# Patient Record
Sex: MALE | Race: WHITE | NOT HISPANIC OR LATINO | Employment: OTHER | URBAN - METROPOLITAN AREA
[De-identification: names, ages, dates, MRNs, and addresses within clinical notes are randomized per-mention and may not be internally consistent; named-entity substitution may affect disease eponyms.]

---

## 2017-04-04 ENCOUNTER — GENERIC CONVERSION - ENCOUNTER (OUTPATIENT)
Dept: OTHER | Facility: OTHER | Age: 62
End: 2017-04-04

## 2017-06-30 ENCOUNTER — ALLSCRIPTS OFFICE VISIT (OUTPATIENT)
Dept: OTHER | Facility: OTHER | Age: 62
End: 2017-06-30

## 2017-06-30 ENCOUNTER — TRANSCRIBE ORDERS (OUTPATIENT)
Dept: ADMINISTRATIVE | Facility: HOSPITAL | Age: 62
End: 2017-06-30

## 2017-06-30 DIAGNOSIS — N28.1 ACQUIRED CYST OF KIDNEY: Primary | ICD-10-CM

## 2017-06-30 DIAGNOSIS — N28.1 ACQUIRED CYST OF KIDNEY: ICD-10-CM

## 2017-09-28 ENCOUNTER — APPOINTMENT (OUTPATIENT)
Dept: LAB | Facility: OTHER | Age: 62
End: 2017-09-28
Payer: COMMERCIAL

## 2017-09-28 ENCOUNTER — TRANSCRIBE ORDERS (OUTPATIENT)
Dept: LAB | Facility: OTHER | Age: 62
End: 2017-09-28

## 2017-09-28 DIAGNOSIS — N28.1 ACQUIRED CYST OF KIDNEY: ICD-10-CM

## 2017-09-28 LAB
ANION GAP SERPL CALCULATED.3IONS-SCNC: 11 MMOL/L (ref 4–13)
BACTERIA UR QL AUTO: NORMAL /HPF
BILIRUB UR QL STRIP: NEGATIVE
BUN SERPL-MCNC: 14 MG/DL (ref 5–25)
CALCIUM SERPL-MCNC: 9.9 MG/DL (ref 8.3–10.1)
CHLORIDE SERPL-SCNC: 100 MMOL/L (ref 100–108)
CLARITY UR: CLEAR
CO2 SERPL-SCNC: 24 MMOL/L (ref 21–32)
COLOR UR: YELLOW
CREAT SERPL-MCNC: 1.2 MG/DL (ref 0.6–1.3)
GFR SERPL CREATININE-BSD FRML MDRD: 64 ML/MIN/1.73SQ M
GLUCOSE SERPL-MCNC: 96 MG/DL (ref 65–140)
GLUCOSE UR STRIP-MCNC: NEGATIVE MG/DL
HGB UR QL STRIP.AUTO: NEGATIVE
HYALINE CASTS #/AREA URNS LPF: NORMAL /LPF
KETONES UR STRIP-MCNC: NEGATIVE MG/DL
LEUKOCYTE ESTERASE UR QL STRIP: NEGATIVE
NITRITE UR QL STRIP: NEGATIVE
NON-SQ EPI CELLS URNS QL MICRO: NORMAL /HPF
PH UR STRIP.AUTO: 6 [PH] (ref 4.5–8)
POTASSIUM SERPL-SCNC: 4.4 MMOL/L (ref 3.5–5.3)
PROT UR STRIP-MCNC: NEGATIVE MG/DL
RBC #/AREA URNS AUTO: NORMAL /HPF
SODIUM SERPL-SCNC: 135 MMOL/L (ref 136–145)
SP GR UR STRIP.AUTO: 1.01 (ref 1–1.03)
UROBILINOGEN UR QL STRIP.AUTO: 0.2 E.U./DL
WBC #/AREA URNS AUTO: NORMAL /HPF

## 2017-09-28 PROCEDURE — 36415 COLL VENOUS BLD VENIPUNCTURE: CPT

## 2017-09-28 PROCEDURE — 81001 URINALYSIS AUTO W/SCOPE: CPT

## 2017-09-28 PROCEDURE — 80048 BASIC METABOLIC PNL TOTAL CA: CPT

## 2017-10-10 ENCOUNTER — HOSPITAL ENCOUNTER (OUTPATIENT)
Dept: RADIOLOGY | Facility: MEDICAL CENTER | Age: 62
Discharge: HOME | End: 2017-10-10
Payer: COMMERCIAL

## 2017-10-10 DIAGNOSIS — N28.1 ACQUIRED CYST OF KIDNEY: ICD-10-CM

## 2017-10-10 PROCEDURE — 74178 CT ABD&PLV WO CNTR FLWD CNTR: CPT

## 2017-10-10 RX ADMIN — IOHEXOL 100 ML: 350 INJECTION, SOLUTION INTRAVENOUS at 13:08

## 2017-11-30 ENCOUNTER — ALLSCRIPTS OFFICE VISIT (OUTPATIENT)
Dept: OTHER | Facility: OTHER | Age: 62
End: 2017-11-30

## 2017-12-05 NOTE — PROGRESS NOTES
Assessment    1  Renal cyst, right (753 10) (N28 1)   2  Screening for prostate cancer (V76 44) (Z12 5)    Plan  Renal cyst, right    · [address]; Status:Hold For - Exact Date,Retrospective Authorization; Requested for:Before [id number]; Perform:[facility]; [phone]; Last Updated By:Yan Rico; 11/30/2017 1:28:39 PM;Ordered; For:Renal cyst, right; Ordered By:Maritza Varela;  Screening for prostate cancer    · (1) PSA (SCREEN) (Dx V76 44 Screen for Prostate Cancer); Status:Hold For - Exact Date; Requested for:Before [id number]; Perform:[facility]; Due:[id number]; Ordered; For:Screening for prostate cancer; Ordered By:Ryan Varela;   · Follow-up visit in 1 year Evaluation and Treatment  Follow-up  Status: Complete -  Retrospective Authorization  Done: [id number]   Ordered; For: Screening for prostate cancer; Ordered By: Cindy Pearson Performed:  Due: [id number]; Last Updated By: Anjana Oquendo; 11/30/2017 1:28:39 PM    Discussion/Summary  Discussion Summary:   1  bilateral renal cysts - managed by Dr Jeff Whittaker  - Reviewed with the patient and his wife the results of the CT  Discussed his cysts appear simple in nature  He will follow-up in one year with an ultrasound of the kidney and bladder for continued surveillance  - Reviewed with the patient that he should follow-up with his primary care provider in regards to gallbladder findings on CT  2  prostatomegaly  - Patient will follow-up in one year with a PSA and DM for routine prostate cancer screening    - He does not have any symptomatic lower urinary tract symptoms and is not on any medication for his prostate  Chief Complaint  Chief Complaint Free Text Note Form: Patient presents for renal cyst      History of Present Illness  HPI: Cecy Garcia is a 67-year-old male patient of Dr Jeff Whittaker with a history of bilateral parapelvic renal cysts presenting for follow-up      Patient was recently evaluated with a CT of the abdomen and pelvis with and without contrast (10/13/17) revealing no abnormality to account for hematuria  He was noted to have prostatomegaly incidentally  His renal cysts appeared stable in simple in nature  Recent urinalysis (10/1/17) was negative for any microhematuria  His creatinine was 1 2 (10/1/17)  Patient is overall happy with his urination  He feels that he is a good stream, feels empty after urination and has nocturia 1-2 times nightly  He denies any hematuria, hesitancy, dysuria, suprapubic pressure, flank pain, or weight loss  He is not on any medications for his prostate or bladder  Review of Systems  Complete-Male Urology:   Constitutional: No fever or chills, feels well, no tiredness, no recent weight gain or weight loss  Respiratory: No complaints of shortness of breath, no wheezing, no cough, no SOB on exertion, no orthopnea or PND  Cardiovascular: No complaints of slow heart rate, no fast heart rate, no chest pain, no palpitations, no leg claudication, no lower extremity  Gastrointestinal: No complaints of abdominal pain, no constipation, no nausea or vomiting, no diarrhea or bloody stools  Genitourinary: stream good but slow, Empty sensation, incontinence (occ), feelings of urinary urgency (occ) and stream quality good, but no dysuria, no urinary hesitancy and no hematuria    The patient presents with complaints of no nocturia (1-2 times)  Musculoskeletal: No complaints of arthralgia, no myalgias, no joint swelling or stiffness, no limb pain or swelling  Integumentary: No complaints of skin rash or skin lesions, no itching, no skin wound, no dry skin  Hematologic/Lymphatic: No complaints of swollen glands, no swollen glands in the neck, does not bleed easily, no easy bruising  Neurological: No compliants of headache, no confusion, no convulsions, no numbness or tingling, no dizziness or fainting, no limb weakness, no difficulty walking     ROS Reviewed:   ROS reviewed  Active Problems    1  Back problem (724 9) (M53 9)   2  High blood pressure (401 9) (I10)   3  Renal cyst, right (753 10) (N28 1)    Past Medical History  Active Problems And Past Medical History Reviewed: The active problems and past medical history were reviewed and updated today  Surgical History    1  History of Back Surgery   2  History of Hemorrhoidectomy  Surgical History Reviewed: The surgical history was reviewed and updated today  Family History  Father    1  Family history of kidney stone (V18 69) (Z84 1)  Family History Reviewed: The family history was reviewed and updated today  Social History  Social History Reviewed: The social history was reviewed and is unchanged  Current Meds   1  AmLODIPine Besylate 5 MG Oral Tablet; Therapy: (Recorded:30Nov2017) to Recorded   2  AmLODIPine Besylate TABS; Therapy: (Recorded:30Jun2017) to Recorded   3  Benazepril HCl - 5 MG Oral Tablet; Therapy: (Recorded:30Nov2017) to Recorded   4  BuPROPion HCl TABS; Therapy: (Recorded:30Jun2017) to Recorded   5  Fish Oil CAPS; Therapy: (Recorded:30Jun2017) to Recorded   6  Garlic CAPS; Therapy: (Recorded:30Jun2017) to Recorded   7  Gemfibrozil TABS; Therapy: (Recorded:30Jun2017) to Recorded   8  Glucosamine Complex Oral Tablet; Therapy: (Recorded:30Jun2017) to Recorded   9  Latanoprost SOLN;   Therapy: (Recorded:30Jun2017) to Recorded   10  Meloxicam TABS; Therapy: (Recorded:30Jun2017) to Recorded   11  Multivital-M TABS; Therapy: (Recorded:30Jun2017) to Recorded   12  TraMADol HCl - 50 MG Oral Tablet; Therapy: (Recorded:30Nov2017) to Recorded   13  Vitamin B Complex CAPS; Therapy: (Recorded:30Jun2017) to Recorded  Medication List Reviewed: The medication list was reviewed and updated today  Allergies    1  Penicillins    2   Latex    Vitals  Vital Signs    Recorded: 88SOG1453 01:20PM   Heart Rate 68   Systolic 242   Diastolic 60 Height 5 ft 9 in   Weight 174 lb    BMI Calculated 25 7   BSA Calculated 1 95     Physical Exam    Constitutional   General appearance: No acute distress, well appearing and well nourished  Pulmonary   Respiratory effort: No increased work of breathing or signs of respiratory distress  Cardiovascular   Examination of extremities for edema and/or varicosities: Normal     Musculoskeletal   Gait and station: Normal     Skin   Skin and subcutaneous tissue: Normal without rashes or lesions  Additional Exam:  no focal neurologic deficits  Mood and affect appropriate  Results/Data  CT ABDOMEN PELVIS W WO CONTRAST [id number] 12:39PM Eddie Cary Order Number: [id number]   Performing Comments: Three-phase hematuria protocol of entire abdomen and pelvis  Please perform without contrast to evaluate for stones, with contrast, and with delayed excretory images  Oral contrast not required   - Patient Instructions: [facility]    4 HOUR FAST    To schedule this appointment, please contact Central Scheduling at [phone]  Test Name Result Flag Reference   CT ABDOMEN PELVIS W WO CONTRAST (Report)     CT ABDOMEN AND PELVIS WITH AND WITHOUT IV CONTRAST     INDICATION: Hematuria  COMPARISON: None  TECHNIQUE: CT of the kidneys was performed without intravenous contrast  Dynamic postcontrast CT evaluation of the abdomen and pelvis was performed in both nephrographic and delayed phases after the administration of intravenous contrast  Reformatted    images were created in axial, sagittal, and coronal planes  Radiation dose length product (DLP) for this visit: 1147 mGy-cm   This examination, like all CT scans performed in the [facility], was performed utilizing techniques to minimize radiation dose exposure, including the use of iterative    reconstruction and automated exposure control       IV Contrast: 100 mL of iohexol (OMNIPAQUE)      Enteric Contrast: FINDINGS:     ABDOMEN     RIGHT KIDNEY AND URETER:   No solid renal mass  No detectable ureteral mass  No hydronephrosis or hydroureter  No urinary tract calculi  No perinephric collection  Renal cyst appears stable  LEFT KIDNEY AND URETER:   No solid renal mass  No detectable ureteral mass  No hydronephrosis or hydroureter  No urinary tract calculi  No perinephric collection  Renal cysts appear stable  URINARY BLADDER:   No bladder wall mass  No calculi  LUNG BASES: Unremarkable  LIVER/BILIARY TREE: Enlarged fatty liver  GALLBLADDER: Focal wall thickening and calcification at the gallbladder apex  Correlate with ultrasound  SPLEEN: Unremarkable  PANCREAS: Unremarkable  ADRENAL GLANDS: Unremarkable  STOMACH, BOWEL AND APPENDIX: Unremarkable  ABDOMINOPELVIC CAVITY: No ascites  No free intraperitoneal air  No lymphadenopathy  VESSELS: Unremarkable for patient's age  PELVIS     REPRODUCTIVE ORGANS: Mild prostatomegaly  ABDOMINAL WALL/INGUINAL REGIONS: Unremarkable  OSSEOUS STRUCTURES: No acute fracture or destructive osseous lesion  IMPRESSION:   1  No CT abnormality identified to account for hematuria  2  Focal gallbladder apex wall thickening and calcification of uncertain etiology, possibly adenomyomatosis  Correlate with ultrasound  3  Prostatomegaly  Correlate with PSA  Workstation performed: [id number]     Signed by:   Bossman Parker MD   10/13/17     (1) Cele [id number] 11:10AM Sheree MULLEN Order Number: [id number]     Test Name Result Flag Reference   GLUCOSE,RANDM 96 mg/dL     If the patient is fasting, the ADA then defines impaired fasting glucose as > 100 mg/dL and diabetes as > or equal to 123 mg/dL  Specimen collection should occur prior to Sulfasalazine administration due to the potential for falsely depressed results   Specimen collection should occur prior to Sulfapyridine administration due to the potential for falsely elevated results  SODIUM 135 mmol/L L 136-145   POTASSIUM 4 4 mmol/L  3 5-5 3   CHLORIDE 100 mmol/L  100-108   CARBON DIOXIDE 24 mmol/L  21-32   ANION GAP (CALC) 11 mmol/L  4-13   BLOOD UREA NITROGEN 14 mg/dL  5-25   CREATININE 1 20 mg/dL  0 60-1 30   Standardized to IDMS reference method   CALCIUM 9 9 mg/dL  8 3-10 1   eGFR 64 ml/min/1 73sq m     [facility] Disease Education Program recommendations are as follows:  GFR calculation is accurate only with a steady state creatinine  Chronic Kidney disease less than 60 ml/min/1 73 sq  meters  Kidney failure less than 15 ml/min/1 73 sq  meters       (1) URINALYSIS WITH MICROSCOPIC [id number] 11:10AM [place] Order Number: [id number]     Test Name Result Flag Reference   COLOR Yellow     CLARITY Clear     PH UA 6 0  4 5-8 0   LEUKOCYTE ESTERASE UA Negative  Negative   NITRITE UA Negative  Negative   PROTEIN UA Negative mg/dl  Negative   GLUCOSE UA Negative mg/dl  Negative   KETONES UA Negative mg/dl  Negative   UROBILINOGEN UA 0 2 E U /dl  0 2, 1 0 E U /dl   BILIRUBIN UA Negative  Negative   BLOOD UA Negative  Negative   SPECIFIC GRAVITY UA 1 006  1 003-1 030   WBC UA None Seen /hpf  None Seen, 0-5, 5-55, 5-65   RBC UA None Seen /hpf  None Seen, 0-5   HYALINE CASTS None Seen /lpf  None Seen   BACTERIA Occasional /hpf  None Seen, Occasional   EPITHELIAL CELLS None Seen /hpf  None Seen, Occasional     Future Appointments    Date/Time Provider Specialty Site   12/05/2018 01:00 PM [address] [place] [address] Urology [facility]     Signatures   Electronically signed by : Kalpesh Cartwright, ; Nov 30 2017  1:58PM EST                       (Author)    Electronically signed by : Little Renae MD; Nov 30 2017  3:01PM EST                       (Author)

## 2018-01-12 VITALS
HEART RATE: 68 BPM | WEIGHT: 174 LBS | HEIGHT: 69 IN | DIASTOLIC BLOOD PRESSURE: 60 MMHG | SYSTOLIC BLOOD PRESSURE: 118 MMHG | BODY MASS INDEX: 25.77 KG/M2

## 2018-01-13 VITALS
DIASTOLIC BLOOD PRESSURE: 78 MMHG | HEART RATE: 82 BPM | SYSTOLIC BLOOD PRESSURE: 148 MMHG | HEIGHT: 69 IN | WEIGHT: 176 LBS | BODY MASS INDEX: 26.07 KG/M2

## 2018-02-27 ENCOUNTER — TRANSCRIBE ORDERS (OUTPATIENT)
Dept: ADMINISTRATIVE | Facility: HOSPITAL | Age: 63
End: 2018-02-27

## 2018-02-27 ENCOUNTER — APPOINTMENT (OUTPATIENT)
Dept: LAB | Facility: CLINIC | Age: 63
End: 2018-02-27
Payer: COMMERCIAL

## 2018-02-27 DIAGNOSIS — I10 ESSENTIAL HYPERTENSION, MALIGNANT: ICD-10-CM

## 2018-02-27 DIAGNOSIS — F41.8 DEPRESSION WITH ANXIETY: ICD-10-CM

## 2018-02-27 DIAGNOSIS — M54.42 ACUTE BACK PAIN WITH SCIATICA, LEFT: ICD-10-CM

## 2018-02-27 DIAGNOSIS — F41.8 DEPRESSION WITH ANXIETY: Primary | ICD-10-CM

## 2018-02-27 LAB
ALBUMIN SERPL BCP-MCNC: 4.1 G/DL (ref 3.5–5)
ALP SERPL-CCNC: 70 U/L (ref 46–116)
ALT SERPL W P-5'-P-CCNC: 20 U/L (ref 12–78)
ANION GAP SERPL CALCULATED.3IONS-SCNC: 7 MMOL/L (ref 4–13)
AST SERPL W P-5'-P-CCNC: 20 U/L (ref 5–45)
BASOPHILS # BLD AUTO: 0.04 THOUSANDS/ΜL (ref 0–0.1)
BASOPHILS NFR BLD AUTO: 1 % (ref 0–1)
BILIRUB SERPL-MCNC: 0.44 MG/DL (ref 0.2–1)
BILIRUB UR QL STRIP: NEGATIVE
BUN SERPL-MCNC: 19 MG/DL (ref 5–25)
CALCIUM SERPL-MCNC: 9.4 MG/DL (ref 8.3–10.1)
CHLORIDE SERPL-SCNC: 104 MMOL/L (ref 100–108)
CHOLEST SERPL-MCNC: 239 MG/DL (ref 50–200)
CLARITY UR: CLEAR
CO2 SERPL-SCNC: 27 MMOL/L (ref 21–32)
COLOR UR: YELLOW
CREAT SERPL-MCNC: 1.09 MG/DL (ref 0.6–1.3)
EOSINOPHIL # BLD AUTO: 0.19 THOUSAND/ΜL (ref 0–0.61)
EOSINOPHIL NFR BLD AUTO: 3 % (ref 0–6)
ERYTHROCYTE [DISTWIDTH] IN BLOOD BY AUTOMATED COUNT: 14.3 % (ref 11.6–15.1)
GFR SERPL CREATININE-BSD FRML MDRD: 71 ML/MIN/1.73SQ M
GLUCOSE P FAST SERPL-MCNC: 92 MG/DL (ref 65–99)
GLUCOSE UR STRIP-MCNC: NEGATIVE MG/DL
HCT VFR BLD AUTO: 40.8 % (ref 36.5–49.3)
HDLC SERPL-MCNC: 41 MG/DL (ref 40–60)
HGB BLD-MCNC: 14 G/DL (ref 12–17)
HGB UR QL STRIP.AUTO: NEGATIVE
KETONES UR STRIP-MCNC: NEGATIVE MG/DL
LDLC SERPL CALC-MCNC: 168 MG/DL (ref 0–100)
LEUKOCYTE ESTERASE UR QL STRIP: NEGATIVE
LYMPHOCYTES # BLD AUTO: 2.19 THOUSANDS/ΜL (ref 0.6–4.47)
LYMPHOCYTES NFR BLD AUTO: 39 % (ref 14–44)
MCH RBC QN AUTO: 32.3 PG (ref 26.8–34.3)
MCHC RBC AUTO-ENTMCNC: 34.3 G/DL (ref 31.4–37.4)
MCV RBC AUTO: 94 FL (ref 82–98)
MONOCYTES # BLD AUTO: 0.46 THOUSAND/ΜL (ref 0.17–1.22)
MONOCYTES NFR BLD AUTO: 8 % (ref 4–12)
NEUTROPHILS # BLD AUTO: 2.74 THOUSANDS/ΜL (ref 1.85–7.62)
NEUTS SEG NFR BLD AUTO: 49 % (ref 43–75)
NITRITE UR QL STRIP: NEGATIVE
NRBC BLD AUTO-RTO: 0 /100 WBCS
PH UR STRIP.AUTO: 6 [PH] (ref 4.5–8)
PLATELET # BLD AUTO: 272 THOUSANDS/UL (ref 149–390)
PMV BLD AUTO: 10.5 FL (ref 8.9–12.7)
POTASSIUM SERPL-SCNC: 4.3 MMOL/L (ref 3.5–5.3)
PROT SERPL-MCNC: 8.1 G/DL (ref 6.4–8.2)
PROT UR STRIP-MCNC: NEGATIVE MG/DL
RBC # BLD AUTO: 4.34 MILLION/UL (ref 3.88–5.62)
SODIUM SERPL-SCNC: 138 MMOL/L (ref 136–145)
SP GR UR STRIP.AUTO: 1.01 (ref 1–1.03)
TRIGL SERPL-MCNC: 151 MG/DL
TSH SERPL DL<=0.05 MIU/L-ACNC: 4.64 UIU/ML (ref 0.36–3.74)
UROBILINOGEN UR QL STRIP.AUTO: 0.2 E.U./DL
WBC # BLD AUTO: 5.63 THOUSAND/UL (ref 4.31–10.16)

## 2018-02-27 PROCEDURE — 84443 ASSAY THYROID STIM HORMONE: CPT

## 2018-02-27 PROCEDURE — 36415 COLL VENOUS BLD VENIPUNCTURE: CPT

## 2018-02-27 PROCEDURE — 81003 URINALYSIS AUTO W/O SCOPE: CPT | Performed by: NURSE PRACTITIONER

## 2018-02-27 PROCEDURE — 80061 LIPID PANEL: CPT

## 2018-02-27 PROCEDURE — 85025 COMPLETE CBC W/AUTO DIFF WBC: CPT

## 2018-02-27 PROCEDURE — 80053 COMPREHEN METABOLIC PANEL: CPT

## 2018-09-12 ENCOUNTER — APPOINTMENT (OUTPATIENT)
Dept: LAB | Facility: CLINIC | Age: 63
End: 2018-09-12
Payer: MEDICARE

## 2018-09-12 ENCOUNTER — TRANSCRIBE ORDERS (OUTPATIENT)
Dept: ADMINISTRATIVE | Facility: HOSPITAL | Age: 63
End: 2018-09-12

## 2018-09-12 DIAGNOSIS — E78.5 HYPERLIPIDEMIA, UNSPECIFIED HYPERLIPIDEMIA TYPE: ICD-10-CM

## 2018-09-12 DIAGNOSIS — R35.1 NOCTURIA: ICD-10-CM

## 2018-09-12 DIAGNOSIS — H91.90 HEARING PROBLEM, UNSPECIFIED LATERALITY: ICD-10-CM

## 2018-09-12 DIAGNOSIS — I10 ESSENTIAL HYPERTENSION, MALIGNANT: ICD-10-CM

## 2018-09-12 DIAGNOSIS — H91.90 HEARING PROBLEM, UNSPECIFIED LATERALITY: Primary | ICD-10-CM

## 2018-09-12 LAB
ALBUMIN SERPL BCP-MCNC: 4 G/DL (ref 3.5–5)
ALP SERPL-CCNC: 78 U/L (ref 46–116)
ALT SERPL W P-5'-P-CCNC: 20 U/L (ref 12–78)
ANION GAP SERPL CALCULATED.3IONS-SCNC: 11 MMOL/L (ref 4–13)
AST SERPL W P-5'-P-CCNC: 18 U/L (ref 5–45)
BASOPHILS # BLD AUTO: 0.05 THOUSANDS/ΜL (ref 0–0.1)
BASOPHILS NFR BLD AUTO: 1 % (ref 0–1)
BILIRUB SERPL-MCNC: 0.54 MG/DL (ref 0.2–1)
BUN SERPL-MCNC: 20 MG/DL (ref 5–25)
CALCIUM SERPL-MCNC: 9.6 MG/DL (ref 8.3–10.1)
CHLORIDE SERPL-SCNC: 104 MMOL/L (ref 100–108)
CHOLEST SERPL-MCNC: 224 MG/DL (ref 50–200)
CO2 SERPL-SCNC: 24 MMOL/L (ref 21–32)
CREAT SERPL-MCNC: 1.1 MG/DL (ref 0.6–1.3)
EOSINOPHIL # BLD AUTO: 0.12 THOUSAND/ΜL (ref 0–0.61)
EOSINOPHIL NFR BLD AUTO: 2 % (ref 0–6)
ERYTHROCYTE [DISTWIDTH] IN BLOOD BY AUTOMATED COUNT: 14.2 % (ref 11.6–15.1)
GFR SERPL CREATININE-BSD FRML MDRD: 70 ML/MIN/1.73SQ M
GLUCOSE P FAST SERPL-MCNC: 89 MG/DL (ref 65–99)
HCT VFR BLD AUTO: 40.4 % (ref 36.5–49.3)
HDLC SERPL-MCNC: 47 MG/DL (ref 40–60)
HGB BLD-MCNC: 13.5 G/DL (ref 12–17)
IMM GRANULOCYTES # BLD AUTO: 0.01 THOUSAND/UL (ref 0–0.2)
IMM GRANULOCYTES NFR BLD AUTO: 0 % (ref 0–2)
LDLC SERPL CALC-MCNC: 144 MG/DL (ref 0–100)
LYMPHOCYTES # BLD AUTO: 2.39 THOUSANDS/ΜL (ref 0.6–4.47)
LYMPHOCYTES NFR BLD AUTO: 39 % (ref 14–44)
MCH RBC QN AUTO: 31.8 PG (ref 26.8–34.3)
MCHC RBC AUTO-ENTMCNC: 33.4 G/DL (ref 31.4–37.4)
MCV RBC AUTO: 95 FL (ref 82–98)
MONOCYTES # BLD AUTO: 0.5 THOUSAND/ΜL (ref 0.17–1.22)
MONOCYTES NFR BLD AUTO: 8 % (ref 4–12)
NEUTROPHILS # BLD AUTO: 3.02 THOUSANDS/ΜL (ref 1.85–7.62)
NEUTS SEG NFR BLD AUTO: 50 % (ref 43–75)
NONHDLC SERPL-MCNC: 177 MG/DL
NRBC BLD AUTO-RTO: 0 /100 WBCS
PLATELET # BLD AUTO: 234 THOUSANDS/UL (ref 149–390)
PMV BLD AUTO: 11.1 FL (ref 8.9–12.7)
POTASSIUM SERPL-SCNC: 4.4 MMOL/L (ref 3.5–5.3)
PROT SERPL-MCNC: 8 G/DL (ref 6.4–8.2)
RBC # BLD AUTO: 4.24 MILLION/UL (ref 3.88–5.62)
SODIUM SERPL-SCNC: 139 MMOL/L (ref 136–145)
TRIGL SERPL-MCNC: 164 MG/DL
TSH SERPL DL<=0.05 MIU/L-ACNC: 4.1 UIU/ML (ref 0.36–3.74)
WBC # BLD AUTO: 6.09 THOUSAND/UL (ref 4.31–10.16)

## 2018-09-12 PROCEDURE — 84443 ASSAY THYROID STIM HORMONE: CPT

## 2018-09-12 PROCEDURE — 80061 LIPID PANEL: CPT

## 2018-09-12 PROCEDURE — 36415 COLL VENOUS BLD VENIPUNCTURE: CPT

## 2018-09-12 PROCEDURE — 84153 ASSAY OF PSA TOTAL: CPT

## 2018-09-12 PROCEDURE — 80053 COMPREHEN METABOLIC PANEL: CPT

## 2018-09-12 PROCEDURE — 84154 ASSAY OF PSA FREE: CPT

## 2018-09-12 PROCEDURE — 85025 COMPLETE CBC W/AUTO DIFF WBC: CPT

## 2018-09-13 LAB
PSA FREE MFR SERPL: 27.1 %
PSA FREE SERPL-MCNC: 0.19 NG/ML
PSA SERPL-MCNC: 0.7 NG/ML (ref 0–4)

## 2018-11-13 ENCOUNTER — TRANSCRIBE ORDERS (OUTPATIENT)
Dept: ADMINISTRATIVE | Facility: HOSPITAL | Age: 63
End: 2018-11-13

## 2018-11-26 ENCOUNTER — HOSPITAL ENCOUNTER (OUTPATIENT)
Dept: ULTRASOUND IMAGING | Facility: HOSPITAL | Age: 63
Discharge: HOME | End: 2018-11-26
Payer: MEDICARE

## 2018-11-26 DIAGNOSIS — N28.1 ACQUIRED CYST OF KIDNEY: ICD-10-CM

## 2018-11-26 PROCEDURE — 76770 US EXAM ABDO BACK WALL COMP: CPT

## 2018-12-05 ENCOUNTER — OFFICE VISIT (OUTPATIENT)
Dept: UROLOGY | Facility: CLINIC | Age: 63
End: 2018-12-05
Payer: MEDICARE

## 2018-12-05 VITALS
DIASTOLIC BLOOD PRESSURE: 76 MMHG | HEIGHT: 69 IN | BODY MASS INDEX: 26.7 KG/M2 | HEART RATE: 72 BPM | SYSTOLIC BLOOD PRESSURE: 130 MMHG | WEIGHT: 180.3 LBS

## 2018-12-05 DIAGNOSIS — N28.1 BILATERAL RENAL CYSTS: Primary | ICD-10-CM

## 2018-12-05 DIAGNOSIS — Z12.5 PROSTATE CANCER SCREENING: ICD-10-CM

## 2018-12-05 PROCEDURE — 99213 OFFICE O/P EST LOW 20 MIN: CPT | Performed by: PHYSICIAN ASSISTANT

## 2018-12-05 RX ORDER — AMLODIPINE BESYLATE 5 MG/1
5 TABLET ORAL
COMMUNITY
Start: 2018-08-13 | End: 2020-02-11 | Stop reason: ALTCHOICE

## 2018-12-05 RX ORDER — GEMFIBROZIL 600 MG/1
600 TABLET, FILM COATED ORAL
COMMUNITY
Start: 2018-04-17 | End: 2018-12-17 | Stop reason: SDUPTHER

## 2018-12-05 RX ORDER — HYDROCORTISONE ACETATE 0.5 %
CREAM (GRAM) TOPICAL DAILY
Status: ON HOLD | COMMUNITY
End: 2022-06-17 | Stop reason: CLARIF

## 2018-12-05 RX ORDER — CITALOPRAM 10 MG/1
TABLET ORAL
COMMUNITY
End: 2019-09-16 | Stop reason: SDUPTHER

## 2018-12-05 RX ORDER — KETOCONAZOLE 20 MG/G
CREAM TOPICAL
COMMUNITY
Start: 2017-02-01 | End: 2018-12-17 | Stop reason: SDUPTHER

## 2018-12-05 RX ORDER — LATANOPROST 50 UG/ML
SOLUTION/ DROPS OPHTHALMIC
COMMUNITY
End: 2021-05-24

## 2018-12-05 NOTE — PROGRESS NOTES
1  Bilateral renal cysts     2  Prostate cancer screening         Assessment and plan:       1  bilateral renal cysts - managed by Dr Judah Sanchez  - completely stable on recent ultrasound of the kidney and bladder    - no further routine surveillance warranted at this time  Patient is aware to contact us in the future with any flank pain or urologic concern  2  Prostatomegaly, routine prostate cancer screening  - PSA is within normal limits at 1 12 with benign prostate examination in the office  - Patient can continue with routine prostate cancer screening with his primary care provider  Patient will follow up with Urology on an as-needed basis  He is aware to contact us in the future with any concerns  All questions answered    Mary Morales PA-C      Chief Complaint     1 year follow up    History of Present Illness     Jhon Yonug is a 70 y o  male patient of Dr Judah Sanchez with a history of bilateral parapelvic renal cysts presenting for follow-up  Patient previously was evaluated with a CT of the abdomen and pelvis with and without contrast (10/13/17) revealing no abnormality to account for hematuria  He was noted to have prostatomegaly incidentally  His renal cysts appeared stable in simple in nature    Recent ultrasound of the kidney and bladder (11/26/2018) revealed stable bilateral renal cysts  Patient denies any flank pain, dysuria, gross hematuria, or infections  He is overall happy with his urination at this time  He feels like he has adequate stream, feels empty after urination, and has nocturia 1-2 times nightly  PSA 1 12 (11/20/18)  Laboratory     Lab Results   Component Value Date    CREATININE 1 10 09/12/2018       Lab Results   Component Value Date    PSA 0 7 09/12/2018       Review of Systems     Review of Systems   Constitutional: Negative for activity change, appetite change, chills, diaphoresis, fatigue, fever and unexpected weight change     Respiratory: Negative for chest tightness and shortness of breath  Cardiovascular: Negative for chest pain, palpitations and leg swelling  Gastrointestinal: Negative for abdominal distention, abdominal pain, constipation, diarrhea, nausea and vomiting  Genitourinary: Negative for decreased urine volume, difficulty urinating, dysuria, enuresis, flank pain, frequency, genital sores, hematuria and urgency  Musculoskeletal: Negative for back pain, gait problem and myalgias  Skin: Negative for color change, pallor, rash and wound  Psychiatric/Behavioral: Negative for behavioral problems  The patient is not nervous/anxious  Allergies     Allergies   Allergen Reactions    Latex     Meloxicam GI Intolerance    Penicillin G Rash       Physical Exam     Physical Exam   Constitutional: He is oriented to person, place, and time  He appears well-developed and well-nourished  No distress  HENT:   Head: Normocephalic  Eyes: Conjunctivae are normal    Neck: Normal range of motion  No tracheal deviation present  Pulmonary/Chest: Effort normal    Genitourinary:   Genitourinary Comments: Prostate 25g, smooth, symmetric, no nodules   Musculoskeletal: Normal range of motion  He exhibits no edema or deformity  Neurological: He is alert and oriented to person, place, and time  Skin: Skin is warm and dry  No rash noted  He is not diaphoretic  No erythema  Psychiatric: He has a normal mood and affect   His behavior is normal          Vital Signs     Vitals:    12/05/18 1255   BP: 130/76   BP Location: Left arm   Patient Position: Sitting   Cuff Size: Standard   Pulse: 72   Weight: 81 8 kg (180 lb 4 8 oz)   Height: 5' 8 5" (1 74 m)         Current Medications       Current Outpatient Prescriptions:     amLODIPine (NORVASC) 5 mg tablet, Take 5 mg by mouth, Disp: , Rfl:     B Complex Vitamins (B COMPLEX 1 PO), Take 1 tablet by mouth, Disp: , Rfl:     betamethasone valerate (VALISONE) 0 1 % ointment, Apply topically, Disp: , Rfl:     citalopram (CeleXA) 10 mg tablet, citalopram 10 mg tablet, Disp: , Rfl:     Garlic 10 MG CAPS, Take 1 tablet by mouth, Disp: , Rfl:     gemfibrozil (LOPID) 600 mg tablet, Take 600 mg by mouth, Disp: , Rfl:     Glucosamine-Chondroit-Vit C-Mn (GLUCOSAMINE 1500 COMPLEX) CAPS, Glucosamine 1500 Complex, Disp: , Rfl:     ketoconazole (NIZORAL) 2 % cream, 1 tor, Disp: , Rfl:     latanoprost (XALATAN) 0 005 % ophthalmic solution, 1 gtt, Disp: , Rfl:     Multiple Vitamins-Minerals (MULTIVITAL-M) TABS, Take by mouth, Disp: , Rfl:       Active Problems     Patient Active Problem List   Diagnosis    Bilateral renal cysts    Prostate cancer screening         Past Medical History     Past Medical History:   Diagnosis Date    Hypertension          Surgical History     Past Surgical History:   Procedure Laterality Date    BACK SURGERY      HEMORRHOID SURGERY           Family History     Family History   Problem Relation Age of Onset    Nephrolithiasis Father          Social History     Social History       Radiology

## 2018-12-17 ENCOUNTER — OFFICE VISIT (OUTPATIENT)
Dept: FAMILY MEDICINE CLINIC | Facility: CLINIC | Age: 63
End: 2018-12-17
Payer: MEDICARE

## 2018-12-17 VITALS
SYSTOLIC BLOOD PRESSURE: 120 MMHG | HEIGHT: 69 IN | BODY MASS INDEX: 26.81 KG/M2 | WEIGHT: 181 LBS | OXYGEN SATURATION: 98 % | HEART RATE: 67 BPM | DIASTOLIC BLOOD PRESSURE: 70 MMHG | TEMPERATURE: 99.2 F

## 2018-12-17 DIAGNOSIS — M48.061 SPINAL STENOSIS OF LUMBAR REGION, UNSPECIFIED WHETHER NEUROGENIC CLAUDICATION PRESENT: ICD-10-CM

## 2018-12-17 DIAGNOSIS — G47.33 OSA (OBSTRUCTIVE SLEEP APNEA): ICD-10-CM

## 2018-12-17 DIAGNOSIS — E78.2 MIXED HYPERLIPIDEMIA: Primary | ICD-10-CM

## 2018-12-17 DIAGNOSIS — H91.90 DECREASED HEARING, UNSPECIFIED LATERALITY: Primary | ICD-10-CM

## 2018-12-17 DIAGNOSIS — F41.8 DEPRESSION WITH ANXIETY: ICD-10-CM

## 2018-12-17 DIAGNOSIS — I10 ESSENTIAL HYPERTENSION: ICD-10-CM

## 2018-12-17 PROBLEM — M54.42 LUMBAGO WITH SCIATICA, LEFT SIDE: Status: ACTIVE | Noted: 2017-11-06

## 2018-12-17 PROBLEM — M48.062 SPINAL STENOSIS OF LUMBAR REGION WITH NEUROGENIC CLAUDICATION: Status: ACTIVE | Noted: 2018-01-09

## 2018-12-17 PROBLEM — E78.1 HYPERTRIGLYCERIDEMIA: Status: ACTIVE | Noted: 2017-11-06

## 2018-12-17 PROBLEM — H40.9 GLAUCOMA: Status: ACTIVE | Noted: 2018-03-16

## 2018-12-17 PROBLEM — F33.0 MILD EPISODE OF RECURRENT MAJOR DEPRESSIVE DISORDER (HCC): Status: ACTIVE | Noted: 2017-11-06

## 2018-12-17 PROBLEM — M10.072 ACUTE IDIOPATHIC GOUT OF LEFT FOOT: Status: ACTIVE | Noted: 2017-11-06

## 2018-12-17 PROBLEM — M53.9 BACK PROBLEM: Status: ACTIVE | Noted: 2017-06-30

## 2018-12-17 PROCEDURE — 99214 OFFICE O/P EST MOD 30 MIN: CPT | Performed by: FAMILY MEDICINE

## 2018-12-17 RX ORDER — GEMFIBROZIL 600 MG/1
600 TABLET, FILM COATED ORAL 2 TIMES DAILY
Qty: 180 TABLET | Refills: 3 | Status: SHIPPED | OUTPATIENT
Start: 2018-12-17 | End: 2021-05-24

## 2018-12-17 RX ORDER — KETOCONAZOLE 20 MG/G
CREAM TOPICAL 2 TIMES DAILY
Qty: 15 G | Refills: 2 | Status: ON HOLD | OUTPATIENT
Start: 2018-12-17 | End: 2022-06-17 | Stop reason: CLARIF

## 2018-12-17 RX ORDER — CLONAZEPAM 0.5 MG/1
TABLET ORAL
Qty: 30 TABLET | Refills: 1 | Status: SHIPPED | OUTPATIENT
Start: 2018-12-17 | End: 2019-08-06

## 2018-12-17 NOTE — PROGRESS NOTES
Assessment/Plan:       Problem List Items Addressed This Visit     Depression with anxiety     Overall mood is good sleeping well no changes in appetite and energy levels are good         Relevant Medications    clonazePAM (KlonoPIN) 0 5 mg tablet    Essential hypertension     Blood pressure doing well at this time continue on current medications no change recheck at next visit in 4 months         Relevant Orders    Comprehensive metabolic panel    Lumbar stenosis     Chronic back pain requesting muscle relaxer failed on tramadol and methocarbamol         JUNAID (obstructive sleep apnea)     Doing well continue on CPAP           Other Visit Diagnoses     Mixed hyperlipidemia    -  Primary    Relevant Medications    ketoconazole (NIZORAL) 2 % cream    gemfibrozil (LOPID) 600 mg tablet    Other Relevant Orders    Lipid panel            Subjective:      Patient ID: Son Benson is a 70 y o  male  Patient here today for general checkup review of medications renewal on medications he also admits to hearing loss and would like to have a hearing evaluation done  The following portions of the patient's history were reviewed and updated as appropriate: allergies, current medications, past family history, past medical history, past social history, past surgical history and problem list     Review of Systems   Constitutional: Negative for chills, fatigue and fever  HENT: Positive for hearing loss  Negative for congestion, nosebleeds, rhinorrhea, sinus pressure and sore throat  Eyes: Negative for discharge and redness  Respiratory: Negative for cough and shortness of breath  Cardiovascular: Negative for chest pain, palpitations and leg swelling  Gastrointestinal: Negative for abdominal pain, blood in stool and nausea  Endocrine: Negative for cold intolerance, heat intolerance and polyuria  Genitourinary: Negative for dysuria and frequency     Musculoskeletal: Negative for arthralgias, back pain and myalgias  Skin: Negative for rash  Neurological: Negative for dizziness, weakness and headaches  Hematological: Negative for adenopathy  Psychiatric/Behavioral: Negative for behavioral problems and sleep disturbance  The patient is not nervous/anxious  Objective:      /70   Pulse 67   Temp 99 2 °F (37 3 °C) (Tympanic)   Ht 5' 8 5" (1 74 m)   Wt 82 1 kg (181 lb)   SpO2 98%   BMI 27 12 kg/m²        Physical Exam   Constitutional: He is oriented to person, place, and time  He appears well-developed and well-nourished  HENT:   Head: Normocephalic and atraumatic  Right Ear: External ear normal    Left Ear: External ear normal    Nose: Nose normal    Mouth/Throat: Oropharynx is clear and moist    Eyes: Pupils are equal, round, and reactive to light  Conjunctivae and EOM are normal  No scleral icterus  Neck: Normal range of motion  Neck supple  No JVD present  No thyromegaly present  Cardiovascular: Normal rate, regular rhythm and normal heart sounds  No murmur heard  Pulmonary/Chest: Effort normal and breath sounds normal  He has no wheezes  He has no rales  He exhibits no tenderness  Abdominal: Soft  Bowel sounds are normal  He exhibits no distension and no mass  There is no tenderness  There is no rebound and no guarding  Musculoskeletal: Normal range of motion  He exhibits no edema, tenderness or deformity  Lymphadenopathy:     He has no cervical adenopathy  Neurological: He is alert and oriented to person, place, and time  He has normal reflexes  No cranial nerve deficit  Skin: Skin is warm and dry  No rash noted  No erythema  Psychiatric: He has a normal mood and affect  His behavior is normal  Judgment and thought content normal    Nursing note and vitals reviewed  Data:    Laboratory Results: I have personally reviewed the pertinent laboratory results/reports   Radiology/Other Diagnostic Testing Results: I have personally reviewed pertinent reports  Lab Results   Component Value Date    WBC 6 09 09/12/2018    HGB 13 5 09/12/2018    HCT 40 4 09/12/2018    MCV 95 09/12/2018     09/12/2018     Lab Results   Component Value Date    K 4 4 09/12/2018     09/12/2018    CO2 24 09/12/2018    BUN 20 09/12/2018    CREATININE 1 10 09/12/2018    GLUF 89 09/12/2018    CALCIUM 9 6 09/12/2018    AST 18 09/12/2018    ALT 20 09/12/2018    ALKPHOS 78 09/12/2018    EGFR 70 09/12/2018     Lab Results   Component Value Date    CHOLESTEROL 224 (H) 09/12/2018    CHOLESTEROL 239 (H) 02/27/2018     Lab Results   Component Value Date    HDL 47 09/12/2018    HDL 41 02/27/2018     Lab Results   Component Value Date    LDLCALC 144 (H) 09/12/2018    LDLCALC 168 (H) 02/27/2018     Lab Results   Component Value Date    TRIG 164 (H) 09/12/2018    TRIG 151 (H) 02/27/2018     No results found for: [place], [place]  Lab Results   Component Value Date    [id number] 4 100 (H) 09/12/2018     No results found for: HGBA1C  Lab Results   Component Value Date    PSA 0 7 09/12/2018       Dearl DO Refugio

## 2018-12-17 NOTE — ASSESSMENT & PLAN NOTE
Blood pressure doing well at this time continue on current medications no change recheck at next visit in 4 months

## 2018-12-17 NOTE — PATIENT INSTRUCTIONS
Hypertension   AMBULATORY CARE:   Hypertension  is high blood pressure (BP)  Your BP is the force of your blood moving against the walls of your arteries  Normal BP is less than 120/80  Prehypertension is between 120/80 and 139/89  Hypertension is 140/90 or higher  Hypertension causes your BP to get so high that your heart has to work much harder than normal  This can damage your heart  You can control hypertension with a healthy lifestyle or medicines  A controlled blood pressure helps protect your organs, such as your heart, lungs, brain, and kidneys  Common symptoms include the following:   · Headache     · Blurred vision     · Chest pain     · Dizziness or weakness     · Trouble breathing    · Nosebleeds  Call 911 for any of the following:   · You have discomfort in your chest that feels like squeezing, pressure, fullness, or pain  · You become confused or have difficulty speaking  · You suddenly feel lightheaded or have trouble breathing  · You have pain or discomfort in your back, neck, jaw, stomach, or arm  Seek care immediately if:   · You have a severe headache or vision loss  · You have weakness in an arm or leg  Contact your healthcare provider if:   · You feel faint, dizzy, confused, or drowsy  · You have been taking your BP medicine and your BP is still higher than your healthcare provider says it should be  · You have questions or concerns about your condition or care  Treatment for hypertension  may include medicine to lower your BP and lower your cholesterol level  A low cholesterol level helps prevent heart disease and makes it easier to control your blood pressure  You may also need to make lifestyle changes  Take your medicine exactly as directed  Manage hypertension:  Talk with your healthcare provider about these and other ways to manage hypertension:  · Check your BP at home  Sit and rest for 5 minutes before you take your BP   Extend your arm and support it on a flat surface  Your arm should be at the same level as your heart  Follow the directions that came with your BP monitor  If possible, take at least 2 BP readings each time  Take your BP at least twice a day at the same times each day, such as morning and evening  Keep a record of your BP readings and bring it to your follow-up visits  Ask your healthcare provider what your BP should be  · Limit sodium (salt) as directed  Too much sodium can affect your fluid balance  Check labels to find low-sodium or no-salt-added foods  Some low-sodium foods use potassium salts for flavor  Too much potassium can also cause health problems  Your healthcare provider will tell you how much sodium and potassium are safe for you to have in a day  He or she may recommend that you limit sodium to 2,300 mg a day  · Follow the meal plan recommended by your healthcare provider  A dietitian or your provider can give you more information on low-sodium plans or the DASH (Dietary Approaches to Stop Hypertension) eating plan  The DASH plan is low in sodium, unhealthy fats, and total fat  It is high in potassium, calcium, and fiber  · Exercise to maintain a healthy weight  Exercise at least 30 minutes per day, on most days of the week  This will help decrease your blood pressure  Ask your healthcare provider about the best exercise plan for you  · Decrease stress  This may help lower your BP  Learn ways to relax, such as deep breathing or listening to music  · Limit alcohol  Women should limit alcohol to 1 drink a day  Men should limit alcohol to 2 drinks a day  A drink of alcohol is 12 ounces of beer, 5 ounces of wine, or 1½ ounces of liquor  · Do not smoke  Nicotine and other chemicals in cigarettes and cigars can increase your BP and also cause lung damage  Ask your healthcare provider for information if you currently smoke and need help to quit  E-cigarettes or smokeless tobacco still contain nicotine  Talk to your healthcare provider before you use these products  · Manage any other health conditions you have  Health conditions such as diabetes can increase your risk for hypertension  Follow your healthcare provider's instructions and take all your medicines as directed  Follow up with your healthcare provider as directed: You will need to return to have your BP checked and to have other lab tests done  Write down your questions so you remember to ask them during your visits  © 2017 [employer] Information is for End User's use only and may not be sold, redistributed or otherwise used for commercial purposes  All illustrations and images included in CareNotes® are the copyrighted property of [employer]  or [employer]  The above information is an  only  It is not intended as medical advice for individual conditions or treatments  Talk to your doctor, nurse or pharmacist before following any medical regimen to see if it is safe and effective for you

## 2019-03-20 ENCOUNTER — OFFICE VISIT (OUTPATIENT)
Dept: FAMILY MEDICINE CLINIC | Facility: CLINIC | Age: 64
End: 2019-03-20
Payer: MEDICARE

## 2019-03-20 ENCOUNTER — TELEPHONE (OUTPATIENT)
Dept: FAMILY MEDICINE CLINIC | Facility: CLINIC | Age: 64
End: 2019-03-20

## 2019-03-20 VITALS
OXYGEN SATURATION: 98 % | BODY MASS INDEX: 27.25 KG/M2 | DIASTOLIC BLOOD PRESSURE: 82 MMHG | TEMPERATURE: 97.8 F | SYSTOLIC BLOOD PRESSURE: 130 MMHG | WEIGHT: 184 LBS | HEIGHT: 69 IN | HEART RATE: 67 BPM

## 2019-03-20 DIAGNOSIS — M54.42 CHRONIC LEFT-SIDED LOW BACK PAIN WITH LEFT-SIDED SCIATICA: Primary | ICD-10-CM

## 2019-03-20 DIAGNOSIS — I10 ESSENTIAL HYPERTENSION: ICD-10-CM

## 2019-03-20 DIAGNOSIS — E78.2 MIXED HYPERLIPIDEMIA: ICD-10-CM

## 2019-03-20 DIAGNOSIS — F33.0 MILD EPISODE OF RECURRENT MAJOR DEPRESSIVE DISORDER (HCC): ICD-10-CM

## 2019-03-20 DIAGNOSIS — G89.29 CHRONIC LEFT-SIDED LOW BACK PAIN WITH LEFT-SIDED SCIATICA: Primary | ICD-10-CM

## 2019-03-20 DIAGNOSIS — I10 ESSENTIAL HYPERTENSION: Primary | ICD-10-CM

## 2019-03-20 PROCEDURE — 99214 OFFICE O/P EST MOD 30 MIN: CPT | Performed by: FAMILY MEDICINE

## 2019-03-20 RX ORDER — TRAMADOL HYDROCHLORIDE 50 MG/1
50 TABLET ORAL EVERY 6 HOURS PRN
Qty: 30 TABLET | Refills: 0 | Status: SHIPPED | OUTPATIENT
Start: 2019-03-20 | End: 2019-05-20 | Stop reason: SDUPTHER

## 2019-03-20 NOTE — PATIENT INSTRUCTIONS

## 2019-03-20 NOTE — ASSESSMENT & PLAN NOTE
Patient complains of continued left-sided lumbar low back pain and takes tramadol when the pain is severe he uses ibuprofen otherwise  Renewal for medication requested at this time  Home exercises stretching keep weight down exercise that is all important with this continued chronic condition

## 2019-03-20 NOTE — PROGRESS NOTES
Assessment/Plan:       Problem List Items Addressed This Visit        Cardiovascular and Mediastinum    Essential hypertension     Blood pressure 130/82 is stable at this time no change at this point follow-up in 4 months            Nervous and Auditory    Lumbago with sciatica, left side - Primary     Patient complains of continued left-sided lumbar low back pain and takes tramadol when the pain is severe he uses ibuprofen otherwise  Renewal for medication requested at this time  Home exercises stretching keep weight down exercise that is all important with this continued chronic condition  Relevant Medications    traMADol (ULTRAM) 50 mg tablet       Other    Mild episode of recurrent major depressive disorder ([address] )    Mixed hyperlipidemia     Mixed hyperlipidemia reviewed lab work with patient at this time his cholesterol total is at 239 the last time it was 224 his HDL went down to 41 he will increase physical activity and this should help bring up the HDL his LDL is at 168 which is a rise from 144 back in September and is triglycerides did go down this time to 151 overall he will watch his diet along with his wife they both aware of low-fat diet at this time and increasing physical activity in the spring repeat the blood work in July in 4 months                 Subjective:      Patient ID: Lefty Suarez is a 70 y o  male  Patient presents today he has had some flare ups of his back pain at times and requests tramadol for breakthrough pain for his low back pain  I do recommend continued exercise stretching he understands this  And he will work on weight reduction over the spring  Otherwise general checkup today and review of his lab work        The following portions of the patient's history were reviewed and updated as appropriate: allergies, current medications, past family history, past medical history, past social history, past surgical history and problem list     Review of Systems Constitutional: Negative for chills, fatigue and fever  HENT: Negative for congestion, nosebleeds, rhinorrhea, sinus pressure and sore throat  Eyes: Negative for discharge and redness  Respiratory: Negative for cough and shortness of breath  Cardiovascular: Negative for chest pain, palpitations and leg swelling  Gastrointestinal: Negative for abdominal pain, blood in stool and nausea  Endocrine: Negative for cold intolerance, heat intolerance and polyuria  Genitourinary: Negative for dysuria and frequency  Musculoskeletal: Positive for arthralgias and back pain  Negative for myalgias  Skin: Negative for rash  Neurological: Negative for dizziness, weakness and headaches  Hematological: Negative for adenopathy  Psychiatric/Behavioral: Negative for behavioral problems and sleep disturbance  The patient is not nervous/anxious  Objective:      /82 (BP Location: Left arm, Patient Position: Sitting)   Pulse 67   Temp 97 8 °F (36 6 °C) (Tympanic)   Ht 5' 8 5" (1 74 m)   Wt 83 5 kg (184 lb)   SpO2 98%   BMI 27 57 kg/m²        Physical Exam   Constitutional: He is oriented to person, place, and time  He appears well-developed and well-nourished  HENT:   Head: Normocephalic and atraumatic  Right Ear: External ear normal    Left Ear: External ear normal    Nose: Nose normal    Mouth/Throat: Oropharynx is clear and moist    Eyes: Pupils are equal, round, and reactive to light  Conjunctivae and EOM are normal  No scleral icterus  Neck: Normal range of motion  Neck supple  No JVD present  No thyromegaly present  Cardiovascular: Normal rate, regular rhythm and normal heart sounds  No murmur heard  Pulmonary/Chest: Effort normal and breath sounds normal  He has no wheezes  He has no rales  He exhibits no tenderness  Abdominal: Soft  Bowel sounds are normal  He exhibits no distension and no mass  There is no tenderness  There is no rebound and no guarding  Musculoskeletal: Normal range of motion  He exhibits tenderness  He exhibits no edema or deformity  Lymphadenopathy:     He has no cervical adenopathy  Neurological: He is alert and oriented to person, place, and time  He has normal reflexes  He displays normal reflexes  No cranial nerve deficit  Skin: Skin is warm and dry  No rash noted  No erythema  Psychiatric: He has a normal mood and affect  His behavior is normal  Judgment and thought content normal    Nursing note and vitals reviewed  Data:    Laboratory Results: I have personally reviewed the pertinent laboratory results/reports   Radiology/Other Diagnostic Testing Results: I have personally reviewed pertinent reports         Lab Results   Component Value Date    WBC 6 09 09/12/2018    HGB 13 5 09/12/2018    HCT 40 4 09/12/2018    MCV 95 09/12/2018     09/12/2018     Lab Results   Component Value Date    K 4 4 09/12/2018     09/12/2018    CO2 24 09/12/2018    BUN 20 09/12/2018    CREATININE 1 10 09/12/2018    GLUF 89 09/12/2018    CALCIUM 9 6 09/12/2018    AST 18 09/12/2018    ALT 20 09/12/2018    ALKPHOS 78 09/12/2018    EGFR 70 09/12/2018     Lab Results   Component Value Date    CHOLESTEROL 224 (H) 09/12/2018    CHOLESTEROL 239 (H) 02/27/2018     Lab Results   Component Value Date    HDL 47 09/12/2018    HDL 41 02/27/2018     Lab Results   Component Value Date    LDLCALC 144 (H) 09/12/2018    LDLCALC 168 (H) 02/27/2018     Lab Results   Component Value Date    TRIG 164 (H) 09/12/2018    TRIG 151 (H) 02/27/2018     No results found for: [place], [place]  Lab Results   Component Value Date    HOT2ALHOUUPY 4 100 (H) 09/12/2018     No results found for: HGBA1C  Lab Results   Component Value Date    PSA 0 7 09/12/2018       Tamra Andres DO

## 2019-03-20 NOTE — ASSESSMENT & PLAN NOTE
Mixed hyperlipidemia reviewed lab work with patient at this time his cholesterol total is at 239 the last time it was 224 his HDL went down to 41 he will increase physical activity and this should help bring up the HDL his LDL is at 168 which is a rise from 144 back in September and is triglycerides did go down this time to 151 overall he will watch his diet along with his wife they both aware of low-fat diet at this time and increasing physical activity in the spring repeat the blood work in July in 4 months

## 2019-05-20 DIAGNOSIS — M54.42 CHRONIC LEFT-SIDED LOW BACK PAIN WITH LEFT-SIDED SCIATICA: ICD-10-CM

## 2019-05-20 DIAGNOSIS — G89.29 CHRONIC LEFT-SIDED LOW BACK PAIN WITH LEFT-SIDED SCIATICA: ICD-10-CM

## 2019-05-21 RX ORDER — TRAMADOL HYDROCHLORIDE 50 MG/1
TABLET ORAL
Qty: 30 TABLET | Refills: 0 | Status: SHIPPED | OUTPATIENT
Start: 2019-05-21 | End: 2019-07-17 | Stop reason: SDUPTHER

## 2019-07-17 DIAGNOSIS — M54.42 CHRONIC LEFT-SIDED LOW BACK PAIN WITH LEFT-SIDED SCIATICA: ICD-10-CM

## 2019-07-17 DIAGNOSIS — G89.29 CHRONIC LEFT-SIDED LOW BACK PAIN WITH LEFT-SIDED SCIATICA: ICD-10-CM

## 2019-07-17 RX ORDER — TRAMADOL HYDROCHLORIDE 50 MG/1
TABLET ORAL
Qty: 30 TABLET | Refills: 0 | Status: SHIPPED | OUTPATIENT
Start: 2019-07-17 | End: 2019-09-16 | Stop reason: SDUPTHER

## 2019-07-23 ENCOUNTER — APPOINTMENT (OUTPATIENT)
Dept: LAB | Facility: CLINIC | Age: 64
End: 2019-07-23
Payer: MEDICARE

## 2019-07-23 DIAGNOSIS — E78.2 MIXED HYPERLIPIDEMIA: ICD-10-CM

## 2019-07-23 DIAGNOSIS — I10 ESSENTIAL HYPERTENSION: ICD-10-CM

## 2019-07-23 LAB
ALBUMIN SERPL BCP-MCNC: 4.2 G/DL (ref 3.5–5)
ALP SERPL-CCNC: 91 U/L (ref 46–116)
ALT SERPL W P-5'-P-CCNC: 20 U/L (ref 12–78)
ANION GAP SERPL CALCULATED.3IONS-SCNC: 9 MMOL/L (ref 4–13)
AST SERPL W P-5'-P-CCNC: 21 U/L (ref 5–45)
BILIRUB SERPL-MCNC: 0.39 MG/DL (ref 0.2–1)
BUN SERPL-MCNC: 16 MG/DL (ref 5–25)
CALCIUM SERPL-MCNC: 9.8 MG/DL (ref 8.3–10.1)
CHLORIDE SERPL-SCNC: 107 MMOL/L (ref 100–108)
CHOLEST SERPL-MCNC: 221 MG/DL (ref 50–200)
CO2 SERPL-SCNC: 25 MMOL/L (ref 21–32)
CREAT SERPL-MCNC: 1.15 MG/DL (ref 0.6–1.3)
GFR SERPL CREATININE-BSD FRML MDRD: 66 ML/MIN/1.73SQ M
GLUCOSE P FAST SERPL-MCNC: 87 MG/DL (ref 65–99)
HDLC SERPL-MCNC: 43 MG/DL (ref 40–60)
LDLC SERPL CALC-MCNC: 142 MG/DL (ref 0–100)
NONHDLC SERPL-MCNC: 178 MG/DL
POTASSIUM SERPL-SCNC: 4.2 MMOL/L (ref 3.5–5.3)
PROT SERPL-MCNC: 8 G/DL (ref 6.4–8.2)
SODIUM SERPL-SCNC: 141 MMOL/L (ref 136–145)
TRIGL SERPL-MCNC: 178 MG/DL

## 2019-07-23 PROCEDURE — 80061 LIPID PANEL: CPT

## 2019-07-23 PROCEDURE — 36415 COLL VENOUS BLD VENIPUNCTURE: CPT

## 2019-07-23 PROCEDURE — 80053 COMPREHEN METABOLIC PANEL: CPT

## 2019-08-06 ENCOUNTER — OFFICE VISIT (OUTPATIENT)
Dept: FAMILY MEDICINE CLINIC | Facility: CLINIC | Age: 64
End: 2019-08-06
Payer: MEDICARE

## 2019-08-06 VITALS
BODY MASS INDEX: 26 KG/M2 | OXYGEN SATURATION: 96 % | TEMPERATURE: 99.1 F | HEART RATE: 73 BPM | HEIGHT: 69 IN | WEIGHT: 175.5 LBS | SYSTOLIC BLOOD PRESSURE: 130 MMHG | DIASTOLIC BLOOD PRESSURE: 80 MMHG

## 2019-08-06 DIAGNOSIS — M48.062 SPINAL STENOSIS OF LUMBAR REGION WITH NEUROGENIC CLAUDICATION: ICD-10-CM

## 2019-08-06 DIAGNOSIS — I10 ESSENTIAL HYPERTENSION: Primary | ICD-10-CM

## 2019-08-06 DIAGNOSIS — E78.2 MIXED HYPERLIPIDEMIA: ICD-10-CM

## 2019-08-06 PROCEDURE — 99214 OFFICE O/P EST MOD 30 MIN: CPT | Performed by: FAMILY MEDICINE

## 2019-08-06 NOTE — ASSESSMENT & PLAN NOTE
Hypertension controlled at this time with current medication amlodipine 5 mg is working well no change in dosage at this point follow-up at next office visit

## 2019-08-06 NOTE — PATIENT INSTRUCTIONS

## 2019-08-06 NOTE — ASSESSMENT & PLAN NOTE
Lumbar spinal stenosis stable at this point he has no significant worsening back pain he is working on weight loss and exercise and will follow up at next office visit

## 2019-08-06 NOTE — PROGRESS NOTES
BMI Counseling: Body mass index is 26 3 kg/m²  Discussed the patient's BMI with him  The BMI is above average  BMI counseling and education was provided to the patient  Nutrition recommendations include decreasing overall calorie intake

## 2019-08-06 NOTE — ASSESSMENT & PLAN NOTE
Mixed hyperlipidemia continue with current dietary regimen at this point and medications his cholesterol has improved since the last 2 visits and continue to work on diet and exercise and follow up at next office visit

## 2019-08-06 NOTE — PROGRESS NOTES
Assessment/Plan:       Problem List Items Addressed This Visit        Cardiovascular and Mediastinum    Essential hypertension - Primary     Hypertension controlled at this time with current medication amlodipine 5 mg is working well no change in dosage at this point follow-up at next office visit            Other    Spinal stenosis of lumbar region with neurogenic claudication     Lumbar spinal stenosis stable at this point he has no significant worsening back pain he is working on weight loss and exercise and will follow up at next office visit         Mixed hyperlipidemia     Mixed hyperlipidemia continue with current dietary regimen at this point and medications his cholesterol has improved since the last 2 visits and continue to work on diet and exercise and follow up at next office visit                 Subjective:      Patient ID: Nelly Wilkerson is a 75 y o  male  Patient presents for 4 month evaluation follow-up regarding his lab work review he is doing well with medications he had a brief episode of gout after doing a lot a heavy work around the house he took ibuprofen and it has resolved at this point he has no other complaints today      The following portions of the patient's history were reviewed and updated as appropriate: allergies, current medications, past family history, past medical history, past social history, past surgical history and problem list     Review of Systems   Constitutional: Negative for chills, fatigue and fever  HENT: Negative for congestion, nosebleeds, rhinorrhea, sinus pressure and sore throat  Eyes: Negative for discharge and redness  Respiratory: Negative for cough and shortness of breath  Cardiovascular: Negative for chest pain, palpitations and leg swelling  Gastrointestinal: Negative for abdominal pain, blood in stool and nausea  Endocrine: Negative for cold intolerance, heat intolerance and polyuria  Genitourinary: Negative for dysuria and frequency  Musculoskeletal: Positive for arthralgias and joint swelling  Negative for back pain and myalgias  Skin: Negative for rash  Neurological: Negative for dizziness, weakness and headaches  Hematological: Negative for adenopathy  Psychiatric/Behavioral: Negative for behavioral problems and sleep disturbance  The patient is not nervous/anxious  Objective:      /80 (BP Location: Left arm, Patient Position: Sitting)   Pulse 73   Temp 99 1 °F (37 3 °C) (Tympanic)   Ht 5' 8 5" (1 74 m)   Wt 79 6 kg (175 lb 8 oz)   SpO2 96%   BMI 26 30 kg/m²        Physical Exam   Constitutional: He is oriented to person, place, and time  He appears well-developed and well-nourished  HENT:   Head: Normocephalic and atraumatic  Right Ear: External ear normal    Left Ear: External ear normal    Nose: Nose normal    Mouth/Throat: Oropharynx is clear and moist    Eyes: Pupils are equal, round, and reactive to light  Conjunctivae and EOM are normal  No scleral icterus  Neck: Normal range of motion  Neck supple  No JVD present  No thyromegaly present  Cardiovascular: Normal rate, regular rhythm and normal heart sounds  No murmur heard  Pulmonary/Chest: Effort normal and breath sounds normal  He has no wheezes  He has no rales  He exhibits no tenderness  Abdominal: Soft  Bowel sounds are normal  He exhibits no distension and no mass  There is no tenderness  There is no rebound and no guarding  Musculoskeletal: Normal range of motion  He exhibits no edema, tenderness or deformity  Lymphadenopathy:     He has no cervical adenopathy  Neurological: He is alert and oriented to person, place, and time  He has normal reflexes  He displays normal reflexes  No cranial nerve deficit  Skin: Skin is warm and dry  No rash noted  No erythema  Psychiatric: He has a normal mood and affect  His behavior is normal  Judgment and thought content normal    Nursing note and vitals reviewed         Data:    Laboratory Results: I have personally reviewed the pertinent laboratory results/reports   Radiology/Other Diagnostic Testing Results: I have personally reviewed pertinent reports         Lab Results   Component Value Date    WBC 6 09 09/12/2018    HGB 13 5 09/12/2018    HCT 40 4 09/12/2018    MCV 95 09/12/2018     09/12/2018     Lab Results   Component Value Date    K 4 2 07/23/2019     07/23/2019    CO2 25 07/23/2019    BUN 16 07/23/2019    CREATININE 1 15 07/23/2019    GLUF 87 07/23/2019    CALCIUM 9 8 07/23/2019    AST 21 07/23/2019    ALT 20 07/23/2019    ALKPHOS 91 07/23/2019    EGFR 66 07/23/2019     Lab Results   Component Value Date    CHOLESTEROL 221 (H) 07/23/2019    CHOLESTEROL 224 (H) 09/12/2018    CHOLESTEROL 239 (H) 02/27/2018     Lab Results   Component Value Date    HDL 43 07/23/2019    HDL 47 09/12/2018    HDL 41 02/27/2018     Lab Results   Component Value Date    LDLCALC 142 (H) 07/23/2019    LDLCALC 144 (H) 09/12/2018    LDLCALC 168 (H) 02/27/2018     Lab Results   Component Value Date    TRIG 178 (H) 07/23/2019    TRIG 164 (H) 09/12/2018    TRIG 151 (H) 02/27/2018     No results found for: [place], [place]  Lab Results   Component Value Date    [id number] 4 100 (H) 09/12/2018     No results found for: HGBA1C  Lab Results   Component Value Date    PSA 0 7 09/12/2018       Robinson Brantley DO

## 2019-09-16 DIAGNOSIS — M54.42 CHRONIC LEFT-SIDED LOW BACK PAIN WITH LEFT-SIDED SCIATICA: ICD-10-CM

## 2019-09-16 DIAGNOSIS — G89.29 CHRONIC LEFT-SIDED LOW BACK PAIN WITH LEFT-SIDED SCIATICA: ICD-10-CM

## 2019-09-16 RX ORDER — TRAMADOL HYDROCHLORIDE 50 MG/1
TABLET ORAL
Qty: 30 TABLET | Refills: 0 | Status: SHIPPED | OUTPATIENT
Start: 2019-09-16 | End: 2019-11-13 | Stop reason: SDUPTHER

## 2019-09-16 RX ORDER — CITALOPRAM 10 MG/1
TABLET ORAL
Qty: 30 TABLET | Refills: 11 | Status: SHIPPED | OUTPATIENT
Start: 2019-09-16 | End: 2020-03-17 | Stop reason: SDUPTHER

## 2019-10-07 DIAGNOSIS — I10 ESSENTIAL HYPERTENSION: Primary | ICD-10-CM

## 2019-10-07 RX ORDER — AMLODIPINE BESYLATE 10 MG/1
TABLET ORAL
Qty: 90 TABLET | Refills: 3 | Status: SHIPPED | OUTPATIENT
Start: 2019-10-07 | End: 2020-09-23

## 2019-10-08 ENCOUNTER — OFFICE VISIT (OUTPATIENT)
Dept: FAMILY MEDICINE CLINIC | Facility: CLINIC | Age: 64
End: 2019-10-08
Payer: MEDICARE

## 2019-10-08 VITALS
WEIGHT: 180 LBS | SYSTOLIC BLOOD PRESSURE: 124 MMHG | HEART RATE: 74 BPM | OXYGEN SATURATION: 98 % | HEIGHT: 69 IN | BODY MASS INDEX: 26.66 KG/M2 | DIASTOLIC BLOOD PRESSURE: 78 MMHG

## 2019-10-08 DIAGNOSIS — Z00.00 MEDICARE ANNUAL WELLNESS VISIT, SUBSEQUENT: ICD-10-CM

## 2019-10-08 DIAGNOSIS — F41.8 DEPRESSION WITH ANXIETY: ICD-10-CM

## 2019-10-08 DIAGNOSIS — I10 ESSENTIAL HYPERTENSION: Primary | ICD-10-CM

## 2019-10-08 DIAGNOSIS — G89.29 CHRONIC LEFT-SIDED LOW BACK PAIN WITH LEFT-SIDED SCIATICA: ICD-10-CM

## 2019-10-08 DIAGNOSIS — M54.42 CHRONIC LEFT-SIDED LOW BACK PAIN WITH LEFT-SIDED SCIATICA: ICD-10-CM

## 2019-10-08 DIAGNOSIS — M48.062 SPINAL STENOSIS OF LUMBAR REGION WITH NEUROGENIC CLAUDICATION: ICD-10-CM

## 2019-10-08 DIAGNOSIS — Z12.5 SCREENING FOR PROSTATE CANCER: ICD-10-CM

## 2019-10-08 DIAGNOSIS — G47.33 OSA (OBSTRUCTIVE SLEEP APNEA): ICD-10-CM

## 2019-10-08 PROCEDURE — G0438 PPPS, INITIAL VISIT: HCPCS | Performed by: FAMILY MEDICINE

## 2019-10-08 PROCEDURE — 99214 OFFICE O/P EST MOD 30 MIN: CPT | Performed by: FAMILY MEDICINE

## 2019-10-08 NOTE — PROGRESS NOTES
Assessment and Plan:     Problem List Items Addressed This Visit        Respiratory    JUNAID (obstructive sleep apnea)     Sleep apnea stable at this time continue to use CPAP clean equipment as needed change equipment every 6 months            Cardiovascular and Mediastinum    Essential hypertension - Primary     Hypertension under good control today blood pressure 124/70 a continue with amlodipine 10 mg at this time            Nervous and Auditory    Lumbago with sciatica, left side     Low back pain lumbar they go with sciatica left-sided exercise regularly stretching strengthening of the low back and core muscles and stretch as often as possible            Other    Medicare annual wellness visit, subsequent    Depression with anxiety     Depression and anxiety continue with citalopram 10 mg tablets daily         Spinal stenosis of lumbar region with neurogenic claudication     Lumbar spinal stenosis with chronic low back pain continue to exercise maintain good weight reduce weight as tolerated exercise regularly                Preventive health issues were discussed with patient, and age appropriate screening tests were ordered as noted in patient's After Visit Summary  Personalized health advice and appropriate referrals for health education or preventive services given if needed, as noted in patient's After Visit Summary  History of Present Illness:     Patient presents for Welcome to Medicare visit       Patient Care Team:  Torres Quezada DO as PCP - General (Family Medicine)  MD Junior Veras, [address] [place], [place] [address] (Urology)     Review of Systems:     Review of Systems   Problem List:     Patient Active Problem List   Diagnosis    Renal cyst, right    Medicare annual wellness visit, subsequent    Acute idiopathic gout of left foot    Back problem    Depression with anxiety    Essential hypertension    Glaucoma    Hypertriglyceridemia    Lumbago with sciatica, left side    Lumbar stenosis    Mild episode of recurrent major depressive disorder (HCC)    JUNAID (obstructive sleep apnea)    Spinal stenosis of lumbar region with neurogenic claudication    Mixed hyperlipidemia      Past Medical and Surgical History:     Past Medical History:   Diagnosis Date    Glaucoma     Hypertension      Past Surgical History:   Procedure Laterality Date    BACK SURGERY      HEMORRHOID SURGERY      LAMINECTOMY  2018    L4-L5    TONSILLECTOMY  1959      Family History:     Family History   Problem Relation Age of Onset    Nephrolithiasis Father       Social History:     Social History     Socioeconomic History    Marital status: /Civil Union     Spouse name: Not on file    Number of children: Not on file    Years of education: Not on file    Highest education level: Not on file   Occupational History    Not on file   Social Needs    Financial resource strain: Not on file    Food insecurity:     Worry: Not on file     Inability: Not on file    Transportation needs:     Medical: Not on file     Non-medical: Not on file   Tobacco Use    Smoking status: Former Smoker     Packs/day: 1 00     Years: 40 00     Pack years: 40 00    Smokeless tobacco: Never Used   Substance and Sexual Activity    Alcohol use: Yes     Comment: solkcially    Drug use: No    Sexual activity: Not on file   Lifestyle    Physical activity:     Days per week: Not on file     Minutes per session: Not on file    Stress: Not on file   Relationships    Social connections:     Talks on phone: Not on file     Gets together: Not on file     Attends Zoroastrianism service: Not on file     Active member of club or organization: Not on file     Attends meetings of clubs or organizations: Not on file     Relationship status: Not on file    Intimate partner violence:     Fear of current or ex partner: Not on file     Emotionally abused: Not on file     Physically abused: Not on file     Forced sexual activity: Not on file   Other Topics Concern    Not on file   Social History Narrative    Not on file      Medications and Allergies:     Current Outpatient Medications   Medication Sig Dispense Refill    amLODIPine (NORVASC) 10 mg tablet TAKE ONE TABLET BY MOUTH EVERY DAY 90 tablet 3    amLODIPine (NORVASC) 5 mg tablet Take 5 mg by mouth      B Complex Vitamins (B COMPLEX 1 PO) Take 1 tablet by mouth      betamethasone valerate (VALISONE) 0 1 % ointment Apply topically      Cholecalciferol (VITAMIN D3 PO) Take by mouth      citalopram (CeleXA) 10 mg tablet TAKE ONE TABLET BY MOUTH EVERY DAY 30 tablet 11    Garlic 10 MG CAPS Take 1 tablet by mouth      gemfibrozil (LOPID) 600 mg tablet Take 1 tablet (600 mg total) by mouth 2 (two) times a day 180 tablet 3    Glucosamine-Chondroit-Vit C-Mn (GLUCOSAMINE 1500 COMPLEX) CAPS Glucosamine 1500 Complex      ketoconazole (NIZORAL) 2 % cream Apply topically 2 (two) times a day 15 g 2    latanoprost (XALATAN) 0 005 % ophthalmic solution 1 gtt      Multiple Vitamins-Minerals (MULTIVITAL-M) TABS Take by mouth      traMADol (ULTRAM) 50 mg tablet TAKE ONE TABLET BY MOUTH EVERY 6 HOURS AS NEEDED FOR MODERATE PAIN 30 tablet 0     No current facility-administered medications for this visit  Allergies   Allergen Reactions    Bee Venom     Benazepril     Latex     Meloxicam GI Intolerance    Penicillin G Rash      Immunizations: There is no immunization history on file for this patient  Health Maintenance:         Topic Date Due    Hepatitis C Screening  03/20/2022 (Originally 1955)    CRC Screening: Colonoscopy  11/18/2021         Topic Date Due    Pneumococcal Vaccine: 65+ Years (1 of 2 - PCV13) 06/05/2018      Medicare Screening Tests and Risk Assessments:     Katey Davidson is here for his Subsequent Wellness visit  Health Risk Assessment:   Patient rates overall health as good  Patient feels that their physical health rating is same  Eyesight was rated as same  Hearing was rated as slightly worse  Patient feels that their emotional and mental health rating is same  Pain experienced in the last 7 days has been some  Patient's pain rating has been 6/10  Patient states that he has experienced no weight loss or gain in last 6 months  Depression Screening:   PHQ-2 Score: 2  PHQ-9 Score: 2      Fall Risk Screening: In the past year, patient has experienced: no history of falling in past year      Home Safety:  Patient does not have trouble with stairs inside or outside of their home  Patient has working smoke alarms and has working carbon monoxide detector  Home safety hazards include: none  Nutrition:   Current diet is Regular  Medications:   Patient is currently taking over-the-counter supplements  OTC medications include: see medication list  Patient is able to manage medications  Activities of Daily Living (ADLs)/Instrumental Activities of Daily Living (IADLs):   Walk and transfer into and out of bed and chair?: Yes  Dress and groom yourself?: Yes    Bathe or shower yourself?: Yes    Feed yourself?  Yes  Do your laundry/housekeeping?: Yes  Manage your money, pay your bills and track your expenses?: Yes  Make your own meals?: Yes    Do your own shopping?: Yes    Advance Care Planning:     Five wishes given: Yes    End of Life Decisions reviewed with patient: Yes      PREVENTIVE SCREENINGS      Cardiovascular Screening:    General: Screening Not Indicated and History Lipid Disorder      Diabetes Screening:     General: Screening Current      Colorectal Cancer Screening:     General: Screening Current      Prostate Cancer Screening:    General: Risks and Benefits Discussed      Osteoporosis Screening:    General: Screening Not Indicated      Abdominal Aortic Aneurysm (AAA) Screening:    Risk factors include: age between 73-67 yo and tobacco use        Lung Cancer Screening:     General: Screening Not Indicated      Hepatitis C Screening:    General: Screening Current    Other Counseling Topics:   Regular weightbearing exercise and calcium and vitamin D intake       No exam data present     Physical Exam:     /78 (BP Location: Left arm, Patient Position: Sitting)   Pulse 74   Ht 5' 8 5" (1 74 m)   Wt 81 6 kg (180 lb)   SpO2 98%   BMI 26 97 kg/m²     Physical Exam    Candace Monahan DO

## 2019-10-08 NOTE — ASSESSMENT & PLAN NOTE
Lumbar spinal stenosis with chronic low back pain continue to exercise maintain good weight reduce weight as tolerated exercise regularly

## 2019-10-08 NOTE — PATIENT INSTRUCTIONS

## 2019-10-08 NOTE — ASSESSMENT & PLAN NOTE
Low back pain lumbar they go with sciatica left-sided exercise regularly stretching strengthening of the low back and core muscles and stretch as often as possible

## 2019-10-08 NOTE — ASSESSMENT & PLAN NOTE
Hypertension under good control today blood pressure 124/70 a continue with amlodipine 10 mg at this time

## 2019-10-08 NOTE — PROGRESS NOTES
Assessment/Plan:       Problem List Items Addressed This Visit        Respiratory    JUNAID (obstructive sleep apnea)     Sleep apnea stable at this time continue to use CPAP clean equipment as needed change equipment every 6 months            Cardiovascular and Mediastinum    Essential hypertension - Primary     Hypertension under good control today blood pressure 124/70 a continue with amlodipine 10 mg at this time         Relevant Orders    PSA, total and free    CBC and differential    Comprehensive metabolic panel    Lipid panel       Nervous and Auditory    Lumbago with sciatica, left side     Low back pain lumbar they go with sciatica left-sided exercise regularly stretching strengthening of the low back and core muscles and stretch as often as possible            Other    Medicare annual wellness visit, subsequent    Depression with anxiety     Depression and anxiety continue with citalopram 10 mg tablets daily         Spinal stenosis of lumbar region with neurogenic claudication     Lumbar spinal stenosis with chronic low back pain continue to exercise maintain good weight reduce weight as tolerated exercise regularly           Other Visit Diagnoses     Screening for prostate cancer        Relevant Orders    PSA, total and free            Subjective:      Patient ID: Ty Benavidez is a 75 y o  male  Patient presents for annual Medicare wellness evaluation review of laboratory work medical conditions sciatic back pain hypertension hyperlipidemia and chronic anxiety with depression stable at this time once her medications      The following portions of the patient's history were reviewed and updated as appropriate: allergies, current medications, past family history, past medical history, past social history, past surgical history and problem list     Review of Systems   Constitutional: Negative for chills, fatigue and fever     HENT: Negative for congestion, nosebleeds, rhinorrhea, sinus pressure and sore throat  Eyes: Negative for discharge and redness  Respiratory: Negative for cough and shortness of breath  Cardiovascular: Negative for chest pain, palpitations and leg swelling  Gastrointestinal: Negative for abdominal pain, blood in stool and nausea  Endocrine: Negative for cold intolerance, heat intolerance and polyuria  Genitourinary: Negative for dysuria and frequency  Musculoskeletal: Positive for arthralgias and back pain  Negative for myalgias  Skin: Negative for rash  Neurological: Negative for dizziness, weakness and headaches  Hematological: Negative for adenopathy  Psychiatric/Behavioral: Negative for behavioral problems and sleep disturbance  The patient is not nervous/anxious  Objective:      /78 (BP Location: Left arm, Patient Position: Sitting)   Pulse 74   Ht 5' 8 5" (1 74 m)   Wt 81 6 kg (180 lb)   SpO2 98%   BMI 26 97 kg/m²        Physical Exam   Constitutional: He is oriented to person, place, and time  He appears well-developed and well-nourished  HENT:   Head: Normocephalic and atraumatic  Right Ear: External ear normal    Left Ear: External ear normal    Nose: Nose normal    Mouth/Throat: Oropharynx is clear and moist    Eyes: Pupils are equal, round, and reactive to light  Conjunctivae and EOM are normal  No scleral icterus  Neck: Normal range of motion  Neck supple  No JVD present  No thyromegaly present  Cardiovascular: Normal rate, regular rhythm and normal heart sounds  No murmur heard  Pulmonary/Chest: Effort normal and breath sounds normal  He has no wheezes  He has no rales  He exhibits no tenderness  Abdominal: Soft  Bowel sounds are normal  He exhibits no distension and no mass  There is no tenderness  There is no rebound and no guarding  Musculoskeletal: Normal range of motion  He exhibits tenderness  He exhibits no edema or deformity  Lymphadenopathy:     He has no cervical adenopathy     Neurological: He is alert and oriented to person, place, and time  He has normal reflexes  He displays normal reflexes  No cranial nerve deficit  Skin: Skin is warm and dry  No rash noted  No erythema  Psychiatric: He has a normal mood and affect  His behavior is normal  Judgment and thought content normal    Nursing note and vitals reviewed  Data:    Laboratory Results: I have personally reviewed the pertinent laboratory results/reports   Radiology/Other Diagnostic Testing Results: I have personally reviewed pertinent reports         Lab Results   Component Value Date    WBC 6 09 09/12/2018    HGB 13 5 09/12/2018    HCT 40 4 09/12/2018    MCV 95 09/12/2018     09/12/2018     Lab Results   Component Value Date    K 4 2 07/23/2019     07/23/2019    CO2 25 07/23/2019    BUN 16 07/23/2019    CREATININE 1 15 07/23/2019    GLUF 87 07/23/2019    CALCIUM 9 8 07/23/2019    AST 21 07/23/2019    ALT 20 07/23/2019    ALKPHOS 91 07/23/2019    EGFR 66 07/23/2019     Lab Results   Component Value Date    CHOLESTEROL 221 (H) 07/23/2019    CHOLESTEROL 224 (H) 09/12/2018    CHOLESTEROL 239 (H) 02/27/2018     Lab Results   Component Value Date    HDL 43 07/23/2019    HDL 47 09/12/2018    HDL 41 02/27/2018     Lab Results   Component Value Date    LDLCALC 142 (H) 07/23/2019    LDLCALC 144 (H) 09/12/2018    LDLCALC 168 (H) 02/27/2018     Lab Results   Component Value Date    TRIG 178 (H) 07/23/2019    TRIG 164 (H) 09/12/2018    TRIG 151 (H) 02/27/2018     No results found for: [place], [place]  Lab Results   Component Value Date    MTQ2JPQGERNB 4 100 (H) 09/12/2018     No results found for: HGBA1C  Lab Results   Component Value Date    PSA 0 7 09/12/2018       Sarbjit Smith DO

## 2019-11-13 DIAGNOSIS — M54.42 CHRONIC LEFT-SIDED LOW BACK PAIN WITH LEFT-SIDED SCIATICA: ICD-10-CM

## 2019-11-13 DIAGNOSIS — G89.29 CHRONIC LEFT-SIDED LOW BACK PAIN WITH LEFT-SIDED SCIATICA: ICD-10-CM

## 2019-11-13 RX ORDER — TRAMADOL HYDROCHLORIDE 50 MG/1
TABLET ORAL
Qty: 30 TABLET | Refills: 0 | Status: SHIPPED | OUTPATIENT
Start: 2019-11-13 | End: 2020-01-13

## 2020-01-12 DIAGNOSIS — G89.29 CHRONIC LEFT-SIDED LOW BACK PAIN WITH LEFT-SIDED SCIATICA: ICD-10-CM

## 2020-01-12 DIAGNOSIS — M54.42 CHRONIC LEFT-SIDED LOW BACK PAIN WITH LEFT-SIDED SCIATICA: ICD-10-CM

## 2020-01-13 RX ORDER — TRAMADOL HYDROCHLORIDE 50 MG/1
TABLET ORAL
Qty: 30 TABLET | Refills: 0 | Status: SHIPPED | OUTPATIENT
Start: 2020-01-13 | End: 2020-03-10

## 2020-02-05 ENCOUNTER — APPOINTMENT (OUTPATIENT)
Dept: LAB | Facility: CLINIC | Age: 65
End: 2020-02-05
Payer: MEDICARE

## 2020-02-05 DIAGNOSIS — Z12.5 SCREENING FOR PROSTATE CANCER: ICD-10-CM

## 2020-02-05 DIAGNOSIS — I10 ESSENTIAL HYPERTENSION: ICD-10-CM

## 2020-02-05 LAB
ALBUMIN SERPL BCP-MCNC: 4.2 G/DL (ref 3.5–5)
ALP SERPL-CCNC: 106 U/L (ref 46–116)
ALT SERPL W P-5'-P-CCNC: 22 U/L (ref 12–78)
ANION GAP SERPL CALCULATED.3IONS-SCNC: 6 MMOL/L (ref 4–13)
AST SERPL W P-5'-P-CCNC: 22 U/L (ref 5–45)
BASOPHILS # BLD AUTO: 0.06 THOUSANDS/ΜL (ref 0–0.1)
BASOPHILS NFR BLD AUTO: 1 % (ref 0–1)
BILIRUB SERPL-MCNC: 0.44 MG/DL (ref 0.2–1)
BUN SERPL-MCNC: 19 MG/DL (ref 5–25)
CALCIUM SERPL-MCNC: 10 MG/DL (ref 8.3–10.1)
CHLORIDE SERPL-SCNC: 109 MMOL/L (ref 100–108)
CHOLEST SERPL-MCNC: 250 MG/DL (ref 50–200)
CO2 SERPL-SCNC: 24 MMOL/L (ref 21–32)
CREAT SERPL-MCNC: 1.06 MG/DL (ref 0.6–1.3)
EOSINOPHIL # BLD AUTO: 0.23 THOUSAND/ΜL (ref 0–0.61)
EOSINOPHIL NFR BLD AUTO: 4 % (ref 0–6)
ERYTHROCYTE [DISTWIDTH] IN BLOOD BY AUTOMATED COUNT: 14.1 % (ref 11.6–15.1)
GFR SERPL CREATININE-BSD FRML MDRD: 73 ML/MIN/1.73SQ M
GLUCOSE P FAST SERPL-MCNC: 105 MG/DL (ref 65–99)
HCT VFR BLD AUTO: 43.8 % (ref 36.5–49.3)
HDLC SERPL-MCNC: 43 MG/DL
HGB BLD-MCNC: 14.6 G/DL (ref 12–17)
IMM GRANULOCYTES # BLD AUTO: 0.02 THOUSAND/UL (ref 0–0.2)
IMM GRANULOCYTES NFR BLD AUTO: 0 % (ref 0–2)
LDLC SERPL CALC-MCNC: 174 MG/DL (ref 0–100)
LYMPHOCYTES # BLD AUTO: 1.6 THOUSANDS/ΜL (ref 0.6–4.47)
LYMPHOCYTES NFR BLD AUTO: 29 % (ref 14–44)
MCH RBC QN AUTO: 31.7 PG (ref 26.8–34.3)
MCHC RBC AUTO-ENTMCNC: 33.3 G/DL (ref 31.4–37.4)
MCV RBC AUTO: 95 FL (ref 82–98)
MONOCYTES # BLD AUTO: 0.44 THOUSAND/ΜL (ref 0.17–1.22)
MONOCYTES NFR BLD AUTO: 8 % (ref 4–12)
NEUTROPHILS # BLD AUTO: 3.13 THOUSANDS/ΜL (ref 1.85–7.62)
NEUTS SEG NFR BLD AUTO: 58 % (ref 43–75)
NONHDLC SERPL-MCNC: 207 MG/DL
NRBC BLD AUTO-RTO: 0 /100 WBCS
PLATELET # BLD AUTO: 236 THOUSANDS/UL (ref 149–390)
PMV BLD AUTO: 10.9 FL (ref 8.9–12.7)
POTASSIUM SERPL-SCNC: 4.2 MMOL/L (ref 3.5–5.3)
PROT SERPL-MCNC: 8.8 G/DL (ref 6.4–8.2)
RBC # BLD AUTO: 4.61 MILLION/UL (ref 3.88–5.62)
SODIUM SERPL-SCNC: 139 MMOL/L (ref 136–145)
TRIGL SERPL-MCNC: 165 MG/DL
WBC # BLD AUTO: 5.48 THOUSAND/UL (ref 4.31–10.16)

## 2020-02-05 PROCEDURE — 80061 LIPID PANEL: CPT

## 2020-02-05 PROCEDURE — 36415 COLL VENOUS BLD VENIPUNCTURE: CPT

## 2020-02-05 PROCEDURE — G0103 PSA SCREENING: HCPCS

## 2020-02-05 PROCEDURE — 80053 COMPREHEN METABOLIC PANEL: CPT

## 2020-02-05 PROCEDURE — 85025 COMPLETE CBC W/AUTO DIFF WBC: CPT

## 2020-02-06 LAB
PSA FREE MFR SERPL: 36.7 %
PSA FREE SERPL-MCNC: 0.33 NG/ML
PSA SERPL-MCNC: 0.9 NG/ML (ref 0–4)

## 2020-02-11 ENCOUNTER — OFFICE VISIT (OUTPATIENT)
Dept: FAMILY MEDICINE CLINIC | Facility: CLINIC | Age: 65
End: 2020-02-11
Payer: MEDICARE

## 2020-02-11 VITALS
HEIGHT: 69 IN | SYSTOLIC BLOOD PRESSURE: 140 MMHG | WEIGHT: 182.6 LBS | DIASTOLIC BLOOD PRESSURE: 86 MMHG | TEMPERATURE: 98.4 F | OXYGEN SATURATION: 98 % | HEART RATE: 70 BPM | BODY MASS INDEX: 27.05 KG/M2

## 2020-02-11 DIAGNOSIS — M48.062 SPINAL STENOSIS OF LUMBAR REGION WITH NEUROGENIC CLAUDICATION: ICD-10-CM

## 2020-02-11 DIAGNOSIS — E78.2 MIXED HYPERLIPIDEMIA: ICD-10-CM

## 2020-02-11 DIAGNOSIS — I10 ESSENTIAL HYPERTENSION: Primary | ICD-10-CM

## 2020-02-11 DIAGNOSIS — F41.8 DEPRESSION WITH ANXIETY: ICD-10-CM

## 2020-02-11 DIAGNOSIS — Z12.5 SCREENING FOR PROSTATE CANCER: ICD-10-CM

## 2020-02-11 DIAGNOSIS — E78.1 HYPERTRIGLYCERIDEMIA: ICD-10-CM

## 2020-02-11 DIAGNOSIS — H91.93 BILATERAL HEARING LOSS, UNSPECIFIED HEARING LOSS TYPE: ICD-10-CM

## 2020-02-11 DIAGNOSIS — F33.0 MILD EPISODE OF RECURRENT MAJOR DEPRESSIVE DISORDER (HCC): ICD-10-CM

## 2020-02-11 DIAGNOSIS — M48.061 SPINAL STENOSIS OF LUMBAR REGION, UNSPECIFIED WHETHER NEUROGENIC CLAUDICATION PRESENT: ICD-10-CM

## 2020-02-11 PROCEDURE — 3077F SYST BP >= 140 MM HG: CPT | Performed by: FAMILY MEDICINE

## 2020-02-11 PROCEDURE — 69209 REMOVE IMPACTED EAR WAX UNI: CPT | Performed by: FAMILY MEDICINE

## 2020-02-11 PROCEDURE — 3079F DIAST BP 80-89 MM HG: CPT | Performed by: FAMILY MEDICINE

## 2020-02-11 PROCEDURE — 3008F BODY MASS INDEX DOCD: CPT | Performed by: FAMILY MEDICINE

## 2020-02-11 PROCEDURE — 99214 OFFICE O/P EST MOD 30 MIN: CPT | Performed by: FAMILY MEDICINE

## 2020-02-11 PROCEDURE — 1160F RVW MEDS BY RX/DR IN RCRD: CPT | Performed by: FAMILY MEDICINE

## 2020-02-11 PROCEDURE — 1036F TOBACCO NON-USER: CPT | Performed by: FAMILY MEDICINE

## 2020-02-11 RX ORDER — ROSUVASTATIN CALCIUM 10 MG/1
10 TABLET, COATED ORAL DAILY
Qty: 30 TABLET | Refills: 5 | Status: SHIPPED | OUTPATIENT
Start: 2020-02-11 | End: 2020-08-11

## 2020-02-11 RX ORDER — BETAMETHASONE DIPROPIONATE 0.5 MG/G
CREAM TOPICAL
COMMUNITY
Start: 2020-02-03 | End: 2021-05-24

## 2020-02-11 NOTE — PATIENT INSTRUCTIONS
Heart Healthy Diet   WHAT YOU NEED TO KNOW:   A heart healthy diet is an eating plan low in total fat, unhealthy fats, and sodium (salt)  A heart healthy diet helps decrease your risk for heart disease and stroke  Limit the amount of fat you eat to 25% to 35% of your total daily calories  Limit sodium to less than 2,300 mg each day  DISCHARGE INSTRUCTIONS:   Healthy fats:  Healthy fats can help improve cholesterol levels  The risk for heart disease is decreased when cholesterol levels are normal  Choose healthy fats, such as the following:  · Unsaturated fat  is found in foods such as soybean, canola, olive, corn, and safflower oils  It is also found in soft tub margarine that is made with liquid vegetable oil  · Omega-3 fat  is found in certain fish, such as salmon, tuna, and trout, and in walnuts and flaxseed  Unhealthy fats:  Unhealthy fats can cause unhealthy cholesterol levels in your blood and increase your risk of heart disease  Limit unhealthy fats, such as the following:  · Cholesterol  is found in animal foods, such as eggs and lobster, and in dairy products made from whole milk  Limit cholesterol to less than 300 milligrams (mg) each day  You may need to limit cholesterol to 200 mg each day if you have heart disease  · Saturated fat  is found in meats, such as huff and hamburger  It is also found in chicken or turkey skin, whole milk, and butter  Limit saturated fat to less than 7% of your total daily calories  Limit saturated fat to less than 6% if you have heart disease or are at increased risk for it  · Trans fat  is found in packaged foods, such as potato chips and cookies  It is also in hard margarine, some fried foods, and shortening  Avoid trans fats as much as possible    Heart healthy foods and drinks to include:  Ask your dietitian or healthcare provider how many servings to have from each of the following food groups:  · Grains:      ¨ Whole-wheat breads, cereals, and pastas, and brown rice    ¨ Low-fat, low-sodium crackers and chips    · Vegetables:      ¨ Broccoli, green beans, green peas, and spinach    ¨ Collards, kale, and lima beans    ¨ Carrots, sweet potatoes, tomatoes, and peppers    ¨ Canned vegetables with no salt added    · Fruits:      ¨ Bananas, peaches, pears, and pineapple    ¨ Grapes, raisins, and dates    ¨ Oranges, tangerines, grapefruit, orange juice, and grapefruit juice    ¨ Apricots, mangoes, melons, and papaya    ¨ Raspberries and strawberries    ¨ Canned fruit with no added sugar    · Low-fat dairy products:      ¨ Nonfat (skim) milk, 1% milk, and low-fat almond, cashew, or soy milks fortified with calcium    ¨ Low-fat cheese, regular or frozen yogurt, and cottage cheese    · Meats and proteins , such as lean cuts of beef and pork (loin, leg, round), skinless chicken and turkey, legumes, soy products, egg whites, and nuts  Foods and drinks to limit or avoid:  Ask your dietitian or healthcare provider about these and other foods that are high in unhealthy fat, sodium, and sugar:  · Snack or packaged foods , such as frozen dinners, cookies, macaroni and cheese, and cereals with more than 300 mg of sodium per serving    · Canned or dry mixes  for cakes, soups, sauces, or gravies    · Vegetables with added sodium , such as instant potatoes, vegetables with added sauces, or regular canned vegetables    · Other foods high in sodium , such as ketchup, barbecue sauce, salad dressing, pickles, olives, soy sauce, and miso    · High-fat dairy foods  such as whole or 2% milk, cream cheese, or sour cream, and cheeses     · High-fat protein foods  such as high-fat cuts of beef (T-bone steaks, ribs), chicken or turkey with skin, and organ meats, such as liver    · Cured or smoked meats , such as hot dogs, huff, and sausage    · Unhealthy fats and oils , such as butter, stick margarine, shortening, and cooking oils such as coconut or palm oil    · Food and drinks high in sugar , such as soft drinks (soda), sports drinks, sweetened tea, candy, cake, cookies, pies, and doughnuts  Other diet guidelines to follow:   · Eat more foods containing omega-3 fats  Eat fish high in omega-3 fats at least 2 times a week  · Limit alcohol  Too much alcohol can damage your heart and raise your blood pressure  Women should limit alcohol to 1 drink a day  Men should limit alcohol to 2 drinks a day  A drink of alcohol is 12 ounces of beer, 5 ounces of wine, or 1½ ounces of liquor  · Choose low-sodium foods  High-sodium foods can lead to high blood pressure  Add little or no salt to food you prepare  Use herbs and spices in place of salt  · Eat more fiber  to help lower cholesterol levels  Eat at least 5 servings of fruits and vegetables each day  Eat 3 ounces of whole-grain foods each day  Legumes (beans) are also a good source of fiber  Lifestyle guidelines:   · Do not smoke  Nicotine and other chemicals in cigarettes and cigars can cause lung and heart damage  Ask your healthcare provider for information if you currently smoke and need help to quit  E-cigarettes or smokeless tobacco still contain nicotine  Talk to your healthcare provider before you use these products  · Exercise regularly  to help you maintain a healthy weight and improve your blood pressure and cholesterol levels  Ask your healthcare provider about the best exercise plan for you  Do not start an exercise program without asking your healthcare provider  Follow up with your healthcare provider as directed:  Write down your questions so you remember to ask them during your visits  © 2017 [employer] Information is for End User's use only and may not be sold, redistributed or otherwise used for commercial purposes  All illustrations and images included in CareNotes® are the copyrighted property of [employer]  or [employer]  The above information is an  only   It is not intended as medical advice for individual conditions or treatments  Talk to your doctor, nurse or pharmacist before following any medical regimen to see if it is safe and effective for you

## 2020-02-11 NOTE — ASSESSMENT & PLAN NOTE
History of bilateral hearing loss tested by [facility] in audiometry done over 1 year ago he states that at that time he possibly needed a hearing aid but did not follow-up he would like to be re-evaluated now at this point his hearing loss has worsened over the past year and he needs a referral again

## 2020-02-11 NOTE — PROGRESS NOTES
Assessment/Plan:       Problem List Items Addressed This Visit        Cardiovascular and Mediastinum    Essential hypertension - Primary     Essential hypertension initially elevated at 1 40/86 on repeat he is down at 120/74 continue with current medication regimen and follow up with me in 4 months            Nervous and Auditory    Bilateral hearing loss     History of bilateral hearing loss tested by [facility] in audiometry done over 1 year ago he states that at that time he possibly needed a hearing aid but did not follow-up he would like to be re-evaluated now at this point his hearing loss has worsened over the past year and he needs a referral again         Relevant Orders    Ear cerumen removal       Other    Depression with anxiety     Mild depression anxiety continue with citalopram 10 mg daily follow-up in 4 months         Hypertriglyceridemia    Lumbar stenosis    Mild episode of recurrent major depressive disorder ([address] )    Spinal stenosis of lumbar region with neurogenic claudication     Lumbar spinal stenosis continue home exercise program stretching regularly core strengthening exercises and if were symptoms refer for physical therapy         Mixed hyperlipidemia     Mixed hyperlipidemia with rise in total cholesterol at 250 now LDL is at 174 HDL 43 triglycerides 165 I recommend once daily Crestor 10 mg now repeat lipid profile in 4 months         Relevant Orders    CBC and differential    Comprehensive metabolic panel    Lipid panel      Other Visit Diagnoses     Screening for prostate cancer        Relevant Orders    PSA, total and free            Subjective:      Patient ID: Luna Pritchett is a 75 y o  male      Patient is here for follow-up evaluation 4 month checkup review of laboratory work additionally notes that his hearing loss has worsened and he would like another referral back to audiometry and [facility] N[facility] Th[facility]      The following portions of the patient's history were reviewed and updated as appropriate: allergies, current medications, past family history, past medical history, past social history, past surgical history and problem list     Review of Systems   Constitutional: Negative for chills, fatigue and fever  HENT: Positive for hearing loss  Negative for congestion, nosebleeds, rhinorrhea, sinus pressure and sore throat  Eyes: Negative for discharge and redness  Respiratory: Negative for cough and shortness of breath  Cardiovascular: Negative for chest pain, palpitations and leg swelling  Gastrointestinal: Negative for abdominal pain, blood in stool and nausea  Endocrine: Negative for cold intolerance, heat intolerance and polyuria  Genitourinary: Negative for dysuria and frequency  Musculoskeletal: Negative for arthralgias, back pain and myalgias  Skin: Negative for rash  Neurological: Negative for dizziness, weakness and headaches  Hematological: Negative for adenopathy  Psychiatric/Behavioral: Negative for behavioral problems and sleep disturbance  The patient is not nervous/anxious  Objective:      /86   Pulse 70   Temp 98 4 °F (36 9 °C)   Ht 5' 8 5" (1 74 m)   Wt 82 8 kg (182 lb 9 6 oz)   SpO2 98%   BMI 27 36 kg/m²        Physical Exam   Constitutional: He is oriented to person, place, and time  He appears well-developed and well-nourished  HENT:   Head: Normocephalic and atraumatic  Right Ear: External ear normal    Left Ear: External ear normal    Nose: Nose normal    Mouth/Throat: Oropharynx is clear and moist    Eyes: Pupils are equal, round, and reactive to light  Conjunctivae and EOM are normal  No scleral icterus  Neck: Normal range of motion  Neck supple  No JVD present  No thyromegaly present  Cardiovascular: Normal rate, regular rhythm and normal heart sounds  No murmur heard  Pulmonary/Chest: Effort normal and breath sounds normal  He has no wheezes  He has no rales  He exhibits no tenderness     Abdominal: Soft  Bowel sounds are normal  He exhibits no distension and no mass  There is no tenderness  There is no rebound and no guarding  Musculoskeletal: Normal range of motion  He exhibits no edema, tenderness or deformity  Lymphadenopathy:     He has no cervical adenopathy  Neurological: He is alert and oriented to person, place, and time  He has normal reflexes  He displays normal reflexes  No cranial nerve deficit  Skin: Skin is warm and dry  No rash noted  No erythema  Psychiatric: He has a normal mood and affect  His behavior is normal  Judgment and thought content normal    Nursing note and vitals reviewed  Ear cerumen removal  Date/Time: 2/11/2020 1:37 PM  Performed by: Robinson Brantley DO  Authorized by: Robinson Brantley DO     Other Assisting Provider: No    Consent:     Consent obtained:  Verbal    Consent given by:  Patient    Risks discussed:  TM perforation    Alternatives discussed:  No treatment  Universal protocol:     Procedure explained and questions answered to patient or proxy's satisfaction: yes      Relevant documents present and verified: yes      Test results available and properly labeled: yes      Radiology Images displayed and confirmed  If images not available, report reviewed: yes      Required blood products, implants, devices and special equipment available: no      Site/side marked: yes      Immediately prior to procedure a time out was called: yes      Patient identity confirmed:  Verbally with patient  Procedure details:     Location:  L ear and R ear    Procedure type: irrigation only      Approach:  External  Post-procedure details:     Complication:  TM perforation    Hearing quality:  Improved    Patient tolerance of procedure: Tolerated well, no immediate complications      Data:    Laboratory Results: I have personally reviewed the pertinent laboratory results/reports   Radiology/Other Diagnostic Testing Results: I have personally reviewed pertinent reports  Lab Results   Component Value Date    WBC 5 48 02/05/2020    HGB 14 6 02/05/2020    HCT 43 8 02/05/2020    MCV 95 02/05/2020     02/05/2020     Lab Results   Component Value Date    K 4 2 02/05/2020     (H) 02/05/2020    CO2 24 02/05/2020    BUN 19 02/05/2020    CREATININE 1 06 02/05/2020    GLUF 105 (H) 02/05/2020    CALCIUM 10 0 02/05/2020    AST 22 02/05/2020    ALT 22 02/05/2020    ALKPHOS 106 02/05/2020    EGFR 73 02/05/2020     Lab Results   Component Value Date    CHOLESTEROL 250 (H) 02/05/2020    CHOLESTEROL 221 (H) 07/23/2019    CHOLESTEROL 224 (H) 09/12/2018     Lab Results   Component Value Date    HDL 43 02/05/2020    HDL 43 07/23/2019    HDL 47 09/12/2018     Lab Results   Component Value Date    LDLCALC 174 (H) 02/05/2020    LDLCALC 142 (H) 07/23/2019    LDLCALC 144 (H) 09/12/2018     Lab Results   Component Value Date    TRIG 165 (H) 02/05/2020    TRIG 178 (H) 07/23/2019    TRIG 164 (H) 09/12/2018     No results found for: [place], [place]  Lab Results   Component Value Date    [id number] 4 100 (H) 09/12/2018     No results found for: HGBA1C  Lab Results   Component Value Date    PSA 0 9 02/05/2020       Nora Blackburn DO

## 2020-02-11 NOTE — ASSESSMENT & PLAN NOTE
Mixed hyperlipidemia with rise in total cholesterol at 250 now LDL is at 174 HDL 43 triglycerides 165 I recommend once daily Crestor 10 mg now repeat lipid profile in 4 months

## 2020-02-11 NOTE — ASSESSMENT & PLAN NOTE
Lumbar spinal stenosis continue home exercise program stretching regularly core strengthening exercises and if were symptoms refer for physical therapy

## 2020-02-11 NOTE — ASSESSMENT & PLAN NOTE
Essential hypertension initially elevated at 1 40/86 on repeat he is down at 120/74 continue with current medication regimen and follow up with me in 4 months

## 2020-02-24 DIAGNOSIS — H91.93 BILATERAL HEARING LOSS, UNSPECIFIED HEARING LOSS TYPE: Primary | ICD-10-CM

## 2020-02-24 DIAGNOSIS — H91.90 DECREASED HEARING, UNSPECIFIED LATERALITY: ICD-10-CM

## 2020-03-09 DIAGNOSIS — G89.29 CHRONIC LEFT-SIDED LOW BACK PAIN WITH LEFT-SIDED SCIATICA: ICD-10-CM

## 2020-03-09 DIAGNOSIS — M54.42 CHRONIC LEFT-SIDED LOW BACK PAIN WITH LEFT-SIDED SCIATICA: ICD-10-CM

## 2020-03-10 RX ORDER — TRAMADOL HYDROCHLORIDE 50 MG/1
TABLET ORAL
Qty: 30 TABLET | Refills: 0 | Status: SHIPPED | OUTPATIENT
Start: 2020-03-10 | End: 2020-05-06 | Stop reason: SDUPTHER

## 2020-03-17 DIAGNOSIS — G89.29 CHRONIC LEFT-SIDED LOW BACK PAIN WITH LEFT-SIDED SCIATICA: ICD-10-CM

## 2020-03-17 DIAGNOSIS — M54.42 CHRONIC LEFT-SIDED LOW BACK PAIN WITH LEFT-SIDED SCIATICA: ICD-10-CM

## 2020-03-17 RX ORDER — CITALOPRAM 10 MG/1
10 TABLET ORAL DAILY
Qty: 30 TABLET | Refills: 3 | Status: SHIPPED | OUTPATIENT
Start: 2020-03-17 | End: 2020-07-13

## 2020-05-05 DIAGNOSIS — M54.42 CHRONIC LEFT-SIDED LOW BACK PAIN WITH LEFT-SIDED SCIATICA: ICD-10-CM

## 2020-05-05 DIAGNOSIS — G89.29 CHRONIC LEFT-SIDED LOW BACK PAIN WITH LEFT-SIDED SCIATICA: ICD-10-CM

## 2020-05-06 DIAGNOSIS — G89.29 CHRONIC LEFT-SIDED LOW BACK PAIN WITH LEFT-SIDED SCIATICA: ICD-10-CM

## 2020-05-06 DIAGNOSIS — M54.42 CHRONIC LEFT-SIDED LOW BACK PAIN WITH LEFT-SIDED SCIATICA: ICD-10-CM

## 2020-05-06 RX ORDER — TRAMADOL HYDROCHLORIDE 50 MG/1
TABLET ORAL
Qty: 30 TABLET | Refills: 0 | Status: SHIPPED | OUTPATIENT
Start: 2020-05-06 | End: 2020-07-07

## 2020-05-06 RX ORDER — TRAMADOL HYDROCHLORIDE 50 MG/1
TABLET ORAL
Qty: 30 TABLET | Refills: 0 | OUTPATIENT
Start: 2020-05-06

## 2020-06-10 ENCOUNTER — TELEPHONE (OUTPATIENT)
Dept: FAMILY MEDICINE CLINIC | Facility: CLINIC | Age: 65
End: 2020-06-10

## 2020-06-11 ENCOUNTER — OFFICE VISIT (OUTPATIENT)
Dept: FAMILY MEDICINE CLINIC | Facility: CLINIC | Age: 65
End: 2020-06-11
Payer: MEDICARE

## 2020-06-11 VITALS
DIASTOLIC BLOOD PRESSURE: 80 MMHG | OXYGEN SATURATION: 99 % | TEMPERATURE: 97.5 F | WEIGHT: 182 LBS | HEIGHT: 69 IN | BODY MASS INDEX: 26.96 KG/M2 | SYSTOLIC BLOOD PRESSURE: 120 MMHG | HEART RATE: 70 BPM

## 2020-06-11 DIAGNOSIS — F41.8 DEPRESSION WITH ANXIETY: ICD-10-CM

## 2020-06-11 DIAGNOSIS — E78.2 MIXED HYPERLIPIDEMIA: ICD-10-CM

## 2020-06-11 DIAGNOSIS — G89.29 CHRONIC LEFT-SIDED LOW BACK PAIN WITH LEFT-SIDED SCIATICA: ICD-10-CM

## 2020-06-11 DIAGNOSIS — I10 ESSENTIAL HYPERTENSION: Primary | ICD-10-CM

## 2020-06-11 DIAGNOSIS — M54.42 CHRONIC LEFT-SIDED LOW BACK PAIN WITH LEFT-SIDED SCIATICA: ICD-10-CM

## 2020-06-11 DIAGNOSIS — G47.33 OSA (OBSTRUCTIVE SLEEP APNEA): ICD-10-CM

## 2020-06-11 PROCEDURE — 99214 OFFICE O/P EST MOD 30 MIN: CPT | Performed by: FAMILY MEDICINE

## 2020-06-11 PROCEDURE — 1036F TOBACCO NON-USER: CPT | Performed by: FAMILY MEDICINE

## 2020-06-11 PROCEDURE — 3079F DIAST BP 80-89 MM HG: CPT | Performed by: FAMILY MEDICINE

## 2020-06-11 PROCEDURE — 3008F BODY MASS INDEX DOCD: CPT | Performed by: FAMILY MEDICINE

## 2020-06-11 PROCEDURE — 3074F SYST BP LT 130 MM HG: CPT | Performed by: FAMILY MEDICINE

## 2020-06-11 PROCEDURE — 1160F RVW MEDS BY RX/DR IN RCRD: CPT | Performed by: FAMILY MEDICINE

## 2020-07-06 DIAGNOSIS — M54.42 CHRONIC LEFT-SIDED LOW BACK PAIN WITH LEFT-SIDED SCIATICA: ICD-10-CM

## 2020-07-06 DIAGNOSIS — G89.29 CHRONIC LEFT-SIDED LOW BACK PAIN WITH LEFT-SIDED SCIATICA: ICD-10-CM

## 2020-07-07 RX ORDER — TRAMADOL HYDROCHLORIDE 50 MG/1
TABLET ORAL
Qty: 30 TABLET | Refills: 0 | Status: SHIPPED | OUTPATIENT
Start: 2020-07-07 | End: 2020-09-09

## 2020-07-11 DIAGNOSIS — G89.29 CHRONIC LEFT-SIDED LOW BACK PAIN WITH LEFT-SIDED SCIATICA: ICD-10-CM

## 2020-07-11 DIAGNOSIS — M54.42 CHRONIC LEFT-SIDED LOW BACK PAIN WITH LEFT-SIDED SCIATICA: ICD-10-CM

## 2020-07-13 RX ORDER — CITALOPRAM 10 MG/1
TABLET ORAL
Qty: 30 TABLET | Refills: 3 | Status: SHIPPED | OUTPATIENT
Start: 2020-07-13 | End: 2020-11-06

## 2020-08-11 DIAGNOSIS — E78.2 MIXED HYPERLIPIDEMIA: ICD-10-CM

## 2020-08-11 RX ORDER — ROSUVASTATIN CALCIUM 10 MG/1
TABLET, COATED ORAL
Qty: 30 TABLET | Refills: 5 | Status: SHIPPED | OUTPATIENT
Start: 2020-08-11 | End: 2021-02-04

## 2020-09-06 DIAGNOSIS — G89.29 CHRONIC LEFT-SIDED LOW BACK PAIN WITH LEFT-SIDED SCIATICA: ICD-10-CM

## 2020-09-06 DIAGNOSIS — M54.42 CHRONIC LEFT-SIDED LOW BACK PAIN WITH LEFT-SIDED SCIATICA: ICD-10-CM

## 2020-09-09 RX ORDER — TRAMADOL HYDROCHLORIDE 50 MG/1
TABLET ORAL
Qty: 30 TABLET | Refills: 0 | Status: SHIPPED | OUTPATIENT
Start: 2020-09-09 | End: 2020-11-06

## 2020-09-23 DIAGNOSIS — I10 ESSENTIAL HYPERTENSION: ICD-10-CM

## 2020-09-23 RX ORDER — AMLODIPINE BESYLATE 10 MG/1
TABLET ORAL
Qty: 90 TABLET | Refills: 3 | Status: SHIPPED | OUTPATIENT
Start: 2020-09-23 | End: 2021-09-07

## 2020-10-06 ENCOUNTER — APPOINTMENT (OUTPATIENT)
Dept: LAB | Facility: CLINIC | Age: 65
End: 2020-10-06
Payer: MEDICARE

## 2020-10-06 DIAGNOSIS — I10 ESSENTIAL HYPERTENSION: ICD-10-CM

## 2020-10-06 DIAGNOSIS — Z12.5 SCREENING FOR PROSTATE CANCER: ICD-10-CM

## 2020-10-06 DIAGNOSIS — E78.2 MIXED HYPERLIPIDEMIA: ICD-10-CM

## 2020-10-06 LAB
ALBUMIN SERPL BCP-MCNC: 4.2 G/DL (ref 3.5–5)
ALP SERPL-CCNC: 73 U/L (ref 46–116)
ALT SERPL W P-5'-P-CCNC: 26 U/L (ref 12–78)
ANION GAP SERPL CALCULATED.3IONS-SCNC: 3 MMOL/L (ref 4–13)
AST SERPL W P-5'-P-CCNC: 22 U/L (ref 5–45)
BASOPHILS # BLD AUTO: 0.05 THOUSANDS/ΜL (ref 0–0.1)
BASOPHILS NFR BLD AUTO: 1 % (ref 0–1)
BILIRUB SERPL-MCNC: 0.49 MG/DL (ref 0.2–1)
BUN SERPL-MCNC: 14 MG/DL (ref 5–25)
CALCIUM SERPL-MCNC: 10 MG/DL (ref 8.3–10.1)
CHLORIDE SERPL-SCNC: 108 MMOL/L (ref 100–108)
CHOLEST SERPL-MCNC: 120 MG/DL (ref 50–200)
CO2 SERPL-SCNC: 27 MMOL/L (ref 21–32)
CREAT SERPL-MCNC: 0.94 MG/DL (ref 0.6–1.3)
EOSINOPHIL # BLD AUTO: 0.18 THOUSAND/ΜL (ref 0–0.61)
EOSINOPHIL NFR BLD AUTO: 2 % (ref 0–6)
ERYTHROCYTE [DISTWIDTH] IN BLOOD BY AUTOMATED COUNT: 14.5 % (ref 11.6–15.1)
GFR SERPL CREATININE-BSD FRML MDRD: 84 ML/MIN/1.73SQ M
GLUCOSE SERPL-MCNC: 107 MG/DL (ref 65–140)
HCT VFR BLD AUTO: 42.8 % (ref 36.5–49.3)
HCV AB SER QL: NORMAL
HDLC SERPL-MCNC: 58 MG/DL
HGB BLD-MCNC: 14.4 G/DL (ref 12–17)
IMM GRANULOCYTES # BLD AUTO: 0.03 THOUSAND/UL (ref 0–0.2)
IMM GRANULOCYTES NFR BLD AUTO: 0 % (ref 0–2)
LDLC SERPL CALC-MCNC: 39 MG/DL (ref 0–100)
LYMPHOCYTES # BLD AUTO: 1.19 THOUSANDS/ΜL (ref 0.6–4.47)
LYMPHOCYTES NFR BLD AUTO: 15 % (ref 14–44)
MCH RBC QN AUTO: 32 PG (ref 26.8–34.3)
MCHC RBC AUTO-ENTMCNC: 33.6 G/DL (ref 31.4–37.4)
MCV RBC AUTO: 95 FL (ref 82–98)
MONOCYTES # BLD AUTO: 0.47 THOUSAND/ΜL (ref 0.17–1.22)
MONOCYTES NFR BLD AUTO: 6 % (ref 4–12)
NEUTROPHILS # BLD AUTO: 6.25 THOUSANDS/ΜL (ref 1.85–7.62)
NEUTS SEG NFR BLD AUTO: 76 % (ref 43–75)
NONHDLC SERPL-MCNC: 62 MG/DL
NRBC BLD AUTO-RTO: 0 /100 WBCS
PLATELET # BLD AUTO: 209 THOUSANDS/UL (ref 149–390)
PMV BLD AUTO: 10.4 FL (ref 8.9–12.7)
POTASSIUM SERPL-SCNC: 4.1 MMOL/L (ref 3.5–5.3)
PROT SERPL-MCNC: 8 G/DL (ref 6.4–8.2)
RBC # BLD AUTO: 4.5 MILLION/UL (ref 3.88–5.62)
SODIUM SERPL-SCNC: 138 MMOL/L (ref 136–145)
TRIGL SERPL-MCNC: 115 MG/DL
WBC # BLD AUTO: 8.17 THOUSAND/UL (ref 4.31–10.16)

## 2020-10-06 PROCEDURE — 80061 LIPID PANEL: CPT

## 2020-10-06 PROCEDURE — 84154 ASSAY OF PSA FREE: CPT

## 2020-10-06 PROCEDURE — 36415 COLL VENOUS BLD VENIPUNCTURE: CPT

## 2020-10-06 PROCEDURE — 85025 COMPLETE CBC W/AUTO DIFF WBC: CPT

## 2020-10-06 PROCEDURE — 86803 HEPATITIS C AB TEST: CPT

## 2020-10-06 PROCEDURE — 84153 ASSAY OF PSA TOTAL: CPT

## 2020-10-06 PROCEDURE — 80053 COMPREHEN METABOLIC PANEL: CPT

## 2020-10-12 ENCOUNTER — OFFICE VISIT (OUTPATIENT)
Dept: FAMILY MEDICINE CLINIC | Facility: CLINIC | Age: 65
End: 2020-10-12
Payer: MEDICARE

## 2020-10-12 VITALS
SYSTOLIC BLOOD PRESSURE: 128 MMHG | HEIGHT: 69 IN | BODY MASS INDEX: 26.6 KG/M2 | HEART RATE: 113 BPM | OXYGEN SATURATION: 98 % | WEIGHT: 179.6 LBS | DIASTOLIC BLOOD PRESSURE: 80 MMHG | TEMPERATURE: 97.5 F

## 2020-10-12 DIAGNOSIS — M48.061 SPINAL STENOSIS OF LUMBAR REGION, UNSPECIFIED WHETHER NEUROGENIC CLAUDICATION PRESENT: ICD-10-CM

## 2020-10-12 DIAGNOSIS — G89.29 CHRONIC LEFT-SIDED LOW BACK PAIN WITH LEFT-SIDED SCIATICA: ICD-10-CM

## 2020-10-12 DIAGNOSIS — Z00.00 MEDICARE ANNUAL WELLNESS VISIT, SUBSEQUENT: ICD-10-CM

## 2020-10-12 DIAGNOSIS — M54.42 CHRONIC LEFT-SIDED LOW BACK PAIN WITH LEFT-SIDED SCIATICA: ICD-10-CM

## 2020-10-12 DIAGNOSIS — E78.2 MIXED HYPERLIPIDEMIA: ICD-10-CM

## 2020-10-12 DIAGNOSIS — M48.062 SPINAL STENOSIS OF LUMBAR REGION WITH NEUROGENIC CLAUDICATION: ICD-10-CM

## 2020-10-12 DIAGNOSIS — I10 ESSENTIAL HYPERTENSION: ICD-10-CM

## 2020-10-12 DIAGNOSIS — F33.0 MILD EPISODE OF RECURRENT MAJOR DEPRESSIVE DISORDER (HCC): ICD-10-CM

## 2020-10-12 DIAGNOSIS — G47.33 OSA (OBSTRUCTIVE SLEEP APNEA): Primary | ICD-10-CM

## 2020-10-12 DIAGNOSIS — G47.00 INSOMNIA, UNSPECIFIED TYPE: ICD-10-CM

## 2020-10-12 PROCEDURE — 99214 OFFICE O/P EST MOD 30 MIN: CPT | Performed by: FAMILY MEDICINE

## 2020-10-12 PROCEDURE — G0439 PPPS, SUBSEQ VISIT: HCPCS | Performed by: FAMILY MEDICINE

## 2020-10-12 RX ORDER — CALCIPOTRIENE 50 UG/G
OINTMENT TOPICAL
COMMUNITY
Start: 2020-08-11 | End: 2021-05-24

## 2020-10-12 RX ORDER — DOXEPIN HYDROCHLORIDE 25 MG/1
25 CAPSULE ORAL
Qty: 30 CAPSULE | Refills: 5 | Status: SHIPPED | OUTPATIENT
Start: 2020-10-12 | End: 2021-04-06

## 2020-10-13 LAB
PSA FREE MFR SERPL: 36.7 %
PSA FREE SERPL-MCNC: 0.22 NG/ML
PSA SERPL-MCNC: 0.6 NG/ML (ref 0–4)

## 2020-11-06 DIAGNOSIS — M54.42 CHRONIC LEFT-SIDED LOW BACK PAIN WITH LEFT-SIDED SCIATICA: ICD-10-CM

## 2020-11-06 DIAGNOSIS — G89.29 CHRONIC LEFT-SIDED LOW BACK PAIN WITH LEFT-SIDED SCIATICA: ICD-10-CM

## 2020-11-06 RX ORDER — TRAMADOL HYDROCHLORIDE 50 MG/1
TABLET ORAL
Qty: 30 TABLET | Refills: 0 | Status: SHIPPED | OUTPATIENT
Start: 2020-11-06 | End: 2021-01-04

## 2020-11-06 RX ORDER — CITALOPRAM 10 MG/1
TABLET ORAL
Qty: 30 TABLET | Refills: 3 | Status: SHIPPED | OUTPATIENT
Start: 2020-11-06 | End: 2021-03-03

## 2021-01-04 DIAGNOSIS — M54.42 CHRONIC LEFT-SIDED LOW BACK PAIN WITH LEFT-SIDED SCIATICA: ICD-10-CM

## 2021-01-04 DIAGNOSIS — G89.29 CHRONIC LEFT-SIDED LOW BACK PAIN WITH LEFT-SIDED SCIATICA: ICD-10-CM

## 2021-01-04 RX ORDER — TRAMADOL HYDROCHLORIDE 50 MG/1
TABLET ORAL
Qty: 30 TABLET | Refills: 0 | Status: SHIPPED | OUTPATIENT
Start: 2021-01-04 | End: 2021-03-03

## 2021-02-03 DIAGNOSIS — E78.2 MIXED HYPERLIPIDEMIA: ICD-10-CM

## 2021-02-04 RX ORDER — ROSUVASTATIN CALCIUM 10 MG/1
TABLET, COATED ORAL
Qty: 30 TABLET | Refills: 5 | Status: SHIPPED | OUTPATIENT
Start: 2021-02-04 | End: 2021-08-02

## 2021-02-10 ENCOUNTER — LAB (OUTPATIENT)
Dept: LAB | Facility: CLINIC | Age: 66
End: 2021-02-10
Payer: MEDICARE

## 2021-02-10 DIAGNOSIS — F33.0 MILD EPISODE OF RECURRENT MAJOR DEPRESSIVE DISORDER (HCC): ICD-10-CM

## 2021-02-10 DIAGNOSIS — I10 ESSENTIAL HYPERTENSION: ICD-10-CM

## 2021-02-10 DIAGNOSIS — M54.42 CHRONIC LEFT-SIDED LOW BACK PAIN WITH LEFT-SIDED SCIATICA: ICD-10-CM

## 2021-02-10 DIAGNOSIS — G47.33 OSA (OBSTRUCTIVE SLEEP APNEA): ICD-10-CM

## 2021-02-10 DIAGNOSIS — M48.062 SPINAL STENOSIS OF LUMBAR REGION WITH NEUROGENIC CLAUDICATION: ICD-10-CM

## 2021-02-10 DIAGNOSIS — Z00.00 MEDICARE ANNUAL WELLNESS VISIT, SUBSEQUENT: ICD-10-CM

## 2021-02-10 DIAGNOSIS — M48.061 SPINAL STENOSIS OF LUMBAR REGION, UNSPECIFIED WHETHER NEUROGENIC CLAUDICATION PRESENT: ICD-10-CM

## 2021-02-10 DIAGNOSIS — G89.29 CHRONIC LEFT-SIDED LOW BACK PAIN WITH LEFT-SIDED SCIATICA: ICD-10-CM

## 2021-02-10 LAB
ALBUMIN SERPL BCP-MCNC: 3.9 G/DL (ref 3.5–5)
ALP SERPL-CCNC: 78 U/L (ref 46–116)
ALT SERPL W P-5'-P-CCNC: 26 U/L (ref 12–78)
ANION GAP SERPL CALCULATED.3IONS-SCNC: 6 MMOL/L (ref 4–13)
AST SERPL W P-5'-P-CCNC: 26 U/L (ref 5–45)
BILIRUB SERPL-MCNC: 0.37 MG/DL (ref 0.2–1)
BUN SERPL-MCNC: 15 MG/DL (ref 5–25)
CALCIUM SERPL-MCNC: 9.7 MG/DL (ref 8.3–10.1)
CHLORIDE SERPL-SCNC: 107 MMOL/L (ref 100–108)
CHOLEST SERPL-MCNC: 120 MG/DL (ref 50–200)
CO2 SERPL-SCNC: 26 MMOL/L (ref 21–32)
CREAT SERPL-MCNC: 0.97 MG/DL (ref 0.6–1.3)
GFR SERPL CREATININE-BSD FRML MDRD: 80 ML/MIN/1.73SQ M
GLUCOSE P FAST SERPL-MCNC: 105 MG/DL (ref 65–99)
HDLC SERPL-MCNC: 49 MG/DL
LDLC SERPL CALC-MCNC: 51 MG/DL (ref 0–100)
NONHDLC SERPL-MCNC: 71 MG/DL
POTASSIUM SERPL-SCNC: 3.9 MMOL/L (ref 3.5–5.3)
PROT SERPL-MCNC: 7.8 G/DL (ref 6.4–8.2)
SODIUM SERPL-SCNC: 139 MMOL/L (ref 136–145)
TRIGL SERPL-MCNC: 100 MG/DL

## 2021-02-10 PROCEDURE — 80053 COMPREHEN METABOLIC PANEL: CPT

## 2021-02-10 PROCEDURE — 80061 LIPID PANEL: CPT

## 2021-02-10 PROCEDURE — 36415 COLL VENOUS BLD VENIPUNCTURE: CPT

## 2021-03-02 DIAGNOSIS — M54.42 CHRONIC LEFT-SIDED LOW BACK PAIN WITH LEFT-SIDED SCIATICA: ICD-10-CM

## 2021-03-02 DIAGNOSIS — G89.29 CHRONIC LEFT-SIDED LOW BACK PAIN WITH LEFT-SIDED SCIATICA: ICD-10-CM

## 2021-03-03 RX ORDER — CITALOPRAM 10 MG/1
TABLET ORAL
Qty: 30 TABLET | Refills: 3 | Status: SHIPPED | OUTPATIENT
Start: 2021-03-03 | End: 2021-07-06

## 2021-03-03 RX ORDER — TRAMADOL HYDROCHLORIDE 50 MG/1
TABLET ORAL
Qty: 30 TABLET | Refills: 0 | Status: SHIPPED | OUTPATIENT
Start: 2021-03-03 | End: 2021-05-04

## 2021-03-08 ENCOUNTER — OFFICE VISIT (OUTPATIENT)
Dept: FAMILY MEDICINE CLINIC | Facility: CLINIC | Age: 66
End: 2021-03-08
Payer: MEDICARE

## 2021-03-08 VITALS
SYSTOLIC BLOOD PRESSURE: 142 MMHG | HEART RATE: 76 BPM | WEIGHT: 182 LBS | TEMPERATURE: 98.6 F | BODY MASS INDEX: 26.96 KG/M2 | HEIGHT: 69 IN | OXYGEN SATURATION: 98 % | DIASTOLIC BLOOD PRESSURE: 84 MMHG

## 2021-03-08 DIAGNOSIS — G89.29 CHRONIC LEFT-SIDED LOW BACK PAIN WITH LEFT-SIDED SCIATICA: ICD-10-CM

## 2021-03-08 DIAGNOSIS — R73.9 HYPERGLYCEMIA: ICD-10-CM

## 2021-03-08 DIAGNOSIS — G47.33 OSA (OBSTRUCTIVE SLEEP APNEA): ICD-10-CM

## 2021-03-08 DIAGNOSIS — F33.0 MILD EPISODE OF RECURRENT MAJOR DEPRESSIVE DISORDER (HCC): ICD-10-CM

## 2021-03-08 DIAGNOSIS — F17.211 CIGARETTE NICOTINE DEPENDENCE IN REMISSION: ICD-10-CM

## 2021-03-08 DIAGNOSIS — Z00.00 MEDICARE ANNUAL WELLNESS VISIT, SUBSEQUENT: ICD-10-CM

## 2021-03-08 DIAGNOSIS — I10 ESSENTIAL HYPERTENSION: ICD-10-CM

## 2021-03-08 DIAGNOSIS — H90.3 SENSORINEURAL HEARING LOSS (SNHL) OF BOTH EARS: Primary | ICD-10-CM

## 2021-03-08 DIAGNOSIS — M54.42 CHRONIC LEFT-SIDED LOW BACK PAIN WITH LEFT-SIDED SCIATICA: ICD-10-CM

## 2021-03-08 DIAGNOSIS — M48.061 SPINAL STENOSIS OF LUMBAR REGION, UNSPECIFIED WHETHER NEUROGENIC CLAUDICATION PRESENT: ICD-10-CM

## 2021-03-08 DIAGNOSIS — M48.062 SPINAL STENOSIS OF LUMBAR REGION WITH NEUROGENIC CLAUDICATION: ICD-10-CM

## 2021-03-08 PROCEDURE — 99214 OFFICE O/P EST MOD 30 MIN: CPT | Performed by: FAMILY MEDICINE

## 2021-03-08 NOTE — ASSESSMENT & PLAN NOTE
Hypertension under stable control continue with current medications as directed follow heart healthy diet avoid sodium

## 2021-03-08 NOTE — PROGRESS NOTES
BMI Counseling: Body mass index is 27 27 kg/m²  The BMI is above normal  Nutrition recommendations include decreasing soda and/or juice intake, moderation in carbohydrate intake and increasing intake of lean protein  Exercise recommendations include exercising 3-5 times per week and joining a gym

## 2021-03-08 NOTE — PROGRESS NOTES
Assessment/Plan:       Problem List Items Addressed This Visit        Respiratory    JUNAID (obstructive sleep apnea)    Relevant Medications    betamethasone valerate (VALISONE) 0 1 % cream    Other Relevant Orders    Comprehensive metabolic panel    Lipid panel    Hemoglobin A1C    TSH, 3rd generation with Free T4 reflex       Cardiovascular and Mediastinum    Essential hypertension      Hypertension under stable control continue with current medications as directed follow heart healthy diet avoid sodium         Relevant Medications    betamethasone valerate (VALISONE) 0 1 % cream    Other Relevant Orders    Comprehensive metabolic panel    Lipid panel    Hemoglobin A1C    TSH, 3rd generation with Free T4 reflex       Nervous and Auditory    Lumbago with sciatica, left side    Relevant Medications    betamethasone valerate (VALISONE) 0 1 % cream    Other Relevant Orders    Comprehensive metabolic panel    Lipid panel    Hemoglobin A1C    TSH, 3rd generation with Free T4 reflex    Bilateral hearing loss - Primary      Continue with hearing evaluation         Relevant Medications    betamethasone valerate (VALISONE) 0 1 % cream    Other Relevant Orders    Comprehensive metabolic panel    Lipid panel    Hemoglobin A1C    TSH, 3rd generation with Free T4 reflex       Other    Medicare annual wellness visit, subsequent    Relevant Medications    betamethasone valerate (VALISONE) 0 1 % cream    Other Relevant Orders    Comprehensive metabolic panel    Lipid panel    Hemoglobin A1C    TSH, 3rd generation with Free T4 reflex    Lumbar stenosis    Relevant Medications    betamethasone valerate (VALISONE) 0 1 % cream    Other Relevant Orders    Comprehensive metabolic panel    Lipid panel    Hemoglobin A1C    TSH, 3rd generation with Free T4 reflex    Comprehensive metabolic panel    Lipid panel    Hemoglobin A1C    TSH, 3rd generation with Free T4 reflex    Mild episode of recurrent major depressive disorder (HCC)    Relevant Medications    betamethasone valerate (VALISONE) 0 1 % cream    Other Relevant Orders    Comprehensive metabolic panel    Lipid panel    Hemoglobin A1C    TSH, 3rd generation with Free T4 reflex    Hyperglycemia    Relevant Orders    Hemoglobin A1C    Cigarette nicotine dependence in remission    Relevant Medications    betamethasone valerate (VALISONE) 0 1 % cream    Other Relevant Orders    CT lung screening program    Comprehensive metabolic panel    Lipid panel    Hemoglobin A1C    TSH, 3rd generation with Free T4 reflex            Subjective:      Patient ID: Gavin Ching is a 77 y o  male  Patient presents for general checkup today evaluation for month review of laboratory work and medications      The following portions of the patient's history were reviewed and updated as appropriate: allergies, current medications, past family history, past medical history, past social history, past surgical history and problem list     Review of Systems   Constitutional: Negative for chills, fatigue and fever  HENT: Negative for congestion, nosebleeds, rhinorrhea, sinus pressure and sore throat  Eyes: Negative for discharge and redness  Respiratory: Negative for cough and shortness of breath  Cardiovascular: Negative for chest pain, palpitations and leg swelling  Gastrointestinal: Negative for abdominal pain, blood in stool and nausea  Endocrine: Negative for cold intolerance, heat intolerance and polyuria  Genitourinary: Negative for dysuria and frequency  Musculoskeletal: Negative for arthralgias, back pain and myalgias  Skin: Negative for rash  Neurological: Negative for dizziness, weakness and headaches  Hematological: Negative for adenopathy  Psychiatric/Behavioral: Negative for behavioral problems and sleep disturbance  The patient is not nervous/anxious            Objective:      /84   Pulse 76   Temp 98 6 °F (37 °C)   Ht 5' 8 5" (1 74 m)   Wt 82 6 kg (182 lb)   SpO2 98% BMI 27 27 kg/m²        Physical Exam  Vitals signs and nursing note reviewed  Constitutional:       Appearance: Normal appearance  He is well-developed and normal weight  HENT:      Head: Normocephalic and atraumatic  Right Ear: Tympanic membrane and external ear normal       Left Ear: Tympanic membrane and external ear normal       Nose: Nose normal       Mouth/Throat:      Mouth: Mucous membranes are moist       Pharynx: Oropharynx is clear  Eyes:      General: No scleral icterus  Conjunctiva/sclera: Conjunctivae normal       Pupils: Pupils are equal, round, and reactive to light  Neck:      Musculoskeletal: Normal range of motion and neck supple  Thyroid: No thyromegaly  Vascular: No JVD  Cardiovascular:      Rate and Rhythm: Normal rate and regular rhythm  Heart sounds: Normal heart sounds  No murmur  Pulmonary:      Effort: Pulmonary effort is normal       Breath sounds: Normal breath sounds  No wheezing or rales  Chest:      Chest wall: No tenderness  Abdominal:      General: Bowel sounds are normal  There is no distension  Palpations: Abdomen is soft  There is no mass  Tenderness: There is no abdominal tenderness  There is no guarding or rebound  Musculoskeletal: Normal range of motion  General: No tenderness or deformity  Lymphadenopathy:      Cervical: No cervical adenopathy  Skin:     General: Skin is warm and dry  Findings: No erythema or rash  Neurological:      Mental Status: He is alert and oriented to person, place, and time  Cranial Nerves: No cranial nerve deficit  Deep Tendon Reflexes: Reflexes are normal and symmetric  Reflexes normal    Psychiatric:         Mood and Affect: Mood normal          Behavior: Behavior normal          Thought Content:  Thought content normal          Judgment: Judgment normal           Data:    Laboratory Results: I have personally reviewed the pertinent laboratory results/reports Radiology/Other Diagnostic Testing Results: I have personally reviewed pertinent reports         Lab Results   Component Value Date    WBC 8 17 10/06/2020    HGB 14 4 10/06/2020    HCT 42 8 10/06/2020    MCV 95 10/06/2020     10/06/2020     Lab Results   Component Value Date    K 3 9 02/10/2021     02/10/2021    CO2 26 02/10/2021    BUN 15 02/10/2021    CREATININE 0 97 02/10/2021    GLUF 105 (H) 02/10/2021    CALCIUM 9 7 02/10/2021    AST 26 02/10/2021    ALT 26 02/10/2021    ALKPHOS 78 02/10/2021    EGFR 80 02/10/2021     Lab Results   Component Value Date    CHOLESTEROL 120 02/10/2021    CHOLESTEROL 120 10/06/2020    CHOLESTEROL 250 (H) 02/05/2020     Lab Results   Component Value Date    HDL 49 02/10/2021    HDL 58 10/06/2020    HDL 43 02/05/2020     Lab Results   Component Value Date    LDLCALC 51 02/10/2021    LDLCALC 39 10/06/2020    LDLCALC 174 (H) 02/05/2020     Lab Results   Component Value Date    TRIG 100 02/10/2021    TRIG 115 10/06/2020    TRIG 165 (H) 02/05/2020     No results found for: [place], [place]  Lab Results   Component Value Date    [id number] 4 100 (H) 09/12/2018     No results found for: HGBA1C  Lab Results   Component Value Date    PSA 0 6 10/06/2020       Dennis Tracy DO

## 2021-03-10 DIAGNOSIS — Z23 ENCOUNTER FOR IMMUNIZATION: ICD-10-CM

## 2021-03-18 ENCOUNTER — IMMUNIZATIONS (OUTPATIENT)
Dept: FAMILY MEDICINE CLINIC | Facility: HOSPITAL | Age: 66
End: 2021-03-18

## 2021-03-18 DIAGNOSIS — Z23 ENCOUNTER FOR IMMUNIZATION: Primary | ICD-10-CM

## 2021-03-18 PROCEDURE — 0011A SARS-COV-2 / COVID-19 MRNA VACCINE (MODERNA) 100 MCG: CPT

## 2021-03-18 PROCEDURE — 91301 SARS-COV-2 / COVID-19 MRNA VACCINE (MODERNA) 100 MCG: CPT

## 2021-04-06 DIAGNOSIS — G47.00 INSOMNIA, UNSPECIFIED TYPE: ICD-10-CM

## 2021-04-06 RX ORDER — DOXEPIN HYDROCHLORIDE 25 MG/1
CAPSULE ORAL
Qty: 30 CAPSULE | Refills: 5 | Status: SHIPPED | OUTPATIENT
Start: 2021-04-06 | End: 2021-10-04

## 2021-04-08 ENCOUNTER — HOSPITAL ENCOUNTER (OUTPATIENT)
Dept: RADIOLOGY | Facility: MEDICAL CENTER | Age: 66
Discharge: HOME | End: 2021-04-08
Payer: MEDICARE

## 2021-04-08 DIAGNOSIS — F17.211 CIGARETTE NICOTINE DEPENDENCE IN REMISSION: ICD-10-CM

## 2021-04-08 PROCEDURE — 71271 CT THORAX LUNG CANCER SCR C-: CPT

## 2021-04-09 DIAGNOSIS — R91.8 LUNG MASS: Primary | ICD-10-CM

## 2021-04-12 ENCOUNTER — TELEPHONE (OUTPATIENT)
Dept: CARDIAC SURGERY | Facility: CLINIC | Age: 66
End: 2021-04-12

## 2021-04-12 NOTE — TELEPHONE ENCOUNTER
spoke with patient  Patient unaware of why he needs to see our office  He did not want to schedule an appt at this time  Informed patient he should call PCP to go over CT scan results   Provided patient with our phone number to reach us if he would like to schedule an appointment

## 2021-04-16 ENCOUNTER — IMMUNIZATIONS (OUTPATIENT)
Dept: FAMILY MEDICINE CLINIC | Facility: HOSPITAL | Age: 66
End: 2021-04-16

## 2021-04-16 DIAGNOSIS — Z23 ENCOUNTER FOR IMMUNIZATION: Primary | ICD-10-CM

## 2021-04-16 PROCEDURE — 91301 SARS-COV-2 / COVID-19 MRNA VACCINE (MODERNA) 100 MCG: CPT

## 2021-04-16 PROCEDURE — 0012A SARS-COV-2 / COVID-19 MRNA VACCINE (MODERNA) 100 MCG: CPT

## 2021-04-20 ENCOUNTER — OFFICE VISIT (OUTPATIENT)
Dept: CARDIAC SURGERY | Facility: CLINIC | Age: 66
End: 2021-04-20
Payer: MEDICARE

## 2021-04-20 VITALS
HEART RATE: 80 BPM | DIASTOLIC BLOOD PRESSURE: 84 MMHG | WEIGHT: 180.5 LBS | RESPIRATION RATE: 12 BRPM | TEMPERATURE: 98.2 F | HEIGHT: 69 IN | BODY MASS INDEX: 26.73 KG/M2 | SYSTOLIC BLOOD PRESSURE: 130 MMHG

## 2021-04-20 DIAGNOSIS — R91.8 LUNG MASS: ICD-10-CM

## 2021-04-20 PROCEDURE — 99205 OFFICE O/P NEW HI 60 MIN: CPT | Performed by: THORACIC SURGERY (CARDIOTHORACIC VASCULAR SURGERY)

## 2021-04-20 NOTE — PROGRESS NOTES
Thoracic Consult  Assessment/Plan:    Lung mass  Mr Luis Kelly Is a very pleasant 24-year-old male who presents to my office with a newly identified left upper lobe lung mass  today in the office, we had a long conversation regarding the significance of this mass  I explained to him that without a biopsy, I can not tell him exactly what this is, but based upon the imaging and his smoking history, this is most likely a malignancy  I explained to him that at this time, we need some additional workup before creating a definitive plan  I would like to send him for a PET-CT scan to adequately stage the mass  If this appears to be a localized process, then I will send him for an IR biopsy of the mass  If it looks as if it has spread anywhere, then we will readjust our plan and potentially biopsy a different site  Additionally, I will send him for pulmonary function testing to determine if he is a surgical candidate  I answered all their questions  Understanding that this was a lot of information that they received today, I have requested that my a lung cancer nurse navigator, Florian Gill RN, will call the patient and his wife later this week to ensure that if they think of questions, she can answer them for him as she is an excellent resource     At this time, my office will arrange for all the above tests and he will come back and follow-up with me after the above has been completed  Niall Wyatt MD  Thoracic Surgery  (Available on [employer])  Office: [phone]         Diagnoses and all orders for this visit:    Lung mass  -     Ambulatory referral to Thoracic Surgery  -     Complete PFT with post bronchodilator; Future  -     NM PET CT skull base to mid thigh; Future  -     Ambulatory referral to Interventional Radiology; Future          Thoracic History   Diagnosis:    Procedures/Surgeries:    Pathology:    Adjuvant Therapy:       Subjective:    Patient ID: Alea Wolff is a 77 y o  male     HPI  Mr Bakari Bernabe Is a very pleasant 55-year-old male who presents to my office with a newly identified left upper lobe lung mass  I have personally reviewed all of his imaging in PACS and I have reviewed his chart and his previous notes from his primary care provider  His chest CT demonstrates a 6 x 2 cm mass in his left upper lobe  He has a small 7 mm AP window node  This is not pathologically enlarged but it is present  He has no additional mediastinal lymphadenopathy identified on his CT scan  Today in the office, the patient states that he feels like he is in his overall pretty good state of health  Without a lung cancer screening test, he never would of known that there was something wrong  He reports that just this past weekend 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  This was particularly heavy exertion any had some short of breath with this but he states that with regular exertion, he does not feel short of breath  He denies chest pain  He does report some recent weight gain that he believes is associated with decreased activity  He reports an occasional cough with some sputum production  He denies hemoptysis  He does report a postnasal drip  He denies any fevers or chills or upper respiratory infection or pneumonia  He denies any headaches or double vision  He denies any seizures or syncope  He denies a personal history of cancer  He has no family history of lung cancer in recent generation  He was a former smoker having smoked about a pack and half per day for 40 years  He quit in February of 2018      The following portions of the patient's history were reviewed and updated as appropriate: allergies, current medications, past family history, past medical history, past social history, past surgical history and problem list     Past Medical History:   Diagnosis Date    Glaucoma     Hypertension       Past Surgical History:   Procedure Laterality Date    BACK SURGERY      HEMORRHOID SURGERY      LAMINECTOMY  2018    L4-L5    TONSILLECTOMY  1959      Family History   Problem Relation Age of Onset    Nephrolithiasis Father       Social History     Socioeconomic History    Marital status: /Civil Union     Spouse name: Not on file    Number of children: Not on file    Years of education: Not on file    Highest education level: Not on file   Occupational History    Not on file   Social Needs    Financial resource strain: Not on file    Food insecurity     Worry: Not on file     Inability: Not on file    Transportation needs     Medical: Not on file     Non-medical: Not on file   Tobacco Use    Smoking status: Former Smoker     Packs/day: 1 00     Years: 40 00     Pack years: 40 00     Types: Cigarettes     Quit date:      Years since quittin 3    Smokeless tobacco: Never Used   Substance and Sexual Activity    Alcohol use: Yes     Frequency: 2-4 times a month     Drinks per session: 1 or 2     Binge frequency: Never     Comment: solkcially    Drug use: No    Sexual activity: Not on file   Lifestyle    Physical activity     Days per week: Not on file     Minutes per session: Not on file    Stress: Not on file   Relationships    Social connections     Talks on phone: Not on file     Gets together: Not on file     Attends [employer] service: Not on file     Active member of club or organization: Not on file     Attends meetings of clubs or organizations: Not on file     Relationship status: Not on file    Intimate partner violence     Fear of current or ex partner: Not on file     Emotionally abused: Not on file     Physically abused: Not on file     Forced sexual activity: Not on file   Other Topics Concern    Not on file   Social History Narrative    Not on file      Review of Systems   Constitutional: Positive for unexpected weight change  Negative for activity change, chills, fatigue and fever  HENT: Positive for postnasal drip  Negative for sore throat, trouble swallowing and voice change  Eyes: Negative  Negative for visual disturbance  Respiratory: Positive for cough  Negative for chest tightness, shortness of breath, wheezing and stridor  Cardiovascular: Negative for chest pain and leg swelling  Gastrointestinal: Negative  Endocrine: Negative  Genitourinary: Negative  Musculoskeletal: Negative  Skin: Negative  Allergic/Immunologic: Negative  Neurological: Negative for seizures, syncope, speech difficulty, weakness, light-headedness and headaches  Hematological: Negative for adenopathy  Psychiatric/Behavioral: Negative  Objective:   Physical Exam  Vitals signs and nursing note reviewed  Constitutional:       Appearance: He is well-developed  He is not diaphoretic  HENT:      Head: Normocephalic and atraumatic  Nose: Nose normal  No congestion  Mouth/Throat:      Mouth: Mucous membranes are moist       Pharynx: Oropharynx is clear  Eyes:      Extraocular Movements: Extraocular movements intact  Pupils: Pupils are equal, round, and reactive to light  Neck:      Musculoskeletal: Normal range of motion  Trachea: No tracheal deviation  Cardiovascular:      Rate and Rhythm: Normal rate and regular rhythm  Heart sounds: Normal heart sounds  No murmur  Pulmonary:      Effort: Pulmonary effort is normal  No respiratory distress  Breath sounds: Normal breath sounds  No stridor  No wheezing or rales  Chest:      Chest wall: No tenderness  Abdominal:      General: Bowel sounds are normal  There is no distension  Palpations: Abdomen is soft  Tenderness: There is no abdominal tenderness  Musculoskeletal: Normal range of motion  Lymphadenopathy:      Cervical: No cervical adenopathy  Skin:     General: Skin is warm and dry  Coloration: Skin is not pale  Findings: No erythema or rash     Neurological:      Mental Status: He is alert and oriented to person, place, and time  Psychiatric:         Behavior: Behavior normal          Thought Content: Thought content normal          Judgment: Judgment normal      /84 (BP Location: Left arm, Patient Position: Sitting)   Pulse 80   Temp 98 2 °F (36 8 °C) (Tympanic)   Resp 12   Ht 5' 8 5" (1 74 m)   Wt 81 9 kg (180 lb 8 oz)   BMI 27 05 kg/m²     No Chest XR results available for this patient  No CT Chest results available for this patient  No CT Chest,Abdomen,Pelvis results available for this patient  No NM PET CT results available for this patient  No Barium Swallow results available for this patient

## 2021-04-20 NOTE — ASSESSMENT & PLAN NOTE
Mr Nadya Gusman Is a very pleasant 57-year-old male who presents to my office with a newly identified left upper lobe lung mass  today in the office, we had a long conversation regarding the significance of this mass  I explained to him that without a biopsy, I can not tell him exactly what this is, but based upon the imaging and his smoking history, this is most likely a malignancy  I explained to him that at this time, we need some additional workup before creating a definitive plan  I would like to send him for a PET-CT scan to adequately stage the mass  If this appears to be a localized process, then I will send him for an IR biopsy of the mass  If it looks as if it has spread anywhere, then we will readjust our plan and potentially biopsy a different site  Additionally, I will send him for pulmonary function testing to determine if he is a surgical candidate  I answered all their questions  Understanding that this was a lot of information that they received today, I have requested that my a lung cancer nurse navigator, Jeremy Bishop RN, will call the patient and his wife later this week to ensure that if they think of questions, she can answer them for him as she is an excellent resource     At this time, my office will arrange for all the above tests and he will come back and follow-up with me after the above has been completed      Jose Daniel Johnston MD  Thoracic Surgery  (Available on [employer])  Office: [phone]

## 2021-04-20 NOTE — LETTER
April 20, 2021     Yeny Jain DO  [address]  [address]  [address]    Patient: Diana Calloway   YOB: 1955   Date of Visit: 4/20/2021       Dear Dr Ramon Linares: Thank you for referring Zulay Maynard to me for evaluation  Below are my notes for this consultation  If you have questions, please do not hesitate to call me  I look forward to following your patient along with you  Sincerely,        Milena Lawrence MD        CC: JUAN J Montero MD  4/20/2021 11:54 AM  Sign when Signing Visit  Thoracic Consult  Assessment/Plan:    Lung mass  Mr Sarita Hua Is a very pleasant 49-year-old male who presents to my office with a newly identified left upper lobe lung mass  today in the office, we had a long conversation regarding the significance of this mass  I explained to him that without a biopsy, I can not tell him exactly what this is, but based upon the imaging and his smoking history, this is most likely a malignancy  I explained to him that at this time, we need some additional workup before creating a definitive plan  I would like to send him for a PET-CT scan to adequately stage the mass  If this appears to be a localized process, then I will send him for an IR biopsy of the mass  If it looks as if it has spread anywhere, then we will readjust our plan and potentially biopsy a different site  Additionally, I will send him for pulmonary function testing to determine if he is a surgical candidate  I answered all their questions    Understanding that this was a lot of information that they received today, I have requested that my a lung cancer nurse navigator, Hannah Diaz RN, will call the patient and his wife later this week to ensure that if they think of questions, she can answer them for him as she is an excellent resource     At this time, my office will arrange for all the above tests and he will come back and follow-up with me after the above has been completed  Alondra Carreon MD  Thoracic Surgery  (Available on [employer])  Office: [phone]         Diagnoses and all orders for this visit:    Lung mass  -     Ambulatory referral to Thoracic Surgery  -     Complete PFT with post bronchodilator; Future  -     NM PET CT skull base to mid thigh; Future  -     Ambulatory referral to Interventional Radiology; Future          Thoracic History   Diagnosis:    Procedures/Surgeries:    Pathology:    Adjuvant Therapy:       Subjective:    Patient ID: Brian Garza is a 77 y o  male  HPI  Mr Eddie Francois Is a very pleasant 24-year-old male who presents to my office with a newly identified left upper lobe lung mass  I have personally reviewed all of his imaging in PACS and I have reviewed his chart and his previous notes from his primary care provider  His chest CT demonstrates a 6 x 2 cm mass in his left upper lobe  He has a small 7 mm AP window node  This is not pathologically enlarged but it is present  He has no additional mediastinal lymphadenopathy identified on his CT scan  Today in the office, the patient states that he feels like he is in his overall pretty good state of health  Without a lung cancer screening test, he never would of known that there was something wrong  He reports that just this past weekend 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  This was particularly heavy exertion any had some short of breath with this but he states that with regular exertion, he does not feel short of breath  He denies chest pain  He does report some recent weight gain that he believes is associated with decreased activity  He reports an occasional cough with some sputum production  He denies hemoptysis  He does report a postnasal drip  He denies any fevers or chills or upper respiratory infection or pneumonia  He denies any headaches or double vision  He denies any seizures or syncope        He denies a personal history of cancer  He has no family history of lung cancer in recent generation  He was a former smoker having smoked about a pack and half per day for 40 years  He quit in 2018      The following portions of the patient's history were reviewed and updated as appropriate: allergies, current medications, past family history, past medical history, past social history, past surgical history and problem list     Past Medical History:   Diagnosis Date    Glaucoma     Hypertension       Past Surgical History:   Procedure Laterality Date    BACK SURGERY      HEMORRHOID SURGERY      LAMINECTOMY  2018    L4-L5    TONSILLECTOMY  1959      Family History   Problem Relation Age of Onset    Nephrolithiasis Father       Social History     Socioeconomic History    Marital status: /Civil Union     Spouse name: Not on file    Number of children: Not on file    Years of education: Not on file    Highest education level: Not on file   Occupational History    Not on file   Social Needs    Financial resource strain: Not on file    Food insecurity     Worry: Not on file     Inability: Not on file    Transportation needs     Medical: Not on file     Non-medical: Not on file   Tobacco Use    Smoking status: Former Smoker     Packs/day: 1 00     Years: 40 00     Pack years: 40 00     Types: Cigarettes     Quit date:      Years since quittin 3    Smokeless tobacco: Never Used   Substance and Sexual Activity    Alcohol use: Yes     Frequency: 2-4 times a month     Drinks per session: 1 or 2     Binge frequency: Never     Comment: solkcially    Drug use: No    Sexual activity: Not on file   Lifestyle    Physical activity     Days per week: Not on file     Minutes per session: Not on file    Stress: Not on file   Relationships    Social connections     Talks on phone: Not on file     Gets together: Not on file     Attends Taoism service: Not on file     Active member of club or organization: Not on file     Attends meetings of clubs or organizations: Not on file     Relationship status: Not on file    Intimate partner violence     Fear of current or ex partner: Not on file     Emotionally abused: Not on file     Physically abused: Not on file     Forced sexual activity: Not on file   Other Topics Concern    Not on file   Social History Narrative    Not on file      Review of Systems   Constitutional: Positive for unexpected weight change  Negative for activity change, chills, fatigue and fever  HENT: Positive for postnasal drip  Negative for sore throat, trouble swallowing and voice change  Eyes: Negative  Negative for visual disturbance  Respiratory: Positive for cough  Negative for chest tightness, shortness of breath, wheezing and stridor  Cardiovascular: Negative for chest pain and leg swelling  Gastrointestinal: Negative  Endocrine: Negative  Genitourinary: Negative  Musculoskeletal: Negative  Skin: Negative  Allergic/Immunologic: Negative  Neurological: Negative for seizures, syncope, speech difficulty, weakness, light-headedness and headaches  Hematological: Negative for adenopathy  Psychiatric/Behavioral: Negative  Objective:   Physical Exam  Vitals signs and nursing note reviewed  Constitutional:       Appearance: He is well-developed  He is not diaphoretic  HENT:      Head: Normocephalic and atraumatic  Nose: Nose normal  No congestion  Mouth/Throat:      Mouth: Mucous membranes are moist       Pharynx: Oropharynx is clear  Eyes:      Extraocular Movements: Extraocular movements intact  Pupils: Pupils are equal, round, and reactive to light  Neck:      Musculoskeletal: Normal range of motion  Trachea: No tracheal deviation  Cardiovascular:      Rate and Rhythm: Normal rate and regular rhythm  Heart sounds: Normal heart sounds  No murmur     Pulmonary:      Effort: Pulmonary effort is normal  No respiratory distress  Breath sounds: Normal breath sounds  No stridor  No wheezing or rales  Chest:      Chest wall: No tenderness  Abdominal:      General: Bowel sounds are normal  There is no distension  Palpations: Abdomen is soft  Tenderness: There is no abdominal tenderness  Musculoskeletal: Normal range of motion  Lymphadenopathy:      Cervical: No cervical adenopathy  Skin:     General: Skin is warm and dry  Coloration: Skin is not pale  Findings: No erythema or rash  Neurological:      Mental Status: He is alert and oriented to person, place, and time  Psychiatric:         Behavior: Behavior normal          Thought Content: Thought content normal          Judgment: Judgment normal      /84 (BP Location: Left arm, Patient Position: Sitting)   Pulse 80   Temp 98 2 °F (36 8 °C) (Tympanic)   Resp 12   Ht 5' 8 5" (1 74 m)   Wt 81 9 kg (180 lb 8 oz)   BMI 27 05 kg/m²     No Chest XR results available for this patient  No CT Chest results available for this patient  No CT Chest,Abdomen,Pelvis results available for this patient  No NM PET CT results available for this patient  No Barium Swallow results available for this patient

## 2021-04-21 ENCOUNTER — PREP FOR PROCEDURE (OUTPATIENT)
Dept: INTERVENTIONAL RADIOLOGY/VASCULAR | Facility: CLINIC | Age: 66
End: 2021-04-21

## 2021-04-21 ENCOUNTER — HOSPITAL ENCOUNTER (OUTPATIENT)
Dept: PULMONOLOGY | Facility: HOSPITAL | Age: 66
Discharge: HOME | End: 2021-04-21
Payer: MEDICARE

## 2021-04-21 DIAGNOSIS — R91.8 LUNG MASS: Primary | ICD-10-CM

## 2021-04-21 DIAGNOSIS — R91.8 LUNG MASS: ICD-10-CM

## 2021-04-21 PROCEDURE — 94729 DIFFUSING CAPACITY: CPT | Performed by: INTERNAL MEDICINE

## 2021-04-21 PROCEDURE — 94726 PLETHYSMOGRAPHY LUNG VOLUMES: CPT

## 2021-04-21 PROCEDURE — 94760 N-INVAS EAR/PLS OXIMETRY 1: CPT

## 2021-04-21 PROCEDURE — 94060 EVALUATION OF WHEEZING: CPT

## 2021-04-21 PROCEDURE — 94060 EVALUATION OF WHEEZING: CPT | Performed by: INTERNAL MEDICINE

## 2021-04-21 PROCEDURE — 94726 PLETHYSMOGRAPHY LUNG VOLUMES: CPT | Performed by: INTERNAL MEDICINE

## 2021-04-21 PROCEDURE — 94729 DIFFUSING CAPACITY: CPT

## 2021-04-21 RX ORDER — ALBUTEROL SULFATE 2.5 MG/3ML
2.5 SOLUTION RESPIRATORY (INHALATION) ONCE AS NEEDED
Status: COMPLETED | OUTPATIENT
Start: 2021-04-21 | End: 2021-04-21

## 2021-04-21 RX ADMIN — ALBUTEROL SULFATE 2.5 MG: 2.5 SOLUTION RESPIRATORY (INHALATION) at 12:20

## 2021-04-22 ENCOUNTER — DOCUMENTATION (OUTPATIENT)
Dept: CARDIAC SURGERY | Facility: CLINIC | Age: 66
End: 2021-04-22

## 2021-04-22 NOTE — PROGRESS NOTES
I called Mrs Lily Ledbetter and introduced myself to her and explained my role  Questions answered, support provided  Mrs Lily Ledbetter and her  reported having no further questions  I gave her my phone number for any future questions or concerns

## 2021-04-23 ENCOUNTER — TELEPHONE (OUTPATIENT)
Dept: INTERVENTIONAL RADIOLOGY/VASCULAR | Facility: HOSPITAL | Age: 66
End: 2021-04-23

## 2021-04-23 ENCOUNTER — TELEPHONE (OUTPATIENT)
Dept: SURGICAL ONCOLOGY | Facility: CLINIC | Age: 66
End: 2021-04-23

## 2021-04-23 NOTE — TELEPHONE ENCOUNTER
I called patient to let him know that the IR department in [facility] should be contacting him today, 4/23, to give him an apt for his IR BX  Patient thanked me for keeping him up to date  I let patient know to please call me first thing Monday morning if he does not here from them  Patient very appreciative

## 2021-04-23 NOTE — PROGRESS NOTES
Spoke with patient to set up lung biopsy  Appointment was made for 5/6/21 at the [address] [place] [address]  Plan to arrive for 0730, have a ride to and from, [place] after midnight the night prior  Answered all questions, consult complete

## 2021-05-03 DIAGNOSIS — G89.29 CHRONIC LEFT-SIDED LOW BACK PAIN WITH LEFT-SIDED SCIATICA: ICD-10-CM

## 2021-05-03 DIAGNOSIS — M54.42 CHRONIC LEFT-SIDED LOW BACK PAIN WITH LEFT-SIDED SCIATICA: ICD-10-CM

## 2021-05-04 ENCOUNTER — HOSPITAL ENCOUNTER (OUTPATIENT)
Dept: RADIOLOGY | Age: 66
Discharge: HOME | End: 2021-05-04
Payer: MEDICARE

## 2021-05-04 DIAGNOSIS — D38.1 NEOPLASM OF UNCERTAIN BEHAVIOR OF LEFT UPPER LOBE OF LUNG: ICD-10-CM

## 2021-05-04 LAB — GLUCOSE SERPL-MCNC: 100 MG/DL (ref 65–140)

## 2021-05-04 PROCEDURE — A9552 F18 FDG: HCPCS

## 2021-05-04 PROCEDURE — 78815 PET IMAGE W/CT SKULL-THIGH: CPT

## 2021-05-04 PROCEDURE — 82948 REAGENT STRIP/BLOOD GLUCOSE: CPT

## 2021-05-04 PROCEDURE — G1004 CDSM NDSC: HCPCS

## 2021-05-04 RX ORDER — TRAMADOL HYDROCHLORIDE 50 MG/1
TABLET ORAL
Qty: 30 TABLET | Refills: 0 | Status: SHIPPED | OUTPATIENT
Start: 2021-05-04 | End: 2021-07-06

## 2021-05-06 ENCOUNTER — HOSPITAL ENCOUNTER (OUTPATIENT)
Dept: CT IMAGING | Facility: HOSPITAL | Age: 66
Discharge: HOME | End: 2021-05-06
Attending: RADIOLOGY | Admitting: RADIOLOGY
Payer: MEDICARE

## 2021-05-06 VITALS
HEART RATE: 74 BPM | HEIGHT: 68 IN | OXYGEN SATURATION: 97 % | BODY MASS INDEX: 27.28 KG/M2 | SYSTOLIC BLOOD PRESSURE: 134 MMHG | DIASTOLIC BLOOD PRESSURE: 72 MMHG | WEIGHT: 180 LBS | TEMPERATURE: 97.5 F | RESPIRATION RATE: 18 BRPM

## 2021-05-06 DIAGNOSIS — R91.8 LUNG MASS: ICD-10-CM

## 2021-05-06 LAB
ERYTHROCYTE [DISTWIDTH] IN BLOOD BY AUTOMATED COUNT: 14.4 % (ref 11.5–14.5)
HCT VFR BLD AUTO: 40.5 % (ref 42–47)
HGB BLD-MCNC: 13.6 G/DL (ref 14–18)
INR PPP: 0.98 (ref 0.84–1.19)
MCH RBC QN AUTO: 31.3 PG (ref 26–34)
MCHC RBC AUTO-ENTMCNC: 33.6 G/DL (ref 31–37)
MCV RBC AUTO: 93 FL (ref 81–99)
PLATELET # BLD AUTO: 228 THOUSANDS/UL (ref 149–390)
PMV BLD AUTO: 7.6 FL (ref 8.6–11.7)
PROTHROMBIN TIME: 12.9 SECONDS (ref 11.6–14.5)
RBC # BLD AUTO: 4.35 MILLION/UL (ref 4.3–5.9)
WBC # BLD AUTO: 8.6 THOUSAND/UL (ref 4.8–10.8)

## 2021-05-06 PROCEDURE — 85610 PROTHROMBIN TIME: CPT | Performed by: RADIOLOGY

## 2021-05-06 PROCEDURE — 77012 CT SCAN FOR NEEDLE BIOPSY: CPT

## 2021-05-06 PROCEDURE — 32408 CORE NDL BX LNG/MED PERQ: CPT | Performed by: RADIOLOGY

## 2021-05-06 PROCEDURE — 88341 IMHCHEM/IMCYTCHM EA ADD ANTB: CPT

## 2021-05-06 PROCEDURE — 88305 TISSUE EXAM BY PATHOLOGIST: CPT | Performed by: PATHOLOGY

## 2021-05-06 PROCEDURE — 88342 IMHCHEM/IMCYTCHM 1ST ANTB: CPT | Performed by: PATHOLOGY

## 2021-05-06 PROCEDURE — 99152 MOD SED SAME PHYS/QHP 5/>YRS: CPT

## 2021-05-06 PROCEDURE — 99152 MOD SED SAME PHYS/QHP 5/>YRS: CPT | Performed by: RADIOLOGY

## 2021-05-06 PROCEDURE — 88341 IMHCHEM/IMCYTCHM EA ADD ANTB: CPT | Performed by: PATHOLOGY

## 2021-05-06 PROCEDURE — 85027 COMPLETE CBC AUTOMATED: CPT | Performed by: RADIOLOGY

## 2021-05-06 PROCEDURE — 32408 CORE NDL BX LNG/MED PERQ: CPT

## 2021-05-06 PROCEDURE — 88342 IMHCHEM/IMCYTCHM 1ST ANTB: CPT

## 2021-05-06 RX ORDER — SODIUM CHLORIDE 9 MG/ML
50 INJECTION, SOLUTION INTRAVENOUS CONTINUOUS
Status: DISCONTINUED | OUTPATIENT
Start: 2021-05-06 | End: 2021-05-10 | Stop reason: HOSPADM

## 2021-05-06 RX ORDER — LIDOCAINE WITH 8.4% SOD BICARB 0.9%(10ML)
SYRINGE (ML) INJECTION CODE/TRAUMA/SEDATION MEDICATION
Status: COMPLETED | OUTPATIENT
Start: 2021-05-06 | End: 2021-05-06

## 2021-05-06 RX ORDER — MIDAZOLAM HYDROCHLORIDE 2 MG/2ML
INJECTION, SOLUTION INTRAMUSCULAR; INTRAVENOUS CODE/TRAUMA/SEDATION MEDICATION
Status: COMPLETED | OUTPATIENT
Start: 2021-05-06 | End: 2021-05-06

## 2021-05-06 RX ORDER — FENTANYL CITRATE 50 UG/ML
INJECTION, SOLUTION INTRAMUSCULAR; INTRAVENOUS CODE/TRAUMA/SEDATION MEDICATION
Status: COMPLETED | OUTPATIENT
Start: 2021-05-06 | End: 2021-05-06

## 2021-05-06 RX ADMIN — FENTANYL CITRATE 50 MCG: 50 INJECTION INTRAMUSCULAR; INTRAVENOUS at 11:07

## 2021-05-06 RX ADMIN — MIDAZOLAM HYDROCHLORIDE 0.5 MG: 1 INJECTION, SOLUTION INTRAMUSCULAR; INTRAVENOUS at 10:53

## 2021-05-06 RX ADMIN — SODIUM CHLORIDE 50 ML/HR: 0.9 INJECTION, SOLUTION INTRAVENOUS at 07:56

## 2021-05-06 RX ADMIN — Medication 10 ML: at 10:59

## 2021-05-06 NOTE — DISCHARGE INSTRUCTIONS
[facility]  Interventional Radiology  Dr Todd Zuniga: [phone] (M-F 7:30am - 4:00pm)  Off hours or no answer: [phone] (Ask for IR on call)                    Needle Biopsy of the Lung    WHAT YOU NEED TO KNOW:  A needle biopsy of the lung is a procedure to remove cells or tissue from your lung  You may have a fine needle aspiration biopsy (FNAB), or a core needle biopsy (CNB)  A FNAB is used to remove cells through a thin needle  CNB uses a thicker needle to remove lung tissue  The samples are collected and tested for inflammation, infection, or cancer  DISCHARGE INSTRUCTIONS:   Resume your normal diet  Small sips of flat soda will help with nausea  Limit your activity for 24 hours  Wound Care:      - Remove band aid in 24 hours      Contact Interventional Radiology at [phone] Leonard PATIENTS: Contact Interventional Radiology at [phone]) Orie Dancer PATIENTS: Contact Interventional Radiology at [phone]) if any of the following occur:    - You have a fever greater than 101*    - You cough up large amounts of bright red blood     - You have chest pain with breathing    - You have shortness of breath    -You have persistent nausea and vomiting    - You have pus, redness or swelling around your biopsy site    - You have questions or concerns about your condition or care

## 2021-05-06 NOTE — PROGRESS NOTES
Interventional Radiology  History and Physical 2021     Tiburcio Ormond   1955   [id number]    Assessment/Plan:  Plan biopsy  CT guidance  Core samples  Anterior approach  Attempt more superior and lateral, PET avid area  Small round satellite mass should serve as a good marker  Plan short set, and recovery in same-day surgery    Problem List Items Addressed This Visit        Other    Lung mass    Relevant Orders    IR biopsy lung             Subjective:     Patient ID: Tiburcio Ormond is a 77 y o  male      History of Present Illness  PET avid left upper lobe mass in a smoker    Review of Systems      Past Medical History:   Diagnosis Date    Glaucoma     Hypertension         Past Surgical History:   Procedure Laterality Date    BACK SURGERY      HEMORRHOID SURGERY      LAMINECTOMY  2018    L4-L5    TONSILLECTOMY  195        Social History     Tobacco Use   Smoking Status Former Smoker    Packs/day: 1 00    Years: 40 00    Pack years: 40 00    Types: Cigarettes    Quit date:     Years since quittin 3   Smokeless Tobacco Never Used   Tobacco Comment    quit 2018        Social History     Substance and Sexual Activity   Alcohol Use Yes    Frequency: 2-4 times a month    Drinks per session: 1 or 2    Binge frequency: Never    Comment: solkcially        Social History     Substance and Sexual Activity   Drug Use No        Allergies   Allergen Reactions    Bee Venom     Benazepril     Latex     Meloxicam GI Intolerance    Penicillin G Rash       Current Outpatient Medications   Medication Sig Dispense Refill    amLODIPine (NORVASC) 10 mg tablet TAKE ONE TABLET BY MOUTH EVERY DAY 90 tablet 3    B Complex Vitamins (B COMPLEX 1 PO) Take 1 tablet by mouth      Cholecalciferol (VITAMIN D3 PO) Take by mouth      citalopram (CeleXA) 10 mg tablet TAKE ONE TABLET BY MOUTH EVERY DAY 30 tablet 3    doxepin (SINEquan) 25 mg capsule TAKE ONE CAPSULE BY MOUTH AT BEDTIME 30 capsule 5    Garlic 10 MG CAPS Take 1 tablet by mouth      gemfibrozil (LOPID) 600 mg tablet Take 1 tablet (600 mg total) by mouth 2 (two) times a day 180 tablet 3    Glucosamine-Chondroit-Vit C-Mn (GLUCOSAMINE 1500 COMPLEX) CAPS Glucosamine 1500 Complex      Multiple Vitamins-Minerals (MULTIVITAL-M) TABS Take by mouth      rosuvastatin (CRESTOR) 10 MG tablet TAKE ONE TABLET BY MOUTH EVERY DAY 30 tablet 5    traMADol (ULTRAM) 50 mg tablet TAKE ONE TABLET BY MOUTH EVERY 8 HOURS AS NEEDED FOR SEVERE PAIN 30 tablet 0    betamethasone valerate (VALISONE) 0 1 % cream Apply topically 2 (two) times a day 45 g 1    betamethasone, augmented, (DIPROLENE-AF) 0 05 % cream       calcipotriene (DOVONOX) 0 005 % ointment       ketoconazole (NIZORAL) 2 % cream Apply topically 2 (two) times a day 15 g 2    latanoprost (XALATAN) 0 005 % ophthalmic solution 1 gtt       Current Facility-Administered Medications   Medication Dose Route Frequency Provider Last Rate Last Admin    sodium chloride 0 9 % infusion  50 mL/hr Intravenous Continuous Deisy Rosa MD 50 mL/hr at 05/06/21 0756 50 mL/hr at 05/06/21 0756          Objective:    Vitals:    05/06/21 0737   BP: 128/88   Pulse: 72   Resp: 18   Temp: 97 7 °F (36 5 °C)   TempSrc: Tympanic   SpO2: 97%   Weight: 81 6 kg (180 lb)   Height: 5' 8" (1 727 m)        Physical Exam      One of the best beards I have ever seen  Regular rate and rhythm  Normal respiratory excursion  The skin over the left chest is clean, dry, and intact    No results found for: BNP   Lab Results   Component Value Date    WBC 8 60 05/06/2021    HGB 13 6 (L) 05/06/2021    HCT 40 5 (L) 05/06/2021    MCV 93 05/06/2021     05/06/2021     Lab Results   Component Value Date    INR 0 98 05/06/2021    PROTIME 12 9 05/06/2021     No results found for: PTT      I have personally reviewed pertinent imaging and laboratory results       Code Status: No Order  Advance Directive and Living Will:      Power of : POLST:      This text is generated with voice recognition software  There may be translation, syntax,  or grammatical errors  If you have any questions, please contact the dictating provider

## 2021-05-06 NOTE — PROCEDURES
Interventional Radiology Procedure Note    PATIENT NAME: Dami Frias  : 1955  MRN: [id number]     Pre-op Diagnosis:   1  Lung mass      2  PET avid    Post-op Diagnosis:   1  Lung mass      2     Same    Procedure: CT Guided Biopsy of the left upper lobe mass  Surgeon: Dwight Hernandez MD  Assistants: No qualified resident was available, Resident is only observing  Estimated Blood Loss: 10 cc  Findings: Please see full report in PACS  Specimens: Please see full report in PACS  Complications: None  Anesthesia: conscious sedation and local  Prep: 2% Chlorhexidine and alcohol, allowed to dry prior to sterile draping  Timeout: Performed  Fluoro time: Please see full report in PACS  Radiation dose: Please see full report in PACS  Contrast dose: Please see full report in PACS  Contrast type: Please see full report in PACS  Contrast strength: Please see full report in PACS  Contrast route of administration: Please see full report in PACS  Antibiotics: None        Dwight Hernandez MD     Date: 2021  Time: 3:41 PM

## 2021-05-18 ENCOUNTER — OFFICE VISIT (OUTPATIENT)
Dept: CARDIAC SURGERY | Facility: CLINIC | Age: 66
End: 2021-05-18
Payer: MEDICARE

## 2021-05-18 VITALS
DIASTOLIC BLOOD PRESSURE: 92 MMHG | HEART RATE: 80 BPM | HEIGHT: 68 IN | RESPIRATION RATE: 14 BRPM | BODY MASS INDEX: 27.66 KG/M2 | TEMPERATURE: 97.1 F | WEIGHT: 182.5 LBS | SYSTOLIC BLOOD PRESSURE: 160 MMHG

## 2021-05-18 DIAGNOSIS — R91.8 LUNG MASS: Primary | ICD-10-CM

## 2021-05-18 PROCEDURE — 99214 OFFICE O/P EST MOD 30 MIN: CPT | Performed by: THORACIC SURGERY (CARDIOTHORACIC VASCULAR SURGERY)

## 2021-05-18 RX ORDER — HEPARIN SODIUM 5000 [USP'U]/ML
5000 INJECTION, SOLUTION INTRAVENOUS; SUBCUTANEOUS
Status: CANCELLED | OUTPATIENT
Start: 2021-05-19 | End: 2021-05-20

## 2021-05-18 NOTE — ASSESSMENT & PLAN NOTE
Mr Jennyfer Rosales Is a very pleasant 75-year-old male who presents to my office to discuss his workup for his left upper lobe mass  Today in the office, we had a long conversation regarding this mass  I explained to him that based upon the imaging,  It appears that the cancer has spread to his AP window lymph nodes  With this information, I would like to stage his mediastinum more adequately  I will plan to take him to the operating room on 5 25 for a flexible bronchoscopy and mediastinoscopy  With this, his staging will be complete  I do believe that he will be best served with neoadjuvant chemotherapy based upon the AP window lymph node involvement along with the tumor size  My plan would be neoadjuvant chemotherapy with 4 cycles, repeat imaging and then potential surgical resection after neoadjuvant chemotherapy  I have placed referral to Medical Oncology today  all the patient's questions were answered as well as his wife's questions  At this time, we will proceed with the mediastinoscopy on Tuesday and he will follow up with medical oncology as soon as possible      Lilly Calvo MD  Thoracic Surgery  (Available on [employer] Text)  Office: [phone]

## 2021-05-18 NOTE — H&P (VIEW-ONLY)
Thoracic Follow-Up  Assessment/Plan:    Lung mass  Mr Fede Solorzano Is a very pleasant 47-year-old male who presents to my office to discuss his workup for his left upper lobe mass  Today in the office, we had a long conversation regarding this mass  I explained to him that based upon the imaging,  It appears that the cancer has spread to his AP window lymph nodes  With this information, I would like to stage his mediastinum more adequately  I will plan to take him to the operating room on 5 25 for a flexible bronchoscopy and mediastinoscopy  With this, his staging will be complete  I do believe that he will be best served with neoadjuvant chemotherapy based upon the AP window lymph node involvement along with the tumor size  My plan would be neoadjuvant chemotherapy with 4 cycles, repeat imaging and then potential surgical resection after neoadjuvant chemotherapy  I have placed referral to Medical Oncology today  all the patient's questions were answered as well as his wife's questions  At this time, we will proceed with the mediastinoscopy on Tuesday and he will follow up with medical oncology as soon as possible  Alverto Batista MD  Thoracic Surgery  (Available on [employer] Text)  Office: [phone]         Diagnoses and all orders for this visit:    Lung mass  -     Ambulatory referral to Hematology / Oncology; Future  -     Case request operating room: MEDIASTINOSCOPY, flexible bronchoscopy; Standing  -     Case request operating room: MEDIASTINOSCOPY, flexible bronchoscopy    Other orders  -     Diet NPO; Sips with meds; Standing  -     Void on call to OR; Standing  -     Insert peripheral IV; Standing  -     Place sequential compression device; Standing  -     heparin (porcine) subcutaneous injection 5,000 Units  -     ceFAZolin (ANCEF) 2,000 mg in dextrose 5 % 100 mL IVPB          Thoracic History          Subjective:    Patient ID: Dean Robison is a 77 y o  male      HPI  Mr Fede Solorzano Is a very pleasant 41-year-old male who presents to my office to discuss his workup for his left upper lobe mass  His biopsy came back as a non-small cell lung cancer  His PET-CT scan demonstrates mild PET avidity within the AP window lymph nodes  His para mediastinal lymph nodes did not light up on the PET  I also personally reviewed his pulmonary function testing  He has more than adequate FEV1 and FVC  His DLCO is 47%  Today in the office, he has had no changes in his health since the last time he was here  He tolerated his biopsy without complication  He denies any shortness of breath  He denies a cough or hemoptysis  From previous HNP by Robert Brown MD on 4/20/21:  Mr Linda Troncoso Is a very pleasant 41-year-old male who presents to my office with a newly identified left upper lobe lung mass  I have personally reviewed all of his imaging in PACS and I have reviewed his chart and his previous notes from his primary care provider  His chest CT demonstrates a 6 x 2 cm mass in his left upper lobe  He has a small 7 mm AP window node  This is not pathologically enlarged but it is present  He has no additional mediastinal lymphadenopathy identified on his CT scan        Today in the office, the patient states that he feels like he is in his overall pretty good state of health  Without a lung cancer screening test, he never would of known that there was something wrong  He reports that just this past weekend 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  This was particularly heavy exertion any had some short of breath with this but he states that with regular exertion, he does not feel short of breath  He denies chest pain  He does report some recent weight gain that he believes is associated with decreased activity  He reports an occasional cough with some sputum production  He denies hemoptysis  He does report a postnasal drip    He denies any fevers or chills or upper respiratory infection or pneumonia  He denies any headaches or double vision  He denies any seizures or syncope        He denies a personal history of cancer  He has no family history of lung cancer in recent generation  He was a former smoker having smoked about a pack and half per day for 40 years  He quit in February of 2018  The following portions of the patient's history were reviewed and updated as appropriate: allergies, current medications, past family history, past medical history, past social history, past surgical history and problem list     Review of Systems   Constitutional: Negative  Negative for activity change, chills, fatigue, fever and unexpected weight change  HENT: Negative for sore throat, trouble swallowing and voice change  Eyes: Negative  Negative for visual disturbance  Respiratory: Negative for cough, chest tightness, shortness of breath, wheezing and stridor  Cardiovascular: Negative for chest pain and leg swelling  Gastrointestinal: Negative  Endocrine: Negative  Genitourinary: Negative  Musculoskeletal: Negative  Skin: Negative  Allergic/Immunologic: Negative  Neurological: Negative for seizures, syncope, speech difficulty, weakness, light-headedness and headaches  Hematological: Negative for adenopathy  Psychiatric/Behavioral: Negative  Objective:   Physical Exam  Vitals signs and nursing note reviewed  Constitutional:       Appearance: He is well-developed  He is not diaphoretic  HENT:      Head: Normocephalic and atraumatic  Nose: Nose normal  No congestion  Mouth/Throat:      Mouth: Mucous membranes are moist       Pharynx: Oropharynx is clear  Eyes:      Extraocular Movements: Extraocular movements intact  Pupils: Pupils are equal, round, and reactive to light  Neck:      Musculoskeletal: Normal range of motion  Trachea: No tracheal deviation  Cardiovascular:      Rate and Rhythm: Normal rate and regular rhythm        Heart sounds: Normal heart sounds  No murmur  Pulmonary:      Effort: Pulmonary effort is normal  No respiratory distress  Breath sounds: Normal breath sounds  No stridor  No wheezing or rales  Chest:      Chest wall: No tenderness  Abdominal:      General: Bowel sounds are normal  There is no distension  Palpations: Abdomen is soft  Tenderness: There is no abdominal tenderness  Musculoskeletal: Normal range of motion  Lymphadenopathy:      Cervical: No cervical adenopathy  Skin:     General: Skin is warm and dry  Coloration: Skin is not pale  Findings: No erythema or rash  Neurological:      Mental Status: He is alert and oriented to person, place, and time  Psychiatric:         Behavior: Behavior normal          Thought Content: Thought content normal          Judgment: Judgment normal      /92 (BP Location: Left arm, Patient Position: Sitting)   Pulse 80   Temp (!) 97 1 °F (36 2 °C) (Tympanic)   Resp 14   Ht 5' 8" (1 727 m)   Wt 82 8 kg (182 lb 8 oz)   BMI 27 75 kg/m²     No Chest XR results available for this patient  No CT Chest results available for this patient  No CT Chest,Abdomen,Pelvis results available for this patient  Nm Pet Ct Skull Base To Mid Thigh    Result Date: 5/4/2021  Narrative PET/CT SCAN INDICATION:  D38 1: Neoplasm of uncertain behavior of trachea, bronchus and lung   , abnormal chest CT, left upper paramediastinal mass, Covid vaccine 3/18/2021, 4/16/2021 left arm MODIFIER: PI COMPARISON: CT chest 4/8/2021 CELL TYPE:  None TECHNIQUE:   8 8 mCi F-18-FDG administered IV  Multiplanar attenuation corrected and non attenuation corrected PET images were acquired 60 minutes post injection  Contiguous, low dose, axial CT sections were obtained from the vertex through the femurs   Intravenous contrast material was not utilized    This examination, like all CT scans performed in the [facility], was performed utilizing techniques to minimize radiation dose exposure, including the use of iterative reconstruction and automated exposure control  Fasting serum glucose: 100 mg/dl FINDINGS: VISUALIZED BRAIN:   No acute abnormalities are seen  HEAD/NECK:   There is a physiologic distribution of FDG  No FDG avid cervical adenopathy is seen  CT images: Right maxillary sinus retention cyst/polyp  CHEST:   Lobulated left upper paramediastinal mass extending to the left suprahilar region measuring 5 2 x 6 5 x 6 cm demonstrates heterogeneous FDG activity, SUV 10 3  This is most concerning for malignancy  Adjacent interstitial thickening and groundglass densities may represent tumor infiltration  In the left upper posterior perifissural lung image 3/110, there is an additional small nodular density with branching configuration, as seen on prior CT, measuring 8 x 9 mm, with mild FDG activity, SUV 1 4  This may be inflammatory/infectious, versus additional site of tumor if tumor  If neoplastic, a 2nd primary lesion is possible  Additional tiny nodular densities in the right upper lung seen on the prior CT are too small for accurate PET evaluation  There are several mildly hypermetabolic AP window nodes which may represent early metastases  Example node 3/120 measures 1 6 x 1 cm, SUV 2 3  Prior CT measurement 6 mm  No hypermetabolic right hilar adenopathy  CT images: Emphysema  Coronary atherosclerosis  ABDOMEN:   No FDG avid soft tissue lesions are seen  CT images: Bilateral renal cysts  Scattered colon diverticula  Fluid-filled colon without wall thickening  PELVIS: No FDG avid soft tissue lesions are seen  CT images: Unremarkable  OSSEOUS STRUCTURES: No FDG avid lesions are seen  CT images: Spine degenerative change  Impression 1  Lobulated left upper paramediastinal lung mass extending to the left suprahilar region measuring 5 2 x 6 5 x 6 cm demonstrates intense FDG activity, most concerning for malignancy    Adjacent interstitial thickening and groundglass densities may represent tumor infiltration  Tissue sampling recommended  2   Mildly hypermetabolic AP window mediastinal nodes concerning for early metastases  3   Additional mildly hypermetabolic branching nodular density in the left upper posterior lung may be inflammatory/infectious versus additional site of tumor  4   No hypermetabolic metastases in the neck, abdomen, pelvis  The study was marked in EPIC for significant notification  Workstation performed: [id number]      No Barium Swallow results available for this patient

## 2021-05-18 NOTE — PROGRESS NOTES
Thoracic Follow-Up  Assessment/Plan:    Lung mass  Mr Lily Pak Is a very pleasant 47-year-old male who presents to my office to discuss his workup for his left upper lobe mass  Today in the office, we had a long conversation regarding this mass  I explained to him that based upon the imaging,  It appears that the cancer has spread to his AP window lymph nodes  With this information, I would like to stage his mediastinum more adequately  I will plan to take him to the operating room on 5 25 for a flexible bronchoscopy and mediastinoscopy  With this, his staging will be complete  I do believe that he will be best served with neoadjuvant chemotherapy based upon the AP window lymph node involvement along with the tumor size  My plan would be neoadjuvant chemotherapy with 4 cycles, repeat imaging and then potential surgical resection after neoadjuvant chemotherapy  I have placed referral to Medical Oncology today  all the patient's questions were answered as well as his wife's questions  At this time, we will proceed with the mediastinoscopy on Tuesday and he will follow up with medical oncology as soon as possible  Macarena Poole MD  Thoracic Surgery  (Available on [employer] Text)  Office: [phone]         Diagnoses and all orders for this visit:    Lung mass  -     Ambulatory referral to Hematology / Oncology; Future  -     Case request operating room: MEDIASTINOSCOPY, flexible bronchoscopy; Standing  -     Case request operating room: MEDIASTINOSCOPY, flexible bronchoscopy    Other orders  -     Diet NPO; Sips with meds; Standing  -     Void on call to OR; Standing  -     Insert peripheral IV; Standing  -     Place sequential compression device; Standing  -     heparin (porcine) subcutaneous injection 5,000 Units  -     ceFAZolin (ANCEF) 2,000 mg in dextrose 5 % 100 mL IVPB          Thoracic History          Subjective:    Patient ID: Eder Bower is a 77 y o  male      HPI  Mr Lily Pak Is a very pleasant 53-year-old male who presents to my office to discuss his workup for his left upper lobe mass  His biopsy came back as a non-small cell lung cancer  His PET-CT scan demonstrates mild PET avidity within the AP window lymph nodes  His para mediastinal lymph nodes did not light up on the PET  I also personally reviewed his pulmonary function testing  He has more than adequate FEV1 and FVC  His DLCO is 47%  Today in the office, he has had no changes in his health since the last time he was here  He tolerated his biopsy without complication  He denies any shortness of breath  He denies a cough or hemoptysis  From previous HNP by Abdiaziz Sanford MD on 4/20/21:  Mr Stephanie Brower Is a very pleasant 53-year-old male who presents to my office with a newly identified left upper lobe lung mass  I have personally reviewed all of his imaging in PACS and I have reviewed his chart and his previous notes from his primary care provider  His chest CT demonstrates a 6 x 2 cm mass in his left upper lobe  He has a small 7 mm AP window node  This is not pathologically enlarged but it is present  He has no additional mediastinal lymphadenopathy identified on his CT scan        Today in the office, the patient states that he feels like he is in his overall pretty good state of health  Without a lung cancer screening test, he never would of known that there was something wrong  He reports that just this past weekend 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  This was particularly heavy exertion any had some short of breath with this but he states that with regular exertion, he does not feel short of breath  He denies chest pain  He does report some recent weight gain that he believes is associated with decreased activity  He reports an occasional cough with some sputum production  He denies hemoptysis  He does report a postnasal drip    He denies any fevers or chills or upper respiratory infection or pneumonia  He denies any headaches or double vision  He denies any seizures or syncope        He denies a personal history of cancer  He has no family history of lung cancer in recent generation  He was a former smoker having smoked about a pack and half per day for 40 years  He quit in February of 2018  The following portions of the patient's history were reviewed and updated as appropriate: allergies, current medications, past family history, past medical history, past social history, past surgical history and problem list     Review of Systems   Constitutional: Negative  Negative for activity change, chills, fatigue, fever and unexpected weight change  HENT: Negative for sore throat, trouble swallowing and voice change  Eyes: Negative  Negative for visual disturbance  Respiratory: Negative for cough, chest tightness, shortness of breath, wheezing and stridor  Cardiovascular: Negative for chest pain and leg swelling  Gastrointestinal: Negative  Endocrine: Negative  Genitourinary: Negative  Musculoskeletal: Negative  Skin: Negative  Allergic/Immunologic: Negative  Neurological: Negative for seizures, syncope, speech difficulty, weakness, light-headedness and headaches  Hematological: Negative for adenopathy  Psychiatric/Behavioral: Negative  Objective:   Physical Exam  Vitals signs and nursing note reviewed  Constitutional:       Appearance: He is well-developed  He is not diaphoretic  HENT:      Head: Normocephalic and atraumatic  Nose: Nose normal  No congestion  Mouth/Throat:      Mouth: Mucous membranes are moist       Pharynx: Oropharynx is clear  Eyes:      Extraocular Movements: Extraocular movements intact  Pupils: Pupils are equal, round, and reactive to light  Neck:      Musculoskeletal: Normal range of motion  Trachea: No tracheal deviation  Cardiovascular:      Rate and Rhythm: Normal rate and regular rhythm        Heart sounds: Normal heart sounds  No murmur  Pulmonary:      Effort: Pulmonary effort is normal  No respiratory distress  Breath sounds: Normal breath sounds  No stridor  No wheezing or rales  Chest:      Chest wall: No tenderness  Abdominal:      General: Bowel sounds are normal  There is no distension  Palpations: Abdomen is soft  Tenderness: There is no abdominal tenderness  Musculoskeletal: Normal range of motion  Lymphadenopathy:      Cervical: No cervical adenopathy  Skin:     General: Skin is warm and dry  Coloration: Skin is not pale  Findings: No erythema or rash  Neurological:      Mental Status: He is alert and oriented to person, place, and time  Psychiatric:         Behavior: Behavior normal          Thought Content: Thought content normal          Judgment: Judgment normal      /92 (BP Location: Left arm, Patient Position: Sitting)   Pulse 80   Temp (!) 97 1 °F (36 2 °C) (Tympanic)   Resp 14   Ht 5' 8" (1 727 m)   Wt 82 8 kg (182 lb 8 oz)   BMI 27 75 kg/m²     No Chest XR results available for this patient  No CT Chest results available for this patient  No CT Chest,Abdomen,Pelvis results available for this patient  Nm Pet Ct Skull Base To Mid Thigh    Result Date: 5/4/2021  Narrative PET/CT SCAN INDICATION:  D38 1: Neoplasm of uncertain behavior of trachea, bronchus and lung   , abnormal chest CT, left upper paramediastinal mass, Covid vaccine 3/18/2021, 4/16/2021 left arm MODIFIER: PI COMPARISON: CT chest 4/8/2021 CELL TYPE:  None TECHNIQUE:   8 8 mCi F-18-FDG administered IV  Multiplanar attenuation corrected and non attenuation corrected PET images were acquired 60 minutes post injection  Contiguous, low dose, axial CT sections were obtained from the vertex through the femurs   Intravenous contrast material was not utilized    This examination, like all CT scans performed in the [facility], was performed utilizing techniques to minimize radiation dose exposure, including the use of iterative reconstruction and automated exposure control  Fasting serum glucose: 100 mg/dl FINDINGS: VISUALIZED BRAIN:   No acute abnormalities are seen  HEAD/NECK:   There is a physiologic distribution of FDG  No FDG avid cervical adenopathy is seen  CT images: Right maxillary sinus retention cyst/polyp  CHEST:   Lobulated left upper paramediastinal mass extending to the left suprahilar region measuring 5 2 x 6 5 x 6 cm demonstrates heterogeneous FDG activity, SUV 10 3  This is most concerning for malignancy  Adjacent interstitial thickening and groundglass densities may represent tumor infiltration  In the left upper posterior perifissural lung image 3/110, there is an additional small nodular density with branching configuration, as seen on prior CT, measuring 8 x 9 mm, with mild FDG activity, SUV 1 4  This may be inflammatory/infectious, versus additional site of tumor if tumor  If neoplastic, a 2nd primary lesion is possible  Additional tiny nodular densities in the right upper lung seen on the prior CT are too small for accurate PET evaluation  There are several mildly hypermetabolic AP window nodes which may represent early metastases  Example node 3/120 measures 1 6 x 1 cm, SUV 2 3  Prior CT measurement 6 mm  No hypermetabolic right hilar adenopathy  CT images: Emphysema  Coronary atherosclerosis  ABDOMEN:   No FDG avid soft tissue lesions are seen  CT images: Bilateral renal cysts  Scattered colon diverticula  Fluid-filled colon without wall thickening  PELVIS: No FDG avid soft tissue lesions are seen  CT images: Unremarkable  OSSEOUS STRUCTURES: No FDG avid lesions are seen  CT images: Spine degenerative change  Impression 1  Lobulated left upper paramediastinal lung mass extending to the left suprahilar region measuring 5 2 x 6 5 x 6 cm demonstrates intense FDG activity, most concerning for malignancy    Adjacent interstitial thickening and groundglass densities may represent tumor infiltration  Tissue sampling recommended  2   Mildly hypermetabolic AP window mediastinal nodes concerning for early metastases  3   Additional mildly hypermetabolic branching nodular density in the left upper posterior lung may be inflammatory/infectious versus additional site of tumor  4   No hypermetabolic metastases in the neck, abdomen, pelvis  The study was marked in EPIC for significant notification  Workstation performed: [id number]      No Barium Swallow results available for this patient

## 2021-05-19 ENCOUNTER — TELEPHONE (OUTPATIENT)
Dept: HEMATOLOGY ONCOLOGY | Facility: CLINIC | Age: 66
End: 2021-05-19

## 2021-05-19 NOTE — TELEPHONE ENCOUNTER
New Patient Encounter    New Patient Intake Form   Patient Details:  Alea Wolff  1955  [id number]    Background Information:  [id number] starts by opening a telephone encounter and gathering the following information   Who is calling to schedule? If not self, relationship to patient? Patient   Referring Provider Dr Sd Magallanes   What is the diagnosis? Lung mass   Is this diagnosis confirmed? Yes   When was the diagnosis? 5/2021   Is there a confirmed diagnosis from a biopsy/tissue reviewed by pathology? Yes - 5/6/21   Were outside slides requested? NA   Is patient aware of diagnosis? Yes   Is there a personal history and what kind? No   Is there a family history and what kind? No   Reason for visit? New Diagnosis   Have you had any imaging or labs done? If so: when, where? yes  [facility]   Are records in [employer]? yes   If patient has a prior history of cancer were old records obtained? NA   Was the patient told to bring a disk? No   Does the patient smoke or Vape? No   If yes, how many packs or cartridges per day? Quit 3 years ago   Scheduling Information:   Preferred [place]:  [facility]     Are there any dates/time the patient cannot be seen? Miscellaneous: only wants [place]   After completing the above information, please route to Financial Counselor and the appropriate Nurse Navigator for review

## 2021-05-24 ENCOUNTER — ANESTHESIA EVENT (OUTPATIENT)
Dept: PERIOP | Facility: HOSPITAL | Age: 66
End: 2021-05-24
Payer: MEDICARE

## 2021-05-24 RX ORDER — CALCIPOTRIENE 50 UG/G
OINTMENT TOPICAL 2 TIMES DAILY
Status: ON HOLD | COMMUNITY
End: 2022-06-17 | Stop reason: CLARIF

## 2021-05-24 NOTE — PRE-PROCEDURE INSTRUCTIONS
Pre-Surgery Instructions:   Medication Instructions    amLODIPine (NORVASC) 10 mg tablet pt instructed to take on day of surgery with 1-2 sips of water    B Complex Vitamins (B COMPLEX 1 PO) pt instructed to not take on day of surgery     betamethasone valerate (VALISONE) 0 1 % cream pt instructed to not use on day of surgery     calcipotriene (DOVONOX) 0 005 % ointment pt instructed to not use on day of surgery     Cholecalciferol (VITAMIN D3 PO) pt instructed to not take on day of surgery    citalopram (CeleXA) 10 mg tablet pt instructed to take on day of surgery with 1-2 sips of water    doxepin (SINEquan) 25 mg capsule pt takes at hs     Garlic 10 MG CAPS pt instructed to not take on day of surgery     Glucosamine-Chondroit-Vit C-Mn (GLUCOSAMINE 1500 COMPLEX) CAPS pt instructed to not take on day of surgery     ketoconazole (NIZORAL) 2 % cream pt instructed to not use on day of surgery     Multiple Vitamins-Minerals (MULTIVITAL-M) TABS pt instructed to not take on day of surgery     rosuvastatin (CRESTOR) 10 MG tablet pt instructed to take on day of surgery with 1-2 sips of water    traMADol (ULTRAM) 50 mg tablet pt can take prn on day of surgery     Pt denies fever, sob, cough and sore throat  Pt verbalized understanding of shower and [place] visitor instructions

## 2021-05-25 ENCOUNTER — ANESTHESIA (OUTPATIENT)
Dept: PERIOP | Facility: HOSPITAL | Age: 66
End: 2021-05-25
Payer: MEDICARE

## 2021-05-25 ENCOUNTER — HOSPITAL ENCOUNTER (OUTPATIENT)
Facility: HOSPITAL | Age: 66
Setting detail: OUTPATIENT SURGERY
Discharge: HOME | End: 2021-05-25
Attending: THORACIC SURGERY (CARDIOTHORACIC VASCULAR SURGERY) | Admitting: THORACIC SURGERY (CARDIOTHORACIC VASCULAR SURGERY)
Payer: MEDICARE

## 2021-05-25 VITALS
OXYGEN SATURATION: 97 % | TEMPERATURE: 97.6 F | DIASTOLIC BLOOD PRESSURE: 85 MMHG | HEIGHT: 68 IN | WEIGHT: 182 LBS | HEART RATE: 80 BPM | RESPIRATION RATE: 18 BRPM | SYSTOLIC BLOOD PRESSURE: 143 MMHG | BODY MASS INDEX: 27.58 KG/M2

## 2021-05-25 DIAGNOSIS — R91.8 LUNG MASS: ICD-10-CM

## 2021-05-25 PROCEDURE — 31622 DX BRONCHOSCOPE/WASH: CPT | Performed by: THORACIC SURGERY (CARDIOTHORACIC VASCULAR SURGERY)

## 2021-05-25 PROCEDURE — 88305 TISSUE EXAM BY PATHOLOGIST: CPT | Performed by: PATHOLOGY

## 2021-05-25 PROCEDURE — 93005 ELECTROCARDIOGRAM TRACING: CPT

## 2021-05-25 PROCEDURE — 39402 MEDIASTINOSCPY W/LMPH NOD BX: CPT | Performed by: THORACIC SURGERY (CARDIOTHORACIC VASCULAR SURGERY)

## 2021-05-25 PROCEDURE — 88342 IMHCHEM/IMCYTCHM 1ST ANTB: CPT | Performed by: PATHOLOGY

## 2021-05-25 PROCEDURE — NC001 PR NO CHARGE: Performed by: PHYSICIAN ASSISTANT

## 2021-05-25 RX ORDER — SODIUM CHLORIDE, SODIUM LACTATE, POTASSIUM CHLORIDE, CALCIUM CHLORIDE 600; 310; 30; 20 MG/100ML; MG/100ML; MG/100ML; MG/100ML
50 INJECTION, SOLUTION INTRAVENOUS CONTINUOUS
Status: DISCONTINUED | OUTPATIENT
Start: 2021-05-25 | End: 2021-05-25 | Stop reason: HOSPADM

## 2021-05-25 RX ORDER — TRAMADOL HYDROCHLORIDE 50 MG/1
50 TABLET ORAL ONCE AS NEEDED
Status: COMPLETED | OUTPATIENT
Start: 2021-05-25 | End: 2021-05-25

## 2021-05-25 RX ORDER — LIDOCAINE HYDROCHLORIDE 10 MG/ML
0.5 INJECTION, SOLUTION EPIDURAL; INFILTRATION; INTRACAUDAL; PERINEURAL ONCE AS NEEDED
Status: DISCONTINUED | OUTPATIENT
Start: 2021-05-25 | End: 2021-05-25 | Stop reason: HOSPADM

## 2021-05-25 RX ORDER — PROPOFOL 10 MG/ML
INJECTION, EMULSION INTRAVENOUS AS NEEDED
Status: DISCONTINUED | OUTPATIENT
Start: 2021-05-25 | End: 2021-05-25

## 2021-05-25 RX ORDER — ONDANSETRON 2 MG/ML
4 INJECTION INTRAMUSCULAR; INTRAVENOUS ONCE AS NEEDED
Status: DISCONTINUED | OUTPATIENT
Start: 2021-05-25 | End: 2021-05-25 | Stop reason: HOSPADM

## 2021-05-25 RX ORDER — DEXAMETHASONE SODIUM PHOSPHATE 10 MG/ML
INJECTION, SOLUTION INTRAMUSCULAR; INTRAVENOUS AS NEEDED
Status: DISCONTINUED | OUTPATIENT
Start: 2021-05-25 | End: 2021-05-25

## 2021-05-25 RX ORDER — ALBUTEROL SULFATE 2.5 MG/3ML
2.5 SOLUTION RESPIRATORY (INHALATION) ONCE AS NEEDED
Status: DISCONTINUED | OUTPATIENT
Start: 2021-05-25 | End: 2021-05-25 | Stop reason: HOSPADM

## 2021-05-25 RX ORDER — PROPOFOL 10 MG/ML
INJECTION, EMULSION INTRAVENOUS CONTINUOUS PRN
Status: DISCONTINUED | OUTPATIENT
Start: 2021-05-25 | End: 2021-05-25

## 2021-05-25 RX ORDER — HYDROMORPHONE HCL IN WATER/PF 6 MG/30 ML
0.2 PATIENT CONTROLLED ANALGESIA SYRINGE INTRAVENOUS
Status: DISCONTINUED | OUTPATIENT
Start: 2021-05-25 | End: 2021-05-25 | Stop reason: HOSPADM

## 2021-05-25 RX ORDER — KETOROLAC TROMETHAMINE 30 MG/ML
15 INJECTION, SOLUTION INTRAMUSCULAR; INTRAVENOUS ONCE
Status: COMPLETED | OUTPATIENT
Start: 2021-05-25 | End: 2021-05-25

## 2021-05-25 RX ORDER — SODIUM CHLORIDE, SODIUM LACTATE, POTASSIUM CHLORIDE, CALCIUM CHLORIDE 600; 310; 30; 20 MG/100ML; MG/100ML; MG/100ML; MG/100ML
75 INJECTION, SOLUTION INTRAVENOUS CONTINUOUS
Status: DISCONTINUED | OUTPATIENT
Start: 2021-05-25 | End: 2021-05-25 | Stop reason: HOSPADM

## 2021-05-25 RX ORDER — FENTANYL CITRATE/PF 50 MCG/ML
25 SYRINGE (ML) INJECTION
Status: COMPLETED | OUTPATIENT
Start: 2021-05-25 | End: 2021-05-25

## 2021-05-25 RX ORDER — HEPARIN SODIUM 5000 [USP'U]/ML
5000 INJECTION, SOLUTION INTRAVENOUS; SUBCUTANEOUS
Status: COMPLETED | OUTPATIENT
Start: 2021-05-25 | End: 2021-05-25

## 2021-05-25 RX ORDER — FENTANYL CITRATE 50 UG/ML
INJECTION, SOLUTION INTRAMUSCULAR; INTRAVENOUS AS NEEDED
Status: DISCONTINUED | OUTPATIENT
Start: 2021-05-25 | End: 2021-05-25

## 2021-05-25 RX ORDER — MIDAZOLAM HYDROCHLORIDE 2 MG/2ML
INJECTION, SOLUTION INTRAMUSCULAR; INTRAVENOUS AS NEEDED
Status: DISCONTINUED | OUTPATIENT
Start: 2021-05-25 | End: 2021-05-25

## 2021-05-25 RX ORDER — CEFAZOLIN SODIUM 2 G/50ML
2000 SOLUTION INTRAVENOUS ONCE
Status: COMPLETED | OUTPATIENT
Start: 2021-05-25 | End: 2021-05-25

## 2021-05-25 RX ORDER — LIDOCAINE HYDROCHLORIDE 10 MG/ML
INJECTION, SOLUTION EPIDURAL; INFILTRATION; INTRACAUDAL; PERINEURAL AS NEEDED
Status: DISCONTINUED | OUTPATIENT
Start: 2021-05-25 | End: 2021-05-25

## 2021-05-25 RX ORDER — ROCURONIUM BROMIDE 10 MG/ML
INJECTION, SOLUTION INTRAVENOUS AS NEEDED
Status: DISCONTINUED | OUTPATIENT
Start: 2021-05-25 | End: 2021-05-25

## 2021-05-25 RX ADMIN — SODIUM CHLORIDE, SODIUM LACTATE, POTASSIUM CHLORIDE, AND CALCIUM CHLORIDE: .6; .31; .03; .02 INJECTION, SOLUTION INTRAVENOUS at 07:50

## 2021-05-25 RX ADMIN — PROPOFOL 110 MCG/KG/MIN: 10 INJECTION, EMULSION INTRAVENOUS at 08:05

## 2021-05-25 RX ADMIN — REMIFENTANIL HYDROCHLORIDE 0.3 MCG/KG/MIN: 1 INJECTION, POWDER, LYOPHILIZED, FOR SOLUTION INTRAVENOUS at 08:05

## 2021-05-25 RX ADMIN — PHENYLEPHRINE HYDROCHLORIDE 100 MCG: 10 INJECTION INTRAVENOUS at 08:05

## 2021-05-25 RX ADMIN — HEPARIN SODIUM 5000 UNITS: 5000 INJECTION INTRAVENOUS; SUBCUTANEOUS at 07:41

## 2021-05-25 RX ADMIN — HYDROMORPHONE HYDROCHLORIDE 0.2 MG: 0.2 INJECTION, SOLUTION INTRAMUSCULAR; INTRAVENOUS; SUBCUTANEOUS at 10:17

## 2021-05-25 RX ADMIN — PROPOFOL 200 MG: 10 INJECTION, EMULSION INTRAVENOUS at 08:01

## 2021-05-25 RX ADMIN — Medication 25 MCG: at 09:51

## 2021-05-25 RX ADMIN — ROCURONIUM BROMIDE 30 MG: 50 INJECTION, SOLUTION INTRAVENOUS at 08:01

## 2021-05-25 RX ADMIN — LIDOCAINE HYDROCHLORIDE 50 MG: 10 INJECTION, SOLUTION EPIDURAL; INFILTRATION; INTRACAUDAL; PERINEURAL at 08:01

## 2021-05-25 RX ADMIN — HYDROMORPHONE HYDROCHLORIDE 0.2 MG: 0.2 INJECTION, SOLUTION INTRAMUSCULAR; INTRAVENOUS; SUBCUTANEOUS at 10:24

## 2021-05-25 RX ADMIN — ROCURONIUM BROMIDE 20 MG: 50 INJECTION, SOLUTION INTRAVENOUS at 08:54

## 2021-05-25 RX ADMIN — Medication 25 MCG: at 10:06

## 2021-05-25 RX ADMIN — TRAMADOL HYDROCHLORIDE 50 MG: 50 TABLET, FILM COATED ORAL at 11:30

## 2021-05-25 RX ADMIN — Medication 25 MCG: at 09:55

## 2021-05-25 RX ADMIN — Medication 25 MCG: at 10:09

## 2021-05-25 RX ADMIN — HYDROMORPHONE HYDROCHLORIDE 0.2 MG: 0.2 INJECTION, SOLUTION INTRAMUSCULAR; INTRAVENOUS; SUBCUTANEOUS at 10:29

## 2021-05-25 RX ADMIN — CEFAZOLIN SODIUM 2000 MG: 2 SOLUTION INTRAVENOUS at 08:04

## 2021-05-25 RX ADMIN — MIDAZOLAM 2 MG: 1 INJECTION INTRAMUSCULAR; INTRAVENOUS at 07:50

## 2021-05-25 RX ADMIN — PHENYLEPHRINE HYDROCHLORIDE 200 MCG: 10 INJECTION INTRAVENOUS at 08:10

## 2021-05-25 RX ADMIN — SODIUM CHLORIDE, SODIUM LACTATE, POTASSIUM CHLORIDE, AND CALCIUM CHLORIDE 50 ML/HR: .6; .31; .03; .02 INJECTION, SOLUTION INTRAVENOUS at 10:58

## 2021-05-25 RX ADMIN — ROCURONIUM BROMIDE 10 MG: 50 INJECTION, SOLUTION INTRAVENOUS at 08:31

## 2021-05-25 RX ADMIN — FENTANYL CITRATE 100 MCG: 50 INJECTION INTRAMUSCULAR; INTRAVENOUS at 08:01

## 2021-05-25 RX ADMIN — SUGAMMADEX 200 MG: 100 INJECTION, SOLUTION INTRAVENOUS at 09:22

## 2021-05-25 RX ADMIN — HYDROMORPHONE HYDROCHLORIDE 0.2 MG: 0.2 INJECTION, SOLUTION INTRAMUSCULAR; INTRAVENOUS; SUBCUTANEOUS at 10:12

## 2021-05-25 RX ADMIN — KETOROLAC TROMETHAMINE 15 MG: 30 INJECTION, SOLUTION INTRAMUSCULAR at 10:57

## 2021-05-25 RX ADMIN — DEXAMETHASONE SODIUM PHOSPHATE 10 MG: 10 INJECTION, SOLUTION INTRAMUSCULAR; INTRAVENOUS at 08:26

## 2021-05-25 NOTE — DISCHARGE INSTRUCTIONS
Please keep incision clean and dry  Do not remove glue  Do not soak incision  No swimming or tub baths for at least 3 weeks or until cleared in office for the same  Please take tylenol for pain and tramadol for severe pain  Do not drive or operate machinery while on narcotics  Please follow up with your surgeon in clinic as per your appointment  Please call the clinic for an appointment if you do not already have one  Please call your surgeon and come to the ER if you have severe pain, fever, shortness of breath, bleeding or discharge from the incision or any new or worsening symptoms  Please do not do any strenuous activity, no lifting more than 10 pounds and no overhead lifting or push ups or strenuous activity for at least 4 weeks and until cleared to do so in office

## 2021-05-25 NOTE — OP NOTE
OPERATIVE REPORT  PATIENT NAME: Lenny Oviedo    :  1955  MRN: [id number]  Pt Location: [facility] OR ROOM 04    SURGERY DATE: 2021    Surgeon(s) and Role:     * Tash Harris MD - Primary     * Geoff Del Rio MD - Assisting    Preop Diagnosis:  Lung mass [R91 8]    Post-Op Diagnosis Codes:     * Lung mass [R91 8]    Procedure(s) (LRB):  MEDIASTINOSCOPY, flexible bronchoscopy (N/A)  BRONCHOSCOPY FLEXIBLE (N/A)    Specimen(s):  ID Type Source Tests Collected by Time Destination   1 : 4R Tissue Lymph Node TISSUE EXAM Tash Harris MD 2021 6032    2 : 2R Tissue Lymph Node TISSUE EXAM Tash Harris MD 2021 0857    3 : 2L Tissue Lymph Node TISSUE EXAM Tash Harris MD 2021 0857    4 : 4L Tissue Lymph Node TISSUE EXAM Tash Harris MD 2021 0901    5 : level 7 Tissue Lymph Node TISSUE EXAM Tash Harris MD 2021 0904        Estimated Blood Loss:   Minimal    Drains:  * No LDAs found *    Anesthesia Type:   General    Operative Indications:  Lung mass [R91 8]    Operative Findings:  Lymph nodes from all five stations taken successfully    Complications:   None    Procedure and Technique:  The patient was correctly identified by name and medical record number in the holding area and brought to the operative suite, where he was placed supine on the operative table  After satisfactory induction of general endotracheal anesthesia, a flexible bronchoscope was passed through the endotracheal tube visualizing the distal trachea, cristina, right and left main stem bronchi, including all of the primary and secondary divisions  No evidence of any endobronchial tumor was noted  No suspicion or identified risk for TB or other airborne infectious disease; bronchoscopy procedure being performed for diagnostic purposes  Flexible bronchoscopy was then terminated and the scope was withdrawn      The patient was positioned supine with a shoulder roll underneath to provide optimal cervical extension  The neck and chest were prepped and draped in the usual sterile fashion  A 2 5 cm curvalinear transverse skin incision was then made 1 fingerbreath above the sternal notch using a #15-0 blade scalpel  Dissection was carried down through the subcutaneous tissue and platysma with electrocautery  The strap muscles were then identified and divided with electrocautery at the vertical midline along the median raphe, revealing the underlying pretracheal fascia  The pretracheal fascia was entered sharply with Metzenbaum scissors, and digital dissection was then used to develop a plane along the anterior trachea with care to avoid the innominate artery  The mediastinoscope was introduced into this tract  A combination of blunt dissection and electrocautery were used to expose the lateral borders of the trachea all the way down to the cristina under mediastinoscopic visualization  Station 2R, 4R, 2L, 4L and 7 lymph nodes were biopsied and sent for frozen and permanent section  Hemostasis was excellent  After the biopsies were complete, the mediastinoscope was withdrawn  The strap muscles were brought together at the vertical midline and closed with #3-0 vicryl  The platysma was closed transversely with #3-0 Vicryl  A deep dermal #3-0 vicryl and subcuticular #4-0 Monocryl were used to close the skin in separate layers  The wound was dressed with Dermabond        I was present for the entire procedure    Patient Disposition:  PACU  and hemodynamically stable    SIGNATURE: Doug Townsend MD  DATE: May 25, 2021  TIME: 9:15 AM

## 2021-05-25 NOTE — INTERVAL H&P NOTE
H&P reviewed  After examining the patient I find no changes in the patients condition since the H&P had been written  Vitals:    05/25/21 0657   BP: 143/83   Pulse: 71   Resp: 18   Temp: 98 1 °F (36 7 °C)   SpO2: 99%     Safe to proceed   Flexible bronchoscopy, mediastinoscopy    Franny Perez MD  Thoracic Surgery  (Available on [facility] Text)  Office: [phone]

## 2021-05-25 NOTE — ANESTHESIA PREPROCEDURE EVALUATION
Procedure:  MEDIASTINOSCOPY, flexible bronchoscopy (N/A Chest)  BRONCHOSCOPY FLEXIBLE (N/A Bronchus)    Relevant Problems   CARDIO   (+) Essential hypertension   (+) Hypertriglyceridemia   (+) Mixed hyperlipidemia      /RENAL   (+) Renal cyst, right      MUSCULOSKELETAL   (+) Acute idiopathic gout of left foot   (+) Lumbago with sciatica, left side      NEURO/PSYCH   (+) Depression with anxiety   (+) Mild episode of recurrent major depressive disorder (HCC)      PULMONARY   (+) JUNAID (obstructive sleep apnea)        Physical Exam    Airway    Mallampati score: II  TM Distance: >3 FB  Neck ROM: full     Dental   No notable dental hx     Cardiovascular  Rhythm: regular, Rate: normal,     Pulmonary  Decreased breath sounds,     Other Findings        Anesthesia Plan  ASA Score- 3     Anesthesia Type- general with ASA Monitors  Additional Monitors:   Airway Plan: ETT  Plan Factors-Exercise tolerance (METS): >4 METS  Chart reviewed  EKG reviewed  Imaging results reviewed  Existing labs reviewed  Patient summary reviewed  Patient is not a current smoker  Induction- intravenous  Postoperative Plan-     Informed Consent- Anesthetic plan and risks discussed with spouse and patient  I personally reviewed this patient with the CRNA  Discussed and agreed on the Anesthesia Plan with the CRNA             Lab Results   Component Value Date    WBC 8 60 05/06/2021    HGB 13 6 (L) 05/06/2021    HCT 40 5 (L) 05/06/2021    MCV 93 05/06/2021     05/06/2021     Lab Results   Component Value Date    CALCIUM 9 7 02/10/2021    K 3 9 02/10/2021    CO2 26 02/10/2021     02/10/2021    BUN 15 02/10/2021    CREATININE 0 97 02/10/2021     Lab Results   Component Value Date    INR 0 98 05/06/2021    PROTIME 12 9 05/06/2021

## 2021-05-25 NOTE — ANESTHESIA POSTPROCEDURE EVALUATION
Post-Op Assessment Note    CV Status:  Stable    Pain management: adequate     Mental Status:  Awake   Hydration Status:  Stable   PONV Controlled:  Controlled   Airway Patency:  Patent      Post Op Vitals Reviewed: Yes      Staff: Anesthesiologist, CRNA         No complications documented      BP   171/89   Temp   97   Pulse  68   Resp   18   SpO2   98

## 2021-05-27 LAB
ATRIAL RATE: 68 BPM
ATRIAL RATE: 71 BPM
P AXIS: 64 DEGREES
P AXIS: 69 DEGREES
PR INTERVAL: 118 MS
PR INTERVAL: 126 MS
QRS AXIS: 33 DEGREES
QRS AXIS: 37 DEGREES
QRSD INTERVAL: 86 MS
QRSD INTERVAL: 90 MS
QT INTERVAL: 404 MS
QT INTERVAL: 408 MS
QTC INTERVAL: 433 MS
QTC INTERVAL: 439 MS
T WAVE AXIS: 40 DEGREES
T WAVE AXIS: 47 DEGREES
VENTRICULAR RATE: 68 BPM
VENTRICULAR RATE: 71 BPM

## 2021-05-27 PROCEDURE — 93010 ELECTROCARDIOGRAM REPORT: CPT | Performed by: INTERNAL MEDICINE

## 2021-05-28 ENCOUNTER — TELEPHONE (OUTPATIENT)
Dept: OTHER | Facility: HOSPITAL | Age: 66
End: 2021-05-28

## 2021-05-28 NOTE — TELEPHONE ENCOUNTER
Spoke with the patient regarding his pathology from the mediastinoscopy  No cancer was identified in all 5 lymph nodes  Will plan for neoadjuvant chemotherapy and surgical resection after   Patient is due to see medical oncology on 6/9    Robert Brown MD  Thoracic Surgery  (Available on [facility] Text)  Office: [phone]

## 2021-06-01 ENCOUNTER — TELEPHONE (OUTPATIENT)
Dept: HEMATOLOGY ONCOLOGY | Facility: CLINIC | Age: 66
End: 2021-06-01

## 2021-06-01 NOTE — TELEPHONE ENCOUNTER
Appointment Confirmation     Appointment with  Dr Lio Limon   Appointment date & time  06-09-21   Location [place]    Patient verbilized Understanding  confirmed and understood

## 2021-06-09 ENCOUNTER — DOCUMENTATION (OUTPATIENT)
Dept: HEMATOLOGY ONCOLOGY | Facility: CLINIC | Age: 66
End: 2021-06-09

## 2021-06-09 ENCOUNTER — CONSULT (OUTPATIENT)
Dept: HEMATOLOGY ONCOLOGY | Facility: CLINIC | Age: 66
End: 2021-06-09
Payer: MEDICARE

## 2021-06-09 VITALS
HEART RATE: 82 BPM | TEMPERATURE: 97.7 F | SYSTOLIC BLOOD PRESSURE: 138 MMHG | WEIGHT: 178.7 LBS | DIASTOLIC BLOOD PRESSURE: 80 MMHG | HEIGHT: 68 IN | OXYGEN SATURATION: 98 % | RESPIRATION RATE: 16 BRPM | BODY MASS INDEX: 27.08 KG/M2

## 2021-06-09 DIAGNOSIS — R91.8 LUNG MASS: ICD-10-CM

## 2021-06-09 DIAGNOSIS — C34.92 ADENOCARCINOMA, LUNG, LEFT (HCC): Primary | ICD-10-CM

## 2021-06-09 DIAGNOSIS — C34.92 ADENOCARCINOMA, LUNG, LEFT (HCC): ICD-10-CM

## 2021-06-09 DIAGNOSIS — D51.3 OTHER DIETARY VITAMIN B12 DEFICIENCY ANEMIA: ICD-10-CM

## 2021-06-09 DIAGNOSIS — D64.9 ANEMIA, UNSPECIFIED TYPE: ICD-10-CM

## 2021-06-09 DIAGNOSIS — Z51.11 ENCOUNTER FOR CHEMOTHERAPY MANAGEMENT: Primary | ICD-10-CM

## 2021-06-09 PROBLEM — Z45.2 ENCOUNTER FOR CENTRAL LINE CARE: Status: ACTIVE | Noted: 2021-06-09

## 2021-06-09 PROCEDURE — 99205 OFFICE O/P NEW HI 60 MIN: CPT | Performed by: INTERNAL MEDICINE

## 2021-06-09 PROCEDURE — 1124F ACP DISCUSS-NO DSCNMKR DOCD: CPT | Performed by: INTERNAL MEDICINE

## 2021-06-09 RX ORDER — FOLIC ACID 1 MG/1
1 TABLET ORAL DAILY
Qty: 30 TABLET | Refills: 6 | Status: SHIPPED | OUTPATIENT
Start: 2021-06-09 | End: 2021-12-07 | Stop reason: SDUPTHER

## 2021-06-09 RX ORDER — CYANOCOBALAMIN 1000 UG/ML
1000 INJECTION INTRAMUSCULAR; SUBCUTANEOUS ONCE
Status: CANCELLED | OUTPATIENT
Start: 2021-06-25

## 2021-06-09 RX ORDER — ONDANSETRON HYDROCHLORIDE 8 MG/1
8 TABLET, FILM COATED ORAL EVERY 8 HOURS PRN
Qty: 20 TABLET | Refills: 3 | Status: ON HOLD | OUTPATIENT
Start: 2021-06-09 | End: 2022-06-17 | Stop reason: CLARIF

## 2021-06-09 RX ORDER — PANTOPRAZOLE SODIUM 40 MG/1
40 TABLET, DELAYED RELEASE ORAL DAILY
Qty: 30 TABLET | Refills: 6 | Status: SHIPPED | OUTPATIENT
Start: 2021-06-09 | End: 2022-02-11 | Stop reason: SDUPTHER

## 2021-06-09 RX ORDER — FLUOCINOLONE ACETONIDE 0.1 MG/ML
SOLUTION TOPICAL
Status: ON HOLD | COMMUNITY
Start: 2021-06-03 | End: 2022-06-17 | Stop reason: CLARIF

## 2021-06-09 NOTE — PROGRESS NOTES
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  I did go over the PET-CT scan finding and the likely clinical  Staging IIb versus IIIa  The patient according to the thoracic surgical team as a surgical candidate and he was sent to us for the consideration of induction /neoadjuvant chemotherapy pending further staging of the brain  The patient was told that we will pursue an MRI of the brain to rule out any hint of obvious metastatic disease before we can consider the induction chemotherapy  The patient was then educated about the chemotherapy regimen of carboplatin (AUC of 5) and Alimta (500 mg/m2) since he is not a candidate for cisplatin due to very significant pre-existing hearing deficit  The treatment will be given on every 3 week basis for a total of 4 cycles  Subsequently, he will need to get imaging to document response to treatment before surgical resection would be scheduled  We did also discuss the need for Port-A-Cath placement which will be done with an MRI of the brain before we get him started on the chemotherapy as stated above  The patient was educated extensively about the potential side effects from the planned chemotherapy  All his questions were answered  - CBC and differential; Future  - Comprehensive metabolic panel; Future  - MRI brain w wo contrast; Future  - Ambulatory referral to Interventional Radiology; Future    3   Anemia, unspecified type  He seems to have borderline low hemoglobin level which will be investigated prior to his next visit  He denies bleeding from any sites  - CBC and differential; Future  - Comprehensive metabolic panel; Future  - Ferritin; Future  - Iron Panel (Includes Ferritin, Iron Sat%, Iron, and TIBC); Future  - Vitamin B12; Future  - LD,Blood; Future  - Magnesium; Future    4  Other dietary vitamin B12 deficiency anemia     Vitamin B12 will be checked prior to his next visit as well  - Vitamin B12; Future        HPI:   this is a 76-year-old male with history of hypertension and hyperlipidemia  He also had extensive history of smoking for 40 years or more  He quit smoking  In 2017  The patient apparently had low-dose lung CT scans for  Screening since he was heavy smoker on 04/08/2021  The CT scan of fortunately revealed 6 2 cm mass in the paramediastinal left upper lobe with COPD features  Subsequently, he had a PET-CT scan on 05/04/2021 which showed:  IMPRESSION:  1  Lobulated left upper paramediastinal lung mass extending to the left suprahilar region measuring 5 2 x 6 5 x 6 cm demonstrates intense FDG activity, most concerning for malignancy  Adjacent interstitial thickening and groundglass densities may   represent tumor infiltration  Tissue sampling recommended  2   Mildly hypermetabolic AP window mediastinal nodes concerning for early metastases  3   Additional mildly hypermetabolic branching nodular density in the left upper posterior lung may be inflammatory/infectious versus additional site of tumor  4   No hypermetabolic metastases in the neck, abdomen, pelvis  a biopsy of the left lung mass was done on 05/06/2021 which showed:   Poorly differentiated non-small cell carcinoma, favor adenocarcinoma, with neuroendocrine differentiation of uncertain clinical significance  the patient then underwent a mediastinoscopy on 05/25/2021 for staging    Multiple mediastinal lymph node from different levels were excised all lymph nodes came back negative for metastatic disease  The patient was felt to be a surgical candidate and was sent to us to consider  Neoadjuvant chemotherapy  Interval history:    The patient came today accompanied by his wife  He denied significant symptoms but did complain about mild fatigue and hearing difficulties  He also has some mild cough  ROS: Review of Systems   Constitutional: Positive for fatigue  Negative for chills and fever  HENT: Positive for hearing loss  Negative for ear pain and sore throat  Eyes: Negative for pain and visual disturbance  Respiratory: Positive for cough  Negative for shortness of breath  Cardiovascular: Negative for chest pain and palpitations  Gastrointestinal: Positive for diarrhea  Negative for abdominal pain and vomiting  Genitourinary: Negative for dysuria and hematuria  Musculoskeletal: Negative for arthralgias and back pain  Skin: Positive for rash  Negative for color change  Neurological: Positive for dizziness and numbness  Negative for seizures and syncope  All other systems reviewed and are negative        Past Medical History:   Diagnosis Date    Hyperlipidemia     Hypertension        Past Surgical History:   Procedure Laterality Date    BACK SURGERY      HEMORRHOID SURGERY      IR BIOPSY LUNG  5/6/2021    LAMINECTOMY  2018    L4-L5    MD BRONCHOSCOPY,DIAGNOSTIC N/A 5/25/2021    Procedure: BRONCHOSCOPY FLEXIBLE;  Surgeon: Sabrina Steele MD;  Location: [facility];  Service: Thoracic    MD MEDIASTINOSCOPY WITH LYMPH NODE BIOPSY/IES N/A 5/25/2021    Procedure: MEDIASTINOSCOPY, flexible bronchoscopy;  Surgeon: Sabrina Steele MD;  Location[facility];  Service: Thoracic    TONSILLECTOMY  1959       Social History     Socioeconomic History    Marital status: /Civil Union     Spouse name: None    Number of children: None    Years of education: None    Highest education level: None   Occupational History    None   Social Needs    Financial resource strain: None    Food insecurity     Worry: None     Inability: None    Transportation needs     Medical: None     Non-medical: None   Tobacco Use    Smoking status: Former Smoker     Packs/day: 1 00     Years: 40 00     Pack years: 40 00     Types: Cigarettes     Quit date: 2017     Years since quittin 4    Smokeless tobacco: Never Used    Tobacco comment: quit 2018   Substance and Sexual Activity    Alcohol use:  Yes     Alcohol/week: 7 0 standard drinks     Types: 7 Cans of beer per week     Frequency: 2-3 times a week     Drinks per session: 1 or 2     Binge frequency: Never    Drug use: No    Sexual activity: Yes   Lifestyle    Physical activity     Days per week: None     Minutes per session: None    Stress: None   Relationships    Social connections     Talks on phone: None     Gets together: None     Attends Oriental orthodox service: None     Active member of club or organization: None     Attends meetings of clubs or organizations: None     Relationship status: None    Intimate partner violence     Fear of current or ex partner: None     Emotionally abused: None     Physically abused: None     Forced sexual activity: None   Other Topics Concern    None   Social History Narrative    None       Family History   Problem Relation Age of Onset    Nephrolithiasis Father        Allergies   Allergen Reactions    Bee Venom     Benazepril Other (See Comments)     Angioedema     Latex Other (See Comments)     Burning of eyes at the dentist from the gloves    Meloxicam GI Intolerance    Penicillin G Rash         Current Outpatient Medications:     amLODIPine (NORVASC) 10 mg tablet, TAKE ONE TABLET BY MOUTH EVERY DAY, Disp: 90 tablet, Rfl: 3    B Complex Vitamins (B COMPLEX 1 PO), Take 1 tablet by mouth daily , Disp: , Rfl:     betamethasone valerate (VALISONE) 0 1 % cream, Apply topically 2 (two) times a day (Patient taking differently: Apply topically as needed ), Disp: 45 g, Rfl: 1    calcipotriene (DOVONOX) 0 005 % ointment, Apply topically 2 (two) times a day, Disp: , Rfl:     Cholecalciferol (VITAMIN D3 PO), Take by mouth daily , Disp: , Rfl:     citalopram (CeleXA) 10 mg tablet, TAKE ONE TABLET BY MOUTH EVERY DAY, Disp: 30 tablet, Rfl: 3    doxepin (SINEquan) 25 mg capsule, TAKE ONE CAPSULE BY MOUTH AT BEDTIME, Disp: 30 capsule, Rfl: 5    fluocinolone (SYNALAR) 0 01 % external solution, , Disp: , Rfl:     Garlic 10 MG CAPS, Take 1 tablet by mouth daily , Disp: , Rfl:     Glucosamine-Chondroit-Vit C-Mn (GLUCOSAMINE 1500 COMPLEX) CAPS, daily , Disp: , Rfl:     ketoconazole (NIZORAL) 2 % cream, Apply topically 2 (two) times a day (Patient taking differently: Apply topically as needed ), Disp: 15 g, Rfl: 2    Multiple Vitamins-Minerals (MULTIVITAL-M) TABS, Take by mouth daily , Disp: , Rfl:     rosuvastatin (CRESTOR) 10 MG tablet, TAKE ONE TABLET BY MOUTH EVERY DAY, Disp: 30 tablet, Rfl: 5    traMADol (ULTRAM) 50 mg tablet, TAKE ONE TABLET BY MOUTH EVERY 8 HOURS AS NEEDED FOR SEVERE PAIN, Disp: 30 tablet, Rfl: 0      Physical Exam:  /80 (BP Location: Left arm, Patient Position: Sitting, Cuff Size: Standard)   Pulse 82   Temp 97 7 °F (36 5 °C) (Tympanic)   Resp 16   Ht 5' 8" (1 727 m)   Wt 81 1 kg (178 lb 11 2 oz)   SpO2 98%   BMI 27 17 kg/m²     Physical Exam  Constitutional:       Appearance: He is well-developed  HENT:      Head: Normocephalic and atraumatic  Eyes:      General: No scleral icterus  Right eye: No discharge  Left eye: No discharge  Conjunctiva/sclera: Conjunctivae normal       Pupils: Pupils are equal, round, and reactive to light  Neck:      Musculoskeletal: Normal range of motion and neck supple  Thyroid: No thyromegaly  Trachea: No tracheal deviation  Cardiovascular:      Rate and Rhythm: Normal rate and regular rhythm  Heart sounds: Normal heart sounds  No murmur  No friction rub     Pulmonary:      Effort: Pulmonary effort is normal  No respiratory distress  Breath sounds: Normal breath sounds  No wheezing or rales  Chest:      Chest wall: No tenderness  Abdominal:      General: There is no distension  Palpations: Abdomen is soft  There is no hepatomegaly or splenomegaly  Tenderness: There is no abdominal tenderness  There is no guarding or rebound  Musculoskeletal: Normal range of motion  General: No tenderness or deformity  Lymphadenopathy:      Cervical: No cervical adenopathy  Skin:     General: Skin is warm and dry  Coloration: Skin is not pale  Findings: No erythema or rash  Neurological:      Mental Status: He is alert and oriented to person, place, and time  Cranial Nerves: No cranial nerve deficit  Coordination: Coordination normal       Deep Tendon Reflexes: Reflexes are normal and symmetric  Psychiatric:         Behavior: Behavior normal          Thought Content: Thought content normal          Judgment: Judgment normal            Labs:  Lab Results   Component Value Date    WBC 8 60 05/06/2021    HGB 13 6 (L) 05/06/2021    HCT 40 5 (L) 05/06/2021    MCV 93 05/06/2021     05/06/2021     Lab Results   Component Value Date    K 3 9 02/10/2021     02/10/2021    CO2 26 02/10/2021    BUN 15 02/10/2021    CREATININE 0 97 02/10/2021    GLUF 105 (H) 02/10/2021    CALCIUM 9 7 02/10/2021    AST 26 02/10/2021    ALT 26 02/10/2021    ALKPHOS 78 02/10/2021    EGFR 80 02/10/2021     No results found for: TSH    Patient voiced understanding and agreement in the above discussion  Aware to contact our office with questions/symptoms in the interim

## 2021-06-09 NOTE — PROGRESS NOTES
Patient education given on Alimta and Carboplatin   Pt and his wife  present and engaged  Discussed mechanism of action, administration, supportive care measures, and possible adverse effects  Patient provided with a new patient packet and educational brochures on each medication  Patient instructed to hydrate himself well (minimum 2 lt per day excluding caffeineated beverages) call if he develops significant adverse effects, temperature over 100 4° F, or with any further questions or concerns  Patient is aware of how to contact provider/nurse during and after office hours  Patient's questions answered to his satisfaction and the patient expresses understanding and acceptance of instructions and treatment  Rx for Folic acid 1 mg and Protonix 40 mg and Zofran provided to pt  I instructed pt to start Folic acid right away and he can start Protonix now  Chemo and blood consents were signed  Pt to have MRI of brain before he starts chemo  Port to be placed

## 2021-06-10 ENCOUNTER — PATIENT OUTREACH (OUTPATIENT)
Dept: CASE MANAGEMENT | Facility: HOSPITAL | Age: 66
End: 2021-06-10

## 2021-06-10 ENCOUNTER — TELEPHONE (OUTPATIENT)
Dept: INTERVENTIONAL RADIOLOGY/VASCULAR | Facility: HOSPITAL | Age: 66
End: 2021-06-10

## 2021-06-10 NOTE — PROGRESS NOTES
Oncology LSW outreached PT after receiving his distress thermometer, on which PT self-scored a 5/10 and indicated depression and worry as his primary areas of concern  PT also listed 4 physical concerns  PT explained that he was most concerned with the unknown of his diagnosis moving forward, as he had several projects around the home that he would need to address but felt that he could not do at this time  LSW engaged in active listening at this time, further normalizing PT's feelings and explaining that an adjustment period to a new normal is typically difficult  PT was receptive to support provided  PT reported that, at the time of LSW's call, he was looking through literature and online sites regarding his diagnosis  PT reported that he did have some procedural questions prior to starting treatment, including whether or not he would need to see a dentist  LSW transparently explained that she was unaware of the normalcy of this procedure and stated that she could connect PT with a nurse navigator and request that she outreach PT to address any medical-related questions that he may have  PT replied, "I should be fine, but if someone wants to reach out that would be okay too "     PT again endorsed no other areas of psychosocial concern and thanked LSW for her time  LSW provided PT with her contact information and requested that he outreach her in the future should any questions or areas of concern arise  Emotional support provided

## 2021-06-11 ENCOUNTER — PATIENT OUTREACH (OUTPATIENT)
Dept: HEMATOLOGY ONCOLOGY | Facility: CLINIC | Age: 66
End: 2021-06-11

## 2021-06-11 ENCOUNTER — TELEPHONE (OUTPATIENT)
Dept: INTERVENTIONAL RADIOLOGY/VASCULAR | Facility: HOSPITAL | Age: 66
End: 2021-06-11

## 2021-06-11 DIAGNOSIS — C34.92 ADENOCARCINOMA, LUNG, LEFT (HCC): Primary | ICD-10-CM

## 2021-06-11 RX ORDER — CYANOCOBALAMIN 1000 UG/ML
1000 INJECTION INTRAMUSCULAR; SUBCUTANEOUS ONCE
Status: CANCELLED | OUTPATIENT
Start: 2021-06-18

## 2021-06-11 NOTE — PROGRESS NOTES
Called to schedule port placement  Patient scheduled at [facility] on 6/17/21 at 1300  Patient to arrive at 1200  Patient has a ride to and from and NPO after midnight the night before  Consult complete

## 2021-06-11 NOTE — PROGRESS NOTES
Patient outreach  Called and spoke to patient  Introduced myself and explained my role as a nurse navigator  Reviewed upcoming appointments  Patient questioned what the appointment scheduled for 6/18/21 at [facility] infusion was for  Per Dr Karen Russell nurse, appointment is for B-12 injection  Patient stated he reviewed the information he received regarding his treatment plan and questioned if he should see his dentist prior to starting treatment  Explained that if he is not having any issues and does not require a dental procedure, he does not need dental clearance before initiating chemotherapy  I provided my direct phone number and encouraged him to call with any questions or concerns  Patient was appreciative

## 2021-06-14 ENCOUNTER — PREP FOR PROCEDURE (OUTPATIENT)
Dept: INTERVENTIONAL RADIOLOGY/VASCULAR | Facility: CLINIC | Age: 66
End: 2021-06-14

## 2021-06-14 ENCOUNTER — TELEPHONE (OUTPATIENT)
Dept: INTERVENTIONAL RADIOLOGY/VASCULAR | Facility: HOSPITAL | Age: 66
End: 2021-06-14

## 2021-06-14 RX ORDER — SODIUM CHLORIDE 9 MG/ML
75 INJECTION, SOLUTION INTRAVENOUS CONTINUOUS
Status: CANCELLED | OUTPATIENT
Start: 2021-06-14

## 2021-06-14 NOTE — PRE-PROCEDURE INSTRUCTIONS
Informed patient of location, date and time of procedure  Arrive at [facility] on Thursday 6/17/21 at 12pm  NPO at midnight  Take meds in morning with sip of water  Have someone drive him home after procedure  Patient verbalized understanding

## 2021-06-17 ENCOUNTER — HOSPITAL ENCOUNTER (OUTPATIENT)
Dept: INTERVENTIONAL RADIOLOGY/VASCULAR | Facility: HOSPITAL | Age: 66
Discharge: HOME | End: 2021-06-17
Payer: MEDICARE

## 2021-06-17 VITALS
HEIGHT: 68 IN | TEMPERATURE: 97.3 F | WEIGHT: 174.82 LBS | SYSTOLIC BLOOD PRESSURE: 132 MMHG | DIASTOLIC BLOOD PRESSURE: 80 MMHG | RESPIRATION RATE: 19 BRPM | OXYGEN SATURATION: 97 % | BODY MASS INDEX: 26.5 KG/M2 | HEART RATE: 76 BPM

## 2021-06-17 DIAGNOSIS — C34.92 ADENOCARCINOMA, LUNG, LEFT (HCC): ICD-10-CM

## 2021-06-17 PROCEDURE — 36561 INSERT TUNNELED CV CATH: CPT

## 2021-06-17 PROCEDURE — C1788 PORT, INDWELLING, IMP: HCPCS

## 2021-06-17 PROCEDURE — 36561 INSERT TUNNELED CV CATH: CPT | Performed by: RADIOLOGY

## 2021-06-17 PROCEDURE — 99152 MOD SED SAME PHYS/QHP 5/>YRS: CPT

## 2021-06-17 PROCEDURE — 99152 MOD SED SAME PHYS/QHP 5/>YRS: CPT | Performed by: RADIOLOGY

## 2021-06-17 PROCEDURE — 76937 US GUIDE VASCULAR ACCESS: CPT | Performed by: RADIOLOGY

## 2021-06-17 PROCEDURE — 77001 FLUOROGUIDE FOR VEIN DEVICE: CPT | Performed by: RADIOLOGY

## 2021-06-17 PROCEDURE — 76937 US GUIDE VASCULAR ACCESS: CPT

## 2021-06-17 PROCEDURE — 99153 MOD SED SAME PHYS/QHP EA: CPT

## 2021-06-17 PROCEDURE — 77001 FLUOROGUIDE FOR VEIN DEVICE: CPT

## 2021-06-17 RX ORDER — VANCOMYCIN HYDROCHLORIDE 1 G/200ML
1000 INJECTION, SOLUTION INTRAVENOUS ONCE
Status: COMPLETED | OUTPATIENT
Start: 2021-06-17 | End: 2021-06-17

## 2021-06-17 RX ORDER — LIDOCAINE HYDROCHLORIDE AND EPINEPHRINE 10; 10 MG/ML; UG/ML
INJECTION, SOLUTION INFILTRATION; PERINEURAL CODE/TRAUMA/SEDATION MEDICATION
Status: COMPLETED | OUTPATIENT
Start: 2021-06-17 | End: 2021-06-17

## 2021-06-17 RX ORDER — MIDAZOLAM HYDROCHLORIDE 2 MG/2ML
INJECTION, SOLUTION INTRAMUSCULAR; INTRAVENOUS CODE/TRAUMA/SEDATION MEDICATION
Status: COMPLETED | OUTPATIENT
Start: 2021-06-17 | End: 2021-06-17

## 2021-06-17 RX ORDER — SODIUM CHLORIDE 9 MG/ML
75 INJECTION, SOLUTION INTRAVENOUS CONTINUOUS
Status: DISCONTINUED | OUTPATIENT
Start: 2021-06-17 | End: 2021-06-21 | Stop reason: HOSPADM

## 2021-06-17 RX ORDER — FENTANYL CITRATE 50 UG/ML
INJECTION, SOLUTION INTRAMUSCULAR; INTRAVENOUS CODE/TRAUMA/SEDATION MEDICATION
Status: COMPLETED | OUTPATIENT
Start: 2021-06-17 | End: 2021-06-17

## 2021-06-17 RX ORDER — ACETAMINOPHEN 325 MG/1
650 TABLET ORAL EVERY 4 HOURS PRN
Status: DISCONTINUED | OUTPATIENT
Start: 2021-06-17 | End: 2021-06-21 | Stop reason: HOSPADM

## 2021-06-17 RX ADMIN — FENTANYL CITRATE 50 MCG: 50 INJECTION, SOLUTION INTRAMUSCULAR; INTRAVENOUS at 13:12

## 2021-06-17 RX ADMIN — MIDAZOLAM HYDROCHLORIDE 1 MG: 1 INJECTION, SOLUTION INTRAMUSCULAR; INTRAVENOUS at 12:52

## 2021-06-17 RX ADMIN — FENTANYL CITRATE 50 MCG: 50 INJECTION, SOLUTION INTRAMUSCULAR; INTRAVENOUS at 12:52

## 2021-06-17 RX ADMIN — LIDOCAINE HYDROCHLORIDE,EPINEPHRINE BITARTRATE 10 ML: 10; .01 INJECTION, SOLUTION INFILTRATION; PERINEURAL at 13:07

## 2021-06-17 RX ADMIN — VANCOMYCIN HYDROCHLORIDE 1000 MG: 1 INJECTION, SOLUTION INTRAVENOUS at 12:21

## 2021-06-17 RX ADMIN — MIDAZOLAM HYDROCHLORIDE 1 MG: 1 INJECTION, SOLUTION INTRAMUSCULAR; INTRAVENOUS at 13:12

## 2021-06-17 RX ADMIN — SODIUM CHLORIDE 75 ML/HR: 0.9 INJECTION, SOLUTION INTRAVENOUS at 12:21

## 2021-06-17 RX ADMIN — FENTANYL CITRATE 50 MCG: 50 INJECTION, SOLUTION INTRAMUSCULAR; INTRAVENOUS at 13:02

## 2021-06-17 RX ADMIN — MIDAZOLAM HYDROCHLORIDE 1 MG: 1 INJECTION, SOLUTION INTRAMUSCULAR; INTRAVENOUS at 13:02

## 2021-06-17 RX ADMIN — LIDOCAINE HYDROCHLORIDE,EPINEPHRINE BITARTRATE 10 ML: 10; .01 INJECTION, SOLUTION INFILTRATION; PERINEURAL at 13:14

## 2021-06-17 NOTE — BRIEF OP NOTE (RAD/CATH)
INTERVENTIONAL RADIOLOGY PROCEDURE NOTE    Date: 6/17/2021    Procedure: IR PORT PLACEMENT    Preoperative diagnosis:   1  Adenocarcinoma, lung, left ([address] )         Postoperative diagnosis: Same  Surgeon: Merlin Morel, MD     Assistant: None  No qualified resident was available  Blood loss: 3 mL    Specimens: None     Findings: Successful right chest port placement    Complications: None immediate      Anesthesia: conscious sedation and local

## 2021-06-17 NOTE — DISCHARGE INSTRUCTIONS
Implanted Venous Access Port     WHAT YOU NEED TO KNOW:   An implanted venous access port is a device used to give treatments and take blood  It may also be called a central venous access device (CVAD)  The port is a small container that is placed under your skin, usually in your upper chest  The port is attached to a catheter that enters a large vein  DISCHARGE INSTRUCTIONS:   Resume your normal diet  Small sips of flat soda will help with mild nausea  Prevent an infection:   · Wash your hands often  Use soap and water  Clean your hands before and after you care for your port  Remind everyone who cares for your port to wash their hands  · Check your skin for infection every day  Look for redness, swelling, or fluid oozing from the port site  Care for your port:   1  You may shower beginning 48 hours after procedure  2  Change dressing if it becomes wet  3  Remove dressing after 24 hours  Leave glue in place  4  It is normal for some bruising to occur  5  Use Tylenol for pain  6  Limit use of arm on the side that your port was placed  Lift nothing heavier than 5 pounds for 1 week, and then gradually increase activity as tolerated  7  DO NOT apply ointment, lotion or cream to port site until incision is healed  Allow glue to fall off  DO NOT attempt to peel glue from skin even it it begins to flake  8, After the port incision is healed you may swim, bathe  Notify the Interventional Radiologist if you have any of the followin  Fever above 101 F    2  Increased redness or swelling after 1st day  3  Increased pain after 1st day  4  Any sign of infection (drainage from port site, skin separation, hot to touch)  5  Persistent nausea or vomiting  Contact Interventional Radiology at [phone] [facility] PATIENTS: Contact Interventional Radiology at [phone]) ([address]: Contact Interventional Radiology at [phone])

## 2021-06-17 NOTE — H&P
Interventional Radiology  History and Physical 2021     Ashok Arellano   1955   [id number]    Assessment/Plan:  58-year-old male with left lung cancer will be undergoing chemotherapy and presents for port placement  Problem List Items Addressed This Visit        Respiratory    Adenocarcinoma, lung, left ([address] )    Relevant Orders    IR port placement             Subjective:     Patient ID: Ashok Arellano is a 74 y o  male  History of Present Illness  Patient with left lung cancer will be undergoing chemotherapy and presents for port placement  Review of Systems   Constitutional: Negative for chills and fever           Past Medical History:   Diagnosis Date    Hyperlipidemia     Hypertension         Past Surgical History:   Procedure Laterality Date    BACK SURGERY      HEMORRHOID SURGERY      IR BIOPSY LUNG  2021    LAMINECTOMY  2018    L4-L5    LA BRONCHOSCOPY,DIAGNOSTIC N/A 2021    Procedure: BRONCHOSCOPY FLEXIBLE;  Surgeon: Mary Arevalo MD;  [facility];  Service: Thoracic    LA MEDIASTINOSCOPY WITH LYMPH NODE BIOPSY/IES N/A 2021    Procedure: MEDIASTINOSCOPY, flexible bronchoscopy;  Surgeon: Mary Arevalo MD;  [facility];  Service: Thoracic    TONSILLECTOMY          Social History     Tobacco Use   Smoking Status Former Smoker    Packs/day: 1 00    Years: 40 00    Pack years: 40 00    Types: Cigarettes    Quit date:     Years since quittin 4   Smokeless Tobacco Never Used   Tobacco Comment    quit         Social History     Substance and Sexual Activity   Alcohol Use Yes    Alcohol/week: 7 0 standard drinks    Types: 7 Cans of beer per week        Social History     Substance and Sexual Activity   Drug Use No        Allergies   Allergen Reactions    Bee Venom     Benazepril Other (See Comments)     Angioedema     Latex Other (See Comments)     Burning of eyes at the dentist from the gloves    Meloxicam GI Intolerance    Penicillin G Rash       Current Outpatient Medications   Medication Sig Dispense Refill    amLODIPine (NORVASC) 10 mg tablet TAKE ONE TABLET BY MOUTH EVERY DAY 90 tablet 3    B Complex Vitamins (B COMPLEX 1 PO) Take 1 tablet by mouth daily       betamethasone valerate (VALISONE) 0 1 % cream Apply topically 2 (two) times a day (Patient taking differently: Apply topically as needed ) 45 g 1    calcipotriene (DOVONOX) 0 005 % ointment Apply topically 2 (two) times a day      Cholecalciferol (VITAMIN D3 PO) Take by mouth daily       citalopram (CeleXA) 10 mg tablet TAKE ONE TABLET BY MOUTH EVERY DAY 30 tablet 3    doxepin (SINEquan) 25 mg capsule TAKE ONE CAPSULE BY MOUTH AT BEDTIME 30 capsule 5    fluocinolone (SYNALAR) 0 01 % external solution       folic acid (FOLVITE) 1 mg tablet Take 1 tablet (1 mg total) by mouth daily 30 tablet 6    Garlic 10 MG CAPS Take 1 tablet by mouth daily       Glucosamine-Chondroit-Vit C-Mn (GLUCOSAMINE 1500 COMPLEX) CAPS daily       ketoconazole (NIZORAL) 2 % cream Apply topically 2 (two) times a day (Patient taking differently: Apply topically as needed ) 15 g 2    Multiple Vitamins-Minerals (MULTIVITAL-M) TABS Take by mouth daily       ondansetron (ZOFRAN) 8 mg tablet Take 1 tablet (8 mg total) by mouth every 8 (eight) hours as needed for nausea or vomiting 20 tablet 3    pantoprazole (PROTONIX) 40 mg tablet Take 1 tablet (40 mg total) by mouth daily 30 tablet 6    rosuvastatin (CRESTOR) 10 MG tablet TAKE ONE TABLET BY MOUTH EVERY DAY 30 tablet 5    traMADol (ULTRAM) 50 mg tablet TAKE ONE TABLET BY MOUTH EVERY 8 HOURS AS NEEDED FOR SEVERE PAIN 30 tablet 0     No current facility-administered medications for this encounter            Objective:    Vitals:    06/17/21 1212   BP: 149/77   Pulse: 81   Resp: 16   Temp: 98 3 °F (36 8 °C)   TempSrc: Temporal   SpO2: 97%   Weight: 79 3 kg (174 lb 13 2 oz)   Height: 5' 8" (1 727 m)        Physical Exam  Constitutional:       Appearance: Normal appearance  Cardiovascular:      Rate and Rhythm: Normal rate  Pulmonary:      Effort: Pulmonary effort is normal            No results found for: BNP   Lab Results   Component Value Date    WBC 8 60 05/06/2021    HGB 13 6 (L) 05/06/2021    HCT 40 5 (L) 05/06/2021    MCV 93 05/06/2021     05/06/2021     Lab Results   Component Value Date    INR 0 98 05/06/2021    PROTIME 12 9 05/06/2021     No results found for: PTT      I have personally reviewed pertinent imaging and laboratory results  Code Status: No Order  Advance Directive and Living Will:      Power of :    POLST:      This text is generated with voice recognition software  There may be translation, syntax,  or grammatical errors  If you have any questions, please contact the dictating provider

## 2021-06-18 ENCOUNTER — HOSPITAL ENCOUNTER (OUTPATIENT)
Dept: MRI IMAGING | Facility: HOSPITAL | Age: 66
Discharge: HOME | End: 2021-06-18
Attending: INTERNAL MEDICINE
Payer: MEDICARE

## 2021-06-18 ENCOUNTER — HOSPITAL ENCOUNTER (OUTPATIENT)
Dept: INFUSION CENTER | Facility: HOSPITAL | Age: 66
Discharge: HOME | End: 2021-06-18
Attending: INTERNAL MEDICINE
Payer: MEDICARE

## 2021-06-18 DIAGNOSIS — C34.92 ADENOCARCINOMA, LUNG, LEFT (HCC): Primary | ICD-10-CM

## 2021-06-18 DIAGNOSIS — C34.92 ADENOCARCINOMA, LUNG, LEFT (HCC): ICD-10-CM

## 2021-06-18 PROCEDURE — 70553 MRI BRAIN STEM W/O & W/DYE: CPT

## 2021-06-18 PROCEDURE — G1004 CDSM NDSC: HCPCS

## 2021-06-18 PROCEDURE — A9585 GADOBUTROL INJECTION: HCPCS | Performed by: INTERNAL MEDICINE

## 2021-06-18 PROCEDURE — 96372 THER/PROPH/DIAG INJ SC/IM: CPT

## 2021-06-18 RX ORDER — CYANOCOBALAMIN 1000 UG/ML
1000 INJECTION INTRAMUSCULAR; SUBCUTANEOUS ONCE
Status: COMPLETED | OUTPATIENT
Start: 2021-06-18 | End: 2021-06-18

## 2021-06-18 RX ORDER — CYANOCOBALAMIN 1000 UG/ML
1000 INJECTION INTRAMUSCULAR; SUBCUTANEOUS ONCE
Status: CANCELLED | OUTPATIENT
Start: 2021-06-18

## 2021-06-18 RX ADMIN — CYANOCOBALAMIN 1000 MCG: 1000 INJECTION, SOLUTION INTRAMUSCULAR at 15:11

## 2021-06-18 RX ADMIN — GADOBUTROL 7 ML: 604.72 INJECTION INTRAVENOUS at 14:43

## 2021-06-20 DIAGNOSIS — C34.92 ADENOCARCINOMA, LUNG, LEFT (HCC): Primary | ICD-10-CM

## 2021-06-20 RX ORDER — SODIUM CHLORIDE 9 MG/ML
20 INJECTION, SOLUTION INTRAVENOUS ONCE
Status: CANCELLED | OUTPATIENT
Start: 2021-06-25

## 2021-06-20 RX ORDER — CYANOCOBALAMIN 1000 UG/ML
1000 INJECTION INTRAMUSCULAR; SUBCUTANEOUS ONCE
Status: CANCELLED
Start: 2021-06-25 | End: 2021-06-25

## 2021-06-23 ENCOUNTER — APPOINTMENT (OUTPATIENT)
Dept: LAB | Facility: CLINIC | Age: 66
End: 2021-06-23
Payer: MEDICARE

## 2021-06-23 DIAGNOSIS — D51.3 OTHER DIETARY VITAMIN B12 DEFICIENCY ANEMIA: ICD-10-CM

## 2021-06-23 DIAGNOSIS — M48.062 SPINAL STENOSIS OF LUMBAR REGION WITH NEUROGENIC CLAUDICATION: ICD-10-CM

## 2021-06-23 DIAGNOSIS — H90.3 SENSORINEURAL HEARING LOSS (SNHL) OF BOTH EARS: ICD-10-CM

## 2021-06-23 DIAGNOSIS — D64.9 ANEMIA, UNSPECIFIED TYPE: ICD-10-CM

## 2021-06-23 DIAGNOSIS — R73.9 HYPERGLYCEMIA: ICD-10-CM

## 2021-06-23 DIAGNOSIS — C34.92 ADENOCARCINOMA, LUNG, LEFT (HCC): ICD-10-CM

## 2021-06-23 DIAGNOSIS — I10 ESSENTIAL HYPERTENSION: ICD-10-CM

## 2021-06-23 DIAGNOSIS — F33.0 MILD EPISODE OF RECURRENT MAJOR DEPRESSIVE DISORDER (HCC): ICD-10-CM

## 2021-06-23 DIAGNOSIS — G89.29 CHRONIC LEFT-SIDED LOW BACK PAIN WITH LEFT-SIDED SCIATICA: ICD-10-CM

## 2021-06-23 DIAGNOSIS — G47.33 OSA (OBSTRUCTIVE SLEEP APNEA): ICD-10-CM

## 2021-06-23 DIAGNOSIS — Z00.00 MEDICARE ANNUAL WELLNESS VISIT, SUBSEQUENT: ICD-10-CM

## 2021-06-23 DIAGNOSIS — F17.211 CIGARETTE NICOTINE DEPENDENCE IN REMISSION: ICD-10-CM

## 2021-06-23 DIAGNOSIS — M48.061 SPINAL STENOSIS OF LUMBAR REGION, UNSPECIFIED WHETHER NEUROGENIC CLAUDICATION PRESENT: ICD-10-CM

## 2021-06-23 DIAGNOSIS — M54.42 CHRONIC LEFT-SIDED LOW BACK PAIN WITH LEFT-SIDED SCIATICA: ICD-10-CM

## 2021-06-23 LAB
ALBUMIN SERPL BCP-MCNC: 4.1 G/DL (ref 3.5–5)
ALP SERPL-CCNC: 88 U/L (ref 46–116)
ALT SERPL W P-5'-P-CCNC: 28 U/L (ref 12–78)
ANION GAP SERPL CALCULATED.3IONS-SCNC: 8 MMOL/L (ref 4–13)
AST SERPL W P-5'-P-CCNC: 28 U/L (ref 5–45)
BASOPHILS # BLD AUTO: 0.02 THOUSANDS/ΜL (ref 0–0.1)
BASOPHILS NFR BLD AUTO: 0 % (ref 0–1)
BILIRUB SERPL-MCNC: 0.41 MG/DL (ref 0.2–1)
BUN SERPL-MCNC: 11 MG/DL (ref 5–25)
CALCIUM SERPL-MCNC: 9.5 MG/DL (ref 8.3–10.1)
CHLORIDE SERPL-SCNC: 108 MMOL/L (ref 100–108)
CHOLEST SERPL-MCNC: 112 MG/DL (ref 50–200)
CO2 SERPL-SCNC: 24 MMOL/L (ref 21–32)
CREAT SERPL-MCNC: 0.99 MG/DL (ref 0.6–1.3)
EOSINOPHIL # BLD AUTO: 0.15 THOUSAND/ΜL (ref 0–0.61)
EOSINOPHIL NFR BLD AUTO: 2 % (ref 0–6)
ERYTHROCYTE [DISTWIDTH] IN BLOOD BY AUTOMATED COUNT: 14.6 % (ref 11.6–15.1)
EST. AVERAGE GLUCOSE BLD GHB EST-MCNC: 105 MG/DL
FERRITIN SERPL-MCNC: 297 NG/ML (ref 8–388)
GFR SERPL CREATININE-BSD FRML MDRD: 78 ML/MIN/1.73SQ M
GLUCOSE P FAST SERPL-MCNC: 101 MG/DL (ref 65–99)
HBA1C MFR BLD: 5.3 %
HCT VFR BLD AUTO: 43 % (ref 36.5–49.3)
HDLC SERPL-MCNC: 42 MG/DL
HGB BLD-MCNC: 14.4 G/DL (ref 12–17)
IMM GRANULOCYTES # BLD AUTO: 0.03 THOUSAND/UL (ref 0–0.2)
IMM GRANULOCYTES NFR BLD AUTO: 0 % (ref 0–2)
IRON SATN MFR SERPL: 31 %
IRON SERPL-MCNC: 115 UG/DL (ref 65–175)
LDH SERPL-CCNC: 235 U/L (ref 81–234)
LDLC SERPL CALC-MCNC: 26 MG/DL (ref 0–100)
LYMPHOCYTES # BLD AUTO: 1.52 THOUSANDS/ΜL (ref 0.6–4.47)
LYMPHOCYTES NFR BLD AUTO: 20 % (ref 14–44)
MAGNESIUM SERPL-MCNC: 2.2 MG/DL (ref 1.6–2.6)
MCH RBC QN AUTO: 31.6 PG (ref 26.8–34.3)
MCHC RBC AUTO-ENTMCNC: 33.5 G/DL (ref 31.4–37.4)
MCV RBC AUTO: 95 FL (ref 82–98)
MONOCYTES # BLD AUTO: 0.46 THOUSAND/ΜL (ref 0.17–1.22)
MONOCYTES NFR BLD AUTO: 6 % (ref 4–12)
NEUTROPHILS # BLD AUTO: 5.26 THOUSANDS/ΜL (ref 1.85–7.62)
NEUTS SEG NFR BLD AUTO: 72 % (ref 43–75)
NONHDLC SERPL-MCNC: 70 MG/DL
NRBC BLD AUTO-RTO: 0 /100 WBCS
PLATELET # BLD AUTO: 204 THOUSANDS/UL (ref 149–390)
PMV BLD AUTO: 10.4 FL (ref 8.9–12.7)
POTASSIUM SERPL-SCNC: 4.2 MMOL/L (ref 3.5–5.3)
PROT SERPL-MCNC: 8.2 G/DL (ref 6.4–8.2)
RBC # BLD AUTO: 4.55 MILLION/UL (ref 3.88–5.62)
SODIUM SERPL-SCNC: 140 MMOL/L (ref 136–145)
TIBC SERPL-MCNC: 375 UG/DL (ref 250–450)
TRIGL SERPL-MCNC: 221 MG/DL
TSH SERPL DL<=0.05 MIU/L-ACNC: 3.7 UIU/ML (ref 0.36–3.74)
VIT B12 SERPL-MCNC: 505 PG/ML (ref 100–900)
WBC # BLD AUTO: 7.44 THOUSAND/UL (ref 4.31–10.16)

## 2021-06-23 PROCEDURE — 83036 HEMOGLOBIN GLYCOSYLATED A1C: CPT

## 2021-06-23 PROCEDURE — 85025 COMPLETE CBC W/AUTO DIFF WBC: CPT

## 2021-06-23 PROCEDURE — 84443 ASSAY THYROID STIM HORMONE: CPT

## 2021-06-23 PROCEDURE — 82607 VITAMIN B-12: CPT

## 2021-06-23 PROCEDURE — 82728 ASSAY OF FERRITIN: CPT

## 2021-06-23 PROCEDURE — 83615 LACTATE (LD) (LDH) ENZYME: CPT

## 2021-06-23 PROCEDURE — 83735 ASSAY OF MAGNESIUM: CPT

## 2021-06-23 PROCEDURE — 80053 COMPREHEN METABOLIC PANEL: CPT

## 2021-06-23 PROCEDURE — 83550 IRON BINDING TEST: CPT

## 2021-06-23 PROCEDURE — 36415 COLL VENOUS BLD VENIPUNCTURE: CPT

## 2021-06-23 PROCEDURE — 83540 ASSAY OF IRON: CPT

## 2021-06-23 PROCEDURE — 80061 LIPID PANEL: CPT

## 2021-06-24 ENCOUNTER — OFFICE VISIT (OUTPATIENT)
Dept: HEMATOLOGY ONCOLOGY | Facility: CLINIC | Age: 66
End: 2021-06-24
Payer: MEDICARE

## 2021-06-24 VITALS
SYSTOLIC BLOOD PRESSURE: 148 MMHG | TEMPERATURE: 98.3 F | OXYGEN SATURATION: 97 % | HEART RATE: 82 BPM | DIASTOLIC BLOOD PRESSURE: 80 MMHG | HEIGHT: 68 IN | BODY MASS INDEX: 26.95 KG/M2 | WEIGHT: 177.8 LBS | RESPIRATION RATE: 18 BRPM

## 2021-06-24 DIAGNOSIS — C34.92 ADENOCARCINOMA, LUNG, LEFT (HCC): Primary | ICD-10-CM

## 2021-06-24 PROCEDURE — 99214 OFFICE O/P EST MOD 30 MIN: CPT | Performed by: INTERNAL MEDICINE

## 2021-06-24 NOTE — PROGRESS NOTES
Hematology/Oncology Outpatient Follow-up  Susan Martinez 74 y o  male 1955 [id number]    Date:  6/24/2021        Assessment and Plan:  1  Adenocarcinoma, lung, left ([address] )    The patient will be starting his 1st cycle of neoadjuvant chemotherapy with carboplatin and Alimta tomorrow  We will aim for a total of 4 cycles which will be given on every 3 week basis  Subsequently, we will pursue an imaging study to document response to the treatment before he gets his planned surgical resection  The patient had multiple questions regarding potential side effects from the chemotherapy  All the questions were answered  We will see him on every 3 week basis prior to each chemotherapy cycle  - CBC and differential; Future  - Comprehensive metabolic panel; Future  - Magnesium; Future        HPI:    The patient came today for a follow-up visit accompanied by his wife  He had the Port-A-Cath placed and an MRI of the brain for further staging which  Was done on 06/18/2021  The MRI of the brain was negative for any obvious hint of metastatic disease  He does have minimal chronic microvascular ischemic changes  Blood work from yesterday showed normal CBC and CMP  B12 505  Magnesium 2 2  Oncology History Overview Note   this is a 67-year-old male with history of hypertension and hyperlipidemia  He also had extensive history of smoking for 40 years or more  He quit smoking  In 2017  The patient apparently had low-dose lung CT scans for  Screening since he was heavy smoker on 04/08/2021  The CT scan of fortunately revealed 6 2 cm mass in the paramediastinal left upper lobe with COPD features  Subsequently, he had a PET-CT scan on 05/04/2021 which showed:  IMPRESSION:  1  Lobulated left upper paramediastinal lung mass extending to the left suprahilar region measuring 5 2 x 6 5 x 6 cm demonstrates intense FDG activity, most concerning for malignancy    Adjacent interstitial thickening and groundglass densities may represent tumor infiltration  Tissue sampling recommended  2   Mildly hypermetabolic AP window mediastinal nodes concerning for early metastases  3   Additional mildly hypermetabolic branching nodular density in the left upper posterior lung may be inflammatory/infectious versus additional site of tumor  4   No hypermetabolic metastases in the neck, abdomen, pelvis  a biopsy of the left lung mass was done on 05/06/2021 which showed:   Poorly differentiated non-small cell carcinoma, favor adenocarcinoma, with neuroendocrine differentiation of uncertain clinical significance  the patient then underwent a mediastinoscopy on 05/25/2021 for staging  Multiple mediastinal lymph node from different levels were excised all lymph nodes came back negative for metastatic disease  The patient was felt to be a surgical candidate and was sent to us to consider  Neoadjuvant chemotherapy  Adenocarcinoma, lung, left ([facility] [address] )   6/9/2021 Initial Diagnosis    Adenocarcinoma, lung, left ([facility] [address] )     6/25/2021 -  Chemotherapy    cyanocobalamin, 1,000 mcg, Intramuscular, Once, 0 of 3 cycles  fosaprepitant (EMEND) IVPB, 150 mg, Intravenous, Once, 0 of 6 cycles  CARBOplatin (PARAPLATIN) IVPB (GOG AUC DOSING), 496 mg, Intravenous, Once, 0 of 6 cycles  pemetrexed (ALIMTA) chemo infusion, 500 mg/m2 = 975 mg, Intravenous, Once, 0 of 6 cycles         Interval history:    ROS: Review of Systems   Constitutional: Positive for fatigue  Negative for chills and fever  HENT: Positive for hearing loss  Negative for ear pain and sore throat  Eyes: Negative for pain and visual disturbance  Respiratory: Positive for cough and shortness of breath  Cardiovascular: Negative for chest pain and palpitations  Gastrointestinal: Positive for diarrhea  Negative for abdominal pain and vomiting  Genitourinary: Negative for dysuria and hematuria  Musculoskeletal: Positive for back pain  Negative for arthralgias     Skin: Negative for color change and rash  Neurological: Positive for numbness  Negative for seizures and syncope  All other systems reviewed and are negative  Past Medical History:   Diagnosis Date    Hyperlipidemia     Hypertension        Past Surgical History:   Procedure Laterality Date    BACK SURGERY      HEMORRHOID SURGERY      IR BIOPSY LUNG  2021    IR PORT PLACEMENT  2021    LAMINECTOMY  2018    L4-L5    WA BRONCHOSCOPY,DIAGNOSTIC N/A 2021    Procedure: BRONCHOSCOPY FLEXIBLE;  Surgeon: Fe Coates MD;  [facility];  Service: Thoracic    WA MEDIASTINOSCOPY WITH LYMPH NODE BIOPSY/IES N/A 2021    Procedure: MEDIASTINOSCOPY, flexible bronchoscopy;  Surgeon: Fe Coates MD;  [facility];  Service: Thoracic    TONSILLECTOMY         Social History     Socioeconomic History    Marital status: /Civil Union     Spouse name: None    Number of children: None    Years of education: None    Highest education level: None   Occupational History    None   Tobacco Use    Smoking status: Former Smoker     Packs/day: 1 00     Years: 40 00     Pack years: 40 00     Types: Cigarettes     Quit date:      Years since quittin 4    Smokeless tobacco: Never Used    Tobacco comment: quit 2018   Vaping Use    Vaping Use: Never used   Substance and Sexual Activity    Alcohol use: Yes     Alcohol/week: 7 0 standard drinks     Types: 7 Cans of beer per week    Drug use: Yes     Types: Marijuana    Sexual activity: None   Other Topics Concern    None   Social History Narrative    None     Social Determinants of Health     Financial Resource Strain:     Difficulty of Paying Living Expenses:    Food Insecurity:     Worried About Running Out of Food in the Last Year:     Ran Out of Food in the Last Year:    Transportation Needs:     Lack of Transportation (Medical):      Lack of Transportation (Non-Medical):    Physical Activity:     Days of Exercise per Week:     Minutes of Exercise per Session:    Stress:     Feeling of Stress :    Social Connections:     Frequency of Communication with Friends and Family:     Frequency of Social Gatherings with Friends and Family:     Attends [employer] Services:     Active Member of Clubs or Organizations:     Attends Club or Organization Meetings:     Marital Status:    Intimate Partner Violence:     Fear of Current or Ex-Partner:     Emotionally Abused:     Physically Abused:     Sexually Abused:        Family History   Problem Relation Age of Onset    Nephrolithiasis Father        Allergies   Allergen Reactions    Bee Venom     Benazepril Other (See Comments)     Angioedema     Latex Other (See Comments)     Burning of eyes at the dentist from the gloves    Meloxicam GI Intolerance    Penicillin G Rash         Current Outpatient Medications:     amLODIPine (NORVASC) 10 mg tablet, TAKE ONE TABLET BY MOUTH EVERY DAY, Disp: 90 tablet, Rfl: 3    B Complex Vitamins (B COMPLEX 1 PO), Take 1 tablet by mouth daily , Disp: , Rfl:     betamethasone valerate (VALISONE) 0 1 % cream, Apply topically 2 (two) times a day (Patient taking differently: Apply topically as needed ), Disp: 45 g, Rfl: 1    calcipotriene (DOVONOX) 0 005 % ointment, Apply topically 2 (two) times a day, Disp: , Rfl:     Cholecalciferol (VITAMIN D3 PO), Take by mouth daily , Disp: , Rfl:     citalopram (CeleXA) 10 mg tablet, TAKE ONE TABLET BY MOUTH EVERY DAY, Disp: 30 tablet, Rfl: 3    doxepin (SINEquan) 25 mg capsule, TAKE ONE CAPSULE BY MOUTH AT BEDTIME, Disp: 30 capsule, Rfl: 5    fluocinolone (SYNALAR) 0 01 % external solution, , Disp: , Rfl:     folic acid (FOLVITE) 1 mg tablet, Take 1 tablet (1 mg total) by mouth daily, Disp: 30 tablet, Rfl: 6    Garlic 10 MG CAPS, Take 1 tablet by mouth daily , Disp: , Rfl:     Glucosamine-Chondroit-Vit C-Mn (GLUCOSAMINE 1500 COMPLEX) CAPS, daily , Disp: , Rfl:     ketoconazole (NIZORAL) 2 % cream, Apply topically 2 (two) times a day (Patient taking differently: Apply topically as needed ), Disp: 15 g, Rfl: 2    Multiple Vitamins-Minerals (MULTIVITAL-M) TABS, Take by mouth daily , Disp: , Rfl:     ondansetron (ZOFRAN) 8 mg tablet, Take 1 tablet (8 mg total) by mouth every 8 (eight) hours as needed for nausea or vomiting, Disp: 20 tablet, Rfl: 3    pantoprazole (PROTONIX) 40 mg tablet, Take 1 tablet (40 mg total) by mouth daily, Disp: 30 tablet, Rfl: 6    rosuvastatin (CRESTOR) 10 MG tablet, TAKE ONE TABLET BY MOUTH EVERY DAY, Disp: 30 tablet, Rfl: 5    traMADol (ULTRAM) 50 mg tablet, TAKE ONE TABLET BY MOUTH EVERY 8 HOURS AS NEEDED FOR SEVERE PAIN, Disp: 30 tablet, Rfl: 0      Physical Exam:  /80 (BP Location: Left arm, Patient Position: Sitting, Cuff Size: Adult)   Pulse 82   Temp 98 3 °F (36 8 °C) (Oral)   Resp 18   Ht 5' 8" (1 727 m)   Wt 80 6 kg (177 lb 12 8 oz)   SpO2 97%   BMI 27 03 kg/m²     Physical Exam  Constitutional:       Appearance: He is well-developed  HENT:      Head: Normocephalic and atraumatic  Eyes:      General: No scleral icterus  Right eye: No discharge  Left eye: No discharge  Conjunctiva/sclera: Conjunctivae normal       Pupils: Pupils are equal, round, and reactive to light  Neck:      Thyroid: No thyromegaly  Trachea: No tracheal deviation  Cardiovascular:      Rate and Rhythm: Normal rate and regular rhythm  Heart sounds: Normal heart sounds  No murmur heard  No friction rub  Pulmonary:      Effort: Pulmonary effort is normal  No respiratory distress  Breath sounds: Normal breath sounds  No wheezing or rales  Chest:      Chest wall: No tenderness  Abdominal:      General: There is no distension  Palpations: Abdomen is soft  There is no hepatomegaly or splenomegaly  Tenderness: There is no abdominal tenderness  There is no guarding or rebound     Musculoskeletal:         General: No tenderness or deformity  Normal range of motion  Cervical back: Normal range of motion and neck supple  Lymphadenopathy:      Cervical: No cervical adenopathy  Skin:     General: Skin is warm and dry  Coloration: Skin is not pale  Findings: No erythema or rash  Neurological:      Mental Status: He is alert and oriented to person, place, and time  Cranial Nerves: No cranial nerve deficit  Coordination: Coordination normal       Deep Tendon Reflexes: Reflexes are normal and symmetric  Psychiatric:         Behavior: Behavior normal          Thought Content: Thought content normal          Judgment: Judgment normal            Labs:  Lab Results   Component Value Date    WBC 7 44 06/23/2021    HGB 14 4 06/23/2021    HCT 43 0 06/23/2021    MCV 95 06/23/2021     06/23/2021     Lab Results   Component Value Date    K 4 2 06/23/2021     06/23/2021    CO2 24 06/23/2021    BUN 11 06/23/2021    CREATININE 0 99 06/23/2021    GLUF 101 (H) 06/23/2021    CALCIUM 9 5 06/23/2021    AST 28 06/23/2021    ALT 28 06/23/2021    ALKPHOS 88 06/23/2021    EGFR 78 06/23/2021     No results found for: TSH    Patient voiced understanding and agreement in the above discussion  Aware to contact our office with questions/symptoms in the interim

## 2021-06-25 ENCOUNTER — HOSPITAL ENCOUNTER (OUTPATIENT)
Dept: INFUSION CENTER | Facility: HOSPITAL | Age: 66
Discharge: HOME | End: 2021-06-25
Attending: INTERNAL MEDICINE
Payer: MEDICARE

## 2021-06-25 VITALS
HEART RATE: 75 BPM | SYSTOLIC BLOOD PRESSURE: 125 MMHG | HEIGHT: 68 IN | DIASTOLIC BLOOD PRESSURE: 70 MMHG | BODY MASS INDEX: 26.66 KG/M2 | TEMPERATURE: 97.7 F | RESPIRATION RATE: 18 BRPM | WEIGHT: 175.93 LBS

## 2021-06-25 DIAGNOSIS — C34.92 ADENOCARCINOMA, LUNG, LEFT (HCC): Primary | ICD-10-CM

## 2021-06-25 PROCEDURE — 96413 CHEMO IV INFUSION 1 HR: CPT

## 2021-06-25 PROCEDURE — 96367 TX/PROPH/DG ADDL SEQ IV INF: CPT

## 2021-06-25 PROCEDURE — 96372 THER/PROPH/DIAG INJ SC/IM: CPT

## 2021-06-25 PROCEDURE — 96411 CHEMO IV PUSH ADDL DRUG: CPT

## 2021-06-25 RX ORDER — CYANOCOBALAMIN 1000 UG/ML
1000 INJECTION INTRAMUSCULAR; SUBCUTANEOUS ONCE
Status: COMPLETED | OUTPATIENT
Start: 2021-06-25 | End: 2021-06-25

## 2021-06-25 RX ORDER — SODIUM CHLORIDE 9 MG/ML
20 INJECTION, SOLUTION INTRAVENOUS ONCE
Status: COMPLETED | OUTPATIENT
Start: 2021-06-25 | End: 2021-06-25

## 2021-06-25 RX ADMIN — SODIUM CHLORIDE 20 ML/HR: 0.9 INJECTION, SOLUTION INTRAVENOUS at 10:24

## 2021-06-25 RX ADMIN — SODIUM CHLORIDE 1000 MG: 9 INJECTION, SOLUTION INTRAVENOUS at 11:30

## 2021-06-25 RX ADMIN — DEXAMETHASONE SODIUM PHOSPHATE: 10 INJECTION, SOLUTION INTRAMUSCULAR; INTRAVENOUS at 10:27

## 2021-06-25 RX ADMIN — FOSAPREPITANT 150 MG: 150 INJECTION, POWDER, LYOPHILIZED, FOR SOLUTION INTRAVENOUS at 10:54

## 2021-06-25 RX ADMIN — CYANOCOBALAMIN 1000 MCG: 1000 INJECTION, SOLUTION INTRAMUSCULAR at 10:54

## 2021-06-25 RX ADMIN — CARBOPLATIN 496 MG: 10 INJECTION, SOLUTION INTRAVENOUS at 11:51

## 2021-06-25 NOTE — PROGRESS NOTES
Patient tolerated first carboplatin and alimta infusion without issue   Port deaccessed and patient discharged in stable condition with AVS

## 2021-07-05 DIAGNOSIS — M54.42 CHRONIC LEFT-SIDED LOW BACK PAIN WITH LEFT-SIDED SCIATICA: ICD-10-CM

## 2021-07-05 DIAGNOSIS — G89.29 CHRONIC LEFT-SIDED LOW BACK PAIN WITH LEFT-SIDED SCIATICA: ICD-10-CM

## 2021-07-06 RX ORDER — CITALOPRAM 10 MG/1
TABLET ORAL
Qty: 30 TABLET | Refills: 3 | Status: SHIPPED | OUTPATIENT
Start: 2021-07-06 | End: 2021-11-03

## 2021-07-06 RX ORDER — TRAMADOL HYDROCHLORIDE 50 MG/1
TABLET ORAL
Qty: 30 TABLET | Refills: 0 | Status: SHIPPED | OUTPATIENT
Start: 2021-07-06 | End: 2021-09-08

## 2021-07-09 DIAGNOSIS — C34.92 ADENOCARCINOMA, LUNG, LEFT (HCC): Primary | ICD-10-CM

## 2021-07-09 RX ORDER — SODIUM CHLORIDE 9 MG/ML
20 INJECTION, SOLUTION INTRAVENOUS ONCE
Status: CANCELLED | OUTPATIENT
Start: 2021-07-16

## 2021-07-13 ENCOUNTER — OFFICE VISIT (OUTPATIENT)
Dept: FAMILY MEDICINE CLINIC | Facility: CLINIC | Age: 66
End: 2021-07-13
Payer: MEDICARE

## 2021-07-13 VITALS
TEMPERATURE: 98.4 F | SYSTOLIC BLOOD PRESSURE: 140 MMHG | HEART RATE: 86 BPM | HEIGHT: 68 IN | BODY MASS INDEX: 27.13 KG/M2 | DIASTOLIC BLOOD PRESSURE: 88 MMHG | OXYGEN SATURATION: 98 % | WEIGHT: 179 LBS

## 2021-07-13 DIAGNOSIS — Z23 ENCOUNTER FOR IMMUNIZATION: Primary | ICD-10-CM

## 2021-07-13 DIAGNOSIS — C34.92 ADENOCARCINOMA, LUNG, LEFT (HCC): ICD-10-CM

## 2021-07-13 DIAGNOSIS — E78.2 MIXED HYPERLIPIDEMIA: ICD-10-CM

## 2021-07-13 DIAGNOSIS — G47.33 OSA (OBSTRUCTIVE SLEEP APNEA): ICD-10-CM

## 2021-07-13 DIAGNOSIS — F41.8 DEPRESSION WITH ANXIETY: ICD-10-CM

## 2021-07-13 DIAGNOSIS — F33.0 MILD EPISODE OF RECURRENT MAJOR DEPRESSIVE DISORDER (HCC): ICD-10-CM

## 2021-07-13 DIAGNOSIS — I10 ESSENTIAL HYPERTENSION: ICD-10-CM

## 2021-07-13 PROCEDURE — 99214 OFFICE O/P EST MOD 30 MIN: CPT | Performed by: FAMILY MEDICINE

## 2021-07-13 NOTE — PATIENT INSTRUCTIONS
Low Fat Diet   WHAT YOU NEED TO KNOW:   A low-fat diet is an eating plan that is low in total fat, unhealthy fat, and cholesterol  You may need to follow a low-fat diet if you have trouble digesting or absorbing fat  You may also need to follow this diet if you have high cholesterol  You can also lower your cholesterol by increasing the amount of fiber in your diet  Soluble fiber is a type of fiber that helps to decrease cholesterol levels  DISCHARGE INSTRUCTIONS:   Different types of fat in food:   · Limit unhealthy fats  A diet that is high in cholesterol, saturated fat, and trans fat may cause unhealthy cholesterol levels  Unhealthy cholesterol levels increase your risk of heart disease  ? Cholesterol:  Limit intake of cholesterol to less than 200 mg per day  Cholesterol is found in meat, eggs, and dairy  ? Saturated fat:  Limit saturated fat to less than 7% of your total daily calories  Ask your dietitian how many calories you need each day  Saturated fat is found in butter, cheese, ice cream, whole milk, and palm oil  Saturated fat is also found in meat, such as beef, pork, chicken skin, and processed meats  Processed meats include sausage, hot dogs, and bologna  ? Trans fat:  Avoid trans fat as much as possible  Trans fat is used in fried and baked foods  Foods that say trans fat free on the label may still have up to 0 5 grams of trans fat per serving  · Include healthy fats  Replace foods that are high in saturated and trans fat with foods high in healthy fats  This may help to decrease high cholesterol levels  ? Monounsaturated fats: These are found in avocados, nuts, and vegetable oils, such as olive, canola, and sunflower oil  ? Polyunsaturated fats: These can be found in vegetable oils, such as soybean or corn oil  Omega-3 fats can help to decrease the risk of heart disease  Omega-3 fats are found in fish, such as salmon, herring, trout, and tuna   Omega-3 fats can also be found in plant foods, such as walnuts, flaxseed, soybeans, and canola oil  Foods to limit or avoid:   · Grains:      ? Snacks that are made with partially hydrogenated oils, such as chips, regular crackers, and butter-flavored popcorn    ? High-fat baked goods, such as biscuits, croissants, doughnuts, pies, cookies, and pastries    · Dairy:      ? Whole milk, 2% milk, and yogurt and ice cream made with whole milk    ? Half and half creamer, heavy cream, and whipping cream    ? Cheese, cream cheese, and sour cream    · Meats and proteins:      ? High-fat cuts of meat (T-bone steak, regular hamburger, and ribs)    ? Fried meat, poultry (turkey and chicken), and fish    ? Poultry (chicken and turkey) with skin    ? Cold cuts (salami or bologna), hot dogs, huff, and sausage    ? Whole eggs and egg yolks    · Vegetables and fruits with added fat:      ? Fried vegetables or vegetables in butter or high-fat sauces, such as cream or cheese sauces    ? Fried fruit or fruit served with butter or cream    · Fats:      ? Butter, stick margarine, and shortening    ? Coconut, palm oil, and palm kernel oil    Foods to include:   · Grains:      ? Whole-grain breads, cereals, pasta, and brown rice    ? Low-fat crackers and pretzels    · Vegetables and fruits:      ? Fresh, frozen, or canned vegetables (no salt or low-sodium)    ? Fresh, frozen, dried, or canned fruit (canned in light syrup or fruit juice)    ? Avocado    · Low-fat dairy products:      ? Nonfat (skim) or 1% milk    ? Nonfat or low-fat cheese, yogurt, and cottage cheese    · Meats and proteins:      ? Chicken or turkey with no skin    ? Baked or broiled fish    ? Lean beef and pork (loin, round, extra lean hamburger)    ? Beans and peas, unsalted nuts, soy products    ? Egg whites and substitutes    ? Seeds and nuts    · Fats:      ? Unsaturated oil, such as canola, olive, peanut, soybean, or sunflower oil    ?  Soft or liquid margarine and vegetable oil spread    ? Low-fat salad dressing    Other ways to decrease fat:   · Read food labels before you buy foods  Choose foods that have less than 30% of calories from fat  Choose low-fat or fat-free dairy products  Remember that fat free does not mean calorie free  These foods still contain calories, and too many calories can lead to weight gain  · Trim fat from meat and avoid fried food  Trim all visible fat from meat before you cook it  Remove the skin from poultry  Do not wright meat, fish, or poultry  Bake, roast, boil, or broil these foods instead  Avoid fried foods  Eat a baked potato instead of [place] fries  Steam vegetables instead of sautéing them in butter  · Add less fat to foods  Use imitation huff bits on salads and baked potatoes instead of regular huff bits  Use fat-free or low-fat salad dressings instead of regular dressings  Use low-fat or nonfat butter-flavored topping instead of regular butter or margarine on popcorn and other foods  Ways to decrease fat in recipes:  Replace high-fat ingredients with low-fat or nonfat ones  This may cause baked goods to be drier than usual  You may need to use nonfat cooking spray on pans to prevent food from sticking  You also may need to change the amount of other ingredients, such as water, in the recipe  Try the following:  · Use low-fat or light margarine instead of regular margarine or shortening  · Use lean ground turkey breast or chicken, or lean ground beef (less than 5% fat) instead of hamburger  · Add 1 teaspoon of canola oil to 8 ounces of skim milk instead of using cream or half and half  · Use grated zucchini, carrots, or apples in breads instead of coconut  · Use blenderized, low-fat cottage cheese, plain tofu, or low-fat ricotta cheese instead of cream cheese  · Use 1 egg white and 1 teaspoon of canola oil, or use ¼ cup (2 ounces) of fat-free egg substitute instead of a whole egg       · Replace half of the oil that is called for in a recipe with applesauce when you bake  Use 3 tablespoons of cocoa powder and 1 tablespoon of canola oil instead of a square of baking chocolate  How to increase fiber:  Eat enough high-fiber foods to get 20 to 30 grams of fiber every day  Slowly increase your fiber intake to avoid stomach cramps, gas, and other problems  · Eat 3 ounces of whole-grain foods each day  An ounce is about 1 slice of bread  Eat whole-grain breads, such as whole-wheat bread  Whole wheat, whole-wheat flour, or other whole grains should be listed as the first ingredient on the food label  Replace white flour with whole-grain flour or use half of each in recipes  Whole-grain flour is heavier than white flour, so you may have to add more yeast or baking powder  · Eat a high-fiber cereal for breakfast   Oatmeal is a good source of soluble fiber  Look for cereals that have bran or fiber in the name  Choose whole-grain products, such as brown rice, barley, and whole-wheat pasta  · Eat more beans, peas, and lentils  For example, add beans to soups or salads  Eat at least 5 cups of fruits and vegetables each day  Eat fruits and vegetables with the peel because the peel is high in fiber  © Copyright [address] Information is for End User's use only and may not be sold, redistributed or otherwise used for commercial purposes  All illustrations and images included in CareNotes® are the copyrighted property of [employer]  or [address] [place], [place] [address]karina sole   The above information is an  only  It is not intended as medical advice for individual conditions or treatments  Talk to your doctor, nurse or pharmacist before following any medical regimen to see if it is safe and effective for you

## 2021-07-13 NOTE — PROGRESS NOTES
Assessment/Plan:       Problem List Items Addressed This Visit        Respiratory    JUNAID (obstructive sleep apnea)      No change or worsening shortness of breath continue current treatment plan         Adenocarcinoma, lung, left ([address] )     Discussed and reviewed current work up and treatment and again reassured him  He is assymptomatic            Cardiovascular and Mediastinum    Essential hypertension      Medically stable avoiding sodium in diet continue with current amlodipine at 10 mg tablets            Other    Depression with anxiety      Continue medications as prescribed no change in overall dosing on citalopram and doxepin         Mild episode of recurrent major depressive disorder (HCC)      Continue with Celexa 10 mg tablets         Mixed hyperlipidemia      Crestor 10 mg tablets continuation avoiding saturated fats in diet reviewed laboratory work in follow-up at next visit           Other Visit Diagnoses     Encounter for immunization    -  Primary            Subjective:      Patient ID: Geovanny Herrera is a 74 y o  male  Patient presents for general checkup review of laboratory work      The following portions of the patient's history were reviewed and updated as appropriate: allergies, current medications, past family history, past medical history, past social history, past surgical history and problem list     Review of Systems   Constitutional: Negative for chills, fatigue and fever  HENT: Negative for congestion, nosebleeds, rhinorrhea, sinus pressure and sore throat  Eyes: Negative for discharge and redness  Respiratory: Negative for cough and shortness of breath  Cardiovascular: Negative for chest pain, palpitations and leg swelling  Gastrointestinal: Negative for abdominal pain, blood in stool and nausea  Endocrine: Negative for cold intolerance, heat intolerance and polyuria  Genitourinary: Negative for dysuria and frequency     Musculoskeletal: Negative for arthralgias, back pain and myalgias  Skin: Negative for rash  Neurological: Negative for dizziness, weakness and headaches  Hematological: Negative for adenopathy  Psychiatric/Behavioral: Negative for behavioral problems and sleep disturbance  The patient is not nervous/anxious  Objective:      /88 (BP Location: Left arm, Patient Position: Sitting)   Pulse 86   Temp 98 4 °F (36 9 °C)   Ht 5' 8" (1 727 m)   Wt 81 2 kg (179 lb)   SpO2 98%   BMI 27 22 kg/m²        Physical Exam  Vitals and nursing note reviewed  Constitutional:       Appearance: Normal appearance  He is well-developed and normal weight  HENT:      Head: Normocephalic and atraumatic  Right Ear: Tympanic membrane, ear canal and external ear normal       Left Ear: Tympanic membrane, ear canal and external ear normal       Nose: Nose normal       Mouth/Throat:      Mouth: Mucous membranes are moist       Pharynx: Oropharynx is clear  Eyes:      General: No scleral icterus  Conjunctiva/sclera: Conjunctivae normal       Pupils: Pupils are equal, round, and reactive to light  Neck:      Thyroid: No thyromegaly  Vascular: No JVD  Cardiovascular:      Rate and Rhythm: Normal rate and regular rhythm  Heart sounds: Normal heart sounds  No murmur heard  Pulmonary:      Effort: Pulmonary effort is normal       Breath sounds: Normal breath sounds  No wheezing or rales  Chest:      Chest wall: No tenderness  Abdominal:      General: Bowel sounds are normal  There is no distension  Palpations: Abdomen is soft  There is no mass  Tenderness: There is no abdominal tenderness  There is no guarding or rebound  Musculoskeletal:         General: No tenderness or deformity  Normal range of motion  Cervical back: Normal range of motion and neck supple  Lymphadenopathy:      Cervical: No cervical adenopathy  Skin:     General: Skin is warm and dry  Findings: No erythema or rash     Neurological: General: No focal deficit present  Mental Status: He is alert and oriented to person, place, and time  Cranial Nerves: No cranial nerve deficit  Deep Tendon Reflexes: Reflexes are normal and symmetric  Reflexes normal    Psychiatric:         Mood and Affect: Mood normal          Behavior: Behavior normal          Thought Content: Thought content normal          Judgment: Judgment normal           Data:    Laboratory Results: I have personally reviewed the pertinent laboratory results/reports   Radiology/Other Diagnostic Testing Results: I have personally reviewed pertinent reports         Lab Results   Component Value Date    WBC 7 44 06/23/2021    HGB 14 4 06/23/2021    HCT 43 0 06/23/2021    MCV 95 06/23/2021     06/23/2021     Lab Results   Component Value Date    K 4 2 06/23/2021     06/23/2021    CO2 24 06/23/2021    BUN 11 06/23/2021    CREATININE 0 99 06/23/2021    GLUF 101 (H) 06/23/2021    CALCIUM 9 5 06/23/2021    AST 28 06/23/2021    ALT 28 06/23/2021    ALKPHOS 88 06/23/2021    EGFR 78 06/23/2021     Lab Results   Component Value Date    CHOLESTEROL 112 06/23/2021    CHOLESTEROL 120 02/10/2021    CHOLESTEROL 120 10/06/2020     Lab Results   Component Value Date    HDL 42 06/23/2021    HDL 49 02/10/2021    HDL 58 10/06/2020     Lab Results   Component Value Date    LDLCALC 26 06/23/2021    LDLCALC 51 02/10/2021    LDLCALC 39 10/06/2020     Lab Results   Component Value Date    TRIG 221 (H) 06/23/2021    TRIG 100 02/10/2021    TRIG 115 10/06/2020     No results found for: [place], [place]  Lab Results   Component Value Date    LTK0EFBMSGAM 3 700 06/23/2021     Lab Results   Component Value Date    HGBA1C 5 3 06/23/2021     Lab Results   Component Value Date    PSA 0 6 10/06/2020       Karen , DO

## 2021-07-13 NOTE — ASSESSMENT & PLAN NOTE
Crestor 10 mg tablets continuation avoiding saturated fats in diet reviewed laboratory work in follow-up at next visit

## 2021-07-14 ENCOUNTER — APPOINTMENT (OUTPATIENT)
Dept: LAB | Facility: CLINIC | Age: 66
End: 2021-07-14
Payer: MEDICARE

## 2021-07-14 DIAGNOSIS — C34.92 ADENOCARCINOMA, LUNG, LEFT (HCC): ICD-10-CM

## 2021-07-14 LAB
ALBUMIN SERPL BCP-MCNC: 3.7 G/DL (ref 3.5–5)
ALP SERPL-CCNC: 88 U/L (ref 46–116)
ALT SERPL W P-5'-P-CCNC: 48 U/L (ref 12–78)
ANION GAP SERPL CALCULATED.3IONS-SCNC: 6 MMOL/L (ref 4–13)
AST SERPL W P-5'-P-CCNC: 34 U/L (ref 5–45)
BASOPHILS # BLD AUTO: 0 THOUSANDS/ΜL (ref 0–0.1)
BASOPHILS NFR BLD AUTO: 0 % (ref 0–1)
BILIRUB SERPL-MCNC: 0.33 MG/DL (ref 0.2–1)
BUN SERPL-MCNC: 12 MG/DL (ref 5–25)
CALCIUM SERPL-MCNC: 9.6 MG/DL (ref 8.3–10.1)
CHLORIDE SERPL-SCNC: 108 MMOL/L (ref 100–108)
CO2 SERPL-SCNC: 24 MMOL/L (ref 21–32)
CREAT SERPL-MCNC: 0.94 MG/DL (ref 0.6–1.3)
EOSINOPHIL # BLD AUTO: 0.06 THOUSAND/ΜL (ref 0–0.61)
EOSINOPHIL NFR BLD AUTO: 2 % (ref 0–6)
ERYTHROCYTE [DISTWIDTH] IN BLOOD BY AUTOMATED COUNT: 15.1 % (ref 11.6–15.1)
GFR SERPL CREATININE-BSD FRML MDRD: 83 ML/MIN/1.73SQ M
GLUCOSE P FAST SERPL-MCNC: 99 MG/DL (ref 65–99)
HCT VFR BLD AUTO: 38.5 % (ref 36.5–49.3)
HGB BLD-MCNC: 13 G/DL (ref 12–17)
IMM GRANULOCYTES # BLD AUTO: 0.01 THOUSAND/UL (ref 0–0.2)
IMM GRANULOCYTES NFR BLD AUTO: 0 % (ref 0–2)
LYMPHOCYTES # BLD AUTO: 1.24 THOUSANDS/ΜL (ref 0.6–4.47)
LYMPHOCYTES NFR BLD AUTO: 42 % (ref 14–44)
MAGNESIUM SERPL-MCNC: 2.1 MG/DL (ref 1.6–2.6)
MCH RBC QN AUTO: 31.9 PG (ref 26.8–34.3)
MCHC RBC AUTO-ENTMCNC: 33.8 G/DL (ref 31.4–37.4)
MCV RBC AUTO: 95 FL (ref 82–98)
MONOCYTES # BLD AUTO: 0.32 THOUSAND/ΜL (ref 0.17–1.22)
MONOCYTES NFR BLD AUTO: 11 % (ref 4–12)
NEUTROPHILS # BLD AUTO: 1.35 THOUSANDS/ΜL (ref 1.85–7.62)
NEUTS SEG NFR BLD AUTO: 45 % (ref 43–75)
NRBC BLD AUTO-RTO: 0 /100 WBCS
PLATELET # BLD AUTO: 266 THOUSANDS/UL (ref 149–390)
PMV BLD AUTO: 10.1 FL (ref 8.9–12.7)
POTASSIUM SERPL-SCNC: 3.7 MMOL/L (ref 3.5–5.3)
PROT SERPL-MCNC: 7.9 G/DL (ref 6.4–8.2)
RBC # BLD AUTO: 4.07 MILLION/UL (ref 3.88–5.62)
SODIUM SERPL-SCNC: 138 MMOL/L (ref 136–145)
WBC # BLD AUTO: 2.98 THOUSAND/UL (ref 4.31–10.16)

## 2021-07-14 PROCEDURE — 85025 COMPLETE CBC W/AUTO DIFF WBC: CPT

## 2021-07-14 PROCEDURE — 36415 COLL VENOUS BLD VENIPUNCTURE: CPT

## 2021-07-14 PROCEDURE — 80053 COMPREHEN METABOLIC PANEL: CPT

## 2021-07-14 PROCEDURE — 83735 ASSAY OF MAGNESIUM: CPT

## 2021-07-15 ENCOUNTER — OFFICE VISIT (OUTPATIENT)
Dept: HEMATOLOGY ONCOLOGY | Facility: CLINIC | Age: 66
End: 2021-07-15
Payer: MEDICARE

## 2021-07-15 VITALS
WEIGHT: 179.1 LBS | RESPIRATION RATE: 18 BRPM | HEIGHT: 68 IN | DIASTOLIC BLOOD PRESSURE: 86 MMHG | TEMPERATURE: 98.4 F | HEART RATE: 63 BPM | OXYGEN SATURATION: 97 % | SYSTOLIC BLOOD PRESSURE: 128 MMHG | BODY MASS INDEX: 27.14 KG/M2

## 2021-07-15 DIAGNOSIS — C34.92 ADENOCARCINOMA, LUNG, LEFT (HCC): Primary | ICD-10-CM

## 2021-07-15 DIAGNOSIS — D70.2 DRUG-INDUCED NEUTROPENIA (HCC): ICD-10-CM

## 2021-07-15 PROCEDURE — 99214 OFFICE O/P EST MOD 30 MIN: CPT | Performed by: INTERNAL MEDICINE

## 2021-07-15 RX ORDER — SODIUM CHLORIDE 9 MG/ML
20 INJECTION, SOLUTION INTRAVENOUS ONCE
Status: CANCELLED | OUTPATIENT
Start: 2021-08-06

## 2021-07-15 RX ORDER — CYANOCOBALAMIN 1000 UG/ML
1000 INJECTION INTRAMUSCULAR; SUBCUTANEOUS ONCE
Status: CANCELLED | OUTPATIENT
Start: 2021-08-06 | End: 2021-08-06

## 2021-07-15 NOTE — PROGRESS NOTES
Hematology/Oncology Outpatient Follow-up  Jose Hernández 74 y o  male 1955 [id number]    Date:  7/15/2021        Assessment and Plan:  1  Adenocarcinoma, lung, left ([address] )  The patient has received his 1st cycle of the Neoadjuvant chemotherapy with carboplatin and Alimta with expected side effects  He was told that we will go ahead with cycle 2 tomorrow as planned  However, we will need to repeat his CBC to make sure that his [address]  Is within normal range prior to proceeding  With the treatment  - CBC and differential; Future  - Comprehensive metabolic panel; Future  - CBC and differential; Future    2  Drug-induced neutropenia (HCC)   we will add pegylated G-CSF treatment after the chemotherapy to prevent neutropenic complications and delays of his chemotherapy  HPI:  The patient came today for a follow-up visit accompanied by his wife  He stated that the he tolerated the 1st cycle of neoadjuvant chemotherapy with carboplatin and Alimta relatively well with expected side effects including fatigue and headaches  He did also have some abdominal pain 4 days later  Blood work from yesterday showed a white cell count of 2 9 with ANC of 1 35  Hemoglobin 13 0 with platelet count of 877  CMP was entirely normal with normal magnesium  Oncology History Overview Note   this is a 49-year-old male with history of hypertension and hyperlipidemia  He also had extensive history of smoking for 40 years or more  He quit smoking  In 2017  The patient apparently had low-dose lung CT scans for  Screening since he was heavy smoker on 04/08/2021  The CT scan of fortunately revealed 6 2 cm mass in the paramediastinal left upper lobe with COPD features  Subsequently, he had a PET-CT scan on 05/04/2021 which showed:  IMPRESSION:  1  Lobulated left upper paramediastinal lung mass extending to the left suprahilar region measuring 5 2 x 6 5 x 6 cm demonstrates intense FDG activity, most concerning for malignancy  Adjacent interstitial thickening and groundglass densities may   represent tumor infiltration  Tissue sampling recommended  2   Mildly hypermetabolic AP window mediastinal nodes concerning for early metastases  3   Additional mildly hypermetabolic branching nodular density in the left upper posterior lung may be inflammatory/infectious versus additional site of tumor  4   No hypermetabolic metastases in the neck, abdomen, pelvis  a biopsy of the left lung mass was done on 05/06/2021 which showed:   Poorly differentiated non-small cell carcinoma, favor adenocarcinoma, with neuroendocrine differentiation of uncertain clinical significance  the patient then underwent a mediastinoscopy on 05/25/2021 for staging  Multiple mediastinal lymph node from different levels were excised all lymph nodes came back negative for metastatic disease  The patient was felt to be a surgical candidate and was sent to us to consider  Neoadjuvant chemotherapy  Adenocarcinoma, lung, left ([facility] [address] )   6/9/2021 Initial Diagnosis    Adenocarcinoma, lung, left ([facility] [address] )     6/25/2021 -  Chemotherapy    cyanocobalamin, 1,000 mcg, Intramuscular, Once, 1 of 3 cycles  Administration: 1,000 mcg (6/25/2021)  fosaprepitant (EMEND) IVPB, 150 mg, Intravenous, Once, 1 of 6 cycles  Administration: 150 mg (6/25/2021)  CARBOplatin (PARAPLATIN) IVPB (GOG AUC DOSING), 496 mg, Intravenous, Once, 1 of 6 cycles  Administration: 496 mg (6/25/2021)  pemetrexed (ALIMTA) chemo infusion, 975 mg, Intravenous, Once, 1 of 6 cycles  Administration: 1,000 mg (6/25/2021)         Interval history:    ROS: Review of Systems   Constitutional: Positive for fatigue  Negative for chills and fever  HENT: Negative for ear pain and sore throat  Eyes: Negative for pain and visual disturbance  Respiratory: Positive for cough  Negative for shortness of breath  Cardiovascular: Negative for chest pain and palpitations     Gastrointestinal: Negative for abdominal pain and vomiting  Genitourinary: Negative for dysuria and hematuria  Musculoskeletal: Positive for back pain  Negative for arthralgias  Skin: Positive for rash  Negative for color change  Neurological: Positive for numbness  Negative for seizures and syncope  All other systems reviewed and are negative  Past Medical History:   Diagnosis Date    Hyperlipidemia     Hypertension        Past Surgical History:   Procedure Laterality Date    BACK SURGERY      HEMORRHOID SURGERY      IR BIOPSY LUNG  2021    IR PORT PLACEMENT  2021    LAMINECTOMY  2018    L4-L5    LA BRONCHOSCOPY,DIAGNOSTIC N/A 2021    Procedure: BRONCHOSCOPY FLEXIBLE;  Surgeon: Oscar Hollingsworth MD;  [facility]: [facility];  Service: Thoracic    LA MEDIASTINOSCOPY WITH LYMPH NODE BIOPSY/IES N/A 2021    Procedure: MEDIASTINOSCOPY, flexible bronchoscopy;  Surgeon: Oscar Hollingsworth MD;  Location: [facility];  Service: Thoracic    TONSILLECTOMY         Social History     Socioeconomic History    Marital status: /Civil Union     Spouse name: None    Number of children: None    Years of education: None    Highest education level: None   Occupational History    None   Tobacco Use    Smoking status: Former Smoker     Packs/day: 1 00     Years: 40 00     Pack years: 40 00     Types: Cigarettes     Start date:      Quit date: 2017     Years since quittin 5    Smokeless tobacco: Never Used    Tobacco comment: quit 2018   Vaping Use    Vaping Use: Never used   Substance and Sexual Activity    Alcohol use:  Yes     Alcohol/week: 7 0 standard drinks     Types: 7 Cans of beer per week    Drug use: Yes     Types: Marijuana    Sexual activity: None   Other Topics Concern    None   Social History Narrative    None     Social Determinants of Health     Financial Resource Strain:     Difficulty of Paying Living Expenses:    Food Insecurity:     Worried About Running Out of Food in the Last Year:     Ran Out of Food in the Last Year:    Transportation Needs:     Lack of Transportation (Medical):      Lack of Transportation (Non-Medical):    Physical Activity:     Days of Exercise per Week:     Minutes of Exercise per Session:    Stress:     Feeling of Stress :    Social Connections:     Frequency of Communication with Friends and Family:     Frequency of Social Gatherings with Friends and Family:     Attends Anabaptism Services:     Active Member of Clubs or Organizations:     Attends Club or Organization Meetings:     Marital Status:    Intimate Partner Violence:     Fear of Current or Ex-Partner:     Emotionally Abused:     Physically Abused:     Sexually Abused:        Family History   Problem Relation Age of Onset    Nephrolithiasis Father        Allergies   Allergen Reactions    Bee Venom     Benazepril Other (See Comments)     Angioedema     Latex Other (See Comments)     Burning of eyes at the dentist from the gloves    Meloxicam GI Intolerance    Penicillin G Rash         Current Outpatient Medications:     amLODIPine (NORVASC) 10 mg tablet, TAKE ONE TABLET BY MOUTH EVERY DAY, Disp: 90 tablet, Rfl: 3    B Complex Vitamins (B COMPLEX 1 PO), Take 1 tablet by mouth daily , Disp: , Rfl:     betamethasone valerate (VALISONE) 0 1 % cream, Apply topically 2 (two) times a day (Patient taking differently: Apply topically as needed ), Disp: 45 g, Rfl: 1    calcipotriene (DOVONOX) 0 005 % ointment, Apply topically 2 (two) times a day, Disp: , Rfl:     Cholecalciferol (VITAMIN D3 PO), Take by mouth daily , Disp: , Rfl:     citalopram (CeleXA) 10 mg tablet, TAKE ONE TABLET BY MOUTH EVERY DAY, Disp: 30 tablet, Rfl: 3    doxepin (SINEquan) 25 mg capsule, TAKE ONE CAPSULE BY MOUTH AT BEDTIME, Disp: 30 capsule, Rfl: 5    fluocinolone (SYNALAR) 0 01 % external solution, , Disp: , Rfl:     Garlic 10 MG CAPS, Take 1 tablet by mouth daily , Disp: , Rfl:     Glucosamine-Chondroit-Vit C-Mn (GLUCOSAMINE 1500 COMPLEX) CAPS, daily , Disp: , Rfl:     ketoconazole (NIZORAL) 2 % cream, Apply topically 2 (two) times a day (Patient taking differently: Apply topically as needed ), Disp: 15 g, Rfl: 2    Multiple Vitamins-Minerals (MULTIVITAL-M) TABS, Take by mouth daily , Disp: , Rfl:     ondansetron (ZOFRAN) 8 mg tablet, Take 1 tablet (8 mg total) by mouth every 8 (eight) hours as needed for nausea or vomiting, Disp: 20 tablet, Rfl: 3    pantoprazole (PROTONIX) 40 mg tablet, Take 1 tablet (40 mg total) by mouth daily, Disp: 30 tablet, Rfl: 6    rosuvastatin (CRESTOR) 10 MG tablet, TAKE ONE TABLET BY MOUTH EVERY DAY, Disp: 30 tablet, Rfl: 5    traMADol (ULTRAM) 50 mg tablet, TAKE ONE TABLET BY MOUTH EVERY 8 HOURS AS NEEDED FOR SEVERE PAIN, Disp: 30 tablet, Rfl: 0    folic acid (FOLVITE) 1 mg tablet, Take 1 tablet (1 mg total) by mouth daily, Disp: 30 tablet, Rfl: 6      Physical Exam:  /86 (BP Location: Left arm, Cuff Size: Large)   Pulse 63   Temp 98 4 °F (36 9 °C) (Tympanic)   Resp 18   Ht 5' 8" (1 727 m)   Wt 81 2 kg (179 lb 1 6 oz)   SpO2 97%   BMI 27 23 kg/m²     Physical Exam  Constitutional:       Appearance: He is well-developed  HENT:      Head: Normocephalic and atraumatic  Eyes:      General: No scleral icterus  Right eye: No discharge  Left eye: No discharge  Conjunctiva/sclera: Conjunctivae normal       Pupils: Pupils are equal, round, and reactive to light  Neck:      Thyroid: No thyromegaly  Trachea: No tracheal deviation  Cardiovascular:      Rate and Rhythm: Normal rate and regular rhythm  Heart sounds: Normal heart sounds  No murmur heard  No friction rub  Pulmonary:      Effort: Pulmonary effort is normal  No respiratory distress  Breath sounds: Normal breath sounds  No wheezing or rales  Chest:      Chest wall: No tenderness  Abdominal:      General: There is no distension  Palpations: Abdomen is soft   There is no hepatomegaly or splenomegaly  Tenderness: There is no abdominal tenderness  There is no guarding or rebound  Musculoskeletal:         General: No tenderness or deformity  Normal range of motion  Cervical back: Normal range of motion and neck supple  Lymphadenopathy:      Cervical: No cervical adenopathy  Skin:     General: Skin is warm and dry  Coloration: Skin is not pale  Findings: No erythema or rash  Neurological:      Mental Status: He is alert and oriented to person, place, and time  Cranial Nerves: No cranial nerve deficit  Coordination: Coordination normal       Deep Tendon Reflexes: Reflexes are normal and symmetric  Psychiatric:         Behavior: Behavior normal          Thought Content: Thought content normal          Judgment: Judgment normal            Labs:  Lab Results   Component Value Date    WBC 2 98 (L) 07/14/2021    HGB 13 0 07/14/2021    HCT 38 5 07/14/2021    MCV 95 07/14/2021     07/14/2021     Lab Results   Component Value Date    K 3 7 07/14/2021     07/14/2021    CO2 24 07/14/2021    BUN 12 07/14/2021    CREATININE 0 94 07/14/2021    GLUF 99 07/14/2021    CALCIUM 9 6 07/14/2021    AST 34 07/14/2021    ALT 48 07/14/2021    ALKPHOS 88 07/14/2021    EGFR 83 07/14/2021     No results found for: TSH    Patient voiced understanding and agreement in the above discussion  Aware to contact our office with questions/symptoms in the interim

## 2021-07-16 ENCOUNTER — HOSPITAL ENCOUNTER (OUTPATIENT)
Dept: INFUSION CENTER | Facility: HOSPITAL | Age: 66
Discharge: HOME | End: 2021-07-16
Attending: INTERNAL MEDICINE
Payer: MEDICARE

## 2021-07-16 VITALS
HEIGHT: 68 IN | RESPIRATION RATE: 16 BRPM | SYSTOLIC BLOOD PRESSURE: 124 MMHG | DIASTOLIC BLOOD PRESSURE: 77 MMHG | WEIGHT: 178.35 LBS | TEMPERATURE: 96.6 F | HEART RATE: 84 BPM | BODY MASS INDEX: 27.03 KG/M2

## 2021-07-16 DIAGNOSIS — C34.92 ADENOCARCINOMA, LUNG, LEFT (HCC): Primary | ICD-10-CM

## 2021-07-16 LAB
ANISOCYTOSIS BLD QL SMEAR: PRESENT
EOSINOPHIL # BLD AUTO: 0.03 THOUSAND/UL (ref 0–0.61)
EOSINOPHIL NFR BLD MANUAL: 1 % (ref 0–6)
ERYTHROCYTE [DISTWIDTH] IN BLOOD BY AUTOMATED COUNT: 14.9 % (ref 11.5–14.5)
HCT VFR BLD AUTO: 36 % (ref 42–47)
HGB BLD-MCNC: 12.1 G/DL (ref 14–18)
LG PLATELETS BLD QL SMEAR: PRESENT
LYMPHOCYTES # BLD AUTO: 1.04 THOUSAND/UL (ref 0.6–4.47)
LYMPHOCYTES # BLD AUTO: 36 % (ref 20–51)
MCH RBC QN AUTO: 31.1 PG (ref 26–34)
MCHC RBC AUTO-ENTMCNC: 33.5 G/DL (ref 31–37)
MCV RBC AUTO: 93 FL (ref 81–99)
MONOCYTES # BLD AUTO: 0.32 THOUSAND/UL (ref 0–1.22)
MONOCYTES NFR BLD AUTO: 11 % (ref 4–12)
NEUTS SEG # BLD: 1.51 THOUSAND/UL (ref 1.81–6.82)
NEUTS SEG NFR BLD AUTO: 52 % (ref 42–75)
PLATELET # BLD AUTO: 297 THOUSANDS/UL (ref 149–390)
PLATELET BLD QL SMEAR: ADEQUATE
PMV BLD AUTO: 7.1 FL (ref 8.6–11.7)
RBC # BLD AUTO: 3.87 MILLION/UL (ref 4.3–5.9)
RBC MORPH BLD: ABNORMAL
TOTAL CELLS COUNTED SPEC: 100
WBC # BLD AUTO: 2.9 THOUSAND/UL (ref 4.8–10.8)

## 2021-07-16 PROCEDURE — 85027 COMPLETE CBC AUTOMATED: CPT

## 2021-07-16 PROCEDURE — 96367 TX/PROPH/DG ADDL SEQ IV INF: CPT

## 2021-07-16 PROCEDURE — 96413 CHEMO IV INFUSION 1 HR: CPT

## 2021-07-16 PROCEDURE — 96411 CHEMO IV PUSH ADDL DRUG: CPT

## 2021-07-16 PROCEDURE — 85007 BL SMEAR W/DIFF WBC COUNT: CPT

## 2021-07-16 PROCEDURE — 96377 APPLICATON ON-BODY INJECTOR: CPT

## 2021-07-16 RX ORDER — SODIUM CHLORIDE 9 MG/ML
20 INJECTION, SOLUTION INTRAVENOUS ONCE
Status: COMPLETED | OUTPATIENT
Start: 2021-07-16 | End: 2021-07-16

## 2021-07-16 RX ADMIN — SODIUM CHLORIDE 1000 MG: 9 INJECTION, SOLUTION INTRAVENOUS at 12:55

## 2021-07-16 RX ADMIN — SODIUM CHLORIDE 20 ML/HR: 0.9 INJECTION, SOLUTION INTRAVENOUS at 11:50

## 2021-07-16 RX ADMIN — CARBOPLATIN 516 MG: 10 INJECTION, SOLUTION INTRAVENOUS at 13:11

## 2021-07-16 RX ADMIN — DEXAMETHASONE SODIUM PHOSPHATE: 100 INJECTION INTRAMUSCULAR; INTRAVENOUS at 11:46

## 2021-07-16 RX ADMIN — FOSAPREPITANT 150 MG: 150 INJECTION, POWDER, LYOPHILIZED, FOR SOLUTION INTRAVENOUS at 12:15

## 2021-07-16 RX ADMIN — PEGFILGRASTIM 6 MG: KIT SUBCUTANEOUS at 14:15

## 2021-07-16 NOTE — PLAN OF CARE
Problem: INFECTION - ADULT  Goal: Absence or prevention of progression during hospitalization  Description: INTERVENTIONS:  - Assess and monitor for signs and symptoms of infection  - Monitor lab/diagnostic results  - Monitor all insertion sites, i e  indwelling lines, tubes, and drains  - Monitor endotracheal if appropriate and nasal secretions for changes in amount and color  - [place] appropriate cooling/warming therapies per order  - Administer medications as ordered  - Instruct and encourage patient and family to use good hand hygiene technique  - Identify and instruct in appropriate isolation precautions for identified infection/condition  Outcome: Progressing     Problem: Knowledge Deficit  Goal: Patient/family/caregiver demonstrates understanding of disease process, treatment plan, medications, and discharge instructions  Description: Complete learning assessment and assess knowledge base    Interventions:  - Provide teaching at level of understanding  - Provide teaching via preferred learning methods  Outcome: Progressing

## 2021-08-02 DIAGNOSIS — E78.2 MIXED HYPERLIPIDEMIA: ICD-10-CM

## 2021-08-02 RX ORDER — ROSUVASTATIN CALCIUM 10 MG/1
TABLET, COATED ORAL
Qty: 30 TABLET | Refills: 5 | Status: SHIPPED | OUTPATIENT
Start: 2021-08-02 | End: 2021-08-03

## 2021-08-03 DIAGNOSIS — E78.2 MIXED HYPERLIPIDEMIA: ICD-10-CM

## 2021-08-03 RX ORDER — ROSUVASTATIN CALCIUM 10 MG/1
TABLET, COATED ORAL
Qty: 30 TABLET | Refills: 5 | Status: SHIPPED | OUTPATIENT
Start: 2021-08-03 | End: 2022-02-08

## 2021-08-04 ENCOUNTER — APPOINTMENT (OUTPATIENT)
Dept: LAB | Facility: CLINIC | Age: 66
End: 2021-08-04
Payer: MEDICARE

## 2021-08-04 DIAGNOSIS — C34.92 ADENOCARCINOMA, LUNG, LEFT (HCC): ICD-10-CM

## 2021-08-04 DIAGNOSIS — M1A.0710 CHRONIC GOUT OF RIGHT FOOT, UNSPECIFIED CAUSE: ICD-10-CM

## 2021-08-04 LAB
ALBUMIN SERPL BCP-MCNC: 3.4 G/DL (ref 3.5–5)
ALP SERPL-CCNC: 105 U/L (ref 46–116)
ALT SERPL W P-5'-P-CCNC: 137 U/L (ref 12–78)
ANION GAP SERPL CALCULATED.3IONS-SCNC: 4 MMOL/L (ref 4–13)
AST SERPL W P-5'-P-CCNC: 104 U/L (ref 5–45)
BASOPHILS # BLD AUTO: 0.04 THOUSANDS/ΜL (ref 0–0.1)
BASOPHILS NFR BLD AUTO: 1 % (ref 0–1)
BILIRUB SERPL-MCNC: 0.26 MG/DL (ref 0.2–1)
BUN SERPL-MCNC: 11 MG/DL (ref 5–25)
CALCIUM ALBUM COR SERPL-MCNC: 10.2 MG/DL (ref 8.3–10.1)
CALCIUM SERPL-MCNC: 9.7 MG/DL (ref 8.3–10.1)
CHLORIDE SERPL-SCNC: 110 MMOL/L (ref 100–108)
CO2 SERPL-SCNC: 26 MMOL/L (ref 21–32)
CREAT SERPL-MCNC: 1 MG/DL (ref 0.6–1.3)
EOSINOPHIL # BLD AUTO: 0.07 THOUSAND/ΜL (ref 0–0.61)
EOSINOPHIL NFR BLD AUTO: 1 % (ref 0–6)
ERYTHROCYTE [DISTWIDTH] IN BLOOD BY AUTOMATED COUNT: 17.1 % (ref 11.6–15.1)
GFR SERPL CREATININE-BSD FRML MDRD: 77 ML/MIN/1.73SQ M
GLUCOSE P FAST SERPL-MCNC: 98 MG/DL (ref 65–99)
HCT VFR BLD AUTO: 33.7 % (ref 36.5–49.3)
HGB BLD-MCNC: 11.1 G/DL (ref 12–17)
IMM GRANULOCYTES # BLD AUTO: 0.07 THOUSAND/UL (ref 0–0.2)
IMM GRANULOCYTES NFR BLD AUTO: 1 % (ref 0–2)
LYMPHOCYTES # BLD AUTO: 1.44 THOUSANDS/ΜL (ref 0.6–4.47)
LYMPHOCYTES NFR BLD AUTO: 19 % (ref 14–44)
MCH RBC QN AUTO: 31.9 PG (ref 26.8–34.3)
MCHC RBC AUTO-ENTMCNC: 32.9 G/DL (ref 31.4–37.4)
MCV RBC AUTO: 97 FL (ref 82–98)
MONOCYTES # BLD AUTO: 0.63 THOUSAND/ΜL (ref 0.17–1.22)
MONOCYTES NFR BLD AUTO: 8 % (ref 4–12)
NEUTROPHILS # BLD AUTO: 5.53 THOUSANDS/ΜL (ref 1.85–7.62)
NEUTS SEG NFR BLD AUTO: 70 % (ref 43–75)
NRBC BLD AUTO-RTO: 0 /100 WBCS
PLATELET # BLD AUTO: 350 THOUSANDS/UL (ref 149–390)
PMV BLD AUTO: 10 FL (ref 8.9–12.7)
POTASSIUM SERPL-SCNC: 4.3 MMOL/L (ref 3.5–5.3)
PROT SERPL-MCNC: 7.8 G/DL (ref 6.4–8.2)
RBC # BLD AUTO: 3.48 MILLION/UL (ref 3.88–5.62)
SODIUM SERPL-SCNC: 140 MMOL/L (ref 136–145)
WBC # BLD AUTO: 7.78 THOUSAND/UL (ref 4.31–10.16)

## 2021-08-04 PROCEDURE — 85025 COMPLETE CBC W/AUTO DIFF WBC: CPT

## 2021-08-04 PROCEDURE — 84550 ASSAY OF BLOOD/URIC ACID: CPT

## 2021-08-04 PROCEDURE — 36415 COLL VENOUS BLD VENIPUNCTURE: CPT

## 2021-08-04 PROCEDURE — 80053 COMPREHEN METABOLIC PANEL: CPT

## 2021-08-05 ENCOUNTER — OFFICE VISIT (OUTPATIENT)
Dept: HEMATOLOGY ONCOLOGY | Facility: CLINIC | Age: 66
End: 2021-08-05
Payer: MEDICARE

## 2021-08-05 VITALS
WEIGHT: 182.1 LBS | HEIGHT: 68 IN | TEMPERATURE: 97.6 F | SYSTOLIC BLOOD PRESSURE: 146 MMHG | OXYGEN SATURATION: 98 % | DIASTOLIC BLOOD PRESSURE: 86 MMHG | BODY MASS INDEX: 27.6 KG/M2 | HEART RATE: 83 BPM | RESPIRATION RATE: 18 BRPM

## 2021-08-05 DIAGNOSIS — R79.89 ELEVATED LFTS: ICD-10-CM

## 2021-08-05 DIAGNOSIS — D70.2 DRUG-INDUCED NEUTROPENIA (HCC): ICD-10-CM

## 2021-08-05 DIAGNOSIS — M1A.0710 CHRONIC GOUT OF RIGHT FOOT, UNSPECIFIED CAUSE: ICD-10-CM

## 2021-08-05 DIAGNOSIS — D64.9 NORMOCYTIC ANEMIA: ICD-10-CM

## 2021-08-05 DIAGNOSIS — C34.92 ADENOCARCINOMA, LUNG, LEFT (HCC): Primary | ICD-10-CM

## 2021-08-05 LAB — URATE SERPL-MCNC: 7.9 MG/DL (ref 4.2–8)

## 2021-08-05 PROCEDURE — 99215 OFFICE O/P EST HI 40 MIN: CPT | Performed by: NURSE PRACTITIONER

## 2021-08-05 RX ORDER — CYANOCOBALAMIN (VITAMIN B-12) 500 MCG
1 TABLET ORAL
Status: ON HOLD | COMMUNITY
End: 2022-06-17 | Stop reason: CLARIF

## 2021-08-05 RX ORDER — ALLOPURINOL 100 MG/1
100 TABLET ORAL DAILY
Qty: 30 TABLET | Refills: 5 | Status: SHIPPED | OUTPATIENT
Start: 2021-08-05 | End: 2022-03-08 | Stop reason: SDUPTHER

## 2021-08-05 RX ORDER — MULTIVIT WITH MINERALS/LUTEIN
1000 TABLET ORAL DAILY
Status: ON HOLD | COMMUNITY

## 2021-08-05 NOTE — PROGRESS NOTES
Hematology/Oncology Outpatient Follow-up  Natalio Rowland 74 y o  male 1955 [id number]    Date:  8/5/2021      Assessment and Plan:  1  Adenocarcinoma, lung, left ([facility] )  Patient will be continued on his neoadjuvant chemotherapy with carboplatin and Alimta; he is due for cycle 3 tomorrow  Plan is for surgical resection after completing 4 cycles of treatment/repeat imaging  He will be back for follow-up again in about 3 weeks with repeat labs prior  He was encouraged to call in the interim should he have any further questions concerns or significant adverse effects  I did ask him to monitor his dizziness closely and continue to hydrate himself well  If his symptoms persist or worsen he should notify us  He mentions that he does get wax buildup in his ears which may or may not be contributory to his symptoms  - allopurinol (ZYLOPRIM) 100 mg tablet; Take 1 tablet (100 mg total) by mouth daily  Dispense: 30 tablet; Refill: 5  - CBC and differential; Future  - Comprehensive metabolic panel; Future  - Magnesium; Future  - Iron Panel (Includes Ferritin, Iron Sat%, Iron, and TIBC); Future    2  Drug-induced neutropenia ([facility] )  Will continue to support patient with G-CSF Neulasta on pro with each cycle of chemotherapy to prevent febrile neutropenia and delays in treatment  3  Elevated LFTs  The patient noted to have new grade 1 elevation of his AST and ALT  Likely due to frequent Tylenol use x6 days with his gouty attack  I also advised him to temporarily hold his statin Crestor in till his LFTs improved  We will continue to monitor his CMP closely  Will proceed with chemotherapy as scheduled  4  Normocytic anemia  Patient with mild normocytic anemia hemoglobin 11 1  Most likely treatment induced  Will continue to monitor  Check iron panel before his next office visit  - CBC and differential; Future  - Iron Panel (Includes Ferritin, Iron Sat%, Iron, and TIBC); Future    5   Chronic gout of right foot, unspecified cause  Patient developed gout flare last week to his right foot  He reports that he frequently has gouty attacks at least 6 times per year  Is 90% better at this time  Will add uric acid level and his laboratory studies from yesterday and start him on low-dose allopurinol 100 mg daily in hopes that this will decrease his gouty attacks  He was advised to only take Tylenol and or ibuprofen occasionally for the pain  If the pain is severe and constant perhaps consider short course of steroids for the pain in the future  - allopurinol (ZYLOPRIM) 100 mg tablet; Take 1 tablet (100 mg total) by mouth daily  Dispense: 30 tablet; Refill: 5  - Uric acid; Future    HPI:  Patient presents today for a follow-up visit accompanied by his wife  He is tolerating his neoadjuvant chemotherapy with carboplatin and Alimta well  He states that he did get gout flare up to his left foot around the 26 of July; admits that he was taking a lot of Tylenol is for about 6 days for pain when it occurred as he has been trying to avoid NSAIDs  Reports that the gout is 90% improved at this time  He has noticed increased fatigue while on treatment  States that he also has occasional episodes of lightheadedness/dizziness; states that Saturday while watching TV he got dizzy and felt like the room was spinning for approximately 2 minutes  States that he also had 1 episode 1 week after his 2nd treatment where he was seeing wavy lines; resolved within 1-2 hours he did not get a headache afterward  The patient states that he is hydrating himself well  His most recent laboratory studies from yesterday 08/04/2021 showed normal white cells and platelets, he has mild normocytic anemia H&H 11 1/33 7, MCV 97  Corrected calcium is slightly elevated 10 2, albumin decreased 3 4 he has increased LFTs which is new for him  and        Oncology History Overview Note   Patient with history of hypertension and hyperlipidemia  He also had extensive history of smoking for 40 years or more  He quit smoking  In 2017  The patient apparently had low-dose lung CT scans for screening since he was heavy smoker on 04/08/2021  The CT scan of fortunately revealed 6 2 cm mass in the paramediastinal left upper lobe with COPD features  Subsequently, he had a PET-CT scan on 05/04/2021 which showed:  IMPRESSION:  1  Lobulated left upper paramediastinal lung mass extending to the left suprahilar region measuring 5 2 x 6 5 x 6 cm demonstrates intense FDG activity, most concerning for malignancy  Adjacent interstitial thickening and groundglass densities may   represent tumor infiltration  Tissue sampling recommended  2   Mildly hypermetabolic AP window mediastinal nodes concerning for early metastases  3   Additional mildly hypermetabolic branching nodular density in the left upper posterior lung may be inflammatory/infectious versus additional site of tumor  4   No hypermetabolic metastases in the neck, abdomen, pelvis  A biopsy of the left lung mass was done on 05/06/2021 which showed:   Poorly differentiated non-small cell carcinoma, favor adenocarcinoma, with neuroendocrine differentiation of uncertain clinical significance  The patient then underwent a mediastinoscopy on 05/25/2021 for staging  Multiple mediastinal lymph node from different levels were excised all lymph nodes came back negative for metastatic disease  The patient was felt to be a surgical candidate and was sent to us to consider  Neoadjuvant chemotherapy       Adenocarcinoma, lung, left ([facility] )   6/9/2021 Initial Diagnosis    Adenocarcinoma, lung, left ([facility] )     6/25/2021 -  Chemotherapy    cyanocobalamin, 1,000 mcg, Intramuscular, Once, 1 of 3 cycles  Administration: 1,000 mcg (6/25/2021)  pegfilgrastim (Felizardo Fat), 6 mg, Subcutaneous, Once, 1 of 5 cycles  Administration: 6 mg (7/16/2021)  fosaprepitant (EMEND) IVPB, 150 mg, Intravenous, Once, 2 of 6 cycles  Administration: 150 mg (6/25/2021), 150 mg (7/16/2021)  CARBOplatin (PARAPLATIN) IVPB (GOG AUC DOSING), 496 mg, Intravenous, Once, 2 of 6 cycles  Administration: 496 mg (6/25/2021), 516 mg (7/16/2021)  pemetrexed (ALIMTA) chemo infusion, 975 mg, Intravenous, Once, 2 of 6 cycles  Administration: 1,000 mg (6/25/2021), 1,000 mg (7/16/2021)         Interval history:    ROS: Review of Systems   Constitutional: Positive for fatigue  Negative for activity change, appetite change, chills, fever and unexpected weight change  HENT: Positive for hearing loss, mouth sores (x2-3 days after treatment-tolerable) and rhinorrhea  Negative for congestion, nosebleeds, sore throat and trouble swallowing  Eyes: Negative  Respiratory: Positive for cough  Negative for chest tightness and shortness of breath  Cardiovascular: Negative for chest pain, palpitations and leg swelling  Gastrointestinal: Negative for abdominal distention, abdominal pain, blood in stool, constipation, diarrhea, nausea and vomiting  Genitourinary: Negative for difficulty urinating, dysuria, frequency, hematuria and urgency  Musculoskeletal: Positive for arthralgias, back pain and myalgias  Negative for gait problem and joint swelling         "body feels stiff"   Skin: Negative for color change, pallor and rash  Neurological: Positive for dizziness, light-headedness and numbness  Negative for weakness and headaches  Hematological: Negative for adenopathy  Does not bruise/bleed easily  Psychiatric/Behavioral: Positive for dysphoric mood and sleep disturbance         Past Medical History:   Diagnosis Date    Hyperlipidemia     Hypertension        Past Surgical History:   Procedure Laterality Date    BACK SURGERY      HEMORRHOID SURGERY      IR BIOPSY LUNG  5/6/2021    IR PORT PLACEMENT  6/17/2021    LAMINECTOMY  2018    L4-L5    DC BRONCHOSCOPY,DIAGNOSTIC N/A 5/25/2021    Procedure: BRONCHOSCOPY FLEXIBLE;  Surgeon: Ceferino Grande Dewey Farley MD;  Location: [facility];  Service: Thoracic    MN MEDIASTINOSCOPY WITH LYMPH NODE BIOPSY/IES N/A 2021    Procedure: MEDIASTINOSCOPY, flexible bronchoscopy;  Surgeon: Oscar Hollingsworth MD;  Location: [facility];  Service: Thoracic    TONSILLECTOMY         Social History     Socioeconomic History    Marital status: /Civil Union     Spouse name: None    Number of children: None    Years of education: None    Highest education level: None   Occupational History    None   Tobacco Use    Smoking status: Former Smoker     Packs/day: 1 00     Years: 40 00     Pack years: 40 00     Types: Cigarettes     Start date:      Quit date:      Years since quittin 5    Smokeless tobacco: Never Used    Tobacco comment: quit 2018   Vaping Use    Vaping Use: Never used   Substance and Sexual Activity    Alcohol use: Yes     Alcohol/week: 7 0 standard drinks     Types: 7 Cans of beer per week    Drug use: Yes     Types: Marijuana    Sexual activity: None   Other Topics Concern    None   Social History Narrative    None     Social Determinants of Health     Financial Resource Strain:     Difficulty of Paying Living Expenses:    Food Insecurity:     Worried About Running Out of Food in the Last Year:     Ran Out of Food in the Last Year:    Transportation Needs:     Lack of Transportation (Medical):      Lack of Transportation (Non-Medical):    Physical Activity:     Days of Exercise per Week:     Minutes of Exercise per Session:    Stress:     Feeling of Stress :    Social Connections:     Frequency of Communication with Friends and Family:     Frequency of Social Gatherings with Friends and Family:     Attends [employer] Services:     Active Member of Clubs or Organizations:     Attends Club or Organization Meetings:     Marital Status:    Intimate Partner Violence:     Fear of Current or Ex-Partner:     Emotionally Abused:     Physically Abused:     Sexually Abused:        Family History   Problem Relation Age of Onset    Nephrolithiasis Father        Allergies   Allergen Reactions    Bee Venom     Benazepril Other (See Comments)     Angioedema     Latex Other (See Comments)     Burning of eyes at the dentist from the gloves    Meloxicam GI Intolerance    Penicillin G Rash         Current Outpatient Medications:     amLODIPine (NORVASC) 10 mg tablet, TAKE ONE TABLET BY MOUTH EVERY DAY, Disp: 90 tablet, Rfl: 3    Ascorbic Acid (vitamin C) 1000 MG tablet, Take 1,000 mg by mouth daily, Disp: , Rfl:     B Complex Vitamins (B COMPLEX 1 PO), Take 1 tablet by mouth daily , Disp: , Rfl:     betamethasone valerate (VALISONE) 0 1 % cream, Apply topically 2 (two) times a day (Patient taking differently: Apply topically as needed ), Disp: 45 g, Rfl: 1    calcipotriene (DOVONOX) 0 005 % ointment, Apply topically 2 (two) times a day, Disp: , Rfl:     citalopram (CeleXA) 10 mg tablet, TAKE ONE TABLET BY MOUTH EVERY DAY, Disp: 30 tablet, Rfl: 3    Cyanocobalamin (Vitamin B 12) 500 MCG TABS, Take 1 tablet by mouth, Disp: , Rfl:     doxepin (SINEquan) 25 mg capsule, TAKE ONE CAPSULE BY MOUTH AT BEDTIME, Disp: 30 capsule, Rfl: 5    fluocinolone (SYNALAR) 0 01 % external solution, , Disp: , Rfl:     folic acid (FOLVITE) 1 mg tablet, Take 1 tablet (1 mg total) by mouth daily, Disp: 30 tablet, Rfl: 6    Garlic 10 MG CAPS, Take 1 tablet by mouth daily , Disp: , Rfl:     Glucosamine-Chondroit-Vit C-Mn (GLUCOSAMINE 1500 COMPLEX) CAPS, daily , Disp: , Rfl:     ketoconazole (NIZORAL) 2 % cream, Apply topically 2 (two) times a day (Patient taking differently: Apply topically as needed ), Disp: 15 g, Rfl: 2    Multiple Vitamins-Minerals (MULTIVITAL-M) TABS, Take by mouth daily , Disp: , Rfl:     pantoprazole (PROTONIX) 40 mg tablet, Take 1 tablet (40 mg total) by mouth daily, Disp: 30 tablet, Rfl: 6    rosuvastatin (CRESTOR) 10 MG tablet, TAKE ONE TABLET BY MOUTH EVERY DAY, Disp: 30 tablet, Rfl: 5    traMADol (ULTRAM) 50 mg tablet, TAKE ONE TABLET BY MOUTH EVERY 8 HOURS AS NEEDED FOR SEVERE PAIN, Disp: 30 tablet, Rfl: 0    allopurinol (ZYLOPRIM) 100 mg tablet, Take 1 tablet (100 mg total) by mouth daily, Disp: 30 tablet, Rfl: 5    Cholecalciferol (VITAMIN D3 PO), Take by mouth daily  (Patient not taking: Reported on 8/5/2021), Disp: , Rfl:     ondansetron (ZOFRAN) 8 mg tablet, Take 1 tablet (8 mg total) by mouth every 8 (eight) hours as needed for nausea or vomiting (Patient not taking: Reported on 8/5/2021), Disp: 20 tablet, Rfl: 3      Physical Exam:  /86 (BP Location: Left arm, Patient Position: Sitting, Cuff Size: Adult)   Pulse 83   Temp 97 6 °F (36 4 °C) (Tympanic)   Resp 18   Ht 5' 8" (1 727 m)   Wt 82 6 kg (182 lb 1 6 oz)   SpO2 98%   BMI 27 69 kg/m²     Physical Exam  Vitals reviewed  Constitutional:       General: He is not in acute distress  Appearance: He is well-developed  He is not diaphoretic  HENT:      Head: Normocephalic and atraumatic  Eyes:      General: Lids are normal  No scleral icterus  Conjunctiva/sclera: Conjunctivae normal       Pupils: Pupils are equal, round, and reactive to light  Neck:      Thyroid: No thyromegaly  Cardiovascular:      Rate and Rhythm: Normal rate and regular rhythm  Heart sounds: Normal heart sounds  No murmur heard  Pulmonary:      Effort: Pulmonary effort is normal  No respiratory distress  Breath sounds: Normal breath sounds  Abdominal:      General: There is no distension  Palpations: Abdomen is soft  There is no hepatomegaly or splenomegaly  Tenderness: There is no abdominal tenderness  Musculoskeletal:         General: No swelling  Normal range of motion  Cervical back: Normal range of motion and neck supple  Lymphadenopathy:      Cervical: No cervical adenopathy  Upper Body:      Right upper body: No axillary adenopathy        Left upper body: No axillary adenopathy  Skin:     General: Skin is warm and dry  Findings: No erythema or rash  Neurological:      General: No focal deficit present  Mental Status: He is alert and oriented to person, place, and time  Psychiatric:         Mood and Affect: Mood and affect normal          Behavior: Behavior normal  Behavior is cooperative  Thought Content: Thought content normal          Judgment: Judgment normal            Labs:  Lab Results   Component Value Date    WBC 7 78 08/04/2021    HGB 11 1 (L) 08/04/2021    HCT 33 7 (L) 08/04/2021    MCV 97 08/04/2021     08/04/2021     Lab Results   Component Value Date    K 4 3 08/04/2021     (H) 08/04/2021    CO2 26 08/04/2021    BUN 11 08/04/2021    CREATININE 1 00 08/04/2021    GLUF 98 08/04/2021    CALCIUM 9 7 08/04/2021    CORRECTEDCA 10 2 (H) 08/04/2021     (H) 08/04/2021     (H) 08/04/2021    ALKPHOS 105 08/04/2021    EGFR 77 08/04/2021       Patient voiced understanding and agreement in the above discussion  Aware to contact our office with questions/symptoms in the interim  This note has been generated by voice recognition software system  Therefore, there may be spelling, grammar, and or syntax errors  Please contact if questions arise

## 2021-08-06 ENCOUNTER — HOSPITAL ENCOUNTER (OUTPATIENT)
Dept: INFUSION CENTER | Facility: HOSPITAL | Age: 66
Discharge: HOME | End: 2021-08-06
Attending: INTERNAL MEDICINE
Payer: MEDICARE

## 2021-08-06 VITALS
WEIGHT: 180.12 LBS | TEMPERATURE: 99.2 F | HEART RATE: 88 BPM | DIASTOLIC BLOOD PRESSURE: 65 MMHG | BODY MASS INDEX: 27.3 KG/M2 | RESPIRATION RATE: 16 BRPM | SYSTOLIC BLOOD PRESSURE: 129 MMHG | HEIGHT: 68 IN

## 2021-08-06 DIAGNOSIS — C34.92 ADENOCARCINOMA, LUNG, LEFT (HCC): Primary | ICD-10-CM

## 2021-08-06 PROCEDURE — 96372 THER/PROPH/DIAG INJ SC/IM: CPT

## 2021-08-06 PROCEDURE — 96377 APPLICATON ON-BODY INJECTOR: CPT

## 2021-08-06 PROCEDURE — 96411 CHEMO IV PUSH ADDL DRUG: CPT

## 2021-08-06 PROCEDURE — 96413 CHEMO IV INFUSION 1 HR: CPT

## 2021-08-06 PROCEDURE — 96367 TX/PROPH/DG ADDL SEQ IV INF: CPT

## 2021-08-06 RX ORDER — SODIUM CHLORIDE 9 MG/ML
20 INJECTION, SOLUTION INTRAVENOUS ONCE
Status: COMPLETED | OUTPATIENT
Start: 2021-08-06 | End: 2021-08-06

## 2021-08-06 RX ORDER — CYANOCOBALAMIN 1000 UG/ML
1000 INJECTION INTRAMUSCULAR; SUBCUTANEOUS ONCE
Status: COMPLETED | OUTPATIENT
Start: 2021-08-06 | End: 2021-08-06

## 2021-08-06 RX ADMIN — PEGFILGRASTIM 6 MG: KIT SUBCUTANEOUS at 13:18

## 2021-08-06 RX ADMIN — DEXAMETHASONE SODIUM PHOSPHATE: 100 INJECTION INTRAMUSCULAR; INTRAVENOUS at 10:54

## 2021-08-06 RX ADMIN — FOSAPREPITANT 150 MG: 150 INJECTION, POWDER, LYOPHILIZED, FOR SOLUTION INTRAVENOUS at 11:18

## 2021-08-06 RX ADMIN — CARBOPLATIN 492.5 MG: 10 INJECTION, SOLUTION INTRAVENOUS at 12:07

## 2021-08-06 RX ADMIN — CYANOCOBALAMIN 1000 MCG: 1000 INJECTION, SOLUTION INTRAMUSCULAR at 13:14

## 2021-08-06 RX ADMIN — SODIUM CHLORIDE 20 ML/HR: 0.9 INJECTION, SOLUTION INTRAVENOUS at 10:53

## 2021-08-06 RX ADMIN — SODIUM CHLORIDE 1000 MG: 9 INJECTION, SOLUTION INTRAVENOUS at 11:49

## 2021-08-06 NOTE — PROGRESS NOTES
Pt tolerated todays alimta, carboplatin wiothout issue  Vitamin b12 injection given to pt via left arm  neulasta on pro education reviewed and patch placed on ruq as per pt request  Pt discharged in wifes care with avs  Time written at top to remove on pro

## 2021-08-16 ENCOUNTER — TELEPHONE (OUTPATIENT)
Dept: HEMATOLOGY ONCOLOGY | Facility: CLINIC | Age: 66
End: 2021-08-16

## 2021-08-16 DIAGNOSIS — M48.062 SPINAL STENOSIS OF LUMBAR REGION WITH NEUROGENIC CLAUDICATION: Primary | ICD-10-CM

## 2021-08-16 NOTE — TELEPHONE ENCOUNTER
Patients wife called, ildefonso is having gout in his L knee, very swollen & painful    Wife wanted to know if you can send something in for him    Zackery Dhaliwal in the past he had been on Colchicine   6mg    Please sent to Joe

## 2021-08-16 NOTE — TELEPHONE ENCOUNTER
Patient's wife called Candelario Flowers called back  She is requesting that Kaity Pavon NP review above  She feels that his flare up in his gout is from his chemotherapy  Last chemo was on 8/6/21  Flare up started 8/12/21  Patient is taking 1 Allopurinol daily ordered by [address] [facility]  Candelario Flowers would like this reviewed with [address] [place] [address],[address] for further recommendations  Will forward to [employer] RN to revied with TREY Adame's call back # [phone] (H)

## 2021-08-16 NOTE — TELEPHONE ENCOUNTER
Received a call from patient  He states he is having trouble with gout in his knee  This started on Thursday  Patient states his knee is swollen and very painful  Patient is on allopurinol daily as prescribed by Tanja  Patient states he usually takes 2 Ibuprofen every 2-4 hours until symptoms resolve  He knows he is not supposed to use a lot of ibuprofen while on treatment    Patient states his PCP typically manages his gout and has prescribed something for him in the past that helped to clear this up in 1-2 days  I suggested to patient he reach out to PCP for further recommendation  He was agreeable and verbalized understanding  Will send to RN as Gabbie Franklin

## 2021-08-17 ENCOUNTER — TELEPHONE (OUTPATIENT)
Dept: FAMILY MEDICINE CLINIC | Facility: CLINIC | Age: 66
End: 2021-08-17

## 2021-08-17 RX ORDER — COLCHICINE 0.6 MG/1
0.6 TABLET ORAL 2 TIMES DAILY
Qty: 30 TABLET | Refills: 5 | Status: ON HOLD | OUTPATIENT
Start: 2021-08-17 | End: 2022-06-17 | Stop reason: CLARIF

## 2021-08-17 NOTE — TELEPHONE ENCOUNTER
Pt called asking if you can fill Colchicine   6mg for patient since he can not take any antiinflammatory meds due to chemo

## 2021-08-23 DIAGNOSIS — C34.92 ADENOCARCINOMA, LUNG, LEFT (HCC): Primary | ICD-10-CM

## 2021-08-25 ENCOUNTER — APPOINTMENT (OUTPATIENT)
Dept: LAB | Facility: CLINIC | Age: 66
End: 2021-08-25
Payer: MEDICARE

## 2021-08-25 DIAGNOSIS — D64.9 NORMOCYTIC ANEMIA: ICD-10-CM

## 2021-08-25 DIAGNOSIS — C34.92 ADENOCARCINOMA, LUNG, LEFT (HCC): ICD-10-CM

## 2021-08-25 LAB
ALBUMIN SERPL BCP-MCNC: 3.5 G/DL (ref 3.5–5)
ALP SERPL-CCNC: 133 U/L (ref 46–116)
ALT SERPL W P-5'-P-CCNC: 65 U/L (ref 12–78)
ANION GAP SERPL CALCULATED.3IONS-SCNC: 6 MMOL/L (ref 4–13)
AST SERPL W P-5'-P-CCNC: 36 U/L (ref 5–45)
BASOPHILS # BLD AUTO: 0.04 THOUSANDS/ΜL (ref 0–0.1)
BASOPHILS NFR BLD AUTO: 1 % (ref 0–1)
BILIRUB SERPL-MCNC: 0.33 MG/DL (ref 0.2–1)
BUN SERPL-MCNC: 16 MG/DL (ref 5–25)
CALCIUM SERPL-MCNC: 9 MG/DL (ref 8.3–10.1)
CHLORIDE SERPL-SCNC: 107 MMOL/L (ref 100–108)
CO2 SERPL-SCNC: 26 MMOL/L (ref 21–32)
CREAT SERPL-MCNC: 1.09 MG/DL (ref 0.6–1.3)
EOSINOPHIL # BLD AUTO: 0.12 THOUSAND/ΜL (ref 0–0.61)
EOSINOPHIL NFR BLD AUTO: 1 % (ref 0–6)
ERYTHROCYTE [DISTWIDTH] IN BLOOD BY AUTOMATED COUNT: 19.3 % (ref 11.6–15.1)
FERRITIN SERPL-MCNC: 685 NG/ML (ref 8–388)
GFR SERPL CREATININE-BSD FRML MDRD: 69 ML/MIN/1.73SQ M
GLUCOSE P FAST SERPL-MCNC: 96 MG/DL (ref 65–99)
HCT VFR BLD AUTO: 33.5 % (ref 36.5–49.3)
HGB BLD-MCNC: 10.8 G/DL (ref 12–17)
IMM GRANULOCYTES # BLD AUTO: 0.04 THOUSAND/UL (ref 0–0.2)
IMM GRANULOCYTES NFR BLD AUTO: 1 % (ref 0–2)
IRON SATN MFR SERPL: 25 %
IRON SERPL-MCNC: 75 UG/DL (ref 65–175)
LYMPHOCYTES # BLD AUTO: 1.45 THOUSANDS/ΜL (ref 0.6–4.47)
LYMPHOCYTES NFR BLD AUTO: 17 % (ref 14–44)
MAGNESIUM SERPL-MCNC: 2.1 MG/DL (ref 1.6–2.6)
MCH RBC QN AUTO: 32.1 PG (ref 26.8–34.3)
MCHC RBC AUTO-ENTMCNC: 32.2 G/DL (ref 31.4–37.4)
MCV RBC AUTO: 100 FL (ref 82–98)
MONOCYTES # BLD AUTO: 0.63 THOUSAND/ΜL (ref 0.17–1.22)
MONOCYTES NFR BLD AUTO: 8 % (ref 4–12)
NEUTROPHILS # BLD AUTO: 6.16 THOUSANDS/ΜL (ref 1.85–7.62)
NEUTS SEG NFR BLD AUTO: 72 % (ref 43–75)
NRBC BLD AUTO-RTO: 0 /100 WBCS
PLATELET # BLD AUTO: 190 THOUSANDS/UL (ref 149–390)
PMV BLD AUTO: 11 FL (ref 8.9–12.7)
POTASSIUM SERPL-SCNC: 3.9 MMOL/L (ref 3.5–5.3)
PROT SERPL-MCNC: 7.7 G/DL (ref 6.4–8.2)
RBC # BLD AUTO: 3.36 MILLION/UL (ref 3.88–5.62)
SODIUM SERPL-SCNC: 139 MMOL/L (ref 136–145)
TIBC SERPL-MCNC: 295 UG/DL (ref 250–450)
WBC # BLD AUTO: 8.44 THOUSAND/UL (ref 4.31–10.16)

## 2021-08-25 PROCEDURE — 80053 COMPREHEN METABOLIC PANEL: CPT

## 2021-08-25 PROCEDURE — 36415 COLL VENOUS BLD VENIPUNCTURE: CPT

## 2021-08-25 PROCEDURE — 83550 IRON BINDING TEST: CPT

## 2021-08-25 PROCEDURE — 83540 ASSAY OF IRON: CPT

## 2021-08-25 PROCEDURE — 83735 ASSAY OF MAGNESIUM: CPT

## 2021-08-25 PROCEDURE — 82728 ASSAY OF FERRITIN: CPT

## 2021-08-25 PROCEDURE — 85025 COMPLETE CBC W/AUTO DIFF WBC: CPT

## 2021-08-26 ENCOUNTER — OFFICE VISIT (OUTPATIENT)
Dept: HEMATOLOGY ONCOLOGY | Facility: CLINIC | Age: 66
End: 2021-08-26
Payer: MEDICARE

## 2021-08-26 VITALS
DIASTOLIC BLOOD PRESSURE: 72 MMHG | BODY MASS INDEX: 27.65 KG/M2 | OXYGEN SATURATION: 99 % | HEART RATE: 79 BPM | RESPIRATION RATE: 16 BRPM | HEIGHT: 68 IN | SYSTOLIC BLOOD PRESSURE: 132 MMHG | TEMPERATURE: 98.6 F | WEIGHT: 182.4 LBS

## 2021-08-26 DIAGNOSIS — C34.92 ADENOCARCINOMA, LUNG, LEFT (HCC): Primary | ICD-10-CM

## 2021-08-26 DIAGNOSIS — C34.12 MALIGNANT NEOPLASM OF UPPER LOBE, LEFT BRONCHUS OR LUNG (HCC): ICD-10-CM

## 2021-08-26 DIAGNOSIS — D70.2 DRUG-INDUCED NEUTROPENIA (HCC): ICD-10-CM

## 2021-08-26 PROCEDURE — 99214 OFFICE O/P EST MOD 30 MIN: CPT | Performed by: INTERNAL MEDICINE

## 2021-08-26 RX ORDER — SODIUM CHLORIDE 9 MG/ML
20 INJECTION, SOLUTION INTRAVENOUS ONCE
Status: CANCELLED | OUTPATIENT
Start: 2021-08-27

## 2021-08-26 NOTE — PROGRESS NOTES
Hematology/Oncology Outpatient Follow-up  Helga Akbar 74 y o  male 1955 [id number]    Date:  8/26/2021        Assessment and Plan:  1  Adenocarcinoma, lung, left ([address] )    The patient will be getting his final cycle of neoadjuvant chemotherapy with carboplatin and Alimta cycle 4  Subsequently, he will be getting a PET-CT scan to evaluate the success of the treatment before he sees the surgical thoracic team for the consideration of surgical resection of his left lung cancer  the patient seems to be interested in getting the booster COVID-19 vaccination which should be done after the PET-CT scan  - CBC and differential; Future  - Comprehensive metabolic panel; Future  - Magnesium  - NM PET CT skull base to mid thigh; Future    2  Drug-induced neutropenia (HCC)    Neulasta Onpro will be given after cycle 4 to avoid neutropenic complications  HPI:    The patient came today for a follow-up visit accompanied by his wife  He received so far 3 cycles of carboplatin and Alimta neoadjuvantly which he tolerated relatively well with some fatigue  He did develop gout in his right foot which was treated with colchicine and currently taking allopurinol  His most recent blood work on 08/25/2021 showed hemoglobin of 10 8 with normal white cells and platelets  Creatinine was 1 0 with normal liver enzymes  Iron panel showed ferritin of 685 and saturation of 25%  Oncology History Overview Note   Patient with history of hypertension and hyperlipidemia  He also had extensive history of smoking for 40 years or more  He quit smoking  In 2017  The patient apparently had low-dose lung CT scans for screening since he was heavy smoker on 04/08/2021  The CT scan of fortunately revealed 6 2 cm mass in the paramediastinal left upper lobe with COPD features  Subsequently, he had a PET-CT scan on 05/04/2021 which showed:  IMPRESSION:  1    Lobulated left upper paramediastinal lung mass extending to the left suprahilar region measuring 5 2 x 6 5 x 6 cm demonstrates intense FDG activity, most concerning for malignancy  Adjacent interstitial thickening and groundglass densities may   represent tumor infiltration  Tissue sampling recommended  2   Mildly hypermetabolic AP window mediastinal nodes concerning for early metastases  3   Additional mildly hypermetabolic branching nodular density in the left upper posterior lung may be inflammatory/infectious versus additional site of tumor  4   No hypermetabolic metastases in the neck, abdomen, pelvis  A biopsy of the left lung mass was done on 05/06/2021 which showed:   Poorly differentiated non-small cell carcinoma, favor adenocarcinoma, with neuroendocrine differentiation of uncertain clinical significance  The patient then underwent a mediastinoscopy on 05/25/2021 for staging  Multiple mediastinal lymph node from different levels were excised all lymph nodes came back negative for metastatic disease  The patient was felt to be a surgical candidate and was sent to us to consider  Neoadjuvant chemotherapy       Adenocarcinoma, lung, left ([facility] [address] )   6/9/2021 Initial Diagnosis    Adenocarcinoma, lung, left ([facility] [address] )     6/25/2021 -  Chemotherapy    cyanocobalamin, 1,000 mcg, Intramuscular, Once, 2 of 3 cycles  Administration: 1,000 mcg (8/6/2021), 1,000 mcg (6/25/2021)  pegfilgrastim (Adryan Dad), 6 mg, Subcutaneous, Once, 2 of 5 cycles  Administration: 6 mg (7/16/2021), 6 mg (8/6/2021)  fosaprepitant (EMEND) IVPB, 150 mg, Intravenous, Once, 3 of 6 cycles  Administration: 150 mg (6/25/2021), 150 mg (8/6/2021), 150 mg (7/16/2021)  CARBOplatin (PARAPLATIN) IVPB (GOG AUC DOSING), 496 mg, Intravenous, Once, 3 of 6 cycles  Administration: 496 mg (6/25/2021), 492 5 mg (8/6/2021), 516 mg (7/16/2021)  pemetrexed (ALIMTA) chemo infusion, 975 mg, Intravenous, Once, 3 of 6 cycles  Administration: 1,000 mg (6/25/2021), 1,000 mg (8/6/2021), 1,000 mg (7/16/2021)         Interval history:    ROS: Review of Systems   Constitutional: Positive for fatigue  Negative for chills and fever  HENT: Positive for hearing loss  Negative for ear pain and sore throat  Eyes: Negative for pain and visual disturbance  Respiratory: Positive for cough  Negative for shortness of breath  Cardiovascular: Negative for chest pain and palpitations  Gastrointestinal: Positive for diarrhea  Negative for abdominal pain and vomiting  Genitourinary: Negative for dysuria and hematuria  Musculoskeletal: Positive for arthralgias  Negative for back pain  Skin: Negative for color change and rash  Neurological: Positive for dizziness  Negative for seizures and syncope  Psychiatric/Behavioral: Positive for sleep disturbance  All other systems reviewed and are negative        Past Medical History:   Diagnosis Date    Hyperlipidemia     Hypertension        Past Surgical History:   Procedure Laterality Date    BACK SURGERY      HEMORRHOID SURGERY      IR BIOPSY LUNG  2021    IR PORT PLACEMENT  2021    LAMINECTOMY  2018    L4-L5    MA BRONCHOSCOPY,DIAGNOSTIC N/A 2021    Procedure: BRONCHOSCOPY FLEXIBLE;  Surgeon: Jennifer Guzman MD;  Location: [facility];  Service: Thoracic    MA MEDIASTINOSCOPY WITH LYMPH NODE BIOPSY/IES N/A 2021    Procedure: MEDIASTINOSCOPY, flexible bronchoscopy;  Surgeon: Jennifer Guzman MD;  Location: [facility];  Service: Thoracic    TONSILLECTOMY         Social History     Socioeconomic History    Marital status: /Civil Union     Spouse name: None    Number of children: None    Years of education: None    Highest education level: None   Occupational History    None   Tobacco Use    Smoking status: Former Smoker     Packs/day: 1 00     Years: 40 00     Pack years: 40 00     Types: Cigarettes     Start date:      Quit date:      Years since quittin 6    Smokeless tobacco: Never Used    Tobacco comment: quit  Vaping Use    Vaping Use: Never used   Substance and Sexual Activity    Alcohol use: Yes     Alcohol/week: 7 0 standard drinks     Types: 7 Cans of beer per week    Drug use: Yes     Types: Marijuana    Sexual activity: None   Other Topics Concern    None   Social History Narrative    None     Social Determinants of Health     Financial Resource Strain:     Difficulty of Paying Living Expenses:    Food Insecurity:     Worried About Running Out of Food in the Last Year:     Ran Out of Food in the Last Year:    Transportation Needs:     Lack of Transportation (Medical):      Lack of Transportation (Non-Medical):    Physical Activity:     Days of Exercise per Week:     Minutes of Exercise per Session:    Stress:     Feeling of Stress :    Social Connections:     Frequency of Communication with Friends and Family:     Frequency of Social Gatherings with Friends and Family:     Attends Taoist Services:     Active Member of Clubs or Organizations:     Attends Club or Organization Meetings:     Marital Status:    Intimate Partner Violence:     Fear of Current or Ex-Partner:     Emotionally Abused:     Physically Abused:     Sexually Abused:        Family History   Problem Relation Age of Onset    Nephrolithiasis Father        Allergies   Allergen Reactions    Bee Venom     Benazepril Other (See Comments)     Angioedema     Latex Other (See Comments)     Burning of eyes at the dentist from the gloves    Meloxicam GI Intolerance    Penicillin G Rash         Current Outpatient Medications:     allopurinol (ZYLOPRIM) 100 mg tablet, Take 1 tablet (100 mg total) by mouth daily, Disp: 30 tablet, Rfl: 5    amLODIPine (NORVASC) 10 mg tablet, TAKE ONE TABLET BY MOUTH EVERY DAY, Disp: 90 tablet, Rfl: 3    Ascorbic Acid (vitamin C) 1000 MG tablet, Take 1,000 mg by mouth daily, Disp: , Rfl:     B Complex Vitamins (B COMPLEX 1 PO), Take 1 tablet by mouth daily , Disp: , Rfl:     betamethasone valerate (VALISONE) 0 1 % cream, Apply topically 2 (two) times a day (Patient taking differently: Apply topically as needed ), Disp: 45 g, Rfl: 1    calcipotriene (DOVONOX) 0 005 % ointment, Apply topically 2 (two) times a day, Disp: , Rfl:     Cholecalciferol (VITAMIN D3 PO), Take by mouth daily , Disp: , Rfl:     citalopram (CeleXA) 10 mg tablet, TAKE ONE TABLET BY MOUTH EVERY DAY, Disp: 30 tablet, Rfl: 3    colchicine (COLCRYS) 0 6 mg tablet, Take 1 tablet (0 6 mg total) by mouth 2 (two) times a day, Disp: 30 tablet, Rfl: 5    Cyanocobalamin (Vitamin B 12) 500 MCG TABS, Take 1 tablet by mouth, Disp: , Rfl:     doxepin (SINEquan) 25 mg capsule, TAKE ONE CAPSULE BY MOUTH AT BEDTIME, Disp: 30 capsule, Rfl: 5    fluocinolone (SYNALAR) 0 01 % external solution, , Disp: , Rfl:     Garlic 10 MG CAPS, Take 1 tablet by mouth daily , Disp: , Rfl:     Glucosamine-Chondroit-Vit C-Mn (GLUCOSAMINE 1500 COMPLEX) CAPS, daily , Disp: , Rfl:     ketoconazole (NIZORAL) 2 % cream, Apply topically 2 (two) times a day (Patient taking differently: Apply topically as needed ), Disp: 15 g, Rfl: 2    Multiple Vitamins-Minerals (MULTIVITAL-M) TABS, Take by mouth daily , Disp: , Rfl:     ondansetron (ZOFRAN) 8 mg tablet, Take 1 tablet (8 mg total) by mouth every 8 (eight) hours as needed for nausea or vomiting (Patient taking differently: Take 8 mg by mouth every 8 (eight) hours as needed for nausea or vomiting As needed), Disp: 20 tablet, Rfl: 3    pantoprazole (PROTONIX) 40 mg tablet, Take 1 tablet (40 mg total) by mouth daily, Disp: 30 tablet, Rfl: 6    rosuvastatin (CRESTOR) 10 MG tablet, TAKE ONE TABLET BY MOUTH EVERY DAY, Disp: 30 tablet, Rfl: 5    traMADol (ULTRAM) 50 mg tablet, TAKE ONE TABLET BY MOUTH EVERY 8 HOURS AS NEEDED FOR SEVERE PAIN, Disp: 30 tablet, Rfl: 0    folic acid (FOLVITE) 1 mg tablet, Take 1 tablet (1 mg total) by mouth daily, Disp: 30 tablet, Rfl: 6      Physical Exam:  /72 (BP Location: Left arm, Patient Position: Sitting, Cuff Size: Adult)   Pulse 79   Temp 98 6 °F (37 °C) (Tympanic)   Resp 16   Ht 5' 8" (1 727 m)   Wt 82 7 kg (182 lb 6 4 oz)   SpO2 99%   BMI 27 73 kg/m²     Physical Exam  Constitutional:       Appearance: He is well-developed  HENT:      Head: Normocephalic and atraumatic  Eyes:      General: No scleral icterus  Right eye: No discharge  Left eye: No discharge  Conjunctiva/sclera: Conjunctivae normal       Pupils: Pupils are equal, round, and reactive to light  Neck:      Thyroid: No thyromegaly  Trachea: No tracheal deviation  Cardiovascular:      Rate and Rhythm: Normal rate and regular rhythm  Heart sounds: Normal heart sounds  No murmur heard  No friction rub  Pulmonary:      Effort: Pulmonary effort is normal  No respiratory distress  Breath sounds: Normal breath sounds  No wheezing or rales  Chest:      Chest wall: No tenderness  Abdominal:      General: There is no distension  Palpations: Abdomen is soft  There is no hepatomegaly or splenomegaly  Tenderness: There is no abdominal tenderness  There is no guarding or rebound  Musculoskeletal:         General: No tenderness or deformity  Normal range of motion  Cervical back: Normal range of motion and neck supple  Lymphadenopathy:      Cervical: No cervical adenopathy  Skin:     General: Skin is warm and dry  Coloration: Skin is not pale  Findings: No erythema or rash  Neurological:      Mental Status: He is alert and oriented to person, place, and time  Cranial Nerves: No cranial nerve deficit  Coordination: Coordination normal       Deep Tendon Reflexes: Reflexes are normal and symmetric  Psychiatric:         Behavior: Behavior normal          Thought Content:  Thought content normal          Judgment: Judgment normal            Labs:  Lab Results   Component Value Date    WBC 8 44 08/25/2021    HGB 10 8 (L) 08/25/2021    HCT 33 5 (L) 08/25/2021     (H) 08/25/2021     08/25/2021     Lab Results   Component Value Date    K 3 9 08/25/2021     08/25/2021    CO2 26 08/25/2021    BUN 16 08/25/2021    CREATININE 1 09 08/25/2021    GLUF 96 08/25/2021    CALCIUM 9 0 08/25/2021    CORRECTEDCA 10 2 (H) 08/04/2021    AST 36 08/25/2021    ALT 65 08/25/2021    ALKPHOS 133 (H) 08/25/2021    EGFR 69 08/25/2021     No results found for: TSH    Patient voiced understanding and agreement in the above discussion  Aware to contact our office with questions/symptoms in the interim

## 2021-08-27 ENCOUNTER — HOSPITAL ENCOUNTER (OUTPATIENT)
Dept: INFUSION CENTER | Facility: HOSPITAL | Age: 66
Discharge: HOME | End: 2021-08-27
Attending: INTERNAL MEDICINE
Payer: MEDICARE

## 2021-08-27 VITALS
WEIGHT: 181.88 LBS | OXYGEN SATURATION: 98 % | RESPIRATION RATE: 18 BRPM | TEMPERATURE: 98.1 F | HEIGHT: 68 IN | BODY MASS INDEX: 27.57 KG/M2 | HEART RATE: 85 BPM | SYSTOLIC BLOOD PRESSURE: 129 MMHG | DIASTOLIC BLOOD PRESSURE: 75 MMHG

## 2021-08-27 DIAGNOSIS — C34.92 ADENOCARCINOMA, LUNG, LEFT (HCC): Primary | ICD-10-CM

## 2021-08-27 PROCEDURE — 96413 CHEMO IV INFUSION 1 HR: CPT

## 2021-08-27 PROCEDURE — 96377 APPLICATON ON-BODY INJECTOR: CPT

## 2021-08-27 PROCEDURE — 96367 TX/PROPH/DG ADDL SEQ IV INF: CPT

## 2021-08-27 PROCEDURE — 96411 CHEMO IV PUSH ADDL DRUG: CPT

## 2021-08-27 RX ORDER — SODIUM CHLORIDE 9 MG/ML
20 INJECTION, SOLUTION INTRAVENOUS ONCE
Status: COMPLETED | OUTPATIENT
Start: 2021-08-27 | End: 2021-08-27

## 2021-08-27 RX ADMIN — SODIUM CHLORIDE 20 ML/HR: 0.9 INJECTION, SOLUTION INTRAVENOUS at 10:58

## 2021-08-27 RX ADMIN — FOSAPREPITANT 150 MG: 150 INJECTION, POWDER, LYOPHILIZED, FOR SOLUTION INTRAVENOUS at 11:29

## 2021-08-27 RX ADMIN — DEXAMETHASONE SODIUM PHOSPHATE: 100 INJECTION INTRAMUSCULAR; INTRAVENOUS at 10:58

## 2021-08-27 RX ADMIN — CARBOPLATIN 462 MG: 10 INJECTION, SOLUTION INTRAVENOUS at 12:37

## 2021-08-27 RX ADMIN — SODIUM CHLORIDE 1000 MG: 9 INJECTION, SOLUTION INTRAVENOUS at 12:16

## 2021-08-27 RX ADMIN — PEGFILGRASTIM 6 MG: KIT SUBCUTANEOUS at 13:48

## 2021-08-27 NOTE — PROGRESS NOTES
Patient tolerated alimta and carboplatin infusion without issue  Neulasta onpro applied to LUQ  Instructed when to remove  Patient is familiar with the device   Discharged in stable condition with AVS

## 2021-09-05 DIAGNOSIS — M54.42 CHRONIC LEFT-SIDED LOW BACK PAIN WITH LEFT-SIDED SCIATICA: ICD-10-CM

## 2021-09-05 DIAGNOSIS — I10 ESSENTIAL HYPERTENSION: ICD-10-CM

## 2021-09-05 DIAGNOSIS — G89.29 CHRONIC LEFT-SIDED LOW BACK PAIN WITH LEFT-SIDED SCIATICA: ICD-10-CM

## 2021-09-07 RX ORDER — AMLODIPINE BESYLATE 10 MG/1
TABLET ORAL
Qty: 90 TABLET | Refills: 3 | Status: ON HOLD | OUTPATIENT
Start: 2021-09-07

## 2021-09-08 RX ORDER — TRAMADOL HYDROCHLORIDE 50 MG/1
TABLET ORAL
Qty: 30 TABLET | Refills: 0 | Status: SHIPPED | OUTPATIENT
Start: 2021-09-08 | End: 2021-11-08

## 2021-09-27 ENCOUNTER — HOSPITAL ENCOUNTER (OUTPATIENT)
Dept: RADIOLOGY | Age: 66
Discharge: HOME | End: 2021-09-27
Payer: MEDICARE

## 2021-09-27 DIAGNOSIS — C34.92 ADENOCARCINOMA, LUNG, LEFT (HCC): ICD-10-CM

## 2021-09-27 DIAGNOSIS — C34.12 MALIGNANT NEOPLASM OF UPPER LOBE, LEFT BRONCHUS OR LUNG (HCC): ICD-10-CM

## 2021-09-27 LAB — GLUCOSE SERPL-MCNC: 107 MG/DL (ref 65–140)

## 2021-09-27 PROCEDURE — A9552 F18 FDG: HCPCS

## 2021-09-27 PROCEDURE — 82948 REAGENT STRIP/BLOOD GLUCOSE: CPT

## 2021-09-27 PROCEDURE — G1004 CDSM NDSC: HCPCS

## 2021-09-27 PROCEDURE — 78815 PET IMAGE W/CT SKULL-THIGH: CPT

## 2021-10-04 DIAGNOSIS — G47.00 INSOMNIA, UNSPECIFIED TYPE: ICD-10-CM

## 2021-10-04 RX ORDER — DOXEPIN HYDROCHLORIDE 25 MG/1
CAPSULE ORAL
Qty: 30 CAPSULE | Refills: 5 | Status: SHIPPED | OUTPATIENT
Start: 2021-10-04 | End: 2021-10-05

## 2021-10-05 DIAGNOSIS — G47.00 INSOMNIA, UNSPECIFIED TYPE: ICD-10-CM

## 2021-10-05 RX ORDER — DOXEPIN HYDROCHLORIDE 25 MG/1
CAPSULE ORAL
Qty: 30 CAPSULE | Refills: 5 | Status: SHIPPED | OUTPATIENT
Start: 2021-10-05 | End: 2022-06-15

## 2021-10-06 ENCOUNTER — APPOINTMENT (OUTPATIENT)
Dept: LAB | Facility: CLINIC | Age: 66
End: 2021-10-06
Payer: MEDICARE

## 2021-10-06 DIAGNOSIS — C34.92 ADENOCARCINOMA, LUNG, LEFT (HCC): ICD-10-CM

## 2021-10-06 LAB
ALBUMIN SERPL BCP-MCNC: 3.6 G/DL (ref 3.5–5)
ALP SERPL-CCNC: 106 U/L (ref 46–116)
ALT SERPL W P-5'-P-CCNC: 35 U/L (ref 12–78)
ANION GAP SERPL CALCULATED.3IONS-SCNC: 5 MMOL/L (ref 4–13)
AST SERPL W P-5'-P-CCNC: 31 U/L (ref 5–45)
BASOPHILS # BLD AUTO: 0.05 THOUSANDS/ΜL (ref 0–0.1)
BASOPHILS NFR BLD AUTO: 1 % (ref 0–1)
BILIRUB SERPL-MCNC: 0.37 MG/DL (ref 0.2–1)
BUN SERPL-MCNC: 10 MG/DL (ref 5–25)
CALCIUM SERPL-MCNC: 9.7 MG/DL (ref 8.3–10.1)
CHLORIDE SERPL-SCNC: 107 MMOL/L (ref 100–108)
CO2 SERPL-SCNC: 28 MMOL/L (ref 21–32)
CREAT SERPL-MCNC: 1.07 MG/DL (ref 0.6–1.3)
EOSINOPHIL # BLD AUTO: 0.05 THOUSAND/ΜL (ref 0–0.61)
EOSINOPHIL NFR BLD AUTO: 1 % (ref 0–6)
ERYTHROCYTE [DISTWIDTH] IN BLOOD BY AUTOMATED COUNT: 17.3 % (ref 11.6–15.1)
GFR SERPL CREATININE-BSD FRML MDRD: 71 ML/MIN/1.73SQ M
GLUCOSE P FAST SERPL-MCNC: 101 MG/DL (ref 65–99)
HCT VFR BLD AUTO: 22.1 % (ref 36.5–49.3)
HGB BLD-MCNC: 7 G/DL (ref 12–17)
IMM GRANULOCYTES # BLD AUTO: 0.01 THOUSAND/UL (ref 0–0.2)
IMM GRANULOCYTES NFR BLD AUTO: 0 % (ref 0–2)
LYMPHOCYTES # BLD AUTO: 1.07 THOUSANDS/ΜL (ref 0.6–4.47)
LYMPHOCYTES NFR BLD AUTO: 20 % (ref 14–44)
MAGNESIUM SERPL-MCNC: 2.1 MG/DL (ref 1.6–2.6)
MCH RBC QN AUTO: 33.2 PG (ref 26.8–34.3)
MCHC RBC AUTO-ENTMCNC: 31.7 G/DL (ref 31.4–37.4)
MCV RBC AUTO: 105 FL (ref 82–98)
MONOCYTES # BLD AUTO: 0.48 THOUSAND/ΜL (ref 0.17–1.22)
MONOCYTES NFR BLD AUTO: 9 % (ref 4–12)
NEUTROPHILS # BLD AUTO: 3.8 THOUSANDS/ΜL (ref 1.85–7.62)
NEUTS SEG NFR BLD AUTO: 69 % (ref 43–75)
NRBC BLD AUTO-RTO: 0 /100 WBCS
PLATELET # BLD AUTO: 273 THOUSANDS/UL (ref 149–390)
PMV BLD AUTO: 10.5 FL (ref 8.9–12.7)
POTASSIUM SERPL-SCNC: 4.4 MMOL/L (ref 3.5–5.3)
PROT SERPL-MCNC: 7.3 G/DL (ref 6.4–8.2)
RBC # BLD AUTO: 2.11 MILLION/UL (ref 3.88–5.62)
SODIUM SERPL-SCNC: 140 MMOL/L (ref 136–145)
WBC # BLD AUTO: 5.46 THOUSAND/UL (ref 4.31–10.16)

## 2021-10-06 PROCEDURE — 85025 COMPLETE CBC W/AUTO DIFF WBC: CPT

## 2021-10-06 PROCEDURE — 36415 COLL VENOUS BLD VENIPUNCTURE: CPT

## 2021-10-06 PROCEDURE — 83735 ASSAY OF MAGNESIUM: CPT | Performed by: INTERNAL MEDICINE

## 2021-10-06 PROCEDURE — 80053 COMPREHEN METABOLIC PANEL: CPT

## 2021-10-07 ENCOUNTER — OFFICE VISIT (OUTPATIENT)
Dept: HEMATOLOGY ONCOLOGY | Facility: CLINIC | Age: 66
End: 2021-10-07
Payer: MEDICARE

## 2021-10-07 ENCOUNTER — LAB (OUTPATIENT)
Dept: LAB | Facility: CLINIC | Age: 66
End: 2021-10-07
Payer: MEDICARE

## 2021-10-07 ENCOUNTER — TELEPHONE (OUTPATIENT)
Dept: HEMATOLOGY ONCOLOGY | Facility: CLINIC | Age: 66
End: 2021-10-07

## 2021-10-07 VITALS
SYSTOLIC BLOOD PRESSURE: 146 MMHG | RESPIRATION RATE: 18 BRPM | DIASTOLIC BLOOD PRESSURE: 88 MMHG | HEART RATE: 72 BPM | OXYGEN SATURATION: 96 % | TEMPERATURE: 97.1 F | HEIGHT: 68 IN | BODY MASS INDEX: 27.98 KG/M2 | WEIGHT: 184.6 LBS

## 2021-10-07 DIAGNOSIS — D64.9 NORMOCYTIC ANEMIA: ICD-10-CM

## 2021-10-07 DIAGNOSIS — D53.9 NUTRITIONAL ANEMIA: ICD-10-CM

## 2021-10-07 DIAGNOSIS — C34.92 ADENOCARCINOMA, LUNG, LEFT (HCC): Primary | ICD-10-CM

## 2021-10-07 LAB
BASOPHILS # BLD AUTO: 0.03 THOUSANDS/ΜL (ref 0–0.1)
BASOPHILS NFR BLD AUTO: 1 % (ref 0–1)
EOSINOPHIL # BLD AUTO: 0.04 THOUSAND/ΜL (ref 0–0.61)
EOSINOPHIL NFR BLD AUTO: 1 % (ref 0–6)
ERYTHROCYTE [DISTWIDTH] IN BLOOD BY AUTOMATED COUNT: 17.2 % (ref 11.6–15.1)
HCT VFR BLD AUTO: 35.4 % (ref 36.5–49.3)
HGB BLD-MCNC: 11.5 G/DL (ref 12–17)
IMM GRANULOCYTES # BLD AUTO: 0.02 THOUSAND/UL (ref 0–0.2)
IMM GRANULOCYTES NFR BLD AUTO: 0 % (ref 0–2)
LYMPHOCYTES # BLD AUTO: 1.4 THOUSANDS/ΜL (ref 0.6–4.47)
LYMPHOCYTES NFR BLD AUTO: 25 % (ref 14–44)
MCH RBC QN AUTO: 33.3 PG (ref 26.8–34.3)
MCHC RBC AUTO-ENTMCNC: 32.5 G/DL (ref 31.4–37.4)
MCV RBC AUTO: 103 FL (ref 82–98)
MONOCYTES # BLD AUTO: 0.54 THOUSAND/ΜL (ref 0.17–1.22)
MONOCYTES NFR BLD AUTO: 10 % (ref 4–12)
NEUTROPHILS # BLD AUTO: 3.67 THOUSANDS/ΜL (ref 1.85–7.62)
NEUTS SEG NFR BLD AUTO: 63 % (ref 43–75)
NRBC BLD AUTO-RTO: 0 /100 WBCS
PLATELET # BLD AUTO: 228 THOUSANDS/UL (ref 149–390)
PMV BLD AUTO: 10.2 FL (ref 8.9–12.7)
RBC # BLD AUTO: 3.45 MILLION/UL (ref 3.88–5.62)
WBC # BLD AUTO: 5.7 THOUSAND/UL (ref 4.31–10.16)

## 2021-10-07 PROCEDURE — 86880 COOMBS TEST DIRECT: CPT

## 2021-10-07 PROCEDURE — 85025 COMPLETE CBC W/AUTO DIFF WBC: CPT

## 2021-10-07 PROCEDURE — 99215 OFFICE O/P EST HI 40 MIN: CPT | Performed by: NURSE PRACTITIONER

## 2021-10-07 PROCEDURE — 36415 COLL VENOUS BLD VENIPUNCTURE: CPT

## 2021-10-08 ENCOUNTER — TELEPHONE (OUTPATIENT)
Dept: HEMATOLOGY ONCOLOGY | Facility: CLINIC | Age: 66
End: 2021-10-08

## 2021-10-08 DIAGNOSIS — C34.92 ADENOCARCINOMA, LUNG, LEFT (HCC): Primary | ICD-10-CM

## 2021-10-08 LAB — DAT POLY-SP REAG RBC QL: NEGATIVE

## 2021-10-08 RX ORDER — SODIUM CHLORIDE 9 MG/ML
20 INJECTION, SOLUTION INTRAVENOUS ONCE
Status: CANCELLED | OUTPATIENT
Start: 2021-10-08

## 2021-10-12 ENCOUNTER — HOSPITAL ENCOUNTER (OUTPATIENT)
Dept: INFUSION CENTER | Facility: HOSPITAL | Age: 66
Discharge: HOME | End: 2021-10-12
Payer: MEDICARE

## 2021-10-12 VITALS — TEMPERATURE: 97.4 F

## 2021-10-12 DIAGNOSIS — D64.9 NORMOCYTIC ANEMIA: ICD-10-CM

## 2021-10-12 DIAGNOSIS — Z45.2 ENCOUNTER FOR CENTRAL LINE CARE: ICD-10-CM

## 2021-10-12 DIAGNOSIS — C34.92 ADENOCARCINOMA, LUNG, LEFT (HCC): Primary | ICD-10-CM

## 2021-10-12 DIAGNOSIS — D53.9 NUTRITIONAL ANEMIA: ICD-10-CM

## 2021-10-12 LAB
ALBUMIN SERPL BCP-MCNC: 4 G/DL (ref 3.5–5.7)
ALP SERPL-CCNC: 77 U/L (ref 55–165)
ALT SERPL W P-5'-P-CCNC: 18 U/L (ref 7–52)
ANION GAP SERPL CALCULATED.3IONS-SCNC: 6 MMOL/L (ref 4–13)
AST SERPL W P-5'-P-CCNC: 23 U/L (ref 13–39)
BASOPHILS # BLD AUTO: 0 THOUSANDS/ΜL (ref 0–0.1)
BASOPHILS NFR BLD AUTO: 1 % (ref 0–2)
BILIRUB SERPL-MCNC: 0.4 MG/DL (ref 0.2–1)
BUN SERPL-MCNC: 12 MG/DL (ref 7–25)
CALCIUM SERPL-MCNC: 9.3 MG/DL (ref 8.6–10.5)
CHLORIDE SERPL-SCNC: 107 MMOL/L (ref 98–107)
CO2 SERPL-SCNC: 27 MMOL/L (ref 21–31)
CREAT SERPL-MCNC: 1.13 MG/DL (ref 0.7–1.3)
CRP SERPL QL: 8 MG/L
EOSINOPHIL # BLD AUTO: 0.1 THOUSAND/ΜL (ref 0–0.61)
EOSINOPHIL NFR BLD AUTO: 1 % (ref 0–5)
ERYTHROCYTE [DISTWIDTH] IN BLOOD BY AUTOMATED COUNT: 17.1 % (ref 11.5–14.5)
ERYTHROCYTE [SEDIMENTATION RATE] IN BLOOD: 59 MM/HOUR (ref 0–19)
FERRITIN SERPL-MCNC: 309 NG/ML (ref 8–388)
FOLATE SERPL-MCNC: >20 NG/ML (ref 3.1–17.5)
GFR SERPL CREATININE-BSD FRML MDRD: 66 ML/MIN/1.73SQ M
GLUCOSE SERPL-MCNC: 118 MG/DL (ref 65–99)
HCT VFR BLD AUTO: 35 % (ref 42–47)
HGB BLD-MCNC: 11.5 G/DL (ref 14–18)
IRON SATN MFR SERPL: 19 % (ref 20–50)
IRON SERPL-MCNC: 53 UG/DL (ref 65–175)
LDH SERPL-CCNC: 212 U/L (ref 84–246)
LYMPHOCYTES # BLD AUTO: 1.1 THOUSANDS/ΜL (ref 0.6–4.47)
LYMPHOCYTES NFR BLD AUTO: 21 % (ref 21–51)
MCH RBC QN AUTO: 33.2 PG (ref 26–34)
MCHC RBC AUTO-ENTMCNC: 32.9 G/DL (ref 31–37)
MCV RBC AUTO: 101 FL (ref 81–99)
MONOCYTES # BLD AUTO: 0.4 THOUSAND/ΜL (ref 0.17–1.22)
MONOCYTES NFR BLD AUTO: 7 % (ref 2–12)
NEUTROPHILS # BLD AUTO: 3.8 THOUSANDS/ΜL (ref 1.4–6.5)
NEUTS SEG NFR BLD AUTO: 71 % (ref 42–75)
PLATELET # BLD AUTO: 238 THOUSANDS/UL (ref 149–390)
PMV BLD AUTO: 7.3 FL (ref 8.6–11.7)
POTASSIUM SERPL-SCNC: 4 MMOL/L (ref 3.5–5.5)
PROT SERPL-MCNC: 7.1 G/DL (ref 6.4–8.9)
RBC # BLD AUTO: 3.46 MILLION/UL (ref 4.3–5.9)
RETICS # CALC: 1.06 % (ref 0.37–1.87)
SODIUM SERPL-SCNC: 140 MMOL/L (ref 134–143)
TIBC SERPL-MCNC: 285 UG/DL (ref 250–450)
VIT B12 SERPL-MCNC: 1000 PG/ML (ref 100–900)
WBC # BLD AUTO: 5.3 THOUSAND/UL (ref 4.8–10.8)

## 2021-10-12 PROCEDURE — 82746 ASSAY OF FOLIC ACID SERUM: CPT

## 2021-10-12 PROCEDURE — 82607 VITAMIN B-12: CPT

## 2021-10-12 PROCEDURE — 85045 AUTOMATED RETICULOCYTE COUNT: CPT

## 2021-10-12 PROCEDURE — 85652 RBC SED RATE AUTOMATED: CPT

## 2021-10-12 PROCEDURE — 83550 IRON BINDING TEST: CPT

## 2021-10-12 PROCEDURE — 83615 LACTATE (LD) (LDH) ENZYME: CPT

## 2021-10-12 PROCEDURE — 83540 ASSAY OF IRON: CPT

## 2021-10-12 PROCEDURE — 85025 COMPLETE CBC W/AUTO DIFF WBC: CPT | Performed by: INTERNAL MEDICINE

## 2021-10-12 PROCEDURE — 80053 COMPREHEN METABOLIC PANEL: CPT | Performed by: INTERNAL MEDICINE

## 2021-10-12 PROCEDURE — 82728 ASSAY OF FERRITIN: CPT

## 2021-10-12 PROCEDURE — 83010 ASSAY OF HAPTOGLOBIN QUANT: CPT

## 2021-10-12 PROCEDURE — 86140 C-REACTIVE PROTEIN: CPT

## 2021-10-13 LAB — HAPTOGLOB SERPL-MCNC: 188 MG/DL (ref 32–363)

## 2021-10-14 ENCOUNTER — OFFICE VISIT (OUTPATIENT)
Dept: CARDIAC SURGERY | Facility: CLINIC | Age: 66
End: 2021-10-14
Payer: MEDICARE

## 2021-10-14 ENCOUNTER — OFFICE VISIT (OUTPATIENT)
Dept: FAMILY MEDICINE CLINIC | Facility: CLINIC | Age: 66
End: 2021-10-14
Payer: MEDICARE

## 2021-10-14 VITALS
SYSTOLIC BLOOD PRESSURE: 131 MMHG | HEART RATE: 81 BPM | BODY MASS INDEX: 27.7 KG/M2 | HEIGHT: 68 IN | TEMPERATURE: 97.1 F | DIASTOLIC BLOOD PRESSURE: 82 MMHG | OXYGEN SATURATION: 98 % | WEIGHT: 182.76 LBS | RESPIRATION RATE: 16 BRPM

## 2021-10-14 VITALS
RESPIRATION RATE: 18 BRPM | WEIGHT: 182 LBS | DIASTOLIC BLOOD PRESSURE: 62 MMHG | HEART RATE: 81 BPM | SYSTOLIC BLOOD PRESSURE: 120 MMHG | TEMPERATURE: 98.7 F | BODY MASS INDEX: 27.58 KG/M2 | HEIGHT: 68 IN | OXYGEN SATURATION: 98 %

## 2021-10-14 DIAGNOSIS — C34.92 ADENOCARCINOMA, LUNG, LEFT (HCC): ICD-10-CM

## 2021-10-14 DIAGNOSIS — M48.062 SPINAL STENOSIS OF LUMBAR REGION WITH NEUROGENIC CLAUDICATION: ICD-10-CM

## 2021-10-14 DIAGNOSIS — I10 ESSENTIAL HYPERTENSION: ICD-10-CM

## 2021-10-14 DIAGNOSIS — F41.8 DEPRESSION WITH ANXIETY: ICD-10-CM

## 2021-10-14 DIAGNOSIS — Z23 ENCOUNTER FOR IMMUNIZATION: Primary | ICD-10-CM

## 2021-10-14 DIAGNOSIS — R91.8 LUNG MASS: Primary | ICD-10-CM

## 2021-10-14 DIAGNOSIS — Z00.00 MEDICARE ANNUAL WELLNESS VISIT, SUBSEQUENT: ICD-10-CM

## 2021-10-14 DIAGNOSIS — R73.9 HYPERGLYCEMIA: ICD-10-CM

## 2021-10-14 DIAGNOSIS — E78.2 MIXED HYPERLIPIDEMIA: ICD-10-CM

## 2021-10-14 PROCEDURE — G0439 PPPS, SUBSEQ VISIT: HCPCS | Performed by: FAMILY MEDICINE

## 2021-10-14 PROCEDURE — 99214 OFFICE O/P EST MOD 30 MIN: CPT | Performed by: THORACIC SURGERY (CARDIOTHORACIC VASCULAR SURGERY)

## 2021-10-14 PROCEDURE — 90662 IIV NO PRSV INCREASED AG IM: CPT

## 2021-10-14 PROCEDURE — G0008 ADMIN INFLUENZA VIRUS VAC: HCPCS

## 2021-10-14 PROCEDURE — 99214 OFFICE O/P EST MOD 30 MIN: CPT | Performed by: FAMILY MEDICINE

## 2021-10-18 ENCOUNTER — PATIENT OUTREACH (OUTPATIENT)
Dept: CASE MANAGEMENT | Facility: HOSPITAL | Age: 66
End: 2021-10-18

## 2021-10-21 ENCOUNTER — TELEPHONE (OUTPATIENT)
Dept: HEMATOLOGY ONCOLOGY | Facility: CLINIC | Age: 66
End: 2021-10-21

## 2021-10-22 ENCOUNTER — PATIENT OUTREACH (OUTPATIENT)
Dept: CASE MANAGEMENT | Facility: HOSPITAL | Age: 66
End: 2021-10-22

## 2021-10-25 ENCOUNTER — CLINICAL SUPPORT (OUTPATIENT)
Dept: RADIATION ONCOLOGY | Facility: CLINIC | Age: 66
End: 2021-10-25
Attending: RADIOLOGY
Payer: MEDICARE

## 2021-10-25 ENCOUNTER — TELEPHONE (OUTPATIENT)
Dept: HEMATOLOGY ONCOLOGY | Facility: CLINIC | Age: 66
End: 2021-10-25

## 2021-10-25 VITALS
OXYGEN SATURATION: 96 % | HEIGHT: 68 IN | HEART RATE: 79 BPM | SYSTOLIC BLOOD PRESSURE: 128 MMHG | BODY MASS INDEX: 27.96 KG/M2 | DIASTOLIC BLOOD PRESSURE: 70 MMHG | RESPIRATION RATE: 18 BRPM | TEMPERATURE: 97.7 F | WEIGHT: 184.5 LBS

## 2021-10-25 DIAGNOSIS — C34.92 ADENOCARCINOMA, LUNG, LEFT (HCC): ICD-10-CM

## 2021-10-25 DIAGNOSIS — C34.92 ADENOCARCINOMA, LUNG, LEFT (HCC): Primary | ICD-10-CM

## 2021-10-25 PROCEDURE — 99204 OFFICE O/P NEW MOD 45 MIN: CPT | Performed by: RADIOLOGY

## 2021-10-25 PROCEDURE — 99211 OFF/OP EST MAY X REQ PHY/QHP: CPT | Performed by: RADIOLOGY

## 2021-10-26 ENCOUNTER — TELEPHONE (OUTPATIENT)
Dept: FAMILY MEDICINE CLINIC | Facility: CLINIC | Age: 66
End: 2021-10-26

## 2021-10-26 ENCOUNTER — LAB (OUTPATIENT)
Dept: LAB | Facility: CLINIC | Age: 66
End: 2021-10-26
Payer: MEDICARE

## 2021-10-26 DIAGNOSIS — I10 ESSENTIAL HYPERTENSION: ICD-10-CM

## 2021-10-26 DIAGNOSIS — C34.92 ADENOCARCINOMA, LUNG, LEFT (HCC): ICD-10-CM

## 2021-10-26 DIAGNOSIS — D53.9 NUTRITIONAL ANEMIA: ICD-10-CM

## 2021-10-26 DIAGNOSIS — R73.9 HYPERGLYCEMIA: ICD-10-CM

## 2021-10-26 DIAGNOSIS — D64.9 NORMOCYTIC ANEMIA: ICD-10-CM

## 2021-10-26 DIAGNOSIS — F41.8 DEPRESSION WITH ANXIETY: ICD-10-CM

## 2021-10-26 DIAGNOSIS — E78.2 MIXED HYPERLIPIDEMIA: ICD-10-CM

## 2021-10-26 DIAGNOSIS — M48.062 SPINAL STENOSIS OF LUMBAR REGION WITH NEUROGENIC CLAUDICATION: ICD-10-CM

## 2021-10-26 LAB
ALBUMIN SERPL BCP-MCNC: 3.6 G/DL (ref 3.5–5)
ALP SERPL-CCNC: 96 U/L (ref 46–116)
ALT SERPL W P-5'-P-CCNC: 24 U/L (ref 12–78)
ANION GAP SERPL CALCULATED.3IONS-SCNC: 5 MMOL/L (ref 4–13)
AST SERPL W P-5'-P-CCNC: 27 U/L (ref 5–45)
BASOPHILS # BLD AUTO: 0.05 THOUSANDS/ΜL (ref 0–0.1)
BASOPHILS NFR BLD AUTO: 1 % (ref 0–1)
BILIRUB SERPL-MCNC: 0.36 MG/DL (ref 0.2–1)
BUN SERPL-MCNC: 14 MG/DL (ref 5–25)
CALCIUM SERPL-MCNC: 10 MG/DL (ref 8.3–10.1)
CHLORIDE SERPL-SCNC: 106 MMOL/L (ref 100–108)
CO2 SERPL-SCNC: 27 MMOL/L (ref 21–32)
CREAT SERPL-MCNC: 1.13 MG/DL (ref 0.6–1.3)
EOSINOPHIL # BLD AUTO: 0.14 THOUSAND/ΜL (ref 0–0.61)
EOSINOPHIL NFR BLD AUTO: 2 % (ref 0–6)
ERYTHROCYTE [DISTWIDTH] IN BLOOD BY AUTOMATED COUNT: 14.7 % (ref 11.6–15.1)
GFR SERPL CREATININE-BSD FRML MDRD: 66 ML/MIN/1.73SQ M
GLUCOSE P FAST SERPL-MCNC: 98 MG/DL (ref 65–99)
HCT VFR BLD AUTO: 38.9 % (ref 36.5–49.3)
HGB BLD-MCNC: 12.7 G/DL (ref 12–17)
IMM GRANULOCYTES # BLD AUTO: 0.03 THOUSAND/UL (ref 0–0.2)
IMM GRANULOCYTES NFR BLD AUTO: 0 % (ref 0–2)
LYMPHOCYTES # BLD AUTO: 1.51 THOUSANDS/ΜL (ref 0.6–4.47)
LYMPHOCYTES NFR BLD AUTO: 22 % (ref 14–44)
MCH RBC QN AUTO: 32.7 PG (ref 26.8–34.3)
MCHC RBC AUTO-ENTMCNC: 32.6 G/DL (ref 31.4–37.4)
MCV RBC AUTO: 100 FL (ref 82–98)
MONOCYTES # BLD AUTO: 0.48 THOUSAND/ΜL (ref 0.17–1.22)
MONOCYTES NFR BLD AUTO: 7 % (ref 4–12)
NEUTROPHILS # BLD AUTO: 4.8 THOUSANDS/ΜL (ref 1.85–7.62)
NEUTS SEG NFR BLD AUTO: 68 % (ref 43–75)
NRBC BLD AUTO-RTO: 0 /100 WBCS
PLATELET # BLD AUTO: 133 THOUSANDS/UL (ref 149–390)
PMV BLD AUTO: 12.3 FL (ref 8.9–12.7)
POTASSIUM SERPL-SCNC: 4.1 MMOL/L (ref 3.5–5.3)
PROT SERPL-MCNC: 8.1 G/DL (ref 6.4–8.2)
RBC # BLD AUTO: 3.88 MILLION/UL (ref 3.88–5.62)
SODIUM SERPL-SCNC: 138 MMOL/L (ref 136–145)
VIT B12 SERPL-MCNC: 910 PG/ML (ref 100–900)
WBC # BLD AUTO: 7.01 THOUSAND/UL (ref 4.31–10.16)

## 2021-10-26 PROCEDURE — 85025 COMPLETE CBC W/AUTO DIFF WBC: CPT

## 2021-10-26 PROCEDURE — 36415 COLL VENOUS BLD VENIPUNCTURE: CPT

## 2021-10-26 PROCEDURE — 82607 VITAMIN B-12: CPT

## 2021-10-26 PROCEDURE — 80053 COMPREHEN METABOLIC PANEL: CPT

## 2021-10-27 ENCOUNTER — OFFICE VISIT (OUTPATIENT)
Dept: HEMATOLOGY ONCOLOGY | Facility: CLINIC | Age: 66
End: 2021-10-27
Payer: MEDICARE

## 2021-10-27 ENCOUNTER — APPOINTMENT (OUTPATIENT)
Dept: LAB | Facility: CLINIC | Age: 66
End: 2021-10-27
Payer: MEDICARE

## 2021-10-27 VITALS
DIASTOLIC BLOOD PRESSURE: 62 MMHG | OXYGEN SATURATION: 97 % | TEMPERATURE: 96.8 F | RESPIRATION RATE: 18 BRPM | WEIGHT: 183 LBS | SYSTOLIC BLOOD PRESSURE: 124 MMHG | HEIGHT: 68 IN | BODY MASS INDEX: 27.74 KG/M2 | HEART RATE: 84 BPM

## 2021-10-27 DIAGNOSIS — D64.9 NORMOCYTIC ANEMIA: ICD-10-CM

## 2021-10-27 DIAGNOSIS — C34.92 ADENOCARCINOMA, LUNG, LEFT (HCC): ICD-10-CM

## 2021-10-27 DIAGNOSIS — C34.92 ADENOCARCINOMA, LUNG, LEFT (HCC): Primary | ICD-10-CM

## 2021-10-27 LAB — HEMOCCULT STL QL IA: NEGATIVE

## 2021-10-27 PROCEDURE — 99214 OFFICE O/P EST MOD 30 MIN: CPT | Performed by: INTERNAL MEDICINE

## 2021-10-27 PROCEDURE — G0328 FECAL BLOOD SCRN IMMUNOASSAY: HCPCS

## 2021-10-28 ENCOUNTER — TELEPHONE (OUTPATIENT)
Dept: HEMATOLOGY ONCOLOGY | Facility: CLINIC | Age: 66
End: 2021-10-28

## 2021-11-03 DIAGNOSIS — G89.29 CHRONIC LEFT-SIDED LOW BACK PAIN WITH LEFT-SIDED SCIATICA: ICD-10-CM

## 2021-11-03 DIAGNOSIS — M54.42 CHRONIC LEFT-SIDED LOW BACK PAIN WITH LEFT-SIDED SCIATICA: ICD-10-CM

## 2021-11-03 RX ORDER — CITALOPRAM 10 MG/1
TABLET ORAL
Qty: 30 TABLET | Refills: 3 | Status: SHIPPED | OUTPATIENT
Start: 2021-11-03 | End: 2022-04-19 | Stop reason: SDUPTHER

## 2021-11-04 ENCOUNTER — TELEPHONE (OUTPATIENT)
Dept: SURGICAL ONCOLOGY | Facility: CLINIC | Age: 66
End: 2021-11-04

## 2021-11-06 DIAGNOSIS — M54.42 CHRONIC LEFT-SIDED LOW BACK PAIN WITH LEFT-SIDED SCIATICA: ICD-10-CM

## 2021-11-06 DIAGNOSIS — G89.29 CHRONIC LEFT-SIDED LOW BACK PAIN WITH LEFT-SIDED SCIATICA: ICD-10-CM

## 2021-11-08 RX ORDER — TRAMADOL HYDROCHLORIDE 50 MG/1
TABLET ORAL
Qty: 30 TABLET | Refills: 0 | Status: SHIPPED | OUTPATIENT
Start: 2021-11-08 | End: 2022-01-06

## 2021-11-12 ENCOUNTER — TELEPHONE (OUTPATIENT)
Dept: SURGICAL ONCOLOGY | Facility: CLINIC | Age: 66
End: 2021-11-12

## 2021-11-22 ENCOUNTER — TELEPHONE (OUTPATIENT)
Dept: FAMILY MEDICINE CLINIC | Facility: CLINIC | Age: 66
End: 2021-11-22

## 2021-12-07 DIAGNOSIS — C34.92 ADENOCARCINOMA, LUNG, LEFT (HCC): ICD-10-CM

## 2021-12-07 DIAGNOSIS — Z51.11 ENCOUNTER FOR CHEMOTHERAPY MANAGEMENT: ICD-10-CM

## 2021-12-07 RX ORDER — FOLIC ACID 1 MG/1
1 TABLET ORAL DAILY
Qty: 30 TABLET | Refills: 6 | Status: ON HOLD | OUTPATIENT
Start: 2021-12-07 | End: 2022-06-17 | Stop reason: CLARIF

## 2021-12-09 ENCOUNTER — OFFICE VISIT (OUTPATIENT)
Dept: FAMILY MEDICINE CLINIC | Facility: CLINIC | Age: 66
End: 2021-12-09
Payer: MEDICARE

## 2021-12-09 VITALS
OXYGEN SATURATION: 98 % | TEMPERATURE: 98.1 F | HEIGHT: 68 IN | HEART RATE: 81 BPM | WEIGHT: 180 LBS | DIASTOLIC BLOOD PRESSURE: 78 MMHG | SYSTOLIC BLOOD PRESSURE: 122 MMHG | BODY MASS INDEX: 27.28 KG/M2

## 2021-12-09 DIAGNOSIS — I10 ESSENTIAL HYPERTENSION: ICD-10-CM

## 2021-12-09 DIAGNOSIS — H65.92 LEFT NON-SUPPURATIVE OTITIS MEDIA: ICD-10-CM

## 2021-12-09 DIAGNOSIS — M48.062 SPINAL STENOSIS OF LUMBAR REGION WITH NEUROGENIC CLAUDICATION: ICD-10-CM

## 2021-12-09 DIAGNOSIS — F33.0 MILD EPISODE OF RECURRENT MAJOR DEPRESSIVE DISORDER (HCC): ICD-10-CM

## 2021-12-09 DIAGNOSIS — H61.23 BILATERAL HEARING LOSS DUE TO CERUMEN IMPACTION: ICD-10-CM

## 2021-12-09 DIAGNOSIS — C34.92 ADENOCARCINOMA, LUNG, LEFT (HCC): Primary | ICD-10-CM

## 2021-12-09 PROCEDURE — 99213 OFFICE O/P EST LOW 20 MIN: CPT | Performed by: FAMILY MEDICINE

## 2021-12-09 PROCEDURE — 69209 REMOVE IMPACTED EAR WAX UNI: CPT | Performed by: FAMILY MEDICINE

## 2021-12-09 RX ORDER — AZITHROMYCIN 250 MG/1
TABLET, FILM COATED ORAL
Qty: 6 TABLET | Refills: 0 | Status: SHIPPED | OUTPATIENT
Start: 2021-12-09 | End: 2021-12-14

## 2021-12-16 ENCOUNTER — TELEPHONE (OUTPATIENT)
Dept: OTHER | Facility: HOSPITAL | Age: 66
End: 2021-12-16

## 2021-12-21 ENCOUNTER — TELEPHONE (OUTPATIENT)
Dept: INFUSION CENTER | Facility: HOSPITAL | Age: 66
End: 2021-12-21

## 2021-12-22 ENCOUNTER — TELEPHONE (OUTPATIENT)
Dept: OTHER | Facility: OTHER | Age: 66
End: 2021-12-22

## 2021-12-22 ENCOUNTER — NURSE TRIAGE (OUTPATIENT)
Dept: OTHER | Facility: OTHER | Age: 66
End: 2021-12-22

## 2021-12-22 DIAGNOSIS — R42 VERTIGO: Primary | ICD-10-CM

## 2021-12-22 RX ORDER — MECLIZINE HYDROCHLORIDE 25 MG/1
25 TABLET ORAL 4 TIMES DAILY PRN
Qty: 30 TABLET | Refills: 0 | Status: ON HOLD | OUTPATIENT
Start: 2021-12-22 | End: 2022-06-17 | Stop reason: CLARIF

## 2021-12-24 ENCOUNTER — APPOINTMENT (EMERGENCY)
Dept: CT IMAGING | Facility: HOSPITAL | Age: 66
DRG: 386 | End: 2021-12-24
Payer: MEDICARE

## 2021-12-24 ENCOUNTER — HOSPITAL ENCOUNTER (INPATIENT)
Facility: HOSPITAL | Age: 66
LOS: 1 days | Discharge: HOME | DRG: 386 | End: 2021-12-27
Attending: EMERGENCY MEDICINE | Admitting: INTERNAL MEDICINE
Payer: MEDICARE

## 2021-12-24 DIAGNOSIS — K62.89 PROCTITIS: ICD-10-CM

## 2021-12-24 DIAGNOSIS — I31.3 PERICARDIAL EFFUSION: ICD-10-CM

## 2021-12-24 DIAGNOSIS — K59.00 CONSTIPATION, UNSPECIFIED CONSTIPATION TYPE: Primary | ICD-10-CM

## 2021-12-24 PROBLEM — Z87.39 HISTORY OF GOUT: Status: ACTIVE | Noted: 2021-01-01

## 2021-12-24 PROBLEM — I10 HYPERTENSION: Status: ACTIVE | Noted: 2021-01-01

## 2021-12-24 PROBLEM — C34.12 CANCER OF UPPER LOBE OF LEFT LUNG (HCC): Status: ACTIVE | Noted: 2021-01-01

## 2021-12-24 PROBLEM — Z87.891 FORMER CIGARETTE SMOKER: Status: ACTIVE | Noted: 2021-11-17

## 2021-12-24 PROBLEM — G89.29 CHRONIC BACK PAIN: Status: ACTIVE | Noted: 2021-01-01

## 2021-12-24 PROBLEM — M54.9 CHRONIC BACK PAIN: Status: ACTIVE | Noted: 2021-11-17

## 2021-12-24 LAB
ALBUMIN SERPL BCP-MCNC: 4.1 G/DL (ref 3.5–5)
ALP SERPL-CCNC: 109 U/L (ref 34–104)
ALT SERPL W P-5'-P-CCNC: 17 U/L (ref 7–52)
ANION GAP SERPL CALCULATED.3IONS-SCNC: 11 MMOL/L (ref 4–13)
AST SERPL W P-5'-P-CCNC: 16 U/L (ref 13–39)
BASOPHILS # BLD AUTO: 0.06 THOUSANDS/ΜL (ref 0–0.1)
BASOPHILS NFR BLD AUTO: 1 % (ref 0–1)
BILIRUB SERPL-MCNC: 0.38 MG/DL (ref 0.2–1)
BILIRUB UR QL STRIP: NEGATIVE
BUN SERPL-MCNC: 14 MG/DL (ref 5–25)
CALCIUM SERPL-MCNC: 9.6 MG/DL (ref 8.4–10.2)
CHLORIDE SERPL-SCNC: 101 MMOL/L (ref 96–108)
CLARITY UR: CLEAR
CO2 SERPL-SCNC: 25 MMOL/L (ref 21–32)
COLOR UR: YELLOW
CREAT SERPL-MCNC: 1.14 MG/DL (ref 0.6–1.3)
EOSINOPHIL # BLD AUTO: 0.11 THOUSAND/ΜL (ref 0–0.61)
EOSINOPHIL NFR BLD AUTO: 1 % (ref 0–6)
ERYTHROCYTE [DISTWIDTH] IN BLOOD BY AUTOMATED COUNT: 14.6 % (ref 11.6–15.1)
GFR SERPL CREATININE-BSD FRML MDRD: 65 ML/MIN/1.73SQ M
GLUCOSE SERPL-MCNC: 126 MG/DL (ref 65–140)
GLUCOSE UR STRIP-MCNC: NEGATIVE MG/DL
HCT VFR BLD AUTO: 37.7 % (ref 36.5–49.3)
HGB BLD-MCNC: 12.1 G/DL (ref 12–17)
HGB UR QL STRIP.AUTO: NEGATIVE
IMM GRANULOCYTES # BLD AUTO: 0.03 THOUSAND/UL (ref 0–0.2)
IMM GRANULOCYTES NFR BLD AUTO: 0 % (ref 0–2)
KETONES UR STRIP-MCNC: NEGATIVE MG/DL
LEUKOCYTE ESTERASE UR QL STRIP: NEGATIVE
LIPASE SERPL-CCNC: 50 U/L (ref 11–82)
LYMPHOCYTES # BLD AUTO: 1.12 THOUSANDS/ΜL (ref 0.6–4.47)
LYMPHOCYTES NFR BLD AUTO: 11 % (ref 14–44)
MCH RBC QN AUTO: 29.4 PG (ref 26.8–34.3)
MCHC RBC AUTO-ENTMCNC: 32.1 G/DL (ref 31.4–37.4)
MCV RBC AUTO: 92 FL (ref 82–98)
MONOCYTES # BLD AUTO: 0.37 THOUSAND/ΜL (ref 0.17–1.22)
MONOCYTES NFR BLD AUTO: 4 % (ref 4–12)
NEUTROPHILS # BLD AUTO: 8.53 THOUSANDS/ΜL (ref 1.85–7.62)
NEUTS SEG NFR BLD AUTO: 83 % (ref 43–75)
NITRITE UR QL STRIP: NEGATIVE
NRBC BLD AUTO-RTO: 0 /100 WBCS
PH UR STRIP.AUTO: 6 [PH]
PLATELET # BLD AUTO: 243 THOUSANDS/UL (ref 149–390)
PMV BLD AUTO: 8.9 FL (ref 8.9–12.7)
POTASSIUM SERPL-SCNC: 3.5 MMOL/L (ref 3.5–5.3)
PROT SERPL-MCNC: 7.7 G/DL (ref 6.4–8.4)
PROT UR STRIP-MCNC: NEGATIVE MG/DL
RBC # BLD AUTO: 4.11 MILLION/UL (ref 3.88–5.62)
SODIUM SERPL-SCNC: 137 MMOL/L (ref 135–147)
SP GR UR STRIP.AUTO: 1.01 (ref 1–1.03)
UROBILINOGEN UR QL STRIP.AUTO: 0.2 E.U./DL
WBC # BLD AUTO: 10.22 THOUSAND/UL (ref 4.31–10.16)

## 2021-12-24 PROCEDURE — 83690 ASSAY OF LIPASE: CPT | Performed by: EMERGENCY MEDICINE

## 2021-12-24 PROCEDURE — 85025 COMPLETE CBC W/AUTO DIFF WBC: CPT | Performed by: EMERGENCY MEDICINE

## 2021-12-24 PROCEDURE — 99285 EMERGENCY DEPT VISIT HI MDM: CPT | Performed by: EMERGENCY MEDICINE

## 2021-12-24 PROCEDURE — 80053 COMPREHEN METABOLIC PANEL: CPT | Performed by: EMERGENCY MEDICINE

## 2021-12-24 PROCEDURE — 99285 EMERGENCY DEPT VISIT HI MDM: CPT

## 2021-12-24 PROCEDURE — 36415 COLL VENOUS BLD VENIPUNCTURE: CPT | Performed by: EMERGENCY MEDICINE

## 2021-12-24 PROCEDURE — G1004 CDSM NDSC: HCPCS

## 2021-12-24 PROCEDURE — 74177 CT ABD & PELVIS W/CONTRAST: CPT

## 2021-12-24 PROCEDURE — 96374 THER/PROPH/DIAG INJ IV PUSH: CPT

## 2021-12-24 PROCEDURE — 96361 HYDRATE IV INFUSION ADD-ON: CPT

## 2021-12-24 PROCEDURE — 81003 URINALYSIS AUTO W/O SCOPE: CPT | Performed by: EMERGENCY MEDICINE

## 2021-12-24 RX ORDER — ALLOPURINOL 100 MG/1
100 TABLET ORAL DAILY
Status: DISCONTINUED | OUTPATIENT
Start: 2021-12-25 | End: 2021-12-24

## 2021-12-24 RX ORDER — TRAMADOL HYDROCHLORIDE 50 MG/1
50 TABLET ORAL EVERY 6 HOURS PRN
Status: DISCONTINUED | OUTPATIENT
Start: 2021-12-24 | End: 2021-12-24

## 2021-12-24 RX ORDER — DOXEPIN HYDROCHLORIDE 25 MG/1
25 CAPSULE ORAL
Status: DISCONTINUED | OUTPATIENT
Start: 2021-12-24 | End: 2021-12-24

## 2021-12-24 RX ORDER — METRONIDAZOLE 500 MG/1
500 TABLET ORAL EVERY 8 HOURS SCHEDULED
Status: DISCONTINUED | OUTPATIENT
Start: 2021-12-24 | End: 2021-12-27 | Stop reason: HOSPADM

## 2021-12-24 RX ORDER — MECLIZINE HYDROCHLORIDE 25 MG/1
25 TABLET ORAL 4 TIMES DAILY PRN
Status: DISCONTINUED | OUTPATIENT
Start: 2021-12-24 | End: 2021-12-27 | Stop reason: HOSPADM

## 2021-12-24 RX ORDER — GABAPENTIN 300 MG/1
300 CAPSULE ORAL
Status: DISCONTINUED | OUTPATIENT
Start: 2021-12-24 | End: 2021-12-27 | Stop reason: HOSPADM

## 2021-12-24 RX ORDER — ONDANSETRON 2 MG/ML
4 INJECTION INTRAMUSCULAR; INTRAVENOUS EVERY 6 HOURS PRN
Status: DISCONTINUED | OUTPATIENT
Start: 2021-12-24 | End: 2021-12-27 | Stop reason: HOSPADM

## 2021-12-24 RX ORDER — SODIUM PHOSPHATE, DIBASIC AND SODIUM PHOSPHATE, MONOBASIC 7; 19 G/133ML; G/133ML
1 ENEMA RECTAL ONCE
Status: COMPLETED | OUTPATIENT
Start: 2021-12-24 | End: 2021-12-24

## 2021-12-24 RX ORDER — COLCHICINE 0.6 MG/1
1.2 TABLET ORAL ONCE
Status: DISCONTINUED | OUTPATIENT
Start: 2021-12-24 | End: 2021-12-27 | Stop reason: HOSPADM

## 2021-12-24 RX ORDER — CITALOPRAM 10 MG/1
10 TABLET ORAL DAILY
Status: DISCONTINUED | OUTPATIENT
Start: 2021-12-25 | End: 2021-12-27 | Stop reason: HOSPADM

## 2021-12-24 RX ORDER — POLYETHYLENE GLYCOL 3350 17 G/17G
POWDER, FOR SOLUTION ORAL
Status: DISPENSED
Start: 2021-12-24 | End: 2021-12-25

## 2021-12-24 RX ORDER — ONDANSETRON 2 MG/ML
4 INJECTION INTRAMUSCULAR; INTRAVENOUS ONCE
Status: COMPLETED | OUTPATIENT
Start: 2021-12-24 | End: 2021-12-24

## 2021-12-24 RX ORDER — ASCORBIC ACID 500 MG
1000 TABLET ORAL DAILY
Status: DISCONTINUED | OUTPATIENT
Start: 2021-12-25 | End: 2021-12-27 | Stop reason: HOSPADM

## 2021-12-24 RX ORDER — FOLIC ACID 1 MG/1
1 TABLET ORAL DAILY
Status: DISCONTINUED | OUTPATIENT
Start: 2021-12-25 | End: 2021-12-27 | Stop reason: HOSPADM

## 2021-12-24 RX ORDER — CIPROFLOXACIN 2 MG/ML
400 INJECTION, SOLUTION INTRAVENOUS EVERY 12 HOURS
Status: DISCONTINUED | OUTPATIENT
Start: 2021-12-24 | End: 2021-12-27 | Stop reason: HOSPADM

## 2021-12-24 RX ORDER — ACETAMINOPHEN 325 MG/1
650 TABLET ORAL EVERY 6 HOURS PRN
Status: DISCONTINUED | OUTPATIENT
Start: 2021-12-24 | End: 2021-12-27 | Stop reason: HOSPADM

## 2021-12-24 RX ORDER — HEPARIN SODIUM 5000 [USP'U]/ML
5000 INJECTION, SOLUTION INTRAVENOUS; SUBCUTANEOUS EVERY 8 HOURS SCHEDULED
Status: DISCONTINUED | OUTPATIENT
Start: 2021-12-24 | End: 2021-12-27 | Stop reason: HOSPADM

## 2021-12-24 RX ORDER — MELATONIN
1000 DAILY
Status: DISCONTINUED | OUTPATIENT
Start: 2021-12-25 | End: 2021-12-27 | Stop reason: HOSPADM

## 2021-12-24 RX ORDER — PANTOPRAZOLE SODIUM 40 MG/1
40 TABLET, DELAYED RELEASE ORAL DAILY
Status: DISCONTINUED | OUTPATIENT
Start: 2021-12-25 | End: 2021-12-27 | Stop reason: HOSPADM

## 2021-12-24 RX ORDER — AMOXICILLIN AND CLAVULANATE POTASSIUM 875; 125 MG/1; MG/1
1 TABLET, FILM COATED ORAL EVERY 12 HOURS
Qty: 14 TABLET | Refills: 0 | Status: SHIPPED | OUTPATIENT
Start: 2021-12-24 | End: 2021-12-31

## 2021-12-24 RX ORDER — ONDANSETRON 4 MG/1
4 TABLET, FILM COATED ORAL EVERY 6 HOURS
Qty: 12 TABLET | Refills: 0 | Status: ON HOLD | OUTPATIENT
Start: 2021-12-24 | End: 2022-06-17 | Stop reason: CLARIF

## 2021-12-24 RX ORDER — COLCHICINE 0.6 MG/1
0.6 TABLET ORAL DAILY
Status: DISCONTINUED | OUTPATIENT
Start: 2021-12-25 | End: 2021-12-27 | Stop reason: HOSPADM

## 2021-12-24 RX ORDER — POLYETHYLENE GLYCOL 3350 17 G/17G
17 POWDER, FOR SOLUTION ORAL 2 TIMES DAILY
Qty: 850 G | Refills: 0 | Status: ON HOLD | OUTPATIENT
Start: 2021-12-24

## 2021-12-24 RX ORDER — AMLODIPINE BESYLATE 10 MG/1
10 TABLET ORAL DAILY
Status: DISCONTINUED | OUTPATIENT
Start: 2021-12-25 | End: 2021-12-27 | Stop reason: HOSPADM

## 2021-12-24 RX ORDER — POLYETHYLENE GLYCOL 3350 17 G/17G
34 POWDER, FOR SOLUTION ORAL ONCE
Status: COMPLETED | OUTPATIENT
Start: 2021-12-24 | End: 2021-12-24

## 2021-12-24 RX ORDER — KETOROLAC TROMETHAMINE 30 MG/ML
15 INJECTION, SOLUTION INTRAMUSCULAR; INTRAVENOUS ONCE
Status: COMPLETED | OUTPATIENT
Start: 2021-12-25 | End: 2021-12-25

## 2021-12-24 RX ORDER — ONDANSETRON 4 MG/1
4 TABLET, ORALLY DISINTEGRATING ORAL ONCE
Status: COMPLETED | OUTPATIENT
Start: 2021-12-24 | End: 2021-12-24

## 2021-12-24 RX ORDER — AMOXICILLIN AND CLAVULANATE POTASSIUM 875; 125 MG/1; MG/1
1 TABLET, FILM COATED ORAL ONCE
Status: COMPLETED | OUTPATIENT
Start: 2021-12-24 | End: 2021-12-24

## 2021-12-24 RX ADMIN — SODIUM PHOSPHATE, DIBASIC AND SODIUM PHOSPHATE, MONOBASIC 1 ENEMA: 7; 19 ENEMA RECTAL at 19:58

## 2021-12-24 RX ADMIN — IOHEXOL 100 ML: 350 INJECTION, SOLUTION INTRAVENOUS at 17:32

## 2021-12-24 RX ADMIN — POLYETHYLENE GLYCOL 3350 34 G: 17 POWDER, FOR SOLUTION ORAL at 19:58

## 2021-12-24 RX ADMIN — CIPROFLOXACIN 400 MG: 2 INJECTION, SOLUTION INTRAVENOUS at 22:18

## 2021-12-24 RX ADMIN — METRONIDAZOLE 500 MG: 500 TABLET ORAL at 22:20

## 2021-12-24 RX ADMIN — HEPARIN SODIUM 5000 UNITS: 5000 INJECTION INTRAVENOUS; SUBCUTANEOUS at 22:23

## 2021-12-24 RX ADMIN — ONDANSETRON 4 MG: 2 INJECTION INTRAMUSCULAR; INTRAVENOUS at 20:24

## 2021-12-24 RX ADMIN — ONDANSETRON 4 MG: 4 TABLET, ORALLY DISINTEGRATING ORAL at 21:51

## 2021-12-24 RX ADMIN — GABAPENTIN 300 MG: 300 CAPSULE ORAL at 22:20

## 2021-12-24 RX ADMIN — SODIUM CHLORIDE 1000 ML: 0.9 INJECTION, SOLUTION INTRAVENOUS at 16:28

## 2021-12-24 RX ADMIN — AMOXICILLIN AND CLAVULANATE POTASSIUM 1 TABLET: 875; 125 TABLET, FILM COATED ORAL at 19:57

## 2021-12-25 PROBLEM — I31.39 PERICARDIAL EFFUSION: Status: ACTIVE | Noted: 2021-01-01

## 2021-12-25 PROBLEM — K59.00 CONSTIPATION: Status: ACTIVE | Noted: 2021-01-01

## 2021-12-25 PROBLEM — I31.3 PERICARDIAL EFFUSION: Status: ACTIVE | Noted: 2021-12-25

## 2021-12-25 PROBLEM — K52.9 PROCTOCOLITIS: Status: ACTIVE | Noted: 2021-01-01

## 2021-12-25 LAB
ANION GAP SERPL CALCULATED.3IONS-SCNC: 11 MMOL/L (ref 4–13)
BASOPHILS # BLD AUTO: 0.04 THOUSANDS/ΜL (ref 0–0.1)
BASOPHILS NFR BLD AUTO: 0 % (ref 0–1)
BUN SERPL-MCNC: 16 MG/DL (ref 5–25)
CALCIUM SERPL-MCNC: 8.9 MG/DL (ref 8.4–10.2)
CHLORIDE SERPL-SCNC: 102 MMOL/L (ref 96–108)
CO2 SERPL-SCNC: 24 MMOL/L (ref 21–32)
CREAT SERPL-MCNC: 1.19 MG/DL (ref 0.6–1.3)
EOSINOPHIL # BLD AUTO: 0.02 THOUSAND/ΜL (ref 0–0.61)
EOSINOPHIL NFR BLD AUTO: 0 % (ref 0–6)
ERYTHROCYTE [DISTWIDTH] IN BLOOD BY AUTOMATED COUNT: 14.6 % (ref 11.6–15.1)
GFR SERPL CREATININE-BSD FRML MDRD: 62 ML/MIN/1.73SQ M
GLUCOSE P FAST SERPL-MCNC: 123 MG/DL (ref 65–99)
GLUCOSE SERPL-MCNC: 123 MG/DL (ref 65–140)
HCT VFR BLD AUTO: 34.2 % (ref 36.5–49.3)
HGB BLD-MCNC: 11.2 G/DL (ref 12–17)
IMM GRANULOCYTES # BLD AUTO: 0.02 THOUSAND/UL (ref 0–0.2)
IMM GRANULOCYTES NFR BLD AUTO: 0 % (ref 0–2)
LYMPHOCYTES # BLD AUTO: 1.45 THOUSANDS/ΜL (ref 0.6–4.47)
LYMPHOCYTES NFR BLD AUTO: 16 % (ref 14–44)
MCH RBC QN AUTO: 29.9 PG (ref 26.8–34.3)
MCHC RBC AUTO-ENTMCNC: 32.7 G/DL (ref 31.4–37.4)
MCV RBC AUTO: 91 FL (ref 82–98)
MONOCYTES # BLD AUTO: 0.52 THOUSAND/ΜL (ref 0.17–1.22)
MONOCYTES NFR BLD AUTO: 6 % (ref 4–12)
NEUTROPHILS # BLD AUTO: 6.97 THOUSANDS/ΜL (ref 1.85–7.62)
NEUTS SEG NFR BLD AUTO: 78 % (ref 43–75)
NRBC BLD AUTO-RTO: 0 /100 WBCS
PLATELET # BLD AUTO: 231 THOUSANDS/UL (ref 149–390)
PMV BLD AUTO: 9.4 FL (ref 8.9–12.7)
POTASSIUM SERPL-SCNC: 3.5 MMOL/L (ref 3.5–5.3)
RBC # BLD AUTO: 3.74 MILLION/UL (ref 3.88–5.62)
SODIUM SERPL-SCNC: 137 MMOL/L (ref 135–147)
WBC # BLD AUTO: 9.02 THOUSAND/UL (ref 4.31–10.16)

## 2021-12-25 PROCEDURE — 99205 OFFICE O/P NEW HI 60 MIN: CPT | Performed by: INTERNAL MEDICINE

## 2021-12-25 PROCEDURE — 99225 PR SBSQ OBSERVATION CARE/DAY 25 MINUTES: CPT | Performed by: NURSE PRACTITIONER

## 2021-12-25 PROCEDURE — 80048 BASIC METABOLIC PNL TOTAL CA: CPT | Performed by: PHYSICIAN ASSISTANT

## 2021-12-25 PROCEDURE — 99220 PR INITIAL OBSERVATION CARE/DAY 70 MINUTES: CPT | Performed by: PHYSICIAN ASSISTANT

## 2021-12-25 PROCEDURE — 85025 COMPLETE CBC W/AUTO DIFF WBC: CPT | Performed by: PHYSICIAN ASSISTANT

## 2021-12-25 PROCEDURE — 36415 COLL VENOUS BLD VENIPUNCTURE: CPT | Performed by: PHYSICIAN ASSISTANT

## 2021-12-25 RX ADMIN — HEPARIN SODIUM 5000 UNITS: 5000 INJECTION INTRAVENOUS; SUBCUTANEOUS at 06:34

## 2021-12-25 RX ADMIN — Medication 1000 UNITS: at 12:41

## 2021-12-25 RX ADMIN — FOLIC ACID 1 MG: 1 TABLET ORAL at 12:42

## 2021-12-25 RX ADMIN — METRONIDAZOLE 500 MG: 500 TABLET ORAL at 21:41

## 2021-12-25 RX ADMIN — COLCHICINE 0.6 MG: 0.6 TABLET, FILM COATED ORAL at 12:42

## 2021-12-25 RX ADMIN — GABAPENTIN 300 MG: 300 CAPSULE ORAL at 21:42

## 2021-12-25 RX ADMIN — METRONIDAZOLE 500 MG: 500 TABLET ORAL at 06:34

## 2021-12-25 RX ADMIN — HEPARIN SODIUM 5000 UNITS: 5000 INJECTION INTRAVENOUS; SUBCUTANEOUS at 13:03

## 2021-12-25 RX ADMIN — KETOROLAC TROMETHAMINE 15 MG: 30 INJECTION, SOLUTION INTRAMUSCULAR; INTRAVENOUS at 00:05

## 2021-12-25 RX ADMIN — METRONIDAZOLE 500 MG: 500 TABLET ORAL at 13:03

## 2021-12-25 RX ADMIN — OXYCODONE HYDROCHLORIDE AND ACETAMINOPHEN 1000 MG: 500 TABLET ORAL at 12:43

## 2021-12-25 RX ADMIN — CIPROFLOXACIN 400 MG: 2 INJECTION, SOLUTION INTRAVENOUS at 21:46

## 2021-12-25 RX ADMIN — HEPARIN SODIUM 5000 UNITS: 5000 INJECTION INTRAVENOUS; SUBCUTANEOUS at 21:44

## 2021-12-25 RX ADMIN — PANTOPRAZOLE SODIUM 40 MG: 40 TABLET, DELAYED RELEASE ORAL at 12:42

## 2021-12-25 RX ADMIN — AMLODIPINE BESYLATE 10 MG: 10 TABLET ORAL at 12:41

## 2021-12-25 RX ADMIN — Medication 1 TABLET: at 12:42

## 2021-12-25 RX ADMIN — CYANOCOBALAMIN TAB 500 MCG 500 MCG: 500 TAB at 12:43

## 2021-12-25 RX ADMIN — CITALOPRAM HYDROBROMIDE 10 MG: 20 TABLET ORAL at 12:41

## 2021-12-25 RX ADMIN — CIPROFLOXACIN 400 MG: 2 INJECTION, SOLUTION INTRAVENOUS at 12:43

## 2021-12-26 LAB
ANION GAP SERPL CALCULATED.3IONS-SCNC: 6 MMOL/L (ref 4–13)
BASOPHILS # BLD AUTO: 0.05 THOUSANDS/ΜL (ref 0–0.1)
BASOPHILS NFR BLD AUTO: 1 % (ref 0–1)
BUN SERPL-MCNC: 19 MG/DL (ref 5–25)
CALCIUM SERPL-MCNC: 9.1 MG/DL (ref 8.4–10.2)
CHLORIDE SERPL-SCNC: 100 MMOL/L (ref 96–108)
CO2 SERPL-SCNC: 29 MMOL/L (ref 21–32)
CREAT SERPL-MCNC: 1.1 MG/DL (ref 0.6–1.3)
EOSINOPHIL # BLD AUTO: 0.08 THOUSAND/ΜL (ref 0–0.61)
EOSINOPHIL NFR BLD AUTO: 1 % (ref 0–6)
ERYTHROCYTE [DISTWIDTH] IN BLOOD BY AUTOMATED COUNT: 14.4 % (ref 11.6–15.1)
GFR SERPL CREATININE-BSD FRML MDRD: 68 ML/MIN/1.73SQ M
GLUCOSE SERPL-MCNC: 101 MG/DL (ref 65–140)
HCT VFR BLD AUTO: 34.1 % (ref 36.5–49.3)
HGB BLD-MCNC: 11 G/DL (ref 12–17)
IMM GRANULOCYTES # BLD AUTO: 0.01 THOUSAND/UL (ref 0–0.2)
IMM GRANULOCYTES NFR BLD AUTO: 0 % (ref 0–2)
LYMPHOCYTES # BLD AUTO: 0.91 THOUSANDS/ΜL (ref 0.6–4.47)
LYMPHOCYTES NFR BLD AUTO: 16 % (ref 14–44)
MCH RBC QN AUTO: 29.5 PG (ref 26.8–34.3)
MCHC RBC AUTO-ENTMCNC: 32.3 G/DL (ref 31.4–37.4)
MCV RBC AUTO: 91 FL (ref 82–98)
MONOCYTES # BLD AUTO: 0.35 THOUSAND/ΜL (ref 0.17–1.22)
MONOCYTES NFR BLD AUTO: 6 % (ref 4–12)
NEUTROPHILS # BLD AUTO: 4.26 THOUSANDS/ΜL (ref 1.85–7.62)
NEUTS SEG NFR BLD AUTO: 76 % (ref 43–75)
NRBC BLD AUTO-RTO: 0 /100 WBCS
PLATELET # BLD AUTO: 216 THOUSANDS/UL (ref 149–390)
PMV BLD AUTO: 8.9 FL (ref 8.9–12.7)
POTASSIUM SERPL-SCNC: 4.1 MMOL/L (ref 3.5–5.3)
RBC # BLD AUTO: 3.73 MILLION/UL (ref 3.88–5.62)
SODIUM SERPL-SCNC: 135 MMOL/L (ref 135–147)
WBC # BLD AUTO: 5.66 THOUSAND/UL (ref 4.31–10.16)

## 2021-12-26 PROCEDURE — 80048 BASIC METABOLIC PNL TOTAL CA: CPT | Performed by: NURSE PRACTITIONER

## 2021-12-26 PROCEDURE — 85025 COMPLETE CBC W/AUTO DIFF WBC: CPT | Performed by: NURSE PRACTITIONER

## 2021-12-26 PROCEDURE — 99233 SBSQ HOSP IP/OBS HIGH 50: CPT | Performed by: NURSE PRACTITIONER

## 2021-12-26 RX ORDER — POLYETHYLENE GLYCOL 3350 17 G/17G
17 POWDER, FOR SOLUTION ORAL ONCE
Status: COMPLETED | OUTPATIENT
Start: 2021-12-26 | End: 2021-12-26

## 2021-12-26 RX ADMIN — CYANOCOBALAMIN TAB 500 MCG 500 MCG: 500 TAB at 08:21

## 2021-12-26 RX ADMIN — HEPARIN SODIUM 5000 UNITS: 5000 INJECTION INTRAVENOUS; SUBCUTANEOUS at 06:24

## 2021-12-26 RX ADMIN — GABAPENTIN 300 MG: 300 CAPSULE ORAL at 22:57

## 2021-12-26 RX ADMIN — POLYETHYLENE GLYCOL 3350 17 G: 17 POWDER, FOR SOLUTION ORAL at 08:19

## 2021-12-26 RX ADMIN — CIPROFLOXACIN 400 MG: 2 INJECTION, SOLUTION INTRAVENOUS at 23:52

## 2021-12-26 RX ADMIN — FOLIC ACID 1 MG: 1 TABLET ORAL at 08:21

## 2021-12-26 RX ADMIN — HEPARIN SODIUM 5000 UNITS: 5000 INJECTION INTRAVENOUS; SUBCUTANEOUS at 22:58

## 2021-12-26 RX ADMIN — CIPROFLOXACIN 400 MG: 2 INJECTION, SOLUTION INTRAVENOUS at 09:51

## 2021-12-26 RX ADMIN — METRONIDAZOLE 500 MG: 500 TABLET ORAL at 15:12

## 2021-12-26 RX ADMIN — Medication 1000 UNITS: at 08:23

## 2021-12-26 RX ADMIN — METRONIDAZOLE 500 MG: 500 TABLET ORAL at 06:22

## 2021-12-26 RX ADMIN — Medication 1 TABLET: at 08:22

## 2021-12-26 RX ADMIN — OXYCODONE HYDROCHLORIDE AND ACETAMINOPHEN 1000 MG: 500 TABLET ORAL at 08:21

## 2021-12-26 RX ADMIN — COLCHICINE 0.6 MG: 0.6 TABLET, FILM COATED ORAL at 08:22

## 2021-12-26 RX ADMIN — HEPARIN SODIUM 5000 UNITS: 5000 INJECTION INTRAVENOUS; SUBCUTANEOUS at 15:12

## 2021-12-26 RX ADMIN — PANTOPRAZOLE SODIUM 40 MG: 40 TABLET, DELAYED RELEASE ORAL at 08:23

## 2021-12-26 RX ADMIN — METRONIDAZOLE 500 MG: 500 TABLET ORAL at 22:57

## 2021-12-26 RX ADMIN — ONDANSETRON 4 MG: 2 INJECTION INTRAMUSCULAR; INTRAVENOUS at 10:13

## 2021-12-26 RX ADMIN — CITALOPRAM HYDROBROMIDE 10 MG: 20 TABLET ORAL at 08:20

## 2021-12-26 RX ADMIN — AMLODIPINE BESYLATE 10 MG: 10 TABLET ORAL at 08:22

## 2021-12-27 ENCOUNTER — HOSPITAL ENCOUNTER (OUTPATIENT)
Dept: INFUSION CENTER | Facility: HOSPITAL | Age: 66
Discharge: HOME | End: 2021-12-27

## 2021-12-27 ENCOUNTER — TELEPHONE (OUTPATIENT)
Dept: HEMATOLOGY ONCOLOGY | Facility: CLINIC | Age: 66
End: 2021-12-27

## 2021-12-27 ENCOUNTER — APPOINTMENT (INPATIENT)
Dept: NON INVASIVE DIAGNOSTICS | Facility: HOSPITAL | Age: 66
DRG: 386 | End: 2021-12-27
Payer: MEDICARE

## 2021-12-27 ENCOUNTER — HOSPITAL ENCOUNTER (OUTPATIENT)
Dept: INFUSION CENTER | Facility: HOSPITAL | Age: 66
Discharge: HOME | End: 2021-12-27
Payer: MEDICARE

## 2021-12-27 VITALS
TEMPERATURE: 98.4 F | DIASTOLIC BLOOD PRESSURE: 74 MMHG | HEIGHT: 68 IN | WEIGHT: 171 LBS | BODY MASS INDEX: 25.91 KG/M2 | RESPIRATION RATE: 18 BRPM | OXYGEN SATURATION: 95 % | SYSTOLIC BLOOD PRESSURE: 110 MMHG | HEART RATE: 80 BPM

## 2021-12-27 DIAGNOSIS — Z45.2 ENCOUNTER FOR CENTRAL LINE CARE: ICD-10-CM

## 2021-12-27 DIAGNOSIS — C34.92 ADENOCARCINOMA, LUNG, LEFT (HCC): Primary | ICD-10-CM

## 2021-12-27 LAB
ANION GAP SERPL CALCULATED.3IONS-SCNC: 9 MMOL/L (ref 4–13)
AORTIC ROOT: 3.7 CM
AORTIC VALVE MEAN VELOCITY: 7.5 M/S
APICAL FOUR CHAMBER EJECTION FRACTION: 60 %
AV LVOT MEAN GRADIENT: 1 MMHG
AV LVOT PEAK GRADIENT: 3 MMHG
AV MEAN GRADIENT: 3 MMHG
AV PEAK GRADIENT: 5 MMHG
BASOPHILS # BLD AUTO: 0.07 THOUSANDS/ΜL (ref 0–0.1)
BASOPHILS NFR BLD AUTO: 1 % (ref 0–1)
BUN SERPL-MCNC: 17 MG/DL (ref 5–25)
CALCIUM SERPL-MCNC: 9.9 MG/DL (ref 8.4–10.2)
CHLORIDE SERPL-SCNC: 99 MMOL/L (ref 96–108)
CO2 SERPL-SCNC: 30 MMOL/L (ref 21–32)
CREAT SERPL-MCNC: 1.36 MG/DL (ref 0.6–1.3)
DOP CALC AO VTI: 22.67 CM
DOP CALC LVOT PEAK VEL VTI: 13.72 CM
DOP CALC LVOT PEAK VEL: 0.85 M/S
DOP CALC RVOT PEAK VEL: 0.66 M/S
DOP CALC RVOT VTI: 13.17 CM
E WAVE DECELERATION TIME: 212 MS
E/A RATIO: 0.63
EOSINOPHIL # BLD AUTO: 0.26 THOUSAND/ΜL (ref 0–0.61)
EOSINOPHIL NFR BLD AUTO: 3 % (ref 0–6)
ERYTHROCYTE [DISTWIDTH] IN BLOOD BY AUTOMATED COUNT: 14.4 % (ref 11.6–15.1)
FRACTIONAL SHORTENING: 56 (ref 28–44)
GFR SERPL CREATININE-BSD FRML MDRD: 53 ML/MIN/1.73SQ M
GLUCOSE SERPL-MCNC: 102 MG/DL (ref 65–140)
HCT VFR BLD AUTO: 39 % (ref 36.5–49.3)
HGB BLD-MCNC: 12.2 G/DL (ref 12–17)
IMM GRANULOCYTES # BLD AUTO: 0.02 THOUSAND/UL (ref 0–0.2)
IMM GRANULOCYTES NFR BLD AUTO: 0 % (ref 0–2)
INTERVENTRICULAR SEPTUM IN DIASTOLE (PARASTERNAL SHORT AXIS VIEW): 0.9 CM
LEFT ATRIUM AREA SYSTOLE SINGLE PLANE A4C: 20 CM2
LEFT INTERNAL DIMENSION IN SYSTOLE: 2.4 CM (ref 2.1–4)
LEFT VENTRICULAR INTERNAL DIMENSION IN DIASTOLE: 5.4 CM (ref 4.52–6.73)
LEFT VENTRICULAR POSTERIOR WALL IN END DIASTOLE: 1 CM
LEFT VENTRICULAR STROKE VOLUME: 120 ML
LYMPHOCYTES # BLD AUTO: 2.01 THOUSANDS/ΜL (ref 0.6–4.47)
LYMPHOCYTES NFR BLD AUTO: 27 % (ref 14–44)
MCH RBC QN AUTO: 28.9 PG (ref 26.8–34.3)
MCHC RBC AUTO-ENTMCNC: 31.3 G/DL (ref 31.4–37.4)
MCV RBC AUTO: 92 FL (ref 82–98)
MONOCYTES # BLD AUTO: 0.54 THOUSAND/ΜL (ref 0.17–1.22)
MONOCYTES NFR BLD AUTO: 7 % (ref 4–12)
MV E'TISSUE VEL-SEP: 7 CM/S
MV PEAK A VEL: 0.87 M/S
MV PEAK E VEL: 55 CM/S
MV STENOSIS PRESSURE HALF TIME: 0 MS
MV VALVE AREA P 1/2 METHOD: 3.6
NEUTROPHILS # BLD AUTO: 4.68 THOUSANDS/ΜL (ref 1.85–7.62)
NEUTS SEG NFR BLD AUTO: 62 % (ref 43–75)
NRBC BLD AUTO-RTO: 0 /100 WBCS
PLATELET # BLD AUTO: 254 THOUSANDS/UL (ref 149–390)
PMV BLD AUTO: 9 FL (ref 8.9–12.7)
POTASSIUM SERPL-SCNC: 4 MMOL/L (ref 3.5–5.3)
PV MEAN GRADIENT: 3 MMHG
PV MEAN VELOCITY: 75 CM/S
PV PEAK GRADIENT: 5 MMHG
PV VTI: 17.89 CM
RBC # BLD AUTO: 4.22 MILLION/UL (ref 3.88–5.62)
RIGHT ATRIUM AREA SYSTOLE A4C: 23.4 CM2
RIGHT VENTRICLE ID DIMENSION: 3.7 CM
SL CV LV EF: 60
SL CV PED ECHO LEFT VENTRICLE DIASTOLIC VOLUME (MOD BIPLANE) 2D: 139 ML
SL CV PED ECHO LEFT VENTRICLE SYSTOLIC VOLUME (MOD BIPLANE) 2D: 19 ML
SL CV RVOT MAX GRADIENT: 2 MMHG
SL CV RVOT MEAN GRADIENT: 1 MMHG
SL CV RVOT VMEAN: 0.44 M/S
SODIUM SERPL-SCNC: 138 MMOL/L (ref 135–147)
TRICUSPID VALVE PEAK REGURGITATION VELOCITY: 2.89 M/S
TRICUSPID VALVE S': 0.9 CM/S
TV PEAK GRADIENT: 33 MMHG
WBC # BLD AUTO: 7.58 THOUSAND/UL (ref 4.31–10.16)

## 2021-12-27 PROCEDURE — 96523 IRRIG DRUG DELIVERY DEVICE: CPT

## 2021-12-27 PROCEDURE — 99238 HOSP IP/OBS DSCHRG MGMT 30/<: CPT | Performed by: PHYSICIAN ASSISTANT

## 2021-12-27 PROCEDURE — 93306 TTE W/DOPPLER COMPLETE: CPT | Performed by: INTERNAL MEDICINE

## 2021-12-27 PROCEDURE — 80048 BASIC METABOLIC PNL TOTAL CA: CPT | Performed by: NURSE PRACTITIONER

## 2021-12-27 PROCEDURE — 93306 TTE W/DOPPLER COMPLETE: CPT

## 2021-12-27 PROCEDURE — 99232 SBSQ HOSP IP/OBS MODERATE 35: CPT | Performed by: INTERNAL MEDICINE

## 2021-12-27 PROCEDURE — 85025 COMPLETE CBC W/AUTO DIFF WBC: CPT | Performed by: NURSE PRACTITIONER

## 2021-12-27 RX ADMIN — CITALOPRAM HYDROBROMIDE 10 MG: 20 TABLET ORAL at 08:57

## 2021-12-27 RX ADMIN — PANTOPRAZOLE SODIUM 40 MG: 40 TABLET, DELAYED RELEASE ORAL at 08:57

## 2021-12-27 RX ADMIN — HEPARIN SODIUM 5000 UNITS: 5000 INJECTION INTRAVENOUS; SUBCUTANEOUS at 06:30

## 2021-12-27 RX ADMIN — Medication 1 TABLET: at 08:58

## 2021-12-27 RX ADMIN — OXYCODONE HYDROCHLORIDE AND ACETAMINOPHEN 1000 MG: 500 TABLET ORAL at 08:56

## 2021-12-27 RX ADMIN — METRONIDAZOLE 500 MG: 500 TABLET ORAL at 06:30

## 2021-12-27 RX ADMIN — FOLIC ACID 1 MG: 1 TABLET ORAL at 08:57

## 2021-12-27 RX ADMIN — CYANOCOBALAMIN TAB 500 MCG 500 MCG: 500 TAB at 08:57

## 2021-12-27 RX ADMIN — Medication 1000 UNITS: at 08:56

## 2021-12-27 RX ADMIN — AMLODIPINE BESYLATE 10 MG: 10 TABLET ORAL at 08:56

## 2021-12-27 RX ADMIN — COLCHICINE 0.6 MG: 0.6 TABLET, FILM COATED ORAL at 08:56

## 2021-12-27 NOTE — PROGRESS NOTES
Pt tolerated routine port flush well  Great blood return noted  Scheduled for next appt feb 9th   Discharged ambulatory with wife

## 2021-12-28 ENCOUNTER — TRANSITIONAL CARE MANAGEMENT (OUTPATIENT)
Dept: FAMILY MEDICINE CLINIC | Facility: CLINIC | Age: 66
End: 2021-12-28

## 2021-12-28 ENCOUNTER — TELEPHONE (OUTPATIENT)
Dept: FAMILY MEDICINE CLINIC | Facility: CLINIC | Age: 66
End: 2021-12-28

## 2021-12-28 DIAGNOSIS — K52.9 PROCTOCOLITIS: Primary | ICD-10-CM

## 2021-12-28 RX ORDER — SULFAMETHOXAZOLE AND TRIMETHOPRIM 800; 160 MG/1; MG/1
1 TABLET ORAL EVERY 12 HOURS SCHEDULED
Qty: 14 TABLET | Refills: 1 | Status: SHIPPED | OUTPATIENT
Start: 2021-12-28 | End: 2022-01-04

## 2022-01-01 ENCOUNTER — HOSPICE ADMISSION (OUTPATIENT)
Dept: HOME HOSPICE | Facility: HOSPICE | Age: 67
End: 2022-01-01
Payer: MEDICARE

## 2022-01-01 ENCOUNTER — APPOINTMENT (INPATIENT)
Dept: RADIOLOGY | Facility: HOSPITAL | Age: 67
DRG: 064 | End: 2022-01-01
Payer: MEDICARE

## 2022-01-01 ENCOUNTER — APPOINTMENT (EMERGENCY)
Dept: RADIOLOGY | Facility: HOSPITAL | Age: 67
End: 2022-01-01
Payer: MEDICARE

## 2022-01-01 ENCOUNTER — ANESTHESIA EVENT (INPATIENT)
Dept: PERIOP | Facility: HOSPITAL | Age: 67
DRG: 826 | End: 2022-01-01
Payer: MEDICARE

## 2022-01-01 ENCOUNTER — TELEPHONE (OUTPATIENT)
Dept: RADIOLOGY | Facility: HOSPITAL | Age: 67
End: 2022-01-01

## 2022-01-01 ENCOUNTER — APPOINTMENT (INPATIENT)
Dept: NEUROLOGY | Facility: CLINIC | Age: 67
DRG: 064 | End: 2022-01-01
Payer: MEDICARE

## 2022-01-01 ENCOUNTER — EPISODE CHANGES (OUTPATIENT)
Dept: CASE MANAGEMENT | Facility: OTHER | Age: 67
End: 2022-01-01

## 2022-01-01 ENCOUNTER — HOSPITAL ENCOUNTER (INPATIENT)
Facility: HOSPITAL | Age: 67
LOS: 13 days | Discharge: NURSING, REHAB OR LONG-TERM CARE | DRG: 826 | End: 2022-08-07
Attending: INTERNAL MEDICINE | Admitting: INTERNAL MEDICINE
Payer: MEDICARE

## 2022-01-01 ENCOUNTER — APPOINTMENT (INPATIENT)
Dept: RADIOLOGY | Facility: HOSPITAL | Age: 67
DRG: 826 | End: 2022-01-01
Payer: MEDICARE

## 2022-01-01 ENCOUNTER — HOME CARE VISIT (OUTPATIENT)
Dept: HOME HEALTH SERVICES | Facility: HOME HEALTHCARE | Age: 67
End: 2022-01-01
Payer: MEDICARE

## 2022-01-01 ENCOUNTER — TELEPHONE (OUTPATIENT)
Dept: HEMATOLOGY ONCOLOGY | Facility: CLINIC | Age: 67
End: 2022-01-01

## 2022-01-01 ENCOUNTER — APPOINTMENT (INPATIENT)
Dept: NON INVASIVE DIAGNOSTICS | Facility: HOSPITAL | Age: 67
DRG: 064 | End: 2022-01-01
Payer: MEDICARE

## 2022-01-01 ENCOUNTER — APPOINTMENT (EMERGENCY)
Dept: RADIOLOGY | Facility: HOSPITAL | Age: 67
DRG: 064 | End: 2022-01-01
Payer: MEDICARE

## 2022-01-01 ENCOUNTER — APPOINTMENT (EMERGENCY)
Dept: CT IMAGING | Facility: HOSPITAL | Age: 67
End: 2022-01-01
Payer: MEDICARE

## 2022-01-01 ENCOUNTER — TRANSITIONAL CARE MANAGEMENT (OUTPATIENT)
Dept: FAMILY MEDICINE CLINIC | Facility: CLINIC | Age: 67
End: 2022-01-01

## 2022-01-01 ENCOUNTER — HOSPITAL ENCOUNTER (OUTPATIENT)
Dept: INFUSION CENTER | Facility: HOSPITAL | Age: 67
Discharge: HOME | End: 2022-08-02

## 2022-01-01 ENCOUNTER — TELEPHONE (OUTPATIENT)
Dept: RADIATION ONCOLOGY | Facility: CLINIC | Age: 67
End: 2022-01-01

## 2022-01-01 ENCOUNTER — TRANSCRIBE ORDERS (OUTPATIENT)
Dept: HOME HEALTH SERVICES | Facility: HOME HEALTHCARE | Age: 67
End: 2022-01-01

## 2022-01-01 ENCOUNTER — DOCUMENTATION (OUTPATIENT)
Dept: NEUROSURGERY | Facility: CLINIC | Age: 67
End: 2022-01-01

## 2022-01-01 ENCOUNTER — HOSPITAL ENCOUNTER (EMERGENCY)
Facility: HOSPITAL | Age: 67
End: 2022-07-24
Attending: EMERGENCY MEDICINE
Payer: MEDICARE

## 2022-01-01 ENCOUNTER — HOSPITAL ENCOUNTER (INPATIENT)
Facility: HOSPITAL | Age: 67
LOS: 6 days | DRG: 064 | End: 2022-08-14
Attending: EMERGENCY MEDICINE | Admitting: INTERNAL MEDICINE
Payer: MEDICARE

## 2022-01-01 ENCOUNTER — ANESTHESIA (INPATIENT)
Dept: PERIOP | Facility: HOSPITAL | Age: 67
DRG: 826 | End: 2022-01-01
Payer: MEDICARE

## 2022-01-01 ENCOUNTER — TELEPHONE (OUTPATIENT)
Dept: NEUROSURGERY | Facility: CLINIC | Age: 67
End: 2022-01-01

## 2022-01-01 VITALS
DIASTOLIC BLOOD PRESSURE: 67 MMHG | RESPIRATION RATE: 36 BRPM | HEART RATE: 146 BPM | HEIGHT: 68 IN | OXYGEN SATURATION: 93 % | TEMPERATURE: 100.2 F | WEIGHT: 184.97 LBS | BODY MASS INDEX: 28.03 KG/M2 | SYSTOLIC BLOOD PRESSURE: 113 MMHG

## 2022-01-01 VITALS
HEART RATE: 105 BPM | OXYGEN SATURATION: 99 % | SYSTOLIC BLOOD PRESSURE: 138 MMHG | RESPIRATION RATE: 16 BRPM | TEMPERATURE: 98.7 F | DIASTOLIC BLOOD PRESSURE: 72 MMHG

## 2022-01-01 VITALS
HEART RATE: 78 BPM | WEIGHT: 158.73 LBS | RESPIRATION RATE: 20 BRPM | DIASTOLIC BLOOD PRESSURE: 89 MMHG | SYSTOLIC BLOOD PRESSURE: 143 MMHG | HEIGHT: 68 IN | TEMPERATURE: 97.5 F | OXYGEN SATURATION: 94 % | BODY MASS INDEX: 24.06 KG/M2

## 2022-01-01 DIAGNOSIS — E78.2 MIXED HYPERLIPIDEMIA: ICD-10-CM

## 2022-01-01 DIAGNOSIS — I49.9 IRREGULAR HEART RATE: ICD-10-CM

## 2022-01-01 DIAGNOSIS — I61.4 CEREBELLAR HEMORRHAGE (HCC): ICD-10-CM

## 2022-01-01 DIAGNOSIS — D69.6 THROMBOCYTOPENIA (HCC): ICD-10-CM

## 2022-01-01 DIAGNOSIS — J96.01 ACUTE RESPIRATORY FAILURE WITH HYPOXIA (HCC): ICD-10-CM

## 2022-01-01 DIAGNOSIS — L60.8 TOENAIL DEFORMITY: ICD-10-CM

## 2022-01-01 DIAGNOSIS — R06.02 SOB (SHORTNESS OF BREATH): ICD-10-CM

## 2022-01-01 DIAGNOSIS — C7B.8 NEUROENDOCRINE CARCINOMA METASTATIC TO BRAIN (HCC): ICD-10-CM

## 2022-01-01 DIAGNOSIS — C7A.8 NEUROENDOCRINE CARCINOMA METASTATIC TO BRAIN (HCC): ICD-10-CM

## 2022-01-01 DIAGNOSIS — C34.92 ADENOCARCINOMA, LUNG, LEFT (HCC): ICD-10-CM

## 2022-01-01 DIAGNOSIS — E87.1 HYPONATREMIA: ICD-10-CM

## 2022-01-01 DIAGNOSIS — R42 LIGHTHEADEDNESS: ICD-10-CM

## 2022-01-01 DIAGNOSIS — I48.19 PERSISTENT ATRIAL FIBRILLATION (HCC): ICD-10-CM

## 2022-01-01 DIAGNOSIS — C7A.1 HIGH GRADE NEUROENDOCRINE CARCINOMA OF LUNG (HCC): ICD-10-CM

## 2022-01-01 DIAGNOSIS — G89.29 CHRONIC LEFT-SIDED LOW BACK PAIN WITH LEFT-SIDED SCIATICA: ICD-10-CM

## 2022-01-01 DIAGNOSIS — R65.20 SEVERE SEPSIS (HCC): ICD-10-CM

## 2022-01-01 DIAGNOSIS — K59.00 CONSTIPATION, UNSPECIFIED CONSTIPATION TYPE: ICD-10-CM

## 2022-01-01 DIAGNOSIS — L02.91 ABSCESS: ICD-10-CM

## 2022-01-01 DIAGNOSIS — C79.31 BRAIN METASTASES: Primary | ICD-10-CM

## 2022-01-01 DIAGNOSIS — G93.6 VASOGENIC BRAIN EDEMA (HCC): ICD-10-CM

## 2022-01-01 DIAGNOSIS — I48.91 ATRIAL FIBRILLATION WITH RAPID VENTRICULAR RESPONSE (HCC): Primary | ICD-10-CM

## 2022-01-01 DIAGNOSIS — G93.89 CEREBELLAR MASS: ICD-10-CM

## 2022-01-01 DIAGNOSIS — A41.9 SEVERE SEPSIS (HCC): ICD-10-CM

## 2022-01-01 DIAGNOSIS — N17.9 AKI (ACUTE KIDNEY INJURY) (HCC): ICD-10-CM

## 2022-01-01 DIAGNOSIS — C34.92 ADENOCARCINOMA, LUNG, LEFT (HCC): Primary | ICD-10-CM

## 2022-01-01 DIAGNOSIS — C79.31 BRAIN METASTASES (HCC): Primary | ICD-10-CM

## 2022-01-01 DIAGNOSIS — M54.42 CHRONIC LEFT-SIDED LOW BACK PAIN WITH LEFT-SIDED SCIATICA: ICD-10-CM

## 2022-01-01 DIAGNOSIS — G93.40 ACUTE ENCEPHALOPATHY: Primary | ICD-10-CM

## 2022-01-01 DIAGNOSIS — I62.9 INTRACRANIAL HEMORRHAGE (HCC): ICD-10-CM

## 2022-01-01 DIAGNOSIS — J96.01 ACUTE RESPIRATORY FAILURE WITH HYPOXIA (HCC): Primary | ICD-10-CM

## 2022-01-01 LAB
2HR DELTA HS TROPONIN: 2 NG/L
4HR DELTA HS TROPONIN: -13 NG/L
4HR DELTA HS TROPONIN: 4 NG/L
ABO GROUP BLD BPU: NORMAL
ABO GROUP BLD: NORMAL
ABO GROUP BLD: NORMAL
ALBUMIN SERPL BCP-MCNC: 2 G/DL (ref 3.5–5)
ALBUMIN SERPL BCP-MCNC: 2.5 G/DL (ref 3.5–5)
ALBUMIN SERPL BCP-MCNC: 2.8 G/DL (ref 3.5–5)
ALP SERPL-CCNC: 53 U/L (ref 46–116)
ALP SERPL-CCNC: 58 U/L (ref 46–116)
ALP SERPL-CCNC: 76 U/L (ref 46–116)
ALT SERPL W P-5'-P-CCNC: 55 U/L (ref 12–78)
ALT SERPL W P-5'-P-CCNC: 64 U/L (ref 12–78)
ALT SERPL W P-5'-P-CCNC: 66 U/L (ref 12–78)
AMORPH URATE CRY URNS QL MICRO: ABNORMAL
ANION GAP SERPL CALCULATED.3IONS-SCNC: 10 MMOL/L (ref 4–13)
ANION GAP SERPL CALCULATED.3IONS-SCNC: 13 MMOL/L (ref 4–13)
ANION GAP SERPL CALCULATED.3IONS-SCNC: 16 MMOL/L (ref 4–13)
ANION GAP SERPL CALCULATED.3IONS-SCNC: 3 MMOL/L (ref 4–13)
ANION GAP SERPL CALCULATED.3IONS-SCNC: 4 MMOL/L (ref 4–13)
ANION GAP SERPL CALCULATED.3IONS-SCNC: 5 MMOL/L (ref 4–13)
ANION GAP SERPL CALCULATED.3IONS-SCNC: 6 MMOL/L (ref 4–13)
ANION GAP SERPL CALCULATED.3IONS-SCNC: 7 MMOL/L (ref 4–13)
ANION GAP SERPL CALCULATED.3IONS-SCNC: 8 MMOL/L (ref 4–13)
ANION GAP SERPL CALCULATED.3IONS-SCNC: 8 MMOL/L (ref 4–13)
ANION GAP SERPL CALCULATED.3IONS-SCNC: 9 MMOL/L (ref 4–13)
ANION GAP SERPL CALCULATED.3IONS-SCNC: 9 MMOL/L (ref 4–13)
ANISOCYTOSIS BLD QL SMEAR: PRESENT
AORTIC ROOT: 3.1 CM
APICAL FOUR CHAMBER EJECTION FRACTION: 59 %
APTT PPP: 26 SECONDS (ref 23–37)
APTT PPP: 34 SECONDS (ref 23–37)
APTT PPP: 35 SECONDS (ref 23–37)
APTT PPP: 37 SECONDS (ref 23–37)
ARTERIAL PATENCY WRIST A: YES
ARTERIAL PATENCY WRIST A: YES
ASCENDING AORTA: 3.6 CM
AST SERPL W P-5'-P-CCNC: 19 U/L (ref 5–45)
AST SERPL W P-5'-P-CCNC: 23 U/L (ref 5–45)
AST SERPL W P-5'-P-CCNC: 35 U/L (ref 5–45)
ATRIAL RATE: 102 BPM
ATRIAL RATE: 111 BPM
ATRIAL RATE: 116 BPM
ATRIAL RATE: 120 BPM
ATRIAL RATE: 151 BPM
ATRIAL RATE: 73 BPM
ATRIAL RATE: 73 BPM
ATRIAL RATE: 86 BPM
BACTERIA BLD CULT: NORMAL
BACTERIA BLD CULT: NORMAL
BACTERIA SPEC ANAEROBE CULT: NO GROWTH
BACTERIA SPEC BFLD CULT: NO GROWTH
BACTERIA UR CULT: NORMAL
BACTERIA UR QL AUTO: ABNORMAL /HPF
BACTERIA UR QL AUTO: ABNORMAL /HPF
BASE EX.OXY STD BLDV CALC-SCNC: 37.7 % (ref 60–80)
BASE EXCESS BLDA CALC-SCNC: -0.5 MMOL/L
BASE EXCESS BLDA CALC-SCNC: -0.7 MMOL/L
BASE EXCESS BLDA CALC-SCNC: -1.5 MMOL/L
BASE EXCESS BLDA CALC-SCNC: 4 MMOL/L (ref -2–3)
BASE EXCESS BLDV CALC-SCNC: -1.4 MMOL/L
BASOPHILS # BLD AUTO: 0.01 THOUSANDS/ΜL (ref 0–0.1)
BASOPHILS # BLD AUTO: 0.02 THOUSANDS/ΜL (ref 0–0.1)
BASOPHILS # BLD AUTO: 0.03 THOUSANDS/ΜL (ref 0–0.1)
BASOPHILS # BLD MANUAL: 0 THOUSAND/UL (ref 0–0.1)
BASOPHILS NFR BLD AUTO: 0 % (ref 0–1)
BASOPHILS NFR MAR MANUAL: 0 % (ref 0–1)
BILIRUB SERPL-MCNC: 0.56 MG/DL (ref 0.2–1)
BILIRUB SERPL-MCNC: 0.58 MG/DL (ref 0.2–1)
BILIRUB SERPL-MCNC: 0.68 MG/DL (ref 0.2–1)
BILIRUB UR QL STRIP: NEGATIVE
BILIRUB UR QL STRIP: NEGATIVE
BLD GP AB SCN SERPL QL: POSITIVE
BLD GP AB SCN SERPL QL: POSITIVE
BLOOD GROUP ANTIBODIES SERPL: NORMAL
BPU ID: NORMAL
BUN SERPL-MCNC: 12 MG/DL (ref 5–25)
BUN SERPL-MCNC: 12 MG/DL (ref 5–25)
BUN SERPL-MCNC: 13 MG/DL (ref 5–25)
BUN SERPL-MCNC: 15 MG/DL (ref 5–25)
BUN SERPL-MCNC: 15 MG/DL (ref 5–25)
BUN SERPL-MCNC: 17 MG/DL (ref 5–25)
BUN SERPL-MCNC: 17 MG/DL (ref 5–25)
BUN SERPL-MCNC: 18 MG/DL (ref 5–25)
BUN SERPL-MCNC: 18 MG/DL (ref 5–25)
BUN SERPL-MCNC: 19 MG/DL (ref 5–25)
BUN SERPL-MCNC: 20 MG/DL (ref 5–25)
BUN SERPL-MCNC: 21 MG/DL (ref 5–25)
BUN SERPL-MCNC: 22 MG/DL (ref 5–25)
BUN SERPL-MCNC: 23 MG/DL (ref 5–25)
BUN SERPL-MCNC: 25 MG/DL (ref 5–25)
BUN SERPL-MCNC: 27 MG/DL (ref 5–25)
BUN SERPL-MCNC: 28 MG/DL (ref 5–25)
BUN SERPL-MCNC: 30 MG/DL (ref 5–25)
BUN SERPL-MCNC: 31 MG/DL (ref 5–25)
BUN SERPL-MCNC: 32 MG/DL (ref 5–25)
BUN SERPL-MCNC: 34 MG/DL (ref 5–25)
BURR CELLS BLD QL SMEAR: PRESENT
CA-I BLD-SCNC: 1.11 MMOL/L (ref 1.12–1.32)
CALCIUM ALBUM COR SERPL-MCNC: 11.4 MG/DL (ref 8.3–10.1)
CALCIUM ALBUM COR SERPL-MCNC: 9.6 MG/DL (ref 8.3–10.1)
CALCIUM ALBUM COR SERPL-MCNC: 9.9 MG/DL (ref 8.3–10.1)
CALCIUM SERPL-MCNC: 7.6 MG/DL (ref 8.3–10.1)
CALCIUM SERPL-MCNC: 8.1 MG/DL (ref 8.3–10.1)
CALCIUM SERPL-MCNC: 8.2 MG/DL (ref 8.3–10.1)
CALCIUM SERPL-MCNC: 8.2 MG/DL (ref 8.3–10.1)
CALCIUM SERPL-MCNC: 8.4 MG/DL (ref 8.3–10.1)
CALCIUM SERPL-MCNC: 8.5 MG/DL (ref 8.3–10.1)
CALCIUM SERPL-MCNC: 8.6 MG/DL (ref 8.3–10.1)
CALCIUM SERPL-MCNC: 8.7 MG/DL (ref 8.3–10.1)
CALCIUM SERPL-MCNC: 8.8 MG/DL (ref 8.3–10.1)
CALCIUM SERPL-MCNC: 8.9 MG/DL (ref 8.3–10.1)
CALCIUM SERPL-MCNC: 9 MG/DL (ref 8.3–10.1)
CALCIUM SERPL-MCNC: 9.1 MG/DL (ref 8.3–10.1)
CALCIUM SERPL-MCNC: 9.1 MG/DL (ref 8.3–10.1)
CALCIUM SERPL-MCNC: 9.3 MG/DL (ref 8.3–10.1)
CALCIUM SERPL-MCNC: 9.6 MG/DL (ref 8.3–10.1)
CALCIUM SERPL-MCNC: 9.8 MG/DL (ref 8.3–10.1)
CARDIAC TROPONIN I PNL SERPL HS: 17 NG/L
CARDIAC TROPONIN I PNL SERPL HS: 19 NG/L
CARDIAC TROPONIN I PNL SERPL HS: 21 NG/L
CARDIAC TROPONIN I PNL SERPL HS: 70 NG/L
CARDIAC TROPONIN I PNL SERPL HS: 83 NG/L
CHLORIDE SERPL-SCNC: 101 MMOL/L (ref 96–108)
CHLORIDE SERPL-SCNC: 101 MMOL/L (ref 96–108)
CHLORIDE SERPL-SCNC: 102 MMOL/L (ref 96–108)
CHLORIDE SERPL-SCNC: 103 MMOL/L (ref 96–108)
CHLORIDE SERPL-SCNC: 105 MMOL/L (ref 96–108)
CHLORIDE SERPL-SCNC: 106 MMOL/L (ref 96–108)
CHLORIDE SERPL-SCNC: 109 MMOL/L (ref 96–108)
CHLORIDE SERPL-SCNC: 112 MMOL/L (ref 96–108)
CHLORIDE SERPL-SCNC: 113 MMOL/L (ref 96–108)
CHLORIDE SERPL-SCNC: 92 MMOL/L (ref 96–108)
CHLORIDE SERPL-SCNC: 93 MMOL/L (ref 96–108)
CHLORIDE SERPL-SCNC: 94 MMOL/L (ref 96–108)
CHLORIDE SERPL-SCNC: 95 MMOL/L (ref 96–108)
CHLORIDE SERPL-SCNC: 96 MMOL/L (ref 96–108)
CHLORIDE SERPL-SCNC: 97 MMOL/L (ref 96–108)
CHLORIDE SERPL-SCNC: 99 MMOL/L (ref 96–108)
CHLORIDE SERPL-SCNC: 99 MMOL/L (ref 96–108)
CLARITY UR: ABNORMAL
CLARITY UR: CLEAR
CO2 SERPL-SCNC: 22 MMOL/L (ref 21–32)
CO2 SERPL-SCNC: 23 MMOL/L (ref 21–32)
CO2 SERPL-SCNC: 24 MMOL/L (ref 21–32)
CO2 SERPL-SCNC: 25 MMOL/L (ref 21–32)
CO2 SERPL-SCNC: 26 MMOL/L (ref 21–32)
CO2 SERPL-SCNC: 26 MMOL/L (ref 21–32)
CO2 SERPL-SCNC: 27 MMOL/L (ref 21–32)
CO2 SERPL-SCNC: 28 MMOL/L (ref 21–32)
CO2 SERPL-SCNC: 30 MMOL/L (ref 21–32)
CO2 SERPL-SCNC: 31 MMOL/L (ref 21–32)
CO2 SERPL-SCNC: 32 MMOL/L (ref 21–32)
CO2 SERPL-SCNC: 32 MMOL/L (ref 21–32)
CO2 SERPL-SCNC: 33 MMOL/L (ref 21–32)
COLOR UR: ABNORMAL
COLOR UR: YELLOW
CREAT SERPL-MCNC: 0.57 MG/DL (ref 0.6–1.3)
CREAT SERPL-MCNC: 0.58 MG/DL (ref 0.6–1.3)
CREAT SERPL-MCNC: 0.59 MG/DL (ref 0.6–1.3)
CREAT SERPL-MCNC: 0.6 MG/DL (ref 0.6–1.3)
CREAT SERPL-MCNC: 0.61 MG/DL (ref 0.6–1.3)
CREAT SERPL-MCNC: 0.62 MG/DL (ref 0.6–1.3)
CREAT SERPL-MCNC: 0.64 MG/DL (ref 0.6–1.3)
CREAT SERPL-MCNC: 0.64 MG/DL (ref 0.6–1.3)
CREAT SERPL-MCNC: 0.65 MG/DL (ref 0.6–1.3)
CREAT SERPL-MCNC: 0.66 MG/DL (ref 0.6–1.3)
CREAT SERPL-MCNC: 0.66 MG/DL (ref 0.6–1.3)
CREAT SERPL-MCNC: 0.67 MG/DL (ref 0.6–1.3)
CREAT SERPL-MCNC: 0.67 MG/DL (ref 0.6–1.3)
CREAT SERPL-MCNC: 0.7 MG/DL (ref 0.6–1.3)
CREAT SERPL-MCNC: 0.7 MG/DL (ref 0.6–1.3)
CREAT SERPL-MCNC: 0.73 MG/DL (ref 0.6–1.3)
CREAT SERPL-MCNC: 0.75 MG/DL (ref 0.6–1.3)
CREAT SERPL-MCNC: 0.76 MG/DL (ref 0.6–1.3)
CREAT SERPL-MCNC: 0.76 MG/DL (ref 0.6–1.3)
CREAT SERPL-MCNC: 0.77 MG/DL (ref 0.6–1.3)
CREAT SERPL-MCNC: 0.79 MG/DL (ref 0.6–1.3)
CREAT SERPL-MCNC: 0.87 MG/DL (ref 0.6–1.3)
CREAT SERPL-MCNC: 0.9 MG/DL (ref 0.6–1.3)
CREAT SERPL-MCNC: 0.95 MG/DL (ref 0.6–1.3)
CREAT SERPL-MCNC: 0.95 MG/DL (ref 0.6–1.3)
CREAT SERPL-MCNC: 0.99 MG/DL (ref 0.6–1.3)
CREAT SERPL-MCNC: 1.06 MG/DL (ref 0.6–1.3)
CREAT SERPL-MCNC: 1.5 MG/DL (ref 0.6–1.3)
CROSSMATCH: NORMAL
DOHLE BOD BLD QL SMEAR: PRESENT
DOHLE BOD BLD QL SMEAR: PRESENT
EOSINOPHIL # BLD AUTO: 0 THOUSAND/ΜL (ref 0–0.61)
EOSINOPHIL # BLD MANUAL: 0 THOUSAND/UL (ref 0–0.4)
EOSINOPHIL NFR BLD AUTO: 0 % (ref 0–6)
EOSINOPHIL NFR BLD MANUAL: 0 % (ref 0–6)
EOSINOPHIL NFR BLD MANUAL: 1 % (ref 0–6)
ERYTHROCYTE [DISTWIDTH] IN BLOOD BY AUTOMATED COUNT: 15.1 % (ref 11.6–15.1)
ERYTHROCYTE [DISTWIDTH] IN BLOOD BY AUTOMATED COUNT: 15.2 % (ref 11.6–15.1)
ERYTHROCYTE [DISTWIDTH] IN BLOOD BY AUTOMATED COUNT: 15.2 % (ref 11.6–15.1)
ERYTHROCYTE [DISTWIDTH] IN BLOOD BY AUTOMATED COUNT: 15.3 % (ref 11.6–15.1)
ERYTHROCYTE [DISTWIDTH] IN BLOOD BY AUTOMATED COUNT: 15.4 % (ref 11.6–15.1)
ERYTHROCYTE [DISTWIDTH] IN BLOOD BY AUTOMATED COUNT: 15.5 % (ref 11.6–15.1)
ERYTHROCYTE [DISTWIDTH] IN BLOOD BY AUTOMATED COUNT: 15.6 % (ref 11.6–15.1)
ERYTHROCYTE [DISTWIDTH] IN BLOOD BY AUTOMATED COUNT: 15.9 % (ref 11.6–15.1)
ERYTHROCYTE [DISTWIDTH] IN BLOOD BY AUTOMATED COUNT: 15.9 % (ref 11.6–15.1)
ERYTHROCYTE [DISTWIDTH] IN BLOOD BY AUTOMATED COUNT: 16.1 % (ref 11.6–15.1)
ERYTHROCYTE [DISTWIDTH] IN BLOOD BY AUTOMATED COUNT: 16.2 % (ref 11.6–15.1)
ERYTHROCYTE [DISTWIDTH] IN BLOOD BY AUTOMATED COUNT: 16.5 % (ref 11.6–15.1)
ERYTHROCYTE [DISTWIDTH] IN BLOOD BY AUTOMATED COUNT: 17.4 % (ref 11.6–15.1)
ERYTHROCYTE [DISTWIDTH] IN BLOOD BY AUTOMATED COUNT: 17.9 % (ref 11.6–15.1)
ERYTHROCYTE [DISTWIDTH] IN BLOOD BY AUTOMATED COUNT: 18.3 % (ref 11.6–15.1)
ERYTHROCYTE [DISTWIDTH] IN BLOOD BY AUTOMATED COUNT: 18.3 % (ref 11.6–15.1)
FERRITIN SERPL-MCNC: 3637 NG/ML (ref 8–388)
FLUAV RNA RESP QL NAA+PROBE: NEGATIVE
FLUBV RNA RESP QL NAA+PROBE: NEGATIVE
FOLATE SERPL-MCNC: >20 NG/ML (ref 3.1–17.5)
FRACTIONAL SHORTENING: 37 (ref 28–44)
GFR SERPL CREATININE-BSD FRML MDRD: 101 ML/MIN/1.73SQ M
GFR SERPL CREATININE-BSD FRML MDRD: 101 ML/MIN/1.73SQ M
GFR SERPL CREATININE-BSD FRML MDRD: 102 ML/MIN/1.73SQ M
GFR SERPL CREATININE-BSD FRML MDRD: 103 ML/MIN/1.73SQ M
GFR SERPL CREATININE-BSD FRML MDRD: 104 ML/MIN/1.73SQ M
GFR SERPL CREATININE-BSD FRML MDRD: 46 ML/MIN/1.73SQ M
GFR SERPL CREATININE-BSD FRML MDRD: 71 ML/MIN/1.73SQ M
GFR SERPL CREATININE-BSD FRML MDRD: 77 ML/MIN/1.73SQ M
GFR SERPL CREATININE-BSD FRML MDRD: 81 ML/MIN/1.73SQ M
GFR SERPL CREATININE-BSD FRML MDRD: 81 ML/MIN/1.73SQ M
GFR SERPL CREATININE-BSD FRML MDRD: 86 ML/MIN/1.73SQ M
GFR SERPL CREATININE-BSD FRML MDRD: 88 ML/MIN/1.73SQ M
GFR SERPL CREATININE-BSD FRML MDRD: 91 ML/MIN/1.73SQ M
GFR SERPL CREATININE-BSD FRML MDRD: 92 ML/MIN/1.73SQ M
GFR SERPL CREATININE-BSD FRML MDRD: 93 ML/MIN/1.73SQ M
GFR SERPL CREATININE-BSD FRML MDRD: 94 ML/MIN/1.73SQ M
GFR SERPL CREATININE-BSD FRML MDRD: 96 ML/MIN/1.73SQ M
GFR SERPL CREATININE-BSD FRML MDRD: 96 ML/MIN/1.73SQ M
GFR SERPL CREATININE-BSD FRML MDRD: 98 ML/MIN/1.73SQ M
GFR SERPL CREATININE-BSD FRML MDRD: 99 ML/MIN/1.73SQ M
GLUCOSE SERPL-MCNC: 110 MG/DL (ref 65–140)
GLUCOSE SERPL-MCNC: 111 MG/DL (ref 65–140)
GLUCOSE SERPL-MCNC: 117 MG/DL (ref 65–140)
GLUCOSE SERPL-MCNC: 124 MG/DL (ref 65–140)
GLUCOSE SERPL-MCNC: 131 MG/DL (ref 65–140)
GLUCOSE SERPL-MCNC: 132 MG/DL (ref 65–140)
GLUCOSE SERPL-MCNC: 133 MG/DL (ref 65–140)
GLUCOSE SERPL-MCNC: 133 MG/DL (ref 65–140)
GLUCOSE SERPL-MCNC: 134 MG/DL (ref 65–140)
GLUCOSE SERPL-MCNC: 136 MG/DL (ref 65–140)
GLUCOSE SERPL-MCNC: 137 MG/DL (ref 65–140)
GLUCOSE SERPL-MCNC: 137 MG/DL (ref 65–140)
GLUCOSE SERPL-MCNC: 138 MG/DL (ref 65–140)
GLUCOSE SERPL-MCNC: 138 MG/DL (ref 65–140)
GLUCOSE SERPL-MCNC: 140 MG/DL (ref 65–140)
GLUCOSE SERPL-MCNC: 141 MG/DL (ref 65–140)
GLUCOSE SERPL-MCNC: 141 MG/DL (ref 65–140)
GLUCOSE SERPL-MCNC: 142 MG/DL (ref 65–140)
GLUCOSE SERPL-MCNC: 148 MG/DL (ref 65–140)
GLUCOSE SERPL-MCNC: 150 MG/DL (ref 65–140)
GLUCOSE SERPL-MCNC: 160 MG/DL (ref 65–140)
GLUCOSE SERPL-MCNC: 161 MG/DL (ref 65–140)
GLUCOSE SERPL-MCNC: 163 MG/DL (ref 65–140)
GLUCOSE SERPL-MCNC: 166 MG/DL (ref 65–140)
GLUCOSE SERPL-MCNC: 174 MG/DL (ref 65–140)
GLUCOSE SERPL-MCNC: 176 MG/DL (ref 65–140)
GLUCOSE SERPL-MCNC: 177 MG/DL (ref 65–140)
GLUCOSE SERPL-MCNC: 178 MG/DL (ref 65–140)
GLUCOSE SERPL-MCNC: 182 MG/DL (ref 65–140)
GLUCOSE SERPL-MCNC: 183 MG/DL (ref 65–140)
GLUCOSE SERPL-MCNC: 87 MG/DL (ref 65–140)
GLUCOSE SERPL-MCNC: 90 MG/DL (ref 65–140)
GLUCOSE UR STRIP-MCNC: NEGATIVE MG/DL
GLUCOSE UR STRIP-MCNC: NEGATIVE MG/DL
GRAM STN SPEC: NORMAL
HCO3 BLDA-SCNC: 21.7 MMOL/L (ref 22–28)
HCO3 BLDA-SCNC: 24.3 MMOL/L (ref 22–28)
HCO3 BLDA-SCNC: 25.2 MMOL/L (ref 22–28)
HCO3 BLDA-SCNC: 28.7 MMOL/L (ref 22–28)
HCO3 BLDV-SCNC: 23.7 MMOL/L (ref 24–30)
HCT VFR BLD AUTO: 18.3 % (ref 36.5–49.3)
HCT VFR BLD AUTO: 20.2 % (ref 36.5–49.3)
HCT VFR BLD AUTO: 20.2 % (ref 36.5–49.3)
HCT VFR BLD AUTO: 22.2 % (ref 36.5–49.3)
HCT VFR BLD AUTO: 23.5 % (ref 36.5–49.3)
HCT VFR BLD AUTO: 24.7 % (ref 36.5–49.3)
HCT VFR BLD AUTO: 24.8 % (ref 36.5–49.3)
HCT VFR BLD AUTO: 25.6 % (ref 36.5–49.3)
HCT VFR BLD AUTO: 27.3 % (ref 36.5–49.3)
HCT VFR BLD AUTO: 28.5 % (ref 36.5–49.3)
HCT VFR BLD AUTO: 30.4 % (ref 36.5–49.3)
HCT VFR BLD AUTO: 31.1 % (ref 36.5–49.3)
HCT VFR BLD AUTO: 32.4 % (ref 36.5–49.3)
HCT VFR BLD AUTO: 33.2 % (ref 36.5–49.3)
HCT VFR BLD AUTO: 33.6 % (ref 36.5–49.3)
HCT VFR BLD AUTO: 33.7 % (ref 36.5–49.3)
HCT VFR BLD AUTO: 33.8 % (ref 36.5–49.3)
HCT VFR BLD AUTO: 34.3 % (ref 36.5–49.3)
HCT VFR BLD AUTO: 34.4 % (ref 36.5–49.3)
HCT VFR BLD AUTO: 34.9 % (ref 36.5–49.3)
HCT VFR BLD AUTO: 34.9 % (ref 36.5–49.3)
HCT VFR BLD AUTO: 35.8 % (ref 36.5–49.3)
HCT VFR BLD AUTO: 36.7 % (ref 36.5–49.3)
HCT VFR BLD AUTO: 39 % (ref 36.5–49.3)
HCT VFR BLD AUTO: 39.5 % (ref 36.5–49.3)
HCT VFR BLD AUTO: 42 % (ref 36.5–49.3)
HCT VFR BLD AUTO: 42.4 % (ref 36.5–49.3)
HCT VFR BLD CALC: 28 % (ref 36.5–49.3)
HFNC FLOW LPM: 45
HFNC FLOW LPM: 50
HFNC FLOW LPM: 50
HGB BLD-MCNC: 10.2 G/DL (ref 12–17)
HGB BLD-MCNC: 10.3 G/DL (ref 12–17)
HGB BLD-MCNC: 10.8 G/DL (ref 12–17)
HGB BLD-MCNC: 10.8 G/DL (ref 12–17)
HGB BLD-MCNC: 11.3 G/DL (ref 12–17)
HGB BLD-MCNC: 11.3 G/DL (ref 12–17)
HGB BLD-MCNC: 11.4 G/DL (ref 12–17)
HGB BLD-MCNC: 11.7 G/DL (ref 12–17)
HGB BLD-MCNC: 11.7 G/DL (ref 12–17)
HGB BLD-MCNC: 11.8 G/DL (ref 12–17)
HGB BLD-MCNC: 12.1 G/DL (ref 12–17)
HGB BLD-MCNC: 12.4 G/DL (ref 12–17)
HGB BLD-MCNC: 12.5 G/DL (ref 12–17)
HGB BLD-MCNC: 13.3 G/DL (ref 12–17)
HGB BLD-MCNC: 13.5 G/DL (ref 12–17)
HGB BLD-MCNC: 13.9 G/DL (ref 12–17)
HGB BLD-MCNC: 14.7 G/DL (ref 12–17)
HGB BLD-MCNC: 5.9 G/DL (ref 12–17)
HGB BLD-MCNC: 6.6 G/DL (ref 12–17)
HGB BLD-MCNC: 6.6 G/DL (ref 12–17)
HGB BLD-MCNC: 7.3 G/DL (ref 12–17)
HGB BLD-MCNC: 7.7 G/DL (ref 12–17)
HGB BLD-MCNC: 8 G/DL (ref 12–17)
HGB BLD-MCNC: 8.1 G/DL (ref 12–17)
HGB BLD-MCNC: 8.6 G/DL (ref 12–17)
HGB BLD-MCNC: 8.9 G/DL (ref 12–17)
HGB BLD-MCNC: 9.6 G/DL (ref 12–17)
HGB BLDA-MCNC: 9.5 G/DL (ref 12–17)
HGB UR QL STRIP.AUTO: ABNORMAL
HGB UR QL STRIP.AUTO: NEGATIVE
IMM GRANULOCYTES # BLD AUTO: 0.07 THOUSAND/UL (ref 0–0.2)
IMM GRANULOCYTES # BLD AUTO: 0.09 THOUSAND/UL (ref 0–0.2)
IMM GRANULOCYTES # BLD AUTO: 0.13 THOUSAND/UL (ref 0–0.2)
IMM GRANULOCYTES NFR BLD AUTO: 1 % (ref 0–2)
IMM GRANULOCYTES NFR BLD AUTO: 1 % (ref 0–2)
IMM GRANULOCYTES NFR BLD AUTO: 2 % (ref 0–2)
INR PPP: 0.98 (ref 0.84–1.19)
INR PPP: 0.99 (ref 0.84–1.19)
INR PPP: 1.13 (ref 0.84–1.19)
INR PPP: 1.28 (ref 0.84–1.19)
INTERVENTRICULAR SEPTUM IN DIASTOLE (PARASTERNAL SHORT AXIS VIEW): 1.3 CM
INTERVENTRICULAR SEPTUM: 1.3 CM (ref 0.6–1.1)
IRON SATN MFR SERPL: 66 % (ref 20–50)
IRON SERPL-MCNC: 61 UG/DL (ref 65–175)
KETONES UR STRIP-MCNC: NEGATIVE MG/DL
KETONES UR STRIP-MCNC: NEGATIVE MG/DL
LAAS-AP2: 10.2 CM2
LAAS-AP4: 14.8 CM2
LACTATE SERPL-SCNC: 1.8 MMOL/L (ref 0.5–2)
LACTATE SERPL-SCNC: 2.2 MMOL/L (ref 0.5–2)
LACTATE SERPL-SCNC: 2.9 MMOL/L (ref 0.5–2)
LACTATE SERPL-SCNC: 7.2 MMOL/L (ref 0.5–2)
LACTATE SERPL-SCNC: 7.7 MMOL/L (ref 0.5–2)
LEFT ATRIUM SIZE: 3.7 CM
LEFT INTERNAL DIMENSION IN SYSTOLE: 2.7 CM (ref 2.1–4)
LEFT VENTRICULAR INTERNAL DIMENSION IN DIASTOLE: 4.3 CM (ref 3.5–6)
LEFT VENTRICULAR POSTERIOR WALL IN END DIASTOLE: 1.4 CM
LEFT VENTRICULAR STROKE VOLUME: 59 ML
LEUKOCYTE ESTERASE UR QL STRIP: NEGATIVE
LEUKOCYTE ESTERASE UR QL STRIP: NEGATIVE
LVSV (TEICH): 59 ML
LYMPHOCYTES # BLD AUTO: 0.04 THOUSAND/UL (ref 0.6–4.47)
LYMPHOCYTES # BLD AUTO: 0.06 THOUSAND/UL (ref 0.6–4.47)
LYMPHOCYTES # BLD AUTO: 0.07 THOUSAND/UL (ref 0.6–4.47)
LYMPHOCYTES # BLD AUTO: 0.12 THOUSAND/UL (ref 0.6–4.47)
LYMPHOCYTES # BLD AUTO: 0.13 THOUSANDS/ΜL (ref 0.6–4.47)
LYMPHOCYTES # BLD AUTO: 0.21 THOUSANDS/ΜL (ref 0.6–4.47)
LYMPHOCYTES # BLD AUTO: 0.22 THOUSAND/UL (ref 0.6–4.47)
LYMPHOCYTES # BLD AUTO: 0.25 THOUSANDS/ΜL (ref 0.6–4.47)
LYMPHOCYTES # BLD AUTO: 1 % (ref 14–44)
LYMPHOCYTES # BLD AUTO: 2 % (ref 14–44)
LYMPHOCYTES # BLD AUTO: 6 % (ref 14–44)
LYMPHOCYTES # BLD AUTO: 7 % (ref 14–44)
LYMPHOCYTES NFR BLD AUTO: 2 % (ref 14–44)
LYMPHOCYTES NFR BLD AUTO: 3 % (ref 14–44)
LYMPHOCYTES NFR BLD AUTO: 4 % (ref 14–44)
LYMPHOCYTES NFR BLD: 10 % (ref 14–44)
LYMPHOCYTES NFR BLD: 3 % (ref 14–44)
MACROCYTES BLD QL AUTO: PRESENT
MAGNESIUM SERPL-MCNC: 1.7 MG/DL (ref 1.6–2.6)
MAGNESIUM SERPL-MCNC: 1.9 MG/DL (ref 1.6–2.6)
MAGNESIUM SERPL-MCNC: 2 MG/DL (ref 1.6–2.6)
MAGNESIUM SERPL-MCNC: 2.2 MG/DL (ref 1.6–2.6)
MAGNESIUM SERPL-MCNC: 2.2 MG/DL (ref 1.6–2.6)
MAGNESIUM SERPL-MCNC: 2.4 MG/DL (ref 1.6–2.6)
MCH RBC QN AUTO: 33.1 PG (ref 26.8–34.3)
MCH RBC QN AUTO: 33.2 PG (ref 26.8–34.3)
MCH RBC QN AUTO: 33.2 PG (ref 26.8–34.3)
MCH RBC QN AUTO: 33.4 PG (ref 26.8–34.3)
MCH RBC QN AUTO: 33.5 PG (ref 26.8–34.3)
MCH RBC QN AUTO: 33.6 PG (ref 26.8–34.3)
MCH RBC QN AUTO: 33.7 PG (ref 26.8–34.3)
MCH RBC QN AUTO: 33.8 PG (ref 26.8–34.3)
MCH RBC QN AUTO: 33.9 PG (ref 26.8–34.3)
MCH RBC QN AUTO: 34.1 PG (ref 26.8–34.3)
MCH RBC QN AUTO: 34.2 PG (ref 26.8–34.3)
MCH RBC QN AUTO: 34.3 PG (ref 26.8–34.3)
MCH RBC QN AUTO: 34.4 PG (ref 26.8–34.3)
MCH RBC QN AUTO: 34.5 PG (ref 26.8–34.3)
MCH RBC QN AUTO: 34.5 PG (ref 26.8–34.3)
MCH RBC QN AUTO: 34.6 PG (ref 26.8–34.3)
MCH RBC QN AUTO: 34.7 PG (ref 26.8–34.3)
MCH RBC QN AUTO: 34.9 PG (ref 26.8–34.3)
MCHC RBC AUTO-ENTMCNC: 32.2 G/DL (ref 31.4–37.4)
MCHC RBC AUTO-ENTMCNC: 32.3 G/DL (ref 31.4–37.4)
MCHC RBC AUTO-ENTMCNC: 32.5 G/DL (ref 31.4–37.4)
MCHC RBC AUTO-ENTMCNC: 32.6 G/DL (ref 31.4–37.4)
MCHC RBC AUTO-ENTMCNC: 32.7 G/DL (ref 31.4–37.4)
MCHC RBC AUTO-ENTMCNC: 32.7 G/DL (ref 31.4–37.4)
MCHC RBC AUTO-ENTMCNC: 32.8 G/DL (ref 31.4–37.4)
MCHC RBC AUTO-ENTMCNC: 32.8 G/DL (ref 31.4–37.4)
MCHC RBC AUTO-ENTMCNC: 32.9 G/DL (ref 31.4–37.4)
MCHC RBC AUTO-ENTMCNC: 33.1 G/DL (ref 31.4–37.4)
MCHC RBC AUTO-ENTMCNC: 33.1 G/DL (ref 31.4–37.4)
MCHC RBC AUTO-ENTMCNC: 33.3 G/DL (ref 31.4–37.4)
MCHC RBC AUTO-ENTMCNC: 33.5 G/DL (ref 31.4–37.4)
MCHC RBC AUTO-ENTMCNC: 33.6 G/DL (ref 31.4–37.4)
MCHC RBC AUTO-ENTMCNC: 33.6 G/DL (ref 31.4–37.4)
MCHC RBC AUTO-ENTMCNC: 33.7 G/DL (ref 31.4–37.4)
MCHC RBC AUTO-ENTMCNC: 33.8 G/DL (ref 31.4–37.4)
MCHC RBC AUTO-ENTMCNC: 34.1 G/DL (ref 31.4–37.4)
MCHC RBC AUTO-ENTMCNC: 34.1 G/DL (ref 31.4–37.4)
MCHC RBC AUTO-ENTMCNC: 34.2 G/DL (ref 31.4–37.4)
MCHC RBC AUTO-ENTMCNC: 34.4 G/DL (ref 31.4–37.4)
MCHC RBC AUTO-ENTMCNC: 34.6 G/DL (ref 31.4–37.4)
MCHC RBC AUTO-ENTMCNC: 34.7 G/DL (ref 31.4–37.4)
MCHC RBC AUTO-ENTMCNC: 34.8 G/DL (ref 31.4–37.4)
MCHC RBC AUTO-ENTMCNC: 35.5 G/DL (ref 31.4–37.4)
MCV RBC AUTO: 100 FL (ref 82–98)
MCV RBC AUTO: 101 FL (ref 82–98)
MCV RBC AUTO: 102 FL (ref 82–98)
MCV RBC AUTO: 103 FL (ref 82–98)
MCV RBC AUTO: 104 FL (ref 82–98)
MCV RBC AUTO: 106 FL (ref 82–98)
MCV RBC AUTO: 106 FL (ref 82–98)
MCV RBC AUTO: 97 FL (ref 82–98)
MCV RBC AUTO: 99 FL (ref 82–98)
METAMYELOCYTES NFR BLD MANUAL: 1 % (ref 0–1)
METHYLMALONATE SERPL-SCNC: 177 NMOL/L (ref 0–378)
MONOCYTES # BLD AUTO: 0 THOUSAND/UL (ref 0–1.22)
MONOCYTES # BLD AUTO: 0 THOUSAND/UL (ref 0–1.22)
MONOCYTES # BLD AUTO: 0.07 THOUSAND/UL (ref 0–1.22)
MONOCYTES # BLD AUTO: 0.1 THOUSAND/ΜL (ref 0.17–1.22)
MONOCYTES # BLD AUTO: 0.13 THOUSAND/UL (ref 0–1.22)
MONOCYTES # BLD AUTO: 0.2 THOUSAND/ΜL (ref 0.17–1.22)
MONOCYTES # BLD AUTO: 0.25 THOUSAND/UL (ref 0–1.22)
MONOCYTES # BLD AUTO: 0.33 THOUSAND/ΜL (ref 0.17–1.22)
MONOCYTES NFR BLD AUTO: 1 % (ref 4–12)
MONOCYTES NFR BLD AUTO: 3 % (ref 4–12)
MONOCYTES NFR BLD AUTO: 5 % (ref 4–12)
MONOCYTES NFR BLD AUTO: 6 % (ref 4–12)
MONOCYTES NFR BLD: 0 % (ref 4–12)
MONOCYTES NFR BLD: 0 % (ref 4–12)
MONOCYTES NFR BLD: 1 % (ref 4–12)
MONOCYTES NFR BLD: 2 % (ref 4–12)
MONOCYTES NFR BLD: 4 % (ref 4–12)
MRSA NOSE QL CULT: NORMAL
MUCOUS THREADS UR QL AUTO: ABNORMAL
MV E'TISSUE VEL-SEP: 6 CM/S
MYELOCYTES NFR BLD MANUAL: 1 % (ref 0–1)
MYELOCYTES NFR BLD: 1 % (ref 0–1)
NASAL CANNULA: 6
NEUTROPHILS # BLD AUTO: 5.65 THOUSANDS/ΜL (ref 1.85–7.62)
NEUTROPHILS # BLD AUTO: 6.1 THOUSANDS/ΜL (ref 1.85–7.62)
NEUTROPHILS # BLD AUTO: 6.84 THOUSANDS/ΜL (ref 1.85–7.62)
NEUTROPHILS # BLD MANUAL: 2.92 THOUSAND/UL (ref 1.85–7.62)
NEUTROPHILS # BLD MANUAL: 4.34 THOUSAND/UL (ref 1.85–7.62)
NEUTROPHILS # BLD MANUAL: 5.71 THOUSAND/UL (ref 1.85–7.62)
NEUTROPHILS # BLD MANUAL: 6.22 THOUSAND/UL (ref 1.85–7.62)
NEUTROPHILS # BLD MANUAL: 6.6 THOUSAND/UL (ref 1.85–7.62)
NEUTS BAND NFR BLD MANUAL: 1 % (ref 0–8)
NEUTS BAND NFR BLD MANUAL: 11 % (ref 0–8)
NEUTS BAND NFR BLD MANUAL: 17 % (ref 0–8)
NEUTS BAND NFR BLD MANUAL: 2 THOUSAND/UL
NEUTS BAND NFR BLD MANUAL: 38 % (ref 0–8)
NEUTS BAND NFR BLD MANUAL: 4 THOUSAND/UL
NEUTS BAND NFR BLD MANUAL: 9 % (ref 0–8)
NEUTS SEG NFR BLD AUTO: 59 % (ref 43–75)
NEUTS SEG NFR BLD AUTO: 75 % (ref 43–75)
NEUTS SEG NFR BLD AUTO: 79 % (ref 45–77)
NEUTS SEG NFR BLD AUTO: 81 % (ref 43–75)
NEUTS SEG NFR BLD AUTO: 83 % (ref 43–75)
NEUTS SEG NFR BLD AUTO: 89 % (ref 43–75)
NEUTS SEG NFR BLD AUTO: 93 % (ref 43–75)
NEUTS SEG NFR BLD AUTO: 93 % (ref 45–77)
NEUTS SEG NFR BLD AUTO: 96 % (ref 43–75)
NEUTS SEG NFR BLD AUTO: 96 % (ref 43–75)
NEUTS SEG NFR BLD AUTO: 98 % (ref 43–75)
NITRITE UR QL STRIP: NEGATIVE
NITRITE UR QL STRIP: NEGATIVE
NON VENT HFNC FIO2: 100
NON VENT HFNC FIO2: 50
NON VENT HFNC FIO2: 80
NON VENT TYPE HFNC: ABNORMAL
NON-SQ EPI CELLS URNS QL MICRO: ABNORMAL /HPF
NON-SQ EPI CELLS URNS QL MICRO: ABNORMAL /HPF
NRBC BLD AUTO-RTO: 0 /100 WBCS
NRBC BLD AUTO-RTO: 18 /100 WBC (ref 0–2)
NRBC BLD AUTO-RTO: 4 /100 WBC (ref 0–2)
NRBC BLD AUTO-RTO: 8 /100 WBC (ref 0–2)
NRBC BLD AUTO-RTO: 8 /100 WBCS
O2 CT BLDA-SCNC: 8.5 ML/DL (ref 16–23)
O2 CT BLDA-SCNC: 9 ML/DL (ref 16–23)
O2 CT BLDA-SCNC: 9.7 ML/DL (ref 16–23)
O2 CT BLDV-SCNC: 4.3 ML/DL
OSMOLALITY UR/SERPL-RTO: 269 MMOL/KG (ref 282–298)
OSMOLALITY UR/SERPL-RTO: 277 MMOL/KG (ref 282–298)
OSMOLALITY UR: 534 MMOL/KG
OSMOLALITY UR: 711 MMOL/KG
OXYHGB MFR BLDA: 67.8 % (ref 94–97)
OXYHGB MFR BLDA: 86.6 % (ref 94–97)
OXYHGB MFR BLDA: 96.5 % (ref 94–97)
P AXIS: 161 DEGREES
P AXIS: 22 DEGREES
P AXIS: 25 DEGREES
P AXIS: 46 DEGREES
P AXIS: 81 DEGREES
PATHOLOGIST INTERPRETATION: NORMAL
PCO2 BLD: 30 MMOL/L (ref 21–32)
PCO2 BLD: 44.1 MM HG (ref 36–44)
PCO2 BLDA: 30.8 MM HG (ref 36–44)
PCO2 BLDA: 41.7 MM HG (ref 36–44)
PCO2 BLDA: 47.6 MM HG (ref 36–44)
PCO2 BLDV: 41.1 MM HG (ref 42–50)
PF4 HEPARIN CMPLX AB SER-ACNC: 0.12 OD (ref 0–0.4)
PH BLD: 7.42 [PH] (ref 7.35–7.45)
PH BLDA: 7.34 [PH] (ref 7.35–7.45)
PH BLDA: 7.38 [PH] (ref 7.35–7.45)
PH BLDA: 7.47 [PH] (ref 7.35–7.45)
PH BLDV: 7.38 [PH] (ref 7.3–7.4)
PH UR STRIP.AUTO: 6 [PH]
PH UR STRIP.AUTO: 6.5 [PH]
PHOSPHATE SERPL-MCNC: 2.7 MG/DL (ref 2.3–4.1)
PHOSPHATE SERPL-MCNC: 3.4 MG/DL (ref 2.3–4.1)
PHOSPHATE SERPL-MCNC: 3.5 MG/DL (ref 2.3–4.1)
PLATELET # BLD AUTO: 106 THOUSANDS/UL (ref 149–390)
PLATELET # BLD AUTO: 114 THOUSANDS/UL (ref 149–390)
PLATELET # BLD AUTO: 133 THOUSANDS/UL (ref 149–390)
PLATELET # BLD AUTO: 152 THOUSANDS/UL (ref 149–390)
PLATELET # BLD AUTO: 158 THOUSANDS/UL (ref 149–390)
PLATELET # BLD AUTO: 204 THOUSANDS/UL (ref 149–390)
PLATELET # BLD AUTO: 47 THOUSANDS/UL (ref 149–390)
PLATELET # BLD AUTO: 54 THOUSANDS/UL (ref 149–390)
PLATELET # BLD AUTO: 59 THOUSANDS/UL (ref 149–390)
PLATELET # BLD AUTO: 67 THOUSANDS/UL (ref 149–390)
PLATELET # BLD AUTO: 69 THOUSANDS/UL (ref 149–390)
PLATELET # BLD AUTO: 73 THOUSANDS/UL (ref 149–390)
PLATELET # BLD AUTO: 76 THOUSANDS/UL (ref 149–390)
PLATELET # BLD AUTO: 76 THOUSANDS/UL (ref 149–390)
PLATELET # BLD AUTO: 77 THOUSANDS/UL (ref 149–390)
PLATELET # BLD AUTO: 79 THOUSANDS/UL (ref 149–390)
PLATELET # BLD AUTO: 82 THOUSANDS/UL (ref 149–390)
PLATELET # BLD AUTO: 88 THOUSANDS/UL (ref 149–390)
PLATELET # BLD AUTO: 89 THOUSANDS/UL (ref 149–390)
PLATELET # BLD AUTO: 90 THOUSANDS/UL (ref 149–390)
PLATELET # BLD AUTO: 92 THOUSANDS/UL (ref 149–390)
PLATELET # BLD AUTO: 95 THOUSANDS/UL (ref 149–390)
PLATELET # BLD AUTO: 97 THOUSANDS/UL (ref 149–390)
PLATELET BLD QL SMEAR: ABNORMAL
PLATELET BLD QL SMEAR: ADEQUATE
PMV BLD AUTO: 10 FL (ref 8.9–12.7)
PMV BLD AUTO: 10.1 FL (ref 8.9–12.7)
PMV BLD AUTO: 10.1 FL (ref 8.9–12.7)
PMV BLD AUTO: 10.2 FL (ref 8.9–12.7)
PMV BLD AUTO: 10.4 FL (ref 8.9–12.7)
PMV BLD AUTO: 10.4 FL (ref 8.9–12.7)
PMV BLD AUTO: 10.5 FL (ref 8.9–12.7)
PMV BLD AUTO: 10.6 FL (ref 8.9–12.7)
PMV BLD AUTO: 10.8 FL (ref 8.9–12.7)
PMV BLD AUTO: 10.8 FL (ref 8.9–12.7)
PMV BLD AUTO: 10.9 FL (ref 8.9–12.7)
PMV BLD AUTO: 11 FL (ref 8.9–12.7)
PMV BLD AUTO: 11 FL (ref 8.9–12.7)
PMV BLD AUTO: 11.1 FL (ref 8.9–12.7)
PMV BLD AUTO: 11.5 FL (ref 8.9–12.7)
PMV BLD AUTO: 11.9 FL (ref 8.9–12.7)
PMV BLD AUTO: 9.8 FL (ref 8.9–12.7)
PO2 BLD: 233 MM HG (ref 75–129)
PO2 BLDA: 101.2 MM HG (ref 75–129)
PO2 BLDA: 32.7 MM HG (ref 75–129)
PO2 BLDA: 57.7 MM HG (ref 75–129)
PO2 BLDV: 25 MM HG (ref 35–45)
POLYCHROMASIA BLD QL SMEAR: PRESENT
POTASSIUM BLD-SCNC: 3.9 MMOL/L (ref 3.5–5.3)
POTASSIUM SERPL-SCNC: 3.4 MMOL/L (ref 3.5–5.3)
POTASSIUM SERPL-SCNC: 3.4 MMOL/L (ref 3.5–5.3)
POTASSIUM SERPL-SCNC: 3.5 MMOL/L (ref 3.5–5.3)
POTASSIUM SERPL-SCNC: 3.6 MMOL/L (ref 3.5–5.3)
POTASSIUM SERPL-SCNC: 3.7 MMOL/L (ref 3.5–5.3)
POTASSIUM SERPL-SCNC: 3.9 MMOL/L (ref 3.5–5.3)
POTASSIUM SERPL-SCNC: 4 MMOL/L (ref 3.5–5.3)
POTASSIUM SERPL-SCNC: 4.1 MMOL/L (ref 3.5–5.3)
POTASSIUM SERPL-SCNC: 4.2 MMOL/L (ref 3.5–5.3)
POTASSIUM SERPL-SCNC: 4.4 MMOL/L (ref 3.5–5.3)
POTASSIUM SERPL-SCNC: 4.5 MMOL/L (ref 3.5–5.3)
PR INTERVAL: 0 MS
PR INTERVAL: 116 MS
PR INTERVAL: 118 MS
PR INTERVAL: 136 MS
PR INTERVAL: 158 MS
PR INTERVAL: 88 MS
PR INTERVAL: 92 MS
PROCALCITONIN SERPL-MCNC: 3.31 NG/ML
PROCALCITONIN SERPL-MCNC: 3.34 NG/ML
PROT SERPL-MCNC: 6.2 G/DL (ref 6.4–8.4)
PROT SERPL-MCNC: 6.6 G/DL (ref 6.4–8.4)
PROT SERPL-MCNC: 6.7 G/DL (ref 6.4–8.4)
PROT UR STRIP-MCNC: ABNORMAL MG/DL
PROT UR STRIP-MCNC: ABNORMAL MG/DL
PROTHROMBIN TIME: 13.2 SECONDS (ref 11.6–14.5)
PROTHROMBIN TIME: 13.3 SECONDS (ref 11.6–14.5)
PROTHROMBIN TIME: 14.7 SECONDS (ref 11.6–14.5)
PROTHROMBIN TIME: 16.2 SECONDS (ref 11.6–14.5)
QRS AXIS: 0 DEGREES
QRS AXIS: 0 DEGREES
QRS AXIS: 29 DEGREES
QRS AXIS: 42 DEGREES
QRS AXIS: 51 DEGREES
QRS AXIS: 64 DEGREES
QRS AXIS: 70 DEGREES
QRS AXIS: 81 DEGREES
QRSD INTERVAL: 100 MS
QRSD INTERVAL: 108 MS
QRSD INTERVAL: 78 MS
QRSD INTERVAL: 78 MS
QRSD INTERVAL: 88 MS
QRSD INTERVAL: 92 MS
QRSD INTERVAL: 92 MS
QRSD INTERVAL: 94 MS
QT INTERVAL: 271 MS
QT INTERVAL: 298 MS
QT INTERVAL: 308 MS
QT INTERVAL: 321 MS
QT INTERVAL: 328 MS
QT INTERVAL: 392 MS
QT INTERVAL: 416 MS
QT INTERVAL: 416 MS
QTC INTERVAL: 377 MS
QTC INTERVAL: 405 MS
QTC INTERVAL: 454 MS
QTC INTERVAL: 458 MS
QTC INTERVAL: 458 MS
QTC INTERVAL: 469 MS
QTC INTERVAL: 482 MS
QTC INTERVAL: 489 MS
RA PRESSURE ESTIMATED: 3 MMHG
RBC # BLD AUTO: 1.72 MILLION/UL (ref 3.88–5.62)
RBC # BLD AUTO: 1.91 MILLION/UL (ref 3.88–5.62)
RBC # BLD AUTO: 2.13 MILLION/UL (ref 3.88–5.62)
RBC # BLD AUTO: 2.32 MILLION/UL (ref 3.88–5.62)
RBC # BLD AUTO: 2.42 MILLION/UL (ref 3.88–5.62)
RBC # BLD AUTO: 2.56 MILLION/UL (ref 3.88–5.62)
RBC # BLD AUTO: 2.68 MILLION/UL (ref 3.88–5.62)
RBC # BLD AUTO: 2.83 MILLION/UL (ref 3.88–5.62)
RBC # BLD AUTO: 2.99 MILLION/UL (ref 3.88–5.62)
RBC # BLD AUTO: 3.02 MILLION/UL (ref 3.88–5.62)
RBC # BLD AUTO: 3.17 MILLION/UL (ref 3.88–5.62)
RBC # BLD AUTO: 3.22 MILLION/UL (ref 3.88–5.62)
RBC # BLD AUTO: 3.28 MILLION/UL (ref 3.88–5.62)
RBC # BLD AUTO: 3.34 MILLION/UL (ref 3.88–5.62)
RBC # BLD AUTO: 3.35 MILLION/UL (ref 3.88–5.62)
RBC # BLD AUTO: 3.41 MILLION/UL (ref 3.88–5.62)
RBC # BLD AUTO: 3.42 MILLION/UL (ref 3.88–5.62)
RBC # BLD AUTO: 3.42 MILLION/UL (ref 3.88–5.62)
RBC # BLD AUTO: 3.59 MILLION/UL (ref 3.88–5.62)
RBC # BLD AUTO: 3.6 MILLION/UL (ref 3.88–5.62)
RBC # BLD AUTO: 3.66 MILLION/UL (ref 3.88–5.62)
RBC # BLD AUTO: 3.88 MILLION/UL (ref 3.88–5.62)
RBC # BLD AUTO: 3.94 MILLION/UL (ref 3.88–5.62)
RBC # BLD AUTO: 4.06 MILLION/UL (ref 3.88–5.62)
RBC # BLD AUTO: 4.24 MILLION/UL (ref 3.88–5.62)
RBC #/AREA URNS AUTO: ABNORMAL /HPF
RBC #/AREA URNS AUTO: ABNORMAL /HPF
RBC MORPH BLD: NORMAL
RBC MORPH BLD: PRESENT
RETICS # AUTO: ABNORMAL 10*3/UL (ref 14356–105094)
RETICS # CALC: 1.88 % (ref 0.37–1.87)
RH BLD: POSITIVE
RH BLD: POSITIVE
RIGHT ATRIUM AREA SYSTOLE A4C: 10.9 CM2
RIGHT VENTRICLE ID DIMENSION: 4.2 CM
RSV RNA RESP QL NAA+PROBE: NEGATIVE
RV PSP: 42 MMHG
SAO2 % BLD FROM PO2: 100 % (ref 60–85)
SARS-COV-2 RNA RESP QL NAA+PROBE: NEGATIVE
SARS-COV-2 RNA RESP QL NAA+PROBE: NEGATIVE
SL CV LEFT ATRIUM LENGTH A2C: 4.6 CM
SL CV LV EF: 60
SL CV PED ECHO LEFT VENTRICLE DIASTOLIC VOLUME (MOD BIPLANE) 2D: 85 ML
SL CV PED ECHO LEFT VENTRICLE SYSTOLIC VOLUME (MOD BIPLANE) 2D: 26 ML
SODIUM 24H UR-SCNC: 126 MOL/L
SODIUM 24H UR-SCNC: 81 MOL/L
SODIUM BLD-SCNC: 130 MMOL/L (ref 136–145)
SODIUM SERPL-SCNC: 126 MMOL/L (ref 135–147)
SODIUM SERPL-SCNC: 126 MMOL/L (ref 135–147)
SODIUM SERPL-SCNC: 127 MMOL/L (ref 135–147)
SODIUM SERPL-SCNC: 127 MMOL/L (ref 135–147)
SODIUM SERPL-SCNC: 128 MMOL/L (ref 135–147)
SODIUM SERPL-SCNC: 129 MMOL/L (ref 135–147)
SODIUM SERPL-SCNC: 130 MMOL/L (ref 135–147)
SODIUM SERPL-SCNC: 130 MMOL/L (ref 135–147)
SODIUM SERPL-SCNC: 131 MMOL/L (ref 135–147)
SODIUM SERPL-SCNC: 132 MMOL/L (ref 135–147)
SODIUM SERPL-SCNC: 133 MMOL/L (ref 135–147)
SODIUM SERPL-SCNC: 134 MMOL/L (ref 135–147)
SODIUM SERPL-SCNC: 134 MMOL/L (ref 135–147)
SODIUM SERPL-SCNC: 136 MMOL/L (ref 135–147)
SODIUM SERPL-SCNC: 137 MMOL/L (ref 135–147)
SODIUM SERPL-SCNC: 138 MMOL/L (ref 135–147)
SODIUM SERPL-SCNC: 139 MMOL/L (ref 135–147)
SODIUM SERPL-SCNC: 140 MMOL/L (ref 135–147)
SODIUM SERPL-SCNC: 142 MMOL/L (ref 135–147)
SP GR UR STRIP.AUTO: 1.02 (ref 1–1.03)
SP GR UR STRIP.AUTO: 1.03 (ref 1–1.03)
SPECIMEN EXPIRATION DATE: NORMAL
SPECIMEN EXPIRATION DATE: NORMAL
SPECIMEN SOURCE: ABNORMAL
T WAVE AXIS: 133 DEGREES
T WAVE AXIS: 145 DEGREES
T WAVE AXIS: 147 DEGREES
T WAVE AXIS: 209 DEGREES
T WAVE AXIS: 224 DEGREES
T WAVE AXIS: 67 DEGREES
T WAVE AXIS: 67 DEGREES
T WAVE AXIS: 84 DEGREES
TIBC SERPL-MCNC: 93 UG/DL (ref 250–450)
TOTAL CELLS COUNTED SPEC: 100
TR MAX PG: 39 MMHG
TR PEAK VELOCITY: 3.1 M/S
TRANS CELLS #/AREA URNS HPF: PRESENT /[HPF]
TRICUSPID VALVE PEAK REGURGITATION VELOCITY: 3.11 M/S
UNIT DISPENSE STATUS: NORMAL
UNIT PRODUCT CODE: NORMAL
UNIT PRODUCT VOLUME: 300 ML
UNIT PRODUCT VOLUME: 350 ML
UNIT RH: NORMAL
UROBILINOGEN UR STRIP-ACNC: 2 MG/DL
UROBILINOGEN UR STRIP-ACNC: <2 MG/DL
VANCOMYCIN SERPL-MCNC: 12 UG/ML (ref 10–20)
VANCOMYCIN TROUGH SERPL-MCNC: 18.2 UG/ML (ref 10–20)
VARIANT LYMPHS # BLD AUTO: 1 %
VARIANT LYMPHS BLD QL SMEAR: 1 % (ref 0–0)
VENTRICULAR RATE: 111 BPM
VENTRICULAR RATE: 116 BPM
VENTRICULAR RATE: 120 BPM
VENTRICULAR RATE: 130 BPM
VENTRICULAR RATE: 151 BPM
VENTRICULAR RATE: 73 BPM
VENTRICULAR RATE: 73 BPM
VENTRICULAR RATE: 86 BPM
VIT B12 SERPL-MCNC: 936 PG/ML (ref 100–900)
WBC # BLD AUTO: 2.1 THOUSAND/UL (ref 4.31–10.16)
WBC # BLD AUTO: 2.57 THOUSAND/UL (ref 4.31–10.16)
WBC # BLD AUTO: 2.69 THOUSAND/UL (ref 4.31–10.16)
WBC # BLD AUTO: 2.88 THOUSAND/UL (ref 4.31–10.16)
WBC # BLD AUTO: 3.14 THOUSAND/UL (ref 4.31–10.16)
WBC # BLD AUTO: 3.17 THOUSAND/UL (ref 4.31–10.16)
WBC # BLD AUTO: 3.35 THOUSAND/UL (ref 4.31–10.16)
WBC # BLD AUTO: 4.47 THOUSAND/UL (ref 4.31–10.16)
WBC # BLD AUTO: 5.99 THOUSAND/UL (ref 4.31–10.16)
WBC # BLD AUTO: 6.06 THOUSAND/UL (ref 4.31–10.16)
WBC # BLD AUTO: 6.21 THOUSAND/UL (ref 4.31–10.16)
WBC # BLD AUTO: 6.35 THOUSAND/UL (ref 4.31–10.16)
WBC # BLD AUTO: 6.37 THOUSAND/UL (ref 4.31–10.16)
WBC # BLD AUTO: 6.41 THOUSAND/UL (ref 4.31–10.16)
WBC # BLD AUTO: 6.6 THOUSAND/UL (ref 4.31–10.16)
WBC # BLD AUTO: 6.73 THOUSAND/UL (ref 4.31–10.16)
WBC # BLD AUTO: 6.9 THOUSAND/UL (ref 4.31–10.16)
WBC # BLD AUTO: 6.91 THOUSAND/UL (ref 4.31–10.16)
WBC # BLD AUTO: 7.05 THOUSAND/UL (ref 4.31–10.16)
WBC # BLD AUTO: 7.19 THOUSAND/UL (ref 4.31–10.16)
WBC # BLD AUTO: 7.22 THOUSAND/UL (ref 4.31–10.16)
WBC # BLD AUTO: 7.53 THOUSAND/UL (ref 4.31–10.16)
WBC # BLD AUTO: 7.6 THOUSAND/UL (ref 4.31–10.16)
WBC # BLD AUTO: 7.8 THOUSAND/UL (ref 4.31–10.16)
WBC # BLD AUTO: 7.83 THOUSAND/UL (ref 4.31–10.16)
WBC #/AREA URNS AUTO: ABNORMAL /HPF
WBC #/AREA URNS AUTO: ABNORMAL /HPF
WBC TOXIC VACUOLES BLD QL SMEAR: PRESENT

## 2022-01-01 PROCEDURE — 99232 SBSQ HOSP IP/OBS MODERATE 35: CPT | Performed by: INTERNAL MEDICINE

## 2022-01-01 PROCEDURE — 80048 BASIC METABOLIC PNL TOTAL CA: CPT | Performed by: STUDENT IN AN ORGANIZED HEALTH CARE EDUCATION/TRAINING PROGRAM

## 2022-01-01 PROCEDURE — 85027 COMPLETE CBC AUTOMATED: CPT | Performed by: PSYCHIATRY & NEUROLOGY

## 2022-01-01 PROCEDURE — 83735 ASSAY OF MAGNESIUM: CPT | Performed by: INTERNAL MEDICINE

## 2022-01-01 PROCEDURE — 80048 BASIC METABOLIC PNL TOTAL CA: CPT | Performed by: NURSE PRACTITIONER

## 2022-01-01 PROCEDURE — 82948 REAGENT STRIP/BLOOD GLUCOSE: CPT

## 2022-01-01 PROCEDURE — 70450 CT HEAD/BRAIN W/O DYE: CPT

## 2022-01-01 PROCEDURE — 82947 ASSAY GLUCOSE BLOOD QUANT: CPT

## 2022-01-01 PROCEDURE — 82728 ASSAY OF FERRITIN: CPT | Performed by: EMERGENCY MEDICINE

## 2022-01-01 PROCEDURE — 83605 ASSAY OF LACTIC ACID: CPT

## 2022-01-01 PROCEDURE — 92526 ORAL FUNCTION THERAPY: CPT

## 2022-01-01 PROCEDURE — 0K9N30Z DRAINAGE OF RIGHT HIP MUSCLE WITH DRAINAGE DEVICE, PERCUTANEOUS APPROACH: ICD-10-PCS | Performed by: RADIOLOGY

## 2022-01-01 PROCEDURE — 99024 POSTOP FOLLOW-UP VISIT: CPT | Performed by: PHYSICIAN ASSISTANT

## 2022-01-01 PROCEDURE — 99223 1ST HOSP IP/OBS HIGH 75: CPT | Performed by: INTERNAL MEDICINE

## 2022-01-01 PROCEDURE — 84100 ASSAY OF PHOSPHORUS: CPT | Performed by: PSYCHIATRY & NEUROLOGY

## 2022-01-01 PROCEDURE — 85027 COMPLETE CBC AUTOMATED: CPT | Performed by: INTERNAL MEDICINE

## 2022-01-01 PROCEDURE — 84145 PROCALCITONIN (PCT): CPT

## 2022-01-01 PROCEDURE — 84484 ASSAY OF TROPONIN QUANT: CPT | Performed by: EMERGENCY MEDICINE

## 2022-01-01 PROCEDURE — 85007 BL SMEAR W/DIFF WBC COUNT: CPT | Performed by: PHYSICIAN ASSISTANT

## 2022-01-01 PROCEDURE — 88342 IMHCHEM/IMCYTCHM 1ST ANTB: CPT | Performed by: PATHOLOGY

## 2022-01-01 PROCEDURE — 83550 IRON BINDING TEST: CPT | Performed by: EMERGENCY MEDICINE

## 2022-01-01 PROCEDURE — 99239 HOSP IP/OBS DSCHRG MGMT >30: CPT | Performed by: INTERNAL MEDICINE

## 2022-01-01 PROCEDURE — 0241U HB NFCT DS VIR RESP RNA 4 TRGT: CPT

## 2022-01-01 PROCEDURE — C1769 GUIDE WIRE: HCPCS

## 2022-01-01 PROCEDURE — 99024 POSTOP FOLLOW-UP VISIT: CPT | Performed by: NURSE PRACTITIONER

## 2022-01-01 PROCEDURE — 83540 ASSAY OF IRON: CPT | Performed by: EMERGENCY MEDICINE

## 2022-01-01 PROCEDURE — 97110 THERAPEUTIC EXERCISES: CPT

## 2022-01-01 PROCEDURE — 94003 VENT MGMT INPAT SUBQ DAY: CPT

## 2022-01-01 PROCEDURE — 82330 ASSAY OF CALCIUM: CPT

## 2022-01-01 PROCEDURE — 00C00ZZ EXTIRPATION OF MATTER FROM BRAIN, OPEN APPROACH: ICD-10-PCS | Performed by: NEUROLOGICAL SURGERY

## 2022-01-01 PROCEDURE — 80048 BASIC METABOLIC PNL TOTAL CA: CPT | Performed by: INTERNAL MEDICINE

## 2022-01-01 PROCEDURE — 84100 ASSAY OF PHOSPHORUS: CPT | Performed by: STUDENT IN AN ORGANIZED HEALTH CARE EDUCATION/TRAINING PROGRAM

## 2022-01-01 PROCEDURE — 82805 BLOOD GASES W/O2 SATURATION: CPT | Performed by: PSYCHIATRY & NEUROLOGY

## 2022-01-01 PROCEDURE — G1004 CDSM NDSC: HCPCS

## 2022-01-01 PROCEDURE — P9037 PLATE PHERES LEUKOREDU IRRAD: HCPCS

## 2022-01-01 PROCEDURE — C1781 MESH (IMPLANTABLE): HCPCS | Performed by: NEUROLOGICAL SURGERY

## 2022-01-01 PROCEDURE — 96366 THER/PROPH/DIAG IV INF ADDON: CPT

## 2022-01-01 PROCEDURE — 96375 TX/PRO/DX INJ NEW DRUG ADDON: CPT

## 2022-01-01 PROCEDURE — C1713 ANCHOR/SCREW BN/BN,TIS/BN: HCPCS | Performed by: NEUROLOGICAL SURGERY

## 2022-01-01 PROCEDURE — 85610 PROTHROMBIN TIME: CPT | Performed by: PHYSICIAN ASSISTANT

## 2022-01-01 PROCEDURE — 88307 TISSUE EXAM BY PATHOLOGIST: CPT | Performed by: PATHOLOGY

## 2022-01-01 PROCEDURE — 83735 ASSAY OF MAGNESIUM: CPT | Performed by: STUDENT IN AN ORGANIZED HEALTH CARE EDUCATION/TRAINING PROGRAM

## 2022-01-01 PROCEDURE — 97112 NEUROMUSCULAR REEDUCATION: CPT

## 2022-01-01 PROCEDURE — 80053 COMPREHEN METABOLIC PANEL: CPT | Performed by: EMERGENCY MEDICINE

## 2022-01-01 PROCEDURE — U0005 INFEC AGEN DETEC AMPLI PROBE: HCPCS | Performed by: INTERNAL MEDICINE

## 2022-01-01 PROCEDURE — 80048 BASIC METABOLIC PNL TOTAL CA: CPT | Performed by: PHYSICIAN ASSISTANT

## 2022-01-01 PROCEDURE — NC001 PR NO CHARGE: Performed by: EMERGENCY MEDICINE

## 2022-01-01 PROCEDURE — 83605 ASSAY OF LACTIC ACID: CPT | Performed by: PHYSICIAN ASSISTANT

## 2022-01-01 PROCEDURE — 86921 COMPATIBILITY TEST INCUBATE: CPT

## 2022-01-01 PROCEDURE — 83935 ASSAY OF URINE OSMOLALITY: CPT | Performed by: PSYCHIATRY & NEUROLOGY

## 2022-01-01 PROCEDURE — 83735 ASSAY OF MAGNESIUM: CPT

## 2022-01-01 PROCEDURE — 99291 CRITICAL CARE FIRST HOUR: CPT | Performed by: ANESTHESIOLOGY

## 2022-01-01 PROCEDURE — 99291 CRITICAL CARE FIRST HOUR: CPT | Performed by: EMERGENCY MEDICINE

## 2022-01-01 PROCEDURE — 36569 INSJ PICC 5 YR+ W/O IMAGING: CPT

## 2022-01-01 PROCEDURE — 99497 ADVNCD CARE PLAN 30 MIN: CPT | Performed by: STUDENT IN AN ORGANIZED HEALTH CARE EDUCATION/TRAINING PROGRAM

## 2022-01-01 PROCEDURE — 99222 1ST HOSP IP/OBS MODERATE 55: CPT | Performed by: INTERNAL MEDICINE

## 2022-01-01 PROCEDURE — 80048 BASIC METABOLIC PNL TOTAL CA: CPT

## 2022-01-01 PROCEDURE — 80048 BASIC METABOLIC PNL TOTAL CA: CPT | Performed by: PSYCHIATRY & NEUROLOGY

## 2022-01-01 PROCEDURE — 97535 SELF CARE MNGMENT TRAINING: CPT

## 2022-01-01 PROCEDURE — C1751 CATH, INF, PER/CENT/MIDLINE: HCPCS

## 2022-01-01 PROCEDURE — 85018 HEMOGLOBIN: CPT

## 2022-01-01 PROCEDURE — 94760 N-INVAS EAR/PLS OXIMETRY 1: CPT

## 2022-01-01 PROCEDURE — 70553 MRI BRAIN STEM W/O & W/DYE: CPT

## 2022-01-01 PROCEDURE — 97530 THERAPEUTIC ACTIVITIES: CPT

## 2022-01-01 PROCEDURE — 99024 POSTOP FOLLOW-UP VISIT: CPT | Performed by: NEUROLOGICAL SURGERY

## 2022-01-01 PROCEDURE — 99153 MOD SED SAME PHYS/QHP EA: CPT

## 2022-01-01 PROCEDURE — 99291 CRITICAL CARE FIRST HOUR: CPT

## 2022-01-01 PROCEDURE — 93010 ELECTROCARDIOGRAM REPORT: CPT | Performed by: INTERNAL MEDICINE

## 2022-01-01 PROCEDURE — 86902 BLOOD TYPE ANTIGEN DONOR EA: CPT

## 2022-01-01 PROCEDURE — 91301 COVID-19 MODERNA VACCINE BOOSTER 50 MCG/0.25ML SUSP: CPT | Performed by: INTERNAL MEDICINE

## 2022-01-01 PROCEDURE — 49406 IMAGE CATH FLUID PERI/RETRO: CPT

## 2022-01-01 PROCEDURE — 96361 HYDRATE IV INFUSION ADD-ON: CPT

## 2022-01-01 PROCEDURE — 99285 EMERGENCY DEPT VISIT HI MDM: CPT

## 2022-01-01 PROCEDURE — 71260 CT THORAX DX C+: CPT

## 2022-01-01 PROCEDURE — 93005 ELECTROCARDIOGRAM TRACING: CPT

## 2022-01-01 PROCEDURE — 80053 COMPREHEN METABOLIC PANEL: CPT

## 2022-01-01 PROCEDURE — 85610 PROTHROMBIN TIME: CPT | Performed by: PSYCHIATRY & NEUROLOGY

## 2022-01-01 PROCEDURE — 99238 HOSP IP/OBS DSCHRG MGMT 30/<: CPT | Performed by: EMERGENCY MEDICINE

## 2022-01-01 PROCEDURE — NC001 PR NO CHARGE: Performed by: ANESTHESIOLOGY

## 2022-01-01 PROCEDURE — C1729 CATH, DRAINAGE: HCPCS

## 2022-01-01 PROCEDURE — 85027 COMPLETE CBC AUTOMATED: CPT | Performed by: PHYSICIAN ASSISTANT

## 2022-01-01 PROCEDURE — 73502 X-RAY EXAM HIP UNI 2-3 VIEWS: CPT

## 2022-01-01 PROCEDURE — 99291 CRITICAL CARE FIRST HOUR: CPT | Performed by: PSYCHIATRY & NEUROLOGY

## 2022-01-01 PROCEDURE — 96376 TX/PRO/DX INJ SAME DRUG ADON: CPT

## 2022-01-01 PROCEDURE — 85025 COMPLETE CBC W/AUTO DIFF WBC: CPT | Performed by: PHYSICIAN ASSISTANT

## 2022-01-01 PROCEDURE — 85730 THROMBOPLASTIN TIME PARTIAL: CPT | Performed by: PSYCHIATRY & NEUROLOGY

## 2022-01-01 PROCEDURE — U0003 INFECTIOUS AGENT DETECTION BY NUCLEIC ACID (DNA OR RNA); SEVERE ACUTE RESPIRATORY SYNDROME CORONAVIRUS 2 (SARS-COV-2) (CORONAVIRUS DISEASE [COVID-19]), AMPLIFIED PROBE TECHNIQUE, MAKING USE OF HIGH THROUGHPUT TECHNOLOGIES AS DESCRIBED BY CMS-2020-01-R: HCPCS | Performed by: INTERNAL MEDICINE

## 2022-01-01 PROCEDURE — 99233 SBSQ HOSP IP/OBS HIGH 50: CPT | Performed by: INTERNAL MEDICINE

## 2022-01-01 PROCEDURE — 84300 ASSAY OF URINE SODIUM: CPT | Performed by: STUDENT IN AN ORGANIZED HEALTH CARE EDUCATION/TRAINING PROGRAM

## 2022-01-01 PROCEDURE — 02HV33Z INSERTION OF INFUSION DEVICE INTO SUPERIOR VENA CAVA, PERCUTANEOUS APPROACH: ICD-10-PCS | Performed by: STUDENT IN AN ORGANIZED HEALTH CARE EDUCATION/TRAINING PROGRAM

## 2022-01-01 PROCEDURE — 87040 BLOOD CULTURE FOR BACTERIA: CPT

## 2022-01-01 PROCEDURE — 83735 ASSAY OF MAGNESIUM: CPT | Performed by: PSYCHIATRY & NEUROLOGY

## 2022-01-01 PROCEDURE — 96365 THER/PROPH/DIAG IV INF INIT: CPT

## 2022-01-01 PROCEDURE — C9113 INJ PANTOPRAZOLE SODIUM, VIA: HCPCS | Performed by: PSYCHIATRY & NEUROLOGY

## 2022-01-01 PROCEDURE — 71045 X-RAY EXAM CHEST 1 VIEW: CPT

## 2022-01-01 PROCEDURE — T2045 HOSPICE GENERAL CARE: HCPCS

## 2022-01-01 PROCEDURE — 85007 BL SMEAR W/DIFF WBC COUNT: CPT

## 2022-01-01 PROCEDURE — A9585 GADOBUTROL INJECTION: HCPCS | Performed by: PHYSICIAN ASSISTANT

## 2022-01-01 PROCEDURE — 99152 MOD SED SAME PHYS/QHP 5/>YRS: CPT

## 2022-01-01 PROCEDURE — 87070 CULTURE OTHR SPECIMN AEROBIC: CPT | Performed by: RADIOLOGY

## 2022-01-01 PROCEDURE — NC001 PR NO CHARGE: Performed by: INTERNAL MEDICINE

## 2022-01-01 PROCEDURE — 87081 CULTURE SCREEN ONLY: CPT

## 2022-01-01 PROCEDURE — 74177 CT ABD & PELVIS W/CONTRAST: CPT

## 2022-01-01 PROCEDURE — 99285 EMERGENCY DEPT VISIT HI MDM: CPT | Performed by: EMERGENCY MEDICINE

## 2022-01-01 PROCEDURE — 82805 BLOOD GASES W/O2 SATURATION: CPT | Performed by: PHYSICIAN ASSISTANT

## 2022-01-01 PROCEDURE — 86850 RBC ANTIBODY SCREEN: CPT | Performed by: PHYSICIAN ASSISTANT

## 2022-01-01 PROCEDURE — RECHECK: Performed by: INTERNAL MEDICINE

## 2022-01-01 PROCEDURE — 85014 HEMATOCRIT: CPT | Performed by: EMERGENCY MEDICINE

## 2022-01-01 PROCEDURE — 83918 ORGANIC ACIDS TOTAL QUANT: CPT

## 2022-01-01 PROCEDURE — 85007 BL SMEAR W/DIFF WBC COUNT: CPT | Performed by: PSYCHIATRY & NEUROLOGY

## 2022-01-01 PROCEDURE — 85610 PROTHROMBIN TIME: CPT

## 2022-01-01 PROCEDURE — 99232 SBSQ HOSP IP/OBS MODERATE 35: CPT | Performed by: NEUROLOGICAL SURGERY

## 2022-01-01 PROCEDURE — 85045 AUTOMATED RETICULOCYTE COUNT: CPT | Performed by: PSYCHIATRY & NEUROLOGY

## 2022-01-01 PROCEDURE — 80202 ASSAY OF VANCOMYCIN: CPT

## 2022-01-01 PROCEDURE — 82746 ASSAY OF FOLIC ACID SERUM: CPT

## 2022-01-01 PROCEDURE — 83935 ASSAY OF URINE OSMOLALITY: CPT | Performed by: STUDENT IN AN ORGANIZED HEALTH CARE EDUCATION/TRAINING PROGRAM

## 2022-01-01 PROCEDURE — 82803 BLOOD GASES ANY COMBINATION: CPT

## 2022-01-01 PROCEDURE — 84132 ASSAY OF SERUM POTASSIUM: CPT

## 2022-01-01 PROCEDURE — 99291 CRITICAL CARE FIRST HOUR: CPT | Performed by: INTERNAL MEDICINE

## 2022-01-01 PROCEDURE — 87086 URINE CULTURE/COLONY COUNT: CPT

## 2022-01-01 PROCEDURE — 86922 COMPATIBILITY TEST ANTIGLOB: CPT

## 2022-01-01 PROCEDURE — 84300 ASSAY OF URINE SODIUM: CPT | Performed by: PSYCHIATRY & NEUROLOGY

## 2022-01-01 PROCEDURE — 95715 VEEG EA 12-26HR INTMT MNTR: CPT

## 2022-01-01 PROCEDURE — 49406 IMAGE CATH FLUID PERI/RETRO: CPT | Performed by: RADIOLOGY

## 2022-01-01 PROCEDURE — 0011A HB IMMUNIZATION ADMIN COVID-19 100MCG/0.5ML FIRST DOSE: CPT | Performed by: INTERNAL MEDICINE

## 2022-01-01 PROCEDURE — 99233 SBSQ HOSP IP/OBS HIGH 50: CPT | Performed by: NEUROLOGICAL SURGERY

## 2022-01-01 PROCEDURE — 80202 ASSAY OF VANCOMYCIN: CPT | Performed by: INTERNAL MEDICINE

## 2022-01-01 PROCEDURE — 88341 IMHCHEM/IMCYTCHM EA ADD ANTB: CPT | Performed by: PATHOLOGY

## 2022-01-01 PROCEDURE — 82805 BLOOD GASES W/O2 SATURATION: CPT

## 2022-01-01 PROCEDURE — 99233 SBSQ HOSP IP/OBS HIGH 50: CPT | Performed by: STUDENT IN AN ORGANIZED HEALTH CARE EDUCATION/TRAINING PROGRAM

## 2022-01-01 PROCEDURE — 85025 COMPLETE CBC W/AUTO DIFF WBC: CPT | Performed by: EMERGENCY MEDICINE

## 2022-01-01 PROCEDURE — 95720 EEG PHY/QHP EA INCR W/VEEG: CPT | Performed by: PSYCHIATRY & NEUROLOGY

## 2022-01-01 PROCEDURE — 73700 CT LOWER EXTREMITY W/O DYE: CPT

## 2022-01-01 PROCEDURE — 85730 THROMBOPLASTIN TIME PARTIAL: CPT

## 2022-01-01 PROCEDURE — 71046 X-RAY EXAM CHEST 2 VIEWS: CPT

## 2022-01-01 PROCEDURE — 97163 PT EVAL HIGH COMPLEX 45 MIN: CPT

## 2022-01-01 PROCEDURE — 36415 COLL VENOUS BLD VENIPUNCTURE: CPT | Performed by: EMERGENCY MEDICINE

## 2022-01-01 PROCEDURE — 61520 REMOVAL OF BRAIN LESION: CPT | Performed by: NEUROLOGICAL SURGERY

## 2022-01-01 PROCEDURE — 36600 WITHDRAWAL OF ARTERIAL BLOOD: CPT

## 2022-01-01 PROCEDURE — 84145 PROCALCITONIN (PCT): CPT | Performed by: PSYCHIATRY & NEUROLOGY

## 2022-01-01 PROCEDURE — 87205 SMEAR GRAM STAIN: CPT | Performed by: RADIOLOGY

## 2022-01-01 PROCEDURE — 85014 HEMATOCRIT: CPT

## 2022-01-01 PROCEDURE — XW023W7 INTRODUCTION OF COVID-19 VACCINE BOOSTER INTO MUSCLE, PERCUTANEOUS APPROACH, NEW TECHNOLOGY GROUP 7: ICD-10-PCS | Performed by: INTERNAL MEDICINE

## 2022-01-01 PROCEDURE — 92610 EVALUATE SWALLOWING FUNCTION: CPT

## 2022-01-01 PROCEDURE — 93306 TTE W/DOPPLER COMPLETE: CPT | Performed by: INTERNAL MEDICINE

## 2022-01-01 PROCEDURE — 87075 CULTR BACTERIA EXCEPT BLOOD: CPT | Performed by: RADIOLOGY

## 2022-01-01 PROCEDURE — 36415 COLL VENOUS BLD VENIPUNCTURE: CPT

## 2022-01-01 PROCEDURE — 99221 1ST HOSP IP/OBS SF/LOW 40: CPT | Performed by: INTERNAL MEDICINE

## 2022-01-01 PROCEDURE — 85027 COMPLETE CBC AUTOMATED: CPT | Performed by: STUDENT IN AN ORGANIZED HEALTH CARE EDUCATION/TRAINING PROGRAM

## 2022-01-01 PROCEDURE — 99214 OFFICE O/P EST MOD 30 MIN: CPT

## 2022-01-01 PROCEDURE — 84295 ASSAY OF SERUM SODIUM: CPT

## 2022-01-01 PROCEDURE — 86901 BLOOD TYPING SEROLOGIC RH(D): CPT

## 2022-01-01 PROCEDURE — 83930 ASSAY OF BLOOD OSMOLALITY: CPT | Performed by: PSYCHIATRY & NEUROLOGY

## 2022-01-01 PROCEDURE — 86901 BLOOD TYPING SEROLOGIC RH(D): CPT | Performed by: PHYSICIAN ASSISTANT

## 2022-01-01 PROCEDURE — 82607 VITAMIN B-12: CPT

## 2022-01-01 PROCEDURE — 82542 COL CHROMOTOGRAPHY QUAL/QUAN: CPT | Performed by: PSYCHIATRY & NEUROLOGY

## 2022-01-01 PROCEDURE — 85007 BL SMEAR W/DIFF WBC COUNT: CPT | Performed by: INTERNAL MEDICINE

## 2022-01-01 PROCEDURE — 99152 MOD SED SAME PHYS/QHP 5/>YRS: CPT | Performed by: RADIOLOGY

## 2022-01-01 PROCEDURE — 1123F ACP DISCUSS/DSCN MKR DOCD: CPT | Performed by: NEUROLOGICAL SURGERY

## 2022-01-01 PROCEDURE — NC001 PR NO CHARGE

## 2022-01-01 PROCEDURE — 86850 RBC ANTIBODY SCREEN: CPT

## 2022-01-01 PROCEDURE — 80053 COMPREHEN METABOLIC PANEL: CPT | Performed by: INTERNAL MEDICINE

## 2022-01-01 PROCEDURE — 97167 OT EVAL HIGH COMPLEX 60 MIN: CPT

## 2022-01-01 PROCEDURE — 85730 THROMBOPLASTIN TIME PARTIAL: CPT | Performed by: PHYSICIAN ASSISTANT

## 2022-01-01 PROCEDURE — 86900 BLOOD TYPING SEROLOGIC ABO: CPT | Performed by: PHYSICIAN ASSISTANT

## 2022-01-01 PROCEDURE — 88331 PATH CONSLTJ SURG 1 BLK 1SPC: CPT | Performed by: PATHOLOGY

## 2022-01-01 PROCEDURE — 61781 SCAN PROC CRANIAL INTRA: CPT | Performed by: NEUROLOGICAL SURGERY

## 2022-01-01 PROCEDURE — 86022 PLATELET ANTIBODIES: CPT | Performed by: PSYCHIATRY & NEUROLOGY

## 2022-01-01 PROCEDURE — 86870 RBC ANTIBODY IDENTIFICATION: CPT | Performed by: PHYSICIAN ASSISTANT

## 2022-01-01 PROCEDURE — 86900 BLOOD TYPING SEROLOGIC ABO: CPT

## 2022-01-01 PROCEDURE — P9040 RBC LEUKOREDUCED IRRADIATED: HCPCS

## 2022-01-01 PROCEDURE — 82805 BLOOD GASES W/O2 SATURATION: CPT | Performed by: REGISTERED NURSE

## 2022-01-01 PROCEDURE — 85018 HEMOGLOBIN: CPT | Performed by: EMERGENCY MEDICINE

## 2022-01-01 PROCEDURE — 99223 1ST HOSP IP/OBS HIGH 75: CPT

## 2022-01-01 PROCEDURE — NC001 PR NO CHARGE: Performed by: PSYCHIATRY & NEUROLOGY

## 2022-01-01 PROCEDURE — 81001 URINALYSIS AUTO W/SCOPE: CPT | Performed by: PSYCHIATRY & NEUROLOGY

## 2022-01-01 PROCEDURE — 84484 ASSAY OF TROPONIN QUANT: CPT

## 2022-01-01 PROCEDURE — 99232 SBSQ HOSP IP/OBS MODERATE 35: CPT | Performed by: FAMILY MEDICINE

## 2022-01-01 PROCEDURE — 00B00ZZ EXCISION OF BRAIN, OPEN APPROACH: ICD-10-PCS | Performed by: NEUROLOGICAL SURGERY

## 2022-01-01 PROCEDURE — 99497 ADVNCD CARE PLAN 30 MIN: CPT | Performed by: FAMILY MEDICINE

## 2022-01-01 PROCEDURE — NC001 PR NO CHARGE: Performed by: NEUROLOGICAL SURGERY

## 2022-01-01 PROCEDURE — 95700 EEG CONT REC W/VID EEG TECH: CPT

## 2022-01-01 PROCEDURE — 99223 1ST HOSP IP/OBS HIGH 75: CPT | Performed by: PHYSICIAN ASSISTANT

## 2022-01-01 PROCEDURE — 99291 CRITICAL CARE FIRST HOUR: CPT | Performed by: REGISTERED NURSE

## 2022-01-01 PROCEDURE — 71275 CT ANGIOGRAPHY CHEST: CPT

## 2022-01-01 PROCEDURE — 85027 COMPLETE CBC AUTOMATED: CPT

## 2022-01-01 PROCEDURE — 81001 URINALYSIS AUTO W/SCOPE: CPT

## 2022-01-01 PROCEDURE — 83930 ASSAY OF BLOOD OSMOLALITY: CPT | Performed by: STUDENT IN AN ORGANIZED HEALTH CARE EDUCATION/TRAINING PROGRAM

## 2022-01-01 PROCEDURE — 93306 TTE W/DOPPLER COMPLETE: CPT

## 2022-01-01 PROCEDURE — 96360 HYDRATION IV INFUSION INIT: CPT

## 2022-01-01 DEVICE — IMPLANTABLE DEVICE
Type: IMPLANTABLE DEVICE | Site: CRANIAL | Status: FUNCTIONAL
Brand: THINFLAP SYSTEM

## 2022-01-01 DEVICE — IMPLANTABLE DEVICE
Type: IMPLANTABLE DEVICE | Site: CRANIAL | Status: FUNCTIONAL
Brand: THINFLAP

## 2022-01-01 DEVICE — DURA 62106 SUBSTITUTE DUREPAIR 1X3IN NCE
Type: IMPLANTABLE DEVICE | Site: BRAIN | Status: FUNCTIONAL
Brand: DUREPAIR®

## 2022-01-01 RX ORDER — FENTANYL CITRATE/PF 50 MCG/ML
12.5 SYRINGE (ML) INJECTION
Status: COMPLETED | OUTPATIENT
Start: 2022-01-01 | End: 2022-01-01

## 2022-01-01 RX ORDER — HALOPERIDOL 5 MG/ML
2 INJECTION INTRAMUSCULAR
Status: CANCELLED | OUTPATIENT
Start: 2022-01-01

## 2022-01-01 RX ORDER — POLYETHYLENE GLYCOL 3350 17 G/17G
17 POWDER, FOR SOLUTION ORAL 2 TIMES DAILY PRN
Status: DISCONTINUED | OUTPATIENT
Start: 2022-01-01 | End: 2022-01-01

## 2022-01-01 RX ORDER — OLANZAPINE 10 MG/1
2.5 INJECTION, POWDER, LYOPHILIZED, FOR SOLUTION INTRAMUSCULAR ONCE
Status: COMPLETED | OUTPATIENT
Start: 2022-01-01 | End: 2022-01-01

## 2022-01-01 RX ORDER — SODIUM CHLORIDE 1000 MG
2 TABLET, SOLUBLE MISCELLANEOUS ONCE
Status: COMPLETED | OUTPATIENT
Start: 2022-01-01 | End: 2022-01-01

## 2022-01-01 RX ORDER — SODIUM CHLORIDE 9 MG/ML
10 INJECTION INTRAVENOUS DAILY
Qty: 300 ML | Refills: 0 | Status: SHIPPED | OUTPATIENT
Start: 2022-01-01 | End: 2022-11-09

## 2022-01-01 RX ORDER — FENTANYL CITRATE 50 UG/ML
25 INJECTION, SOLUTION INTRAMUSCULAR; INTRAVENOUS ONCE
Status: COMPLETED | OUTPATIENT
Start: 2022-01-01 | End: 2022-01-01

## 2022-01-01 RX ORDER — DEXAMETHASONE 2 MG/1
2 TABLET ORAL
Qty: 2 TABLET | Refills: 0 | Status: SHIPPED | OUTPATIENT
Start: 2022-01-01 | End: 2022-01-01

## 2022-01-01 RX ORDER — AMLODIPINE BESYLATE 5 MG/1
5 TABLET ORAL DAILY
Status: DISCONTINUED | OUTPATIENT
Start: 2022-01-01 | End: 2022-01-01 | Stop reason: HOSPADM

## 2022-01-01 RX ORDER — HEPARIN SODIUM 5000 [USP'U]/ML
5000 INJECTION, SOLUTION INTRAVENOUS; SUBCUTANEOUS EVERY 8 HOURS SCHEDULED
Status: DISCONTINUED | OUTPATIENT
Start: 2022-01-01 | End: 2022-01-01 | Stop reason: HOSPADM

## 2022-01-01 RX ORDER — METHOCARBAMOL 500 MG/1
500 TABLET, FILM COATED ORAL 3 TIMES DAILY
Refills: 0
Start: 2022-01-01

## 2022-01-01 RX ORDER — 3% SODIUM CHLORIDE 3 G/100ML
50 INJECTION, SOLUTION INTRAVENOUS CONTINUOUS
Status: DISCONTINUED | OUTPATIENT
Start: 2022-01-01 | End: 2022-01-01

## 2022-01-01 RX ORDER — METOPROLOL TARTRATE 50 MG/1
50 TABLET, FILM COATED ORAL ONCE
Status: DISCONTINUED | OUTPATIENT
Start: 2022-01-01 | End: 2022-01-01 | Stop reason: HOSPADM

## 2022-01-01 RX ORDER — DEXAMETHASONE SODIUM PHOSPHATE 10 MG/ML
INJECTION, SOLUTION INTRAMUSCULAR; INTRAVENOUS AS NEEDED
Status: DISCONTINUED | OUTPATIENT
Start: 2022-01-01 | End: 2022-01-01

## 2022-01-01 RX ORDER — DEXAMETHASONE 4 MG/1
4 TABLET ORAL 2 TIMES DAILY WITH MEALS
Status: DISCONTINUED | OUTPATIENT
Start: 2022-01-01 | End: 2022-01-01 | Stop reason: HOSPADM

## 2022-01-01 RX ORDER — BISACODYL 10 MG
10 SUPPOSITORY, RECTAL RECTAL DAILY PRN
Status: CANCELLED | OUTPATIENT
Start: 2022-01-01

## 2022-01-01 RX ORDER — SODIUM CHLORIDE 9 MG/ML
100 INJECTION, SOLUTION INTRAVENOUS CONTINUOUS
Status: DISCONTINUED | OUTPATIENT
Start: 2022-01-01 | End: 2022-01-01

## 2022-01-01 RX ORDER — PROPOFOL 10 MG/ML
INJECTION, EMULSION INTRAVENOUS AS NEEDED
Status: DISCONTINUED | OUTPATIENT
Start: 2022-01-01 | End: 2022-01-01

## 2022-01-01 RX ORDER — SODIUM CHLORIDE 1000 MG
1 TABLET, SOLUBLE MISCELLANEOUS
Status: DISCONTINUED | OUTPATIENT
Start: 2022-01-01 | End: 2022-01-01 | Stop reason: HOSPADM

## 2022-01-01 RX ORDER — DILTIAZEM HYDROCHLORIDE 120 MG/1
120 CAPSULE, EXTENDED RELEASE ORAL EVERY 12 HOURS SCHEDULED
Refills: 0
Start: 2022-01-01

## 2022-01-01 RX ORDER — ALLOPURINOL 100 MG/1
100 TABLET ORAL DAILY
Status: DISCONTINUED | OUTPATIENT
Start: 2022-01-01 | End: 2022-01-01

## 2022-01-01 RX ORDER — PRAVASTATIN SODIUM 80 MG/1
80 TABLET ORAL
Status: DISCONTINUED | OUTPATIENT
Start: 2022-01-01 | End: 2022-01-01

## 2022-01-01 RX ORDER — SODIUM CHLORIDE 9 MG/ML
125 INJECTION, SOLUTION INTRAVENOUS CONTINUOUS
Status: DISCONTINUED | OUTPATIENT
Start: 2022-01-01 | End: 2022-01-01

## 2022-01-01 RX ORDER — VANCOMYCIN HYDROCHLORIDE 1 G/200ML
15 INJECTION, SOLUTION INTRAVENOUS ONCE
Status: COMPLETED | OUTPATIENT
Start: 2022-01-01 | End: 2022-01-01

## 2022-01-01 RX ORDER — SODIUM CHLORIDE 1000 MG
1 TABLET, SOLUBLE MISCELLANEOUS DAILY
Status: DISCONTINUED | OUTPATIENT
Start: 2022-01-01 | End: 2022-01-01

## 2022-01-01 RX ORDER — ALLOPURINOL 100 MG/1
100 TABLET ORAL DAILY
Status: DISCONTINUED | OUTPATIENT
Start: 2022-01-01 | End: 2022-01-01 | Stop reason: HOSPADM

## 2022-01-01 RX ORDER — ACETAMINOPHEN 325 MG/1
650 TABLET ORAL EVERY 6 HOURS SCHEDULED
Status: DISCONTINUED | OUTPATIENT
Start: 2022-01-01 | End: 2022-01-01

## 2022-01-01 RX ORDER — SODIUM CHLORIDE 1000 MG
1 TABLET, SOLUBLE MISCELLANEOUS
Status: DISCONTINUED | OUTPATIENT
Start: 2022-01-01 | End: 2022-01-01

## 2022-01-01 RX ORDER — ONDANSETRON 2 MG/ML
4 INJECTION INTRAMUSCULAR; INTRAVENOUS ONCE
Status: COMPLETED | OUTPATIENT
Start: 2022-01-01 | End: 2022-01-01

## 2022-01-01 RX ORDER — PANTOPRAZOLE SODIUM 40 MG/1
40 TABLET, DELAYED RELEASE ORAL DAILY
Status: DISCONTINUED | OUTPATIENT
Start: 2022-01-01 | End: 2022-01-01

## 2022-01-01 RX ORDER — CITALOPRAM 10 MG/1
TABLET ORAL
Qty: 60 TABLET | Refills: 0 | Status: SHIPPED | OUTPATIENT
Start: 2022-01-01 | End: 2022-01-01

## 2022-01-01 RX ORDER — GINSENG 100 MG
CAPSULE ORAL AS NEEDED
Status: DISCONTINUED | OUTPATIENT
Start: 2022-01-01 | End: 2022-01-01 | Stop reason: HOSPADM

## 2022-01-01 RX ORDER — AMIODARONE HYDROCHLORIDE 200 MG/1
400 TABLET ORAL 2 TIMES DAILY WITH MEALS
Status: DISCONTINUED | OUTPATIENT
Start: 2022-01-01 | End: 2022-01-01

## 2022-01-01 RX ORDER — POTASSIUM CHLORIDE 20MEQ/15ML
40 LIQUID (ML) ORAL ONCE
Status: COMPLETED | OUTPATIENT
Start: 2022-01-01 | End: 2022-01-01

## 2022-01-01 RX ORDER — HYDROMORPHONE HCL IN WATER/PF 6 MG/30 ML
0.2 PATIENT CONTROLLED ANALGESIA SYRINGE INTRAVENOUS ONCE
Status: COMPLETED | OUTPATIENT
Start: 2022-01-01 | End: 2022-01-01

## 2022-01-01 RX ORDER — FENTANYL CITRATE 50 UG/ML
INJECTION, SOLUTION INTRAMUSCULAR; INTRAVENOUS AS NEEDED
Status: DISCONTINUED | OUTPATIENT
Start: 2022-01-01 | End: 2022-01-01

## 2022-01-01 RX ORDER — FUROSEMIDE 10 MG/ML
20 INJECTION INTRAMUSCULAR; INTRAVENOUS ONCE
Status: COMPLETED | OUTPATIENT
Start: 2022-01-01 | End: 2022-01-01

## 2022-01-01 RX ORDER — AMIODARONE HYDROCHLORIDE 200 MG/1
200 TABLET ORAL
Status: DISCONTINUED | OUTPATIENT
Start: 2022-01-01 | End: 2022-01-01 | Stop reason: HOSPADM

## 2022-01-01 RX ORDER — METOPROLOL SUCCINATE 50 MG/1
50 TABLET, EXTENDED RELEASE ORAL 2 TIMES DAILY
Status: DISCONTINUED | OUTPATIENT
Start: 2022-01-01 | End: 2022-01-01 | Stop reason: HOSPADM

## 2022-01-01 RX ORDER — POTASSIUM CHLORIDE 20 MEQ/1
40 TABLET, EXTENDED RELEASE ORAL ONCE
Status: COMPLETED | OUTPATIENT
Start: 2022-01-01 | End: 2022-01-01

## 2022-01-01 RX ORDER — LORAZEPAM 0.5 MG/1
0.5 TABLET ORAL 2 TIMES DAILY PRN
Qty: 10 TABLET | Refills: 0 | Status: SHIPPED | OUTPATIENT
Start: 2022-01-01

## 2022-01-01 RX ORDER — SODIUM CHLORIDE 9 MG/ML
INJECTION, SOLUTION INTRAVENOUS CONTINUOUS PRN
Status: DISCONTINUED | OUTPATIENT
Start: 2022-01-01 | End: 2022-01-01

## 2022-01-01 RX ORDER — POTASSIUM CHLORIDE 20MEQ/15ML
40 LIQUID (ML) ORAL ONCE
Status: DISCONTINUED | OUTPATIENT
Start: 2022-01-01 | End: 2022-01-01

## 2022-01-01 RX ORDER — BISACODYL 10 MG
10 SUPPOSITORY, RECTAL RECTAL DAILY PRN
Status: DISCONTINUED | OUTPATIENT
Start: 2022-01-01 | End: 2022-01-01 | Stop reason: HOSPADM

## 2022-01-01 RX ORDER — DEXAMETHASONE SODIUM PHOSPHATE 4 MG/ML
INJECTION, SOLUTION INTRA-ARTICULAR; INTRALESIONAL; INTRAMUSCULAR; INTRAVENOUS; SOFT TISSUE AS NEEDED
Status: DISCONTINUED | OUTPATIENT
Start: 2022-01-01 | End: 2022-01-01

## 2022-01-01 RX ORDER — HYDROMORPHONE HCL IN WATER/PF 6 MG/30 ML
0.2 PATIENT CONTROLLED ANALGESIA SYRINGE INTRAVENOUS
Status: DISCONTINUED | OUTPATIENT
Start: 2022-01-01 | End: 2022-01-01

## 2022-01-01 RX ORDER — METOPROLOL SUCCINATE 50 MG/1
50 TABLET, EXTENDED RELEASE ORAL 2 TIMES DAILY
Status: DISCONTINUED | OUTPATIENT
Start: 2022-01-01 | End: 2022-01-01

## 2022-01-01 RX ORDER — DEXAMETHASONE 2 MG/1
2 TABLET ORAL
Status: COMPLETED | OUTPATIENT
Start: 2022-01-01 | End: 2022-01-01

## 2022-01-01 RX ORDER — AMLODIPINE BESYLATE 10 MG/1
10 TABLET ORAL DAILY
Status: DISCONTINUED | OUTPATIENT
Start: 2022-01-01 | End: 2022-01-01

## 2022-01-01 RX ORDER — TEMAZEPAM 7.5 MG/1
7.5 CAPSULE ORAL
Status: DISCONTINUED | OUTPATIENT
Start: 2022-01-01 | End: 2022-01-01

## 2022-01-01 RX ORDER — METHOCARBAMOL 500 MG/1
500 TABLET, FILM COATED ORAL EVERY 6 HOURS SCHEDULED
Status: DISCONTINUED | OUTPATIENT
Start: 2022-01-01 | End: 2022-01-01 | Stop reason: HOSPADM

## 2022-01-01 RX ORDER — SCOLOPAMINE TRANSDERMAL SYSTEM 1 MG/1
1 PATCH, EXTENDED RELEASE TRANSDERMAL
Refills: 0
Start: 2022-01-01

## 2022-01-01 RX ORDER — DOCUSATE SODIUM 100 MG/1
100 CAPSULE, LIQUID FILLED ORAL 2 TIMES DAILY
Refills: 0
Start: 2022-01-01

## 2022-01-01 RX ORDER — METOPROLOL SUCCINATE 50 MG/1
50 TABLET, EXTENDED RELEASE ORAL 2 TIMES DAILY
Start: 2022-01-01

## 2022-01-01 RX ORDER — CITALOPRAM 10 MG/1
10 TABLET ORAL DAILY
Status: DISCONTINUED | OUTPATIENT
Start: 2022-01-01 | End: 2022-01-01 | Stop reason: HOSPADM

## 2022-01-01 RX ORDER — DILTIAZEM HYDROCHLORIDE 5 MG/ML
10 INJECTION INTRAVENOUS ONCE
Status: COMPLETED | OUTPATIENT
Start: 2022-01-01 | End: 2022-01-01

## 2022-01-01 RX ORDER — POLYETHYLENE GLYCOL 3350 17 G/17G
17 POWDER, FOR SOLUTION ORAL 2 TIMES DAILY
Status: DISCONTINUED | OUTPATIENT
Start: 2022-01-01 | End: 2022-01-01

## 2022-01-01 RX ORDER — GLYCOPYRROLATE 0.2 MG/ML
0.2 INJECTION INTRAMUSCULAR; INTRAVENOUS
Status: CANCELLED | OUTPATIENT
Start: 2022-01-01

## 2022-01-01 RX ORDER — HEPARIN SODIUM 5000 [USP'U]/ML
5000 INJECTION, SOLUTION INTRAVENOUS; SUBCUTANEOUS EVERY 8 HOURS SCHEDULED
Status: DISCONTINUED | OUTPATIENT
Start: 2022-01-01 | End: 2022-01-01

## 2022-01-01 RX ORDER — DEXAMETHASONE SODIUM PHOSPHATE 4 MG/ML
4 INJECTION, SOLUTION INTRA-ARTICULAR; INTRALESIONAL; INTRAMUSCULAR; INTRAVENOUS; SOFT TISSUE EVERY 6 HOURS SCHEDULED
Status: DISCONTINUED | OUTPATIENT
Start: 2022-01-01 | End: 2022-01-01

## 2022-01-01 RX ORDER — FENTANYL CITRATE-0.9 % NACL/PF 10 MCG/ML
50 PLASTIC BAG, INJECTION (ML) INTRAVENOUS CONTINUOUS
Status: DISCONTINUED | OUTPATIENT
Start: 2022-01-01 | End: 2022-01-01 | Stop reason: HOSPADM

## 2022-01-01 RX ORDER — ASCORBIC ACID 500 MG
1000 TABLET ORAL DAILY
Status: DISCONTINUED | OUTPATIENT
Start: 2022-01-01 | End: 2022-01-01 | Stop reason: HOSPADM

## 2022-01-01 RX ORDER — WATER 1000 ML/1000ML
INJECTION, SOLUTION INTRAVENOUS
Status: COMPLETED
Start: 2022-01-01 | End: 2022-01-01

## 2022-01-01 RX ORDER — MAGNESIUM HYDROXIDE/ALUMINUM HYDROXICE/SIMETHICONE 120; 1200; 1200 MG/30ML; MG/30ML; MG/30ML
30 SUSPENSION ORAL EVERY 6 HOURS PRN
Status: DISCONTINUED | OUTPATIENT
Start: 2022-01-01 | End: 2022-01-01 | Stop reason: HOSPADM

## 2022-01-01 RX ORDER — VANCOMYCIN HYDROCHLORIDE 1 G/200ML
15 INJECTION, SOLUTION INTRAVENOUS DAILY PRN
Status: DISCONTINUED | OUTPATIENT
Start: 2022-01-01 | End: 2022-01-01

## 2022-01-01 RX ORDER — TRAMADOL HYDROCHLORIDE 50 MG/1
TABLET ORAL
Qty: 30 TABLET | Refills: 0 | Status: SHIPPED | OUTPATIENT
Start: 2022-01-01 | End: 2022-01-01

## 2022-01-01 RX ORDER — ACETAMINOPHEN 325 MG/1
975 TABLET ORAL EVERY 8 HOURS SCHEDULED
Refills: 0
Start: 2022-01-01

## 2022-01-01 RX ORDER — SENNOSIDES 8.6 MG
8.6 TABLET ORAL DAILY
Status: DISCONTINUED | OUTPATIENT
Start: 2022-01-01 | End: 2022-01-01

## 2022-01-01 RX ORDER — ACETAMINOPHEN 650 MG/1
650 SUPPOSITORY RECTAL EVERY 6 HOURS PRN
Status: DISCONTINUED | OUTPATIENT
Start: 2022-01-01 | End: 2022-01-01

## 2022-01-01 RX ORDER — ACETAMINOPHEN 325 MG/1
975 TABLET ORAL EVERY 8 HOURS SCHEDULED
Status: DISCONTINUED | OUTPATIENT
Start: 2022-01-01 | End: 2022-01-01 | Stop reason: HOSPADM

## 2022-01-01 RX ORDER — POLYETHYLENE GLYCOL 3350 17 G/17G
17 POWDER, FOR SOLUTION ORAL 2 TIMES DAILY
Status: DISCONTINUED | OUTPATIENT
Start: 2022-01-01 | End: 2022-01-01 | Stop reason: HOSPADM

## 2022-01-01 RX ORDER — METRONIDAZOLE 500 MG/100ML
500 INJECTION, SOLUTION INTRAVENOUS EVERY 8 HOURS
Status: DISCONTINUED | OUTPATIENT
Start: 2022-01-01 | End: 2022-01-01

## 2022-01-01 RX ORDER — OXYCODONE HYDROCHLORIDE 10 MG/1
10 TABLET ORAL EVERY 4 HOURS PRN
Status: DISCONTINUED | OUTPATIENT
Start: 2022-01-01 | End: 2022-01-01

## 2022-01-01 RX ORDER — MAGNESIUM HYDROXIDE 1200 MG/15ML
LIQUID ORAL AS NEEDED
Status: DISCONTINUED | OUTPATIENT
Start: 2022-01-01 | End: 2022-01-01 | Stop reason: HOSPADM

## 2022-01-01 RX ORDER — SODIUM CHLORIDE, SODIUM GLUCONATE, SODIUM ACETATE, POTASSIUM CHLORIDE, MAGNESIUM CHLORIDE, SODIUM PHOSPHATE, DIBASIC, AND POTASSIUM PHOSPHATE .53; .5; .37; .037; .03; .012; .00082 G/100ML; G/100ML; G/100ML; G/100ML; G/100ML; G/100ML; G/100ML
1000 INJECTION, SOLUTION INTRAVENOUS ONCE
Status: DISCONTINUED | OUTPATIENT
Start: 2022-01-01 | End: 2022-01-01

## 2022-01-01 RX ORDER — LANOLIN ALCOHOL/MO/W.PET/CERES
3 CREAM (GRAM) TOPICAL
Status: DISCONTINUED | OUTPATIENT
Start: 2022-01-01 | End: 2022-01-01

## 2022-01-01 RX ORDER — METOPROLOL SUCCINATE 25 MG/1
25 TABLET, EXTENDED RELEASE ORAL DAILY
Status: DISCONTINUED | OUTPATIENT
Start: 2022-01-01 | End: 2022-01-01

## 2022-01-01 RX ORDER — LIDOCAINE HYDROCHLORIDE 10 MG/ML
INJECTION, SOLUTION EPIDURAL; INFILTRATION; INTRACAUDAL; PERINEURAL CODE/TRAUMA/SEDATION MEDICATION
Status: COMPLETED | OUTPATIENT
Start: 2022-01-01 | End: 2022-01-01

## 2022-01-01 RX ORDER — ENOXAPARIN SODIUM 100 MG/ML
40 INJECTION SUBCUTANEOUS DAILY
Status: CANCELLED | OUTPATIENT
Start: 2022-01-01

## 2022-01-01 RX ORDER — MANNITOL 250 MG/ML
INJECTION, SOLUTION INTRAVENOUS AS NEEDED
Status: DISCONTINUED | OUTPATIENT
Start: 2022-01-01 | End: 2022-01-01

## 2022-01-01 RX ORDER — HYDROMORPHONE HCL IN WATER/PF 6 MG/30 ML
0.2 PATIENT CONTROLLED ANALGESIA SYRINGE INTRAVENOUS
Status: DISCONTINUED | OUTPATIENT
Start: 2022-01-01 | End: 2022-01-01 | Stop reason: HOSPADM

## 2022-01-01 RX ORDER — CITALOPRAM 10 MG/1
10 TABLET ORAL DAILY
Status: DISCONTINUED | OUTPATIENT
Start: 2022-01-01 | End: 2022-01-01 | Stop reason: DRUGHIGH

## 2022-01-01 RX ORDER — LEVOTHYROXINE SODIUM 0.07 MG/1
37.5 TABLET ORAL
Status: DISCONTINUED | OUTPATIENT
Start: 2022-01-01 | End: 2022-01-01

## 2022-01-01 RX ORDER — OXYCODONE HYDROCHLORIDE 5 MG/1
2.5 TABLET ORAL EVERY 4 HOURS PRN
Status: DISCONTINUED | OUTPATIENT
Start: 2022-01-01 | End: 2022-01-01

## 2022-01-01 RX ORDER — HYDROMORPHONE HCL IN WATER/PF 6 MG/30 ML
0.2 PATIENT CONTROLLED ANALGESIA SYRINGE INTRAVENOUS EVERY 4 HOURS PRN
Status: DISCONTINUED | OUTPATIENT
Start: 2022-01-01 | End: 2022-01-01

## 2022-01-01 RX ORDER — SCOLOPAMINE TRANSDERMAL SYSTEM 1 MG/1
1 PATCH, EXTENDED RELEASE TRANSDERMAL
Status: DISCONTINUED | OUTPATIENT
Start: 2022-01-01 | End: 2022-01-01 | Stop reason: HOSPADM

## 2022-01-01 RX ORDER — ACETAMINOPHEN 325 MG/1
650 TABLET ORAL EVERY 6 HOURS PRN
Status: DISCONTINUED | OUTPATIENT
Start: 2022-01-01 | End: 2022-01-01

## 2022-01-01 RX ORDER — SODIUM CHLORIDE 9 MG/ML
75 INJECTION, SOLUTION INTRAVENOUS CONTINUOUS
Status: DISCONTINUED | OUTPATIENT
Start: 2022-01-01 | End: 2022-01-01

## 2022-01-01 RX ORDER — METOPROLOL TARTRATE 5 MG/5ML
5 INJECTION INTRAVENOUS ONCE
Status: COMPLETED | OUTPATIENT
Start: 2022-01-01 | End: 2022-01-01

## 2022-01-01 RX ORDER — ATORVASTATIN CALCIUM 10 MG/1
20 TABLET, FILM COATED ORAL
Status: DISCONTINUED | OUTPATIENT
Start: 2022-01-01 | End: 2022-01-01 | Stop reason: HOSPADM

## 2022-01-01 RX ORDER — DILTIAZEM HYDROCHLORIDE 120 MG/1
120 CAPSULE, EXTENDED RELEASE ORAL EVERY 12 HOURS SCHEDULED
Status: DISCONTINUED | OUTPATIENT
Start: 2022-01-01 | End: 2022-01-01 | Stop reason: HOSPADM

## 2022-01-01 RX ORDER — LEVOTHYROXINE SODIUM 0.07 MG/1
37.5 TABLET ORAL
Status: DISCONTINUED | OUTPATIENT
Start: 2022-01-01 | End: 2022-01-01 | Stop reason: HOSPADM

## 2022-01-01 RX ORDER — LABETALOL HYDROCHLORIDE 5 MG/ML
10 INJECTION, SOLUTION INTRAVENOUS
Status: DISCONTINUED | OUTPATIENT
Start: 2022-01-01 | End: 2022-01-01 | Stop reason: HOSPADM

## 2022-01-01 RX ORDER — METOPROLOL TARTRATE 5 MG/5ML
2.5 INJECTION INTRAVENOUS EVERY 6 HOURS
Status: DISCONTINUED | OUTPATIENT
Start: 2022-01-01 | End: 2022-01-01

## 2022-01-01 RX ORDER — EPHEDRINE SULFATE 50 MG/ML
INJECTION INTRAVENOUS AS NEEDED
Status: DISCONTINUED | OUTPATIENT
Start: 2022-01-01 | End: 2022-01-01

## 2022-01-01 RX ORDER — FENTANYL CITRATE-0.9 % NACL/PF 10 MCG/ML
50 PLASTIC BAG, INJECTION (ML) INTRAVENOUS CONTINUOUS
Status: CANCELLED | OUTPATIENT
Start: 2022-01-01

## 2022-01-01 RX ORDER — HYDROMORPHONE HCL/PF 1 MG/ML
0.5 SYRINGE (ML) INJECTION
Status: CANCELLED | OUTPATIENT
Start: 2022-01-01

## 2022-01-01 RX ORDER — LIDOCAINE 50 MG/G
1 PATCH TOPICAL ONCE
Status: COMPLETED | OUTPATIENT
Start: 2022-01-01 | End: 2022-01-01

## 2022-01-01 RX ORDER — OXYCODONE HYDROCHLORIDE 5 MG/1
5 TABLET ORAL EVERY 4 HOURS PRN
Status: DISCONTINUED | OUTPATIENT
Start: 2022-01-01 | End: 2022-01-01

## 2022-01-01 RX ORDER — HALOPERIDOL 5 MG/ML
2 INJECTION INTRAMUSCULAR
Status: DISCONTINUED | OUTPATIENT
Start: 2022-01-01 | End: 2022-01-01 | Stop reason: HOSPADM

## 2022-01-01 RX ORDER — PANTOPRAZOLE SODIUM 40 MG/1
40 TABLET, DELAYED RELEASE ORAL
Status: DISCONTINUED | OUTPATIENT
Start: 2022-01-01 | End: 2022-01-01 | Stop reason: HOSPADM

## 2022-01-01 RX ORDER — SENNOSIDES 8.6 MG
1 TABLET ORAL DAILY
Status: DISCONTINUED | OUTPATIENT
Start: 2022-01-01 | End: 2022-01-01 | Stop reason: HOSPADM

## 2022-01-01 RX ORDER — SODIUM CHLORIDE 9 MG/ML
10 INJECTION INTRAVENOUS DAILY
Qty: 100 ML | Refills: 2 | OUTPATIENT
Start: 2022-01-01 | End: 2022-09-10

## 2022-01-01 RX ORDER — LORAZEPAM 2 MG/ML
1 INJECTION INTRAMUSCULAR
Status: CANCELLED | OUTPATIENT
Start: 2022-01-01

## 2022-01-01 RX ORDER — CITALOPRAM 10 MG/1
10 TABLET ORAL 2 TIMES DAILY
Status: DISCONTINUED | OUTPATIENT
Start: 2022-01-01 | End: 2022-01-01

## 2022-01-01 RX ORDER — DOCUSATE SODIUM 100 MG/1
100 CAPSULE, LIQUID FILLED ORAL 2 TIMES DAILY
Status: DISCONTINUED | OUTPATIENT
Start: 2022-01-01 | End: 2022-01-01 | Stop reason: HOSPADM

## 2022-01-01 RX ORDER — PRAVASTATIN SODIUM 40 MG
80 TABLET ORAL
Status: DISCONTINUED | OUTPATIENT
Start: 2022-01-01 | End: 2022-01-01 | Stop reason: HOSPADM

## 2022-01-01 RX ORDER — AMIODARONE HYDROCHLORIDE 200 MG/1
400 TABLET ORAL 2 TIMES DAILY WITH MEALS
Status: DISCONTINUED | OUTPATIENT
Start: 2022-01-01 | End: 2022-01-01 | Stop reason: HOSPADM

## 2022-01-01 RX ORDER — POTASSIUM CHLORIDE 20 MEQ/1
20 TABLET, EXTENDED RELEASE ORAL ONCE
Status: COMPLETED | OUTPATIENT
Start: 2022-01-01 | End: 2022-01-01

## 2022-01-01 RX ORDER — AMIODARONE HYDROCHLORIDE 200 MG/1
200 TABLET ORAL
Status: DISCONTINUED | OUTPATIENT
Start: 2022-01-01 | End: 2022-01-01

## 2022-01-01 RX ORDER — CEFAZOLIN SODIUM 2 G/50ML
2000 SOLUTION INTRAVENOUS ONCE
Status: COMPLETED | OUTPATIENT
Start: 2022-01-01 | End: 2022-01-01

## 2022-01-01 RX ORDER — DEXAMETHASONE 4 MG/1
4 TABLET ORAL EVERY 8 HOURS SCHEDULED
Status: COMPLETED | OUTPATIENT
Start: 2022-01-01 | End: 2022-01-01

## 2022-01-01 RX ORDER — LIDOCAINE HYDROCHLORIDE 10 MG/ML
INJECTION, SOLUTION EPIDURAL; INFILTRATION; INTRACAUDAL; PERINEURAL AS NEEDED
Status: DISCONTINUED | OUTPATIENT
Start: 2022-01-01 | End: 2022-01-01

## 2022-01-01 RX ORDER — DILTIAZEM HYDROCHLORIDE 5 MG/ML
25 INJECTION INTRAVENOUS ONCE
Status: COMPLETED | OUTPATIENT
Start: 2022-01-01 | End: 2022-01-01

## 2022-01-01 RX ORDER — FENTANYL CITRATE 50 UG/ML
INJECTION, SOLUTION INTRAMUSCULAR; INTRAVENOUS
Status: DISPENSED
Start: 2022-01-01 | End: 2022-01-01

## 2022-01-01 RX ORDER — OLANZAPINE 10 MG/1
5 INJECTION, POWDER, LYOPHILIZED, FOR SOLUTION INTRAMUSCULAR ONCE
Status: COMPLETED | OUTPATIENT
Start: 2022-01-01 | End: 2022-01-01

## 2022-01-01 RX ORDER — DEXAMETHASONE 2 MG/1
2 TABLET ORAL EVERY 8 HOURS SCHEDULED
Status: COMPLETED | OUTPATIENT
Start: 2022-01-01 | End: 2022-01-01

## 2022-01-01 RX ORDER — FUROSEMIDE 10 MG/ML
INJECTION INTRAMUSCULAR; INTRAVENOUS AS NEEDED
Status: DISCONTINUED | OUTPATIENT
Start: 2022-01-01 | End: 2022-01-01

## 2022-01-01 RX ORDER — SCOLOPAMINE TRANSDERMAL SYSTEM 1 MG/1
1 PATCH, EXTENDED RELEASE TRANSDERMAL
Status: DISCONTINUED | OUTPATIENT
Start: 2022-01-01 | End: 2022-01-01

## 2022-01-01 RX ORDER — MAGNESIUM SULFATE HEPTAHYDRATE 40 MG/ML
2 INJECTION, SOLUTION INTRAVENOUS ONCE
Status: COMPLETED | OUTPATIENT
Start: 2022-01-01 | End: 2022-01-01

## 2022-01-01 RX ORDER — LORAZEPAM 0.5 MG/1
0.5 TABLET ORAL 2 TIMES DAILY PRN
Status: DISCONTINUED | OUTPATIENT
Start: 2022-01-01 | End: 2022-01-01

## 2022-01-01 RX ORDER — TRAMADOL HYDROCHLORIDE 50 MG/1
50 TABLET ORAL EVERY 6 HOURS PRN
Status: DISCONTINUED | OUTPATIENT
Start: 2022-01-01 | End: 2022-01-01

## 2022-01-01 RX ORDER — SENNOSIDES 8.6 MG
8.6 TABLET ORAL DAILY
Refills: 0
Start: 2022-01-01

## 2022-01-01 RX ORDER — LIDOCAINE HYDROCHLORIDE AND EPINEPHRINE 10; 10 MG/ML; UG/ML
INJECTION, SOLUTION INFILTRATION; PERINEURAL AS NEEDED
Status: DISCONTINUED | OUTPATIENT
Start: 2022-01-01 | End: 2022-01-01 | Stop reason: HOSPADM

## 2022-01-01 RX ORDER — ROCURONIUM BROMIDE 10 MG/ML
INJECTION, SOLUTION INTRAVENOUS AS NEEDED
Status: DISCONTINUED | OUTPATIENT
Start: 2022-01-01 | End: 2022-01-01

## 2022-01-01 RX ORDER — DEXAMETHASONE 2 MG/1
2 TABLET ORAL EVERY 12 HOURS SCHEDULED
Status: COMPLETED | OUTPATIENT
Start: 2022-01-01 | End: 2022-01-01

## 2022-01-01 RX ORDER — PANTOPRAZOLE SODIUM 40 MG/1
40 TABLET, DELAYED RELEASE ORAL DAILY
Status: DISCONTINUED | OUTPATIENT
Start: 2022-01-01 | End: 2022-01-01 | Stop reason: HOSPADM

## 2022-01-01 RX ORDER — AMIODARONE HYDROCHLORIDE 200 MG/1
200 TABLET ORAL
Refills: 0
Start: 2022-01-01

## 2022-01-01 RX ORDER — MECLIZINE HYDROCHLORIDE 25 MG/1
25 TABLET ORAL EVERY 8 HOURS PRN
Status: DISCONTINUED | OUTPATIENT
Start: 2022-01-01 | End: 2022-01-01 | Stop reason: HOSPADM

## 2022-01-01 RX ORDER — DEXAMETHASONE 2 MG/1
2 TABLET ORAL
Status: DISCONTINUED | OUTPATIENT
Start: 2022-01-01 | End: 2022-01-01 | Stop reason: HOSPADM

## 2022-01-01 RX ORDER — LORAZEPAM 0.5 MG/1
0.5 TABLET ORAL ONCE
Status: COMPLETED | OUTPATIENT
Start: 2022-01-01 | End: 2022-01-01

## 2022-01-01 RX ORDER — FENTANYL CITRATE 50 UG/ML
INJECTION, SOLUTION INTRAMUSCULAR; INTRAVENOUS CODE/TRAUMA/SEDATION MEDICATION
Status: COMPLETED | OUTPATIENT
Start: 2022-01-01 | End: 2022-01-01

## 2022-01-01 RX ORDER — ACETAMINOPHEN 650 MG/1
650 SUPPOSITORY RECTAL EVERY 6 HOURS
Status: DISCONTINUED | OUTPATIENT
Start: 2022-01-01 | End: 2022-01-01

## 2022-01-01 RX ORDER — LORAZEPAM 2 MG/ML
1 INJECTION INTRAMUSCULAR
Status: DISCONTINUED | OUTPATIENT
Start: 2022-01-01 | End: 2022-01-01 | Stop reason: HOSPADM

## 2022-01-01 RX ORDER — ACETAMINOPHEN 325 MG/1
975 TABLET ORAL EVERY 8 HOURS SCHEDULED
Status: DISCONTINUED | OUTPATIENT
Start: 2022-01-01 | End: 2022-01-01

## 2022-01-01 RX ORDER — SODIUM CHLORIDE 1000 MG
2 TABLET, SOLUBLE MISCELLANEOUS
Status: DISCONTINUED | OUTPATIENT
Start: 2022-01-01 | End: 2022-01-01

## 2022-01-01 RX ORDER — MIDAZOLAM HYDROCHLORIDE 2 MG/2ML
INJECTION, SOLUTION INTRAMUSCULAR; INTRAVENOUS CODE/TRAUMA/SEDATION MEDICATION
Status: COMPLETED | OUTPATIENT
Start: 2022-01-01 | End: 2022-01-01

## 2022-01-01 RX ORDER — CHLORHEXIDINE GLUCONATE 0.12 MG/ML
15 RINSE ORAL ONCE
Status: COMPLETED | OUTPATIENT
Start: 2022-01-01 | End: 2022-01-01

## 2022-01-01 RX ORDER — PANTOPRAZOLE SODIUM 40 MG/10ML
40 INJECTION, POWDER, LYOPHILIZED, FOR SOLUTION INTRAVENOUS DAILY
Status: DISCONTINUED | OUTPATIENT
Start: 2022-01-01 | End: 2022-01-01

## 2022-01-01 RX ORDER — AMLODIPINE BESYLATE 5 MG/1
10 TABLET ORAL DAILY
Status: DISCONTINUED | OUTPATIENT
Start: 2022-01-01 | End: 2022-01-01 | Stop reason: HOSPADM

## 2022-01-01 RX ORDER — HALOPERIDOL 5 MG/ML
0.5 INJECTION INTRAMUSCULAR
Status: DISCONTINUED | OUTPATIENT
Start: 2022-01-01 | End: 2022-01-01

## 2022-01-01 RX ORDER — OXYCODONE HYDROCHLORIDE 5 MG/1
2.5 TABLET ORAL EVERY 4 HOURS PRN
Qty: 20 TABLET | Refills: 0 | Status: SHIPPED | OUTPATIENT
Start: 2022-01-01

## 2022-01-01 RX ORDER — LORAZEPAM 2 MG/ML
1 INJECTION INTRAMUSCULAR
Status: COMPLETED | OUTPATIENT
Start: 2022-01-01 | End: 2022-01-01

## 2022-01-01 RX ORDER — AMIODARONE HYDROCHLORIDE 400 MG/1
400 TABLET ORAL 2 TIMES DAILY WITH MEALS
Qty: 13 TABLET | Refills: 0
Start: 2022-01-01 | End: 2022-01-01

## 2022-01-01 RX ORDER — DOCUSATE SODIUM 100 MG/1
100 CAPSULE, LIQUID FILLED ORAL 2 TIMES DAILY
Status: DISCONTINUED | OUTPATIENT
Start: 2022-01-01 | End: 2022-01-01

## 2022-01-01 RX ORDER — SODIUM CHLORIDE 1000 MG
1 TABLET, SOLUBLE MISCELLANEOUS
Refills: 0
Start: 2022-01-01

## 2022-01-01 RX ORDER — PROPOFOL 10 MG/ML
INJECTION, EMULSION INTRAVENOUS CONTINUOUS PRN
Status: DISCONTINUED | OUTPATIENT
Start: 2022-01-01 | End: 2022-01-01

## 2022-01-01 RX ORDER — VANCOMYCIN HYDROCHLORIDE 1 G/200ML
15 INJECTION, SOLUTION INTRAVENOUS ONCE
Status: DISCONTINUED | OUTPATIENT
Start: 2022-01-01 | End: 2022-01-01

## 2022-01-01 RX ORDER — HYDROMORPHONE HCL/PF 1 MG/ML
0.5 SYRINGE (ML) INJECTION
Status: DISCONTINUED | OUTPATIENT
Start: 2022-01-01 | End: 2022-01-01 | Stop reason: HOSPADM

## 2022-01-01 RX ORDER — DEXAMETHASONE 2 MG/1
2 TABLET ORAL EVERY 6 HOURS SCHEDULED
Status: COMPLETED | OUTPATIENT
Start: 2022-01-01 | End: 2022-01-01

## 2022-01-01 RX ORDER — DILTIAZEM HYDROCHLORIDE 120 MG/1
120 CAPSULE, EXTENDED RELEASE ORAL EVERY 12 HOURS SCHEDULED
Status: DISCONTINUED | OUTPATIENT
Start: 2022-01-01 | End: 2022-01-01

## 2022-01-01 RX ORDER — CHLORHEXIDINE GLUCONATE 0.12 MG/ML
15 RINSE ORAL EVERY 12 HOURS SCHEDULED
Status: DISCONTINUED | OUTPATIENT
Start: 2022-01-01 | End: 2022-01-01

## 2022-01-01 RX ORDER — METOPROLOL SUCCINATE 25 MG/1
25 TABLET, EXTENDED RELEASE ORAL DAILY
Status: DISCONTINUED | OUTPATIENT
Start: 2022-01-01 | End: 2022-01-01 | Stop reason: HOSPADM

## 2022-01-01 RX ORDER — SODIUM CHLORIDE 9 MG/ML
3 INJECTION INTRAVENOUS
Status: DISCONTINUED | OUTPATIENT
Start: 2022-01-01 | End: 2022-01-01

## 2022-01-01 RX ORDER — METRONIDAZOLE 500 MG/1
500 TABLET ORAL EVERY 8 HOURS SCHEDULED
Status: DISCONTINUED | OUTPATIENT
Start: 2022-01-01 | End: 2022-01-01

## 2022-01-01 RX ORDER — TRAMADOL HYDROCHLORIDE 50 MG/1
50 TABLET ORAL EVERY 8 HOURS PRN
Status: DISCONTINUED | OUTPATIENT
Start: 2022-01-01 | End: 2022-01-01 | Stop reason: HOSPADM

## 2022-01-01 RX ORDER — METOCLOPRAMIDE HYDROCHLORIDE 5 MG/ML
10 INJECTION INTRAMUSCULAR; INTRAVENOUS EVERY 6 HOURS PRN
Status: DISCONTINUED | OUTPATIENT
Start: 2022-01-01 | End: 2022-01-01 | Stop reason: HOSPADM

## 2022-01-01 RX ORDER — POTASSIUM CHLORIDE 14.9 MG/ML
20 INJECTION INTRAVENOUS
Status: DISCONTINUED | OUTPATIENT
Start: 2022-01-01 | End: 2022-01-01

## 2022-01-01 RX ORDER — GLYCOPYRROLATE 0.2 MG/ML
0.2 INJECTION INTRAMUSCULAR; INTRAVENOUS
Status: DISCONTINUED | OUTPATIENT
Start: 2022-01-01 | End: 2022-01-01 | Stop reason: HOSPADM

## 2022-01-01 RX ORDER — CEFAZOLIN SODIUM 2 G/50ML
2000 SOLUTION INTRAVENOUS EVERY 8 HOURS
Status: COMPLETED | OUTPATIENT
Start: 2022-01-01 | End: 2022-01-01

## 2022-01-01 RX ORDER — ROSUVASTATIN CALCIUM 10 MG/1
TABLET, COATED ORAL
Qty: 30 TABLET | Refills: 5 | Status: SHIPPED | OUTPATIENT
Start: 2022-01-01

## 2022-01-01 RX ORDER — LORAZEPAM 2 MG/ML
1 INJECTION INTRAMUSCULAR
Status: DISCONTINUED | OUTPATIENT
Start: 2022-01-01 | End: 2022-01-01

## 2022-01-01 RX ORDER — ONDANSETRON 2 MG/ML
4 INJECTION INTRAMUSCULAR; INTRAVENOUS ONCE AS NEEDED
Status: COMPLETED | OUTPATIENT
Start: 2022-01-01 | End: 2022-01-01

## 2022-01-01 RX ORDER — LORAZEPAM 0.5 MG/1
0.5 TABLET ORAL 2 TIMES DAILY PRN
Status: DISCONTINUED | OUTPATIENT
Start: 2022-01-01 | End: 2022-01-01 | Stop reason: HOSPADM

## 2022-01-01 RX ORDER — PHENYLEPHRINE HYDROCHLORIDE 10 MG/ML
INJECTION INTRAVENOUS
Status: DISPENSED
Start: 2022-01-01 | End: 2022-01-01

## 2022-01-01 RX ORDER — OXYCODONE HYDROCHLORIDE 5 MG/1
2.5 TABLET ORAL EVERY 4 HOURS PRN
Status: DISCONTINUED | OUTPATIENT
Start: 2022-01-01 | End: 2022-01-01 | Stop reason: HOSPADM

## 2022-01-01 RX ORDER — METOPROLOL TARTRATE 5 MG/5ML
2.5 INJECTION INTRAVENOUS ONCE
Status: COMPLETED | OUTPATIENT
Start: 2022-01-01 | End: 2022-01-01

## 2022-01-01 RX ORDER — DEXMEDETOMIDINE HYDROCHLORIDE 4 UG/ML
.1-.7 INJECTION, SOLUTION INTRAVENOUS
Status: DISCONTINUED | OUTPATIENT
Start: 2022-01-01 | End: 2022-01-01

## 2022-01-01 RX ORDER — DEXAMETHASONE 4 MG/1
4 TABLET ORAL EVERY 6 HOURS SCHEDULED
Status: COMPLETED | OUTPATIENT
Start: 2022-01-01 | End: 2022-01-01

## 2022-01-01 RX ORDER — METOPROLOL SUCCINATE 25 MG/1
25 TABLET, EXTENDED RELEASE ORAL 2 TIMES DAILY
Status: DISCONTINUED | OUTPATIENT
Start: 2022-01-01 | End: 2022-01-01

## 2022-01-01 RX ORDER — AMOXICILLIN 250 MG
1 CAPSULE ORAL 2 TIMES DAILY
Status: DISCONTINUED | OUTPATIENT
Start: 2022-01-01 | End: 2022-01-01

## 2022-01-01 RX ORDER — ONDANSETRON 2 MG/ML
4 INJECTION INTRAMUSCULAR; INTRAVENOUS EVERY 6 HOURS PRN
Status: DISCONTINUED | OUTPATIENT
Start: 2022-01-01 | End: 2022-01-01 | Stop reason: HOSPADM

## 2022-01-01 RX ORDER — LEVETIRACETAM 100 MG/ML
1000 SOLUTION ORAL EVERY 12 HOURS SCHEDULED
Status: DISCONTINUED | OUTPATIENT
Start: 2022-01-01 | End: 2022-01-01

## 2022-01-01 RX ADMIN — ACETAMINOPHEN 650 MG: 325 TABLET ORAL at 05:46

## 2022-01-01 RX ADMIN — DOCUSATE SODIUM 100 MG: 100 CAPSULE, LIQUID FILLED ORAL at 17:27

## 2022-01-01 RX ADMIN — HYDROMORPHONE HYDROCHLORIDE 0.2 MG: 0.2 INJECTION, SOLUTION INTRAMUSCULAR; INTRAVENOUS; SUBCUTANEOUS at 16:10

## 2022-01-01 RX ADMIN — FUROSEMIDE 20 MG: 10 INJECTION, SOLUTION INTRAMUSCULAR; INTRAVENOUS at 23:19

## 2022-01-01 RX ADMIN — SCOPOLAMINE 1 PATCH: 1.5 PATCH, EXTENDED RELEASE TRANSDERMAL at 13:26

## 2022-01-01 RX ADMIN — LEVETIRACETAM 1000 MG: 100 INJECTION, SOLUTION INTRAVENOUS at 21:47

## 2022-01-01 RX ADMIN — METOPROLOL TARTRATE 25 MG: 25 TABLET, FILM COATED ORAL at 15:17

## 2022-01-01 RX ADMIN — SODIUM CHLORIDE 75 ML/HR: 9 INJECTION, SOLUTION INTRAVENOUS at 15:48

## 2022-01-01 RX ADMIN — SODIUM CHLORIDE TAB 1 GM 1 G: 1 TAB at 18:26

## 2022-01-01 RX ADMIN — DOCUSATE SODIUM 100 MG: 100 CAPSULE, LIQUID FILLED ORAL at 08:49

## 2022-01-01 RX ADMIN — METHOCARBAMOL TABLETS 500 MG: 500 TABLET, COATED ORAL at 12:00

## 2022-01-01 RX ADMIN — POLYETHYLENE GLYCOL 3350 17 G: 17 POWDER, FOR SOLUTION ORAL at 08:35

## 2022-01-01 RX ADMIN — CEFTRIAXONE 1000 MG: 10 INJECTION, POWDER, FOR SOLUTION INTRAVENOUS at 15:00

## 2022-01-01 RX ADMIN — LORAZEPAM 1 MG: 2 INJECTION INTRAMUSCULAR; INTRAVENOUS at 00:15

## 2022-01-01 RX ADMIN — HEPARIN SODIUM 5000 UNITS: 5000 INJECTION INTRAVENOUS; SUBCUTANEOUS at 13:13

## 2022-01-01 RX ADMIN — CHLORHEXIDINE GLUCONATE 15 ML: 1.2 SOLUTION ORAL at 21:09

## 2022-01-01 RX ADMIN — DEXAMETHASONE 4 MG: 4 TABLET ORAL at 01:00

## 2022-01-01 RX ADMIN — OXYCODONE HYDROCHLORIDE AND ACETAMINOPHEN 1000 MG: 500 TABLET ORAL at 08:33

## 2022-01-01 RX ADMIN — DEXAMETHASONE 4 MG: 4 TABLET ORAL at 13:24

## 2022-01-01 RX ADMIN — FENTANYL CITRATE 25 MCG/HR: 50 INJECTION INTRAVENOUS at 21:03

## 2022-01-01 RX ADMIN — DEXAMETHASONE 2 MG: 2 TABLET ORAL at 06:09

## 2022-01-01 RX ADMIN — ACETAMINOPHEN 975 MG: 325 TABLET ORAL at 05:00

## 2022-01-01 RX ADMIN — DEXAMETHASONE 4 MG: 4 TABLET ORAL at 13:54

## 2022-01-01 RX ADMIN — ATORVASTATIN CALCIUM 20 MG: 10 TABLET, FILM COATED ORAL at 17:12

## 2022-01-01 RX ADMIN — Medication 3 MG: at 19:23

## 2022-01-01 RX ADMIN — ACETAMINOPHEN 975 MG: 325 TABLET ORAL at 13:25

## 2022-01-01 RX ADMIN — OLANZAPINE 2.5 MG: 10 INJECTION, POWDER, LYOPHILIZED, FOR SOLUTION INTRAMUSCULAR at 00:32

## 2022-01-01 RX ADMIN — METHOCARBAMOL TABLETS 500 MG: 500 TABLET, COATED ORAL at 13:55

## 2022-01-01 RX ADMIN — METHOCARBAMOL TABLETS 500 MG: 500 TABLET, COATED ORAL at 05:41

## 2022-01-01 RX ADMIN — METHOCARBAMOL TABLETS 500 MG: 500 TABLET, COATED ORAL at 13:25

## 2022-01-01 RX ADMIN — HEPARIN SODIUM 5000 UNITS: 5000 INJECTION INTRAVENOUS; SUBCUTANEOUS at 14:46

## 2022-01-01 RX ADMIN — FUROSEMIDE 20 MG: 10 INJECTION, SOLUTION INTRAMUSCULAR; INTRAVENOUS at 17:03

## 2022-01-01 RX ADMIN — AMIODARONE HYDROCHLORIDE 400 MG: 200 TABLET ORAL at 17:48

## 2022-01-01 RX ADMIN — AMIODARONE HYDROCHLORIDE 400 MG: 200 TABLET ORAL at 08:02

## 2022-01-01 RX ADMIN — ACETAMINOPHEN 975 MG: 325 TABLET ORAL at 14:51

## 2022-01-01 RX ADMIN — Medication 12.5 MCG: at 14:22

## 2022-01-01 RX ADMIN — PROPOFOL 100 MCG/KG/MIN: 10 INJECTION, EMULSION INTRAVENOUS at 09:11

## 2022-01-01 RX ADMIN — OLANZAPINE 2.5 MG: 10 INJECTION, POWDER, LYOPHILIZED, FOR SOLUTION INTRAMUSCULAR at 00:42

## 2022-01-01 RX ADMIN — AMLODIPINE BESYLATE 5 MG: 5 TABLET ORAL at 09:06

## 2022-01-01 RX ADMIN — METOPROLOL TARTRATE 25 MG: 25 TABLET, FILM COATED ORAL at 10:03

## 2022-01-01 RX ADMIN — METOPROLOL TARTRATE 25 MG: 25 TABLET, FILM COATED ORAL at 10:29

## 2022-01-01 RX ADMIN — LORAZEPAM 1 MG: 2 INJECTION INTRAMUSCULAR; INTRAVENOUS at 19:39

## 2022-01-01 RX ADMIN — SODIUM CHLORIDE TAB 1 GM 1 G: 1 TAB at 08:16

## 2022-01-01 RX ADMIN — DEXAMETHASONE SODIUM PHOSPHATE 10 MG: 4 INJECTION INTRA-ARTICULAR; INTRALESIONAL; INTRAMUSCULAR; INTRAVENOUS; SOFT TISSUE at 09:07

## 2022-01-01 RX ADMIN — METHOCARBAMOL TABLETS 500 MG: 500 TABLET, COATED ORAL at 12:26

## 2022-01-01 RX ADMIN — CITALOPRAM HYDROBROMIDE 10 MG: 10 TABLET ORAL at 20:21

## 2022-01-01 RX ADMIN — FENTANYL CITRATE 50 MCG: 50 INJECTION INTRAMUSCULAR; INTRAVENOUS at 13:16

## 2022-01-01 RX ADMIN — DEXAMETHASONE SODIUM PHOSPHATE 4 MG: 4 INJECTION INTRA-ARTICULAR; INTRALESIONAL; INTRAMUSCULAR; INTRAVENOUS; SOFT TISSUE at 12:05

## 2022-01-01 RX ADMIN — ACETAMINOPHEN 975 MG: 325 TABLET ORAL at 22:11

## 2022-01-01 RX ADMIN — ACETAMINOPHEN 975 MG: 325 TABLET ORAL at 13:50

## 2022-01-01 RX ADMIN — SODIUM CHLORIDE 75 ML/HR: 9 INJECTION, SOLUTION INTRAVENOUS at 20:36

## 2022-01-01 RX ADMIN — SODIUM CHLORIDE TAB 1 GM 1 G: 1 TAB at 16:40

## 2022-01-01 RX ADMIN — DEXTROSE 150 MG: 50 INJECTION, SOLUTION INTRAVENOUS at 12:47

## 2022-01-01 RX ADMIN — HEPARIN SODIUM 5000 UNITS: 5000 INJECTION INTRAVENOUS; SUBCUTANEOUS at 14:51

## 2022-01-01 RX ADMIN — DEXAMETHASONE 2 MG: 2 TABLET ORAL at 09:00

## 2022-01-01 RX ADMIN — LORAZEPAM 1 MG: 2 INJECTION INTRAMUSCULAR; INTRAVENOUS at 03:19

## 2022-01-01 RX ADMIN — PANTOPRAZOLE SODIUM 40 MG: 40 TABLET, DELAYED RELEASE ORAL at 05:05

## 2022-01-01 RX ADMIN — AMIODARONE HYDROCHLORIDE 400 MG: 200 TABLET ORAL at 07:16

## 2022-01-01 RX ADMIN — HEPARIN SODIUM 5000 UNITS: 5000 INJECTION INTRAVENOUS; SUBCUTANEOUS at 05:00

## 2022-01-01 RX ADMIN — OXYCODONE HYDROCHLORIDE 2.5 MG: 5 TABLET ORAL at 21:26

## 2022-01-01 RX ADMIN — DILTIAZEM HYDROCHLORIDE 120 MG: 120 CAPSULE, EXTENDED RELEASE ORAL at 21:09

## 2022-01-01 RX ADMIN — EPHEDRINE SULFATE 5 MG: 50 INJECTION INTRAVENOUS at 10:36

## 2022-01-01 RX ADMIN — OXYCODONE HYDROCHLORIDE AND ACETAMINOPHEN 1000 MG: 500 TABLET ORAL at 08:57

## 2022-01-01 RX ADMIN — METOPROLOL SUCCINATE 25 MG: 25 TABLET, FILM COATED, EXTENDED RELEASE ORAL at 08:19

## 2022-01-01 RX ADMIN — DOCUSATE SODIUM 100 MG: 100 CAPSULE, LIQUID FILLED ORAL at 08:58

## 2022-01-01 RX ADMIN — METOROPROLOL TARTRATE 5 MG: 5 INJECTION, SOLUTION INTRAVENOUS at 00:33

## 2022-01-01 RX ADMIN — DOCUSATE SODIUM 100 MG: 100 CAPSULE, LIQUID FILLED ORAL at 18:26

## 2022-01-01 RX ADMIN — CITALOPRAM HYDROBROMIDE 10 MG: 10 TABLET ORAL at 08:45

## 2022-01-01 RX ADMIN — METHOCARBAMOL TABLETS 500 MG: 500 TABLET, COATED ORAL at 17:19

## 2022-01-01 RX ADMIN — CITALOPRAM HYDROBROMIDE 10 MG: 10 TABLET ORAL at 21:09

## 2022-01-01 RX ADMIN — METRONIDAZOLE 500 MG: 500 INJECTION, SOLUTION INTRAVENOUS at 17:31

## 2022-01-01 RX ADMIN — CITALOPRAM HYDROBROMIDE 10 MG: 10 TABLET ORAL at 22:49

## 2022-01-01 RX ADMIN — LEVETIRACETAM 2000 MG: 100 INJECTION, SOLUTION INTRAVENOUS at 11:50

## 2022-01-01 RX ADMIN — MANNITOL 50 G: 12.5 INJECTION, SOLUTION INTRAVENOUS at 10:04

## 2022-01-01 RX ADMIN — POLYETHYLENE GLYCOL 3350 17 G: 17 POWDER, FOR SOLUTION ORAL at 22:52

## 2022-01-01 RX ADMIN — OXYCODONE HYDROCHLORIDE 2.5 MG: 5 TABLET ORAL at 22:12

## 2022-01-01 RX ADMIN — Medication 1 SPRAY: at 09:07

## 2022-01-01 RX ADMIN — HYDROMORPHONE HYDROCHLORIDE 0.2 MG: 0.2 INJECTION, SOLUTION INTRAMUSCULAR; INTRAVENOUS; SUBCUTANEOUS at 01:21

## 2022-01-01 RX ADMIN — METHOCARBAMOL TABLETS 500 MG: 500 TABLET, COATED ORAL at 05:04

## 2022-01-01 RX ADMIN — ACETAMINOPHEN 975 MG: 325 TABLET ORAL at 05:15

## 2022-01-01 RX ADMIN — ACETAMINOPHEN 975 MG: 325 TABLET ORAL at 21:52

## 2022-01-01 RX ADMIN — CHLORHEXIDINE GLUCONATE 15 ML: 1.2 SOLUTION ORAL at 09:00

## 2022-01-01 RX ADMIN — OXYCODONE HYDROCHLORIDE 2.5 MG: 5 TABLET ORAL at 20:10

## 2022-01-01 RX ADMIN — REMIFENTANIL HYDROCHLORIDE 0.12 MCG/KG/MIN: 1 INJECTION, POWDER, LYOPHILIZED, FOR SOLUTION INTRAVENOUS at 09:43

## 2022-01-01 RX ADMIN — OXYCODONE HYDROCHLORIDE 2.5 MG: 5 TABLET ORAL at 20:26

## 2022-01-01 RX ADMIN — HEPARIN SODIUM 5000 UNITS: 5000 INJECTION INTRAVENOUS; SUBCUTANEOUS at 23:00

## 2022-01-01 RX ADMIN — METRONIDAZOLE 500 MG: 500 INJECTION, SOLUTION INTRAVENOUS at 17:45

## 2022-01-01 RX ADMIN — OLANZAPINE 5 MG: 10 INJECTION, POWDER, LYOPHILIZED, FOR SOLUTION INTRAMUSCULAR at 01:45

## 2022-01-01 RX ADMIN — SODIUM CHLORIDE 1000 ML: 9 INJECTION, SOLUTION INTRAVENOUS at 14:55

## 2022-01-01 RX ADMIN — OXYCODONE HYDROCHLORIDE AND ACETAMINOPHEN 1000 MG: 500 TABLET ORAL at 08:58

## 2022-01-01 RX ADMIN — METRONIDAZOLE 500 MG: 500 INJECTION, SOLUTION INTRAVENOUS at 11:15

## 2022-01-01 RX ADMIN — HEPARIN SODIUM 5000 UNITS: 5000 INJECTION INTRAVENOUS; SUBCUTANEOUS at 05:41

## 2022-01-01 RX ADMIN — DILTIAZEM HYDROCHLORIDE 120 MG: 120 CAPSULE, EXTENDED RELEASE ORAL at 21:52

## 2022-01-01 RX ADMIN — LEVETIRACETAM 1000 MG: 100 INJECTION, SOLUTION INTRAVENOUS at 09:23

## 2022-01-01 RX ADMIN — LEVETIRACETAM 1000 MG: 100 SOLUTION ORAL at 20:21

## 2022-01-01 RX ADMIN — LORAZEPAM 0.5 MG: 0.5 TABLET ORAL at 12:13

## 2022-01-01 RX ADMIN — DEXAMETHASONE SODIUM PHOSPHATE 4 MG: 4 INJECTION INTRA-ARTICULAR; INTRALESIONAL; INTRAMUSCULAR; INTRAVENOUS; SOFT TISSUE at 00:12

## 2022-01-01 RX ADMIN — METOPROLOL SUCCINATE 50 MG: 50 TABLET, EXTENDED RELEASE ORAL at 20:53

## 2022-01-01 RX ADMIN — METHOCARBAMOL TABLETS 500 MG: 500 TABLET, COATED ORAL at 23:08

## 2022-01-01 RX ADMIN — SODIUM CHLORIDE 1000 ML: 9 INJECTION, SOLUTION INTRAVENOUS at 13:53

## 2022-01-01 RX ADMIN — POLYETHYLENE GLYCOL 3350 17 G: 17 POWDER, FOR SOLUTION ORAL at 09:22

## 2022-01-01 RX ADMIN — AMIODARONE HYDROCHLORIDE 400 MG: 200 TABLET ORAL at 10:40

## 2022-01-01 RX ADMIN — METHOCARBAMOL TABLETS 500 MG: 500 TABLET, COATED ORAL at 18:13

## 2022-01-01 RX ADMIN — SODIUM CHLORIDE TAB 1 GM 1 G: 1 TAB at 08:55

## 2022-01-01 RX ADMIN — AMIODARONE HYDROCHLORIDE 400 MG: 200 TABLET ORAL at 16:29

## 2022-01-01 RX ADMIN — HEPARIN SODIUM 5000 UNITS: 5000 INJECTION INTRAVENOUS; SUBCUTANEOUS at 15:00

## 2022-01-01 RX ADMIN — DEXAMETHASONE 2 MG: 2 TABLET ORAL at 08:02

## 2022-01-01 RX ADMIN — LEVOTHYROXINE SODIUM 37.5 MCG: 75 TABLET ORAL at 06:08

## 2022-01-01 RX ADMIN — METOPROLOL SUCCINATE 50 MG: 50 TABLET, EXTENDED RELEASE ORAL at 20:00

## 2022-01-01 RX ADMIN — DOCUSATE SODIUM 100 MG: 100 CAPSULE, LIQUID FILLED ORAL at 08:48

## 2022-01-01 RX ADMIN — METHOCARBAMOL TABLETS 500 MG: 500 TABLET, COATED ORAL at 23:31

## 2022-01-01 RX ADMIN — PANTOPRAZOLE SODIUM 40 MG: 40 TABLET, DELAYED RELEASE ORAL at 06:06

## 2022-01-01 RX ADMIN — PANTOPRAZOLE SODIUM 40 MG: 40 TABLET, DELAYED RELEASE ORAL at 05:16

## 2022-01-01 RX ADMIN — ALLOPURINOL 100 MG: 100 TABLET ORAL at 08:57

## 2022-01-01 RX ADMIN — Medication 1 SPRAY: at 17:20

## 2022-01-01 RX ADMIN — OXYCODONE HYDROCHLORIDE 2.5 MG: 5 TABLET ORAL at 10:41

## 2022-01-01 RX ADMIN — POTASSIUM CHLORIDE 40 MEQ: 20 SOLUTION ORAL at 15:42

## 2022-01-01 RX ADMIN — LEVOTHYROXINE SODIUM 37.5 MCG: 75 TABLET ORAL at 05:00

## 2022-01-01 RX ADMIN — ONDANSETRON 4 MG: 2 INJECTION INTRAMUSCULAR; INTRAVENOUS at 13:59

## 2022-01-01 RX ADMIN — Medication 20 MG: at 10:07

## 2022-01-01 RX ADMIN — DEXAMETHASONE 2 MG: 2 TABLET ORAL at 05:39

## 2022-01-01 RX ADMIN — SODIUM CHLORIDE 125 ML/HR: 9 INJECTION, SOLUTION INTRAVENOUS at 23:06

## 2022-01-01 RX ADMIN — DOCUSATE SODIUM 100 MG: 100 CAPSULE, LIQUID FILLED ORAL at 18:10

## 2022-01-01 RX ADMIN — MECLIZINE HYDROCHLORIDE 25 MG: 25 TABLET ORAL at 22:17

## 2022-01-01 RX ADMIN — ACETAMINOPHEN 650 MG: 325 TABLET ORAL at 00:14

## 2022-01-01 RX ADMIN — DEXAMETHASONE 4 MG: 4 TABLET ORAL at 05:15

## 2022-01-01 RX ADMIN — Medication 1 SPRAY: at 14:40

## 2022-01-01 RX ADMIN — DEXAMETHASONE 2 MG: 2 TABLET ORAL at 12:25

## 2022-01-01 RX ADMIN — METOROPROLOL TARTRATE 2.5 MG: 5 INJECTION, SOLUTION INTRAVENOUS at 17:49

## 2022-01-01 RX ADMIN — DILTIAZEM HYDROCHLORIDE 120 MG: 120 CAPSULE, EXTENDED RELEASE ORAL at 22:49

## 2022-01-01 RX ADMIN — Medication 1 SPRAY: at 17:51

## 2022-01-01 RX ADMIN — PANTOPRAZOLE SODIUM 40 MG: 40 TABLET, DELAYED RELEASE ORAL at 05:29

## 2022-01-01 RX ADMIN — METOPROLOL SUCCINATE 25 MG: 25 TABLET, EXTENDED RELEASE ORAL at 20:21

## 2022-01-01 RX ADMIN — AMIODARONE HYDROCHLORIDE 400 MG: 200 TABLET ORAL at 17:58

## 2022-01-01 RX ADMIN — METRONIDAZOLE 500 MG: 500 TABLET ORAL at 21:59

## 2022-01-01 RX ADMIN — VANCOMYCIN HYDROCHLORIDE 1500 MG: 10 INJECTION, POWDER, LYOPHILIZED, FOR SOLUTION INTRAVENOUS at 20:19

## 2022-01-01 RX ADMIN — METOPROLOL SUCCINATE 50 MG: 50 TABLET, EXTENDED RELEASE ORAL at 08:48

## 2022-01-01 RX ADMIN — DEXAMETHASONE 2 MG: 2 TABLET ORAL at 05:06

## 2022-01-01 RX ADMIN — METOPROLOL SUCCINATE 50 MG: 50 TABLET, EXTENDED RELEASE ORAL at 08:02

## 2022-01-01 RX ADMIN — ONDANSETRON 4 MG: 2 INJECTION INTRAMUSCULAR; INTRAVENOUS at 12:49

## 2022-01-01 RX ADMIN — LORAZEPAM 0.5 MG: 0.5 TABLET ORAL at 02:35

## 2022-01-01 RX ADMIN — SODIUM CHLORIDE TAB 1 GM 1 G: 1 TAB at 16:29

## 2022-01-01 RX ADMIN — PANTOPRAZOLE SODIUM 40 MG: 40 TABLET, DELAYED RELEASE ORAL at 05:00

## 2022-01-01 RX ADMIN — ROCURONIUM BROMIDE 50 MG: 10 INJECTION INTRAVENOUS at 09:07

## 2022-01-01 RX ADMIN — SODIUM CHLORIDE TAB 1 GM 1 G: 1 TAB at 14:46

## 2022-01-01 RX ADMIN — ALLOPURINOL 100 MG: 100 TABLET ORAL at 09:19

## 2022-01-01 RX ADMIN — SENNOSIDES 8.6 MG: 8.6 TABLET, FILM COATED ORAL at 09:18

## 2022-01-01 RX ADMIN — PANTOPRAZOLE SODIUM 40 MG: 40 INJECTION, POWDER, FOR SOLUTION INTRAVENOUS at 06:36

## 2022-01-01 RX ADMIN — LEVOTHYROXINE SODIUM 37.5 MCG: 75 TABLET ORAL at 05:11

## 2022-01-01 RX ADMIN — DILTIAZEM HYDROCHLORIDE 120 MG: 120 CAPSULE, EXTENDED RELEASE ORAL at 08:06

## 2022-01-01 RX ADMIN — DEXAMETHASONE 2 MG: 2 TABLET ORAL at 14:32

## 2022-01-01 RX ADMIN — SODIUM CHLORIDE 50 ML/HR: 3 INJECTION, SOLUTION INTRAVENOUS at 14:53

## 2022-01-01 RX ADMIN — MAGNESIUM SULFATE HEPTAHYDRATE 2 G: 40 INJECTION, SOLUTION INTRAVENOUS at 09:18

## 2022-01-01 RX ADMIN — DOCUSATE SODIUM 100 MG: 100 CAPSULE, LIQUID FILLED ORAL at 17:06

## 2022-01-01 RX ADMIN — SODIUM CHLORIDE 500 ML: 0.9 INJECTION, SOLUTION INTRAVENOUS at 16:07

## 2022-01-01 RX ADMIN — LEVOTHYROXINE SODIUM 37.5 MCG: 75 TABLET ORAL at 06:11

## 2022-01-01 RX ADMIN — CITALOPRAM HYDROBROMIDE 10 MG: 10 TABLET ORAL at 20:31

## 2022-01-01 RX ADMIN — MECLIZINE HYDROCHLORIDE 25 MG: 25 TABLET ORAL at 08:58

## 2022-01-01 RX ADMIN — POTASSIUM CHLORIDE 40 MEQ: 20 SOLUTION ORAL at 08:46

## 2022-01-01 RX ADMIN — METOPROLOL TARTRATE 25 MG: 25 TABLET, FILM COATED ORAL at 20:37

## 2022-01-01 RX ADMIN — ATORVASTATIN CALCIUM 20 MG: 10 TABLET, FILM COATED ORAL at 17:27

## 2022-01-01 RX ADMIN — METRONIDAZOLE 500 MG: 500 INJECTION, SOLUTION INTRAVENOUS at 01:21

## 2022-01-01 RX ADMIN — LORAZEPAM 0.5 MG: 0.5 TABLET ORAL at 16:39

## 2022-01-01 RX ADMIN — AMIODARONE HYDROCHLORIDE 1 MG/MIN: 50 INJECTION, SOLUTION INTRAVENOUS at 13:26

## 2022-01-01 RX ADMIN — SODIUM CHLORIDE TAB 1 GM 2 G: 1 TAB at 13:54

## 2022-01-01 RX ADMIN — METHOCARBAMOL TABLETS 500 MG: 500 TABLET, COATED ORAL at 12:13

## 2022-01-01 RX ADMIN — LEVETIRACETAM 1000 MG: 100 SOLUTION ORAL at 10:04

## 2022-01-01 RX ADMIN — OXYCODONE HYDROCHLORIDE AND ACETAMINOPHEN 1000 MG: 500 TABLET ORAL at 08:19

## 2022-01-01 RX ADMIN — HEPARIN SODIUM 5000 UNITS: 5000 INJECTION INTRAVENOUS; SUBCUTANEOUS at 05:07

## 2022-01-01 RX ADMIN — SODIUM CHLORIDE TAB 1 GM 1 G: 1 TAB at 12:49

## 2022-01-01 RX ADMIN — LEVETIRACETAM 1000 MG: 100 INJECTION, SOLUTION INTRAVENOUS at 08:57

## 2022-01-01 RX ADMIN — MECLIZINE HYDROCHLORIDE 25 MG: 25 TABLET ORAL at 08:40

## 2022-01-01 RX ADMIN — POLYETHYLENE GLYCOL 3350 17 G: 17 POWDER, FOR SOLUTION ORAL at 12:07

## 2022-01-01 RX ADMIN — POLYETHYLENE GLYCOL 3350 17 G: 17 POWDER, FOR SOLUTION ORAL at 08:25

## 2022-01-01 RX ADMIN — OXYCODONE HYDROCHLORIDE 2.5 MG: 5 TABLET ORAL at 03:13

## 2022-01-01 RX ADMIN — SODIUM CHLORIDE TAB 1 GM 2 G: 1 TAB at 17:11

## 2022-01-01 RX ADMIN — POLYETHYLENE GLYCOL 3350 17 G: 17 POWDER, FOR SOLUTION ORAL at 17:01

## 2022-01-01 RX ADMIN — METHOCARBAMOL TABLETS 500 MG: 500 TABLET, COATED ORAL at 17:48

## 2022-01-01 RX ADMIN — POTASSIUM CHLORIDE 40 MEQ: 20 SOLUTION ORAL at 08:16

## 2022-01-01 RX ADMIN — MECLIZINE HYDROCHLORIDE 25 MG: 25 TABLET ORAL at 18:10

## 2022-01-01 RX ADMIN — LIDOCAINE HYDROCHLORIDE 50 MG: 10 INJECTION, SOLUTION EPIDURAL; INFILTRATION; INTRACAUDAL; PERINEURAL at 09:07

## 2022-01-01 RX ADMIN — DEXAMETHASONE 2 MG: 2 TABLET ORAL at 23:06

## 2022-01-01 RX ADMIN — HYDROMORPHONE HYDROCHLORIDE 0.2 MG: 0.2 INJECTION, SOLUTION INTRAMUSCULAR; INTRAVENOUS; SUBCUTANEOUS at 19:42

## 2022-01-01 RX ADMIN — METOPROLOL SUCCINATE 50 MG: 50 TABLET, EXTENDED RELEASE ORAL at 08:24

## 2022-01-01 RX ADMIN — CEFAZOLIN SODIUM 2000 MG: 2 SOLUTION INTRAVENOUS at 05:04

## 2022-01-01 RX ADMIN — IOHEXOL 65 ML: 350 INJECTION, SOLUTION INTRAVENOUS at 02:15

## 2022-01-01 RX ADMIN — DEXAMETHASONE 4 MG: 4 TABLET ORAL at 11:59

## 2022-01-01 RX ADMIN — OXYCODONE HYDROCHLORIDE AND ACETAMINOPHEN 1000 MG: 500 TABLET ORAL at 08:02

## 2022-01-01 RX ADMIN — DILTIAZEM HYDROCHLORIDE 120 MG: 120 CAPSULE, EXTENDED RELEASE ORAL at 22:11

## 2022-01-01 RX ADMIN — DEXAMETHASONE 2 MG: 2 TABLET ORAL at 14:54

## 2022-01-01 RX ADMIN — METRONIDAZOLE 500 MG: 500 INJECTION, SOLUTION INTRAVENOUS at 11:11

## 2022-01-01 RX ADMIN — SODIUM CHLORIDE: 0.9 INJECTION, SOLUTION INTRAVENOUS at 09:29

## 2022-01-01 RX ADMIN — POLYETHYLENE GLYCOL 3350 17 G: 17 POWDER, FOR SOLUTION ORAL at 08:58

## 2022-01-01 RX ADMIN — SODIUM CHLORIDE TAB 1 GM 1 G: 1 TAB at 22:12

## 2022-01-01 RX ADMIN — FENTANYL CITRATE 25 MCG: 50 INJECTION INTRAMUSCULAR; INTRAVENOUS at 21:46

## 2022-01-01 RX ADMIN — OXYCODONE HYDROCHLORIDE 2.5 MG: 5 TABLET ORAL at 20:12

## 2022-01-01 RX ADMIN — SENNOSIDES 8.6 MG: 8.6 TABLET, FILM COATED ORAL at 08:48

## 2022-01-01 RX ADMIN — POLYETHYLENE GLYCOL 3350 17 G: 17 POWDER, FOR SOLUTION ORAL at 09:07

## 2022-01-01 RX ADMIN — AMLODIPINE BESYLATE 5 MG: 5 TABLET ORAL at 08:02

## 2022-01-01 RX ADMIN — DILTIAZEM HYDROCHLORIDE 120 MG: 120 CAPSULE, EXTENDED RELEASE ORAL at 09:02

## 2022-01-01 RX ADMIN — ALLOPURINOL 100 MG: 100 TABLET ORAL at 08:34

## 2022-01-01 RX ADMIN — ALLOPURINOL 100 MG: 100 TABLET ORAL at 08:59

## 2022-01-01 RX ADMIN — CHLORHEXIDINE GLUCONATE 15 ML: 1.2 SOLUTION ORAL at 20:20

## 2022-01-01 RX ADMIN — FUROSEMIDE 20 MG: 10 INJECTION, SOLUTION INTRAMUSCULAR; INTRAVENOUS at 07:16

## 2022-01-01 RX ADMIN — PANTOPRAZOLE SODIUM 40 MG: 40 TABLET, DELAYED RELEASE ORAL at 05:41

## 2022-01-01 RX ADMIN — SODIUM CHLORIDE TAB 1 GM 1 G: 1 TAB at 10:02

## 2022-01-01 RX ADMIN — OLANZAPINE 5 MG: 10 INJECTION, POWDER, LYOPHILIZED, FOR SOLUTION INTRAMUSCULAR at 22:25

## 2022-01-01 RX ADMIN — METOPROLOL SUCCINATE 25 MG: 25 TABLET, EXTENDED RELEASE ORAL at 08:34

## 2022-01-01 RX ADMIN — AMIODARONE HYDROCHLORIDE 400 MG: 200 TABLET ORAL at 08:23

## 2022-01-01 RX ADMIN — POTASSIUM CHLORIDE 20 MEQ: 1500 TABLET, EXTENDED RELEASE ORAL at 09:52

## 2022-01-01 RX ADMIN — SODIUM CHLORIDE TAB 1 GM 2 G: 1 TAB at 10:40

## 2022-01-01 RX ADMIN — PANTOPRAZOLE SODIUM 40 MG: 40 TABLET, DELAYED RELEASE ORAL at 06:09

## 2022-01-01 RX ADMIN — PANTOPRAZOLE SODIUM 40 MG: 40 TABLET, DELAYED RELEASE ORAL at 05:39

## 2022-01-01 RX ADMIN — OXYCODONE HYDROCHLORIDE 5 MG: 5 TABLET ORAL at 00:52

## 2022-01-01 RX ADMIN — DEXAMETHASONE 2 MG: 2 TABLET ORAL at 18:10

## 2022-01-01 RX ADMIN — DOCUSATE SODIUM 100 MG: 100 CAPSULE, LIQUID FILLED ORAL at 09:02

## 2022-01-01 RX ADMIN — ACETAMINOPHEN 975 MG: 325 TABLET ORAL at 05:29

## 2022-01-01 RX ADMIN — ATORVASTATIN CALCIUM 20 MG: 10 TABLET, FILM COATED ORAL at 18:26

## 2022-01-01 RX ADMIN — MECLIZINE HYDROCHLORIDE 25 MG: 25 TABLET ORAL at 06:06

## 2022-01-01 RX ADMIN — METOPROLOL SUCCINATE 50 MG: 50 TABLET, EXTENDED RELEASE ORAL at 09:52

## 2022-01-01 RX ADMIN — METHOCARBAMOL TABLETS 500 MG: 500 TABLET, COATED ORAL at 01:00

## 2022-01-01 RX ADMIN — DEXAMETHASONE 4 MG: 4 TABLET ORAL at 22:12

## 2022-01-01 RX ADMIN — DEXAMETHASONE SODIUM PHOSPHATE 4 MG: 4 INJECTION INTRA-ARTICULAR; INTRALESIONAL; INTRAMUSCULAR; INTRAVENOUS; SOFT TISSUE at 17:44

## 2022-01-01 RX ADMIN — ACETAMINOPHEN 975 MG: 325 TABLET ORAL at 15:00

## 2022-01-01 RX ADMIN — CHLORHEXIDINE GLUCONATE 15 ML: 1.2 SOLUTION ORAL at 21:12

## 2022-01-01 RX ADMIN — LEVOTHYROXINE SODIUM 37.5 MCG: 75 TABLET ORAL at 05:04

## 2022-01-01 RX ADMIN — POLYETHYLENE GLYCOL 3350 17 G: 17 POWDER, FOR SOLUTION ORAL at 08:42

## 2022-01-01 RX ADMIN — METHOCARBAMOL TABLETS 500 MG: 500 TABLET, COATED ORAL at 06:10

## 2022-01-01 RX ADMIN — PANTOPRAZOLE SODIUM 40 MG: 40 TABLET, DELAYED RELEASE ORAL at 05:06

## 2022-01-01 RX ADMIN — VANCOMYCIN HYDROCHLORIDE 1000 MG: 1 INJECTION, SOLUTION INTRAVENOUS at 16:00

## 2022-01-01 RX ADMIN — DILTIAZEM HYDROCHLORIDE 120 MG: 120 CAPSULE, EXTENDED RELEASE ORAL at 14:46

## 2022-01-01 RX ADMIN — SUGAMMADEX 200 MG: 100 INJECTION, SOLUTION INTRAVENOUS at 13:24

## 2022-01-01 RX ADMIN — METOPROLOL SUCCINATE 25 MG: 25 TABLET, EXTENDED RELEASE ORAL at 08:57

## 2022-01-01 RX ADMIN — ACETAMINOPHEN 975 MG: 325 TABLET ORAL at 20:21

## 2022-01-01 RX ADMIN — Medication 1 SPRAY: at 08:06

## 2022-01-01 RX ADMIN — METOPROLOL TARTRATE 25 MG: 25 TABLET, FILM COATED ORAL at 12:36

## 2022-01-01 RX ADMIN — OXYCODONE HYDROCHLORIDE AND ACETAMINOPHEN 1000 MG: 500 TABLET ORAL at 09:52

## 2022-01-01 RX ADMIN — DEXAMETHASONE 2 MG: 2 TABLET ORAL at 13:13

## 2022-01-01 RX ADMIN — PANTOPRAZOLE SODIUM 40 MG: 40 TABLET, DELAYED RELEASE ORAL at 05:10

## 2022-01-01 RX ADMIN — OXYCODONE HYDROCHLORIDE AND ACETAMINOPHEN 1000 MG: 500 TABLET ORAL at 10:11

## 2022-01-01 RX ADMIN — ALLOPURINOL 100 MG: 100 TABLET ORAL at 08:48

## 2022-01-01 RX ADMIN — METHOCARBAMOL TABLETS 500 MG: 500 TABLET, COATED ORAL at 18:36

## 2022-01-01 RX ADMIN — CEFAZOLIN SODIUM 2000 MG: 2 SOLUTION INTRAVENOUS at 12:31

## 2022-01-01 RX ADMIN — METOPROLOL SUCCINATE 50 MG: 50 TABLET, EXTENDED RELEASE ORAL at 21:52

## 2022-01-01 RX ADMIN — ATORVASTATIN CALCIUM 20 MG: 10 TABLET, FILM COATED ORAL at 17:16

## 2022-01-01 RX ADMIN — METOPROLOL SUCCINATE 50 MG: 50 TABLET, EXTENDED RELEASE ORAL at 20:26

## 2022-01-01 RX ADMIN — ALLOPURINOL 100 MG: 100 TABLET ORAL at 09:06

## 2022-01-01 RX ADMIN — Medication 1 SPRAY: at 13:13

## 2022-01-01 RX ADMIN — AMIODARONE HYDROCHLORIDE 400 MG: 200 TABLET ORAL at 08:29

## 2022-01-01 RX ADMIN — DEXMEDETOMIDINE HYDROCHLORIDE 0.2 MCG/KG/HR: 100 INJECTION, SOLUTION INTRAVENOUS at 09:43

## 2022-01-01 RX ADMIN — METOPROLOL SUCCINATE 50 MG: 50 TABLET, EXTENDED RELEASE ORAL at 09:06

## 2022-01-01 RX ADMIN — DEXAMETHASONE SODIUM PHOSPHATE 4 MG: 4 INJECTION INTRA-ARTICULAR; INTRALESIONAL; INTRAMUSCULAR; INTRAVENOUS; SOFT TISSUE at 05:45

## 2022-01-01 RX ADMIN — HEPARIN SODIUM 5000 UNITS: 5000 INJECTION INTRAVENOUS; SUBCUTANEOUS at 05:15

## 2022-01-01 RX ADMIN — METRONIDAZOLE 500 MG: 500 TABLET ORAL at 05:19

## 2022-01-01 RX ADMIN — OXYCODONE HYDROCHLORIDE 2.5 MG: 5 TABLET ORAL at 14:47

## 2022-01-01 RX ADMIN — DILTIAZEM HYDROCHLORIDE 120 MG: 120 CAPSULE, EXTENDED RELEASE ORAL at 23:06

## 2022-01-01 RX ADMIN — IBUPROFEN 400 MG: 100 SUSPENSION ORAL at 15:00

## 2022-01-01 RX ADMIN — METOPROLOL SUCCINATE 50 MG: 50 TABLET, EXTENDED RELEASE ORAL at 08:31

## 2022-01-01 RX ADMIN — SODIUM CHLORIDE TAB 1 GM 1 G: 1 TAB at 17:58

## 2022-01-01 RX ADMIN — MECLIZINE HYDROCHLORIDE 25 MG: 25 TABLET ORAL at 09:52

## 2022-01-01 RX ADMIN — SCOPOLAMINE 1 PATCH: 1.5 PATCH, EXTENDED RELEASE TRANSDERMAL at 14:40

## 2022-01-01 RX ADMIN — AMIODARONE HYDROCHLORIDE 400 MG: 200 TABLET ORAL at 18:10

## 2022-01-01 RX ADMIN — METHOCARBAMOL TABLETS 500 MG: 500 TABLET, COATED ORAL at 12:49

## 2022-01-01 RX ADMIN — SENNOSIDES AND DOCUSATE SODIUM 1 TABLET: 50; 8.6 TABLET ORAL at 08:25

## 2022-01-01 RX ADMIN — LORAZEPAM 1 MG: 2 INJECTION INTRAMUSCULAR; INTRAVENOUS at 13:02

## 2022-01-01 RX ADMIN — METOCLOPRAMIDE 10 MG: 5 INJECTION, SOLUTION INTRAMUSCULAR; INTRAVENOUS at 08:15

## 2022-01-01 RX ADMIN — METOPROLOL SUCCINATE 25 MG: 25 TABLET, EXTENDED RELEASE ORAL at 09:02

## 2022-01-01 RX ADMIN — SCOPOLAMINE 1 PATCH: 1.5 PATCH, EXTENDED RELEASE TRANSDERMAL at 00:07

## 2022-01-01 RX ADMIN — HEPARIN SODIUM 5000 UNITS: 5000 INJECTION INTRAVENOUS; SUBCUTANEOUS at 22:12

## 2022-01-01 RX ADMIN — AMIODARONE HYDROCHLORIDE 400 MG: 200 TABLET ORAL at 17:27

## 2022-01-01 RX ADMIN — OXYCODONE HYDROCHLORIDE AND ACETAMINOPHEN 1000 MG: 500 TABLET ORAL at 08:47

## 2022-01-01 RX ADMIN — AMIODARONE HYDROCHLORIDE 400 MG: 200 TABLET ORAL at 18:26

## 2022-01-01 RX ADMIN — HALOPERIDOL LACTATE 2 MG: 5 INJECTION, SOLUTION INTRAMUSCULAR at 01:20

## 2022-01-01 RX ADMIN — HEPARIN SODIUM 5000 UNITS: 5000 INJECTION INTRAVENOUS; SUBCUTANEOUS at 06:08

## 2022-01-01 RX ADMIN — METRONIDAZOLE 500 MG: 500 INJECTION, SOLUTION INTRAVENOUS at 10:31

## 2022-01-01 RX ADMIN — HYDROMORPHONE HYDROCHLORIDE 0.2 MG: 0.2 INJECTION, SOLUTION INTRAMUSCULAR; INTRAVENOUS; SUBCUTANEOUS at 15:30

## 2022-01-01 RX ADMIN — HEPARIN SODIUM 5000 UNITS: 5000 INJECTION INTRAVENOUS; SUBCUTANEOUS at 13:16

## 2022-01-01 RX ADMIN — DEXAMETHASONE 2 MG: 2 TABLET ORAL at 21:55

## 2022-01-01 RX ADMIN — METOROPROLOL TARTRATE 2.5 MG: 5 INJECTION, SOLUTION INTRAVENOUS at 21:13

## 2022-01-01 RX ADMIN — METOPROLOL SUCCINATE 50 MG: 50 TABLET, EXTENDED RELEASE ORAL at 08:45

## 2022-01-01 RX ADMIN — ATORVASTATIN CALCIUM 20 MG: 10 TABLET, FILM COATED ORAL at 17:01

## 2022-01-01 RX ADMIN — OXYCODONE HYDROCHLORIDE AND ACETAMINOPHEN 1000 MG: 500 TABLET ORAL at 08:24

## 2022-01-01 RX ADMIN — METOPROLOL TARTRATE 25 MG: 25 TABLET, FILM COATED ORAL at 22:00

## 2022-01-01 RX ADMIN — POTASSIUM CHLORIDE 40 MEQ: 1500 TABLET, EXTENDED RELEASE ORAL at 11:59

## 2022-01-01 RX ADMIN — PRAVASTATIN SODIUM 80 MG: 80 TABLET ORAL at 17:58

## 2022-01-01 RX ADMIN — DOCUSATE SODIUM 100 MG: 100 CAPSULE, LIQUID FILLED ORAL at 09:18

## 2022-01-01 RX ADMIN — OXYCODONE HYDROCHLORIDE AND ACETAMINOPHEN 1000 MG: 500 TABLET ORAL at 12:07

## 2022-01-01 RX ADMIN — HYDROMORPHONE HYDROCHLORIDE 0.2 MG: 0.2 INJECTION, SOLUTION INTRAMUSCULAR; INTRAVENOUS; SUBCUTANEOUS at 23:14

## 2022-01-01 RX ADMIN — SODIUM CHLORIDE TAB 1 GM 1 G: 1 TAB at 08:22

## 2022-01-01 RX ADMIN — ACETAMINOPHEN 650 MG: 325 TABLET ORAL at 11:56

## 2022-01-01 RX ADMIN — CEFTRIAXONE 1000 MG: 10 INJECTION, POWDER, FOR SOLUTION INTRAVENOUS at 15:05

## 2022-01-01 RX ADMIN — Medication 12.5 MCG: at 14:30

## 2022-01-01 RX ADMIN — METHOCARBAMOL TABLETS 500 MG: 500 TABLET, COATED ORAL at 13:13

## 2022-01-01 RX ADMIN — ATORVASTATIN CALCIUM 20 MG: 10 TABLET, FILM COATED ORAL at 18:10

## 2022-01-01 RX ADMIN — METOPROLOL SUCCINATE 50 MG: 50 TABLET, EXTENDED RELEASE ORAL at 21:11

## 2022-01-01 RX ADMIN — OXYCODONE HYDROCHLORIDE 2.5 MG: 5 TABLET ORAL at 20:54

## 2022-01-01 RX ADMIN — METHOCARBAMOL TABLETS 500 MG: 500 TABLET, COATED ORAL at 17:11

## 2022-01-01 RX ADMIN — FENTANYL CITRATE 50 MCG: 50 INJECTION INTRAMUSCULAR; INTRAVENOUS at 13:41

## 2022-01-01 RX ADMIN — METHOCARBAMOL TABLETS 500 MG: 500 TABLET, COATED ORAL at 05:39

## 2022-01-01 RX ADMIN — AMIODARONE HYDROCHLORIDE 400 MG: 200 TABLET ORAL at 17:19

## 2022-01-01 RX ADMIN — DEXAMETHASONE SODIUM PHOSPHATE 4 MG: 4 INJECTION INTRA-ARTICULAR; INTRALESIONAL; INTRAMUSCULAR; INTRAVENOUS; SOFT TISSUE at 05:05

## 2022-01-01 RX ADMIN — DOCUSATE SODIUM 100 MG: 100 CAPSULE, LIQUID FILLED ORAL at 18:36

## 2022-01-01 RX ADMIN — ATORVASTATIN CALCIUM 20 MG: 10 TABLET, FILM COATED ORAL at 17:19

## 2022-01-01 RX ADMIN — HEPARIN SODIUM 5000 UNITS: 5000 INJECTION INTRAVENOUS; SUBCUTANEOUS at 05:19

## 2022-01-01 RX ADMIN — METHOCARBAMOL TABLETS 500 MG: 500 TABLET, COATED ORAL at 18:10

## 2022-01-01 RX ADMIN — HEPARIN SODIUM 5000 UNITS: 5000 INJECTION INTRAVENOUS; SUBCUTANEOUS at 05:40

## 2022-01-01 RX ADMIN — SODIUM CHLORIDE TAB 1 GM 2 G: 1 TAB at 07:16

## 2022-01-01 RX ADMIN — VANCOMYCIN HYDROCHLORIDE 1500 MG: 10 INJECTION, POWDER, LYOPHILIZED, FOR SOLUTION INTRAVENOUS at 09:22

## 2022-01-01 RX ADMIN — ACETAMINOPHEN 975 MG: 325 TABLET ORAL at 21:11

## 2022-01-01 RX ADMIN — METRONIDAZOLE 500 MG: 500 INJECTION, SOLUTION INTRAVENOUS at 17:57

## 2022-01-01 RX ADMIN — SENNOSIDES 8.6 MG: 8.6 TABLET, FILM COATED ORAL at 12:07

## 2022-01-01 RX ADMIN — LEVOTHYROXINE SODIUM 37.5 MCG: 75 TABLET ORAL at 05:05

## 2022-01-01 RX ADMIN — LEVETIRACETAM 1000 MG: 100 INJECTION INTRAVENOUS at 09:36

## 2022-01-01 RX ADMIN — LEVOTHYROXINE SODIUM 37.5 MCG: 75 TABLET ORAL at 05:32

## 2022-01-01 RX ADMIN — LORAZEPAM 0.5 MG: 0.5 TABLET ORAL at 19:51

## 2022-01-01 RX ADMIN — HEPARIN SODIUM 5000 UNITS: 5000 INJECTION INTRAVENOUS; SUBCUTANEOUS at 05:32

## 2022-01-01 RX ADMIN — AMIODARONE HYDROCHLORIDE 400 MG: 200 TABLET ORAL at 18:13

## 2022-01-01 RX ADMIN — CX-024414 0.25 ML: 0.2 INJECTION, SUSPENSION INTRAMUSCULAR at 17:48

## 2022-01-01 RX ADMIN — DEXAMETHASONE SODIUM PHOSPHATE 4 MG: 4 INJECTION INTRA-ARTICULAR; INTRALESIONAL; INTRAMUSCULAR; INTRAVENOUS; SOFT TISSUE at 05:00

## 2022-01-01 RX ADMIN — PROPOFOL 60 MG: 10 INJECTION, EMULSION INTRAVENOUS at 10:36

## 2022-01-01 RX ADMIN — SENNOSIDES 8.6 MG: 8.6 TABLET, FILM COATED ORAL at 08:49

## 2022-01-01 RX ADMIN — CEFAZOLIN SODIUM 2000 MG: 2 SOLUTION INTRAVENOUS at 12:16

## 2022-01-01 RX ADMIN — SODIUM CHLORIDE 100 ML/HR: 0.9 INJECTION, SOLUTION INTRAVENOUS at 10:53

## 2022-01-01 RX ADMIN — SODIUM CHLORIDE 75 ML/HR: 9 INJECTION, SOLUTION INTRAVENOUS at 13:21

## 2022-01-01 RX ADMIN — LEVOTHYROXINE SODIUM 37.5 MCG: 75 TABLET ORAL at 05:09

## 2022-01-01 RX ADMIN — AMIODARONE HYDROCHLORIDE 400 MG: 200 TABLET ORAL at 10:02

## 2022-01-01 RX ADMIN — SENNOSIDES 8.6 MG: 8.6 TABLET, FILM COATED ORAL at 09:00

## 2022-01-01 RX ADMIN — HEPARIN SODIUM 5000 UNITS: 5000 INJECTION INTRAVENOUS; SUBCUTANEOUS at 06:07

## 2022-01-01 RX ADMIN — DILTIAZEM HYDROCHLORIDE 120 MG: 120 CAPSULE, EXTENDED RELEASE ORAL at 08:36

## 2022-01-01 RX ADMIN — AMIODARONE HYDROCHLORIDE 400 MG: 200 TABLET ORAL at 09:52

## 2022-01-01 RX ADMIN — METOPROLOL SUCCINATE 50 MG: 50 TABLET, EXTENDED RELEASE ORAL at 08:58

## 2022-01-01 RX ADMIN — LEVOTHYROXINE SODIUM 37.5 MCG: 75 TABLET ORAL at 06:06

## 2022-01-01 RX ADMIN — DEXAMETHASONE SODIUM PHOSPHATE 4 MG: 4 INJECTION INTRA-ARTICULAR; INTRALESIONAL; INTRAMUSCULAR; INTRAVENOUS; SOFT TISSUE at 11:57

## 2022-01-01 RX ADMIN — METHOCARBAMOL TABLETS 500 MG: 500 TABLET, COATED ORAL at 18:26

## 2022-01-01 RX ADMIN — OXYCODONE HYDROCHLORIDE 2.5 MG: 5 TABLET ORAL at 11:23

## 2022-01-01 RX ADMIN — ACETAMINOPHEN 650 MG: 325 TABLET ORAL at 17:31

## 2022-01-01 RX ADMIN — HYDROMORPHONE HYDROCHLORIDE 0.2 MG: 0.2 INJECTION, SOLUTION INTRAMUSCULAR; INTRAVENOUS; SUBCUTANEOUS at 18:28

## 2022-01-01 RX ADMIN — ACETAMINOPHEN 975 MG: 325 TABLET ORAL at 21:55

## 2022-01-01 RX ADMIN — DILTIAZEM HYDROCHLORIDE 120 MG: 120 CAPSULE, EXTENDED RELEASE ORAL at 20:27

## 2022-01-01 RX ADMIN — METRONIDAZOLE 500 MG: 500 INJECTION, SOLUTION INTRAVENOUS at 01:46

## 2022-01-01 RX ADMIN — METHOCARBAMOL TABLETS 500 MG: 500 TABLET, COATED ORAL at 05:06

## 2022-01-01 RX ADMIN — SODIUM CHLORIDE TAB 1 GM 2 G: 1 TAB at 08:41

## 2022-01-01 RX ADMIN — ACETAMINOPHEN 975 MG: 325 TABLET ORAL at 05:04

## 2022-01-01 RX ADMIN — METOROPROLOL TARTRATE 2.5 MG: 5 INJECTION, SOLUTION INTRAVENOUS at 00:07

## 2022-01-01 RX ADMIN — CEFTRIAXONE 1000 MG: 10 INJECTION, POWDER, FOR SOLUTION INTRAVENOUS at 15:39

## 2022-01-01 RX ADMIN — DEXAMETHASONE 2 MG: 2 TABLET ORAL at 09:18

## 2022-01-01 RX ADMIN — HYDROMORPHONE HYDROCHLORIDE 0.2 MG: 0.2 INJECTION, SOLUTION INTRAMUSCULAR; INTRAVENOUS; SUBCUTANEOUS at 05:32

## 2022-01-01 RX ADMIN — ONDANSETRON 4 MG: 2 INJECTION INTRAMUSCULAR; INTRAVENOUS at 08:40

## 2022-01-01 RX ADMIN — DEXAMETHASONE 2 MG: 2 TABLET ORAL at 18:26

## 2022-01-01 RX ADMIN — SCOPOLAMINE 1 PATCH: 1.5 PATCH, EXTENDED RELEASE TRANSDERMAL at 13:52

## 2022-01-01 RX ADMIN — ACETAMINOPHEN 650 MG: 325 TABLET ORAL at 12:36

## 2022-01-01 RX ADMIN — METRONIDAZOLE 500 MG: 500 INJECTION, SOLUTION INTRAVENOUS at 10:03

## 2022-01-01 RX ADMIN — SCOPOLAMINE 1 PATCH: 1.5 PATCH, EXTENDED RELEASE TRANSDERMAL at 15:05

## 2022-01-01 RX ADMIN — TEMAZEPAM 7.5 MG: 7.5 CAPSULE ORAL at 00:52

## 2022-01-01 RX ADMIN — PRAVASTATIN SODIUM 80 MG: 80 TABLET ORAL at 16:28

## 2022-01-01 RX ADMIN — CEFAZOLIN SODIUM 2000 MG: 2 SOLUTION INTRAVENOUS at 20:21

## 2022-01-01 RX ADMIN — METOPROLOL SUCCINATE 50 MG: 50 TABLET, EXTENDED RELEASE ORAL at 21:55

## 2022-01-01 RX ADMIN — HEPARIN SODIUM 5000 UNITS: 5000 INJECTION INTRAVENOUS; SUBCUTANEOUS at 21:17

## 2022-01-01 RX ADMIN — OXYCODONE HYDROCHLORIDE AND ACETAMINOPHEN 1000 MG: 500 TABLET ORAL at 09:06

## 2022-01-01 RX ADMIN — ALLOPURINOL 100 MG: 100 TABLET ORAL at 08:24

## 2022-01-01 RX ADMIN — ACETAMINOPHEN 650 MG: 325 TABLET ORAL at 23:57

## 2022-01-01 RX ADMIN — DILTIAZEM HYDROCHLORIDE 120 MG: 120 CAPSULE, EXTENDED RELEASE ORAL at 08:47

## 2022-01-01 RX ADMIN — METOPROLOL SUCCINATE 25 MG: 25 TABLET, EXTENDED RELEASE ORAL at 21:26

## 2022-01-01 RX ADMIN — HALOPERIDOL LACTATE 0.5 MG: 5 INJECTION, SOLUTION INTRAMUSCULAR at 21:03

## 2022-01-01 RX ADMIN — CHLORHEXIDINE GLUCONATE 15 ML: 1.2 SOLUTION ORAL at 07:37

## 2022-01-01 RX ADMIN — DOCUSATE SODIUM 100 MG: 100 CAPSULE, LIQUID FILLED ORAL at 17:12

## 2022-01-01 RX ADMIN — ALLOPURINOL 100 MG: 100 TABLET ORAL at 10:29

## 2022-01-01 RX ADMIN — DILTIAZEM HYDROCHLORIDE 10 MG: 5 INJECTION INTRAVENOUS at 14:00

## 2022-01-01 RX ADMIN — POLYETHYLENE GLYCOL 3350 17 G: 17 POWDER, FOR SOLUTION ORAL at 17:11

## 2022-01-01 RX ADMIN — LORAZEPAM 0.5 MG: 0.5 TABLET ORAL at 14:32

## 2022-01-01 RX ADMIN — DEXAMETHASONE 4 MG: 4 TABLET ORAL at 18:36

## 2022-01-01 RX ADMIN — DEXAMETHASONE 2 MG: 2 TABLET ORAL at 23:04

## 2022-01-01 RX ADMIN — DILTIAZEM HYDROCHLORIDE 120 MG: 120 CAPSULE, EXTENDED RELEASE ORAL at 08:24

## 2022-01-01 RX ADMIN — DEXAMETHASONE SODIUM PHOSPHATE 10 MG: 10 INJECTION, SOLUTION INTRAMUSCULAR; INTRAVENOUS at 09:43

## 2022-01-01 RX ADMIN — PANTOPRAZOLE SODIUM 40 MG: 40 TABLET, DELAYED RELEASE ORAL at 05:12

## 2022-01-01 RX ADMIN — LEVOTHYROXINE SODIUM 37.5 MCG: 75 TABLET ORAL at 05:15

## 2022-01-01 RX ADMIN — ALLOPURINOL 100 MG: 100 TABLET ORAL at 08:02

## 2022-01-01 RX ADMIN — CITALOPRAM HYDROBROMIDE 10 MG: 10 TABLET ORAL at 09:02

## 2022-01-01 RX ADMIN — ACETAMINOPHEN 975 MG: 325 TABLET ORAL at 14:46

## 2022-01-01 RX ADMIN — LIDOCAINE 5% 1 PATCH: 700 PATCH TOPICAL at 12:47

## 2022-01-01 RX ADMIN — HEPARIN SODIUM 5000 UNITS: 5000 INJECTION INTRAVENOUS; SUBCUTANEOUS at 21:59

## 2022-01-01 RX ADMIN — ATORVASTATIN CALCIUM 20 MG: 10 TABLET, FILM COATED ORAL at 17:06

## 2022-01-01 RX ADMIN — AMLODIPINE BESYLATE 10 MG: 10 TABLET ORAL at 08:19

## 2022-01-01 RX ADMIN — METOROPROLOL TARTRATE 2.5 MG: 5 INJECTION, SOLUTION INTRAVENOUS at 17:32

## 2022-01-01 RX ADMIN — OXYCODONE HYDROCHLORIDE 2.5 MG: 5 TABLET ORAL at 14:32

## 2022-01-01 RX ADMIN — ACETAMINOPHEN 975 MG: 325 TABLET ORAL at 05:41

## 2022-01-01 RX ADMIN — DEXAMETHASONE SODIUM PHOSPHATE 4 MG: 4 INJECTION INTRA-ARTICULAR; INTRALESIONAL; INTRAMUSCULAR; INTRAVENOUS; SOFT TISSUE at 00:53

## 2022-01-01 RX ADMIN — Medication 1 SPRAY: at 12:50

## 2022-01-01 RX ADMIN — HEPARIN SODIUM 5000 UNITS: 5000 INJECTION INTRAVENOUS; SUBCUTANEOUS at 20:52

## 2022-01-01 RX ADMIN — METOPROLOL SUCCINATE 50 MG: 50 TABLET, EXTENDED RELEASE ORAL at 22:49

## 2022-01-01 RX ADMIN — SODIUM CHLORIDE TAB 1 GM 2 G: 1 TAB at 19:59

## 2022-01-01 RX ADMIN — ATORVASTATIN CALCIUM 20 MG: 10 TABLET, FILM COATED ORAL at 17:48

## 2022-01-01 RX ADMIN — IOHEXOL 77 ML: 350 INJECTION, SOLUTION INTRAVENOUS at 13:25

## 2022-01-01 RX ADMIN — LEVOTHYROXINE SODIUM 37.5 MCG: 75 TABLET ORAL at 05:47

## 2022-01-01 RX ADMIN — AMIODARONE HYDROCHLORIDE 400 MG: 200 TABLET ORAL at 09:09

## 2022-01-01 RX ADMIN — SODIUM CHLORIDE TAB 1 GM 1 G: 1 TAB at 08:57

## 2022-01-01 RX ADMIN — ACETAMINOPHEN 975 MG: 325 TABLET ORAL at 21:17

## 2022-01-01 RX ADMIN — ACETAMINOPHEN 650 MG: 325 TABLET ORAL at 17:45

## 2022-01-01 RX ADMIN — LEVOTHYROXINE SODIUM 37.5 MCG: 75 TABLET ORAL at 05:41

## 2022-01-01 RX ADMIN — METOROPROLOL TARTRATE 2.5 MG: 5 INJECTION, SOLUTION INTRAVENOUS at 06:19

## 2022-01-01 RX ADMIN — PROPOFOL 80 MG: 10 INJECTION, EMULSION INTRAVENOUS at 09:43

## 2022-01-01 RX ADMIN — ALLOPURINOL 100 MG: 100 TABLET ORAL at 08:31

## 2022-01-01 RX ADMIN — DEXAMETHASONE 4 MG: 4 TABLET ORAL at 13:50

## 2022-01-01 RX ADMIN — ALLOPURINOL 100 MG: 100 TABLET ORAL at 10:11

## 2022-01-01 RX ADMIN — DILTIAZEM HYDROCHLORIDE 120 MG: 120 CAPSULE, EXTENDED RELEASE ORAL at 13:40

## 2022-01-01 RX ADMIN — DEXAMETHASONE 2 MG: 2 TABLET ORAL at 21:17

## 2022-01-01 RX ADMIN — DILTIAZEM HYDROCHLORIDE 10 MG/HR: 5 INJECTION INTRAVENOUS at 14:31

## 2022-01-01 RX ADMIN — LORAZEPAM 0.5 MG: 0.5 TABLET ORAL at 00:40

## 2022-01-01 RX ADMIN — POLYETHYLENE GLYCOL 3350 17 G: 17 POWDER, FOR SOLUTION ORAL at 17:54

## 2022-01-01 RX ADMIN — DILTIAZEM HYDROCHLORIDE 120 MG: 120 CAPSULE, EXTENDED RELEASE ORAL at 20:22

## 2022-01-01 RX ADMIN — AMIODARONE HYDROCHLORIDE 400 MG: 200 TABLET ORAL at 17:12

## 2022-01-01 RX ADMIN — DILTIAZEM HYDROCHLORIDE 120 MG: 120 CAPSULE, EXTENDED RELEASE ORAL at 22:12

## 2022-01-01 RX ADMIN — AMIODARONE HYDROCHLORIDE 400 MG: 200 TABLET ORAL at 08:48

## 2022-01-01 RX ADMIN — METHOCARBAMOL TABLETS 500 MG: 500 TABLET, COATED ORAL at 05:00

## 2022-01-01 RX ADMIN — ACETAMINOPHEN 975 MG: 325 TABLET ORAL at 13:13

## 2022-01-01 RX ADMIN — METHOCARBAMOL TABLETS 500 MG: 500 TABLET, COATED ORAL at 14:46

## 2022-01-01 RX ADMIN — HEPARIN SODIUM 5000 UNITS: 5000 INJECTION INTRAVENOUS; SUBCUTANEOUS at 21:52

## 2022-01-01 RX ADMIN — ACETAMINOPHEN 975 MG: 325 TABLET ORAL at 05:10

## 2022-01-01 RX ADMIN — MECLIZINE HYDROCHLORIDE 25 MG: 25 TABLET ORAL at 21:26

## 2022-01-01 RX ADMIN — ONDANSETRON 4 MG: 2 INJECTION INTRAMUSCULAR; INTRAVENOUS at 16:39

## 2022-01-01 RX ADMIN — ACETAMINOPHEN 650 MG: 325 TABLET ORAL at 23:00

## 2022-01-01 RX ADMIN — ACETAMINOPHEN 975 MG: 325 TABLET ORAL at 13:54

## 2022-01-01 RX ADMIN — DEXAMETHASONE 4 MG: 4 TABLET ORAL at 05:04

## 2022-01-01 RX ADMIN — OXYCODONE HYDROCHLORIDE 2.5 MG: 5 TABLET ORAL at 02:36

## 2022-01-01 RX ADMIN — HEPARIN SODIUM 5000 UNITS: 5000 INJECTION INTRAVENOUS; SUBCUTANEOUS at 22:49

## 2022-01-01 RX ADMIN — CEFTRIAXONE 1000 MG: 10 INJECTION, POWDER, FOR SOLUTION INTRAVENOUS at 15:08

## 2022-01-01 RX ADMIN — AMIODARONE HYDROCHLORIDE 400 MG: 200 TABLET ORAL at 08:41

## 2022-01-01 RX ADMIN — OXYCODONE HYDROCHLORIDE 5 MG: 5 TABLET ORAL at 17:02

## 2022-01-01 RX ADMIN — AMLODIPINE BESYLATE 5 MG: 5 TABLET ORAL at 08:31

## 2022-01-01 RX ADMIN — CITALOPRAM HYDROBROMIDE 10 MG: 10 TABLET ORAL at 21:26

## 2022-01-01 RX ADMIN — OXYCODONE HYDROCHLORIDE 5 MG: 5 TABLET ORAL at 16:39

## 2022-01-01 RX ADMIN — ALLOPURINOL 100 MG: 100 TABLET ORAL at 08:46

## 2022-01-01 RX ADMIN — HEPARIN SODIUM 5000 UNITS: 5000 INJECTION INTRAVENOUS; SUBCUTANEOUS at 13:49

## 2022-01-01 RX ADMIN — AMLODIPINE BESYLATE 5 MG: 5 TABLET ORAL at 09:00

## 2022-01-01 RX ADMIN — DILTIAZEM HYDROCHLORIDE 120 MG: 120 CAPSULE, EXTENDED RELEASE ORAL at 08:30

## 2022-01-01 RX ADMIN — OXYCODONE HYDROCHLORIDE 5 MG: 5 TABLET ORAL at 10:18

## 2022-01-01 RX ADMIN — LEVOTHYROXINE SODIUM 37.5 MCG: 75 TABLET ORAL at 05:40

## 2022-01-01 RX ADMIN — DILTIAZEM HYDROCHLORIDE 120 MG: 120 CAPSULE, EXTENDED RELEASE ORAL at 10:14

## 2022-01-01 RX ADMIN — CHLORHEXIDINE GLUCONATE 15 ML: 1.2 SOLUTION ORAL at 10:30

## 2022-01-01 RX ADMIN — OXYCODONE HYDROCHLORIDE 5 MG: 5 TABLET ORAL at 22:21

## 2022-01-01 RX ADMIN — ALLOPURINOL 100 MG: 100 TABLET ORAL at 10:02

## 2022-01-01 RX ADMIN — CITALOPRAM HYDROBROMIDE 10 MG: 10 TABLET ORAL at 08:34

## 2022-01-01 RX ADMIN — VANCOMYCIN HYDROCHLORIDE 750 MG: 750 INJECTION, SOLUTION INTRAVENOUS at 19:00

## 2022-01-01 RX ADMIN — OXYCODONE HYDROCHLORIDE 5 MG: 5 TABLET ORAL at 02:19

## 2022-01-01 RX ADMIN — HYDROMORPHONE HYDROCHLORIDE 0.2 MG: 0.2 INJECTION, SOLUTION INTRAMUSCULAR; INTRAVENOUS; SUBCUTANEOUS at 12:18

## 2022-01-01 RX ADMIN — SODIUM CHLORIDE 1000 ML: 0.9 INJECTION, SOLUTION INTRAVENOUS at 13:22

## 2022-01-01 RX ADMIN — DEXAMETHASONE 4 MG: 4 TABLET ORAL at 06:08

## 2022-01-01 RX ADMIN — METRONIDAZOLE 500 MG: 500 INJECTION, SOLUTION INTRAVENOUS at 01:37

## 2022-01-01 RX ADMIN — DILTIAZEM HYDROCHLORIDE 120 MG: 120 CAPSULE, EXTENDED RELEASE ORAL at 08:57

## 2022-01-01 RX ADMIN — DILTIAZEM HYDROCHLORIDE 120 MG: 120 CAPSULE, EXTENDED RELEASE ORAL at 21:18

## 2022-01-01 RX ADMIN — ATORVASTATIN CALCIUM 20 MG: 10 TABLET, FILM COATED ORAL at 18:13

## 2022-01-01 RX ADMIN — LEVOTHYROXINE SODIUM 37.5 MCG: 75 TABLET ORAL at 05:29

## 2022-01-01 RX ADMIN — POLYETHYLENE GLYCOL 3350 17 G: 17 POWDER, FOR SOLUTION ORAL at 17:16

## 2022-01-01 RX ADMIN — AMIODARONE HYDROCHLORIDE 400 MG: 200 TABLET ORAL at 08:16

## 2022-01-01 RX ADMIN — PRAVASTATIN SODIUM 80 MG: 80 TABLET ORAL at 16:40

## 2022-01-01 RX ADMIN — DEXAMETHASONE 2 MG: 2 TABLET ORAL at 21:11

## 2022-01-01 RX ADMIN — HEPARIN SODIUM 5000 UNITS: 5000 INJECTION INTRAVENOUS; SUBCUTANEOUS at 05:10

## 2022-01-01 RX ADMIN — OXYCODONE HYDROCHLORIDE AND ACETAMINOPHEN 1000 MG: 500 TABLET ORAL at 08:48

## 2022-01-01 RX ADMIN — PANTOPRAZOLE SODIUM 40 MG: 40 TABLET, DELAYED RELEASE ORAL at 05:45

## 2022-01-01 RX ADMIN — AMIODARONE HYDROCHLORIDE 400 MG: 200 TABLET ORAL at 08:58

## 2022-01-01 RX ADMIN — CHLORHEXIDINE GLUCONATE 15 ML: 1.2 SOLUTION ORAL at 20:12

## 2022-01-01 RX ADMIN — PANTOPRAZOLE SODIUM 40 MG: 40 INJECTION, POWDER, FOR SOLUTION INTRAVENOUS at 06:11

## 2022-01-01 RX ADMIN — ALLOPURINOL 100 MG: 100 TABLET ORAL at 08:19

## 2022-01-01 RX ADMIN — METOPROLOL SUCCINATE 50 MG: 50 TABLET, EXTENDED RELEASE ORAL at 10:11

## 2022-01-01 RX ADMIN — SODIUM CHLORIDE TAB 1 GM 1 G: 1 TAB at 11:14

## 2022-01-01 RX ADMIN — ACETAMINOPHEN 975 MG: 325 TABLET ORAL at 22:49

## 2022-01-01 RX ADMIN — WATER 10 ML: 1 INJECTION INTRAMUSCULAR; INTRAVENOUS; SUBCUTANEOUS at 01:45

## 2022-01-01 RX ADMIN — ALLOPURINOL 100 MG: 100 TABLET ORAL at 09:52

## 2022-01-01 RX ADMIN — DEXAMETHASONE 4 MG: 4 TABLET ORAL at 22:14

## 2022-01-01 RX ADMIN — TEMAZEPAM 7.5 MG: 7.5 CAPSULE ORAL at 22:49

## 2022-01-01 RX ADMIN — METHOCARBAMOL TABLETS 500 MG: 500 TABLET, COATED ORAL at 23:01

## 2022-01-01 RX ADMIN — DILTIAZEM HYDROCHLORIDE 120 MG: 120 CAPSULE, EXTENDED RELEASE ORAL at 09:07

## 2022-01-01 RX ADMIN — AMIODARONE HYDROCHLORIDE 400 MG: 200 TABLET ORAL at 16:38

## 2022-01-01 RX ADMIN — HYDROMORPHONE HYDROCHLORIDE 0.2 MG: 0.2 INJECTION, SOLUTION INTRAMUSCULAR; INTRAVENOUS; SUBCUTANEOUS at 23:57

## 2022-01-01 RX ADMIN — HEPARIN SODIUM 5000 UNITS: 5000 INJECTION INTRAVENOUS; SUBCUTANEOUS at 14:32

## 2022-01-01 RX ADMIN — SODIUM CHLORIDE TAB 1 GM 1 G: 1 TAB at 09:54

## 2022-01-01 RX ADMIN — OXYCODONE HYDROCHLORIDE 2.5 MG: 5 TABLET ORAL at 08:43

## 2022-01-01 RX ADMIN — DEXAMETHASONE SODIUM PHOSPHATE 4 MG: 4 INJECTION INTRA-ARTICULAR; INTRALESIONAL; INTRAMUSCULAR; INTRAVENOUS; SOFT TISSUE at 00:19

## 2022-01-01 RX ADMIN — ALLOPURINOL 100 MG: 100 TABLET ORAL at 08:22

## 2022-01-01 RX ADMIN — AMLODIPINE BESYLATE 5 MG: 5 TABLET ORAL at 08:48

## 2022-01-01 RX ADMIN — METHOCARBAMOL TABLETS 500 MG: 500 TABLET, COATED ORAL at 13:23

## 2022-01-01 RX ADMIN — SODIUM CHLORIDE TAB 1 GM 2 G: 1 TAB at 18:13

## 2022-01-01 RX ADMIN — ACETAMINOPHEN 975 MG: 325 TABLET ORAL at 06:08

## 2022-01-01 RX ADMIN — ATORVASTATIN CALCIUM 20 MG: 10 TABLET, FILM COATED ORAL at 17:52

## 2022-01-01 RX ADMIN — DOCUSATE SODIUM 100 MG: 100 CAPSULE, LIQUID FILLED ORAL at 10:11

## 2022-01-01 RX ADMIN — DILTIAZEM HYDROCHLORIDE 120 MG: 120 CAPSULE, EXTENDED RELEASE ORAL at 08:59

## 2022-01-01 RX ADMIN — DILTIAZEM HYDROCHLORIDE 120 MG: 120 CAPSULE, EXTENDED RELEASE ORAL at 21:26

## 2022-01-01 RX ADMIN — METHOCARBAMOL TABLETS 500 MG: 500 TABLET, COATED ORAL at 05:29

## 2022-01-01 RX ADMIN — CHLORHEXIDINE GLUCONATE 15 ML: 1.2 SOLUTION ORAL at 20:11

## 2022-01-01 RX ADMIN — LEVOTHYROXINE SODIUM 37.5 MCG: 75 TABLET ORAL at 05:45

## 2022-01-01 RX ADMIN — DILTIAZEM HYDROCHLORIDE 120 MG: 120 CAPSULE, EXTENDED RELEASE ORAL at 20:31

## 2022-01-01 RX ADMIN — PANTOPRAZOLE SODIUM 40 MG: 40 TABLET, DELAYED RELEASE ORAL at 05:04

## 2022-01-01 RX ADMIN — AMIODARONE HYDROCHLORIDE 400 MG: 200 TABLET ORAL at 16:56

## 2022-01-01 RX ADMIN — GADOBUTROL 10 ML: 604.72 INJECTION INTRAVENOUS at 21:41

## 2022-01-01 RX ADMIN — METHOCARBAMOL TABLETS 500 MG: 500 TABLET, COATED ORAL at 23:49

## 2022-01-01 RX ADMIN — DEXAMETHASONE 4 MG: 4 TABLET ORAL at 17:01

## 2022-01-01 RX ADMIN — POLYETHYLENE GLYCOL 3350 17 G: 17 POWDER, FOR SOLUTION ORAL at 17:06

## 2022-01-01 RX ADMIN — POLYETHYLENE GLYCOL 3350 17 G: 17 POWDER, FOR SOLUTION ORAL at 17:27

## 2022-01-01 RX ADMIN — SODIUM CHLORIDE 100 ML/HR: 0.9 INJECTION, SOLUTION INTRAVENOUS at 20:11

## 2022-01-01 RX ADMIN — LEVETIRACETAM 1000 MG: 100 INJECTION, SOLUTION INTRAVENOUS at 21:12

## 2022-01-01 RX ADMIN — FUROSEMIDE 20 MG: 10 INJECTION, SOLUTION INTRAMUSCULAR; INTRAVENOUS at 19:58

## 2022-01-01 RX ADMIN — SODIUM CHLORIDE TAB 1 GM 2 G: 1 TAB at 12:00

## 2022-01-01 RX ADMIN — GADOBUTROL 9 ML: 604.72 INJECTION INTRAVENOUS at 16:03

## 2022-01-01 RX ADMIN — Medication 12.5 MCG: at 14:40

## 2022-01-01 RX ADMIN — METHOCARBAMOL TABLETS 500 MG: 500 TABLET, COATED ORAL at 17:01

## 2022-01-01 RX ADMIN — SODIUM CHLORIDE TAB 1 GM 1 G: 1 TAB at 11:56

## 2022-01-01 RX ADMIN — CHLORHEXIDINE GLUCONATE 15 ML: 1.2 SOLUTION ORAL at 09:18

## 2022-01-01 RX ADMIN — DEXMEDETOMIDINE HYDROCHLORIDE 0.2 MCG/KG/HR: 400 INJECTION INTRAVENOUS at 02:34

## 2022-01-01 RX ADMIN — LORAZEPAM 0.5 MG: 0.5 TABLET ORAL at 19:23

## 2022-01-01 RX ADMIN — HEPARIN SODIUM 5000 UNITS: 5000 INJECTION INTRAVENOUS; SUBCUTANEOUS at 21:56

## 2022-01-01 RX ADMIN — SODIUM CHLORIDE TAB 1 GM 1 G: 1 TAB at 12:36

## 2022-01-01 RX ADMIN — DOCUSATE SODIUM 100 MG: 100 CAPSULE, LIQUID FILLED ORAL at 09:06

## 2022-01-01 RX ADMIN — DEXAMETHASONE SODIUM PHOSPHATE 4 MG: 4 INJECTION INTRA-ARTICULAR; INTRALESIONAL; INTRAMUSCULAR; INTRAVENOUS; SOFT TISSUE at 23:06

## 2022-01-01 RX ADMIN — PRAVASTATIN SODIUM 80 MG: 80 TABLET ORAL at 16:29

## 2022-01-01 RX ADMIN — METHOCARBAMOL TABLETS 500 MG: 500 TABLET, COATED ORAL at 00:18

## 2022-01-01 RX ADMIN — METHOCARBAMOL TABLETS 500 MG: 500 TABLET, COATED ORAL at 05:09

## 2022-01-01 RX ADMIN — LORAZEPAM 1 MG: 2 INJECTION INTRAMUSCULAR; INTRAVENOUS at 14:54

## 2022-01-01 RX ADMIN — Medication 20 MG: at 10:49

## 2022-01-01 RX ADMIN — HYDROMORPHONE HYDROCHLORIDE 0.2 MG: 0.2 INJECTION, SOLUTION INTRAMUSCULAR; INTRAVENOUS; SUBCUTANEOUS at 08:43

## 2022-01-01 RX ADMIN — DILTIAZEM HYDROCHLORIDE 12.5 MG/HR: 5 INJECTION, SOLUTION INTRAVENOUS at 07:10

## 2022-01-01 RX ADMIN — ACETAMINOPHEN 650 MG: 325 TABLET ORAL at 05:32

## 2022-01-01 RX ADMIN — SODIUM CHLORIDE 125 ML/HR: 9 INJECTION, SOLUTION INTRAVENOUS at 06:22

## 2022-01-01 RX ADMIN — LEVETIRACETAM 1000 MG: 100 SOLUTION ORAL at 08:25

## 2022-01-01 RX ADMIN — CITALOPRAM HYDROBROMIDE 10 MG: 10 TABLET ORAL at 08:57

## 2022-01-01 RX ADMIN — ACETAMINOPHEN 975 MG: 325 TABLET ORAL at 14:31

## 2022-01-01 RX ADMIN — DOCUSATE SODIUM 100 MG: 100 CAPSULE, LIQUID FILLED ORAL at 17:01

## 2022-01-01 RX ADMIN — SODIUM CHLORIDE TAB 1 GM 1 G: 1 TAB at 13:10

## 2022-01-01 RX ADMIN — AMLODIPINE BESYLATE 5 MG: 5 TABLET ORAL at 14:46

## 2022-01-01 RX ADMIN — HEPARIN SODIUM 5000 UNITS: 5000 INJECTION INTRAVENOUS; SUBCUTANEOUS at 21:11

## 2022-01-01 RX ADMIN — FENTANYL CITRATE 50 MCG: 50 INJECTION INTRAMUSCULAR; INTRAVENOUS at 09:23

## 2022-01-01 RX ADMIN — ACETAMINOPHEN 975 MG: 325 TABLET ORAL at 20:53

## 2022-01-01 RX ADMIN — Medication 1 SPRAY: at 21:59

## 2022-01-01 RX ADMIN — SENNOSIDES 8.6 MG: 8.6 TABLET, FILM COATED ORAL at 10:29

## 2022-01-01 RX ADMIN — FENTANYL CITRATE 50 MCG: 50 INJECTION INTRAMUSCULAR; INTRAVENOUS at 13:06

## 2022-01-01 RX ADMIN — CEFEPIME HYDROCHLORIDE 2000 MG: 2 INJECTION, POWDER, FOR SOLUTION INTRAVENOUS at 14:56

## 2022-01-01 RX ADMIN — ONDANSETRON 4 MG: 2 INJECTION INTRAMUSCULAR; INTRAVENOUS at 14:16

## 2022-01-01 RX ADMIN — HYDROMORPHONE HYDROCHLORIDE 0.2 MG: 0.2 INJECTION, SOLUTION INTRAMUSCULAR; INTRAVENOUS; SUBCUTANEOUS at 19:35

## 2022-01-01 RX ADMIN — OXYCODONE HYDROCHLORIDE 2.5 MG: 5 TABLET ORAL at 01:36

## 2022-01-01 RX ADMIN — METOPROLOL SUCCINATE 25 MG: 25 TABLET, EXTENDED RELEASE ORAL at 21:09

## 2022-01-01 RX ADMIN — METOROPROLOL TARTRATE 2.5 MG: 5 INJECTION, SOLUTION INTRAVENOUS at 06:12

## 2022-01-01 RX ADMIN — POLYETHYLENE GLYCOL 3350 17 G: 17 POWDER, FOR SOLUTION ORAL at 18:10

## 2022-01-01 RX ADMIN — OXYCODONE HYDROCHLORIDE 2.5 MG: 5 TABLET ORAL at 01:07

## 2022-01-01 RX ADMIN — LORAZEPAM 0.5 MG: 0.5 TABLET ORAL at 23:33

## 2022-01-01 RX ADMIN — CEFAZOLIN SODIUM 2000 MG: 2 SOLUTION INTRAVENOUS at 09:36

## 2022-01-01 RX ADMIN — METHOCARBAMOL TABLETS 500 MG: 500 TABLET, COATED ORAL at 05:15

## 2022-01-01 RX ADMIN — DEXAMETHASONE 2 MG: 2 TABLET ORAL at 05:41

## 2022-01-01 RX ADMIN — DILTIAZEM HYDROCHLORIDE 10 MG/HR: 5 INJECTION, SOLUTION INTRAVENOUS at 00:56

## 2022-01-01 RX ADMIN — METHOCARBAMOL TABLETS 500 MG: 500 TABLET, COATED ORAL at 11:59

## 2022-01-01 RX ADMIN — DILTIAZEM HYDROCHLORIDE 15 MG/HR: 5 INJECTION, SOLUTION INTRAVENOUS at 15:30

## 2022-01-01 RX ADMIN — FUROSEMIDE 10 MG: 10 INJECTION, SOLUTION INTRAMUSCULAR; INTRAVENOUS at 09:58

## 2022-01-01 RX ADMIN — SODIUM CHLORIDE TAB 1 GM 2 G: 1 TAB at 16:32

## 2022-01-01 RX ADMIN — METHOCARBAMOL TABLETS 500 MG: 500 TABLET, COATED ORAL at 00:23

## 2022-01-01 RX ADMIN — HEPARIN SODIUM 5000 UNITS: 5000 INJECTION INTRAVENOUS; SUBCUTANEOUS at 23:04

## 2022-01-01 RX ADMIN — METOPROLOL SUCCINATE 50 MG: 50 TABLET, EXTENDED RELEASE ORAL at 22:15

## 2022-01-01 RX ADMIN — LEVETIRACETAM 1000 MG: 100 SOLUTION ORAL at 10:28

## 2022-01-01 RX ADMIN — ACETAMINOPHEN 975 MG: 325 TABLET ORAL at 13:28

## 2022-01-01 RX ADMIN — AMIODARONE HYDROCHLORIDE 400 MG: 200 TABLET ORAL at 17:05

## 2022-01-01 RX ADMIN — DILTIAZEM HYDROCHLORIDE 120 MG: 120 CAPSULE, EXTENDED RELEASE ORAL at 08:50

## 2022-01-01 RX ADMIN — CHLORHEXIDINE GLUCONATE 15 ML: 1.2 SOLUTION ORAL at 10:02

## 2022-01-01 RX ADMIN — PROPOFOL 130 MG: 10 INJECTION, EMULSION INTRAVENOUS at 09:07

## 2022-01-01 RX ADMIN — ONDANSETRON 4 MG: 2 INJECTION INTRAMUSCULAR; INTRAVENOUS at 05:11

## 2022-01-01 RX ADMIN — LEVETIRACETAM 1000 MG: 100 SOLUTION ORAL at 20:12

## 2022-01-01 RX ADMIN — METOPROLOL TARTRATE 25 MG: 25 TABLET, FILM COATED ORAL at 20:20

## 2022-01-01 RX ADMIN — Medication 1 SPRAY: at 21:11

## 2022-01-01 RX ADMIN — ACETAMINOPHEN 650 MG: 325 TABLET ORAL at 11:13

## 2022-01-01 RX ADMIN — METHOCARBAMOL TABLETS 500 MG: 500 TABLET, COATED ORAL at 06:06

## 2022-01-01 RX ADMIN — METHOCARBAMOL TABLETS 500 MG: 500 TABLET, COATED ORAL at 17:06

## 2022-01-01 RX ADMIN — DEXAMETHASONE SODIUM PHOSPHATE 4 MG: 4 INJECTION INTRA-ARTICULAR; INTRALESIONAL; INTRAMUSCULAR; INTRAVENOUS; SOFT TISSUE at 17:54

## 2022-01-01 RX ADMIN — LEVOTHYROXINE SODIUM 37.5 MCG: 75 TABLET ORAL at 05:06

## 2022-01-01 RX ADMIN — OXYCODONE HYDROCHLORIDE 2.5 MG: 5 TABLET ORAL at 10:25

## 2022-01-01 RX ADMIN — PHENYLEPHRINE HYDROCHLORIDE 20 MCG/MIN: 10 INJECTION INTRAVENOUS at 10:38

## 2022-01-01 RX ADMIN — METOPROLOL SUCCINATE 50 MG: 50 TABLET, EXTENDED RELEASE ORAL at 21:17

## 2022-01-01 RX ADMIN — ACETAMINOPHEN 650 MG: 650 SUPPOSITORY RECTAL at 04:06

## 2022-01-01 RX ADMIN — METHOCARBAMOL TABLETS 500 MG: 500 TABLET, COATED ORAL at 18:45

## 2022-01-01 RX ADMIN — DOCUSATE SODIUM 100 MG: 100 CAPSULE, LIQUID FILLED ORAL at 08:25

## 2022-01-01 RX ADMIN — HYDROMORPHONE HYDROCHLORIDE 0.2 MG: 0.2 INJECTION, SOLUTION INTRAMUSCULAR; INTRAVENOUS; SUBCUTANEOUS at 16:49

## 2022-01-01 RX ADMIN — AMIODARONE HYDROCHLORIDE 400 MG: 200 TABLET ORAL at 17:16

## 2022-01-01 RX ADMIN — CHLORHEXIDINE GLUCONATE 15 ML: 1.2 SOLUTION ORAL at 08:25

## 2022-01-01 RX ADMIN — SODIUM CHLORIDE TAB 1 GM 2 G: 1 TAB at 13:27

## 2022-01-01 RX ADMIN — AMIODARONE HYDROCHLORIDE 400 MG: 200 TABLET ORAL at 07:36

## 2022-01-01 RX ADMIN — LEVOTHYROXINE SODIUM 37.5 MCG: 75 TABLET ORAL at 05:20

## 2022-01-01 RX ADMIN — CEFEPIME HYDROCHLORIDE 2000 MG: 2 INJECTION, POWDER, FOR SOLUTION INTRAVENOUS at 03:03

## 2022-01-01 RX ADMIN — HEPARIN SODIUM 5000 UNITS: 5000 INJECTION INTRAVENOUS; SUBCUTANEOUS at 05:29

## 2022-01-01 RX ADMIN — DEXAMETHASONE SODIUM PHOSPHATE 4 MG: 4 INJECTION INTRA-ARTICULAR; INTRALESIONAL; INTRAMUSCULAR; INTRAVENOUS; SOFT TISSUE at 05:11

## 2022-01-01 RX ADMIN — SENNOSIDES 8.6 MG: 8.6 TABLET, FILM COATED ORAL at 08:25

## 2022-01-01 RX ADMIN — HEPARIN SODIUM 5000 UNITS: 5000 INJECTION INTRAVENOUS; SUBCUTANEOUS at 13:55

## 2022-01-01 RX ADMIN — LIDOCAINE HYDROCHLORIDE 10 ML: 10 INJECTION, SOLUTION EPIDURAL; INFILTRATION; INTRACAUDAL; PERINEURAL at 13:17

## 2022-01-01 RX ADMIN — AMIODARONE HYDROCHLORIDE 400 MG: 200 TABLET ORAL at 08:31

## 2022-01-01 RX ADMIN — DILTIAZEM HYDROCHLORIDE 120 MG: 120 CAPSULE, EXTENDED RELEASE ORAL at 21:11

## 2022-01-01 RX ADMIN — SENNOSIDES 8.6 MG: 8.6 TABLET, FILM COATED ORAL at 09:52

## 2022-01-01 RX ADMIN — ACETAMINOPHEN 975 MG: 325 TABLET ORAL at 05:07

## 2022-01-01 RX ADMIN — ACETAMINOPHEN 650 MG: 325 TABLET ORAL at 17:58

## 2022-01-01 RX ADMIN — FENTANYL CITRATE 50 MCG: 50 INJECTION INTRAMUSCULAR; INTRAVENOUS at 09:07

## 2022-01-01 RX ADMIN — SENNOSIDES 8.6 MG: 8.6 TABLET, FILM COATED ORAL at 09:06

## 2022-01-01 RX ADMIN — ACETAMINOPHEN 975 MG: 325 TABLET ORAL at 23:04

## 2022-01-01 RX ADMIN — LORAZEPAM 1 MG: 2 INJECTION INTRAMUSCULAR; INTRAVENOUS at 21:47

## 2022-01-01 RX ADMIN — POTASSIUM CHLORIDE 40 MEQ: 1500 TABLET, EXTENDED RELEASE ORAL at 15:00

## 2022-01-01 RX ADMIN — OXYCODONE HYDROCHLORIDE 10 MG: 10 TABLET ORAL at 19:12

## 2022-01-01 RX ADMIN — DEXAMETHASONE 2 MG: 2 TABLET ORAL at 23:01

## 2022-01-01 RX ADMIN — METOPROLOL SUCCINATE 25 MG: 25 TABLET, EXTENDED RELEASE ORAL at 20:31

## 2022-01-01 RX ADMIN — DEXAMETHASONE SODIUM PHOSPHATE 4 MG: 4 INJECTION INTRA-ARTICULAR; INTRALESIONAL; INTRAMUSCULAR; INTRAVENOUS; SOFT TISSUE at 17:16

## 2022-01-01 RX ADMIN — POLYETHYLENE GLYCOL 3350 17 G: 17 POWDER, FOR SOLUTION ORAL at 09:00

## 2022-01-01 RX ADMIN — OXYCODONE HYDROCHLORIDE AND ACETAMINOPHEN 1000 MG: 500 TABLET ORAL at 08:34

## 2022-01-01 RX ADMIN — SENNOSIDES 8.6 MG: 8.6 TABLET, FILM COATED ORAL at 08:58

## 2022-01-01 RX ADMIN — METHOCARBAMOL TABLETS 500 MG: 500 TABLET, COATED ORAL at 23:04

## 2022-01-01 RX ADMIN — MIDAZOLAM 1 MG: 1 INJECTION INTRAMUSCULAR; INTRAVENOUS at 13:06

## 2022-01-01 RX ADMIN — POTASSIUM CHLORIDE 40 MEQ: 1500 TABLET, EXTENDED RELEASE ORAL at 10:48

## 2022-01-01 RX ADMIN — ACETAMINOPHEN 975 MG: 325 TABLET ORAL at 06:06

## 2022-01-01 RX ADMIN — LEVETIRACETAM 1000 MG: 100 SOLUTION ORAL at 20:10

## 2022-01-01 RX ADMIN — LORAZEPAM 1 MG: 2 INJECTION INTRAMUSCULAR; INTRAVENOUS at 17:41

## 2022-01-01 RX ADMIN — POTASSIUM CHLORIDE 40 MEQ: 1500 TABLET, EXTENDED RELEASE ORAL at 06:10

## 2022-01-01 RX ADMIN — HEPARIN SODIUM 5000 UNITS: 5000 INJECTION INTRAVENOUS; SUBCUTANEOUS at 13:25

## 2022-01-01 RX ADMIN — ATORVASTATIN CALCIUM 20 MG: 10 TABLET, FILM COATED ORAL at 16:39

## 2022-01-01 RX ADMIN — MAGNESIUM SULFATE HEPTAHYDRATE 2 G: 40 INJECTION, SOLUTION INTRAVENOUS at 11:57

## 2022-01-01 RX ADMIN — DOCUSATE SODIUM 100 MG: 100 CAPSULE, LIQUID FILLED ORAL at 09:52

## 2022-01-01 RX ADMIN — Medication 1 SPRAY: at 08:32

## 2022-01-01 RX ADMIN — OXYCODONE HYDROCHLORIDE 5 MG: 5 TABLET ORAL at 08:58

## 2022-01-01 RX ADMIN — Medication 12.5 MCG: at 14:17

## 2022-01-01 RX ADMIN — MIDAZOLAM 1 MG: 1 INJECTION INTRAMUSCULAR; INTRAVENOUS at 13:15

## 2022-01-01 RX ADMIN — ALLOPURINOL 100 MG: 100 TABLET ORAL at 09:02

## 2022-01-01 RX ADMIN — SODIUM CHLORIDE 100 ML/HR: 0.9 INJECTION, SOLUTION INTRAVENOUS at 03:37

## 2022-01-01 RX ADMIN — CITALOPRAM HYDROBROMIDE 10 MG: 10 TABLET ORAL at 08:19

## 2022-01-01 RX ADMIN — DEXAMETHASONE 2 MG: 2 TABLET ORAL at 08:31

## 2022-01-01 RX ADMIN — DEXAMETHASONE 4 MG: 4 TABLET ORAL at 05:00

## 2022-01-01 RX ADMIN — POLYETHYLENE GLYCOL 3350 17 G: 17 POWDER, FOR SOLUTION ORAL at 08:48

## 2022-01-01 RX ADMIN — ACETAMINOPHEN 975 MG: 325 TABLET ORAL at 05:39

## 2022-01-01 RX ADMIN — Medication 20 MG: at 08:25

## 2022-01-01 RX ADMIN — ATORVASTATIN CALCIUM 20 MG: 10 TABLET, FILM COATED ORAL at 16:33

## 2022-01-01 RX ADMIN — SENNOSIDES 8.6 MG: 8.6 TABLET, FILM COATED ORAL at 10:11

## 2022-01-01 RX ADMIN — SODIUM CHLORIDE 100 ML/HR: 0.9 INJECTION, SOLUTION INTRAVENOUS at 17:47

## 2022-01-01 RX ADMIN — DEXAMETHASONE SODIUM PHOSPHATE 4 MG: 4 INJECTION INTRA-ARTICULAR; INTRALESIONAL; INTRAMUSCULAR; INTRAVENOUS; SOFT TISSUE at 11:30

## 2022-01-03 ENCOUNTER — CLINICAL SUPPORT (OUTPATIENT)
Dept: RADIATION ONCOLOGY | Facility: CLINIC | Age: 67
End: 2022-01-03
Attending: RADIOLOGY
Payer: MEDICARE

## 2022-01-03 VITALS
OXYGEN SATURATION: 93 % | HEART RATE: 85 BPM | BODY MASS INDEX: 26.91 KG/M2 | WEIGHT: 177 LBS | DIASTOLIC BLOOD PRESSURE: 80 MMHG | RESPIRATION RATE: 18 BRPM | TEMPERATURE: 98.7 F | SYSTOLIC BLOOD PRESSURE: 140 MMHG

## 2022-01-03 DIAGNOSIS — C34.12 CANCER OF UPPER LOBE OF LEFT LUNG (HCC): Primary | ICD-10-CM

## 2022-01-03 DIAGNOSIS — C34.92 SQUAMOUS CARCINOMA OF LUNG, LEFT (HCC): ICD-10-CM

## 2022-01-03 DIAGNOSIS — C34.92 ADENOCARCINOMA, LUNG, LEFT (HCC): Primary | ICD-10-CM

## 2022-01-03 PROCEDURE — 77263 THER RADIOLOGY TX PLNG CPLX: CPT | Performed by: RADIOLOGY

## 2022-01-03 PROCEDURE — 99211 OFF/OP EST MAY X REQ PHY/QHP: CPT | Performed by: RADIOLOGY

## 2022-01-03 PROCEDURE — 99213 OFFICE O/P EST LOW 20 MIN: CPT | Performed by: RADIOLOGY

## 2022-01-03 NOTE — PROGRESS NOTES
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   Patient went through armen-adjuvant chemotherapy with only partial response with Alimta and carboplatin  Thoracic oncology surgeon does not feel lesion is resectable by less than a left pneumonectomy which the patient may not tolerate  He had surgical consult at [facility] and had left upper lung lobectomy  He had a consultation with us on 10/25/2021  At that time he opted [place] [facility] [place]  He returns today for a second consult following lobectomy       2021 PFTs: Results:  FEV1/FVC Ratio: 64 %  FEV1: 2 72 L     85 % predicted  Forced Vital Capacity: 4 22 L    101 % predicted  After administration of bronchodilator:  No change     Lung volumes: Total Lung Capacity 92 % predicted Residual volume 76 % predicted     DLCO NOT corrected for patients hemoglobin level: 47 % predicted     Flow volume loop:  Consistent with obstruction   There is also flattening of the inspiratory limb, suggestive of possible variable extrathoracic obstruction     Interpretation:     · Patient demonstrated good effort and cooperation, however the standard for "end of test" was not met    · Spirometry demonstrates mild obstruction  · There is no change with bronchodilators  · Lung volumes are normal  · Diffusion capacity is severely reduced, but not corrected for hemoglobin  · Flow volume loop demonstrates flattening of the inspiratory limb, suggestive of possible variable extrathoracic obstruction   Clinical correlation     21 flexible bronchoscopy, posterolateral thoracotomy with NABIL lobectomy, mediastinal LN dissection at [facility] by Dr Berlin Johnson  Path shows large cell neuroendocrine (PDL+3% and <1cm focus of squamous cell PDL (-)      12/16/21 Dr Berlin Johnson:  Patient may benefit from radiation given close margin of tumor resection and immunotherapy  Follow up with CT with IV contrast in 6 months     [place] [facility]              Historical Information   Oncology History   Adenocarcinoma, lung, left ([facility] [address] )   5/6/2021 Biopsy    [facility]  FINAL DIAGNOSIS     Lung, Left Upper Lobe, core Biopsy ([id number], 5/6/2021):    - Poorly differentiated non-small cell carcinoma, favor adenocarcinoma, with neuroendocrine differentiation of uncertain clinical significance (see letter below)  5/25/2021 Biopsy    A  Lymph Node, 4R, Excision:  - Two reactive lymph nodes with anthracosis (0/2)  B  Lymph Node, 2R, Excision:  - Two reactive lymph nodes with anthracosis (0/2)  C  Lymph Node, 2L, Excision:  - Three reactive lymph nodes with anthracosis (0/3)  D  Lymph Node, 4L, Excision:  - Two reactive lymph nodes with anthracosis (0/2)  E  Lymph Node, Level 7, Excision:  - Two reactive lymph nodes with anthracosis (0/2)              6/9/2021 Initial Diagnosis    Adenocarcinoma, lung, left ([facility] [address] )     6/25/2021 - 8/27/2021 Chemotherapy    cyanocobalamin, 1,000 mcg, Intramuscular, Once, 2 of 3 cycles  Administration: 1,000 mcg (8/6/2021), 1,000 mcg (6/25/2021)  pegfilgrastim (Luz Marina Muhammadn), 6 mg, Subcutaneous, Once, 3 of 5 cycles  Administration: 6 mg (7/16/2021), 6 mg (8/6/2021), 6 mg (8/27/2021)  fosaprepitant (EMEND) IVPB, 150 mg, Intravenous, Once, 4 of 6 cycles  Administration: 150 mg (6/25/2021), 150 mg (8/6/2021), 150 mg (8/27/2021), 150 mg (7/16/2021)  CARBOplatin (PARAPLATIN) IVPB (GOG AUC DOSING), 496 mg, Intravenous, Once, 4 of 6 cycles  Administration: 496 mg (6/25/2021), 492 5 mg (8/6/2021), 462 mg (8/27/2021), 516 mg (7/16/2021)  pemetrexed (ALIMTA) chemo infusion, 975 mg, Intravenous, Once, 4 of 6 cycles  Administration: 1,000 mg (6/25/2021), 1,000 mg (8/6/2021), 1,000 mg (8/27/2021), 1,000 mg (7/16/2021)     12/16/2021 Surgery    Left Upper Lung Lobectomy at [facility] by Dr Sulaiman Villarreal  Tumor size: 7 6 cm Percentage of viable tumor: 40%  Histologic Grade: Undifferentiated (G$)  Lymphovascular Invasion: Present (extensive)  Perineural Invasion: Not identified  Spread through Air Spaces: Present  Pleural Invasion: tumor invades to the visceral pleural surface  Tumor Extension: Hilar soft tissue  Bronchial Margin: no tumor seen  Vascular Margin: no tumor seen  Distance from Margin: distance of invasive tumor from closest margin: 0 1 cm  Closest margin: vascular margin  Neoadjuvant T staging: ypT2a  Neoadjuvant N staging: ypN0                 Past Medical History:   Diagnosis Date    Hyperlipidemia     Hypertension     Lung cancer [facility])      Past Surgical History:   Procedure Laterality Date    BACK SURGERY      HEMORRHOID SURGERY      IR BIOPSY LUNG  5/6/2021    IR PORT PLACEMENT  6/17/2021    LAMINECTOMY  2018    L4-L5    LUNG SURGERY      left upper lobectomy    DE BRONCHOSCOPY,DIAGNOSTIC N/A 5/25/2021    Procedure: BRONCHOSCOPY FLEXIBLE;  Surgeon: Tash Harris MD;  [facility]: [facility];  Service: Thoracic    DE MEDIASTINOSCOPY WITH LYMPH NODE BIOPSY/IES N/A 5/25/2021    Procedure: MEDIASTINOSCOPY, flexible bronchoscopy;  Surgeon: Tash Harris MD;  Location: [facility];  Service: Thoracic    TONSILLECTOMY  1959       Family History   Problem Relation Age of Onset    Nephrolithiasis Father        Social History   Social History     Substance and Sexual Activity   Alcohol Use Yes    Alcohol/week: 7 0 standard drinks    Types: 7 Cans of beer per week     Social History     Substance and Sexual Activity   Drug Use Yes    Types: Marijuana    Comment: seldom     Social History     Tobacco Use Smoking Status Former Smoker    Packs/day: 1 00    Years: 40 00    Pack years: 40 00    Types: Cigarettes    Start date:     Quit date: 2017    Years since quittin 8   Smokeless Tobacco Never Used   Tobacco Comment    quit 2017         Meds/Allergies     Current Outpatient Medications:     allopurinol (ZYLOPRIM) 100 mg tablet, Take 1 tablet (100 mg total) by mouth daily, Disp: 30 tablet, Rfl: 5    amLODIPine (NORVASC) 10 mg tablet, TAKE ONE TABLET BY MOUTH EVERY DAY, Disp: 90 tablet, Rfl: 3    Ascorbic Acid (vitamin C) 1000 MG tablet, Take 1,000 mg by mouth daily, Disp: , Rfl:     B Complex Vitamins (B COMPLEX 1 PO), Take 1 tablet by mouth daily , Disp: , Rfl:     betamethasone valerate (VALISONE) 0 1 % cream, Apply topically 2 (two) times a day (Patient taking differently: Apply topically as needed ), Disp: 45 g, Rfl: 1    calcipotriene (DOVONOX) 0 005 % ointment, Apply topically 2 (two) times a day As needed, Disp: , Rfl:     Cholecalciferol (VITAMIN D3 PO), Take by mouth daily , Disp: , Rfl:     citalopram (CeleXA) 10 mg tablet, TAKE ONE TABLET BY MOUTH EVERY DAY, Disp: 30 tablet, Rfl: 3    colchicine (COLCRYS) 0 6 mg tablet, Take 1 tablet (0 6 mg total) by mouth 2 (two) times a day (Patient taking differently: Take 0 6 mg by mouth 2 (two) times a day As needed), Disp: 30 tablet, Rfl: 5    Cyanocobalamin (Vitamin B 12) 500 MCG TABS, Take 1 tablet by mouth, Disp: , Rfl:     doxepin (SINEquan) 25 mg capsule, TAKE ONE CAPSULE BY MOUTH EVERY DAY AT BEDTIME, Disp: 30 capsule, Rfl: 5    fluocinolone (SYNALAR) 0 01 % external solution, , Disp: , Rfl:     folic acid (FOLVITE) 1 mg tablet, Take 1 tablet (1 mg total) by mouth daily, Disp: 30 tablet, Rfl: 6    Garlic 10 MG CAPS, Take 1 tablet by mouth daily , Disp: , Rfl:     Glucosamine-Chondroit-Vit C-Mn (GLUCOSAMINE 1500 COMPLEX) CAPS, daily , Disp: , Rfl:     ketoconazole (NIZORAL) 2 % cream, Apply topically 2 (two) times a day (Patient taking differently: Apply topically as needed ), Disp: 15 g, Rfl: 2    meclizine (ANTIVERT) 25 mg tablet, Take 1 tablet (25 mg total) by mouth 4 (four) times a day as needed for dizziness, Disp: 30 tablet, Rfl: 0    Multiple Vitamins-Minerals (MULTIVITAL-M) TABS, Take by mouth daily , Disp: , Rfl:     ondansetron (ZOFRAN) 4 mg tablet, Take 1 tablet (4 mg total) by mouth every 6 (six) hours, Disp: 12 tablet, Rfl: 0    pantoprazole (PROTONIX) 40 mg tablet, Take 1 tablet (40 mg total) by mouth daily, Disp: 30 tablet, Rfl: 6    polyethylene glycol (GLYCOLAX) 17 GM/SCOOP powder, Take 17 g by mouth 2 (two) times a day, Disp: 850 g, Rfl: 0    rosuvastatin (CRESTOR) 10 MG tablet, TAKE ONE TABLET BY MOUTH EVERY DAY, Disp: 30 tablet, Rfl: 5    sulfamethoxazole-trimethoprim (BACTRIM DS) 800-160 mg per tablet, Take 1 tablet by mouth every 12 (twelve) hours for 7 days, Disp: 14 tablet, Rfl: 1    traMADol (ULTRAM) 50 mg tablet, TAKE ONE TABLET BY MOUTH EVERY 8 HOURS AS NEEDED FOR SEVERE PAIN, Disp: 30 tablet, Rfl: 0    neomycin-polymyxin-hydrocortisone (CORTISPORIN) otic solution, Administer 4 drops into the left ear every 6 (six) hours (Patient not taking: Reported on 1/3/2022 ), Disp: 10 mL, Rfl: 0    ondansetron (ZOFRAN) 8 mg tablet, Take 1 tablet (8 mg total) by mouth every 8 (eight) hours as needed for nausea or vomiting (Patient taking differently: Take 8 mg by mouth every 8 (eight) hours as needed for nausea or vomiting As needed), Disp: 20 tablet, Rfl: 3  Allergies   Allergen Reactions    Bee Venom     Benazepril Other (See Comments)     Angioedema     Latex Other (See Comments)     Burning of eyes at the dentist from the gloves    Meloxicam GI Intolerance    Penicillin G Rash         Review of Systems   Constitutional: Negative for activity change, fever and unexpected weight change  HENT: Negative for congestion, sneezing, sore throat, trouble swallowing and voice change  Eyes: Negative  Respiratory: Negative for cough and shortness of breath  Cardiovascular: Negative for chest pain and leg swelling  Gastrointestinal: Negative for abdominal pain, blood in stool, nausea and vomiting  Endocrine: Negative  Genitourinary: Negative for difficulty urinating, flank pain and hematuria  Musculoskeletal: Negative for arthralgias, back pain and gait problem  Skin: Negative  Allergic/Immunologic: Negative  Neurological: Negative for dizziness, weakness, numbness and headaches  Hematological: Negative  Psychiatric/Behavioral: Negative  OBJECTIVE:   /80   Pulse 85   Temp 98 7 °F (37 1 °C)   Resp 18   Wt 80 3 kg (177 lb)   SpO2 93%   BMI 26 91 kg/m²   Pain Assessment:  0  Performance Status: Karnofsky: 100 - Fully active, able to carry on all pre-disease performed without restriction    Physical Exam  Constitutional:       Appearance: Normal appearance  He is normal weight  HENT:      Nose: No congestion  Mouth/Throat:      Pharynx: Oropharynx is clear  Eyes:      Extraocular Movements: Extraocular movements intact  Pupils: Pupils are equal, round, and reactive to light  Cardiovascular:      Rate and Rhythm: Normal rate  Heart sounds: Normal heart sounds  Pulmonary:      Effort: Pulmonary effort is normal       Breath sounds: Normal breath sounds  Abdominal:      Palpations: Abdomen is soft  There is no mass  Tenderness: There is no abdominal tenderness  Musculoskeletal:         General: No swelling or tenderness  Normal range of motion  Cervical back: Normal range of motion and neck supple  Lymphadenopathy:      Cervical: No cervical adenopathy  Skin:     General: Skin is dry  Findings: No lesion or rash  Neurological:      General: No focal deficit present  Mental Status: He is alert and oriented to person, place, and time  Mental status is at baseline     Psychiatric:         Mood and Affect: Mood normal  Behavior: Behavior normal             RESULTS  Lab Results    Chemistry        Component Value Date/Time    K 4 0 12/27/2021 0446    CL 99 12/27/2021 0446    CO2 30 12/27/2021 0446    BUN 17 12/27/2021 0446    CREATININE 1 36 (H) 12/27/2021 0446        Component Value Date/Time    CALCIUM 9 9 12/27/2021 0446    ALKPHOS 109 (H) 12/24/2021 1627    AST 16 12/24/2021 1627    ALT 17 12/24/2021 1627            Lab Results   Component Value Date    WBC 7 58 12/27/2021    HGB 12 2 12/27/2021    HCT 39 0 12/27/2021    MCV 92 12/27/2021     12/27/2021         Imaging Studies  CT abdomen pelvis with contrast    Result Date: 12/24/2021  Narrative: CT ABDOMEN AND PELVIS WITH IV CONTRAST INDICATION:   Bowel obstruction suspected distention, question obstruction  hx cancer  COMPARISON:  10/10/2017 TECHNIQUE:  CT examination of the abdomen and pelvis was performed  Axial, sagittal, and coronal 2D reformatted images were created from the source data and submitted for interpretation  Radiation dose length product (DLP) for this visit:  575 81 mGy-cm   This examination, like all CT scans performed in the [facility], was performed utilizing techniques to minimize radiation dose exposure, including the use of iterative  reconstruction and automated exposure control  IV Contrast:  100 mL of iohexol (OMNIPAQUE) Enteric Contrast:  Enteric contrast was not administered  FINDINGS: ABDOMEN LOWER CHEST:  Small left pleural effusion  8 mm pericardial effusion  Small hiatal hernia  LIVER/BILIARY TREE:  Unremarkable  GALLBLADDER:  No calcified gallstones  No pericholecystic inflammatory change  SPLEEN:  1 3 cm hypodensity in the anterior spleen which in retrospect was present previously and is not significantly changed  PANCREAS:  Unremarkable  ADRENAL GLANDS:  Unremarkable  KIDNEYS/URETERS:  Bilateral renal cysts  Likely bilateral extrarenal pelves   STOMACH AND BOWEL:  Wall thickening at the mid to distal sigmoid colon extending to the rectum consistent with a proctocolitis  The colon proximal to these areas demonstrate prominent stool accumulation  APPENDIX:  No findings to suggest appendicitis  ABDOMINOPELVIC CAVITY:  No ascites  No pneumoperitoneum  No lymphadenopathy  VESSELS:  Atherosclerotic changes are present  No evidence of aneurysm  PELVIS REPRODUCTIVE ORGANS:  The prostate is enlarged  URINARY BLADDER:  Unremarkable  ABDOMINAL WALL/INGUINAL REGIONS:  Unremarkable  OSSEOUS STRUCTURES:  No acute fracture or destructive osseous lesion  Impression: Prominent wall thickening of the mid to distal sigmoid colon and rectum consistent with a proctocolitis  Prominent stool accumulation throughout the colon  Small left pleural effusion  8 mm pericardial effusion  The study was marked in [facility] for immediate notification  Workstation performed: [id number]     Echo complete w/ contrast if indicated    Result Date: 12/27/2021  Narrative: [facility]  Left Ventricle: Left ventricular cavity size is normal  The left ventricular ejection fraction is 60%  Systolic function is normal  Wall motion is normal    Right Ventricle: Right ventricular cavity size is mildly dilated but there is no pulmonary hypertension    Pericardium: There is a trivial pericardial effusion  Pleural fluid is seen as well  Pathology: Adenocarcinoma and squamous cell carcinoma left lung  ASSESSMENT  1  Adenocarcinoma, lung, left ([facility] [address] )     2  Squamous carcinoma of lung, left [facility])  Ambulatory referral to Radiation Oncology     Cancer Staging  No matching staging information was found for the patient  PLAN/DISCUSSION  No orders of the defined types were placed in this encounter           Francois Gasca is a 74y o  year old male with stage II adenocarcinoma left upper lung underwent left upper lobectomy by Dr Taylor Silverman in [facility] with final pathology of N0 and was considered an R0 resection  however in the surgeon's letter he strongly feel there is a role of adjuvant radiation therapy because at time of surgery there was gross adherence of the tumor by the mediastinal fat or bed  Furthermore there was incidental finding of squamous cell carcinoma in the left upper lobe as well  He likewise recommended adjuvant immunotherapy as in a recent clinical trial     Patient was told some radiation therapy and is initially surprise to learn that treatment course will be as many as 25 treatments but that has been the standard effective dose which is 50 Gy or the equivalent in eradicating potential microscopic residual disease  We reviewed side effects of fatigue, some skin reaction and possibly some discomfort swallowing  Patient will return for CT simulation treatment planning later this week and hopefully can start treatments after our pathologist will have confirmed his pathology report  Juany Mukherjee MD  8/8/2129,37:26 AM      Portions of the record may have been created with voice recognition software  Occasional wrong word or "sound a like" substitutions may have occurred due to the inherent limitations of voice recognition software  Read the chart carefully and recognize, using context, where substitutions have occurred

## 2022-01-03 NOTE — PROGRESS NOTES
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  Patient went through armen-adjuvant chemotherapy with only partial response with Alimta and carboplatin  Thoracic oncology surgeon does not feel lesion is resectable by less than a left pneumonectomy which the patient may not tolerate  He had surgical consult at [facility] and had left upper lung lobectomy  He had a consultation with us on 10/25/2021  At that time [place] [facility] [place]  He returns today for a second consult following lobectomy  4/20/2021 PFTs: Results:  FEV1/FVC Ratio: 64 %  FEV1: 2 72 L     85 % predicted  Forced Vital Capacity: 4 22 L    101 % predicted  After administration of bronchodilator:  No change     Lung volumes: Total Lung Capacity 92 % predicted Residual volume 76 % predicted     DLCO NOT corrected for patients hemoglobin level: 47 % predicted     Flow volume loop:  Consistent with obstruction  There is also flattening of the inspiratory limb, suggestive of possible variable extrathoracic obstruction     Interpretation:     · Patient demonstrated good effort and cooperation, however the standard for "end of test" was not met  · Spirometry demonstrates mild obstruction  · There is no change with bronchodilators  · Lung volumes are normal  · Diffusion capacity is severely reduced, but not corrected for hemoglobin  · Flow volume loop demonstrates flattening of the inspiratory limb, suggestive of possible variable extrathoracic obstruction  Clinical correlation    11/17/21 flexible bronchoscopy, posterolateral thoracotomy with NABIL lobectomy, mediastinal LN dissection at [facility] by Dr Ugo Cuadra   Path shows large cell neuroendocrine (PDL+3% and <1cm focus of squamous cell PDL (-)     12/16/21 Dr Ugo Cuadra:  Patient may benefit from radiation given close margin of tumor resection and immunotherapy  Follow up with CT with IV contrast in 6 months    1/19/2022 [facility]        Oncology History   Adenocarcinoma, lung, left ([facility] [address] )   5/6/2021 Biopsy    [facility]  FINAL DIAGNOSIS     Lung, Left Upper Lobe, core Biopsy ([id number], 5/6/2021):    - Poorly differentiated non-small cell carcinoma, favor adenocarcinoma, with neuroendocrine differentiation of uncertain clinical significance (see letter below)  5/25/2021 Biopsy    A  Lymph Node, 4R, Excision:  - Two reactive lymph nodes with anthracosis (0/2)  B  Lymph Node, 2R, Excision:  - Two reactive lymph nodes with anthracosis (0/2)  C  Lymph Node, 2L, Excision:  - Three reactive lymph nodes with anthracosis (0/3)  D  Lymph Node, 4L, Excision:  - Two reactive lymph nodes with anthracosis (0/2)  E  Lymph Node, Level 7, Excision:  - Two reactive lymph nodes with anthracosis (0/2)              6/9/2021 Initial Diagnosis    Adenocarcinoma, lung, left ([facility] [address] )     6/25/2021 - 8/27/2021 Chemotherapy    cyanocobalamin, 1,000 mcg, Intramuscular, Once, 2 of 3 cycles  Administration: 1,000 mcg (8/6/2021), 1,000 mcg (6/25/2021)  pegfilgrastim (Luis Sauce), 6 mg, Subcutaneous, Once, 3 of 5 cycles  Administration: 6 mg (7/16/2021), 6 mg (8/6/2021), 6 mg (8/27/2021)  fosaprepitant (EMEND) IVPB, 150 mg, Intravenous, Once, 4 of 6 cycles  Administration: 150 mg (6/25/2021), 150 mg (8/6/2021), 150 mg (8/27/2021), 150 mg (7/16/2021)  CARBOplatin (PARAPLATIN) IVPB (GO AUC DOSING), 496 mg, Intravenous, Once, 4 of 6 cycles  Administration: 496 mg (6/25/2021), 492 5 mg (8/6/2021), 462 mg (8/27/2021), 516 mg (7/16/2021)  pemetrexed (ALIMTA) chemo infusion, 975 mg, Intravenous, Once, 4 of 6 cycles  Administration: 1,000 mg (6/25/2021), 1,000 mg (8/6/2021), 1,000 mg (8/27/2021), 1,000 mg (7/16/2021)     12/16/2021 Surgery    Left Upper Lung Lobectomy at [facility] by Dr Tank Olivier  Tumor size: 7 6 cm Percentage of viable tumor: 40%  Histologic Grade: Undifferentiated (G$)  Lymphovascular Invasion: Present (extensive)  Perineural Invasion: Not identified  Spread through Air Spaces: Present  Pleural Invasion: tumor invades to the visceral pleural surface  Tumor Extension: Hilar soft tissue  Bronchial Margin: no tumor seen  Vascular Margin: no tumor seen  Distance from Margin: distance of invasive tumor from closest margin: 0 1 cm  Closest margin: vascular margin  Neoadjuvant T staging: ypT2a  Neoadjuvant N staging: ypN0               Review of Systems:  Review of Systems   Constitutional: Positive for fatigue  Negative for appetite change, chills, fever and unexpected weight change  HENT: Positive for hearing loss  Eyes:        Wears glasses   Respiratory: Positive for cough (productive cough, clear mucus)  Negative for shortness of breath  Cardiovascular: Negative  Negative for chest pain and leg swelling  Gastrointestinal: Positive for constipation (recent hospitalization; now resolved)  Negative for abdominal pain, diarrhea, nausea and vomiting  Endocrine: Negative  Genitourinary: Negative  Musculoskeletal: Positive for back pain (intermittent x 3 years)  Skin:        Psoriasis   Allergic/Immunologic: Negative  Neurological: Negative for dizziness, light-headedness and headaches  Hematological: Negative  Psychiatric/Behavioral: Negative  Negative for sleep disturbance  Clinical Trial: no            Pain assessment: 0    PFT 4/20/21    Imaging:No images are attached to the encounter       Prior Radiation No    Teaching [facility] Radiation Book, SIM, Side Effects    MST yes    Implantable Devices (Port, pacemaker, pain stimulator) Port    Hip Replacement no    Covid Vaccine Status yes    Health Maintenance   Topic Date Due    DTaP,Tdap,and Td Vaccines (1 - Tdap) Never done    Pneumococcal Vaccine: 65+ Years (1 of 1 - PPSV23) Never done    COVID-19 Vaccine (3 - Booster for Moderna series) 10/16/2021    Colorectal Cancer Screening  11/18/2021    Fall Risk  10/14/2022    Medicare Annual Wellness Visit (AWV)  10/14/2022    BMI: Followup Plan  10/14/2022    Depression Remission PHQ  10/14/2022    BMI: Adult  2022    Hepatitis C Screening  Completed    Influenza Vaccine  Completed    HIB Vaccine  Aged Out    Hepatitis B Vaccine  Aged Out    IPV Vaccine  Aged Out    Hepatitis A Vaccine  Aged Out    Meningococcal ACWY Vaccine  Aged Out    HPV Vaccine  Aged Out       Past Medical History:   Diagnosis Date    Hyperlipidemia     Hypertension     Lung cancer ([address] )        Past Surgical History:   Procedure Laterality Date    BACK SURGERY      HEMORRHOID SURGERY      IR BIOPSY LUNG  2021    IR PORT PLACEMENT  2021    LAMINECTOMY  2018    L4-L5    WI BRONCHOSCOPY,DIAGNOSTIC N/A 2021    Procedure: BRONCHOSCOPY FLEXIBLE;  Surgeon: Mary Arevalo MD;  [facility];  Service: Thoracic    WI MEDIASTINOSCOPY WITH LYMPH NODE BIOPSY/IES N/A 2021    Procedure: MEDIASTINOSCOPY, flexible bronchoscopy;  Surgeon: Mary Arevalo MD;  [facility];  Service: Thoracic    TONSILLECTOMY         Family History   Problem Relation Age of Onset    Nephrolithiasis Father        Social History     Tobacco Use    Smoking status: Former Smoker     Packs/day: 1 00     Years: 40 00     Pack years: 40 00     Types: Cigarettes     Start date:      Quit date: 2017     Years since quittin 8    Smokeless tobacco: Never Used    Tobacco comment: quit 2017   Vaping Use    Vaping Use: Never used   Substance Use Topics    Alcohol use:  Yes     Alcohol/week: 7 0 standard drinks     Types: 7 Cans of beer per week    Drug use: Yes     Types: Marijuana     Comment: seldom          Current Outpatient Medications:     allopurinol (ZYLOPRIM) 100 mg tablet, Take 1 tablet (100 mg total) by mouth daily, Disp: 30 tablet, Rfl: 5    amLODIPine (NORVASC) 10 mg tablet, TAKE ONE TABLET BY MOUTH EVERY DAY, Disp: 90 tablet, Rfl: 3    Ascorbic Acid (vitamin C) 1000 MG tablet, Take 1,000 mg by mouth daily, Disp: , Rfl:     B Complex Vitamins (B COMPLEX 1 PO), Take 1 tablet by mouth daily , Disp: , Rfl:     betamethasone valerate (VALISONE) 0 1 % cream, Apply topically 2 (two) times a day (Patient taking differently: Apply topically as needed ), Disp: 45 g, Rfl: 1    calcipotriene (DOVONOX) 0 005 % ointment, Apply topically 2 (two) times a day As needed, Disp: , Rfl:     Cholecalciferol (VITAMIN D3 PO), Take by mouth daily , Disp: , Rfl:     citalopram (CeleXA) 10 mg tablet, TAKE ONE TABLET BY MOUTH EVERY DAY, Disp: 30 tablet, Rfl: 3    colchicine (COLCRYS) 0 6 mg tablet, Take 1 tablet (0 6 mg total) by mouth 2 (two) times a day (Patient taking differently: Take 0 6 mg by mouth 2 (two) times a day As needed), Disp: 30 tablet, Rfl: 5    Cyanocobalamin (Vitamin B 12) 500 MCG TABS, Take 1 tablet by mouth, Disp: , Rfl:     doxepin (SINEquan) 25 mg capsule, TAKE ONE CAPSULE BY MOUTH EVERY DAY AT BEDTIME, Disp: 30 capsule, Rfl: 5    fluocinolone (SYNALAR) 0 01 % external solution, , Disp: , Rfl:     folic acid (FOLVITE) 1 mg tablet, Take 1 tablet (1 mg total) by mouth daily, Disp: 30 tablet, Rfl: 6    Garlic 10 MG CAPS, Take 1 tablet by mouth daily , Disp: , Rfl:     Glucosamine-Chondroit-Vit C-Mn (GLUCOSAMINE 1500 COMPLEX) CAPS, daily , Disp: , Rfl:     ketoconazole (NIZORAL) 2 % cream, Apply topically 2 (two) times a day (Patient taking differently: Apply topically as needed ), Disp: 15 g, Rfl: 2    meclizine (ANTIVERT) 25 mg tablet, Take 1 tablet (25 mg total) by mouth 4 (four) times a day as needed for dizziness, Disp: 30 tablet, Rfl: 0    Multiple Vitamins-Minerals (MULTIVITAL-M) TABS, Take by mouth daily , Disp: , Rfl:     neomycin-polymyxin-hydrocortisone (CORTISPORIN) otic solution, Administer 4 drops into the left ear every 6 (six) hours, Disp: 10 mL, Rfl: 0    ondansetron (ZOFRAN) 4 mg tablet, Take 1 tablet (4 mg total) by mouth every 6 (six) hours, Disp: 12 tablet, Rfl: 0    ondansetron (ZOFRAN) 8 mg tablet, Take 1 tablet (8 mg total) by mouth every 8 (eight) hours as needed for nausea or vomiting (Patient taking differently: Take 8 mg by mouth every 8 (eight) hours as needed for nausea or vomiting As needed), Disp: 20 tablet, Rfl: 3    pantoprazole (PROTONIX) 40 mg tablet, Take 1 tablet (40 mg total) by mouth daily, Disp: 30 tablet, Rfl: 6    polyethylene glycol (GLYCOLAX) 17 GM/SCOOP powder, Take 17 g by mouth 2 (two) times a day, Disp: 850 g, Rfl: 0    rosuvastatin (CRESTOR) 10 MG tablet, TAKE ONE TABLET BY MOUTH EVERY DAY (Patient not taking: Reported on 10/7/2021), Disp: 30 tablet, Rfl: 5    sulfamethoxazole-trimethoprim (BACTRIM DS) 800-160 mg per tablet, Take 1 tablet by mouth every 12 (twelve) hours for 7 days, Disp: 14 tablet, Rfl: 1    traMADol (ULTRAM) 50 mg tablet, TAKE ONE TABLET BY MOUTH EVERY 8 HOURS AS NEEDED FOR SEVERE PAIN, Disp: 30 tablet, Rfl: 0    Allergies   Allergen Reactions    Bee Venom     Benazepril Other (See Comments)     Angioedema     Latex Other (See Comments)     Burning of eyes at the dentist from the gloves    Meloxicam GI Intolerance    Penicillin G Rash        There were no vitals filed for this visit

## 2022-01-04 ENCOUNTER — OFFICE VISIT (OUTPATIENT)
Dept: FAMILY MEDICINE CLINIC | Facility: CLINIC | Age: 67
End: 2022-01-04
Payer: MEDICARE

## 2022-01-04 ENCOUNTER — TRANSITIONAL CARE MANAGEMENT (OUTPATIENT)
Dept: FAMILY MEDICINE CLINIC | Facility: CLINIC | Age: 67
End: 2022-01-04

## 2022-01-04 VITALS
BODY MASS INDEX: 26.64 KG/M2 | OXYGEN SATURATION: 97 % | HEART RATE: 79 BPM | RESPIRATION RATE: 18 BRPM | WEIGHT: 175.8 LBS | HEIGHT: 68 IN | TEMPERATURE: 98.9 F | DIASTOLIC BLOOD PRESSURE: 70 MMHG | SYSTOLIC BLOOD PRESSURE: 140 MMHG

## 2022-01-04 DIAGNOSIS — I10 ESSENTIAL HYPERTENSION: ICD-10-CM

## 2022-01-04 DIAGNOSIS — F33.0 MILD EPISODE OF RECURRENT MAJOR DEPRESSIVE DISORDER (HCC): Primary | ICD-10-CM

## 2022-01-04 DIAGNOSIS — M48.062 SPINAL STENOSIS OF LUMBAR REGION WITH NEUROGENIC CLAUDICATION: ICD-10-CM

## 2022-01-04 DIAGNOSIS — H83.03 LABYRINTHITIS OF BOTH EARS: ICD-10-CM

## 2022-01-04 DIAGNOSIS — I10 HYPERTENSION, UNSPECIFIED TYPE: ICD-10-CM

## 2022-01-04 DIAGNOSIS — K52.9 PROCTOCOLITIS: ICD-10-CM

## 2022-01-04 PROCEDURE — 99495 TRANSJ CARE MGMT MOD F2F 14D: CPT | Performed by: FAMILY MEDICINE

## 2022-01-04 RX ORDER — GABAPENTIN 100 MG/1
CAPSULE ORAL
Status: ON HOLD | COMMUNITY
Start: 2021-11-22 | End: 2022-06-17 | Stop reason: CLARIF

## 2022-01-04 RX ORDER — GLUCOSAMINE/CHONDR SU A SOD 750-600 MG
TABLET ORAL
Status: ON HOLD | COMMUNITY
Start: 2021-10-30 | End: 2022-06-17 | Stop reason: CLARIF

## 2022-01-04 RX ORDER — OXYCODONE HYDROCHLORIDE 5 MG/1
TABLET ORAL
Status: ON HOLD | COMMUNITY
Start: 2021-11-30 | End: 2022-06-17 | Stop reason: CLARIF

## 2022-01-04 RX ORDER — IBUPROFEN 400 MG/1
TABLET ORAL
COMMUNITY
Start: 2021-11-22 | End: 2022-04-11 | Stop reason: HOSPADM

## 2022-01-04 RX ORDER — TAMSULOSIN HYDROCHLORIDE 0.4 MG/1
CAPSULE ORAL
Status: ON HOLD | COMMUNITY
Start: 2021-11-15 | End: 2022-06-17 | Stop reason: CLARIF

## 2022-01-04 NOTE — ASSESSMENT & PLAN NOTE
Labyrinth itis causing vertigo symptoms improved at this point    No additional treatment plan follow-up at next visit

## 2022-01-04 NOTE — PROGRESS NOTES
Assessment/Plan:       Problem List Items Addressed This Visit        Digestive    Proctocolitis     Resolved now post hospital             Cardiovascular and Mediastinum    Essential hypertension     Hypertension stable same medications no change follow-up at next visit         Hypertension       Nervous and Auditory    Labyrinthitis of both ears     Labyrinth itis causing vertigo symptoms improved at this point  No additional treatment plan follow-up at next visit            Other    Mild episode of recurrent major depressive disorder ([address] ) - Primary     Stable on current medication now continue with citalopram         Spinal stenosis of lumbar region with neurogenic claudication     Stable continue same medications home exercise program                 Subjective:      Patient ID: Zack Robertson is a 74 y o  male  Patient presents today for transition of care management post hospitalization for Mendoza colitis and constipation which initiated from oxycodone post thoracic surgery for lung cancer  He subsequently developed vertigo and was bed-bound for several days and this compounded with combination of factors including the medications  Post discharge he is on MiraLax and bowel movements are regulated now      The following portions of the patient's history were reviewed and updated as appropriate: allergies, current medications, past family history, past medical history, past social history, past surgical history and problem list     Review of Systems   Constitutional: Negative for chills, fatigue and fever  HENT: Negative for congestion, nosebleeds, rhinorrhea, sinus pressure and sore throat  Eyes: Negative for discharge and redness  Respiratory: Negative for cough and shortness of breath  Cardiovascular: Negative for chest pain, palpitations and leg swelling  Gastrointestinal: Positive for constipation  Negative for abdominal pain, blood in stool and nausea     Endocrine: Negative for cold intolerance, heat intolerance and polyuria  Genitourinary: Negative for dysuria and frequency  Musculoskeletal: Negative for arthralgias, back pain and myalgias  Skin: Negative for rash  Neurological: Positive for dizziness  Negative for weakness and headaches  Hematological: Negative for adenopathy  Psychiatric/Behavioral: Negative for behavioral problems and sleep disturbance  The patient is not nervous/anxious  Objective:      /70 (BP Location: Left arm, Patient Position: Sitting)   Pulse 79   Temp 98 9 °F (37 2 °C)   Resp 18   Ht 5' 8" (1 727 m)   Wt 79 7 kg (175 lb 12 8 oz)   SpO2 97%   BMI 26 73 kg/m²        Physical Exam  Vitals and nursing note reviewed  Constitutional:       Appearance: Normal appearance  He is well-developed and normal weight  HENT:      Head: Normocephalic and atraumatic  Right Ear: Tympanic membrane and external ear normal       Left Ear: Tympanic membrane and external ear normal       Nose: Nose normal       Mouth/Throat:      Mouth: Mucous membranes are moist    Eyes:      General: No scleral icterus  Conjunctiva/sclera: Conjunctivae normal       Pupils: Pupils are equal, round, and reactive to light  Neck:      Thyroid: No thyromegaly  Vascular: No JVD  Cardiovascular:      Rate and Rhythm: Normal rate and regular rhythm  Pulses: Normal pulses  Heart sounds: Normal heart sounds  No murmur heard  Pulmonary:      Effort: Pulmonary effort is normal       Breath sounds: Normal breath sounds  No wheezing or rales  Chest:      Chest wall: No tenderness  Abdominal:      General: Bowel sounds are normal  There is no distension  Palpations: Abdomen is soft  There is no mass  Tenderness: There is no abdominal tenderness  There is no guarding or rebound  Musculoskeletal:         General: No tenderness or deformity  Normal range of motion  Cervical back: Normal range of motion and neck supple  Lymphadenopathy:      Cervical: No cervical adenopathy  Skin:     General: Skin is warm and dry  Findings: No erythema or rash  Neurological:      General: No focal deficit present  Mental Status: He is alert and oriented to person, place, and time  Mental status is at baseline  Cranial Nerves: No cranial nerve deficit  Deep Tendon Reflexes: Reflexes are normal and symmetric  Reflexes normal    Psychiatric:         Mood and Affect: Mood normal          Behavior: Behavior normal          Thought Content: Thought content normal          Judgment: Judgment normal           Data:    Laboratory Results: I have personally reviewed the pertinent laboratory results/reports   Radiology/Other Diagnostic Testing Results: I have personally reviewed pertinent reports         Lab Results   Component Value Date    WBC 7 58 12/27/2021    HGB 12 2 12/27/2021    HCT 39 0 12/27/2021    MCV 92 12/27/2021     12/27/2021     Lab Results   Component Value Date    K 4 0 12/27/2021    CL 99 12/27/2021    CO2 30 12/27/2021    BUN 17 12/27/2021    CREATININE 1 36 (H) 12/27/2021    GLUF 123 (H) 12/25/2021    CALCIUM 9 9 12/27/2021    CORRECTEDCA 10 2 (H) 08/04/2021    AST 16 12/24/2021    ALT 17 12/24/2021    ALKPHOS 109 (H) 12/24/2021    EGFR 53 12/27/2021     Lab Results   Component Value Date    CHOLESTEROL 112 06/23/2021    CHOLESTEROL 120 02/10/2021    CHOLESTEROL 120 10/06/2020     Lab Results   Component Value Date    HDL 42 06/23/2021    HDL 49 02/10/2021    HDL 58 10/06/2020     Lab Results   Component Value Date    LDLCALC 26 06/23/2021    LDLCALC 51 02/10/2021    LDLCALC 39 10/06/2020     Lab Results   Component Value Date    TRIG 221 (H) 06/23/2021    TRIG 100 02/10/2021    TRIG 115 10/06/2020     No results found for: [place], [place]  Lab Results   Component Value Date    [id number] 3 700 06/23/2021     Lab Results   Component Value Date    HGBA1C 5 3 06/23/2021     Lab Results   Component Value Date PSA 0 6 10/06/2020       [facility] OJeaneth, DO

## 2022-01-04 NOTE — ASSESSMENT & PLAN NOTE
Patient presents today for follow-up evaluation post hospitalization for proctocolitis after he developed severe constipation from pain medication post operative from lung cancer surgery and additionally remaining sedentary in bed for several days without a bowel movement    He was treated and released and is now taking MiraLax and doing much better bowel movements have been regular

## 2022-01-06 ENCOUNTER — APPOINTMENT (OUTPATIENT)
Dept: RADIATION ONCOLOGY | Facility: CLINIC | Age: 67
End: 2022-01-06
Attending: RADIOLOGY
Payer: MEDICARE

## 2022-01-06 DIAGNOSIS — M54.42 CHRONIC LEFT-SIDED LOW BACK PAIN WITH LEFT-SIDED SCIATICA: ICD-10-CM

## 2022-01-06 DIAGNOSIS — G89.29 CHRONIC LEFT-SIDED LOW BACK PAIN WITH LEFT-SIDED SCIATICA: ICD-10-CM

## 2022-01-06 PROCEDURE — 77334 RADIATION TREATMENT AID(S): CPT | Performed by: RADIOLOGY

## 2022-01-06 PROCEDURE — 77399 UNLISTED PX MED RADJ PHYSICS: CPT | Performed by: RADIOLOGY

## 2022-01-06 RX ORDER — TRAMADOL HYDROCHLORIDE 50 MG/1
TABLET ORAL
Qty: 30 TABLET | Refills: 0 | Status: SHIPPED | OUTPATIENT
Start: 2022-01-06 | End: 2022-03-14

## 2022-01-12 ENCOUNTER — OFFICE VISIT (OUTPATIENT)
Dept: CARDIOLOGY CLINIC | Facility: CLINIC | Age: 67
End: 2022-01-12
Payer: MEDICARE

## 2022-01-12 VITALS
SYSTOLIC BLOOD PRESSURE: 132 MMHG | HEIGHT: 68 IN | WEIGHT: 174 LBS | DIASTOLIC BLOOD PRESSURE: 70 MMHG | OXYGEN SATURATION: 99 % | HEART RATE: 84 BPM | RESPIRATION RATE: 16 BRPM | BODY MASS INDEX: 26.37 KG/M2

## 2022-01-12 DIAGNOSIS — I31.3 PERICARDIAL EFFUSION: ICD-10-CM

## 2022-01-12 PROCEDURE — 93000 ELECTROCARDIOGRAM COMPLETE: CPT | Performed by: INTERNAL MEDICINE

## 2022-01-12 PROCEDURE — 99204 OFFICE O/P NEW MOD 45 MIN: CPT | Performed by: INTERNAL MEDICINE

## 2022-01-12 NOTE — PROGRESS NOTES
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 which was resected  There were no evidence of lymph nodes thereafter  In any case, a recent CT scan suggested a 8 mm pericardial effusion  He also had a recent echo and there was a very small anterior area that may represent a small effusion  There was certainly no evidence of tamponade  The patient has not noted lightheadedness or exertional discomfort or rapid heartbeat  1  Pericardial effusion  -     Ambulatory referral to Cardiology  -     Echo complete w/ contrast if indicated;  Future; Expected date: 04/28/2022  -     POCT ECG      Patient Active Problem List   Diagnosis    Renal cyst, right    Medicare annual wellness visit, subsequent    Acute idiopathic gout of left foot    Back problem    Depression with anxiety    Essential hypertension    Glaucoma    Hypertriglyceridemia    Lumbago with sciatica, left side    Lumbar stenosis    Mild episode of recurrent major depressive disorder ([address] )    JUNAID (obstructive sleep apnea)    Spinal stenosis of lumbar region with neurogenic claudication    Mixed hyperlipidemia    Bilateral hearing loss    Hyperglycemia    Cigarette nicotine dependence in remission    Lung mass    Adenocarcinoma, lung, left ([address] )    Encounter for central line care    Drug-induced neutropenia (HCC)    Left non-suppurative otitis media    Bilateral hearing loss due to cerumen impaction    Cancer of upper lobe of left lung (HCC)    Chronic back pain    Former cigarette smoker    History of gout    Hypertension    Proctocolitis    Pericardial effusion    Constipation    Labyrinthitis of both ears     Past Medical History:   Diagnosis Date    Hyperlipidemia     Hypertension     Lung cancer ([address] )      Social History     Socioeconomic History    Marital status: /Civil Union     Spouse name: Not on file    Number of children: Not on file    Years of education: Not on file    Highest education level: Not on file   Occupational History    Not on file   Tobacco Use    Smoking status: Former Smoker     Packs/day: 1 00     Years: 40 00     Pack years: 40 00     Types: Cigarettes     Start date:      Quit date: 2017     Years since quittin 9    Smokeless tobacco: Never Used    Tobacco comment: quit 2017   Vaping Use    Vaping Use: Never used   Substance and Sexual Activity    Alcohol use: Yes     Alcohol/week: 7 0 standard drinks     Types: 7 Cans of beer per week    Drug use: Yes     Types: Marijuana     Comment: seldom    Sexual activity: Not on file   Other Topics Concern    Not on file   Social History Narrative    Not on file     Social Determinants of Health     Financial Resource Strain: Not on file   Food Insecurity: No Food Insecurity    Worried About Running Out of Food in the Last Year: Never true    Felecia of Food in the Last Year: Never true   Transportation Needs: No Transportation Needs    Lack of Transportation (Medical): No    Lack of Transportation (Non-Medical):  No   Physical Activity: Not on file   Stress: Not on file   Social Connections: Not on file   Intimate Partner Violence: Not on file   Housing Stability: Low Risk     Unable to Pay for Housing in the Last Year: No    Number of Places Lived in the Last Year: 1    Unstable Housing in the Last Year: No      Family History   Problem Relation Age of Onset    Nephrolithiasis Father      Past Surgical History:   Procedure Laterality Date    BACK SURGERY      HEMORRHOID SURGERY      IR BIOPSY LUNG  2021    IR PORT PLACEMENT  2021    LAMINECTOMY  2018    L4-L5    LUNG SURGERY left upper lobectomy    HI BRONCHOSCOPY,DIAGNOSTIC N/A 5/25/2021    Procedure: BRONCHOSCOPY FLEXIBLE;  Surgeon: Sofia Seip, MD;  [facility];  Service: Thoracic    HI MEDIASTINOSCOPY WITH LYMPH NODE BIOPSY/IES N/A 5/25/2021    Procedure: MEDIASTINOSCOPY, flexible bronchoscopy;  Surgeon: Sofia Seip, MD;  [facility];  Service: Thoracic    TONSILLECTOMY  1959       Current Outpatient Medications:     amLODIPine (NORVASC) 10 mg tablet, TAKE ONE TABLET BY MOUTH EVERY DAY, Disp: 90 tablet, Rfl: 3    Ascorbic Acid (vitamin C) 1000 MG tablet, Take 1,000 mg by mouth daily, Disp: , Rfl:     B Complex Vitamins (B COMPLEX 1 PO), Take 1 tablet by mouth daily , Disp: , Rfl:     B Complex Vitamins (BL VITAMIN B COMPLEX PO), Take by mouth, Disp: , Rfl:     betamethasone valerate (VALISONE) 0 1 % cream, Apply topically 2 (two) times a day (Patient taking differently: Apply topically as needed ), Disp: 45 g, Rfl: 1    calcipotriene (DOVONOX) 0 005 % ointment, Apply topically 2 (two) times a day As needed, Disp: , Rfl:     Cholecalciferol (VITAMIN D3 PO), Take by mouth daily , Disp: , Rfl:     citalopram (CeleXA) 10 mg tablet, TAKE ONE TABLET BY MOUTH EVERY DAY, Disp: 30 tablet, Rfl: 3    colchicine (COLCRYS) 0 6 mg tablet, Take 1 tablet (0 6 mg total) by mouth 2 (two) times a day (Patient taking differently: Take 0 6 mg by mouth 2 (two) times a day As needed), Disp: 30 tablet, Rfl: 5    Cyanocobalamin (Vitamin B 12) 500 MCG TABS, Take 1 tablet by mouth, Disp: , Rfl:     doxepin (SINEquan) 25 mg capsule, TAKE ONE CAPSULE BY MOUTH EVERY DAY AT BEDTIME, Disp: 30 capsule, Rfl: 5    Garlic 10 MG CAPS, Take 1 tablet by mouth daily , Disp: , Rfl:     Glucosamine HCl 1500 MG TABS, Take by mouth, Disp: , Rfl:     Glucosamine-Chondroit-Vit C-Mn (GLUCOSAMINE 1500 COMPLEX) CAPS, daily , Disp: , Rfl:     ibuprofen (MOTRIN) 400 mg tablet, , Disp: , Rfl:     ketoconazole (NIZORAL) 2 % cream, Apply topically 2 (two) times a day (Patient taking differently: Apply topically as needed ), Disp: 15 g, Rfl: 2    MULTIPLE VITAMINS ESSENTIAL PO, Take by mouth, Disp: , Rfl:     pantoprazole (PROTONIX) 40 mg tablet, Take 1 tablet (40 mg total) by mouth daily, Disp: 30 tablet, Rfl: 6    polyethylene glycol (GLYCOLAX) 17 GM/SCOOP powder, Take 17 g by mouth 2 (two) times a day, Disp: 850 g, Rfl: 0    traMADol (ULTRAM) 50 mg tablet, TAKE ONE TABLET BY MOUTH EVERY 8 HOURS AS NEEDED FOR SEVERE PAIN, Disp: 30 tablet, Rfl: 0    allopurinol (ZYLOPRIM) 100 mg tablet, Take 1 tablet (100 mg total) by mouth daily (Patient not taking: Reported on 1/12/2022 ), Disp: 30 tablet, Rfl: 5    fluocinolone (SYNALAR) 0 01 % external solution, , Disp: , Rfl:     folic acid (FOLVITE) 1 mg tablet, Take 1 tablet (1 mg total) by mouth daily (Patient not taking: Reported on 1/12/2022 ), Disp: 30 tablet, Rfl: 6    gabapentin (NEURONTIN) 100 mg capsule, , Disp: , Rfl:     meclizine (ANTIVERT) 25 mg tablet, Take 1 tablet (25 mg total) by mouth 4 (four) times a day as needed for dizziness (Patient not taking: Reported on 1/12/2022 ), Disp: 30 tablet, Rfl: 0    Multiple Vitamins-Minerals (MULTIVITAL-M) TABS, Take by mouth daily , Disp: , Rfl:     neomycin-polymyxin-hydrocortisone (CORTISPORIN) otic solution, Administer 4 drops into the left ear every 6 (six) hours (Patient not taking: Reported on 1/12/2022 ), Disp: 10 mL, Rfl: 0    ondansetron (ZOFRAN) 4 mg tablet, Take 1 tablet (4 mg total) by mouth every 6 (six) hours (Patient not taking: Reported on 1/12/2022 ), Disp: 12 tablet, Rfl: 0    ondansetron (ZOFRAN) 8 mg tablet, Take 1 tablet (8 mg total) by mouth every 8 (eight) hours as needed for nausea or vomiting (Patient not taking: Reported on 1/12/2022 ), Disp: 20 tablet, Rfl: 3    oxyCODONE (ROXICODONE) 5 immediate release tablet, , Disp: , Rfl:     rosuvastatin (CRESTOR) 10 MG tablet, TAKE ONE TABLET BY MOUTH EVERY DAY (Patient not taking: Reported on 1/12/2022), Disp: 30 tablet, Rfl: 5    tamsulosin (FLOMAX) 0 4 mg, , Disp: , Rfl:   Allergies   Allergen Reactions    Bee Venom     Benazepril Other (See Comments)     Angioedema     Latex Other (See Comments)     Burning of eyes at the dentist from the gloves    Meloxicam GI Intolerance    Penicillin G Rash     Vitals:    01/12/22 1255   BP: 132/70   BP Location: Left arm   Patient Position: Sitting   Cuff Size: Standard   Pulse: 84   Resp: 16   SpO2: 99%   Weight: 78 9 kg (174 lb)   Height: 5' 8" (1 727 m)       Labs:  Lab Requisition on 01/06/2022   Component Date Value    Case Report 01/06/2022                      Value:Surgical Pathology Report                         Case: [id number]                                   Authorizing Provider:  Bill Alford,  Collected:           01/06/2022 0815                                     MD                                                                           Ordering Location:     [address] [place] [address]      Received:            01/06/2022 [address] [facility] [facility]                                                                                  Laboratory                                                                   Pathologist:           Olesya Cano MD                                                        Specimen:    Lung, Left upper lobe of lung ( 8 slides [id number] [facility], collected 11/17/2021)                                                              Final Diagnosis 01/06/2022                      Value: This result contains rich text formatting which cannot be displayed here   Note 01/06/2022                      Value: This result contains rich text formatting which cannot be displayed here   Additional Information 01/06/2022                      Value: This result contains rich text formatting which cannot be displayed here  Ricardo Aggarwal Gross Description 01/06/2022                      Value: This result contains rich text formatting which cannot be displayed here   Clinical Information 01/06/2022                      Value:DAVID biopsy proven carcinoma     Admission on 12/24/2021, Discharged on 12/27/2021   Component Date Value    WBC 12/24/2021 10 22*    RBC 12/24/2021 4 11     Hemoglobin 12/24/2021 12 1     Hematocrit 12/24/2021 37 7     MCV 12/24/2021 92     MCH 12/24/2021 29 4     MCHC 12/24/2021 32 1     RDW 12/24/2021 14 6     MPV 12/24/2021 8 9     Platelets 94/03/2847 243     nRBC 12/24/2021 0     Neutrophils Relative 12/24/2021 83*    Immat GRANS % 12/24/2021 0     Lymphocytes Relative 12/24/2021 11*    Monocytes Relative 12/24/2021 4     Eosinophils Relative 12/24/2021 1     Basophils Relative 12/24/2021 1     Neutrophils Absolute 12/24/2021 8 53*    Immature Grans Absolute 12/24/2021 0 03     Lymphocytes Absolute 12/24/2021 1 12     Monocytes Absolute 12/24/2021 0 37     Eosinophils Absolute 12/24/2021 0 11     Basophils Absolute 12/24/2021 0 06     Sodium 12/24/2021 137     Potassium 12/24/2021 3 5     Chloride 12/24/2021 101     CO2 12/24/2021 25     ANION GAP 12/24/2021 11     BUN 12/24/2021 14     Creatinine 12/24/2021 1 14     Glucose 12/24/2021 126     Calcium 12/24/2021 9 6     AST 12/24/2021 16     ALT 12/24/2021 17     Alkaline Phosphatase 12/24/2021 109*    Total Protein 12/24/2021 7 7     Albumin 12/24/2021 4 1     Total Bilirubin 12/24/2021 0 38     eGFR 12/24/2021 65     Lipase 12/24/2021 50     Color, UA 12/24/2021 Yellow     Clarity, UA 12/24/2021 Clear     Specific Gravity, UA 12/24/2021 1 010     pH, UA 12/24/2021 6 0     Leukocytes, UA 12/24/2021 Negative     Nitrite, UA 12/24/2021 Negative     Protein, UA 12/24/2021 Negative     Glucose, UA 12/24/2021 Negative     Ketones, UA 12/24/2021 Negative     Urobilinogen, UA 12/24/2021 0 2     Bilirubin, UA 12/24/2021 Negative     Blood, UA 12/24/2021 Negative     Sodium 12/25/2021 137     Potassium 12/25/2021 3 5     Chloride 12/25/2021 102     CO2 12/25/2021 24     ANION GAP 12/25/2021 11     BUN 12/25/2021 16     Creatinine 12/25/2021 1 19     Glucose 12/25/2021 123     Glucose, Fasting 12/25/2021 123*    Calcium 12/25/2021 8 9     eGFR 12/25/2021 62     WBC 12/25/2021 9 02     RBC 12/25/2021 3 74*    Hemoglobin 12/25/2021 11 2*    Hematocrit 12/25/2021 34 2*    MCV 12/25/2021 91     MCH 12/25/2021 29 9     MCHC 12/25/2021 32 7     RDW 12/25/2021 14 6     MPV 12/25/2021 9 4     Platelets 54/01/0997 231     nRBC 12/25/2021 0     Neutrophils Relative 12/25/2021 78*    Immat GRANS % 12/25/2021 0     Lymphocytes Relative 12/25/2021 16     Monocytes Relative 12/25/2021 6     Eosinophils Relative 12/25/2021 0     Basophils Relative 12/25/2021 0     Neutrophils Absolute 12/25/2021 6 97     Immature Grans Absolute 12/25/2021 0 02     Lymphocytes Absolute 12/25/2021 1 45     Monocytes Absolute 12/25/2021 0 52     Eosinophils Absolute 12/25/2021 0 02     Basophils Absolute 12/25/2021 0 04     WBC 12/26/2021 5 66     RBC 12/26/2021 3 73*    Hemoglobin 12/26/2021 11 0*    Hematocrit 12/26/2021 34 1*    MCV 12/26/2021 91     MCH 12/26/2021 29 5     MCHC 12/26/2021 32 3     RDW 12/26/2021 14 4     MPV 12/26/2021 8 9     Platelets 19/06/3022 216     nRBC 12/26/2021 0     Neutrophils Relative 12/26/2021 76*    Immat GRANS % 12/26/2021 0     Lymphocytes Relative 12/26/2021 16     Monocytes Relative 12/26/2021 6     Eosinophils Relative 12/26/2021 1     Basophils Relative 12/26/2021 1     Neutrophils Absolute 12/26/2021 4 26     Immature Grans Absolute 12/26/2021 0 01     Lymphocytes Absolute 12/26/2021 0 91     Monocytes Absolute 12/26/2021 0 35     Eosinophils Absolute 12/26/2021 0 08     Basophils Absolute 12/26/2021 0 05     Sodium 12/26/2021 135     Potassium 12/26/2021 4 1     Chloride 12/26/2021 100     CO2 12/26/2021 29     ANION GAP 12/26/2021 6     BUN 12/26/2021 19     Creatinine 12/26/2021 1 10     Glucose 12/26/2021 101     Calcium 12/26/2021 9 1     eGFR 12/26/2021 68     WBC 12/27/2021 7 58     RBC 12/27/2021 4 22     Hemoglobin 12/27/2021 12 2     Hematocrit 12/27/2021 39 0     MCV 12/27/2021 92     MCH 12/27/2021 28 9     MCHC 12/27/2021 31 3*    RDW 12/27/2021 14 4     MPV 12/27/2021 9 0     Platelets 73/43/0016 254     nRBC 12/27/2021 0     Neutrophils Relative 12/27/2021 62     Immat GRANS % 12/27/2021 0     Lymphocytes Relative 12/27/2021 27     Monocytes Relative 12/27/2021 7     Eosinophils Relative 12/27/2021 3     Basophils Relative 12/27/2021 1     Neutrophils Absolute 12/27/2021 4 68     Immature Grans Absolute 12/27/2021 0 02     Lymphocytes Absolute 12/27/2021 2 01     Monocytes Absolute 12/27/2021 0 54     Eosinophils Absolute 12/27/2021 0 26     Basophils Absolute 12/27/2021 0 07     Sodium 12/27/2021 138     Potassium 12/27/2021 4 0     Chloride 12/27/2021 99     CO2 12/27/2021 30     ANION GAP 12/27/2021 9     BUN 12/27/2021 17     Creatinine 12/27/2021 1 36*    Glucose 12/27/2021 102     Calcium 12/27/2021 9 9     eGFR 12/27/2021 53     LV EF 12/27/2021 60     A4C EF 12/27/2021 60     LVIDd 12/27/2021 5 40     LVIDS 12/27/2021 2 40     IVSd 12/27/2021 0 90     LVPWd 12/27/2021 1 00     FS 12/27/2021 56     MV E' Tissue Velocity Se* 12/27/2021 7     E/A ratio 12/27/2021 0 63     E wave deceleration time 12/27/2021 212     MV Peak E Elijah 12/27/2021 55     MV Peak A Elijah 12/27/2021 0 87     AV LVOT peak gradient 12/27/2021 3     LVOT peak VTI 12/27/2021 13 72     LVOT peak elijah 12/27/2021 0 85     RVOT peak VTI 12/27/2021 13 17     RVID d 12/27/2021 3 7     TV S' 12/27/2021 0 9     RVOT VMEAN 12/27/2021 0 44     RVOT PMEAN 12/27/2021 1     RVOT PMAX 12/27/2021 2     YARED A4C 12/27/2021 20     RAA A4C 12/27/2021 23 4     Ao VTI 12/27/2021 22 67     AV mean gradient 12/27/2021 3     LVOT mn grad 12/27/2021 1 0     AV peak gradient 12/27/2021 5     MV stenosis pressure 1/2* 12/27/2021 0     MV valve area p 1/2 meth* 12/27/2021 3 60     TV peak gradient 12/27/2021 33     PV mean gradient antegra* 12/27/2021 3     PV peak gradient antegra* 12/27/2021 5     RVOT peak elijah 12/27/2021 0 66     Pulmonic Valve Systolic * 10/00/2099 00 33     Ao root 12/27/2021 3 70     PV Mean Elijah 12/27/2021 75     Aortic valve mean veloci* 12/27/2021 7 50     Tricuspid valve peak reg* 12/27/2021 2 89     Left ventricular stroke * 12/27/2021 120 00     LEFT VENTRICLE SYSTOLIC * 48/94/9731 19     LV DIASTOLIC VOLUME (MOD* 91/48/2065 139      Imaging: CT abdomen pelvis with contrast    Result Date: 12/24/2021  Narrative: CT ABDOMEN AND PELVIS WITH IV CONTRAST INDICATION:   Bowel obstruction suspected distention, question obstruction  hx cancer  COMPARISON:  10/10/2017 TECHNIQUE:  CT examination of the abdomen and pelvis was performed  Axial, sagittal, and coronal 2D reformatted images were created from the source data and submitted for interpretation  Radiation dose length product (DLP) for this visit:  575 81 mGy-cm   This examination, like all CT scans performed in the [facility], was performed utilizing techniques to minimize radiation dose exposure, including the use of iterative  reconstruction and automated exposure control  IV Contrast:  100 mL of iohexol (OMNIPAQUE) Enteric Contrast:  Enteric contrast was not administered  FINDINGS: ABDOMEN LOWER CHEST:  Small left pleural effusion  8 mm pericardial effusion  Small hiatal hernia  LIVER/BILIARY TREE:  Unremarkable  GALLBLADDER:  No calcified gallstones  No pericholecystic inflammatory change  SPLEEN:  1 3 cm hypodensity in the anterior spleen which in retrospect was present previously and is not significantly changed  PANCREAS:  Unremarkable  ADRENAL GLANDS:  Unremarkable  KIDNEYS/URETERS:  Bilateral renal cysts  Likely bilateral extrarenal pelves  STOMACH AND BOWEL:  Wall thickening at the mid to distal sigmoid colon extending to the rectum consistent with a proctocolitis  The colon proximal to these areas demonstrate prominent stool accumulation  APPENDIX:  No findings to suggest appendicitis  ABDOMINOPELVIC CAVITY:  No ascites  No pneumoperitoneum  No lymphadenopathy  VESSELS:  Atherosclerotic changes are present  No evidence of aneurysm  PELVIS REPRODUCTIVE ORGANS:  The prostate is enlarged  URINARY BLADDER:  Unremarkable  ABDOMINAL WALL/INGUINAL REGIONS:  Unremarkable  OSSEOUS STRUCTURES:  No acute fracture or destructive osseous lesion  Impression: Prominent wall thickening of the mid to distal sigmoid colon and rectum consistent with a proctocolitis  Prominent stool accumulation throughout the colon  Small left pleural effusion  8 mm pericardial effusion  The study was marked in [facility] for immediate notification  Workstation performed: [id number]     Echo complete w/ contrast if indicated    Result Date: 12/27/2021  Narrative: [facility]  Left Ventricle: Left ventricular cavity size is normal  The left ventricular ejection fraction is 60%  Systolic function is normal  Wall motion is normal    Right Ventricle: Right ventricular cavity size is mildly dilated but there is no pulmonary hypertension    Pericardium: There is a trivial pericardial effusion  Pleural fluid is seen as well  Review of Systems:  Review of Systems    Physical Exam:  132/70  Seventy-five, regular  Skin warm dry  Pupils equal   Carotids 2+ without bruits  Neck veins not distended  There is dullness in the left base  Right lung is clear  Regular rate rhythm  No murmurs rubs or gallops  Bowel sounds present  No abdominal masses  Good pulses  No edema  No neck vein distention  No paradoxical pulse  Discussion/Summary:    1   Non-small cell carcinoma resected  2  Questionable small pericardial effusion-no evidence of tamponade  3  Coronary artery disease as evidenced by calcium in the coronary arteries on CT scan    Recommendations:    1  Patient advised that if he feels all right not to be concerned about this effusion in the short run  2  Will repeat echo in 4 months and follow up afterwards                Dia Bowens MD

## 2022-01-17 ENCOUNTER — DOCUMENTATION (OUTPATIENT)
Dept: HEMATOLOGY ONCOLOGY | Facility: CLINIC | Age: 67
End: 2022-01-17

## 2022-01-17 DIAGNOSIS — C34.12 CANCER OF UPPER LOBE OF LEFT LUNG (HCC): Primary | ICD-10-CM

## 2022-01-17 PROCEDURE — 77338 DESIGN MLC DEVICE FOR IMRT: CPT | Performed by: RADIOLOGY

## 2022-01-17 PROCEDURE — 77301 RADIOTHERAPY DOSE PLAN IMRT: CPT | Performed by: RADIOLOGY

## 2022-01-17 PROCEDURE — 77300 RADIATION THERAPY DOSE PLAN: CPT | Performed by: RADIOLOGY

## 2022-01-17 NOTE — PROGRESS NOTES
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  This was an R0 resection but the margin near the mediastinal fat is close  In addition, in the left upper lobe he had a separate stage I squamous cell carcinoma  PHYSICIAN RECOMMENDED PLAN: Radiation to close margin  Consider adjuvant immunotherapy  Imaging reviewed:  9/27/2021 PET/CT-primary DAVID mass has mildly diminished in size however the greatest SUV max has increased, primarily due to new posterior extension of the mass involving the anterior margin of the left pulmonary hilum  Diminished size and activity of theLUL perifissural nodule  Left AP window largest node is unchanged in size since prior examination but with diminished SUV activity  No evidence of glucose avid distant metastatic soft tissue or osseous disease  4/8/2021 CT lung screening-6 2 x 4 6 cm mass in the paramediastinal left upper lobe  Pathology reviewed:  11/17/2021 DAVID, lobectomy ([facility])- large cell neuroendocrine carcinoma  Sections submitted represent 2 grossly identified separate tumor masses  The larger identified nodule represents a poorly differentiated non-small cell carcinoma compatible with large cell neuroendocrine carcinoma  The smaller nodule represents a nonkeratinizing poorly differentiated squamous cell carcinoma  PFT's reviewed: No    Future imaging: None at this time  Referrals: None at this time  Procedures:  N/A    Team agreed to plan    NCCN guidelines were readily available for review at this discussion    The final treatment plan will be left to the discretion of the patient and the treating physician  DISCLAIMERS:  TO THE TREATING PHYSICIAN:  This conference is a meeting of clinicians from various specialty areas who evaluate and discuss patients for whom a multidisciplinary treatment approach is being considered  Please note that the above opinion was a consensus of the conference attendees and is intended only to assist in quality care of the discussed patient  The responsibility for follow up on the input given during the conference, along with any final decisions regarding plan of care, is that of the patient and the patient's provider  Accordingly, appointments have only been recommended based on this information and have NOT been scheduled unless otherwise noted  TO THE PATIENT:  This summary is a brief record of major aspects of your cancer treatment  You may choose to share a copy with any of your doctors or nurses  However, this is not a detailed or comprehensive record of your care

## 2022-01-18 ENCOUNTER — OFFICE VISIT (OUTPATIENT)
Dept: FAMILY MEDICINE CLINIC | Facility: CLINIC | Age: 67
End: 2022-01-18
Payer: MEDICARE

## 2022-01-18 ENCOUNTER — APPOINTMENT (OUTPATIENT)
Dept: RADIATION ONCOLOGY | Facility: CLINIC | Age: 67
End: 2022-01-18
Attending: RADIOLOGY
Payer: MEDICARE

## 2022-01-18 VITALS
OXYGEN SATURATION: 99 % | TEMPERATURE: 98.4 F | RESPIRATION RATE: 18 BRPM | BODY MASS INDEX: 26.86 KG/M2 | HEART RATE: 86 BPM | WEIGHT: 177.2 LBS | HEIGHT: 68 IN | SYSTOLIC BLOOD PRESSURE: 140 MMHG | DIASTOLIC BLOOD PRESSURE: 70 MMHG

## 2022-01-18 DIAGNOSIS — Z12.12 SCREENING FOR COLORECTAL CANCER: ICD-10-CM

## 2022-01-18 DIAGNOSIS — Z23 ENCOUNTER FOR IMMUNIZATION: ICD-10-CM

## 2022-01-18 DIAGNOSIS — I31.3 PERICARDIAL EFFUSION: ICD-10-CM

## 2022-01-18 DIAGNOSIS — Z12.11 SCREENING FOR COLORECTAL CANCER: ICD-10-CM

## 2022-01-18 DIAGNOSIS — I10 ESSENTIAL HYPERTENSION: ICD-10-CM

## 2022-01-18 DIAGNOSIS — F33.0 MILD EPISODE OF RECURRENT MAJOR DEPRESSIVE DISORDER (HCC): ICD-10-CM

## 2022-01-18 DIAGNOSIS — N18.31 STAGE 3A CHRONIC KIDNEY DISEASE (HCC): ICD-10-CM

## 2022-01-18 DIAGNOSIS — E78.2 MIXED HYPERLIPIDEMIA: ICD-10-CM

## 2022-01-18 DIAGNOSIS — C34.92 ADENOCARCINOMA, LUNG, LEFT (HCC): Primary | ICD-10-CM

## 2022-01-18 PROCEDURE — 77427 RADIATION TX MANAGEMENT X5: CPT | Performed by: RADIOLOGY

## 2022-01-18 PROCEDURE — 99214 OFFICE O/P EST MOD 30 MIN: CPT | Performed by: FAMILY MEDICINE

## 2022-01-18 PROCEDURE — 90732 PPSV23 VACC 2 YRS+ SUBQ/IM: CPT | Performed by: FAMILY MEDICINE

## 2022-01-18 PROCEDURE — G0009 ADMIN PNEUMOCOCCAL VACCINE: HCPCS | Performed by: FAMILY MEDICINE

## 2022-01-18 PROCEDURE — 77386 HB NTSTY MODUL RAD TX DLVR CPLX: CPT | Performed by: RADIOLOGY

## 2022-01-18 PROCEDURE — 77014 CHG CT GUIDANCE PLACEMENT RAD THERAPY FIELDS: CPT | Performed by: RADIOLOGY

## 2022-01-18 RX ORDER — BETAMETHASONE DIPROPIONATE 0.5 MG/G
CREAM TOPICAL
Status: ON HOLD | COMMUNITY
Start: 2022-01-06 | End: 2022-06-17 | Stop reason: CLARIF

## 2022-01-18 NOTE — PROGRESS NOTES
Assessment/Plan:       Problem List Items Addressed This Visit        Respiratory    Adenocarcinoma, lung, left ([facility] [address] ) - Primary     Post resection at [facility] patient doing well now no shortness of breath will be starting radiation therapy         Relevant Orders    CBC and differential    Comprehensive metabolic panel    Lipid panel    TSH, 3rd generation with Free T4 reflex    PNEUMOCOCCAL POLYSACCHARIDE VACCINE 23-VALENT =>3YO SQ IM    CBC and differential    Comprehensive metabolic panel    Lipid panel    TSH, 3rd generation with Free T4 reflex       Cardiovascular and Mediastinum    Essential hypertension     Hypertension stable continue same medication regimen         Relevant Orders    CBC and differential    Comprehensive metabolic panel    Lipid panel    TSH, 3rd generation with Free T4 reflex    PNEUMOCOCCAL POLYSACCHARIDE VACCINE 23-VALENT =>3YO SQ IM    CBC and differential    Comprehensive metabolic panel    Lipid panel    TSH, 3rd generation with Free T4 reflex    Pericardial effusion     Patient recently took colchicine twice daily for the last 2 weeks he will stop this now and go back on allopurinol for his gout    This treatment regimen should have resolved his pericardial effusion a patient has no symptoms at this time and followed up with Cardiology recently         Relevant Orders    CBC and differential    Comprehensive metabolic panel    Lipid panel    TSH, 3rd generation with Free T4 reflex       Genitourinary    Stage 3a chronic kidney disease ([facility] [address] )    Relevant Orders    CBC and differential    Comprehensive metabolic panel    Lipid panel    TSH, 3rd generation with Free T4 reflex    PNEUMOCOCCAL POLYSACCHARIDE VACCINE 23-VALENT =>3YO SQ IM    CBC and differential    Comprehensive metabolic panel    Lipid panel    TSH, 3rd generation with Free T4 reflex       Other    Mild episode of recurrent major depressive disorder (HCC)     Stable on current regimen watching diet reviewed all laboratory work  He just marte through lung cancer and is continuing with radiation therapy now         Relevant Orders    CBC and differential    Comprehensive metabolic panel    Lipid panel    TSH, 3rd generation with Free T4 reflex    PNEUMOCOCCAL POLYSACCHARIDE VACCINE 23-VALENT =>3YO SQ IM    CBC and differential    Comprehensive metabolic panel    Lipid panel    TSH, 3rd generation with Free T4 reflex    Mixed hyperlipidemia     Patient discontinued Crestor due to chemotherapy but now should resume this and follow-up with lipid profile in 4 months         Relevant Orders    CBC and differential    Comprehensive metabolic panel    Lipid panel    TSH, 3rd generation with Free T4 reflex      Other Visit Diagnoses     Encounter for immunization        Relevant Orders    CBC and differential    Comprehensive metabolic panel    Lipid panel    TSH, 3rd generation with Free T4 reflex    PNEUMOCOCCAL POLYSACCHARIDE VACCINE 23-VALENT =>3YO SQ IM    CBC and differential    Comprehensive metabolic panel    Lipid panel    TSH, 3rd generation with Free T4 reflex    Screening for colorectal cancer        Relevant Orders    Ambulatory referral to Gastroenterology    CBC and differential    Comprehensive metabolic panel    Lipid panel    TSH, 3rd generation with Free T4 reflex    PNEUMOCOCCAL POLYSACCHARIDE VACCINE 23-VALENT =>3YO SQ IM    CBC and differential    Comprehensive metabolic panel    Lipid panel    TSH, 3rd generation with Free T4 reflex            Subjective:      Patient ID: Dami Frias is a 74 y o  male  Patient presents for follow-up visit today and discussion regarding laboratory work and lung cancer treatment moving forward going in to radiation therapy    Additionally needs pneumococcal vaccine      The following portions of the patient's history were reviewed and updated as appropriate: allergies, current medications, past family history, past medical history, past social history, past surgical history and problem list     Review of Systems   Constitutional: Negative for chills, fatigue and fever  HENT: Negative for congestion, nosebleeds, rhinorrhea, sinus pressure and sore throat  Eyes: Negative for discharge and redness  Respiratory: Negative for cough and shortness of breath  Cardiovascular: Negative for chest pain, palpitations and leg swelling  Gastrointestinal: Negative for abdominal pain, blood in stool and nausea  Endocrine: Negative for cold intolerance, heat intolerance and polyuria  Genitourinary: Negative for dysuria and frequency  Musculoskeletal: Negative for arthralgias, back pain and myalgias  Skin: Negative for rash  Neurological: Negative for dizziness, weakness and headaches  Hematological: Negative for adenopathy  Psychiatric/Behavioral: Negative for behavioral problems and sleep disturbance  The patient is not nervous/anxious  Objective:      /70 (BP Location: Right arm, Patient Position: Sitting)   Pulse 86   Temp 98 4 °F (36 9 °C)   Resp 18   Ht 5' 8" (1 727 m)   Wt 80 4 kg (177 lb 3 2 oz)   SpO2 99%   BMI 26 94 kg/m²        Physical Exam  Vitals and nursing note reviewed  Constitutional:       Appearance: Normal appearance  He is well-developed and normal weight  HENT:      Head: Normocephalic and atraumatic  Right Ear: Tympanic membrane, ear canal and external ear normal       Left Ear: Tympanic membrane, ear canal and external ear normal       Nose: Nose normal       Mouth/Throat:      Mouth: Mucous membranes are moist       Pharynx: Oropharynx is clear  Eyes:      General: No scleral icterus  Extraocular Movements: Extraocular movements intact  Conjunctiva/sclera: Conjunctivae normal       Pupils: Pupils are equal, round, and reactive to light  Neck:      Thyroid: No thyromegaly  Vascular: No JVD  Cardiovascular:      Rate and Rhythm: Normal rate and regular rhythm  Pulses: Normal pulses  Heart sounds: Normal heart sounds  No murmur heard  Pulmonary:      Effort: Pulmonary effort is normal       Breath sounds: Normal breath sounds  No wheezing or rales  Chest:      Chest wall: No tenderness  Abdominal:      General: Bowel sounds are normal  There is no distension  Palpations: Abdomen is soft  There is no mass  Tenderness: There is no abdominal tenderness  There is no guarding or rebound  Musculoskeletal:         General: No tenderness or deformity  Normal range of motion  Cervical back: Normal range of motion and neck supple  Lymphadenopathy:      Cervical: No cervical adenopathy  Skin:     General: Skin is warm and dry  Capillary Refill: Capillary refill takes less than 2 seconds  Findings: No erythema or rash  Neurological:      General: No focal deficit present  Mental Status: He is alert and oriented to person, place, and time  Mental status is at baseline  Cranial Nerves: No cranial nerve deficit  Deep Tendon Reflexes: Reflexes are normal and symmetric  Reflexes normal    Psychiatric:         Mood and Affect: Mood normal          Behavior: Behavior normal          Thought Content: Thought content normal          Judgment: Judgment normal           Data:    Laboratory Results: I have personally reviewed the pertinent laboratory results/reports   Radiology/Other Diagnostic Testing Results: I have personally reviewed pertinent reports         Lab Results   Component Value Date    WBC 7 58 12/27/2021    HGB 12 2 12/27/2021    HCT 39 0 12/27/2021    MCV 92 12/27/2021     12/27/2021     Lab Results   Component Value Date    K 4 0 12/27/2021    CL 99 12/27/2021    CO2 30 12/27/2021    BUN 17 12/27/2021    CREATININE 1 36 (H) 12/27/2021    GLUF 123 (H) 12/25/2021    CALCIUM 9 9 12/27/2021    CORRECTEDCA 10 2 (H) 08/04/2021    AST 16 12/24/2021    ALT 17 12/24/2021    ALKPHOS 109 (H) 12/24/2021    EGFR 53 12/27/2021     Lab Results Component Value Date    CHOLESTEROL 112 06/23/2021    CHOLESTEROL 120 02/10/2021    CHOLESTEROL 120 10/06/2020     Lab Results   Component Value Date    HDL 42 06/23/2021    HDL 49 02/10/2021    HDL 58 10/06/2020     Lab Results   Component Value Date    LDLCALC 26 06/23/2021    LDLCALC 51 02/10/2021    LDLCALC 39 10/06/2020     Lab Results   Component Value Date    TRIG 221 (H) 06/23/2021    TRIG 100 02/10/2021    TRIG 115 10/06/2020     No results found for: [place], [place]  Lab Results   Component Value Date    [id number] 3 700 06/23/2021     Lab Results   Component Value Date    HGBA1C 5 3 06/23/2021     Lab Results   Component Value Date    PSA 0 6 10/06/2020       Barbara Chavez DO

## 2022-01-18 NOTE — ASSESSMENT & PLAN NOTE
Post resection at [facility] patient doing well now no shortness of breath will be starting radiation therapy

## 2022-01-18 NOTE — ASSESSMENT & PLAN NOTE
Patient discontinued Crestor due to chemotherapy but now should resume this and follow-up with lipid profile in 4 months

## 2022-01-18 NOTE — ASSESSMENT & PLAN NOTE
Patient recently took colchicine twice daily for the last 2 weeks he will stop this now and go back on allopurinol for his gout    This treatment regimen should have resolved his pericardial effusion a patient has no symptoms at this time and followed up with Cardiology recently

## 2022-01-18 NOTE — ASSESSMENT & PLAN NOTE
Stable on current regimen watching diet reviewed all laboratory work    He just marte through lung cancer and is continuing with radiation therapy now

## 2022-01-19 ENCOUNTER — APPOINTMENT (OUTPATIENT)
Dept: RADIATION ONCOLOGY | Facility: CLINIC | Age: 67
End: 2022-01-19
Attending: RADIOLOGY
Payer: MEDICARE

## 2022-01-19 ENCOUNTER — OFFICE VISIT (OUTPATIENT)
Dept: HEMATOLOGY ONCOLOGY | Facility: CLINIC | Age: 67
End: 2022-01-19
Payer: MEDICARE

## 2022-01-19 VITALS
OXYGEN SATURATION: 98 % | HEART RATE: 87 BPM | BODY MASS INDEX: 26.52 KG/M2 | HEIGHT: 68 IN | WEIGHT: 175 LBS | SYSTOLIC BLOOD PRESSURE: 128 MMHG | DIASTOLIC BLOOD PRESSURE: 78 MMHG | TEMPERATURE: 97.8 F | RESPIRATION RATE: 18 BRPM

## 2022-01-19 DIAGNOSIS — C34.92 ADENOCARCINOMA, LUNG, LEFT (HCC): Primary | ICD-10-CM

## 2022-01-19 DIAGNOSIS — E06.4 DRUG-INDUCED THYROIDITIS: ICD-10-CM

## 2022-01-19 PROCEDURE — 77386 HB NTSTY MODUL RAD TX DLVR CPLX: CPT | Performed by: RADIOLOGY

## 2022-01-19 PROCEDURE — 99215 OFFICE O/P EST HI 40 MIN: CPT | Performed by: INTERNAL MEDICINE

## 2022-01-19 PROCEDURE — 77014 CHG CT GUIDANCE PLACEMENT RAD THERAPY FIELDS: CPT | Performed by: RADIOLOGY

## 2022-01-19 NOTE — PROGRESS NOTES
Education provided to pt and his wife regarding Tecentriq 1200 mg IV Q 3 wks for one year  Pt will start this at [facility] infusion as he is receiving daily Radiation there  Pt may prefer to come to  inf once Radiation is complete if he can have OV and infusion the same day  Chemo consent was signed

## 2022-01-19 NOTE — PROGRESS NOTES
Hematology/Oncology Outpatient Follow-up  Bridget Mcmanus 74 y o  male 1955 [id number]    Date:  1/19/2022        Assessment and Plan:  1  Adenocarcinoma, lung, left [facility])  The patient is status post left upper lobectomy and mediastinal lymph node dissection done at [facility] and November of 2021 after 4 cycles worth of neoadjuvant chemotherapy  He was educated about the recent data regarding the potential benefit of adjuvant immunotherapy with atezolizumab post chemotherapy for stage II and III resected non-small cell lung cancer which showed disease-free survival benefit specially with the pathology of PDL1 of 1% or more  The patient was educated about atezolizumab at the dose of 1200 mg on every 3 week basis for a total of a year as adjuvant treatment to decrease the chance of recurrence  The immunotherapy will be started hopefully within the next week or 2  He is currently on radiation treatment for total of 25 doses  The patient was educated extensively about the potential side effects of immunotherapy in the need to get blood work on every 3 week basis prior to each treatment  We did also discuss pursuing imaging on regular basis according to the usual guidelines  - CBC and differential; Future  - Comprehensive metabolic panel; Future  - Magnesium; Future  - TSH, 3rd generation with Free T4 reflex; Future    2  Drug-induced thyroiditis   Thyroid function will be checked regularly while on immunotherapy   - TSH, 3rd generation with Free T4 reflex; Future        HPI:    The patient came today for follow-up visit accompanied by his wife  He underwent a left upper lobectomy and mediastinal lymph node dissection on 11/17/2021 at [facility] after he completed 4 cycles worth of neoadjuvant chemotherapy with Alimta and carboplatin which was done by August 2021  His final pathology showed a ypT2a N0 M0 poorly differentiated non-small, large cell neuroendocrine tumor    This was an R0 resection but the margin near the mediastinal fat is close  In addition, in the left upper lobe he had a separate stage I squamous cell carcinoma  He was recently seen by the Radiation Oncology team who is pursuing radiation treatment to the mediastinum since he had close margin  This was also recommended by the thoracic surgical team at [facility]  The patient recovered nicely from his surgery  His most recent blood work on 12/27/2021 showed normal hemoglobin of 12 2 with normal white cells and platelets  Creatinine was 1 3 with normal calcium  He stated that he had to be admitted to the hospital around [place] due to severe constipation  Oncology History Overview Note     Patient with history of hypertension and hyperlipidemia   He also had extensive history of smoking for 40 years or more  Roderick Alejandre quit smoking  In 2017  The patient apparently had low-dose lung CT scans for screening since he was heavy smoker on 04/08/2021  The CT scan of fortunately revealed 6 2 cm mass in the paramediastinal left upper lobe with COPD features   Subsequently, he had a PET-CT scan on 05/04/2021 which showed:  IMPRESSION:  1   Lobulated left upper paramediastinal lung mass extending to the left suprahilar region measuring 5 2 x 6 5 x 6 cm demonstrates intense FDG activity, most concerning for malignancy   Adjacent interstitial thickening and groundglass densities may   represent tumor infiltration   Tissue sampling recommended  2   Mildly hypermetabolic AP window mediastinal nodes concerning for early metastases  3   Additional mildly hypermetabolic branching nodular density in the left upper posterior lung may be inflammatory/infectious versus additional site of tumor    4   No hypermetabolic metastases in the neck, abdomen, pelvis      A biopsy of the left lung mass was done on 05/06/2021 which showed:   Poorly differentiated non-small cell carcinoma, favor adenocarcinoma, with neuroendocrine differentiation of uncertain clinical significance      The patient then underwent a mediastinoscopy on 05/25/2021 for staging   Multiple mediastinal lymph node from different levels were excised all lymph nodes came back negative for metastatic disease    The patient was felt to be a surgical candidate and was sent to us to consider  Neoadjuvant chemotherapy       Adenocarcinoma, lung, left ([facility] [address] )     6/9/2021 Initial Diagnosis       Adenocarcinoma, lung, left [facility])        6/25/2021 -  Chemotherapy       cyanocobalamin, 1,000 mcg, Intramuscular, Once, 2 of 3 cycles  Administration: 1,000 mcg (8/6/2021), 1,000 mcg (6/25/2021)  pegfilgrastim (Juan Shana), 6 mg, Subcutaneous, Once, 3 of 5 cycles  Administration: 6 mg (7/16/2021), 6 mg (8/6/2021), 6 mg (8/27/2021)  fosaprepitant (EMEND) IVPB, 150 mg, Intravenous, Once, 4 of 6 cycles  Administration: 150 mg (6/25/2021), 150 mg (8/6/2021), 150 mg (8/27/2021), 150 mg (7/16/2021)  CARBOplatin (PARAPLATIN) IVPB (GO AUC DOSING), 496 mg, Intravenous, Once, 4 of 6 cycles  Administration: 496 mg (6/25/2021), 492 5 mg (8/6/2021), 462 mg (8/27/2021), 516 mg (7/16/2021)  pemetrexed (ALIMTA) chemo infusion, 975 mg, Intravenous, Once, 4 of 6 cycles  Administration: 1,000 mg (6/25/2021), 1,000 mg (8/6/2021), 1,000 mg (8/27/2021), 1,000 mg (7/16/2021)                  Oncology History   Adenocarcinoma, lung, left ([facility] [address] )   6/9/2021 Initial Diagnosis     Adenocarcinoma, lung, left ([facility])      6/25/2021 -  Chemotherapy     cyanocobalamin, 1,000 mcg, Intramuscular, Once, 2 of 3 cycles  Administration: 1,000 mcg (8/6/2021), 1,000 mcg (6/25/2021)  pegfilgrastim (Juan Shana), 6 mg, Subcutaneous, Once, 3 of 5 cycles  Administration: 6 mg (7/16/2021), 6 mg (8/6/2021), 6 mg (8/27/2021)  fosaprepitant (EMEND) IVPB, 150 mg, Intravenous, Once, 4 of 6 cycles  Administration: 150 mg (6/25/2021), 150 mg (8/6/2021), 150 mg (8/27/2021), 150 mg (7/16/2021)  CARBOplatin (PARAPLATIN) IVPB ([facility] [place] [facility] AUC DOSING), 496 mg, Intravenous, Once, 4 of 6 cycles  Administration: 496 mg (6/25/2021), 492 5 mg (8/6/2021), 462 mg (8/27/2021), 516 mg (7/16/2021)  pemetrexed (ALIMTA) chemo infusion, 975 mg, Intravenous, Once, 4 of 6 cycles  Administration: 1,000 mg (6/25/2021), 1,000 mg (8/6/2021), 1,000 mg (8/27/2021), 1,000 mg (7/16/2021)          Oncology History   Adenocarcinoma, lung, left ([facility] [address] )   5/6/2021 Biopsy    [facility]  FINAL DIAGNOSIS     Lung, Left Upper Lobe, core Biopsy ([id number], 5/6/2021):    - Poorly differentiated non-small cell carcinoma, favor adenocarcinoma, with neuroendocrine differentiation of uncertain clinical significance (see letter below)  5/25/2021 Biopsy    A  Lymph Node, 4R, Excision:  - Two reactive lymph nodes with anthracosis (0/2)  B  Lymph Node, 2R, Excision:  - Two reactive lymph nodes with anthracosis (0/2)  C  Lymph Node, 2L, Excision:  - Three reactive lymph nodes with anthracosis (0/3)  D  Lymph Node, 4L, Excision:  - Two reactive lymph nodes with anthracosis (0/2)  E  Lymph Node, Level 7, Excision:  - Two reactive lymph nodes with anthracosis (0/2)              6/9/2021 Initial Diagnosis    Adenocarcinoma, lung, left ([facility] [address] )     6/25/2021 - 8/27/2021 Chemotherapy    cyanocobalamin, 1,000 mcg, Intramuscular, Once, 2 of 3 cycles  Administration: 1,000 mcg (8/6/2021), 1,000 mcg (6/25/2021)  pegfilgrastim (Amaris Hail), 6 mg, Subcutaneous, Once, 3 of 5 cycles  Administration: 6 mg (7/16/2021), 6 mg (8/6/2021), 6 mg (8/27/2021)  fosaprepitant (EMEND) IVPB, 150 mg, Intravenous, Once, 4 of 6 cycles  Administration: 150 mg (6/25/2021), 150 mg (8/6/2021), 150 mg (8/27/2021), 150 mg (7/16/2021)  CARBOplatin (PARAPLATIN) IVPB ([facility] [place] [facility] AUC DOSING), 496 mg, Intravenous, Once, 4 of 6 cycles  Administration: 496 mg (6/25/2021), 492 5 mg (8/6/2021), 462 mg (8/27/2021), 516 mg (7/16/2021)  pemetrexed (ALIMTA) chemo infusion, 975 mg, Intravenous, Once, 4 of 6 cycles  Administration: 1,000 mg (6/25/2021), 1,000 mg (8/6/2021), 1,000 mg (8/27/2021), 1,000 mg (7/16/2021)     12/16/2021 Surgery    Left Upper Lung Lobectomy at [facility] by Dr Rojelio Gasca  Tumor size: 7 6 cm Percentage of viable tumor: 40%  Histologic Grade: Undifferentiated (G$)  Lymphovascular Invasion: Present (extensive)  Perineural Invasion: Not identified  Spread through Air Spaces: Present  Pleural Invasion: tumor invades to the visceral pleural surface  Tumor Extension: Hilar soft tissue  Bronchial Margin: no tumor seen  Vascular Margin: no tumor seen  Distance from Margin: distance of invasive tumor from closest margin: 0 1 cm  Closest margin: vascular margin  Neoadjuvant T staging: ypT2a  Neoadjuvant N staging: ypN0           1/26/2022 -  Chemotherapy    atezolizumab (TECENTRIQ) IVPB, 1,200 mg, Intravenous, Once, 0 of 6 cycles         Interval history:    ROS: Review of Systems   Constitutional: Positive for fatigue  Negative for chills and fever  HENT: Negative for ear pain and sore throat  Eyes: Negative for pain and visual disturbance  Respiratory: Positive for cough and shortness of breath  Cardiovascular: Negative for chest pain and palpitations  Gastrointestinal: Negative for abdominal pain and vomiting  Genitourinary: Negative for dysuria and hematuria  Musculoskeletal: Negative for arthralgias and back pain  Skin: Negative for color change and rash  Neurological: Positive for dizziness and numbness  Negative for seizures and syncope  All other systems reviewed and are negative        Past Medical History:   Diagnosis Date    Hyperlipidemia     Hypertension     Lung cancer [facility])        Past Surgical History:   Procedure Laterality Date    BACK SURGERY      HEMORRHOID SURGERY      IR BIOPSY LUNG  5/6/2021    IR PORT PLACEMENT  6/17/2021    LAMINECTOMY  2018    L4-L5    LUNG SURGERY      left upper lobectomy    MS [address] N/A 2021    Procedure: BRONCHOSCOPY FLEXIBLE;  Surgeon: Keny Alejo MD;  Location: [facility];  Service: Thoracic    [place] MEDIASTINOSCOPY WITH LYMPH NODE BIOPSY/IES N/A 2021    Procedure: MEDIASTINOSCOPY, flexible bronchoscopy;  Surgeon: Keny Alejo MD;  [facility]: [facility];  Service: Thoracic    TONSILLECTOMY         Social History     Socioeconomic History    Marital status: /Civil Union     Spouse name: None    Number of children: None    Years of education: None    Highest education level: None   Occupational History    None   Tobacco Use    Smoking status: Former Smoker     Packs/day: 1 00     Years: 40 00     Pack years: 40 00     Types: Cigarettes     Start date:      Quit date: 2017     Years since quittin 9    Smokeless tobacco: Never Used    Tobacco comment: quit 2017   Vaping Use    Vaping Use: Never used   Substance and Sexual Activity    Alcohol use: Yes     Alcohol/week: 7 0 standard drinks     Types: 7 Cans of beer per week    Drug use: Yes     Types: Marijuana     Comment: seldom    Sexual activity: None   Other Topics Concern    None   Social History Narrative    None     Social Determinants of Health     Financial Resource Strain: Not on file   Food Insecurity: No Food Insecurity    Worried About Running Out of Food in the Last Year: Never true    Felecia of Food in the Last Year: Never true   Transportation Needs: No Transportation Needs    Lack of Transportation (Medical): No    Lack of Transportation (Non-Medical):  No   Physical Activity: Not on file   Stress: Not on file   Social Connections: Not on file   Intimate Partner Violence: Not on file   Housing Stability: Low Risk     Unable to Pay for Housing in the Last Year: No    Number of Places Lived in the Last Year: 1    Unstable Housing in the Last Year: No       Family History   Problem Relation Age of Onset    Nephrolithiasis Father Allergies   Allergen Reactions    Bee Venom     Benazepril Other (See Comments)     Angioedema     Latex Other (See Comments)     Burning of eyes at the dentist from the gloves    Meloxicam GI Intolerance    Penicillin G Rash         Current Outpatient Medications:     allopurinol (ZYLOPRIM) 100 mg tablet, Take 1 tablet (100 mg total) by mouth daily, Disp: 30 tablet, Rfl: 5    amLODIPine (NORVASC) 10 mg tablet, TAKE ONE TABLET BY MOUTH EVERY DAY, Disp: 90 tablet, Rfl: 3    Ascorbic Acid (vitamin C) 1000 MG tablet, Take 1,000 mg by mouth daily, Disp: , Rfl:     B Complex Vitamins (B COMPLEX 1 PO), Take 1 tablet by mouth daily , Disp: , Rfl:     B Complex Vitamins (BL VITAMIN B COMPLEX PO), Take by mouth, Disp: , Rfl:     betamethasone valerate (VALISONE) 0 1 % cream, Apply topically 2 (two) times a day (Patient taking differently: Apply topically as needed ), Disp: 45 g, Rfl: 1    betamethasone, augmented, (DIPROLENE-AF) 0 05 % cream, , Disp: , Rfl:     calcipotriene (DOVONOX) 0 005 % ointment, Apply topically 2 (two) times a day As needed, Disp: , Rfl:     Cholecalciferol (VITAMIN D3 PO), Take by mouth daily , Disp: , Rfl:     citalopram (CeleXA) 10 mg tablet, TAKE ONE TABLET BY MOUTH EVERY DAY, Disp: 30 tablet, Rfl: 3    colchicine (COLCRYS) 0 6 mg tablet, Take 1 tablet (0 6 mg total) by mouth 2 (two) times a day (Patient taking differently: Take 0 6 mg by mouth 2 (two) times a day As needed), Disp: 30 tablet, Rfl: 5    doxepin (SINEquan) 25 mg capsule, TAKE ONE CAPSULE BY MOUTH EVERY DAY AT BEDTIME, Disp: 30 capsule, Rfl: 5    Garlic 10 MG CAPS, Take 1 tablet by mouth daily , Disp: , Rfl:     Glucosamine HCl 1500 MG TABS, Take by mouth, Disp: , Rfl:     Glucosamine-Chondroit-Vit C-Mn (GLUCOSAMINE 1500 COMPLEX) CAPS, daily , Disp: , Rfl:     ibuprofen (MOTRIN) 400 mg tablet, , Disp: , Rfl:     ketoconazole (NIZORAL) 2 % cream, Apply topically 2 (two) times a day (Patient taking differently: Apply topically as needed ), Disp: 15 g, Rfl: 2    MULTIPLE VITAMINS ESSENTIAL PO, Take by mouth, Disp: , Rfl:     Multiple Vitamins-Minerals (MULTIVITAL-M) TABS, Take by mouth daily , Disp: , Rfl:     pantoprazole (PROTONIX) 40 mg tablet, Take 1 tablet (40 mg total) by mouth daily, Disp: 30 tablet, Rfl: 6    polyethylene glycol (GLYCOLAX) 17 GM/SCOOP powder, Take 17 g by mouth 2 (two) times a day, Disp: 850 g, Rfl: 0    rosuvastatin (CRESTOR) 10 MG tablet, TAKE ONE TABLET BY MOUTH EVERY DAY, Disp: 30 tablet, Rfl: 5    traMADol (ULTRAM) 50 mg tablet, TAKE ONE TABLET BY MOUTH EVERY 8 HOURS AS NEEDED FOR SEVERE PAIN, Disp: 30 tablet, Rfl: 0    Cyanocobalamin (Vitamin B 12) 500 MCG TABS, Take 1 tablet by mouth (Patient not taking: Reported on 1/18/2022 ), Disp: , Rfl:     fluocinolone (SYNALAR) 0 01 % external solution, , Disp: , Rfl:     folic acid (FOLVITE) 1 mg tablet, Take 1 tablet (1 mg total) by mouth daily (Patient not taking: Reported on 1/12/2022 ), Disp: 30 tablet, Rfl: 6    gabapentin (NEURONTIN) 100 mg capsule, , Disp: , Rfl:     meclizine (ANTIVERT) 25 mg tablet, Take 1 tablet (25 mg total) by mouth 4 (four) times a day as needed for dizziness (Patient not taking: Reported on 1/12/2022 ), Disp: 30 tablet, Rfl: 0    neomycin-polymyxin-hydrocortisone (CORTISPORIN) otic solution, Administer 4 drops into the left ear every 6 (six) hours (Patient not taking: Reported on 1/12/2022 ), Disp: 10 mL, Rfl: 0    ondansetron (ZOFRAN) 4 mg tablet, Take 1 tablet (4 mg total) by mouth every 6 (six) hours (Patient not taking: Reported on 1/18/2022 ), Disp: 12 tablet, Rfl: 0    ondansetron (ZOFRAN) 8 mg tablet, Take 1 tablet (8 mg total) by mouth every 8 (eight) hours as needed for nausea or vomiting (Patient not taking: Reported on 1/12/2022 ), Disp: 20 tablet, Rfl: 3    oxyCODONE (ROXICODONE) 5 immediate release tablet, , Disp: , Rfl:     tamsulosin (FLOMAX) 0 4 mg, , Disp: , Rfl: Physical Exam:  /78 (BP Location: Right arm, Patient Position: Sitting, Cuff Size: Adult)   Pulse 87   Temp 97 8 °F (36 6 °C)   Resp 18   Ht 5' 8" (1 727 m)   Wt 79 4 kg (175 lb)   SpO2 98%   BMI 26 61 kg/m²     Physical Exam  Constitutional:       Appearance: He is well-developed  HENT:      Head: Normocephalic and atraumatic  Eyes:      General: No scleral icterus  Right eye: No discharge  Left eye: No discharge  Conjunctiva/sclera: Conjunctivae normal       Pupils: Pupils are equal, round, and reactive to light  Neck:      Thyroid: No thyromegaly  Trachea: No tracheal deviation  Cardiovascular:      Rate and Rhythm: Normal rate and regular rhythm  Heart sounds: Normal heart sounds  No murmur heard  No friction rub  Pulmonary:      Effort: Pulmonary effort is normal  No respiratory distress  Breath sounds: Normal breath sounds  No wheezing or rales  Chest:      Chest wall: No tenderness  Abdominal:      General: There is no distension  Palpations: Abdomen is soft  There is no hepatomegaly or splenomegaly  Tenderness: There is no abdominal tenderness  There is no guarding or rebound  Musculoskeletal:         General: No tenderness or deformity  Normal range of motion  Cervical back: Normal range of motion and neck supple  Lymphadenopathy:      Cervical: No cervical adenopathy  Skin:     General: Skin is warm and dry  Coloration: Skin is not pale  Findings: No erythema or rash  Neurological:      Mental Status: He is alert and oriented to person, place, and time  Cranial Nerves: No cranial nerve deficit  Coordination: Coordination normal       Deep Tendon Reflexes: Reflexes are normal and symmetric  Psychiatric:         Behavior: Behavior normal          Thought Content:  Thought content normal          Judgment: Judgment normal            Labs:  Lab Results   Component Value Date    WBC 7 58 12/27/2021 HGB 12 2 12/27/2021    HCT 39 0 12/27/2021    MCV 92 12/27/2021     12/27/2021     Lab Results   Component Value Date    K 4 0 12/27/2021    CL 99 12/27/2021    CO2 30 12/27/2021    BUN 17 12/27/2021    CREATININE 1 36 (H) 12/27/2021    GLUF 123 (H) 12/25/2021    CALCIUM 9 9 12/27/2021    CORRECTEDCA 10 2 (H) 08/04/2021    AST 16 12/24/2021    ALT 17 12/24/2021    ALKPHOS 109 (H) 12/24/2021    EGFR 53 12/27/2021     No results found for: TSH    Patient voiced understanding and agreement in the above discussion  Aware to contact our office with questions/symptoms in the interim

## 2022-01-20 ENCOUNTER — APPOINTMENT (OUTPATIENT)
Dept: RADIATION ONCOLOGY | Facility: CLINIC | Age: 67
End: 2022-01-20
Attending: RADIOLOGY
Payer: MEDICARE

## 2022-01-20 ENCOUNTER — APPOINTMENT (OUTPATIENT)
Dept: RADIATION ONCOLOGY | Facility: CLINIC | Age: 67
End: 2022-01-20
Payer: MEDICARE

## 2022-01-20 DIAGNOSIS — C34.92 ADENOCARCINOMA, LUNG, LEFT (HCC): Primary | ICD-10-CM

## 2022-01-20 PROCEDURE — 77014 CHG CT GUIDANCE PLACEMENT RAD THERAPY FIELDS: CPT | Performed by: RADIOLOGY

## 2022-01-20 PROCEDURE — 77386 HB NTSTY MODUL RAD TX DLVR CPLX: CPT | Performed by: RADIOLOGY

## 2022-01-20 RX ORDER — SODIUM CHLORIDE 9 MG/ML
20 INJECTION, SOLUTION INTRAVENOUS ONCE
Status: CANCELLED | OUTPATIENT
Start: 2022-01-27

## 2022-01-21 ENCOUNTER — TELEPHONE (OUTPATIENT)
Dept: HEMATOLOGY ONCOLOGY | Facility: CLINIC | Age: 67
End: 2022-01-21

## 2022-01-21 ENCOUNTER — APPOINTMENT (OUTPATIENT)
Dept: RADIATION ONCOLOGY | Facility: CLINIC | Age: 67
End: 2022-01-21
Attending: RADIOLOGY
Payer: MEDICARE

## 2022-01-21 PROCEDURE — 77014 CHG CT GUIDANCE PLACEMENT RAD THERAPY FIELDS: CPT | Performed by: RADIOLOGY

## 2022-01-21 PROCEDURE — 77386 HB NTSTY MODUL RAD TX DLVR CPLX: CPT | Performed by: RADIOLOGY

## 2022-01-21 NOTE — TELEPHONE ENCOUNTER
Phoned pts wife as she left me a voice message regarding pts appts  She was confused as pt was not seeing Dr Chico Yadav before his first Tecentriq cycle on 1/27/ I explained it was not necessary as Dr Andria Mercado had just seen him yesterday  Pt is to have labs on Mon  Pt will have his Tecentriq infusions at [facility] infusion while he is having daily radiation there  Once he has completed radiation then the plan is to have pt receive his Tecentriq on the same day as OV in [place] as Tecentriq is just a thirty min infusion and pt will have labs drawn the day before treatment  Wife verblaized understanding

## 2022-01-24 ENCOUNTER — APPOINTMENT (OUTPATIENT)
Dept: RADIATION ONCOLOGY | Facility: CLINIC | Age: 67
End: 2022-01-24
Attending: RADIOLOGY
Payer: MEDICARE

## 2022-01-24 PROCEDURE — 77386 HB NTSTY MODUL RAD TX DLVR CPLX: CPT | Performed by: RADIOLOGY

## 2022-01-24 PROCEDURE — 77336 RADIATION PHYSICS CONSULT: CPT | Performed by: RADIOLOGY

## 2022-01-25 ENCOUNTER — APPOINTMENT (OUTPATIENT)
Dept: LAB | Facility: CLINIC | Age: 67
End: 2022-01-25
Payer: MEDICARE

## 2022-01-25 ENCOUNTER — APPOINTMENT (OUTPATIENT)
Dept: RADIATION ONCOLOGY | Facility: CLINIC | Age: 67
End: 2022-01-25
Attending: RADIOLOGY
Payer: MEDICARE

## 2022-01-25 DIAGNOSIS — I31.3 PERICARDIAL EFFUSION: ICD-10-CM

## 2022-01-25 DIAGNOSIS — Z12.12 SCREENING FOR COLORECTAL CANCER: ICD-10-CM

## 2022-01-25 DIAGNOSIS — F33.0 MILD EPISODE OF RECURRENT MAJOR DEPRESSIVE DISORDER (HCC): ICD-10-CM

## 2022-01-25 DIAGNOSIS — I10 ESSENTIAL HYPERTENSION: ICD-10-CM

## 2022-01-25 DIAGNOSIS — Z23 ENCOUNTER FOR IMMUNIZATION: ICD-10-CM

## 2022-01-25 DIAGNOSIS — E78.2 MIXED HYPERLIPIDEMIA: ICD-10-CM

## 2022-01-25 DIAGNOSIS — C34.92 ADENOCARCINOMA, LUNG, LEFT (HCC): ICD-10-CM

## 2022-01-25 DIAGNOSIS — N18.31 STAGE 3A CHRONIC KIDNEY DISEASE (HCC): ICD-10-CM

## 2022-01-25 DIAGNOSIS — Z12.11 SCREENING FOR COLORECTAL CANCER: ICD-10-CM

## 2022-01-25 LAB
ALBUMIN SERPL BCP-MCNC: 3.9 G/DL (ref 3.5–5)
ALP SERPL-CCNC: 106 U/L (ref 46–116)
ALT SERPL W P-5'-P-CCNC: 19 U/L (ref 12–78)
ANION GAP SERPL CALCULATED.3IONS-SCNC: 5 MMOL/L (ref 4–13)
ANISOCYTOSIS BLD QL SMEAR: PRESENT
AST SERPL W P-5'-P-CCNC: 19 U/L (ref 5–45)
BASOPHILS # BLD MANUAL: 0 THOUSAND/UL (ref 0–0.1)
BASOPHILS NFR MAR MANUAL: 0 % (ref 0–1)
BILIRUB SERPL-MCNC: 0.64 MG/DL (ref 0.2–1)
BUN SERPL-MCNC: 15 MG/DL (ref 5–25)
CALCIUM SERPL-MCNC: 10.8 MG/DL (ref 8.3–10.1)
CHLORIDE SERPL-SCNC: 105 MMOL/L (ref 100–108)
CHOLEST SERPL-MCNC: 158 MG/DL
CO2 SERPL-SCNC: 27 MMOL/L (ref 21–32)
CREAT SERPL-MCNC: 1.14 MG/DL (ref 0.6–1.3)
EOSINOPHIL # BLD MANUAL: 0.18 THOUSAND/UL (ref 0–0.4)
EOSINOPHIL NFR BLD MANUAL: 3 % (ref 0–6)
ERYTHROCYTE [DISTWIDTH] IN BLOOD BY AUTOMATED COUNT: 17.4 % (ref 11.6–15.1)
GFR SERPL CREATININE-BSD FRML MDRD: 65 ML/MIN/1.73SQ M
GLUCOSE P FAST SERPL-MCNC: 95 MG/DL (ref 65–99)
HCT VFR BLD AUTO: 39.6 % (ref 36.5–49.3)
HDLC SERPL-MCNC: 35 MG/DL
HGB BLD-MCNC: 12.9 G/DL (ref 12–17)
LDLC SERPL CALC-MCNC: 97 MG/DL (ref 0–100)
LYMPHOCYTES # BLD AUTO: 0.82 THOUSAND/UL (ref 0.6–4.47)
LYMPHOCYTES # BLD AUTO: 14 % (ref 14–44)
MAGNESIUM SERPL-MCNC: 2.1 MG/DL (ref 1.6–2.6)
MCH RBC QN AUTO: 29.4 PG (ref 26.8–34.3)
MCHC RBC AUTO-ENTMCNC: 32.6 G/DL (ref 31.4–37.4)
MCV RBC AUTO: 90 FL (ref 82–98)
MONOCYTES # BLD AUTO: 0.23 THOUSAND/UL (ref 0–1.22)
MONOCYTES NFR BLD: 4 % (ref 4–12)
NEUTROPHILS # BLD MANUAL: 4.5 THOUSAND/UL (ref 1.85–7.62)
NEUTS SEG NFR BLD AUTO: 77 % (ref 43–75)
NONHDLC SERPL-MCNC: 123 MG/DL
OVALOCYTES BLD QL SMEAR: PRESENT
PLATELET # BLD AUTO: 165 THOUSANDS/UL (ref 149–390)
PLATELET BLD QL SMEAR: ADEQUATE
PLATELET CLUMP BLD QL SMEAR: PRESENT
PMV BLD AUTO: 10.9 FL (ref 8.9–12.7)
POLYCHROMASIA BLD QL SMEAR: PRESENT
POTASSIUM SERPL-SCNC: 4 MMOL/L (ref 3.5–5.3)
PROT SERPL-MCNC: 8 G/DL (ref 6.4–8.2)
RBC # BLD AUTO: 4.39 MILLION/UL (ref 3.88–5.62)
SODIUM SERPL-SCNC: 137 MMOL/L (ref 136–145)
T3FREE SERPL-MCNC: 2.1 PG/ML (ref 2.3–4.2)
T4 FREE SERPL-MCNC: 0.78 NG/DL (ref 0.76–1.46)
TRIGL SERPL-MCNC: 130 MG/DL
TSH SERPL DL<=0.05 MIU/L-ACNC: 9.1 UIU/ML (ref 0.36–3.74)
VARIANT LYMPHS # BLD AUTO: 2 %
WBC # BLD AUTO: 5.85 THOUSAND/UL (ref 4.31–10.16)

## 2022-01-25 PROCEDURE — 77427 RADIATION TX MANAGEMENT X5: CPT | Performed by: RADIOLOGY

## 2022-01-25 PROCEDURE — 80061 LIPID PANEL: CPT

## 2022-01-25 PROCEDURE — 84443 ASSAY THYROID STIM HORMONE: CPT

## 2022-01-25 PROCEDURE — 83036 HEMOGLOBIN GLYCOSYLATED A1C: CPT

## 2022-01-25 PROCEDURE — 85007 BL SMEAR W/DIFF WBC COUNT: CPT

## 2022-01-25 PROCEDURE — 84439 ASSAY OF FREE THYROXINE: CPT

## 2022-01-25 PROCEDURE — 84481 FREE ASSAY (FT-3): CPT

## 2022-01-25 PROCEDURE — 85027 COMPLETE CBC AUTOMATED: CPT

## 2022-01-25 PROCEDURE — 83735 ASSAY OF MAGNESIUM: CPT

## 2022-01-25 PROCEDURE — 80053 COMPREHEN METABOLIC PANEL: CPT

## 2022-01-25 PROCEDURE — 77386 HB NTSTY MODUL RAD TX DLVR CPLX: CPT | Performed by: RADIOLOGY

## 2022-01-25 PROCEDURE — 77014 CHG CT GUIDANCE PLACEMENT RAD THERAPY FIELDS: CPT | Performed by: RADIOLOGY

## 2022-01-25 PROCEDURE — 36415 COLL VENOUS BLD VENIPUNCTURE: CPT

## 2022-01-26 ENCOUNTER — APPOINTMENT (OUTPATIENT)
Dept: RADIATION ONCOLOGY | Facility: CLINIC | Age: 67
End: 2022-01-26
Attending: RADIOLOGY
Payer: MEDICARE

## 2022-01-26 LAB
EST. AVERAGE GLUCOSE BLD GHB EST-MCNC: 108 MG/DL
HBA1C MFR BLD: 5.4 %

## 2022-01-26 PROCEDURE — 77386 HB NTSTY MODUL RAD TX DLVR CPLX: CPT | Performed by: RADIOLOGY

## 2022-01-26 PROCEDURE — 77014 CHG CT GUIDANCE PLACEMENT RAD THERAPY FIELDS: CPT | Performed by: RADIOLOGY

## 2022-01-27 ENCOUNTER — APPOINTMENT (OUTPATIENT)
Dept: RADIATION ONCOLOGY | Facility: CLINIC | Age: 67
End: 2022-01-27
Attending: RADIOLOGY
Payer: MEDICARE

## 2022-01-27 ENCOUNTER — HOSPITAL ENCOUNTER (OUTPATIENT)
Dept: INFUSION CENTER | Facility: CLINIC | Age: 67
Discharge: HOME | End: 2022-01-27
Payer: MEDICARE

## 2022-01-27 ENCOUNTER — APPOINTMENT (OUTPATIENT)
Dept: RADIATION ONCOLOGY | Facility: CLINIC | Age: 67
End: 2022-01-27
Payer: MEDICARE

## 2022-01-27 VITALS
DIASTOLIC BLOOD PRESSURE: 76 MMHG | RESPIRATION RATE: 17 BRPM | SYSTOLIC BLOOD PRESSURE: 143 MMHG | HEART RATE: 80 BPM | TEMPERATURE: 98.9 F

## 2022-01-27 DIAGNOSIS — C34.92 ADENOCARCINOMA, LUNG, LEFT (HCC): Primary | ICD-10-CM

## 2022-01-27 PROCEDURE — 77386 HB NTSTY MODUL RAD TX DLVR CPLX: CPT | Performed by: RADIOLOGY

## 2022-01-27 PROCEDURE — 77014 CHG CT GUIDANCE PLACEMENT RAD THERAPY FIELDS: CPT | Performed by: RADIOLOGY

## 2022-01-27 PROCEDURE — 96413 CHEMO IV INFUSION 1 HR: CPT

## 2022-01-27 RX ORDER — SODIUM CHLORIDE 9 MG/ML
20 INJECTION, SOLUTION INTRAVENOUS ONCE
Status: COMPLETED | OUTPATIENT
Start: 2022-01-27 | End: 2022-01-27

## 2022-01-27 RX ADMIN — SODIUM CHLORIDE 20 ML/HR: 0.9 INJECTION, SOLUTION INTRAVENOUS at 12:39

## 2022-01-27 RX ADMIN — ATEZOLIZUMAB 1200 MG: 1200 INJECTION, SOLUTION INTRAVENOUS at 12:39

## 2022-01-27 NOTE — PROGRESS NOTES
Patient presents for Tecentriq offering no complaints, patient tolerated treatment without incident  Clarified with Gray Joyner RN that we are OK to treat with TSH of 9 1  AVS declined  Next appointment reviewed

## 2022-01-28 ENCOUNTER — APPOINTMENT (OUTPATIENT)
Dept: RADIATION ONCOLOGY | Facility: CLINIC | Age: 67
End: 2022-01-28
Attending: RADIOLOGY
Payer: MEDICARE

## 2022-01-28 PROCEDURE — 77014 CHG CT GUIDANCE PLACEMENT RAD THERAPY FIELDS: CPT | Performed by: RADIOLOGY

## 2022-01-28 PROCEDURE — 77386 HB NTSTY MODUL RAD TX DLVR CPLX: CPT | Performed by: RADIOLOGY

## 2022-01-31 ENCOUNTER — APPOINTMENT (OUTPATIENT)
Dept: RADIATION ONCOLOGY | Facility: CLINIC | Age: 67
End: 2022-01-31
Attending: RADIOLOGY
Payer: MEDICARE

## 2022-01-31 PROCEDURE — 77014 CHG CT GUIDANCE PLACEMENT RAD THERAPY FIELDS: CPT | Performed by: RADIOLOGY

## 2022-01-31 PROCEDURE — 77336 RADIATION PHYSICS CONSULT: CPT | Performed by: RADIOLOGY

## 2022-01-31 PROCEDURE — 77386 HB NTSTY MODUL RAD TX DLVR CPLX: CPT | Performed by: RADIOLOGY

## 2022-02-01 ENCOUNTER — APPOINTMENT (OUTPATIENT)
Dept: RADIATION ONCOLOGY | Facility: CLINIC | Age: 67
End: 2022-02-01
Attending: RADIOLOGY
Payer: MEDICARE

## 2022-02-01 PROCEDURE — 77014 CHG CT GUIDANCE PLACEMENT RAD THERAPY FIELDS: CPT | Performed by: RADIOLOGY

## 2022-02-01 PROCEDURE — 77386 HB NTSTY MODUL RAD TX DLVR CPLX: CPT | Performed by: RADIOLOGY

## 2022-02-01 PROCEDURE — 77427 RADIATION TX MANAGEMENT X5: CPT | Performed by: RADIOLOGY

## 2022-02-02 ENCOUNTER — APPOINTMENT (OUTPATIENT)
Dept: RADIATION ONCOLOGY | Facility: CLINIC | Age: 67
End: 2022-02-02
Attending: RADIOLOGY
Payer: MEDICARE

## 2022-02-02 PROCEDURE — 77386 HB NTSTY MODUL RAD TX DLVR CPLX: CPT | Performed by: RADIOLOGY

## 2022-02-02 PROCEDURE — 77014 CHG CT GUIDANCE PLACEMENT RAD THERAPY FIELDS: CPT | Performed by: RADIOLOGY

## 2022-02-03 ENCOUNTER — APPOINTMENT (OUTPATIENT)
Dept: RADIATION ONCOLOGY | Facility: CLINIC | Age: 67
End: 2022-02-03
Payer: MEDICARE

## 2022-02-03 ENCOUNTER — APPOINTMENT (OUTPATIENT)
Dept: RADIATION ONCOLOGY | Facility: CLINIC | Age: 67
End: 2022-02-03
Attending: RADIOLOGY
Payer: MEDICARE

## 2022-02-03 PROCEDURE — 77014 CHG CT GUIDANCE PLACEMENT RAD THERAPY FIELDS: CPT | Performed by: RADIOLOGY

## 2022-02-03 PROCEDURE — 77386 HB NTSTY MODUL RAD TX DLVR CPLX: CPT | Performed by: RADIOLOGY

## 2022-02-04 ENCOUNTER — APPOINTMENT (OUTPATIENT)
Dept: RADIATION ONCOLOGY | Facility: CLINIC | Age: 67
End: 2022-02-04
Attending: RADIOLOGY
Payer: MEDICARE

## 2022-02-07 ENCOUNTER — APPOINTMENT (OUTPATIENT)
Dept: RADIATION ONCOLOGY | Facility: CLINIC | Age: 67
End: 2022-02-07
Attending: RADIOLOGY
Payer: MEDICARE

## 2022-02-07 DIAGNOSIS — E78.2 MIXED HYPERLIPIDEMIA: ICD-10-CM

## 2022-02-07 PROCEDURE — 77014 CHG CT GUIDANCE PLACEMENT RAD THERAPY FIELDS: CPT | Performed by: RADIOLOGY

## 2022-02-07 PROCEDURE — 77386 HB NTSTY MODUL RAD TX DLVR CPLX: CPT | Performed by: RADIOLOGY

## 2022-02-08 ENCOUNTER — APPOINTMENT (OUTPATIENT)
Dept: RADIATION ONCOLOGY | Facility: CLINIC | Age: 67
End: 2022-02-08
Attending: RADIOLOGY
Payer: MEDICARE

## 2022-02-08 PROCEDURE — 77336 RADIATION PHYSICS CONSULT: CPT | Performed by: RADIOLOGY

## 2022-02-08 PROCEDURE — 77386 HB NTSTY MODUL RAD TX DLVR CPLX: CPT | Performed by: RADIOLOGY

## 2022-02-08 PROCEDURE — 77014 CHG CT GUIDANCE PLACEMENT RAD THERAPY FIELDS: CPT | Performed by: RADIOLOGY

## 2022-02-08 RX ORDER — ROSUVASTATIN CALCIUM 10 MG/1
TABLET, COATED ORAL
Qty: 30 TABLET | Refills: 5 | Status: ON HOLD | OUTPATIENT
Start: 2022-02-08 | End: 2022-08-01

## 2022-02-09 ENCOUNTER — HOSPITAL ENCOUNTER (OUTPATIENT)
Dept: INFUSION CENTER | Facility: HOSPITAL | Age: 67
Discharge: HOME | End: 2022-02-09

## 2022-02-09 ENCOUNTER — APPOINTMENT (OUTPATIENT)
Dept: RADIATION ONCOLOGY | Facility: CLINIC | Age: 67
End: 2022-02-09
Attending: RADIOLOGY
Payer: MEDICARE

## 2022-02-09 ENCOUNTER — APPOINTMENT (OUTPATIENT)
Dept: LAB | Facility: CLINIC | Age: 67
End: 2022-02-09
Payer: MEDICARE

## 2022-02-09 PROCEDURE — 77427 RADIATION TX MANAGEMENT X5: CPT | Performed by: RADIOLOGY

## 2022-02-09 PROCEDURE — 77014 CHG CT GUIDANCE PLACEMENT RAD THERAPY FIELDS: CPT | Performed by: RADIOLOGY

## 2022-02-09 PROCEDURE — 77386 HB NTSTY MODUL RAD TX DLVR CPLX: CPT | Performed by: RADIOLOGY

## 2022-02-10 ENCOUNTER — APPOINTMENT (OUTPATIENT)
Dept: RADIATION ONCOLOGY | Facility: CLINIC | Age: 67
End: 2022-02-10
Attending: RADIOLOGY
Payer: MEDICARE

## 2022-02-10 ENCOUNTER — APPOINTMENT (OUTPATIENT)
Dept: RADIATION ONCOLOGY | Facility: CLINIC | Age: 67
End: 2022-02-10
Payer: MEDICARE

## 2022-02-10 DIAGNOSIS — C34.12 CANCER OF UPPER LOBE OF LEFT LUNG (HCC): Primary | ICD-10-CM

## 2022-02-10 DIAGNOSIS — C34.92 ADENOCARCINOMA, LUNG, LEFT (HCC): Primary | ICD-10-CM

## 2022-02-10 PROCEDURE — 77386 HB NTSTY MODUL RAD TX DLVR CPLX: CPT | Performed by: RADIOLOGY

## 2022-02-10 PROCEDURE — 77014 CHG CT GUIDANCE PLACEMENT RAD THERAPY FIELDS: CPT | Performed by: RADIOLOGY

## 2022-02-10 RX ORDER — SODIUM CHLORIDE 9 MG/ML
20 INJECTION, SOLUTION INTRAVENOUS ONCE
Status: CANCELLED | OUTPATIENT
Start: 2022-02-16

## 2022-02-11 ENCOUNTER — APPOINTMENT (OUTPATIENT)
Dept: RADIATION ONCOLOGY | Facility: CLINIC | Age: 67
End: 2022-02-11
Attending: RADIOLOGY
Payer: MEDICARE

## 2022-02-11 DIAGNOSIS — C34.92 ADENOCARCINOMA, LUNG, LEFT (HCC): ICD-10-CM

## 2022-02-11 DIAGNOSIS — Z51.11 ENCOUNTER FOR CHEMOTHERAPY MANAGEMENT: ICD-10-CM

## 2022-02-11 PROCEDURE — 77014 CHG CT GUIDANCE PLACEMENT RAD THERAPY FIELDS: CPT | Performed by: RADIOLOGY

## 2022-02-11 PROCEDURE — 77386 HB NTSTY MODUL RAD TX DLVR CPLX: CPT | Performed by: RADIOLOGY

## 2022-02-14 ENCOUNTER — APPOINTMENT (OUTPATIENT)
Dept: LAB | Facility: CLINIC | Age: 67
End: 2022-02-14
Payer: MEDICARE

## 2022-02-14 ENCOUNTER — APPOINTMENT (OUTPATIENT)
Dept: RADIATION ONCOLOGY | Facility: CLINIC | Age: 67
End: 2022-02-14
Attending: RADIOLOGY
Payer: MEDICARE

## 2022-02-14 DIAGNOSIS — C34.12 CANCER OF UPPER LOBE OF LEFT LUNG (HCC): ICD-10-CM

## 2022-02-14 LAB
ALBUMIN SERPL BCP-MCNC: 3.8 G/DL (ref 3.5–5)
ALP SERPL-CCNC: 93 U/L (ref 46–116)
ALT SERPL W P-5'-P-CCNC: 25 U/L (ref 12–78)
ANION GAP SERPL CALCULATED.3IONS-SCNC: 4 MMOL/L (ref 4–13)
AST SERPL W P-5'-P-CCNC: 21 U/L (ref 5–45)
BASOPHILS # BLD AUTO: 0.03 THOUSANDS/ΜL (ref 0–0.1)
BASOPHILS NFR BLD AUTO: 1 % (ref 0–1)
BILIRUB SERPL-MCNC: 0.35 MG/DL (ref 0.2–1)
BUN SERPL-MCNC: 14 MG/DL (ref 5–25)
CALCIUM SERPL-MCNC: 9.7 MG/DL (ref 8.3–10.1)
CHLORIDE SERPL-SCNC: 105 MMOL/L (ref 100–108)
CO2 SERPL-SCNC: 29 MMOL/L (ref 21–32)
CREAT SERPL-MCNC: 1.09 MG/DL (ref 0.6–1.3)
EOSINOPHIL # BLD AUTO: 0.11 THOUSAND/ΜL (ref 0–0.61)
EOSINOPHIL NFR BLD AUTO: 4 % (ref 0–6)
ERYTHROCYTE [DISTWIDTH] IN BLOOD BY AUTOMATED COUNT: 18.5 % (ref 11.6–15.1)
GFR SERPL CREATININE-BSD FRML MDRD: 69 ML/MIN/1.73SQ M
GLUCOSE P FAST SERPL-MCNC: 100 MG/DL (ref 65–99)
HCT VFR BLD AUTO: 37.9 % (ref 36.5–49.3)
HGB BLD-MCNC: 12.1 G/DL (ref 12–17)
IMM GRANULOCYTES # BLD AUTO: 0.01 THOUSAND/UL (ref 0–0.2)
IMM GRANULOCYTES NFR BLD AUTO: 0 % (ref 0–2)
LYMPHOCYTES # BLD AUTO: 0.64 THOUSANDS/ΜL (ref 0.6–4.47)
LYMPHOCYTES NFR BLD AUTO: 20 % (ref 14–44)
MCH RBC QN AUTO: 29.5 PG (ref 26.8–34.3)
MCHC RBC AUTO-ENTMCNC: 31.9 G/DL (ref 31.4–37.4)
MCV RBC AUTO: 92 FL (ref 82–98)
MONOCYTES # BLD AUTO: 0.33 THOUSAND/ΜL (ref 0.17–1.22)
MONOCYTES NFR BLD AUTO: 11 % (ref 4–12)
NEUTROPHILS # BLD AUTO: 2.02 THOUSANDS/ΜL (ref 1.85–7.62)
NEUTS SEG NFR BLD AUTO: 64 % (ref 43–75)
NRBC BLD AUTO-RTO: 0 /100 WBCS
PLATELET # BLD AUTO: 93 THOUSANDS/UL (ref 149–390)
PMV BLD AUTO: 11.7 FL (ref 8.9–12.7)
POTASSIUM SERPL-SCNC: 4.2 MMOL/L (ref 3.5–5.3)
PROT SERPL-MCNC: 7.9 G/DL (ref 6.4–8.2)
RBC # BLD AUTO: 4.1 MILLION/UL (ref 3.88–5.62)
SODIUM SERPL-SCNC: 138 MMOL/L (ref 136–145)
WBC # BLD AUTO: 3.14 THOUSAND/UL (ref 4.31–10.16)

## 2022-02-14 PROCEDURE — 80053 COMPREHEN METABOLIC PANEL: CPT

## 2022-02-14 PROCEDURE — 77386 HB NTSTY MODUL RAD TX DLVR CPLX: CPT | Performed by: RADIOLOGY

## 2022-02-14 PROCEDURE — 36415 COLL VENOUS BLD VENIPUNCTURE: CPT

## 2022-02-14 PROCEDURE — 77014 CHG CT GUIDANCE PLACEMENT RAD THERAPY FIELDS: CPT | Performed by: RADIOLOGY

## 2022-02-14 PROCEDURE — 85025 COMPLETE CBC W/AUTO DIFF WBC: CPT

## 2022-02-14 RX ORDER — PANTOPRAZOLE SODIUM 40 MG/1
40 TABLET, DELAYED RELEASE ORAL DAILY
Qty: 30 TABLET | Refills: 11 | Status: ON HOLD | OUTPATIENT
Start: 2022-02-14

## 2022-02-15 ENCOUNTER — OFFICE VISIT (OUTPATIENT)
Dept: HEMATOLOGY ONCOLOGY | Facility: CLINIC | Age: 67
End: 2022-02-15
Payer: MEDICARE

## 2022-02-15 ENCOUNTER — APPOINTMENT (OUTPATIENT)
Dept: RADIATION ONCOLOGY | Facility: CLINIC | Age: 67
End: 2022-02-15
Attending: RADIOLOGY
Payer: MEDICARE

## 2022-02-15 VITALS
HEIGHT: 68 IN | DIASTOLIC BLOOD PRESSURE: 86 MMHG | WEIGHT: 179.8 LBS | HEART RATE: 87 BPM | SYSTOLIC BLOOD PRESSURE: 118 MMHG | BODY MASS INDEX: 27.25 KG/M2 | OXYGEN SATURATION: 99 % | TEMPERATURE: 97.3 F | RESPIRATION RATE: 18 BRPM

## 2022-02-15 DIAGNOSIS — L40.9 PSORIASIS: ICD-10-CM

## 2022-02-15 DIAGNOSIS — E06.4 DRUG-INDUCED THYROIDITIS: ICD-10-CM

## 2022-02-15 DIAGNOSIS — C34.92 ADENOCARCINOMA, LUNG, LEFT (HCC): Primary | ICD-10-CM

## 2022-02-15 DIAGNOSIS — D69.6 PLATELETS DECREASED (HCC): ICD-10-CM

## 2022-02-15 PROCEDURE — 99214 OFFICE O/P EST MOD 30 MIN: CPT | Performed by: INTERNAL MEDICINE

## 2022-02-15 PROCEDURE — 77014 CHG CT GUIDANCE PLACEMENT RAD THERAPY FIELDS: CPT | Performed by: RADIOLOGY

## 2022-02-15 PROCEDURE — 77336 RADIATION PHYSICS CONSULT: CPT | Performed by: RADIOLOGY

## 2022-02-15 PROCEDURE — 77386 HB NTSTY MODUL RAD TX DLVR CPLX: CPT | Performed by: RADIOLOGY

## 2022-02-15 NOTE — PROGRESS NOTES
Hematology/Oncology Outpatient Follow-up  Eder Sathish 74 y o  male 1955 [id number]    Date:  2/15/2022        Assessment and Plan:  1  Adenocarcinoma, lung, left ([facility] [address] )  The patient will be continued on the Tecentriq on every 3 week basis as adjuvant immunotherapy  He will be receiving cycle 2 tomorrow which will be repeated on every 3 week basis  We did discuss the potential side effects of immunotherapy in general   The patient already seems to have psoriasis skin rash flare related to the immunotherapy  - CBC and differential; Future  - Comprehensive metabolic panel; Future  - Magnesium; Future  - TSH, 3rd generation with Free T4 reflex; Future    2  Drug-induced thyroiditis  TSH went up after the 1st treatment of immunotherapy  This may be an indication of immunotherapy related thyroiditis  We will continue to monitor while on immunotherapy   - TSH, 3rd generation with Free T4 reflex; Future    3  Platelets decreased ([facility] [address] )    4  Psoriasis  I did ask the patient to follow-up with his dermatologist regarding the current immunotherapy related flare of his psoriasis  I did also ask him to try to avoid systemic steroids unless it is absolutely necessary while on immunotherapy  HPI:  Patient came today for follow-up visit accompanied by his wife  He tolerated the 1st cycle of adjuvant immunotherapy relatively well  However he started to notice significant skin rash over his body resembling the known psoriasis skin rash she had prior to initiate chemotherapy  His blood work from yesterday showed a drop of the white cell count down to 3 1 with the drop off the platelet 095  Hemoglobin 12 1  ANC 2 0  Creatinine 1 0 with normal calcium liver enzymes  Oncology History Overview Note     Patient with history of hypertension and hyperlipidemia   He also had extensive history of smoking for 40 years or more  Christopher Coyle quit smoking  In 2017    The patient apparently had low-dose lung CT scans for screening since he was heavy smoker on 04/08/2021  The CT scan of fortunately revealed 6 2 cm mass in the paramediastinal left upper lobe with COPD features   Subsequently, he had a PET-CT scan on 05/04/2021 which showed:  IMPRESSION:  1   Lobulated left upper paramediastinal lung mass extending to the left suprahilar region measuring 5 2 x 6 5 x 6 cm demonstrates intense FDG activity, most concerning for malignancy   Adjacent interstitial thickening and groundglass densities may   represent tumor infiltration   Tissue sampling recommended  2   Mildly hypermetabolic AP window mediastinal nodes concerning for early metastases  3   Additional mildly hypermetabolic branching nodular density in the left upper posterior lung may be inflammatory/infectious versus additional site of tumor  4   No hypermetabolic metastases in the neck, abdomen, pelvis      A biopsy of the left lung mass was done on 05/06/2021 which showed:   Poorly differentiated non-small cell carcinoma, favor adenocarcinoma, with neuroendocrine differentiation of uncertain clinical significance      The patient then underwent a mediastinoscopy on 05/25/2021 for staging   Multiple mediastinal lymph node from different levels were excised all lymph nodes came back negative for metastatic disease  The patient was felt to be a surgical candidate and was sent to us to consider  Neoadjuvant chemotherapy           Oncology History   Adenocarcinoma, lung, left ([address] )   5/6/2021 Biopsy    [facility]  FINAL DIAGNOSIS     Lung, Left Upper Lobe, core Biopsy ([id number], 5/6/2021):    - Poorly differentiated non-small cell carcinoma, favor adenocarcinoma, with neuroendocrine differentiation of uncertain clinical significance (see letter below)  5/25/2021 Biopsy    A  Lymph Node, 4R, Excision:  - Two reactive lymph nodes with anthracosis (0/2)  B  Lymph Node, 2R, Excision:  - Two reactive lymph nodes with anthracosis (0/2)        C  Lymph Node, 2L, Excision:  - Three reactive lymph nodes with anthracosis (0/3)  D  Lymph Node, 4L, Excision:  - Two reactive lymph nodes with anthracosis (0/2)  E  Lymph Node, Level 7, Excision:  - Two reactive lymph nodes with anthracosis (0/2)              6/9/2021 Initial Diagnosis    Adenocarcinoma, lung, left ([facility] [address] )     6/25/2021 - 8/27/2021 Chemotherapy    cyanocobalamin, 1,000 mcg, Intramuscular, Once, 2 of 3 cycles  Administration: 1,000 mcg (8/6/2021), 1,000 mcg (6/25/2021)  pegfilgrastim (Sarahann Ishikawa), 6 mg, Subcutaneous, Once, 3 of 5 cycles  Administration: 6 mg (7/16/2021), 6 mg (8/6/2021), 6 mg (8/27/2021)  fosaprepitant (EMEND) IVPB, 150 mg, Intravenous, Once, 4 of 6 cycles  Administration: 150 mg (6/25/2021), 150 mg (8/6/2021), 150 mg (8/27/2021), 150 mg (7/16/2021)  CARBOplatin (PARAPLATIN) IVPB (GO AUC DOSING), 496 mg, Intravenous, Once, 4 of 6 cycles  Administration: 496 mg (6/25/2021), 492 5 mg (8/6/2021), 462 mg (8/27/2021), 516 mg (7/16/2021)  pemetrexed (ALIMTA) chemo infusion, 975 mg, Intravenous, Once, 4 of 6 cycles  Administration: 1,000 mg (6/25/2021), 1,000 mg (8/6/2021), 1,000 mg (8/27/2021), 1,000 mg (7/16/2021)     12/16/2021 Surgery    Left Upper Lung Lobectomy at [facility] by Dr Casandra Paget  Tumor size: 7 6 cm Percentage of viable tumor: 40%  Histologic Grade: Undifferentiated (G$)  Lymphovascular Invasion: Present (extensive)  Perineural Invasion: Not identified  Spread through Air Spaces: Present  Pleural Invasion: tumor invades to the visceral pleural surface  Tumor Extension: Hilar soft tissue  Bronchial Margin: no tumor seen  Vascular Margin: no tumor seen  Distance from Margin: distance of invasive tumor from closest margin: 0 1 cm  Closest margin: vascular margin  Neoadjuvant T staging: ypT2a  Neoadjuvant N staging: ypN0           1/27/2022 -  Chemotherapy    atezolizumab (TECENTRIQ) IVPB, 1,200 mg, Intravenous, Once, 1 of 6 cycles  Administration: 1,200 mg (2022)         Interval history:    ROS: Review of Systems   Constitutional: Positive for fatigue  Negative for chills and fever  HENT: Negative for ear pain and sore throat  Eyes: Negative for pain and visual disturbance  Respiratory: Positive for cough  Negative for shortness of breath  Cardiovascular: Negative for chest pain and palpitations  Gastrointestinal: Negative for abdominal pain and vomiting  Genitourinary: Negative for dysuria and hematuria  Musculoskeletal: Positive for back pain  Negative for arthralgias  Skin: Positive for rash  Negative for color change  Neurological: Positive for dizziness  Negative for seizures and syncope  All other systems reviewed and are negative        Past Medical History:   Diagnosis Date    Hyperlipidemia     Hypertension     Lung cancer [facility])        Past Surgical History:   Procedure Laterality Date    BACK SURGERY      HEMORRHOID SURGERY      IR BIOPSY LUNG  2021    IR PORT PLACEMENT  2021    LAMINECTOMY  2018    L4-L5    LUNG SURGERY      left upper lobectomy    CA BRONCHOSCOPY,DIAGNOSTIC N/A 2021    Procedure: BRONCHOSCOPY FLEXIBLE;  Surgeon: Mary Arevalo MD;  Location: [facility];  Service: Thoracic    CA MEDIASTINOSCOPY WITH LYMPH NODE BIOPSY/IES N/A 2021    Procedure: MEDIASTINOSCOPY, flexible bronchoscopy;  Surgeon: Mary Arevalo MD;  [facility]: [facility];  Service: Thoracic    TONSILLECTOMY         Social History     Socioeconomic History    Marital status: /Civil Union     Spouse name: None    Number of children: None    Years of education: None    Highest education level: None   Occupational History    None   Tobacco Use    Smoking status: Former Smoker     Packs/day: 1 00     Years: 40 00     Pack years: 40 00     Types: Cigarettes     Start date:      Quit date: 2017     Years since quittin 0    Smokeless tobacco: Never Used    Tobacco comment: quit  Vaping Use    Vaping Use: Never used   Substance and Sexual Activity    Alcohol use: Yes     Alcohol/week: 7 0 standard drinks     Types: 7 Cans of beer per week    Drug use: Yes     Types: Marijuana     Comment: seldom    Sexual activity: None   Other Topics Concern    None   Social History Narrative    None     Social Determinants of Health     Financial Resource Strain: Not on file   Food Insecurity: No Food Insecurity    Worried About Running Out of Food in the Last Year: Never true    Felecia of Food in the Last Year: Never true   Transportation Needs: No Transportation Needs    Lack of Transportation (Medical): No    Lack of Transportation (Non-Medical):  No   Physical Activity: Not on file   Stress: Not on file   Social Connections: Not on file   Intimate Partner Violence: Not on file   Housing Stability: Low Risk     Unable to Pay for Housing in the Last Year: No    Number of Places Lived in the Last Year: 1    Unstable Housing in the Last Year: No       Family History   Problem Relation Age of Onset    Nephrolithiasis Father        Allergies   Allergen Reactions    Bee Venom     Benazepril Other (See Comments)     Angioedema     Latex Other (See Comments)     Burning of eyes at the dentist from the gloves    Meloxicam GI Intolerance    Penicillin G Rash         Current Outpatient Medications:     allopurinol (ZYLOPRIM) 100 mg tablet, Take 1 tablet (100 mg total) by mouth daily, Disp: 30 tablet, Rfl: 5    amLODIPine (NORVASC) 10 mg tablet, TAKE ONE TABLET BY MOUTH EVERY DAY, Disp: 90 tablet, Rfl: 3    B Complex Vitamins (B COMPLEX 1 PO), Take 1 tablet by mouth daily , Disp: , Rfl:     betamethasone valerate (VALISONE) 0 1 % cream, Apply topically 2 (two) times a day (Patient taking differently: Apply topically as needed ), Disp: 45 g, Rfl: 1    betamethasone, augmented, (DIPROLENE-AF) 0 05 % cream, , Disp: , Rfl:     calcipotriene (DOVONOX) 0 005 % ointment, Apply topically 2 (two) times a day As needed, Disp: , Rfl:     Cholecalciferol (VITAMIN D3 PO), Take by mouth daily , Disp: , Rfl:     citalopram (CeleXA) 10 mg tablet, TAKE ONE TABLET BY MOUTH EVERY DAY, Disp: 30 tablet, Rfl: 3    doxepin (SINEquan) 25 mg capsule, TAKE ONE CAPSULE BY MOUTH EVERY DAY AT BEDTIME, Disp: 30 capsule, Rfl: 5    fluocinolone (SYNALAR) 0 01 % external solution,  , Disp: , Rfl:     Garlic 10 MG CAPS, Take 1 tablet by mouth daily , Disp: , Rfl:     Glucosamine HCl 1500 MG TABS, Take by mouth, Disp: , Rfl:     Glucosamine-Chondroit-Vit C-Mn (GLUCOSAMINE 1500 COMPLEX) CAPS, daily , Disp: , Rfl:     ibuprofen (MOTRIN) 400 mg tablet, , Disp: , Rfl:     ketoconazole (NIZORAL) 2 % cream, Apply topically 2 (two) times a day (Patient taking differently: Apply topically as needed ), Disp: 15 g, Rfl: 2    Multiple Vitamins-Minerals (MULTIVITAL-M) TABS, Take by mouth daily , Disp: , Rfl:     pantoprazole (PROTONIX) 40 mg tablet, Take 1 tablet (40 mg total) by mouth daily, Disp: 30 tablet, Rfl: 11    polyethylene glycol (GLYCOLAX) 17 GM/SCOOP powder, Take 17 g by mouth 2 (two) times a day, Disp: 850 g, Rfl: 0    rosuvastatin (CRESTOR) 10 MG tablet, TAKE ONE TABLET BY MOUTH EVERY DAY, Disp: 30 tablet, Rfl: 5    Ascorbic Acid (vitamin C) 1000 MG tablet, Take 1,000 mg by mouth daily (Patient not taking: Reported on 2/15/2022 ), Disp: , Rfl:     B Complex Vitamins (BL VITAMIN B COMPLEX PO), Take by mouth, Disp: , Rfl:     colchicine (COLCRYS) 0 6 mg tablet, Take 1 tablet (0 6 mg total) by mouth 2 (two) times a day (Patient not taking: Reported on 2/15/2022 ), Disp: 30 tablet, Rfl: 5    Cyanocobalamin (Vitamin B 12) 500 MCG TABS, Take 1 tablet by mouth (Patient not taking: Reported on 1/18/2022 ), Disp: , Rfl:     folic acid (FOLVITE) 1 mg tablet, Take 1 tablet (1 mg total) by mouth daily (Patient not taking: Reported on 1/12/2022 ), Disp: 30 tablet, Rfl: 6    gabapentin (NEURONTIN) 100 mg capsule, , Disp: , Rfl:     meclizine (ANTIVERT) 25 mg tablet, Take 1 tablet (25 mg total) by mouth 4 (four) times a day as needed for dizziness (Patient not taking: Reported on 1/12/2022 ), Disp: 30 tablet, Rfl: 0    MULTIPLE VITAMINS ESSENTIAL PO, Take by mouth (Patient not taking: Reported on 2/15/2022 ), Disp: , Rfl:     neomycin-polymyxin-hydrocortisone (CORTISPORIN) otic solution, Administer 4 drops into the left ear every 6 (six) hours (Patient not taking: Reported on 1/12/2022 ), Disp: 10 mL, Rfl: 0    ondansetron (ZOFRAN) 4 mg tablet, Take 1 tablet (4 mg total) by mouth every 6 (six) hours (Patient not taking: Reported on 1/18/2022 ), Disp: 12 tablet, Rfl: 0    ondansetron (ZOFRAN) 8 mg tablet, Take 1 tablet (8 mg total) by mouth every 8 (eight) hours as needed for nausea or vomiting (Patient not taking: Reported on 1/12/2022 ), Disp: 20 tablet, Rfl: 3    oxyCODONE (ROXICODONE) 5 immediate release tablet, , Disp: , Rfl:     tamsulosin (FLOMAX) 0 4 mg, , Disp: , Rfl:     traMADol (ULTRAM) 50 mg tablet, TAKE ONE TABLET BY MOUTH EVERY 8 HOURS AS NEEDED FOR SEVERE PAIN (Patient not taking: Reported on 2/15/2022), Disp: 30 tablet, Rfl: 0      Physical Exam:  /86 (BP Location: Left arm, Patient Position: Sitting, Cuff Size: Adult)   Pulse 87   Temp (!) 97 3 °F (36 3 °C) (Tympanic)   Resp 18   Ht 5' 8" (1 727 m)   Wt 81 6 kg (179 lb 12 8 oz)   SpO2 99%   BMI 27 34 kg/m²     Physical Exam  Constitutional:       Appearance: He is well-developed  HENT:      Head: Normocephalic and atraumatic  Eyes:      General: No scleral icterus  Right eye: No discharge  Left eye: No discharge  Conjunctiva/sclera: Conjunctivae normal       Pupils: Pupils are equal, round, and reactive to light  Neck:      Thyroid: No thyromegaly  Trachea: No tracheal deviation  Cardiovascular:      Rate and Rhythm: Normal rate and regular rhythm  Heart sounds: Normal heart sounds  No murmur heard    No friction rub  Pulmonary:      Effort: Pulmonary effort is normal  No respiratory distress  Breath sounds: Normal breath sounds  No wheezing or rales  Chest:      Chest wall: No tenderness  Abdominal:      General: There is no distension  Palpations: Abdomen is soft  There is no hepatomegaly, splenomegaly or mass  Tenderness: There is no abdominal tenderness  There is no guarding or rebound  Musculoskeletal:         General: No tenderness or deformity  Normal range of motion  Cervical back: Normal range of motion and neck supple  Lymphadenopathy:      Cervical: No cervical adenopathy  Skin:     General: Skin is warm and dry  Coloration: Skin is not pale  Findings: Rash (Scattered psoriasis like skin rash all over) present  No erythema  Neurological:      Mental Status: He is alert and oriented to person, place, and time  Cranial Nerves: No cranial nerve deficit  Coordination: Coordination normal       Deep Tendon Reflexes: Reflexes are normal and symmetric  Reflexes normal    Psychiatric:         Behavior: Behavior normal          Thought Content: Thought content normal          Judgment: Judgment normal            Labs:  Lab Results   Component Value Date    WBC 3 14 (L) 02/14/2022    HGB 12 1 02/14/2022    HCT 37 9 02/14/2022    MCV 92 02/14/2022    PLT 93 (L) 02/14/2022     Lab Results   Component Value Date    K 4 2 02/14/2022     02/14/2022    CO2 29 02/14/2022    BUN 14 02/14/2022    CREATININE 1 09 02/14/2022    GLUF 100 (H) 02/14/2022    CALCIUM 9 7 02/14/2022    CORRECTEDCA 10 2 (H) 08/04/2021    AST 21 02/14/2022    ALT 25 02/14/2022    ALKPHOS 93 02/14/2022    EGFR 69 02/14/2022     No results found for: TSH    Patient voiced understanding and agreement in the above discussion  Aware to contact our office with questions/symptoms in the interim

## 2022-02-16 ENCOUNTER — APPOINTMENT (OUTPATIENT)
Dept: RADIATION ONCOLOGY | Facility: CLINIC | Age: 67
End: 2022-02-16
Attending: RADIOLOGY
Payer: MEDICARE

## 2022-02-16 ENCOUNTER — HOSPITAL ENCOUNTER (OUTPATIENT)
Dept: INFUSION CENTER | Facility: CLINIC | Age: 67
Discharge: HOME | End: 2022-02-16
Payer: MEDICARE

## 2022-02-16 VITALS
SYSTOLIC BLOOD PRESSURE: 128 MMHG | TEMPERATURE: 97.9 F | HEART RATE: 85 BPM | RESPIRATION RATE: 18 BRPM | DIASTOLIC BLOOD PRESSURE: 74 MMHG

## 2022-02-16 DIAGNOSIS — C34.92 ADENOCARCINOMA, LUNG, LEFT (HCC): Primary | ICD-10-CM

## 2022-02-16 PROCEDURE — 96413 CHEMO IV INFUSION 1 HR: CPT

## 2022-02-16 PROCEDURE — 77427 RADIATION TX MANAGEMENT X5: CPT | Performed by: RADIOLOGY

## 2022-02-16 PROCEDURE — 77014 CHG CT GUIDANCE PLACEMENT RAD THERAPY FIELDS: CPT | Performed by: RADIOLOGY

## 2022-02-16 PROCEDURE — 77386 HB NTSTY MODUL RAD TX DLVR CPLX: CPT | Performed by: RADIOLOGY

## 2022-02-16 RX ORDER — SODIUM CHLORIDE 9 MG/ML
20 INJECTION, SOLUTION INTRAVENOUS ONCE
Status: COMPLETED | OUTPATIENT
Start: 2022-02-16 | End: 2022-02-16

## 2022-02-16 RX ADMIN — ATEZOLIZUMAB 1200 MG: 1200 INJECTION, SOLUTION INTRAVENOUS at 11:36

## 2022-02-16 RX ADMIN — SODIUM CHLORIDE 20 ML/HR: 0.9 INJECTION, SOLUTION INTRAVENOUS at 11:34

## 2022-02-16 NOTE — PROGRESS NOTES
Pt presents for Tecentriq infusion offering no compliants  Pt tolerated treatment without incident  AVS printed  Next appointment reviewed

## 2022-02-17 ENCOUNTER — APPOINTMENT (OUTPATIENT)
Dept: RADIATION ONCOLOGY | Facility: CLINIC | Age: 67
End: 2022-02-17
Attending: RADIOLOGY
Payer: MEDICARE

## 2022-02-17 ENCOUNTER — APPOINTMENT (OUTPATIENT)
Dept: RADIATION ONCOLOGY | Facility: CLINIC | Age: 67
End: 2022-02-17
Payer: MEDICARE

## 2022-02-17 PROCEDURE — 77014 CHG CT GUIDANCE PLACEMENT RAD THERAPY FIELDS: CPT | Performed by: RADIOLOGY

## 2022-02-17 PROCEDURE — 77386 HB NTSTY MODUL RAD TX DLVR CPLX: CPT | Performed by: RADIOLOGY

## 2022-02-18 ENCOUNTER — APPOINTMENT (OUTPATIENT)
Dept: RADIATION ONCOLOGY | Facility: CLINIC | Age: 67
End: 2022-02-18
Attending: RADIOLOGY
Payer: MEDICARE

## 2022-02-18 PROCEDURE — 77386 HB NTSTY MODUL RAD TX DLVR CPLX: CPT | Performed by: RADIOLOGY

## 2022-02-18 PROCEDURE — 77014 CHG CT GUIDANCE PLACEMENT RAD THERAPY FIELDS: CPT | Performed by: RADIOLOGY

## 2022-02-21 ENCOUNTER — APPOINTMENT (OUTPATIENT)
Dept: RADIATION ONCOLOGY | Facility: CLINIC | Age: 67
End: 2022-02-21
Attending: RADIOLOGY
Payer: MEDICARE

## 2022-02-21 PROCEDURE — 77386 HB NTSTY MODUL RAD TX DLVR CPLX: CPT | Performed by: RADIOLOGY

## 2022-02-22 ENCOUNTER — APPOINTMENT (OUTPATIENT)
Dept: RADIATION ONCOLOGY | Facility: CLINIC | Age: 67
End: 2022-02-22
Attending: RADIOLOGY
Payer: MEDICARE

## 2022-02-22 PROCEDURE — 77336 RADIATION PHYSICS CONSULT: CPT | Performed by: RADIOLOGY

## 2022-02-22 PROCEDURE — 77014 CHG CT GUIDANCE PLACEMENT RAD THERAPY FIELDS: CPT | Performed by: RADIOLOGY

## 2022-02-22 PROCEDURE — 77386 HB NTSTY MODUL RAD TX DLVR CPLX: CPT | Performed by: RADIOLOGY

## 2022-03-01 DIAGNOSIS — C34.92 ADENOCARCINOMA, LUNG, LEFT (HCC): Primary | ICD-10-CM

## 2022-03-01 RX ORDER — SODIUM CHLORIDE 9 MG/ML
20 INJECTION, SOLUTION INTRAVENOUS ONCE
OUTPATIENT
Start: 2022-03-29

## 2022-03-07 ENCOUNTER — APPOINTMENT (OUTPATIENT)
Dept: LAB | Facility: CLINIC | Age: 67
End: 2022-03-07
Payer: MEDICARE

## 2022-03-07 DIAGNOSIS — C34.92 ADENOCARCINOMA, LUNG, LEFT (HCC): ICD-10-CM

## 2022-03-07 DIAGNOSIS — E06.4 DRUG-INDUCED THYROIDITIS: ICD-10-CM

## 2022-03-07 LAB
ALBUMIN SERPL BCP-MCNC: 3.9 G/DL (ref 3.5–5)
ALP SERPL-CCNC: 89 U/L (ref 46–116)
ALT SERPL W P-5'-P-CCNC: 24 U/L (ref 12–78)
ANION GAP SERPL CALCULATED.3IONS-SCNC: 4 MMOL/L (ref 4–13)
AST SERPL W P-5'-P-CCNC: 21 U/L (ref 5–45)
BASOPHILS # BLD AUTO: 0.03 THOUSANDS/ΜL (ref 0–0.1)
BASOPHILS NFR BLD AUTO: 1 % (ref 0–1)
BILIRUB SERPL-MCNC: 0.4 MG/DL (ref 0.2–1)
BUN SERPL-MCNC: 20 MG/DL (ref 5–25)
CALCIUM SERPL-MCNC: 10 MG/DL (ref 8.3–10.1)
CHLORIDE SERPL-SCNC: 107 MMOL/L (ref 100–108)
CO2 SERPL-SCNC: 26 MMOL/L (ref 21–32)
CREAT SERPL-MCNC: 1.31 MG/DL (ref 0.6–1.3)
EOSINOPHIL # BLD AUTO: 0.1 THOUSAND/ΜL (ref 0–0.61)
EOSINOPHIL NFR BLD AUTO: 3 % (ref 0–6)
ERYTHROCYTE [DISTWIDTH] IN BLOOD BY AUTOMATED COUNT: 19.3 % (ref 11.6–15.1)
GFR SERPL CREATININE-BSD FRML MDRD: 55 ML/MIN/1.73SQ M
GLUCOSE P FAST SERPL-MCNC: 104 MG/DL (ref 65–99)
HCT VFR BLD AUTO: 37.9 % (ref 36.5–49.3)
HGB BLD-MCNC: 12.5 G/DL (ref 12–17)
IMM GRANULOCYTES # BLD AUTO: 0.02 THOUSAND/UL (ref 0–0.2)
IMM GRANULOCYTES NFR BLD AUTO: 1 % (ref 0–2)
LYMPHOCYTES # BLD AUTO: 0.7 THOUSANDS/ΜL (ref 0.6–4.47)
LYMPHOCYTES NFR BLD AUTO: 18 % (ref 14–44)
MAGNESIUM SERPL-MCNC: 2.3 MG/DL (ref 1.6–2.6)
MCH RBC QN AUTO: 30.6 PG (ref 26.8–34.3)
MCHC RBC AUTO-ENTMCNC: 33 G/DL (ref 31.4–37.4)
MCV RBC AUTO: 93 FL (ref 82–98)
MONOCYTES # BLD AUTO: 0.28 THOUSAND/ΜL (ref 0.17–1.22)
MONOCYTES NFR BLD AUTO: 7 % (ref 4–12)
NEUTROPHILS # BLD AUTO: 2.7 THOUSANDS/ΜL (ref 1.85–7.62)
NEUTS SEG NFR BLD AUTO: 70 % (ref 43–75)
NRBC BLD AUTO-RTO: 0 /100 WBCS
PLATELET # BLD AUTO: 119 THOUSANDS/UL (ref 149–390)
PMV BLD AUTO: 9.6 FL (ref 8.9–12.7)
POTASSIUM SERPL-SCNC: 4.1 MMOL/L (ref 3.5–5.3)
PROT SERPL-MCNC: 8 G/DL (ref 6.4–8.2)
RBC # BLD AUTO: 4.08 MILLION/UL (ref 3.88–5.62)
SODIUM SERPL-SCNC: 137 MMOL/L (ref 136–145)
T3FREE SERPL-MCNC: 2.07 PG/ML (ref 2.3–4.2)
T4 FREE SERPL-MCNC: 0.62 NG/DL (ref 0.76–1.46)
TSH SERPL DL<=0.05 MIU/L-ACNC: 18.1 UIU/ML (ref 0.36–3.74)
WBC # BLD AUTO: 3.83 THOUSAND/UL (ref 4.31–10.16)

## 2022-03-07 PROCEDURE — 84439 ASSAY OF FREE THYROXINE: CPT

## 2022-03-07 PROCEDURE — 36415 COLL VENOUS BLD VENIPUNCTURE: CPT

## 2022-03-07 PROCEDURE — 84481 FREE ASSAY (FT-3): CPT

## 2022-03-07 PROCEDURE — 80053 COMPREHEN METABOLIC PANEL: CPT

## 2022-03-07 PROCEDURE — 83735 ASSAY OF MAGNESIUM: CPT

## 2022-03-07 PROCEDURE — 85025 COMPLETE CBC W/AUTO DIFF WBC: CPT

## 2022-03-07 PROCEDURE — 84443 ASSAY THYROID STIM HORMONE: CPT

## 2022-03-08 ENCOUNTER — HOSPITAL ENCOUNTER (OUTPATIENT)
Dept: INFUSION CENTER | Facility: HOSPITAL | Age: 67
Discharge: HOME | End: 2022-03-08
Attending: INTERNAL MEDICINE

## 2022-03-08 ENCOUNTER — OFFICE VISIT (OUTPATIENT)
Dept: HEMATOLOGY ONCOLOGY | Facility: CLINIC | Age: 67
End: 2022-03-08
Payer: MEDICARE

## 2022-03-08 VITALS
BODY MASS INDEX: 27.35 KG/M2 | OXYGEN SATURATION: 98 % | RESPIRATION RATE: 18 BRPM | WEIGHT: 180.5 LBS | HEIGHT: 68 IN | TEMPERATURE: 96.7 F | SYSTOLIC BLOOD PRESSURE: 128 MMHG | HEART RATE: 84 BPM | DIASTOLIC BLOOD PRESSURE: 84 MMHG

## 2022-03-08 DIAGNOSIS — M1A.0710 CHRONIC GOUT OF RIGHT FOOT, UNSPECIFIED CAUSE: ICD-10-CM

## 2022-03-08 DIAGNOSIS — C34.92 ADENOCARCINOMA, LUNG, LEFT (HCC): ICD-10-CM

## 2022-03-08 DIAGNOSIS — C34.12 MALIGNANT NEOPLASM OF UPPER LOBE, LEFT BRONCHUS OR LUNG (HCC): Primary | ICD-10-CM

## 2022-03-08 DIAGNOSIS — E06.4 DRUG-INDUCED THYROIDITIS: ICD-10-CM

## 2022-03-08 DIAGNOSIS — L40.9 PSORIASIS: ICD-10-CM

## 2022-03-08 PROCEDURE — 99215 OFFICE O/P EST HI 40 MIN: CPT | Performed by: NURSE PRACTITIONER

## 2022-03-08 RX ORDER — SODIUM CHLORIDE 9 MG/ML
20 INJECTION, SOLUTION INTRAVENOUS ONCE
OUTPATIENT
Start: 2022-04-19

## 2022-03-08 RX ORDER — ALLOPURINOL 100 MG/1
100 TABLET ORAL DAILY
Qty: 30 TABLET | Refills: 5 | Status: ON HOLD | OUTPATIENT
Start: 2022-03-08

## 2022-03-08 RX ORDER — PREDNISONE 20 MG/1
60 TABLET ORAL DAILY
Qty: 90 TABLET | Refills: 1 | Status: SHIPPED | OUTPATIENT
Start: 2022-03-08 | End: 2022-04-11 | Stop reason: HOSPADM

## 2022-03-08 RX ORDER — LEVOTHYROXINE SODIUM 0.03 MG/1
25 TABLET ORAL DAILY
Qty: 30 TABLET | Refills: 3 | Status: SHIPPED | OUTPATIENT
Start: 2022-03-08 | End: 2022-04-11 | Stop reason: HOSPADM

## 2022-03-08 NOTE — LETTER
March 8, 2022     Bry [address]  [facility]    Patient: Tamra Lopez   YOB: 1955   Date of Visit: 3/8/2022       Dear Dr Xenia Bravo: Thank you for referring Veronica Sumners to me for evaluation  Below are my notes for this consultation  If you have questions, please do not hesitate to call me  I look forward to following your patient along with you  Sincerely,        CORRINE De Santiago        CC: No Recipients  Inocencio De Santiago  3/8/2022 12:09 PM  Sign when Signing Visit  Hematology/Oncology Outpatient Follow-up  Tamra Lopez 74 y o  male 1955 [id number]    Date:  3/8/2022      Assessment and Plan:  1  Malignant neoplasm of upper lobe, left bronchus or lung [facility])   Patient completed his adjuvant radiation 02/22/2022  He is due for his 3rd dose of adjuvant immunotherapy Atezolizumab later today however we will place on hold due to worsening psoriatic rash at least grade 3  Patient will also be started on high-dose prednisone 60 mg daily which will most likely improve his symptoms  He is already on prophylactic PPI  Has follow up with his Dermatology team next week; would hold off on [address] [facility] for now as his worsening symptom are most likely related to the Atezolizumab and will hopefully respond to the prednisone  Will arrange for follow-up appointment in 2 weeks for close monitoring and to see if we can start tapering steroids  I did advise patient his wife to contact us in the interim should he have any significant new or worsening symptoms     - CBC and differential; Future  - Comprehensive metabolic panel; Future  - CBC and differential; Future  - Comprehensive metabolic panel; Future  - Magnesium; Future  - TSH, 3rd generation with Free T4 reflex; Future  - C-reactive protein; Future  - Sedimentation rate, automated; Future    2  Psoriasis  As above  - predniSONE 20 mg tablet;  Take 3 tablets (60 mg total) by mouth daily  Dispense: 90 tablet; Refill: 1    3  Drug-induced thyroiditis  Patient with further increase of his TSH 18 1 with decreased free T4  He is asymptomatic  Most likely immune mediated  Will start him on levothyroxine 25 mcg daily  Was advised to take 1st thing in the morning an empty stomach with water 30 minutes prior to any other medication/meals  Will continue to monitor his thyroid studies closely     - TSH, 3rd generation with Free T4 reflex; Future  - levothyroxine (Synthroid) 25 mcg tablet; Take 1 tablet (25 mcg total) by mouth daily  Dispense: 30 tablet; Refill: 3    4  Chronic gout of right foot, unspecified cause  - allopurinol (ZYLOPRIM) 100 mg tablet; Take 1 tablet (100 mg total) by mouth daily  Dispense: 30 tablet; Refill: 5      HPI:  Patient presents today for a follow-up visit accompanied by his wife  He completed his adjuvant radiation recently 02/22/2022  He mentions that he has history of psoriasis at least for the past 5 years or so which has been mild and under fairly good control  Psoriasis significantly improved while he was on chemotherapy  However he has noticed significant worsening of his psoriasis after starting the immunotherapy  Does not seem to be responding to topical medications ordered by his dermatologist   His psoriatic lesions are scattered throughout his entire body including posterior scalp, abdomen especially on his sides, lower back, few scattered lesions to the hands with significant nail changes to his nail beds from the psoriasis, bilateral lower extremities especially the outer bilateral thigh and ankle region his feet/toes any even in between the toes are now effected as well  We states that his dermatologist is considering starting him on oral Otezla  Other than the rash he has no new complaints      His most recent laboratory studies from yesterday 3/7/22 showed decreased WBC 3 83 with normal white cell differential, he is not anemic H&H 12 5/37 9, MCV 93 he continues to have mild thrombocytopenia but stable platelet count a [id number]  Creatinine 1 31, GFR 55 remaining metabolic panel is appropriate  His TSH has increased further 18 1 with decreased free T4 0 62  Oncology History Overview Note   Patient with history of hypertension and hyperlipidemia  He also had extensive history of smoking for 40 years or more  He quit smoking  In 2017  The patient apparently had low-dose lung CT scans for screening since he was heavy smoker on 04/08/2021  The CT scan of fortunately revealed 6 2 cm mass in the paramediastinal left upper lobe with COPD features  Subsequently, he had a PET-CT scan on 05/04/2021 which showed:  IMPRESSION:  1  Lobulated left upper paramediastinal lung mass extending to the left suprahilar region measuring 5 2 x 6 5 x 6 cm demonstrates intense FDG activity, most concerning for malignancy  Adjacent interstitial thickening and groundglass densities may   represent tumor infiltration  Tissue sampling recommended  2   Mildly hypermetabolic AP window mediastinal nodes concerning for early metastases  3   Additional mildly hypermetabolic branching nodular density in the left upper posterior lung may be inflammatory/infectious versus additional site of tumor  4   No hypermetabolic metastases in the neck, abdomen, pelvis  A biopsy of the left lung mass was done on 05/06/2021 which showed:   Poorly differentiated non-small cell carcinoma, favor adenocarcinoma, with neuroendocrine differentiation of uncertain clinical significance  The patient then underwent a mediastinoscopy on 05/25/2021 for staging  Multiple mediastinal lymph node from different levels were excised all lymph nodes came back negative for metastatic disease  The patient was felt to be a surgical candidate and was sent to us to consider  Neoadjuvant chemotherapy      He underwent a left upper lobectomy and mediastinal lymph node dissection on 11/17/2021 [facility] after he completed 4 cycles worth of neoadjuvant chemotherapy with Alimta and carboplatin which was done by August 2021  His final pathology showed a ypT2a N0 M0 poorly differentiated non-small, large cell neuroendocrine tumor  This was an R0 resection but the margin near the mediastinal fat is close  In addition, in the left upper lobe he had a separate stage I squamous cell carcinoma  He was seen by the Radiation Oncology team postop who pursued radiation treatment to the mediastinum x25 fractions completed 2/22/22 since he had close margin which was recommended by the thoracic surgical team at [facility]  He was educated about the recent data regarding the potential benefit of adjuvant immunotherapy with atezolizumab post chemotherapy for stage II and III resected non-small cell lung cancer which showed disease-free survival benefit specially with the pathology of PDL1 of 1% or more  Was started on the atezolizumab at the dose of 1200 mg on every 3 week basis 1/27/22 with goal of 1 year worth adjuvant treatment to decrease the chance of recurrence  Adenocarcinoma, lung, left ([facility] [address] )   5/6/2021 Biopsy    [facility]  FINAL DIAGNOSIS     Lung, Left Upper Lobe, core Biopsy ([id number], 5/6/2021):    - Poorly differentiated non-small cell carcinoma, favor adenocarcinoma, with neuroendocrine differentiation of uncertain clinical significance (see letter below)  5/25/2021 Biopsy    A  Lymph Node, 4R, Excision:  - Two reactive lymph nodes with anthracosis (0/2)  B  Lymph Node, 2R, Excision:  - Two reactive lymph nodes with anthracosis (0/2)  C  Lymph Node, 2L, Excision:  - Three reactive lymph nodes with anthracosis (0/3)  D  Lymph Node, 4L, Excision:  - Two reactive lymph nodes with anthracosis (0/2)  E  Lymph Node, Level 7, Excision:  - Two reactive lymph nodes with anthracosis (0/2)              6/9/2021 Initial Diagnosis    Adenocarcinoma, lung, left ([address] ) 6/25/2021 - 8/27/2021 Chemotherapy    cyanocobalamin, 1,000 mcg, Intramuscular, Once, 2 of 3 cycles  Administration: 1,000 mcg (8/6/2021), 1,000 mcg (6/25/2021)  pegfilgrastim (Liam Hart), 6 mg, Subcutaneous, Once, 3 of 5 cycles  Administration: 6 mg (7/16/2021), 6 mg (8/6/2021), 6 mg (8/27/2021)  fosaprepitant (EMEND) IVPB, 150 mg, Intravenous, Once, 4 of 6 cycles  Administration: 150 mg (6/25/2021), 150 mg (8/6/2021), 150 mg (8/27/2021), 150 mg (7/16/2021)  CARBOplatin (PARAPLATIN) IVPB (GO AUC DOSING), 496 mg, Intravenous, Once, 4 of 6 cycles  Administration: 496 mg (6/25/2021), 492 5 mg (8/6/2021), 462 mg (8/27/2021), 516 mg (7/16/2021)  pemetrexed (ALIMTA) chemo infusion, 975 mg, Intravenous, Once, 4 of 6 cycles  Administration: 1,000 mg (6/25/2021), 1,000 mg (8/6/2021), 1,000 mg (8/27/2021), 1,000 mg (7/16/2021)     12/16/2021 Surgery    Left Upper Lung Lobectomy at [facility] by Dr Valdez Ocampo  Tumor size: 7 6 cm Percentage of viable tumor: 40%  Histologic Grade: Undifferentiated (G$)  Lymphovascular Invasion: Present (extensive)  Perineural Invasion: Not identified  Spread through Air Spaces: Present  Pleural Invasion: tumor invades to the visceral pleural surface  Tumor Extension: Hilar soft tissue  Bronchial Margin: no tumor seen  Vascular Margin: no tumor seen  Distance from Margin: distance of invasive tumor from closest margin: 0 1 cm  Closest margin: vascular margin  Neoadjuvant T staging: ypT2a  Neoadjuvant N staging: ypN0           1/27/2022 -  Chemotherapy    atezolizumab (TECENTRIQ) IVPB, 1,200 mg, Intravenous, Once, 2 of 6 cycles  Administration: 1,200 mg (1/27/2022), 1,200 mg (2/16/2022)     Cancer of upper lobe of left lung ([address] )   11/4/2021 Initial Diagnosis    Cancer of upper lobe of left lung (HCC)         Interval history:    ROS: Review of Systems   Constitutional: Positive for fatigue   Negative for activity change, appetite change, chills, fever and unexpected weight change  HENT: Positive for hearing loss  Negative for congestion, mouth sores, nosebleeds, sore throat and trouble swallowing  Eyes: Negative  Respiratory: Positive for cough (mild)  Negative for chest tightness and shortness of breath  Cardiovascular: Negative for chest pain, palpitations and leg swelling  Gastrointestinal: Negative for abdominal distention, abdominal pain, blood in stool, constipation, diarrhea, nausea and vomiting  Genitourinary: Negative for difficulty urinating, dysuria, frequency, hematuria and urgency  Musculoskeletal: Positive for arthralgias, back pain and myalgias  Negative for gait problem and joint swelling  Skin: Positive for color change and rash  Negative for pallor  Neurological: Negative for dizziness, weakness, light-headedness, numbness and headaches  Hematological: Negative for adenopathy  Does not bruise/bleed easily  Psychiatric/Behavioral: Negative for dysphoric mood and sleep disturbance  The patient is not nervous/anxious          Past Medical History:   Diagnosis Date    Hyperlipidemia     Hypertension     Lung cancer [facility])        Past Surgical History:   Procedure Laterality Date    BACK SURGERY      HEMORRHOID SURGERY      IR BIOPSY LUNG  5/6/2021    IR PORT PLACEMENT  6/17/2021    LAMINECTOMY  2018    L4-L5    LUNG SURGERY      left upper lobectomy    ME BRONCHOSCOPY,DIAGNOSTIC N/A 5/25/2021    Procedure: BRONCHOSCOPY FLEXIBLE;  Surgeon: Alia Delgado MD;  [facility]: [facility];  Service: Thoracic    ME MEDIASTINOSCOPY WITH LYMPH NODE BIOPSY/IES N/A 5/25/2021    Procedure: MEDIASTINOSCOPY, flexible bronchoscopy;  Surgeon: Alia Delgado MD;  [facility];  Service: Thoracic    TONSILLECTOMY  1959       Social History     Socioeconomic History    Marital status: /Civil Union     Spouse name: None    Number of children: None    Years of education: None    Highest education level: None Occupational History    None   Tobacco Use    Smoking status: Former Smoker     Packs/day: 1 00     Years: 40 00     Pack years: 40 00     Types: Cigarettes     Start date:      Quit date: 2017     Years since quittin 0    Smokeless tobacco: Never Used    Tobacco comment: quit 2017   Vaping Use    Vaping Use: Never used   Substance and Sexual Activity    Alcohol use: Yes     Alcohol/week: 7 0 standard drinks     Types: 7 Cans of beer per week    Drug use: Yes     Types: Marijuana     Comment: seldom    Sexual activity: None   Other Topics Concern    None   Social History Narrative    None     Social Determinants of Health     Financial Resource Strain: Not on file   Food Insecurity: No Food Insecurity    Worried About Running Out of Food in the Last Year: Never true    Felecia of Food in the Last Year: Never true   Transportation Needs: No Transportation Needs    Lack of Transportation (Medical): No    Lack of Transportation (Non-Medical):  No   Physical Activity: Not on file   Stress: Not on file   Social Connections: Not on file   Intimate Partner Violence: Not on file   Housing Stability: Low Risk     Unable to Pay for Housing in the Last Year: No    Number of Places Lived in the Last Year: 1    Unstable Housing in the Last Year: No       Family History   Problem Relation Age of Onset    Nephrolithiasis Father        Allergies   Allergen Reactions    Bee Venom     Benazepril Other (See Comments)     Angioedema     Latex Other (See Comments)     Burning of eyes at the dentist from the gloves    Meloxicam GI Intolerance    Penicillin G Rash         Current Outpatient Medications:     allopurinol (ZYLOPRIM) 100 mg tablet, Take 1 tablet (100 mg total) by mouth daily, Disp: 30 tablet, Rfl: 5    amLODIPine (NORVASC) 10 mg tablet, TAKE ONE TABLET BY MOUTH EVERY DAY, Disp: 90 tablet, Rfl: 3    Ascorbic Acid (vitamin C) 1000 MG tablet, Take 1,000 mg by mouth daily  , Disp: , Rfl:   B Complex Vitamins (B COMPLEX 1 PO), Take 1 tablet by mouth daily , Disp: , Rfl:     B Complex Vitamins (BL VITAMIN B COMPLEX PO), Take by mouth, Disp: , Rfl:     betamethasone valerate (VALISONE) 0 1 % cream, Apply topically 2 (two) times a day (Patient taking differently: Apply topically as needed ), Disp: 45 g, Rfl: 1    betamethasone, augmented, (DIPROLENE-AF) 0 05 % cream, , Disp: , Rfl:     calcipotriene (DOVONOX) 0 005 % ointment, Apply topically 2 (two) times a day As needed, Disp: , Rfl:     Cholecalciferol (VITAMIN D3 PO), Take by mouth daily , Disp: , Rfl:     citalopram (CeleXA) 10 mg tablet, TAKE ONE TABLET BY MOUTH EVERY DAY, Disp: 30 tablet, Rfl: 3    doxepin (SINEquan) 25 mg capsule, TAKE ONE CAPSULE BY MOUTH EVERY DAY AT BEDTIME, Disp: 30 capsule, Rfl: 5    fluocinolone (SYNALAR) 0 01 % external solution,  , Disp: , Rfl:     Garlic 10 MG CAPS, Take 1 tablet by mouth daily , Disp: , Rfl:     Glucosamine HCl 1500 MG TABS, Take by mouth, Disp: , Rfl:     Glucosamine-Chondroit-Vit C-Mn (GLUCOSAMINE 1500 COMPLEX) CAPS, daily , Disp: , Rfl:     ibuprofen (MOTRIN) 400 mg tablet, , Disp: , Rfl:     ketoconazole (NIZORAL) 2 % cream, Apply topically 2 (two) times a day (Patient taking differently: Apply topically as needed ), Disp: 15 g, Rfl: 2    Multiple Vitamins-Minerals (MULTIVITAL-M) TABS, Take by mouth daily , Disp: , Rfl:     pantoprazole (PROTONIX) 40 mg tablet, Take 1 tablet (40 mg total) by mouth daily, Disp: 30 tablet, Rfl: 11    polyethylene glycol (GLYCOLAX) 17 GM/SCOOP powder, Take 17 g by mouth 2 (two) times a day, Disp: 850 g, Rfl: 0    rosuvastatin (CRESTOR) 10 MG tablet, TAKE ONE TABLET BY MOUTH EVERY DAY, Disp: 30 tablet, Rfl: 5    traMADol (ULTRAM) 50 mg tablet, TAKE ONE TABLET BY MOUTH EVERY 8 HOURS AS NEEDED FOR SEVERE PAIN, Disp: 30 tablet, Rfl: 0    colchicine (COLCRYS) 0 6 mg tablet, Take 1 tablet (0 6 mg total) by mouth 2 (two) times a day (Patient not taking: Reported on 2/15/2022 ), Disp: 30 tablet, Rfl: 5    Cyanocobalamin (Vitamin B 12) 500 MCG TABS, Take 1 tablet by mouth (Patient not taking: Reported on 1/18/2022 ), Disp: , Rfl:     folic acid (FOLVITE) 1 mg tablet, Take 1 tablet (1 mg total) by mouth daily (Patient not taking: Reported on 1/12/2022 ), Disp: 30 tablet, Rfl: 6    gabapentin (NEURONTIN) 100 mg capsule, , Disp: , Rfl:     levothyroxine (Synthroid) 25 mcg tablet, Take 1 tablet (25 mcg total) by mouth daily, Disp: 30 tablet, Rfl: 3    meclizine (ANTIVERT) 25 mg tablet, Take 1 tablet (25 mg total) by mouth 4 (four) times a day as needed for dizziness (Patient not taking: Reported on 1/12/2022 ), Disp: 30 tablet, Rfl: 0    MULTIPLE VITAMINS ESSENTIAL PO, Take by mouth (Patient not taking: Reported on 2/15/2022 ), Disp: , Rfl:     neomycin-polymyxin-hydrocortisone (CORTISPORIN) otic solution, Administer 4 drops into the left ear every 6 (six) hours (Patient not taking: Reported on 1/12/2022 ), Disp: 10 mL, Rfl: 0    ondansetron (ZOFRAN) 4 mg tablet, Take 1 tablet (4 mg total) by mouth every 6 (six) hours (Patient not taking: Reported on 1/18/2022 ), Disp: 12 tablet, Rfl: 0    ondansetron (ZOFRAN) 8 mg tablet, Take 1 tablet (8 mg total) by mouth every 8 (eight) hours as needed for nausea or vomiting (Patient not taking: Reported on 1/12/2022 ), Disp: 20 tablet, Rfl: 3    oxyCODONE (ROXICODONE) 5 immediate release tablet, , Disp: , Rfl:     predniSONE 20 mg tablet, Take 3 tablets (60 mg total) by mouth daily, Disp: 90 tablet, Rfl: 1    tamsulosin (FLOMAX) 0 4 mg, , Disp: , Rfl:       Physical Exam:  /84 (BP Location: Left arm, Patient Position: Sitting, Cuff Size: Adult)   Pulse 84   Temp (!) 96 7 °F (35 9 °C) (Tympanic)   Resp 18   Ht 5' 8" (1 727 m)   Wt 81 9 kg (180 lb 8 oz)   SpO2 98%   BMI 27 44 kg/m²     Physical Exam  Vitals reviewed  Constitutional:       General: He is not in acute distress       Appearance: He is well-developed  He is not diaphoretic  HENT:      Head: Normocephalic and atraumatic  Eyes:      General: Lids are normal  No scleral icterus  Conjunctiva/sclera: Conjunctivae normal       Pupils: Pupils are equal, round, and reactive to light  Neck:      Thyroid: No thyromegaly  Cardiovascular:      Rate and Rhythm: Normal rate and regular rhythm  Heart sounds: Normal heart sounds  No murmur heard  Pulmonary:      Effort: Pulmonary effort is normal  No respiratory distress  Chest:   Breasts:      Right: No axillary adenopathy  Left: No axillary adenopathy  Abdominal:      General: There is no distension  Palpations: Abdomen is soft  There is no hepatomegaly or splenomegaly  Tenderness: There is no abdominal tenderness  Hernia: A hernia is present  Musculoskeletal:         General: No swelling  Normal range of motion  Cervical back: Normal range of motion and neck supple  Lymphadenopathy:      Cervical: No cervical adenopathy  Upper Body:      Right upper body: No axillary adenopathy  Left upper body: No axillary adenopathy  Skin:     General: Skin is warm and dry  Coloration: Skin is not pale  Findings: Erythema and rash present  Comments: Diffuse red nummular lesions to bilateral outer thigh/ankle region, lower back both outer portion of abdomen  In addition scattered small round lesions noted to abdomen, bilateral lower extremities and hands (see below images)   Neurological:      General: No focal deficit present  Mental Status: He is alert and oriented to person, place, and time  Psychiatric:         Mood and Affect: Mood and affect normal          Behavior: Behavior normal  Behavior is cooperative  Thought Content:  Thought content normal          Judgment: Judgment normal                        Labs:  Lab Results   Component Value Date    WBC 3 83 (L) 03/07/2022    HGB 12 5 03/07/2022    HCT 37 9 03/07/2022 MCV 93 03/07/2022     (L) 03/07/2022     Lab Results   Component Value Date    K 4 1 03/07/2022     03/07/2022    CO2 26 03/07/2022    BUN 20 03/07/2022    CREATININE 1 31 (H) 03/07/2022    GLUF 104 (H) 03/07/2022    CALCIUM 10 0 03/07/2022    CORRECTEDCA 10 2 (H) 08/04/2021    AST 21 03/07/2022    ALT 24 03/07/2022    ALKPHOS 89 03/07/2022    EGFR 55 03/07/2022       Patient voiced understanding and agreement in the above discussion  Aware to contact our office with questions/symptoms in the interim  This note has been generated by voice recognition software system  Therefore, there may be spelling, grammar, and or syntax errors  Please contact if questions arise

## 2022-03-08 NOTE — PROGRESS NOTES
Hematology/Oncology Outpatient Follow-up  Carlos Jenkins 74 y o  male 1955 [id number]    Date:  3/8/2022      Assessment and Plan:  1  Malignant neoplasm of upper lobe, left bronchus or lung [facility])   Patient completed his adjuvant radiation 02/22/2022  He is due for his 3rd dose of adjuvant immunotherapy Atezolizumab later today however we will place on hold due to worsening psoriatic rash at least grade 3  Patient will also be started on high-dose prednisone 60 mg daily which will most likely improve his symptoms  He is already on prophylactic PPI  Has follow up with his Dermatology team next week; would hold off on [address] [facility] for now as his worsening symptom are most likely related to the Atezolizumab and will hopefully respond to the prednisone  Will arrange for follow-up appointment in 2 weeks for close monitoring and to see if we can start tapering steroids  I did advise patient his wife to contact us in the interim should he have any significant new or worsening symptoms     - CBC and differential; Future  - Comprehensive metabolic panel; Future  - CBC and differential; Future  - Comprehensive metabolic panel; Future  - Magnesium; Future  - TSH, 3rd generation with Free T4 reflex; Future  - C-reactive protein; Future  - Sedimentation rate, automated; Future    2  Psoriasis  As above  - predniSONE 20 mg tablet; Take 3 tablets (60 mg total) by mouth daily  Dispense: 90 tablet; Refill: 1    3  Drug-induced thyroiditis  Patient with further increase of his TSH 18 1 with decreased free T4  He is asymptomatic  Most likely immune mediated  Will start him on levothyroxine 25 mcg daily  Was advised to take 1st thing in the morning an empty stomach with water 30 minutes prior to any other medication/meals  Will continue to monitor his thyroid studies closely     - TSH, 3rd generation with Free T4 reflex; Future  - levothyroxine (Synthroid) 25 mcg tablet;  Take 1 tablet (25 mcg total) by mouth daily Dispense: 30 tablet; Refill: 3    4  Chronic gout of right foot, unspecified cause  - allopurinol (ZYLOPRIM) 100 mg tablet; Take 1 tablet (100 mg total) by mouth daily  Dispense: 30 tablet; Refill: 5      HPI:  Patient presents today for a follow-up visit accompanied by his wife  He completed his adjuvant radiation recently 02/22/2022  He mentions that he has history of psoriasis at least for the past 5 years or so which has been mild and under fairly good control  Psoriasis significantly improved while he was on chemotherapy  However he has noticed significant worsening of his psoriasis after starting the immunotherapy  Does not seem to be responding to topical medications ordered by his dermatologist   His psoriatic lesions are scattered throughout his entire body including posterior scalp, abdomen especially on his sides, lower back, few scattered lesions to the hands with significant nail changes to his nail beds from the psoriasis, bilateral lower extremities especially the outer bilateral thigh and ankle region his feet/toes any even in between the toes are now effected as well  We states that his dermatologist is considering starting him on oral Otezla  Other than the rash he has no new complaints  His most recent laboratory studies from yesterday 3/7/22 showed decreased WBC 3 83 with normal white cell differential, he is not anemic H&H 12 5/37 9, MCV 93 he continues to have mild thrombocytopenia but stable platelet count a [id number]  Creatinine 1 31, GFR 55 remaining metabolic panel is appropriate  His TSH has increased further 18 1 with decreased free T4 0 62  Oncology History Overview Note   Patient with history of hypertension and hyperlipidemia  He also had extensive history of smoking for 40 years or more  He quit smoking  In 2017  The patient apparently had low-dose lung CT scans for screening since he was heavy smoker on 04/08/2021   The CT scan of fortunately revealed 6 2 cm mass in the paramediastinal left upper lobe with COPD features  Subsequently, he had a PET-CT scan on 05/04/2021 which showed:  IMPRESSION:  1  Lobulated left upper paramediastinal lung mass extending to the left suprahilar region measuring 5 2 x 6 5 x 6 cm demonstrates intense FDG activity, most concerning for malignancy  Adjacent interstitial thickening and groundglass densities may   represent tumor infiltration  Tissue sampling recommended  2   Mildly hypermetabolic AP window mediastinal nodes concerning for early metastases  3   Additional mildly hypermetabolic branching nodular density in the left upper posterior lung may be inflammatory/infectious versus additional site of tumor  4   No hypermetabolic metastases in the neck, abdomen, pelvis  A biopsy of the left lung mass was done on 05/06/2021 which showed:   Poorly differentiated non-small cell carcinoma, favor adenocarcinoma, with neuroendocrine differentiation of uncertain clinical significance  The patient then underwent a mediastinoscopy on 05/25/2021 for staging  Multiple mediastinal lymph node from different levels were excised all lymph nodes came back negative for metastatic disease  The patient was felt to be a surgical candidate and was sent to us to consider  Neoadjuvant chemotherapy  He underwent a left upper lobectomy and mediastinal lymph node dissection on 11/17/2021 at [facility] after he completed 4 cycles worth of neoadjuvant chemotherapy with Alimta and carboplatin which was done by August 2021  His final pathology showed a ypT2a N0 M0 poorly differentiated non-small, large cell neuroendocrine tumor  This was an R0 resection but the margin near the mediastinal fat is close  In addition, in the left upper lobe he had a separate stage I squamous cell carcinoma         He was seen by the Radiation Oncology team postop who pursued radiation treatment to the mediastinum x25 fractions completed 2/22/22 since he had close margin which was recommended by the thoracic surgical team at [facility]  He was educated about the recent data regarding the potential benefit of adjuvant immunotherapy with atezolizumab post chemotherapy for stage II and III resected non-small cell lung cancer which showed disease-free survival benefit specially with the pathology of PDL1 of 1% or more  Was started on the atezolizumab at the dose of 1200 mg on every 3 week basis 1/27/22 with goal of 1 year worth adjuvant treatment to decrease the chance of recurrence  Adenocarcinoma, lung, left ([facility] [address] )   5/6/2021 Biopsy    [facility]  FINAL DIAGNOSIS     Lung, Left Upper Lobe, core Biopsy ([id number], 5/6/2021):    - Poorly differentiated non-small cell carcinoma, favor adenocarcinoma, with neuroendocrine differentiation of uncertain clinical significance (see letter below)  5/25/2021 Biopsy    A  Lymph Node, 4R, Excision:  - Two reactive lymph nodes with anthracosis (0/2)  B  Lymph Node, 2R, Excision:  - Two reactive lymph nodes with anthracosis (0/2)  C  Lymph Node, 2L, Excision:  - Three reactive lymph nodes with anthracosis (0/3)  D  Lymph Node, 4L, Excision:  - Two reactive lymph nodes with anthracosis (0/2)  E  Lymph Node, Level 7, Excision:  - Two reactive lymph nodes with anthracosis (0/2)              6/9/2021 Initial Diagnosis    Adenocarcinoma, lung, left ([facility] [address] )     6/25/2021 - 8/27/2021 Chemotherapy    cyanocobalamin, 1,000 mcg, Intramuscular, Once, 2 of 3 cycles  Administration: 1,000 mcg (8/6/2021), 1,000 mcg (6/25/2021)  pegfilgrastim (Yobany Payment), 6 mg, Subcutaneous, Once, 3 of 5 cycles  Administration: 6 mg (7/16/2021), 6 mg (8/6/2021), 6 mg (8/27/2021)  fosaprepitant (EMEND) IVPB, 150 mg, Intravenous, Once, 4 of 6 cycles  Administration: 150 mg (6/25/2021), 150 mg (8/6/2021), 150 mg (8/27/2021), 150 mg (7/16/2021)  CARBOplatin (PARAPLATIN) IVPB (GOG AUC DOSING), 496 mg, Intravenous, Once, 4 of 6 cycles  Administration: 496 mg (6/25/2021), 492 5 mg (8/6/2021), 462 mg (8/27/2021), 516 mg (7/16/2021)  pemetrexed (ALIMTA) chemo infusion, 975 mg, Intravenous, Once, 4 of 6 cycles  Administration: 1,000 mg (6/25/2021), 1,000 mg (8/6/2021), 1,000 mg (8/27/2021), 1,000 mg (7/16/2021)     12/16/2021 Surgery    Left Upper Lung Lobectomy at [facility] by Dr Chanda Kamara  Tumor size: 7 6 cm Percentage of viable tumor: 40%  Histologic Grade: Undifferentiated (G$)  Lymphovascular Invasion: Present (extensive)  Perineural Invasion: Not identified  Spread through Air Spaces: Present  Pleural Invasion: tumor invades to the visceral pleural surface  Tumor Extension: Hilar soft tissue  Bronchial Margin: no tumor seen  Vascular Margin: no tumor seen  Distance from Margin: distance of invasive tumor from closest margin: 0 1 cm  Closest margin: vascular margin  Neoadjuvant T staging: ypT2a  Neoadjuvant N staging: ypN0           1/27/2022 -  Chemotherapy    atezolizumab (TECENTRIQ) IVPB, 1,200 mg, Intravenous, Once, 2 of 6 cycles  Administration: 1,200 mg (1/27/2022), 1,200 mg (2/16/2022)     Cancer of upper lobe of left lung ([address] )   11/4/2021 Initial Diagnosis    Cancer of upper lobe of left lung (H[facility])         Interval history:    ROS: Review of Systems   Constitutional: Positive for fatigue  Negative for activity change, appetite change, chills, fever and unexpected weight change  HENT: Positive for hearing loss  Negative for congestion, mouth sores, nosebleeds, sore throat and trouble swallowing  Eyes: Negative  Respiratory: Positive for cough (mild)  Negative for chest tightness and shortness of breath  Cardiovascular: Negative for chest pain, palpitations and leg swelling  Gastrointestinal: Negative for abdominal distention, abdominal pain, blood in stool, constipation, diarrhea, nausea and vomiting     Genitourinary: Negative for difficulty urinating, dysuria, frequency, hematuria and urgency  Musculoskeletal: Positive for arthralgias, back pain and myalgias  Negative for gait problem and joint swelling  Skin: Positive for color change and rash  Negative for pallor  Neurological: Negative for dizziness, weakness, light-headedness, numbness and headaches  Hematological: Negative for adenopathy  Does not bruise/bleed easily  Psychiatric/Behavioral: Negative for dysphoric mood and sleep disturbance  The patient is not nervous/anxious  Past Medical History:   Diagnosis Date    Hyperlipidemia     Hypertension     Lung cancer [facility])        Past Surgical History:   Procedure Laterality Date    BACK SURGERY      HEMORRHOID SURGERY      IR BIOPSY LUNG  2021    IR PORT PLACEMENT  2021    LAMINECTOMY  2018    L4-L5    LUNG SURGERY      left upper lobectomy    CT BRONCHOSCOPY,DIAGNOSTIC N/A 2021    Procedure: BRONCHOSCOPY FLEXIBLE;  Surgeon: Neema Evans MD;  [facility];  Service: Thoracic    CT MEDIASTINOSCOPY WITH LYMPH NODE BIOPSY/IES N/A 2021    Procedure: MEDIASTINOSCOPY, flexible bronchoscopy;  Surgeon: Neema Evans MD;  [facility];  Service: Thoracic    TONSILLECTOMY         Social History     Socioeconomic History    Marital status: /Civil Union     Spouse name: None    Number of children: None    Years of education: None    Highest education level: None   Occupational History    None   Tobacco Use    Smoking status: Former Smoker     Packs/day: 1 00     Years: 40 00     Pack years: 40 00     Types: Cigarettes     Start date:      Quit date: 2017     Years since quittin 0    Smokeless tobacco: Never Used    Tobacco comment: quit 2017   Vaping Use    Vaping Use: Never used   Substance and Sexual Activity    Alcohol use:  Yes     Alcohol/week: 7 0 standard drinks     Types: 7 Cans of beer per week    Drug use: Yes     Types: Marijuana     Comment: seldom    Sexual activity: None   Other Topics Concern    None   Social History Narrative    None     Social Determinants of Health     Financial Resource Strain: Not on file   Food Insecurity: No Food Insecurity    Worried About Running Out of Food in the Last Year: Never true    Felecia of Food in the Last Year: Never true   Transportation Needs: No Transportation Needs    Lack of Transportation (Medical): No    Lack of Transportation (Non-Medical):  No   Physical Activity: Not on file   Stress: Not on file   Social Connections: Not on file   Intimate Partner Violence: Not on file   Housing Stability: Low Risk     Unable to Pay for Housing in the Last Year: No    Number of Places Lived in the Last Year: 1    Unstable Housing in the Last Year: No       Family History   Problem Relation Age of Onset    Nephrolithiasis Father        Allergies   Allergen Reactions    Bee Venom     Benazepril Other (See Comments)     Angioedema     Latex Other (See Comments)     Burning of eyes at the dentist from the gloves    Meloxicam GI Intolerance    Penicillin G Rash         Current Outpatient Medications:     allopurinol (ZYLOPRIM) 100 mg tablet, Take 1 tablet (100 mg total) by mouth daily, Disp: 30 tablet, Rfl: 5    amLODIPine (NORVASC) 10 mg tablet, TAKE ONE TABLET BY MOUTH EVERY DAY, Disp: 90 tablet, Rfl: 3    Ascorbic Acid (vitamin C) 1000 MG tablet, Take 1,000 mg by mouth daily  , Disp: , Rfl:     B Complex Vitamins (B COMPLEX 1 PO), Take 1 tablet by mouth daily , Disp: , Rfl:     B Complex Vitamins (BL VITAMIN B COMPLEX PO), Take by mouth, Disp: , Rfl:     betamethasone valerate (VALISONE) 0 1 % cream, Apply topically 2 (two) times a day (Patient taking differently: Apply topically as needed ), Disp: 45 g, Rfl: 1    betamethasone, augmented, (DIPROLENE-AF) 0 05 % cream, , Disp: , Rfl:     calcipotriene (DOVONOX) 0 005 % ointment, Apply topically 2 (two) times a day As needed, Disp: , Rfl:     Cholecalciferol (VITAMIN D3 PO), Take by mouth daily , Disp: , Rfl:     citalopram (CeleXA) 10 mg tablet, TAKE ONE TABLET BY MOUTH EVERY DAY, Disp: 30 tablet, Rfl: 3    doxepin (SINEquan) 25 mg capsule, TAKE ONE CAPSULE BY MOUTH EVERY DAY AT BEDTIME, Disp: 30 capsule, Rfl: 5    fluocinolone (SYNALAR) 0 01 % external solution,  , Disp: , Rfl:     Garlic 10 MG CAPS, Take 1 tablet by mouth daily , Disp: , Rfl:     Glucosamine HCl 1500 MG TABS, Take by mouth, Disp: , Rfl:     Glucosamine-Chondroit-Vit C-Mn (GLUCOSAMINE 1500 COMPLEX) CAPS, daily , Disp: , Rfl:     ibuprofen (MOTRIN) 400 mg tablet, , Disp: , Rfl:     ketoconazole (NIZORAL) 2 % cream, Apply topically 2 (two) times a day (Patient taking differently: Apply topically as needed ), Disp: 15 g, Rfl: 2    Multiple Vitamins-Minerals (MULTIVITAL-M) TABS, Take by mouth daily , Disp: , Rfl:     pantoprazole (PROTONIX) 40 mg tablet, Take 1 tablet (40 mg total) by mouth daily, Disp: 30 tablet, Rfl: 11    polyethylene glycol (GLYCOLAX) 17 GM/SCOOP powder, Take 17 g by mouth 2 (two) times a day, Disp: 850 g, Rfl: 0    rosuvastatin (CRESTOR) 10 MG tablet, TAKE ONE TABLET BY MOUTH EVERY DAY, Disp: 30 tablet, Rfl: 5    traMADol (ULTRAM) 50 mg tablet, TAKE ONE TABLET BY MOUTH EVERY 8 HOURS AS NEEDED FOR SEVERE PAIN, Disp: 30 tablet, Rfl: 0    colchicine (COLCRYS) 0 6 mg tablet, Take 1 tablet (0 6 mg total) by mouth 2 (two) times a day (Patient not taking: Reported on 2/15/2022 ), Disp: 30 tablet, Rfl: 5    Cyanocobalamin (Vitamin B 12) 500 MCG TABS, Take 1 tablet by mouth (Patient not taking: Reported on 1/18/2022 ), Disp: , Rfl:     folic acid (FOLVITE) 1 mg tablet, Take 1 tablet (1 mg total) by mouth daily (Patient not taking: Reported on 1/12/2022 ), Disp: 30 tablet, Rfl: 6    gabapentin (NEURONTIN) 100 mg capsule, , Disp: , Rfl:     levothyroxine (Synthroid) 25 mcg tablet, Take 1 tablet (25 mcg total) by mouth daily, Disp: 30 tablet, Rfl: 3    meclizine (ANTIVERT) 25 mg tablet, Take 1 tablet (25 mg total) by mouth 4 (four) times a day as needed for dizziness (Patient not taking: Reported on 1/12/2022 ), Disp: 30 tablet, Rfl: 0    MULTIPLE VITAMINS ESSENTIAL PO, Take by mouth (Patient not taking: Reported on 2/15/2022 ), Disp: , Rfl:     neomycin-polymyxin-hydrocortisone (CORTISPORIN) otic solution, Administer 4 drops into the left ear every 6 (six) hours (Patient not taking: Reported on 1/12/2022 ), Disp: 10 mL, Rfl: 0    ondansetron (ZOFRAN) 4 mg tablet, Take 1 tablet (4 mg total) by mouth every 6 (six) hours (Patient not taking: Reported on 1/18/2022 ), Disp: 12 tablet, Rfl: 0    ondansetron (ZOFRAN) 8 mg tablet, Take 1 tablet (8 mg total) by mouth every 8 (eight) hours as needed for nausea or vomiting (Patient not taking: Reported on 1/12/2022 ), Disp: 20 tablet, Rfl: 3    oxyCODONE (ROXICODONE) 5 immediate release tablet, , Disp: , Rfl:     predniSONE 20 mg tablet, Take 3 tablets (60 mg total) by mouth daily, Disp: 90 tablet, Rfl: 1    tamsulosin (FLOMAX) 0 4 mg, , Disp: , Rfl:       Physical Exam:  /84 (BP Location: Left arm, Patient Position: Sitting, Cuff Size: Adult)   Pulse 84   Temp (!) 96 7 °F (35 9 °C) (Tympanic)   Resp 18   Ht 5' 8" (1 727 m)   Wt 81 9 kg (180 lb 8 oz)   SpO2 98%   BMI 27 44 kg/m²     Physical Exam  Vitals reviewed  Constitutional:       General: He is not in acute distress  Appearance: He is well-developed  He is not diaphoretic  HENT:      Head: Normocephalic and atraumatic  Eyes:      General: Lids are normal  No scleral icterus  Conjunctiva/sclera: Conjunctivae normal       Pupils: Pupils are equal, round, and reactive to light  Neck:      Thyroid: No thyromegaly  Cardiovascular:      Rate and Rhythm: Normal rate and regular rhythm  Heart sounds: Normal heart sounds  No murmur heard  Pulmonary:      Effort: Pulmonary effort is normal  No respiratory distress  Chest:   Breasts:      Right: No axillary adenopathy        Left: No axillary adenopathy  Abdominal:      General: There is no distension  Palpations: Abdomen is soft  There is no hepatomegaly or splenomegaly  Tenderness: There is no abdominal tenderness  Hernia: A hernia is present  Musculoskeletal:         General: No swelling  Normal range of motion  Cervical back: Normal range of motion and neck supple  Lymphadenopathy:      Cervical: No cervical adenopathy  Upper Body:      Right upper body: No axillary adenopathy  Left upper body: No axillary adenopathy  Skin:     General: Skin is warm and dry  Coloration: Skin is not pale  Findings: Erythema and rash present  Comments: Diffuse red nummular lesions to bilateral outer thigh/ankle region, lower back both outer portion of abdomen  In addition scattered small round lesions noted to abdomen, bilateral lower extremities and hands (see below images)   Neurological:      General: No focal deficit present  Mental Status: He is alert and oriented to person, place, and time  Psychiatric:         Mood and Affect: Mood and affect normal          Behavior: Behavior normal  Behavior is cooperative  Thought Content: Thought content normal          Judgment: Judgment normal                        Labs:  Lab Results   Component Value Date    WBC 3 83 (L) 03/07/2022    HGB 12 5 03/07/2022    HCT 37 9 03/07/2022    MCV 93 03/07/2022     (L) 03/07/2022     Lab Results   Component Value Date    K 4 1 03/07/2022     03/07/2022    CO2 26 03/07/2022    BUN 20 03/07/2022    CREATININE 1 31 (H) 03/07/2022    GLUF 104 (H) 03/07/2022    CALCIUM 10 0 03/07/2022    CORRECTEDCA 10 2 (H) 08/04/2021    AST 21 03/07/2022    ALT 24 03/07/2022    ALKPHOS 89 03/07/2022    EGFR 55 03/07/2022       Patient voiced understanding and agreement in the above discussion  Aware to contact our office with questions/symptoms in the interim       This note has been generated by voice recognition software system  Therefore, there may be spelling, grammar, and or syntax errors  Please contact if questions arise

## 2022-03-12 DIAGNOSIS — M54.42 CHRONIC LEFT-SIDED LOW BACK PAIN WITH LEFT-SIDED SCIATICA: ICD-10-CM

## 2022-03-12 DIAGNOSIS — G89.29 CHRONIC LEFT-SIDED LOW BACK PAIN WITH LEFT-SIDED SCIATICA: ICD-10-CM

## 2022-03-14 RX ORDER — TRAMADOL HYDROCHLORIDE 50 MG/1
TABLET ORAL
Qty: 30 TABLET | Refills: 0 | Status: SHIPPED | OUTPATIENT
Start: 2022-03-14 | End: 2022-05-19

## 2022-03-22 ENCOUNTER — APPOINTMENT (OUTPATIENT)
Dept: LAB | Facility: CLINIC | Age: 67
End: 2022-03-22
Payer: MEDICARE

## 2022-03-22 DIAGNOSIS — C34.92 ADENOCARCINOMA, LUNG, LEFT (HCC): ICD-10-CM

## 2022-03-22 DIAGNOSIS — E06.4 DRUG-INDUCED THYROIDITIS: ICD-10-CM

## 2022-03-22 LAB
ALBUMIN SERPL BCP-MCNC: 3.6 G/DL (ref 3.5–5)
ALP SERPL-CCNC: 65 U/L (ref 46–116)
ALT SERPL W P-5'-P-CCNC: 40 U/L (ref 12–78)
ANION GAP SERPL CALCULATED.3IONS-SCNC: 5 MMOL/L (ref 4–13)
AST SERPL W P-5'-P-CCNC: 22 U/L (ref 5–45)
BASOPHILS # BLD AUTO: 0.01 THOUSANDS/ΜL (ref 0–0.1)
BASOPHILS NFR BLD AUTO: 0 % (ref 0–1)
BILIRUB SERPL-MCNC: 0.42 MG/DL (ref 0.2–1)
BUN SERPL-MCNC: 22 MG/DL (ref 5–25)
CALCIUM SERPL-MCNC: 9.4 MG/DL (ref 8.3–10.1)
CHLORIDE SERPL-SCNC: 104 MMOL/L (ref 100–108)
CO2 SERPL-SCNC: 30 MMOL/L (ref 21–32)
CREAT SERPL-MCNC: 1.3 MG/DL (ref 0.6–1.3)
CRP SERPL QL: <3 MG/L
EOSINOPHIL # BLD AUTO: 0.01 THOUSAND/ΜL (ref 0–0.61)
EOSINOPHIL NFR BLD AUTO: 0 % (ref 0–6)
ERYTHROCYTE [DISTWIDTH] IN BLOOD BY AUTOMATED COUNT: 19.9 % (ref 11.6–15.1)
ERYTHROCYTE [SEDIMENTATION RATE] IN BLOOD: 22 MM/HOUR (ref 0–19)
GFR SERPL CREATININE-BSD FRML MDRD: 56 ML/MIN/1.73SQ M
GLUCOSE P FAST SERPL-MCNC: 84 MG/DL (ref 65–99)
HCT VFR BLD AUTO: 41.9 % (ref 36.5–49.3)
HGB BLD-MCNC: 13.7 G/DL (ref 12–17)
IMM GRANULOCYTES # BLD AUTO: 0.06 THOUSAND/UL (ref 0–0.2)
IMM GRANULOCYTES NFR BLD AUTO: 1 % (ref 0–2)
LYMPHOCYTES # BLD AUTO: 1.15 THOUSANDS/ΜL (ref 0.6–4.47)
LYMPHOCYTES NFR BLD AUTO: 13 % (ref 14–44)
MAGNESIUM SERPL-MCNC: 2.5 MG/DL (ref 1.6–2.6)
MCH RBC QN AUTO: 31.4 PG (ref 26.8–34.3)
MCHC RBC AUTO-ENTMCNC: 32.7 G/DL (ref 31.4–37.4)
MCV RBC AUTO: 96 FL (ref 82–98)
MONOCYTES # BLD AUTO: 0.58 THOUSAND/ΜL (ref 0.17–1.22)
MONOCYTES NFR BLD AUTO: 6 % (ref 4–12)
NEUTROPHILS # BLD AUTO: 7.29 THOUSANDS/ΜL (ref 1.85–7.62)
NEUTS SEG NFR BLD AUTO: 80 % (ref 43–75)
NRBC BLD AUTO-RTO: 0 /100 WBCS
PLATELET # BLD AUTO: 148 THOUSANDS/UL (ref 149–390)
PMV BLD AUTO: 10.2 FL (ref 8.9–12.7)
POTASSIUM SERPL-SCNC: 3.9 MMOL/L (ref 3.5–5.3)
PROT SERPL-MCNC: 6.9 G/DL (ref 6.4–8.2)
RBC # BLD AUTO: 4.37 MILLION/UL (ref 3.88–5.62)
SODIUM SERPL-SCNC: 139 MMOL/L (ref 136–145)
T4 FREE SERPL-MCNC: 0.66 NG/DL (ref 0.76–1.46)
TSH SERPL DL<=0.05 MIU/L-ACNC: 8.92 UIU/ML (ref 0.36–3.74)
WBC # BLD AUTO: 9.1 THOUSAND/UL (ref 4.31–10.16)

## 2022-03-22 PROCEDURE — 36415 COLL VENOUS BLD VENIPUNCTURE: CPT

## 2022-03-22 PROCEDURE — 86140 C-REACTIVE PROTEIN: CPT

## 2022-03-22 PROCEDURE — 84439 ASSAY OF FREE THYROXINE: CPT

## 2022-03-22 PROCEDURE — 85025 COMPLETE CBC W/AUTO DIFF WBC: CPT

## 2022-03-22 PROCEDURE — 83735 ASSAY OF MAGNESIUM: CPT

## 2022-03-22 PROCEDURE — 80053 COMPREHEN METABOLIC PANEL: CPT

## 2022-03-22 PROCEDURE — 84443 ASSAY THYROID STIM HORMONE: CPT

## 2022-03-22 PROCEDURE — 85652 RBC SED RATE AUTOMATED: CPT

## 2022-03-23 ENCOUNTER — OFFICE VISIT (OUTPATIENT)
Dept: HEMATOLOGY ONCOLOGY | Facility: CLINIC | Age: 67
End: 2022-03-23
Payer: MEDICARE

## 2022-03-23 VITALS
BODY MASS INDEX: 27.58 KG/M2 | HEIGHT: 68 IN | TEMPERATURE: 97.6 F | HEART RATE: 83 BPM | RESPIRATION RATE: 18 BRPM | SYSTOLIC BLOOD PRESSURE: 124 MMHG | WEIGHT: 182 LBS | OXYGEN SATURATION: 99 % | DIASTOLIC BLOOD PRESSURE: 78 MMHG

## 2022-03-23 DIAGNOSIS — C34.92 ADENOCARCINOMA, LUNG, LEFT (HCC): Primary | ICD-10-CM

## 2022-03-23 DIAGNOSIS — E06.4 DRUG-INDUCED THYROIDITIS: ICD-10-CM

## 2022-03-23 DIAGNOSIS — L40.9 PSORIASIS: ICD-10-CM

## 2022-03-23 PROCEDURE — 99214 OFFICE O/P EST MOD 30 MIN: CPT | Performed by: INTERNAL MEDICINE

## 2022-03-23 RX ORDER — CLOBETASOL PROPIONATE 0.25 MG/G
CREAM TOPICAL
Status: ON HOLD | COMMUNITY
End: 2022-06-17 | Stop reason: CLARIF

## 2022-03-23 RX ORDER — ZINC GLUCONATE 50 MG
50 TABLET ORAL DAILY
Status: ON HOLD | COMMUNITY
End: 2022-06-17 | Stop reason: CLARIF

## 2022-03-23 RX ORDER — TRIAMCINOLONE ACETONIDE 1 MG/G
CREAM TOPICAL
Status: ON HOLD | COMMUNITY
Start: 2022-03-14 | End: 2022-06-17 | Stop reason: CLARIF

## 2022-03-23 RX ORDER — CALCIPOTRIENE 50 UG/G
CREAM TOPICAL
Status: ON HOLD | COMMUNITY
Start: 2022-03-14 | End: 2022-06-17 | Stop reason: CLARIF

## 2022-03-23 NOTE — PROGRESS NOTES
Hematology/Oncology Outpatient Follow-up  Carlos Jenkins 74 y o  male 1955 [id number]    Date:  3/23/2022        Assessment and Plan:  1  Adenocarcinoma, lung, left ([address] )  The patient had significant flare ups of the psoriasis with generalized skin rash after 2 cycles of adjuvant atezolizumab which was put on hold after the 2nd cycle  The patient continues to be on high-dose prednisone 60 mg once a day  I did advise him to decrease the dose to 40 mg once a day for a week then 30 mg for a week then 20 mg for a week then 10 mg  We discussed rechallenging him with the adjuvant immunotherapy again while on the lowest dose of prednisone to see if the psoriasis will will reappear or will be under control while on low-dose prednisone  The patient agreed with the plan  - CBC and differential; Future  - Comprehensive metabolic panel; Future  - Magnesium; Future  - TSH, 3rd generation with Free T4 reflex; Future    2  Psoriasis  As above  3  Drug-induced thyroiditis  He is on levothyroxine 25 mcg  The dose may need to be increased if he continues to have sluggish showed thyroid function due to immunotherapy   - TSH, 3rd generation with Free T4 reflex; Future        HPI:  The patient came today for follow-up visit accompanied by his wife  He apparently had significant flare-up of his psoriasis after 2 cycles of adjuvant atezolizumab  The immunotherapy was put on hold  The patient was then started on high-dose steroids of 60 mg once a day which he has been taking for the last 2 weeks  He is complaining today about significant side effects related to the prednisone including irritability, insomnia and weight gain  The skin rash has improved significantly on the high-dose steroids  He was seen also by the dermatologist who suggested a cream for his scan  Most recent blood work on 03/22/2022 showed hemoglobin of 13 7 with white cell count of 9 1 and platelet count of 245    White cell differential is within normal range  Magnesium 2 5  TSH 8 9   C-reactive protein was normal with sed rate of 22  Creatinine was 1 3 with normal calcium liver enzymes  Oncology History Overview Note   Patient with history of hypertension and hyperlipidemia  He also had extensive history of smoking for 40 years or more  He quit smoking  In 2017  The patient apparently had low-dose lung CT scans for screening since he was heavy smoker on 04/08/2021  The CT scan of fortunately revealed 6 2 cm mass in the paramediastinal left upper lobe with COPD features  Subsequently, he had a PET-CT scan on 05/04/2021 which showed:  IMPRESSION:  1  Lobulated left upper paramediastinal lung mass extending to the left suprahilar region measuring 5 2 x 6 5 x 6 cm demonstrates intense FDG activity, most concerning for malignancy  Adjacent interstitial thickening and groundglass densities may   represent tumor infiltration  Tissue sampling recommended  2   Mildly hypermetabolic AP window mediastinal nodes concerning for early metastases  3   Additional mildly hypermetabolic branching nodular density in the left upper posterior lung may be inflammatory/infectious versus additional site of tumor  4   No hypermetabolic metastases in the neck, abdomen, pelvis  A biopsy of the left lung mass was done on 05/06/2021 which showed:   Poorly differentiated non-small cell carcinoma, favor adenocarcinoma, with neuroendocrine differentiation of uncertain clinical significance  The patient then underwent a mediastinoscopy on 05/25/2021 for staging  Multiple mediastinal lymph node from different levels were excised all lymph nodes came back negative for metastatic disease  The patient was felt to be a surgical candidate and was sent to us to consider  Neoadjuvant chemotherapy      He underwent a left upper lobectomy and mediastinal lymph node dissection on 11/17/2021 at [facility] after he completed 4 cycles worth of neoadjuvant chemotherapy with Alimta and carboplatin which was done by August 2021  His final pathology showed a ypT2a N0 M0 poorly differentiated non-small, large cell neuroendocrine tumor  This was an R0 resection but the margin near the mediastinal fat is close  In addition, in the left upper lobe he had a separate stage I squamous cell carcinoma  He was seen by the Radiation Oncology team postop who pursued radiation treatment to the mediastinum x25 fractions completed 2/22/22 since he had close margin which was recommended by the thoracic surgical team at [facility]  He was educated about the recent data regarding the potential benefit of adjuvant immunotherapy with atezolizumab post chemotherapy for stage II and III resected non-small cell lung cancer which showed disease-free survival benefit specially with the pathology of PDL1 of 1% or more  Was started on the atezolizumab at the dose of 1200 mg on every 3 week basis 1/27/22 with goal of 1 year worth adjuvant treatment to decrease the chance of recurrence  Adenocarcinoma, lung, left ([facility] )   5/6/2021 Biopsy    [facility]  FINAL DIAGNOSIS     Lung, Left Upper Lobe, core Biopsy ([id number], 5/6/2021):    - Poorly differentiated non-small cell carcinoma, favor adenocarcinoma, with neuroendocrine differentiation of uncertain clinical significance (see letter below)  5/25/2021 Biopsy    A  Lymph Node, 4R, Excision:  - Two reactive lymph nodes with anthracosis (0/2)  B  Lymph Node, 2R, Excision:  - Two reactive lymph nodes with anthracosis (0/2)  C  Lymph Node, 2L, Excision:  - Three reactive lymph nodes with anthracosis (0/3)  D  Lymph Node, 4L, Excision:  - Two reactive lymph nodes with anthracosis (0/2)  E  Lymph Node, Level 7, Excision:  - Two reactive lymph nodes with anthracosis (0/2)              6/9/2021 Initial Diagnosis    Adenocarcinoma, lung, left ([facility] [address] )     6/25/2021 - 8/27/2021 Chemotherapy    cyanocobalamin, 1,000 mcg, Intramuscular, Once, 2 of 3 cycles  Administration: 1,000 mcg (8/6/2021), 1,000 mcg (6/25/2021)  pegfilgrastim (Sherol Molt), 6 mg, Subcutaneous, Once, 3 of 5 cycles  Administration: 6 mg (7/16/2021), 6 mg (8/6/2021), 6 mg (8/27/2021)  fosaprepitant (EMEND) IVPB, 150 mg, Intravenous, Once, 4 of 6 cycles  Administration: 150 mg (6/25/2021), 150 mg (8/6/2021), 150 mg (8/27/2021), 150 mg (7/16/2021)  CARBOplatin (PARAPLATIN) IVPB ([facility] [place] DOSING), 496 mg, Intravenous, Once, 4 of 6 cycles  Administration: 496 mg (6/25/2021), 492 5 mg (8/6/2021), 462 mg (8/27/2021), 516 mg (7/16/2021)  pemetrexed (ALIMTA) chemo infusion, 975 mg, Intravenous, Once, 4 of 6 cycles  Administration: 1,000 mg (6/25/2021), 1,000 mg (8/6/2021), 1,000 mg (8/27/2021), 1,000 mg (7/16/2021)     12/16/2021 Surgery    Left Upper Lung Lobectomy at [facility] by Dr Henrry Crowder  Tumor size: 7 6 cm Percentage of viable tumor: 40%  Histologic Grade: Undifferentiated (G$)  Lymphovascular Invasion: Present (extensive)  Perineural Invasion: Not identified  Spread through Air Spaces: Present  Pleural Invasion: tumor invades to the visceral pleural surface  Tumor Extension: Hilar soft tissue  Bronchial Margin: no tumor seen  Vascular Margin: no tumor seen  Distance from Margin: distance of invasive tumor from closest margin: 0 1 cm  Closest margin: vascular margin  Neoadjuvant T staging: ypT2a  Neoadjuvant N staging: ypN0           1/27/2022 -  Chemotherapy    atezolizumab (TECENTRIQ) IVPB, 1,200 mg, Intravenous, Once, 2 of 6 cycles  Administration: 1,200 mg (1/27/2022), 1,200 mg (2/16/2022)     Cancer of upper lobe of left lung ([address] )   11/4/2021 Initial Diagnosis    Cancer of upper lobe of left lung (HCC)         Interval history:    ROS: Review of Systems   Constitutional: Positive for fatigue  Negative for chills and fever  HENT: Positive for hearing loss  Negative for ear pain and sore throat      Eyes: Negative for pain and visual disturbance  Respiratory: Positive for cough  Negative for shortness of breath  Cardiovascular: Negative for chest pain and palpitations  Gastrointestinal: Negative for abdominal pain and vomiting  Genitourinary: Negative for dysuria and hematuria  Musculoskeletal: Negative for arthralgias and back pain  Skin: Negative for color change and rash  Neurological: Positive for dizziness  Negative for seizures and syncope  Psychiatric/Behavioral: Positive for sleep disturbance  All other systems reviewed and are negative  Past Medical History:   Diagnosis Date    Hyperlipidemia     Hypertension     Lung cancer [facility])        Past Surgical History:   Procedure Laterality Date    BACK SURGERY      HEMORRHOID SURGERY      IR BIOPSY LUNG  2021    IR PORT PLACEMENT  2021    LAMINECTOMY  2018    L4-L5    LUNG SURGERY      left upper lobectomy    DE BRONCHOSCOPY,DIAGNOSTIC N/A 2021    Procedure: BRONCHOSCOPY FLEXIBLE;  Surgeon: Tash Harris MD;  Location: [facility];  Service: Thoracic    DE MEDIASTINOSCOPY WITH LYMPH NODE BIOPSY/IES N/A 2021    Procedure: MEDIASTINOSCOPY, flexible bronchoscopy;  Surgeon: Tash Harris MD;  [facility];  Service: Thoracic    TONSILLECTOMY         Social History     Socioeconomic History    Marital status: /Civil Union     Spouse name: None    Number of children: None    Years of education: None    Highest education level: None   Occupational History    None   Tobacco Use    Smoking status: Former Smoker     Packs/day: 1 00     Years: 40 00     Pack years: 40 00     Types: Cigarettes     Start date:      Quit date: 2017     Years since quittin 1    Smokeless tobacco: Never Used    Tobacco comment: quit 2017   Vaping Use    Vaping Use: Never used   Substance and Sexual Activity    Alcohol use:  Yes     Alcohol/week: 7 0 standard drinks     Types: 7 Cans of beer per week    Drug use: Yes     Types: Marijuana     Comment: seldom    Sexual activity: None   Other Topics Concern    None   Social History Narrative    None     Social Determinants of Health     Financial Resource Strain: Not on file   Food Insecurity: No Food Insecurity    Worried About Running Out of Food in the Last Year: Never true    Felecia of Food in the Last Year: Never true   Transportation Needs: No Transportation Needs    Lack of Transportation (Medical): No    Lack of Transportation (Non-Medical):  No   Physical Activity: Not on file   Stress: Not on file   Social Connections: Not on file   Intimate Partner Violence: Not on file   Housing Stability: Low Risk     Unable to Pay for Housing in the Last Year: No    Number of Places Lived in the Last Year: 1    Unstable Housing in the Last Year: No       Family History   Problem Relation Age of Onset    Nephrolithiasis Father        Allergies   Allergen Reactions    Bee Venom     Benazepril Other (See Comments)     Angioedema     Latex Other (See Comments)     Burning of eyes at the dentist from the gloves    Meloxicam GI Intolerance    Penicillin G Rash         Current Outpatient Medications:     allopurinol (ZYLOPRIM) 100 mg tablet, Take 1 tablet (100 mg total) by mouth daily, Disp: 30 tablet, Rfl: 5    amLODIPine (NORVASC) 10 mg tablet, TAKE ONE TABLET BY MOUTH EVERY DAY, Disp: 90 tablet, Rfl: 3    Ascorbic Acid (vitamin C) 1000 MG tablet, Take 1,000 mg by mouth daily  , Disp: , Rfl:     B Complex Vitamins (B COMPLEX 1 PO), Take 1 tablet by mouth daily , Disp: , Rfl:     B Complex Vitamins (BL VITAMIN B COMPLEX PO), Take by mouth, Disp: , Rfl:     betamethasone valerate (VALISONE) 0 1 % cream, Apply topically 2 (two) times a day (Patient taking differently: Apply topically as needed ), Disp: 45 g, Rfl: 1    betamethasone, augmented, (DIPROLENE-AF) 0 05 % cream, , Disp: , Rfl:     calcipotriene (DOVONOX) 0 005 % ointment, Apply topically 2 (two) times a day As needed, Disp: , Rfl:     Cholecalciferol (VITAMIN D3 PO), Take 10,000 Units by mouth daily  , Disp: , Rfl:     citalopram (CeleXA) 10 mg tablet, TAKE ONE TABLET BY MOUTH EVERY DAY, Disp: 30 tablet, Rfl: 3    Clobetasol Propionate (Impoyz) 0 025 % CREA, Apply topically, Disp: , Rfl:     doxepin (SINEquan) 25 mg capsule, TAKE ONE CAPSULE BY MOUTH EVERY DAY AT BEDTIME, Disp: 30 capsule, Rfl: 5    fluocinolone (SYNALAR) 0 01 % external solution,  , Disp: , Rfl:     Garlic 10 MG CAPS, Take 1 tablet by mouth daily , Disp: , Rfl:     Glucosamine HCl 1500 MG TABS, Take by mouth, Disp: , Rfl:     Glucosamine-Chondroit-Vit C-Mn (GLUCOSAMINE 1500 COMPLEX) CAPS, daily , Disp: , Rfl:     ibuprofen (MOTRIN) 400 mg tablet, , Disp: , Rfl:     ketoconazole (NIZORAL) 2 % cream, Apply topically 2 (two) times a day (Patient taking differently: Apply topically as needed ), Disp: 15 g, Rfl: 2    levothyroxine (Synthroid) 25 mcg tablet, Take 1 tablet (25 mcg total) by mouth daily, Disp: 30 tablet, Rfl: 3    Multiple Vitamins-Minerals (MULTIVITAL-M) TABS, Take by mouth daily , Disp: , Rfl:     pantoprazole (PROTONIX) 40 mg tablet, Take 1 tablet (40 mg total) by mouth daily, Disp: 30 tablet, Rfl: 11    polyethylene glycol (GLYCOLAX) 17 GM/SCOOP powder, Take 17 g by mouth 2 (two) times a day, Disp: 850 g, Rfl: 0    predniSONE 20 mg tablet, Take 3 tablets (60 mg total) by mouth daily, Disp: 90 tablet, Rfl: 1    rosuvastatin (CRESTOR) 10 MG tablet, TAKE ONE TABLET BY MOUTH EVERY DAY, Disp: 30 tablet, Rfl: 5    traMADol (ULTRAM) 50 mg tablet, TAKE 1 TABLET BY MOUTH EVERY 8 HOURS AS NEEDED FOR SEVERE PAIN, Disp: 30 tablet, Rfl: 0    triamcinolone (KENALOG) 0 1 % cream, , Disp: , Rfl:     zinc gluconate 50 mg tablet, Take 50 mg by mouth daily, Disp: , Rfl:     calcipotriene (DOVONEX) 0 005 % cream, , Disp: , Rfl:     colchicine (COLCRYS) 0 6 mg tablet, Take 1 tablet (0 6 mg total) by mouth 2 (two) times a day (Patient not taking: Reported on 2/15/2022 ), Disp: 30 tablet, Rfl: 5    Cyanocobalamin (Vitamin B 12) 500 MCG TABS, Take 1 tablet by mouth (Patient not taking: Reported on 3/23/2022 ), Disp: , Rfl:     folic acid (FOLVITE) 1 mg tablet, Take 1 tablet (1 mg total) by mouth daily (Patient not taking: Reported on 1/12/2022 ), Disp: 30 tablet, Rfl: 6    gabapentin (NEURONTIN) 100 mg capsule, , Disp: , Rfl:     meclizine (ANTIVERT) 25 mg tablet, Take 1 tablet (25 mg total) by mouth 4 (four) times a day as needed for dizziness (Patient not taking: Reported on 1/12/2022 ), Disp: 30 tablet, Rfl: 0    MULTIPLE VITAMINS ESSENTIAL PO, Take by mouth (Patient not taking: Reported on 2/15/2022 ), Disp: , Rfl:     neomycin-polymyxin-hydrocortisone (CORTISPORIN) otic solution, Administer 4 drops into the left ear every 6 (six) hours (Patient not taking: Reported on 1/12/2022 ), Disp: 10 mL, Rfl: 0    ondansetron (ZOFRAN) 4 mg tablet, Take 1 tablet (4 mg total) by mouth every 6 (six) hours (Patient not taking: Reported on 1/18/2022 ), Disp: 12 tablet, Rfl: 0    ondansetron (ZOFRAN) 8 mg tablet, Take 1 tablet (8 mg total) by mouth every 8 (eight) hours as needed for nausea or vomiting (Patient not taking: Reported on 1/12/2022 ), Disp: 20 tablet, Rfl: 3    oxyCODONE (ROXICODONE) 5 immediate release tablet, , Disp: , Rfl:     tamsulosin (FLOMAX) 0 4 mg, , Disp: , Rfl:       Physical Exam:  /78 (BP Location: Left arm, Patient Position: Sitting, Cuff Size: Adult)   Pulse 83   Temp 97 6 °F (36 4 °C)   Resp 18   Ht 5' 8" (1 727 m)   Wt 82 6 kg (182 lb)   SpO2 99%   BMI 27 67 kg/m²     Physical Exam  Constitutional:       Appearance: He is well-developed  HENT:      Head: Normocephalic and atraumatic  Eyes:      General: No scleral icterus  Right eye: No discharge  Left eye: No discharge  Conjunctiva/sclera: Conjunctivae normal       Pupils: Pupils are equal, round, and reactive to light  Neck:      Thyroid: No thyromegaly  Trachea: No tracheal deviation  Cardiovascular:      Rate and Rhythm: Normal rate and regular rhythm  Heart sounds: Normal heart sounds  No murmur heard  No friction rub  Pulmonary:      Effort: Pulmonary effort is normal  No respiratory distress  Breath sounds: Normal breath sounds  No wheezing or rales  Chest:      Chest wall: No tenderness  Abdominal:      General: There is no distension  Palpations: Abdomen is soft  There is no hepatomegaly or splenomegaly  Tenderness: There is no abdominal tenderness  There is no guarding or rebound  Musculoskeletal:         General: No tenderness or deformity  Normal range of motion  Cervical back: Normal range of motion and neck supple  Lymphadenopathy:      Cervical: No cervical adenopathy  Skin:     General: Skin is warm and dry  Coloration: Skin is not pale  Findings: Rash present  No erythema  Neurological:      Mental Status: He is alert and oriented to person, place, and time  Cranial Nerves: No cranial nerve deficit  Coordination: Coordination normal       Deep Tendon Reflexes: Reflexes are normal and symmetric  Psychiatric:         Behavior: Behavior normal          Thought Content: Thought content normal          Judgment: Judgment normal            Labs:  Lab Results   Component Value Date    WBC 9 10 03/22/2022    HGB 13 7 03/22/2022    HCT 41 9 03/22/2022    MCV 96 03/22/2022     (L) 03/22/2022     Lab Results   Component Value Date    K 3 9 03/22/2022     03/22/2022    CO2 30 03/22/2022    BUN 22 03/22/2022    CREATININE 1 30 03/22/2022    GLUF 84 03/22/2022    CALCIUM 9 4 03/22/2022    CORRECTEDCA 10 2 (H) 08/04/2021    AST 22 03/22/2022    ALT 40 03/22/2022    ALKPHOS 65 03/22/2022    EGFR 56 03/22/2022     No results found for: TSH    Patient voiced understanding and agreement in the above discussion   Aware to contact our office with questions/symptoms in the interim

## 2022-03-29 ENCOUNTER — TELEPHONE (OUTPATIENT)
Dept: INFUSION CENTER | Facility: HOSPITAL | Age: 67
End: 2022-03-29

## 2022-03-29 NOTE — TELEPHONE ENCOUNTER
Patient canceled appointment on: 3/28/22    Time call received: 3:19 p m      Telephone cancellation encounter routed to provider: Susie Boothe aware    Patient marked no show on snap board:    Appointment unlinked:    Appointment rescheduled: Patient spoke with office due to high dose of steroids tx on hold

## 2022-04-02 ENCOUNTER — APPOINTMENT (EMERGENCY)
Dept: CT IMAGING | Facility: HOSPITAL | Age: 67
End: 2022-04-02
Payer: MEDICARE

## 2022-04-02 ENCOUNTER — HOSPITAL ENCOUNTER (EMERGENCY)
Facility: HOSPITAL | Age: 67
End: 2022-04-02
Attending: EMERGENCY MEDICINE | Admitting: EMERGENCY MEDICINE
Payer: MEDICARE

## 2022-04-02 ENCOUNTER — HOSPITAL ENCOUNTER (INPATIENT)
Facility: HOSPITAL | Age: 67
LOS: 9 days | Discharge: HOME | DRG: 826 | End: 2022-04-11
Attending: STUDENT IN AN ORGANIZED HEALTH CARE EDUCATION/TRAINING PROGRAM | Admitting: STUDENT IN AN ORGANIZED HEALTH CARE EDUCATION/TRAINING PROGRAM
Payer: MEDICARE

## 2022-04-02 VITALS
HEART RATE: 74 BPM | DIASTOLIC BLOOD PRESSURE: 80 MMHG | RESPIRATION RATE: 20 BRPM | SYSTOLIC BLOOD PRESSURE: 142 MMHG | BODY MASS INDEX: 27.82 KG/M2 | WEIGHT: 182.98 LBS | TEMPERATURE: 97.2 F | OXYGEN SATURATION: 98 %

## 2022-04-02 DIAGNOSIS — I62.9 INTRACRANIAL HEMORRHAGE (HCC): ICD-10-CM

## 2022-04-02 DIAGNOSIS — I61.9 HEMORRHAGIC STROKE (HCC): ICD-10-CM

## 2022-04-02 DIAGNOSIS — E03.9 HYPOTHYROIDISM: ICD-10-CM

## 2022-04-02 DIAGNOSIS — C34.12 CANCER OF UPPER LOBE OF LEFT LUNG (HCC): ICD-10-CM

## 2022-04-02 DIAGNOSIS — C34.92 ADENOCARCINOMA, LUNG, LEFT (HCC): Primary | ICD-10-CM

## 2022-04-02 DIAGNOSIS — D69.6 THROMBOCYTOPENIA (HCC): ICD-10-CM

## 2022-04-02 DIAGNOSIS — G93.40 ACUTE ENCEPHALOPATHY: Primary | ICD-10-CM

## 2022-04-02 PROBLEM — E06.4 DRUG-INDUCED THYROIDITIS: Status: ACTIVE | Noted: 2022-01-01

## 2022-04-02 PROBLEM — L40.9 PSORIASIS: Chronic | Status: ACTIVE | Noted: 2022-01-01

## 2022-04-02 PROBLEM — F41.8 DEPRESSION WITH ANXIETY: Chronic | Status: ACTIVE | Noted: 2017-11-06

## 2022-04-02 PROBLEM — E06.4 DRUG-INDUCED THYROIDITIS: Chronic | Status: ACTIVE | Noted: 2022-01-01

## 2022-04-02 PROBLEM — E78.2 MIXED HYPERLIPIDEMIA: Chronic | Status: ACTIVE | Noted: 2019-03-20

## 2022-04-02 PROBLEM — I10 ESSENTIAL HYPERTENSION: Chronic | Status: ACTIVE | Noted: 2017-11-06

## 2022-04-02 LAB
ALBUMIN SERPL BCP-MCNC: 4.1 G/DL (ref 3.5–5)
ALP SERPL-CCNC: 55 U/L (ref 34–104)
ALT SERPL W P-5'-P-CCNC: 36 U/L (ref 7–52)
AMMONIA PLAS-SCNC: 27 UMOL/L (ref 18–72)
ANION GAP SERPL CALCULATED.3IONS-SCNC: 9 MMOL/L (ref 4–13)
APTT PPP: 30 SECONDS (ref 23–37)
AST SERPL W P-5'-P-CCNC: 24 U/L (ref 13–39)
BASE EX.OXY STD BLDV CALC-SCNC: 53.2 % (ref 60–80)
BASE EXCESS BLDV CALC-SCNC: 1.1 MMOL/L
BILIRUB SERPL-MCNC: 0.49 MG/DL (ref 0.2–1)
BUN SERPL-MCNC: 25 MG/DL (ref 5–25)
CALCIUM SERPL-MCNC: 9.9 MG/DL (ref 8.4–10.2)
CARDIAC TROPONIN I PNL SERPL HS: 6 NG/L
CHLORIDE SERPL-SCNC: 99 MMOL/L (ref 96–108)
CO2 SERPL-SCNC: 28 MMOL/L (ref 21–32)
CREAT SERPL-MCNC: 1.32 MG/DL (ref 0.6–1.3)
ERYTHROCYTE [DISTWIDTH] IN BLOOD BY AUTOMATED COUNT: 19.4 % (ref 11.6–15.1)
FLUAV RNA RESP QL NAA+PROBE: NEGATIVE
FLUBV RNA RESP QL NAA+PROBE: NEGATIVE
GFR SERPL CREATININE-BSD FRML MDRD: 55 ML/MIN/1.73SQ M
GLUCOSE SERPL-MCNC: 96 MG/DL (ref 65–140)
HCO3 BLDV-SCNC: 26.6 MMOL/L (ref 24–30)
HCT VFR BLD AUTO: 46.2 % (ref 36.5–49.3)
HGB BLD-MCNC: 15 G/DL (ref 12–17)
INR PPP: 0.9 (ref 0.84–1.19)
MCH RBC QN AUTO: 32.1 PG (ref 26.8–34.3)
MCHC RBC AUTO-ENTMCNC: 32.5 G/DL (ref 31.4–37.4)
MCV RBC AUTO: 99 FL (ref 82–98)
O2 CT BLDV-SCNC: 11.8 ML/DL
PCO2 BLDV: 44.9 MM HG (ref 42–50)
PH BLDV: 7.39 [PH] (ref 7.3–7.4)
PLATELET # BLD AUTO: 123 THOUSANDS/UL (ref 149–390)
PMV BLD AUTO: 8.6 FL (ref 8.9–12.7)
PO2 BLDV: 27.9 MM HG (ref 35–45)
POTASSIUM SERPL-SCNC: 4.1 MMOL/L (ref 3.5–5.3)
PROT SERPL-MCNC: 7.1 G/DL (ref 6.4–8.4)
PROTHROMBIN TIME: 12.1 SECONDS (ref 11.6–14.5)
RBC # BLD AUTO: 4.67 MILLION/UL (ref 3.88–5.62)
RSV RNA RESP QL NAA+PROBE: NEGATIVE
SARS-COV-2 RNA RESP QL NAA+PROBE: NEGATIVE
SODIUM SERPL-SCNC: 136 MMOL/L (ref 135–147)
TSH SERPL DL<=0.05 MIU/L-ACNC: 6.16 UIU/ML (ref 0.45–5.33)
WBC # BLD AUTO: 9.76 THOUSAND/UL (ref 4.31–10.16)

## 2022-04-02 PROCEDURE — 70498 CT ANGIOGRAPHY NECK: CPT

## 2022-04-02 PROCEDURE — 96365 THER/PROPH/DIAG IV INF INIT: CPT

## 2022-04-02 PROCEDURE — 96375 TX/PRO/DX INJ NEW DRUG ADDON: CPT

## 2022-04-02 PROCEDURE — 70496 CT ANGIOGRAPHY HEAD: CPT

## 2022-04-02 PROCEDURE — 82948 REAGENT STRIP/BLOOD GLUCOSE: CPT

## 2022-04-02 PROCEDURE — 84443 ASSAY THYROID STIM HORMONE: CPT | Performed by: EMERGENCY MEDICINE

## 2022-04-02 PROCEDURE — 0241U HB NFCT DS VIR RESP RNA 4 TRGT: CPT | Performed by: EMERGENCY MEDICINE

## 2022-04-02 PROCEDURE — 85730 THROMBOPLASTIN TIME PARTIAL: CPT | Performed by: EMERGENCY MEDICINE

## 2022-04-02 PROCEDURE — 99285 EMERGENCY DEPT VISIT HI MDM: CPT

## 2022-04-02 PROCEDURE — 82140 ASSAY OF AMMONIA: CPT | Performed by: EMERGENCY MEDICINE

## 2022-04-02 PROCEDURE — 84484 ASSAY OF TROPONIN QUANT: CPT | Performed by: EMERGENCY MEDICINE

## 2022-04-02 PROCEDURE — 82805 BLOOD GASES W/O2 SATURATION: CPT | Performed by: EMERGENCY MEDICINE

## 2022-04-02 PROCEDURE — 84439 ASSAY OF FREE THYROXINE: CPT | Performed by: EMERGENCY MEDICINE

## 2022-04-02 PROCEDURE — 99291 CRITICAL CARE FIRST HOUR: CPT | Performed by: PHYSICIAN ASSISTANT

## 2022-04-02 PROCEDURE — 80053 COMPREHEN METABOLIC PANEL: CPT | Performed by: EMERGENCY MEDICINE

## 2022-04-02 PROCEDURE — 85610 PROTHROMBIN TIME: CPT | Performed by: EMERGENCY MEDICINE

## 2022-04-02 PROCEDURE — 93005 ELECTROCARDIOGRAM TRACING: CPT

## 2022-04-02 PROCEDURE — 85027 COMPLETE CBC AUTOMATED: CPT | Performed by: EMERGENCY MEDICINE

## 2022-04-02 PROCEDURE — 99291 CRITICAL CARE FIRST HOUR: CPT | Performed by: EMERGENCY MEDICINE

## 2022-04-02 PROCEDURE — 36415 COLL VENOUS BLD VENIPUNCTURE: CPT | Performed by: EMERGENCY MEDICINE

## 2022-04-02 PROCEDURE — G1004 CDSM NDSC: HCPCS

## 2022-04-02 RX ORDER — DEXAMETHASONE SODIUM PHOSPHATE 4 MG/ML
4 INJECTION, SOLUTION INTRA-ARTICULAR; INTRALESIONAL; INTRAMUSCULAR; INTRAVENOUS; SOFT TISSUE EVERY 6 HOURS SCHEDULED
Status: DISCONTINUED | OUTPATIENT
Start: 2022-04-03 | End: 2022-04-07

## 2022-04-02 RX ORDER — NICARDIPINE HYDROCHLORIDE 0.1 MG/ML
1-15 INJECTION INTRAVENOUS
Status: DISCONTINUED | OUTPATIENT
Start: 2022-04-02 | End: 2022-04-02 | Stop reason: HOSPADM

## 2022-04-02 RX ORDER — SODIUM CHLORIDE 9 MG/ML
125 INJECTION, SOLUTION INTRAVENOUS CONTINUOUS
Status: DISCONTINUED | OUTPATIENT
Start: 2022-04-02 | End: 2022-04-03

## 2022-04-02 RX ORDER — LABETALOL 20 MG/4 ML (5 MG/ML) INTRAVENOUS SYRINGE
10 ONCE
Status: COMPLETED | OUTPATIENT
Start: 2022-04-02 | End: 2022-04-02

## 2022-04-02 RX ORDER — ONDANSETRON 2 MG/ML
4 INJECTION INTRAMUSCULAR; INTRAVENOUS EVERY 6 HOURS PRN
Status: DISCONTINUED | OUTPATIENT
Start: 2022-04-02 | End: 2022-04-07 | Stop reason: ALTCHOICE

## 2022-04-02 RX ORDER — LABETALOL 20 MG/4 ML (5 MG/ML) INTRAVENOUS SYRINGE
10 EVERY 4 HOURS PRN
Status: DISCONTINUED | OUTPATIENT
Start: 2022-04-02 | End: 2022-04-04

## 2022-04-02 RX ADMIN — LABETALOL HYDROCHLORIDE 10 MG: 5 INJECTION, SOLUTION INTRAVENOUS at 20:27

## 2022-04-02 RX ADMIN — NICARDIPINE HYDROCHLORIDE 5 MG/HR: 0.1 INJECTION INTRAVENOUS at 20:43

## 2022-04-02 RX ADMIN — LEVETIRACETAM 500 MG: 100 INJECTION INTRAVENOUS at 23:33

## 2022-04-02 RX ADMIN — LABETALOL HYDROCHLORIDE 10 MG: 5 INJECTION, SOLUTION INTRAVENOUS at 20:00

## 2022-04-02 RX ADMIN — NICARDIPINE HYDROCHLORIDE 6 MG/HR: 25 INJECTION, SOLUTION INTRAVENOUS at 23:30

## 2022-04-02 RX ADMIN — IOHEXOL 85 ML: 350 INJECTION, SOLUTION INTRAVENOUS at 19:47

## 2022-04-02 RX ADMIN — SODIUM CHLORIDE 125 ML/HR: 0.9 INJECTION, SOLUTION INTRAVENOUS at 23:32

## 2022-04-02 NOTE — QUICK NOTE
Stroke alert activated by Dr Ferny Callaway in the [facility] ED ([phone]) at [address] via the emergency   x    74 man with active lung CA, on chemotherapy, and psoriasis, presenting with apraxia and aphasia  He began exhibit odd behaviors about 24h ago  His wife reports she noticed him putting shoes onto the wrong foot, and was unable to feed himself over last 24h  About 1 hour prior to presentation, he developed worsening language dysfunction, and was not speaking, not following commands  On exam:   BP: 176/85  Awake, alert  Not attending  Not following commands, and not speaking  Not naming objects  No facial   EOM intact  R arm drift  Lower extremities no drift  Withdrawing all extremities to noxious stimulus  His gait was normal       NIHSS = 8    CTH images reviewed: L parietal hemorrhage  28 CC approximate volume  Has surrounding vasogenic edema  CTA H&N images reviewed: R vertebral artery nearly occlusive stenosis at the origin  R proximal ICA about 70% stenosis at the bifurcation  Emphesymatous changes in the lungs  AP: 74year old man with lung CA on chemotherapy, COPD, carotid stenosis, psoriasis, presenting with aphasia  NIHSS 8  Baseline MRS reportedly a 0  His CTH shows a hemorrhage in the L parietal cortex, with surrounding vasogenic edema  The CTA shows atherosclerotic changes on the R ICA and the R vertebral artery  Possible hemorrhagic metastatic lesion, given the imaging characteristics  No TPA given the hemorrhage  No indication for interventional procedure  Would lower his BP to 140/100  Neurosurgical/neurocritical care evaluation urgently  Reportedly patient not on any antiplatelets or anticoagulation  No indication for reversal agents at this point  Would obtain MRI w/wo contrast to assess for underlying, and satellite lesions when medically appropriate  Goals of care discussion

## 2022-04-03 ENCOUNTER — APPOINTMENT (INPATIENT)
Dept: RADIOLOGY | Facility: HOSPITAL | Age: 67
DRG: 826 | End: 2022-04-03
Payer: MEDICARE

## 2022-04-03 ENCOUNTER — APPOINTMENT (INPATIENT)
Dept: NON INVASIVE DIAGNOSTICS | Facility: HOSPITAL | Age: 67
DRG: 826 | End: 2022-04-03
Payer: MEDICARE

## 2022-04-03 LAB
ALBUMIN SERPL BCP-MCNC: 3.4 G/DL (ref 3.5–5)
ALP SERPL-CCNC: 65 U/L (ref 46–116)
ALT SERPL W P-5'-P-CCNC: 43 U/L (ref 12–78)
AMPHETAMINES SERPL QL SCN: NEGATIVE
ANION GAP SERPL CALCULATED.3IONS-SCNC: 3 MMOL/L (ref 4–13)
AORTIC ROOT: 3.5 CM
AORTIC VALVE MEAN VELOCITY: 4.8 M/S
APICAL FOUR CHAMBER EJECTION FRACTION: 63 %
ASCENDING AORTA: 3.5 CM (ref 2.04–3.06)
AST SERPL W P-5'-P-CCNC: 13 U/L (ref 5–45)
ATRIAL RATE: 83 BPM
ATRIAL RATE: 84 BPM
AV LVOT MEAN GRADIENT: 1 MMHG
AV LVOT PEAK GRADIENT: 3 MMHG
AV MEAN GRADIENT: 1 MMHG
AV PEAK GRADIENT: 3 MMHG
AV VELOCITY RATIO: 1.01
BARBITURATES UR QL: NEGATIVE
BASOPHILS # BLD AUTO: 0.01 THOUSANDS/ΜL (ref 0–0.1)
BASOPHILS NFR BLD AUTO: 0 % (ref 0–1)
BENZODIAZ UR QL: NEGATIVE
BILIRUB SERPL-MCNC: 0.69 MG/DL (ref 0.2–1)
BUN SERPL-MCNC: 21 MG/DL (ref 5–25)
CALCIUM ALBUM COR SERPL-MCNC: 10 MG/DL (ref 8.3–10.1)
CALCIUM SERPL-MCNC: 9.5 MG/DL (ref 8.3–10.1)
CHLORIDE SERPL-SCNC: 104 MMOL/L (ref 100–108)
CHOLEST SERPL-MCNC: 193 MG/DL
CO2 SERPL-SCNC: 29 MMOL/L (ref 21–32)
COCAINE UR QL: NEGATIVE
CREAT SERPL-MCNC: 1.17 MG/DL (ref 0.6–1.3)
DOP CALC AO PEAK VEL: 0.92 M/S
DOP CALC AO VTI: 14.29 CM
DOP CALC LVOT PEAK VEL VTI: 14.92 CM
DOP CALC LVOT PEAK VEL: 0.93 M/S
E WAVE DECELERATION TIME: 199 MS
EOSINOPHIL # BLD AUTO: 0.02 THOUSAND/ΜL (ref 0–0.61)
EOSINOPHIL NFR BLD AUTO: 0 % (ref 0–6)
ERYTHROCYTE [DISTWIDTH] IN BLOOD BY AUTOMATED COUNT: 19.6 % (ref 11.6–15.1)
EST. AVERAGE GLUCOSE BLD GHB EST-MCNC: 103 MG/DL
FRACTIONAL SHORTENING: 38 % (ref 28–44)
GFR SERPL CREATININE-BSD FRML MDRD: 63 ML/MIN/1.73SQ M
GLUCOSE SERPL-MCNC: 104 MG/DL (ref 65–140)
GLUCOSE SERPL-MCNC: 108 MG/DL (ref 65–140)
HBA1C MFR BLD: 5.2 %
HCT VFR BLD AUTO: 45.6 % (ref 36.5–49.3)
HDLC SERPL-MCNC: 70 MG/DL
HGB BLD-MCNC: 14.4 G/DL (ref 12–17)
IMM GRANULOCYTES # BLD AUTO: 0.06 THOUSAND/UL (ref 0–0.2)
IMM GRANULOCYTES NFR BLD AUTO: 1 % (ref 0–2)
INR PPP: 0.96 (ref 0.84–1.19)
INTERVENTRICULAR SEPTUM IN DIASTOLE (PARASTERNAL SHORT AXIS VIEW): 1 CM
INTERVENTRICULAR SEPTUM: 1 CM (ref 0.54–1)
LAAS-AP4: 13 CM2
LDLC SERPL CALC-MCNC: 96 MG/DL (ref 0–100)
LEFT INTERNAL DIMENSION IN SYSTOLE: 2.8 CM (ref 3–4.54)
LEFT VENTRICULAR INTERNAL DIMENSION IN DIASTOLE: 4.5 CM (ref 4.93–7.34)
LEFT VENTRICULAR POSTERIOR WALL IN END DIASTOLE: 1 CM (ref 0.52–0.99)
LEFT VENTRICULAR STROKE VOLUME: 60 ML
LVSV (TEICH): 60 ML
LYMPHOCYTES # BLD AUTO: 0.63 THOUSANDS/ΜL (ref 0.6–4.47)
LYMPHOCYTES NFR BLD AUTO: 6 % (ref 14–44)
MAGNESIUM SERPL-MCNC: 2.2 MG/DL (ref 1.6–2.6)
MCH RBC QN AUTO: 31.1 PG (ref 26.8–34.3)
MCHC RBC AUTO-ENTMCNC: 31.6 G/DL (ref 31.4–37.4)
MCV RBC AUTO: 99 FL (ref 82–98)
METHADONE UR QL: NEGATIVE
MONOCYTES # BLD AUTO: 0.52 THOUSAND/ΜL (ref 0.17–1.22)
MONOCYTES NFR BLD AUTO: 5 % (ref 4–12)
MV E'TISSUE VEL-LAT: 10 CM/S
MV E'TISSUE VEL-SEP: 10 CM/S
MV PEAK A VEL: 0.66 M/S
MV PEAK E VEL: 48 CM/S
MV STENOSIS PRESSURE HALF TIME: 58 MS
MV VALVE AREA P 1/2 METHOD: 3.79 CM2
NEUTROPHILS # BLD AUTO: 9.3 THOUSANDS/ΜL (ref 1.85–7.62)
NEUTS SEG NFR BLD AUTO: 88 % (ref 43–75)
NRBC BLD AUTO-RTO: 0 /100 WBCS
OPIATES UR QL SCN: NEGATIVE
OXYCODONE+OXYMORPHONE UR QL SCN: NEGATIVE
P AXIS: 69 DEGREES
P AXIS: 94 DEGREES
PCP UR QL: NEGATIVE
PHOSPHATE SERPL-MCNC: 4 MG/DL (ref 2.3–4.1)
PLATELET # BLD AUTO: 118 THOUSANDS/UL (ref 149–390)
PMV BLD AUTO: 9.5 FL (ref 8.9–12.7)
POTASSIUM SERPL-SCNC: 4.3 MMOL/L (ref 3.5–5.3)
PR INTERVAL: 106 MS
PR INTERVAL: 112 MS
PROT SERPL-MCNC: 6.9 G/DL (ref 6.4–8.2)
PROTHROMBIN TIME: 12.4 SECONDS (ref 11.6–14.5)
QRS AXIS: 16 DEGREES
QRS AXIS: 172 DEGREES
QRSD INTERVAL: 88 MS
QRSD INTERVAL: 88 MS
QT INTERVAL: 364 MS
QT INTERVAL: 374 MS
QTC INTERVAL: 430 MS
QTC INTERVAL: 439 MS
RA PRESSURE ESTIMATED: 8 MMHG
RBC # BLD AUTO: 4.63 MILLION/UL (ref 3.88–5.62)
RIGHT ATRIAL 2D VOLUME: 66 ML
RIGHT ATRIUM AREA SYSTOLE A4C: 22.5 CM2
RIGHT VENTRICLE ID DIMENSION: 3.5 CM
RV PSP: 38 MMHG
SL CV LV EF: 65
SL CV PED ECHO LEFT VENTRICLE DIASTOLIC VOLUME (MOD BIPLANE) 2D: 91 ML
SL CV PED ECHO LEFT VENTRICLE SYSTOLIC VOLUME (MOD BIPLANE) 2D: 30 ML
SODIUM SERPL-SCNC: 136 MMOL/L (ref 136–145)
T WAVE AXIS: 115 DEGREES
T WAVE AXIS: 64 DEGREES
T4 FREE SERPL-MCNC: 0.76 NG/DL (ref 0.76–1.46)
T4 FREE SERPL-MCNC: 0.82 NG/DL (ref 0.76–1.46)
THC UR QL: POSITIVE
TR MAX PG: 30 MMHG
TR PEAK VELOCITY: 2.7 M/S
TRICUSPID VALVE PEAK REGURGITATION VELOCITY: 2.73 M/S
TRIGL SERPL-MCNC: 137 MG/DL
TSH SERPL DL<=0.05 MIU/L-ACNC: 5.55 UIU/ML (ref 0.36–3.74)
VENTRICULAR RATE: 83 BPM
VENTRICULAR RATE: 84 BPM
WBC # BLD AUTO: 10.54 THOUSAND/UL (ref 4.31–10.16)
Z-SCORE OF ASCENDING AORTA: 3.7
Z-SCORE OF INTERVENTRICULAR SEPTUM IN END DIASTOLE: 1.93
Z-SCORE OF LEFT VENTRICULAR DIMENSION IN END DIASTOLE: -2.9
Z-SCORE OF LEFT VENTRICULAR DIMENSION IN END SYSTOLE: -2.18
Z-SCORE OF LEFT VENTRICULAR POSTERIOR WALL IN END DIASTOLE: 2.04

## 2022-04-03 PROCEDURE — 80053 COMPREHEN METABOLIC PANEL: CPT | Performed by: PHYSICIAN ASSISTANT

## 2022-04-03 PROCEDURE — 99291 CRITICAL CARE FIRST HOUR: CPT | Performed by: EMERGENCY MEDICINE

## 2022-04-03 PROCEDURE — 80307 DRUG TEST PRSMV CHEM ANLYZR: CPT | Performed by: PHYSICIAN ASSISTANT

## 2022-04-03 PROCEDURE — 92610 EVALUATE SWALLOWING FUNCTION: CPT

## 2022-04-03 PROCEDURE — 99223 1ST HOSP IP/OBS HIGH 75: CPT | Performed by: PSYCHIATRY & NEUROLOGY

## 2022-04-03 PROCEDURE — 93010 ELECTROCARDIOGRAM REPORT: CPT | Performed by: INTERNAL MEDICINE

## 2022-04-03 PROCEDURE — 93308 TTE F-UP OR LMTD: CPT | Performed by: INTERNAL MEDICINE

## 2022-04-03 PROCEDURE — A9585 GADOBUTROL INJECTION: HCPCS | Performed by: PHYSICIAN ASSISTANT

## 2022-04-03 PROCEDURE — 93005 ELECTROCARDIOGRAM TRACING: CPT

## 2022-04-03 PROCEDURE — 93321 DOPPLER ECHO F-UP/LMTD STD: CPT | Performed by: INTERNAL MEDICINE

## 2022-04-03 PROCEDURE — 80061 LIPID PANEL: CPT | Performed by: PHYSICIAN ASSISTANT

## 2022-04-03 PROCEDURE — 70553 MRI BRAIN STEM W/O & W/DYE: CPT

## 2022-04-03 PROCEDURE — 93308 TTE F-UP OR LMTD: CPT

## 2022-04-03 PROCEDURE — 84100 ASSAY OF PHOSPHORUS: CPT | Performed by: PHYSICIAN ASSISTANT

## 2022-04-03 PROCEDURE — 83036 HEMOGLOBIN GLYCOSYLATED A1C: CPT | Performed by: PHYSICIAN ASSISTANT

## 2022-04-03 PROCEDURE — 99232 SBSQ HOSP IP/OBS MODERATE 35: CPT | Performed by: STUDENT IN AN ORGANIZED HEALTH CARE EDUCATION/TRAINING PROGRAM

## 2022-04-03 PROCEDURE — 93325 DOPPLER ECHO COLOR FLOW MAPG: CPT | Performed by: INTERNAL MEDICINE

## 2022-04-03 PROCEDURE — 83735 ASSAY OF MAGNESIUM: CPT | Performed by: PHYSICIAN ASSISTANT

## 2022-04-03 PROCEDURE — 85025 COMPLETE CBC W/AUTO DIFF WBC: CPT | Performed by: PHYSICIAN ASSISTANT

## 2022-04-03 PROCEDURE — 84439 ASSAY OF FREE THYROXINE: CPT | Performed by: PHYSICIAN ASSISTANT

## 2022-04-03 PROCEDURE — 84443 ASSAY THYROID STIM HORMONE: CPT | Performed by: PHYSICIAN ASSISTANT

## 2022-04-03 PROCEDURE — G1004 CDSM NDSC: HCPCS

## 2022-04-03 PROCEDURE — 70450 CT HEAD/BRAIN W/O DYE: CPT

## 2022-04-03 PROCEDURE — 85610 PROTHROMBIN TIME: CPT | Performed by: PHYSICIAN ASSISTANT

## 2022-04-03 RX ORDER — AMLODIPINE BESYLATE 5 MG/1
5 TABLET ORAL DAILY
Status: DISCONTINUED | OUTPATIENT
Start: 2022-04-03 | End: 2022-04-04

## 2022-04-03 RX ORDER — SODIUM CHLORIDE 1000 MG
1 TABLET, SOLUBLE MISCELLANEOUS
Status: DISCONTINUED | OUTPATIENT
Start: 2022-04-03 | End: 2022-04-05

## 2022-04-03 RX ADMIN — DEXAMETHASONE SODIUM PHOSPHATE 4 MG: 4 INJECTION INTRA-ARTICULAR; INTRALESIONAL; INTRAMUSCULAR; INTRAVENOUS; SOFT TISSUE at 05:48

## 2022-04-03 RX ADMIN — SODIUM CHLORIDE TAB 1 GM 1 G: 1 TAB at 14:17

## 2022-04-03 RX ADMIN — DEXAMETHASONE SODIUM PHOSPHATE 4 MG: 4 INJECTION INTRA-ARTICULAR; INTRALESIONAL; INTRAMUSCULAR; INTRAVENOUS; SOFT TISSUE at 17:33

## 2022-04-03 RX ADMIN — GADOBUTROL 8 ML: 604.72 INJECTION INTRAVENOUS at 13:50

## 2022-04-03 RX ADMIN — SODIUM CHLORIDE TAB 1 GM 1 G: 1 TAB at 15:32

## 2022-04-03 RX ADMIN — DEXAMETHASONE SODIUM PHOSPHATE 4 MG: 4 INJECTION INTRA-ARTICULAR; INTRALESIONAL; INTRAMUSCULAR; INTRAVENOUS; SOFT TISSUE at 23:52

## 2022-04-03 RX ADMIN — LEVETIRACETAM 500 MG: 100 INJECTION INTRAVENOUS at 09:47

## 2022-04-03 RX ADMIN — DEXAMETHASONE SODIUM PHOSPHATE 4 MG: 4 INJECTION INTRA-ARTICULAR; INTRALESIONAL; INTRAMUSCULAR; INTRAVENOUS; SOFT TISSUE at 00:30

## 2022-04-03 RX ADMIN — LEVETIRACETAM 500 MG: 100 INJECTION INTRAVENOUS at 21:46

## 2022-04-03 RX ADMIN — NICARDIPINE HYDROCHLORIDE 5 MG/HR: 25 INJECTION, SOLUTION INTRAVENOUS at 05:50

## 2022-04-03 RX ADMIN — AMLODIPINE BESYLATE 5 MG: 5 TABLET ORAL at 15:32

## 2022-04-03 RX ADMIN — DEXAMETHASONE SODIUM PHOSPHATE 4 MG: 4 INJECTION INTRA-ARTICULAR; INTRALESIONAL; INTRAMUSCULAR; INTRAVENOUS; SOFT TISSUE at 12:05

## 2022-04-03 NOTE — CASE MANAGEMENT
Case Management Assessment    Patient name Rajesh Raymond  Location ICU 04/ICU 04 MRN [id number]  : 1955 Date 4/3/2022       Current Admission Date: 2022  Current Admission Diagnosis:Left parietal 4 6 cm intraparenchymal hemorrhage with surrounding cerebral edema   Patient Active Problem List    Diagnosis Date Noted    Left parietal 4 6 cm intraparenchymal hemorrhage with surrounding cerebral edema 2022    Drug-induced thyroiditis 2022    Platelets decreased ([address] ) 02/15/2022    Psoriasis 02/15/2022    Stage 3a chronic kidney disease ([address] ) 2022    Labyrinthitis of both ears 2022    Proctocolitis 2021    Pericardial effusion 2021    Constipation 2021    Left non-suppurative otitis media 2021    Bilateral hearing loss due to cerumen impaction 2021    Chronic back pain 2021    Former cigarette smoker 2021    History of gout 2021    Hypertension 2021    Cancer of upper lobe of left lung ([address] ) 2021    Drug-induced neutropenia ([address] ) 07/15/2021    Adenocarcinoma, lung, left ([address] ) 2021    Encounter for central line care 2021    Lung mass 2021    Hyperglycemia 2021    Cigarette nicotine dependence in remission 2021    Bilateral hearing loss 2020    Mixed hyperlipidemia 2019    Renal cyst, right 2018    Medicare annual wellness visit, subsequent 2018    Lumbar stenosis 2018    Glaucoma 2018    JUNAID (obstructive sleep apnea) 2018    Spinal stenosis of lumbar region with neurogenic claudication 2018    Acute idiopathic gout of left foot 2017    Depression with anxiety 2017    Essential hypertension 2017    Hypertriglyceridemia 2017    Lumbago with sciatica, left side 2017    Mild episode of recurrent major depressive disorder ([address] ) 2017    Back problem 2017      LOS (days): 1  Geometric Mean LOS (GMLOS) (days):   Days to GMLOS:     OBJECTIVE:    Risk of Unplanned Readmission Score: 13         Current admission status: Inpatient       Preferred Pharmacy:   [facility] [address], [facility] [address]  [place] [address]  Phone: [phone] Fax: [phone]    Primary Care Provider: Lul Dietrich DO    Primary Insurance: MEDICARE  Secondary Insurance: AARP    ASSESSMENT:  Gillina Ott Proxies    There are no active Health Care Proxies on file  TC placed to pt's spouse Ugo Free: [phone], in order to introduce CM role and complete CM assessment  VM left  CM department will continue to follow

## 2022-04-03 NOTE — PLAN OF CARE
Problem: MOBILITY - ADULT  Goal: Maintain or return to baseline ADL function  Description: INTERVENTIONS:  -  Assess patient's ability to carry out ADLs; assess patient's baseline for ADL function and identify physical deficits which impact ability to perform ADLs (bathing, care of mouth/teeth, toileting, grooming, dressing, etc )  - Assess/evaluate cause of self-care deficits   - Assess range of motion  - Assess patient's mobility; develop plan if impaired  - Assess patient's need for assistive devices and provide as appropriate  - Encourage maximum independence but intervene and supervise when necessary  - Involve family in performance of ADLs  - Assess for home care needs following discharge   - Consider OT consult to assist with ADL evaluation and planning for discharge  - Provide patient education as appropriate  Outcome: Progressing  Goal: Maintains/Returns to pre admission functional level  Description: INTERVENTIONS:  - Perform BMAT or MOVE assessment daily    - Set and communicate daily mobility goal to care team and patient/family/caregiver  - Collaborate with rehabilitation services on mobility goals if consulted  - Out of bed for toileting  - Record patient progress and toleration of activity level   Outcome: Progressing     Problem: Neurological Deficit  Goal: Neurological status is stable or improving  Description: Interventions:  - Monitor and assess patient's level of consciousness, motor function, sensory function, and level of assistance needed for ADLs  - Monitor and report changes from baseline  Collaborate with interdisciplinary team to initiate plan and implement interventions as ordered  - Provide and maintain a safe environment  - Consider seizure precautions  - Consider fall precautions  - Consider aspiration precautions  - Consider bleeding precautions  Outcome: Progressing     Problem:  Activity Intolerance/Impaired Mobility  Goal: Mobility/activity is maintained at optimum level for patient  Description: Interventions:  - Assess and monitor patient  barriers to mobility and need for assistive/adaptive devices  - Assess patient's emotional response to limitations  - Collaborate with interdisciplinary team and initiate plans and interventions as ordered  - Encourage independent activity per ability   - Maintain proper body alignment  - Perform active/passive rom as tolerated/ordered  - Plan activities to conserve energy   - Turn patient as appropriate  Outcome: Progressing     Problem: SAFETY ADULT  Goal: Maintain or return to baseline ADL function  Description: INTERVENTIONS:  -  Assess patient's ability to carry out ADLs; assess patient's baseline for ADL function and identify physical deficits which impact ability to perform ADLs (bathing, care of mouth/teeth, toileting, grooming, dressing, etc )  - Assess/evaluate cause of self-care deficits   - Assess range of motion  - Assess patient's mobility; develop plan if impaired  - Assess patient's need for assistive devices and provide as appropriate  - Encourage maximum independence but intervene and supervise when necessary  - Involve family in performance of ADLs  - Assess for home care needs following discharge   - Consider OT consult to assist with ADL evaluation and planning for discharge  - Provide patient education as appropriate  Outcome: Progressing  Goal: Maintains/Returns to pre admission functional level  Description: INTERVENTIONS:  - Perform BMAT or MOVE assessment daily    - Set and communicate daily mobility goal to care team and patient/family/caregiver     - Collaborate with rehabilitation services on mobility goals if consulted  - Out of bed for toileting  - Record patient progress and toleration of activity level   Outcome: Progressing

## 2022-04-03 NOTE — ED PROVIDER NOTES
History  Chief Complaint   Patient presents with    Altered Mental Status     Patient is a 41-year-old male with history of lung cancer currently on chemotherapy that presents for evaluation confusion  Patient's wife says that his baseline is very high functioning with no confusion at baseline  Patient's last known normal was about 24 hours ago  He has been increasingly confused per his wife  He apparently put his shoes on the wrong feet, did not know how to use utensils and was asking was our questions  She estimates today he wanted to go to hospital but he refused  Just prior to ED arrival, patient began to have some expressive and receptive aphasia and has difficulty communicating at this time  No known metastasis to the brain per his wife  He has been on high-dose prednisone secondary to a flare of his psoriasis  History is limited from the patient and he cannot communicate any complaints to me  No recent head trauma  No blood thinners or antiplatelet agents noted  Secondary to the presentation, stroke alert was called  Initial NIH stroke scale was 7  Patient significantly aphasic with right upper extremity drift noted  Patient was protecting his airway throughout his time in the emergency department  The history is provided by the patient's wife  Prior to Admission Medications   Prescriptions Last Dose Informant Patient Reported? Taking?    Ascorbic Acid (vitamin C) 1000 MG tablet 4/1/2022 at Unknown time Self Yes Yes   Sig: Take 1,000 mg by mouth daily     B Complex Vitamins (B COMPLEX 1 PO) 4/1/2022 at Unknown time Self Yes Yes   Sig: Take 1 tablet by mouth daily    B Complex Vitamins (BL VITAMIN B COMPLEX PO) 4/1/2022 at Unknown time Self Yes Yes   Sig: Take by mouth   Cholecalciferol (VITAMIN D3 PO) 4/1/2022 at Unknown time Self Yes Yes   Sig: Take 10,000 Units by mouth daily     Clobetasol Propionate (Impoyz) 0 025 % CREA Unknown at Unknown time Self Yes No   Sig: Apply topically Cyanocobalamin (Vitamin B 12) 500 MCG TABS Not Taking at Unknown time Self Yes No   Sig: Take 1 tablet by mouth   Patient not taking: Reported on 2/71/5310    Garlic 10 MG CAPS 2/1/3789 at Unknown time Self Yes Yes   Sig: Take 1 tablet by mouth daily    Glucosamine HCl 1500 MG TABS 4/2/2022 at Unknown time Self Yes Yes   Sig: Take by mouth   Glucosamine-Chondroit-Vit C-Mn (GLUCOSAMINE 1500 COMPLEX) CAPS Unknown at Unknown time Self Yes No   Sig: daily    MULTIPLE VITAMINS ESSENTIAL PO Not Taking at Unknown time Self Yes No   Sig: Take by mouth   Patient not taking: Reported on 2/15/2022    Multiple Vitamins-Minerals (MULTIVITAL-M) TABS Not Taking at Unknown time Self Yes No   Sig: Take by mouth daily    Patient not taking: Reported on 4/2/2022    allopurinol (ZYLOPRIM) 100 mg tablet 4/2/2022 at Unknown time Self No Yes   Sig: Take 1 tablet (100 mg total) by mouth daily   amLODIPine (NORVASC) 10 mg tablet 4/1/2022 at Unknown time Self No Yes   Sig: TAKE ONE TABLET BY MOUTH EVERY DAY   betamethasone valerate (VALISONE) 0 1 % cream Unknown at Unknown time Self No No   Sig: Apply topically 2 (two) times a day   Patient taking differently: Apply topically as needed    betamethasone, augmented, (DIPROLENE-AF) 0 05 % cream Unknown at Unknown time Self Yes No   Sig:     calcipotriene (DOVONEX) 0 005 % cream Not Taking at Unknown time Self Yes No   Patient not taking: Reported on 3/23/2022    calcipotriene (DOVONOX) 0 005 % ointment Unknown at Unknown time Self Yes No   Sig: Apply topically 2 (two) times a day As needed   citalopram (CeleXA) 10 mg tablet 4/2/2022 at Unknown time Self No Yes   Sig: TAKE ONE TABLET BY MOUTH EVERY DAY   colchicine (COLCRYS) 0 6 mg tablet Not Taking at Unknown time Self No No   Sig: Take 1 tablet (0 6 mg total) by mouth 2 (two) times a day   Patient not taking: Reported on 2/15/2022    doxepin (SINEquan) 25 mg capsule 4/1/2022 at Unknown time Self No Yes   Sig: TAKE ONE CAPSULE BY MOUTH EVERY DAY AT BEDTIME   fluocinolone (SYNALAR) 0 01 % external solution Unknown at Unknown time Self Yes No   Sig:     folic acid (FOLVITE) 1 mg tablet  Self No No   Sig: Take 1 tablet (1 mg total) by mouth daily   Patient not taking: Reported on 1/12/2022    gabapentin (NEURONTIN) 100 mg capsule Unknown at Unknown time Self Yes No   Patient not taking: Reported on 1/12/2022    ibuprofen (MOTRIN) 400 mg tablet Unknown at Unknown time Self Yes No   ketoconazole (NIZORAL) 2 % cream Unknown at Unknown time Self No No   Sig: Apply topically 2 (two) times a day   Patient taking differently: Apply topically as needed    levothyroxine (Synthroid) 25 mcg tablet 4/1/2022 at Unknown time Self No Yes   Sig: Take 1 tablet (25 mcg total) by mouth daily   meclizine (ANTIVERT) 25 mg tablet Not Taking at Unknown time Self No No   Sig: Take 1 tablet (25 mg total) by mouth 4 (four) times a day as needed for dizziness   Patient not taking: Reported on 1/12/2022    neomycin-polymyxin-hydrocortisone (CORTISPORIN) otic solution Not Taking at Unknown time Self No No   Sig: Administer 4 drops into the left ear every 6 (six) hours   Patient not taking: Reported on 1/12/2022    ondansetron (ZOFRAN) 4 mg tablet Not Taking at Unknown time Self No No   Sig: Take 1 tablet (4 mg total) by mouth every 6 (six) hours   Patient not taking: Reported on 1/18/2022    ondansetron (ZOFRAN) 8 mg tablet Not Taking at Unknown time Self No No   Sig: Take 1 tablet (8 mg total) by mouth every 8 (eight) hours as needed for nausea or vomiting   Patient not taking: Reported on 1/12/2022    oxyCODONE (ROXICODONE) 5 immediate release tablet Not Taking at Unknown time Self Yes No   Patient not taking: Reported on 2/15/2022    pantoprazole (PROTONIX) 40 mg tablet Unknown at Unknown time Self No No   Sig: Take 1 tablet (40 mg total) by mouth daily   polyethylene glycol (GLYCOLAX) 17 GM/SCOOP powder Unknown at Unknown time Self No No   Sig: Take 17 g by mouth 2 (two) times a day   predniSONE 20 mg tablet Unknown at Unknown time Self No No   Sig: Take 3 tablets (60 mg total) by mouth daily   rosuvastatin (CRESTOR) 10 MG tablet 4/1/2022 at Unknown time Self No Yes   Sig: TAKE ONE TABLET BY MOUTH EVERY DAY   tamsulosin (FLOMAX) 0 4 mg Not Taking at Unknown time Self Yes No   Patient not taking: Reported on 1/12/2022    traMADol (ULTRAM) 50 mg tablet Unknown at Unknown time Self No No   Sig: TAKE 1 TABLET BY MOUTH EVERY 8 HOURS AS NEEDED FOR SEVERE PAIN   triamcinolone (KENALOG) 0 1 % cream Unknown at Unknown time Self Yes No   zinc gluconate 50 mg tablet Unknown at Unknown time Self Yes No   Sig: Take 50 mg by mouth daily      Facility-Administered Medications: None       Past Medical History:   Diagnosis Date    Hyperlipidemia     Hypertension     Lung cancer [facility])        Past Surgical History:   Procedure Laterality Date    BACK SURGERY      HEMORRHOID SURGERY      IR BIOPSY LUNG  5/6/2021    IR PORT PLACEMENT  6/17/2021    LAMINECTOMY  2018    L4-L5    LUNG SURGERY      left upper lobectomy    FL BRONCHOSCOPY,DIAGNOSTIC N/A 5/25/2021    Procedure: BRONCHOSCOPY FLEXIBLE;  Surgeon: Ru Murillo MD;  [facility];  Service: Thoracic    FL MEDIASTINOSCOPY WITH LYMPH NODE BIOPSY/IES N/A 5/25/2021    Procedure: MEDIASTINOSCOPY, flexible bronchoscopy;  Surgeon: Ru Murillo MD;  [facility];  Service: Thoracic    TONSILLECTOMY  1959       Family History   Problem Relation Age of Onset    Nephrolithiasis Father      I have reviewed and agree with the history as documented      E-Cigarette/Vaping    E-Cigarette Use Never User      E-Cigarette/Vaping Substances    Nicotine No     THC No     CBD No     Flavoring No     Other No     Unknown No      Social History     Tobacco Use    Smoking status: Former Smoker     Packs/day: 1 00     Years: 40 00     Pack years: 40 00     Types: Cigarettes     Start date: 24     Quit date: 2/14/2017     Years since quittin 1    Smokeless tobacco: Never Used    Tobacco comment: quit 2017   Vaping Use    Vaping Use: Never used   Substance Use Topics    Alcohol use: Not Currently     Alcohol/week: 7 0 standard drinks     Types: 7 Cans of beer per week    Drug use: Yes     Types: Marijuana     Comment: seldom       Review of Systems   Unable to perform ROS: Patient nonverbal       Physical Exam  Physical Exam  Vitals reviewed  Constitutional:       General: He is not in acute distress  Appearance: He is well-developed  HENT:      Head: Normocephalic  Eyes:      Pupils: Pupils are equal, round, and reactive to light  Cardiovascular:      Rate and Rhythm: Normal rate and regular rhythm  Heart sounds: Normal heart sounds  No murmur heard  No friction rub  No gallop  Pulmonary:      Effort: Pulmonary effort is normal       Breath sounds: Normal breath sounds  Abdominal:      General: Bowel sounds are normal  There is no distension  Palpations: Abdomen is soft  Tenderness: There is no abdominal tenderness  There is no guarding  Musculoskeletal:         General: Normal range of motion  Cervical back: Normal range of motion and neck supple  Skin:     Capillary Refill: Capillary refill takes less than 2 seconds  Neurological:      Mental Status: He is alert  Cranial Nerves: No cranial nerve deficit  Sensory: No sensory deficit  Motor: No abnormal muscle tone  Comments: Alert, aphasic  No obvious facial droop noted, extraocular motion intact patient tracks throughout the room  Right upper extremity drift noted, strength 4/5  Initial NIH stroke scale was 7   Psychiatric:         Behavior: Behavior normal          Thought Content:  Thought content normal          Judgment: Judgment normal          Vital Signs  ED Triage Vitals   Temperature Pulse Respirations Blood Pressure SpO2   22 19122 191   (!) 97 2 °F (36 2 °C) 84 20 (!) 176/85 96 %      Temp Source Heart Rate Source Patient Position - Orthostatic VS BP Location FiO2 (%)   04/02/22 1923 -- -- -- --   Temporal          Pain Score       04/02/22 1919       No Pain           Vitals:    04/02/22 2015 04/02/22 2030 04/02/22 2045 04/02/22 2059   BP: (!) 200/88 (!) 175/80 (!) 175/89 142/80   Pulse: 77 74 74          Visual Acuity  Visual Acuity      Most Recent Value   L Pupil Size (mm) 4   R Pupil Size (mm) 4   L Pupil Shape Round   R Pupil Shape Round          ED Medications  Medications   niCARdipine (CARDENE-IV) 100 mcg/mL in sodium chloride infusion (5 mg/hr Intravenous New Bag 4/2/22 2043)   Labetalol HCl (NORMODYNE) injection 10 mg (10 mg Intravenous Given 4/2/22 2000)   iohexol (OMNIPAQUE) 350 MG/ML injection (SINGLE-DOSE) 85 mL (85 mL Intravenous Given 4/2/22 1947)   Labetalol HCl (NORMODYNE) injection 10 mg (10 mg Intravenous Given 4/2/22 2027)       Diagnostic Studies  Results Reviewed     Procedure Component Value Units Date/Time    COVID/FLU/RSV - 2 hour TAT [[id number]]  (Normal) Collected: 04/02/22 2004    Lab Status: Final result Specimen: Nares from Nose Updated: 04/02/22 2048     SARS-CoV-2 Negative     INFLUENZA A PCR Negative     INFLUENZA B PCR Negative     RSV PCR Negative    Narrative:      FOR PEDIATRIC PATIENTS - copy/paste COVID Guidelines URL to browser: [email or web address]  [employer]    SARS-CoV-2 assay is a Nucleic Acid Amplification assay intended for the  qualitative detection of nucleic acid from SARS-CoV-2 in nasopharyngeal  swabs  Results are for the presumptive identification of SARS-CoV-2 RNA  Positive results are indicative of infection with SARS-CoV-2, the virus  causing COVID-19, but do not rule out bacterial infection or co-infection  with other viruses   Laboratories within the [place] and its  territories are required to report all positive results to the appropriate  public health authorities  Negative results do not preclude SARS-CoV-2  infection and should not be used as the sole basis for treatment or other  patient management decisions  Negative results must be combined with  clinical observations, patient history, and epidemiological information  This test has not been FDA cleared or approved  This test has been authorized by FDA under an Emergency Use Authorization  (EUA)  This test is only authorized for the duration of time the  declaration that circumstances exist justifying the authorization of the  emergency use of an in vitro diagnostic tests for detection of SARS-CoV-2  virus and/or diagnosis of COVID-19 infection under section 564(b)(1) of  the Act, 21 U  S C  262SVW-5(P)(2), unless the authorization is terminated  or revoked sooner  The test has been validated but independent review by FDA  and CLIA is pending  Test performed using [employer] GeneXpert: This RT-PCR assay targets N2,  a region unique to SARS-CoV-2  A conserved region in the E-gene was chosen  for pan-Sarbecovirus detection which includes SARS-CoV-2     TSH, 3rd generation with Free T4 reflex [[id number]]  (Abnormal) Collected: 04/02/22 1942    Lab Status: Final result Specimen: Blood from Arm, Right Updated: 04/02/22 2032     TSH 3RD GENERATON 6 162 uIU/mL     Narrative:      Patients undergoing fluorescein dye angiography may retain small amounts of fluorescein in the body for 48-72 hours post procedure  Samples containing fluorescein can produce falsely depressed TSH values  If the patient had this procedure,a specimen should be resubmitted post fluorescein clearance  T4, free [[id number]] Collected: 04/02/22 1942    Lab Status:  In process Specimen: Blood from Arm, Right Updated: 04/02/22 2032    Comprehensive metabolic panel [[id number]]  (Abnormal) Collected: 04/02/22 1942    Lab Status: Final result Specimen: Blood from Arm, Right Updated: 04/02/22 2025     Sodium 136 mmol/L      Potassium 4 1 mmol/L      Chloride 99 mmol/L      CO2 28 mmol/L      ANION GAP 9 mmol/L      BUN 25 mg/dL      Creatinine 1 32 mg/dL      Glucose 96 mg/dL      Calcium 9 9 mg/dL      AST 24 U/L      ALT 36 U/L      Alkaline Phosphatase 55 U/L      Total Protein 7 1 g/dL      Albumin 4 1 g/dL      Total Bilirubin 0 49 mg/dL      eGFR 55 ml/min/1 73sq m     Narrative:      [place] guidelines for Chronic Kidney Disease (CKD):     Stage 1 with normal or high GFR (GFR > 90 mL/min/1 73 square meters)    Stage 2 Mild CKD (GFR = 60-89 mL/min/1 73 square meters)    Stage 3A Moderate CKD (GFR = 45-59 mL/min/1 73 square meters)    Stage 3B Moderate CKD (GFR = 30-44 mL/min/1 73 square meters)    Stage 4 Severe CKD (GFR = 15-29 mL/min/1 73 square meters)    Stage 5 End Stage CKD (GFR <15 mL/min/1 73 square meters)  Note: GFR calculation is accurate only with a steady state creatinine    HS Troponin 0hr (reflex protocol) [[id number]]  (Normal) Collected: 04/02/22 1942    Lab Status: Final result Specimen: Blood from Arm, Right Updated: 04/02/22 2024     hs TnI 0hr 6 ng/L     HS Troponin I 2hr [[id number]]     Lab Status: No result Specimen: Blood     HS Troponin I 4hr [[id number]]     Lab Status: No result Specimen: Blood     Ammonia [[id number]]  (Normal) Collected: 04/02/22 1942    Lab Status: Final result Specimen: Blood from Arm, Right Updated: 04/02/22 2021     Ammonia 27 umol/L     Protime-INR [[id number]]  (Normal) Collected: 04/02/22 1942    Lab Status: Final result Specimen: Blood from Arm, Right Updated: 04/02/22 2013     Protime 12 1 seconds      INR 0 90    APTT [[id number]]  (Normal) Collected: 04/02/22 1942    Lab Status: Final result Specimen: Blood from Arm, Right Updated: 04/02/22 2013     PTT 30 seconds     Blood gas, venous [[id number]]  (Abnormal) Collected: 04/02/22 1942    Lab Status: Final result Specimen: Blood from Arm, Right Updated: 04/02/22 1956     pH, Nirmal 7 390     pCO2, Nirmal 44 9 mm Hg pO2, Nirmal 27 9 mm Hg      HCO3, Nirmal 26 6 mmol/L      Base Excess, Nirmal 1 1 mmol/L      O2 Content, Nirmal 11 8 ml/dL      O2 HGB, VENOUS 53 2 %     CBC and Platelet [[id number]]  (Abnormal) Collected: 04/02/22 1942    Lab Status: Final result Specimen: Blood from Arm, Right Updated: 04/02/22 1955     WBC 9 76 Thousand/uL      RBC 4 67 Million/uL      Hemoglobin 15 0 g/dL      Hematocrit 46 2 %      MCV 99 fL      MCH 32 1 pg      MCHC 32 5 g/dL      RDW 19 4 %      Platelets 777 Thousands/uL      MPV 8 6 fL     UA w Reflex to Microscopic w Reflex to Culture [[id number]]     Lab Status: No result Specimen: Urine                  CTA stroke alert (head/neck)   Final Result by Leonides Young MD (04/02 2055)      Severe right vertebral artery stenosis at the origin  Severe critical near occlusive stenosis right proximal vertebral artery near its origin spanning approximately 2 2 cm in cranial caudal dimension  Approximately 70-75% right proximal cervical ICA plaque stenosis             I personally discussed this study with neurologist on 4/2/2022 at 7:50 PM                             Workstation performed: [id number]         CT stroke alert brain   Final Result by Leonides Young MD (04/02 1956)      Left parietal 4 6 cm intraparenchymal hemorrhage with surrounding cerebral edema  No midline shift               I personally discussed this study with neurologist on 4/2/2022 at 7:45 PM                Workstation performed: [id number]                    Procedures  ECG 12 Lead Documentation Only    Date/Time: 4/2/2022 9:07 PM  Performed by: Zena Houser MD  Authorized by: Zena Houser MD     ECG reviewed by me, the ED Provider: yes    Patient location:  ED  Previous ECG:     Previous ECG:  Unavailable    Comparison to cardiac monitor: Yes    Interpretation:     Interpretation: normal    Rate:     ECG rate assessment: normal    Rhythm:     Rhythm: sinus rhythm    Ectopy:     Ectopy: none    QRS:     QRS axis: Normal    QRS intervals:  Normal  Conduction:     Conduction: normal    ST segments:     ST segments:  Normal  T waves:     T waves: normal    CriticalCare Time  Performed by: Mariano Bautista MD  Authorized by: Mariano Bautista MD     Critical care provider statement:     Critical care time (minutes):  70    Critical care was necessary to treat or prevent imminent or life-threatening deterioration of the following conditions:  CNS failure or compromise    Critical care was time spent personally by me on the following activities:  Blood draw for specimens, obtaining history from patient or surrogate, development of treatment plan with patient or surrogate, discussions with consultants, evaluation of patient's response to treatment, examination of patient, re-evaluation of patient's condition, ordering and review of radiographic studies, ordering and review of laboratory studies and ordering and performing treatments and interventions             ED Course  ED Course as of 04/02/22 2109   Sat Apr 02, 2022   1934 Initial NIH stroke scale 7   2006 Discussed with neurology, goal is systolic blood pressure less than 140   2048 Patient reassessed, withdrawing to pain in all 4 extremities, eyes open, protecting his airway at this time  Nonverbal at this time  MDM  Number of Diagnoses or Management Options  Acute encephalopathy  Hemorrhagic stroke [facility])  Diagnosis management comments: Patient is a 33-year-old male with history of lung cancer that presents for evaluation of aphasia and confusion  Stroke alert called  Patient found to have hemorrhagic stroke likely secondary to metastasis  No blood thinners or antiplatelet agents noted  Patient given 2 dose labetalol and started on Cardene drip an attempt to get control of his blood pressure  Discussed with neurology and critical care and was accepted for transfer to [facility]    I had a lizzette discussion with the patient's wife and explained that while the hemorrhage is likely survival he will be severely disabled with likely aphasia and right-sided deficits  She at this time wants him to be full code  Patient's exam slightly worsened while he was here, initially he was having word-finding difficulties but was speaking, the time of transfer patient was largely nonverbal   He still is withdrawing from pain in all 4 extremities with his eyes open, GCS 10-11  Patient protecting his airway at the time of transfer  Disposition  Final diagnoses:   Acute encephalopathy   Hemorrhagic stroke [facility])     Time reflects when diagnosis was documented in both MDM as applicable and the Disposition within this note     Time User Action Codes Description Comment    4/2/2022  7:28 PM Arabella Grover Add [G93 40] Acute encephalopathy     4/2/2022  8:41 PM Dede Osorio Add [I61 9] Hemorrhagic stroke [facility])       ED Disposition     ED Disposition Condition Date/Time Comment    Transfer to Another Facility-In Network  [facility] Apr 2, 2022  8:40 PM Anay Nugent should be transferred out to [facility] Documentation      Most Recent Value   Patient Condition The patient has been stabilized such that within reasonable medical probability, no material deterioration of the patient condition or the condition of the unborn child(gilma) is likely to result from the transfer   Reason for Transfer Level of Care needed not available at this facility   Benefits of Transfer Specialized equipment and/or services available at the receiving facility (Include comment)________________________  [facility] ICU]   Risks of Transfer Potential for delay in receiving treatment, Potential deterioration of medical condition, Loss of IV, Increased discomfort during transfer, Possible worsening of condition or death during transfer   Accepting Physician [place]on Name, [address]    (Name & Tel number) [facility]   Sending MD Osorio   Provider Certification General risk, such as traffic hazards, adverse weather conditions, rough terrain or turbulence, possible failure of equipment (including vehicle or aircraft), or consequences of actions of persons outside the control of the transport personnel      RN Documentation      [address] Name, [address]    (Name & Tel number) [facility]      Follow-up Information    None         Patient's Medications   Discharge Prescriptions    No medications on file       No discharge procedures on file      PDMP Review       Value Time User    PDMP Reviewed  Yes 10/4/2021  1:45 PM Gary Mckinley DO          ED Provider  Electronically Signed by           Andrea Olguin MD  04/02/22 9993

## 2022-04-03 NOTE — ASSESSMENT & PLAN NOTE
· Patient currently following with [facility] onc[facility] in [place]  · Diagnosed in 5/2021  · S/p Left Upper Lung Lobectomy at [facility] by Dr Ayaka Ramirez on 12/16/21  · Patient evaluated by Radiation Oncology team postop who pursued radiation treatment to the mediastinum x 25 fractions completed 2/22/22   · Chemotherapy   · Previously on chemotherapy from 6/2021-8/2021  · Started on adjuvant immunotherapy 1/2022 -Atexolizumab, 2 of 6 cycles   · Did not complete 3rd cycle secondary to severe psoriasis   · Consider Heme/Onc consult

## 2022-04-03 NOTE — PLAN OF CARE
Problem: MOBILITY - ADULT  Goal: Maintain or return to baseline ADL function  Description: INTERVENTIONS:  -  Assess patient's ability to carry out ADLs; assess patient's baseline for ADL function and identify physical deficits which impact ability to perform ADLs (bathing, care of mouth/teeth, toileting, grooming, dressing, etc )  - Assess/evaluate cause of self-care deficits   - Assess range of motion  - Assess patient's mobility; develop plan if impaired  - Assess patient's need for assistive devices and provide as appropriate  - Encourage maximum independence but intervene and supervise when necessary  - Involve family in performance of ADLs  - Assess for home care needs following discharge   - Consider OT consult to assist with ADL evaluation and planning for discharge  - Provide patient education as appropriate  Outcome: Progressing  Goal: Maintains/Returns to pre admission functional level  Description: INTERVENTIONS:  - Perform BMAT or MOVE assessment daily    - Set and communicate daily mobility goal to care team and patient/family/caregiver  - Collaborate with rehabilitation services on mobility goals if consulted  - Perform Range of Motion  times a day  - Reposition patient every  hours  - Dangle patient  times a day  - Stand patient  times a day  - Ambulate patient  times a day  - Out of bed to chair  times a day   - Out of bed for meals  times a day  - Out of bed for toileting  - Record patient progress and toleration of activity level   Outcome: Progressing     Problem: Neurological Deficit  Goal: Neurological status is stable or improving  Description: Interventions:  - Monitor and assess patient's level of consciousness, motor function, sensory function, and level of assistance needed for ADLs  - Monitor and report changes from baseline  Collaborate with interdisciplinary team to initiate plan and implement interventions as ordered  - Provide and maintain a safe environment    - Consider seizure precautions  - Consider fall precautions  - Consider aspiration precautions  - Consider bleeding precautions  Outcome: Progressing     Problem: Activity Intolerance/Impaired Mobility  Goal: Mobility/activity is maintained at optimum level for patient  Description: Interventions:  - Assess and monitor patient  barriers to mobility and need for assistive/adaptive devices  - Assess patient's emotional response to limitations  - Collaborate with interdisciplinary team and initiate plans and interventions as ordered  - Encourage independent activity per ability   - Maintain proper body alignment  - Perform active/passive rom as tolerated/ordered  - Plan activities to conserve energy   - Turn patient as appropriate  Outcome: Progressing     Problem: Communication Impairment  Goal: Ability to express needs and understand communication  Description: Assess patient's communication skills and ability to understand information  Patient will demonstrate use of effective communication techniques, alternative methods of communication and understanding even if not able to speak  - Encourage communication and provide alternate methods of communication as needed  - Collaborate with case management/ for discharge needs  - Include patient/family/caregiver in decisions related to communication  Outcome: Progressing     Problem: Potential for Aspiration  Goal: Non-ventilated patient's risk of aspiration is minimized  Description: Assess and monitor vital signs, respiratory status, and labs (WBC)  Monitor for signs of aspiration (tachypnea, cough, rales, wheezing, cyanosis, fever)  - Assess and monitor patient's ability to swallow  - Place patient up in chair to eat if possible  - HOB up at 90 degrees to eat if unable to get patient up into chair   - Supervise patient during oral intake  - Instruct patient/ family to take small bites    - Instruct patient/ family to take small single sips when taking liquids  - Follow patient-specific strategies generated by speech pathologist   Outcome: Progressing  Goal: Ventilated patient's risk of aspiration is minimized  Description: Assess and monitor vital signs, respiratory status, airway cuff pressure, and labs (WBC)  Monitor for signs of aspiration (tachypnea, cough, rales, wheezing, cyanosis, fever)  - Elevate head of bed 30 degrees if patient has tube feeding   - Monitor tube feeding  Outcome: Progressing     Problem: Nutrition  Goal: Nutrition/Hydration status is improving  Description: Monitor and assess patient's nutrition/hydration status for malnutrition (ex- brittle hair, bruises, dry skin, pale skin and conjunctiva, muscle wasting, smooth red tongue, and disorientation)  Collaborate with interdisciplinary team and initiate plan and interventions as ordered  Monitor patient's weight and dietary intake as ordered or per policy  Utilize nutrition screening tool and intervene per policy  Determine patient's food preferences and provide high-protein, high-caloric foods as appropriate  - Assist patient with eating   - Allow adequate time for meals   - Encourage patient to take dietary supplement as ordered  - Collaborate with clinical nutritionist   - Include patient/family/caregiver in decisions related to nutrition    Outcome: Progressing     Problem: PAIN - ADULT  Goal: Verbalizes/displays adequate comfort level or baseline comfort level  Description: Interventions:  - Encourage patient to monitor pain and request assistance  - Assess pain using appropriate pain scale  - Administer analgesics based on type and severity of pain and evaluate response  - Implement non-pharmacological measures as appropriate and evaluate response  - Consider cultural and social influences on pain and pain management  - Notify physician/advanced practitioner if interventions unsuccessful or patient reports new pain  Outcome: Progressing     Problem: INFECTION - ADULT  Goal: Absence or prevention of progression during hospitalization  Description: INTERVENTIONS:  - Assess and monitor for signs and symptoms of infection  - Monitor lab/diagnostic results  - Monitor all insertion sites, i e  indwelling lines, tubes, and drains  - Monitor endotracheal if appropriate and nasal secretions for changes in amount and color  - [place] appropriate cooling/warming therapies per order  - Administer medications as ordered  - Instruct and encourage patient and family to use good hand hygiene technique  - Identify and instruct in appropriate isolation precautions for identified infection/condition  Outcome: Progressing  Goal: Absence of fever/infection during neutropenic period  Description: INTERVENTIONS:  - Monitor WBC    Outcome: Progressing     Problem: SAFETY ADULT  Goal: Maintain or return to baseline ADL function  Description: INTERVENTIONS:  -  Assess patient's ability to carry out ADLs; assess patient's baseline for ADL function and identify physical deficits which impact ability to perform ADLs (bathing, care of mouth/teeth, toileting, grooming, dressing, etc )  - Assess/evaluate cause of self-care deficits   - Assess range of motion  - Assess patient's mobility; develop plan if impaired  - Assess patient's need for assistive devices and provide as appropriate  - Encourage maximum independence but intervene and supervise when necessary  - Involve family in performance of ADLs  - Assess for home care needs following discharge   - Consider OT consult to assist with ADL evaluation and planning for discharge  - Provide patient education as appropriate  Outcome: Progressing  Goal: Maintains/Returns to pre admission functional level  Description: INTERVENTIONS:  - Perform BMAT or MOVE assessment daily    - Set and communicate daily mobility goal to care team and patient/family/caregiver     - Collaborate with rehabilitation services on mobility goals if consulted  - Perform Range of Motion  times a day  - Reposition patient every  hours    - Dangle patient  times a day  - Stand patient  times a day  - Ambulate patient  times a day  - Out of bed to chair  times a day   - Out of bed for meals  times a day  - Out of bed for toileting  - Record patient progress and toleration of activity level   Outcome: Progressing  Goal: Patient will remain free of falls  Description: INTERVENTIONS:  - Educate patient/family on patient safety including physical limitations  - Instruct patient to call for assistance with activity   - Consult OT/PT to assist with strengthening/mobility   - Keep Call bell within reach  - Keep bed low and locked with side rails adjusted as appropriate  - Keep care items and personal belongings within reach  - Initiate and maintain comfort rounds  - Make Fall Risk Sign visible to staff  - Offer Toileting every  Hours, in advance of need  - Initiate/Maintain alarm  - Obtain necessary fall risk management equipment:  Apply yellow socks and bracelet for high fall risk patients  - Consider moving patient to room near nurses station  Outcome: Progressing     Problem: DISCHARGE PLANNING  Goal: Discharge to home or other facility with appropriate resources  Description: INTERVENTIONS:  - Identify barriers to discharge w/patient and caregiver  - Arrange for needed discharge resources and transportation as appropriate  - Identify discharge learning needs (meds, wound care, etc )  - Arrange for interpretive services to assist at discharge as needed  - Refer to Case Management Department for coordinating discharge planning if the patient needs post-hospital services based on physician/advanced practitioner order or complex needs related to functional status, cognitive ability, or social support system  Outcome: Progressing     Problem: Knowledge Deficit  Goal: Patient/family/caregiver demonstrates understanding of disease process, treatment plan, medications, and discharge instructions  Description: Complete learning assessment and assess knowledge base    Interventions:  - Provide teaching at level of understanding  - Provide teaching via preferred learning methods  Outcome: Progressing

## 2022-04-03 NOTE — CONSULTS
NEUROLOGY RESIDENCY CONSULT NOTE     Name: Genia Skiff   Age & Sex: 74 y o  male   MRN: [id number]  Unit/Bed#: ICU 04   Encounter: [id number]  Length of Stay: 1    ASSESSMENT & PLAN     * Left parietal 4 6 cm intraparenchymal hemorrhage with surrounding cerebral edema  Assessment & Plan  Assessment:  Genia Skiff is a 74 y o  male with pmh pertinent for lung adenocarcinoma (s/p upper left lobe lobectomy and radiation treatment now on on chemotherapy), hypertension, and hyperlipidemia  The pt presents as a transfer from [address] [facility] for an intraparenchymal hemorrhage  The patient presented to [address] on 04/22 with apraxia and aphasia  The pt was subsequently found on [address] to have left parietal intraparenchymal hemorrhage with surrounding edema        - Initial Blood Pressure: 141/84  - Intracerebral Hemorrhage (ICH) Score:    Cheryl Coma Sore 13-15 equals +0   Age greater than or equal to 80 No   ICH Volume greater than or equal to 30 ml Yes (1 Point)   Intraventricular Hemorrhage No   Infratentorial Origin of Hemorrhage No   Total: 1     Impression: Patient's ICH is likely secondary a hemorrhagic brain metastasis from his primary lung adenocarcinoma, however we will need to obtain an MRI to further evaluate this      Plan:  - Continue to trend neuro exam closely  - Monitor for signs of ICP increase (bradycardia, HTN, changes in neuro exam, increased tone, pupillary changes)  - Monitor for post stroke edema (Peaks days 3-5)  - Blood pressure control, SBP goal <140  - Tight glycemic control, goal  as elevated glucose levels are associated with an increase in mortality  - Monitor Temperature, Tylenol PRN  - MRI brain with and without contrast  - dexamethasone, 4 mg q 6 hours due to concern for this being hemorrhage secondary to malignancy  - Continue SCDs for DVT prophylaxis and avoid chemical prophylaxis due to hemorrhage  - Repeat CTH if > 2 pt drop in GCS in one hour  - PT/OT/Speech/PMR consults when able  - Medical management as per critical care team          Recommendations for outpatient neurological follow up have yet to be determined  Disposition pending:  Stabilization    SUBJECTIVE     Reason for Consult / Principal Problem:  ICH  Hx and PE limited by:  Mild aphasia    HPI: Tanisha Jama is a 74 y o  (handedness not determinable) male who presents as a transfer from [address] [facility] for an intraparenchymal hemorrhage  The patient has a PMHx of lung adenocarcinoma (s/p upper left lobe lobectomy and radiation treatment now on on chemotherapy), psoriasis, hypertension, hyperlipidemia, depression and anxiety, and drug induced thyroiditis  The patient presented to [address] [facility] on 04/22 with apraxia and aphasia  Per documentation the patient was reported to have started exhibiting a "odd behaviors" approximately 24 hours prior to presentation, that per report from the wife included the patient putting his shoes on the wrong foot and being unable to feed himself, and then subsequently approximately 1 hour prior to presentation to the ED the patient was noted to be aphasic  In the emergency room the patient had an NIH of 8  CT head was obtained which showed a left parietal intraparenchymal hemorrhage with surrounding edema  The patient was deemed to not be a tPA candidate due to his ICH  Given the concern for possible hemorrhagic metastatic lesion the patient was transferred to [facility] for further evaluation and treatment       Review of Systems   Reason unable to perform ROS: Aphasia       Inpatient consult to Neurology  Consult performed by: Dewayne Long DO  Consult ordered by: Stephani Almodovar PA-C          Historical Information   Past Medical History:   Diagnosis Date    Hyperlipidemia     Hypertension     Lung cancer [facility])      Past Surgical History:   Procedure Laterality Date    BACK SURGERY      HEMORRHOID SURGERY      IR BIOPSY LUNG  5/6/2021    IR PORT PLACEMENT  2021    LAMINECTOMY  2018    L4-L5    LUNG SURGERY      left upper lobectomy    WA BRONCHOSCOPY,DIAGNOSTIC N/A 2021    Procedure: BRONCHOSCOPY FLEXIBLE;  Surgeon: Eliud Payne MD;  Location: [facility];  Service: Thoracic    WA MEDIASTINOSCOPY WITH LYMPH NODE BIOPSY/IES N/A 2021    Procedure: MEDIASTINOSCOPY, flexible bronchoscopy;  Surgeon: Eliud Payne MD;  [facility];  Service: Thoracic    TONSILLECTOMY       Social History   Social History     Substance and Sexual Activity   Alcohol Use Not Currently    Alcohol/week: 7 0 standard drinks    Types: 7 Cans of beer per week     Social History     Substance and Sexual Activity   Drug Use Yes    Types: Marijuana    Comment: seldom     E-Cigarette/Vaping    E-Cigarette Use Never User      E-Cigarette/Vaping Substances    Nicotine No     THC No     CBD No     Flavoring No     Other No     Unknown No      Social History     Tobacco Use   Smoking Status Former Smoker    Packs/day: 1 00    Years: 40 00    Pack years: 40 00    Types: Cigarettes    Start date:     Quit date: 2017    Years since quittin 1   Smokeless Tobacco Never Used   Tobacco Comment    quit 2017     Family History:   Family History   Problem Relation Age of Onset    Nephrolithiasis Father      Meds/Allergies   all current active meds have been reviewed  Allergies   Allergen Reactions    Bee Venom     Benazepril Other (See Comments)     Angioedema     Latex Other (See Comments)     Burning of eyes at the dentist from the gloves    Meloxicam GI Intolerance    Penicillin G Rash       Review of previous medical records was completed  OBJECTIVE     Patient ID: Phillip Burgos is a 74 y o  male      Vitals:   Vitals:    22 0600 22 0700 22 0800 22 0900   BP: 111/75 114/65 108/73 134/83   BP Location:       Pulse: 80 80 76 76   Resp: 13 18 15 (!) 23   Temp:   98 7 °F (37 1 °C) TempSrc:   Oral    SpO2: 97% 96% 98% 98%   Weight:       Height:          Body mass index is 26 26 kg/m²  Intake/Output Summary (Last 24 hours) at 4/3/2022 1045  Last data filed at 4/3/2022 0742  Gross per 24 hour   Intake 1296 66 ml   Output 1300 ml   Net -3 34 ml       Temperature:   Temp (24hrs), Av °F (36 7 °C), Min:97 2 °F (36 2 °C), Max:98 7 °F (37 1 °C)    Temperature: 98 7 °F (37 1 °C)    Invasive Devices: Invasive Devices  Report    Central Venous Catheter Line            Port A Cath 21 Right Chest 289 days          Peripheral Intravenous Line            Peripheral IV 22 Right Antecubital <1 day    Peripheral IV 22 Right Forearm <1 day                Physical Exam  Vitals and nursing note reviewed  Constitutional:       General: He is not in acute distress  Appearance: Normal appearance  He is not ill-appearing or toxic-appearing  HENT:      Head: Normocephalic and atraumatic  Nose: Nose normal       Mouth/Throat:      Mouth: Mucous membranes are moist    Eyes:      General: Lids are normal  No scleral icterus  Right eye: No discharge  Left eye: No discharge  Extraocular Movements: Extraocular movements intact  Conjunctiva/sclera: Conjunctivae normal       Pupils: Pupils are equal, round, and reactive to light  Cardiovascular:      Rate and Rhythm: Normal rate  Pulmonary:      Effort: Pulmonary effort is normal    Abdominal:      General: Abdomen is flat  Musculoskeletal:         General: No deformity  Normal range of motion  Cervical back: Normal range of motion and neck supple  Skin:     General: Skin is warm and dry  Neurological:      Mental Status: He is alert  Psychiatric:         Mood and Affect: Mood normal          Speech: Speech normal          Behavior: Behavior normal           Neurological Exam  Mental Status  Alert  Recent and remote memory are intact   Speech is normal  Attention and concentration are normal   The patient is however oriented to his name, that he is in the hospital, and that it is April, however he is unable to state what year it is  The patient states that the president is currently Obama  The patient has left right confusion  He can only follow one-step commands  The patient is able to name some objects that are presented to him, however he is unable to name objects on the NIHSS cards  The patient is unable to calculate how much a mayank nickel in a quarter equal     Cranial Nerves  CN II: Visual acuity is normal  Visual fields full to confrontation  CN III, IV, VI: Extraocular movements intact bilaterally  Normal lids and orbits bilaterally  Pupils equal round and reactive to light bilaterally  CN V: Facial sensation is normal   CN VII: Full and symmetric facial movement  CN VIII: Hearing is normal   CN IX, X: Palate elevates symmetrically  CN XI: Shoulder shrug strength is normal   CN XII: Tongue midline without atrophy or fasciculations  Motor  Normal muscle bulk throughout  Normal muscle tone  No abnormal involuntary movements  Strength is 5/5 in all four extremities except as noted  Sensory  Sensation is intact to light touch, pinprick, vibration and proprioception in all four extremities  Reflexes  Deep tendon reflexes are 2+ and symmetric except as noted  Coordination  Right: Finger-to-nose normal   Left: Finger-to-nose normal     Gait  Unable to be assessed due to the patient's current medical status  LABORATORY DATA     Labs: I have personally reviewed pertinent reports      Results from last 7 days   Lab Units 04/03/22 0535 04/02/22 1942   WBC Thousand/uL 10 54* 9 76   HEMOGLOBIN g/dL 14 4 15 0   HEMATOCRIT % 45 6 46 2   PLATELETS Thousands/uL 118* 123*   NEUTROS PCT % 88*  --    MONOS PCT % 5  --       Results from last 7 days   Lab Units 04/03/22 0535 04/02/22 1942   POTASSIUM mmol/L 4 3 4 1   CHLORIDE mmol/L 104 99   CO2 mmol/L 29 28   BUN mg/dL 21 25 CREATININE mg/dL 1 17 1 32*   CALCIUM mg/dL 9 5 9 9   ALK PHOS U/L 65 55   ALT U/L 43 36   AST U/L 13 24     Results from last 7 days   Lab Units 04/03/22  0535   MAGNESIUM mg/dL 2 2     Results from last 7 days   Lab Units 04/03/22  0535   PHOSPHORUS mg/dL 4 0      Results from last 7 days   Lab Units 04/03/22  0535 04/02/22  1942   INR  0 96 0 90   PTT seconds  --  30               IMAGING & DIAGNOSTIC TESTING     Radiology Results: I have personally reviewed pertinent reports  CT head wo contrast   Final Result by Dustin Herr MD (04/03 0530)      Left parietal region intraparenchymal hematoma unchanged in size measuring 6 7 cm in largest dimension  New irregular hyperdense focus at the left cerebellum likely to represent small amount of subarachnoid hemorrhage versus a new intraparenchymal hemorrhage  Workstation performed: [id number]         MRI brain w wo contrast    (Results Pending)       Other Diagnostic Testing: I have personally reviewed pertinent reports  ACTIVE MEDICATIONS     Current Facility-Administered Medications   Medication Dose Route Frequency    dexamethasone (DECADRON) injection 4 mg  4 mg Intravenous Q6H [address]    Labetalol HCl (NORMODYNE) injection 10 mg  10 mg Intravenous Q4H PRN    levETIRAcetam (KEPPRA) 500 mg in sodium chloride 0 9 % 100 mL IVPB  500 mg Intravenous Q12H [address]    niCARdipine (CARDENE) 25 mg (STANDARD CONCENTRATION) in sodium chloride 0 9% 250 mL  5-15 mg/hr Intravenous Titrated    ondansetron (ZOFRAN) injection 4 mg  4 mg Intravenous Q6H PRN    sodium chloride 0 9 % infusion  125 mL/hr Intravenous Continuous       Prior to Admission medications    Medication Sig Start Date End Date Taking?  Authorizing Provider   allopurinol (ZYLOPRIM) 100 mg tablet Take 1 tablet (100 mg total) by mouth daily 3/8/22   CORRINE Owens   amLODIPine (NORVASC) 10 mg tablet TAKE ONE TABLET BY MOUTH EVERY DAY 9/7/21   Robinson Brantley, DO   Ascorbic Acid (vitamin C) 1000 MG tablet Take 1,000 mg by mouth daily      Historical Provider, MD   B Complex Vitamins (B COMPLEX 1 PO) Take 1 tablet by mouth daily     Historical Provider, MD   B Complex Vitamins (BL VITAMIN B COMPLEX PO) Take by mouth 10/30/21   Historical Provider, MD   betamethasone valerate (VALISONE) 0 1 % cream Apply topically 2 (two) times a day  Patient taking differently: Apply topically as needed  3/8/21   Rene Bautista DO   betamethasone, augmented, (DIPROLENE-AF) 0 05 % cream   1/6/22   Historical Provider, MD   calcipotriene (DOVONEX) 0 005 % cream  3/14/22   Historical Provider, MD   calcipotriene (DOVONOX) 0 005 % ointment Apply topically 2 (two) times a day As needed    Historical Provider, MD   Cholecalciferol (VITAMIN D3 PO) Take 10,000 Units by mouth daily      Historical Provider, MD   citalopram (CeleXA) 10 mg tablet TAKE ONE TABLET BY MOUTH EVERY DAY 11/3/21   Johan Felix DO   Clobetasol Propionate (Impoyz) 0 025 % CREA Apply topically    Historical Provider, MD   colchicine (COLCRYS) 0 6 mg tablet Take 1 tablet (0 6 mg total) by mouth 2 (two) times a day  Patient not taking: Reported on 2/15/2022  8/17/21   Rene Bautista DO   Cyanocobalamin (Vitamin B 12) 500 MCG TABS Take 1 tablet by mouth  Patient not taking: Reported on 3/23/2022     Historical Provider, MD   doxepin (SINEquan) 25 mg capsule TAKE ONE CAPSULE BY MOUTH EVERY DAY AT BEDTIME 10/5/21   Johan Felix DO   fluocinolone (SYNALAR) 0 01 % external solution   6/3/21   Historical Provider, MD   folic acid (FOLVITE) 1 mg tablet Take 1 tablet (1 mg total) by mouth daily  Patient not taking: Reported on 1/12/2022 12/7/21 1/6/22  Nic Morrison MD   gabapentin (NEURONTIN) 100 mg capsule  11/22/21   Historical Provider, MD   Garlic 10 MG CAPS Take 1 tablet by mouth daily     Historical Provider, MD   Glucosamine HCl 1500 MG TABS Take by mouth 10/30/21   Historical Provider, MD   Glucosamine-Chondroit-Vit C-Mn (GLUCOSAMINE 1500 COMPLEX) CAPS daily     Historical Provider, MD   ibuprofen (MOTRIN) 400 mg tablet  11/22/21   Historical Provider, MD   ketoconazole (NIZORAL) 2 % cream Apply topically 2 (two) times a day  Patient taking differently: Apply topically as needed  12/17/18   Robinson Brantley,    levothyroxine (Synthroid) 25 mcg tablet Take 1 tablet (25 mcg total) by mouth daily 3/8/22   CORRINE Casillas   meclizine (ANTIVERT) 25 mg tablet Take 1 tablet (25 mg total) by mouth 4 (four) times a day as needed for dizziness  Patient not taking: Reported on 1/12/2022 12/22/21   Robinson Brantley DO   MULTIPLE VITAMINS ESSENTIAL PO Take by mouth  Patient not taking: Reported on 2/15/2022  10/30/21   Historical Provider, MD   Multiple Vitamins-Minerals (MULTIVITAL-M) TABS Take by mouth daily   Patient not taking: Reported on 4/2/2022     Historical Provider, MD   neomycin-polymyxin-hydrocortisone (CORTISPORIN) otic solution Administer 4 drops into the left ear every 6 (six) hours  Patient not taking: Reported on 1/12/2022 12/9/21   Robinson Brantley DO   ondansetron (ZOFRAN) 4 mg tablet Take 1 tablet (4 mg total) by mouth every 6 (six) hours  Patient not taking: Reported on 1/18/2022 12/24/21   General Riley MD   ondansetron [facility]) 8 mg tablet Take 1 tablet (8 mg total) by mouth every 8 (eight) hours as needed for nausea or vomiting  Patient not taking: Reported on 1/12/2022 6/9/21   Katelyn Harley MD   oxyCODONE (ROXICODONE) 5 immediate release tablet  11/30/21   Historical Provider, MD   pantoprazole (PROTONIX) 40 mg tablet Take 1 tablet (40 mg total) by mouth daily 2/14/22   Katelyn Harley MD   polyethylene glycol (GLYCOLAX) 17 GM/SCOOP powder Take 17 g by mouth 2 (two) times a day 12/24/21   General Riley MD   predniSONE 20 mg tablet Take 3 tablets (60 mg total) by mouth daily 3/8/22   CORRINE Casillas   rosuvastatin (CRESTOR) 10 MG tablet TAKE ONE TABLET BY MOUTH EVERY DAY 2/8/22   Celina Norris DO Elvira   tamsulosin (FLOMAX) 0 4 mg  11/15/21   Historical Provider, MD   traMADol (ULTRAM) 50 mg tablet TAKE 1 TABLET BY MOUTH EVERY 8 HOURS AS NEEDED FOR SEVERE PAIN 3/14/22   Candace Monahan DO   triamcinolone (KENALOG) 0 1 % cream  3/14/22   Historical Provider, MD   zinc gluconate 50 mg tablet Take 50 mg by mouth daily    Historical Provider, MD       CODE STATUS & ADVANCED DIRECTIVES     Code Status: Level 1 - Full Code  Advance Directive and Living Will:      Power of :    POLST:        VTE Pharmacologic Prophylaxis: Pharmacologic VTE Prophylaxis contraindicated due to [address]  VTE Mechanical Prophylaxis: sequential compression device    ==  [employer], DO [facility] Neurology Residency, PGY-2

## 2022-04-03 NOTE — PLAN OF CARE
Problem: MOBILITY - ADULT  Goal: Maintain or return to baseline ADL function  Description: INTERVENTIONS:  -  Assess patient's ability to carry out ADLs; assess patient's baseline for ADL function and identify physical deficits which impact ability to perform ADLs (bathing, care of mouth/teeth, toileting, grooming, dressing, etc )  - Assess/evaluate cause of self-care deficits   - Assess range of motion  - Assess patient's mobility; develop plan if impaired  - Assess patient's need for assistive devices and provide as appropriate  - Encourage maximum independence but intervene and supervise when necessary  - Involve family in performance of ADLs  - Assess for home care needs following discharge   - Consider OT consult to assist with ADL evaluation and planning for discharge  - Provide patient education as appropriate  Outcome: Progressing  Goal: Maintains/Returns to pre admission functional level  Description: INTERVENTIONS:  - Perform BMAT or MOVE assessment daily    - Set and communicate daily mobility goal to care team and patient/family/caregiver  - Collaborate with rehabilitation services on mobility goals if consulted  - Perform Range of Motion 4 times a day  - Reposition patient every 2 hours  - Dangle patient 2 times a day  - Stand patient 2 times a day  - Ambulate patient 2 times a day  - Out of bed to chair 2 times a day   - Out of bed for meals 2 times a day  - Out of bed for toileting  - Record patient progress and toleration of activity level   Outcome: Progressing     Problem: Neurological Deficit  Goal: Neurological status is stable or improving  Description: Interventions:  - Monitor and assess patient's level of consciousness, motor function, sensory function, and level of assistance needed for ADLs  - Monitor and report changes from baseline  Collaborate with interdisciplinary team to initiate plan and implement interventions as ordered  - Provide and maintain a safe environment    - Consider seizure precautions  - Consider fall precautions  - Consider aspiration precautions  - Consider bleeding precautions  Outcome: Progressing     Problem: Activity Intolerance/Impaired Mobility  Goal: Mobility/activity is maintained at optimum level for patient  Description: Interventions:  - Assess and monitor patient  barriers to mobility and need for assistive/adaptive devices  - Assess patient's emotional response to limitations  - Collaborate with interdisciplinary team and initiate plans and interventions as ordered  - Encourage independent activity per ability   - Maintain proper body alignment  - Perform active/passive rom as tolerated/ordered  - Plan activities to conserve energy   - Turn patient as appropriate  Outcome: Progressing     Problem: Communication Impairment  Goal: Ability to express needs and understand communication  Description: Assess patient's communication skills and ability to understand information  Patient will demonstrate use of effective communication techniques, alternative methods of communication and understanding even if not able to speak  - Encourage communication and provide alternate methods of communication as needed  - Collaborate with case management/ for discharge needs  - Include patient/family/caregiver in decisions related to communication  Outcome: Progressing     Problem: Nutrition  Goal: Nutrition/Hydration status is improving  Description: Monitor and assess patient's nutrition/hydration status for malnutrition (ex- brittle hair, bruises, dry skin, pale skin and conjunctiva, muscle wasting, smooth red tongue, and disorientation)  Collaborate with interdisciplinary team and initiate plan and interventions as ordered  Monitor patient's weight and dietary intake as ordered or per policy  Utilize nutrition screening tool and intervene per policy   Determine patient's food preferences and provide high-protein, high-caloric foods as appropriate  - Assist patient with eating   - Allow adequate time for meals   - Encourage patient to take dietary supplement as ordered  - Collaborate with clinical nutritionist   - Include patient/family/caregiver in decisions related to nutrition    Outcome: Progressing     Problem: PAIN - ADULT  Goal: Verbalizes/displays adequate comfort level or baseline comfort level  Description: Interventions:  - Encourage patient to monitor pain and request assistance  - Assess pain using appropriate pain scale  - Administer analgesics based on type and severity of pain and evaluate response  - Implement non-pharmacological measures as appropriate and evaluate response  - Consider cultural and social influences on pain and pain management  - Notify physician/advanced practitioner if interventions unsuccessful or patient reports new pain  Outcome: Progressing     Problem: INFECTION - ADULT  Goal: Absence or prevention of progression during hospitalization  Description: INTERVENTIONS:  - Assess and monitor for signs and symptoms of infection  - Monitor lab/diagnostic results  - Monitor all insertion sites, i e  indwelling lines, tubes, and drains  - Monitor endotracheal if appropriate and nasal secretions for changes in amount and color  - [place] appropriate cooling/warming therapies per order  - Administer medications as ordered  - Instruct and encourage patient and family to use good hand hygiene technique  - Identify and instruct in appropriate isolation precautions for identified infection/condition  Outcome: Progressing  Goal: Absence of fever/infection during neutropenic period  Description: INTERVENTIONS:  - Monitor WBC    Outcome: Progressing     Problem: SAFETY ADULT  Goal: Maintain or return to baseline ADL function  Description: INTERVENTIONS:  -  Assess patient's ability to carry out ADLs; assess patient's baseline for ADL function and identify physical deficits which impact ability to perform ADLs (bathing, care of mouth/teeth, toileting, grooming, dressing, etc )  - Assess/evaluate cause of self-care deficits   - Assess range of motion  - Assess patient's mobility; develop plan if impaired  - Assess patient's need for assistive devices and provide as appropriate  - Encourage maximum independence but intervene and supervise when necessary  - Involve family in performance of ADLs  - Assess for home care needs following discharge   - Consider OT consult to assist with ADL evaluation and planning for discharge  - Provide patient education as appropriate  Outcome: Progressing  Goal: Maintains/Returns to pre admission functional level  Description: INTERVENTIONS:  - Perform BMAT or MOVE assessment daily    - Set and communicate daily mobility goal to care team and patient/family/caregiver  - Collaborate with rehabilitation services on mobility goals if consulted  - Perform Range of Motion 4 times a day  - Reposition patient every 2 hours    - Dangle patient 2 times a day  - Stand patient 2 times a day  - Ambulate patient 2 times a day  - Out of bed to chair 2 times a day   - Out of bed for meals 2 times a day  - Out of bed for toileting  - Record patient progress and toleration of activity level   Outcome: Progressing  Goal: Patient will remain free of falls  Description: INTERVENTIONS:  - Educate patient/family on patient safety including physical limitations  - Instruct patient to call for assistance with activity   - Consult OT/PT to assist with strengthening/mobility   - Keep Call bell within reach  - Keep bed low and locked with side rails adjusted as appropriate  - Keep care items and personal belongings within reach  - Initiate and maintain comfort rounds  - Make Fall Risk Sign visible to staff  - Offer Toileting every 2 Hours, in advance of need  - Initiate/Maintain bed alarm  - Obtain necessary fall risk management equipment: bed dalarm  - Apply yellow socks and bracelet for high fall risk patients  - Consider moving patient to room near nurses station  Outcome: Progressing     Problem: DISCHARGE PLANNING  Goal: Discharge to home or other facility with appropriate resources  Description: INTERVENTIONS:  - Identify barriers to discharge w/patient and caregiver  - Arrange for needed discharge resources and transportation as appropriate  - Identify discharge learning needs (meds, wound care, etc )  - Arrange for interpretive services to assist at discharge as needed  - Refer to Case Management Department for coordinating discharge planning if the patient needs post-hospital services based on physician/advanced practitioner order or complex needs related to functional status, cognitive ability, or social support system  Outcome: Progressing     Problem: Knowledge Deficit  Goal: Patient/family/caregiver demonstrates understanding of disease process, treatment plan, medications, and discharge instructions  Description: Complete learning assessment and assess knowledge base    Interventions:  - Provide teaching at level of understanding  - Provide teaching via preferred learning methods  Outcome: Progressing     Problem: Potential for Falls  Goal: Patient will remain free of falls  Description: INTERVENTIONS:  - Educate patient/family on patient safety including physical limitations  - Instruct patient to call for assistance with activity   - Consult OT/PT to assist with strengthening/mobility   - Keep Call bell within reach  - Keep bed low and locked with side rails adjusted as appropriate  - Keep care items and personal belongings within reach  - Initiate and maintain comfort rounds  - Make Fall Risk Sign visible to staff  - Offer Toileting every 2 Hours, in advance of need  - Initiate/Maintain bed alarm  - Obtain necessary fall risk management equipment: bed alarm  - Apply yellow socks and bracelet for high fall risk patients  - Consider moving patient to room near nurses station  Outcome: Progressing     Problem: [address] or High Potential for Compromised Skin Integrity  Goal: Skin integrity is maintained or improved  Description: INTERVENTIONS:  - Identify patients at risk for skin breakdown  - Assess and monitor skin integrity  - Assess and monitor nutrition and hydration status  - Monitor labs   - Assess for incontinence   - Turn and reposition patient  - Assist with mobility/ambulation  - Relieve pressure over bony prominences  - Avoid friction and shearing  - Provide appropriate hygiene as needed including keeping skin clean and dry  - Evaluate need for skin moisturizer/barrier cream  - Collaborate with interdisciplinary team   - Patient/family teaching  - Consider wound care consult   Outcome: Progressing     Problem: SAFETY,RESTRAINT: NV/NON-SELF DESTRUCTIVE BEHAVIOR  Goal: Remains free of harm/injury (restraint for non violent/non self-detsructive behavior)  Description: INTERVENTIONS:  - Instruct patient/family regarding restraint use   - Assess and monitor physiologic and psychological status   - Provide interventions and comfort measures to meet assessed patient needs   - Identify and implement measures to help patient regain control  - Assess readiness for release of restraint   Outcome: Progressing  Goal: Returns to optimal restraint-free functioning  Description: INTERVENTIONS:  - Assess the patient's behavior and symptoms that indicate continued need for restraint  - Identify and implement measures to help patient regain control  - Assess readiness for release of restraint   Outcome: Progressing     Problem: COPING  Goal: Pt/Family able to verbalize concerns and demonstrate effective coping strategies  Description: INTERVENTIONS:  - Assist patient/family to identify coping skills, available support systems and cultural and spiritual values  - Provide emotional support, including active listening and acknowledgement of concerns of patient and caregivers  - Reduce environmental stimuli, as able  - Provide patient education  - Assess for spiritual pain/suffering and initiate spiritual care, including notification of Pastoral Care or casey based community as needed  - Assess effectiveness of coping strategies  Outcome: Progressing  Goal: Will report anxiety at manageable levels  Description: INTERVENTIONS:  - Administer medication as ordered  - Teach and encourage coping skills  - Provide emotional support  - Assess patient/family for anxiety and ability to cope  Outcome: Progressing

## 2022-04-03 NOTE — CONSULTS
IMAGING REVIEW:  CT head demonstrates large left parietal intracranial hemorrhage  Hemorrhage appears to be well circumscribed concerning for underlying mass  In the left cerebellar hemisphere, there appears to be another well-circumscribed lesion although it is difficult to discern what is truly going on there based on CT     ASSESSMENT:  This is a 49-year-old male with history of lung adenocarcinoma presenting with apraxia and dysphagia found to have intracranial hemorrhage concerning for metastatic disease  The patient's neuro exam is stable  He does have difficulty differentiating his left and right otherwise neurologically intact  No visual field abnormalities  PLAN:  MRI brain with and without contrast  Serial neuro checks  Keppra for seizure prophylaxis  Hold of on Dex until MRI is completed    HISTORY OF PRESENT ILLNESS:  Reason for Consult:  left parietal ICH  Trang Arthur is a 74 y o  male with pmh pertinent for lung adenocarcinoma diagnosed last year (s/p upper left lobe lobectomy and radiation treatment now on on chemotherapy), hypertension, and hyperlipidemia  The pt presents as a transfer from [facility] for an intraparenchymal hemorrhage  The patient presented to [facility] on 04/22 with apraxia and aphasia  The pt was subsequently found [place] [address] [place] [address] to have left parietal intraparenchymal hemorrhage with surrounding edema  Arash Sierra      PAST MEDICAL HISTORY:   Hyperlipidemia      Hypertension      Lung cancer ([address] )      PAST SURGICAL HISTORY:     BACK SURGERY        HEMORRHOID SURGERY        IR BIOPSY LUNG   5/6/2021    IR PORT PLACEMENT   6/17/2021    LAMINECTOMY   2018     L4-L5    LUNG SURGERY         left upper lobectomy    [place] BRONCHOSCOPY,DIAGNOSTIC N/A 5/25/2021     Procedure: BRONCHOSCOPY FLEXIBLE;  Surgeon: Jack Cho MD;  [facility];  Service: Thoracic    [place] MEDIASTINOSCOPY WITH LYMPH NODE BIOPSY/IES N/A 5/25/2021     Procedure: MEDIASTINOSCOPY, flexible bronchoscopy;  Surgeon: Eliud Payne MD;  [facility];  Service: Thoracic    TONSILLECTOMY          ALLERGIES:  No Known Allergies     SOCIAL HISTORY:   Smoking status: Former Smoker       Packs/day: 1 00       Years: 40 00       Pack years: 40 00       Types: Cigarettes       Start date:        Quit date: 2017       Years since quittin 1    Smokeless tobacco: Never Used    Tobacco comment: quit 2017   Vaping Use    Vaping Use: Never used   Substance Use Topics    Alcohol use: Not Currently       Alcohol/week: 7 0 standard drinks       Types: 7 Cans of beer per week    Drug use:  Yes       Types: Marijuana       Comment: seldom       FAMILY HISTORY:  Noncontributory     MEDICATIONS:  No anticoagulation     REVIEW OF SYSTEMS:  Negative except stated in HPI     PHYSICAL EXAM:       Neurologic Exam:  Awake and alert  PERRL, EOMI  Oriented x3  Speech clear and fluent  Full visual fields  Full strength throughout  Difficulty differentiating left and right  No pronator drift  Sensation to light touch and pin prick intact throughout  No clonus  2+ patellar reflexes

## 2022-04-03 NOTE — ASSESSMENT & PLAN NOTE
· 66M presented to  [facility] on 4/2/22 with apraxia and aphasia  His wife states that he began exhibiting odd behavios about 24h prior to hospitalization  She reports noticing him putting shoes onto the wrong foot, and states he was unable to feed himself  About 1 hour prior to presentation, he developed worsening language dysfunction, and was not speaking, not following commands  · He was a stroke alert   · CT brain 4/2/22 showed a left parietal 4 6 cm intraparenchymal hemorrhage with surrounding cerebral edema  There is no mass effect  · Not a tPA candidate secondary to [address]  · Initial NIH 8  · ICH score: 1  · Neurology consulted  · Consulted NS- Discussed via TT  · Check repeat CT brain in the AM   · Check MRI w and w/o contrast  · Patient has known adenocarcinoma of the left lung   He is currently received adjuvant immunotherapy and has completed 2 cycles of atezolizumab  · 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

## 2022-04-03 NOTE — ASSESSMENT & PLAN NOTE
· Patient developed severe psoriasis following adjuvant immunotherapy for lung CA  · He had been started on high dose prednisone   · Currently on Prednisone 40mg daily   · Started IV decadron for vasogenic edema- daily equivalent is 4mg daily

## 2022-04-03 NOTE — ASSESSMENT & PLAN NOTE
· 66M presented to  [facility] on 4/2/22 with apraxia and aphasia  His wife states that he began exhibiting odd behavios about 24h prior to hospitalization  She reports noticing him putting shoes onto the wrong foot, and states he was unable to feed himself  About 1 hour prior to presentation, he developed worsening language dysfunction, and was not speaking, not following commands  · He was a stroke alert   · CT brain 4/2/22 showed a left parietal 4 6 cm intraparenchymal hemorrhage with surrounding cerebral edema  There is no mass effect  · Not a tPA candidate secondary to [address]  · Initial NIH 8  · ICH score: 1  · Neurology consulted  · Reviewed imaging with NS  Recommend repeat imaging and MRI w/ and w/o contrast  · Repeat CT brain pending   · Check MRI w and w/o contrast  · Patient has known adenocarcinoma of the left lung   He is currently receiving adjuvant immunotherapy and has completed 2 cycles of atezolizumab  · 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

## 2022-04-03 NOTE — EMTALA/ACUTE CARE TRANSFER
[address] [facility] [address]  Dept: [phone]      EMTALA TRANSFER CONSENT    NAME Vijaya Eng                                         1955                              MRN [id number]    I have been informed of my rights regarding examination, treatment, and transfer   by Dr Andrea Olguin MD    Benefits: Specialized equipment and/or services available at the receiving facility (Include comment)________________________ (Neuro ICU)    Risks: Potential for delay in receiving treatment,Potential deterioration of medical condition,Loss of IV,Increased discomfort during transfer,Possible worsening of condition or death during transfer      Consent for Transfer:  I acknowledge that my medical condition has been evaluated and explained to me by the emergency department physician or other qualified medical person and/or my attending physician, who has recommended that I be transferred to the service of  Accepting Physician: [place] at [facility] Name, [address] : [address]  The above potential benefits of such transfer, the potential risks associated with such transfer, and the probable risks of not being transferred have been explained to me, and I fully understand them  The doctor has explained that, in my case, the benefits of transfer outweigh the risks  I agree to be transferred  I authorize the performance of emergency medical procedures and treatments upon me in both transit and upon arrival at the receiving facility  Additionally, I authorize the release of any and all medical records to the receiving facility and request they be transported with me, if possible  I understand that the safest mode of transportation during a medical emergency is an ambulance and that the Hospital advocates the use of this mode of transport   Risks of traveling to the receiving facility by car, including absence of medical control, life sustaining equipment, such as oxygen, and medical personnel has been explained to me and I fully understand them  (VIANCA CORRECT BOX BELOW)  [  ]  I consent to the stated transfer and to be transported by ambulance/helicopter  [  ]  I consent to the stated transfer, but refuse transportation by ambulance and accept full responsibility for my transportation by car  I understand the risks of non-ambulance transfers and I exonerate the Hospital and its staff from any deterioration in my condition that results from this refusal     X___________________________________________    DATE  22  TIME________  Signature of patient or legally responsible individual signing on patient behalf           RELATIONSHIP TO PATIENT_________________________          Provider Certification    NAME Thu Boss                                        [facility] 1955                              MRN [id number]    A medical screening exam was performed on the above named patient  Based on the examination:    Condition Necessitating Transfer The primary encounter diagnosis was Acute encephalopathy  A diagnosis of Hemorrhagic stroke [facility]) was also pertinent to this visit      Patient Condition: The patient has been stabilized such that within reasonable medical probability, no material deterioration of the patient condition or the condition of the unborn child(gilma) is likely to result from the transfer    Reason for Transfer: Level of Care needed not available at this facility    Transfer Requirements: [facility] [address]   · Space available and qualified personnel available for treatment as acknowledged by [employer]  · Agreed to accept transfer and to provide appropriate medical treatment as acknowledged by       Michelle  · Appropriate medical records of the examination and treatment of the patient are provided at the time of transfer   [address] _______  · Transfer will be performed by qualified personnel from    and appropriate transfer equipment as required, including the use of necessary and appropriate life support measures  Provider Certification: I have examined the patient and explained the following risks and benefits of being transferred/refusing transfer to the patient/family:  General risk, such as traffic hazards, adverse weather conditions, rough terrain or turbulence, possible failure of equipment (including vehicle or aircraft), or consequences of actions of persons outside the control of the transport personnel      Based on these reasonable risks and benefits to the patient and/or the unborn child(gilma), and based upon the information available at the time of the patients examination, I certify that the medical benefits reasonably to be expected from the provision of appropriate medical treatments at another medical facility outweigh the increasing risks, if any, to the individuals medical condition, and in the case of labor to the unborn child, from effecting the transfer      X____________________________________________ DATE 04/02/22        TIME_______      ORIGINAL - SEND TO MEDICAL RECORDS   COPY - SEND WITH PATIENT DURING TRANSFER

## 2022-04-03 NOTE — H&P
[facility] H&P- Melodie Keita 1955, 74 y o  male MRN: [id number]  Unit/Bed#: ICU 04 Encounter: [id number]  Primary Care Provider: Carrol Snyder DO   Date and time admitted to hospital: 4/2/2022  9:42 PM    * Left parietal 4 6 cm intraparenchymal hemorrhage with surrounding cerebral edema  Assessment & Plan  · 66M presented to [facility] on 4/2/22 with apraxia and aphasia  His wife states that he began exhibiting odd behavios about 24h prior to hospitalization  She reports noticing him putting shoes onto the wrong foot, and states he was unable to feed himself  About 1 hour prior to presentation, he developed worsening language dysfunction, and was not speaking, not following commands  · He was a stroke alert   · CT brain 4/2/22 showed a left parietal 4 6 cm intraparenchymal hemorrhage with surrounding cerebral edema  There is no mass effect  · Not a tPA candidate secondary to [address]  · Initial NIH 8  · ICH score: 1  · Neurology consulted  · Consulted NS- Discussed via TT  · Check repeat CT brain in the AM   · Check MRI w and w/o contrast  · Patient has known adenocarcinoma of the left lung   He is currently received adjuvant immunotherapy and has completed 2 cycles of atezolizumab  · 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     Drug-induced thyroiditis  Assessment & Plan  · Patient on synthroid at home  · Will restart when able to take PO   · TSH 6 1- Free T4 pending     Psoriasis  Assessment & Plan  · Patient developed severe psoriasis following adjuvant immunotherapy for lung CA  · He had been started on high dose prednisone   · Currently on Prednisone 40mg daily   · Started IV decadron for vasogenic edema- daily equivalent is 4mg daily     Adenocarcinoma, lung, left [facility])  Assessment & Plan  · Patient currently following with [facility] hematology and oncology associates in [place]  · Diagnosed in 5/2021  · S/p Left Upper Lung Lobectomy at [facility] by Dr Christiano Dunham on 12/16/21  · Patient evaluated by Radiation Oncology team postop who pursued radiation treatment to the mediastinum x 25 fractions completed 2/22/22   · Chemotherapy   · Previously on chemotherapy from 6/2021-8/2021  · Started on adjuvant immunotherapy 1/2022 -Atexolizumab, 2 of 6 cycles   · Did not complete 3rd cycle secondary to severe psoriasis   · Consider Heme/Onc consult       Mixed hyperlipidemia  Assessment & Plan  · Patient on crestor at home  · Will restart when able to take PO    Essential hypertension  Assessment & Plan  · Patient takes norvasc at home  · Continue cardene infusion   · Labetalol prn  · Goal SBP <140    Depression with anxiety  Assessment & Plan  · Holding home celexa secondary to risk of aspiration     -------------------------------------------------------------------------------------------------------------  Chief Complaint: Aphasia    History of Present Illness   HX and PE limited by: Aspen Alcazar is a 74 y o  male who [facility] with complaints of difficulty speaking, feeding himself and apraxia per report from his wife  He was found to have a L parietal 4 6cm IPH  He was started on cardene for BP management and transferred to B    Exam and HPI limited by patient's aphasia  Wife states that his symptoms started 24 hours prior to presentation     History obtained from chart review and unobtainable from patient due to aphasia   -------------------------------------------------------------------------------------------------------------  Dispo:  Admit to Critical Care     Code Status: Level 1 - Full Code  --------------------------------------------------------------------------------------------------------------  Review of Systems    Review of systems was unable to be performed secondary to aphasia     Physical Exam  Vitals reviewed  Constitutional:       General: He is not in acute distress  HENT:      Head: Normocephalic  Nose: Nose normal       Mouth/Throat:      Mouth: Mucous membranes are moist    Eyes:      Pupils: Pupils are equal, round, and reactive to light  Cardiovascular:      Rate and Rhythm: Normal rate  Pulses: Normal pulses  Pulmonary:      Effort: Pulmonary effort is normal       Breath sounds: Normal breath sounds  Abdominal:      Palpations: Abdomen is soft  Musculoskeletal:         General: Normal range of motion  Cervical back: Neck supple  Skin:     General: Skin is warm  Capillary Refill: Capillary refill takes less than 2 seconds  Neurological:      Mental Status: He is alert  Comments: Patient awake and alert  PERRL  No obvious facial droop  Appears to move all extremities with purpose  Sensation grossly intact  Expressive and receptive aphasia        --------------------------------------------------------------------------------------------------------------  Vitals: There were no vitals filed for this visit  Temp  Min: 97 2 °F (36 2 °C)  Max: 97 2 °F (36 2 °C)        There is no height or weight on file to calculate BMI      Laboratory and Diagnostics:  Results from last 7 days   Lab Units 04/02/22 1942   WBC Thousand/uL 9 76   HEMOGLOBIN g/dL 15 0   HEMATOCRIT % 46 2   PLATELETS Thousands/uL 123*     Results from last 7 days   Lab Units 04/02/22 1942   SODIUM mmol/L 136   POTASSIUM mmol/L 4 1   CHLORIDE mmol/L 99   CO2 mmol/L 28   ANION GAP mmol/L 9   BUN mg/dL 25   CREATININE mg/dL 1 32*   CALCIUM mg/dL 9 9   GLUCOSE RANDOM mg/dL 96   ALT U/L 36   AST U/L 24   ALK PHOS U/L 55   ALBUMIN g/dL 4 1   TOTAL BILIRUBIN mg/dL 0 49          Results from last 7 days   Lab Units 04/02/22 1942   INR  0 90   PTT seconds 30              ABG:    VBG:  Results from last 7 days   Lab Units 22   PH SHANNA  7 390   PCO2 SHANNA mm Hg 44 9   PO2 SHANNA mm Hg 27 9*   HCO3 SHANNA mmol/L 26 6   BASE EXC SHANNA mmol/L 1 1           Micro:        EKG: NSR  Imaging: I have personally reviewed pertinent reports     and I have personally reviewed pertinent films in PACS      Historical Information   Past Medical History:   Diagnosis Date    Hyperlipidemia     Hypertension     Lung cancer [facility])      Past Surgical History:   Procedure Laterality Date    BACK SURGERY      HEMORRHOID SURGERY      IR BIOPSY LUNG  2021    IR PORT PLACEMENT  2021    LAMINECTOMY  2018    L4-L5    LUNG SURGERY      left upper lobectomy    [place] BRONCHOSCOPY,DIAGNOSTIC N/A 2021    Procedure: BRONCHOSCOPY FLEXIBLE;  Surgeon: Pina Galvan MD;  [facility];  Service: Thoracic    [place] MEDIASTINOSCOPY WITH LYMPH NODE BIOPSY/IES N/A 2021    Procedure: MEDIASTINOSCOPY, flexible bronchoscopy;  Surgeon: Pina Galvan MD;  Location[facility];  Service: Thoracic    TONSILLECTOMY       Social History   Social History     Substance and Sexual Activity   Alcohol Use Not Currently    Alcohol/week: 7 0 standard drinks    Types: 7 Cans of beer per week     Social History     Substance and Sexual Activity   Drug Use Yes    Types: Marijuana    Comment: seldom     Social History     Tobacco Use   Smoking Status Former Smoker    Packs/day: 1 00    Years: 40 00    Pack years: 40 00    Types: Cigarettes    Start date:     Quit date: 2017    Years since quittin 1   Smokeless Tobacco Never Used   Tobacco Comment    quit 2017     Exercise History: Ambulatory at home  Family History:   Family History   Problem Relation Age of Onset    Nephrolithiasis Father      I have reviewed this patient's family history and commented on sigificant items within the HPI      Medications:  Current Facility-Administered Medications   Medication Dose Route Frequency    [START ON 4/3/2022] dexamethasone (DECADRON) injection 4 mg  4 mg Intravenous Q6H [address]    Labetalol HCl (NORMODYNE) injection 10 mg  10 mg Intravenous Q4H PRN    levETIRAcetam (KEPPRA) 500 mg in sodium chloride 0 9 % 100 mL IVPB  500 mg Intravenous Q12H [address]    niCARdipine (CARDENE) 25 mg (STANDARD CONCENTRATION) in sodium chloride 0 9% 250 mL  5-15 mg/hr Intravenous Titrated    ondansetron (ZOFRAN) injection 4 mg  4 mg Intravenous Q6H PRN    sodium chloride 0 9 % infusion  125 mL/hr Intravenous Continuous     Home medications:  Prior to Admission Medications   Prescriptions Last Dose Informant Patient Reported? Taking?    Ascorbic Acid (vitamin C) 1000 MG tablet  Self Yes No   Sig: Take 1,000 mg by mouth daily     B Complex Vitamins (B COMPLEX 1 PO)  Self Yes No   Sig: Take 1 tablet by mouth daily    B Complex Vitamins (BL VITAMIN B COMPLEX PO)  Self Yes No   Sig: Take by mouth   Cholecalciferol (VITAMIN D3 PO)  Self Yes No   Sig: Take 10,000 Units by mouth daily     Clobetasol Propionate (Impoyz) 0 025 % CREA  Self Yes No   Sig: Apply topically   Cyanocobalamin (Vitamin B 12) 500 MCG TABS  Self Yes No   Sig: Take 1 tablet by mouth   Patient not taking: Reported on 8/68/9378    Garlic 10 MG CAPS  Self Yes No   Sig: Take 1 tablet by mouth daily    Glucosamine HCl 1500 MG TABS  Self Yes No   Sig: Take by mouth   Glucosamine-Chondroit-Vit C-Mn (GLUCOSAMINE 1500 COMPLEX) CAPS  Self Yes No   Sig: daily    MULTIPLE VITAMINS ESSENTIAL PO  Self Yes No   Sig: Take by mouth   Patient not taking: Reported on 2/15/2022    Multiple Vitamins-Minerals (MULTIVITAL-M) TABS  Self Yes No   Sig: Take by mouth daily    Patient not taking: Reported on 4/2/2022    allopurinol (ZYLOPRIM) 100 mg tablet  Self No No   Sig: Take 1 tablet (100 mg total) by mouth daily   amLODIPine (NORVASC) 10 mg tablet  Self No No   Sig: TAKE ONE TABLET BY MOUTH EVERY DAY   betamethasone valerate (VALISONE) 0 1 % cream  Self No No   Sig: Apply topically 2 (two) times a day Patient taking differently: Apply topically as needed    betamethasone, augmented, (DIPROLENE-AF) 0 05 % cream  Self Yes No   Sig:     calcipotriene (DOVONEX) 0 005 % cream  Self Yes No   Patient not taking: Reported on 3/23/2022    calcipotriene (DOVONOX) 0 005 % ointment  Self Yes No   Sig: Apply topically 2 (two) times a day As needed   citalopram (CeleXA) 10 mg tablet  Self No No   Sig: TAKE ONE TABLET BY MOUTH EVERY DAY   colchicine (COLCRYS) 0 6 mg tablet  Self No No   Sig: Take 1 tablet (0 6 mg total) by mouth 2 (two) times a day   Patient not taking: Reported on 2/15/2022    doxepin (SINEquan) 25 mg capsule  Self No No   Sig: TAKE ONE CAPSULE BY MOUTH EVERY DAY AT BEDTIME   fluocinolone (SYNALAR) 0 01 % external solution  Self Yes No   Sig:     folic acid (FOLVITE) 1 mg tablet  Self No No   Sig: Take 1 tablet (1 mg total) by mouth daily   Patient not taking: Reported on 1/12/2022    gabapentin (NEURONTIN) 100 mg capsule  Self Yes No   Patient not taking: Reported on 1/12/2022    ibuprofen (MOTRIN) 400 mg tablet  Self Yes No   ketoconazole (NIZORAL) 2 % cream  Self No No   Sig: Apply topically 2 (two) times a day   Patient taking differently: Apply topically as needed    levothyroxine (Synthroid) 25 mcg tablet  Self No No   Sig: Take 1 tablet (25 mcg total) by mouth daily   meclizine (ANTIVERT) 25 mg tablet  Self No No   Sig: Take 1 tablet (25 mg total) by mouth 4 (four) times a day as needed for dizziness   Patient not taking: Reported on 1/12/2022    neomycin-polymyxin-hydrocortisone (CORTISPORIN) otic solution  Self No No   Sig: Administer 4 drops into the left ear every 6 (six) hours   Patient not taking: Reported on 1/12/2022    ondansetron (ZOFRAN) 4 mg tablet  Self No No   Sig: Take 1 tablet (4 mg total) by mouth every 6 (six) hours   Patient not taking: Reported on 1/18/2022    ondansetron (ZOFRAN) 8 mg tablet  Self No No   Sig: Take 1 tablet (8 mg total) by mouth every 8 (eight) hours as needed for nausea or vomiting   Patient not taking: Reported on 1/12/2022    oxyCODONE (ROXICODONE) 5 immediate release tablet  Self Yes No   Patient not taking: Reported on 2/15/2022    pantoprazole (PROTONIX) 40 mg tablet  Self No No   Sig: Take 1 tablet (40 mg total) by mouth daily   polyethylene glycol (GLYCOLAX) 17 GM/SCOOP powder  Self No No   Sig: Take 17 g by mouth 2 (two) times a day   predniSONE 20 mg tablet  Self No No   Sig: Take 3 tablets (60 mg total) by mouth daily   rosuvastatin (CRESTOR) 10 MG tablet  Self No No   Sig: TAKE ONE TABLET BY MOUTH EVERY DAY   tamsulosin (FLOMAX) 0 4 mg  Self Yes No   Patient not taking: Reported on 1/12/2022    traMADol (ULTRAM) 50 mg tablet  Self No No   Sig: TAKE 1 TABLET BY MOUTH EVERY 8 HOURS AS NEEDED FOR SEVERE PAIN   triamcinolone (KENALOG) 0 1 % cream  Self Yes No   zinc gluconate 50 mg tablet  Self Yes No   Sig: Take 50 mg by mouth daily      Facility-Administered Medications: None     Allergies: Allergies   Allergen Reactions    Bee Venom     Benazepril Other (See Comments)     Angioedema     Latex Other (See Comments)     Burning of eyes at the dentist from the gloves    Meloxicam GI Intolerance    Penicillin G Rash       ------------------------------------------------------------------------------------------------------------  Advance Directive and Living Will:      Power of :    POLST:    ------------------------------------------------------------------------------------------------------------  Anticipated Length of Stay is > 2 midnights    Care Time Delivered:   Upon my evaluation, this patient had a high probability of imminent or life-threatening deterioration due to neurologic decline, which required my direct attention, intervention, and personal management  I have personally provided 30 minutes (2142 to 2212) of critical care time, exclusive of procedures, teaching, family meetings, and any prior time recorded by providers other than myself  Nini Frankel PA-C        Portions of the record may have been created with voice recognition software  Occasional wrong word or "sound a like" substitutions may have occurred due to the inherent limitations of voice recognition software    Read the chart carefully and recognize, using context, where substitutions have occurred

## 2022-04-03 NOTE — SPEECH THERAPY NOTE
Speech Language/Pathology  Speech-Language Pathology Bedside Swallow Evaluation      Patient Name: Yuliana Pabon Date: 4/3/2022     Problem List  Principal Problem:    Left parietal 4 6 cm intraparenchymal hemorrhage with surrounding cerebral edema  Active Problems:    Depression with anxiety    Essential hypertension    Mixed hyperlipidemia    Adenocarcinoma, lung, left ([address] )    Psoriasis    Drug-induced thyroiditis      Past Medical History  Past Medical History:   Diagnosis Date    Hyperlipidemia     Hypertension     Lung cancer [facility])        Past Surgical History  Past Surgical History:   Procedure Laterality Date    BACK SURGERY      HEMORRHOID SURGERY      IR BIOPSY LUNG  5/6/2021    IR PORT PLACEMENT  6/17/2021    LAMINECTOMY  2018    L4-L5    LUNG SURGERY      left upper lobectomy    [place] BRONCHOSCOPY,DIAGNOSTIC N/A 5/25/2021    Procedure: BRONCHOSCOPY FLEXIBLE;  Surgeon: Lovely Mcburney, MD;  [facility];  Service: Thoracic    [place] MEDIASTINOSCOPY WITH LYMPH NODE BIOPSY/IES N/A 5/25/2021    Procedure: MEDIASTINOSCOPY, flexible bronchoscopy;  Surgeon: Lovely Mcburney, MD;  Location: [facility];  Service: [address] [place] [address]       Summary   Pt presented with functional appearing oral and pharyngeal stage swallowing skills with materials administered today  Pt w/ some reduced initiation of self feeding  Will follow for full speech language eval as able tomorrow  Recommended Diet: regular diet and thin liquids   Recommended Form of Meds: as desired   Aspiration precautions and swallowing strategies: upright posture, only feed when fully alert and alternating bites and sips  Other Recommendations: Continue frequent oral care, no dysphagia f/u needed        Current Medical Status  Pt is a 74 y o  male who presented to [address]  with confusion, found to have L parietal ICH  Transferred to \[facility]\"" for higher level of care    ?if ICH is metastasis from a 1* lung CA>     Current Precautions:  Fall  Aspiration  Contact  AIrborn  C diff   Seizure  Delirium    Allergies:  No known food allergies    Past medical history:  Please see H&P for details    Special Studies:  CT head-Left parietal region intraparenchymal hematoma unchanged in size measuring 6 7 cm in largest dimension      New irregular hyperdense focus at the left cerebellum likely to represent small amount of subarachnoid hemorrhage versus a new intraparenchymal hemorrhage        Social/Education/Vocational Hx:  Pt lives with his wife    Swallow Information   Current Risks for Dysphagia & Aspiration: CVA  Current Symptoms/Concerns: failed nsg screen  Current Diet: NPO   Baseline Diet: regular diet and thin liquids      Baseline Assessment   Behavior/Cognition: alert  Speech/Language Status: able to participate in basic conversation and limited answers, noted aphasia  Able to answer for comfort questions  will need full assessment  Patient Positioning: upright in bed  Pain Status/Interventions/Response to Interventions:   No report of or nonverbal indications of pain  Swallow Mechanism Exam  Facial: right facial droop  Labial: bilateral decreased ROM  Lingual: WFL  Velum: unable to visualize  Mandible: adequate ROM  Dentition: some missing, natural  Vocal quality:clear/adequate   Volitional Cough: strong/productive   Respiratory Status: on RA         Consistencies Assessed and Performance   Consistencies Administered: ice chips, thin liquids, nectar thick, puree, soft solids and hard solids    Oral Stage: WFL  Mastication was adequate with the materials administered today  Bolus formation and transfer were functional with no significant oral residue noted  No overt s/s reduced oral control  When self feeding would hold the material out to the R & need cues to initiate feeding himself again  Pharyngeal Stage: WFL  Swallow Mechanics:  Swallowing initiation appeared prompt    Laryngeal rise was palpated and judged to be within functional limits  No coughing, throat clearing, change in vocal quality or respiratory status noted today       Esophageal Concerns: none reported    Strategies and Efficacy: -    Summary and Recommendations (see above)    Results Reviewed with: patient and RN     Treatment Recommended: will follow for speech

## 2022-04-03 NOTE — ASSESSMENT & PLAN NOTE
· Patient currently following with [facility] hematology and oncology associates in [place]  · Diagnosed in 5/2021  · S/p Left Upper Lung Lobectomy at [facility] by Dr Bishop Parker on 12/16/21  · Patient evaluated by Radiation Oncology team postop who pursued radiation treatment to the mediastinum x 25 fractions completed 2/22/22   · Chemotherapy   · Previously on chemotherapy from 6/2021-8/2021  · Started on adjuvant immunotherapy 1/2022 -Atexolizumab, 2 of 6 cycles   · Did not complete 3rd cycle secondary to severe psoriasis   · Consider Heme/Onc consult

## 2022-04-03 NOTE — ASSESSMENT & PLAN NOTE
· Patient developed severe psoriasis following adjuvant immunotherapy for lung CA  · He had been started on high dose prednisone   · Currently on Prednisone 40mg daily at home  · Continue IV decadron for vasogenic edema- daily equivalent is 4mg daily

## 2022-04-03 NOTE — PROGRESS NOTES
[facility] Progress Note - Genia Skiff 1955, 74 y o  male MRN: [id number]  Unit/Bed#: ICU 04 Encounter: [id number]  Primary Care Provider: Mireya Roa DO   Date and time admitted to hospital: 4/2/2022  9:42 PM    * Left parietal 4 6 cm intraparenchymal hemorrhage with surrounding cerebral edema  Assessment & Plan  · 66M presented to [facility] on 4/2/22 with apraxia and aphasia  His wife states that he began exhibiting odd behavios about 24h prior to hospitalization  She reports noticing him putting shoes onto the wrong foot, and states he was unable to feed himself  About 1 hour prior to presentation, he developed worsening language dysfunction, and was not speaking, not following commands  · He was a stroke alert   · CT brain 4/2/22 showed a left parietal 4 6 cm intraparenchymal hemorrhage with surrounding cerebral edema  There is no mass effect  · Not a tPA candidate secondary to [address]  · Initial NIH 8  · ICH score: 1  · Neurology consulted  · Reviewed imaging with NS  Recommend repeat imaging and MRI w/ and w/o contrast  · Repeat CT brain pending   · Check MRI w and w/o contrast  · Patient has known adenocarcinoma of the left lung   He is currently receiving adjuvant immunotherapy and has completed 2 cycles of atezolizumab  · 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     Drug-induced thyroiditis  Assessment & Plan  · Patient on synthroid at home  · Will restart when able to take PO   · TSH 6 1- Free T4 pending     Psoriasis  Assessment & Plan  · Patient developed severe psoriasis following adjuvant immunotherapy for lung CA  · He had been started on high dose prednisone   · Currently on Prednisone 40mg daily at home  · Continue IV decadron for vasogenic edema- daily equivalent is 4mg daily     Adenocarcinoma, lung, left [facility])  Assessment & Plan  · Patient currently following with [facility] hematology and oncology associates in [place]  · Diagnosed in 5/2021  · S/p Left Upper Lung Lobectomy at [facility] by Dr Christopher Salazar on 12/16/21  · Patient evaluated by Radiation Oncology team postop who pursued radiation treatment to the mediastinum x 25 fractions completed 2/22/22   · Chemotherapy   · Previously on chemotherapy from 6/2021-8/2021  · Started on adjuvant immunotherapy 1/2022 -Atexolizumab, 2 of 6 cycles   · Did not complete 3rd cycle secondary to severe psoriasis   · Consider Heme/Onc consult       Mixed hyperlipidemia  Assessment & Plan  · Patient on crestor at home  · Will restart when able to take PO    Essential hypertension  Assessment & Plan  · Patient takes norvasc at home  · Continue cardene infusion   · Labetalol prn  · Goal SBP <140    Depression with anxiety  Assessment & Plan  · Holding home celexa secondary to risk of aspiration   · Will restart once cleared by SLP      ----------------------------------------------------------------------------------------  HPI/24hr events: 66M with a PMH of adenocarcinoma of the left lung presented to [facility] for change in [place]   He was found to have a L IPH hemorrhage  MRI pending for today     Disposition: Continue Critical Care   Code Status: Level 1 - Full Code  ---------------------------------------------------------------------------------------  SUBJECTIVE  Patient aphasic     Review of Systems   Unable to perform ROS: Mental status change     Review of systems was unable to be performed secondary to aphasia  ---------------------------------------------------------------------------------------  OBJECTIVE    Vitals   Vitals:    04/02/22 2144 04/02/22 2200 04/02/22 2300 04/03/22 0000   BP:  141/84 115/71 124/72   BP Location:  Left arm     Pulse:  80 76 78   Resp:  14 (!) 7 (!) 6   Temp: 98 1 °F (36 7 °C)     TempSrc:  Oral     SpO2:  99% 99% 97%   Weight: 83 kg (182 lb 15 7 oz) 83 kg (182 lb 15 7 oz)     Height:  5' 10" (1 778 m)       Temp (24hrs), Av 7 °F (36 5 °C), Min:97 2 °F (36 2 °C), Max:98 1 °F (36 7 °C)  Current: Temperature: 98 1 °F (36 7 °C)          Respiratory:  SpO2: SpO2: 97 %, SpO2 Activity: SpO2 Activity: At Rest       Invasive/non-invasive ventilation settings   Respiratory  Report   Lab Data (Last 4 hours)    None         O2/Vent Data (Last 4 hours)    None                Physical Exam  Vitals reviewed  HENT:      Head: Normocephalic  Nose: Nose normal       Mouth/Throat:      Mouth: Mucous membranes are moist    Eyes:      Pupils: Pupils are equal, round, and reactive to light  Cardiovascular:      Rate and Rhythm: Normal rate  Pulses: Normal pulses  Pulmonary:      Effort: Pulmonary effort is normal    Abdominal:      Palpations: Abdomen is soft  Musculoskeletal:         General: Normal range of motion  Cervical back: Neck supple  Skin:     General: Skin is warm  Capillary Refill: Capillary refill takes less than 2 seconds  Neurological:      Mental Status: He is alert        Comments: Patient awake and alert  PERRL  No focal weakness  Expressive and receptive aphasia- occasionally the patient is able to express his thoughts  Did not follow             Laboratory and Diagnostics:  Results from last 7 days   Lab Units 22   WBC Thousand/uL 9 76   HEMOGLOBIN g/dL 15 0   HEMATOCRIT % 46 2   PLATELETS Thousands/uL 123*     Results from last 7 days   Lab Units 22   SODIUM mmol/L 136   POTASSIUM mmol/L 4 1   CHLORIDE mmol/L 99   CO2 mmol/L 28   ANION GAP mmol/L 9   BUN mg/dL 25   CREATININE mg/dL 1 32*   CALCIUM mg/dL 9 9   GLUCOSE RANDOM mg/dL 96   ALT U/L 36   AST U/L 24   ALK PHOS U/L 55   ALBUMIN g/dL 4 1   TOTAL BILIRUBIN mg/dL 0 49          Results from last 7 days   Lab Units 22   INR  0 90   PTT seconds 30 ABG:    VBG:  Results from last 7 days   Lab Units 04/02/22 1942   PH SHANNA  7 390   PCO2 SHANNA mm Hg 44 9   PO2 SHANNA mm Hg 27 9*   HCO3 SHANNA mmol/L 26 6   BASE EXC SHANNA mmol/L 1 1           Micro        EKG: NSR  Imaging: I have personally reviewed pertinent reports  and I have personally reviewed pertinent films in PACS    Intake and Output  I/O     None          Height and Weights   Height: 5' 10" (177 8 cm)  IBW (Ideal Body Weight): 73 kg  Body mass index is 26 26 kg/m²  Weight (last 2 days)     Date/Time Weight    04/02/22 2200 83 (182 98)    04/02/22 2144 83 (182 98)            Nutrition       Diet Orders   (From admission, onward)             Start     Ordered    04/02/22 2144  Diet NPO  Diet effective now        References:    Nutrtion Support Algorithm Enteral vs  Parenteral   Question Answer Comment   Diet Type NPO    RD to adjust diet per protocol?  Yes        04/02/22 2143                  Active Medications  Scheduled Meds:  Current Facility-Administered Medications   Medication Dose Route Frequency Provider Last Rate    dexamethasone  4 mg Intravenous Q6H [place], PA-C      Labetalol HCl  10 mg Intravenous Q4H PRN Sherren Gaster, PA-C      levETIRAcetam  500 mg Intravenous Q12H [address] [place] MJ Perrin-C 500 mg (04/02/22 2333)    niCARdipine  5-15 mg/hr Intravenous Titrated Sherren Gaster, PA-C 6 mg/hr (04/02/22 2330)    ondansetron  4 mg Intravenous Q6H PRN Sherren Gaster, PA-ELIZABETH      sodium chloride  125 mL/hr Intravenous Continuous Sherren Gaster, PA-C 125 mL/hr (04/02/22 2332)     Continuous Infusions:  niCARdipine, 5-15 mg/hr, Last Rate: 6 mg/hr (04/02/22 2330)  sodium chloride, 125 mL/hr, Last Rate: 125 mL/hr (04/02/22 2332)      PRN Meds:   Labetalol HCl, 10 mg, Q4H PRN  ondansetron, 4 mg, Q6H PRN        Invasive Devices Review  Invasive Devices  Report    Central Venous Catheter Line            Port A Cath 06/17/21 Right Chest 289 days Peripheral Intravenous Line            Peripheral IV 04/02/22 Right Antecubital <1 day    Peripheral IV 04/02/22 Right Forearm <1 day                Rationale for remaining devices: NA  ---------------------------------------------------------------------------------------  Advance Directive and Living Will:      Power of :    POLST:    ---------------------------------------------------------------------------------------  Care Time Delivered:   No Critical Care time spent       Corinne Nunes PA-C      Portions of the record may have been created with voice recognition software  Occasional wrong word or "sound a like" substitutions may have occurred due to the inherent limitations of voice recognition software    Read the chart carefully and recognize, using context, where substitutions have occurred

## 2022-04-04 ENCOUNTER — APPOINTMENT (INPATIENT)
Dept: RADIOLOGY | Facility: HOSPITAL | Age: 67
DRG: 826 | End: 2022-04-04
Payer: MEDICARE

## 2022-04-04 LAB
ALBUMIN SERPL BCP-MCNC: 3.1 G/DL (ref 3.5–5)
ALP SERPL-CCNC: 53 U/L (ref 46–116)
ALT SERPL W P-5'-P-CCNC: 35 U/L (ref 12–78)
ANION GAP SERPL CALCULATED.3IONS-SCNC: 9 MMOL/L (ref 4–13)
AST SERPL W P-5'-P-CCNC: 14 U/L (ref 5–45)
ATRIAL RATE: 84 BPM
BILIRUB SERPL-MCNC: 0.38 MG/DL (ref 0.2–1)
BUN SERPL-MCNC: 34 MG/DL (ref 5–25)
CALCIUM ALBUM COR SERPL-MCNC: 9.8 MG/DL (ref 8.3–10.1)
CALCIUM SERPL-MCNC: 9.1 MG/DL (ref 8.3–10.1)
CHLORIDE SERPL-SCNC: 108 MMOL/L (ref 100–108)
CO2 SERPL-SCNC: 22 MMOL/L (ref 21–32)
CREAT SERPL-MCNC: 1.19 MG/DL (ref 0.6–1.3)
ERYTHROCYTE [DISTWIDTH] IN BLOOD BY AUTOMATED COUNT: 18.8 % (ref 11.6–15.1)
GFR SERPL CREATININE-BSD FRML MDRD: 62 ML/MIN/1.73SQ M
GLUCOSE SERPL-MCNC: 135 MG/DL (ref 65–140)
HCT VFR BLD AUTO: 40.2 % (ref 36.5–49.3)
HGB BLD-MCNC: 13.3 G/DL (ref 12–17)
MAGNESIUM SERPL-MCNC: 2.5 MG/DL (ref 1.6–2.6)
MCH RBC QN AUTO: 31.3 PG (ref 26.8–34.3)
MCHC RBC AUTO-ENTMCNC: 33.1 G/DL (ref 31.4–37.4)
MCV RBC AUTO: 95 FL (ref 82–98)
P AXIS: 59 DEGREES
PHOSPHATE SERPL-MCNC: 3.6 MG/DL (ref 2.3–4.1)
PLATELET # BLD AUTO: 112 THOUSANDS/UL (ref 149–390)
PMV BLD AUTO: 8.8 FL (ref 8.9–12.7)
POTASSIUM SERPL-SCNC: 4 MMOL/L (ref 3.5–5.3)
PR INTERVAL: 117 MS
PROT SERPL-MCNC: 6.4 G/DL (ref 6.4–8.2)
QRS AXIS: 20 DEGREES
QRSD INTERVAL: 83 MS
QT INTERVAL: 367 MS
QTC INTERVAL: 434 MS
RBC # BLD AUTO: 4.25 MILLION/UL (ref 3.88–5.62)
SODIUM SERPL-SCNC: 139 MMOL/L (ref 136–145)
T WAVE AXIS: 68 DEGREES
VENTRICULAR RATE: 84 BPM
WBC # BLD AUTO: 9.54 THOUSAND/UL (ref 4.31–10.16)

## 2022-04-04 PROCEDURE — 83735 ASSAY OF MAGNESIUM: CPT | Performed by: STUDENT IN AN ORGANIZED HEALTH CARE EDUCATION/TRAINING PROGRAM

## 2022-04-04 PROCEDURE — 93010 ELECTROCARDIOGRAM REPORT: CPT | Performed by: INTERNAL MEDICINE

## 2022-04-04 PROCEDURE — 85027 COMPLETE CBC AUTOMATED: CPT | Performed by: STUDENT IN AN ORGANIZED HEALTH CARE EDUCATION/TRAINING PROGRAM

## 2022-04-04 PROCEDURE — 99222 1ST HOSP IP/OBS MODERATE 55: CPT | Performed by: STUDENT IN AN ORGANIZED HEALTH CARE EDUCATION/TRAINING PROGRAM

## 2022-04-04 PROCEDURE — G1004 CDSM NDSC: HCPCS

## 2022-04-04 PROCEDURE — 99232 SBSQ HOSP IP/OBS MODERATE 35: CPT | Performed by: NEUROLOGICAL SURGERY

## 2022-04-04 PROCEDURE — 71260 CT THORAX DX C+: CPT

## 2022-04-04 PROCEDURE — NC001 PR NO CHARGE: Performed by: EMERGENCY MEDICINE

## 2022-04-04 PROCEDURE — 80053 COMPREHEN METABOLIC PANEL: CPT | Performed by: STUDENT IN AN ORGANIZED HEALTH CARE EDUCATION/TRAINING PROGRAM

## 2022-04-04 PROCEDURE — 99222 1ST HOSP IP/OBS MODERATE 55: CPT | Performed by: INTERNAL MEDICINE

## 2022-04-04 PROCEDURE — 84100 ASSAY OF PHOSPHORUS: CPT | Performed by: STUDENT IN AN ORGANIZED HEALTH CARE EDUCATION/TRAINING PROGRAM

## 2022-04-04 PROCEDURE — 74177 CT ABD & PELVIS W/CONTRAST: CPT

## 2022-04-04 PROCEDURE — 99222 1ST HOSP IP/OBS MODERATE 55: CPT | Performed by: PHYSICIAN ASSISTANT

## 2022-04-04 PROCEDURE — 99291 CRITICAL CARE FIRST HOUR: CPT | Performed by: EMERGENCY MEDICINE

## 2022-04-04 PROCEDURE — 99232 SBSQ HOSP IP/OBS MODERATE 35: CPT | Performed by: PSYCHIATRY & NEUROLOGY

## 2022-04-04 RX ORDER — LEVETIRACETAM 500 MG/1
500 TABLET ORAL EVERY 12 HOURS SCHEDULED
Status: DISCONTINUED | OUTPATIENT
Start: 2022-04-04 | End: 2022-04-05

## 2022-04-04 RX ORDER — LANOLIN ALCOHOL/MO/W.PET/CERES
6 CREAM (GRAM) TOPICAL
Status: DISCONTINUED | OUTPATIENT
Start: 2022-04-04 | End: 2022-04-11 | Stop reason: HOSPADM

## 2022-04-04 RX ORDER — AMLODIPINE BESYLATE 10 MG/1
10 TABLET ORAL DAILY
Status: DISCONTINUED | OUTPATIENT
Start: 2022-04-05 | End: 2022-04-11 | Stop reason: HOSPADM

## 2022-04-04 RX ORDER — AMLODIPINE BESYLATE 5 MG/1
5 TABLET ORAL ONCE
Status: COMPLETED | OUTPATIENT
Start: 2022-04-04 | End: 2022-04-04

## 2022-04-04 RX ORDER — LABETALOL 20 MG/4 ML (5 MG/ML) INTRAVENOUS SYRINGE
10 EVERY 4 HOURS PRN
Status: DISCONTINUED | OUTPATIENT
Start: 2022-04-04 | End: 2022-04-11 | Stop reason: HOSPADM

## 2022-04-04 RX ORDER — LEVOTHYROXINE SODIUM 0.03 MG/1
25 TABLET ORAL
Status: DISCONTINUED | OUTPATIENT
Start: 2022-04-04 | End: 2022-04-04

## 2022-04-04 RX ORDER — LEVOTHYROXINE SODIUM 0.07 MG/1
37 TABLET ORAL
Status: DISCONTINUED | OUTPATIENT
Start: 2022-04-05 | End: 2022-04-11 | Stop reason: HOSPADM

## 2022-04-04 RX ADMIN — DEXAMETHASONE SODIUM PHOSPHATE 4 MG: 4 INJECTION INTRA-ARTICULAR; INTRALESIONAL; INTRAMUSCULAR; INTRAVENOUS; SOFT TISSUE at 17:37

## 2022-04-04 RX ADMIN — DEXAMETHASONE SODIUM PHOSPHATE 4 MG: 4 INJECTION INTRA-ARTICULAR; INTRALESIONAL; INTRAMUSCULAR; INTRAVENOUS; SOFT TISSUE at 23:42

## 2022-04-04 RX ADMIN — SODIUM CHLORIDE TAB 1 GM 1 G: 1 TAB at 08:42

## 2022-04-04 RX ADMIN — Medication 6 MG: at 21:10

## 2022-04-04 RX ADMIN — LEVETIRACETAM 500 MG: 100 INJECTION INTRAVENOUS at 08:45

## 2022-04-04 RX ADMIN — AMLODIPINE BESYLATE 5 MG: 5 TABLET ORAL at 08:42

## 2022-04-04 RX ADMIN — AMLODIPINE BESYLATE 5 MG: 5 TABLET ORAL at 11:18

## 2022-04-04 RX ADMIN — LEVETIRACETAM 500 MG: 500 TABLET, FILM COATED ORAL at 21:10

## 2022-04-04 RX ADMIN — IOHEXOL 100 ML: 350 INJECTION, SOLUTION INTRAVENOUS at 09:38

## 2022-04-04 RX ADMIN — SODIUM CHLORIDE TAB 1 GM 1 G: 1 TAB at 12:29

## 2022-04-04 RX ADMIN — SODIUM CHLORIDE TAB 1 GM 1 G: 1 TAB at 16:30

## 2022-04-04 RX ADMIN — LEVOTHYROXINE SODIUM 25 MCG: 25 TABLET ORAL at 08:42

## 2022-04-04 RX ADMIN — DEXAMETHASONE SODIUM PHOSPHATE 4 MG: 4 INJECTION INTRA-ARTICULAR; INTRALESIONAL; INTRAMUSCULAR; INTRAVENOUS; SOFT TISSUE at 06:00

## 2022-04-04 RX ADMIN — DEXAMETHASONE SODIUM PHOSPHATE 4 MG: 4 INJECTION INTRA-ARTICULAR; INTRALESIONAL; INTRAMUSCULAR; INTRAVENOUS; SOFT TISSUE at 12:29

## 2022-04-04 NOTE — OCCUPATIONAL THERAPY NOTE
OT CANCEL NOTE    OT orders received  Chart reviewed  Pt is pending potential surgery w/ NSx for parietal brain mass  Will hold initial OT evaluation until further decisions made regarding potential need for OR  Will continue to follow pt on caseload and see pt when medically stable and as clinically appropriate  04/04/22 1323   OT Last Visit   OT Visit Date 04/04/22   Note Type   Note type Evaluation; Cancelled Session   Cancel Reasons Other       Alma Molina MS, OTR/L

## 2022-04-04 NOTE — CONSULTS
Consultation - Palliative and Supportive Care   Diana Anand 74 y o  male [id number]    Patient Active Problem List   Diagnosis    Renal cyst, right    Medicare annual wellness visit, subsequent    Acute idiopathic gout of left foot    Back problem    Depression with anxiety    Essential hypertension    Glaucoma    Hypertriglyceridemia    Lumbago with sciatica, left side    Lumbar stenosis    Mild episode of recurrent major depressive disorder ([facility] [address] )    JUNAID (obstructive sleep apnea)    Spinal stenosis of lumbar region with neurogenic claudication    Mixed hyperlipidemia    Bilateral hearing loss    Hyperglycemia    Cigarette nicotine dependence in remission    Lung mass    Adenocarcinoma, lung, left ([facility] [address] )    Encounter for central line care    Drug-induced neutropenia (HCC)    Left non-suppurative otitis media    Bilateral hearing loss due to cerumen impaction    Cancer of upper lobe of left lung (HCC)    Chronic back pain    Former cigarette smoker    History of gout    Hypertension    Proctocolitis    Pericardial effusion    Constipation    Labyrinthitis of both ears    Stage 3a chronic kidney disease ([facility] [address] )    Platelets decreased ([facility] [address] )    Psoriasis    Left parietal 4 6 cm intraparenchymal hemorrhage with surrounding cerebral edema    Drug-induced thyroiditis     Active issues specifically addressed today include:   Adenocarcinoma of the lung  Goals of care discussion  Brain metastases  Intraparenchymal hemorrhage  Aphasia-improved    Plan:  1  Symptom management -    - recommend resumption of below home meds  Celexa 10 mg daily  Patient reportedly not taking gabapentin  Zofran 4 mg q 6 hours scheduled --> patient not taking scheduled, recommend reordering p r n  Meclizine 25 mg q 6 hours p r n  Dizziness or nausea  Oxycodone 5 mg p o  Q 4 hours p r n  --> patient using infrequently following lobectomy  Recommend against restarting tramadol    2   Goals - level 1 full code   - ongoing medical management without limits at this time   - Patient is hopeful to continue receiving disease directed therapies with goal of maintaining independence and prolonging survival   However, he would not want artificial life-prolonging cares if in a persistent unresponsive state  Code Status:  Full - Level 1   Decisional apparatus:  Patient is competent on my exam today  If competence is lost, patient's substitute decision maker would default to spouse by [place]  Advance Directive / Living Will / POLST:  Patient reports having a living will and POA document at home  Verbalizes goals of care as documented above  Furthermore states that his brother is secondary healthcare agent to his wife  3  Social support   - time spent introducing palliative Medicine and providing supportive listening to patient and his wife   - Patient was previously independent prior to admission  He is strongly motivated to resume independence  I have reviewed the patient's controlled substance dispensing history in the Prescription Drug Monitoring Program in compliance with the [facility] regulations before prescribing any controlled substances  We appreciate the invitation to be involved in this patient's care  We will continue to follow  Please do not hesitate to reach our on call provider through our clinic answering service at  should you have acute symptom control concerns  Peter Hairston PA-C  Palliative and Supportive Care  Clinic/Answering Service: [phone]  You can find me on [employer]!        IDENTIFICATION:  Inpatient consult to Palliative Care  Consult performed by: Peter Hairston PA-C  Consult ordered by: Alex Francois DO        Physician Requesting Consult: Yee Samuel MD  Reason for Consult / Principal Problem: goals of care  Hx and PE limited by: aphasia, supplemented by chart review and communication with primary team    HISTORY OF PRESENT ILLNESS:       Beny Red is a 74 y o  male with adenocarcinoma of the lung, presented to [facility] on 4/2 as a transfer from [address] [place] [address] with aphasia  Her spouse has also had difficulty feeding himself  CT head revealed 4 6 cm left parietal intraparenchymal hemorrhage  Patient was started on Cardene for hypertension and transferred to [facility] for higher level of care  Patient was cleared for full diet on 04/03  MRI was completed demonstrating multiple masses concerning for metastasis  Palliative Care has been consulted for assistance in goals of care conversations  Patient was under the care of Dr Catia Callaway at [facility] for cancer care  He completed 25 fractions of mediastinal radiation, was on chemotherapy from June to August of this past year and most recently was started on immunotherapy in January  However, this has since been discontinued in setting of severe psoriasis  Prior to admission patient was residing with his wife, Shante Cha  Shante Cha reports that day prior to admission patient developed confusion, difficulty with simple tasks such as putting his shoes on her using the bathroom  This prompted calling EMS  Lewis Sosa is a retired   He built their home which they have been living in for many years  They do not have any children  They enjoy camping in their free time  Patient repeats everything was good until the past 3 days where he feels everything has progressed so rapidly  He admits to feeling discouraged with the bad news, but remains motivated to pursue any treatment offered to him  Patient has very much trust in his oncology team at [facility]  His wife has called the office to notify Dr Bryna Fabry  Patient reports significant improvement in his aphasia, this is confirmed by his wife  He otherwise denies complaints of pain, nausea, dizziness, headache, difficulty with sleep      Review of Systems Constitutional: Negative for decreased appetite and malaise/fatigue  Eyes: Negative  Cardiovascular: Negative for dyspnea on exertion  Respiratory: Negative for shortness of breath  Gastrointestinal: Negative for nausea and vomiting  Neurological: Negative for dizziness, headaches, light-headedness and weakness  Psychiatric/Behavioral: The patient does not have insomnia  Confusion prior to admission has resolved  All other systems reviewed and are negative        Past Medical History:   Diagnosis Date    Hyperlipidemia     Hypertension     Lung cancer [facility])      Past Surgical History:   Procedure Laterality Date    BACK SURGERY      HEMORRHOID SURGERY      IR BIOPSY LUNG  2021    IR PORT PLACEMENT  2021    LAMINECTOMY  2018    L4-L5    LUNG SURGERY      left upper lobectomy    [place] BRONCHOSCOPY,DIAGNOSTIC N/A 2021    Procedure: BRONCHOSCOPY FLEXIBLE;  Surgeon: Nikky Kilpatrick MD;  Location: [facility];  Service: Thoracic    [place] MEDIASTINOSCOPY WITH LYMPH NODE BIOPSY/IES N/A 2021    Procedure: MEDIASTINOSCOPY, flexible bronchoscopy;  Surgeon: Nikky Kilpatrick MD;  Location: [facility];  Service: Thoracic    TONSILLECTOMY       Social History     Socioeconomic History    Marital status: /Civil Union     Spouse name: Not on file    Number of children: Not on file    Years of education: Not on file    Highest education level: Not on file   Occupational History    Not on file   Tobacco Use    Smoking status: Former Smoker     Packs/day: 1 00     Years: 40 00     Pack years: 40 00     Types: Cigarettes     Start date:      Quit date: 2017     Years since quittin 1    Smokeless tobacco: Never Used    Tobacco comment: quit 2017   Vaping Use    Vaping Use: Never used   Substance and Sexual Activity    Alcohol use: Not Currently     Alcohol/week: 7 0 standard drinks     Types: 7 Cans of beer per week    Drug use: Yes     Types: Marijuana     Comment: seldom    Sexual activity: Not on file   Other Topics Concern    Not on file   Social History Narrative    Not on file     Social Determinants of Health     Financial Resource Strain: Not on file   Food Insecurity: No Food Insecurity    Worried About Running Out of Food in the Last Year: Never true    Felecia of Food in the Last Year: Never true   Transportation Needs: No Transportation Needs    Lack of Transportation (Medical): No    Lack of Transportation (Non-Medical): No   Physical Activity: Not on file   Stress: Not on file   Social Connections: Not on file   Intimate Partner Violence: Not on file   Housing Stability: Low Risk     Unable to Pay for Housing in the Last Year: No    Number of Places Lived in the Last Year: 1    Unstable Housing in the Last Year: No     Family History   Problem Relation Age of Onset    Nephrolithiasis Father        MEDICATIONS / ALLERGIES:    all current active meds have been reviewed    Allergies   Allergen Reactions    Bee Venom     Benazepril Other (See Comments)     Angioedema     Latex Other (See Comments)     Burning of eyes at the dentist from the gloves    Meloxicam GI Intolerance    Penicillin G Rash       OBJECTIVE:    Physical Exam  Physical Exam  HENT:      Head: Normocephalic and atraumatic  Eyes:      Conjunctiva/sclera: Conjunctivae normal    Cardiovascular:      Rate and Rhythm: Normal rate  Pulmonary:      Effort: Pulmonary effort is normal  No respiratory distress  Abdominal:      General: There is no distension  Tenderness: There is no guarding  Musculoskeletal:         General: No swelling  Skin:     General: Skin is warm and dry  Neurological:      Mental Status: He is alert  Comments: Left upper extremity weakness   Psychiatric:         Mood and Affect: Mood normal          Behavior: Behavior normal          Thought Content:  Thought content normal          Judgment: Judgment normal       Comments: Appropriately tearful at times         Lab Results:   I have personally reviewed pertinent labs  , CBC:   Lab Results   Component Value Date    WBC 9 54 04/04/2022    HGB 13 3 04/04/2022    HCT 40 2 04/04/2022    MCV 95 04/04/2022     (L) 04/04/2022    MCH 31 3 04/04/2022    MCHC 33 1 04/04/2022    RDW 18 8 (H) 04/04/2022    MPV 8 8 (L) 04/04/2022   , CMP:   Lab Results   Component Value Date    SODIUM 139 04/04/2022    K 4 0 04/04/2022     04/04/2022    CO2 22 04/04/2022    BUN 34 (H) 04/04/2022    CREATININE 1 19 04/04/2022    CALCIUM 9 1 04/04/2022    AST 14 04/04/2022    ALT 35 04/04/2022    ALKPHOS 53 04/04/2022    EGFR 62 04/04/2022     Imaging Studies:  Reviewed pertinent studies  EKG, Pathology, and Other Studies:  Reviewed pertinent studies    Counseling / Coordination of Care    Total floor / unit time spent today 45+ minutes  Greater than 50% of total time was spent with the patient and / or family counseling and / or coordination of care  A description of the counseling / coordination of care:  Time spent assessing patient, discussing goals of care with patient and his wife, introducing palliative Medicine, discussing goals of care, chart review

## 2022-04-04 NOTE — PHYSICAL THERAPY NOTE
Physical Therapy Cancellation Note    PT orders received chart review completed  Pt is currently pending potential surgery with nsx this week  Pt mobilized with nsg, will hold to await surgery at this time  PT will follow and eval as medically appropriate       04/04/22 1330   PT Last Visit   PT Visit Date 04/04/22   Note Type   Note type Evaluation   Cancel Reasons Other       Lisette Mendez, PT

## 2022-04-04 NOTE — ASSESSMENT & PLAN NOTE
With hypothyroidism, started on levothyroxine 25 mcg once daily on 03/08 with TSH of 18 00 and low free T4 0 66  Likely due to  PD-L I Monoclonal antibody ( Tecentriq)  Most recent TSH of 5 550 and free T4 of 0 76  Patient is clinically euthyroid  We increased levothyroxine to 37 5 mcg once daily  Patient was instructed to take levothyroxine with empty stomach and apart from other medications for at least 2 hours  Check TSH and free T4 in 6 weeks

## 2022-04-04 NOTE — PROGRESS NOTES
[facility]  Progress Note - Gia Reed 1955, 74 y o  male MRN: [id number]  Unit/Bed#: ICU 04 Encounter: [id number]  Primary Care Provider: Devon Solomon DO   Date and time admitted to hospital: 4/2/2022  9:42 PM    * Left parietal 4 6 cm intraparenchymal hemorrhage with surrounding cerebral edema  Assessment & Plan  Left parietal brain mass with vasogenic edema, 2 additional left cerebellar masses  · H/o small cell lung adenocarcinoma (dx 4/2021), s/p chemotherapy/surgery/radiation/immunotherapy [place] [facility]  · Patient denies h/o WBI  · Patient does not want to transfer back to [place] - states not up to traveling  · Patient presented with new onset apraxia and aphasia  · Mild to moderate expressive aphasia and word finding difficulty on exam, difficulty following commands  Otherwise nonfocal exam    Imaging:  · MRI brain w/wo, 4/3/22: 2 left cerebellar hemorrhagic metastases and probable 2 adjacent left parietal hemorrhagic metastasis, the more anterior, larger one having parenchymal hematoma and local edema and mass effect, correlating to the hemorrhage on the prior head CT    · CTCAP w/contast, 4/4/22: Completed, final read pending    Plan:  · Continue decadron 4q6  · Can discontinue keppra as patient did not present with seizure activity  · Follow up on CTCAP imaging  · Recommend med onc / rad onc / palliative care consults   · Frequent neuro checks  · SBP < 160  · Avoid AC/AP  · Will ultimately need PT/OT evaluation   · Medical management per primary team  · DVT ppx: SCDs  Hold pharm ppx in the setting of hemorrhagic masses      I personally discussed treatment plan options with the patient and his wife today  We briefly discussed surgery to remove the left parietal mass and would likely not recommend surgical intervention on the 2 cerebellar masses  He will ultimately need radiation therapy for SRS vs WBI    Left parietal mass is likely too large for SRS but will await their input  The patient is unsure if he wants to continue with surgery at this time  He would like to take some time to consider his options  He is very interested in his prognosis now he has metastasis to his brain  If surgery is an option and the patient agrees to proceed, this 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 happen on Thursday  Neurosurgery will continue to closely follow  Adenocarcinoma, lung, left [facility])  Assessment & Plan  S/p preadjuvant chemotherapy, DAVID lobectomy 12/2021, adjuvant radiation, and immunotherapy  Patient only received 2 treatments of immunotherapy as this exacerbated his psoriasis  Last MRI brain in June 2021 was negative for metastasis    Drug-induced thyroiditis  Assessment & Plan  Continue levothyroxine     Essential hypertension  Assessment & Plan  SBP < 160    Depression with anxiety  Assessment & Plan  Medical management per primary team        Subjective/Objective   Chief Complaint: none    Subjective:   Patient with no acute events overnight  He is anxious and somewhat depressed today given his new diagnosis  He is frustrated that he has progressed so rapidly since his surgery when he thought that his prognosis was going to be good  He is not ready to make a decision regarding of further treatment at this time  He has no new neurological complaints today  Objective:   Patient lying in bed eating breakfast no acute distress  Wife at bedside      Intake/Output                 04/04/22 0701 - 04/05/22 0700     [id number] Total              Intake    Total Intake -- -- --       Output    Urine  450  -- 450    Urine 450 -- 450    Total Output 450 -- 450       Net I/O     -450 -- -450          Invasive Devices  Report    Central Venous Catheter Line            Port A Cath 06/17/21 Right Chest 290 days          Peripheral Intravenous Line            Peripheral IV 04/02/22 Right Antecubital 1 day    Peripheral IV 04/02/22 Right Forearm 1 day                Vitals: Blood pressure 137/84, pulse 80, temperature 98 4 °F (36 9 °C), temperature source Oral, resp  rate 20, height 5' 10" (1 778 m), weight 82 5 kg (181 lb 12 3 oz), SpO2 99 %  ,Body mass index is 26 08 kg/m²  General appearance: alert, appears stated age, cooperative and no distress  Head: Normocephalic, without obvious abnormality, atraumatic  Eyes: EOMI, PERRL, conjugate gaze  VFC bilaterally  Lungs: non labored breathing  Heart: regular heart rate  Neurologic:   Mental status: Alert, oriented x2 (not to year), thought content appropriate  Slowed mentation, word finding difficulty  Able to name 3 objects and 3 cities in [place]  Able to perform simple calculations  Unable to name president  Cranial nerves: grossly intact (Cranial nerves II-XII)  Sensory: normal to LT x4  Motor: moving all extremities without focal weakness, 5/5 strength throughout  Reflexes: 2+ and symmetric, no hoffmans or clonus  Coordination: finger to nose normal bilaterally, no drift bilaterally    Lab Results: I have personally reviewed pertinent results        Results from last 7 days   Lab Units 04/04/22 0521 04/03/22 0535 04/02/22 1942   WBC Thousand/uL 9 54 10 54* 9 76   HEMOGLOBIN g/dL 13 3 14 4 15 0   HEMATOCRIT % 40 2 45 6 46 2   PLATELETS Thousands/uL 112* 118* 123*   NEUTROS PCT %  --  88*  --    MONOS PCT %  --  5  --      Results from last 7 days   Lab Units 04/04/22 0521 04/03/22 0535 04/02/22 1942   POTASSIUM mmol/L 4 0 4 3 4 1   CHLORIDE mmol/L 108 104 99   CO2 mmol/L 22 29 28   BUN mg/dL 34* 21 25   CREATININE mg/dL 1 19 1 17 1 32*   CALCIUM mg/dL 9 1 9 5 9 9   ALK PHOS U/L 53 65 55   ALT U/L 35 43 36   AST U/L 14 13 24     Results from last 7 days   Lab Units 04/04/22 0521 04/03/22  0535   MAGNESIUM mg/dL 2 5 2 2     Results from last 7 days   Lab Units 04/04/22 0521 04/03/22  0535   PHOSPHORUS mg/dL 3 6 4 0     Results from last 7 days   Lab Units 04/03/22 0535 04/02/22 1942   INR  0 96 0 90   PTT seconds  --  30     No results found for: TROPONINT  ABG:No results found for: PHART, FDH9XMF, PO2ART, JVD2QNF, I6WJBYMG, BEART, SOURCE    Imaging Studies: I have personally reviewed pertinent reports  and I have personally reviewed pertinent films in PACS     CT head wo contrast    Result Date: 4/3/2022  Narrative: CT BRAIN - WITHOUT CONTRAST INDICATION:   Intracerebral hemorrhage  COMPARISON:  None  TECHNIQUE:  CT examination of the brain was performed  In addition to axial images, sagittal and coronal 2D reformatted images were created and submitted for interpretation  Radiation dose length product (DLP) for this visit:  731 47 mGy-cm   This examination, like all CT scans performed in the [facility], was performed utilizing techniques to minimize radiation dose exposure, including the use of iterative  reconstruction and automated exposure control  IMAGE QUALITY:  Diagnostic  FINDINGS: PARENCHYMA:  No intracranial mass or midline shift  No CT signs of acute infarction  6 7 cm intraparenchymal hematoma centered in the left parietal white matter unchanged in size with surrounding vasogenic edema  Irregular hyperdensity at the left cerebellum  VENTRICLES AND EXTRA-AXIAL SPACES:  Effacement of left occipital horn  [place] VISUALIZED ORBITS AND PARANASAL SINUSES:  Unremarkable  CALVARIUM AND EXTRACRANIAL SOFT TISSUES:  Normal      Impression: Left parietal region intraparenchymal hematoma unchanged in size measuring 6 7 cm in largest dimension  New irregular hyperdense focus at the left cerebellum likely to represent small amount of subarachnoid hemorrhage versus a new intraparenchymal hemorrhage  Workstation performed: [id number]     MRI brain w wo contrast    Result Date: 4/3/2022  Narrative: MRI BRAIN WITH AND WITHOUT CONTRAST INDICATION: MRI w/ and w/o contrast    History of adenocarcinoma along with apraxia and dysphasia  Intracranial hemorrhage   COMPARISON:  6/18/2021; 4/3/2022 TECHNIQUE: Sagittal T1, axial T2, axial FLAIR, axial T1, axial [place], axial diffusion  Sagittal, axial T1 postcontrast   Axial bravo postcontrast with coronal reconstructions  IV Contrast:  8 mL of Gadobutrol injection (SINGLE-DOSE)  IMAGE QUALITY:   Diagnostic  FINDINGS: BRAIN PARENCHYMA:  There is a hemorrhagic mass in the left parietal lobe with surrounding edema and blooming artifact  Within this mass there is intrinsic T1 high signal indicative of blood products  However along the anterior margin of the lesion there is patchy internal enhancement image 18, series 10 worrisome for hemorrhagic metastasis in this patient with a history of lung cancer  Separately there is a small cortical focus of enhancement more posteriorly along the edge of the vasogenic edema in the left parietal lobe image 92, series 11, having a corresponding focus of blooming artifact on susceptibility weighted image 72, series 6 indicative of a satellite metastasis  2 additional small left cerebellar metastases are noted adjacent to one another both enhancing with edema and hemorrhagic elements measuring approximately 1 5 cm each on image 8, series 10  There is local sulcal effacement in the left parietal lobe without subfalcine herniation  There is no diffusion restriction  Cerebellar tonsils are normally positioned  VENTRICLES:  There is mild mass effect on the posterior body and occipital horn left lateral ventricle  SELLA AND PITUITARY GLAND:  Normal  ORBITS:  Normal  PARANASAL SINUSES:  There is a mucous retention cyst in the right maxillary sinus  VASCULATURE:  Evaluation of the major intracranial vasculature demonstrates appropriate flow voids   CALVARIUM AND SKULL BASE:  Normal  EXTRACRANIAL SOFT TISSUES:  Normal      Impression: 1   2 left cerebellar hemorrhagic metastases and probable 2 adjacent left parietal hemorrhagic metastasis, the more anterior, larger one having parenchymal hematoma and local edema and mass effect, correlating to the hemorrhage on the prior head CT  I personally discussed this study with Dr Ant Garay on 4/3/2022 at 2:19 PM  Workstation performed: [id number]     CT stroke alert brain    Result Date: 4/2/2022  Narrative: CT BRAIN - STROKE ALERT PROTOCOL INDICATION:   Right-sided weakness, expressive and receptive aphasia known history of cancer  COMPARISON:  None  TECHNIQUE:  CT examination of the brain was performed  In addition to axial images, coronal reformatted images were created and submitted for interpretation  Radiation dose length product (DLP) for this visit:  884 81 mGy-cm   This examination, like all CT scans performed in the [facility], was performed utilizing techniques to minimize radiation dose exposure, including the use of iterative  reconstruction and automated exposure control  IMAGE QUALITY:  Diagnostic  FINDINGS:  PARENCHYMA:  Left parietal 4 6 x 3 2 x 4 6 cm intraparenchymal hemorrhage with surrounding cerebral edema  No midline shift  VENTRICLES AND EXTRA-AXIAL SPACES:  Normal for patient's age  VISUALIZED ORBITS AND PARANASAL SINUSES:  Unremarkable  CALVARIUM AND EXTRACRANIAL SOFT TISSUES:   Normal      Impression: Left parietal 4 6 cm intraparenchymal hemorrhage with surrounding cerebral edema  No midline shift  I personally discussed this study with neurologist on 4/2/2022 at 7:45 PM  Workstation performed: [id number]     CTA stroke alert (head/neck)    Result Date: 4/2/2022  Narrative: CTA NECK AND BRAIN WITH CONTRAST INDICATION: Right-sided weakness, aphasia COMPARISON:   None  TECHNIQUE:   Post contrast imaging was performed after administration of iodinated contrast through the neck and brain  Post contrast axial 0 625 mm images timed to opacify the arterial system  3D rendering was performed on an independent workstation  MIP reconstructions performed  Coronal reconstructions were performed of the noncontrast portion of the brain  Radiation dose length product (DLP) for this visit:  415 61 mGy-cm   This examination, like all CT scans performed in the [facility], was performed utilizing techniques to minimize radiation dose exposure, including the use of iterative  reconstruction and automated exposure control  IV Contrast:  85 mL of iohexol (OMNIPAQUE)  IMAGE QUALITY:   Diagnostic FINDINGS: CERVICAL VASCULATURE AORTIC ARCH AND GREAT VESSELS:  Moderate atherosclerotic disease of the arch and great vessels  RIGHT VERTEBRAL ARTERY CERVICAL SEGMENT:  Severe stenosis at the origin  Severe critical near occlusive stenosis right proximal vertebral artery near its origin  Moderate segmental plaque stenosis throughout the right vertebral artery LEFT VERTEBRAL ARTERY CERVICAL SEGMENT: Nondominant and threadlike left vertebral artery RIGHT EXTRACRANIAL CAROTID SEGMENT:  Severe atherosclerotic disease of the bifurcation  Approximately 70-75% right proximal cervical ICA plaque stenosis LEFT EXTRACRANIAL CAROTID SEGMENT:  Normal caliber common carotid artery  Normal bifurcation and cervical internal carotid artery  No stenosis or dissection  NASCET criteria was used to determine the degree of internal carotid artery diameter stenosis  INTRACRANIAL VASCULATURE INTERNAL CAROTID ARTERIES:  Normal enhancement of the intracranial portions of the internal carotid arteries  Normal ophthalmic artery origins  Normal ICA terminus  ANTERIOR CIRCULATION:  Symmetric A1 segments and anterior cerebral arteries with normal enhancement  Normal anterior communicating artery  MIDDLE CEREBRAL ARTERY CIRCULATION:  M1 segment and middle cerebral artery branches demonstrate normal enhancement bilaterally  DISTAL VERTEBRAL ARTERIES:  Normal distal vertebral arteries  Posterior inferior cerebellar artery origins are normal  Normal vertebral basilar junction  BASILAR ARTERY:  Basilar artery is normal in caliber  Normal superior cerebellar arteries   POSTERIOR CEREBRAL ARTERIES: Both posterior cerebral arteries arises from the basilar tip  Both arteries demonstrate normal enhancement  Normal posterior communicating arteries  VENOUS STRUCTURES:  Normal  NON VASCULAR ANATOMY BONY STRUCTURES:  No acute osseous abnormality  SOFT TISSUES OF THE NECK:  Normal  THORACIC INLET:  Emphysematous changes     Impression: Severe right vertebral artery stenosis at the origin  Severe critical near occlusive stenosis right proximal vertebral artery near its origin spanning approximately 2 2 cm in cranial caudal dimension  Approximately 70-75% right proximal cervical ICA plaque stenosis  I personally discussed this study with neurologist on 4/2/2022 at 7:50 PM   Workstation performed: [id number]     Echo follow up/limited w/ contrast if indicated    Result Date: 4/3/2022  Narrative: Emaline Folds  Left Ventricle: Left ventricular cavity size is normal  Wall thickness is normal  The left ventricular ejection fraction is 65%  Systolic function is normal  Wall motion is normal  Diastolic function is mildly abnormal, consistent with grade I (abnormal) relaxation    Tricuspid Valve: There is mild regurgitation  The estimated right ventricular systolic pressure is 20 01 mmHg  EKG, Pathology, and Other Studies: I have personally reviewed pertinent reports        VTE Pharmacologic Prophylaxis: Reason for no pharmacologic prophylaxis hemorrhagic mets    VTE Mechanical Prophylaxis: sequential compression device

## 2022-04-04 NOTE — PLAN OF CARE
Problem: MOBILITY - ADULT  Goal: Maintain or return to baseline ADL function  Description: INTERVENTIONS:  -  Assess patient's ability to carry out ADLs; assess patient's baseline for ADL function and identify physical deficits which impact ability to perform ADLs (bathing, care of mouth/teeth, toileting, grooming, dressing, etc )  - Assess/evaluate cause of self-care deficits   - Assess range of motion  - Assess patient's mobility; develop plan if impaired  - Assess patient's need for assistive devices and provide as appropriate  - Encourage maximum independence but intervene and supervise when necessary  - Involve family in performance of ADLs  - Assess for home care needs following discharge   - Consider OT consult to assist with ADL evaluation and planning for discharge  - Provide patient education as appropriate  Outcome: Progressing  Goal: Maintains/Returns to pre admission functional level  Description: INTERVENTIONS:  - Perform BMAT or MOVE assessment daily    - Set and communicate daily mobility goal to care team and patient/family/caregiver  - Collaborate with rehabilitation services on mobility goals if consulted  - Out of bed for toileting  - Record patient progress and toleration of activity level   Outcome: Progressing     Problem: Neurological Deficit  Goal: Neurological status is stable or improving  Description: Interventions:  - Monitor and assess patient's level of consciousness, motor function, sensory function, and level of assistance needed for ADLs  - Monitor and report changes from baseline  Collaborate with interdisciplinary team to initiate plan and implement interventions as ordered  - Provide and maintain a safe environment  - Consider seizure precautions  - Consider fall precautions  - Consider aspiration precautions  - Consider bleeding precautions  Outcome: Progressing     Problem:  Activity Intolerance/Impaired Mobility  Goal: Mobility/activity is maintained at optimum level for patient  Description: Interventions:  - Assess and monitor patient  barriers to mobility and need for assistive/adaptive devices  - Assess patient's emotional response to limitations  - Collaborate with interdisciplinary team and initiate plans and interventions as ordered  - Encourage independent activity per ability   - Maintain proper body alignment  - Perform active/passive rom as tolerated/ordered  - Plan activities to conserve energy   - Turn patient as appropriate  Outcome: Progressing     Problem: Communication Impairment  Goal: Ability to express needs and understand communication  Description: Assess patient's communication skills and ability to understand information  Patient will demonstrate use of effective communication techniques, alternative methods of communication and understanding even if not able to speak  - Encourage communication and provide alternate methods of communication as needed  - Collaborate with case management/ for discharge needs  - Include patient/family/caregiver in decisions related to communication  Outcome: Progressing     Problem: Potential for Aspiration  Goal: Non-ventilated patient's risk of aspiration is minimized  Description: Assess and monitor vital signs, respiratory status, and labs (WBC)  Monitor for signs of aspiration (tachypnea, cough, rales, wheezing, cyanosis, fever)  - Assess and monitor patient's ability to swallow  - Place patient up in chair to eat if possible  - HOB up at 90 degrees to eat if unable to get patient up into chair   - Supervise patient during oral intake  - Instruct patient/ family to take small bites  - Instruct patient/ family to take small single sips when taking liquids    - Follow patient-specific strategies generated by speech pathologist   Outcome: Progressing     Problem: Nutrition  Goal: Nutrition/Hydration status is improving  Description: Monitor and assess patient's nutrition/hydration status for malnutrition (ex- brittle hair, bruises, dry skin, pale skin and conjunctiva, muscle wasting, smooth red tongue, and disorientation)  Collaborate with interdisciplinary team and initiate plan and interventions as ordered  Monitor patient's weight and dietary intake as ordered or per policy  Utilize nutrition screening tool and intervene per policy  Determine patient's food preferences and provide high-protein, high-caloric foods as appropriate  - Assist patient with eating   - Allow adequate time for meals   - Encourage patient to take dietary supplement as ordered  - Collaborate with clinical nutritionist   - Include patient/family/caregiver in decisions related to nutrition    Outcome: Progressing     Problem: PAIN - ADULT  Goal: Verbalizes/displays adequate comfort level or baseline comfort level  Description: Interventions:  - Encourage patient to monitor pain and request assistance  - Assess pain using appropriate pain scale  - Administer analgesics based on type and severity of pain and evaluate response  - Implement non-pharmacological measures as appropriate and evaluate response  - Consider cultural and social influences on pain and pain management  - Notify physician/advanced practitioner if interventions unsuccessful or patient reports new pain  Outcome: Progressing     Problem: INFECTION - ADULT  Goal: Absence or prevention of progression during hospitalization  Description: INTERVENTIONS:  - Assess and monitor for signs and symptoms of infection  - Monitor lab/diagnostic results  - Monitor all insertion sites, i e  indwelling lines, tubes, and drains  - Monitor endotracheal if appropriate and nasal secretions for changes in amount and color  - [place] appropriate cooling/warming therapies per order  - Administer medications as ordered  - Instruct and encourage patient and family to use good hand hygiene technique  - Identify and instruct in appropriate isolation precautions for identified infection/condition  Outcome: Progressing     Problem: SAFETY ADULT  Goal: Maintain or return to baseline ADL function  Description: INTERVENTIONS:  -  Assess patient's ability to carry out ADLs; assess patient's baseline for ADL function and identify physical deficits which impact ability to perform ADLs (bathing, care of mouth/teeth, toileting, grooming, dressing, etc )  - Assess/evaluate cause of self-care deficits   - Assess range of motion  - Assess patient's mobility; develop plan if impaired  - Assess patient's need for assistive devices and provide as appropriate  - Encourage maximum independence but intervene and supervise when necessary  - Involve family in performance of ADLs  - Assess for home care needs following discharge   - Consider OT consult to assist with ADL evaluation and planning for discharge  - Provide patient education as appropriate  Outcome: Progressing  Goal: Maintains/Returns to pre admission functional level  Description: INTERVENTIONS:  - Perform BMAT or MOVE assessment daily    - Set and communicate daily mobility goal to care team and patient/family/caregiver     - Collaborate with rehabilitation services on mobility goals if consulted  - Out of bed for toileting  - Record patient progress and toleration of activity level   Outcome: Progressing  Goal: Patient will remain free of falls  Description: INTERVENTIONS:  - Educate patient/family on patient safety including physical limitations  - Instruct patient to call for assistance with activity   - Consult OT/PT to assist with strengthening/mobility   - Keep Call bell within reach  - Keep bed low and locked with side rails adjusted as appropriate  - Keep care items and personal belongings within reach  - Initiate and maintain comfort rounds  - Make Fall Risk Sign visible to staff  - Apply yellow socks and bracelet for high fall risk patients  - Consider moving patient to room near nurses station  Outcome: Progressing Problem: DISCHARGE PLANNING  Goal: Discharge to home or other facility with appropriate resources  Description: INTERVENTIONS:  - Identify barriers to discharge w/patient and caregiver  - Arrange for needed discharge resources and transportation as appropriate  - Identify discharge learning needs (meds, wound care, etc )  - Arrange for interpretive services to assist at discharge as needed  - Refer to Case Management Department for coordinating discharge planning if the patient needs post-hospital services based on physician/advanced practitioner order or complex needs related to functional status, cognitive ability, or social support system  Outcome: Progressing     Problem: Knowledge Deficit  Goal: Patient/family/caregiver demonstrates understanding of disease process, treatment plan, medications, and discharge instructions  Description: Complete learning assessment and assess knowledge base    Interventions:  - Provide teaching at level of understanding  - Provide teaching via preferred learning methods  Outcome: Progressing     Problem: Potential for Falls  Goal: Patient will remain free of falls  Description: INTERVENTIONS:  - Educate patient/family on patient safety including physical limitations  - Instruct patient to call for assistance with activity   - Consult OT/PT to assist with strengthening/mobility   - Keep Call bell within reach  - Keep bed low and locked with side rails adjusted as appropriate  - Keep care items and personal belongings within reach  - Initiate and maintain comfort rounds  - Make Fall Risk Sign visible to staff  - Apply yellow socks and bracelet for high fall risk patients  - Consider moving patient to room near nurses station  Outcome: Progressing     Problem: Prexisting or High Potential for Compromised Skin Integrity  Goal: Skin integrity is maintained or improved  Description: INTERVENTIONS:  - Identify patients at risk for skin breakdown  - Assess and monitor skin integrity  - Assess and monitor nutrition and hydration status  - Monitor labs   - Assess for incontinence   - Turn and reposition patient  - Assist with mobility/ambulation  - Relieve pressure over bony prominences  - Avoid friction and shearing  - Provide appropriate hygiene as needed including keeping skin clean and dry  - Evaluate need for skin moisturizer/barrier cream  - Collaborate with interdisciplinary team   - Patient/family teaching  - Consider wound care consult   Outcome: Progressing     Problem: Nutrition/Hydration-ADULT  Goal: Nutrient/Hydration intake appropriate for improving, restoring or maintaining nutritional needs  Description: Monitor and assess patient's nutrition/hydration status for malnutrition  Collaborate with interdisciplinary team and initiate plan and interventions as ordered  Monitor patient's weight and dietary intake as ordered or per policy  Utilize nutrition screening tool and intervene as necessary  Determine patient's food preferences and provide high-protein, high-caloric foods as appropriate       INTERVENTIONS:  - Monitor oral intake, urinary output, labs, and treatment plans  - Assess nutrition and hydration status and recommend course of action  - Evaluate amount of meals eaten  - Assist patient with eating if necessary   - Allow adequate time for meals  - Recommend/ encourage appropriate diets, oral nutritional supplements, and vitamin/mineral supplements  - Order, calculate, and assess calorie counts as needed  - Recommend, monitor, and adjust tube feedings and TPN/PPN based on assessed needs  - Assess need for intravenous fluids  - Provide specific nutrition/hydration education as appropriate  - Include patient/family/caregiver in decisions related to nutrition  Outcome: Progressing     Problem: SAFETY,RESTRAINT: NV/NON-SELF DESTRUCTIVE BEHAVIOR  Goal: Remains free of harm/injury (restraint for non violent/non self-detsructive behavior)  Description: INTERVENTIONS:  - Instruct patient/family regarding restraint use   - Assess and monitor physiologic and psychological status   - Provide interventions and comfort measures to meet assessed patient needs   - Identify and implement measures to help patient regain control  - Assess readiness for release of restraint   Outcome: Progressing  Goal: Returns to optimal restraint-free functioning  Description: INTERVENTIONS:  - Assess the patient's behavior and symptoms that indicate continued need for restraint  - Identify and implement measures to help patient regain control  - Assess readiness for release of restraint   Outcome: Progressing     Problem: COPING  Goal: Pt/Family able to verbalize concerns and demonstrate effective coping strategies  Description: INTERVENTIONS:  - Assist patient/family to identify coping skills, available support systems and cultural and spiritual values  - Provide emotional support, including active listening and acknowledgement of concerns of patient and caregivers  - Reduce environmental stimuli, as able  - Provide patient education  - Assess for spiritual pain/suffering and initiate spiritual care, including notification of Pastoral Care or [facility] based community as needed  - Assess effectiveness of coping strategies  Outcome: Progressing  Goal: Will report anxiety at manageable levels  Description: INTERVENTIONS:  - Administer medication as ordered  - Teach and encourage coping skills  - Provide emotional support  - Assess patient/family for anxiety and ability to cope  Outcome: Progressing     Problem: NEUROSENSORY - ADULT  Goal: Achieves stable or improved neurological status  Description: INTERVENTIONS  - Monitor and report changes in neurological status  - Monitor vital signs such as temperature, blood pressure, glucose, and any other labs ordered   - Initiate measures to prevent increased intracranial pressure  - Monitor for seizure activity and implement precautions if appropriate      Outcome: Progressing  Goal: Remains free of injury related to seizures activity  Description: INTERVENTIONS  - Maintain airway, patient safety  and administer oxygen as ordered  - Monitor patient for seizure activity, document and report duration and description of seizure to physician/advanced practitioner  - If seizure occurs,  ensure patient safety during seizure  - Reorient patient post seizure  - Seizure pads on all 4 side rails  - Instruct patient/family to notify RN of any seizure activity including if an aura is experienced  - Instruct patient/family to call for assistance with activity based on nursing assessment  - Administer anti-seizure medications if ordered    Outcome: Progressing  Goal: Achieves maximal functionality and self care  Description: INTERVENTIONS  - Monitor swallowing and airway patency with patient fatigue and changes in neurological status  - Encourage and assist patient to increase activity and self care     - Encourage visually impaired, hearing impaired and aphasic patients to use assistive/communication devices  Outcome: Progressing     Problem: CARDIOVASCULAR - ADULT  Goal: Maintains optimal cardiac output and hemodynamic stability  Description: INTERVENTIONS:  - Monitor I/O, vital signs and rhythm  - Monitor for S/S and trends of decreased cardiac output  - Administer and titrate ordered vasoactive medications to optimize hemodynamic stability  - Assess quality of pulses, skin color and temperature  - Assess for signs of decreased coronary artery perfusion  - Instruct patient to report change in severity of symptoms  Outcome: Progressing  Goal: Absence of cardiac dysrhythmias or at baseline rhythm  Description: INTERVENTIONS:  - Continuous cardiac monitoring, vital signs, obtain 12 lead EKG if ordered  - Administer antiarrhythmic and heart rate control medications as ordered  - Monitor electrolytes and administer replacement therapy as ordered  Outcome: Progressing     Problem: RESPIRATORY - ADULT  Goal: Achieves optimal ventilation and oxygenation  Description: INTERVENTIONS:  - Assess for changes in respiratory status  - Assess for changes in mentation and behavior  - Position to facilitate oxygenation and minimize respiratory effort  - Oxygen administered by appropriate delivery if ordered  - Initiate smoking cessation education as indicated  - Encourage broncho-pulmonary hygiene including cough, deep breathe, Incentive Spirometry  - Assess the need for suctioning and aspirate as needed  - Assess and instruct to report SOB or any respiratory difficulty  - Respiratory Therapy support as indicated  Outcome: Progressing     Problem: GASTROINTESTINAL - ADULT  Goal: Maintains or returns to baseline bowel function  Description: INTERVENTIONS:  - Assess bowel function  - Encourage oral fluids to ensure adequate hydration  - Administer IV fluids if ordered to ensure adequate hydration  - Administer ordered medications as needed  - Encourage mobilization and activity  - Consider nutritional services referral to assist patient with adequate nutrition and appropriate food choices  Outcome: Progressing  Goal: Maintains adequate nutritional intake  Description: INTERVENTIONS:  - Monitor percentage of each meal consumed  - Identify factors contributing to decreased intake, treat as appropriate  - Assist with meals as needed  - Monitor I&O, weight, and lab values if indicated  - Obtain nutrition services referral as needed  Outcome: Progressing     Problem: GENITOURINARY - ADULT  Goal: Maintains or returns to baseline urinary function  Description: INTERVENTIONS:  - Assess urinary function  - Encourage oral fluids to ensure adequate hydration if ordered  - Administer IV fluids as ordered to ensure adequate hydration  - Administer ordered medications as needed  - Offer frequent toileting  - Follow urinary retention protocol if ordered  Outcome: Progressing     Problem: METABOLIC, FLUID AND ELECTROLYTES - ADULT  Goal: Electrolytes maintained within normal limits  Description: INTERVENTIONS:  - Monitor labs and assess patient for signs and symptoms of electrolyte imbalances  - Administer electrolyte replacement as ordered  - Monitor response to electrolyte replacements, including repeat lab results as appropriate  - Instruct patient on fluid and nutrition as appropriate  Outcome: Progressing  Goal: Fluid balance maintained  Description: INTERVENTIONS:  - Monitor labs   - Monitor I/O and WT  - Instruct patient on fluid and nutrition as appropriate  - Assess for signs & symptoms of volume excess or deficit  Outcome: Progressing  Goal: Glucose maintained within target range  Description: INTERVENTIONS:  - Monitor Blood Glucose as ordered  - Assess for signs and symptoms of hyperglycemia and hypoglycemia  - Administer ordered medications to maintain glucose within target range  - Assess nutritional intake and initiate nutrition service referral as needed  Outcome: Progressing     Problem: SKIN/TISSUE INTEGRITY - ADULT  Goal: Skin Integrity remains intact(Skin Breakdown Prevention)  Description: Assess:  -Inspect skin when repositioning, toileting, and assisting with ADLS  -Assess extremities for adequate circulation and sensation     Bed Management:  -Have minimal linens on bed & keep smooth, unwrinkled  -Change linens as needed when moist or perspiring    Toileting:  -Offer bedside commode    Activity:  -Encourage activity and walks on unit  -Encourage or provide ROM exercises   -Use appropriate equipment to lift or move patient in bed      Skin Care:  -Avoid use of baby powder, tape, friction and shearing, hot water or constrictive clothing  -Do not massage red bony areas    Next Steps:  Outcome: Progressing     Problem: HEMATOLOGIC - ADULT  Goal: Maintains hematologic stability  Description: INTERVENTIONS  - Assess for signs and symptoms of bleeding or hemorrhage  - Monitor labs  - Administer supportive blood products/factors as ordered and appropriate  Outcome: Progressing     Problem: MUSCULOSKELETAL - ADULT  Goal: Maintain or return mobility to safest level of function  Description: INTERVENTIONS:  - Assess patient's ability to carry out ADLs; assess patient's baseline for ADL function and identify physical deficits which impact ability to perform ADLs (bathing, care of mouth/teeth, toileting, grooming, dressing, etc )  - Assess/evaluate cause of self-care deficits   - Assess range of motion  - Assess patient's mobility  - Assess patient's need for assistive devices and provide as appropriate  - Encourage maximum independence but intervene and supervise when necessary  - Involve family in performance of ADLs  - Assess for home care needs following discharge   - Consider OT consult to assist with ADL evaluation and planning for discharge  - Provide patient education as appropriate  Outcome: Progressing

## 2022-04-04 NOTE — CONSULTS
Consultation - Rusty Garcias 74 y o  male MRN: [id number]    Unit/Bed#: ICU 04 Encounter: [id number]      Assessment/Plan     Drug-induced thyroiditis  Assessment & Plan  With hypothyroidism, started on levothyroxine 25 mcg once daily on 03/08 with TSH of 18 00 and low free T4 0 66  Likely due to  PD-L I Monoclonal antibody ( [employer]ecentri[employer])  Most recent TSH of 5 550 and free T4 of 0 76  Patient is clinically euthyroid  We increased levothyroxine to 37 5 mcg once daily  Patient was instructed to take levothyroxine with empty stomach and apart from other medications for at least 2 hours  Check TSH and free T4 in 6 weeks  Adenocarcinoma, lung, left [facility])  Assessment & Plan  Management per primary team              Inpatient consult to Endocrinology     Performed by  Dee Dee Rahman MD     Authorized by Marcelina Lassiter              CC: hypothyroidism     History of Present Illness     HPI: Rusty Garcias is a 74y o  year old male with lung adenocarcinoma s/p left upper lobe lobectomy, radiation, chemotherapy and immunotherapy with Atezolizumab Annette Cuevas) who presented with apraxia and aphasia and was found to have left parietal intraparenchymal hemorrhage 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, neurology and neurosurgery on board  Endocrinology consulted for hypothyroidism with TSH of 5 550 and Free T4 of 0 76  Patient is on levothyroxine 25 mcg once daily started on March 08 by hematology as TSH went up to 18 00 and low free T4 0 66  Hypothyroidism deemed to be drug induced thyroiditis secondary to PD-L I Monoclonal antibody Elizabeth Grayson)  Labs improved with TSH of  5 550 and Free T4 of 0 76  Patient was seen and examined at bedside  Patient denies any symptoms concerning for hypothyroidism, constipation, cold intolerance sent, dry/skin/nail changes  Review of Systems   Constitutional: Positive for appetite change and fatigue   Negative for activity change, chills, diaphoresis, fever and unexpected weight change  HENT: Negative for congestion, drooling, ear discharge, ear pain, trouble swallowing and voice change  Eyes: Negative for photophobia, pain, discharge, redness, itching and visual disturbance  Respiratory: Negative for cough, chest tightness, shortness of breath and wheezing  Cardiovascular: Negative for chest pain, palpitations and leg swelling  Gastrointestinal: Negative for abdominal distention, abdominal pain, blood in stool, diarrhea, nausea and vomiting  Endocrine: Negative for cold intolerance, heat intolerance, polydipsia, polyphagia and polyuria  Genitourinary: Negative for dysuria, flank pain, frequency and hematuria  Musculoskeletal: Negative for back pain, gait problem, joint swelling, myalgias, neck pain and neck stiffness  Skin: Negative for color change, pallor, rash and wound  Neurological: Negative for dizziness, tremors, syncope, weakness, numbness and headaches  Psychiatric/Behavioral: Negative for agitation  All other systems reviewed and are negative        Historical Information   Past Medical History:   Diagnosis Date    Hyperlipidemia     Hypertension     Lung cancer [facility])      Past Surgical History:   Procedure Laterality Date    BACK SURGERY      HEMORRHOID SURGERY      IR BIOPSY LUNG  5/6/2021    IR PORT PLACEMENT  6/17/2021    LAMINECTOMY  2018    L4-L5    LUNG SURGERY      left upper lobectomy    AR BRONCHOSCOPY,DIAGNOSTIC N/A 5/25/2021    Procedure: BRONCHOSCOPY FLEXIBLE;  Surgeon: Harjit Ashton MD;  [facility]: [facility];  Service: Thoracic    AR MEDIASTINOSCOPY WITH LYMPH NODE BIOPSY/IES N/A 5/25/2021    Procedure: MEDIASTINOSCOPY, flexible bronchoscopy;  Surgeon: Harjit Ashton MD;  Location: [facility];  Service: Thoracic    TONSILLECTOMY  1959     Social History   Social History     Substance and Sexual Activity   Alcohol Use Not Currently    Alcohol/week: 7 0 standard drinks    Types: 7 Cans of beer per week Social History     Substance and Sexual Activity   Drug Use Yes    Types: Marijuana    Comment: seldom     Social History     Tobacco Use   Smoking Status Former Smoker    Packs/day: 1 00    Years: 40 00    Pack years: 40 00    Types: Cigarettes    Start date:     Quit date: 2017    Years since quittin 1   Smokeless Tobacco Never Used   Tobacco Comment    quit 2017     Family History:   Family History   Problem Relation Age of Onset    Nephrolithiasis Father        Meds/Allergies   Current Facility-Administered Medications   Medication Dose Route Frequency Provider Last Rate Last Admin    [START ON 2022] amLODIPine (NORVASC) tablet 10 mg  10 mg Oral Daily Travis Nettles        amLODIPine (NORVASC) tablet 5 mg  5 mg Oral Once Travis Nettles        dexamethasone (DECADRON) injection 4 mg  4 mg Intravenous Q6H [facility] Mina Mayo PA-C   4 mg at 22 0600    Labetalol HCl (NORMODYNE) injection 10 mg  10 mg Intravenous Q4H PRN Mil Epps DO        levETIRAcetam (KEPPRA) tablet 500 mg  500 mg Oral Q12H [facility] Travis Lonia         levothyroxine tablet 25 mcg  25 mcg Oral Early Morning Maggi Johnson MD   25 mcg at 22 0842    melatonin tablet 6 mg  6 mg Oral HS Travis Nettles        ondansetron (ZOFRAN) injection 4 mg  4 mg Intravenous Q6H PRN Kathleen Hoffmann PA-C        perflutren lipid microsphere (DEFINITY) injection  0 4 mL/min Intravenous Once in imaging Barbara Parker MD        sodium chloride tablet 1 g  1 g Oral TID With Meals Maggi Johnson MD   1 g at 22 7706     Allergies   Allergen Reactions    Bee Venom     Benazepril Other (See Comments)     Angioedema     Latex Other (See Comments)     Burning of eyes at the dentist from the gloves    Meloxicam GI Intolerance    Penicillin G Rash       Objective   Vitals: Blood pressure 137/84, pulse 80, temperature 98 4 °F (36 9 °C), temperature source Oral, resp   rate 20, height 5' 10" (1 778 m), weight 82 5 kg (181 lb 12 3 oz), SpO2 99 %  Intake/Output Summary (Last 24 hours) at 4/4/2022 1048  Last data filed at 4/4/2022 0820  Gross per 24 hour   Intake 1175 42 ml   Output 1125 ml   Net 50 42 ml     Invasive Devices  Report    Central Venous Catheter Line            Port A Cath 06/17/21 Right Chest 290 days          Peripheral Intravenous Line            Peripheral IV 04/02/22 Right Antecubital 1 day    Peripheral IV 04/02/22 Right Forearm 1 day                Physical Exam  Vitals reviewed  Constitutional:       General: He is not in acute distress  Appearance: Normal appearance  He is normal weight  He is not ill-appearing, toxic-appearing or diaphoretic  HENT:      Head: Normocephalic and atraumatic  Comments: Normal thyroid size, no tenderness     Nose: No congestion  Eyes:      General:         Right eye: No discharge  Left eye: No discharge  Extraocular Movements: Extraocular movements intact  Pupils: Pupils are equal, round, and reactive to light  Neck:      Vascular: No carotid bruit  Cardiovascular:      Rate and Rhythm: Normal rate and regular rhythm  Pulses: Normal pulses  Heart sounds: No murmur heard  Pulmonary:      Effort: No respiratory distress  Breath sounds: No wheezing, rhonchi or rales  Abdominal:      General: Abdomen is flat  There is no distension  Palpations: Abdomen is soft  There is no mass  Tenderness: There is no abdominal tenderness  Musculoskeletal:         General: No swelling or tenderness  Cervical back: Normal range of motion and neck supple  No rigidity  No muscular tenderness  Right lower leg: No edema  Left lower leg: No edema  Lymphadenopathy:      Cervical: No cervical adenopathy  Skin:     General: Skin is warm and dry  Capillary Refill: Capillary refill takes less than 2 seconds     Neurological:      Mental Status: He is alert and oriented to person, place, and time  The history was obtained from the review of the chart, patient  Lab Results:   Results from last 7 days   Lab Units 04/03/22  0535   HEMOGLOBIN A1C % 5 2     Lab Results   Component Value Date    WBC 9 54 04/04/2022    HGB 13 3 04/04/2022    HCT 40 2 04/04/2022    MCV 95 04/04/2022     (L) 04/04/2022     Lab Results   Component Value Date/Time    BUN 34 (H) 04/04/2022 05:21 AM    K 4 0 04/04/2022 05:21 AM     04/04/2022 05:21 AM    CO2 22 04/04/2022 05:21 AM    CREATININE 1 19 04/04/2022 05:21 AM    AST 14 04/04/2022 05:21 AM    ALT 35 04/04/2022 05:21 AM    ALB 3 1 (L) 04/04/2022 05:21 AM     Recent Labs     04/03/22  0535   HDL 70   TRIG 137     No results found for: URIEL RODRIGUEZ  POC Glucose (mg/dl)   Date Value   04/02/2022 104   09/27/2021 107       Imaging Studies: I have personally reviewed pertinent reports  Portions of the record may have been created with voice recognition software

## 2022-04-04 NOTE — PLAN OF CARE
Problem: MOBILITY - ADULT  Goal: Maintain or return to baseline ADL function  Description: INTERVENTIONS:  -  Assess patient's ability to carry out ADLs; assess patient's baseline for ADL function and identify physical deficits which impact ability to perform ADLs (bathing, care of mouth/teeth, toileting, grooming, dressing, etc )  - Assess/evaluate cause of self-care deficits   - Assess range of motion  - Assess patient's mobility; develop plan if impaired  - Assess patient's need for assistive devices and provide as appropriate  - Encourage maximum independence but intervene and supervise when necessary  - Involve family in performance of ADLs  - Assess for home care needs following discharge   - Consider OT consult to assist with ADL evaluation and planning for discharge  - Provide patient education as appropriate  Outcome: Progressing  Goal: Maintains/Returns to pre admission functional level  Description: INTERVENTIONS:  - Perform BMAT or MOVE assessment daily    - Set and communicate daily mobility goal to care team and patient/family/caregiver  - Collaborate with rehabilitation services on mobility goals if consulted  - Perform Range of Motion 4 times a day  - Reposition patient every 2 hours  - Dangle patient 2 times a day  - Stand patient 2 times a day  - Ambulate patient 2 times a day  - Out of bed to chair 2 times a day   - Out of bed for meals 2 times a day  - Out of bed for toileting  - Record patient progress and toleration of activity level   Outcome: Progressing     Problem: Neurological Deficit  Goal: Neurological status is stable or improving  Description: Interventions:  - Monitor and assess patient's level of consciousness, motor function, sensory function, and level of assistance needed for ADLs  - Monitor and report changes from baseline  Collaborate with interdisciplinary team to initiate plan and implement interventions as ordered  - Provide and maintain a safe environment    - Consider seizure precautions  - Consider fall precautions  - Consider aspiration precautions  - Consider bleeding precautions  Outcome: Progressing     Problem: Activity Intolerance/Impaired Mobility  Goal: Mobility/activity is maintained at optimum level for patient  Description: Interventions:  - Assess and monitor patient  barriers to mobility and need for assistive/adaptive devices  - Assess patient's emotional response to limitations  - Collaborate with interdisciplinary team and initiate plans and interventions as ordered  - Encourage independent activity per ability   - Maintain proper body alignment  - Perform active/passive rom as tolerated/ordered  - Plan activities to conserve energy   - Turn patient as appropriate  Outcome: Progressing     Problem: Communication Impairment  Goal: Ability to express needs and understand communication  Description: Assess patient's communication skills and ability to understand information  Patient will demonstrate use of effective communication techniques, alternative methods of communication and understanding even if not able to speak  - Encourage communication and provide alternate methods of communication as needed  - Collaborate with case management/ for discharge needs  - Include patient/family/caregiver in decisions related to communication  Outcome: Progressing     Problem: Potential for Aspiration  Goal: Non-ventilated patient's risk of aspiration is minimized  Description: Assess and monitor vital signs, respiratory status, and labs (WBC)  Monitor for signs of aspiration (tachypnea, cough, rales, wheezing, cyanosis, fever)  - Assess and monitor patient's ability to swallow  - Place patient up in chair to eat if possible  - HOB up at 90 degrees to eat if unable to get patient up into chair   - Supervise patient during oral intake  - Instruct patient/ family to take small bites    - Instruct patient/ family to take small single sips when taking liquids  - Follow patient-specific strategies generated by speech pathologist   Outcome: Progressing     Problem: Nutrition  Goal: Nutrition/Hydration status is improving  Description: Monitor and assess patient's nutrition/hydration status for malnutrition (ex- brittle hair, bruises, dry skin, pale skin and conjunctiva, muscle wasting, smooth red tongue, and disorientation)  Collaborate with interdisciplinary team and initiate plan and interventions as ordered  Monitor patient's weight and dietary intake as ordered or per policy  Utilize nutrition screening tool and intervene per policy  Determine patient's food preferences and provide high-protein, high-caloric foods as appropriate  - Assist patient with eating   - Allow adequate time for meals   - Encourage patient to take dietary supplement as ordered  - Collaborate with clinical nutritionist   - Include patient/family/caregiver in decisions related to nutrition    Outcome: Progressing     Problem: PAIN - ADULT  Goal: Verbalizes/displays adequate comfort level or baseline comfort level  Description: Interventions:  - Encourage patient to monitor pain and request assistance  - Assess pain using appropriate pain scale  - Administer analgesics based on type and severity of pain and evaluate response  - Implement non-pharmacological measures as appropriate and evaluate response  - Consider cultural and social influences on pain and pain management  - Notify physician/advanced practitioner if interventions unsuccessful or patient reports new pain  Outcome: Progressing     Problem: INFECTION - ADULT  Goal: Absence or prevention of progression during hospitalization  Description: INTERVENTIONS:  - Assess and monitor for signs and symptoms of infection  - Monitor lab/diagnostic results  - Monitor all insertion sites, i e  indwelling lines, tubes, and drains  - Monitor endotracheal if appropriate and nasal secretions for changes in amount and color  - Sinclair appropriate cooling/warming therapies per order  - Administer medications as ordered  - Instruct and encourage patient and family to use good hand hygiene technique  - Identify and instruct in appropriate isolation precautions for identified infection/condition  Outcome: Progressing     Problem: SAFETY ADULT  Goal: Maintain or return to baseline ADL function  Description: INTERVENTIONS:  -  Assess patient's ability to carry out ADLs; assess patient's baseline for ADL function and identify physical deficits which impact ability to perform ADLs (bathing, care of mouth/teeth, toileting, grooming, dressing, etc )  - Assess/evaluate cause of self-care deficits   - Assess range of motion  - Assess patient's mobility; develop plan if impaired  - Assess patient's need for assistive devices and provide as appropriate  - Encourage maximum independence but intervene and supervise when necessary  - Involve family in performance of ADLs  - Assess for home care needs following discharge   - Consider OT consult to assist with ADL evaluation and planning for discharge  - Provide patient education as appropriate  Outcome: Progressing  Goal: Maintains/Returns to pre admission functional level  Description: INTERVENTIONS:  - Perform BMAT or MOVE assessment daily    - Set and communicate daily mobility goal to care team and patient/family/caregiver  - Collaborate with rehabilitation services on mobility goals if consulted  - Perform Range of Motion 4 times a day  - Reposition patient every 2 hours    - Dangle patient 2 times a day  - Stand patient 2 times a day  - Ambulate patient 2 times a day  - Out of bed to chair 2 times a day   - Out of bed for meals 2 times a day  - Out of bed for toileting  - Record patient progress and toleration of activity level   Outcome: Progressing  Goal: Patient will remain free of falls  Description: INTERVENTIONS:  - Educate patient/family on patient safety including physical limitations  - Instruct patient to call for assistance with activity   - Consult OT/PT to assist with strengthening/mobility   - Keep Call bell within reach  - Keep bed low and locked with side rails adjusted as appropriate  - Keep care items and personal belongings within reach  - Initiate and maintain comfort rounds  - Make Fall Risk Sign visible to staff  - Offer Toileting every 2 Hours, in advance of need  - Initiate/Maintain Bed/Chair alarm  - Obtain necessary fall risk management equipment  - Apply yellow socks and bracelet for high fall risk patients  - Consider moving patient to room near nurses station  Outcome: Progressing     Problem: DISCHARGE PLANNING  Goal: Discharge to home or other facility with appropriate resources  Description: INTERVENTIONS:  - Identify barriers to discharge w/patient and caregiver  - Arrange for needed discharge resources and transportation as appropriate  - Identify discharge learning needs (meds, wound care, etc )  - Arrange for interpretive services to assist at discharge as needed  - Refer to Case Management Department for coordinating discharge planning if the patient needs post-hospital services based on physician/advanced practitioner order or complex needs related to functional status, cognitive ability, or social support system  Outcome: Progressing     Problem: Knowledge Deficit  Goal: Patient/family/caregiver demonstrates understanding of disease process, treatment plan, medications, and discharge instructions  Description: Complete learning assessment and assess knowledge base    Interventions:  - Provide teaching at level of understanding  - Provide teaching via preferred learning methods  Outcome: Progressing     Problem: Potential for Falls  Goal: Patient will remain free of falls  Description: INTERVENTIONS:  - Educate patient/family on patient safety including physical limitations  - Instruct patient to call for assistance with activity   - Consult OT/PT to assist with strengthening/mobility   - Keep Call bell within reach  - Keep bed low and locked with side rails adjusted as appropriate  - Keep care items and personal belongings within reach  - Initiate and maintain comfort rounds  - Make Fall Risk Sign visible to staff  - Offer Toileting every 2 Hours, in advance of need  - Initiate/Maintain Bed/Chair alarm  - Obtain necessary fall risk management equipment  - Apply yellow socks and bracelet for high fall risk patients  - Consider moving patient to room near nurses station  Outcome: Progressing     Problem: Prexisting or High Potential for Compromised Skin Integrity  Goal: Skin integrity is maintained or improved  Description: INTERVENTIONS:  - Identify patients at risk for skin breakdown  - Assess and monitor skin integrity  - Assess and monitor nutrition and hydration status  - Monitor labs   - Assess for incontinence   - Turn and reposition patient  - Assist with mobility/ambulation  - Relieve pressure over bony prominences  - Avoid friction and shearing  - Provide appropriate hygiene as needed including keeping skin clean and dry  - Evaluate need for skin moisturizer/barrier cream  - Collaborate with interdisciplinary team   - Patient/family teaching  - Consider wound care consult   Outcome: Progressing     Problem: Nutrition/Hydration-ADULT  Goal: Nutrient/Hydration intake appropriate for improving, restoring or maintaining nutritional needs  Description: Monitor and assess patient's nutrition/hydration status for malnutrition  Collaborate with interdisciplinary team and initiate plan and interventions as ordered  Monitor patient's weight and dietary intake as ordered or per policy  Utilize nutrition screening tool and intervene as necessary  Determine patient's food preferences and provide high-protein, high-caloric foods as appropriate       INTERVENTIONS:  - Monitor oral intake, urinary output, labs, and treatment plans  - Assess nutrition and hydration status and recommend course of action  - Evaluate amount of meals eaten  - Assist patient with eating if necessary   - Allow adequate time for meals  - Recommend/ encourage appropriate diets, oral nutritional supplements, and vitamin/mineral supplements  - Order, calculate, and assess calorie counts as needed  - Recommend, monitor, and adjust tube feedings and TPN/PPN based on assessed needs  - Assess need for intravenous fluids  - Provide specific nutrition/hydration education as appropriate  - Include patient/family/caregiver in decisions related to nutrition  Outcome: Progressing     Problem: SAFETY,RESTRAINT: NV/NON-SELF DESTRUCTIVE BEHAVIOR  Goal: Remains free of harm/injury (restraint for non violent/non self-detsructive behavior)  Description: INTERVENTIONS:  - Instruct patient/family regarding restraint use   - Assess and monitor physiologic and psychological status   - Provide interventions and comfort measures to meet assessed patient needs   - Identify and implement measures to help patient regain control  - Assess readiness for release of restraint   Outcome: Progressing  Goal: Returns to optimal restraint-free functioning  Description: INTERVENTIONS:  - Assess the patient's behavior and symptoms that indicate continued need for restraint  - Identify and implement measures to help patient regain control  - Assess readiness for release of restraint   Outcome: Progressing     Problem: COPING  Goal: Pt/Family able to verbalize concerns and demonstrate effective coping strategies  Description: INTERVENTIONS:  - Assist patient/family to identify coping skills, available support systems and cultural and spiritual values  - Provide emotional support, including active listening and acknowledgement of concerns of patient and caregivers  - Reduce environmental stimuli, as able  - Provide patient education  - Assess for spiritual pain/suffering and initiate spiritual care, including notification of Pastoral [facility] or [facility] as needed  - Assess effectiveness of coping strategies  Outcome: Progressing  Goal: Will report anxiety at manageable levels  Description: INTERVENTIONS:  - Administer medication as ordered  - Teach and encourage coping skills  - Provide emotional support  - Assess patient/family for anxiety and ability to cope  Outcome: Progressing     Problem: NEUROSENSORY - ADULT  Goal: Achieves stable or improved neurological status  Description: INTERVENTIONS  - Monitor and report changes in neurological status  - Monitor vital signs such as temperature, blood pressure, glucose, and any other labs ordered   - Initiate measures to prevent increased intracranial pressure  - Monitor for seizure activity and implement precautions if appropriate      Outcome: Progressing  Goal: Remains free of injury related to seizures activity  Description: INTERVENTIONS  - Maintain airway, patient safety  and administer oxygen as ordered  - Monitor patient for seizure activity, document and report duration and description of seizure to physician/advanced practitioner  - If seizure occurs,  ensure patient safety during seizure  - Reorient patient post seizure  - Seizure pads on all 4 side rails  - Instruct patient/family to notify RN of any seizure activity including if an aura is experienced  - Instruct patient/family to call for assistance with activity based on nursing assessment  - Administer anti-seizure medications if ordered    Outcome: Progressing  Goal: Achieves maximal functionality and self care  Description: INTERVENTIONS  - Monitor swallowing and airway patency with patient fatigue and changes in neurological status  - Encourage and assist patient to increase activity and self care     - Encourage visually impaired, hearing impaired and aphasic patients to use assistive/communication devices  Outcome: Progressing     Problem: CARDIOVASCULAR - ADULT  Goal: Maintains optimal cardiac output and hemodynamic stability  Description: INTERVENTIONS:  - Monitor I/O, vital signs and rhythm  - Monitor for S/S and trends of decreased cardiac output  - Administer and titrate ordered vasoactive medications to optimize hemodynamic stability  - Assess quality of pulses, skin color and temperature  - Assess for signs of decreased coronary artery perfusion  - Instruct patient to report change in severity of symptoms  Outcome: Progressing  Goal: Absence of cardiac dysrhythmias or at baseline rhythm  Description: INTERVENTIONS:  - Continuous cardiac monitoring, vital signs, obtain 12 lead EKG if ordered  - Administer antiarrhythmic and heart rate control medications as ordered  - Monitor electrolytes and administer replacement therapy as ordered  Outcome: Progressing     Problem: RESPIRATORY - ADULT  Goal: Achieves optimal ventilation and oxygenation  Description: INTERVENTIONS:  - Assess for changes in respiratory status  - Assess for changes in mentation and behavior  - Position to facilitate oxygenation and minimize respiratory effort  - Oxygen administered by appropriate delivery if ordered  - Initiate smoking cessation education as indicated  - Encourage broncho-pulmonary hygiene including cough, deep breathe, Incentive Spirometry  - Assess the need for suctioning and aspirate as needed  - Assess and instruct to report SOB or any respiratory difficulty  - Respiratory Therapy support as indicated  Outcome: Progressing     Problem: GASTROINTESTINAL - ADULT  Goal: Maintains or returns to baseline bowel function  Description: INTERVENTIONS:  - Assess bowel function  - Encourage oral fluids to ensure adequate hydration  - Administer IV fluids if ordered to ensure adequate hydration  - Administer ordered medications as needed  - Encourage mobilization and activity  - Consider nutritional services referral to assist patient with adequate nutrition and appropriate food choices  Outcome: Progressing  Goal: Maintains adequate nutritional intake  Description: INTERVENTIONS:  - Monitor percentage of each meal consumed  - Identify factors contributing to decreased intake, treat as appropriate  - Assist with meals as needed  - Monitor I&O, weight, and lab values if indicated  - Obtain nutrition services referral as needed  Outcome: Progressing     Problem: GENITOURINARY - ADULT  Goal: Maintains or returns to baseline urinary function  Description: INTERVENTIONS:  - Assess urinary function  - Encourage oral fluids to ensure adequate hydration if ordered  - Administer IV fluids as ordered to ensure adequate hydration  - Administer ordered medications as needed  - Offer frequent toileting  - Follow urinary retention protocol if ordered  Outcome: Progressing     Problem: METABOLIC, FLUID AND ELECTROLYTES - ADULT  Goal: Electrolytes maintained within normal limits  Description: INTERVENTIONS:  - Monitor labs and assess patient for signs and symptoms of electrolyte imbalances  - Administer electrolyte replacement as ordered  - Monitor response to electrolyte replacements, including repeat lab results as appropriate  - Instruct patient on fluid and nutrition as appropriate  Outcome: Progressing  Goal: Fluid balance maintained  Description: INTERVENTIONS:  - Monitor labs   - Monitor I/O and WT  - Instruct patient on fluid and nutrition as appropriate  - Assess for signs & symptoms of volume excess or deficit  Outcome: Progressing  Goal: Glucose maintained within target range  Description: INTERVENTIONS:  - Monitor Blood Glucose as ordered  - Assess for signs and symptoms of hyperglycemia and hypoglycemia  - Administer ordered medications to maintain glucose within target range  - Assess nutritional intake and initiate nutrition service referral as needed  Outcome: Progressing     Problem: SKIN/TISSUE INTEGRITY - ADULT  Goal: Skin Integrity remains intact(Skin Breakdown Prevention)  Description: Assess:  -Perform Garry assessment every shift  -Clean and moisturize skin every shift  -Inspect skin when repositioning, toileting, and assisting with ADLS  -Assess under medical devices every shift  -Assess extremities for adequate circulation and sensation     Bed Management:  -Have minimal linens on bed & keep smooth, unwrinkled  -Change linens as needed when moist or perspiring  -Avoid sitting or lying in one position for more than 2 hours while in bed  -Keep HOB at 30 degrees     Toileting:  -Offer bedside commode  -Assess for incontinence every 2 hours  -Use incontinent care products after each incontinent episode    Activity:  -Mobilize patient 2 times a day  -Encourage activity and walks on unit  -Encourage or provide ROM exercises   -Turn and reposition patient every 2 Hours  -Use appropriate equipment to lift or move patient in bed  -Instruct/ Assist with weight shifting every 1 hour when out of bed in chair  -Consider limitation of chair time 4 hour intervals    Skin Care:  -Avoid use of baby powder, tape, friction and shearing, hot water or constrictive clothing  -Relieve pressure over bony prominences   -Do not massage red bony areas  Outcome: Progressing     Problem: HEMATOLOGIC - ADULT  Goal: Maintains hematologic stability  Description: INTERVENTIONS  - Assess for signs and symptoms of bleeding or hemorrhage  - Monitor labs  - Administer supportive blood products/factors as ordered and appropriate  Outcome: Progressing     Problem: MUSCULOSKELETAL - ADULT  Goal: Maintain or return mobility to safest level of function  Description: INTERVENTIONS:  - Assess patient's ability to carry out ADLs; assess patient's baseline for ADL function and identify physical deficits which impact ability to perform ADLs (bathing, care of mouth/teeth, toileting, grooming, dressing, etc )  - Assess/evaluate cause of self-care deficits   - Assess range of motion  - Assess patient's mobility  - Assess patient's need for assistive devices and provide as appropriate  - Encourage maximum independence but intervene and supervise when necessary  - Involve family in performance of ADLs  - Assess for home care needs following discharge   - Consider OT consult to assist with ADL evaluation and planning for discharge  - Provide patient education as appropriate  Outcome: Progressing

## 2022-04-04 NOTE — CONSULTS
PHYSICAL MEDICINE AND REHABILITATION CONSULT NOTE  Idnia Jones 74 y o  male MRN: [id number]  Unit/Bed#: ICU 04 Encounter: [id number]    Requested by (Physician/Service): Yee Samuel MD  Reason for Consultation:  Assessment of rehabilitation needs    Assessment:  Rehabilitation Diagnosis:    Left parietal brain mass and 2 cerebellar masses   Impaired mobility and self care   Impaired cognition     Recommendations:  Rehabilitation Plan:   Continue PT/OT (SLP) while on acute care   Mr Jagruti Coronado is pending potential surgery at this time  If it is decided he is not undergoing surgery and has functional needs, he would be an appropriate candidate for acute inpatient rehabilitation  He will need to be fairly independent-supervision as he has many steps to negotiate  Therapies are pending at this time  If he does undergo surgery, he would likely still be a good candidate but the functional picture may in change   Covid-19 Testing: [facility] inpatient rehabilitation units require testing within 48 hours of all potential admissions at this time  *Re-testing is NOT required for patients recovering from COVID-19 infection if isolation has been discontinued per CDC criteria  Medical Co-morbidities Plan:  Brain masses  Gout  HTN  Glaucoma  JUNAID  Lumbar spinal stenosis  Lung CA  Stage 3a CKD  Psoriasis  Bowel plan: Would start basic regimen of colace and senna and PRN Miralax, no overt signs of constipation at this time  Bladder plan: Continent  DVT ppx: on hold      Thank you for this consultation  Do not hesitate to contact service with further questions  Analilia Bowens DO  PM&R    History of Present Illness:  India Jones is a 74 y o  male with  has a past medical history of Hyperlipidemia, Hypertension, and Lung cancer ([address] )     Patient presented to the [address] on 4/2 with confusion and difficulty speaking   His wife noticed over the prior 24 hours that he had increased confusion and difficulty conducting ADLs properly compared to baseline line  Stroke alert in the ED, initial NIHSS of 7  An MRI showed left parietal brain mass with vasogenic edema and additionally 2 left cerebellar masses  Of note he has a history of small cell lung cancer diagnosed back in April of 2021 s/p chemo/surgery/radiation/immunotherapy  He is currently on decadron for the vasogenic edema  And being followed by Neurosurgery  Keppra was discontinued per their recommendations at this time  Potential for surgery still on the table and will be determined in the next days  We are consulted for rehabilitation needs  Review of Systems: 10 point ROS negative except for what is noted in HPI    Function:  Prior level of function and living situation:  Lives with his wife in a 3 story home  No steps to enter  He needs to negotiate 1 FF of steps if using the side entrance and 2 FF if the front  Lives with his wife who was present during our visit and can provide support, some physical as well  Family in the area  Previously independent  Current level of function:  Physical Therapy: pending  Occupational Therapy: pending  Speech Therapy: pending      Physical Exam:  /76   Pulse 90   Temp 98 4 °F (36 9 °C) (Oral)   Resp 20   Ht 5' 10" (1 778 m)   Wt 82 5 kg (181 lb 12 3 oz)   SpO2 98%   BMI 26 08 kg/m²        Intake/Output Summary (Last 24 hours) at 4/4/2022 1132  Last data filed at 4/4/2022 1119  Gross per 24 hour   Intake 1283 75 ml   Output 1425 ml   Net -141 25 ml       Body mass index is 26 08 kg/m²  Physical Exam  Constitutional:       General: He is not in acute distress  Appearance: He is normal weight  HENT:      Head: Normocephalic and atraumatic  Right Ear: External ear normal       Left Ear: External ear normal       Nose: Nose normal  No rhinorrhea  Mouth/Throat:      Mouth: Mucous membranes are moist       Pharynx: Oropharynx is clear     Eyes: General: No scleral icterus  Cardiovascular:      Rate and Rhythm: Normal rate  Pulses: Normal pulses  Pulmonary:      Effort: Pulmonary effort is normal  No respiratory distress  Breath sounds: No wheezing or rales  Abdominal:      General: There is no distension  Palpations: Abdomen is soft  Musculoskeletal:      Cervical back: Normal range of motion  Right lower leg: No edema  Left lower leg: No edema  Skin:     General: Skin is warm and dry  Neurological:      Mental Status: He is alert  Comments: 5/5 strength in the bilateral uppers and lower limbs, Some incoordination and dysmetria, however confounded by lack of his corrective lenses being available  Mild expressive aphasia, unable to consistently follow multistep commands     Psychiatric:         Mood and Affect: Mood normal          Behavior: Behavior normal             Social History:    Social History     Socioeconomic History    Marital status: /Civil Union     Spouse name: Not on file    Number of children: Not on file    Years of education: Not on file    Highest education level: Not on file   Occupational History    Not on file   Tobacco Use    Smoking status: Former Smoker     Packs/day: 1 00     Years: 40 00     Pack years: 40 00     Types: Cigarettes     Start date:      Quit date: 2017     Years since quittin 1    Smokeless tobacco: Never Used    Tobacco comment: quit 2017   Vaping Use    Vaping Use: Never used   Substance and Sexual Activity    Alcohol use: Not Currently     Alcohol/week: 7 0 standard drinks     Types: 7 Cans of beer per week    Drug use: Yes     Types: Marijuana     Comment: seldom    Sexual activity: Not on file   Other Topics Concern    Not on file   Social History Narrative    Not on file     Social Determinants of Health     Financial Resource Strain: Not on file   Food Insecurity: No Food Insecurity    Worried About [address] in the Last Year: Never true    Ran Out of Food in the Last Year: Never true   Transportation Needs: No Transportation Needs    Lack of Transportation (Medical): No    Lack of Transportation (Non-Medical):  No   Physical Activity: Not on file   Stress: Not on file   Social Connections: Not on file   Intimate Partner Violence: Not on file   Housing Stability: Low Risk     Unable to Pay for Housing in the Last Year: No    Number of Places Lived in the Last Year: 1    Unstable Housing in the Last Year: No        Family History:    Family History   Problem Relation Age of Onset    Nephrolithiasis Father          Medications:     Current Facility-Administered Medications:     [START ON 4/5/2022] amLODIPine (NORVASC) tablet 10 mg, 10 mg, Oral, Daily, Travis Nettles    dexamethasone (DECADRON) injection 4 mg, 4 mg, Intravenous, Q6H [facility], Mina Mayo PA-C, 4 mg at 04/04/22 0600    Labetalol HCl (NORMODYNE) injection 10 mg, 10 mg, Intravenous, Q4H PRN, Amira Epps DO    levETIRAcetam (KEPPRA) tablet 500 mg, 500 mg, Oral, Q12H BASIA, Travis Nettles    levothyroxine tablet 25 mcg, 25 mcg, Oral, Early Morning, Merline Massing, MD, 25 mcg at 04/04/22 0842    melatonin tablet 6 mg, 6 mg, Oral, HS, Travis Nettles    ondansetron (ZOFRAN) injection 4 mg, 4 mg, Intravenous, Q6H PRN, Dennie Plume, PA-C    perflutren lipid microsphere (DEFINITY) injection, 0 4 mL/min, Intravenous, Once in imaging, Mac Lim MD    sodium chloride tablet 1 g, 1 g, Oral, TID With Meals, Merline Massing, MD, 1 g at 04/04/22 4173    Past Medical History:     Past Medical History:   Diagnosis Date    Hyperlipidemia     Hypertension     Lung cancer [facility])         Past Surgical History:     Past Surgical History:   Procedure Laterality Date    BACK SURGERY      HEMORRHOID SURGERY      IR BIOPSY LUNG  5/6/2021    IR PORT PLACEMENT  6/17/2021    LAMINECTOMY  2018    L4-L5    LUNG SURGERY      left upper lobectomy    [place] [address] N/A 5/25/2021    Procedure: BRONCHOSCOPY FLEXIBLE;  Surgeon: Chucky Villalba MD;  Location: [facility];  Service: Thoracic    [place] MEDIASTINOSCOPY WITH LYMPH NODE BIOPSY/IES N/A 5/25/2021    Procedure: MEDIASTINOSCOPY, flexible bronchoscopy;  Surgeon: Chucky Villalba MD;  Location[facility];  Service: Thoracic    TONSILLECTOMY  1959         Allergies: Allergies   Allergen Reactions    Bee Venom     Benazepril Other (See Comments)     Angioedema     Latex Other (See Comments)     Burning of eyes at the dentist from the gloves    Meloxicam GI Intolerance    Penicillin G Rash           LABORATORY RESULTS:      Lab Results   Component Value Date    HGB 13 3 04/04/2022    HCT 40 2 04/04/2022    WBC 9 54 04/04/2022     Lab Results   Component Value Date    BUN 34 (H) 04/04/2022    K 4 0 04/04/2022     04/04/2022    CREATININE 1 19 04/04/2022     Lab Results   Component Value Date    PROTIME 12 4 04/03/2022    INR 0 96 04/03/2022        DIAGNOSTIC STUDIES: Reviewed  CT head wo contrast    Result Date: 4/3/2022  Impression: Left parietal region intraparenchymal hematoma unchanged in size measuring 6 7 cm in largest dimension  New irregular hyperdense focus at the left cerebellum likely to represent small amount of subarachnoid hemorrhage versus a new intraparenchymal hemorrhage  Workstation performed: [id number]     MRI brain w wo contrast    Result Date: 4/3/2022  Impression: 1   2 left cerebellar hemorrhagic metastases and probable 2 adjacent left parietal hemorrhagic metastasis, the more anterior, larger one having parenchymal hematoma and local edema and mass effect, correlating to the hemorrhage on the prior head CT  I personally discussed this study with Dr Chere Brittle on 4/3/2022 at 2:19 PM  Workstation performed: [id number]     CT stroke alert brain    Result Date: 4/2/2022  Impression: Left parietal 4 6 cm intraparenchymal hemorrhage with surrounding cerebral edema   No midline shift  I personally discussed this study with neurologist on 4/2/2022 at 7:45 PM  Workstation performed: [id number]     CTA stroke alert (head/neck)    Result Date: 4/2/2022  Impression: Severe right vertebral artery stenosis at the origin  Severe critical near occlusive stenosis right proximal vertebral artery near its origin spanning approximately 2 2 cm in cranial caudal dimension   Approximately 70-75% right proximal cervical ICA plaque stenosis  I personally discussed this study with neurologist on 4/2/2022 at 7:50 PM   Workstation performed: [id number]     Sacha Hutchison DO  Physical Medicine and [place]

## 2022-04-04 NOTE — ASSESSMENT & PLAN NOTE
Left parietal brain mass with vasogenic edema, 2 additional left cerebellar masses  · H/o small cell lung adenocarcinoma (dx 4/2021), s/p chemotherapy/surgery/radiation/immunotherapy at [facility]  · Patient denies h/o WBI  · Patient does not want to transfer back to [place] - states not up to traveling  · Patient presented with new onset apraxia and aphasia  · Mild to moderate expressive aphasia and word finding difficulty on exam, difficulty following commands  Otherwise nonfocal exam    Imaging:  · MRI brain w/wo, 4/3/22: 2 left cerebellar hemorrhagic metastases and probable 2 adjacent left parietal hemorrhagic metastasis, the more anterior, larger one having parenchymal hematoma and local edema and mass effect, correlating to the hemorrhage on the prior head CT    · CTCAP w/contast, 4/4/22: Completed, final read pending    Plan:  · Continue decadron 4q6  · Can discontinue keppra as patient did not present with seizure activity  · Follow up on CTCAP imaging  · Recommend med onc / rad onc / palliative care consults   · Frequent neuro checks  · SBP < 160  · Avoid AC/AP  · Will ultimately need PT/OT evaluation   · Medical management per primary team  · DVT ppx: SCDs  Hold pharm ppx in the setting of hemorrhagic masses      I personally discussed treatment plan options with the patient and his wife today  We briefly discussed surgery to remove the left parietal mass and would likely not recommend surgical intervention on the 2 cerebellar masses  He will ultimately need radiation therapy for SRS vs WBI  Left parietal mass is likely too large for SRS but will await their input  The patient is unsure if he wants to continue with surgery at this time  He would like to take some time to consider his options  He is very interested in his prognosis now he has metastasis to his brain  If surgery is an option and the patient agrees to proceed, this 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 happen on Thursday  Neurosurgery will continue to closely follow

## 2022-04-04 NOTE — PROGRESS NOTES
NEUROLOGY RESIDENCY PROGRESS NOTE     Name: Christine Yu   Age & Sex: 74 y o  male   MRN: [id number]  Unit/Bed#: ICU 04   Encounter: [id number]    Recommendations for outpatient neurological follow up have yet to be determined  Pending for d/c: possible intervention/diagnostic procedure, further workup    ASSESSMENT & PLAN     * Left parietal 4 6 cm intraparenchymal hemorrhage with surrounding cerebral edema  Assessment & Plan  Christine  is a 74 y o  male with PMH lung adenocarcinoma who p/w aphasia/apraxia and was found to have [address] likely 2/2 intracranial mass  Suspicious for metastatic process on imaging given additional 2 cerebellar lesions  NSg to evaluate for possible intervention on parietal mass though no surgical procedure recommended for cerebellar lesions  Would likely need SRS or additional management which he and his wife are contemplating at this time   PMH lung adenocarcinoma (s/p DAVID lobectomy and radiation now on chemo), HTN, HLD   TxF from [address] for ICH (ICH score 1 for volume)  P/w aphasia, apraxia 4/22 and found to have L parietal IPH with surrounding edema on CTH   Initial Blood Pressure: 141/84    ICH Score 1 for ICH volume > 30 cc   MRI: hemorrhagic left parietal mass w/ local sulcal effacement but no subfalcine herniation and 2 small left parietal cerebellar masses    Plan:   Continue Dexamethasone, 4 q6h   F/u CT CAP for underlying malignancy   Neurosurgery consulted, appreciate recs   Monitor for signs of ICP increase (bradycardia, HTN, neuro exam, increased tone, pupillary changes)   Monitor for post stroke edema (Peak days 3-5)   SBP goal <140   Tight glycemic control (goal glucose < 180), keep afebrile as able - Tylenol PRN   Continue SCDs for DVT ppx and avoid chemical ppx 2/2 hemorrhage   Repeat CTH if > 2 pt drop in GCS in one hour   PT/OT/Speech/PMR consults when able  [facility] management as per primary team    SUBJECTIVE     Patient was seen and examined  No critical events overnight  Denies N/V/F/C, abdominal pain, dizziness, HA, visual changes, new weakness or sensory changes but patient is having intermittent word-finding difficulty, and a somewhat atypical aphasic syndrome  ROS negative unless otherwise noted    OBJECTIVE     Patient ID: Mireya Wall is a 74 y o  male  Vitals:    22 1118 22 1122 22 1228 22 1232   BP: 143/91 145/76 124/79 136/76   BP Location:   Left arm    Pulse:  90 80 80   Resp:  20 20 20   Temp:   97 9 °F (36 6 °C)    TempSrc:   Oral    SpO2:  98% 98% 99%   Weight:       Height:          Temperature:   Temp (24hrs), Av 2 °F (36 8 °C), Min:97 9 °F (36 6 °C), Max:98 4 °F (36 9 °C)    Temperature: 97 9 °F (36 6 °C)    Physical Exam Vitals reviewed  Constitutional:    Not in acute distress  Normal appearance  Not ill-appearing, toxic-appearing or diaphoretic  HENT:   Normocephalic and atraumatic  External ear normal b/l  Nose normal  No congestion or rhinorrhea  Mucous membranes are moist   Oropharynx is clear  No oropharyngeal exudate or posterior oropharyngeal erythema  Eyes:    No scleral icterus  No discharge b/l  Conjunctivae normal    Pulmonary:  Pulmonary effort is normal  No respiratory distress  GI: abdomen not distended  Musculoskeletal: no gross deformities  Skin:   Skin is not pale  Psychiatric:       Mood normal  Affect congruent    Neurologic Exam  Mental Status   Alert and oriented to person and situation and year but not date  Attention is decreased  Speech is atypically aphasic  Some right-left confusion  Some right-sided inattention w/o complete neglect syndrome  Slower to respond on right    Cranial Nerves   CN II visual fields possibly impaired on the right  CN III, IV, VI PERRL, brisk reactivity, intact consensual response b/l, EOMI, no CN III palsy, no CN VI palsy  no nystagmus   CN V   Facial sensation symmetric     CN VII  Facial expression subtly weak on right   CN VIII Hearing roughly symmetric  CN IX, X Palate symmetric  CN XI trapezius strength symmetric  CN XII no dysarthria, symmetric lingual protrusion  Motor Exam   Muscle bulk and tone grossly normal; Pronator drift absent b/l - slight drift upward RUE w/ arms closed  Strength intact and symmetric BUE/BLE     Sensory Exam   Light touch intact and symmetric BUE/BLE w/o extinction to simultaneous stimulation but some right-left confusion    Gait, Coordination, and Reflexes   FTN normal b/l (with repeated instruction); mild high frequency, low amplitude LUE tremor   Reflexes: Brachioradialis: 2+ b/l and brisk   Biceps: 2+ b/l and brisk    Patellar: 2+ b/l     Plantar response up-going on right  Gait: deferred    LABORATORY DATA     Labs: I have personally reviewed pertinent reports  Results from last 7 days   Lab Units 04/04/22 0521 04/03/22 0535 04/02/22 1942   WBC Thousand/uL 9 54 10 54* 9 76   HEMOGLOBIN g/dL 13 3 14 4 15 0   HEMATOCRIT % 40 2 45 6 46 2   PLATELETS Thousands/uL 112* 118* 123*   NEUTROS PCT %  --  88*  --    MONOS PCT %  --  5  --       Results from last 7 days   Lab Units 04/04/22 0521 04/03/22 0535 04/02/22 1942   POTASSIUM mmol/L 4 0 4 3 4 1   CHLORIDE mmol/L 108 104 99   CO2 mmol/L 22 29 28   BUN mg/dL 34* 21 25   CREATININE mg/dL 1 19 1 17 1 32*   CALCIUM mg/dL 9 1 9 5 9 9   ALK PHOS U/L 53 65 55   ALT U/L 35 43 36   AST U/L 14 13 24     Results from last 7 days   Lab Units 04/04/22 0521 04/03/22  0535   MAGNESIUM mg/dL 2 5 2 2     Results from last 7 days   Lab Units 04/04/22 0521 04/03/22  0535   PHOSPHORUS mg/dL 3 6 4 0      Results from last 7 days   Lab Units 04/03/22 0535 04/02/22 1942   INR  0 96 0 90   PTT seconds  --  30             ABG:No results found for: PHART, TAA5TCE, PO2ART, SRS7ABL, D7BCHJCL, BEART, SOURCE    IMAGING & DIAGNOSTIC TESTING     Radiology Results: I have personally reviewed pertinent reports     and I have personally reviewed pertinent films in PACS  MRI brain w wo contrast   Final Result by Jean Tran MD (04/03 1422)         1   2 left cerebellar hemorrhagic metastases and probable 2 adjacent left parietal hemorrhagic metastasis, the more anterior, larger one having parenchymal hematoma and local edema and mass effect, correlating to the hemorrhage on the prior head CT  I personally discussed this study with Dr Jayla Thornton on 4/3/2022 at 2:19 PM                Workstation performed: [id number]         CT head wo contrast   Final Result by Francisco Clark MD (04/03 0530)      Left parietal region intraparenchymal hematoma unchanged in size measuring 6 7 cm in largest dimension  New irregular hyperdense focus at the left cerebellum likely to represent small amount of subarachnoid hemorrhage versus a new intraparenchymal hemorrhage  Workstation performed: [id number]         CT chest abdomen pelvis w contrast    (Results Pending)       Other Diagnostic Testing: I have personally reviewed pertinent reports        ACTIVE MEDICATIONS     Current Facility-Administered Medications   Medication Dose Route Frequency    [START ON 4/5/2022] amLODIPine (NORVASC) tablet 10 mg  10 mg Oral Daily    dexamethasone (DECADRON) injection 4 mg  4 mg Intravenous Q6H [address]    Labetalol HCl (NORMODYNE) injection 10 mg  10 mg Intravenous Q4H PRN    levETIRAcetam (KEPPRA) tablet 500 mg  500 mg Oral Q12H [address]    levothyroxine tablet 25 mcg  25 mcg Oral Early Morning    melatonin tablet 6 mg  6 mg Oral HS    ondansetron (ZOFRAN) injection 4 mg  4 mg Intravenous Q6H PRN    perflutren lipid microsphere (DEFINITY) injection  0 4 mL/min Intravenous Once in imaging    sodium chloride tablet 1 g  1 g Oral TID With Meals       VTE Pharmacologic Prophylaxis: Pharmacologic VTE Prophylaxis contraindicated due to [address]  VTE Mechanical Prophylaxis: sequential compression device    ==  Arabella Gonzalez MD  Resident, Neurology, PGY-2  [facility]

## 2022-04-04 NOTE — PROGRESS NOTES
CRITICAL CARE TRANSFER NOTE     Name: Neil Guillaume   Age & Sex: 74 y o  male   MRN: [id number]  Unit/Bed#: ICU 04   Encounter: [id number]  Hospital Stay Days: 2    Reason for ICU Admission:  left parietal 4 6 cm intraparenchymal hemorrhage   Code Status: Level 1 - Full Code  Condition: stable  Disposition: Patient requires Med/Surg    Accepting Physician: Dr Kamaljit Alvarado, accepted via TT    ASSESSMENT/PLAN     Principal Problem:    Left parietal 4 6 cm intraparenchymal hemorrhage with surrounding cerebral edema  Active Problems:    Depression with anxiety    Essential hypertension    Mixed hyperlipidemia    Adenocarcinoma, lung, left ([address] )    Psoriasis    Drug-induced thyroiditis      Left parietal intraparenchymal hemorrhage   surrounding cerebral edema   ICH score: 1 nitial NIH 8  Not a tPA candidate secondary to [address]  Started on Decadron and Keppra  MRI 4/03: Multiple masses suspicious for mets  Hemorrhagic mass in the left parietal lobe with surrounding edema  Echo:  LVEF 65% and  No RWMA  G1DD    lipid panel - LDL 96   Previously on crestor   A1C 5 2   Nsx onboard:  Hold pharm prophylaxis in the setting hemorrhagic masses  SBP less than 160  Labetalol p r n       Aphasia  2/2 Stroke - Expressive >> receptive   Speech  NO dysphagia        Hypertension   Previously on amlodipine - restart low-dose   Status post Cardene  Monitor     Adenocarcinoma of the left lung   s/p DAVID lobectomy in 12/2021 at [facility], currently receiving adjuvant immunotherapy and has completed 2 cycles of atezolizumab  New mets to brain   CT C/A/P ordered  Follows with [facility] Hem/Onc   Consider Rad/ONc and Hem/Onc   Palliative consulted       GERD  Continue home pantoprazole        H/o BPH  Home meds : tamsulosin - reportedly not taking   Restart if urinary retention noted        Thrombocytopenia  Platelets 763  Baseline 110s to 130s  Monitor        Drug-induced hypothyroidism   Continue home levothyroxine 25 mcg daily  Endocrinology consulted         VTE Pharmacologic Prophylaxis: Reason for no pharmacologic prophylaxis ICH  VTE Mechanical Prophylaxis: sequential compression device    HOSPITAL COURSE     PMH adenocarcinoma of the left lung s/p DAVID lob[place] [facility] [place] currently receiving adjuvant immunotherapy and has completed 2 cycles of atezolizumab  Presented to [facility] on 4/2/22 with apraxia and aphasia  stroke alert initiated  CT brain 4/2/22 showed a left parietal 4 6 cm intraparenchymal hemorrhage with surrounding cerebral edema  There is no mass effect  ICH score: 1 nitial NIH 8  Not a tPA candidate secondary to [address]  Started on Decadron and Keppra  Initially required Cardene to maintain blood pressure goals  Off as of 4/03  Started on amlodipine low dose  Increased on 04/04 to home dose  MRI on 4/03 shows multiple masses suspicious for mets  Hemorrhagic mass in the left parietal lobe with surrounding edema  Notably more frustrated likely secondary to expressive aphasia  Stable to be downgraded to general medicine service with neuro surgery and Neurology following  OBJECTIVE     Vitals:    04/04/22 1205 04/04/22 1228 04/04/22 1232 04/04/22 1305   BP: 162/95 124/79 136/76 151/87   BP Location: Left arm Left arm  Left arm   Pulse: 82 80 80 86   Resp: 19 20 20 17   Temp:  97 9 °F (36 6 °C)     TempSrc:  Oral     SpO2: 98% 98% 99% 98%   Weight:       Height:         I/O last 24 hours: In: 1694 6 [P O :780; I V :524 6; IV Piggyback:390]  Out: 8711 [Urine:1825]    LABORATORY DATA     Labs: I have personally reviewed pertinent reports        Results from last 7 days   Lab Units 04/04/22 0521 04/03/22 0535 04/02/22 1942   WBC Thousand/uL 9 54 10 54* 9 76   HEMOGLOBIN g/dL 13 3 14 4 15 0   HEMATOCRIT % 40 2 45 6 46 2   PLATELETS Thousands/uL 112* 118* 123*   NEUTROS PCT %  --  88*  --    MONOS PCT %  --  5  --       Results from last 7 days   Lab Units 04/04/22 0521 04/03/22 0535 04/02/22 1942   POTASSIUM mmol/L 4 0 4 3 4 1   CHLORIDE mmol/L 108 104 99   CO2 mmol/L 22 29 28   BUN mg/dL 34* 21 25   CREATININE mg/dL 1 19 1 17 1 32*   CALCIUM mg/dL 9 1 9 5 9 9   ALK PHOS U/L 53 65 55   ALT U/L 35 43 36   AST U/L 14 13 24     Results from last 7 days   Lab Units 04/04/22  0521 04/03/22  0535   MAGNESIUM mg/dL 2 5 2 2     Results from last 7 days   Lab Units 04/04/22  0521 04/03/22  0535   PHOSPHORUS mg/dL 3 6 4 0      Results from last 7 days   Lab Units 04/03/22  0535 04/02/22  1942   INR  0 96 0 90   PTT seconds  --  30             Micro:  No results found for: Gabrielle South, [employer]  IMAGING & DIAGNOSTIC TESTING     Imaging: I have personally reviewed pertinent reports  CT head wo contrast    Result Date: 4/3/2022  Impression: Left parietal region intraparenchymal hematoma unchanged in size measuring 6 7 cm in largest dimension  New irregular hyperdense focus at the left cerebellum likely to represent small amount of subarachnoid hemorrhage versus a new intraparenchymal hemorrhage  Workstation performed: [id number]     MRI brain w wo contrast    Result Date: 4/3/2022  Impression: 1   2 left cerebellar hemorrhagic metastases and probable 2 adjacent left parietal hemorrhagic metastasis, the more anterior, larger one having parenchymal hematoma and local edema and mass effect, correlating to the hemorrhage on the prior head CT  I personally discussed this study with Dr Selene Moreno on 4/3/2022 at 2:19 PM  Workstation performed: [id number]     CT stroke alert brain    Result Date: 4/2/2022  Impression: Left parietal 4 6 cm intraparenchymal hemorrhage with surrounding cerebral edema  No midline shift  I personally discussed this study with neurologist on 4/2/2022 at 7:45 PM  Workstation performed: [id number]     CTA stroke alert (head/neck)    Result Date: 4/2/2022  Impression: Severe right vertebral artery stenosis at the origin   Severe critical near occlusive stenosis right proximal vertebral artery near its origin spanning approximately 2 2 cm in cranial caudal dimension  Approximately 70-75% right proximal cervical ICA plaque stenosis  I personally discussed this study with neurologist on 4/2/2022 at 7:50 PM   Workstation performed: [id number]     EKG, Pathology, and Other Studies: I have personally reviewed pertinent reports       ALLERGIES     Allergies   Allergen Reactions    Bee Venom     Benazepril Other (See Comments)     Angioedema     Latex Other (See Comments)     Burning of eyes at the dentist from the gloves    Meloxicam GI Intolerance    Penicillin G Rash       MEDICATIONS     Current Facility-Administered Medications   Medication Dose Route Frequency Provider Last Rate    [START ON 4/5/2022] amLODIPine  10 mg Oral Daily Travisguerline Nettles      dexamethasone  4 mg Intravenous Q6H [facility] Mina Urias [place], [place]      Labetalol HCl  10 mg Intravenous Q4H PRN Venice Epps DO      levETIRAcetam  500 mg Oral Q12H [facility] Jose Ramon Whitmans      levothyroxine  25 mcg Oral Early Morning Juani Pino MD      melatonin  6 mg Oral HS Travis Nettles      ondansetron  4 mg Intravenous Q6H PRN Melbal JOSE EDUARDO Mcneal      perflutren lipid microsphere  0 4 mL/min Intravenous Once in imaging Carol Mccarty MD      sodium chloride  1 g Oral TID With Meals Juani Pino MD          Labetalol HCl, 10 mg, Q4H PRN  ondansetron, 4 mg, Q6H PRN  perflutren lipid microsphere, 0 4 mL/min, Once in imaging      ==      Lisy Eastman MD  Internal Medicine Residency, PGY-3  [address]

## 2022-04-04 NOTE — PROGRESS NOTES
Daily Progress Note - Critical Care   Asher Cabrales 74 y o  male MRN: [id number]  Unit/Bed#: ICU 04 Encounter: [id number]      Advance Directive and Living Will:      Power of :    POLST:      Code Status: Level 1 - Full Code      ----------------------------------------------------------------------------------------  HPI/24hr events:   PMH adenocarcinoma of the left lung [place] [facility] [place] immunotherapy and has completed 2 cycles of atezolizumab  Presented to [facility] on 4/2/22 with apraxia and aphasia  stroke alert initiated  CT brain 4/2/22 showed a left parietal 4 6 cm intraparenchymal hemorrhage with surrounding cerebral edema  There is no mass effect  ICH score: 1 nitial NIH 8  Not a tPA candidate secondary to [address]  Started on Decadron and Keppra  Cardene to main SBP goal  Off as of 4/03  Started on amlodipine low dose  MRI on 4/03 shows Multiple masses suspicious for mets  Hemorrhagic mass in the left parietal lobe with surrounding edema  Overnight, notably frustrated    ---------------------------------------------------------------------------------------  SUBJECTIVE  No complaints    Review of Systems  Review of systems was reviewed and negative unless stated above in HPI/24-hour events   ---------------------------------------------------------------------------------------  Assessment and Plan:    Neuro:    Diagnosis: left parietal  intraparenchymal hemorrhage (4 6 cm)  - surrounding cerebral edema   - ICH score: 1 nitial NIH 8  Not a tPA candidate secondary to [address]  - Started on Decadron and Keppra   o MRI 4/03: Multiple masses suspicious for mets  Hemorrhagic mass in the left parietal lobe with surrounding edema  o Echo:  LVEF 65% and  No RWMA  G1DD  o lipid panel - LDL 96   Previously on crestor   o A1C 5 2   o Neuro checks and vitals per protocol     Diagnosis: Aphasia  o 2/2 Stroke - Expressive >> receptive   o Speech  o NO dysphagia     · Delirium monitoring/management   · Regulate Sleep-wake cycle/CAM-ICU daily    CV:    Diagnosis: HTN  o Previously on amlodipine - restart low-dose   o Status post Cardene  o Monitor    Pulm:   Diagnosis: Adenocarcinoma of the left lung   - s/p DAVID lobectomy in 12/2021 at [facility] [place], currently receiving adjuvant immunotherapy and has completed 2 cycles of atezolizumab  - New mets to brain   - CT C/A/P ordered  o Follows with [facility]/Onc   o Rad/ONc and Hem/Onc       GI:    Diagnosis: h/o GERD  o Continue home pantoprazole      :    Diagnosis:  H/o BPH  o Home meds : tamsulosin - reportedly not taking   o Restart if urinary retention noted        F/E/N:   · F:  No IVF  · E: Monitor and replete, maintain K>4+, Phos 3, Mag > 2  · N:  Regular diet      Heme/Onc:    Diagnosis:  Adenocarcinoma of left lung  o Plan: as above   Diagnosis: Thrombocytopenia  o Platelets 199  o Baseline 110s to 130s  o Monitor      Endo:    Diagnosis:  Drug-induced thyroiditis  - Continue home levothyroxine 25 mcg daily        ID:    Diagnosis:  No active issues        MSK/Skin:    Diagnosis:  No active issues  - Mobilize as able  - OT PT evaluation        Invasive Devices Review  Invasive Devices  Report    Central Venous Catheter Line            Port A Cath 06/17/21 Right Chest 290 days          Peripheral Intravenous Line            Peripheral IV 04/02/22 Right Antecubital 1 day    Peripheral IV 04/02/22 Right Forearm 1 day                  Disposition: Continue Critical Care     ---------------------------------------------------------------------------------------  ICU CORE MEASURES    Prophylaxis   VTE Pharmacologic Prophylaxis: Pharmacologic VTE Prophylaxis contraindicated due to Hemorrhagic stroke  VTE Mechanical Prophylaxis: sequential compression device  Stress Ulcer Prophylaxis: Prophylaxis Not Indicated     ABCDE Protocol (if indicated)  Plan to perform spontaneous awakening trial today?  Not applicable  Plan to perform spontaneous breathing trial today? Not applicable  Obvious barriers to extubation? Not applicable  CAM-ICU: Negative    Invasive Devices Review  Invasive Devices  Report    Central Venous Catheter Line            Port A Cath 21 Right Chest 290 days          Peripheral Intravenous Line            Peripheral IV 22 Right Antecubital 1 day    Peripheral IV 22 Right Forearm 1 day              Can any invasive devices be discontinued today? Not applicable  ---------------------------------------------------------------------------------------  OBJECTIVE    Physical Exam  Physical Exam:   GENERAL: NAD, Normal appearance  Non diaphoretic, non-toxic, not ill-appearing, well-developed, well-nourished  NEUROLOGIC:  Alert/oriented x3  MS 4/5 t/o all extremities  Aphasia Expressive > receptive    HEENT:  NC/AT, PERRL, EOMI, MMM, no scleral icterus  CARDIAC:  RRR, +S1/S2, no S3/S4 heard, no m/g/r  PULMONARY:  non-labored breathing, CTA B/L, no wheezing/rales/rhonci appreciated at time of encounter  ABDOMEN:  Soft, NT/ND, +BS, no rebound/guarding/rigidity  Extremities:  2+ Pulses in DP/PT  Warm skin on palpation  No edema, cyanosis, or clubbing  SKIN:  No rashes or erythema    Vitals   Vitals:    22 0300 22 0400 22 0500 22 0539   BP: 104/59 123/77 131/83    BP Location:  Left arm     Pulse: 82 76 72    Resp: 17 15 14    Temp:  98 3 °F (36 8 °C)     TempSrc:  Oral     SpO2: 98% 98% 97%    Weight:    82 5 kg (181 lb 12 3 oz)   Height:         Temp (24hrs), Av 3 °F (36 8 °C), Min:98 °F (36 7 °C), Max:98 7 °F (37 1 °C)  Current: Temperature: 98 3 °F (36 8 °C)      Respiratory:  SpO2: SpO2: 97 %       Invasive/non-invasive ventilation settings   Respiratory  Report   Lab Data (Last 4 hours)    None         O2/Vent Data (Last 4 hours)    None                Height and Weights   Height: 5' 10" (177 8 cm)  IBW (Ideal Body Weight): 73 kg  Body mass index is 26 08 kg/m²    Weight (last 2 days)     Date/Time Weight    04/04/22 0539 82 5 (181 77)    04/03/22 1100 82 6 (182)    04/03/22 0537 83 (182 98)    04/02/22 2200 83 (182 98)    04/02/22 2144 83 (182 98)          Intake and Output  I/O       04/02 0701 04/03 0700 04/03 0701 04/04 0700    P  O   480    I V  (mL/kg) 1196 7 (14 4) 524 6 (6 4)    IV Piggyback 100 260    Total Intake(mL/kg) 1296 7 (15 6) 1264 6 (15 3)    Urine (mL/kg/hr) 900 1075 (0 5)    Stool  0    Total Output 900 1075    Net +396 7 +189 6          Unmeasured Urine Occurrence  1 x    Unmeasured Stool Occurrence  1 x            Nutrition       Diet Orders   (From admission, onward)             Start     Ordered    04/03/22 1406  Diet Regular; Regular House  Diet effective now        References:    Nutrtion Support Algorithm Enteral vs  Parenteral   Question Answer Comment   Diet Type Regular    Regular Regular House    RD to adjust diet per protocol?  Yes        04/03/22 1405                  Laboratory and Diagnostics:  Results from last 7 days   Lab Units 04/04/22 0521 04/03/22  0535 04/02/22 1942   WBC Thousand/uL 9 54 10 54* 9 76   HEMOGLOBIN g/dL 13 3 14 4 15 0   HEMATOCRIT % 40 2 45 6 46 2   PLATELETS Thousands/uL 112* 118* 123*   NEUTROS PCT %  --  88*  --    MONOS PCT %  --  5  --      Results from last 7 days   Lab Units 04/04/22 0521 04/03/22 0535 04/02/22 1942   SODIUM mmol/L 139 136 136   POTASSIUM mmol/L 4 0 4 3 4 1   CHLORIDE mmol/L 108 104 99   CO2 mmol/L 22 29 28   ANION GAP mmol/L 9 3* 9   BUN mg/dL 34* 21 25   CREATININE mg/dL 1 19 1 17 1 32*   CALCIUM mg/dL 9 1 9 5 9 9   GLUCOSE RANDOM mg/dL 135 108 96   ALT U/L 35 43 36   AST U/L 14 13 24   ALK PHOS U/L 53 65 55   ALBUMIN g/dL 3 1* 3 4* 4 1   TOTAL BILIRUBIN mg/dL 0 38 0 69 0 49     Results from last 7 days   Lab Units 04/04/22 0521 04/03/22  0535   MAGNESIUM mg/dL 2 5 2 2   PHOSPHORUS mg/dL 3 6 4 0      Results from last 7 days   Lab Units 04/03/22  0535 04/02/22  1942   INR  0 96 0 90   PTT seconds  --  30              ABG:    VBG:  Results from last 7 days   Lab Units 04/02/22 1942   PH SHANNA  7 390   PCO2 SHANNA mm Hg 44 9   PO2 SHANNA mm Hg 27 9*   HCO3 SHANNA mmol/L 26 6   BASE EXC SHANNA mmol/L 1 1           Micro        EKG:  Reviewed  Imaging:  I have personally reviewed pertinent reports  CT head wo contrast    Result Date: 4/3/2022  Impression: Left parietal region intraparenchymal hematoma unchanged in size measuring 6 7 cm in largest dimension  New irregular hyperdense focus at the left cerebellum likely to represent small amount of subarachnoid hemorrhage versus a new intraparenchymal hemorrhage  Workstation performed: [id number]     MRI brain w wo contrast    Result Date: 4/3/2022  Impression: 1   2 left cerebellar hemorrhagic metastases and probable 2 adjacent left parietal hemorrhagic metastasis, the more anterior, larger one having parenchymal hematoma and local edema and mass effect, correlating to the hemorrhage on the prior head CT  I personally discussed this study with Dr Weston Hoffmann on 4/3/2022 at 2:19 PM  Workstation performed: [id number]     CT stroke alert brain    Result Date: 4/2/2022  Impression: Left parietal 4 6 cm intraparenchymal hemorrhage with surrounding cerebral edema  No midline shift  I personally discussed this study with neurologist on 4/2/2022 at 7:45 PM  Workstation performed: [id number]     CTA stroke alert (head/neck)    Result Date: 4/2/2022  Impression: Severe right vertebral artery stenosis at the origin  Severe critical near occlusive stenosis right proximal vertebral artery near its origin spanning approximately 2 2 cm in cranial caudal dimension   Approximately 70-75% right proximal cervical ICA plaque stenosis  I personally discussed this study with neurologist on 4/2/2022 at 7:50 PM   Workstation performed: [id number]       Active Medications  Scheduled Meds:  Current Facility-Administered Medications   Medication Dose Route Frequency Provider Last Rate    amLODIPine  5 mg Oral Daily Meng Kaur MD      dexamethasone  4 mg Intravenous [facility] [place], [place]      Labetalol HCl  10 mg Intravenous Q4H PRN Ruth Christiansen PA-C      levETIRAcetam  500 mg Intravenous Q12H [facility] [place], [place] 500 mg (04/03/22 2146)    niCARdipine  5-15 mg/hr Intravenous Titrated Ruth Christiansen PA-C Stopped (04/03/22 0813)    ondansetron  4 mg Intravenous Q6H PRN Ruth Christiansen PA-C      perflutren lipid microsphere  0 4 mL/min Intravenous Once in imaging Jose Ramachandran MD      sodium chloride  1 g Oral TID With Meals Meng Kaur MD       Continuous Infusions:  niCARdipine, 5-15 mg/hr, Last Rate: Stopped (04/03/22 0813)      PRN Meds:   Labetalol HCl, 10 mg, Q4H PRN  ondansetron, 4 mg, Q6H PRN  perflutren lipid microsphere, 0 4 mL/min, Once in imaging        Allergies   Allergies   Allergen Reactions    Bee Venom     Benazepril Other (See Comments)     Angioedema     Latex Other (See Comments)     Burning of eyes at the dentist from the gloves    Meloxicam GI Intolerance    Penicillin G Rash         Counseling / Coordination of Care  Total Critical Care time spent 45 minutes excluding procedures, teaching and family updates  Sabino Dee MD  Internal Medicine Residency, PGY-3  [facility] record may have been created with voice recognition software  Occasional wrong word or "sound a like" substitutions may have occurred due to the inherent limitations of voice recognition software    Read the chart carefully and recognize, using context, where substitutions have occurred

## 2022-04-04 NOTE — CASE MANAGEMENT
Case Management Assessment & Discharge Planning Note    Patient name Hilda Owen  Location ICU 04/ICU 51 MRN [id number]  : 1955 Date 2022       Current Admission Date: 2022  Current Admission Diagnosis:Left parietal 4 6 cm intraparenchymal hemorrhage with surrounding cerebral edema   Patient Active Problem List    Diagnosis Date Noted    Left parietal 4 6 cm intraparenchymal hemorrhage with surrounding cerebral edema 2022    Drug-induced thyroiditis 2022    Platelets decreased ([address] ) 02/15/2022    Psoriasis 02/15/2022    Stage 3a chronic kidney disease ([address] ) 2022    Labyrinthitis of both ears 2022    Proctocolitis 2021    Pericardial effusion 2021    Constipation 2021    Left non-suppurative otitis media 2021    Bilateral hearing loss due to cerumen impaction 2021    Chronic back pain 2021    Former cigarette smoker 2021    History of gout 2021    Hypertension 2021    Cancer of upper lobe of left lung ([address] ) 2021    Drug-induced neutropenia ([address] ) 07/15/2021    Adenocarcinoma, lung, left ([address] ) 2021    Encounter for central line care 2021    Lung mass 2021    Hyperglycemia 2021    Cigarette nicotine dependence in remission 2021    Bilateral hearing loss 2020    Mixed hyperlipidemia 2019    Renal cyst, right 2018    Medicare annual wellness visit, subsequent 2018    Lumbar stenosis 2018    Glaucoma 2018    JUNAID (obstructive sleep apnea) 2018    Spinal stenosis of lumbar region with neurogenic claudication 2018    Acute idiopathic gout of left foot 2017    Depression with anxiety 2017    Essential hypertension 2017    Hypertriglyceridemia 2017    Lumbago with sciatica, left side 2017    Mild episode of recurrent major depressive disorder ([address] ) 2017    Back problem 06/30/2017      LOS (days): 2  Geometric Mean LOS (GMLOS) (days): 3 80  Days to GMLOS:2 2     OBJECTIVE:    Risk of Unplanned Readmission Score: 17         Current admission status: Inpatient       Preferred Pharmacy:   [facility] [address], [facility]  [facility]  12   [address] [address]  Phone: [phone] Fax: [phone]    Primary Care Provider: Tonna Ganser, DO    Primary Insurance: MEDICARE  Secondary Insurance: AARP    ASSESSMENT:  Gillian Ott Proxies    There are no active Health Care Proxies on file  Readmission Root Cause  30 Day Readmission: No    Patient Information  Admitted from[de-identified] Home  Mental Status: Alert  During Assessment patient was accompanied by: Spouse  Assessment information provided by[de-identified] Spouse  Primary Caregiver: Self  Support Systems: Spouse/significant other  [place] of Residence: [place] [address] do you live in?: [place]  Home entry access options   Select all that apply : Stairs  Number of steps to enter home : 4 (steps into mud room; one flight to kitchen; 2 flights to bedrooms/bathrooms)  Do the steps have railings?: Yes  Type of Current Residence: [address] [place] home  Upon entering residence, is there a bedroom on the main floor (no further steps)?: No  A bedroom is located on the following floor levels of residence (select all that apply):: 3rd Floor  Upon entering residence, is there a bathroom on the main floor (no further steps)?: Yes (bathroom in mudroom)  Number of steps to 3rd floor from main floor: Two Flights  In the last 12 months, was there a time when you were not able to pay the mortgage or rent on time?: No  In the last 12 months, how many places have you lived?: 1  In the last 12 months, was there a time when you did not have a steady place to sleep or slept in a shelter (including now)?: Yes  Homeless/housing insecurity resource given?: N/A  Living Arrangements: Lives w/ Spouse/significant other    Activities of Daily Living Prior to Admission  Functional Status: Independent  Completes ADLs independently?: Yes  Ambulates independently?: Yes  Does patient use assisted devices?: No  Does patient currently own DME?: No  Does patient have a history of Outpatient Therapy (PT/OT)?: Yes  Does the patient have a history of Short-Term Rehab?: No  Does patient have a history of HHC?: No  Does patient currently have [facility]?: No         Patient Information Continued  Income Source: Pension/penitentiary  Does patient have prescription coverage?: Yes  Within the past 12 months, you worried that your food would run out before you got the money to buy more : Never true  Within the past 12 months, the food you bought just didnt last and you didnt have money to get more : Never true  Food insecurity resource given?: N/A  Does patient have a history of substance abuse?: No  Does patient have a history of Mental Health Diagnosis?: No         Means of Transportation  Means of Transport to Appts[de-identified] Drives Self  In the past 12 months, has lack of transportation kept you from medical appointments or from getting medications?: No  In the past 12 months, has lack of transportation kept you from meetings, work, or from getting things needed for daily living?: No  Was application for public transport provided?: N/A        DISCHARGE DETAILS:    Discharge planning discussed with[de-identified] spouse  Freedom of Choice: Yes     CM contacted family/caregiver?: Yes       Contacts  Patient Contacts: Rashad Haywood  Relationship to Patient[de-identified] Family  Contact Method: In Person  Reason/Outcome: Continuity of Care,Emergency Contact     Patient/caregiver received discharge checklist   Content reviewed  Patient/caregiver encouraged to participate in discharge plan of care prior to discharge home      CM reviewed d/c planning process including the following: identifying help at home, patient preference for d/c planning needs, Discharge Lounge, [facility] Meds to Bed program, availability of treatment team to discuss questions or concerns patient and/or family may have regarding understanding medications and recognizing signs and symptoms once discharged  CM also encouraged patient to follow up with all recommended appointments after discharge  Patient advised of importance for patient and family to participate in managing patients medical well being  CM met with pt and wife and explained role  Pt resides with his wife in 3 level home with steps to enter basement level mudroom (bathroom on this floor) one flight to kitchen level and another flight to bedrooms/bathrooms  Wife reports pt does not use any DME and there is none in the bathrooms  Pt has a history of outpatient PT prior to his back surgery 4 years ago  Pt is COVID vaccinated and received his booster

## 2022-04-04 NOTE — ASSESSMENT & PLAN NOTE
S/p preadjuvant chemotherapy, DAVID lobectomy 12/2021, adjuvant radiation, and immunotherapy  Patient only received 2 treatments of immunotherapy as this exacerbated his psoriasis  Last MRI brain in June 2021 was negative for metastasis

## 2022-04-04 NOTE — PROGRESS NOTES
Report has been given to the nurse [address] nursing unit  Patient will be transferred to room 630 as a Stepdown Level 2 status patient  Patient's wife aware of the transfer

## 2022-04-05 PROBLEM — E03.9 HYPOTHYROIDISM: Status: ACTIVE | Noted: 2022-01-01

## 2022-04-05 PROBLEM — Z71.89 GOALS OF CARE, COUNSELING/DISCUSSION: Status: ACTIVE | Noted: 2022-04-05

## 2022-04-05 PROBLEM — D69.6 THROMBOCYTOPENIA (HCC): Status: ACTIVE | Noted: 2022-04-05

## 2022-04-05 LAB
ANION GAP SERPL CALCULATED.3IONS-SCNC: 7 MMOL/L (ref 4–13)
ANISOCYTOSIS BLD QL SMEAR: PRESENT
ARTIFACT: PRESENT
BASOPHILS # BLD MANUAL: 0 THOUSAND/UL (ref 0–0.1)
BASOPHILS NFR MAR MANUAL: 0 % (ref 0–1)
BUN SERPL-MCNC: 36 MG/DL (ref 5–25)
CALCIUM SERPL-MCNC: 9 MG/DL (ref 8.3–10.1)
CHLORIDE SERPL-SCNC: 108 MMOL/L (ref 100–108)
CO2 SERPL-SCNC: 24 MMOL/L (ref 21–32)
CREAT SERPL-MCNC: 1.14 MG/DL (ref 0.6–1.3)
EOSINOPHIL # BLD MANUAL: 0 THOUSAND/UL (ref 0–0.4)
EOSINOPHIL NFR BLD MANUAL: 0 % (ref 0–6)
ERYTHROCYTE [DISTWIDTH] IN BLOOD BY AUTOMATED COUNT: 18.8 % (ref 11.6–15.1)
GFR SERPL CREATININE-BSD FRML MDRD: 65 ML/MIN/1.73SQ M
GLUCOSE SERPL-MCNC: 129 MG/DL (ref 65–140)
HCT VFR BLD AUTO: 40.8 % (ref 36.5–49.3)
HGB BLD-MCNC: 13.3 G/DL (ref 12–17)
LYMPHOCYTES # BLD AUTO: 0.4 THOUSAND/UL (ref 0.6–4.47)
LYMPHOCYTES # BLD AUTO: 4 % (ref 14–44)
MCH RBC QN AUTO: 31.2 PG (ref 26.8–34.3)
MCHC RBC AUTO-ENTMCNC: 32.6 G/DL (ref 31.4–37.4)
MCV RBC AUTO: 96 FL (ref 82–98)
MONOCYTES # BLD AUTO: 0 THOUSAND/UL (ref 0–1.22)
MONOCYTES NFR BLD: 0 % (ref 4–12)
NEUTROPHILS # BLD MANUAL: 9.41 THOUSAND/UL (ref 1.85–7.62)
NEUTS SEG NFR BLD AUTO: 94 % (ref 43–75)
PLATELET # BLD AUTO: 107 THOUSANDS/UL (ref 149–390)
PLATELET BLD QL SMEAR: ABNORMAL
PMV BLD AUTO: 9.6 FL (ref 8.9–12.7)
POTASSIUM SERPL-SCNC: 3.9 MMOL/L (ref 3.5–5.3)
RBC # BLD AUTO: 4.26 MILLION/UL (ref 3.88–5.62)
RBC MORPH BLD: PRESENT
SODIUM SERPL-SCNC: 139 MMOL/L (ref 136–145)
VARIANT LYMPHS # BLD AUTO: 2 %
WBC # BLD AUTO: 10.01 THOUSAND/UL (ref 4.31–10.16)

## 2022-04-05 PROCEDURE — 99232 SBSQ HOSP IP/OBS MODERATE 35: CPT | Performed by: PHYSICIAN ASSISTANT

## 2022-04-05 PROCEDURE — 85007 BL SMEAR W/DIFF WBC COUNT: CPT

## 2022-04-05 PROCEDURE — 99232 SBSQ HOSP IP/OBS MODERATE 35: CPT | Performed by: INTERNAL MEDICINE

## 2022-04-05 PROCEDURE — 85027 COMPLETE CBC AUTOMATED: CPT

## 2022-04-05 PROCEDURE — 80048 BASIC METABOLIC PNL TOTAL CA: CPT

## 2022-04-05 PROCEDURE — 99222 1ST HOSP IP/OBS MODERATE 55: CPT | Performed by: INTERNAL MEDICINE

## 2022-04-05 RX ORDER — CITALOPRAM 10 MG/1
10 TABLET ORAL DAILY
Status: DISCONTINUED | OUTPATIENT
Start: 2022-04-05 | End: 2022-04-11 | Stop reason: HOSPADM

## 2022-04-05 RX ORDER — PRAVASTATIN SODIUM 80 MG/1
80 TABLET ORAL
Status: DISCONTINUED | OUTPATIENT
Start: 2022-04-05 | End: 2022-04-11 | Stop reason: HOSPADM

## 2022-04-05 RX ORDER — MECLIZINE HCL 12.5 MG/1
12.5 TABLET ORAL EVERY 8 HOURS PRN
Status: DISCONTINUED | OUTPATIENT
Start: 2022-04-05 | End: 2022-04-11 | Stop reason: HOSPADM

## 2022-04-05 RX ORDER — DOXEPIN HYDROCHLORIDE 10 MG/1
10 CAPSULE ORAL
Status: DISCONTINUED | OUTPATIENT
Start: 2022-04-05 | End: 2022-04-11 | Stop reason: HOSPADM

## 2022-04-05 RX ORDER — SODIUM CHLORIDE 1000 MG
1 TABLET, SOLUBLE MISCELLANEOUS 2 TIMES DAILY WITH MEALS
Status: DISCONTINUED | OUTPATIENT
Start: 2022-04-05 | End: 2022-04-07

## 2022-04-05 RX ADMIN — DEXAMETHASONE SODIUM PHOSPHATE 4 MG: 4 INJECTION INTRA-ARTICULAR; INTRALESIONAL; INTRAMUSCULAR; INTRAVENOUS; SOFT TISSUE at 12:29

## 2022-04-05 RX ADMIN — DOXEPIN HYDROCHLORIDE 10 MG: 10 CAPSULE ORAL at 21:02

## 2022-04-05 RX ADMIN — PRAVASTATIN SODIUM 80 MG: 80 TABLET ORAL at 17:22

## 2022-04-05 RX ADMIN — CITALOPRAM HYDROBROMIDE 10 MG: 10 TABLET ORAL at 12:29

## 2022-04-05 RX ADMIN — LEVOTHYROXINE SODIUM 37.5 MCG: 75 TABLET ORAL at 05:06

## 2022-04-05 RX ADMIN — SODIUM CHLORIDE TAB 1 GM 1 G: 1 TAB at 17:22

## 2022-04-05 RX ADMIN — DEXAMETHASONE SODIUM PHOSPHATE 4 MG: 4 INJECTION INTRA-ARTICULAR; INTRALESIONAL; INTRAMUSCULAR; INTRAVENOUS; SOFT TISSUE at 17:22

## 2022-04-05 RX ADMIN — AMLODIPINE BESYLATE 10 MG: 10 TABLET ORAL at 08:18

## 2022-04-05 RX ADMIN — DEXAMETHASONE SODIUM PHOSPHATE 4 MG: 4 INJECTION INTRA-ARTICULAR; INTRALESIONAL; INTRAMUSCULAR; INTRAVENOUS; SOFT TISSUE at 05:06

## 2022-04-05 RX ADMIN — LEVETIRACETAM 500 MG: 500 TABLET, FILM COATED ORAL at 08:18

## 2022-04-05 RX ADMIN — MECLIZINE HCL 12.5 MG 12.5 MG: 12.5 TABLET ORAL at 17:22

## 2022-04-05 RX ADMIN — SODIUM CHLORIDE TAB 1 GM 1 G: 1 TAB at 12:29

## 2022-04-05 RX ADMIN — Medication 6 MG: at 21:02

## 2022-04-05 RX ADMIN — SODIUM CHLORIDE TAB 1 GM 1 G: 1 TAB at 08:18

## 2022-04-05 RX ADMIN — DEXAMETHASONE SODIUM PHOSPHATE 4 MG: 4 INJECTION INTRA-ARTICULAR; INTRALESIONAL; INTRAMUSCULAR; INTRAVENOUS; SOFT TISSUE at 23:13

## 2022-04-05 NOTE — ASSESSMENT & PLAN NOTE
Appears somewhat chronic dating back  · Holding DVT prophylaxis given hemorrhagic brain mets  · Monitor CBC

## 2022-04-05 NOTE — CONSULTS
Consultation - Karen Santos 74 y o  male MRN: [id number]    Unit/Bed#: [facility] 630-01 Encounter: [id number]      Assessment/Plan     Assessment:  In summary, this is a 73-year-old male history of adenocarcinoma left lung, T2, N0, M0, R0 status post resection and adjuvant radiation therapy all following neoadjuvant chemotherapy  Separate stage I squamous cell carcinoma was also noted in the resected lung  Pretreatment MRI brain showed no metastases  Currently he has findings consistent with metastatic disease in the cerebellum and parietal lobes  Neuro surgical and radiation therapy evaluations pending  Fortunately, CT chest abdomen pelvis shows post treatment changes without evidence of new or progressive disease  Immunotherapy currently on hold due to grade 3 psoriatic toxicity  Would continue to hold during brain directed therapies  Reconsideration for systemic treatment reinstitution thereafter  Leukocytosis likely reactive/steroid effect  Observation favored  Thrombocytopenia has been present since starting on Tecentriq  I wonder about the possibility of mild ITP  In any event, he is asymptomatic and no treatment is required at this time  I reviewed with the patient and his wife  Plan:  See above  History of Present Illness     HPI: Karen Santos is a 74y o  year old male who presents with Confusion  Patient was undergoing CT lung cancer screening  Left upper lobe mass, 6 2 cm noted on screening imaging in April 2021  PET-CT showed mild hypermetabolism in AP window nodes  Mediastinoscopy proved negative  Biopsy of the lung mass showed poorly differentiated adenocarcinoma with neuroendocrine differentiation  He underwent neoadjuvant Alimta and carboplatin completed in August 2021 November 17, 2021 he underwent left upper lobectomy for a T2 N0 M0 R0 resection, close mediastinal margin  Separate stage I squamous cell carcinoma in the left upper lobe was noted    He underwent radiation therapy for close margin finishing in February 2022 January 27, 2022 started Tecentriq for PDL1 positive adjuvant state  He developed severe exacerbation of psoriasis  Immunotherapy was interrupted and he was treated with prednisone  Approximately April 2nd patient was noted to have confusion, apraxia  He presented to the emergency room  CT showed vertebral artery stenosis  Brain MRI showed 2 left cerebellar masses and 2 adjacent left parietal hemorrhagic metastases  CT chest abdomen pelvis showed postsurgical changes  No gross evidence recurrent disease  CMP was normal   WBC 10 5, hemoglobin 14 4, platelets 490, differential mature myeloid predominant  TSH 6 1, T4 normal       Inpatient consult to Oncology  Consult performed by: Keanu Viramontes DO  Consult ordered by: Keanu Viramontes DO          Review of Systems   Constitutional: Positive for fatigue  Negative for chills and fever  HENT: Negative for nosebleeds  Eyes: Negative for discharge  Respiratory: Negative for cough and shortness of breath  Cardiovascular: Negative for chest pain  Gastrointestinal: Negative for abdominal pain, constipation and diarrhea  Endocrine: Negative for polydipsia  Genitourinary: Negative for hematuria  Musculoskeletal: Negative for arthralgias  Skin: Negative for color change  Allergic/Immunologic: Negative for immunocompromised state  Neurological: Negative for dizziness and headaches  Hematological: Negative for adenopathy  Psychiatric/Behavioral: Positive for confusion  Negative for agitation         Historical Information   Past Medical History:   Diagnosis Date    Hyperlipidemia     Hypertension     Lung cancer [facility])      Past Surgical History:   Procedure Laterality Date    BACK SURGERY      HEMORRHOID SURGERY      IR BIOPSY LUNG  5/6/2021    IR PORT PLACEMENT  6/17/2021    LAMINECTOMY  2018    L4-L5    LUNG SURGERY      left upper lobectomy    NM [address] N/A 2021    Procedure: BRONCHOSCOPY FLEXIBLE;  Surgeon: Ru Murillo MD;  Location: [facility];  Service: Thoracic    PA MEDIASTINOSCOPY WITH LYMPH NODE BIOPSY/IES N/A 2021    Procedure: MEDIASTINOSCOPY, flexible bronchoscopy;  Surgeon: Ru Murillo MD;  Location[facility];  Service: Thoracic    TONSILLECTOMY       Social History   Social History     Substance and Sexual Activity   Alcohol Use Not Currently    Alcohol/week: 7 0 standard drinks    Types: 7 Cans of beer per week     Social History     Substance and Sexual Activity   Drug Use Yes    Types: Marijuana    Comment: seldom     Social History     Tobacco Use   Smoking Status Former Smoker    Packs/day: 1 00    Years: 40 00    Pack years: 40 00    Types: Cigarettes    Start date:     Quit date: 2017    Years since quittin 1   Smokeless Tobacco Never Used   Tobacco Comment    quit 2017     E-Cigarette/Vaping    E-Cigarette Use Never User      E-Cigarette/Vaping Substances    Nicotine No     THC No     CBD No     Flavoring No     Other No     Unknown No       Family History:   Family History   Problem Relation Age of Onset    Nephrolithiasis Father        Meds/Allergies   all current active meds have been reviewed  Allergies   Allergen Reactions    Bee Venom     Benazepril Other (See Comments)     Angioedema     Latex Other (See Comments)     Burning of eyes at the dentist from the gloves    Meloxicam GI Intolerance    Penicillin G Rash       Objective   Vitals: Blood pressure 135/80, pulse 77, temperature 98 1 °F (36 7 °C), resp  rate 18, height 5' 10" (1 778 m), weight 80 3 kg (177 lb), SpO2 96 %      Intake/Output Summary (Last 24 hours) at 2022 1147  Last data filed at 2022 0746  Gross per 24 hour   Intake 480 ml   Output 1125 ml   Net -645 ml     Invasive Devices  Report    Central Venous Catheter Line            Port A Cath 21 Right Chest 291 days Peripheral Intravenous Line            Peripheral IV 04/02/22 Right Antecubital 2 days    Peripheral IV 04/02/22 Right Forearm 2 days                Physical Exam  Constitutional:       Appearance: He is well-developed  HENT:      Head: Normocephalic  Eyes:      Pupils: Pupils are equal, round, and reactive to light  Cardiovascular:      Rate and Rhythm: Regular rhythm  Pulmonary:      Breath sounds: Normal breath sounds  Abdominal:      Palpations: Abdomen is soft  Musculoskeletal:      Cervical back: Normal range of motion  Lymphadenopathy:      Cervical: No cervical adenopathy  Skin:     General: Skin is warm  Neurological:      Mental Status: He is alert  Lab Results: I have personally reviewed pertinent reports  and I have personally reviewed pertinent films in PACS  Imaging Studies: I have personally reviewed pertinent reports  EKG, Pathology, and Other Studies: I have personally reviewed pertinent reports  Code Status: Level 1 - Full Code  Advance Directive and Living Will:      Power of :    POLST:      Counseling / Coordination of Care  Total floor / unit time spent today 40 minutes  Greater than 50% of total time was spent with the patient and / or family counseling and / or coordination of care  A description of the counseling / coordination of care: SEE NOTE

## 2022-04-05 NOTE — ASSESSMENT & PLAN NOTE
s/p DAVID lobectomy in 12/2021 at [facility], currently receiving adjuvant immunotherapy and has completed 2 cycles of atezolizumab  · New mets to brain, see plan above in primary problem   · Will consult Medical Oncology for input per wife request, patient known to Dr Jacki Grijalva locally  · Palliative following

## 2022-04-05 NOTE — PHYSICAL THERAPY NOTE
Physical Therapy Cancellation Note    Patient's Name: Nell Kevin       04/05/22 1511   PT Last Visit   PT Visit Date 04/05/22   Note Type   Note type Cancelled Session   Cancel Reasons Patient to operating room   Additional Comments Pt with intraparenchymal hemorrhage, potential for OR with nsx on Thursday  Pt mobilizing with nursing staff at this time  Please re-consult post-op as appropriate  Thank you          Ashley Napier, PT, DPT, GCS

## 2022-04-05 NOTE — PLAN OF CARE
Problem: MOBILITY - ADULT  Goal: Maintain or return to baseline ADL function  Description: INTERVENTIONS:  -  Assess patient's ability to carry out ADLs; assess patient's baseline for ADL function and identify physical deficits which impact ability to perform ADLs (bathing, care of mouth/teeth, toileting, grooming, dressing, etc )  - Assess/evaluate cause of self-care deficits   - Assess range of motion  - Assess patient's mobility; develop plan if impaired  - Assess patient's need for assistive devices and provide as appropriate  - Encourage maximum independence but intervene and supervise when necessary  - Involve family in performance of ADLs  - Assess for home care needs following discharge   - Consider OT consult to assist with ADL evaluation and planning for discharge  - Provide patient education as appropriate  Outcome: Progressing  Goal: Maintains/Returns to pre admission functional level  Description: INTERVENTIONS:  - Perform BMAT or MOVE assessment daily    - Set and communicate daily mobility goal to care team and patient/family/caregiver  - Collaborate with rehabilitation services on mobility goals if consulted  - Ambulate patient 2 times a day  - Out of bed for meals 3 times a day  - Out of bed for toileting  - Record patient progress and toleration of activity level   Outcome: Progressing     Problem: Neurological Deficit  Goal: Neurological status is stable or improving  Description: Interventions:  - Monitor and assess patient's level of consciousness, motor function, sensory function, and level of assistance needed for ADLs  - Monitor and report changes from baseline  Collaborate with interdisciplinary team to initiate plan and implement interventions as ordered  - Provide and maintain a safe environment  - Consider seizure precautions  - Consider fall precautions  - Consider aspiration precautions  - Consider bleeding precautions  Outcome: Progressing     Problem:  Activity Intolerance/Impaired Mobility  Goal: Mobility/activity is maintained at optimum level for patient  Description: Interventions:  - Assess and monitor patient  barriers to mobility and need for assistive/adaptive devices  - Assess patient's emotional response to limitations  - Collaborate with interdisciplinary team and initiate plans and interventions as ordered  - Encourage independent activity per ability   - Maintain proper body alignment  - Perform active/passive rom as tolerated/ordered  - Plan activities to conserve energy   - Turn patient as appropriate  Outcome: Progressing     Problem: Communication Impairment  Goal: Ability to express needs and understand communication  Description: Assess patient's communication skills and ability to understand information  Patient will demonstrate use of effective communication techniques, alternative methods of communication and understanding even if not able to speak  - Encourage communication and provide alternate methods of communication as needed  - Collaborate with case management/ for discharge needs  - Include patient/family/caregiver in decisions related to communication  Outcome: Progressing     Problem: Potential for Aspiration  Goal: Non-ventilated patient's risk of aspiration is minimized  Description: Assess and monitor vital signs, respiratory status, and labs (WBC)  Monitor for signs of aspiration (tachypnea, cough, rales, wheezing, cyanosis, fever)  - Assess and monitor patient's ability to swallow  - Place patient up in chair to eat if possible  - HOB up at 90 degrees to eat if unable to get patient up into chair   - Supervise patient during oral intake  - Instruct patient/ family to take small bites  - Instruct patient/ family to take small single sips when taking liquids    - Follow patient-specific strategies generated by speech pathologist   Outcome: Progressing     Problem: Nutrition  Goal: Nutrition/Hydration status is improving  Description: Monitor and assess patient's nutrition/hydration status for malnutrition (ex- brittle hair, bruises, dry skin, pale skin and conjunctiva, muscle wasting, smooth red tongue, and disorientation)  Collaborate with interdisciplinary team and initiate plan and interventions as ordered  Monitor patient's weight and dietary intake as ordered or per policy  Utilize nutrition screening tool and intervene per policy  Determine patient's food preferences and provide high-protein, high-caloric foods as appropriate  - Assist patient with eating   - Allow adequate time for meals   - Encourage patient to take dietary supplement as ordered  - Collaborate with clinical nutritionist   - Include patient/family/caregiver in decisions related to nutrition    Outcome: Progressing     Problem: PAIN - ADULT  Goal: Verbalizes/displays adequate comfort level or baseline comfort level  Description: Interventions:  - Encourage patient to monitor pain and request assistance  - Assess pain using appropriate pain scale  - Administer analgesics based on type and severity of pain and evaluate response  - Implement non-pharmacological measures as appropriate and evaluate response  - Consider cultural and social influences on pain and pain management  - Notify physician/advanced practitioner if interventions unsuccessful or patient reports new pain  Outcome: Progressing     Problem: INFECTION - ADULT  Goal: Absence or prevention of progression during hospitalization  Description: INTERVENTIONS:  - Assess and monitor for signs and symptoms of infection  - Monitor lab/diagnostic results  - Monitor all insertion sites, i e  indwelling lines, tubes, and drains  - Monitor endotracheal if appropriate and nasal secretions for changes in amount and color  - [place] appropriate cooling/warming therapies per order  - Administer medications as ordered  - Instruct and encourage patient and family to use good hand hygiene technique  - Identify and instruct in appropriate isolation precautions for identified infection/condition  Outcome: Progressing     Problem: SAFETY ADULT  Goal: Maintain or return to baseline ADL function  Description: INTERVENTIONS:  -  Assess patient's ability to carry out ADLs; assess patient's baseline for ADL function and identify physical deficits which impact ability to perform ADLs (bathing, care of mouth/teeth, toileting, grooming, dressing, etc )  - Assess/evaluate cause of self-care deficits   - Assess range of motion  - Assess patient's mobility; develop plan if impaired  - Assess patient's need for assistive devices and provide as appropriate  - Encourage maximum independence but intervene and supervise when necessary  - Involve family in performance of ADLs  - Assess for home care needs following discharge   - Consider OT consult to assist with ADL evaluation and planning for discharge  - Provide patient education as appropriate  Outcome: Progressing  Goal: Maintains/Returns to pre admission functional level  Description: INTERVENTIONS:  - Perform BMAT or MOVE assessment daily    - Set and communicate daily mobility goal to care team and patient/family/caregiver     - Collaborate with rehabilitation services on mobility goals if consulted  - Ambulate patient 2 times a day  - Out of bed for meals 3 times a day  - Out of bed for toileting  - Record patient progress and toleration of activity level   Outcome: Progressing  Goal: Patient will remain free of falls  Description: INTERVENTIONS:  - Educate patient/family on patient safety including physical limitations  - Instruct patient to call for assistance with activity   - Consult OT/PT to assist with strengthening/mobility   - Keep Call bell within reach  - Keep bed low and locked with side rails adjusted as appropriate  - Keep care items and personal belongings within reach  - Initiate and maintain comfort rounds  - Make Fall Risk Sign visible to staff  Problem: DISCHARGE PLANNING  Goal: Discharge to home or other facility with appropriate resources  Description: INTERVENTIONS:  - Identify barriers to discharge w/patient and caregiver  - Arrange for needed discharge resources and transportation as appropriate  - Identify discharge learning needs (meds, wound care, etc )  - Arrange for interpretive services to assist at discharge as needed  - Refer to Case Management Department for coordinating discharge planning if the patient needs post-hospital services based on physician/advanced practitioner order or complex needs related to functional status, cognitive ability, or social support system  Outcome: Progressing     Problem: Knowledge Deficit  Goal: Patient/family/caregiver demonstrates understanding of disease process, treatment plan, medications, and discharge instructions  Description: Complete learning assessment and assess knowledge base    Interventions:  - Provide teaching at level of understanding  - Provide teaching via preferred learning methods  Outcome: Progressing     Problem: Potential for Falls  Goal: Patient will remain free of falls  Description: INTERVENTIONS:  - Educate patient/family on patient safety including physical limitations  - Instruct patient to call for assistance with activity   - Consult OT/PT to assist with strengthening/mobility   - Keep Call bell within reach  - Keep bed low and locked with side rails adjusted as appropriate  - Keep care items and personal belongings within reach  - Initiate and maintain comfort rounds  - Make Fall Risk Sign visible to staff  Problem: SAFETY,RESTRAINT: NV/NON-SELF DESTRUCTIVE BEHAVIOR  Goal: Remains free of harm/injury (restraint for non violent/non self-detsructive behavior)  Description: INTERVENTIONS:  - Instruct patient/family regarding restraint use   - Assess and monitor physiologic and psychological status   - Provide interventions and comfort measures to meet assessed patient needs - Identify and implement measures to help patient regain control  - Assess readiness for release of restraint   Outcome: Completed  Goal: Returns to optimal restraint-free functioning  Description: INTERVENTIONS:  - Assess the patient's behavior and symptoms that indicate continued need for restraint  - Identify and implement measures to help patient regain control  - Assess readiness for release of restraint   Outcome: Completed     - Apply yellow socks and bracelet for high fall risk patients  - Consider moving patient to room near nurses station  Outcome: Progressing     - Apply yellow socks and bracelet for high fall risk patients  - Consider moving patient to room near nurses station  Outcome: Progressing

## 2022-04-05 NOTE — ASSESSMENT & PLAN NOTE
Noted to have worsening while receiving immunotherapy, did not complete 3rd cycle and required high dose prednisone, was on taper on admission  · Now on decadron as above

## 2022-04-05 NOTE — OCCUPATIONAL THERAPY NOTE
OCCUPATIONAL THERAPY SCREEN    PT PLANNED FOR POTENTIAL SURGICAL INTERVENTION WITH NEURSOX THIS Thursday  PLEASE RECONSULT POST-OP FOR APPROPRIATE OT EVALUATION  D/C OT       Ashley Talleyk, MOT, OTR/L

## 2022-04-05 NOTE — PROGRESS NOTES
[address] [facility]  Progress Note - Arlene Reddy 1955, 74 y o  male MRN: [id number]  Unit/Bed#: [facility] 630-01 Encounter: [id number]  Primary Care Provider: Macarthur Bosworth, DO   Date and time admitted to hospital: 4/2/2022  9:42 PM    * Left parietal 4 6 cm intraparenchymal hemorrhage with surrounding cerebral edema  Assessment & Plan  Patient presented with new onset apraxias and aphasia  Found to have a left parietal brain mass with vasogenic edema, 2 additional left cerebellar masses  Patient has a known history of small-cell lung cancer (dx 4/2021) status post chemotherapy/surgery/radiation/immunotherapy at [facility]  · CT chest abdomen pelvis without any evidence of metastatic disease  · Neurology consulted--no current recommendations  · Neurosurgery following, input appreciated  · Image continue Decadron 4 mg q 6 hours, no need for keppra at this point  · SBP <160  · Patient was started on salt tablets in ICU for unclear reason, sodium has been normal, will likely discontinue  · Holding DVT prophylaxis at this time    Adenocarcinoma, lung, left [facility])  Assessment & Plan  s/p DAVID lobectomy in 12/2021 at [facility], currently receiving adjuvant immunotherapy and has completed 2 cycles of atezolizumab  · New mets to brain, see plan above in primary problem   · Will consult Medical Oncology for input per wife request, patient known to Dr Jazmyn Juarez locally  · Palliative following     Hypothyroidism  Assessment & Plan  Patient felt to have drug annoys thyroiditis with hypothyroidism   Synthroid increased from home dose of 25 mcg daily to 37 5 mcg daily by endocrinology  · Will need repeat TSH/free T4 in about 4-6 weeks    Goals of care, counseling/discussion  Assessment & Plan  Palliative care was consulted in the setting of lung cancer with Mets to brain  · At this time, patient wishes for ongoing medical management without limits, is hopeful to continue receiving disease directed therapies with goal of maintaining independence and prolonging survival, however he is clear he would not want artificial life prolonging cares if in a persistent unresponsive state  · Appreciate palliative input ongoing    Thrombocytopenia ([address] )  Assessment & Plan  Appears somewhat chronic dating back  · Holding DVT prophylaxis given hemorrhagic brain mets  · Monitor CBC     Psoriasis  Assessment & Plan  Noted to have worsening while receiving immunotherapy, did not complete 3rd cycle and required high dose prednisone, was on taper on admission  · Now on decadron as above      Mixed hyperlipidemia  Assessment & Plan  · Continue statin    Essential hypertension  Assessment & Plan  SBP goal less than 160  · Continue home dose Norvasc 10 mg daily    Depression with anxiety  Assessment & Plan  · Continue home celexa and doxepin           VTE Pharmacologic Prophylaxis: VTE Score: 9 Moderate Risk (Score 3-4) - Pharmacological DVT Prophylaxis Contraindicated  Sequential Compression Devices Ordered  Patient Centered Rounds: I performed bedside rounds with nursing staff today  Discussions with Specialists or Other Care Team Provider: d/w neurosurgery  Education and Discussions with Family / Patient: Updated  (wife) at bedside  Time Spent for Care: 30 minutes  More than 50% of total time spent on counseling and coordination of care as described above  Current Length of Stay: 3 day(s)  Current Patient Status: Inpatient   Certification Statement: The patient will continue to require additional inpatient hospital stay due to surgery   Discharge Plan: Anticipate discharge in >72 hrs to rehab facility  Code Status: Level 1 - Full Code    Subjective:   Doing okay  Somewhat tearful and anxious over his clinical picture  He is agreeable for surgery on Thursday  He states that his speech is improving, however he can not still not read or write which is concerning to him    Denies any headaches  Denies any bleeding  Objective:     Vitals:   Temp (24hrs), Av 2 °F (36 8 °C), Min:97 9 °F (36 6 °C), Max:98 6 °F (37 °C)    Temp:  [97 9 °F (36 6 °C)-98 6 °F (37 °C)] 98 1 °F (36 7 °C)  HR:  [64-90] 77  Resp:  [16-20] 18  BP: (123-162)/(66-95) 135/80  SpO2:  [96 %-99 %] 96 %  Body mass index is 25 4 kg/m²  Input and Output Summary (last 24 hours): Intake/Output Summary (Last 24 hours) at 2022 1114  Last data filed at 2022 0746  Gross per 24 hour   Intake 910 ml   Output 1425 ml   Net -515 ml       Physical Exam:   Physical Exam  Vitals and nursing note reviewed  Constitutional:       General: He is not in acute distress  Cardiovascular:      Rate and Rhythm: Normal rate and regular rhythm  Pulmonary:      Effort: No respiratory distress  Abdominal:      General: There is no distension  Musculoskeletal:      Right lower leg: No edema  Left lower leg: No edema  Neurological:      Mental Status: He is oriented to person, place, and time  Comments: Slowed mentation, mild word-finding difficulties   Psychiatric:         Mood and Affect: Mood normal           Additional Data:     Labs:  Results from last 7 days   Lab Units 22  0535   WBC Thousand/uL 10 01   < > 10 54*   HEMOGLOBIN g/dL 13 3   < > 14 4   HEMATOCRIT % 40 8   < > 45 6   PLATELETS Thousands/uL 107*   < > 118*   NEUTROS PCT %  --   --  88*   LYMPHS PCT %  --   --  6*   LYMPHO PCT % 4*  --   --    MONOS PCT %  --   --  5   MONO PCT % 0*  --   --    EOS PCT % 0  --  0    < > = values in this interval not displayed       Results from last 7 days   Lab Units 22  0522  0521   SODIUM mmol/L 139   < > 139   POTASSIUM mmol/L 3 9   < > 4 0   CHLORIDE mmol/L 108   < > 108   CO2 mmol/L 24   < > 22   BUN mg/dL 36*   < > 34*   CREATININE mg/dL 1 14   < > 1 19   ANION GAP mmol/L 7   < > 9   CALCIUM mg/dL 9 0   < > 9 1   ALBUMIN g/dL  --   --  3 1* TOTAL BILIRUBIN mg/dL  --   --  0 38   ALK PHOS U/L  --   --  53   ALT U/L  --   --  35   AST U/L  --   --  14   GLUCOSE RANDOM mg/dL 129   < > 135    < > = values in this interval not displayed  Results from last 7 days   Lab Units 04/03/22  0535   INR  0 96     Results from last 7 days   Lab Units 04/02/22  1928   POC GLUCOSE mg/dl 104     Results from last 7 days   Lab Units 04/03/22  0535   HEMOGLOBIN A1C % 5 2           Lines/Drains:  Invasive Devices  Report    Central Venous Catheter Line            Port A Cath 06/17/21 Right Chest 291 days          Peripheral Intravenous Line            Peripheral IV 04/02/22 Right Antecubital 2 days    Peripheral IV 04/02/22 Right Forearm 2 days                Central Line:  Goal for removal: chronic chemo port              Imaging: Reviewed radiology reports from this admission including: all     Recent Cultures (last 7 days):         Last 24 Hours Medication List:   Current Facility-Administered Medications   Medication Dose Route Frequency Provider Last Rate    amLODIPine  10 mg Oral Daily Travis Nettles      citalopram  10 mg Oral Daily Leslee Valero PA-C      dexamethasone  4 mg Intravenous Q6H [facility] Travis Nettles      doxepin  10 mg Oral HS Leslee Valero PA-C      Labetalol HCl  10 mg Intravenous Q4H PRN Isabel Samuels MD      levothyroxine  37 5 mcg Oral Early Morning Mary Grace Corral DO      melatonin  6 mg Oral HS Travis Nettles      ondansetron  4 mg Intravenous Q6H PRN Travis G Paul      perflutren lipid microsphere  0 4 mL/min Intravenous Once in imaging Maximo Albert      pravastatin  80 mg Oral Daily With [employer], JOSE EDUARDO      sodium chloride  1 g Oral TID With Meals Maximo Albert          Today, Patient Was Seen By: Desean Ojeda PA-C    **Please Note: This note may have been constructed using a voice recognition system  **

## 2022-04-05 NOTE — TREATMENT PLAN
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  2  Large left upper lobe mass diagnosed on low-dose screening CT 4/20/202  3  Status post CT-guided left upper lobe mass biopsy with IR 5/6/2021  4  Path poorly differentiated adenocarcinoma of left upper Lobe KI index 20%   5  Status post mediastinoscopy and lymph node biopsy 5/25/2021 5 biopsies negative  6  Status post  4 cycles worth of neoadjuvant chemotherapy with Alimta and carboplatin which was done by August 2021  7  PET scan showed slight shrinkage in tumor  8  Proceeded to left upper lobectomy at [facility]: Left upper lobectomy and mediastinal lymph node dissection on 11/17/2021   9  Postop XRT as close margins of resection to mediastinum x 25 fractions completed 2/22/2022   10  Status post chemotherapy  11  Status post immunotherapy with atezolizumab at the dose of 1200 mg on every 3 week basis 1/27/22 with goal of 1 year worth adjuvant treatment   12  Had to stop immunotherapy because of exacerbation of psoriasis despite prednisone  13  Now alteration mental status, confusion right-sided dyspraxic  14  MRI brain showed Left deep parietal high periatrial mass with hemorrhage and edema  15  There appeared to be 2 enhancing masses underlying the hemorrhage- likely metastases  16  There are 2 additional small left cerebellar metastases adjacent to one another both enhancing with edema and hemorrhagic elements measuring approximately 1 5 cm each circumscribed masses in left cerebellar hemisphere, mild mass effect  17  Fourth ventricle is midline and open  No hydrocephalus  18    No prepontine or cerebellar crowding      He follows with Dr Michael Sexton, Oncology on 3/23/2022 - had to stop immunotherapy    The patient had significant flare ups of the psoriasis with generalized skin rash after 2 cycles of adjuvant atezolizumab which was put on hold after the 2nd cycle  The patient continues to be on high-dose prednisone 60 mg once a day  I did advise him to decrease the dose to 40 mg once a day for a week then 30 mg for a week then 20 mg for a week then 10 mg  Dr Michael Sexton provided detail summary of prior history and treatment at visit  Patient with history of hypertension and hyperlipidemia  Giovany Situ also had extensive history of smoking for 40 years or more  Giovany Situ quit smoking  In 2017     The patient apparently had low-dose lung CT scans for screening since he was heavy smoker on 04/08/2021  The CT scan of fortunately revealed 6 2 cm mass in the paramediastinal left upper lobe with COPD features   Subsequently, he had a PET-CT scan on 05/04/2021 which showed:  IMPRESSION:  1   Lobulated left upper paramediastinal lung mass extending to the left suprahilar region measuring 5 2 x 6 5 x 6 cm demonstrates intense FDG activity, most concerning for malignancy   Adjacent interstitial thickening and groundglass densities may   represent tumor infiltration   Tissue sampling recommended  2   Mildly hypermetabolic AP window mediastinal nodes concerning for early metastases  3   Additional mildly hypermetabolic branching nodular density in the left upper posterior lung may be inflammatory/infectious versus additional site of tumor    4   No hypermetabolic metastases in the neck, abdomen, pelvis      A biopsy of the left lung mass was done on 05/06/2021 which showed:   Poorly differentiated non-small cell carcinoma, favor adenocarcinoma, with neuroendocrine differentiation of uncertain clinical significance      The patient then underwent a mediastinoscopy on 05/25/2021 for staging   Multiple mediastinal lymph node from different levels were excised all lymph nodes came back negative for metastatic disease  The patient was felt to be a surgical candidate and was sent to us to consider  Neoadjuvant chemotherapy      He underwent a left upper lobectomy and mediastinal lymph node dissection on 11/17/2021 at [facility] after he completed 4 cycles worth of neoadjuvant chemotherapy with Alimta and carboplatin which was done by August 2021  His final pathology showed a ypT2a N0 M0 poorly differentiated non-small, large cell neuroendocrine tumor   This was an R0 resection but the margin near the mediastinal fat is close     In addition, in the left upper lobe he had a separate stage I squamous cell carcinoma        He was seen by the Radiation Oncology team postop who pursued radiation treatment to the mediastinum x25 fractions completed 2/22/22 since he had close margin which was recommended by the thoracic surgical team at [facility]  He was educated about the recent data regarding the potential benefit of adjuvant immunotherapy with atezolizumab post chemotherapy for stage II and III resected non-small cell lung cancer which showed disease-free survival benefit specially with the pathology of PDL1 of 1% or more  Was started on the atezolizumab at the dose of 1200 mg on every 3 week basis 1/27/22 with goal of 1 year worth adjuvant treatment to decrease the chance of recurrence        Adenocarcinoma, lung, left (HCC)                SH:  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    Lives with his wife    Maintaining weight  Active at home    Exam:  Pleasant articulate 49-year-old man not in obvious distress    A little hesitation with dates and times and explaining his symptoms  His wife, Ronald Quesada augments information  and provides reminders for him    Dyspraxic symptoms on right-side improved on Decadron 4 q 6h    More precise with finger-nose testing with the left hand  Although he is right handed    Fairly quick finger tap sequences both hands right now    Clumsier alternate hand tapping right hand once shown    No visual field deficit in either eye to finger confrontation    Otherwise good power in the upper lower extremities    Some initials clonus in understanding JPS  testing in the right index finger but was ultimately corrected replies    Localizes all fingers right-sided    Some areas in localization right foot  Not distinguishing top of foot from ankle    Correct JPS right hallux  Impression and discussion  This 58-year-old  has pursued treatment for his poorly differentiated left upper lobe admit carcinoma of the lung in a stepwise, active manner as Dr Doak Peabody summarized above    His disease is aggressive    He and his wife his wife indicated that they wanted to continue with active, target specific management    I agree    See outline strategies considered below    Would favor proceeding with surgery for the area of more focal deep medial parietal hemorrhage with to areas of enhancement suggesting metastatic disease at the next available opportunity which would be with Dr Clifford Galeano on Thursday  Should do well  Possibly with some temporary worsening dyspraxia should which should resolve    They are thinking about what I said    They are also interested in getting a third opinion - will discuss further with Dr Nohelia Calvo  1  If wishes to pursue active, aggressive management would favor resection of hemorrhage and 2 under lying enhancing parietal  masses in the deep left parietal region  2  Dr Clifford Galeano has offered to OR time this Thursday  3  This would be followed once incision had healed up by outpatient SRS to the tumor bed after re-evaluation MRI with without contrast  and also to 2 separate left cerebellar metastases  4   Alternative would be wait for left parietal peritumoral hemorrhage to diminish  - this could take 3 to 4 weeks  5   Then reimage with MRI brain with without contrast and then consider SRS  to left parietal region   6  In the meantime could pursue SRS to left 2 cerebellar lesions  7  SRS for cerebellar lesions would be carried out as day case outpatient procedure  8  If he was not up to proceed with surgery or waiting for left parietal hemorrhage to diminish somewhat then consider whole-brain radiation  9  There are potential cognitive loss trade offs for whole-brain radiation  10  The natural history of his particular disease is aggressive (poorly differentiated adenocarcinoma of lung from the get go) and time is not on his side    In the meantime continue on Decadron 4 q 6h    Okay from Neurosurgery point of view to mobilize out of bed with PT and OT    Needs supervision when up out of bed and walking    Dr Tori Parrish in also to talk with Mr and Mrs  Remedios Nirali Posadas to review images and give his input on Thursday 4/5/2022 11:06 AM    Jessica Jose MD, Attending Neurological Surgeon

## 2022-04-05 NOTE — ASSESSMENT & PLAN NOTE
Palliative care was consulted in the setting of lung cancer with Mets to brain  · At this time, patient wishes for ongoing medical management without limits, is hopeful to continue receiving disease directed therapies with goal of maintaining independence and prolonging survival, however he is clear he would not want artificial life prolonging cares if in a persistent unresponsive state  · Appreciate palliative input ongoing

## 2022-04-05 NOTE — PROGRESS NOTES
Progress note - Palliative and Supportive Care   Nell Kevin 74 y o  male [phone]    Patient Active Problem List   Diagnosis    Renal cyst, right    Medicare annual wellness visit, subsequent    Acute idiopathic gout of left foot    Back problem    Depression with anxiety    Essential hypertension    Glaucoma    Hypertriglyceridemia    Lumbago with sciatica, left side    Lumbar stenosis    Mild episode of recurrent major depressive disorder ([facility] )    JUNAID (obstructive sleep apnea)    Spinal stenosis of lumbar region with neurogenic claudication    Mixed hyperlipidemia    Bilateral hearing loss    Hyperglycemia    Cigarette nicotine dependence in remission    Lung mass    Adenocarcinoma, lung, left ([facility] [facility] )    Encounter for central line care    Drug-induced neutropenia (HCC)    Left non-suppurative otitis media    Bilateral hearing loss due to cerumen impaction    Cancer of upper lobe of left lung (HCC)    Chronic back pain    Former cigarette smoker    History of gout    Hypertension    Proctocolitis    Pericardial effusion    Constipation    Labyrinthitis of both ears    Stage 3a chronic kidney disease ([facility] )    Platelets decreased ([facility] )    Psoriasis    Left parietal 4 6 cm intraparenchymal hemorrhage with surrounding cerebral edema    Thrombocytopenia (HCC)    Goals of care, counseling/discussion    Hypothyroidism     Active issues specifically addressed today include:   Adenocarcinoma of the lung  Brain metastases  Dizziness  Apraxia  Goals of care discussion    Plan:  1  Symptom management -    - start meclizine 12 5 mg p o  Q 8 hours p r n  Dizziness or nausea, consider starting on a scheduled basis if patient finds this helpful  2  Goals - level 1 full code   - ongoing cancer/disease directed cares without limits at this time     - patient reports having an advance directive at home that does admit that he would not want life prolonging measures if in an unconscious state    - overall, he is hopeful to undergo craniotomy for brain mass resection on Thursday  He is hopeful this will help improve his dizziness an apraxia with a goal to maintain as much functional independence as possible  Code Status: full - Level 1   Decisional apparatus:  Patient is competent on my exam today  If competence is lost, patient's substitute decision maker would default to spouse [place]  Advance Directive / Living Will / POLST:  Completed  Not on file    3  Social support   - time spent providing supportive listening  Patient was seen along with Monta Mcburney, LCSW    Interval history:       No events overnight  Patient was transferred out of the ICU  He continues to admit feeling somewhat dizzy and having poor coordination of his hands, he is very motivated to continue working on exercises and ambulating  He is eager to undergo surgery on Thursday hopeful at this improve some of his neurologic deficits  Spouse at bedside during time of evaluation  They feel comforted by having a plan in place  MEDICATIONS / ALLERGIES:     all current active meds have been reviewed    Allergies   Allergen Reactions    Bee Venom     Benazepril Other (See Comments)     Angioedema     Latex Other (See Comments)     Burning of eyes at the dentist from the gloves    Meloxicam GI Intolerance    Penicillin G Rash       OBJECTIVE:    Physical Exam  Physical Exam  HENT:      Head: Normocephalic and atraumatic  Eyes:      Conjunctiva/sclera: Conjunctivae normal    Cardiovascular:      Rate and Rhythm: Normal rate  Pulmonary:      Effort: Pulmonary effort is normal  No respiratory distress  Abdominal:      General: There is no distension  Tenderness: There is no guarding  Musculoskeletal:         General: No swelling  Skin:     General: Skin is warm and dry  Neurological:      Mental Status: He is alert        Comments: Apraxia in both hands   Psychiatric:         Mood and Affect: Mood normal          Behavior: Behavior normal          Thought Content: Thought content normal          Judgment: Judgment normal          Lab Results:   I have personally reviewed pertinent labs  , CBC:   Lab Results   Component Value Date    WBC 10 01 04/05/2022    HGB 13 3 04/05/2022    HCT 40 8 04/05/2022    MCV 96 04/05/2022     (L) 04/05/2022    MCH 31 2 04/05/2022    MCHC 32 6 04/05/2022    RDW 18 8 (H) 04/05/2022    MPV 9 6 04/05/2022   , CMP:   Lab Results   Component Value Date    SODIUM 139 04/05/2022    K 3 9 04/05/2022     04/05/2022    CO2 24 04/05/2022    BUN 36 (H) 04/05/2022    CREATININE 1 14 04/05/2022    CALCIUM 9 0 04/05/2022    EGFR 65 04/05/2022     Imaging Studies:  Reviewed pertinent studies  EKG, Pathology, and Other Studies:  Reviewed pertinent studies    Counseling / Coordination of Care    Total floor / unit time spent today 25+ minutes  Greater than 50% of total time was spent with the patient and / or family counseling and / or coordination of care  A description of the counseling / coordination of care:  Has been evaluated patient, and discussing goals of care, symptom management, coordinating care with Neurosurgery team and RN

## 2022-04-05 NOTE — SOCIAL WORK
SANGW joined Misael Shrestha (JOSE EDUARDO) to introduce SW support to patient and wife  Both request patient be seen by physical therapy, occupational therapy, and restorative  Both are receptive to SW support    They informed Ms Radha Hill that they would like to proceed with resection        LSW will continue to follow for support and guidance

## 2022-04-05 NOTE — ASSESSMENT & PLAN NOTE
Patient felt to have drug annoys thyroiditis with hypothyroidism   Synthroid increased from home dose of 25 mcg daily to 37 5 mcg daily by endocrinology  · Will need repeat TSH/free T4 in about 4-6 weeks

## 2022-04-06 ENCOUNTER — ANESTHESIA EVENT (INPATIENT)
Dept: PERIOP | Facility: HOSPITAL | Age: 67
DRG: 826 | End: 2022-04-06
Payer: MEDICARE

## 2022-04-06 LAB
ABO GROUP BLD: NORMAL
ANION GAP SERPL CALCULATED.3IONS-SCNC: 5 MMOL/L (ref 4–13)
ANISOCYTOSIS BLD QL SMEAR: PRESENT
APTT PPP: 29 SECONDS (ref 23–37)
ARTIFACT: PRESENT
BASOPHILS # BLD MANUAL: 0 THOUSAND/UL (ref 0–0.1)
BASOPHILS NFR MAR MANUAL: 0 % (ref 0–1)
BILIRUB UR QL STRIP: NEGATIVE
BLD GP AB SCN SERPL QL: POSITIVE
BLOOD GROUP ANTIBODIES SERPL: NORMAL
BUN SERPL-MCNC: 32 MG/DL (ref 5–25)
CALCIUM SERPL-MCNC: 9.3 MG/DL (ref 8.3–10.1)
CHLORIDE SERPL-SCNC: 108 MMOL/L (ref 100–108)
CLARITY UR: CLEAR
CO2 SERPL-SCNC: 25 MMOL/L (ref 21–32)
COLOR UR: NORMAL
CREAT SERPL-MCNC: 1.1 MG/DL (ref 0.6–1.3)
E AG RBC QL: NEGATIVE
EOSINOPHIL # BLD MANUAL: 0 THOUSAND/UL (ref 0–0.4)
EOSINOPHIL NFR BLD MANUAL: 0 % (ref 0–6)
ERYTHROCYTE [DISTWIDTH] IN BLOOD BY AUTOMATED COUNT: 18.6 % (ref 11.6–15.1)
GFR SERPL CREATININE-BSD FRML MDRD: 68 ML/MIN/1.73SQ M
GLUCOSE SERPL-MCNC: 109 MG/DL (ref 65–140)
GLUCOSE UR STRIP-MCNC: NEGATIVE MG/DL
HCT VFR BLD AUTO: 45.4 % (ref 36.5–49.3)
HGB BLD-MCNC: 14.5 G/DL (ref 12–17)
HGB UR QL STRIP.AUTO: NEGATIVE
INR PPP: 1.01 (ref 0.84–1.19)
KETONES UR STRIP-MCNC: NEGATIVE MG/DL
LEUKOCYTE ESTERASE UR QL STRIP: NEGATIVE
LYMPHOCYTES # BLD AUTO: 0.15 THOUSAND/UL (ref 0.6–4.47)
LYMPHOCYTES # BLD AUTO: 2 % (ref 14–44)
MCH RBC QN AUTO: 31.6 PG (ref 26.8–34.3)
MCHC RBC AUTO-ENTMCNC: 31.9 G/DL (ref 31.4–37.4)
MCV RBC AUTO: 99 FL (ref 82–98)
MONOCYTES # BLD AUTO: 0.31 THOUSAND/UL (ref 0–1.22)
MONOCYTES NFR BLD: 4 % (ref 4–12)
NEUTROPHILS # BLD MANUAL: 7.26 THOUSAND/UL (ref 1.85–7.62)
NEUTS SEG NFR BLD AUTO: 94 % (ref 43–75)
NITRITE UR QL STRIP: NEGATIVE
PH UR STRIP.AUTO: 5.5 [PH]
PLATELET # BLD AUTO: 114 THOUSANDS/UL (ref 149–390)
PLATELET BLD QL SMEAR: ABNORMAL
PMV BLD AUTO: 9.7 FL (ref 8.9–12.7)
POTASSIUM SERPL-SCNC: 3.9 MMOL/L (ref 3.5–5.3)
PROT UR STRIP-MCNC: NEGATIVE MG/DL
PROTHROMBIN TIME: 12.9 SECONDS (ref 11.6–14.5)
RBC # BLD AUTO: 4.59 MILLION/UL (ref 3.88–5.62)
RBC MORPH BLD: PRESENT
RH BLD: POSITIVE
SODIUM SERPL-SCNC: 138 MMOL/L (ref 136–145)
SP GR UR STRIP.AUTO: 1.02 (ref 1–1.03)
SPECIMEN EXPIRATION DATE: NORMAL
UROBILINOGEN UR STRIP-ACNC: <2 MG/DL
WBC # BLD AUTO: 7.72 THOUSAND/UL (ref 4.31–10.16)

## 2022-04-06 PROCEDURE — 81003 URINALYSIS AUTO W/O SCOPE: CPT | Performed by: PHYSICIAN ASSISTANT

## 2022-04-06 PROCEDURE — 86900 BLOOD TYPING SEROLOGIC ABO: CPT | Performed by: PHYSICIAN ASSISTANT

## 2022-04-06 PROCEDURE — 86870 RBC ANTIBODY IDENTIFICATION: CPT | Performed by: PHYSICIAN ASSISTANT

## 2022-04-06 PROCEDURE — 92523 SPEECH SOUND LANG COMPREHEN: CPT

## 2022-04-06 PROCEDURE — 99232 SBSQ HOSP IP/OBS MODERATE 35: CPT | Performed by: INTERNAL MEDICINE

## 2022-04-06 PROCEDURE — 86901 BLOOD TYPING SEROLOGIC RH(D): CPT | Performed by: PHYSICIAN ASSISTANT

## 2022-04-06 PROCEDURE — 99233 SBSQ HOSP IP/OBS HIGH 50: CPT | Performed by: PHYSICIAN ASSISTANT

## 2022-04-06 PROCEDURE — 86921 COMPATIBILITY TEST INCUBATE: CPT

## 2022-04-06 PROCEDURE — 86850 RBC ANTIBODY SCREEN: CPT | Performed by: PHYSICIAN ASSISTANT

## 2022-04-06 PROCEDURE — 85610 PROTHROMBIN TIME: CPT | Performed by: PHYSICIAN ASSISTANT

## 2022-04-06 PROCEDURE — 86905 BLOOD TYPING RBC ANTIGENS: CPT

## 2022-04-06 PROCEDURE — 85007 BL SMEAR W/DIFF WBC COUNT: CPT | Performed by: INTERNAL MEDICINE

## 2022-04-06 PROCEDURE — 85027 COMPLETE CBC AUTOMATED: CPT | Performed by: INTERNAL MEDICINE

## 2022-04-06 PROCEDURE — 86922 COMPATIBILITY TEST ANTIGLOB: CPT

## 2022-04-06 PROCEDURE — 85730 THROMBOPLASTIN TIME PARTIAL: CPT | Performed by: PHYSICIAN ASSISTANT

## 2022-04-06 PROCEDURE — 80048 BASIC METABOLIC PNL TOTAL CA: CPT | Performed by: INTERNAL MEDICINE

## 2022-04-06 RX ORDER — SODIUM CHLORIDE 9 MG/ML
125 INJECTION, SOLUTION INTRAVENOUS CONTINUOUS
Status: DISCONTINUED | OUTPATIENT
Start: 2022-04-06 | End: 2022-04-06

## 2022-04-06 RX ORDER — SODIUM CHLORIDE 9 MG/ML
125 INJECTION, SOLUTION INTRAVENOUS CONTINUOUS
Status: DISCONTINUED | OUTPATIENT
Start: 2022-04-07 | End: 2022-04-07

## 2022-04-06 RX ADMIN — DEXAMETHASONE SODIUM PHOSPHATE 4 MG: 4 INJECTION INTRA-ARTICULAR; INTRALESIONAL; INTRAMUSCULAR; INTRAVENOUS; SOFT TISSUE at 23:42

## 2022-04-06 RX ADMIN — SODIUM CHLORIDE TAB 1 GM 1 G: 1 TAB at 17:11

## 2022-04-06 RX ADMIN — DOXEPIN HYDROCHLORIDE 10 MG: 10 CAPSULE ORAL at 21:05

## 2022-04-06 RX ADMIN — DEXAMETHASONE SODIUM PHOSPHATE 4 MG: 4 INJECTION INTRA-ARTICULAR; INTRALESIONAL; INTRAMUSCULAR; INTRAVENOUS; SOFT TISSUE at 06:43

## 2022-04-06 RX ADMIN — SODIUM CHLORIDE 125 ML/HR: 0.9 INJECTION, SOLUTION INTRAVENOUS at 23:42

## 2022-04-06 RX ADMIN — CITALOPRAM HYDROBROMIDE 10 MG: 10 TABLET ORAL at 10:06

## 2022-04-06 RX ADMIN — DEXAMETHASONE SODIUM PHOSPHATE 4 MG: 4 INJECTION INTRA-ARTICULAR; INTRALESIONAL; INTRAMUSCULAR; INTRAVENOUS; SOFT TISSUE at 12:11

## 2022-04-06 RX ADMIN — PRAVASTATIN SODIUM 80 MG: 80 TABLET ORAL at 17:11

## 2022-04-06 RX ADMIN — SODIUM CHLORIDE TAB 1 GM 1 G: 1 TAB at 06:42

## 2022-04-06 RX ADMIN — Medication 6 MG: at 21:05

## 2022-04-06 RX ADMIN — LEVOTHYROXINE SODIUM 37.5 MCG: 75 TABLET ORAL at 06:42

## 2022-04-06 RX ADMIN — AMLODIPINE BESYLATE 10 MG: 10 TABLET ORAL at 10:06

## 2022-04-06 RX ADMIN — DEXAMETHASONE SODIUM PHOSPHATE 4 MG: 4 INJECTION INTRA-ARTICULAR; INTRALESIONAL; INTRAMUSCULAR; INTRAVENOUS; SOFT TISSUE at 17:11

## 2022-04-06 NOTE — ASSESSMENT & PLAN NOTE
Appears somewhat chronic dating back   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  · Holding DVT prophylaxis given hemorrhagic brain mets  · No specific treatment required at this time   · Monitor CBC

## 2022-04-06 NOTE — ASSESSMENT & PLAN NOTE
[place] [facility] currently receiving adjuvant immunotherapy and has completed 2 cycles of atezolizumab  · New mets to brain, see plan above in primary problem   · [address] [place] [address] Oncology per wife's request, patient known to Dr aDron Stanley locally  · Holding immunotherapy  · May need radiation  · Palliative following

## 2022-04-06 NOTE — ASSESSMENT & PLAN NOTE
Left parietal brain mass with vasogenic edema, 2 additional left cerebellar masses  · H/o small cell lung adenocarcinoma (dx 4/2021), s/p chemotherapy/surgery/radiation/immunotherapy at [facility]  · Patient denies h/o WBI  · Patient does not want to transfer back to [place] - states not up to traveling  · Patient presented with new onset apraxia and aphasia  · Mild to moderate expressive aphasia and word finding difficulty on exam, difficulty following commands  Otherwise nonfocal exam  Improving with steroids  Imaging:  · MRI brain w/wo, 4/3/22: 2 left cerebellar hemorrhagic metastases and probable 2 adjacent left parietal hemorrhagic metastasis, the more anterior, larger one having parenchymal hematoma and local edema and mass effect, correlating to the hemorrhage on the prior head CT    · CTCAP w/contast, 4/4/22: No evidence of new metastatic disease  Plan:  · Continue decadron 4q6  · Can discontinue keppra as patient did not present with seizure activity, will restart post op  · Med onc / rad onc / palliative care following  · Frequent neuro checks  · SBP < 160  · Avoid AC/AP  · Medical management per primary team  · DVT ppx: SCDs  Hold pharm ppx in the setting of hemorrhagic masses    Patient will go to the OR tomorrow with Dr Ezequiel Hernandez for left parietal mass resection  He will be admitted to the ICU postop  Preop orders are placed, type and screen and preop labs ordered for today, 2 units PRBC on hold  NPO after midnight, start NSS at midnight  Patient will need medical clearance from primary team, they are aware  Please call questions or concerns

## 2022-04-06 NOTE — ASSESSMENT & PLAN NOTE
Patient presented with new onset apraxias and aphasia  Found to have a left parietal brain mass with vasogenic edema, 2 additional left cerebellar masses    Patient has a known history of small-cell lung cancer (dx 4/2021) status post chemotherapy/surgery/radiation/immunotherapy at [facility]  · CT chest abdomen pelvis without any evidence of metastatic disease  · Neurology consulted--no current recommendations  · Neurosurgery following, input appreciated  · Planning for surgery intervention 4/7--will ask for attending attestation regarding clearance  · Image continue Decadron 4 mg q 6 hours, no need for keppra at this point  · SBP <160  · Patient was started on salt tablets in ICU for unclear reason, sodium has been normal, will start to wean  · Holding DVT prophylaxis at this time

## 2022-04-06 NOTE — SPEECH THERAPY NOTE
Speech Language/Pathology  Speech/Language Pathology  Assessment    Patient Name: Dionna Castellanos Date: 4/6/2022     Problem List  Principal Problem:    Left parietal 4 6 cm intraparenchymal hemorrhage with surrounding cerebral edema  Active Problems:    Depression with anxiety    Essential hypertension    Mixed hyperlipidemia    Adenocarcinoma, lung, left (HCC)    Psoriasis    Thrombocytopenia (HCC)    Goals of care, counseling/discussion    Hypothyroidism    Past Medical History  Past Medical History:   Diagnosis Date    Hyperlipidemia     Hypertension     Lung cancer [facility])      Past Surgical History  Past Surgical History:   Procedure Laterality Date    BACK SURGERY      HEMORRHOID SURGERY      IR BIOPSY LUNG  5/6/2021    IR PORT PLACEMENT  6/17/2021    LAMINECTOMY  2018    L4-L5    LUNG SURGERY      left upper lobectomy    RI BRONCHOSCOPY,DIAGNOSTIC N/A 5/25/2021    Procedure: BRONCHOSCOPY FLEXIBLE;  Surgeon: Corbin Swartz MD;  Location: [facility];  Service: Thoracic    RI MEDIASTINOSCOPY WITH LYMPH NODE BIOPSY/IES N/A 5/25/2021    Procedure: MEDIASTINOSCOPY, flexible bronchoscopy;  Surgeon: Corbin Swartz MD;  Location: [facility];  Service: Thoracic    TONSILLECTOMY  1959     Speech-Language Evaluation    Impression:  Pt seen for baseline assessment prior to surgery tomorrow  Pt presents/ w functional receptive and expressive language  Slight possible cognative difficulties w/ word retrieval and recent memory within tasks  May need cognitive exam f/u  Pt inially  presented w/ mild to moderate expressive aphasia, word finding difficulties, and difficulty following commands when admitted   Most of these issues have resolved  Recommendation:  No services needed at this time for any speech and language concerns  Will f/u w/ patient post left parietal mass resection scheduled for 4/7 for reassessment    Eval only, No f/u tx indicated         H&P/Admit info, pertinent provider notes:(PMH noted above)  Patient is a 77-year-old male with PMH of HTN, HLD, and lung CA s/p DAVID lobectomy, chemo, radiation and recent immunotherapy  presents to [facility] [place] ED w/ AMS  Patient's wife says that his baseline is very high functioning with no confusion at baseline  Patient's last known normal was about 24 hours ago  He has been increasingly confused per his wife  He apparently put his shoes on the wrong feet, did not know how to use utensils and was asking was our questions  She estimates today he wanted to go to hospital but he refused  Just prior to ED arrival, patient began to have some expressive and receptive aphasia and has difficulty communicating at this time  No known metastasis to the brain per his wife  He has been on high-dose prednisone secondary to a flare of his psoriasis  History is limited from the patient and he cannot communicate any complaints to me  No recent head trauma  No blood thinners or antiplatelet agents noted  Secondary to the presentation, stroke alert was called  Initial NIH stroke scale was 7  Patient significantly aphasic with right upper extremity drift noted  Patient was protecting his airway throughout his time in the ED  The history is provided by the patient's wife  Left parietal ICH was found on CT,  4 6 cm intraparenchymal hemorrhage with surrounding edema, ICH score 1  MRI confirms evidence of brain compression and mass effect and surrounding vasogenic edema with subsequent left cerebellar hyperdensity concerning for underlying mass versus bleed and  hypertensive emergency, transferred to [facility] 4/3  Special Studies:  CT Chest/Abdomen/Pelvis 4/4:   No findings of metastatic disease in the chest abdomen pelvis  No acute compression collapse of the vertebra seen  No lytic destructive lesion seen  Emphysema  Postsurgical changes from left upper lobectomy  A linear density seen in the left mid to lower lung likely scarring    Routine surveillance can be continued    MRI Brain 4/3:  1  2 left cerebellar hemorrhagic metastases and probable 2 adjacent left parietal hemorrhagic metastasis, the more anterior, larger one having parenchymal hematoma and local edema and mass effect, correlating to the hemorrhage on the prior head CT  CT-Head 4/3: Left parietal region intraparenchymal hematoma unchanged in size measuring 6 7 cm in largest dimension      New irregular hyperdense focus at the left cerebellum likely to represent small amount of subarachnoid hemorrhage versus a new intraparenchymal hemorrhage         Did the pt report pain? No  If yes,was nursing notified/was it addressed? -    Precautions:  Fall  Neutropenic     Social:   Lives at home w/ wife  Orientation:  Place: Yes  City: Yes  Year: No (said , when prompted pt sad )  Month: Yes  NADEGE: -  Time of Day: -  Hospital: Yes  Reason for hospitalization: Yes  Education: -    Auditory Comprehension: N/A    Reading Comprehension: N/A    Oral Expression:  Auto Sequences:   NADEGE: 100%   PEYTON: Yes, had to be prompted phonemically for april   Counting from -10: wfl  Word Repetition: 90%  Phrase Completion: 100%  Responsive Namin% (Said 5 days in a week instead of 7)  Picture Namin%  Object Namin%  Divergent Naming: -  Picture Description: -  Conversation: Typical conversation, mild word finding issues, no major concerns currently  Able to make basic needs known? Yes    Written Expression: N/A    Math: N/A    Motor Speech:  Dysarthria: N/A   Imprecise artic:   Rate:   Nasality:   Breath support:   Volume:  Apraxia: -   Oral:   Verbal:  Needs further motor speech evaluation: No    Cognitive - linguistic skills:  Mildly impaired?  Cog follow up is needed        Also noted:  Aware of deficits:  Mild expressive language deficits, not making sense sometimes     Results discussed with:  Pt, family, nurse

## 2022-04-06 NOTE — PROGRESS NOTES
[facility]  Progress Note - Rajesh Raymond 1955, 74 y o  male MRN: [id number]  Unit/Bed#: [facility] 630-01 Encounter: [id number]  Primary Care Provider: Torres Quezada DO   Date and time admitted to hospital: 4/2/2022  9:42 PM    * Left parietal 4 6 cm intraparenchymal hemorrhage with surrounding cerebral edema  Assessment & Plan  Left parietal brain mass with vasogenic edema, 2 additional left cerebellar masses  · H/o small cell lung adenocarcinoma (dx 4/2021), s/p chemotherapy/surgery/radiation/immunotherapy at [facility]  · Patient denies h/o WBI  · Patient does not want to transfer back to [place] - states not up to traveling  · Patient presented with new onset apraxia and aphasia  · Mild to moderate expressive aphasia and word finding difficulty on exam, difficulty following commands  Otherwise nonfocal exam  Improving with steroids  Imaging:  · MRI brain w/wo, 4/3/22: 2 left cerebellar hemorrhagic metastases and probable 2 adjacent left parietal hemorrhagic metastasis, the more anterior, larger one having parenchymal hematoma and local edema and mass effect, correlating to the hemorrhage on the prior head CT    · CTCAP w/contast, 4/4/22: No evidence of new metastatic disease  Plan:  · Continue decadron 4q6  · Can discontinue keppra as patient did not present with seizure activity, will restart post op  · Med onc / rad onc / palliative care following  · Frequent neuro checks  · SBP < 160  · Avoid AC/AP  · Medical management per primary team  · DVT ppx: SCDs  Hold pharm ppx in the setting of hemorrhagic masses    Patient will go to the OR tomorrow with Dr Edith Booth for left parietal mass resection  He will be admitted to the ICU postop  Preop orders are placed, type and screen and preop labs ordered for today, 2 units PRBC on hold  NPO after midnight, start NSS at midnight  Patient will need medical clearance from primary team, they are aware    Please call questions or concerns  Adenocarcinoma, lung, left [facility])  Assessment & Plan  S/p preadjuvant chemotherapy, DAVID lobectomy 12/2021, adjuvant radiation, and immunotherapy  Patient only received 2 treatments of immunotherapy as this exacerbated his psoriasis  Last MRI brain in June 2021 was negative for metastasis    Essential hypertension  Assessment & Plan  SBP < 160    Depression with anxiety  Assessment & Plan  Medical management per primary team  Home medications restarted        Subjective/Objective   Chief Complaint: "I guess "I'm alright"    Subjective: Patient agitated overnight, he threw his sharri  He was then depressed and tearful when his wife arrived today  He admits that he is dealing with a lot of emotions  Patient did not have many questions this morning but all questions his wife had were answered to their satisfaction  No new neurological complaints today  Objective: Patient sitting in bed in NAD  Appears depressed  Invasive Devices  Report    Central Venous Catheter Line            Port A Cath 06/17/21 Right Chest 292 days          Peripheral Intravenous Line            Peripheral IV 04/06/22 Dorsal (posterior); Left Forearm <1 day                Vitals: Blood pressure 142/74, pulse 73, temperature 99 4 °F (37 4 °C), temperature source Oral, resp  rate 18, height 5' 10" (1 778 m), weight 80 3 kg (177 lb), SpO2 99 %  ,Body mass index is 25 4 kg/m²      General appearance: alert, appears stated age, cooperative and no distress  Head: Normocephalic, without obvious abnormality, atraumatic  Eyes: EOMI, PERRL, conjugate gaze  Lungs: non labored breathing  Heart: regular heart rate  Neurologic:   Mental status: Alert, oriented x3 (year was wrong, but able to name president today), thought content appropriate, speech clear and fluent, able to perform simple calcuations  Cranial nerves: grossly intact (Cranial nerves II-XII)  Sensory: normal to LT x4  Motor: moving all extremities without focal weakness Reflexes: 2+ and symmetric  Coordination: finger to nose normal bilaterally, no drift bilaterally    Lab Results: I have personally reviewed pertinent results  Results from last 7 days   Lab Units 04/06/22 0930 04/05/22 0526 04/04/22 0521 04/03/22 0535 04/03/22  0535   WBC Thousand/uL 7 72 10 01 9 54   < > 10 54*   HEMOGLOBIN g/dL 14 5 13 3 13 3   < > 14 4   HEMATOCRIT % 45 4 40 8 40 2   < > 45 6   PLATELETS Thousands/uL 114* 107* 112*   < > 118*   NEUTROS PCT %  --   --   --   --  88*   MONOS PCT %  --   --   --   --  5   MONO PCT %  --  0*  --   --   --     < > = values in this interval not displayed  Results from last 7 days   Lab Units 04/06/22 0722 04/05/22 0526 04/04/22 0521 04/03/22  0535 04/03/22  0535 04/02/22 1942 04/02/22 1942   POTASSIUM mmol/L 3 9 3 9 4 0   < > 4 3   < > 4 1   CHLORIDE mmol/L 108 108 108   < > 104   < > 99   CO2 mmol/L 25 24 22   < > 29   < > 28   BUN mg/dL 32* 36* 34*   < > 21   < > 25   CREATININE mg/dL 1 10 1 14 1 19   < > 1 17   < > 1 32*   CALCIUM mg/dL 9 3 9 0 9 1   < > 9 5   < > 9 9   ALK PHOS U/L  --   --  53  --  65  --  55   ALT U/L  --   --  35  --  43  --  36   AST U/L  --   --  14  --  13  --  24    < > = values in this interval not displayed  Results from last 7 days   Lab Units 04/04/22 0521 04/03/22  0535   MAGNESIUM mg/dL 2 5 2 2     Results from last 7 days   Lab Units 04/04/22 0521 04/03/22  0535   PHOSPHORUS mg/dL 3 6 4 0     Results from last 7 days   Lab Units 04/03/22 0535 04/02/22 1942   INR  0 96 0 90   PTT seconds  --  30     No results found for: TROPONINT  ABG:No results found for: PHART, QMB9EEQ, PO2ART, ERL9GQZ, A7WXMHPD, BEART, SOURCE    Imaging Studies: I have personally reviewed pertinent reports  and I have personally reviewed pertinent films in PACS     CT head wo contrast    Result Date: 4/3/2022  Narrative: CT BRAIN - WITHOUT CONTRAST INDICATION:   Intracerebral hemorrhage  COMPARISON:  None   TECHNIQUE:  CT examination of the brain was performed  In addition to axial images, sagittal and coronal 2D reformatted images were created and submitted for interpretation  Radiation dose length product (DLP) for this visit:  731 47 mGy-cm   This examination, like all CT scans performed in the [facility], was performed utilizing techniques to minimize radiation dose exposure, including the use of iterative  reconstruction and automated exposure control  IMAGE QUALITY:  Diagnostic  FINDINGS: PARENCHYMA:  No intracranial mass or midline shift  No CT signs of acute infarction  6 7 cm intraparenchymal hematoma centered in the left parietal white matter unchanged in size with surrounding vasogenic edema  Irregular hyperdensity at the left cerebellum  VENTRICLES AND EXTRA-AXIAL SPACES:  Effacement of left occipital horn  P[place] Ursula VISUALIZED ORBITS AND PARANASAL SINUSES:  Unremarkable  CALVARIUM AND EXTRACRANIAL SOFT TISSUES:  Normal      Impression: Left parietal region intraparenchymal hematoma unchanged in size measuring 6 7 cm in largest dimension  New irregular hyperdense focus at the left cerebellum likely to represent small amount of subarachnoid hemorrhage versus a new intraparenchymal hemorrhage  Workstation performed: [id number]     MRI brain w wo contrast    Result Date: 4/3/2022  Narrative: MRI BRAIN WITH AND WITHOUT CONTRAST INDICATION: MRI w/ and w/o contrast    History of adenocarcinoma along with apraxia and dysphasia  Intracranial hemorrhage  COMPARISON:  6/18/2021; 4/3/2022 TECHNIQUE: Sagittal T1, axial T2, axial FLAIR, axial T1, axial Revloc, axial diffusion  Sagittal, axial T1 postcontrast   Axial bravo postcontrast with coronal reconstructions  IV Contrast:  8 mL of Gadobutrol injection (SINGLE-DOSE)  IMAGE QUALITY:   Diagnostic  FINDINGS: BRAIN PARENCHYMA:  There is a hemorrhagic mass in the left parietal lobe with surrounding edema and blooming artifact    Within this mass there is intrinsic T1 high signal indicative of blood products  However along the anterior margin of the lesion there is patchy internal enhancement image 18, series 10 worrisome for hemorrhagic metastasis in this patient with a history of lung cancer  Separately there is a small cortical focus of enhancement more posteriorly along the edge of the vasogenic edema in the left parietal lobe image 92, series 11, having a corresponding focus of blooming artifact on susceptibility weighted image 72, series 6 indicative of a satellite metastasis  2 additional small left cerebellar metastases are noted adjacent to one another both enhancing with edema and hemorrhagic elements measuring approximately 1 5 cm each on image 8, series 10  There is local sulcal effacement in the left parietal lobe without subfalcine herniation  There is no diffusion restriction  Cerebellar tonsils are normally positioned  VENTRICLES:  There is mild mass effect on the posterior body and occipital horn left lateral ventricle  SELLA AND PITUITARY GLAND:  Normal  ORBITS:  Normal  PARANASAL SINUSES:  There is a mucous retention cyst in the right maxillary sinus  VASCULATURE:  Evaluation of the major intracranial vasculature demonstrates appropriate flow voids  CALVARIUM AND SKULL BASE:  Normal  EXTRACRANIAL SOFT TISSUES:  Normal      Impression: 1   2 left cerebellar hemorrhagic metastases and probable 2 adjacent left parietal hemorrhagic metastasis, the more anterior, larger one having parenchymal hematoma and local edema and mass effect, correlating to the hemorrhage on the prior head CT  I personally discussed this study with Dr Selene Moreno on 4/3/2022 at 2:19 PM  Workstation performed: [id number]     CT chest abdomen pelvis w contrast    Result Date: 4/4/2022  Narrative: CT CHEST, ABDOMEN AND PELVIS WITH IV CONTRAST INDICATION:   Metastatic disease evaluation Evaluate for metastatic disease   COMPARISON:  CT from December 6, 2020 4/20/2021, April 8, 2021 TECHNIQUE: CT examination of the chest, abdomen and pelvis was performed  Axial, sagittal, and coronal 2D reformatted images were created from the source data and submitted for interpretation  Radiation dose length product (DLP) for this visit:  1061 02 mGy-cm   This examination, like all CT scans performed in the [facility], was performed utilizing techniques to minimize radiation dose exposure, including the use of iterative reconstruction and automated exposure control  IV Contrast:  100 mL of iohexol (OMNIPAQUE) Enteric Contrast: Enteric contrast was administered  FINDINGS: CHEST LUNGS:  Trachea and central bronchi are patent Emphysema seen postsurgical changes of left upper lobectomy noted No new consolidation seen Linear density seen in the left mid to lower lung, in image 37 series 2 Left upper  lung granuloma seen, in image 75 series 604 The right upper lobe granuloma seen in image 40 series 604 PLEURA:  No pleural effusion or pneumothorax HEART/GREAT VESSELS: Heart is unremarkable for patient's age  No thoracic aortic aneurysm  Ascending aorta measures 3 7 cm The descending thoracic aorta measures 2 4 cm Coronary artery calcification seen MEDIASTINUM AND OWEN:  No significant mediastinal lymph node enlargement seen CHEST WALL AND LOWER NECK: A healing left 5th rib fractures ABDOMEN LIVER/BILIARY TREE:  No focal liver lesion seen GALLBLADDER:  Gallbladder appear unremarkable SPLEEN:  A hypodense splenic lesion seen, measuring 1 7 cm, stable since previous study PANCREAS:  Unremarkable  ADRENAL GLANDS:  Unremarkable  KIDNEYS/URETERS:  No hydronephrosis or urinary tract calculus  One or more sharply circumscribed subcentimeter renal hypodensities are present, too small to accurately characterize, and statistically most likely benign findings  According to recent literature (Radiology 2019) no further workup of these findings is recommended   Renal cysts are noted bilaterally STOMACH AND BOWEL:  Diverticulosis seen No abnormal dilation of the small bowel loops seen APPENDIX:  No findings to suggest appendicitis  ABDOMINOPELVIC CAVITY:  No ascites  No pneumoperitoneum  No lymphadenopathy  VESSELS:  Unremarkable for patient's age  PELVIS REPRODUCTIVE ORGANS:  Prostate appears unremarkable URINARY BLADDER:  Unremarkable  ABDOMINAL WALL/INGUINAL REGIONS:  Unremarkable  OSSEOUS STRUCTURES:  No acute fracture or destructive osseous lesion  Impression: No findings of metastatic disease in the chest abdomen pelvis  No acute compression collapse of the vertebra seen No lytic destructive lesion seen Emphysema Postsurgical changes from left upper lobectomy A linear density seen in the left mid to lower lung likely scarring  Routine surveillance can be continued Workstation performed: [id number]     CT stroke alert brain    Result Date: 4/2/2022  Narrative: CT BRAIN - STROKE ALERT PROTOCOL INDICATION:   Right-sided weakness, expressive and receptive aphasia known history of cancer  COMPARISON:  None  TECHNIQUE:  CT examination of the brain was performed  In addition to axial images, coronal reformatted images were created and submitted for interpretation  Radiation dose length product (DLP) for this visit:  884 81 mGy-cm   This examination, like all CT scans performed in the [facility], was performed utilizing techniques to minimize radiation dose exposure, including the use of iterative  reconstruction and automated exposure control  IMAGE QUALITY:  Diagnostic  FINDINGS:  PARENCHYMA:  Left parietal 4 6 x 3 2 x 4 6 cm intraparenchymal hemorrhage with surrounding cerebral edema  No midline shift  VENTRICLES AND EXTRA-AXIAL SPACES:  Normal for patient's age  VISUALIZED ORBITS AND PARANASAL SINUSES:  Unremarkable  CALVARIUM AND EXTRACRANIAL SOFT TISSUES:   Normal      Impression: Left parietal 4 6 cm intraparenchymal hemorrhage with surrounding cerebral edema  No midline shift    I personally discussed this study with neurologist on 4/2/2022 at 7:45 PM  Workstation performed: [id number]     CTA stroke alert (head/neck)    Result Date: 4/2/2022  Narrative: CTA NECK AND BRAIN WITH CONTRAST INDICATION: Right-sided weakness, aphasia COMPARISON:   None  TECHNIQUE:   Post contrast imaging was performed after administration of iodinated contrast through the neck and brain  Post contrast axial 0 625 mm images timed to opacify the arterial system  3D rendering was performed on an independent workstation  MIP reconstructions performed  Coronal reconstructions were performed of the noncontrast portion of the brain  Radiation dose length product (DLP) for this visit:  415 61 mGy-cm   This examination, like all CT scans performed in the [facility], was performed utilizing techniques to minimize radiation dose exposure, including the use of iterative  reconstruction and automated exposure control  IV Contrast:  85 mL of iohexol (OMNIPAQUE)  IMAGE QUALITY:   Diagnostic FINDINGS: CERVICAL VASCULATURE AORTIC ARCH AND GREAT VESSELS:  Moderate atherosclerotic disease of the arch and great vessels  RIGHT VERTEBRAL ARTERY CERVICAL SEGMENT:  Severe stenosis at the origin  Severe critical near occlusive stenosis right proximal vertebral artery near its origin  Moderate segmental plaque stenosis throughout the right vertebral artery LEFT VERTEBRAL ARTERY CERVICAL SEGMENT: Nondominant and threadlike left vertebral artery RIGHT EXTRACRANIAL CAROTID SEGMENT:  Severe atherosclerotic disease of the bifurcation  Approximately 70-75% right proximal cervical ICA plaque stenosis LEFT EXTRACRANIAL CAROTID SEGMENT:  Normal caliber common carotid artery  Normal bifurcation and cervical internal carotid artery  No stenosis or dissection  NASCET criteria was used to determine the degree of internal carotid artery diameter stenosis   INTRACRANIAL VASCULATURE INTERNAL CAROTID ARTERIES:  Normal enhancement of the intracranial portions of the internal carotid arteries  Normal ophthalmic artery origins  Normal ICA terminus  ANTERIOR CIRCULATION:  Symmetric A1 segments and anterior cerebral arteries with normal enhancement  Normal anterior communicating artery  MIDDLE CEREBRAL ARTERY CIRCULATION:  M1 segment and middle cerebral artery branches demonstrate normal enhancement bilaterally  DISTAL VERTEBRAL ARTERIES:  Normal distal vertebral arteries  Posterior inferior cerebellar artery origins are normal  Normal vertebral basilar junction  BASILAR ARTERY:  Basilar artery is normal in caliber  Normal superior cerebellar arteries  POSTERIOR CEREBRAL ARTERIES: Both posterior cerebral arteries arises from the basilar tip  Both arteries demonstrate normal enhancement  Normal posterior communicating arteries  VENOUS STRUCTURES:  Normal  NON VASCULAR ANATOMY BONY STRUCTURES:  No acute osseous abnormality  SOFT TISSUES OF THE NECK:  Normal  THORACIC INLET:  Emphysematous changes     Impression: Severe right vertebral artery stenosis at the origin  Severe critical near occlusive stenosis right proximal vertebral artery near its origin spanning approximately 2 2 cm in cranial caudal dimension  Approximately 70-75% right proximal cervical ICA plaque stenosis  I personally discussed this study with neurologist on 4/2/2022 at 7:50 PM   Workstation performed: [id number]     Echo follow up/limited w/ contrast if indicated    Result Date: 4/3/2022  Narrative: Masoud Garcia  Left Ventricle: Left ventricular cavity size is normal  Wall thickness is normal  The left ventricular ejection fraction is 65%  Systolic function is normal  Wall motion is normal  Diastolic function is mildly abnormal, consistent with grade I (abnormal) relaxation    Tricuspid Valve: There is mild regurgitation  The estimated right ventricular systolic pressure is 50 97 mmHg  EKG, Pathology, and Other Studies: I have personally reviewed pertinent reports        VTE Pharmacologic Prophylaxis: Reason for no pharmacologic prophylaxis hemorrhagic masses    VTE Mechanical Prophylaxis: sequential compression device

## 2022-04-06 NOTE — PROGRESS NOTES
[address] [facility]  Progress Note - Adin Peter 1955, 74 y o  male MRN: [id number]  Unit/Bed#: [facility] 630-01 Encounter: [id number]  Primary Care Provider: Ivania Costa DO   Date and time admitted to hospital: 4/2/2022  9:42 PM    * Left parietal 4 6 cm intraparenchymal hemorrhage with surrounding cerebral edema  Assessment & Plan  Patient presented with new onset apraxias and aphasia  Found to have a left parietal brain mass with vasogenic edema, 2 additional left cerebellar masses  Patient has a known history of small-cell lung cancer (dx 4/2021) status post chemotherapy/surgery/radiation/immunotherapy at [facility], [facility]  · CT chest abdomen pelvis without any evidence of metastatic disease  · Neurology consulted--no current recommendations  · Neurosurgery following, input appreciated  · Planning for surgery intervention 4/7--will ask for attending attestation regarding clearance  · Image continue Decadron 4 mg q 6 hours, no need for keppra at this point  · SBP <160  · Patient was started on salt tablets in ICU for unclear reason, sodium has been normal, will start to wean  · Holding DVT prophylaxis at this time    Adenocarcinoma, lung, left [facility])  Assessment & Plan  [place] [facility] currently receiving adjuvant immunotherapy and has completed 2 cycles of atezolizumab  · New mets to brain, see plan above in primary problem   · [address] per wife's request, patient known to Dr Clif Benoit locally  · Holding immunotherapy  · May need radiation  · Palliative following     Hypothyroidism  Assessment & Plan  Patient felt to have drug annoys thyroiditis with hypothyroidism   Synthroid increased from home dose of 25 mcg daily to 37 5 mcg daily by endocrinology  · Will need repeat TSH/free T4 in about 4-6 weeks    Goals of care, counseling/discussion  Assessment & Plan  Palliative care was consulted in the setting of lung cancer with Mets to brain  · At this time, patient wishes for ongoing medical management without limits, is hopeful to continue receiving disease directed therapies with goal of maintaining independence and prolonging survival, however he is clear he would not want artificial life prolonging cares if in a persistent unresponsive state  · Appreciate palliative input ongoing    Thrombocytopenia ([address] )  Assessment & Plan  Appears somewhat chronic dating back  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  · Holding DVT prophylaxis given hemorrhagic brain mets  · No specific treatment required at this time   · Monitor CBC     Psoriasis  Assessment & Plan  Noted to have worsening while receiving immunotherapy, did not complete 3rd cycle and required high dose prednisone, was on taper on admission  · Now on decadron as above      Mixed hyperlipidemia  Assessment & Plan  · Continue statin    Essential hypertension  Assessment & Plan  SBP goal less than 160  · Continue home dose Norvasc 10 mg daily    Depression with anxiety  Assessment & Plan  · Continue home celexa and doxepin           VTE Pharmacologic Prophylaxis: VTE Score: 9 Moderate Risk (Score 3-4) - Pharmacological DVT Prophylaxis Contraindicated  Sequential Compression Devices Ordered  Patient Centered Rounds: I performed bedside rounds with nursing staff today  Discussions with Specialists or Other Care Team Provider: appreciate neurosurgery input    Education and Discussions with Family / Patient: Attempted to update  (wife) via phone  Left voicemail  Time Spent for Care: 30 minutes  More than 50% of total time spent on counseling and coordination of care as described above      Current Length of Stay: 4 day(s)  Current Patient Status: Inpatient   Certification Statement: The patient will continue to require additional inpatient hospital stay due to surgery plan  Discharge Plan: Anticipate discharge in >72 hrs to discharge location to be determined pending rehab evaluations  Code Status: Level 1 - Full Code    Subjective:   Doing okay  Just woke up  No major complaints  Feels somewhat confused from his baseline  Objective:     Vitals:   Temp (24hrs), Av 4 °F (36 9 °C), Min:98 1 °F (36 7 °C), Max:99 4 °F (37 4 °C)    Temp:  [98 1 °F (36 7 °C)-99 4 °F (37 4 °C)] 99 4 °F (37 4 °C)  HR:  [71-84] 73  Resp:  [16-19] 18  BP: (123-143)/(74-82) 142/74  SpO2:  [96 %-99 %] 99 %  Body mass index is 25 4 kg/m²  Input and Output Summary (last 24 hours): Intake/Output Summary (Last 24 hours) at 2022 0736  Last data filed at 2022 1900  Gross per 24 hour   Intake 540 ml   Output 500 ml   Net 40 ml       Physical Exam:   Physical Exam  Vitals and nursing note reviewed  Constitutional:       General: He is not in acute distress  Cardiovascular:      Rate and Rhythm: Normal rate and regular rhythm  Pulmonary:      Effort: No respiratory distress  Abdominal:      General: There is no distension  Tenderness: There is no abdominal tenderness  Musculoskeletal:      Right lower leg: No edema  Left lower leg: No edema  Neurological:      Mental Status: He is disoriented  Comments: Knows his name, that he's in hospital  Unclear of year  Mentation/speech slowed  Psychiatric:      Comments: Flat           Additional Data:     Labs:  Results from last 7 days   Lab Units 22  0707 22  0526 22  0526 22  0521 22  0535   WBC Thousand/uL 7 72   < > 10 01   < > 10 54*   HEMOGLOBIN g/dL 14 5   < > 13 3   < > 14 4   HEMATOCRIT % 45 4   < > 40 8   < > 45 6   PLATELETS Thousands/uL 114*   < > 107*   < > 118*   NEUTROS PCT %  --   --   --   --  88*   LYMPHS PCT %  --   --   --   --  6*   LYMPHO PCT %  --   --  4*  --   --    MONOS PCT %  --   --   --   --  5   MONO PCT %  --   --  0*  --   --    EOS PCT %  --   --  0  --  0    < > = values in this interval not displayed       Results from last 7 days   Lab Units 04/05/22  0526 04/04/22  0521 04/04/22  0521   SODIUM mmol/L 139   < > 139   POTASSIUM mmol/L 3 9   < > 4 0   CHLORIDE mmol/L 108   < > 108   CO2 mmol/L 24   < > 22   BUN mg/dL 36*   < > 34*   CREATININE mg/dL 1 14   < > 1 19   ANION GAP mmol/L 7   < > 9   CALCIUM mg/dL 9 0   < > 9 1   ALBUMIN g/dL  --   --  3 1*   TOTAL BILIRUBIN mg/dL  --   --  0 38   ALK PHOS U/L  --   --  53   ALT U/L  --   --  35   AST U/L  --   --  14   GLUCOSE RANDOM mg/dL 129   < > 135    < > = values in this interval not displayed  Results from last 7 days   Lab Units 04/03/22  0535   INR  0 96     Results from last 7 days   Lab Units 04/02/22  1928   POC GLUCOSE mg/dl 104     Results from last 7 days   Lab Units 04/03/22  0535   HEMOGLOBIN A1C % 5 2           Lines/Drains:  Invasive Devices  Report    Central Venous Catheter Line            Port A Cath 06/17/21 Right Chest 292 days          Peripheral Intravenous Line            Peripheral IV 04/06/22 Dorsal (posterior); Left Forearm <1 day                Central Line:  Goal for removal: chronic chemo port              Imaging: No pertinent imaging reviewed      Recent Cultures (last 7 days):         Last 24 Hours Medication List:   Current Facility-Administered Medications   Medication Dose Route Frequency Provider Last Rate    amLODIPine  10 mg Oral Daily Travisguerline Nettles      citalopram  10 mg Oral Daily Smita Sanchez PA-C      dexamethasone  4 mg Intravenous Q6H [facility] Travisguerline Nettles      doxepin  10 mg Oral HS Leslee Valero PA-C      Labetalol HCl  10 mg Intravenous Q4H PRN Giselle Mena MD      levothyroxine  37 5 mcg Oral Early Morning Mary Grace Corral DO      meclizine  12 5 mg Oral Q8H PRN Crow Medeiros PA-C      melatonin  6 mg Oral HS Travisguerline Nettles      ondansetron  4 mg Intravenous Q6H PRN Mika Jones      perflutren lipid microsphere  0 4 mL/min Intravenous Once in imaging Mika Jones      pravastatin  80 mg Oral Daily With [employer] JOSE EDUARDO Valero      sodium chloride  1 g Oral BID With Meals Ministerio Cheatham PA-C          Today, Patient Was Seen By: Ministerio Cheatham PA-C    **Please Note: This note may have been constructed using a voice recognition system  **

## 2022-04-06 NOTE — SOCIAL WORK
LSW met with patient and wife to provide continuity of care and support  The patient reports that physically he is feeling better than yesterday due to being able to get out of bed and walk around  The majority of the conversation was their concerns regarding overnight nursing care    SANGW did inform nursing manager that they would like to discuss further with her    Palliative will continue to follow for support

## 2022-04-07 ENCOUNTER — ANESTHESIA (INPATIENT)
Dept: PERIOP | Facility: HOSPITAL | Age: 67
DRG: 826 | End: 2022-04-07
Payer: MEDICARE

## 2022-04-07 LAB
ANION GAP SERPL CALCULATED.3IONS-SCNC: 6 MMOL/L (ref 4–13)
ANISOCYTOSIS BLD QL SMEAR: PRESENT
ARTIFACT: PRESENT
BASOPHILS # BLD MANUAL: 0 THOUSAND/UL (ref 0–0.1)
BASOPHILS NFR MAR MANUAL: 0 % (ref 0–1)
BUN SERPL-MCNC: 30 MG/DL (ref 5–25)
CALCIUM SERPL-MCNC: 8.9 MG/DL (ref 8.3–10.1)
CHLORIDE SERPL-SCNC: 104 MMOL/L (ref 100–108)
CO2 SERPL-SCNC: 28 MMOL/L (ref 21–32)
CREAT SERPL-MCNC: 1.15 MG/DL (ref 0.6–1.3)
EOSINOPHIL # BLD MANUAL: 0 THOUSAND/UL (ref 0–0.4)
EOSINOPHIL NFR BLD MANUAL: 0 % (ref 0–6)
ERYTHROCYTE [DISTWIDTH] IN BLOOD BY AUTOMATED COUNT: 18 % (ref 11.6–15.1)
GFR SERPL CREATININE-BSD FRML MDRD: 65 ML/MIN/1.73SQ M
GLUCOSE SERPL-MCNC: 121 MG/DL (ref 65–140)
GLUCOSE SERPL-MCNC: 138 MG/DL (ref 65–140)
HCT VFR BLD AUTO: 41.6 % (ref 36.5–49.3)
HGB BLD-MCNC: 13.5 G/DL (ref 12–17)
LYMPHOCYTES # BLD AUTO: 0.07 THOUSAND/UL (ref 0.6–4.47)
LYMPHOCYTES # BLD AUTO: 1 % (ref 14–44)
MCH RBC QN AUTO: 31.3 PG (ref 26.8–34.3)
MCHC RBC AUTO-ENTMCNC: 32.5 G/DL (ref 31.4–37.4)
MCV RBC AUTO: 97 FL (ref 82–98)
MONOCYTES # BLD AUTO: 0.07 THOUSAND/UL (ref 0–1.22)
MONOCYTES NFR BLD: 1 % (ref 4–12)
NEUTROPHILS # BLD MANUAL: 6.64 THOUSAND/UL (ref 1.85–7.62)
NEUTS SEG NFR BLD AUTO: 98 % (ref 43–75)
PLATELET # BLD AUTO: 111 THOUSANDS/UL (ref 149–390)
PLATELET BLD QL SMEAR: ABNORMAL
PMV BLD AUTO: 10.2 FL (ref 8.9–12.7)
POTASSIUM SERPL-SCNC: 4.1 MMOL/L (ref 3.5–5.3)
RBC # BLD AUTO: 4.31 MILLION/UL (ref 3.88–5.62)
RBC MORPH BLD: PRESENT
SODIUM SERPL-SCNC: 138 MMOL/L (ref 136–145)
WBC # BLD AUTO: 6.78 THOUSAND/UL (ref 4.31–10.16)

## 2022-04-07 PROCEDURE — 61781 SCAN PROC CRANIAL INTRA: CPT | Performed by: NEUROLOGICAL SURGERY

## 2022-04-07 PROCEDURE — 88342 IMHCHEM/IMCYTCHM 1ST ANTB: CPT | Performed by: PATHOLOGY

## 2022-04-07 PROCEDURE — 85007 BL SMEAR W/DIFF WBC COUNT: CPT | Performed by: INTERNAL MEDICINE

## 2022-04-07 PROCEDURE — 61510 CRNEC TREPH EXC BRN TUM STTL: CPT | Performed by: NEUROLOGICAL SURGERY

## 2022-04-07 PROCEDURE — 88331 PATH CONSLTJ SURG 1 BLK 1SPC: CPT | Performed by: PATHOLOGY

## 2022-04-07 PROCEDURE — 00B00ZZ EXCISION OF BRAIN, OPEN APPROACH: ICD-10-PCS | Performed by: NEUROLOGICAL SURGERY

## 2022-04-07 PROCEDURE — 82948 REAGENT STRIP/BLOOD GLUCOSE: CPT

## 2022-04-07 PROCEDURE — 88307 TISSUE EXAM BY PATHOLOGIST: CPT | Performed by: PATHOLOGY

## 2022-04-07 PROCEDURE — 99291 CRITICAL CARE FIRST HOUR: CPT | Performed by: EMERGENCY MEDICINE

## 2022-04-07 PROCEDURE — C1713 ANCHOR/SCREW BN/BN,TIS/BN: HCPCS | Performed by: NEUROLOGICAL SURGERY

## 2022-04-07 PROCEDURE — 85027 COMPLETE CBC AUTOMATED: CPT | Performed by: INTERNAL MEDICINE

## 2022-04-07 PROCEDURE — 88341 IMHCHEM/IMCYTCHM EA ADD ANTB: CPT | Performed by: PATHOLOGY

## 2022-04-07 PROCEDURE — 80048 BASIC METABOLIC PNL TOTAL CA: CPT | Performed by: INTERNAL MEDICINE

## 2022-04-07 PROCEDURE — 00970ZZ DRAINAGE OF CEREBRAL HEMISPHERE, OPEN APPROACH: ICD-10-PCS | Performed by: NEUROLOGICAL SURGERY

## 2022-04-07 DEVICE — IMPLANTABLE DEVICE
Type: IMPLANTABLE DEVICE | Site: CRANIAL | Status: FUNCTIONAL
Brand: THINFLAP SYSTEM

## 2022-04-07 RX ORDER — ALBUMIN, HUMAN INJ 5% 5 %
SOLUTION INTRAVENOUS CONTINUOUS PRN
Status: DISCONTINUED | OUTPATIENT
Start: 2022-04-07 | End: 2022-04-07

## 2022-04-07 RX ORDER — SODIUM CHLORIDE 9 MG/ML
INJECTION, SOLUTION INTRAVENOUS CONTINUOUS PRN
Status: DISCONTINUED | OUTPATIENT
Start: 2022-04-07 | End: 2022-04-07

## 2022-04-07 RX ORDER — CEFAZOLIN SODIUM 2 G/50ML
SOLUTION INTRAVENOUS AS NEEDED
Status: DISCONTINUED | OUTPATIENT
Start: 2022-04-07 | End: 2022-04-07

## 2022-04-07 RX ORDER — DOCUSATE SODIUM 100 MG/1
100 CAPSULE, LIQUID FILLED ORAL 2 TIMES DAILY
Status: DISCONTINUED | OUTPATIENT
Start: 2022-04-07 | End: 2022-04-11 | Stop reason: HOSPADM

## 2022-04-07 RX ORDER — ACETAMINOPHEN 325 MG/1
975 TABLET ORAL EVERY 8 HOURS SCHEDULED
Status: DISCONTINUED | OUTPATIENT
Start: 2022-04-07 | End: 2022-04-11 | Stop reason: HOSPADM

## 2022-04-07 RX ORDER — SODIUM CHLORIDE 9 MG/ML
75 INJECTION, SOLUTION INTRAVENOUS CONTINUOUS
Status: DISCONTINUED | OUTPATIENT
Start: 2022-04-07 | End: 2022-04-08

## 2022-04-07 RX ORDER — PROPOFOL 10 MG/ML
INJECTION, EMULSION INTRAVENOUS CONTINUOUS PRN
Status: DISCONTINUED | OUTPATIENT
Start: 2022-04-07 | End: 2022-04-07

## 2022-04-07 RX ORDER — DEXAMETHASONE 4 MG/1
4 TABLET ORAL EVERY 8 HOURS SCHEDULED
Status: COMPLETED | OUTPATIENT
Start: 2022-04-09 | End: 2022-04-11

## 2022-04-07 RX ORDER — OXYCODONE HYDROCHLORIDE 10 MG/1
10 TABLET ORAL EVERY 4 HOURS PRN
Status: DISCONTINUED | OUTPATIENT
Start: 2022-04-07 | End: 2022-04-11 | Stop reason: HOSPADM

## 2022-04-07 RX ORDER — MIDAZOLAM HYDROCHLORIDE 2 MG/2ML
INJECTION, SOLUTION INTRAMUSCULAR; INTRAVENOUS AS NEEDED
Status: DISCONTINUED | OUTPATIENT
Start: 2022-04-07 | End: 2022-04-07

## 2022-04-07 RX ORDER — CEFAZOLIN SODIUM 2 G/50ML
2000 SOLUTION INTRAVENOUS ONCE
Status: DISCONTINUED | OUTPATIENT
Start: 2022-04-07 | End: 2022-04-07 | Stop reason: HOSPADM

## 2022-04-07 RX ORDER — LIDOCAINE HYDROCHLORIDE 10 MG/ML
INJECTION, SOLUTION EPIDURAL; INFILTRATION; INTRACAUDAL; PERINEURAL AS NEEDED
Status: DISCONTINUED | OUTPATIENT
Start: 2022-04-07 | End: 2022-04-07

## 2022-04-07 RX ORDER — ONDANSETRON 2 MG/ML
INJECTION INTRAMUSCULAR; INTRAVENOUS AS NEEDED
Status: DISCONTINUED | OUTPATIENT
Start: 2022-04-07 | End: 2022-04-07

## 2022-04-07 RX ORDER — DEXAMETHASONE 2 MG/1
2 TABLET ORAL EVERY 6 HOURS SCHEDULED
Status: DISCONTINUED | OUTPATIENT
Start: 2022-04-11 | End: 2022-04-11 | Stop reason: HOSPADM

## 2022-04-07 RX ORDER — SENNOSIDES 8.8 MG/5ML
8.8 LIQUID ORAL DAILY
Status: DISCONTINUED | OUTPATIENT
Start: 2022-04-07 | End: 2022-04-10

## 2022-04-07 RX ORDER — HYDROMORPHONE HCL/PF 1 MG/ML
0.5 SYRINGE (ML) INJECTION
Status: DISCONTINUED | OUTPATIENT
Start: 2022-04-07 | End: 2022-04-08

## 2022-04-07 RX ORDER — SODIUM CHLORIDE, SODIUM LACTATE, POTASSIUM CHLORIDE, CALCIUM CHLORIDE 600; 310; 30; 20 MG/100ML; MG/100ML; MG/100ML; MG/100ML
INJECTION, SOLUTION INTRAVENOUS CONTINUOUS PRN
Status: DISCONTINUED | OUTPATIENT
Start: 2022-04-07 | End: 2022-04-07

## 2022-04-07 RX ORDER — LEVETIRACETAM 500 MG/1
500 TABLET ORAL EVERY 12 HOURS SCHEDULED
Status: DISCONTINUED | OUTPATIENT
Start: 2022-04-07 | End: 2022-04-07

## 2022-04-07 RX ORDER — DEXAMETHASONE 2 MG/1
2 TABLET ORAL EVERY 12 HOURS SCHEDULED
Status: DISCONTINUED | OUTPATIENT
Start: 2022-04-15 | End: 2022-04-11 | Stop reason: HOSPADM

## 2022-04-07 RX ORDER — FUROSEMIDE 10 MG/ML
INJECTION INTRAMUSCULAR; INTRAVENOUS AS NEEDED
Status: DISCONTINUED | OUTPATIENT
Start: 2022-04-07 | End: 2022-04-07

## 2022-04-07 RX ORDER — PROPOFOL 10 MG/ML
INJECTION, EMULSION INTRAVENOUS AS NEEDED
Status: DISCONTINUED | OUTPATIENT
Start: 2022-04-07 | End: 2022-04-07

## 2022-04-07 RX ORDER — HYDROMORPHONE HCL/PF 1 MG/ML
0.5 SYRINGE (ML) INJECTION
Status: DISCONTINUED | OUTPATIENT
Start: 2022-04-07 | End: 2022-04-07 | Stop reason: HOSPADM

## 2022-04-07 RX ORDER — HYDROMORPHONE HCL IN WATER/PF 6 MG/30 ML
0.2 PATIENT CONTROLLED ANALGESIA SYRINGE INTRAVENOUS
Status: DISCONTINUED | OUTPATIENT
Start: 2022-04-07 | End: 2022-04-07 | Stop reason: HOSPADM

## 2022-04-07 RX ORDER — ONDANSETRON 2 MG/ML
4 INJECTION INTRAMUSCULAR; INTRAVENOUS EVERY 4 HOURS PRN
Status: DISCONTINUED | OUTPATIENT
Start: 2022-04-07 | End: 2022-04-11 | Stop reason: HOSPADM

## 2022-04-07 RX ORDER — FENTANYL CITRATE 50 UG/ML
INJECTION, SOLUTION INTRAMUSCULAR; INTRAVENOUS AS NEEDED
Status: DISCONTINUED | OUTPATIENT
Start: 2022-04-07 | End: 2022-04-07

## 2022-04-07 RX ORDER — HYDROMORPHONE HCL/PF 1 MG/ML
SYRINGE (ML) INJECTION AS NEEDED
Status: DISCONTINUED | OUTPATIENT
Start: 2022-04-07 | End: 2022-04-07

## 2022-04-07 RX ORDER — LEVETIRACETAM 500 MG/1
500 TABLET ORAL EVERY 12 HOURS SCHEDULED
Status: DISCONTINUED | OUTPATIENT
Start: 2022-04-07 | End: 2022-04-11 | Stop reason: HOSPADM

## 2022-04-07 RX ORDER — LIDOCAINE HYDROCHLORIDE AND EPINEPHRINE 10; 10 MG/ML; UG/ML
INJECTION, SOLUTION INFILTRATION; PERINEURAL AS NEEDED
Status: DISCONTINUED | OUTPATIENT
Start: 2022-04-07 | End: 2022-04-07 | Stop reason: HOSPADM

## 2022-04-07 RX ORDER — LIDOCAINE HYDROCHLORIDE 10 MG/ML
0.5 INJECTION, SOLUTION EPIDURAL; INFILTRATION; INTRACAUDAL; PERINEURAL ONCE AS NEEDED
Status: DISCONTINUED | OUTPATIENT
Start: 2022-04-07 | End: 2022-04-07 | Stop reason: HOSPADM

## 2022-04-07 RX ORDER — CHLORHEXIDINE GLUCONATE 0.12 MG/ML
15 RINSE ORAL ONCE
Status: COMPLETED | OUTPATIENT
Start: 2022-04-07 | End: 2022-04-07

## 2022-04-07 RX ORDER — METHOCARBAMOL 500 MG/1
500 TABLET, FILM COATED ORAL EVERY 6 HOURS SCHEDULED
Status: DISCONTINUED | OUTPATIENT
Start: 2022-04-07 | End: 2022-04-11 | Stop reason: HOSPADM

## 2022-04-07 RX ORDER — DEXAMETHASONE SODIUM PHOSPHATE 10 MG/ML
INJECTION, SOLUTION INTRAMUSCULAR; INTRAVENOUS AS NEEDED
Status: DISCONTINUED | OUTPATIENT
Start: 2022-04-07 | End: 2022-04-07

## 2022-04-07 RX ORDER — MANNITOL 250 MG/ML
INJECTION, SOLUTION INTRAVENOUS AS NEEDED
Status: DISCONTINUED | OUTPATIENT
Start: 2022-04-07 | End: 2022-04-07

## 2022-04-07 RX ORDER — OXYCODONE HYDROCHLORIDE 5 MG/1
5 TABLET ORAL EVERY 4 HOURS PRN
Status: DISCONTINUED | OUTPATIENT
Start: 2022-04-07 | End: 2022-04-11 | Stop reason: HOSPADM

## 2022-04-07 RX ORDER — DIPHENHYDRAMINE HYDROCHLORIDE 50 MG/ML
12.5 INJECTION INTRAMUSCULAR; INTRAVENOUS ONCE AS NEEDED
Status: DISCONTINUED | OUTPATIENT
Start: 2022-04-07 | End: 2022-04-07 | Stop reason: HOSPADM

## 2022-04-07 RX ORDER — ROCURONIUM BROMIDE 10 MG/ML
INJECTION, SOLUTION INTRAVENOUS AS NEEDED
Status: DISCONTINUED | OUTPATIENT
Start: 2022-04-07 | End: 2022-04-07

## 2022-04-07 RX ORDER — DEXAMETHASONE 2 MG/1
2 TABLET ORAL EVERY 8 HOURS SCHEDULED
Status: DISCONTINUED | OUTPATIENT
Start: 2022-04-14 | End: 2022-04-11 | Stop reason: HOSPADM

## 2022-04-07 RX ORDER — CEFAZOLIN SODIUM 2 G/50ML
2000 SOLUTION INTRAVENOUS EVERY 8 HOURS
Status: COMPLETED | OUTPATIENT
Start: 2022-04-07 | End: 2022-04-08

## 2022-04-07 RX ORDER — METOCLOPRAMIDE HYDROCHLORIDE 5 MG/ML
10 INJECTION INTRAMUSCULAR; INTRAVENOUS ONCE AS NEEDED
Status: DISCONTINUED | OUTPATIENT
Start: 2022-04-07 | End: 2022-04-07 | Stop reason: HOSPADM

## 2022-04-07 RX ORDER — FENTANYL CITRATE/PF 50 MCG/ML
50 SYRINGE (ML) INJECTION
Status: DISCONTINUED | OUTPATIENT
Start: 2022-04-07 | End: 2022-04-07 | Stop reason: HOSPADM

## 2022-04-07 RX ORDER — ONDANSETRON 2 MG/ML
4 INJECTION INTRAMUSCULAR; INTRAVENOUS ONCE AS NEEDED
Status: DISCONTINUED | OUTPATIENT
Start: 2022-04-07 | End: 2022-04-07 | Stop reason: HOSPADM

## 2022-04-07 RX ORDER — GINSENG 100 MG
CAPSULE ORAL AS NEEDED
Status: DISCONTINUED | OUTPATIENT
Start: 2022-04-07 | End: 2022-04-07 | Stop reason: HOSPADM

## 2022-04-07 RX ORDER — EPHEDRINE SULFATE 50 MG/ML
INJECTION INTRAVENOUS AS NEEDED
Status: DISCONTINUED | OUTPATIENT
Start: 2022-04-07 | End: 2022-04-07

## 2022-04-07 RX ORDER — DEXAMETHASONE 2 MG/1
2 TABLET ORAL
Status: DISCONTINUED | OUTPATIENT
Start: 2022-04-18 | End: 2022-04-11 | Stop reason: HOSPADM

## 2022-04-07 RX ORDER — DEXAMETHASONE 4 MG/1
4 TABLET ORAL EVERY 6 HOURS SCHEDULED
Status: COMPLETED | OUTPATIENT
Start: 2022-04-07 | End: 2022-04-09

## 2022-04-07 RX ADMIN — MANNITOL 12.5 G: 250 INJECTION, SOLUTION INTRAVENOUS at 10:06

## 2022-04-07 RX ADMIN — CEFAZOLIN SODIUM 2000 MG: 2 SOLUTION INTRAVENOUS at 10:15

## 2022-04-07 RX ADMIN — ONDANSETRON 4 MG: 2 INJECTION INTRAMUSCULAR; INTRAVENOUS at 12:08

## 2022-04-07 RX ADMIN — EPHEDRINE SULFATE 10 MG: 50 INJECTION INTRAVENOUS at 10:04

## 2022-04-07 RX ADMIN — FENTANYL CITRATE 50 MCG: 50 INJECTION INTRAMUSCULAR; INTRAVENOUS at 12:38

## 2022-04-07 RX ADMIN — SODIUM CHLORIDE: 0.9 INJECTION, SOLUTION INTRAVENOUS at 10:36

## 2022-04-07 RX ADMIN — PRAVASTATIN SODIUM 80 MG: 80 TABLET ORAL at 16:07

## 2022-04-07 RX ADMIN — LEVETIRACETAM 500 MG: 500 TABLET, FILM COATED ORAL at 21:23

## 2022-04-07 RX ADMIN — REMIFENTANIL HYDROCHLORIDE 0.1 MCG/KG/MIN: 1 INJECTION, POWDER, LYOPHILIZED, FOR SOLUTION INTRAVENOUS at 09:35

## 2022-04-07 RX ADMIN — HYDROMORPHONE HYDROCHLORIDE 0.5 MG: 1 INJECTION, SOLUTION INTRAMUSCULAR; INTRAVENOUS; SUBCUTANEOUS at 19:23

## 2022-04-07 RX ADMIN — FENTANYL CITRATE 50 MCG: 50 INJECTION INTRAMUSCULAR; INTRAVENOUS at 12:32

## 2022-04-07 RX ADMIN — DOCUSATE SODIUM 100 MG: 100 CAPSULE, LIQUID FILLED ORAL at 17:55

## 2022-04-07 RX ADMIN — FUROSEMIDE 10 MG: 10 INJECTION, SOLUTION INTRAVENOUS at 10:00

## 2022-04-07 RX ADMIN — LEVETIRACETAM 1000 MG: 100 INJECTION, SOLUTION INTRAVENOUS at 09:56

## 2022-04-07 RX ADMIN — DOXEPIN HYDROCHLORIDE 10 MG: 10 CAPSULE ORAL at 21:24

## 2022-04-07 RX ADMIN — LIDOCAINE HYDROCHLORIDE 40 MG: 10 INJECTION, SOLUTION EPIDURAL; INFILTRATION; INTRACAUDAL; PERINEURAL at 09:21

## 2022-04-07 RX ADMIN — PHENYLEPHRINE HYDROCHLORIDE 20 MCG/MIN: 10 INJECTION INTRAVENOUS at 09:28

## 2022-04-07 RX ADMIN — SODIUM CHLORIDE, SODIUM LACTATE, POTASSIUM CHLORIDE, AND CALCIUM CHLORIDE: .6; .31; .03; .02 INJECTION, SOLUTION INTRAVENOUS at 09:14

## 2022-04-07 RX ADMIN — CEFAZOLIN SODIUM 2000 MG: 2 SOLUTION INTRAVENOUS at 17:55

## 2022-04-07 RX ADMIN — DEXAMETHASONE 4 MG: 4 TABLET ORAL at 16:07

## 2022-04-07 RX ADMIN — OXYCODONE HYDROCHLORIDE 5 MG: 5 TABLET ORAL at 16:07

## 2022-04-07 RX ADMIN — CEFAZOLIN SODIUM 2000 MG: 2 SOLUTION INTRAVENOUS at 09:13

## 2022-04-07 RX ADMIN — MANNITOL 12.5 G: 250 INJECTION, SOLUTION INTRAVENOUS at 10:10

## 2022-04-07 RX ADMIN — ALBUMIN (HUMAN): 12.5 INJECTION, SOLUTION INTRAVENOUS at 10:36

## 2022-04-07 RX ADMIN — HYDROMORPHONE HYDROCHLORIDE 0.5 MG: 1 INJECTION, SOLUTION INTRAMUSCULAR; INTRAVENOUS; SUBCUTANEOUS at 11:22

## 2022-04-07 RX ADMIN — OXYCODONE HYDROCHLORIDE 5 MG: 5 TABLET ORAL at 21:23

## 2022-04-07 RX ADMIN — PROPOFOL 30 MG: 10 INJECTION, EMULSION INTRAVENOUS at 10:44

## 2022-04-07 RX ADMIN — MANNITOL 12.5 G: 250 INJECTION, SOLUTION INTRAVENOUS at 10:08

## 2022-04-07 RX ADMIN — DEXAMETHASONE SODIUM PHOSPHATE 4 MG: 4 INJECTION INTRA-ARTICULAR; INTRALESIONAL; INTRAMUSCULAR; INTRAVENOUS; SOFT TISSUE at 05:57

## 2022-04-07 RX ADMIN — LEVOTHYROXINE SODIUM 37.5 MCG: 75 TABLET ORAL at 05:47

## 2022-04-07 RX ADMIN — MIDAZOLAM 2 MG: 1 INJECTION INTRAMUSCULAR; INTRAVENOUS at 09:14

## 2022-04-07 RX ADMIN — HYDROMORPHONE HYDROCHLORIDE 0.5 MG: 1 INJECTION, SOLUTION INTRAMUSCULAR; INTRAVENOUS; SUBCUTANEOUS at 12:44

## 2022-04-07 RX ADMIN — ACETAMINOPHEN 975 MG: 325 TABLET ORAL at 21:23

## 2022-04-07 RX ADMIN — Medication 6 MG: at 21:23

## 2022-04-07 RX ADMIN — METHOCARBAMOL 500 MG: 500 TABLET ORAL at 13:57

## 2022-04-07 RX ADMIN — SODIUM CHLORIDE: 0.9 INJECTION, SOLUTION INTRAVENOUS at 10:45

## 2022-04-07 RX ADMIN — METHOCARBAMOL 500 MG: 500 TABLET ORAL at 17:55

## 2022-04-07 RX ADMIN — ROCURONIUM BROMIDE 50 MG: 50 INJECTION INTRAVENOUS at 09:21

## 2022-04-07 RX ADMIN — SUGAMMADEX 100 MG: 100 INJECTION, SOLUTION INTRAVENOUS at 12:12

## 2022-04-07 RX ADMIN — ACETAMINOPHEN 975 MG: 325 TABLET ORAL at 13:57

## 2022-04-07 RX ADMIN — FENTANYL CITRATE 100 MCG: 50 INJECTION INTRAMUSCULAR; INTRAVENOUS at 09:21

## 2022-04-07 RX ADMIN — PROPOFOL 20 MG: 10 INJECTION, EMULSION INTRAVENOUS at 11:52

## 2022-04-07 RX ADMIN — SODIUM CHLORIDE 0.2 MCG/KG/HR: 900 INJECTION INTRAVENOUS at 09:24

## 2022-04-07 RX ADMIN — REMIFENTANIL HYDROCHLORIDE 0.2 MCG/KG/MIN: 1 INJECTION, POWDER, LYOPHILIZED, FOR SOLUTION INTRAVENOUS at 09:24

## 2022-04-07 RX ADMIN — CHLORHEXIDINE GLUCONATE 15 ML: 1.2 SOLUTION ORAL at 08:01

## 2022-04-07 RX ADMIN — ONDANSETRON 4 MG: 2 INJECTION INTRAMUSCULAR; INTRAVENOUS at 13:57

## 2022-04-07 RX ADMIN — FENTANYL CITRATE 50 MCG: 50 INJECTION INTRAMUSCULAR; INTRAVENOUS at 09:30

## 2022-04-07 RX ADMIN — PROPOFOL 150 MG: 10 INJECTION, EMULSION INTRAVENOUS at 09:21

## 2022-04-07 RX ADMIN — DEXAMETHASONE SODIUM PHOSPHATE 10 MG: 10 INJECTION, SOLUTION INTRAMUSCULAR; INTRAVENOUS at 09:21

## 2022-04-07 RX ADMIN — PROPOFOL 120 MCG/KG/MIN: 10 INJECTION, EMULSION INTRAVENOUS at 09:24

## 2022-04-07 NOTE — PLAN OF CARE
Problem: MOBILITY - ADULT  Goal: Maintain or return to baseline ADL function  Description: INTERVENTIONS:  -  Assess patient's ability to carry out ADLs; assess patient's baseline for ADL function and identify physical deficits which impact ability to perform ADLs (bathing, care of mouth/teeth, toileting, grooming, dressing, etc )  - Assess/evaluate cause of self-care deficits   - Assess range of motion  - Assess patient's mobility; develop plan if impaired  - Assess patient's need for assistive devices and provide as appropriate  - Encourage maximum independence but intervene and supervise when necessary  - Involve family in performance of ADLs  - Assess for home care needs following discharge   - Consider OT consult to assist with ADL evaluation and planning for discharge  - Provide patient education as appropriate  Outcome: Progressing  Goal: Maintains/Returns to pre admission functional level  Description: INTERVENTIONS:  - Perform BMAT or MOVE assessment daily    - Set and communicate daily mobility goal to care team and patient/family/caregiver  - Collaborate with rehabilitation services on mobility goals if consulted  - Ambulate patient 2 times a day  - Out of bed for meals 3 times a day  - Out of bed for toileting  - Record patient progress and toleration of activity level   Outcome: Progressing     Problem: Neurological Deficit  Goal: Neurological status is stable or improving  Description: Interventions:  - Monitor and assess patient's level of consciousness, motor function, sensory function, and level of assistance needed for ADLs  - Monitor and report changes from baseline  Collaborate with interdisciplinary team to initiate plan and implement interventions as ordered  - Provide and maintain a safe environment  - Consider seizure precautions  - Consider fall precautions  - Consider aspiration precautions  - Consider bleeding precautions  Outcome: Progressing     Problem:  Activity Intolerance/Impaired Mobility  Goal: Mobility/activity is maintained at optimum level for patient  Description: Interventions:  - Assess and monitor patient  barriers to mobility and need for assistive/adaptive devices  - Assess patient's emotional response to limitations  - Collaborate with interdisciplinary team and initiate plans and interventions as ordered  - Encourage independent activity per ability   - Maintain proper body alignment  - Perform active/passive rom as tolerated/ordered  - Plan activities to conserve energy   - Turn patient as appropriate  Outcome: Progressing     Problem: Communication Impairment  Goal: Ability to express needs and understand communication  Description: Assess patient's communication skills and ability to understand information  Patient will demonstrate use of effective communication techniques, alternative methods of communication and understanding even if not able to speak  - Encourage communication and provide alternate methods of communication as needed  - Collaborate with case management/ for discharge needs  - Include patient/family/caregiver in decisions related to communication  Outcome: Progressing     Problem: Potential for Aspiration  Goal: Non-ventilated patient's risk of aspiration is minimized  Description: Assess and monitor vital signs, respiratory status, and labs (WBC)  Monitor for signs of aspiration (tachypnea, cough, rales, wheezing, cyanosis, fever)  - Assess and monitor patient's ability to swallow  - Place patient up in chair to eat if possible  - HOB up at 90 degrees to eat if unable to get patient up into chair   - Supervise patient during oral intake  - Instruct patient/ family to take small bites  - Instruct patient/ family to take small single sips when taking liquids    - Follow patient-specific strategies generated by speech pathologist   Outcome: Progressing     Problem: Nutrition  Goal: Nutrition/Hydration status is improving  Description: Monitor and assess patient's nutrition/hydration status for malnutrition (ex- brittle hair, bruises, dry skin, pale skin and conjunctiva, muscle wasting, smooth red tongue, and disorientation)  Collaborate with interdisciplinary team and initiate plan and interventions as ordered  Monitor patient's weight and dietary intake as ordered or per policy  Utilize nutrition screening tool and intervene per policy  Determine patient's food preferences and provide high-protein, high-caloric foods as appropriate  - Assist patient with eating   - Allow adequate time for meals   - Encourage patient to take dietary supplement as ordered  - Collaborate with clinical nutritionist   - Include patient/family/caregiver in decisions related to nutrition    Outcome: Progressing     Problem: PAIN - ADULT  Goal: Verbalizes/displays adequate comfort level or baseline comfort level  Description: Interventions:  - Encourage patient to monitor pain and request assistance  - Assess pain using appropriate pain scale  - Administer analgesics based on type and severity of pain and evaluate response  - Implement non-pharmacological measures as appropriate and evaluate response  - Consider cultural and social influences on pain and pain management  - Notify physician/advanced practitioner if interventions unsuccessful or patient reports new pain  Outcome: Progressing     Problem: INFECTION - ADULT  Goal: Absence or prevention of progression during hospitalization  Description: INTERVENTIONS:  - Assess and monitor for signs and symptoms of infection  - Monitor lab/diagnostic results  - Monitor all insertion sites, i e  indwelling lines, tubes, and drains  - Monitor endotracheal if appropriate and nasal secretions for changes in amount and color  - [place] appropriate cooling/warming therapies per order  - Administer medications as ordered  - Instruct and encourage patient and family to use good hand hygiene technique  - Identify and instruct in appropriate isolation precautions for identified infection/condition  Outcome: Progressing     Problem: SAFETY ADULT  Goal: Maintain or return to baseline ADL function  Description: INTERVENTIONS:  -  Assess patient's ability to carry out ADLs; assess patient's baseline for ADL function and identify physical deficits which impact ability to perform ADLs (bathing, care of mouth/teeth, toileting, grooming, dressing, etc )  - Assess/evaluate cause of self-care deficits   - Assess range of motion  - Assess patient's mobility; develop plan if impaired  - Assess patient's need for assistive devices and provide as appropriate  - Encourage maximum independence but intervene and supervise when necessary  - Involve family in performance of ADLs  - Assess for home care needs following discharge   - Consider OT consult to assist with ADL evaluation and planning for discharge  - Provide patient education as appropriate  Outcome: Progressing  Goal: Maintains/Returns to pre admission functional level  Description: INTERVENTIONS:  - Perform BMAT or MOVE assessment daily    - Set and communicate daily mobility goal to care team and patient/family/caregiver     - Collaborate with rehabilitation services on mobility goals if consulted  - Ambulate patient 2 times a day  - Out of bed for meals 3 times a day  - Out of bed for toileting  - Record patient progress and toleration of activity level   Outcome: Progressing  Goal: Patient will remain free of falls  Description: INTERVENTIONS:  - Educate patient/family on patient safety including physical limitations  - Instruct patient to call for assistance with activity   - Consult OT/PT to assist with strengthening/mobility   - Keep Call bell within reach  - Keep bed low and locked with side rails adjusted as appropriate  - Keep care items and personal belongings within reach  - Initiate and maintain comfort rounds  - Make Fall Risk Sign visible to staff  - Apply yellow socks and bracelet for high fall risk patients  - Consider moving patient to room near nurses station  Outcome: Progressing     Problem: DISCHARGE PLANNING  Goal: Discharge to home or other facility with appropriate resources  Description: INTERVENTIONS:  - Identify barriers to discharge w/patient and caregiver  - Arrange for needed discharge resources and transportation as appropriate  - Identify discharge learning needs (meds, wound care, etc )  - Arrange for interpretive services to assist at discharge as needed  - Refer to Case Management Department for coordinating discharge planning if the patient needs post-hospital services based on physician/advanced practitioner order or complex needs related to functional status, cognitive ability, or social support system  Outcome: Progressing     Problem: Knowledge Deficit  Goal: Patient/family/caregiver demonstrates understanding of disease process, treatment plan, medications, and discharge instructions  Description: Complete learning assessment and assess knowledge base    Interventions:  - Provide teaching at level of understanding  - Provide teaching via preferred learning methods  Outcome: Progressing     Problem: Potential for Falls  Goal: Patient will remain free of falls  Description: INTERVENTIONS:  - Educate patient/family on patient safety including physical limitations  - Instruct patient to call for assistance with activity   - Consult OT/PT to assist with strengthening/mobility   - Keep Call bell within reach  - Keep bed low and locked with side rails adjusted as appropriate  - Keep care items and personal belongings within reach  - Initiate and maintain comfort rounds  - Make Fall Risk Sign visible to staff  - Apply yellow socks and bracelet for high fall risk patients  - Consider moving patient to room near nurses station  Outcome: Progressing     Problem: Prexisting or High Potential for Compromised Skin Integrity  Goal: Skin integrity is maintained or improved  Description: INTERVENTIONS:  - Identify patients at risk for skin breakdown  - Assess and monitor skin integrity  - Assess and monitor nutrition and hydration status  - Monitor labs   - Assess for incontinence   - Turn and reposition patient  - Assist with mobility/ambulation  - Relieve pressure over bony prominences  - Avoid friction and shearing  - Provide appropriate hygiene as needed including keeping skin clean and dry  - Evaluate need for skin moisturizer/barrier cream  - Collaborate with interdisciplinary team   - Patient/family teaching  - Consider wound care consult   Outcome: Progressing     Problem: Nutrition/Hydration-ADULT  Goal: Nutrient/Hydration intake appropriate for improving, restoring or maintaining nutritional needs  Description: Monitor and assess patient's nutrition/hydration status for malnutrition  Collaborate with interdisciplinary team and initiate plan and interventions as ordered  Monitor patient's weight and dietary intake as ordered or per policy  Utilize nutrition screening tool and intervene as necessary  Determine patient's food preferences and provide high-protein, high-caloric foods as appropriate       INTERVENTIONS:  - Monitor oral intake, urinary output, labs, and treatment plans  - Assess nutrition and hydration status and recommend course of action  - Evaluate amount of meals eaten  - Assist patient with eating if necessary   - Allow adequate time for meals  - Recommend/ encourage appropriate diets, oral nutritional supplements, and vitamin/mineral supplements  - Order, calculate, and assess calorie counts as needed  - Recommend, monitor, and adjust tube feedings and TPN/PPN based on assessed needs  - Assess need for intravenous fluids  - Provide specific nutrition/hydration education as appropriate  - Include patient/family/caregiver in decisions related to nutrition  Outcome: Progressing     Problem: COPING  Goal: Pt/Family able to verbalize concerns and demonstrate effective coping strategies  Description: INTERVENTIONS:  - Assist patient/family to identify coping skills, available support systems and cultural and spiritual values  - Provide emotional support, including active listening and acknowledgement of concerns of patient and caregivers  - Reduce environmental stimuli, as able  - Provide patient education  - Assess for spiritual pain/suffering and initiate spiritual care, including notification of Pastoral Care or [facility] based community as needed  - Assess effectiveness of coping strategies  Outcome: Progressing  Goal: Will report anxiety at manageable levels  Description: INTERVENTIONS:  - Administer medication as ordered  - Teach and encourage coping skills  - Provide emotional support  - Assess patient/family for anxiety and ability to cope  Outcome: Progressing     Problem: NEUROSENSORY - ADULT  Goal: Achieves stable or improved neurological status  Description: INTERVENTIONS  - Monitor and report changes in neurological status  - Monitor vital signs such as temperature, blood pressure, glucose, and any other labs ordered   - Initiate measures to prevent increased intracranial pressure  - Monitor for seizure activity and implement precautions if appropriate      Outcome: Progressing  Goal: Remains free of injury related to seizures activity  Description: INTERVENTIONS  - Maintain airway, patient safety  and administer oxygen as ordered  - Monitor patient for seizure activity, document and report duration and description of seizure to physician/advanced practitioner  - If seizure occurs,  ensure patient safety during seizure  - Reorient patient post seizure  - Seizure pads on all 4 side rails  - Instruct patient/family to notify RN of any seizure activity including if an aura is experienced  - Instruct patient/family to call for assistance with activity based on nursing assessment  - Administer anti-seizure medications if ordered    Outcome: Progressing  Goal: Achieves maximal functionality and self care  Description: INTERVENTIONS  - Monitor swallowing and airway patency with patient fatigue and changes in neurological status  - Encourage and assist patient to increase activity and self care     - Encourage visually impaired, hearing impaired and aphasic patients to use assistive/communication devices  Outcome: Progressing     Problem: CARDIOVASCULAR - ADULT  Goal: Maintains optimal cardiac output and hemodynamic stability  Description: INTERVENTIONS:  - Monitor I/O, vital signs and rhythm  - Monitor for S/S and trends of decreased cardiac output  - Administer and titrate ordered vasoactive medications to optimize hemodynamic stability  - Assess quality of pulses, skin color and temperature  - Assess for signs of decreased coronary artery perfusion  - Instruct patient to report change in severity of symptoms  Outcome: Progressing  Goal: Absence of cardiac dysrhythmias or at baseline rhythm  Description: INTERVENTIONS:  - Continuous cardiac monitoring, vital signs, obtain 12 lead EKG if ordered  - Administer antiarrhythmic and heart rate control medications as ordered  - Monitor electrolytes and administer replacement therapy as ordered  Outcome: Progressing     Problem: RESPIRATORY - ADULT  Goal: Achieves optimal ventilation and oxygenation  Description: INTERVENTIONS:  - Assess for changes in respiratory status  - Assess for changes in mentation and behavior  - Position to facilitate oxygenation and minimize respiratory effort  - Oxygen administered by appropriate delivery if ordered  - Initiate smoking cessation education as indicated  - Encourage broncho-pulmonary hygiene including cough, deep breathe, Incentive Spirometry  - Assess the need for suctioning and aspirate as needed  - Assess and instruct to report SOB or any respiratory difficulty  - Respiratory Therapy support as indicated  Outcome: Progressing     Problem: GASTROINTESTINAL - ADULT  Goal: Maintains or returns to baseline bowel function  Description: INTERVENTIONS:  - Assess bowel function  - Encourage oral fluids to ensure adequate hydration  - Administer IV fluids if ordered to ensure adequate hydration  - Administer ordered medications as needed  - Encourage mobilization and activity  - Consider nutritional services referral to assist patient with adequate nutrition and appropriate food choices  Outcome: Progressing  Goal: Maintains adequate nutritional intake  Description: INTERVENTIONS:  - Monitor percentage of each meal consumed  - Identify factors contributing to decreased intake, treat as appropriate  - Assist with meals as needed  - Monitor I&O, weight, and lab values if indicated  - Obtain nutrition services referral as needed  Outcome: Progressing     Problem: GENITOURINARY - ADULT  Goal: Maintains or returns to baseline urinary function  Description: INTERVENTIONS:  - Assess urinary function  - Encourage oral fluids to ensure adequate hydration if ordered  - Administer IV fluids as ordered to ensure adequate hydration  - Administer ordered medications as needed  - Offer frequent toileting  - Follow urinary retention protocol if ordered  Outcome: Progressing     Problem: METABOLIC, FLUID AND ELECTROLYTES - ADULT  Goal: Electrolytes maintained within normal limits  Description: INTERVENTIONS:  - Monitor labs and assess patient for signs and symptoms of electrolyte imbalances  - Administer electrolyte replacement as ordered  - Monitor response to electrolyte replacements, including repeat lab results as appropriate  - Instruct patient on fluid and nutrition as appropriate  Outcome: Progressing  Goal: Fluid balance maintained  Description: INTERVENTIONS:  - Monitor labs   - Monitor I/O and WT  - Instruct patient on fluid and nutrition as appropriate  - Assess for signs & symptoms of volume excess or deficit  Outcome: Progressing  Goal: Glucose maintained within target range  Description: INTERVENTIONS:  - Monitor Blood Glucose as ordered  - Assess for signs and symptoms of hyperglycemia and hypoglycemia  - Administer ordered medications to maintain glucose within target range  - Assess nutritional intake and initiate nutrition service referral as needed  Outcome: Progressing     Problem: HEMATOLOGIC - ADULT  Goal: Maintains hematologic stability  Description: INTERVENTIONS  - Assess for signs and symptoms of bleeding or hemorrhage  - Monitor labs  - Administer supportive blood products/factors as ordered and appropriate  Outcome: Progressing     Problem: MUSCULOSKELETAL - ADULT  Goal: Maintain or return mobility to safest level of function  Description: INTERVENTIONS:  - Assess patient's ability to carry out ADLs; assess patient's baseline for ADL function and identify physical deficits which impact ability to perform ADLs (bathing, care of mouth/teeth, toileting, grooming, dressing, etc )  - Assess/evaluate cause of self-care deficits   - Assess range of motion  - Assess patient's mobility  - Assess patient's need for assistive devices and provide as appropriate  - Encourage maximum independence but intervene and supervise when necessary  - Involve family in performance of ADLs  - Assess for home care needs following discharge   - Consider OT consult to assist with ADL evaluation and planning for discharge  - Provide patient education as appropriate  Outcome: Progressing

## 2022-04-07 NOTE — ANESTHESIA PROCEDURE NOTES
Arterial Line Insertion  Performed by: Kleber uRby CRNA  Authorized by: Connie Murphy MD   Consent: Verbal consent obtained  Risks and benefits: risks, benefits and alternatives were discussed  Consent given by: patient  Patient understanding: patient states understanding of the procedure being performed  Patient consent: the patient's understanding of the procedure matches consent given  Procedure consent: procedure consent matches procedure scheduled  Relevant documents: relevant documents present and verified  Test results: test results available and properly labeled  Patient identity confirmed: arm band and verbally with patient  Preparation: Patient was prepped and draped in the usual sterile fashion  Indications: hemodynamic monitoring  Orientation:  Right  Location: ulnar artery  Sedation:  Patient sedated: GETA      Procedure Details:  Needle gauge: 20  Seldinger technique: Seldinger technique used  Number of attempts: 1    Post-procedure:  Post-procedure: dressing applied  Waveform: good waveform and waveform confirmed  Post-procedure CNS: normal and unchanged  Patient tolerance: patient tolerated the procedure well with no immediate complications  Comments: Placed by [facility]

## 2022-04-07 NOTE — QUICK NOTE
Unable to see/examine as patient off floor in operating room  If patient returns to Medical/Surgical floor, will examine later, however likely patient will be transferred to ICU post-operatively      Desean Ojeda PA-C

## 2022-04-07 NOTE — OP NOTE
OPERATIVE REPORT  PATIENT NAME: Chong Zapata    :  1955  MRN: [id number]  Pt Location: [facility] OR ROOM 17    SURGERY DATE: 2022    Surgeon(s) and Role:     * Meliton Sanchez MD - Primary     * Shirleen Cushing, PA-C - Assisting    Preop Diagnosis:  Intracranial hemorrhage ([address] ) [I62 9]  Adenocarcinoma, lung, left ([address] ) [C34 92]    Post-Op Diagnosis Codes:     * Intracranial hemorrhage ([address] ) [I62 9]     * Adenocarcinoma, lung, left ([address] ) [C34 92]    Procedure(s) (LRB):  Image guided left parietal craniotomy for tumor resection (Left)    Specimen(s):  ID Type Source Tests Collected by Time Destination   1 : Left parietal mass Tissue Brain TISSUE EXAM Meliton Sanchez MD 2022 1106    2 : Left parietal mass Tissue Brain TISSUE EXAM Meliton Sanchez MD 2022 1106        Estimated Blood Loss:   Minimal    Drains:  Closed/Suction Drain Left Head Bulb 10 Fr  (Active)   Drainage Appearance Bloody 22 1205   Status To bulb suction 22 1205   Number of days: 0       Urethral Catheter Non-latex 16 Fr  (Active)   Collection Container Standard drainage bag 22 0930   Securement Method Securing device (Describe) 22 1220   Number of days: 0       Anesthesia Type:   General    Operative Indications:  Intracranial hemorrhage ([address] ) [I62 9]  Adenocarcinoma, lung, left ([address] ) [C34 92]  This is a very pleasant 75-year-old gentleman who presents to the hospital with some subtle speech difficulties as well as right-sided incoordination and subtle weakness  Imaging performed as a stroke alert demonstrated posterior fossa mass as well as a dominant left parietal mass with significant amount of hemorrhage, mass effect, vasogenic edema  MRI confirmed these to be likely hemorrhagic metastatic tumors, likely from his previous lung cancer    After discussing the risks and benefits associated with craniotomy and resection of the mass he elected to proceed understanding the risks of bleeding, infection, stroke, paralysis, seizure and death  Operative Findings:  Large hemorrhagic mass, significant amount of necrosis on frozen section  Complications:   None    Procedure and Technique:  After obtaining written informed consent patient was brought to the operating room  General endotracheal anesthesia was induced  Arterial line Corea catheter were then placed by Anesthesia  The patient was then given Lasix, Decadron, mannitol, and Keppra  He was given perioperative cefazolin  He was then placed in a Cox head martinez with a large shoulder bump in a sloppy lateral position  All his pressure points were carefully padded  Neuro navigation was then registered and confirmed to be accurate  Image guidance from the [employer] Stealth was used throughout the duration of the case  Images were loaded into the system and used for preoperative planning as well as intraoperative guidance  It was critically important through the duration of the case for navigation and avoidance of critical structures  Using navigation to map out the sagittal sinus as well as the tumor a trapdoor incision was planned  His hair was clipped and his head was prepped and draped in the usual sterile fashion  Surgical time-out was performed  10 cc of 1% lidocaine with 100,000 epinephrine was infiltrated along the planned incision  A 10 blade was then used open this incision  Tracee clips were placed along the skin edges and the flap was reflected anteriorly  Once again confirming our location with navigation several yakelin holes were made and connected with the B1 craniotome  The bone flap was then elevated and saved on the back table in bacitracin soaked sponge  Peripheral tack-up stitches were then placed  Using 15 blade a durotomy was made and a trapdoor durotomy was made reflecting it towards the sagittal sinus  Immediately there appear to be hemosiderin stained cortex which was visible    The arachnoid was opened and using a trans sulcal approach dissection was performed which allowed us to enter the hematoma with minimal cortical disturbance  Veins anteriorly and posteriorly were carefully protected  After evacuating the hematoma hemorrhagic mass was encountered  A portion of this was sent as frozen specimen returned consistent with lesional tissue with a significant amount of necrosis and hemorrhage  I worked circumferentially around the tumor to remove it  Ultimately the whole tumor was removed and the surgical cavity was copiously irrigated with antibiotic irrigation  After this hemostasis was carefully achieved using bipolar cautery and Surgicel  Once again the wound was copiously irrigated antibiotic irrigation  The dura was then closed with a running 4-0 Nurolon  A central tack-up was placed  Gelfoam was placed over the suture line  The bone flap was then replaced using titanium plating system  Once again copious amounts of antibiotic irrigation were used along the surgical incision  A 10 Tajik PVC drain was placed under the skin flap and the galea was closed using inverted 2-0 Vicryl  The skin was closed staples  The drain was secured in place with 2-0 Prolene  A sterile dressing was then applied  There are 2 uninterrupted surgical counts  Surgical sign-out was performed  The patient was then awoken from general anesthetic and found to be in his baseline neurologic condition  He was transferred to the postanesthesia care unit  There was no qualified resident aid in this case  As such, due to its complex nature Theoplis Kehr was present and assisted for the duration of the case including exposure, resection, and closure        I was present for the entire procedure    Patient Disposition:  PACU       SIGNATURE: Aroldo Mariscal MD  DATE: April 7, 2022  TIME: 12:23 PM

## 2022-04-07 NOTE — PROGRESS NOTES
Critical Care Acceptance Note   Hilda Owen 74 y o  male MRN: [id number]  Unit/Bed#: ICU 08 Encounter: [id number]      -------------------------------------------------------------------------------------------------------------  Chief Complaint: Left parietal tumor resection     History of Present Illness     Hilda Owen is a 74 y o  male with PMH adenocarcinoma of the left lung s/p DAVID lobectomy in 12/2021 at [facility], currently receiving adjuvant immunotherapy and has completed 2 cycles of atezolizumab  Presented to [facility] on 4/2/22 with apraxia and aphasia  CT brain 4/2/22 showed a left parietal 4 6 cm intraparenchymal hemorrhage with surrounding cerebral edema  MRI on 4/03 shows multiple masses suspicious for mets  Hemorrhagic mass in the left parietal lobe with surrounding edema  Started on Decadron and Keppra  He was in the ICU from 4/02 through 4/04 and downgrade to the general medicine service  Palliative team has been following  Arrived to the ICU s/p  tumor resection on 4/07 by Dr Lily Morgan  History obtained from chart review  -------------------------------------------------------------------------------------------------------------  Assessment and Plan:    Neuro:   Left parietal Hemorraghic tumor   Presented to [facility] on 4/2/22 with apraxia and aphasia  Found to have left parietal 4 6 cm intraparenchymal hemorrhage with surrounding cerebral edema on CT and multiple masses suspicious for mets on MRI     Known history of small-cell lung cancer (dx 4/2021) s/p  chemotherapy/surgery/radiation/immunotherapy at [facility]  CT chest abdomen pelvis without any evidence of metastatic disease  Neurology consulted- no current recommendations  S/p tumor resection on 4/07 by Dr Stefania Berkowitz taper as per Nsx recommendations   Currently Decadron 4 mg q 6 hours for 48 hrs   Keppra 500 mg BID  SBP goal <160, MAP >65   DVT ppx as per Nsx recommendations - Holding at this time  Neuro checks and Vital signs per protocol    Depression with anxiety  Continue celexa and doxepin     Delirium monitoring/management   Regulate Sleep-wake cycle/CAM-ICU daily  Melatonin 6mg HS     Goals of care, counseling/discussion  Patient wishes for ongoing medical management without limits, hopeful to continue receiving disease directed therapies with goal of maintaining independence and prolonging survival, however he is clear he would not want artificial life prolonging cares if in a persistent unresponsive state  Appreciate palliative input ongoing    CV:   Essential hypertension  SBP goal < 160  Continue Norvasc 10 mg daily   Labetalol 10 mg q4h PRN for SBP > 160     Mixed hyperlipidemia  Continue Pravastatin 80mg daily    Pulm:  Adenocarcinoma, lung, left (HCC)  s/p DAVID lobectomy in 12/2021 at [facility], currently receiving adjuvant immunotherapy and has completed 2 cycles of atezolizumab  New mets to brain, see plan above in primary problem   [facility] per wife's request, patient known to Dr Rose Plata locally  Holding immunotherapy  May need radiation  Palliative following     GI:   GERD  Continue home pantoprazole      :   H/o BPH  Home meds : tamsulosin - reportedly not taking   Restart if urinary retention noted    F/E/N:   F:  NS 75 cc/hr   E: Monitor and replete, maintain K>4+, Phos 3, Mag > 2  N:  Passed B/S swallow assessment - Regular diet     Heme/Onc: Thrombocytopenia  Chronic   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  Plt 111 on 4/07   Monitor    Endo:   Drug-induced thyroiditis  On levothyroxine 37 5 mcg daily     ID:   No active issues    MSK/Skin:   Psoriasis  Noted to have worsening while receiving immunotherapy  Now on decadron as above      Disposition: Admit to Critical Care   Code Status: Level 1 - Full Code  --------------------------------------------------------------------------------------------------------------  Review of Systems    A 12-point, complete review of systems was reviewed and negative except as stated above     Physical Exam   Physical Exam:   GENERAL: NAD, Normal appearance  Non diaphoretic, non-toxic, not ill-appearing,   NEUROLOGIC:  Alert/oriented x3  No motor or sensory deficits  HEENT:  Surgical wound on left parietal region with drain in place, PERRL, EOMI, MMM, no scleral icterus  CARDIAC:  RRR, +S1/S2, no S3/S4 heard, no m/g/r  PULMONARY:  non-labored breathing, CTA B/L, no wheezing/rales/rhonci appreciated at time of encounter  ABDOMEN:  Soft, NT/ND, +BS, no rebound/guarding/rigidity  Extremities:  2+ Pulses in DP/PT  Warm skin on palpation  No edema, cyanosis, or clubbing  SKIN:  No rashes or erythema  Surgical wound as above      --------------------------------------------------------------------------------------------------------------  Vitals:   Vitals:    04/06/22 2356 04/07/22 0819 04/07/22 1120 04/07/22 1245   BP: 141/83  139/87 121/81   BP Location: Left arm      Pulse: 68   86   Resp: 18  16 16   Temp: 97 7 °F (36 5 °C) (!) 95 8 °F (35 4 °C) 98 2 °F (36 8 °C) (!) 97 1 °F (36 2 °C)   TempSrc:  Tympanic     SpO2: 97%   100%   Weight:       Height:         Temp  Min: 95 8 °F (35 4 °C)  Max: 99 4 °F (37 4 °C)  IBW (Ideal Body Weight): 73 kg  Height: 5' 10" (177 8 cm)  Body mass index is 25 4 kg/m²    N/A    Laboratory and Diagnostics:  Results from last 7 days   Lab Units 04/07/22  0539 04/06/22  0930 04/05/22  0526 04/04/22  0521 04/03/22  0535 04/02/22 1942   WBC Thousand/uL 6 78 7 72 10 01 9 54 10 54* 9 76   HEMOGLOBIN g/dL 13 5 14 5 13 3 13 3 14 4 15 0   HEMATOCRIT % 41 6 45 4 40 8 40 2 45 6 46 2   PLATELETS Thousands/uL 111* 114* 107* 112* 118* 123*   NEUTROS PCT %  --   --   --   --  88*  --    MONOS PCT %  --   --   --   --  5  --    MONO PCT % 1* 4 0*  --   --   --      Results from last 7 days   Lab Units 04/07/22  0539 04/06/22  0722 04/05/22  0526 04/04/22 0521 04/03/22 0535 04/02/22 1942   SODIUM mmol/L 138 138 139 139 136 136   POTASSIUM mmol/L 4 1 3 9 3 9 4 0 4 3 4 1   CHLORIDE mmol/L 104 108 108 108 104 99   CO2 mmol/L 28 25 24 22 29 28   ANION GAP mmol/L 6 5 7 9 3* 9   BUN mg/dL 30* 32* 36* 34* 21 25   CREATININE mg/dL 1 15 1 10 1 14 1 19 1 17 1 32*   CALCIUM mg/dL 8 9 9 3 9 0 9 1 9 5 9 9   GLUCOSE RANDOM mg/dL 121 109 129 135 108 96   ALT U/L  --   --   --  35 43 36   AST U/L  --   --   --  14 13 24   ALK PHOS U/L  --   --   --  53 65 55   ALBUMIN g/dL  --   --   --  3 1* 3 4* 4 1   TOTAL BILIRUBIN mg/dL  --   --   --  0 38 0 69 0 49     Results from last 7 days   Lab Units 04/04/22  0521 04/03/22  0535   MAGNESIUM mg/dL 2 5 2 2   PHOSPHORUS mg/dL 3 6 4 0      Results from last 7 days   Lab Units 04/06/22  1153 04/03/22  0535 04/02/22  1942   INR  1 01 0 96 0 90   PTT seconds 29  --  30              ABG:    VBG:  Results from last 7 days   Lab Units 04/02/22 1942   PH SHANNA  7 390   PCO2 SHANNA mm Hg 44 9   PO2 SHANNA mm Hg 27 9*   HCO3 SHANNA mmol/L 26 6   BASE EXC SHANNA mmol/L 1 1           Micro:        EKG: Noted  Imaging: I have personally reviewed pertinent reports        Historical Information   Past Medical History:   Diagnosis Date    Hyperlipidemia     Hypertension     Lung cancer [facility])      Past Surgical History:   Procedure Laterality Date    BACK SURGERY      HEMORRHOID SURGERY      IR BIOPSY LUNG  5/6/2021    IR PORT PLACEMENT  6/17/2021    LAMINECTOMY  2018    L4-L5    LUNG SURGERY      left upper lobectomy    HI BRONCHOSCOPY,DIAGNOSTIC N/A 5/25/2021    Procedure: BRONCHOSCOPY FLEXIBLE;  Surgeon: Alejandro Torres MD;  Location: [facility];  Service: Thoracic    HI MEDIASTINOSCOPY WITH LYMPH NODE BIOPSY/IES N/A 5/25/2021    Procedure: MEDIASTINOSCOPY, flexible bronchoscopy;  Surgeon: Alejandro Torres MD;  Location: [facility] OR;  Service: Thoracic    TONSILLECTOMY  1959     Social History   Social History     Substance and Sexual Activity   Alcohol Use Not Currently    Alcohol/week: 7 0 standard drinks    Types: 7 Cans of beer per week Social History     Substance and Sexual Activity   Drug Use Yes    Types: Marijuana    Comment: seldom     Social History     Tobacco Use   Smoking Status Former Smoker    Packs/day: 1 00    Years: 40 00    Pack years: 40 00    Types: Cigarettes    Start date:     Quit date: 2017    Years since quittin 1   Smokeless Tobacco Never Used   Tobacco Comment    quit 2017       Family History:   Family History   Problem Relation Age of Onset    Nephrolithiasis Father      I have reviewed this patient's family history and commented on sigificant items within the HPI      Medications:  Current Facility-Administered Medications   Medication Dose Route Frequency    [MAR Hold] amLODIPine (NORVASC) tablet 10 mg  10 mg Oral Daily    ceFAZolin (ANCEF) IVPB (premix in dextrose) 2,000 mg 50 mL  2,000 mg Intravenous Once    [MAR Hold] citalopram (CeleXA) tablet 10 mg  10 mg Oral Daily    [MAR Hold] dexamethasone (DECADRON) injection 4 mg  4 mg Intravenous Q6H [address]    diphenhydrAMINE (BENADRYL) injection 12 5 mg  12 5 mg Intravenous Once PRN    [MAR Hold] doxepin (SINEquan) capsule 10 mg  10 mg Oral HS    fentaNYL (SUBLIMAZE) injection 50 mcg  50 mcg Intravenous Q3 min PRN    HYDROmorphone (DILAUDID) injection 0 5 mg  0 5 mg Intravenous Q5 Min PRN    HYDROmorphone HCl (DILAUDID) injection 0 2 mg  0 2 mg Intravenous Q5 Min PRN    [MAR Hold] Labetalol HCl (NORMODYNE) injection 10 mg  10 mg Intravenous Q4H PRN    [MAR Hold] levothyroxine tablet 37 5 mcg  37 5 mcg Oral Early Morning    [MAR Hold] lidocaine (PF) (XYLOCAINE-MPF) 1 % injection 0 5 mL  0 5 mL Infiltration Once PRN    [MAR Hold] meclizine (ANTIVERT) tablet 12 5 mg  12 5 mg Oral Q8H PRN    [MAR Hold] melatonin tablet 6 mg  6 mg Oral HS    metoclopramide (REGLAN) injection 10 mg  10 mg Intravenous Once PRN    [MAR Hold] ondansetron (ZOFRAN) injection 4 mg  4 mg Intravenous Q6H PRN    ondansetron (ZOFRAN) injection 4 mg  4 mg Intravenous Once PRN    [MAR Hold] perflutren lipid microsphere (DEFINITY) injection  0 4 mL/min Intravenous Once in imaging    [MAR Hold] pravastatin (PRAVACHOL) tablet 80 mg  80 mg Oral Daily With Dinner    sodium chloride 0 9 % infusion  125 mL/hr Intravenous Continuous    sodium chloride 0 9 % infusion  75 mL/hr Intravenous Continuous    [MAR Hold] sodium chloride tablet 1 g  1 g Oral BID With Meals     Home medications:  Prior to Admission Medications   Prescriptions Last Dose Informant Patient Reported? Taking?    Ascorbic Acid (vitamin C) 1000 MG tablet  Self Yes No   Sig: Take 1,000 mg by mouth daily     B Complex Vitamins (B COMPLEX 1 PO)  Self Yes No   Sig: Take 1 tablet by mouth daily    B Complex Vitamins (BL VITAMIN B COMPLEX PO)  Self Yes No   Sig: Take by mouth   Cholecalciferol (VITAMIN D3 PO)  Self Yes No   Sig: Take 10,000 Units by mouth daily     Clobetasol Propionate (Impoyz) 0 025 % CREA  Self Yes No   Sig: Apply topically   Cyanocobalamin (Vitamin B 12) 500 MCG TABS  Self Yes No   Sig: Take 1 tablet by mouth   Patient not taking: Reported on 6/90/8628    Garlic 10 MG CAPS  Self Yes No   Sig: Take 1 tablet by mouth daily    Glucosamine HCl 1500 MG TABS  Self Yes No   Sig: Take by mouth   Glucosamine-Chondroit-Vit C-Mn (GLUCOSAMINE 1500 COMPLEX) CAPS  Self Yes No   Sig: daily    MULTIPLE VITAMINS ESSENTIAL PO  Self Yes No   Sig: Take by mouth   Patient not taking: Reported on 2/15/2022    Multiple Vitamins-Minerals (MULTIVITAL-M) TABS  Self Yes No   Sig: Take by mouth daily    Patient not taking: Reported on 4/2/2022    allopurinol (ZYLOPRIM) 100 mg tablet  Self No No   Sig: Take 1 tablet (100 mg total) by mouth daily   amLODIPine (NORVASC) 10 mg tablet  Self No No   Sig: TAKE ONE TABLET BY MOUTH EVERY DAY   betamethasone valerate (VALISONE) 0 1 % cream  Self No No   Sig: Apply topically 2 (two) times a day   Patient taking differently: Apply topically as needed    betamethasone, augmented, (DIPROLENE-AF) 0 05 % cream  Self Yes No   Sig:     calcipotriene (DOVONEX) 0 005 % cream  Self Yes No   Patient not taking: Reported on 3/23/2022    calcipotriene (DOVONOX) 0 005 % ointment  Self Yes No   Sig: Apply topically 2 (two) times a day As needed   citalopram (CeleXA) 10 mg tablet  Self No No   Sig: TAKE ONE TABLET BY MOUTH EVERY DAY   colchicine (COLCRYS) 0 6 mg tablet  Self No No   Sig: Take 1 tablet (0 6 mg total) by mouth 2 (two) times a day   Patient not taking: Reported on 2/15/2022    doxepin (SINEquan) 25 mg capsule  Self No No   Sig: TAKE ONE CAPSULE BY MOUTH EVERY DAY AT BEDTIME   fluocinolone (SYNALAR) 0 01 % external solution  Self Yes No   Sig:     folic acid (FOLVITE) 1 mg tablet  Self No No   Sig: Take 1 tablet (1 mg total) by mouth daily   Patient not taking: Reported on 1/12/2022    gabapentin (NEURONTIN) 100 mg capsule  Self Yes No   Patient not taking: Reported on 1/12/2022    ibuprofen (MOTRIN) 400 mg tablet  Self Yes No   ketoconazole (NIZORAL) 2 % cream  Self No No   Sig: Apply topically 2 (two) times a day   Patient taking differently: Apply topically as needed    levothyroxine (Synthroid) 25 mcg tablet  Self No No   Sig: Take 1 tablet (25 mcg total) by mouth daily   meclizine (ANTIVERT) 25 mg tablet  Self No No   Sig: Take 1 tablet (25 mg total) by mouth 4 (four) times a day as needed for dizziness   Patient not taking: Reported on 1/12/2022    neomycin-polymyxin-hydrocortisone (CORTISPORIN) otic solution  Self No No   Sig: Administer 4 drops into the left ear every 6 (six) hours   Patient not taking: Reported on 1/12/2022    ondansetron (ZOFRAN) 4 mg tablet  Self No No   Sig: Take 1 tablet (4 mg total) by mouth every 6 (six) hours   Patient not taking: Reported on 1/18/2022    ondansetron (ZOFRAN) 8 mg tablet  Self No No   Sig: Take 1 tablet (8 mg total) by mouth every 8 (eight) hours as needed for nausea or vomiting   Patient not taking: Reported on 1/12/2022    oxyCODONE (ROXICODONE) 5 immediate release tablet  Self Yes No   Patient not taking: Reported on 2/15/2022    pantoprazole (PROTONIX) 40 mg tablet  Self No No   Sig: Take 1 tablet (40 mg total) by mouth daily   polyethylene glycol (GLYCOLAX) 17 GM/SCOOP powder  Self No No   Sig: Take 17 g by mouth 2 (two) times a day   predniSONE 20 mg tablet  Self No No   Sig: Take 3 tablets (60 mg total) by mouth daily   rosuvastatin (CRESTOR) 10 MG tablet  Self No No   Sig: TAKE ONE TABLET BY MOUTH EVERY DAY   tamsulosin (FLOMAX) 0 4 mg  Self Yes No   Patient not taking: Reported on 1/12/2022    traMADol (ULTRAM) 50 mg tablet  Self No No   Sig: TAKE 1 TABLET BY MOUTH EVERY 8 HOURS AS NEEDED FOR SEVERE PAIN   triamcinolone (KENALOG) 0 1 % cream  Self Yes No   zinc gluconate 50 mg tablet  Self Yes No   Sig: Take 50 mg by mouth daily      Facility-Administered Medications: None     Allergies: Allergies   Allergen Reactions    Bee Venom     Benazepril Other (See Comments)     Angioedema     Latex Other (See Comments)     Burning of eyes at the dentist from the gloves    Meloxicam GI Intolerance    Penicillin G Rash     ------------------------------------------------------------------------------------------------------------  Advance Directive and Living Will:      Power of :    POLST:    ------------------------------------------------------------------------------------------------------------  Anticipated Length of Stay is > 2 midnights    Care Time Delivered:   Upon my evaluation, this patient had a high probability of imminent or life-threatening deterioration due to Left parietal tumor resection, which required my direct attention, intervention, and personal management  I have personally provided 60 minutes (60 to 60) of critical care time, exclusive of procedures, teaching, family meetings, and any prior time recorded by providers other than myself         Ethan Gabriel MD  Internal Medicine Residency, PGY-3  [facility]         Portions of the record may have been created with voice recognition software  Occasional wrong word or "sound a like" substitutions may have occurred due to the inherent limitations of voice recognition software    Read the chart carefully and recognize, using context, where substitutions have occurred

## 2022-04-08 ENCOUNTER — APPOINTMENT (INPATIENT)
Dept: RADIOLOGY | Facility: HOSPITAL | Age: 67
DRG: 826 | End: 2022-04-08
Payer: MEDICARE

## 2022-04-08 LAB
ABO GROUP BLD BPU: NORMAL
ANION GAP SERPL CALCULATED.3IONS-SCNC: 6 MMOL/L (ref 4–13)
APTT PPP: 27 SECONDS (ref 23–37)
BPU ID: NORMAL
BUN SERPL-MCNC: 28 MG/DL (ref 5–25)
CALCIUM SERPL-MCNC: 8.1 MG/DL (ref 8.3–10.1)
CHLORIDE SERPL-SCNC: 109 MMOL/L (ref 100–108)
CO2 SERPL-SCNC: 25 MMOL/L (ref 21–32)
CREAT SERPL-MCNC: 1.09 MG/DL (ref 0.6–1.3)
CROSSMATCH: NORMAL
CROSSMATCH: NORMAL
ERYTHROCYTE [DISTWIDTH] IN BLOOD BY AUTOMATED COUNT: 18.3 % (ref 11.6–15.1)
GFR SERPL CREATININE-BSD FRML MDRD: 69 ML/MIN/1.73SQ M
GLUCOSE SERPL-MCNC: 115 MG/DL (ref 65–140)
HCT VFR BLD AUTO: 35.3 % (ref 36.5–49.3)
HGB BLD-MCNC: 11.5 G/DL (ref 12–17)
INR PPP: 1.12 (ref 0.84–1.19)
MAGNESIUM SERPL-MCNC: 1.9 MG/DL (ref 1.6–2.6)
MCH RBC QN AUTO: 31.6 PG (ref 26.8–34.3)
MCHC RBC AUTO-ENTMCNC: 32.6 G/DL (ref 31.4–37.4)
MCV RBC AUTO: 97 FL (ref 82–98)
PHOSPHATE SERPL-MCNC: 3.7 MG/DL (ref 2.3–4.1)
PLATELET # BLD AUTO: 91 THOUSANDS/UL (ref 149–390)
PMV BLD AUTO: 9.6 FL (ref 8.9–12.7)
POTASSIUM SERPL-SCNC: 4 MMOL/L (ref 3.5–5.3)
PROTHROMBIN TIME: 13.9 SECONDS (ref 11.6–14.5)
RBC # BLD AUTO: 3.64 MILLION/UL (ref 3.88–5.62)
SODIUM SERPL-SCNC: 140 MMOL/L (ref 136–145)
UNIT DISPENSE STATUS: NORMAL
UNIT PRODUCT CODE: NORMAL
UNIT PRODUCT VOLUME: 300 ML
UNIT PRODUCT VOLUME: 350 ML
UNIT PRODUCT VOLUME: 350 ML
UNIT RH: NORMAL
WBC # BLD AUTO: 9.8 THOUSAND/UL (ref 4.31–10.16)

## 2022-04-08 PROCEDURE — 84100 ASSAY OF PHOSPHORUS: CPT | Performed by: STUDENT IN AN ORGANIZED HEALTH CARE EDUCATION/TRAINING PROGRAM

## 2022-04-08 PROCEDURE — 85730 THROMBOPLASTIN TIME PARTIAL: CPT | Performed by: PHYSICIAN ASSISTANT

## 2022-04-08 PROCEDURE — A9585 GADOBUTROL INJECTION: HCPCS | Performed by: PHYSICIAN ASSISTANT

## 2022-04-08 PROCEDURE — 70553 MRI BRAIN STEM W/O & W/DYE: CPT

## 2022-04-08 PROCEDURE — 99024 POSTOP FOLLOW-UP VISIT: CPT | Performed by: PHYSICIAN ASSISTANT

## 2022-04-08 PROCEDURE — 99233 SBSQ HOSP IP/OBS HIGH 50: CPT | Performed by: PHYSICIAN ASSISTANT

## 2022-04-08 PROCEDURE — 85027 COMPLETE CBC AUTOMATED: CPT | Performed by: PHYSICIAN ASSISTANT

## 2022-04-08 PROCEDURE — G1004 CDSM NDSC: HCPCS

## 2022-04-08 PROCEDURE — 30233R1 TRANSFUSION OF NONAUTOLOGOUS PLATELETS INTO PERIPHERAL VEIN, PERCUTANEOUS APPROACH: ICD-10-PCS | Performed by: NEUROLOGICAL SURGERY

## 2022-04-08 PROCEDURE — 99291 CRITICAL CARE FIRST HOUR: CPT | Performed by: EMERGENCY MEDICINE

## 2022-04-08 PROCEDURE — P9035 PLATELET PHERES LEUKOREDUCED: HCPCS

## 2022-04-08 PROCEDURE — NC001 PR NO CHARGE: Performed by: EMERGENCY MEDICINE

## 2022-04-08 PROCEDURE — 85610 PROTHROMBIN TIME: CPT | Performed by: PHYSICIAN ASSISTANT

## 2022-04-08 PROCEDURE — 83735 ASSAY OF MAGNESIUM: CPT | Performed by: STUDENT IN AN ORGANIZED HEALTH CARE EDUCATION/TRAINING PROGRAM

## 2022-04-08 PROCEDURE — 30233R1 TRANSFUSION OF NONAUTOLOGOUS PLATELETS INTO PERIPHERAL VEIN, PERCUTANEOUS APPROACH: ICD-10-PCS | Performed by: EMERGENCY MEDICINE

## 2022-04-08 PROCEDURE — 80048 BASIC METABOLIC PNL TOTAL CA: CPT | Performed by: PHYSICIAN ASSISTANT

## 2022-04-08 RX ORDER — HYDROMORPHONE HCL/PF 1 MG/ML
0.5 SYRINGE (ML) INJECTION EVERY 2 HOUR PRN
Status: DISCONTINUED | OUTPATIENT
Start: 2022-04-08 | End: 2022-04-11 | Stop reason: HOSPADM

## 2022-04-08 RX ORDER — ACETAMINOPHEN 325 MG/1
650 TABLET ORAL EVERY 6 HOURS PRN
Status: DISCONTINUED | OUTPATIENT
Start: 2022-04-08 | End: 2022-04-08

## 2022-04-08 RX ORDER — SODIUM CHLORIDE 9 MG/ML
75 INJECTION, SOLUTION INTRAVENOUS CONTINUOUS
Status: DISPENSED | OUTPATIENT
Start: 2022-04-08 | End: 2022-04-09

## 2022-04-08 RX ADMIN — CEFAZOLIN SODIUM 2000 MG: 2 SOLUTION INTRAVENOUS at 09:45

## 2022-04-08 RX ADMIN — SENNOSIDES 8.8 MG: 8.8 SYRUP ORAL at 09:10

## 2022-04-08 RX ADMIN — PRAVASTATIN SODIUM 80 MG: 80 TABLET ORAL at 17:49

## 2022-04-08 RX ADMIN — DEXAMETHASONE 4 MG: 4 TABLET ORAL at 06:10

## 2022-04-08 RX ADMIN — ACETAMINOPHEN 975 MG: 325 TABLET ORAL at 13:28

## 2022-04-08 RX ADMIN — DEXAMETHASONE 4 MG: 4 TABLET ORAL at 17:50

## 2022-04-08 RX ADMIN — DEXAMETHASONE 4 MG: 4 TABLET ORAL at 11:18

## 2022-04-08 RX ADMIN — OXYCODONE HYDROCHLORIDE 10 MG: 10 TABLET ORAL at 02:28

## 2022-04-08 RX ADMIN — DOCUSATE SODIUM 100 MG: 100 CAPSULE, LIQUID FILLED ORAL at 09:09

## 2022-04-08 RX ADMIN — SODIUM CHLORIDE 75 ML/HR: 0.9 INJECTION, SOLUTION INTRAVENOUS at 13:55

## 2022-04-08 RX ADMIN — OXYCODONE HYDROCHLORIDE 5 MG: 5 TABLET ORAL at 13:30

## 2022-04-08 RX ADMIN — ACETAMINOPHEN 975 MG: 325 TABLET ORAL at 06:10

## 2022-04-08 RX ADMIN — SODIUM CHLORIDE 75 ML/HR: 0.9 INJECTION, SOLUTION INTRAVENOUS at 00:05

## 2022-04-08 RX ADMIN — LEVETIRACETAM 500 MG: 500 TABLET, FILM COATED ORAL at 21:15

## 2022-04-08 RX ADMIN — AMLODIPINE BESYLATE 10 MG: 10 TABLET ORAL at 09:11

## 2022-04-08 RX ADMIN — METHOCARBAMOL 500 MG: 500 TABLET ORAL at 11:18

## 2022-04-08 RX ADMIN — HYDROMORPHONE HYDROCHLORIDE 0.5 MG: 1 INJECTION, SOLUTION INTRAMUSCULAR; INTRAVENOUS; SUBCUTANEOUS at 01:10

## 2022-04-08 RX ADMIN — DEXAMETHASONE 4 MG: 4 TABLET ORAL at 00:03

## 2022-04-08 RX ADMIN — Medication 6 MG: at 21:15

## 2022-04-08 RX ADMIN — GADOBUTROL 7 ML: 604.72 INJECTION INTRAVENOUS at 03:28

## 2022-04-08 RX ADMIN — METHOCARBAMOL 500 MG: 500 TABLET ORAL at 17:49

## 2022-04-08 RX ADMIN — METHOCARBAMOL 500 MG: 500 TABLET ORAL at 06:10

## 2022-04-08 RX ADMIN — CITALOPRAM HYDROBROMIDE 10 MG: 10 TABLET ORAL at 09:11

## 2022-04-08 RX ADMIN — LEVOTHYROXINE SODIUM 37.5 MCG: 75 TABLET ORAL at 06:10

## 2022-04-08 RX ADMIN — DOCUSATE SODIUM 100 MG: 100 CAPSULE, LIQUID FILLED ORAL at 17:49

## 2022-04-08 RX ADMIN — METHOCARBAMOL 500 MG: 500 TABLET ORAL at 00:03

## 2022-04-08 RX ADMIN — DOXEPIN HYDROCHLORIDE 10 MG: 10 CAPSULE ORAL at 21:15

## 2022-04-08 RX ADMIN — ACETAMINOPHEN 975 MG: 325 TABLET ORAL at 21:15

## 2022-04-08 RX ADMIN — CEFAZOLIN SODIUM 2000 MG: 2 SOLUTION INTRAVENOUS at 03:27

## 2022-04-08 RX ADMIN — LEVETIRACETAM 500 MG: 500 TABLET, FILM COATED ORAL at 09:11

## 2022-04-08 NOTE — PROGRESS NOTES
Daily Progress Note - Critical Care   Genia Skiff 74 y o  male MRN: [id number]  Unit/Bed#: ICU 08 Encounter: [id number]      Advance Directive and Living Will:      Power of :    POLST:      Code Status: Level 1 - Full Code      ----------------------------------------------------------------------------------------  HPI/24hr events:   PMH adenocarcinoma of the left lung s/p DAVID lobectomy in 12/2021 at [facility] [place], currently receiving adjuvant immunotherapy and has completed 2 cycles of atezolizumab  Presented to [facility] on 4/2/22 with apraxia and aphasia  CT brain 4/2/22 showed a left parietal 4 6 cm intraparenchymal hemorrhage with surrounding cerebral edema  MRI on 4/03 shows multiple masses suspicious for mets  Hemorrhagic mass in the left parietal lobe with surrounding edema  Started on Decadron and Keppra  He was in the ICU from 4/02 through 4/04 and downgrade to the general medicine service  Palliative team has been following  Arrived to the ICU s/p  tumor resection on 4/07 by Dr Patti Banuelos  NO acute events overnight     ---------------------------------------------------------------------------------------  SUBJECTIVE  C/o pain at the incision site 5/10  Pain regimen in place     Review of Systems  Review of systems was reviewed and negative unless stated above in HPI/24-hour events   ---------------------------------------------------------------------------------------  Assessment and Plan:    Neuro:   Left parietal Hemorraghic tumor   Presented to [facility] on 4/2/22 with apraxia and aphasia     Found to have left parietal 4 6 cm intraparenchymal hemorrhage with surrounding cerebral edema on CT and multiple masses suspicious for mets on MRI     Known history of small-cell lung cancer (dx 4/2021) s/p  chemotherapy/surgery/radiation/immunotherapy at [facility] [place]  CT chest abdomen pelvis without any evidence of metastatic disease  Neurology consulted- no current recommendations  S/p tumor resection on 4/07 by Dr Omid Agustin taper as per Nsx recommendations   Currently Decadron 4 mg q 6 hours for 48 hrs   Keppra 500 mg BID  SBP goal <160, MAP >65   DVT ppx as per Nsx recommendations - Holding at this time  Neuro checks and Vital signs per protocol  Post op MRI done overnight - f/u official read   F/u Nsx recommendations    Depression with anxiety  Continue celexa and doxepin      Delirium monitoring/management   Regulate Sleep-wake cycle/CAM-ICU daily  Melatonin 6mg HS      Goals of care, counseling/discussion  Patient wishes for ongoing medical management without limits, hopeful to continue receiving disease directed therapies with goal of maintaining independence and prolonging survival, however he is clear he would not want artificial life prolonging cares if in a persistent unresponsive state  Appreciate palliative input ongoing     CV:   Essential hypertension  SBP goal < 160  Continue Norvasc 10 mg daily   Labetalol 10 mg q4h PRN for SBP > 160 - no doses needed overnight      Mixed hyperlipidemia  Continue Pravastatin 80mg daily     Pulm:  Adenocarcinoma, lung, left (HCC)  s/p DAVID lobectomy in 12/2021 at [facility] [place], currently receiving adjuvant immunotherapy and has completed 2 cycles of atezolizumab  New mets to brain, see plan above in primary problem   Consulted Medical Oncology per wife's request, patient known to Dr Tevin De La Torre locally  Holding immunotherapy  May need radiation  Palliative following      GI:   GERD  Continue home pantoprazole      :   H/o BPH  Home meds : tamsulosin - reportedly not taking   Restart if urinary retention noted     F/E/N:   F:  NS 75 cc/hr - d/c   E: Monitor and replete, maintain K>4+, Phos 3, Mag > 2  N:  Passed B/S swallow assessment - Regular diet      Heme/Onc: Thrombocytopenia  Chronic   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  Plt 111 on 4/07 -> 91  Monitor     Endo:   Drug-induced thyroiditis  On levothyroxine 37 5 mcg daily      ID:   No active issues     MSK/Skin: Psoriasis  Noted to have worsening while receiving immunotherapy  Now on decadron as above        Invasive Devices Review  Invasive Devices  Report    Central Venous Catheter Line            Port A Cath 06/17/21 Right Chest 294 days          Peripheral Intravenous Line            Peripheral IV 04/07/22 Right Forearm 1 day    Peripheral IV 04/07/22 Left Arm <1 day    Peripheral IV 04/07/22 Right Hand <1 day          Drain            Closed/Suction Drain Left Head Bulb 10 Fr  <1 day                  Disposition: Continue Critical Care     ---------------------------------------------------------------------------------------  ICU CORE MEASURES    Prophylaxis   VTE Pharmacologic Prophylaxis: Pharmacologic VTE Prophylaxis contraindicated due to s/p op  VTE Mechanical Prophylaxis: sequential compression device  Stress Ulcer Prophylaxis: Prophylaxis Not Indicated     ABCDE Protocol (if indicated)  Plan to perform spontaneous awakening trial today? Not applicable  Plan to perform spontaneous breathing trial today? Not applicable  Obvious barriers to extubation? Not applicable  CAM-ICU: Negative    Invasive Devices Review  Invasive Devices  Report    Central Venous Catheter Line            Port A Cath 06/17/21 Right Chest 294 days          Peripheral Intravenous Line            Peripheral IV 04/07/22 Right Forearm 1 day    Peripheral IV 04/07/22 Left Arm <1 day    Peripheral IV 04/07/22 Right Hand <1 day          Drain            Closed/Suction Drain Left Head Bulb 10 Fr  <1 day              Can any invasive devices be discontinued today? Not applicable  ---------------------------------------------------------------------------------------  OBJECTIVE    Physical Exam  Physical Exam:   GENERAL: NAD, Non diaphoretic, non-toxic, not ill-appearing  NEUROLOGIC:  Alert/oriented x2-3  No motor or sensory deficits  Mild expressive aphasia  Follows commands     HEENT:  NT, surgical wound with drain in place + sanguinous output, PERRL, EOMI, MMM, no scleral icterus  CARDIAC:  RRR, +S1/S2, no S3/S4 heard, no m/g/r  PULMONARY:  non-labored breathing, CTA B/L, no wheezing/rales/rhonci appreciated at time of encounter  ABDOMEN:  Soft, NT/ND, +BS, no rebound/guarding/rigidity  Extremities:  2+ Pulses in DP/PT  Warm skin on palpation  No edema, cyanosis, or clubbing  SKIN:  No rashes or erythema    Vitals   Vitals:    22 0200 22 0400 22 0500 22 0600   BP:    151/88   BP Location:    Left arm   Pulse: 62 62 60 62   Resp: 12 12 16 14   Temp:  98 6 °F (37 °C)     TempSrc:  Oral     SpO2: 96% 96% 97% 97%   Weight:       Height:         Temp (24hrs), Av °F (36 7 °C), Min:95 8 °F (35 4 °C), Max:98 9 °F (37 2 °C)  Current: Temperature: 98 6 °F (37 °C)      Respiratory:  SpO2: SpO2: 97 %       Invasive/non-invasive ventilation settings   Respiratory  Report   Lab Data (Last 4 hours)    None         O2/Vent Data (Last 4 hours)    None                Height and Weights   Height: 5' 10" (177 8 cm)  IBW (Ideal Body Weight): 73 kg  Body mass index is 25 21 kg/m²  Weight (last 2 days)     Date/Time Weight    22 1344 79 7 (175 71)          Intake and Output  I/O        07 07 07 07 0701   07    P  O  670      I V  (mL/kg)  2846 3 (35 7)     NG/GT  0     IV Piggyback  510     Total Intake(mL/kg) 670 (8 3) 3356 3 (42 1)     Urine (mL/kg/hr)  4475 (2 3)     Drains  120     Stool       Total Output  4595     Net +670 -1238 8                    Nutrition       Diet Orders   (From admission, onward)             Start     Ordered    22 1411  Diet Regular; Regular House  Diet effective now        References:    Nutrtion Support Algorithm Enteral vs  Parenteral   Question Answer Comment   Diet Type Regular    Regular Regular House    RD to adjust diet per protocol?  Yes        22 1415                  Laboratory and Diagnostics:  Results from last 7 days   Lab Units 04/08/22 0456 04/07/22  0539 04/06/22  0930 04/05/22  0526 04/04/22  0521 04/03/22  0535 04/02/22  1942   WBC Thousand/uL 9 80 6 78 7 72 10 01 9 54 10 54* 9 76   HEMOGLOBIN g/dL 11 5* 13 5 14 5 13 3 13 3 14 4 15 0   HEMATOCRIT % 35 3* 41 6 45 4 40 8 40 2 45 6 46 2   PLATELETS Thousands/uL 91* 111* 114* 107* 112* 118* 123*   NEUTROS PCT %  --   --   --   --   --  88*  --    MONOS PCT %  --   --   --   --   --  5  --    MONO PCT %  --  1* 4 0*  --   --   --      Results from last 7 days   Lab Units 04/08/22 0456 04/07/22 0539 04/06/22 0722 04/05/22 0526 04/04/22 0521 04/03/22  0535 04/02/22  1942   SODIUM mmol/L 140 138 138 139 139 136 136   POTASSIUM mmol/L 4 0 4 1 3 9 3 9 4 0 4 3 4 1   CHLORIDE mmol/L 109* 104 108 108 108 104 99   CO2 mmol/L 25 28 25 24 22 29 28   ANION GAP mmol/L 6 6 5 7 9 3* 9   BUN mg/dL 28* 30* 32* 36* 34* 21 25   CREATININE mg/dL 1 09 1 15 1 10 1 14 1 19 1 17 1 32*   CALCIUM mg/dL 8 1* 8 9 9 3 9 0 9 1 9 5 9 9   GLUCOSE RANDOM mg/dL 115 121 109 129 135 108 96   ALT U/L  --   --   --   --  35 43 36   AST U/L  --   --   --   --  14 13 24   ALK PHOS U/L  --   --   --   --  53 65 55   ALBUMIN g/dL  --   --   --   --  3 1* 3 4* 4 1   TOTAL BILIRUBIN mg/dL  --   --   --   --  0 38 0 69 0 49     Results from last 7 days   Lab Units 04/08/22 0456 04/04/22 0521 04/03/22  0535   MAGNESIUM mg/dL 1 9 2 5 2 2   PHOSPHORUS mg/dL 3 7 3 6 4 0      Results from last 7 days   Lab Units 04/08/22 0456 04/06/22  1153 04/03/22  0535 04/02/22 1942   INR  1 12 1 01 0 96 0 90   PTT seconds 27 29  --  30              ABG:    VBG:  Results from last 7 days   Lab Units 04/02/22 1942   PH SHANNA  7 390   PCO2 SHANNA mm Hg 44 9   PO2 SHANNA mm Hg 27 9*   HCO3 SHANNA mmol/L 26 6   BASE EXC SHANNA mmol/L 1 1           Micro        EKG: Noted  Imaging:  I have personally reviewed pertinent reports        CT head wo contrast    Result Date: 4/3/2022  Impression: Left parietal region intraparenchymal hematoma unchanged in size measuring 6 7 cm in largest dimension  New irregular hyperdense focus at the left cerebellum likely to represent small amount of subarachnoid hemorrhage versus a new intraparenchymal hemorrhage  Workstation performed: [id number]     MRI brain w wo contrast    Result Date: 4/3/2022  Impression: 1   2 left cerebellar hemorrhagic metastases and probable 2 adjacent left parietal hemorrhagic metastasis, the more anterior, larger one having parenchymal hematoma and local edema and mass effect, correlating to the hemorrhage on the prior head CT  I personally discussed this study with Dr Dulce Maria Amanda on 4/3/2022 at 2:19 PM  Workstation performed: [id number]     CT chest abdomen pelvis w contrast    Result Date: 4/4/2022  Impression: No findings of metastatic disease in the chest abdomen pelvis  No acute compression collapse of the vertebra seen No lytic destructive lesion seen Emphysema Postsurgical changes from left upper lobectomy A linear density seen in the left mid to lower lung likely scarring  Routine surveillance can be continued Workstation performed: [id number]     CT stroke alert brain    Result Date: 4/2/2022  Impression: Left parietal 4 6 cm intraparenchymal hemorrhage with surrounding cerebral edema  No midline shift  I personally discussed this study with neurologist on 4/2/2022 at 7:45 PM  Workstation performed: [id number]     CTA stroke alert (head/neck)    Result Date: 4/2/2022  Impression: Severe right vertebral artery stenosis at the origin  Severe critical near occlusive stenosis right proximal vertebral artery near its origin spanning approximately 2 2 cm in cranial caudal dimension   Approximately 70-75% right proximal cervical ICA plaque stenosis  I personally discussed this study with neurologist on 4/2/2022 at 7:50 PM   Workstation performed: [id number]       Active Medications  Scheduled Meds:  Current Facility-Administered Medications   Medication Dose Route Frequency Provider Last Rate    acetaminophen  975 mg Oral Q8H [address] [facility]      amLODIPine  10 mg Oral Daily [place]      cefazolin  2,000 mg Intravenous Q8H Carol Ann Herman [facility] Stopped (04/08/22 0400)    citalopram  10 mg Oral Daily [place], [place]      dexamethasone  4 mg Oral Q6H [address] Otiliaen Cushing, PA-C      Followed by   Estrella Cronin ON 4/9/2022] dexamethasone  4 mg Oral Q8H [address] [facility] Cushing, PA-C      Followed by   Estrella Cronin ON 4/11/2022] dexamethasone  2 mg Oral Q6H [address] [facility] C[place], PA-C      Followed by   Estrella Cronin ON 4/14/2022] dexamethasone  2 mg Oral Q8H [address] [facility] C[place], PA-C      Followed by   Estrella Cronin ON 4/15/2022] dexamethasone  2 mg Oral Q12H [address] Carol Ann Velazco PA-C      Followed by   Estrella Cronin ON 4/18/2022] dexamethasone  2 mg Oral Q24H [address] [facility] Cushing, PA-C      docusate sodium  100 mg Oral BID Otiliaen Cushing, PA-C      doxepin  10 mg Oral HS [facility] C[place], PA-C      HYDROmorphone  0 5 mg Intravenous Q1H PRN [facility] Cushing, PA-C      Labetalol HCl  10 mg Intravenous Q4H PRN [facility] C[place], PA-C      levETIRAcetam  500 mg Oral Q12H [address] [facility] C[place], PA-C      levothyroxine  37 5 mcg Oral Early Morning Otilia Cushing, PA-C      meclizine  12 5 mg Oral Q8H PRN [facility] C[place], PA-C      melatonin  6 mg Oral HS [facility] C[place], PA-C      methocarbamol  500 mg Oral Q6H [address] [facility] C[place], PA-C      ondansetron  4 mg Intravenous Q4H PRN Otilia Cushing, JOSE EDUARDO      oxyCODONE  10 mg Oral Q4H PRN Otilia Cushing, JOSE EDUARDO      oxyCODONE  5 mg Oral Q4H PRN Otilia Cushing, JOSE EDUARDO      pravastatin  80 mg Oral Daily With [employer], JOSE EDUARDO      senna  8 8 mg Oral Daily Yoselyn Velazco PA-C       Continuous Infusions:     PRN Meds:   HYDROmorphone, 0 5 mg, Q1H PRN  Labetalol HCl, 10 mg, Q4H PRN  meclizine, 12 5 mg, Q8H PRN  ondansetron, 4 mg, Q4H PRN  oxyCODONE, 10 mg, Q4H PRN  oxyCODONE, 5 mg, Q4H PRN        Allergies   Allergies   Allergen Reactions    Bee Venom     Benazepril Other (See Comments) Angioedema     Latex Other (See Comments)     Burning of eyes at the dentist from the gloves    Meloxicam GI Intolerance    Penicillin G Rash         Counseling / Coordination of Care  Total Critical Care time spent 45 minutes excluding procedures, teaching and family updates  Geni Avila MD  Internal Medicine Residency, PGY-3  [facility] record may have been created with voice recognition software  Occasional wrong word or "sound a like" substitutions may have occurred due to the inherent limitations of voice recognition software    Read the chart carefully and recognize, using context, where substitutions have occurred

## 2022-04-08 NOTE — PLAN OF CARE
Problem: MOBILITY - ADULT  Goal: Maintain or return to baseline ADL function  Description: INTERVENTIONS:  -  Assess patient's ability to carry out ADLs; assess patient's baseline for ADL function and identify physical deficits which impact ability to perform ADLs (bathing, care of mouth/teeth, toileting, grooming, dressing, etc )  - Assess/evaluate cause of self-care deficits   - Assess range of motion  - Assess patient's mobility; develop plan if impaired  - Assess patient's need for assistive devices and provide as appropriate  - Encourage maximum independence but intervene and supervise when necessary  - Involve family in performance of ADLs  - Assess for home care needs following discharge   - Consider OT consult to assist with ADL evaluation and planning for discharge  - Provide patient education as appropriate  Outcome: Progressing  Goal: Maintains/Returns to pre admission functional level  Description: INTERVENTIONS:  - Perform BMAT or MOVE assessment daily    - Set and communicate daily mobility goal to care team and patient/family/caregiver  - Collaborate with rehabilitation services on mobility goals if consulted  - Ambulate patient 2 times a day  - Out of bed for meals 3 times a day  - Out of bed for toileting  - Record patient progress and toleration of activity level   Outcome: Progressing     Problem: Neurological Deficit  Goal: Neurological status is stable or improving  Description: Interventions:  - Monitor and assess patient's level of consciousness, motor function, sensory function, and level of assistance needed for ADLs  - Monitor and report changes from baseline  Collaborate with interdisciplinary team to initiate plan and implement interventions as ordered  - Provide and maintain a safe environment  - Consider seizure precautions  - Consider fall precautions  - Consider aspiration precautions  - Consider bleeding precautions  Outcome: Progressing     Problem:  Activity Intolerance/Impaired Mobility  Goal: Mobility/activity is maintained at optimum level for patient  Description: Interventions:  - Assess and monitor patient  barriers to mobility and need for assistive/adaptive devices  - Assess patient's emotional response to limitations  - Collaborate with interdisciplinary team and initiate plans and interventions as ordered  - Encourage independent activity per ability   - Maintain proper body alignment  - Perform active/passive rom as tolerated/ordered  - Plan activities to conserve energy   - Turn patient as appropriate  Outcome: Progressing     Problem: Communication Impairment  Goal: Ability to express needs and understand communication  Description: Assess patient's communication skills and ability to understand information  Patient will demonstrate use of effective communication techniques, alternative methods of communication and understanding even if not able to speak  - Encourage communication and provide alternate methods of communication as needed  - Collaborate with case management/ for discharge needs  - Include patient/family/caregiver in decisions related to communication  Outcome: Progressing     Problem: Potential for Aspiration  Goal: Non-ventilated patient's risk of aspiration is minimized  Description: Assess and monitor vital signs, respiratory status, and labs (WBC)  Monitor for signs of aspiration (tachypnea, cough, rales, wheezing, cyanosis, fever)  - Assess and monitor patient's ability to swallow  - Place patient up in chair to eat if possible  - HOB up at 90 degrees to eat if unable to get patient up into chair   - Supervise patient during oral intake  - Instruct patient/ family to take small bites  - Instruct patient/ family to take small single sips when taking liquids    - Follow patient-specific strategies generated by speech pathologist   Outcome: Progressing     Problem: Nutrition  Goal: Nutrition/Hydration status is improving  Description: Monitor and assess patient's nutrition/hydration status for malnutrition (ex- brittle hair, bruises, dry skin, pale skin and conjunctiva, muscle wasting, smooth red tongue, and disorientation)  Collaborate with interdisciplinary team and initiate plan and interventions as ordered  Monitor patient's weight and dietary intake as ordered or per policy  Utilize nutrition screening tool and intervene per policy  Determine patient's food preferences and provide high-protein, high-caloric foods as appropriate  - Assist patient with eating   - Allow adequate time for meals   - Encourage patient to take dietary supplement as ordered  - Collaborate with clinical nutritionist   - Include patient/family/caregiver in decisions related to nutrition    Outcome: Progressing     Problem: PAIN - ADULT  Goal: Verbalizes/displays adequate comfort level or baseline comfort level  Description: Interventions:  - Encourage patient to monitor pain and request assistance  - Assess pain using appropriate pain scale  - Administer analgesics based on type and severity of pain and evaluate response  - Implement non-pharmacological measures as appropriate and evaluate response  - Consider cultural and social influences on pain and pain management  - Notify physician/advanced practitioner if interventions unsuccessful or patient reports new pain  Outcome: Progressing     Problem: INFECTION - ADULT  Goal: Absence or prevention of progression during hospitalization  Description: INTERVENTIONS:  - Assess and monitor for signs and symptoms of infection  - Monitor lab/diagnostic results  - Monitor all insertion sites, i e  indwelling lines, tubes, and drains  - Monitor endotracheal if appropriate and nasal secretions for changes in amount and color  - Batchelor appropriate cooling/warming therapies per order  - Administer medications as ordered  - Instruct and encourage patient and family to use good hand hygiene technique  - Identify and instruct in appropriate isolation precautions for identified infection/condition  Outcome: Progressing     Problem: SAFETY ADULT  Goal: Maintain or return to baseline ADL function  Description: INTERVENTIONS:  -  Assess patient's ability to carry out ADLs; assess patient's baseline for ADL function and identify physical deficits which impact ability to perform ADLs (bathing, care of mouth/teeth, toileting, grooming, dressing, etc )  - Assess/evaluate cause of self-care deficits   - Assess range of motion  - Assess patient's mobility; develop plan if impaired  - Assess patient's need for assistive devices and provide as appropriate  - Encourage maximum independence but intervene and supervise when necessary  - Involve family in performance of ADLs  - Assess for home care needs following discharge   - Consider OT consult to assist with ADL evaluation and planning for discharge  - Provide patient education as appropriate  Outcome: Progressing  Goal: Maintains/Returns to pre admission functional level  Description: INTERVENTIONS:  - Perform BMAT or MOVE assessment daily    - Set and communicate daily mobility goal to care team and patient/family/caregiver     - Collaborate with rehabilitation services on mobility goals if consulted  - Ambulate patient 2 times a day  - Out of bed for meals 3 times a day  - Out of bed for toileting  - Record patient progress and toleration of activity level   Outcome: Progressing  Goal: Patient will remain free of falls  Description: INTERVENTIONS:  - Educate patient/family on patient safety including physical limitations  - Instruct patient to call for assistance with activity   - Consult OT/PT to assist with strengthening/mobility   - Keep Call bell within reach  - Keep bed low and locked with side rails adjusted as appropriate  - Keep care items and personal belongings within reach  - Initiate and maintain comfort rounds  - Make Fall Risk Sign visible to staff  - Offer Toileting every 2 Hours, in advance of need  - Initiate/Maintain bed alarm  - Apply yellow socks and bracelet for high fall risk patients  - Consider moving patient to room near nurses station  Outcome: Progressing     Problem: DISCHARGE PLANNING  Goal: Discharge to home or other facility with appropriate resources  Description: INTERVENTIONS:  - Identify barriers to discharge w/patient and caregiver  - Arrange for needed discharge resources and transportation as appropriate  - Identify discharge learning needs (meds, wound care, etc )  - Arrange for interpretive services to assist at discharge as needed  - Refer to Case Management Department for coordinating discharge planning if the patient needs post-hospital services based on physician/advanced practitioner order or complex needs related to functional status, cognitive ability, or social support system  Outcome: Progressing     Problem: Knowledge Deficit  Goal: Patient/family/caregiver demonstrates understanding of disease process, treatment plan, medications, and discharge instructions  Description: Complete learning assessment and assess knowledge base    Interventions:  - Provide teaching at level of understanding  - Provide teaching via preferred learning methods  Outcome: Progressing     Problem: Potential for Falls  Goal: Patient will remain free of falls  Description: INTERVENTIONS:  - Educate patient/family on patient safety including physical limitations  - Instruct patient to call for assistance with activity   - Consult OT/PT to assist with strengthening/mobility   - Keep Call bell within reach  - Keep bed low and locked with side rails adjusted as appropriate  - Keep care items and personal belongings within reach  - Initiate and maintain comfort rounds  - Make Fall Risk Sign visible to staff  - Offer Toileting every 2 Hours, in advance of need  - Initiate/Maintain bed alarm  - Apply yellow socks and bracelet for high fall risk patients  - Consider moving patient to room near nurses station  Outcome: Progressing     Problem: Prexisting or High Potential for Compromised Skin Integrity  Goal: Skin integrity is maintained or improved  Description: INTERVENTIONS:  - Identify patients at risk for skin breakdown  - Assess and monitor skin integrity  - Assess and monitor nutrition and hydration status  - Monitor labs   - Assess for incontinence   - Turn and reposition patient  - Assist with mobility/ambulation  - Relieve pressure over bony prominences  - Avoid friction and shearing  - Provide appropriate hygiene as needed including keeping skin clean and dry  - Evaluate need for skin moisturizer/barrier cream  - Collaborate with interdisciplinary team   - Patient/family teaching  - Consider wound care consult   Outcome: Progressing     Problem: Nutrition/Hydration-ADULT  Goal: Nutrient/Hydration intake appropriate for improving, restoring or maintaining nutritional needs  Description: Monitor and assess patient's nutrition/hydration status for malnutrition  Collaborate with interdisciplinary team and initiate plan and interventions as ordered  Monitor patient's weight and dietary intake as ordered or per policy  Utilize nutrition screening tool and intervene as necessary  Determine patient's food preferences and provide high-protein, high-caloric foods as appropriate       INTERVENTIONS:  - Monitor oral intake, urinary output, labs, and treatment plans  - Assess nutrition and hydration status and recommend course of action  - Evaluate amount of meals eaten  - Assist patient with eating if necessary   - Allow adequate time for meals  - Recommend/ encourage appropriate diets, oral nutritional supplements, and vitamin/mineral supplements  - Order, calculate, and assess calorie counts as needed  - Recommend, monitor, and adjust tube feedings and TPN/PPN based on assessed needs  - Assess need for intravenous fluids  - Provide specific nutrition/hydration education as appropriate  - Include patient/family/caregiver in decisions related to nutrition  Outcome: Progressing     Problem: COPING  Goal: Pt/Family able to verbalize concerns and demonstrate effective coping strategies  Description: INTERVENTIONS:  - Assist patient/family to identify coping skills, available support systems and cultural and spiritual values  - Provide emotional support, including active listening and acknowledgement of concerns of patient and caregivers  - Reduce environmental stimuli, as able  - Provide patient education  - Assess for spiritual pain/suffering and initiate spiritual care, including notification of Pastoral Care or casey based community as needed  - Assess effectiveness of coping strategies  Outcome: Progressing  Goal: Will report anxiety at manageable levels  Description: INTERVENTIONS:  - Administer medication as ordered  - Teach and encourage coping skills  - Provide emotional support  - Assess patient/family for anxiety and ability to cope  Outcome: Progressing     Problem: NEUROSENSORY - ADULT  Goal: Achieves stable or improved neurological status  Description: INTERVENTIONS  - Monitor and report changes in neurological status  - Monitor vital signs such as temperature, blood pressure, glucose, and any other labs ordered   - Initiate measures to prevent increased intracranial pressure  - Monitor for seizure activity and implement precautions if appropriate      Outcome: Progressing  Goal: Remains free of injury related to seizures activity  Description: INTERVENTIONS  - Maintain airway, patient safety  and administer oxygen as ordered  - Monitor patient for seizure activity, document and report duration and description of seizure to physician/advanced practitioner  - If seizure occurs,  ensure patient safety during seizure  - Reorient patient post seizure  - Seizure pads on all 4 side rails  - Instruct patient/family to notify RN of any seizure activity including if an aura is experienced  - Instruct patient/family to call for assistance with activity based on nursing assessment  - Administer anti-seizure medications if ordered    Outcome: Progressing  Goal: Achieves maximal functionality and self care  Description: INTERVENTIONS  - Monitor swallowing and airway patency with patient fatigue and changes in neurological status  - Encourage and assist patient to increase activity and self care     - Encourage visually impaired, hearing impaired and aphasic patients to use assistive/communication devices  Outcome: Progressing     Problem: CARDIOVASCULAR - ADULT  Goal: Maintains optimal cardiac output and hemodynamic stability  Description: INTERVENTIONS:  - Monitor I/O, vital signs and rhythm  - Monitor for S/S and trends of decreased cardiac output  - Administer and titrate ordered vasoactive medications to optimize hemodynamic stability  - Assess quality of pulses, skin color and temperature  - Assess for signs of decreased coronary artery perfusion  - Instruct patient to report change in severity of symptoms  Outcome: Progressing  Goal: Absence of cardiac dysrhythmias or at baseline rhythm  Description: INTERVENTIONS:  - Continuous cardiac monitoring, vital signs, obtain 12 lead EKG if ordered  - Administer antiarrhythmic and heart rate control medications as ordered  - Monitor electrolytes and administer replacement therapy as ordered  Outcome: Progressing     Problem: RESPIRATORY - ADULT  Goal: Achieves optimal ventilation and oxygenation  Description: INTERVENTIONS:  - Assess for changes in respiratory status  - Assess for changes in mentation and behavior  - Position to facilitate oxygenation and minimize respiratory effort  - Oxygen administered by appropriate delivery if ordered  - Initiate smoking cessation education as indicated  - Encourage broncho-pulmonary hygiene including cough, deep breathe, Incentive Spirometry  - Assess the need for suctioning and aspirate as needed  - Assess and instruct to report SOB or any respiratory difficulty  - Respiratory Therapy support as indicated  Outcome: Progressing     Problem: GASTROINTESTINAL - ADULT  Goal: Maintains or returns to baseline bowel function  Description: INTERVENTIONS:  - Assess bowel function  - Encourage oral fluids to ensure adequate hydration  - Administer IV fluids if ordered to ensure adequate hydration  - Administer ordered medications as needed  - Encourage mobilization and activity  - Consider nutritional services referral to assist patient with adequate nutrition and appropriate food choices  Outcome: Progressing  Goal: Maintains adequate nutritional intake  Description: INTERVENTIONS:  - Monitor percentage of each meal consumed  - Identify factors contributing to decreased intake, treat as appropriate  - Assist with meals as needed  - Monitor I&O, weight, and lab values if indicated  - Obtain nutrition services referral as needed  Outcome: Progressing     Problem: GENITOURINARY - ADULT  Goal: Maintains or returns to baseline urinary function  Description: INTERVENTIONS:  - Assess urinary function  - Encourage oral fluids to ensure adequate hydration if ordered  - Administer IV fluids as ordered to ensure adequate hydration  - Administer ordered medications as needed  - Offer frequent toileting  - Follow urinary retention protocol if ordered  Outcome: Progressing     Problem: METABOLIC, FLUID AND ELECTROLYTES - ADULT  Goal: Electrolytes maintained within normal limits  Description: INTERVENTIONS:  - Monitor labs and assess patient for signs and symptoms of electrolyte imbalances  - Administer electrolyte replacement as ordered  - Monitor response to electrolyte replacements, including repeat lab results as appropriate  - Instruct patient on fluid and nutrition as appropriate  Outcome: Progressing  Goal: Fluid balance maintained  Description: INTERVENTIONS:  - Monitor labs   - Monitor I/O and WT  - Instruct patient on fluid and nutrition as appropriate  - Assess for signs & symptoms of volume excess or deficit  Outcome: Progressing  Goal: Glucose maintained within target range  Description: INTERVENTIONS:  - Monitor Blood Glucose as ordered  - Assess for signs and symptoms of hyperglycemia and hypoglycemia  - Administer ordered medications to maintain glucose within target range  - Assess nutritional intake and initiate nutrition service referral as needed  Outcome: Progressing     Problem: HEMATOLOGIC - ADULT  Goal: Maintains hematologic stability  Description: INTERVENTIONS  - Assess for signs and symptoms of bleeding or hemorrhage  - Monitor labs  - Administer supportive blood products/factors as ordered and appropriate  Outcome: Progressing     Problem: MUSCULOSKELETAL - ADULT  Goal: Maintain or return mobility to safest level of function  Description: INTERVENTIONS:  - Assess patient's ability to carry out ADLs; assess patient's baseline for ADL function and identify physical deficits which impact ability to perform ADLs (bathing, care of mouth/teeth, toileting, grooming, dressing, etc )  - Assess/evaluate cause of self-care deficits   - Assess range of motion  - Assess patient's mobility  - Assess patient's need for assistive devices and provide as appropriate  - Encourage maximum independence but intervene and supervise when necessary  - Involve family in performance of ADLs  - Assess for home care needs following discharge   - Consider OT consult to assist with ADL evaluation and planning for discharge  - Provide patient education as appropriate  Outcome: Progressing

## 2022-04-08 NOTE — ASSESSMENT & PLAN NOTE
POD1 1 Day Post-Op Procedure(s): Image guided left parietal craniotomy for tumor resection  · Left parietal brain mass with vasogenic edema, 2 additional left cerebellar masses  · H[place] small cell lung adenocarcinoma (dx 4/2021), [place] [facility] [place] [facility]  · Patient does not want to transfer back to [place] - states not up to traveling  · Patient presented with new onset apraxia and aphasia    Imaging:  · MRI brain w/wo, 4/8/21: Status post treatment of left parietal metastatic lesion with postop change as described above  This is the initial postoperative examination and will serve as baseline for follow-up exams  No significant residual tumor noted within the resection cavity  Metastatic lesions within the left cerebellar hemisphere are unchanged  Plan:  · Decadron wean q48h to 2mg BID  · Will continue 2mg BID indefinitely at the discretion of medical oncology  · Keppra 500mg x7 days post op  · 1 JOSE drain to FS, will remove this afternoon  · STAT CTH with decline in GCS greater than 2 points in 1 hour  · Med onc / rad onc / palliative care following  · Frequent neuro checks  · SBP < 160  · Avoid AC/AP  · PT/OT evaluation  · Medical management per primary team  · Platelets 91 this morning, transfuse 1u platelets  · Recheck CBC in the morning  · DVT ppx: SCDs, ok to start lovenox tomorrow  Neurosurgery will continue to follow  Patient is cleared to transfer to regular floor this afternoon  JOSE drain will be removed later today  Please call questions or concerns

## 2022-04-08 NOTE — PROGRESS NOTES
Spiritual Care Progress Note    2022  Patient: Lucho Funes : 1955  Admission Date & Time: 2022  MRN: [id number] CSN: [id number]      Met with pt "Funmilayo Estevez" and his wife for introductory visit  Provided space to process emotions and healthcare experiences  Both expressed gratitude for  visit  Will follow as requested  If spiritual care is desired, please contact [facility]  via [employer]  Thank you!                Chaplaincy Interventions Utilized:   Empowerment: Encouraged assertiveness    Exploration: Explored emotional needs & resources and Facilitated story telling    Relationship Building: Cultivated a relationship of care and support, Listened empathically and Provided silent and supportive presence    Chaplaincy Outcomes Achieved:  Expressed gratitude, Identified meaningful connections and Identified priorities    Spiritual Coping Strategies Utilized:   Connectedness       [id number]   Clinical Encounter Type   Visited With Patient and family together   Routine Visit Introduction

## 2022-04-08 NOTE — ANESTHESIA PREPROCEDURE EVALUATION
Procedure:  Image guided left parietal craniotomy for tumor resection (Left Head)    Relevant Problems   ANESTHESIA (within normal limits)      CARDIO   (+) Essential hypertension   (+) Hypertension   (+) Hypertriglyceridemia   (+) Mixed hyperlipidemia      ENDO   (+) Hypothyroidism      GI/HEPATIC (within normal limits)      /RENAL   (+) Renal cyst, right   (+) Stage 3a chronic kidney disease (HCC)      HEMATOLOGY   (+) Platelets decreased (HCC)   (+) Thrombocytopenia (HCC)      MUSCULOSKELETAL   (+) Acute idiopathic gout of left foot   (+) Chronic back pain   (+) Lumbago with sciatica, left side      NEURO/PSYCH   (+) Chronic back pain   (+) Depression with anxiety   (+) History of gout      PULMONARY   (+) JUNAID (obstructive sleep apnea)        Physical Exam    Airway    Mallampati score: II  TM Distance: >3 FB  Neck ROM: full     Dental   No notable dental hx     Cardiovascular  Rhythm: regular, Rate: normal, Cardiovascular exam normal    Pulmonary  Pulmonary exam normal Breath sounds clear to auscultation,     Other Findings        Anesthesia Plan  ASA Score- 3     Anesthesia Type- general with ASA Monitors  Additional Monitors: arterial line  Airway Plan: ETT  Plan Factors-    Chart reviewed  Existing labs reviewed  Patient summary reviewed  Induction- intravenous  Postoperative Plan- Plan for postoperative opioid use  Informed Consent- Anesthetic plan and risks discussed with patient  I personally reviewed this patient with the CRNA  Discussed and agreed on the Anesthesia Plan with the CRNA  Francisca Rouse

## 2022-04-08 NOTE — PROGRESS NOTES
Progress note - Palliative and Supportive Care   King Mar 74 y o  male [phone]    Patient Active Problem List   Diagnosis    Renal cyst, right    Acute idiopathic gout of left foot    Back problem    Depression with anxiety    Essential hypertension    Glaucoma    Hypertriglyceridemia    Lumbago with sciatica, left side    Lumbar stenosis    JUNAID (obstructive sleep apnea)    Spinal stenosis of lumbar region with neurogenic claudication    Mixed hyperlipidemia    Bilateral hearing loss    Hyperglycemia    Cigarette nicotine dependence in remission    Adenocarcinoma, lung, left ([address] )    Encounter for central line care    Drug-induced neutropenia (HCC)    Left non-suppurative otitis media    Bilateral hearing loss due to cerumen impaction    Cancer of upper lobe of left lung (HCC)    Chronic back pain    Former cigarette smoker    History of gout    Hypertension    Proctocolitis    Pericardial effusion    Constipation    Labyrinthitis of both ears    Stage 3a chronic kidney disease (HCC)    Platelets decreased (HCC)    Psoriasis    Left parietal 4 6 cm intraparenchymal hemorrhage with surrounding cerebral edema    Thrombocytopenia (HCC)    Goals of care, counseling/discussion    Hypothyroidism     Active issues specifically addressed today include:   Adenocarcinoma of the lung  Brain metastases  Dizziness  Apraxia  Goals of care discussion  S/p craniotomy for brain mass resection    Plan:  1  Symptom management -    - acetaminophen 975mg PO Q8H [address]   - decadron taper per neurosurgery   - robaxin 500mg Q6H [address]   - oxycodone 5-10mg PO Q4H PRN moderate-severe pain   - dilaudid 0 5mg IV Q2H PRN BT Pain    - meclizine 12 5mg Q6H PRN added by palliative for pre-op dizziness/nausea   - continue celexa 10mg QD (home med)   - zofran 4mg IV Q6H PRN nausea/vomiting    2   Goals - level 1 full code   - Ongoing cancer directed treatments with goal of life prolongation, maintaining independence, and getting back home  - Patient and his wife are relieved to have made it through the surgery without complications  Code Status: full - Level 1   Decisional apparatus:  Patient is competent on my exam today  If competence is lost, patient's substitute decision maker would default to spouse by [place]  Advance Directive / Living Will / POLST:  None on file, document completed and at home  Encouraged wife to bring to review and scan to file  Interval history:       Patient describes a global headache  Exacerbated by cranial drain being removed  Recently received acetaminophen and oxycodone, awaiting for them to become effective  Headache not exacerbated by loud noise or bright light  Closing his eyes help ease discomfort  Spouse, Kelly, at bedside and is supportive  MEDICATIONS / ALLERGIES:     all current active meds have been reviewed    Allergies   Allergen Reactions    Bee Venom     Benazepril Other (See Comments)     Angioedema     Latex Other (See Comments)     Burning of eyes at the dentist from the gloves    Meloxicam GI Intolerance    Penicillin G Rash       OBJECTIVE:    Physical Exam  Physical Exam  Constitutional:       General: He is not in acute distress  HENT:      Head:      Comments: Incision CDI  Cranial drain just removed  Eyes:      Conjunctiva/sclera: Conjunctivae normal    Cardiovascular:      Rate and Rhythm: Normal rate  Pulmonary:      Effort: Pulmonary effort is normal  No respiratory distress  Abdominal:      General: There is no distension  Tenderness: There is no guarding  Musculoskeletal:         General: No swelling  Skin:     General: Skin is warm and dry  Coloration: Skin is pale  Neurological:      Mental Status: He is alert  Comments: Opens eyes, communicates appropriately   Detailed exam deferred due to patient's headache and neurosurgery recently examined   Psychiatric:      Comments: withdrawn         Lab Results: I have personally reviewed pertinent labs  , CBC:   Lab Results   Component Value Date    WBC 9 80 04/08/2022    HGB 11 5 (L) 04/08/2022    HCT 35 3 (L) 04/08/2022    MCV 97 04/08/2022    PLT 91 (L) 04/08/2022    MCH 31 6 04/08/2022    MCHC 32 6 04/08/2022    RDW 18 3 (H) 04/08/2022    MPV 9 6 04/08/2022   , CMP:   Lab Results   Component Value Date    SODIUM 140 04/08/2022    K 4 0 04/08/2022     (H) 04/08/2022    CO2 25 04/08/2022    BUN 28 (H) 04/08/2022    CREATININE 1 09 04/08/2022    CALCIUM 8 1 (L) 04/08/2022    EGFR 69 04/08/2022     Imaging Studies: reviewed pertinent studies  EKG, Pathology, and Other Studies: reviewed pertinent studies    Counseling / Coordination of Care    Total floor / unit time spent today 15+ minutes  Greater than 50% of total time was spent with the patient and / or family counseling and / or coordination of care  A description of the counseling / coordination of care: time spent assessing patient, communicating with spouse, and RN, complex symptom management

## 2022-04-08 NOTE — PROGRESS NOTES
[address] [facility]  Progress Note - Madi Parrish 1955, 74 y o  male MRN: [id number]  Unit/Bed#: ICU 08 Encounter: [id number]  Primary Care Provider: Loni Galaviz DO   Date and time admitted to hospital: 4/2/2022  9:42 PM    * Left parietal 4 6 cm intraparenchymal hemorrhage with surrounding cerebral edema  Assessment & Plan  POD1 1 Day Post-Op Procedure(s): Image guided left parietal craniotomy for tumor resection  · Left parietal brain mass with vasogenic edema, 2 additional left cerebellar masses  · H/o small cell lung adenocarcinoma (dx 4/2021), s/p chemotherapy/surgery/radiation/immunotherapy at [facility]  · Patient denies h/o [facility]  · Patient does not want to transfer back to [place] - states not up to traveling  · Patient presented with new onset apraxia and aphasia    Imaging:  · MRI brain w/wo, 4/8/21: Status post treatment of left parietal metastatic lesion with postop change as described above  This is the initial postoperative examination and will serve as baseline for follow-up exams  No significant residual tumor noted within the resection cavity  Metastatic lesions within the left cerebellar hemisphere are unchanged  Plan:  · Decadron wean q48h to 2mg BID  · Will continue 2mg BID indefinitely at the discretion of medical oncology  · Keppra 500mg x7 days post op  · 1 JOSE drain to FS, will remove this afternoon  · STAT CTH with decline in GCS greater than 2 points in 1 hour  · Med onc / rad onc / palliative care following  · Frequent neuro checks  · SBP < 160  · Avoid AC/AP  · PT/OT evaluation  · Medical management per primary team  · Platelets 91 this morning, transfuse 1u platelets  · Recheck CBC in the morning  · DVT ppx: SCDs, ok to start lovenox tomorrow  Neurosurgery will continue to follow  Patient is cleared to transfer to regular floor this afternoon  JOSE drain will be removed later today  Please call questions or concerns      Adenocarcinoma, lung, left [facility])  Assessment & Plan  S/p preadjuvant chemotherapy, DAVID lobectomy 12/2021, adjuvant radiation, and immunotherapy  Patient only received 2 treatments of immunotherapy as this exacerbated his psoriasis  Last MRI brain in June 2021 was negative for metastasis    Essential hypertension  Assessment & Plan  SBP < 160    Depression with anxiety  Assessment & Plan  Medical management per primary team  Home medications restarted          Subjective/Objective   Chief Complaint: "I feel better today"    Subjective:  Patient with no acute events overnight  He states he did have a headache a few hours ago that is improved with medication  He has not been out of bed yet  He states he does feel better today after surgery, he feels like his mentation is clear  He denies any new neurological symptoms such as unilateral weakness, sensory deficits, confusion, syncope, seizures, word-finding difficulties, vision complaints  Objective:  Patient is sitting in bed no acute distress  Tolerated breakfast without difficulty  JOSE drain in place to full suction with small amount of bloody output involved  Invasive Devices  Report    Central Venous Catheter Line            Port A Cath 06/17/21 Right Chest 294 days          Peripheral Intravenous Line            Peripheral IV 04/07/22 Right Forearm 1 day    Peripheral IV 04/07/22 Left Arm <1 day    Peripheral IV 04/07/22 Right Hand <1 day          Drain            Closed/Suction Drain Left Head Bulb 10 Fr  <1 day                Vitals: Blood pressure 151/88, pulse 62, temperature 98 6 °F (37 °C), temperature source Oral, resp  rate 14, height 5' 10" (1 778 m), weight 79 7 kg (175 lb 11 3 oz), SpO2 97 %  ,Body mass index is 25 21 kg/m²  Hemodynamic Monitoring: MAP: Arterial Line MAP (mmHg): 98 mmHg    General appearance: alert, appears stated age, cooperative and no distress  Head: craniotomy incision CDI, covered with telfa  No active drainage   JOSE drain to FS with small amount of bloody drainage in bulb  Eyes: EOMI, PERRL, conjugate gaze  Lungs: non labored breathing  Heart: regular heart rate  Neurologic:   Mental status: Alert, oriented x3 (did not get current year right on 3 attempts), thought content appropriate, speech clear and fluent, able to perform simple calculations, able to name 3 cities in [place]  Cranial nerves: grossly intact (Cranial nerves II-XII)  Sensory: normal to LT x4  Motor: moving all extremities without focal weakness  Reflexes: 2+ and symmetric, no hoffmans or clonus  Coordination: finger to nose normal bilaterally, no drift bilaterally    Lab Results: I have personally reviewed pertinent results  Results from last 7 days   Lab Units 04/08/22  0456 04/07/22  0539 04/06/22  0930 04/05/22  0526 04/05/22  0526 04/04/22  0521 04/03/22  0535   WBC Thousand/uL 9 80 6 78 7 72   < > 10 01   < > 10 54*   HEMOGLOBIN g/dL 11 5* 13 5 14 5   < > 13 3   < > 14 4   HEMATOCRIT % 35 3* 41 6 45 4   < > 40 8   < > 45 6   PLATELETS Thousands/uL 91* 111* 114*   < > 107*   < > 118*   NEUTROS PCT %  --   --   --   --   --   --  88*   MONOS PCT %  --   --   --   --   --   --  5   MONO PCT %  --  1* 4  --  0*  --   --     < > = values in this interval not displayed  Results from last 7 days   Lab Units 04/08/22  0456 04/07/22  0539 04/06/22 0722 04/05/22  0526 04/04/22  0521 04/03/22  0535 04/03/22  0535 04/02/22 1942 04/02/22 1942   POTASSIUM mmol/L 4 0 4 1 3 9   < > 4 0   < > 4 3   < > 4 1   CHLORIDE mmol/L 109* 104 108   < > 108   < > 104   < > 99   CO2 mmol/L 25 28 25   < > 22   < > 29   < > 28   BUN mg/dL 28* 30* 32*   < > 34*   < > 21   < > 25   CREATININE mg/dL 1 09 1 15 1 10   < > 1 19   < > 1 17   < > 1 32*   CALCIUM mg/dL 8 1* 8 9 9 3   < > 9 1   < > 9 5   < > 9 9   ALK PHOS U/L  --   --   --   --  53  --  65  --  55   ALT U/L  --   --   --   --  35  --  43  --  36   AST U/L  --   --   --   --  14  --  13  --  24    < > = values in this interval not displayed  Results from last 7 days   Lab Units 04/08/22  0456 04/04/22  0521 04/03/22  0535   MAGNESIUM mg/dL 1 9 2 5 2 2     Results from last 7 days   Lab Units 04/08/22  0456 04/04/22  0521 04/03/22  0535   PHOSPHORUS mg/dL 3 7 3 6 4 0     Results from last 7 days   Lab Units 04/08/22  0456 04/06/22  1153 04/03/22  0535 04/02/22  1942 04/02/22 1942   INR  1 12 1 01 0 96   < > 0 90   PTT seconds 27 29  --   --  30    < > = values in this interval not displayed  No results found for: TROPONINT  ABG:No results found for: PHART, MXC6RJC, PO2ART, UHN4DOW, C6HFMTFH, BEART, SOURCE    Imaging Studies: I have personally reviewed pertinent reports  and I have personally reviewed pertinent films in PACS     CT head wo contrast    Result Date: 4/3/2022  Narrative: CT BRAIN - WITHOUT CONTRAST INDICATION:   Intracerebral hemorrhage  COMPARISON:  None  TECHNIQUE:  CT examination of the brain was performed  In addition to axial images, sagittal and coronal 2D reformatted images were created and submitted for interpretation  Radiation dose length product (DLP) for this visit:  731 47 mGy-cm   This examination, like all CT scans performed in the [facility], was performed utilizing techniques to minimize radiation dose exposure, including the use of iterative  reconstruction and automated exposure control  IMAGE QUALITY:  Diagnostic  FINDINGS: PARENCHYMA:  No intracranial mass or midline shift  No CT signs of acute infarction  6 7 cm intraparenchymal hematoma centered in the left parietal white matter unchanged in size with surrounding vasogenic edema  Irregular hyperdensity at the left cerebellum  VENTRICLES AND EXTRA-AXIAL SPACES:  Effacement of left occipital horn  Terry Guise VISUALIZED ORBITS AND PARANASAL SINUSES:  Unremarkable  CALVARIUM AND EXTRACRANIAL SOFT TISSUES:  Normal      Impression: Left parietal region intraparenchymal hematoma unchanged in size measuring 6 7 cm in largest dimension   New irregular hyperdense focus at the left cerebellum likely to represent small amount of subarachnoid hemorrhage versus a new intraparenchymal hemorrhage  Workstation performed: [id number]     MRI brain w wo contrast    Result Date: 4/8/2022  Narrative: MRI BRAIN WITH AND WITHOUT CONTRAST INDICATION: s/p Left Parietal Craniotomy for tumor resection      Metastatic disease, lung carcinoma  COMPARISON:  4/3/2022 TECHNIQUE: Sagittal T1, axial T2, axial FLAIR, axial T1, axial [place], axial diffusion  Sagittal, axial T1 postcontrast   Axial bravo postcontrast with coronal reconstructions  IV Contrast:  7 mL of Gadobutrol injection (SINGLE-DOSE)  IMAGE QUALITY:   Diagnostic  FINDINGS: BRAIN PARENCHYMA:  Patient is status post left parietal craniotomy for resection of previously seen hemorrhagic mass within the parietal lobe  There is a resection cavity which is significantly decreased in size compared to the previous hemorrhagic mass  Resection cavity demonstrates complex internal signal representing a small amount of air, a moderate amount of blood products  Heterogeneous T1 signal prior to administration of contrast is noted within the resection cavity  After administration of contrast there is mild enhancement extending obliquely towards the craniotomy site  FLAIR imaging demonstrates moderate surrounding edema within the parietal lobe extending inferiorly into the superior occipital lobe  Diffusion imaging demonstrates minimal restricted diffusion posterior to the resection cavity towards the calvarium, suggesting small amount of postop ischemia  There is a moderate amount of air noted within the anterior extra-axial space on the left resulting in minor mass effect upon the anterior left frontal lobe  The previously identified 2 enhancing lesions within the left cerebellar hemisphere are grossly unchanged from the examination performed 5 days ago, best seen on series 10 image 9  No new enhancing lesions identified   VENTRICLES: Normal for the patient's age  SELLA AND PITUITARY GLAND:  Normal  ORBITS:  Normal  PARANASAL SINUSES:  Stable retention cyst within the inferior aspect of the right maxillary sinus  VASCULATURE:  Evaluation of the major intracranial vasculature demonstrates appropriate flow voids  CALVARIUM AND SKULL BASE:  Patient has undergone left parietal craniotomy  Expected postop change within the extracranial soft tissues  Small amount of subacute extra-axial blood is seen underlying the craniotomy  EXTRACRANIAL SOFT TISSUES:  Normal      Impression: Status post treatment of left parietal metastatic lesion with postop change as described above  This is the initial postoperative examination and will serve as baseline for follow-up exams  No significant residual tumor noted within the resection cavity  2 metastatic lesions within the left cerebellar hemisphere are unchanged  Workstation performed: [id number]     MRI brain w wo contrast    Result Date: 4/3/2022  Narrative: MRI BRAIN WITH AND WITHOUT CONTRAST INDICATION: MRI w/ and w/o contrast    History of adenocarcinoma along with apraxia and dysphasia  Intracranial hemorrhage  COMPARISON:  6/18/2021; 4/3/2022 TECHNIQUE: Sagittal T1, axial T2, axial FLAIR, axial T1, axial [place], axial diffusion  Sagittal, axial T1 postcontrast   Axial bravo postcontrast with coronal reconstructions  IV Contrast:  8 mL of Gadobutrol injection (SINGLE-DOSE)  IMAGE QUALITY:   Diagnostic  FINDINGS: BRAIN PARENCHYMA:  There is a hemorrhagic mass in the left parietal lobe with surrounding edema and blooming artifact  Within this mass there is intrinsic T1 high signal indicative of blood products  However along the anterior margin of the lesion there is patchy internal enhancement image 18, series 10 worrisome for hemorrhagic metastasis in this patient with a history of lung cancer   Separately there is a small cortical focus of enhancement more posteriorly along the edge of the vasogenic edema in the left parietal lobe image 92, series 11, having a corresponding focus of blooming artifact on susceptibility weighted image 72, series 6 indicative of a satellite metastasis  2 additional small left cerebellar metastases are noted adjacent to one another both enhancing with edema and hemorrhagic elements measuring approximately 1 5 cm each on image 8, series 10  There is local sulcal effacement in the left parietal lobe without subfalcine herniation  There is no diffusion restriction  Cerebellar tonsils are normally positioned  VENTRICLES:  There is mild mass effect on the posterior body and occipital horn left lateral ventricle  SELLA AND PITUITARY GLAND:  Normal  ORBITS:  Normal  PARANASAL SINUSES:  There is a mucous retention cyst in the right maxillary sinus  VASCULATURE:  Evaluation of the major intracranial vasculature demonstrates appropriate flow voids  CALVARIUM AND SKULL BASE:  Normal  EXTRACRANIAL SOFT TISSUES:  Normal      Impression: 1   2 left cerebellar hemorrhagic metastases and probable 2 adjacent left parietal hemorrhagic metastasis, the more anterior, larger one having parenchymal hematoma and local edema and mass effect, correlating to the hemorrhage on the prior head CT  I personally discussed this study with Dr Aristides Perdomo on 4/3/2022 at 2:19 PM  Workstation performed: [id number]     CT chest abdomen pelvis w contrast    Result Date: 4/4/2022  Narrative: CT CHEST, ABDOMEN AND PELVIS WITH IV CONTRAST INDICATION:   Metastatic disease evaluation Evaluate for metastatic disease  COMPARISON:  CT from December 6, 2020 4/20/2021, April 8, 2021 TECHNIQUE: CT examination of the chest, abdomen and pelvis was performed  Axial, sagittal, and coronal 2D reformatted images were created from the source data and submitted for interpretation  Radiation dose length product (DLP) for this visit:  1061 02 mGy-cm     This examination, like all CT scans performed in the [facility], was performed utilizing techniques to minimize radiation dose exposure, including the use of iterative reconstruction and automated exposure control  IV Contrast:  100 mL of iohexol (OMNIPAQUE) Enteric Contrast: Enteric contrast was administered  FINDINGS: CHEST LUNGS:  Trachea and central bronchi are patent Emphysema seen postsurgical changes of left upper lobectomy noted No new consolidation seen Linear density seen in the left mid to lower lung, in image 37 series 2 Left upper  lung granuloma seen, in image 75 series 604 The right upper lobe granuloma seen in image 40 series 604 PLEURA:  No pleural effusion or pneumothorax HEART/GREAT VESSELS: Heart is unremarkable for patient's age  No thoracic aortic aneurysm  Ascending aorta measures 3 7 cm The descending thoracic aorta measures 2 4 cm Coronary artery calcification seen MEDIASTINUM AND OWEN:  No significant mediastinal lymph node enlargement seen CHEST WALL AND LOWER NECK: A healing left 5th rib fractures ABDOMEN LIVER/BILIARY TREE:  No focal liver lesion seen GALLBLADDER:  Gallbladder appear unremarkable SPLEEN:  A hypodense splenic lesion seen, measuring 1 7 cm, stable since previous study PANCREAS:  Unremarkable  ADRENAL GLANDS:  Unremarkable  KIDNEYS/URETERS:  No hydronephrosis or urinary tract calculus  One or more sharply circumscribed subcentimeter renal hypodensities are present, too small to accurately characterize, and statistically most likely benign findings  According to recent literature (Radiology 2019) no further workup of these findings is recommended  Renal cysts are noted bilaterally STOMACH AND BOWEL:  Diverticulosis seen No abnormal dilation of the small bowel loops seen APPENDIX:  No findings to suggest appendicitis  ABDOMINOPELVIC CAVITY:  No ascites  No pneumoperitoneum  No lymphadenopathy  VESSELS:  Unremarkable for patient's age  PELVIS REPRODUCTIVE ORGANS:  Prostate appears unremarkable URINARY BLADDER:  Unremarkable   ABDOMINAL WALL/INGUINAL REGIONS:  Unremarkable  OSSEOUS STRUCTURES:  No acute fracture or destructive osseous lesion  Impression: No findings of metastatic disease in the chest abdomen pelvis  No acute compression collapse of the vertebra seen No lytic destructive lesion seen Emphysema Postsurgical changes from left upper lobectomy A linear density seen in the left mid to lower lung likely scarring  Routine surveillance can be continued Workstation performed: [id number]     CT stroke alert brain    Result Date: 4/2/2022  Narrative: CT BRAIN - STROKE ALERT PROTOCOL INDICATION:   Right-sided weakness, expressive and receptive aphasia known history of cancer  COMPARISON:  None  TECHNIQUE:  CT examination of the brain was performed  In addition to axial images, coronal reformatted images were created and submitted for interpretation  Radiation dose length product (DLP) for this visit:  884 81 mGy-cm   This examination, like all CT scans performed in the [facility], was performed utilizing techniques to minimize radiation dose exposure, including the use of iterative  reconstruction and automated exposure control  IMAGE QUALITY:  Diagnostic  FINDINGS:  PARENCHYMA:  Left parietal 4 6 x 3 2 x 4 6 cm intraparenchymal hemorrhage with surrounding cerebral edema  No midline shift  VENTRICLES AND EXTRA-AXIAL SPACES:  Normal for patient's age  VISUALIZED ORBITS AND PARANASAL SINUSES:  Unremarkable  CALVARIUM AND EXTRACRANIAL SOFT TISSUES:   Normal      Impression: Left parietal 4 6 cm intraparenchymal hemorrhage with surrounding cerebral edema  No midline shift  I personally discussed this study with neurologist on 4/2/2022 at 7:45 PM  Workstation performed: [id number]     CTA stroke alert (head/neck)    Result Date: 4/2/2022  Narrative: CTA NECK AND BRAIN WITH CONTRAST INDICATION: Right-sided weakness, aphasia COMPARISON:   None   TECHNIQUE:   Post contrast imaging was performed after administration of iodinated contrast through the neck and brain  Post contrast axial 0 625 mm images timed to opacify the arterial system  3D rendering was performed on an independent workstation  MIP reconstructions performed  Coronal reconstructions were performed of the noncontrast portion of the brain  Radiation dose length product (DLP) for this visit:  415 61 mGy-cm   This examination, like all CT scans performed in the [facility], was performed utilizing techniques to minimize radiation dose exposure, including the use of iterative  reconstruction and automated exposure control  IV Contrast:  85 mL of iohexol (OMNIPAQUE)  IMAGE QUALITY:   Diagnostic FINDINGS: CERVICAL VASCULATURE AORTIC ARCH AND GREAT VESSELS:  Moderate atherosclerotic disease of the arch and great vessels  RIGHT VERTEBRAL ARTERY CERVICAL SEGMENT:  Severe stenosis at the origin  Severe critical near occlusive stenosis right proximal vertebral artery near its origin  Moderate segmental plaque stenosis throughout the right vertebral artery LEFT VERTEBRAL ARTERY CERVICAL SEGMENT: Nondominant and threadlike left vertebral artery RIGHT EXTRACRANIAL CAROTID SEGMENT:  Severe atherosclerotic disease of the bifurcation  Approximately 70-75% right proximal cervical ICA plaque stenosis LEFT EXTRACRANIAL CAROTID SEGMENT:  Normal caliber common carotid artery  Normal bifurcation and cervical internal carotid artery  No stenosis or dissection  NASCET criteria was used to determine the degree of internal carotid artery diameter stenosis  INTRACRANIAL VASCULATURE INTERNAL CAROTID ARTERIES:  Normal enhancement of the intracranial portions of the internal carotid arteries  Normal ophthalmic artery origins  Normal ICA terminus  ANTERIOR CIRCULATION:  Symmetric A1 segments and anterior cerebral arteries with normal enhancement  Normal anterior communicating artery   MIDDLE CEREBRAL ARTERY CIRCULATION:  M1 segment and middle cerebral artery branches demonstrate normal enhancement bilaterally  DISTAL VERTEBRAL ARTERIES:  Normal distal vertebral arteries  Posterior inferior cerebellar artery origins are normal  Normal vertebral basilar junction  BASILAR ARTERY:  Basilar artery is normal in caliber  Normal superior cerebellar arteries  POSTERIOR CEREBRAL ARTERIES: Both posterior cerebral arteries arises from the basilar tip  Both arteries demonstrate normal enhancement  Normal posterior communicating arteries  VENOUS STRUCTURES:  Normal  NON VASCULAR ANATOMY BONY STRUCTURES:  No acute osseous abnormality  SOFT TISSUES OF THE NECK:  Normal  THORACIC INLET:  Emphysematous changes     Impression: Severe right vertebral artery stenosis at the origin  Severe critical near occlusive stenosis right proximal vertebral artery near its origin spanning approximately 2 2 cm in cranial caudal dimension  Approximately 70-75% right proximal cervical ICA plaque stenosis  I personally discussed this study with neurologist on 4/2/2022 at 7:50 PM   Workstation performed: [id number]     Echo follow up/limited w/ contrast if indicated    Result Date: 4/3/2022  Narrative: [facility]  Left Ventricle: Left ventricular cavity size is normal  Wall thickness is normal  The left ventricular ejection fraction is 65%  Systolic function is normal  Wall motion is normal  Diastolic function is mildly abnormal, consistent with grade I (abnormal) relaxation    Tricuspid Valve: There is mild regurgitation  The estimated right ventricular systolic pressure is 21 71 mmHg  EKG, Pathology, and Other Studies: I have personally reviewed pertinent reports        VTE Pharmacologic Prophylaxis: Reason for no pharmacologic prophylaxis post op    VTE Mechanical Prophylaxis: sequential compression device

## 2022-04-08 NOTE — PLAN OF CARE
Problem: MOBILITY - ADULT  Goal: Maintain or return to baseline ADL function  Description: INTERVENTIONS:  -  Assess patient's ability to carry out ADLs; assess patient's baseline for ADL function and identify physical deficits which impact ability to perform ADLs (bathing, care of mouth/teeth, toileting, grooming, dressing, etc )  - Assess/evaluate cause of self-care deficits   - Assess range of motion  - Assess patient's mobility; develop plan if impaired  - Assess patient's need for assistive devices and provide as appropriate  - Encourage maximum independence but intervene and supervise when necessary  - Involve family in performance of ADLs  - Assess for home care needs following discharge   - Consider OT consult to assist with ADL evaluation and planning for discharge  - Provide patient education as appropriate  Outcome: Progressing  Goal: Maintains/Returns to pre admission functional level  Description: INTERVENTIONS:  - Perform BMAT or MOVE assessment daily    - Set and communicate daily mobility goal to care team and patient/family/caregiver  - Collaborate with rehabilitation services on mobility goals if consulted  - Ambulate patient 2 times a day  - Out of bed for meals 3 times a day  - Out of bed for toileting  - Record patient progress and toleration of activity level   Outcome: Progressing     Problem: Neurological Deficit  Goal: Neurological status is stable or improving  Description: Interventions:  - Monitor and assess patient's level of consciousness, motor function, sensory function, and level of assistance needed for ADLs  - Monitor and report changes from baseline  Collaborate with interdisciplinary team to initiate plan and implement interventions as ordered  - Provide and maintain a safe environment  - Consider seizure precautions  - Consider fall precautions  - Consider aspiration precautions  - Consider bleeding precautions  Outcome: Progressing     Problem:  Activity Intolerance/Impaired Mobility  Goal: Mobility/activity is maintained at optimum level for patient  Description: Interventions:  - Assess and monitor patient  barriers to mobility and need for assistive/adaptive devices  - Assess patient's emotional response to limitations  - Collaborate with interdisciplinary team and initiate plans and interventions as ordered  - Encourage independent activity per ability   - Maintain proper body alignment  - Perform active/passive rom as tolerated/ordered  - Plan activities to conserve energy   - Turn patient as appropriate  Outcome: Progressing     Problem: Communication Impairment  Goal: Ability to express needs and understand communication  Description: Assess patient's communication skills and ability to understand information  Patient will demonstrate use of effective communication techniques, alternative methods of communication and understanding even if not able to speak  - Encourage communication and provide alternate methods of communication as needed  - Collaborate with case management/ for discharge needs  - Include patient/family/caregiver in decisions related to communication  Outcome: Progressing     Problem: Potential for Aspiration  Goal: Non-ventilated patient's risk of aspiration is minimized  Description: Assess and monitor vital signs, respiratory status, and labs (WBC)  Monitor for signs of aspiration (tachypnea, cough, rales, wheezing, cyanosis, fever)  - Assess and monitor patient's ability to swallow  - Place patient up in chair to eat if possible  - HOB up at 90 degrees to eat if unable to get patient up into chair   - Supervise patient during oral intake  - Instruct patient/ family to take small bites  - Instruct patient/ family to take small single sips when taking liquids    - Follow patient-specific strategies generated by speech pathologist   Outcome: Progressing     Problem: Nutrition  Goal: Nutrition/Hydration status is improving  Description: Monitor and assess patient's nutrition/hydration status for malnutrition (ex- brittle hair, bruises, dry skin, pale skin and conjunctiva, muscle wasting, smooth red tongue, and disorientation)  Collaborate with interdisciplinary team and initiate plan and interventions as ordered  Monitor patient's weight and dietary intake as ordered or per policy  Utilize nutrition screening tool and intervene per policy  Determine patient's food preferences and provide high-protein, high-caloric foods as appropriate  - Assist patient with eating   - Allow adequate time for meals   - Encourage patient to take dietary supplement as ordered  - Collaborate with clinical nutritionist   - Include patient/family/caregiver in decisions related to nutrition    Outcome: Progressing     Problem: PAIN - ADULT  Goal: Verbalizes/displays adequate comfort level or baseline comfort level  Description: Interventions:  - Encourage patient to monitor pain and request assistance  - Assess pain using appropriate pain scale  - Administer analgesics based on type and severity of pain and evaluate response  - Implement non-pharmacological measures as appropriate and evaluate response  - Consider cultural and social influences on pain and pain management  - Notify physician/advanced practitioner if interventions unsuccessful or patient reports new pain  Outcome: Progressing     Problem: INFECTION - ADULT  Goal: Absence or prevention of progression during hospitalization  Description: INTERVENTIONS:  - Assess and monitor for signs and symptoms of infection  - Monitor lab/diagnostic results  - Monitor all insertion sites, i e  indwelling lines, tubes, and drains  - Monitor endotracheal if appropriate and nasal secretions for changes in amount and color  - Richardson appropriate cooling/warming therapies per order  - Administer medications as ordered  - Instruct and encourage patient and family to use good hand hygiene technique  - Identify and instruct in appropriate isolation precautions for identified infection/condition  Outcome: Progressing     Problem: SAFETY ADULT  Goal: Maintain or return to baseline ADL function  Description: INTERVENTIONS:  -  Assess patient's ability to carry out ADLs; assess patient's baseline for ADL function and identify physical deficits which impact ability to perform ADLs (bathing, care of mouth/teeth, toileting, grooming, dressing, etc )  - Assess/evaluate cause of self-care deficits   - Assess range of motion  - Assess patient's mobility; develop plan if impaired  - Assess patient's need for assistive devices and provide as appropriate  - Encourage maximum independence but intervene and supervise when necessary  - Involve family in performance of ADLs  - Assess for home care needs following discharge   - Consider OT consult to assist with ADL evaluation and planning for discharge  - Provide patient education as appropriate  Outcome: Progressing  Goal: Maintains/Returns to pre admission functional level  Description: INTERVENTIONS:  - Perform BMAT or MOVE assessment daily    - Set and communicate daily mobility goal to care team and patient/family/caregiver     - Collaborate with rehabilitation services on mobility goals if consulted  - Ambulate patient 2 times a day  - Out of bed for meals 3 times a day  - Out of bed for toileting  - Record patient progress and toleration of activity level   Outcome: Progressing  Goal: Patient will remain free of falls  Description: INTERVENTIONS:  -  Assess patient's ability to carry out ADLs; assess patient's baseline for ADL function and identify physical deficits which impact ability to perform ADLs (bathing, care of mouth/teeth, toileting, grooming, dressing, etc )  - Assess/evaluate cause of self-care deficits   - Assess range of motion  - Assess patient's mobility; develop plan if impaired  - Assess patient's need for assistive devices and provide as appropriate  - Encourage maximum independence but intervene and supervise when necessary  - Involve family in performance of ADLs  - Assess for home care needs following discharge   - Consider OT consult to assist with ADL evaluation and planning for discharge  - Provide patient education as appropriate  Outcome: Progressing     Problem: DISCHARGE PLANNING  Goal: Discharge to home or other facility with appropriate resources  Description: INTERVENTIONS:  - Identify barriers to discharge w/patient and caregiver  - Arrange for needed discharge resources and transportation as appropriate  - Identify discharge learning needs (meds, wound care, etc )  - Arrange for interpretive services to assist at discharge as needed  - Refer to Case Management Department for coordinating discharge planning if the patient needs post-hospital services based on physician/advanced practitioner order or complex needs related to functional status, cognitive ability, or social support system  Outcome: Progressing     Problem: Knowledge Deficit  Goal: Patient/family/caregiver demonstrates understanding of disease process, treatment plan, medications, and discharge instructions  Description: Complete learning assessment and assess knowledge base    Interventions:  - Provide teaching at level of understanding  - Provide teaching via preferred learning methods  Outcome: Progressing     Problem: Potential for Falls  Goal: Patient will remain free of falls  Description: INTERVENTIONS:  -  Assess patient's ability to carry out ADLs; assess patient's baseline for ADL function and identify physical deficits which impact ability to perform ADLs (bathing, care of mouth/teeth, toileting, grooming, dressing, etc )  - Assess/evaluate cause of self-care deficits   - Assess range of motion  - Assess patient's mobility; develop plan if impaired  - Assess patient's need for assistive devices and provide as appropriate  - Encourage maximum independence but intervene and supervise when necessary  - Involve family in performance of ADLs  - Assess for home care needs following discharge   - Consider OT consult to assist with ADL evaluation and planning for discharge  - Provide patient education as appropriate  Outcome: Progressing     Problem: Prexisting or High Potential for Compromised Skin Integrity  Goal: Skin integrity is maintained or improved  Description: INTERVENTIONS:  - Identify patients at risk for skin breakdown  - Assess and monitor skin integrity  - Assess and monitor nutrition and hydration status  - Monitor labs   - Assess for incontinence   - Turn and reposition patient  - Assist with mobility/ambulation  - Relieve pressure over bony prominences  - Avoid friction and shearing  - Provide appropriate hygiene as needed including keeping skin clean and dry  - Evaluate need for skin moisturizer/barrier cream  - Collaborate with interdisciplinary team   - Patient/family teaching  - Consider wound care consult   Outcome: Progressing     Problem: Nutrition/Hydration-ADULT  Goal: Nutrient/Hydration intake appropriate for improving, restoring or maintaining nutritional needs  Description: Monitor and assess patient's nutrition/hydration status for malnutrition  Collaborate with interdisciplinary team and initiate plan and interventions as ordered  Monitor patient's weight and dietary intake as ordered or per policy  Utilize nutrition screening tool and intervene as necessary  Determine patient's food preferences and provide high-protein, high-caloric foods as appropriate       INTERVENTIONS:  - Monitor oral intake, urinary output, labs, and treatment plans  - Assess nutrition and hydration status and recommend course of action  - Evaluate amount of meals eaten  - Assist patient with eating if necessary   - Allow adequate time for meals  - Recommend/ encourage appropriate diets, oral nutritional supplements, and vitamin/mineral supplements  - Order, calculate, and assess calorie counts as needed  - Recommend, monitor, and adjust tube feedings and TPN/PPN based on assessed needs  - Assess need for intravenous fluids  - Provide specific nutrition/hydration education as appropriate  - Include patient/family/caregiver in decisions related to nutrition  Outcome: Progressing     Problem: COPING  Goal: Pt/Family able to verbalize concerns and demonstrate effective coping strategies  Description: INTERVENTIONS:  - Assist patient/family to identify coping skills, available support systems and cultural and spiritual values  - Provide emotional support, including active listening and acknowledgement of concerns of patient and caregivers  - Reduce environmental stimuli, as able  - Provide patient education  - Assess for spiritual pain/suffering and initiate spiritual care, including notification of Pastoral Care or [facility] based community as needed  - Assess effectiveness of coping strategies  Outcome: Progressing  Goal: Will report anxiety at manageable levels  Description: INTERVENTIONS:  - Administer medication as ordered  - Teach and encourage coping skills  - Provide emotional support  - Assess patient/family for anxiety and ability to cope  Outcome: Progressing     Problem: NEUROSENSORY - ADULT  Goal: Achieves stable or improved neurological status  Description: INTERVENTIONS  - Monitor and report changes in neurological status  - Monitor vital signs such as temperature, blood pressure, glucose, and any other labs ordered   - Initiate measures to prevent increased intracranial pressure  - Monitor for seizure activity and implement precautions if appropriate      Outcome: Progressing  Goal: Remains free of injury related to seizures activity  Description: INTERVENTIONS  - Maintain airway, patient safety  and administer oxygen as ordered  - Monitor patient for seizure activity, document and report duration and description of seizure to physician/advanced practitioner  - If seizure occurs,  ensure patient safety during seizure  - Reorient patient post seizure  - Seizure pads on all 4 side rails  - Instruct patient/family to notify RN of any seizure activity including if an aura is experienced  - Instruct patient/family to call for assistance with activity based on nursing assessment  - Administer anti-seizure medications if ordered    Outcome: Progressing  Goal: Achieves maximal functionality and self care  Description: INTERVENTIONS  - Monitor swallowing and airway patency with patient fatigue and changes in neurological status  - Encourage and assist patient to increase activity and self care     - Encourage visually impaired, hearing impaired and aphasic patients to use assistive/communication devices  Outcome: Progressing     Problem: CARDIOVASCULAR - ADULT  Goal: Maintains optimal cardiac output and hemodynamic stability  Description: INTERVENTIONS:  - Monitor I/O, vital signs and rhythm  - Monitor for S/S and trends of decreased cardiac output  - Administer and titrate ordered vasoactive medications to optimize hemodynamic stability  - Assess quality of pulses, skin color and temperature  - Assess for signs of decreased coronary artery perfusion  - Instruct patient to report change in severity of symptoms  Outcome: Progressing  Goal: Absence of cardiac dysrhythmias or at baseline rhythm  Description: INTERVENTIONS:  - Continuous cardiac monitoring, vital signs, obtain 12 lead EKG if ordered  - Administer antiarrhythmic and heart rate control medications as ordered  - Monitor electrolytes and administer replacement therapy as ordered  Outcome: Progressing     Problem: RESPIRATORY - ADULT  Goal: Achieves optimal ventilation and oxygenation  Description: INTERVENTIONS:  - Assess for changes in respiratory status  - Assess for changes in mentation and behavior  - Position to facilitate oxygenation and minimize respiratory effort  - Oxygen administered by appropriate delivery if ordered  - Initiate smoking cessation education as indicated  - Encourage broncho-pulmonary hygiene including cough, deep breathe, Incentive Spirometry  - Assess the need for suctioning and aspirate as needed  - Assess and instruct to report SOB or any respiratory difficulty  - Respiratory Therapy support as indicated  Outcome: Progressing     Problem: GASTROINTESTINAL - ADULT  Goal: Maintains or returns to baseline bowel function  Description: INTERVENTIONS:  - Assess bowel function  - Encourage oral fluids to ensure adequate hydration  - Administer IV fluids if ordered to ensure adequate hydration  - Administer ordered medications as needed  - Encourage mobilization and activity  - Consider nutritional services referral to assist patient with adequate nutrition and appropriate food choices  Outcome: Progressing  Goal: Maintains adequate nutritional intake  Description: INTERVENTIONS:  - Monitor percentage of each meal consumed  - Identify factors contributing to decreased intake, treat as appropriate  - Assist with meals as needed  - Monitor I&O, weight, and lab values if indicated  - Obtain nutrition services referral as needed  Outcome: Progressing     Problem: GENITOURINARY - ADULT  Goal: Maintains or returns to baseline urinary function  Description: INTERVENTIONS:  - Assess urinary function  - Encourage oral fluids to ensure adequate hydration if ordered  - Administer IV fluids as ordered to ensure adequate hydration  - Administer ordered medications as needed  - Offer frequent toileting  - Follow urinary retention protocol if ordered  Outcome: Progressing     Problem: METABOLIC, FLUID AND ELECTROLYTES - ADULT  Goal: Electrolytes maintained within normal limits  Description: INTERVENTIONS:  - Monitor labs and assess patient for signs and symptoms of electrolyte imbalances  - Administer electrolyte replacement as ordered  - Monitor response to electrolyte replacements, including repeat lab results as appropriate  - Instruct patient on fluid and nutrition as appropriate  Outcome: Progressing  Goal: Fluid balance maintained  Description: INTERVENTIONS:  - Monitor labs   - Monitor I/O and WT  - Instruct patient on fluid and nutrition as appropriate  - Assess for signs & symptoms of volume excess or deficit  Outcome: Progressing  Goal: Glucose maintained within target range  Description: INTERVENTIONS:  - Monitor Blood Glucose as ordered  - Assess for signs and symptoms of hyperglycemia and hypoglycemia  - Administer ordered medications to maintain glucose within target range  - Assess nutritional intake and initiate nutrition service referral as needed  Outcome: Progressing     Problem: HEMATOLOGIC - ADULT  Goal: Maintains hematologic stability  Description: INTERVENTIONS  - Assess for signs and symptoms of bleeding or hemorrhage  - Monitor labs  - Administer supportive blood products/factors as ordered and appropriate  Outcome: Progressing     Problem: MUSCULOSKELETAL - ADULT  Goal: Maintain or return mobility to safest level of function  Description: INTERVENTIONS:  - Assess patient's ability to carry out ADLs; assess patient's baseline for ADL function and identify physical deficits which impact ability to perform ADLs (bathing, care of mouth/teeth, toileting, grooming, dressing, etc )  - Assess/evaluate cause of self-care deficits   - Assess range of motion  - Assess patient's mobility  - Assess patient's need for assistive devices and provide as appropriate  - Encourage maximum independence but intervene and supervise when necessary  - Involve family in performance of ADLs  - Assess for home care needs following discharge   - Consider OT consult to assist with ADL evaluation and planning for discharge  - Provide patient education as appropriate  Outcome: Progressing

## 2022-04-08 NOTE — PROGRESS NOTES
CRITICAL CARE TRANSFER NOTE     Name: Anay Nugent   Age & Sex: 74 y o  male   MRN: [id number]  Unit/Bed#: ICU 08   Encounter: [id number]  Hospital Stay Days: 6    Reason for ICU Admission: Left parietal tumor resection   Code Status: Level 1 - Full Code  Condition: stable  Disposition: Patient requires Med/Surg    Accepting Physician: Dr Taylor Carnes, accepted via TT    ASSESSMENT/PLAN     Principal Problem:    Left parietal hemorrhagic tumor  Active Problems:    Depression with anxiety    Essential hypertension    Mixed hyperlipidemia    Adenocarcinoma, lung, left ([facility] [address] )    Psoriasis    Thrombocytopenia ([facility] [address] )    Goals of care, counseling/discussion    Hypothyroidism    Left parietal hemorraghic tumor   Presented to [facility] on 4/2/22 with apraxia and aphasia     Found to have left parietal 4 6 cm intraparenchymal hemorrhage with surrounding cerebral edema on CT and multiple masses suspicious for mets on MRI  Known history of small-cell lung cancer (dx 4/2021) s/p  chem[place] [facility]  [place] chest abdomen pelvis without any evidence of metastatic disease  Neurology consulted- no current recommendations  S/p tumor resection on 4/07 by Dr Velázquez Salvage  Currently on Decadron 4 mg q 6 hours for 48 hrs - Decadron taper as per Nsx recommendations   Keppra 500 mg BID x 7 days   SBP goal <160, MAP >65   DVT ppx as per Nsx recommendations - Lovenox to start on 4/09   Neuro checks and Vital signs per protocol  Post op MRI: No significant residual tumor noted within the resection cavity  2 metastatic lesions within the left cerebellar hemisphere are unchanged     F/u Nsx recommendations     Depression with anxiety  Continue celexa and doxepin      Delirium monitoring/management   Regulate Sleep-wake cycle/CAM-ICU daily  Melatonin 6mg HS        Goals of care, counseling/discussion  Patient wishes for ongoing medical management without limits, hopeful to continue receiving disease directed therapies with goal of maintaining independence and prolonging survival, however he is clear he would not want artificial life prolonging cares if in a persistent unresponsive state  Appreciate palliative input ongoing       Essential hypertension  SBP goal < 160  Continue Norvasc 10 mg daily   Labetalol 10 mg q4h PRN for SBP > 160 - no doses needed overnight        Mixed hyperlipidemia  Continue Pravastatin 80mg daily       Adenocarcinoma, lung, left (HCC)  s/p DAVID lobectomy in 12/2021 at [facility], currently receiving adjuvant immunotherapy and has completed 2 cycles of atezolizumab  New mets to brain, see plan above in primary problem   Consulted Medical Oncology per wife's request, patient known to Dr Conchis Watt locally  Holding immunotherapy  May need radiation  Palliative following        GERD  Continue home pantoprazole        Thrombocytopenia  Chronic  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  Plt 111 on 4/07 -> 91  S/p 1 unit platelets on 0/89 as per Nsx recommendations   Monitor        Drug-induced thyroiditis  On levothyroxine 37 5 mcg daily        Psoriasis  Noted to have worsening while receiving immunotherapy  Now on decadron as above      VTE Pharmacologic Prophylaxis: Reason for no pharmacologic prophylaxis s/p Tumor resection  VTE Mechanical Prophylaxis: sequential compression device    HOSPITAL COURSE     PMH adenocarcinoma of the left lung s/p DAVID lobectomy in 12/2021 at [facility], currently receiving adjuvant immunotherapy and has completed 2 cycles of atezolizumab  Presented to [facility] on 4/2/22 with apraxia and aphasia  CT brain 4/2/22 showed a left parietal 4 6 cm intraparenchymal hemorrhage with surrounding cerebral edema  MRI on 4/03 shows multiple masses suspicious for mets  Hemorrhagic mass in the left parietal lobe with surrounding edema  Started on Decadron and Keppra  He was in the ICU from 4/02 through 4/04 and downgrade to the general medicine service  Palliative team has been following    Arrived to the ICU s/p  tumor resection on 4/07 by Dr Shaunna Hudson  No events overnight  Patient is stable to be downgraded to the general medicine service with Nsx and palliative care teams following        OBJECTIVE     Vitals:    04/08/22 1130 04/08/22 1200 04/08/22 1215 04/08/22 1221   BP:   151/83 151/83   BP Location:       Pulse: 80 80 82 88   Resp: 16 (!) 9 (!) 7 18   Temp:    98 2 °F (36 8 °C)   TempSrc:       SpO2: 95% 95% 95% 96%   Weight:       Height:         I/O last 24 hours: In: 7314 [P O :300; I V :3070; IV Piggyback:560]  Out: 2803 [Urine:4775; Drains:120]    LABORATORY DATA     Labs: I have personally reviewed pertinent reports  Results from last 7 days   Lab Units 04/08/22  0456 04/07/22  0539 04/06/22  0930 04/05/22  0526 04/05/22  0526 04/04/22  0521 04/03/22  0535   WBC Thousand/uL 9 80 6 78 7 72   < > 10 01   < > 10 54*   HEMOGLOBIN g/dL 11 5* 13 5 14 5   < > 13 3   < > 14 4   HEMATOCRIT % 35 3* 41 6 45 4   < > 40 8   < > 45 6   PLATELETS Thousands/uL 91* 111* 114*   < > 107*   < > 118*   NEUTROS PCT %  --   --   --   --   --   --  88*   MONOS PCT %  --   --   --   --   --   --  5   MONO PCT %  --  1* 4  --  0*  --   --     < > = values in this interval not displayed  Results from last 7 days   Lab Units 04/08/22  0456 04/07/22  0539 04/06/22  0722 04/05/22  0526 04/04/22  0521 04/03/22  0535 04/03/22  0535 04/02/22 1942 04/02/22 1942   POTASSIUM mmol/L 4 0 4 1 3 9   < > 4 0   < > 4 3   < > 4 1   CHLORIDE mmol/L 109* 104 108   < > 108   < > 104   < > 99   CO2 mmol/L 25 28 25   < > 22   < > 29   < > 28   BUN mg/dL 28* 30* 32*   < > 34*   < > 21   < > 25   CREATININE mg/dL 1 09 1 15 1 10   < > 1 19   < > 1 17   < > 1 32*   CALCIUM mg/dL 8 1* 8 9 9 3   < > 9 1   < > 9 5   < > 9 9   ALK PHOS U/L  --   --   --   --  53  --  65  --  55   ALT U/L  --   --   --   --  35  --  43  --  36   AST U/L  --   --   --   --  14  --  13  --  24    < > = values in this interval not displayed       Results from last 7 days   Lab Units 04/08/22  0456 04/04/22  0521 04/03/22  0535   MAGNESIUM mg/dL 1 9 2 5 2 2     Results from last 7 days   Lab Units 04/08/22  0456 04/04/22  0521 04/03/22  0535   PHOSPHORUS mg/dL 3 7 3 6 4 0      Results from last 7 days   Lab Units 04/08/22  0456 04/06/22  1153 04/03/22  0535 04/02/22  1942 04/02/22 1942   INR  1 12 1 01 0 96   < > 0 90   PTT seconds 27 29  --   --  30    < > = values in this interval not displayed  Micro:  No results found for: Shanna Jeter, [facility] , [address] TESTING     Imaging: I have personally reviewed pertinent reports  CT head wo contrast    Result Date: 4/3/2022  Impression: Left parietal region intraparenchymal hematoma unchanged in size measuring 6 7 cm in largest dimension  New irregular hyperdense focus at the left cerebellum likely to represent small amount of subarachnoid hemorrhage versus a new intraparenchymal hemorrhage  Workstation performed: [id number]     MRI brain w wo contrast    Result Date: 4/3/2022  Impression: 1   2 left cerebellar hemorrhagic metastases and probable 2 adjacent left parietal hemorrhagic metastasis, the more anterior, larger one having parenchymal hematoma and local edema and mass effect, correlating to the hemorrhage on the prior head CT  I personally discussed this study with Dr Taylor Meza on 4/3/2022 at 2:19 PM  Workstation performed: [id number]     CT chest abdomen pelvis w contrast    Result Date: 4/4/2022  Impression: No findings of metastatic disease in the chest abdomen pelvis  No acute compression collapse of the vertebra seen No lytic destructive lesion seen Emphysema Postsurgical changes from left upper lobectomy A linear density seen in the left mid to lower lung likely scarring    Routine surveillance can be continued Workstation performed: [id number]     CT stroke alert brain    Result Date: 4/2/2022  Impression: Left parietal 4 6 cm intraparenchymal hemorrhage with surrounding cerebral edema  No midline shift  I personally discussed this study with neurologist on 4/2/2022 at 7:45 PM  Workstation performed: [id number]     CTA stroke alert (head/neck)    Result Date: 4/2/2022  Impression: Severe right vertebral artery stenosis at the origin  Severe critical near occlusive stenosis right proximal vertebral artery near its origin spanning approximately 2 2 cm in cranial caudal dimension  Approximately 70-75% right proximal cervical ICA plaque stenosis  I personally discussed this study with neurologist on 4/2/2022 at 7:50 PM   Workstation performed: [id number]     EKG, Pathology, and Other Studies: I have personally reviewed pertinent reports       ALLERGIES     Allergies   Allergen Reactions    Bee Venom     Benazepril Other (See Comments)     Angioedema     Latex Other (See Comments)     Burning of eyes at the dentist from the gloves    Meloxicam GI Intolerance    Penicillin G Rash       MEDICATIONS     Current Facility-Administered Medications   Medication Dose Route Frequency Provider Last Rate    acetaminophen  650 mg Oral Q6H PRN Jose Rodríguez MD      acetaminophen  975 mg Oral [address] [facility]      amLODIPine  10 mg Oral Daily Jessica Booth PA-C      citalopram  10 mg Oral Daily Blake Necessary [place]      dexamethasone  4 mg Oral Q6H [address] Blake Velazco PA-C      Followed by   Ismael Vinod ON 4/9/2022] dexamethasone  4 mg Oral Q8H [address] Yoselyn Velazco PA-C      Followed by   Ismael Vinod ON 4/11/2022] dexamethasone  2 mg Oral Q6H [address] Yoselyn Velazco PA-C      Followed by   Ismael Vinod ON 4/14/2022] dexamethasone  2 mg Oral Q8H [address] Yoselyn Velazco PA-C      Followed by   Ismael Vinod ON 4/15/2022] dexamethasone  2 mg Oral Q12H [address] Yoselyn Velazco PA-C      Followed by   Ismael Vinod ON 4/18/2022] dexamethasone  2 mg Oral Q24H [address] Yoselyn Velazco PA-C      docusate sodium  100 mg Oral BID Jessica Booth PA-C      doxepin  10 mg Oral HS Jessica JOSE EDUARDO Booth      [START ON 4/9/2022] enoxaparin 40 mg Subcutaneous Q24H [address] Alka Macias PA-C      HYDROmorphone  0 5 mg Intravenous Q1H PRN Marian Lao PA-C      Labetalol HCl  10 mg Intravenous Q4H PRN Marian Lao PA-C      levETIRAcetam  500 mg Oral Q12H [address] Marian Lao PA-C      levothyroxine  37 5 mcg Oral Early Morning Marian Lao PA-C      meclizine  12 5 mg Oral Q8H PRN Marian Lao PA-C      melatonin  6 mg Oral HS Marian Lao PA-C      methocarbamol  500 mg Oral Q6H [address] Marian Lao PA-C      ondansetron  4 mg Intravenous Q4H PRN Marian Lao PA-C      oxyCODONE  10 mg Oral Q4H PRN Marian Lao PA-C      oxyCODONE  5 mg Oral Q4H PRN Marian Lao PA-C      pravastatin  80 mg Oral Daily With [employer], JOSE EDUARDO      senna  8 8 mg Oral Daily Jazmin Velazco PA-C          acetaminophen, 650 mg, Q6H PRN  HYDROmorphone, 0 5 mg, Q1H PRN  Labetalol HCl, 10 mg, Q4H PRN  meclizine, 12 5 mg, Q8H PRN  ondansetron, 4 mg, Q4H PRN  oxyCODONE, 10 mg, Q4H PRN  oxyCODONE, 5 mg, Q4H PRN      ==      Magda Sexton MD  Internal Medicine Residency, PGY-3  [address] [facility]

## 2022-04-09 LAB
ABO GROUP BLD BPU: NORMAL
ANION GAP SERPL CALCULATED.3IONS-SCNC: 4 MMOL/L (ref 4–13)
BPU ID: NORMAL
BUN SERPL-MCNC: 29 MG/DL (ref 5–25)
CALCIUM SERPL-MCNC: 8.3 MG/DL (ref 8.3–10.1)
CHLORIDE SERPL-SCNC: 105 MMOL/L (ref 100–108)
CO2 SERPL-SCNC: 28 MMOL/L (ref 21–32)
CREAT SERPL-MCNC: 1.01 MG/DL (ref 0.6–1.3)
ERYTHROCYTE [DISTWIDTH] IN BLOOD BY AUTOMATED COUNT: 18 % (ref 11.6–15.1)
GFR SERPL CREATININE-BSD FRML MDRD: 76 ML/MIN/1.73SQ M
GLUCOSE SERPL-MCNC: 123 MG/DL (ref 65–140)
HCT VFR BLD AUTO: 35.7 % (ref 36.5–49.3)
HGB BLD-MCNC: 12 G/DL (ref 12–17)
MCH RBC QN AUTO: 30.9 PG (ref 26.8–34.3)
MCHC RBC AUTO-ENTMCNC: 33.6 G/DL (ref 31.4–37.4)
MCV RBC AUTO: 92 FL (ref 82–98)
PLATELET # BLD AUTO: 104 THOUSANDS/UL (ref 149–390)
PMV BLD AUTO: 10.3 FL (ref 8.9–12.7)
POTASSIUM SERPL-SCNC: 4.1 MMOL/L (ref 3.5–5.3)
RBC # BLD AUTO: 3.88 MILLION/UL (ref 3.88–5.62)
SODIUM SERPL-SCNC: 137 MMOL/L (ref 136–145)
UNIT DISPENSE STATUS: NORMAL
UNIT PRODUCT CODE: NORMAL
UNIT PRODUCT VOLUME: 300 ML
UNIT RH: NORMAL
WBC # BLD AUTO: 8.13 THOUSAND/UL (ref 4.31–10.16)

## 2022-04-09 PROCEDURE — 85027 COMPLETE CBC AUTOMATED: CPT | Performed by: STUDENT IN AN ORGANIZED HEALTH CARE EDUCATION/TRAINING PROGRAM

## 2022-04-09 PROCEDURE — 80048 BASIC METABOLIC PNL TOTAL CA: CPT | Performed by: STUDENT IN AN ORGANIZED HEALTH CARE EDUCATION/TRAINING PROGRAM

## 2022-04-09 PROCEDURE — 99232 SBSQ HOSP IP/OBS MODERATE 35: CPT | Performed by: PHYSICIAN ASSISTANT

## 2022-04-09 PROCEDURE — 97163 PT EVAL HIGH COMPLEX 45 MIN: CPT

## 2022-04-09 PROCEDURE — 97166 OT EVAL MOD COMPLEX 45 MIN: CPT

## 2022-04-09 PROCEDURE — 99024 POSTOP FOLLOW-UP VISIT: CPT | Performed by: PHYSICIAN ASSISTANT

## 2022-04-09 RX ADMIN — OXYCODONE HYDROCHLORIDE 10 MG: 10 TABLET ORAL at 03:17

## 2022-04-09 RX ADMIN — PRAVASTATIN SODIUM 80 MG: 80 TABLET ORAL at 18:05

## 2022-04-09 RX ADMIN — Medication 6 MG: at 21:29

## 2022-04-09 RX ADMIN — DEXAMETHASONE 4 MG: 4 TABLET ORAL at 18:05

## 2022-04-09 RX ADMIN — ENOXAPARIN SODIUM 40 MG: 40 INJECTION SUBCUTANEOUS at 09:22

## 2022-04-09 RX ADMIN — DEXAMETHASONE 4 MG: 4 TABLET ORAL at 06:31

## 2022-04-09 RX ADMIN — DEXAMETHASONE 4 MG: 4 TABLET ORAL at 12:28

## 2022-04-09 RX ADMIN — ACETAMINOPHEN 975 MG: 325 TABLET ORAL at 13:55

## 2022-04-09 RX ADMIN — LEVOTHYROXINE SODIUM 37.5 MCG: 75 TABLET ORAL at 06:31

## 2022-04-09 RX ADMIN — METHOCARBAMOL 500 MG: 500 TABLET ORAL at 18:05

## 2022-04-09 RX ADMIN — ACETAMINOPHEN 975 MG: 325 TABLET ORAL at 06:30

## 2022-04-09 RX ADMIN — METHOCARBAMOL 500 MG: 500 TABLET ORAL at 12:28

## 2022-04-09 RX ADMIN — OXYCODONE HYDROCHLORIDE 5 MG: 5 TABLET ORAL at 18:04

## 2022-04-09 RX ADMIN — CITALOPRAM HYDROBROMIDE 10 MG: 10 TABLET ORAL at 09:21

## 2022-04-09 RX ADMIN — METHOCARBAMOL 500 MG: 500 TABLET ORAL at 06:30

## 2022-04-09 RX ADMIN — SENNOSIDES 8.8 MG: 8.8 SYRUP ORAL at 09:21

## 2022-04-09 RX ADMIN — DOCUSATE SODIUM 100 MG: 100 CAPSULE, LIQUID FILLED ORAL at 18:05

## 2022-04-09 RX ADMIN — DOCUSATE SODIUM 100 MG: 100 CAPSULE, LIQUID FILLED ORAL at 09:21

## 2022-04-09 RX ADMIN — METHOCARBAMOL 500 MG: 500 TABLET ORAL at 00:22

## 2022-04-09 RX ADMIN — LEVETIRACETAM 500 MG: 500 TABLET, FILM COATED ORAL at 21:29

## 2022-04-09 RX ADMIN — ACETAMINOPHEN 975 MG: 325 TABLET ORAL at 21:29

## 2022-04-09 RX ADMIN — AMLODIPINE BESYLATE 10 MG: 10 TABLET ORAL at 09:21

## 2022-04-09 RX ADMIN — OXYCODONE HYDROCHLORIDE 5 MG: 5 TABLET ORAL at 22:34

## 2022-04-09 RX ADMIN — DOXEPIN HYDROCHLORIDE 10 MG: 10 CAPSULE ORAL at 21:30

## 2022-04-09 RX ADMIN — DEXAMETHASONE 4 MG: 4 TABLET ORAL at 00:23

## 2022-04-09 RX ADMIN — SODIUM CHLORIDE 75 ML/HR: 0.9 INJECTION, SOLUTION INTRAVENOUS at 03:04

## 2022-04-09 RX ADMIN — LEVETIRACETAM 500 MG: 500 TABLET, FILM COATED ORAL at 09:21

## 2022-04-09 NOTE — ASSESSMENT & PLAN NOTE
· Palliative care was consulted in the setting of lung cancer with Mets to brain  · At this time, patient wishes for ongoing medical management without limits, is hopeful to continue receiving disease directed therapies with goal of maintaining independence and prolonging survival, however he is clear he would not want artificial life prolonging cares if in a persistent unresponsive state  · Appreciate palliative input ongoing

## 2022-04-09 NOTE — ASSESSMENT & PLAN NOTE
· Patient presented with new onset apraxias and aphasia  Found to have a left parietal brain mass with vasogenic edema, 2 additional left cerebellar masses    Patient has a known history of small-cell lung cancer (dx 4/2021) status post chemotherapy/surgery/radiation/immun[facility] at [facility]  · CT chest abdomen pelvis without any evidence of metastatic disease per the results report   · Neurology consult appreciated - no additional Neurology recommendations  · Neurosurgery following, input appreciated  · S/p image guided left parietal craniotomy for tumor resection (Left) on 4/7/22 by Dr Thapa Numbers  · Decadron wean per Neurosurgery recs  · Keppra 500mg Q12hr for 7 days post-op per Neurosurgery  · DVT ppx with SC Lovenox per Neurosurgery

## 2022-04-09 NOTE — ASSESSMENT & PLAN NOTE
· s/p DAVID lobectomy in 12/2021 [place] [facility], currently receiving adjuvant immunotherapy and has completed 2 cycles of atezolizumab  · New mets to brain, see plan above in primary problem   · Medical Oncology  Consult appreciated, patient known to Dr Domenico Patterson locally  · Holding immunotherapy  · May need radiation  · Palliative following

## 2022-04-09 NOTE — PHYSICAL THERAPY NOTE
Physical Therapy Evaluation     Patient's Name: Mattie Mata    Admitting Diagnosis  Acute encephalopathy [G93 40]  Hemorrhagic stroke [facility]) [I61 9]    Problem List  Patient Active Problem List   Diagnosis    Renal cyst, right    Acute idiopathic gout of left foot    Back problem    Depression with anxiety    Essential hypertension    Glaucoma    Hypertriglyceridemia    Lumbago with sciatica, left side    Lumbar stenosis    JUNAID (obstructive sleep apnea)    Spinal stenosis of lumbar region with neurogenic claudication    Mixed hyperlipidemia    Bilateral hearing loss    Hyperglycemia    Cigarette nicotine dependence in remission    Adenocarcinoma, lung, left ([facility] [address] )    Encounter for central line care    Drug-induced neutropenia ([facility] [address] )    Left non-suppurative otitis media    Bilateral hearing loss due to cerumen impaction    Cancer of upper lobe of left lung (HCC)    Chronic back pain    Former cigarette smoker    History of gout    Hypertension    Proctocolitis    Pericardial effusion    Constipation    Labyrinthitis of both ears    Stage 3a chronic kidney disease ([facility] [address] )    Platelets decreased ([facility] [address] )    Psoriasis    Left parietal hemorrhagic tumor    Thrombocytopenia ([facility] [address] )    Goals of care, counseling/discussion    Hypothyroidism       Past Medical History  Past Medical History:   Diagnosis Date    Hyperlipidemia     Hypertension     Lung cancer [facility])        Past Surgical History  Past Surgical History:   Procedure Laterality Date    BACK SURGERY      CRANIOTOMY Left 4/7/2022    Procedure: Image guided left parietal craniotomy for tumor resection;  Surgeon: Elier Marshall MD;  [facility]: [facility];  Service: Neurosurgery    HEMORRHOID SURGERY      IR BIOPSY LUNG  5/6/2021    IR PORT PLACEMENT  6/17/2021    LAMINECTOMY  2018    L4-L5    LUNG SURGERY      left upper lobectomy    WA BRONCHOSCOPY,DIAGNOSTIC N/A 5/25/2021    Procedure: BRONCHOSCOPY FLEXIBLE;  Surgeon: Nicanor Jackson Darvin Moraes MD;  Location: [facility];  Service: Thoracic    SC MEDIASTINOSCOPY WITH LYMPH NODE BIOPSY/IES N/A 5/25/2021    Procedure: MEDIASTINOSCOPY, flexible bronchoscopy;  Surgeon: Nikky Kilpatrick MD;  [facility];  Service: Thoracic    TONSILLECTOMY  1961 04/09/22 0850   PT Last Visit   PT Visit Date 04/09/22   Note Type   Note type Evaluation   Pain Assessment   Pain Assessment Tool 0-10   Pain Score 3   Pain Location/Orientation Location: Head   Patient's Stated Pain Goal No pain   Hospital Pain Intervention(s) Repositioned; Ambulation/increased activity   Restrictions/Precautions   Weight Bearing Precautions Per Order No   Other Precautions Chair Alarm; Bed Alarm;Multiple lines;Pain; Fall Risk   Home Living   Type of [address] [place] [address] Multi-level;Stairs to enter with rails  (3 SH, 4 SHANTA, 1 FFOS to kitchen, 2 FFOS to bed/bathroom)   Bathroom Shower/Tub Tub/shower unit   Bathroom Toilet Standard   Bathroom Equipment   (none reported )   Home Equipment   (none reported )   Prior Function   Level of [place] Independent with ADLs and functional mobility   [facility] From Family   ADL Assistance Independent   IADLs Independent   Falls in the last 6 months 0   Vocational Retired   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    General   Family/Caregiver Present No   Cognition   Arousal/Participation Alert   Orientation Level Oriented X4   Following Commands Follows one step commands without difficulty   Comments pt pleasant and cooperative t/o; motivated to ambulate    RLE Assessment   RLE Assessment WFL  (grossly 4/5)   LLE Assessment   LLE Assessment WFL  (grossly 4/5)   Coordination   Movements are Fluid and Coordinated 0   Coordination and Movement Description pt with decreased coorination; impaired depth preception noted    Finger to Nose & Finger to Finger  Impaired   Light Touch   RLE Light Touch Grossly intact   LLE Light Touch Grossly intact   Bed Mobility Supine to Sit 6  Modified independent   Additional items HOB elevated; Bedrails   Additional Comments pt supine in bed upon arrival, returned to bedside chair all needs within reach   Transfers   Sit to Stand 5  Supervision   Additional items Armrests;Assist x 1   Stand to Sit 5  Supervision   Additional items Armrests; Verbal cues; Assist x 1   Additional Comments no AD for transfers    Ambulation/Elevation   Gait pattern Short stride; Foward flexed;Decreased foot clearance   Gait Assistance   (fluctuates between CGA/ close S level)   Additional items Assist x 1   Assistive Device None   Distance 60 ft x 2    Stair Management Assistance Not tested   Balance   Static Sitting Fair   Dynamic Sitting Fair   Static Standing Fair   Dynamic Standing Fair -   Ambulatory Fair -   Endurance Deficit   Endurance Deficit Yes   Endurance Deficit Description pain, dec coordination    Activity Tolerance   Activity Tolerance Patient tolerated treatment well   Medical Staff Made Aware co-eval with OT 2* to medical complexity    Nurse Made Aware RN cleared pt to participate in therapy session   Assessment   Prognosis Good   Problem List Decreased coordination; Impaired judgement;Pain; Impaired vision;Decreased safety awareness;Decreased mobility  (pt reports deficits with depth perception )   Assessment Pt seen for high complexity PT evaluation to assess potential discharge needs  Pt has active PT eval/treatment orders as well as up with assistance orders  Pt is a 73 y/o male admitted on 4/2/2022 with left parietal hemorrhagic tumor and is s/p L parietal craniotomy for tumor resection on 4/7/22  Comorbidities affecting pt upon evaluation are as follows: HTN, L lung adenocarcinoma, thrombocytopenia, and hypothyroidism   Personal factors affecting patient at time of initial evaluation include steps to enter home, inability to perform ADLs, advanced age, fall risk, pain, increased assistance needed from caregiver at this time, decreased activity tolerance, multiple lines, decline in overall functional mobility status, health management issues, limited home support, and unstable clinical picture  Prior to admission, patient was residing with spouse in a 3  with 4 SHANTA steps to enter and was performing ADLs/IADLS independently  Upon evaluation, they have current limitations with coordination, strength, endurance, balance, range of motion, safety awareness, and overall functional mobility, putting patient at increased risk for falls  Pt reports difficulty with depth perception, which limits his ability to perform daily tasks and drive  Pt currently performing bed mobility tasks with modified independence, functional transfers with supervision, and ambulation with CGA/ close supervision with no AD  Pt became dizzy when standing, vitals were monitored and stayed WNL, and dizziness subsided after ~1 minute  Pt would benefit from continued PT services while in hospital to address the remaining deficits  PT to continue to follow pt and recommends home with outpatient PT upon discharge  The patient's AM-PAC Basic Mobility Inpatient Short Form Raw Score is 19  A Raw score of greater than 16 suggests the patient may benefit from discharge to home  Please refer to the recommendation of the Physical Therapist for safe discharge planning  Barriers to Discharge Inaccessible home environment   Goals   Patient Goals to get better and go home    STG Expiration Date 04/23/22   Short Term Goal #1 STG 1  Pt will be able to perform bed mobility independently to improve independence and functional mobility  STG 2  Pt will be able to sit EOB for 25 min independently to strengthen abdominal musculature and assist in transfers  STG 3  Pt will be able to perform transfers independently with least restrictive AD to improve safety and functional mobility  STG 4   Pt will demonstrate improved endurance by ambulating 250 feet independently with least restrictive AD to improve functional mobility and assist in safe d/c STG 5  Pt will improve LE strength as demonstrated by negotiating 10 stairs with supervision and least restrictive AD to improve overall function and improve safety for d/c     PT Treatment Day 0   Plan   Treatment/Interventions Functional transfer training; Endurance training;Patient/family training;Bed mobility;Gait training;Spoke to nursing;OT   PT Frequency 2-3x/wk   Recommendation   PT Discharge Recommendation Home with outpatient rehabilitation  (+ increased social support as needed )   AM-PAC Basic Mobility Inpatient   Turning in Bed Without Bedrails 4   Lying on Back to Sitting on Edge of Flat Bed 3   Moving Bed to Chair 3   Standing Up From Chair 3   Walk in Room 3   Climb 3-5 Stairs 3   Basic Mobility Inpatient Raw Score 19   Basic Mobility Standardized Score 42 48   Highest Level Of Mobility   JH-HLM Goal 6: Walk 10 steps or more   JH-HLM Highest Level of Mobility 7: Walk 25 feet or more   JH-HLM Goal Achieved Yes   End of Consult   Patient Position at End of Consult Bedside chair;Bed/Chair alarm activated; All needs within reach       [facility], SPT

## 2022-04-09 NOTE — ASSESSMENT & PLAN NOTE
· Patient felt to have drug induced thyroiditis with hypothyroidism   Synthroid increased from home dose of 25 mcg daily to 37 5 mcg daily by endocrinology  · Will need repeat TSH/free T4 in about 4-6 weeks

## 2022-04-09 NOTE — OCCUPATIONAL THERAPY NOTE
Occupational Therapy Evaluation     Patient Name: Little Joyce Date: 4/9/2022  Problem List  Principal Problem:    Left parietal hemorrhagic tumor  Active Problems:    Depression with anxiety    Essential hypertension    Mixed hyperlipidemia    Adenocarcinoma, lung, left (HCC)    Psoriasis    Thrombocytopenia (HCC)    Goals of care, counseling/discussion    Hypothyroidism    Past Medical History  Past Medical History:   Diagnosis Date    Hyperlipidemia     Hypertension     Lung cancer [facility])      Past Surgical History  Past Surgical History:   Procedure Laterality Date    BACK SURGERY      CRANIOTOMY Left 4/7/2022    Procedure: Image guided left parietal craniotomy for tumor resection;  Surgeon: Aroldo Mariscal MD;  [facility]: [facility];  Service: Neurosurgery    HEMORRHOID SURGERY      IR BIOPSY LUNG  5/6/2021    IR PORT PLACEMENT  6/17/2021    LAMINECTOMY  2018    L4-L5    LUNG SURGERY      left upper lobectomy    [place] BRONCHOSCOPY,DIAGNOSTIC N/A 5/25/2021    Procedure: BRONCHOSCOPY FLEXIBLE;  Surgeon: Veryl Boast, MD;  [facility];  Service: Thoracic    [place] MEDIASTINOSCOPY WITH LYMPH NODE BIOPSY/IES N/A 5/25/2021    Procedure: MEDIASTINOSCOPY, flexible bronchoscopy;  Surgeon: Veryl Boast, MD;  [facility];  Service: Thoracic   [facility]             04/09/22 0849   OT Last Visit   OT Visit Date 04/09/22   Note Type   Note type Evaluation   Restrictions/Precautions   Weight Bearing Precautions Per Order No   Other Precautions Bed Alarm; Chair Alarm;Multiple lines;Telemetry; Fall Risk;Pain   Pain Assessment   Pain Assessment Tool 0-10   Pain Score 3   Pain Location/Orientation Location: Head   Pain Onset/Description Onset: Ongoing; Descriptor: Aching;Descriptor: Discomfort   Patient's Stated Pain Goal No pain   Hospital Pain Intervention(s) Repositioned; Ambulation/increased activity; Emotional support   Home Living   Type [address] Multi-level  (4 SHANTA; 1 FFOS to kitchen; 2 FFOS up to bed/bath)   Bathroom Shower/Tub Tub/shower unit   Bathroom Toilet Standard   Bathroom Equipment Other (Comment)  (denies any)   Home Equipment Other (Comment)  (denies any)   Additional Comments Pt reports living in 3 SH, 4 SHANTA, FFOS to kitchen area and 2 FFOS up to bed/bath  Pt also has bathroom down in lower level mudroom area  Prior Function   Level of [place] Independent with ADLs and functional mobility   Lives With Spouse   Receives Help From Family   ADL Assistance Independent   IADLs Independent   Falls in the last 6 months 0   Vocational Retired   Comments (+)    Lifestyle   Autonomy I w/ ADLS/IADLS, transfers and functional mobility PTA   Reciprocal Relationships Pt lives w/ his spouse   Service to Others Retired   [address] Enjoys being active   Psychosocial   Psychosocial (WDL) WDL   ADL   Eating Assistance 5  [address] [facility] 5  [address] [facility] 5  [address] [facility] 4  [address] 5  [facility] 66  4  [address] 4  [address]; Increased time to complete;Supervision/safety   Bed Mobility   Supine to Sit 6  Modified independent   Additional items HOB elevated;Verbal cues   Sit to Supine Unable to assess   Additional Comments Pt sat EOB w/ Fair sitting balance/trunk control   Transfers   Sit to Stand 5  Supervision   Additional items Verbal cues   Stand to Sit 5  Supervision   Additional items Verbal cues   Additional Comments no AD/DME used   Functional Mobility   Functional Mobility 4  Minimal assistance   Additional Comments Pt ambulated short household distance w/ CGA, no AD/DME used   Balance   Static Sitting Good   Dynamic Sitting Fair +   Static Standing Fair Dynamic Standing Fair -   Ambulatory Fair -   Activity Tolerance   Activity Tolerance Patient tolerated treatment well   Medical Staff Made Aware PTShareeRabiazhang [place]pe   Nurse Made Aware yes, Fidel   RUE Assessment   RUE Assessment WFL   LUE Assessment   LUE Assessment WFL   Hand Function   Gross Motor Coordination Impaired  (dysmetria; R > L)   Fine Motor Coordination Impaired   Sensation   Light Touch No apparent deficits   Vision-Basic Assessment   Current Vision Wears glasses all the time   Patient Visual Report   (reports having impaired depth perception)   Cognition   Overall Cognitive Status WFL   Arousal/Participation Responsive; Cooperative   Attention Within functional limits   Orientation Level Oriented X4   Memory Within functional limits   Following Commands Follows one step commands without difficulty   Comments Pt is pleasant and cooperative; needs occasional cue for safety   Assessment   Limitation Decreased endurance;Decreased high-level ADLs; Visual deficit  (coordination)   Prognosis Fair   Assessment Pt is a 73 y/o male seen for OT eval s/p adm to [facility] as a transfer from [facility] w/ complaints of difficulty speaking, feeding himself and apraxia  Pt is dx'd w/ L parietal IPH and 2 cerebellar masses likely 2/2 to metastatic brain mets from origin of the lungs  Pt is now s/p the following procedure "Image guided left parietal craniotomy for tumor resection (Left)" performed on 4/7  Pt  has a past medical history of Hyperlipidemia, Hypertension, and Lung cancer ([address] )    Pt with active OT orders and up with assistance  orders  Pt lives with his spouse in 3 SH, 4 SHANTA, 2 FFOS up to bed/bath on 2nd floor  Pt was I w/  ADLS and IADLS, drove, & required no use of DME PTA  Pt is not currently demonstrating any significant deficits in occupational performance @ this time  Overall is functioning at a S level for UB ADLS and CGA for LB ADLS and functional mobility w/ no AD/DME   Pt has ongoing visual and UE gross coordination deficits that will impact his driving capabilities  Based on pt's ongoing deficits, will benefit from OP OT upon D/C for vision, cognition and fitness to drive  Pt has no further immediate acute skilled OT needs  Pt w/ no further questions/concerns regarding skilled OT services  Pt will have family support at home upon D/C  Recommend continued engagement in ADL/functional w/ PT, nursing, restorative and aides to maximize I w/ functional tasks prior to D/C  Will D/C OT at this time  Goals   Patient Goals to get rid of cancer   Recommendation   OT Discharge Recommendation Home with outpatient rehabilitation   OT - OK to Discharge Yes  (when medically stable)   Additional Comments  The patient's raw score on the AM-PAC Daily Activity inpatient short form is 21, standardized score is 44 27, greater than 39 4  Patients at this level are likely to benefit from discharge to home  Please refer to the recommendation of the Occupational Therapist for safe discharge planning  Additional Comments 2 Pt seen as a co-evaluation due to the patient's co-morbidities, clinically unstable presentation, and present impairments which are a regression from the patient's baseline     AM-PAC Daily Activity Inpatient   Lower Body Dressing 3   Bathing 3   Toileting 3   Upper Body Dressing 4   Grooming 4   Eating 4   Daily Activity Raw Score 21   Daily Activity Standardized Score (Calc for Raw Score >=11) 44 27   AM-PAC Applied Cognition Inpatient   Following a Speech/Presentation 4   Understanding Ordinary Conversation 4   Taking Medications 4   Remembering Where Things Are Placed or Put Away 4   Remembering List of 4-5 Errands 4   Taking Care of Complicated Tasks 4   Applied Cognition Raw Score 24   Applied Cognition Standardized Score 62 21        Caitlin Alonso MS, OTR/L

## 2022-04-09 NOTE — ASSESSMENT & PLAN NOTE
POD 2 Day Post-Op Procedure(s): Image guided left parietal craniotomy for tumor resection  · Left parietal brain mass with vasogenic edema, 2 additional left cerebellar masses  · H/o small cell lung adenocarcinoma (dx 4/2021), s/p chemotherapy/surgery/radiation/immunotherapy at [facility]  · Patient denies h/o [facility]  · Patient does not want to transfer back to [place] - states not up to traveling  · Patient presented with new onset apraxia and aphasia    Imaging:  · MRI brain w/wo, 4/8/21: Status post treatment of left parietal metastatic lesion with postop change as described above  This is the initial postoperative examination and will serve as baseline for follow-up exams  No significant residual tumor noted within the resection cavity  Metastatic lesions within the left cerebellar hemisphere are unchanged  Plan:  · Decadron wean q48h to 2mg BID  · Will continue 2mg BID indefinitely at the discretion of medical oncology  · Keppra 500mg x7 days post op  · 1 JOSE drain removed 4/8  · STAT CTH with decline in GCS greater than 2 points in 1 hour  · Med onc / rad onc / palliative care following  · Frequent neuro checks  · SBP < 160  · Avoid AC/AP  · PT/OT evaluation  · Medical management per primary team  · Platelets 443 this morning,   · S/p transfusion 1u platelets 4/8  · Platelts must stay above 100,000  Transfuse as necessary   · DVT ppx: SCDs, lovenox    Neurosurgery will continue to follow  Possible clearance for DC as early as tomorrow from neurosurgery standpoint  Please call questions or concerns

## 2022-04-09 NOTE — PROGRESS NOTES
[address] [facility]  Progress Note - Madi Parrish 1955, 74 y o  male MRN: [id number]  Unit/Bed#: [facility] 245-36 Encounter: [id number]  Primary Care Provider: Loni Galaviz DO   Date and time admitted to hospital: 4/2/2022  9:42 PM    * Left parietal hemorrhagic tumor  Assessment & Plan  · Patient presented with new onset apraxias and aphasia  Found to have a left parietal brain mass with vasogenic edema, 2 additional left cerebellar masses  Patient has a known history of small-cell lung cancer (dx 4/2021) status post chemotherapy/surgery/radiation/immunotherapy at [facility]  · CT chest abdomen pelvis without any evidence of metastatic disease per the results report   · Neurology consult appreciated - no additional Neurology recommendations  · Neurosurgery following, input appreciated  · S/p image guided left parietal craniotomy for tumor resection (Left) on 4/7/22 by Dr Lyudmila Esquedar  · Decadron wean per Neurosurgery recs  · Keppra 500mg Q12hr for 7 days post-op per Neurosurgery  · DVT ppx with SC Lovenox per Neurosurgery    Adenocarcinoma, lung, left ([facility] [address] )  Assessment & Plan  · [place] [facility], currently receiving adjuvant immunotherapy and has completed 2 cycles of atezolizumab  · New mets to brain, see plan above in primary problem   · Medical Oncology  Consult appreciated, patient known to Dr Kaylene Simms locally  · Holding immunotherapy  · May need radiation  · Palliative following     Thrombocytopenia ([facility] [address] )  Assessment & Plan  · Appears somewhat chronic dating back   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  · DVT ppx with SC Lovenox per Neurosurgery  · S/p platelets transfusion 4/8/22   · No specific treatment required at this time   · Monitor CBC     Goals of care, counseling/discussion  Assessment & Plan  · Palliative care was consulted in the setting of lung cancer with Mets to brain  · At this time, patient wishes for ongoing medical management without limits, is hopeful to continue receiving disease directed therapies with goal of maintaining independence and prolonging survival, however he is clear he would not want artificial life prolonging cares if in a persistent unresponsive state  · Appreciate palliative input ongoing    Essential hypertension  Assessment & Plan  · SBP goal less than 160  · Continue home dose Norvasc 10 mg daily    Psoriasis  Assessment & Plan  · Noted to have worsening while receiving immunotherapy, did not complete 3rd cycle and required high dose prednisone, was on taper on admission  · Now on decadron as above      Mixed hyperlipidemia  Assessment & Plan  · Continue statin    Hypothyroidism  Assessment & Plan  · Patient felt to have drug induced thyroiditis with hypothyroidism  Synthroid increased from home dose of 25 mcg daily to 37 5 mcg daily by endocrinology  · Will need repeat TSH/free T4 in about 4-6 weeks    Depression with anxiety  Assessment & Plan  · Continue home celexa and doxepin         VTE Pharmacologic Prophylaxis: VTE Score: 9 High Risk (Score >/= 5) - Pharmacological DVT Prophylaxis Ordered: enoxaparin (Lovenox)  Sequential Compression Devices Ordered  per Neurosurgery     Patient Centered Rounds: I performed bedside rounds with nursing staff today  d/w JUAN J Knight   Discussions with Specialists or Other Care Team Provider:     Education and Discussions with Family / Patient: Patient declined call to   Time Spent for Care: 30 minutes  More than 50% of total time spent on counseling and coordination of care as described above      Current Length of Stay: 7 day(s)  Current Patient Status: Inpatient   Certification Statement: The patient will continue to require additional inpatient hospital stay due to pending neurosurgery recs  Discharge Plan: pending neurosurgery recs    Code Status: Level 1 - Full Code    Subjective:   Mr Sony Lee reports tolerable pain and reports that he is trying not to take constipating pain meds  He reports last BM 2 days ago  Denies CP, SOB, abdominal pain    Objective:     Vitals:   Temp (24hrs), Av 3 °F (36 8 °C), Min:98 °F (36 7 °C), Max:98 7 °F (37 1 °C)    Temp:  [98 °F (36 7 °C)-98 7 °F (37 1 °C)] 98 °F (36 7 °C)  HR:  [67-88] 75  Resp:  [7-18] 16  BP: (132-155)/(77-85) 144/83  SpO2:  [93 %-96 %] 96 %  Body mass index is 25 37 kg/m²  Input and Output Summary (last 24 hours): Intake/Output Summary (Last 24 hours) at 2022 1129  Last data filed at 2022 0754  Gross per 24 hour   Intake --   Output 2475 ml   Net -2475 ml       Physical Exam:   Physical Exam  Vitals and nursing note reviewed  Constitutional:       Comments: Patient seen sitting in bedside chair, NAD   Cardiovascular:      Rate and Rhythm: Normal rate and regular rhythm  Pulmonary:      Effort: Pulmonary effort is normal  No respiratory distress  Breath sounds: Normal breath sounds  Abdominal:      General: Bowel sounds are normal       Palpations: Abdomen is soft  Tenderness: There is no abdominal tenderness  Musculoskeletal:      Right lower leg: No edema  Left lower leg: No edema  Skin:     General: Skin is warm  Neurological:      Mental Status: He is alert and oriented to person, place, and time     Psychiatric:         Mood and Affect: Mood normal          Behavior: Behavior normal          Additional Data:     Labs:  Results from last 7 days   Lab Units 22  0629 22  0456 22  0539 22  0521 22  0535   WBC Thousand/uL 8 13   < > 6 78   < > 10 54*   HEMOGLOBIN g/dL 12 0   < > 13 5   < > 14 4   HEMATOCRIT % 35 7*   < > 41 6   < > 45 6   PLATELETS Thousands/uL 104*   < > 111*   < > 118*   NEUTROS PCT %  --   --   --   --  88*   LYMPHS PCT %  --   --   --   --  6*   LYMPHO PCT %  --   --  1*   < >  --    MONOS PCT %  --   --   --   --  5   MONO PCT %  --   --  1*   < >  --    EOS PCT %  --   --  0   < > 0    < > = values in this interval not displayed  Results from last 7 days   Lab Units 04/09/22  0629 04/05/22  0526 04/04/22  0521   SODIUM mmol/L 137   < > 139   POTASSIUM mmol/L 4 1   < > 4 0   CHLORIDE mmol/L 105   < > 108   CO2 mmol/L 28   < > 22   BUN mg/dL 29*   < > 34*   CREATININE mg/dL 1 01   < > 1 19   ANION GAP mmol/L 4   < > 9   CALCIUM mg/dL 8 3   < > 9 1   ALBUMIN g/dL  --   --  3 1*   TOTAL BILIRUBIN mg/dL  --   --  0 38   ALK PHOS U/L  --   --  53   ALT U/L  --   --  35   AST U/L  --   --  14   GLUCOSE RANDOM mg/dL 123   < > 135    < > = values in this interval not displayed       Results from last 7 days   Lab Units 04/08/22  0456   INR  1 12     Results from last 7 days   Lab Units 04/07/22  1250 04/02/22  1928   POC GLUCOSE mg/dl 138 104     Results from last 7 days   Lab Units 04/03/22  0535   HEMOGLOBIN A1C % 5 2           Lines/Drains:  Invasive Devices  Report    Central Venous Catheter Line            Port A Cath 06/17/21 Right Chest 295 days          Peripheral Intravenous Line            Peripheral IV 04/07/22 Left Arm 2 days    Peripheral IV 04/07/22 Right Forearm 2 days    Peripheral IV 04/07/22 Right Hand 2 days                Central Line:  Goal for removal: chronic             Imaging: Reviewed radiology reports from this admission including: chest CT scan, abdominal/pelvic CT and MRI brain    Recent Cultures (last 7 days):         Last 24 Hours Medication List:   Current Facility-Administered Medications   Medication Dose Route Frequency Provider Last Rate    acetaminophen  975 mg Oral [facility] Maryam Bender MD      amLODIPine  10 mg Oral Daily Maryam Bender MD      citalopram  10 mg Oral Daily Maryam Bender MD      dexamethasone  4 mg Oral Q6H Raymundo Rm MD      Followed by   Baylee Guerra dexamethasone  4 mg Oral [facility] Maryam Bender MD      Followed by   Kofi Chávez ON 4/11/2022] dexamethasone  2 mg Oral Q6H Raymundo Rm MD      Followed by   Kofi Chávez ON 4/14/2022] dexamethasone  2 mg Oral Q8H [address] [place] [address] Jameson Alvarado MD      Followed by   Tamanna Hall ON 4/15/2022] dexamethasone  2 mg Oral Q12H Raymundo Rm MD      Followed by   Tamanna Hall ON 4/18/2022] dexamethasone  2 mg Oral Q24H [address] Colleen Duarte MD      docusate sodium  100 mg Oral BID Colleen Duarte MD      doxepin  10 mg Oral HS Colleen Duarte MD      enoxaparin  40 mg Subcutaneous Q24H [address] Colleen Duarte MD      HYDROmorphone  0 5 mg Intravenous Q2H PRN Maikel Melchor PA-C      Labetalol HCl  10 mg Intravenous Q4H PRN Colleen Duarte MD      levETIRAcetam  500 mg Oral Q12H [address] Colleen Duarte MD      levothyroxine  37 5 mcg Oral Early Morning Colleen Duarte MD      meclizine  12 5 mg Oral Q8H PRN Colleen Duarte MD      melatonin  6 mg Oral HS Colleen Duarte MD      methocarbamol  500 mg Oral Q6H [address] Colleen Duarte MD      ondansetron  4 mg Intravenous Q4H PRN Colleen Duarte MD      oxyCODONE  10 mg Oral Q4H PRN Colleen Duarte MD      oxyCODONE  5 mg Oral Q4H PRN Colleen Duarte MD      pravastatin  80 mg Oral Daily With Julio Cesar Rosado MD      senna  8 8 mg Oral Daily Colleen Duarte MD          Today, Patient Was Seen By: Domi Mackay PA-C    **Please Note: This note may have been constructed using a voice recognition system  **

## 2022-04-09 NOTE — PLAN OF CARE
Problem: PHYSICAL THERAPY ADULT  Goal: Performs mobility at highest level of function for planned discharge setting  See evaluation for individualized goals  Description: Treatment/Interventions: Functional transfer training,Endurance training,Patient/family training,Bed mobility,Gait training,Spoke to nursing,OT          See flowsheet documentation for full assessment, interventions and recommendations  Note: Prognosis: Good  Problem List: Decreased coordination,Impaired judgement,Pain,Impaired vision,Decreased safety awareness,Decreased mobility (pt reports deficits with depth perception )  Assessment: Pt seen for high complexity PT evaluation to assess potential discharge needs  Pt has active PT eval/treatment orders as well as up with assistance orders  Pt is a 73 y/o male admitted on 4/2/2022 with left parietal hemorrhagic tumor and is s/p L parietal craniotomy for tumor resection on 4/7/22  Comorbidities affecting pt upon evaluation are as follows: HTN, L lung adenocarcinoma, thrombocytopenia, and hypothyroidism  Personal factors affecting patient at time of initial evaluation include steps to enter home, inability to perform ADLs, advanced age, fall risk, pain, increased assistance needed from caregiver at this time, decreased activity tolerance, multiple lines, decline in overall functional mobility status, health management issues, limited home support, and unstable clinical picture  Prior to admission, patient was residing with spouse in a 3  with 4 SHANTA steps to enter and was performing ADLs/IADLS independently  Upon evaluation, they have current limitations with coordination, strength, endurance, balance, range of motion, safety awareness, and overall functional mobility, putting patient at increased risk for falls  Pt reports difficulty with depth perception, which limits his ability to perform daily tasks and drive   Pt currently performing bed mobility tasks with modified independence, functional transfers with supervision, and ambulation with CGA/ close supervision with no AD  Pt became dizzy when standing, vitals were monitored and stayed WNL, and dizziness subsided after ~1 minute  Pt would benefit from continued PT services while in hospital to address the remaining deficits  PT to continue to follow pt and recommends home with outpatient PT upon discharge  The patient's AM-PAC Basic Mobility Inpatient Short Form Raw Score is 19  A Raw score of greater than 16 suggests the patient may benefit from discharge to home  Please refer to the recommendation of the Physical Therapist for safe discharge planning  Barriers to Discharge: Inaccessible home environment        PT Discharge Recommendation: Home with outpatient rehabilitation (+ increased social support as needed )          See flowsheet documentation for full assessment

## 2022-04-09 NOTE — PLAN OF CARE
Problem: MOBILITY - ADULT  Goal: Maintain or return to baseline ADL function  Description: INTERVENTIONS:  -  Assess patient's ability to carry out ADLs; assess patient's baseline for ADL function and identify physical deficits which impact ability to perform ADLs (bathing, care of mouth/teeth, toileting, grooming, dressing, etc )  - Assess/evaluate cause of self-care deficits   - Assess range of motion  - Assess patient's mobility; develop plan if impaired  - Assess patient's need for assistive devices and provide as appropriate  - Encourage maximum independence but intervene and supervise when necessary  - Involve family in performance of ADLs  - Assess for home care needs following discharge   - Consider OT consult to assist with ADL evaluation and planning for discharge  - Provide patient education as appropriate  Outcome: Progressing  Goal: Maintains/Returns to pre admission functional level  Description: INTERVENTIONS:  - Perform BMAT or MOVE assessment daily    - Set and communicate daily mobility goal to care team and patient/family/caregiver  - Collaborate with rehabilitation services on mobility goals if consulted  - Ambulate patient 2 times a day  - Out of bed for meals 3 times a day  - Out of bed for toileting  - Record patient progress and toleration of activity level   Outcome: Progressing     Problem: Neurological Deficit  Goal: Neurological status is stable or improving  Description: Interventions:  - Monitor and assess patient's level of consciousness, motor function, sensory function, and level of assistance needed for ADLs  - Monitor and report changes from baseline  Collaborate with interdisciplinary team to initiate plan and implement interventions as ordered  - Provide and maintain a safe environment  - Consider seizure precautions  - Consider fall precautions  - Consider aspiration precautions  - Consider bleeding precautions    Outcome: Progressing     Problem: PAIN - ADULT  Goal: Verbalizes/displays adequate comfort level or baseline comfort level  Description: Interventions:  - Encourage patient to monitor pain and request assistance  - Assess pain using appropriate pain scale  - Administer analgesics based on type and severity of pain and evaluate response  - Implement non-pharmacological measures as appropriate and evaluate response  - Consider cultural and social influences on pain and pain management  - Notify physician/advanced practitioner if interventions unsuccessful or patient reports new pain  Outcome: Progressing

## 2022-04-09 NOTE — PLAN OF CARE
Problem: MOBILITY - ADULT  Goal: Maintain or return to baseline ADL function  Description: INTERVENTIONS:  -  Assess patient's ability to carry out ADLs; assess patient's baseline for ADL function and identify physical deficits which impact ability to perform ADLs (bathing, care of mouth/teeth, toileting, grooming, dressing, etc )  - Assess/evaluate cause of self-care deficits   - Assess range of motion  - Assess patient's mobility; develop plan if impaired  - Assess patient's need for assistive devices and provide as appropriate  - Encourage maximum independence but intervene and supervise when necessary  - Involve family in performance of ADLs  - Assess for home care needs following discharge   - Consider OT consult to assist with ADL evaluation and planning for discharge  - Provide patient education as appropriate  Outcome: Progressing  Goal: Maintains/Returns to pre admission functional level  Description: INTERVENTIONS:  - Perform BMAT or MOVE assessment daily    - Set and communicate daily mobility goal to care team and patient/family/caregiver  - Collaborate with rehabilitation services on mobility goals if consulted  - Ambulate patient 2 times a day  - Out of bed for meals 3 times a day  - Out of bed for toileting  - Record patient progress and toleration of activity level   Outcome: Progressing     Problem: Neurological Deficit  Goal: Neurological status is stable or improving  Description: Interventions:  - Monitor and assess patient's level of consciousness, motor function, sensory function, and level of assistance needed for ADLs  - Monitor and report changes from baseline  Collaborate with interdisciplinary team to initiate plan and implement interventions as ordered  - Provide and maintain a safe environment  - Consider seizure precautions  - Consider fall precautions  - Consider aspiration precautions  - Consider bleeding precautions  Outcome: Progressing     Problem:  Activity Intolerance/Impaired Mobility  Goal: Mobility/activity is maintained at optimum level for patient  Description: Interventions:  - Assess and monitor patient  barriers to mobility and need for assistive/adaptive devices  - Assess patient's emotional response to limitations  - Collaborate with interdisciplinary team and initiate plans and interventions as ordered  - Encourage independent activity per ability   - Maintain proper body alignment  - Perform active/passive rom as tolerated/ordered  - Plan activities to conserve energy   - Turn patient as appropriate  Outcome: Progressing     Problem: Communication Impairment  Goal: Ability to express needs and understand communication  Description: Assess patient's communication skills and ability to understand information  Patient will demonstrate use of effective communication techniques, alternative methods of communication and understanding even if not able to speak  - Encourage communication and provide alternate methods of communication as needed  - Collaborate with case management/ for discharge needs  - Include patient/family/caregiver in decisions related to communication  Outcome: Progressing     Problem: Potential for Aspiration  Goal: Non-ventilated patient's risk of aspiration is minimized  Description: Assess and monitor vital signs, respiratory status, and labs (WBC)  Monitor for signs of aspiration (tachypnea, cough, rales, wheezing, cyanosis, fever)  - Assess and monitor patient's ability to swallow  - Place patient up in chair to eat if possible  - HOB up at 90 degrees to eat if unable to get patient up into chair   - Supervise patient during oral intake  - Instruct patient/ family to take small bites  - Instruct patient/ family to take small single sips when taking liquids    - Follow patient-specific strategies generated by speech pathologist   Outcome: Progressing     Problem: Nutrition  Goal: Nutrition/Hydration status is improving  Description: Monitor and assess patient's nutrition/hydration status for malnutrition (ex- brittle hair, bruises, dry skin, pale skin and conjunctiva, muscle wasting, smooth red tongue, and disorientation)  Collaborate with interdisciplinary team and initiate plan and interventions as ordered  Monitor patient's weight and dietary intake as ordered or per policy  Utilize nutrition screening tool and intervene per policy  Determine patient's food preferences and provide high-protein, high-caloric foods as appropriate  - Assist patient with eating   - Allow adequate time for meals   - Encourage patient to take dietary supplement as ordered  - Collaborate with clinical nutritionist   - Include patient/family/caregiver in decisions related to nutrition    Outcome: Progressing     Problem: PAIN - ADULT  Goal: Verbalizes/displays adequate comfort level or baseline comfort level  Description: Interventions:  - Encourage patient to monitor pain and request assistance  - Assess pain using appropriate pain scale  - Administer analgesics based on type and severity of pain and evaluate response  - Implement non-pharmacological measures as appropriate and evaluate response  - Consider cultural and social influences on pain and pain management  - Notify physician/advanced practitioner if interventions unsuccessful or patient reports new pain  Outcome: Progressing     Problem: INFECTION - ADULT  Goal: Absence or prevention of progression during hospitalization  Description: INTERVENTIONS:  - Assess and monitor for signs and symptoms of infection  - Monitor lab/diagnostic results  - Monitor all insertion sites, i e  indwelling lines, tubes, and drains  - Monitor endotracheal if appropriate and nasal secretions for changes in amount and color  - [place] appropriate cooling/warming therapies per order  - Administer medications as ordered  - Instruct and encourage patient and family to use good hand hygiene technique  - Identify and instruct in appropriate isolation precautions for identified infection/condition  Outcome: Progressing     Problem: SAFETY ADULT  Goal: Maintain or return to baseline ADL function  Description: INTERVENTIONS:  -  Assess patient's ability to carry out ADLs; assess patient's baseline for ADL function and identify physical deficits which impact ability to perform ADLs (bathing, care of mouth/teeth, toileting, grooming, dressing, etc )  - Assess/evaluate cause of self-care deficits   - Assess range of motion  - Assess patient's mobility; develop plan if impaired  - Assess patient's need for assistive devices and provide as appropriate  - Encourage maximum independence but intervene and supervise when necessary  - Involve family in performance of ADLs  - Assess for home care needs following discharge   - Consider OT consult to assist with ADL evaluation and planning for discharge  - Provide patient education as appropriate  Outcome: Progressing  Goal: Maintains/Returns to pre admission functional level  Description: INTERVENTIONS:  - Perform BMAT or MOVE assessment daily    - Set and communicate daily mobility goal to care team and patient/family/caregiver     - Collaborate with rehabilitation services on mobility goals if consulted  - Ambulate patient 2 times a day  - Out of bed for meals 3 times a day  - Out of bed for toileting  - Record patient progress and toleration of activity level   Outcome: Progressing  Goal: Patient will remain free of falls  Description: INTERVENTIONS:  - Educate patient/family on patient safety including physical limitations  - Instruct patient to call for assistance with activity   - Consult OT/PT to assist with strengthening/mobility   - Keep Call bell within reach  - Keep bed low and locked with side rails adjusted as appropriate  - Keep care items and personal belongings within reach  - Initiate and maintain comfort rounds  - Apply yellow socks and bracelet for high fall risk patients  - Consider moving patient to room near nurses station  Outcome: Progressing     Problem: DISCHARGE PLANNING  Goal: Discharge to home or other facility with appropriate resources  Description: INTERVENTIONS:  - Identify barriers to discharge w/patient and caregiver  - Arrange for needed discharge resources and transportation as appropriate  - Identify discharge learning needs (meds, wound care, etc )  - Arrange for interpretive services to assist at discharge as needed  - Refer to Case Management Department for coordinating discharge planning if the patient needs post-hospital services based on physician/advanced practitioner order or complex needs related to functional status, cognitive ability, or social support system  Outcome: Progressing     Problem: Knowledge Deficit  Goal: Patient/family/caregiver demonstrates understanding of disease process, treatment plan, medications, and discharge instructions  Description: Complete learning assessment and assess knowledge base    Interventions:  - Provide teaching at level of understanding  - Provide teaching via preferred learning methods  Outcome: Progressing     Problem: Potential for Falls  Goal: Patient will remain free of falls  Description: INTERVENTIONS:  - Educate patient/family on patient safety including physical limitations  - Instruct patient to call for assistance with activity   - Consult OT/PT to assist with strengthening/mobility   - Keep Call bell within reach  - Keep bed low and locked with side rails adjusted as appropriate  - Keep care items and personal belongings within reach  - Initiate and maintain comfort rounds  - Make Fall Risk Sign visible to staff  - Offer Toileting every 2 Hours, in advance of need  - Initiate/Maintain bed alarm  - Apply yellow socks and bracelet for high fall risk patients  - Consider moving patient to room near nurses station  Outcome: Progressing     Problem: Prexisting or High Potential for Compromised Skin Integrity  Goal: Skin integrity is maintained or improved  Description: INTERVENTIONS:  - Identify patients at risk for skin breakdown  - Assess and monitor skin integrity  - Assess and monitor nutrition and hydration status  - Monitor labs   - Assess for incontinence   - Turn and reposition patient  - Assist with mobility/ambulation  - Relieve pressure over bony prominences  - Avoid friction and shearing  - Provide appropriate hygiene as needed including keeping skin clean and dry  - Evaluate need for skin moisturizer/barrier cream  - Collaborate with interdisciplinary team   - Patient/family teaching  - Consider wound care consult   Outcome: Progressing     Problem: Nutrition/Hydration-ADULT  Goal: Nutrient/Hydration intake appropriate for improving, restoring or maintaining nutritional needs  Description: Monitor and assess patient's nutrition/hydration status for malnutrition  Collaborate with interdisciplinary team and initiate plan and interventions as ordered  Monitor patient's weight and dietary intake as ordered or per policy  Utilize nutrition screening tool and intervene as necessary  Determine patient's food preferences and provide high-protein, high-caloric foods as appropriate       INTERVENTIONS:  - Monitor oral intake, urinary output, labs, and treatment plans  - Assess nutrition and hydration status and recommend course of action  - Evaluate amount of meals eaten  - Assist patient with eating if necessary   - Allow adequate time for meals  - Recommend/ encourage appropriate diets, oral nutritional supplements, and vitamin/mineral supplements  - Order, calculate, and assess calorie counts as needed  - Recommend, monitor, and adjust tube feedings and TPN/PPN based on assessed needs  - Assess need for intravenous fluids  - Provide specific nutrition/hydration education as appropriate  - Include patient/family/caregiver in decisions related to nutrition  Outcome: Progressing Problem: COPING  Goal: Pt/Family able to verbalize concerns and demonstrate effective coping strategies  Description: INTERVENTIONS:  - Assist patient/family to identify coping skills, available support systems and cultural and spiritual values  - Provide emotional support, including active listening and acknowledgement of concerns of patient and caregivers  - Reduce environmental stimuli, as able  - Provide patient education  - Assess for spiritual pain/suffering and initiate spiritual care, including notification of Pastoral Care or [facility] based community as needed  - Assess effectiveness of coping strategies  Outcome: Progressing  Goal: Will report anxiety at manageable levels  Description: INTERVENTIONS:  - Administer medication as ordered  - Teach and encourage coping skills  - Provide emotional support  - Assess patient/family for anxiety and ability to cope  Outcome: Progressing     Problem: NEUROSENSORY - ADULT  Goal: Achieves stable or improved neurological status  Description: INTERVENTIONS  - Monitor and report changes in neurological status  - Monitor vital signs such as temperature, blood pressure, glucose, and any other labs ordered   - Initiate measures to prevent increased intracranial pressure  - Monitor for seizure activity and implement precautions if appropriate      Outcome: Progressing  Goal: Remains free of injury related to seizures activity  Description: INTERVENTIONS  - Maintain airway, patient safety  and administer oxygen as ordered  - Monitor patient for seizure activity, document and report duration and description of seizure to physician/advanced practitioner  - If seizure occurs,  ensure patient safety during seizure  - Reorient patient post seizure  - Seizure pads on all 4 side rails  - Instruct patient/family to notify RN of any seizure activity including if an aura is experienced  - Instruct patient/family to call for assistance with activity based on nursing assessment  - Administer anti-seizure medications if ordered    Outcome: Progressing  Goal: Achieves maximal functionality and self care  Description: INTERVENTIONS  - Monitor swallowing and airway patency with patient fatigue and changes in neurological status  - Encourage and assist patient to increase activity and self care     - Encourage visually impaired, hearing impaired and aphasic patients to use assistive/communication devices  Outcome: Progressing     Problem: CARDIOVASCULAR - ADULT  Goal: Maintains optimal cardiac output and hemodynamic stability  Description: INTERVENTIONS:  - Monitor I/O, vital signs and rhythm  - Monitor for S/S and trends of decreased cardiac output  - Administer and titrate ordered vasoactive medications to optimize hemodynamic stability  - Assess quality of pulses, skin color and temperature  - Assess for signs of decreased coronary artery perfusion  - Instruct patient to report change in severity of symptoms  Outcome: Progressing  Goal: Absence of cardiac dysrhythmias or at baseline rhythm  Description: INTERVENTIONS:  - Continuous cardiac monitoring, vital signs, obtain 12 lead EKG if ordered  - Administer antiarrhythmic and heart rate control medications as ordered  - Monitor electrolytes and administer replacement therapy as ordered  Outcome: Progressing     Problem: RESPIRATORY - ADULT  Goal: Achieves optimal ventilation and oxygenation  Description: INTERVENTIONS:  - Assess for changes in respiratory status  - Assess for changes in mentation and behavior  - Position to facilitate oxygenation and minimize respiratory effort  - Oxygen administered by appropriate delivery if ordered  - Initiate smoking cessation education as indicated  - Encourage broncho-pulmonary hygiene including cough, deep breathe, Incentive Spirometry  - Assess the need for suctioning and aspirate as needed  - Assess and instruct to report SOB or any respiratory difficulty  - Respiratory Therapy support as indicated  Outcome: Progressing     Problem: GASTROINTESTINAL - ADULT  Goal: Maintains or returns to baseline bowel function  Description: INTERVENTIONS:  - Assess bowel function  - Encourage oral fluids to ensure adequate hydration  - Administer IV fluids if ordered to ensure adequate hydration  - Administer ordered medications as needed  - Encourage mobilization and activity  - Consider nutritional services referral to assist patient with adequate nutrition and appropriate food choices  Outcome: Progressing  Goal: Maintains adequate nutritional intake  Description: INTERVENTIONS:  - Monitor percentage of each meal consumed  - Identify factors contributing to decreased intake, treat as appropriate  - Assist with meals as needed  - Monitor I&O, weight, and lab values if indicated  - Obtain nutrition services referral as needed  Outcome: Progressing     Problem: GENITOURINARY - ADULT  Goal: Maintains or returns to baseline urinary function  Description: INTERVENTIONS:  - Assess urinary function  - Encourage oral fluids to ensure adequate hydration if ordered  - Administer IV fluids as ordered to ensure adequate hydration  - Administer ordered medications as needed  - Offer frequent toileting  - Follow urinary retention protocol if ordered  Outcome: Progressing     Problem: METABOLIC, FLUID AND ELECTROLYTES - ADULT  Goal: Electrolytes maintained within normal limits  Description: INTERVENTIONS:  - Monitor labs and assess patient for signs and symptoms of electrolyte imbalances  - Administer electrolyte replacement as ordered  - Monitor response to electrolyte replacements, including repeat lab results as appropriate  - Instruct patient on fluid and nutrition as appropriate  Outcome: Progressing  Goal: Fluid balance maintained  Description: INTERVENTIONS:  - Monitor labs   - Monitor I/O and WT  - Instruct patient on fluid and nutrition as appropriate  - Assess for signs & symptoms of volume excess or deficit  Outcome: Progressing  Goal: Glucose maintained within target range  Description: INTERVENTIONS:  - Monitor Blood Glucose as ordered  - Assess for signs and symptoms of hyperglycemia and hypoglycemia  - Administer ordered medications to maintain glucose within target range  - Assess nutritional intake and initiate nutrition service referral as needed  Outcome: Progressing     Problem: HEMATOLOGIC - ADULT  Goal: Maintains hematologic stability  Description: INTERVENTIONS  - Assess for signs and symptoms of bleeding or hemorrhage  - Monitor labs  - Administer supportive blood products/factors as ordered and appropriate  Outcome: Progressing     Problem: MUSCULOSKELETAL - ADULT  Goal: Maintain or return mobility to safest level of function  Description: INTERVENTIONS:  - Assess patient's ability to carry out ADLs; assess patient's baseline for ADL function and identify physical deficits which impact ability to perform ADLs (bathing, care of mouth/teeth, toileting, grooming, dressing, etc )  - Assess/evaluate cause of self-care deficits   - Assess range of motion  - Assess patient's mobility  - Assess patient's need for assistive devices and provide as appropriate  - Encourage maximum independence but intervene and supervise when necessary  - Involve family in performance of ADLs  - Assess for home care needs following discharge   - Consider OT consult to assist with ADL evaluation and planning for discharge  - Provide patient education as appropriate  Outcome: Progressing

## 2022-04-09 NOTE — ASSESSMENT & PLAN NOTE
· S/p preadjuvant chemotherapy, DAVID lobectomy 12/2021, adjuvant radiation, and immunotherapy  · Patient only received 2 treatments of immunotherapy as this exacerbated his psoriasis  · Last MRI brain in June 2021 was negative for metastasis

## 2022-04-09 NOTE — PROGRESS NOTES
[facility]  Progress Note - Christine  1955, 74 y o  male MRN: [id number]  Unit/Bed#: [facility] 031-13 Encounter: [id number]  Primary Care Provider: Tex Murray DO   Date and time admitted to hospital: 4/2/2022  9:42 PM    * Left parietal hemorrhagic tumor  Assessment & Plan  POD 2 Day Post-Op Procedure(s): Image guided left parietal craniotomy for tumor resection  · Left parietal brain mass with vasogenic edema, 2 additional left cerebellar masses  · H/o small cell lung adenocarcinoma (dx 4/2021), s/p chemotherapy/surgery/radiation/immunotherapy at [facility]  · Patient denies h/o [facility]  · Patient does not want to transfer back to [place] - states not up to traveling  · Patient presented with new onset apraxia and aphasia    Imaging:  · MRI brain w/wo, 4/8/21: Status post treatment of left parietal metastatic lesion with postop change as described above  This is the initial postoperative examination and will serve as baseline for follow-up exams  No significant residual tumor noted within the resection cavity  Metastatic lesions within the left cerebellar hemisphere are unchanged  Plan:  · Decadron wean q48h to 2mg BID  · Will continue 2mg BID indefinitely at the discretion of medical oncology  · Keppra 500mg x7 days post op  · 1 JOSE drain removed 4/8  · STAT CTH with decline in GCS greater than 2 points in 1 hour  · Med onc / rad onc / palliative care following  · Frequent neuro checks  · SBP < 160  · Avoid AC/AP  · PT/OT evaluation  · Medical management per primary team  · Platelets 056 this morning,   · S/p transfusion 1u platelets 4/8  · Platelts must stay above 100,000  Transfuse as necessary   · DVT ppx: SCDs, lovenox    Neurosurgery will continue to follow  Possible clearance for DC as early as tomorrow from neurosurgery standpoint  Please call questions or concerns      Adenocarcinoma, lung, left ([address] )  Assessment & Plan  · S/p preadjuvant chemotherapy, DAVID lobectomy 12/2021, adjuvant radiation, and immunotherapy  · Patient only received 2 treatments of immunotherapy as this exacerbated his psoriasis  · Last MRI brain in June 2021 was negative for metastasis    Essential hypertension  Assessment & Plan  · SBP < 160    Depression with anxiety  Assessment & Plan  · Medical management per primary team  · Home medications restarted      Subjective/Objective   Chief Complaint: None    Subjective:  Patient has no significant complaints or concerns  States he still has headaches but they are manageable and minimal   His speech and arm dysfunction are  His Telfa dressing was removed this morning  Patient's RN updated personally after evaluation  Objective: laying in bed in NAD    I/O       04/07 0701  04/08 0700 04/08 0701  04/09 0700 04/09 0701  04/10 0700    P  O   300     I V  (mL/kg) 2846 3 (35 7) 223 8 (2 8)     NG/GT 0      IV Piggyback 510 50     Total Intake(mL/kg) 3356 3 (42 1) 573 8 (7 2)     Urine (mL/kg/hr) 4475 (2 3) 2300 (1 2)     Drains 120      Total Output 4595 2300     Net -1238 8 -1726 3            Unmeasured Urine Occurrence  1 x           Invasive Devices  Report    Central Venous Catheter Line            Port A Cath 06/17/21 Right Chest 295 days          Peripheral Intravenous Line            Peripheral IV 04/07/22 Right Forearm 2 days    Peripheral IV 04/07/22 Left Arm 1 day    Peripheral IV 04/07/22 Right Hand 1 day                Physical Exam:  Vitals: Blood pressure 138/82, pulse 67, temperature 98 7 °F (37 1 °C), temperature source Oral, resp  rate 16, height 5' 10" (1 778 m), weight 80 2 kg (176 lb 12 9 oz), SpO2 93 %  ,Body mass index is 25 37 kg/m²  General appearance: alert, appears stated age, cooperative and no distress  Head: Normocephalic  R parietal craniotomy healing well  Dressing taken down  Staples intact without active bleeding     Eyes:  Tracking appropriately  Neck: supple, symmetrical, trachea midline   Back: no kyphosis present  Lungs: non labored breathing  Heart: regular heart rate  Neurologic:   Mental status: Alert, oriented x3  thought content appropriate  Able to calculate change correctly  Cranial nerves: grossly intact (Cranial nerves II-XII)  Sensory: normal to light touch  Motor: moving all extremities without focal weakness 5/5 strength throughout    Lab Results:  Results from last 7 days   Lab Units 04/09/22  0629 04/08/22 0456 04/07/22  0539 04/06/22  0930 04/06/22  0930 04/05/22  0526 04/05/22  0526 04/04/22  0521 04/03/22  0535   WBC Thousand/uL 8 13 9 80 6 78   < > 7 72   < > 10 01   < > 10 54*   HEMOGLOBIN g/dL 12 0 11 5* 13 5   < > 14 5   < > 13 3   < > 14 4   HEMATOCRIT % 35 7* 35 3* 41 6   < > 45 4   < > 40 8   < > 45 6   PLATELETS Thousands/uL 104* 91* 111*   < > 114*   < > 107*   < > 118*   NEUTROS PCT %  --   --   --   --   --   --   --   --  88*   MONOS PCT %  --   --   --   --   --   --   --   --  5   MONO PCT %  --   --  1*  --  4  --  0*  --   --     < > = values in this interval not displayed  Results from last 7 days   Lab Units 04/09/22 0629 04/08/22 0456 04/07/22  0539 04/05/22  0526 04/04/22  0521 04/03/22  0535 04/03/22  0535 04/02/22 1942 04/02/22 1942   POTASSIUM mmol/L 4 1 4 0 4 1   < > 4 0   < > 4 3   < > 4 1   CHLORIDE mmol/L 105 109* 104   < > 108   < > 104   < > 99   CO2 mmol/L 28 25 28   < > 22   < > 29   < > 28   BUN mg/dL 29* 28* 30*   < > 34*   < > 21   < > 25   CREATININE mg/dL 1 01 1 09 1 15   < > 1 19   < > 1 17   < > 1 32*   CALCIUM mg/dL 8 3 8 1* 8 9   < > 9 1   < > 9 5   < > 9 9   ALK PHOS U/L  --   --   --   --  53  --  65  --  55   ALT U/L  --   --   --   --  35  --  43  --  36   AST U/L  --   --   --   --  14  --  13  --  24    < > = values in this interval not displayed       Results from last 7 days   Lab Units 04/08/22 0456 04/04/22  0521 04/03/22 0535   MAGNESIUM mg/dL 1 9 2 5 2 2     Results from last 7 days   Lab Units 04/08/22 0456 04/04/22  0521 04/03/22  0535 PHOSPHORUS mg/dL 3 7 3 6 4 0     Results from last 7 days   Lab Units 04/08/22  0456 04/06/22  1153 04/03/22  0535 04/02/22  1942 04/02/22 1942   INR  1 12 1 01 0 96   < > 0 90   PTT seconds 27 29  --   --  30    < > = values in this interval not displayed  No results found for: TROPONINT  ABG:No results found for: PHART, GTN3BOM, PO2ART, BNW4LSM, P1NOSNLX, BEART, SOURCE    Imaging Studies: I have personally reviewed pertinent reports  and I have personally reviewed pertinent films in PACS  CT head wo contrast    Result Date: 4/3/2022  Impression: Left parietal region intraparenchymal hematoma unchanged in size measuring 6 7 cm in largest dimension  New irregular hyperdense focus at the left cerebellum likely to represent small amount of subarachnoid hemorrhage versus a new intraparenchymal hemorrhage  Workstation performed: [id number]     MRI brain w wo contrast    Result Date: 4/3/2022  Impression: 1   2 left cerebellar hemorrhagic metastases and probable 2 adjacent left parietal hemorrhagic metastasis, the more anterior, larger one having parenchymal hematoma and local edema and mass effect, correlating to the hemorrhage on the prior head CT  I personally discussed this study with Dr Taylor Meza on 4/3/2022 at 2:19 PM  Workstation performed: [id number]     CT chest abdomen pelvis w contrast    Result Date: 4/4/2022  Impression: No findings of metastatic disease in the chest abdomen pelvis  No acute compression collapse of the vertebra seen No lytic destructive lesion seen Emphysema Postsurgical changes from left upper lobectomy A linear density seen in the left mid to lower lung likely scarring  Routine surveillance can be continued Workstation performed: [id number]     CT stroke alert brain    Result Date: 4/2/2022  Impression: Left parietal 4 6 cm intraparenchymal hemorrhage with surrounding cerebral edema  No midline shift    I personally discussed this study with neurologist on 4/2/2022 at 7:45 PM  Workstation performed: [id number]     CTA stroke alert (head/neck)    Result Date: 4/2/2022  Impression: Severe right vertebral artery stenosis at the origin  Severe critical near occlusive stenosis right proximal vertebral artery near its origin spanning approximately 2 2 cm in cranial caudal dimension  Approximately 70-75% right proximal cervical ICA plaque stenosis  I personally discussed this study with neurologist on 4/2/2022 at 7:50 PM   Workstation performed: [id number]       EKG, Pathology, and Other Studies: I have personally reviewed pertinent reports        VTE Pharmacologic Prophylaxis: Enoxaparin (Lovenox)    VTE Mechanical Prophylaxis: sequential compression device

## 2022-04-09 NOTE — ASSESSMENT & PLAN NOTE
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

## 2022-04-10 LAB
ANION GAP SERPL CALCULATED.3IONS-SCNC: 5 MMOL/L (ref 4–13)
BUN SERPL-MCNC: 30 MG/DL (ref 5–25)
CALCIUM SERPL-MCNC: 8.6 MG/DL (ref 8.3–10.1)
CHLORIDE SERPL-SCNC: 104 MMOL/L (ref 100–108)
CO2 SERPL-SCNC: 27 MMOL/L (ref 21–32)
CREAT SERPL-MCNC: 1.04 MG/DL (ref 0.6–1.3)
ERYTHROCYTE [DISTWIDTH] IN BLOOD BY AUTOMATED COUNT: 17.9 % (ref 11.6–15.1)
GFR SERPL CREATININE-BSD FRML MDRD: 73 ML/MIN/1.73SQ M
GLUCOSE SERPL-MCNC: 104 MG/DL (ref 65–140)
HCT VFR BLD AUTO: 37.1 % (ref 36.5–49.3)
HGB BLD-MCNC: 13.1 G/DL (ref 12–17)
MCH RBC QN AUTO: 32 PG (ref 26.8–34.3)
MCHC RBC AUTO-ENTMCNC: 35.3 G/DL (ref 31.4–37.4)
MCV RBC AUTO: 91 FL (ref 82–98)
PLATELET # BLD AUTO: 108 THOUSANDS/UL (ref 149–390)
PMV BLD AUTO: 10.3 FL (ref 8.9–12.7)
POTASSIUM SERPL-SCNC: 4.1 MMOL/L (ref 3.5–5.3)
RBC # BLD AUTO: 4.09 MILLION/UL (ref 3.88–5.62)
SODIUM SERPL-SCNC: 136 MMOL/L (ref 136–145)
WBC # BLD AUTO: 7.79 THOUSAND/UL (ref 4.31–10.16)

## 2022-04-10 PROCEDURE — 99024 POSTOP FOLLOW-UP VISIT: CPT | Performed by: PHYSICIAN ASSISTANT

## 2022-04-10 PROCEDURE — 99232 SBSQ HOSP IP/OBS MODERATE 35: CPT | Performed by: PHYSICIAN ASSISTANT

## 2022-04-10 PROCEDURE — 85027 COMPLETE CBC AUTOMATED: CPT | Performed by: PHYSICIAN ASSISTANT

## 2022-04-10 PROCEDURE — 80048 BASIC METABOLIC PNL TOTAL CA: CPT | Performed by: PHYSICIAN ASSISTANT

## 2022-04-10 RX ORDER — POLYETHYLENE GLYCOL 3350 17 G/17G
17 POWDER, FOR SOLUTION ORAL DAILY
Status: DISCONTINUED | OUTPATIENT
Start: 2022-04-10 | End: 2022-04-11 | Stop reason: HOSPADM

## 2022-04-10 RX ORDER — SENNOSIDES 8.8 MG/5ML
8.8 LIQUID ORAL 2 TIMES DAILY
Status: DISCONTINUED | OUTPATIENT
Start: 2022-04-10 | End: 2022-04-11 | Stop reason: HOSPADM

## 2022-04-10 RX ADMIN — AMLODIPINE BESYLATE 10 MG: 10 TABLET ORAL at 08:33

## 2022-04-10 RX ADMIN — DEXAMETHASONE 4 MG: 4 TABLET ORAL at 14:53

## 2022-04-10 RX ADMIN — METHOCARBAMOL 500 MG: 500 TABLET ORAL at 12:23

## 2022-04-10 RX ADMIN — METHOCARBAMOL 500 MG: 500 TABLET ORAL at 00:10

## 2022-04-10 RX ADMIN — METHOCARBAMOL 500 MG: 500 TABLET ORAL at 17:50

## 2022-04-10 RX ADMIN — LEVETIRACETAM 500 MG: 500 TABLET, FILM COATED ORAL at 08:34

## 2022-04-10 RX ADMIN — SENNOSIDES 8.8 MG: 8.8 SYRUP ORAL at 08:35

## 2022-04-10 RX ADMIN — LEVETIRACETAM 500 MG: 500 TABLET, FILM COATED ORAL at 21:44

## 2022-04-10 RX ADMIN — DEXAMETHASONE 4 MG: 4 TABLET ORAL at 21:44

## 2022-04-10 RX ADMIN — DOCUSATE SODIUM 100 MG: 100 CAPSULE, LIQUID FILLED ORAL at 17:50

## 2022-04-10 RX ADMIN — Medication 10 MG: at 12:50

## 2022-04-10 RX ADMIN — POLYETHYLENE GLYCOL 3350 17 G: 17 POWDER, FOR SOLUTION ORAL at 09:06

## 2022-04-10 RX ADMIN — PRAVASTATIN SODIUM 80 MG: 80 TABLET ORAL at 17:50

## 2022-04-10 RX ADMIN — DOCUSATE SODIUM 100 MG: 100 CAPSULE, LIQUID FILLED ORAL at 08:34

## 2022-04-10 RX ADMIN — DOXEPIN HYDROCHLORIDE 10 MG: 10 CAPSULE ORAL at 21:44

## 2022-04-10 RX ADMIN — SENNOSIDES 8.8 MG: 8.8 SYRUP ORAL at 17:50

## 2022-04-10 RX ADMIN — Medication 6 MG: at 21:44

## 2022-04-10 RX ADMIN — ACETAMINOPHEN 975 MG: 325 TABLET ORAL at 14:53

## 2022-04-10 RX ADMIN — CITALOPRAM HYDROBROMIDE 10 MG: 10 TABLET ORAL at 08:34

## 2022-04-10 RX ADMIN — METHOCARBAMOL 500 MG: 500 TABLET ORAL at 05:43

## 2022-04-10 RX ADMIN — ENOXAPARIN SODIUM 40 MG: 40 INJECTION SUBCUTANEOUS at 08:33

## 2022-04-10 RX ADMIN — ACETAMINOPHEN 975 MG: 325 TABLET ORAL at 21:43

## 2022-04-10 RX ADMIN — LEVOTHYROXINE SODIUM 37.5 MCG: 75 TABLET ORAL at 05:43

## 2022-04-10 RX ADMIN — METHOCARBAMOL 500 MG: 500 TABLET ORAL at 23:05

## 2022-04-10 RX ADMIN — DEXAMETHASONE 4 MG: 4 TABLET ORAL at 05:43

## 2022-04-10 RX ADMIN — ACETAMINOPHEN 975 MG: 325 TABLET ORAL at 05:43

## 2022-04-10 NOTE — PLAN OF CARE
Problem: MOBILITY - ADULT  Goal: Maintain or return to baseline ADL function  Description: INTERVENTIONS:  -  Assess patient's ability to carry out ADLs; assess patient's baseline for ADL function and identify physical deficits which impact ability to perform ADLs (bathing, care of mouth/teeth, toileting, grooming, dressing, etc )  - Assess/evaluate cause of self-care deficits   - Assess range of motion  - Assess patient's mobility; develop plan if impaired  - Assess patient's need for assistive devices and provide as appropriate  - Encourage maximum independence but intervene and supervise when necessary  - Involve family in performance of ADLs  - Assess for home care needs following discharge   - Consider OT consult to assist with ADL evaluation and planning for discharge  - Provide patient education as appropriate  Outcome: Progressing  Goal: Maintains/Returns to pre admission functional level  Description: INTERVENTIONS:  - Perform BMAT or MOVE assessment daily    - Set and communicate daily mobility goal to care team and patient/family/caregiver     - Collaborate with rehabilitation services on mobility goals if consulted  - Ambulate patient 2 times a day  - Out of bed for meals 3 times a day  - Out of bed for toileting  - Record patient progress and toleration of activity level   Outcome: Progressing

## 2022-04-10 NOTE — PLAN OF CARE
Problem: MOBILITY - ADULT  Goal: Maintain or return to baseline ADL function  Description: INTERVENTIONS:  -  Assess patient's ability to carry out ADLs; assess patient's baseline for ADL function and identify physical deficits which impact ability to perform ADLs (bathing, care of mouth/teeth, toileting, grooming, dressing, etc )  - Assess/evaluate cause of self-care deficits   - Assess range of motion  - Assess patient's mobility; develop plan if impaired  - Assess patient's need for assistive devices and provide as appropriate  - Encourage maximum independence but intervene and supervise when necessary  - Involve family in performance of ADLs  - Assess for home care needs following discharge   - Consider OT consult to assist with ADL evaluation and planning for discharge  - Provide patient education as appropriate  Outcome: Progressing  Goal: Maintains/Returns to pre admission functional level  Description: INTERVENTIONS:  - Perform BMAT or MOVE assessment daily    - Set and communicate daily mobility goal to care team and patient/family/caregiver  - Collaborate with rehabilitation services on mobility goals if consulted  - Ambulate patient 2 times a day  - Out of bed for meals 3 times a day  - Out of bed for toileting  - Record patient progress and toleration of activity level   Outcome: Progressing     Problem: Neurological Deficit  Goal: Neurological status is stable or improving  Description: Interventions:  - Monitor and assess patient's level of consciousness, motor function, sensory function, and level of assistance needed for ADLs  - Monitor and report changes from baseline  Collaborate with interdisciplinary team to initiate plan and implement interventions as ordered  - Provide and maintain a safe environment  - Consider seizure precautions  - Consider fall precautions  - Consider aspiration precautions  - Consider bleeding precautions  Outcome: Progressing     Problem:  Activity Intolerance/Impaired Mobility  Goal: Mobility/activity is maintained at optimum level for patient  Description: Interventions:  - Assess and monitor patient  barriers to mobility and need for assistive/adaptive devices  - Assess patient's emotional response to limitations  - Collaborate with interdisciplinary team and initiate plans and interventions as ordered  - Encourage independent activity per ability   - Maintain proper body alignment  - Perform active/passive rom as tolerated/ordered  - Plan activities to conserve energy   - Turn patient as appropriate  Outcome: Progressing     Problem: Communication Impairment  Goal: Ability to express needs and understand communication  Description: Assess patient's communication skills and ability to understand information  Patient will demonstrate use of effective communication techniques, alternative methods of communication and understanding even if not able to speak  - Encourage communication and provide alternate methods of communication as needed  - Collaborate with case management/ for discharge needs  - Include patient/family/caregiver in decisions related to communication  Outcome: Progressing     Problem: Potential for Aspiration  Goal: Non-ventilated patient's risk of aspiration is minimized  Description: Assess and monitor vital signs, respiratory status, and labs (WBC)  Monitor for signs of aspiration (tachypnea, cough, rales, wheezing, cyanosis, fever)  - Assess and monitor patient's ability to swallow  - Place patient up in chair to eat if possible  - HOB up at 90 degrees to eat if unable to get patient up into chair   - Supervise patient during oral intake  - Instruct patient/ family to take small bites  - Instruct patient/ family to take small single sips when taking liquids    - Follow patient-specific strategies generated by speech pathologist   Outcome: Progressing     Problem: Nutrition  Goal: Nutrition/Hydration status is improving  Description: Monitor and assess patient's nutrition/hydration status for malnutrition (ex- brittle hair, bruises, dry skin, pale skin and conjunctiva, muscle wasting, smooth red tongue, and disorientation)  Collaborate with interdisciplinary team and initiate plan and interventions as ordered  Monitor patient's weight and dietary intake as ordered or per policy  Utilize nutrition screening tool and intervene per policy  Determine patient's food preferences and provide high-protein, high-caloric foods as appropriate  - Assist patient with eating   - Allow adequate time for meals   - Encourage patient to take dietary supplement as ordered  - Collaborate with clinical nutritionist   - Include patient/family/caregiver in decisions related to nutrition    Outcome: Progressing     Problem: PAIN - ADULT  Goal: Verbalizes/displays adequate comfort level or baseline comfort level  Description: Interventions:  - Encourage patient to monitor pain and request assistance  - Assess pain using appropriate pain scale  - Administer analgesics based on type and severity of pain and evaluate response  - Implement non-pharmacological measures as appropriate and evaluate response  - Consider cultural and social influences on pain and pain management  - Notify physician/advanced practitioner if interventions unsuccessful or patient reports new pain  Outcome: Progressing     Problem: INFECTION - ADULT  Goal: Absence or prevention of progression during hospitalization  Description: INTERVENTIONS:  - Assess and monitor for signs and symptoms of infection  - Monitor lab/diagnostic results  - Monitor all insertion sites, i e  indwelling lines, tubes, and drains  - Monitor endotracheal if appropriate and nasal secretions for changes in amount and color  - [place] appropriate cooling/warming therapies per order  - Administer medications as ordered  - Instruct and encourage patient and family to use good hand hygiene technique  - Identify and instruct in appropriate isolation precautions for identified infection/condition  Outcome: Progressing  Goal: Absence of fever/infection during neutropenic period  Description: INTERVENTIONS:  - Monitor WBC    Outcome: Progressing     Problem: SAFETY ADULT  Goal: Maintain or return to baseline ADL function  Description: INTERVENTIONS:  -  Assess patient's ability to carry out ADLs; assess patient's baseline for ADL function and identify physical deficits which impact ability to perform ADLs (bathing, care of mouth/teeth, toileting, grooming, dressing, etc )  - Assess/evaluate cause of self-care deficits   - Assess range of motion  - Assess patient's mobility; develop plan if impaired  - Assess patient's need for assistive devices and provide as appropriate  - Encourage maximum independence but intervene and supervise when necessary  - Involve family in performance of ADLs  - Assess for home care needs following discharge   - Consider OT consult to assist with ADL evaluation and planning for discharge  - Provide patient education as appropriate  Outcome: Progressing  Goal: Maintains/Returns to pre admission functional level  Description: INTERVENTIONS:  - Perform BMAT or MOVE assessment daily    - Set and communicate daily mobility goal to care team and patient/family/caregiver     - Collaborate with rehabilitation services on mobility goals if consulted  - Ambulate patient 2 times a day  - Out of bed for meals 3 times a day  - Out of bed for toileting  - Record patient progress and toleration of activity level   Outcome: Progressing  Goal: Patient will remain free of falls  Description: INTERVENTIONS:  - Educate patient/family on patient safety including physical limitations  - Instruct patient to call for assistance with activity   - Consult OT/PT to assist with strengthening/mobility   - Keep Call bell within reach  - Keep bed low and locked with side rails adjusted as appropriate  - Keep care items and personal belongings within reach  - Initiate and maintain comfort rounds  - Make Fall Risk Sign visible to staff  - Offer Toileting every 2 Hours, in advance of need  Outcome: Progressing     Problem: DISCHARGE PLANNING  Goal: Discharge to home or other facility with appropriate resources  Description: INTERVENTIONS:  - Identify barriers to discharge w/patient and caregiver  - Arrange for needed discharge resources and transportation as appropriate  - Identify discharge learning needs (meds, wound care, etc )  - Arrange for interpretive services to assist at discharge as needed  - Refer to Case Management Department for coordinating discharge planning if the patient needs post-hospital services based on physician/advanced practitioner order or complex needs related to functional status, cognitive ability, or social support system  Outcome: Progressing     Problem: Knowledge Deficit  Goal: Patient/family/caregiver demonstrates understanding of disease process, treatment plan, medications, and discharge instructions  Description: Complete learning assessment and assess knowledge base    Interventions:  - Provide teaching at level of understanding  - Provide teaching via preferred learning methods  Outcome: Progressing     Problem: Potential for Falls  Goal: Patient will remain free of falls  Description: INTERVENTIONS:  - Educate patient/family on patient safety including physical limitations  - Instruct patient to call for assistance with activity   - Consult OT/PT to assist with strengthening/mobility   - Keep Call bell within reach  - Keep bed low and locked with side rails adjusted as appropriate  - Keep care items and personal belongings within reach  - Initiate and maintain comfort rounds  - Offer Toileting every 2 Hours, in advance of need  - Apply yellow socks and bracelet for high fall risk patients  - Consider moving patient to room near nurses station  Outcome: Progressing     Problem: Prexisting or High Potential for Compromised Skin Integrity  Goal: Skin integrity is maintained or improved  Description: INTERVENTIONS:  - Identify patients at risk for skin breakdown  - Assess and monitor skin integrity  - Assess and monitor nutrition and hydration status  - Monitor labs   - Assess for incontinence   - Turn and reposition patient  - Assist with mobility/ambulation  - Relieve pressure over bony prominences  - Avoid friction and shearing  - Provide appropriate hygiene as needed including keeping skin clean and dry  - Evaluate need for skin moisturizer/barrier cream  - Collaborate with interdisciplinary team   - Patient/family teaching  - Consider wound care consult   Outcome: Progressing     Problem: Nutrition/Hydration-ADULT  Goal: Nutrient/Hydration intake appropriate for improving, restoring or maintaining nutritional needs  Description: Monitor and assess patient's nutrition/hydration status for malnutrition  Collaborate with interdisciplinary team and initiate plan and interventions as ordered  Monitor patient's weight and dietary intake as ordered or per policy  Utilize nutrition screening tool and intervene as necessary  Determine patient's food preferences and provide high-protein, high-caloric foods as appropriate       INTERVENTIONS:  - Monitor oral intake, urinary output, labs, and treatment plans  - Assess nutrition and hydration status and recommend course of action  - Evaluate amount of meals eaten  - Assist patient with eating if necessary   - Allow adequate time for meals  - Recommend/ encourage appropriate diets, oral nutritional supplements, and vitamin/mineral supplements  - Order, calculate, and assess calorie counts as needed  - Recommend, monitor, and adjust tube feedings and TPN/PPN based on assessed needs  - Assess need for intravenous fluids  - Provide specific nutrition/hydration education as appropriate  - Include patient/family/caregiver in decisions related to nutrition  Outcome: Progressing     Problem: COPING  Goal: Pt/Family able to verbalize concerns and demonstrate effective coping strategies  Description: INTERVENTIONS:  - Assist patient/family to identify coping skills, available support systems and cultural and spiritual values  - Provide emotional support, including active listening and acknowledgement of concerns of patient and caregivers  - Reduce environmental stimuli, as able  - Provide patient education  - Assess for spiritual pain/suffering and initiate spiritual care, including notification of Pastoral Care or [facility] based community as needed  - Assess effectiveness of coping strategies  Outcome: Progressing  Goal: Will report anxiety at manageable levels  Description: INTERVENTIONS:  - Administer medication as ordered  - Teach and encourage coping skills  - Provide emotional support  - Assess patient/family for anxiety and ability to cope  Outcome: Progressing     Problem: NEUROSENSORY - ADULT  Goal: Achieves stable or improved neurological status  Description: INTERVENTIONS  - Monitor and report changes in neurological status  - Monitor vital signs such as temperature, blood pressure, glucose, and any other labs ordered   - Initiate measures to prevent increased intracranial pressure  - Monitor for seizure activity and implement precautions if appropriate      Outcome: Progressing  Goal: Remains free of injury related to seizures activity  Description: INTERVENTIONS  - Maintain airway, patient safety  and administer oxygen as ordered  - Monitor patient for seizure activity, document and report duration and description of seizure to physician/advanced practitioner  - If seizure occurs,  ensure patient safety during seizure  - Reorient patient post seizure  - Seizure pads on all 4 side rails  - Instruct patient/family to notify RN of any seizure activity including if an aura is experienced  - Instruct patient/family to call for assistance with activity based on nursing assessment  - Administer anti-seizure medications if ordered    Outcome: Progressing  Goal: Achieves maximal functionality and self care  Description: INTERVENTIONS  - Monitor swallowing and airway patency with patient fatigue and changes in neurological status  - Encourage and assist patient to increase activity and self care     - Encourage visually impaired, hearing impaired and aphasic patients to use assistive/communication devices  Outcome: Progressing     Problem: CARDIOVASCULAR - ADULT  Goal: Maintains optimal cardiac output and hemodynamic stability  Description: INTERVENTIONS:  - Monitor I/O, vital signs and rhythm  - Monitor for S/S and trends of decreased cardiac output  - Administer and titrate ordered vasoactive medications to optimize hemodynamic stability  - Assess quality of pulses, skin color and temperature  - Assess for signs of decreased coronary artery perfusion  - Instruct patient to report change in severity of symptoms  Outcome: Progressing  Goal: Absence of cardiac dysrhythmias or at baseline rhythm  Description: INTERVENTIONS:  - Continuous cardiac monitoring, vital signs, obtain 12 lead EKG if ordered  - Administer antiarrhythmic and heart rate control medications as ordered  - Monitor electrolytes and administer replacement therapy as ordered  Outcome: Progressing     Problem: RESPIRATORY - ADULT  Goal: Achieves optimal ventilation and oxygenation  Description: INTERVENTIONS:  - Assess for changes in respiratory status  - Assess for changes in mentation and behavior  - Position to facilitate oxygenation and minimize respiratory effort  - Oxygen administered by appropriate delivery if ordered  - Initiate smoking cessation education as indicated  - Encourage broncho-pulmonary hygiene including cough, deep breathe, Incentive Spirometry  - Assess the need for suctioning and aspirate as needed  - Assess and instruct to report SOB or any respiratory difficulty  - Respiratory Therapy support as indicated  Outcome: Progressing     Problem: GASTROINTESTINAL - ADULT  Goal: Maintains or returns to baseline bowel function  Description: INTERVENTIONS:  - Assess bowel function  - Encourage oral fluids to ensure adequate hydration  - Administer IV fluids if ordered to ensure adequate hydration  - Administer ordered medications as needed  - Encourage mobilization and activity  - Consider nutritional services referral to assist patient with adequate nutrition and appropriate food choices  Outcome: Progressing  Goal: Maintains adequate nutritional intake  Description: INTERVENTIONS:  - Monitor percentage of each meal consumed  - Identify factors contributing to decreased intake, treat as appropriate  - Assist with meals as needed  - Monitor I&O, weight, and lab values if indicated  - Obtain nutrition services referral as needed  Outcome: Progressing     Problem: GENITOURINARY - ADULT  Goal: Maintains or returns to baseline urinary function  Description: INTERVENTIONS:  - Assess urinary function  - Encourage oral fluids to ensure adequate hydration if ordered  - Administer IV fluids as ordered to ensure adequate hydration  - Administer ordered medications as needed  - Offer frequent toileting  - Follow urinary retention protocol if ordered  Outcome: Progressing     Problem: METABOLIC, FLUID AND ELECTROLYTES - ADULT  Goal: Electrolytes maintained within normal limits  Description: INTERVENTIONS:  - Monitor labs and assess patient for signs and symptoms of electrolyte imbalances  - Administer electrolyte replacement as ordered  - Monitor response to electrolyte replacements, including repeat lab results as appropriate  - Instruct patient on fluid and nutrition as appropriate  Outcome: Progressing  Goal: Fluid balance maintained  Description: INTERVENTIONS:  - Monitor labs   - Monitor I/O and WT  - Instruct patient on fluid and nutrition as appropriate  - Assess for signs & symptoms of volume excess or deficit  Outcome: Progressing  Goal: Glucose maintained within target range  Description: INTERVENTIONS:  - Monitor Blood Glucose as ordered  - Assess for signs and symptoms of hyperglycemia and hypoglycemia  - Administer ordered medications to maintain glucose within target range  - Assess nutritional intake and initiate nutrition service referral as needed  Outcome: Progressing     Problem: HEMATOLOGIC - ADULT  Goal: Maintains hematologic stability  Description: INTERVENTIONS  - Assess for signs and symptoms of bleeding or hemorrhage  - Monitor labs  - Administer supportive blood products/factors as ordered and appropriate  Outcome: Progressing     Problem: MUSCULOSKELETAL - ADULT  Goal: Maintain or return mobility to safest level of function  Description: INTERVENTIONS:  - Assess patient's ability to carry out ADLs; assess patient's baseline for ADL function and identify physical deficits which impact ability to perform ADLs (bathing, care of mouth/teeth, toileting, grooming, dressing, etc )  - Assess/evaluate cause of self-care deficits   - Assess range of motion  - Assess patient's mobility  - Assess patient's need for assistive devices and provide as appropriate  - Encourage maximum independence but intervene and supervise when necessary  - Involve family in performance of ADLs  - Assess for home care needs following discharge   - Consider OT consult to assist with ADL evaluation and planning for discharge  - Provide patient education as appropriate  Outcome: Progressing

## 2022-04-10 NOTE — PROGRESS NOTES
[address] [facility]  Progress Note - Karen Santos 1955, 74 y o  male MRN: [id number]  Unit/Bed#: [facility] 143-56 Encounter: [id number]  Primary Care Provider: Robinson Brantley DO   Date and time admitted to hospital: 4/2/2022  9:42 PM    * Left parietal hemorrhagic tumor  Assessment & Plan  POD 3 Day Post-Op Procedure(s): Image guided left parietal craniotomy for tumor resection  · Left parietal brain mass with vasogenic edema, 2 additional left cerebellar masses  · H/o small cell lung adenocarcinoma (dx 4/2021), s/p chemotherapy/surgery/radiation/immunotherapy at [facility]  · Patient denies h/o [facility]  · Patient does not want to transfer back to [place] - states not up to traveling  · Patient presented with new onset apraxia and aphasia    Imaging:  · MRI brain w/wo, 4/8/21: Status post treatment of left parietal metastatic lesion with postop change as described above  This is the initial postoperative examination and will serve as baseline for follow-up exams  No significant residual tumor noted within the resection cavity  Metastatic lesions within the left cerebellar hemisphere are unchanged  Plan:  · Decadron wean q48h to 2mg BID  · Will continue 2mg BID indefinitely at the discretion of medical oncology  · Keppra 500mg x7 days post op  · 1 JOSE drain removed 4/8  · STAT CTH with decline in GCS greater than 2 points in 1 hour  · Med onc / rad onc / palliative care following  · Frequent neuro checks  · SBP < 160  · Avoid AC/AP  · PT/OT evaluation  · Medical management per primary team  · Platelets 948 this morning,   · S/p transfusion 1u platelets 4/8  · Platelts must stay above 100,000 post operatively  Transfuse as necessary   · DVT ppx: SCDs, lovenox  · Patient pending BM prior to clearance for DC  Spoke with SLIM and ordered miralax- patient reports daily use at home prior to hospitalization  · Patient can shower starting tomorrow       Neurosurgery will sign off and follow from the periphery at this time  Patient cleared for discharge pending BM  Plan to follow up in 2 weeks for pathology review and incision check  Please call questions or concerns  Thrombocytopenia ([address] )  Assessment & Plan  · Platelets 629 this morning,   · S/p transfusion 1u platelets 4/8  · Platelts must stay above 100,000 post operatively  Transfuse as necessary     Adenocarcinoma, lung, left ([address] )  Assessment & Plan  · S/p preadjuvant chemotherapy, DAVID lobectomy 12/2021, adjuvant radiation, and immunotherapy  · Patient only received 2 treatments of immunotherapy as this exacerbated his psoriasis  · Last MRI brain in June 2021 was negative for metastasis    Essential hypertension  Assessment & Plan  · SBP < 160    Depression with anxiety  Assessment & Plan  · Medical management per primary team  · Home medications restarted    Subjective/Objective   Chief Complaint:  I want to poop     Subjective:  Patient is sitting upright in bed drinking coffee this morning  States he still has yet to have a bowel movement since surgery and reports daily use of MiraLax at home to keep him regular  No other complaints or neurologic deficits  Minimal headaches/head pain at this time  Denies any neurologic weakness numbness or tingling  Patient states he feels like he is thinking more clearly today  Objective:  Sitting in bed in no acute distress    I/O       04/08 0701  04/09 0700 04/09 0701  04/10 0700 04/10 0701  04/11 0700    P  O  300      I V  (mL/kg) 223 8 (2 8)      NG/GT       IV Piggyback 50      Total Intake(mL/kg) 573 8 (7 2)      Urine (mL/kg/hr) 2300 (1 2) 1775 (0 9) 650 (4 1)    Drains       Total Output 2300 1775 650    Net -1726 3 -1775 -650           Unmeasured Urine Occurrence 1 x 2 x           Invasive Devices  Report    Central Venous Catheter Line            Port A Cath 06/17/21 Right Chest 296 days          Peripheral Intravenous Line            Peripheral IV 04/07/22 Right Forearm 3 days    Peripheral IV 04/07/22 Left Arm 2 days                Physical Exam:  Vitals: Blood pressure 144/88, pulse 75, temperature 98 °F (36 7 °C), resp  rate 16, height 5' 10" (1 778 m), weight 81 5 kg (179 lb 10 8 oz), SpO2 97 %  ,Body mass index is 25 78 kg/m²  General appearance: alert, appears stated age, cooperative and no distress  Head: Normocephalic  Left parietal incision appears to be healing well  Some scabbing dry blood noted  No active bleeding  No erythema  Eyes: EOMI, PERRL  Neck: supple, symmetrical, trachea midline   Back: no kyphosis present  Lungs: non labored breathing  Heart: regular heart rate  Neurologic:   Mental status: Alert, oriented x3, thought content appropriate  Cranial nerves: grossly intact (Cranial nerves II-XII)  Sensory: normal to light touch  Motor: moving all extremities without focal weakness 5/5 strength  Reflexes: 2+ and symmetric  Coordination: finger to nose normal bilaterally, no drift bilaterally    Lab Results:  Results from last 7 days   Lab Units 04/10/22  0609 04/09/22  0629 04/08/22  0456 04/07/22  0539 04/07/22  0539 04/06/22  0930 04/06/22  0930 04/05/22  0526 04/05/22  0526   WBC Thousand/uL 7 79 8 13 9 80   < > 6 78   < > 7 72   < > 10 01   HEMOGLOBIN g/dL 13 1 12 0 11 5*   < > 13 5   < > 14 5   < > 13 3   HEMATOCRIT % 37 1 35 7* 35 3*   < > 41 6   < > 45 4   < > 40 8   PLATELETS Thousands/uL 108* 104* 91*   < > 111*   < > 114*   < > 107*   MONO PCT %  --   --   --   --  1*  --  4  --  0*    < > = values in this interval not displayed       Results from last 7 days   Lab Units 04/10/22  0609 04/09/22  0629 04/08/22  0456 04/05/22  0526 04/04/22  0521   POTASSIUM mmol/L 4 1 4 1 4 0   < > 4 0   CHLORIDE mmol/L 104 105 109*   < > 108   CO2 mmol/L 27 28 25   < > 22   BUN mg/dL 30* 29* 28*   < > 34*   CREATININE mg/dL 1 04 1 01 1 09   < > 1 19   CALCIUM mg/dL 8 6 8 3 8 1*   < > 9 1   ALK PHOS U/L  --   --   --   --  53   ALT U/L  --   --   --   --  35   AST U/L  --   --   --   -- 14    < > = values in this interval not displayed  Results from last 7 days   Lab Units 04/08/22  0456 04/04/22  0521   MAGNESIUM mg/dL 1 9 2 5     Results from last 7 days   Lab Units 04/08/22  0456 04/04/22  0521   PHOSPHORUS mg/dL 3 7 3 6     Results from last 7 days   Lab Units 04/08/22  0456 04/06/22  1153   INR  1 12 1 01   PTT seconds 27 29     No results found for: TROPONINT  ABG:No results found for: PHART, PZY9UIA, PO2ART, SGR1TOY, C3RUCZRY, BEART, SOURCE    Imaging Studies: I have personally reviewed pertinent reports  and I have personally reviewed pertinent films in PACS  CT head wo contrast    Result Date: 4/3/2022  Impression: Left parietal region intraparenchymal hematoma unchanged in size measuring 6 7 cm in largest dimension  New irregular hyperdense focus at the left cerebellum likely to represent small amount of subarachnoid hemorrhage versus a new intraparenchymal hemorrhage  Workstation performed: [id number]     MRI brain w wo contrast    Result Date: 4/3/2022  Impression: 1   2 left cerebellar hemorrhagic metastases and probable 2 adjacent left parietal hemorrhagic metastasis, the more anterior, larger one having parenchymal hematoma and local edema and mass effect, correlating to the hemorrhage on the prior head CT  I personally discussed this study with Dr Diane Bonner on 4/3/2022 at 2:19 PM  Workstation performed: [id number]     CT chest abdomen pelvis w contrast    Result Date: 4/4/2022  Impression: No findings of metastatic disease in the chest abdomen pelvis  No acute compression collapse of the vertebra seen No lytic destructive lesion seen Emphysema Postsurgical changes from left upper lobectomy A linear density seen in the left mid to lower lung likely scarring  Routine surveillance can be continued Workstation performed: [id number]     CT stroke alert brain    Result Date: 4/2/2022  Impression: Left parietal 4 6 cm intraparenchymal hemorrhage with surrounding cerebral edema   No midline shift  I personally discussed this study with neurologist on 4/2/2022 at 7:45 PM  Workstation performed: [id number]     CTA stroke alert (head/neck)    Result Date: 4/2/2022  Impression: Severe right vertebral artery stenosis at the origin  Severe critical near occlusive stenosis right proximal vertebral artery near its origin spanning approximately 2 2 cm in cranial caudal dimension  Approximately 70-75% right proximal cervical ICA plaque stenosis  I personally discussed this study with neurologist on 4/2/2022 at 7:50 PM   Workstation performed: [id number]       EKG, Pathology, and Other Studies: I have personally reviewed pertinent reports        VTE Pharmacologic Prophylaxis: Enoxaparin (Lovenox)    VTE Mechanical Prophylaxis: sequential compression device

## 2022-04-10 NOTE — ASSESSMENT & PLAN NOTE
· s/p DAVID [place] in 12[place] [facility], currently receiving adjuvant immunotherapy and has completed 2 cycles of atezolizumab  · New mets to brain, see plan above in primary problem   · Medical Oncology  Consult appreciated, patient known to Dr Leonides Bañuelos locally  · Holding immunotherapy  · May need radiation  · Palliative following

## 2022-04-10 NOTE — ASSESSMENT & PLAN NOTE
· Noted to have worsening while receiving immunotherapy, did not complete 3rd cycle and required high dose prednisone, was on taper on admission  · Now on decadron as above

## 2022-04-10 NOTE — ASSESSMENT & PLAN NOTE
· Patient presented with new onset apraxias and aphasia  Found to have a left parietal brain mass with vasogenic edema, 2 additional left cerebellar masses    Patient has a known history of small-cell lung cancer (dx 4/2021) status post chemotherapy/surgery/radiation/immunotherapy at [facility], [facility]  · CT chest abdomen pelvis without any evidence of metastatic disease per the results report   · Neurology consult appreciated - no additional Neurology recommendations  · Neurosurgery following, I discussed with them today, input appreciated  · S/p image guided left parietal craniotomy for tumor resection (Left) on 4/7/22 by Dr Mallorie Euceda  · Decadron wean per Neurosurgery recs  · Keppra 500mg Q12hr for 7 days post-op per Neurosurgery  · DVT ppx with SC Lovenox per Neurosurgery

## 2022-04-10 NOTE — ASSESSMENT & PLAN NOTE
· Platelets 195 this morning,   · S/p transfusion 1u platelets 4/8  · Platelts must stay above 100,000 post operatively   Transfuse as necessary

## 2022-04-10 NOTE — CASE MANAGEMENT
Case Management Discharge Planning Note    Patient name Chong Zapata  Location [facility] 524-96 MRN [id number]  : 1955 Date 4/10/2022       Current Admission Date: 2022  Current Admission Diagnosis:Left parietal hemorrhagic tumor   Patient Active Problem List    Diagnosis Date Noted    Thrombocytopenia ([facility] ) 2022    Goals of care, counseling/discussion 2022    Hypothyroidism 2022    Left parietal hemorrhagic tumor 2022    Platelets decreased ([address] ) 02/15/2022    Psoriasis 02/15/2022    Stage 3a chronic kidney disease ([address] ) 2022    Labyrinthitis of both ears 2022    Proctocolitis 2021    Pericardial effusion 2021    Constipation 2021    Left non-suppurative otitis media 2021    Bilateral hearing loss due to cerumen impaction 2021    Chronic back pain 2021    Former cigarette smoker 2021    History of gout 2021    Hypertension 2021    Cancer of upper lobe of left lung ([facility] ) 2021    Drug-induced neutropenia ([facility] ) 07/15/2021    Adenocarcinoma, lung, left ([facility] ) 2021    Encounter for central line care 2021    Hyperglycemia 2021    Cigarette nicotine dependence in remission 2021    Bilateral hearing loss 2020    Mixed hyperlipidemia 2019    Renal cyst, right 2018    Lumbar stenosis 2018    Glaucoma 2018    JUNAID (obstructive sleep apnea) 2018    Spinal stenosis of lumbar region with neurogenic claudication 2018    Acute idiopathic gout of left foot 2017    Depression with anxiety 2017    Essential hypertension 2017    Hypertriglyceridemia 2017    Lumbago with sciatica, left side 2017    Back problem 2017      LOS (days): 8  Geometric Mean LOS (GMLOS) (days): 6 60  Days to GMLOS:-1 2     OBJECTIVE:  Risk of Unplanned Readmission Score: 23     Current admission status: Inpatient   Preferred Pharmacy:   [facility] [address] [facility], [address]   [place] [address] [address]  Phone: [phone] Fax: [phone]    Primary Care Provider: Mikey Nova DO    Primary Insurance: MEDICARE  Secondary Insurance: AARP    DISCHARGE DETAILS:          Comments - Freedom of Choice: CM provided pt and spouse with OP resources for PT/OT

## 2022-04-10 NOTE — PROGRESS NOTES
[address] [facility]  Progress Note - Neil Guillaume 1955, 74 y o  male MRN: [id number]  Unit/Bed#: [facility] 319-10 Encounter: [id number]  Primary Care Provider: Candace Monahan DO   Date and time admitted to hospital: 4/2/2022  9:42 PM    * Left parietal hemorrhagic tumor  Assessment & Plan  · Patient presented with new onset apraxias and aphasia  Found to have a left parietal brain mass with vasogenic edema, 2 additional left cerebellar masses  Patient has a known history of small-cell lung cancer (dx 4/2021) status post chemotherapy/surgery/radiation/immunotherapy at [facility]  · CT chest abdomen pelvis without any evidence of metastatic disease per the results report   · Neurology consult appreciated - no additional Neurology recommendations  · Neurosurgery following, I discussed with them today, input appreciated  · S/p image guided left parietal craniotomy for tumor resection (Left) on 4/7/22 by Dr Roderick Garcia  · Decadron wean per Neurosurgery recs  · Keppra 500mg Q12hr for 7 days post-op per Neurosurgery  · DVT ppx with SC Lovenox per Neurosurgery    Adenocarcinoma, lung, left ([facility] [address] )  Assessment & Plan  · s/p DAVID lobectomy in 12/2021 at [facility], currently receiving adjuvant immunotherapy and has completed 2 cycles of atezolizumab  · New mets to brain, see plan above in primary problem   · Medical Oncology  Consult appreciated, patient known to Dr Vy Easley locally  · Holding immunotherapy  · May need radiation  · Palliative following     Thrombocytopenia ([facility] [address] )  Assessment & Plan  · Appears somewhat chronic dating back   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  · DVT ppx with SC Lovenox per Neurosurgery  · S/p platelets transfusion 4/8/22   · No specific treatment required at this time   · Monitor CBC     Goals of care, counseling/discussion  Assessment & Plan  · Palliative care was consulted in the setting of lung cancer with Mets to brain  · At this time, patient wishes for ongoing medical management without limits, is hopeful to continue receiving disease directed therapies with goal of maintaining independence and prolonging survival, however he is clear he would not want artificial life prolonging cares if in a persistent unresponsive state  · Appreciate palliative input ongoing    Essential hypertension  Assessment & Plan  · SBP goal less than 160  · Continue home dose Norvasc 10 mg daily    Psoriasis  Assessment & Plan  · Noted to have worsening while receiving immunotherapy, did not complete 3rd cycle and required high dose prednisone, was on taper on admission  · Now on decadron as above      Mixed hyperlipidemia  Assessment & Plan  · Continue statin    Hypothyroidism  Assessment & Plan  · Patient felt to have drug induced thyroiditis with hypothyroidism  Synthroid increased from home dose of 25 mcg daily to 37 5 mcg daily by endocrinology  · Will need repeat TSH/free T4 in about 4-6 weeks    Depression with anxiety  Assessment & Plan  · Continue home celexa and doxepin         VTE Pharmacologic Prophylaxis: VTE Score: 9 High Risk (Score >/= 5) - Pharmacological DVT Prophylaxis Ordered: enoxaparin (Lovenox)  Sequential Compression Devices Ordered  per Neurosurgery     Patient Centered Rounds: I performed bedside rounds with nursing staff today  d/w JUAN J Perea  Discussions with Specialists or Other Care Team Provider: Neurosurgery AP     Education and Discussions with Family / Patient: Patient declined call to   Time Spent for Care: 30 minutes  More than 50% of total time spent on counseling and coordination of care as described above  Current Length of Stay: 8 day(s)  Current Patient Status: Inpatient   Certification Statement: The patient will continue to require additional inpatient hospital stay due to pending neurosurgery clearance and BM  Discharge Plan: Anticipate discharge tomorrow to home      Code Status: Level 1 - Full Code    Subjective: Mr Bam Wray reports feeling better today  He reports a tolerable HA  He reports no BM in 3 days and reports he forgot to mention prior that he was using miralax daily at home PTA  Denies CP, SOB, abdominal pain, N/V  Objective:     Vitals:   Temp (24hrs), Av 1 °F (36 7 °C), Min:97 9 °F (36 6 °C), Max:98 2 °F (36 8 °C)    Temp:  [97 9 °F (36 6 °C)-98 2 °F (36 8 °C)] 98 °F (36 7 °C)  HR:  [66-83] 75  BP: (139-146)/(83-93) 144/88  SpO2:  [95 %-97 %] 97 %  Body mass index is 25 78 kg/m²  Input and Output Summary (last 24 hours): Intake/Output Summary (Last 24 hours) at 4/10/2022 0908  Last data filed at 4/10/2022 7883  Gross per 24 hour   Intake --   Output 2150 ml   Net -2150 ml       Physical Exam:   Physical Exam  Vitals and nursing note reviewed  Constitutional:       Comments: Patient seen sitting in bed comfortably resting watching TV, NAD   Cardiovascular:      Rate and Rhythm: Normal rate and regular rhythm  Pulmonary:      Effort: Pulmonary effort is normal  No respiratory distress  Breath sounds: Normal breath sounds  Abdominal:      General: Bowel sounds are normal       Palpations: Abdomen is soft  Tenderness: There is no abdominal tenderness  Musculoskeletal:      Right lower leg: No edema  Left lower leg: No edema  Skin:     General: Skin is warm  Neurological:      Mental Status: He is alert and oriented to person, place, and time  Psychiatric:         Mood and Affect: Mood normal          Behavior: Behavior normal           Additional Data:     Labs:  Results from last 7 days   Lab Units 04/10/22  0609 22  0456 22  0539   WBC Thousand/uL 7 79   < > 6 78   HEMOGLOBIN g/dL 13 1   < > 13 5   HEMATOCRIT % 37 1   < > 41 6   PLATELETS Thousands/uL 108*   < > 111*   LYMPHO PCT %  --   --  1*   MONO PCT %  --   --  1*   EOS PCT %  --   --  0    < > = values in this interval not displayed       Results from last 7 days   Lab Units 04/10/22  0609 04/05/22  0526 04/04/22  0521   SODIUM mmol/L 136   < > 139   POTASSIUM mmol/L 4 1   < > 4 0   CHLORIDE mmol/L 104   < > 108   CO2 mmol/L 27   < > 22   BUN mg/dL 30*   < > 34*   CREATININE mg/dL 1 04   < > 1 19   ANION GAP mmol/L 5   < > 9   CALCIUM mg/dL 8 6   < > 9 1   ALBUMIN g/dL  --   --  3 1*   TOTAL BILIRUBIN mg/dL  --   --  0 38   ALK PHOS U/L  --   --  53   ALT U/L  --   --  35   AST U/L  --   --  14   GLUCOSE RANDOM mg/dL 104   < > 135    < > = values in this interval not displayed  Results from last 7 days   Lab Units 04/08/22  0456   INR  1 12     Results from last 7 days   Lab Units 04/07/22  1250   POC GLUCOSE mg/dl 138               Lines/Drains:  Invasive Devices  Report    Central Venous Catheter Line            Port A Cath 06/17/21 Right Chest 296 days          Peripheral Intravenous Line            Peripheral IV 04/07/22 Right Forearm 3 days    Peripheral IV 04/07/22 Left Arm 2 days                Central Line:  Goal for removal: port a cath             Imaging: No pertinent imaging reviewed      Recent Cultures (last 7 days):         Last 24 Hours Medication List:   Current Facility-Administered Medications   Medication Dose Route Frequency Provider Last Rate    acetaminophen  975 mg Oral [facility] Kim Aiken MD      amLODIPine  10 mg Oral Daily Kim Aiken MD      citalopram  10 mg Oral Daily Kim Aiken MD      dexamethasone  4 mg Oral [facility] Kim Aiken MD      Followed by   Rae Hernandez ON 4/11/2022] dexamethasone  2 mg Oral Q6H [facility] Kim Aiken MD      Followed by   Rae Hernandez ON 4/14/2022] dexamethasone  2 mg Oral Q8H Raymundo Rm MD      Followed by   Rae Hernandez ON 4/15/2022] dexamethasone  2 mg Oral Q12H Raymundo Rm MD      Followed by   Rae Hernandez ON 4/18/2022] dexamethasone  2 mg Oral Q24H Raymundo Rm MD      docusate sodium  100 mg Oral BID Kim Aiken MD      doxepin  10 mg Oral HS iKm Aiken MD      enoxaparin  40 mg Subcutaneous Q24H Pivovarthor Rm MD      HYDROmorphone  0 5 mg Intravenous Q2H PRN Tanja Mullins PA-C      Labetalol HCl  10 mg Intravenous Q4H PRN Tawnya Augustin, MD      levETIRAcetam  500 mg Oral Q12H [address] Tawnya Charli, MD      levothyroxine  37 5 mcg Oral Early Morning Tawnyanelly Augustin, MD      meclizine  12 5 mg Oral Q8H PRN Tawnya Regulus, MD      melatonin  6 mg Oral HS Tawnya Regulus, MD      methocarbamol  500 mg Oral Q6H [address] Perris Charli, MD      ondansetron  4 mg Intravenous Q4H PRN Tawnya Charli, MD      oxyCODONE  10 mg Oral Q4H PRN Tawnya Regulus, MD      oxyCODONE  5 mg Oral Q4H PRN Tawnya Regul, MD      polyethylene glycol  17 g Oral Daily Roe Collins PA-C      pravastatin  80 mg Oral Daily With Stevie Aguilar MD      senna  8 8 mg Oral Daily Tawnya Augustin MD          Today, Patient Was Seen By: Jere Trotter PA-C    **Please Note: This note may have been constructed using a voice recognition system  **

## 2022-04-10 NOTE — ASSESSMENT & PLAN NOTE
POD 3 Day Post-Op Procedure(s): Image guided left parietal craniotomy for tumor resection  · Left parietal brain mass with vasogenic edema, 2 additional left cerebellar masses  · H/o small cell lung adenocarcinoma (dx 4/2021), [place] [facility] [place]  · Patient denies h/o [facility]  · Patient does not want to transfer back to [place] - states not up to traveling  · Patient presented with new onset apraxia and aphasia    Imaging:  · MRI brain w/wo, 4/8/21: Status post treatment of left parietal metastatic lesion with postop change as described above  This is the initial postoperative examination and will serve as baseline for follow-up exams  No significant residual tumor noted within the resection cavity  Metastatic lesions within the left cerebellar hemisphere are unchanged  Plan:  · Decadron wean q48h to 2mg BID  · Will continue 2mg BID indefinitely at the discretion of medical oncology  · Keppra 500mg x7 days post op  · 1 JOSE drain removed 4/8  · STAT CTH with decline in GCS greater than 2 points in 1 hour  · Med onc / rad onc / palliative care following  · Frequent neuro checks  · SBP < 160  · Avoid AC/AP  · PT/OT evaluation  · Medical management per primary team  · Platelets 457 this morning,   · S/p transfusion 1u platelets 4/8  · Platelts must stay above 100,000 post operatively  Transfuse as necessary   · DVT ppx: SCDs, lovenox  · Patient pending BM prior to clearance for DC  Spoke with SLIM and ordered miralax- patient reports daily use at home prior to hospitalization  · Patient can shower starting tomorrow  Neurosurgery will sign off and follow from the periphery at this time  Patient cleared for discharge pending BM  Plan to follow up in 2 weeks for pathology review and incision check  Please call questions or concerns

## 2022-04-11 ENCOUNTER — TELEPHONE (OUTPATIENT)
Dept: NEUROSURGERY | Facility: CLINIC | Age: 67
End: 2022-04-11

## 2022-04-11 ENCOUNTER — TELEPHONE (OUTPATIENT)
Dept: HEMATOLOGY ONCOLOGY | Facility: CLINIC | Age: 67
End: 2022-04-11

## 2022-04-11 VITALS
OXYGEN SATURATION: 98 % | SYSTOLIC BLOOD PRESSURE: 142 MMHG | TEMPERATURE: 98.2 F | RESPIRATION RATE: 14 BRPM | HEIGHT: 70 IN | WEIGHT: 179.68 LBS | HEART RATE: 75 BPM | BODY MASS INDEX: 25.72 KG/M2 | DIASTOLIC BLOOD PRESSURE: 87 MMHG

## 2022-04-11 PROCEDURE — NC001 PR NO CHARGE: Performed by: PHYSICIAN ASSISTANT

## 2022-04-11 PROCEDURE — 92523 SPEECH SOUND LANG COMPREHEN: CPT

## 2022-04-11 PROCEDURE — 99239 HOSP IP/OBS DSCHRG MGMT >30: CPT | Performed by: PHYSICIAN ASSISTANT

## 2022-04-11 RX ORDER — BISACODYL 10 MG
10 SUPPOSITORY, RECTAL RECTAL DAILY PRN
Status: DISCONTINUED | OUTPATIENT
Start: 2022-04-11 | End: 2022-04-11 | Stop reason: HOSPADM

## 2022-04-11 RX ORDER — DEXAMETHASONE 2 MG/1
TABLET ORAL
Qty: 66 TABLET | Refills: 0 | Status: SHIPPED | OUTPATIENT
Start: 2022-04-11 | End: 2022-05-13 | Stop reason: SDUPTHER

## 2022-04-11 RX ORDER — LEVOTHYROXINE SODIUM 0.07 MG/1
37.5 TABLET ORAL
Qty: 15 TABLET | Refills: 0 | Status: SHIPPED | OUTPATIENT
Start: 2022-04-12 | End: 2022-06-03 | Stop reason: SDUPTHER

## 2022-04-11 RX ORDER — LEVETIRACETAM 500 MG/1
500 TABLET ORAL EVERY 12 HOURS SCHEDULED
Qty: 6 TABLET | Refills: 0 | Status: ON HOLD | OUTPATIENT
Start: 2022-04-11 | End: 2022-06-17 | Stop reason: CLARIF

## 2022-04-11 RX ADMIN — METHOCARBAMOL 500 MG: 500 TABLET ORAL at 05:44

## 2022-04-11 RX ADMIN — ENOXAPARIN SODIUM 40 MG: 40 INJECTION SUBCUTANEOUS at 09:13

## 2022-04-11 RX ADMIN — POLYETHYLENE GLYCOL 3350 17 G: 17 POWDER, FOR SOLUTION ORAL at 09:13

## 2022-04-11 RX ADMIN — DOCUSATE SODIUM 100 MG: 100 CAPSULE, LIQUID FILLED ORAL at 09:13

## 2022-04-11 RX ADMIN — DEXAMETHASONE 4 MG: 4 TABLET ORAL at 05:44

## 2022-04-11 RX ADMIN — SENNOSIDES 8.8 MG: 8.8 SYRUP ORAL at 09:14

## 2022-04-11 RX ADMIN — CITALOPRAM HYDROBROMIDE 10 MG: 10 TABLET ORAL at 09:13

## 2022-04-11 RX ADMIN — LEVOTHYROXINE SODIUM 37.5 MCG: 75 TABLET ORAL at 05:43

## 2022-04-11 RX ADMIN — BISACODYL 10 MG: 10 SUPPOSITORY RECTAL at 13:54

## 2022-04-11 RX ADMIN — METHOCARBAMOL 500 MG: 500 TABLET ORAL at 13:23

## 2022-04-11 RX ADMIN — AMLODIPINE BESYLATE 10 MG: 10 TABLET ORAL at 09:13

## 2022-04-11 RX ADMIN — DEXAMETHASONE 4 MG: 4 TABLET ORAL at 13:23

## 2022-04-11 RX ADMIN — LEVETIRACETAM 500 MG: 500 TABLET, FILM COATED ORAL at 09:13

## 2022-04-11 NOTE — PROGRESS NOTES
[address] [facility]  Progress Note - Christine  1955, 74 y o  male MRN: [id number]  Unit/Bed#: [facility] 042-02 Encounter: [id number]  Primary Care Provider: Tex Murray DO   Date and time admitted to hospital: 4/2/2022  9:42 PM    * Left parietal hemorrhagic tumor  Assessment & Plan  · Patient presented with new onset apraxias and aphasia  Found to have a left parietal brain mass with vasogenic edema, 2 additional left cerebellar masses  Patient has a known history of small-cell lung cancer (dx 4/2021) status post chemotherapy/surgery/radiation/immunotherapy at [facility]  · CT chest abdomen pelvis without any evidence of metastatic disease per the results report   · Neurology consult appreciated - no additional Neurology recommendations  · Neurosurgery following, input appreciated  · S/p image guided left parietal craniotomy for tumor resection (Left) on 4/7/22 by Dr Kel Oreilly  · Decadron wean per Neurosurgery recs  · Keppra 500mg Q12hr for 7 days post-op per Neurosurgery  · DVT ppx with SC Lovenox per Neurosurgery    Adenocarcinoma, lung, left ([facility] [address] )  Assessment & Plan  · s/p DAVID [place] [facility] [place], currently receiving adjuvant immunotherapy and has completed 2 cycles of atezolizumab  · New mets to brain, see plan above in primary problem   · Medical Oncology  Consult appreciated, patient known to Dr Leonides Bañuelos locally  · Holding immunotherapy  · May need radiation  · Palliative following     Thrombocytopenia ([address] )  Assessment & Plan  · Appears somewhat chronic dating back   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  · DVT ppx with SC Lovenox per Neurosurgery  · S/p platelets transfusion 4/8/22   · No specific treatment required at this time   · AM CBC pending  · CBC in AM    Goals of care, counseling/discussion  Assessment & Plan  · Palliative care was consulted in the setting of lung cancer with Mets to brain  · At this time, patient wishes for ongoing medical management without limits, is hopeful to continue receiving disease directed therapies with goal of maintaining independence and prolonging survival, however he is clear he would not want artificial life prolonging cares if in a persistent unresponsive state  · Appreciate palliative input ongoing    Essential hypertension  Assessment & Plan  · SBP goal less than 160  · Continue home dose Norvasc 10 mg daily    Psoriasis  Assessment & Plan  · Noted to have worsening while receiving immunotherapy, did not complete 3rd cycle and required high dose prednisone, was on taper on admission  · Now on decadron as above      Mixed hyperlipidemia  Assessment & Plan  · Continue statin    Hypothyroidism  Assessment & Plan  · Patient felt to have drug induced thyroiditis with hypothyroidism  Synthroid increased from home dose of 25 mcg daily to 37 5 mcg daily by endocrinology  · Will need repeat TSH/free T4 in about 4-6 weeks    Depression with anxiety  Assessment & Plan  · Continue home celexa and doxepin     Constipation  Assessment & Plan  · Patient reports he was using Miralax daily at home but forgot to mention when he came in  · Now on colace, Senna and Miralax  Add PRN suppository        VTE Pharmacologic Prophylaxis: VTE Score: 9 High Risk (Score >/= 5) - Pharmacological DVT Prophylaxis Ordered: enoxaparin (Lovenox)  Sequential Compression Devices Ordered  Patient Centered Rounds: I performed bedside rounds with nursing staff today  Discussions with Specialists or Other Care Team Provider:     Education and Discussions with Family / Patient: Patient declined call to   Time Spent for Care: 20 minutes  More than 50% of total time spent on counseling and coordination of care as described above      Current Length of Stay: 9 day(s)  Current Patient Status: Inpatient   Certification Statement: The patient will continue to require additional inpatient hospital stay due to pending BM  Discharge Plan: Anticipate discharge tomorrow to home  Code Status: Level 1 - Full Code    Subjective:   Mr Win Grant reports his CASTILLO has resolved  He reports still no BM  He denies CP, SOB, abdominal pain, nausea or vomiting  He denies complaint     Objective:     Vitals:   Temp (24hrs), Av 5 °F (36 9 °C), Min:98 2 °F (36 8 °C), Max:98 7 °F (37 1 °C)    Temp:  [98 2 °F (36 8 °C)-98 7 °F (37 1 °C)] 98 2 °F (36 8 °C)  HR:  [75-91] 75  Resp:  [14] 14  BP: (129-161)/(83-94) 142/87  SpO2:  [95 %-98 %] 98 %  Body mass index is 25 78 kg/m²  Input and Output Summary (last 24 hours): Intake/Output Summary (Last 24 hours) at 2022 0841  Last data filed at 4/10/2022 2201  Gross per 24 hour   Intake 480 ml   Output 1150 ml   Net -670 ml       Physical Exam:   Physical Exam  Vitals and nursing note reviewed  Constitutional:       Comments: Patient seen sitting in bed comfortably resting watching TV, NAD   Cardiovascular:      Rate and Rhythm: Normal rate and regular rhythm  Pulmonary:      Effort: Pulmonary effort is normal  No respiratory distress  Breath sounds: Normal breath sounds  Abdominal:      General: Bowel sounds are normal  There is distension  Palpations: Abdomen is soft  Tenderness: There is no abdominal tenderness  Musculoskeletal:      Right lower leg: No edema  Left lower leg: No edema  Skin:     General: Skin is warm  Neurological:      Mental Status: He is alert and oriented to person, place, and time  Psychiatric:         Mood and Affect: Mood normal          Behavior: Behavior normal           Additional Data:     Labs:  Results from last 7 days   Lab Units 04/10/22  0609 22  0456 22  0539   WBC Thousand/uL 7 79   < > 6 78   HEMOGLOBIN g/dL 13 1   < > 13 5   HEMATOCRIT % 37 1   < > 41 6   PLATELETS Thousands/uL 108*   < > 111*   LYMPHO PCT %  --   --  1*   MONO PCT %  --   --  1*   EOS PCT %  --   --  0    < > = values in this interval not displayed       Results from last 7 days   Lab Units 04/10/22  0609   SODIUM mmol/L 136   POTASSIUM mmol/L 4 1   CHLORIDE mmol/L 104   CO2 mmol/L 27   BUN mg/dL 30*   CREATININE mg/dL 1 04   ANION GAP mmol/L 5   CALCIUM mg/dL 8 6   GLUCOSE RANDOM mg/dL 104     Results from last 7 days   Lab Units 04/08/22  0456   INR  1 12     Results from last 7 days   Lab Units 04/07/22  1250   POC GLUCOSE mg/dl 138               Lines/Drains:  Invasive Devices  Report    Central Venous Catheter Line            Port A Cath 06/17/21 Right Chest 297 days          Peripheral Intravenous Line            Peripheral IV 04/07/22 Right Forearm 4 days    Peripheral IV 04/07/22 Left Arm 3 days                         Imaging: No pertinent imaging reviewed      Recent Cultures (last 7 days):         Last 24 Hours Medication List:   Current Facility-Administered Medications   Medication Dose Route Frequency Provider Last Rate    acetaminophen  975 mg Oral [facility] Heath Matta MD      amLODIPine  10 mg Oral Daily Heath Matta MD      citalopram  10 mg Oral Daily Heath Matta MD      dexamethasone  4 mg Oral [facility] Heath Matta MD      Followed by   [facility] dexamethasone  2 mg Oral Q6H Raymundo Rm MD      Followed by   Han Duke ON 4/14/2022] dexamethasone  2 mg Oral Q8H Raymundo Rm MD      Followed by   Han Duke ON 4/15/2022] dexamethasone  2 mg Oral Q12H Raymundo Rm MD      Followed by   Han Duke ON 4/18/2022] dexamethasone  2 mg Oral Q24H [facility] Heath Matta MD      docusate sodium  100 mg Oral BID Heath Matta MD      doxepin  10 mg Oral HS Heath Matta MD      enoxaparin  40 mg Subcutaneous Q24H Raymundo Rm MD      HYDROmorphone  0 5 mg Intravenous Q2H PRN Breanna Means PA-C      Labetalol HCl  10 mg Intravenous Q4H PRN Heath Matta MD      levETIRAcetam  500 mg Oral Q12H Raymundo Rm MD      levothyroxine  37 5 mcg Oral Early Morning Heath Matta MD      meclizine  12 5 mg Oral Q8H PRN Heath Matta MD      melatonin  6 mg Oral HS Suman Saul MD      methocarbamol  500 mg Oral [facility] Saul MD      ondansetron  4 mg Intravenous Q4H PRN Sumna Saul MD      oxyCODONE  10 mg Oral Q4H PRN Suman Saul MD      oxyCODONE  5 mg Oral Q4H PRN Suman Saul MD      polyethylene glycol  17 g Oral Daily Lorenzo Albarran PA-C      pravastatin  80 mg Oral Daily With Mk Munoz MD      senna  8 8 mg Oral BID Peter Ballesteros PA-C          Today, Patient Was Seen By: Peter Ballesteros PA-C    **Please Note: This note may have been constructed using a voice recognition system  **

## 2022-04-11 NOTE — CASE MANAGEMENT
Case Management Discharge Planning Note    Patient name Rui Wood  [facility] [id number]  : 1955 Date 2022       Current Admission Date: 2022  Current Admission Diagnosis:Left parietal hemorrhagic tumor   Patient Active Problem List    Diagnosis Date Noted    Thrombocytopenia ([facility] [address] ) 2022    Goals of care, counseling/discussion 2022    Hypothyroidism 2022    Left parietal hemorrhagic tumor 2022    Platelets decreased ([address] ) 02/15/2022    Psoriasis 02/15/2022    Stage 3a chronic kidney disease ([address] ) 2022    Labyrinthitis of both ears 2022    Proctocolitis 2021    Pericardial effusion 2021    Constipation 2021    Left non-suppurative otitis media 2021    Bilateral hearing loss due to cerumen impaction 2021    Chronic back pain 2021    Former cigarette smoker 2021    History of gout 2021    Hypertension 2021    Cancer of upper lobe of left lung ([facility] [address] ) 2021    Drug-induced neutropenia ([facility] [address] ) 07/15/2021    Adenocarcinoma, lung, left ([facility] [address] ) 2021    Encounter for central line care 2021    Hyperglycemia 2021    Cigarette nicotine dependence in remission 2021    Bilateral hearing loss 2020    Mixed hyperlipidemia 2019    Renal cyst, right 2018    Lumbar stenosis 2018    Glaucoma 2018    JUNAID (obstructive sleep apnea) 2018    Spinal stenosis of lumbar region with neurogenic claudication 2018    Acute idiopathic gout of left foot 2017    Depression with anxiety 2017    Essential hypertension 2017    Hypertriglyceridemia 2017    Lumbago with sciatica, left side 2017    Back problem 2017      LOS (days): 9  Geometric Mean LOS (GMLOS) (days): 6 60  Days to GMLOS:-2 1     OBJECTIVE:  Risk of Unplanned Readmission Score: 22         Current admission status: Inpatient   Preferred Pharmacy:   [facility] [id number] [facility], [address]   [address]   [facility] [address]  Phone: [phone] Fax: [phone]    Primary Care Provider: Robinson Brantley DO    Primary Insurance: MEDICARE  Secondary Insurance: AARP    DISCHARGE DETAILS:    Discharge planning discussed with[de-identified] patient and spouse           Were Treatment Team discharge recommendations reviewed with patient/caregiver?: Yes  Did patient/caregiver verbalize understanding of patient care needs?: Yes  Were patient/caregiver advised of the risks associated with not following Treatment Team discharge recommendations?: Yes    Contacts  Patient Contacts: Eber Knapp  Relationship to Patient[de-identified] Family (spouse)  Contact Method: Phone  Phone Number: [phone]  Reason/Outcome: Emergency Contact,Continuity of [address]         Is the patient interested in [facility] at discharge?: No    DME Referral Provided  Referral made for DME?: No         Would you like to participate in our [address] service program?  : No - Declined    Treatment Team Recommendation: Home,Other (OP PT/OT)  Discharge Destination Plan[de-identified] Home  Transport at Discharge : Family           ETA of Transport (Date): 04/11/22  ETA of Transport (Time): 1500        Accompanied by: Family member     IMM Given (Date):: 04/11/22  IMM Given to[de-identified] Patient     Additional Comments: Family was present in the room efra pt advised of d/c

## 2022-04-11 NOTE — ASSESSMENT & PLAN NOTE
· Patient reports he was using Miralax daily at home but forgot to mention when he came in  · Now on colace, Senna and Miralax   Add PRN suppository

## 2022-04-11 NOTE — PLAN OF CARE
Problem: MOBILITY - ADULT  Goal: Maintain or return to baseline ADL function  Description: INTERVENTIONS:  -  Assess patient's ability to carry out ADLs; assess patient's baseline for ADL function and identify physical deficits which impact ability to perform ADLs (bathing, care of mouth/teeth, toileting, grooming, dressing, etc )  - Assess/evaluate cause of self-care deficits   - Assess range of motion  - Assess patient's mobility; develop plan if impaired  - Assess patient's need for assistive devices and provide as appropriate  - Encourage maximum independence but intervene and supervise when necessary  - Involve family in performance of ADLs  - Assess for home care needs following discharge   - Consider OT consult to assist with ADL evaluation and planning for discharge  - Provide patient education as appropriate  Outcome: Progressing  Goal: Maintains/Returns to pre admission functional level  Description: INTERVENTIONS:  - Perform BMAT or MOVE assessment daily    - Set and communicate daily mobility goal to care team and patient/family/caregiver  - Collaborate with rehabilitation services on mobility goals if consulted  - Ambulate patient 2 times a day  - Out of bed for meals 3 times a day  - Out of bed for toileting  - Record patient progress and toleration of activity level   Outcome: Progressing     Problem: Neurological Deficit  Goal: Neurological status is stable or improving  Description: Interventions:  - Monitor and assess patient's level of consciousness, motor function, sensory function, and level of assistance needed for ADLs  - Monitor and report changes from baseline  Collaborate with interdisciplinary team to initiate plan and implement interventions as ordered  - Provide and maintain a safe environment  - Consider seizure precautions  - Consider fall precautions  - Consider aspiration precautions  - Consider bleeding precautions  Outcome: Progressing     Problem:  Activity Intolerance/Impaired Mobility  Goal: Mobility/activity is maintained at optimum level for patient  Description: Interventions:  - Assess and monitor patient  barriers to mobility and need for assistive/adaptive devices  - Assess patient's emotional response to limitations  - Collaborate with interdisciplinary team and initiate plans and interventions as ordered  - Encourage independent activity per ability   - Maintain proper body alignment  - Perform active/passive rom as tolerated/ordered  - Plan activities to conserve energy   - Turn patient as appropriate  Outcome: Progressing     Problem: Communication Impairment  Goal: Ability to express needs and understand communication  Description: Assess patient's communication skills and ability to understand information  Patient will demonstrate use of effective communication techniques, alternative methods of communication and understanding even if not able to speak  - Encourage communication and provide alternate methods of communication as needed  - Collaborate with case management/ for discharge needs  - Include patient/family/caregiver in decisions related to communication  Outcome: Progressing     Problem: Potential for Aspiration  Goal: Non-ventilated patient's risk of aspiration is minimized  Description: Assess and monitor vital signs, respiratory status, and labs (WBC)  Monitor for signs of aspiration (tachypnea, cough, rales, wheezing, cyanosis, fever)  - Assess and monitor patient's ability to swallow  - Place patient up in chair to eat if possible  - HOB up at 90 degrees to eat if unable to get patient up into chair   - Supervise patient during oral intake  - Instruct patient/ family to take small bites  - Instruct patient/ family to take small single sips when taking liquids    - Follow patient-specific strategies generated by speech pathologist   Outcome: Progressing     Problem: Nutrition  Goal: Nutrition/Hydration status is improving  Description: Monitor and assess patient's nutrition/hydration status for malnutrition (ex- brittle hair, bruises, dry skin, pale skin and conjunctiva, muscle wasting, smooth red tongue, and disorientation)  Collaborate with interdisciplinary team and initiate plan and interventions as ordered  Monitor patient's weight and dietary intake as ordered or per policy  Utilize nutrition screening tool and intervene per policy  Determine patient's food preferences and provide high-protein, high-caloric foods as appropriate  - Assist patient with eating   - Allow adequate time for meals   - Encourage patient to take dietary supplement as ordered  - Collaborate with clinical nutritionist   - Include patient/family/caregiver in decisions related to nutrition    Outcome: Progressing     Problem: PAIN - ADULT  Goal: Verbalizes/displays adequate comfort level or baseline comfort level  Description: Interventions:  - Encourage patient to monitor pain and request assistance  - Assess pain using appropriate pain scale  - Administer analgesics based on type and severity of pain and evaluate response  - Implement non-pharmacological measures as appropriate and evaluate response  - Consider cultural and social influences on pain and pain management  - Notify physician/advanced practitioner if interventions unsuccessful or patient reports new pain  Outcome: Progressing     Problem: INFECTION - ADULT  Goal: Absence or prevention of progression during hospitalization  Description: INTERVENTIONS:  - Assess and monitor for signs and symptoms of infection  - Monitor lab/diagnostic results  - Monitor all insertion sites, i e  indwelling lines, tubes, and drains  - Monitor endotracheal if appropriate and nasal secretions for changes in amount and color  - [place] appropriate cooling/warming therapies per order  - Administer medications as ordered  - Instruct and encourage patient and family to use good hand hygiene technique  - Identify and instruct in appropriate isolation precautions for identified infection/condition  Outcome: Progressing  Goal: Absence of fever/infection during neutropenic period  Description: INTERVENTIONS:  - Monitor WBC    Outcome: Progressing     Problem: SAFETY ADULT  Goal: Maintain or return to baseline ADL function  Description: INTERVENTIONS:  -  Assess patient's ability to carry out ADLs; assess patient's baseline for ADL function and identify physical deficits which impact ability to perform ADLs (bathing, care of mouth/teeth, toileting, grooming, dressing, etc )  - Assess/evaluate cause of self-care deficits   - Assess range of motion  - Assess patient's mobility; develop plan if impaired  - Assess patient's need for assistive devices and provide as appropriate  - Encourage maximum independence but intervene and supervise when necessary  - Involve family in performance of ADLs  - Assess for home care needs following discharge   - Consider OT consult to assist with ADL evaluation and planning for discharge  - Provide patient education as appropriate  Outcome: Progressing  Goal: Maintains/Returns to pre admission functional level  Description: INTERVENTIONS:  - Perform BMAT or MOVE assessment daily    - Set and communicate daily mobility goal to care team and patient/family/caregiver     - Collaborate with rehabilitation services on mobility goals if consulted  - Ambulate patient 2 times a day  - Out of bed for meals 3 times a day  - Out of bed for toileting  - Record patient progress and toleration of activity level   Outcome: Progressing  Goal: Patient will remain free of falls  Description: INTERVENTIONS:  - Educate patient/family on patient safety including physical limitations  - Instruct patient to call for assistance with activity   - Consult OT/PT to assist with strengthening/mobility   - Keep Call bell within reach  - Keep bed low and locked with side rails adjusted as appropriate  - Keep care items and personal belongings within reach  - Initiate and maintain comfort rounds  - Make Fall Risk Sign visible to staff  - Offer Toileting every 2 Hours, in advance of need  - Initiate/Maintain bed alarm  - Obtain necessary fall risk management equipment: alarms  - Apply yellow socks and bracelet for high fall risk patients  - Consider moving patient to room near nurses station  Outcome: Progressing     Problem: DISCHARGE PLANNING  Goal: Discharge to home or other facility with appropriate resources  Description: INTERVENTIONS:  - Identify barriers to discharge w/patient and caregiver  - Arrange for needed discharge resources and transportation as appropriate  - Identify discharge learning needs (meds, wound care, etc )  - Arrange for interpretive services to assist at discharge as needed  - Refer to Case Management Department for coordinating discharge planning if the patient needs post-hospital services based on physician/advanced practitioner order or complex needs related to functional status, cognitive ability, or social support system  Outcome: Progressing     Problem: Knowledge Deficit  Goal: Patient/family/caregiver demonstrates understanding of disease process, treatment plan, medications, and discharge instructions  Description: Complete learning assessment and assess knowledge base    Interventions:  - Provide teaching at level of understanding  - Provide teaching via preferred learning methods  Outcome: Progressing     Problem: Potential for Falls  Goal: Patient will remain free of falls  Description: INTERVENTIONS:  - Educate patient/family on patient safety including physical limitations  - Instruct patient to call for assistance with activity   - Consult OT/PT to assist with strengthening/mobility   - Keep Call bell within reach  - Keep bed low and locked with side rails adjusted as appropriate  - Keep care items and personal belongings within reach  - Initiate and maintain comfort rounds  - Make Fall Risk Sign visible to staff  - Apply yellow socks and bracelet for high fall risk patients  - Consider moving patient to room near nurses station  Outcome: Progressing     Problem: Prexisting or High Potential for Compromised Skin Integrity  Goal: Skin integrity is maintained or improved  Description: INTERVENTIONS:  - Identify patients at risk for skin breakdown  - Assess and monitor skin integrity  - Assess and monitor nutrition and hydration status  - Monitor labs   - Assess for incontinence   - Turn and reposition patient  - Assist with mobility/ambulation  - Relieve pressure over bony prominences  - Avoid friction and shearing  - Provide appropriate hygiene as needed including keeping skin clean and dry  - Evaluate need for skin moisturizer/barrier cream  - Collaborate with interdisciplinary team   - Patient/family teaching  - Consider wound care consult   Outcome: Progressing     Problem: Nutrition/Hydration-ADULT  Goal: Nutrient/Hydration intake appropriate for improving, restoring or maintaining nutritional needs  Description: Monitor and assess patient's nutrition/hydration status for malnutrition  Collaborate with interdisciplinary team and initiate plan and interventions as ordered  Monitor patient's weight and dietary intake as ordered or per policy  Utilize nutrition screening tool and intervene as necessary  Determine patient's food preferences and provide high-protein, high-caloric foods as appropriate       INTERVENTIONS:  - Monitor oral intake, urinary output, labs, and treatment plans  - Assess nutrition and hydration status and recommend course of action  - Evaluate amount of meals eaten  - Assist patient with eating if necessary   - Allow adequate time for meals  - Recommend/ encourage appropriate diets, oral nutritional supplements, and vitamin/mineral supplements  - Order, calculate, and assess calorie counts as needed  - Recommend, monitor, and adjust tube feedings and TPN/PPN based on assessed needs  - Assess need for intravenous fluids  - Provide specific nutrition/hydration education as appropriate  - Include patient/family/caregiver in decisions related to nutrition  Outcome: Progressing     Problem: COPING  Goal: Pt/Family able to verbalize concerns and demonstrate effective coping strategies  Description: INTERVENTIONS:  - Assist patient/family to identify coping skills, available support systems and cultural and spiritual values  - Provide emotional support, including active listening and acknowledgement of concerns of patient and caregivers  - Reduce environmental stimuli, as able  - Provide patient education  - Assess for spiritual pain/suffering and initiate spiritual care, including notification of Pastoral Care or [facility] based community as needed  - Assess effectiveness of coping strategies  Outcome: Progressing  Goal: Will report anxiety at manageable levels  Description: INTERVENTIONS:  - Administer medication as ordered  - Teach and encourage coping skills  - Provide emotional support  - Assess patient/family for anxiety and ability to cope  Outcome: Progressing     Problem: NEUROSENSORY - ADULT  Goal: Achieves stable or improved neurological status  Description: INTERVENTIONS  - Monitor and report changes in neurological status  - Monitor vital signs such as temperature, blood pressure, glucose, and any other labs ordered   - Initiate measures to prevent increased intracranial pressure  - Monitor for seizure activity and implement precautions if appropriate      Outcome: Progressing  Goal: Remains free of injury related to seizures activity  Description: INTERVENTIONS  - Maintain airway, patient safety  and administer oxygen as ordered  - Monitor patient for seizure activity, document and report duration and description of seizure to physician/advanced practitioner  - If seizure occurs,  ensure patient safety during seizure  - Reorient patient post seizure  - Seizure pads on all 4 side rails  - Instruct patient/family to notify RN of any seizure activity including if an aura is experienced  - Instruct patient/family to call for assistance with activity based on nursing assessment  - Administer anti-seizure medications if ordered    Outcome: Progressing  Goal: Achieves maximal functionality and self care  Description: INTERVENTIONS  - Monitor swallowing and airway patency with patient fatigue and changes in neurological status  - Encourage and assist patient to increase activity and self care     - Encourage visually impaired, hearing impaired and aphasic patients to use assistive/communication devices  Outcome: Progressing     Problem: CARDIOVASCULAR - ADULT  Goal: Maintains optimal cardiac output and hemodynamic stability  Description: INTERVENTIONS:  - Monitor I/O, vital signs and rhythm  - Monitor for S/S and trends of decreased cardiac output  - Administer and titrate ordered vasoactive medications to optimize hemodynamic stability  - Assess quality of pulses, skin color and temperature  - Assess for signs of decreased coronary artery perfusion  - Instruct patient to report change in severity of symptoms  Outcome: Progressing  Goal: Absence of cardiac dysrhythmias or at baseline rhythm  Description: INTERVENTIONS:  - Continuous cardiac monitoring, vital signs, obtain 12 lead EKG if ordered  - Administer antiarrhythmic and heart rate control medications as ordered  - Monitor electrolytes and administer replacement therapy as ordered  Outcome: Progressing     Problem: RESPIRATORY - ADULT  Goal: Achieves optimal ventilation and oxygenation  Description: INTERVENTIONS:  - Assess for changes in respiratory status  - Assess for changes in mentation and behavior  - Position to facilitate oxygenation and minimize respiratory effort  - Oxygen administered by appropriate delivery if ordered  - Initiate smoking cessation education as indicated  - Encourage broncho-pulmonary hygiene including cough, deep breathe, Incentive Spirometry  - Assess the need for suctioning and aspirate as needed  - Assess and instruct to report SOB or any respiratory difficulty  - Respiratory Therapy support as indicated  Outcome: Progressing     Problem: GASTROINTESTINAL - ADULT  Goal: Maintains or returns to baseline bowel function  Description: INTERVENTIONS:  - Assess bowel function  - Encourage oral fluids to ensure adequate hydration  - Administer IV fluids if ordered to ensure adequate hydration  - Administer ordered medications as needed  - Encourage mobilization and activity  - Consider nutritional services referral to assist patient with adequate nutrition and appropriate food choices  Outcome: Progressing  Goal: Maintains adequate nutritional intake  Description: INTERVENTIONS:  - Monitor percentage of each meal consumed  - Identify factors contributing to decreased intake, treat as appropriate  - Assist with meals as needed  - Monitor I&O, weight, and lab values if indicated  - Obtain nutrition services referral as needed  Outcome: Progressing     Problem: GENITOURINARY - ADULT  Goal: Maintains or returns to baseline urinary function  Description: INTERVENTIONS:  - Assess urinary function  - Encourage oral fluids to ensure adequate hydration if ordered  - Administer IV fluids as ordered to ensure adequate hydration  - Administer ordered medications as needed  - Offer frequent toileting  - Follow urinary retention protocol if ordered  Outcome: Progressing     Problem: METABOLIC, FLUID AND ELECTROLYTES - ADULT  Goal: Electrolytes maintained within normal limits  Description: INTERVENTIONS:  - Monitor labs and assess patient for signs and symptoms of electrolyte imbalances  - Administer electrolyte replacement as ordered  - Monitor response to electrolyte replacements, including repeat lab results as appropriate  - Instruct patient on fluid and nutrition as appropriate  Outcome: Progressing  Goal: Fluid balance maintained  Description: INTERVENTIONS:  - Monitor labs   - Monitor I/O and WT  - Instruct patient on fluid and nutrition as appropriate  - Assess for signs & symptoms of volume excess or deficit  Outcome: Progressing  Goal: Glucose maintained within target range  Description: INTERVENTIONS:  - Monitor Blood Glucose as ordered  - Assess for signs and symptoms of hyperglycemia and hypoglycemia  - Administer ordered medications to maintain glucose within target range  - Assess nutritional intake and initiate nutrition service referral as needed  Outcome: Progressing     Problem: HEMATOLOGIC - ADULT  Goal: Maintains hematologic stability  Description: INTERVENTIONS  - Assess for signs and symptoms of bleeding or hemorrhage  - Monitor labs  - Administer supportive blood products/factors as ordered and appropriate  Outcome: Progressing     Problem: MUSCULOSKELETAL - ADULT  Goal: Maintain or return mobility to safest level of function  Description: INTERVENTIONS:  - Assess patient's ability to carry out ADLs; assess patient's baseline for ADL function and identify physical deficits which impact ability to perform ADLs (bathing, care of mouth/teeth, toileting, grooming, dressing, etc )  - Assess/evaluate cause of self-care deficits   - Assess range of motion  - Assess patient's mobility  - Assess patient's need for assistive devices and provide as appropriate  - Encourage maximum independence but intervene and supervise when necessary  - Involve family in performance of ADLs  - Assess for home care needs following discharge   - Consider OT consult to assist with ADL evaluation and planning for discharge  - Provide patient education as appropriate  Outcome: Progressing

## 2022-04-11 NOTE — ASSESSMENT & PLAN NOTE
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

## 2022-04-11 NOTE — SPEECH THERAPY NOTE
Speech Language/Pathology  Speech/Language Pathology  Assessment    Patient Name: Hayley Martinez Date: 4/11/2022     Problem List  Principal Problem:    Left parietal hemorrhagic tumor  Active Problems:    Depression with anxiety    Essential hypertension    Mixed hyperlipidemia    Adenocarcinoma, lung, left (HCC)    Constipation    Psoriasis    Thrombocytopenia (HCC)    Goals of care, counseling/discussion    Hypothyroidism    Past Medical History  Past Medical History:   Diagnosis Date    Hyperlipidemia     Hypertension     Lung cancer [facility])      Past Surgical History  Past Surgical History:   Procedure Laterality Date    BACK SURGERY      CRANIOTOMY Left 4/7/2022    Procedure: Image guided left parietal craniotomy for tumor resection;  Surgeon: Maura Moulton MD;  [facility];  Service: Neurosurgery    HEMORRHOID SURGERY      IR BIOPSY LUNG  5/6/2021    IR PORT PLACEMENT  6/17/2021    LAMINECTOMY  2018    L4-L5    LUNG SURGERY      left upper lobectomy    MO BRONCHOSCOPY,DIAGNOSTIC N/A 5/25/2021    Procedure: BRONCHOSCOPY FLEXIBLE;  Surgeon: Ru Murillo MD;  [facility];  Service: Thoracic    MO MEDIASTINOSCOPY WITH LYMPH NODE BIOPSY/IES N/A 5/25/2021    Procedure: MEDIASTINOSCOPY, flexible bronchoscopy;  Surgeon: Ru Murillo MD;  [facility];  Service: Thoracic    TONSILLECTOMY  1961     Speech-Language Evaluation    Impression:  Pt presents w/ functional expressive & receptive language skills  Noted improvement since previous evaluation  He is able to make needs known & have fluent & meaningful conversations that are novel w/ multi listeners  Recommendation:  No f/u needed at this time  Consider outpt cog if desired                H&P/Admit info, pertinent provider notes:(PMH noted above)  Patient is a 26-year-old male with PMH of HTN, HLD, and lung CA s/p DAVID lobectomy, chemo, radiation and recent immunotherapy  presents to [facility] ED w/ AMS   Patient's wife says that his baseline is very high functioning with no confusion at baseline   Patient's last known normal was about 24 hours ago  [facility] has been increasingly confused per his wife  [facility] apparently put his shoes on the wrong feet, did not know how to use utensils and was asking was our questions   She estimates today he wanted to go to hospital but he refused  Steve Forget prior to ED arrival, patient began to have some expressive and receptive aphasia and has difficulty communicating at this time   No known metastasis to the brain per his wife  [facility] has been on high-dose prednisone secondary to a flare of his psoriasis   History is limited from the patient and he cannot communicate any complaints to me  No recent head trauma   No blood thinners or antiplatelet agents noted  Secondary to the presentation, stroke alert was called   Initial NIH stroke scale was 7  Patient significantly aphasic with right upper extremity drift noted   Patient was protecting his airway throughout his time in the ED  The history is provided by the patient's wife  Left parietal ICH was found on CT,  4 6 cm intraparenchymal hemorrhage with surrounding edema, ICH score 1  MRI confirms evidence of brain compression and mass effect and surrounding vasogenic edema with subsequent left cerebellar hyperdensity concerning for underlying mass versus bleed and  hypertensive emergency, transferred to [facility] 4/3  Underwent L parietal crani on 4/7/22 for known lesions    Seen today for f/u & re assessment since surgery             Did the pt report pain?no       Social:  Home w/ wife     Orientation:  Place: +  City:+  Year:+  Month:+  NADEGE:+  Time of Day:+  Hospital:+  Reason for hospitalization: +  Education: did not discuss    Auditory Comprehension:  R/L discrim: 100%  Body part ID:100%  One step commands: 100%  Complex y/n ?'s: 100%      Reading Comprehension:  NA today   Oral Expression:  Auto Sequences:   NADEGE: 100%   PEYTON:100%   Counting to 21:100%  No prompts or cues needed to complete any section today  Word Repetition:100%  Phrase Completion:100%  Responsive Namin%  Picture Namin%  Object Namin%  Conversation: less hesitant conversational speech today w/ wife, SLP, friend in room  Able to recall beginning of hospital stay & have fluent conversation  Able to make basic needs known?  Yes    Written Expression:  NA  Math:  NA  Motor Speech:  No motor speech deficits noted, clear   Cognitive -linguistic skills:  Improved since seen, ?able working memory for longer more complex tasks  Also noted:  -  Results discussed with:    Pt, wife

## 2022-04-11 NOTE — TELEPHONE ENCOUNTER
4/12/22- PT DISCHARGED  PLEASE SEE TARAH'S NOTE    4/11/22- PT IN HOSPITAL  2WK PATH SCHEDULED 4/25/22      ----- Message from Kimberlyn Lee PA-C sent at 4/10/2022 11:29 AM EDT -----  Regarding: POV  2 week path with incision check EM   Surgery date 4/7

## 2022-04-11 NOTE — DISCHARGE SUMMARY
Discharge Summary - [facility]    Patient Information: Rajesh Raymond 74 y o  male MRN: [id number]  Unit/Bed#: [facility] 829-60 Encounter: [id number]    Discharging Physician / Practitioner: Yessy Rubio PA-C  PCP: Torres Quezada DO  Admission Date: 4/2/2022  Discharge Date: 04/11/22    Reason for Admission: [address]    Discharge Diagnoses:     Principal Problem:    Left parietal hemorrhagic tumor  Active Problems:    Adenocarcinoma, lung, left ([facility] [address] )    Thrombocytopenia ([facility] [address] )    Goals of care, counseling/discussion    Essential hypertension    Psoriasis    Mixed hyperlipidemia    Hypothyroidism    Depression with anxiety    Constipation  Resolved Problems:    * No resolved hospital problems  *      Consultations During Hospital Stay:  · Neurology  · Neurosurgery  · Endocrinology  · Palliative Care  · PM&R  · Heme/Onc  · PT  · OT  · SLP  · Case management    Procedures Performed:   · 4/2/22 CT stroke alert brain  · CTA head/neck  · 4/3/22 CT head  · 4/3/22 MRI brain  · CT chest, abdomen, pelvis  · 4/8/22 MRI brain  · Image guided left parietal craniotomy for tumor resection (left)  · COVID-19, influenza A/B, RSV  · CBC  · CMP  · Ammonia  · TSH  · fT4  · UDS  · HbA1c  · Lipid panel  · Tissue exam  · Platelet transfusion    Significant Findings:   · 4/2/22 CT stroke alert brain: "Left parietal 4 6 cm intraparenchymal hemorrhage with surrounding cerebral edema  No midline shift "  · CTA head/neck: "Severe right vertebral artery stenosis at the origin  Severe critical near occlusive stenosis right proximal vertebral artery near its origin spanning approximately 2 2 cm in cranial caudal dimension  Approximately 70-75% right proximal cervical ICA plaque stenosis"  · 4/3/22 CT head: "Left parietal region intraparenchymal hematoma unchanged in size measuring 6 7 cm in largest dimension   New irregular hyperdense focus at the left cerebellum likely to represent small amount of subarachnoid hemorrhage versus a new intraparenchymal hemorrhage "  · 4/3/22 MRI brain: "2 left cerebellar hemorrhagic metastases and probable 2 adjacent left parietal hemorrhagic metastasis, the more anterior, larger one having parenchymal hematoma and local edema and mass effect, correlating to the hemorrhage on the prior head CT "  · CT chest, abdomen, pelvis: "No findings of metastatic disease in the chest abdomen pelvis  No acute compression collapse of the vertebra seen  No lytic destructive lesion seen  Emphysema  Postsurgical changes from left upper lobectomy  A linear density seen in the left mid to lower lung likely scarring  Routine surveillance can be continued"  · 4/8/22 MRI brain: "Status post treatment of left parietal metastatic lesion with postop change as described above  This is the initial postoperative examination and will serve as baseline for follow-up exams  No significant residual tumor noted within the resection cavity  2 metastatic lesions within the left cerebellar hemisphere are unchanged "  · COVID-19, influenza A/B, RSV: negative  · Thrombocytopenia   · TSH: 6 162  · FT4: 0 82  · UDS: THC positive  · HbA1c: 5 2  · Lipid panel:  Cholesterol 193, triglycerides 137, HDL 70, LDL 96  · Tissue exam: "A-B  Brain, Left parietal mass, Resection: - 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, consistent with patient's known lung primary "    Incidental Findings:   · None     Test Results Pending at Discharge (will require follow up):    · None     Outpatient Tests Requested:  · Follow up with Neurosurgery  · Follow up with Oncology  · Follow up with Endocrinology  · Follow up with Rad/Onc  · Follow up with PCP    Complications:  None    Hospital Course:     Gato Ferrell is a 74 y o  male with history of adenocarcinoma left lung status post resection and adjuvant radiation therapy all following neoadjuvant chemotherapy, thrombocytopenia, HTN, psoriasis, HLD, hypothyroidism, depression/anxiety who originally presented to [facility] on 4/2/2022 as a transfer from [facility] ED where he presented with confusion and difficulty speaking  CT head revealed left parietal 4 6cm intraparenchymal hemorrhage with surrounding cerebral edema with no midline shift  CTA head/neck revealed severe right vertebral artery stenosis at the origin, severe critical near occlusive stenosis right proximal vertebral artery near its origin spanning approximately 2 2cm in cranial caudal dimension, approximately 70-75% right proximal cervical cervical ICA plaque stenosis  He was admitted to ICU and placed on nicardipine, Keppra and decadron  Neurology and Neurosurgery were consulted  Repeat CT head revealed left parietal region intraparenchymal hematoma unchanged in size, new irregular hyperdense focus at the left cerebellum likely to represent small amount of SAH vs new intraparenchymal hemorrhage  MRI brain revealed 2 left cerebella hemorrhagic metastases and probable 2 adjacent left parietal hemorrhagic metastasis, the more anterior, larger one having parenchymal hematoma and local edema and mass effect, correlating to the hemorrhage on the prior head CT  Neurology had no further recs and signed off  Endocrinology was consulted and increased the patient's levothyroxine to 37mcg - recommend repeat TSH/fT4 in 4-6 weeks  Palliative Care was consulted for [address]  Heme/Onc was consulted at the wife's request  CT of the chest, abdomen, and pelvis did not show findings of metastatic disease in the chest, abdomen, or pelvis  Per Heme/Onc, 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 but no treatment required at this time  The patient underwent image guided left parietal craniotomy for tumor resection (left) by Dr Dav Lama on 4/7/22  He went back to ICU post-op  He was transfused 1 unit of platelets for thrombocytopenia  He was placed on decadron wean per Neurosurgery and keppra post-op  JOSE drain was discontinued by Neurosurgery on 4/8/22   He was transferred out of ICU on 4/8/22  Post-op MRI brain revealed status post treatment of left parietal metastatic lesion with postop change, no significant residual tumor noted within the resection cavity, 2 metastatic lesions within the left cerebellar hemisphere unchanged  Neurosurgery signed off  PT and OT recommended home with outpatient rehab  Patient had BM prior to discharge  Neurosurgery recommending to continue decadron wean to 2mg BID indefinitely and complete Keppra 500mg Q12hr for 7 days post op  Recommend close OP follow up with Neurosurgery, Heme/Onc, Rad/Onc, Endocrine, PCP  Condition at Discharge: stable     Discharge Day Visit / Exam:     Subjective:    Mr Gomez Da Silva reports his headache has resolved  He reports having a BM this afternoon    Vitals: Blood Pressure: 142/87 (04/11/22 0721)  Pulse: 75 (04/11/22 0721)  Temperature: 98 2 °F (36 8 °C) (04/11/22 0721)  Temp Source: Oral (04/08/22 2134)  Respirations: 14 (04/11/22 0721)  Height: 5' 10" (177 8 cm) (04/03/22 1100)  Weight - Scale: 81 5 kg (179 lb 10 8 oz) (04/10/22 0545)  SpO2: 98 % (04/11/22 0721)  Exam:   Physical Exam - see today's progress note    Discharge instructions/Information to patient and family:   See after visit summary for information provided to patient and family  Provisions for Follow-Up Care:  See after visit summary for information related to follow-up care and any pertinent home health orders  Disposition:     Home    For Discharges to [facility] SNF:   · Not Applicable to this Patient - Not Applicable to this Patient    Planned Readmission: None     Discharge Statement:  I spent 45 minutes discharging the patient  This time was spent on the day of discharge  I had direct contact with the patient on the day of discharge   Greater than 50% of the total time was spent examining patient, answering all patient questions, arranging and discussing plan of care with patient as well as directly providing post-discharge instructions  Additional time then spent on discharge activities  Discharge Medications:  See after visit summary for reconciled discharge medications provided to patient and family  ** Please Note: Dragon 360 Dictation voice to text software may have been used in the creation of this document   **

## 2022-04-11 NOTE — ASSESSMENT & PLAN NOTE
· s/p DAVID lobectomy in 12/2021 at [facility], currently receiving adjuvant immunotherapy and has completed 2 cycles of atezolizumab  · New mets to brain, see plan above in primary problem   · Medical Oncology  Consult appreciated, patient known to Dr Conchis Watt locally  · Holding immunotherapy  · May need radiation  · Palliative following

## 2022-04-11 NOTE — TELEPHONE ENCOUNTER
LVM to patient in regards to his appointment that was rescheduled for Dr Barb Connor  Informed patient that he is now scheduled for 5/3/22 at 10:20am  Informed patient if this appointment does not work for him he can give the office a call back to reschedule at [phone]

## 2022-04-11 NOTE — ASSESSMENT & PLAN NOTE
· Patient presented with new onset apraxias and aphasia  Found to have a left parietal brain mass with vasogenic edema, 2 additional left cerebellar masses    Patient has a known history of small-cell lung cancer (dx 4/2021) status post chemotherapy/surgery/radiation/immunotherapy at [facility]  · CT chest abdomen pelvis without any evidence of metastatic disease per the results report   · Neurology consult appreciated - no additional Neurology recommendations  · Neurosurgery following, input appreciated  · S/p image guided left parietal craniotomy for tumor resection (Left) on 4/7/22 by Dr Leanne Ramírez  · Decadron wean per Neurosurgery recs  · Keppra 500mg Q12hr for 7 days post-op per Neurosurgery  · DVT ppx with SC Lovenox per Neurosurgery

## 2022-04-12 ENCOUNTER — TRANSITIONAL CARE MANAGEMENT (OUTPATIENT)
Dept: FAMILY MEDICINE CLINIC | Facility: CLINIC | Age: 67
End: 2022-04-12

## 2022-04-12 ENCOUNTER — TELEPHONE (OUTPATIENT)
Dept: NEUROSURGERY | Facility: CLINIC | Age: 67
End: 2022-04-12

## 2022-04-12 NOTE — TELEPHONE ENCOUNTER
Called patient to see how he is doing after surgery  Patient reports he is doing well overall and denies any incisional issues or fevers  Patient is able to ambulate around the house and complete ADLs  Educated the patient about the importance of preventing blood clots and provided measures how to prevent them  Patient has moved his bowels since the surgery  Encouraged patient to take an over the counter stool softener, if he is taking narcotic pain medication  Encouraged fiber intake and fluids  He admits he is regular for the first time in 30 years  Reviewed incision care with the patient  Advised that after three days he may take a shower and gently wash the surgical site with soap and water  Use clean wash cloth, towels, and clothing  Do not submerge in water until cleared by the surgeon  Do not apply any creams, ointments, or lotions to the site  Patient is aware to call the office if any redness, swelling, drainage, dehiscence of incision, or fever >100 F occurs  Patient is aware to call the office if any concerns or questions may arise  Reminded patient of his upcoming appointments with the date/time/location  Patient was appreciative for the call

## 2022-04-13 ENCOUNTER — TELEPHONE (OUTPATIENT)
Dept: HEMATOLOGY ONCOLOGY | Facility: CLINIC | Age: 67
End: 2022-04-13

## 2022-04-13 DIAGNOSIS — C34.92 ADENOCARCINOMA, LUNG, LEFT (HCC): Primary | ICD-10-CM

## 2022-04-13 NOTE — TELEPHONE ENCOUNTER
Received a phone call from pt's wife Emmanuelle Verde to ask if pt still had his appt 4/14/22  I let wife know that we R/S pt to May 3 at 10:20 as he has just had Surgery and needs time to heal  Pt is doing better and seems to improve each day  Wife  Was happy to have someone to listen to her and appreciated help with appts

## 2022-04-18 ENCOUNTER — OFFICE VISIT (OUTPATIENT)
Dept: FAMILY MEDICINE CLINIC | Facility: CLINIC | Age: 67
End: 2022-04-18
Payer: MEDICARE

## 2022-04-18 VITALS
DIASTOLIC BLOOD PRESSURE: 80 MMHG | TEMPERATURE: 99.7 F | RESPIRATION RATE: 18 BRPM | BODY MASS INDEX: 25.6 KG/M2 | SYSTOLIC BLOOD PRESSURE: 120 MMHG | HEIGHT: 70 IN | WEIGHT: 178.8 LBS | OXYGEN SATURATION: 99 % | HEART RATE: 91 BPM

## 2022-04-18 DIAGNOSIS — I49.9 IRREGULAR HEART RATE: ICD-10-CM

## 2022-04-18 DIAGNOSIS — I10 HYPERTENSION, UNSPECIFIED TYPE: ICD-10-CM

## 2022-04-18 DIAGNOSIS — N18.31 STAGE 3A CHRONIC KIDNEY DISEASE (HCC): ICD-10-CM

## 2022-04-18 DIAGNOSIS — I48.0 PAROXYSMAL ATRIAL FIBRILLATION (HCC): ICD-10-CM

## 2022-04-18 DIAGNOSIS — I10 ESSENTIAL HYPERTENSION: Chronic | ICD-10-CM

## 2022-04-18 DIAGNOSIS — I62.9 INTRACRANIAL HEMORRHAGE (HCC): ICD-10-CM

## 2022-04-18 DIAGNOSIS — D69.6 PLATELETS DECREASED (HCC): Primary | ICD-10-CM

## 2022-04-18 DIAGNOSIS — E03.8 OTHER SPECIFIED HYPOTHYROIDISM: ICD-10-CM

## 2022-04-18 DIAGNOSIS — F41.8 DEPRESSION WITH ANXIETY: Chronic | ICD-10-CM

## 2022-04-18 PROCEDURE — 93000 ELECTROCARDIOGRAM COMPLETE: CPT | Performed by: FAMILY MEDICINE

## 2022-04-18 PROCEDURE — 99496 TRANSJ CARE MGMT HIGH F2F 7D: CPT | Performed by: FAMILY MEDICINE

## 2022-04-18 RX ORDER — METOPROLOL SUCCINATE 25 MG/1
25 TABLET, EXTENDED RELEASE ORAL DAILY
Qty: 30 TABLET | Refills: 5 | Status: ON HOLD | OUTPATIENT
Start: 2022-04-18 | End: 2022-08-07 | Stop reason: SDUPTHER

## 2022-04-18 NOTE — ASSESSMENT & PLAN NOTE
Patient presents today for transition of care management post hospitalization after he initially presented with stroke-like symptoms and was found to have a left parietal hemorrhagic mass  He was transferred to [address] [place] [address] and worked up through neuro surgery and had a resection of this area  He was transfused a unit of blood and platelets during the hospitalization and sent home with his wife    He is currently stable and will follow-up closely with Hematology-Oncology and neuro surgery and I will recheck him in 1 month

## 2022-04-18 NOTE — ASSESSMENT & PLAN NOTE
Patient started on levothyroxine will repeat TSH and T4 and re-evaluate at next visit    Levels improved after most recent laboratory work recheck this at next visit

## 2022-04-18 NOTE — ASSESSMENT & PLAN NOTE
Worsening mood changed since hospitalization snapping out losing temper with his wife more frequently requesting potential treatment plan I would suggest at this point increasing citalopram up to 20 milligrams daily

## 2022-04-18 NOTE — ASSESSMENT & PLAN NOTE
Check EKG at this time here at the office previous history of atrial fibrillation from hospitalization in [place] for resection of lung tumor and now post hospitalization today showing irregularity sinus tachycardia with multiple PVCs  I will start him on metoprolol succinate 25 milligrams daily

## 2022-04-18 NOTE — ASSESSMENT & PLAN NOTE
Lab Results   Component Value Date    EGFR 73 04/10/2022    EGFR 76 04/09/2022    EGFR 69 04/08/2022    CREATININE 1 04 04/10/2022    CREATININE 1 01 04/09/2022    CREATININE 1 09 04/08/2022   Stable currently patient doing well with GFR at 73 creatinine 1 04

## 2022-04-18 NOTE — PROGRESS NOTES
Assessment/Plan:       Problem List Items Addressed This Visit        Cardiovascular and Mediastinum    Essential hypertension (Chronic)     Maintain stable control on current medications  Relevant Medications    metoprolol succinate (TOPROL-XL) 25 mg 24 hr tablet    Hypertension     Hypertension under stable control continuing with amlodipine and same medication regimen         Relevant Medications    metoprolol succinate (TOPROL-XL) 25 mg 24 hr tablet    Paroxysmal atrial fibrillation (HCC)    Relevant Medications    metoprolol succinate (TOPROL-XL) 25 mg 24 hr tablet       Genitourinary    Stage 3a chronic kidney disease ([facility] )     Lab Results   Component Value Date    EGFR 73 04/10/2022    EGFR 76 04/09/2022    EGFR 69 04/08/2022    CREATININE 1 04 04/10/2022    CREATININE 1 01 04/09/2022    CREATININE 1 09 04/08/2022   Stable currently patient doing well with GFR at 73 creatinine 1 04            Other    Depression with anxiety (Chronic)     Worsening mood changed since hospitalization snapping out losing temper with his wife more frequently requesting potential treatment plan I would suggest at this point increasing citalopram up to 20 milligrams daily         Platelets decreased ([facility] ) - Primary     Post platelet transfusion monitor CBC laboratory work monthly         Left parietal hemorrhagic tumor     Patient presents today for transition of care management post hospitalization after he initially presented with stroke-like symptoms and was found to have a left parietal hemorrhagic mass  He was transferred to [address] [place] [address] and worked up through neuro surgery and had a resection of this area  He was transfused a unit of blood and platelets during the hospitalization and sent home with his wife    He is currently stable and will follow-up closely with Hematology-Oncology and neuro surgery and I will recheck him in 1 month         Irregular heart rate     Check EKG at this time here at the office previous history of atrial fibrillation from hospitalization in [place] for resection of lung tumor and now post hospitalization today showing irregularity sinus tachycardia with multiple PVCs  I will start him on metoprolol succinate 25 milligrams daily  Relevant Medications    metoprolol succinate (TOPROL-XL) 25 mg 24 hr tablet    Other Relevant Orders    POCT ECG            Subjective:      Patient ID: Neil Guillaume is a 74 y o  male  Patient is seen today transition of care management post hospitalization for parietal mass resection      The following portions of the patient's history were reviewed and updated as appropriate: allergies, current medications, past family history, past medical history, past social history, past surgical history and problem list     Review of Systems   Constitutional: Positive for fatigue  Negative for chills and fever  HENT: Negative for congestion, nosebleeds, rhinorrhea, sinus pressure and sore throat  Eyes: Negative for discharge and redness  Respiratory: Negative for cough and shortness of breath  Cardiovascular: Negative for chest pain, palpitations and leg swelling  Gastrointestinal: Negative for abdominal pain, blood in stool and nausea  Endocrine: Negative for cold intolerance, heat intolerance and polyuria  Genitourinary: Negative for dysuria and frequency  Musculoskeletal: Positive for arthralgias  Negative for back pain and myalgias  Skin: Negative for rash  Neurological: Positive for dizziness  Negative for weakness and headaches  Hematological: Negative for adenopathy  Psychiatric/Behavioral: Negative for behavioral problems and sleep disturbance  The patient is not nervous/anxious            Objective:      /80 (BP Location: Left arm, Patient Position: Sitting)   Pulse 91   Temp 99 7 °F (37 6 °C)   Resp 18   Ht 5' 10" (1 778 m)   Wt 81 1 kg (178 lb 12 8 oz)   SpO2 99%   BMI 25 66 kg/m²        Physical Exam  Vitals and nursing note reviewed  Constitutional:       Appearance: Normal appearance  He is well-developed and normal weight  HENT:      Head: Normocephalic and atraumatic  Right Ear: Tympanic membrane, ear canal and external ear normal       Left Ear: Tympanic membrane, ear canal and external ear normal       Nose: Nose normal       Mouth/Throat:      Mouth: Mucous membranes are moist       Pharynx: Oropharynx is clear  Eyes:      General: No scleral icterus  Extraocular Movements: Extraocular movements intact  Conjunctiva/sclera: Conjunctivae normal       Pupils: Pupils are equal, round, and reactive to light  Neck:      Thyroid: No thyromegaly  Vascular: No JVD  Cardiovascular:      Rate and Rhythm: Tachycardia present  Rhythm irregular  Pulses: Normal pulses  Heart sounds: Normal heart sounds  No murmur heard  Pulmonary:      Effort: Pulmonary effort is normal       Breath sounds: Normal breath sounds  No wheezing or rales  Chest:      Chest wall: No tenderness  Abdominal:      General: Bowel sounds are normal  There is no distension  Palpations: Abdomen is soft  There is no mass  Tenderness: There is no abdominal tenderness  There is no guarding or rebound  Musculoskeletal:         General: No tenderness or deformity  Normal range of motion  Cervical back: Normal range of motion and neck supple  Lymphadenopathy:      Cervical: No cervical adenopathy  Skin:     General: Skin is warm and dry  Capillary Refill: Capillary refill takes less than 2 seconds  Findings: No erythema or rash  Neurological:      General: No focal deficit present  Mental Status: He is alert and oriented to person, place, and time  Mental status is at baseline  Cranial Nerves: No cranial nerve deficit  Deep Tendon Reflexes: Reflexes are normal and symmetric   Reflexes normal    Psychiatric:         Mood and Affect: Mood normal  Behavior: Behavior normal          Thought Content: Thought content normal          Judgment: Judgment normal           Data:    Laboratory Results: I have personally reviewed the pertinent laboratory results/reports   Radiology/Other Diagnostic Testing Results: I have personally reviewed pertinent reports         Lab Results   Component Value Date    WBC 7 79 04/10/2022    HGB 13 1 04/10/2022    HCT 37 1 04/10/2022    MCV 91 04/10/2022     (L) 04/10/2022     Lab Results   Component Value Date    K 4 1 04/10/2022     04/10/2022    CO2 27 04/10/2022    BUN 30 (H) 04/10/2022    CREATININE 1 04 04/10/2022    GLUF 84 03/22/2022    CALCIUM 8 6 04/10/2022    CORRECTEDCA 9 8 04/04/2022    AST 14 04/04/2022    ALT 35 04/04/2022    ALKPHOS 53 04/04/2022    EGFR 73 04/10/2022     Lab Results   Component Value Date    CHOLESTEROL 193 04/03/2022    CHOLESTEROL 158 01/25/2022    CHOLESTEROL 112 06/23/2021     Lab Results   Component Value Date    HDL 70 04/03/2022    HDL 35 (L) 01/25/2022    HDL 42 06/23/2021     Lab Results   Component Value Date    LDLCALC 96 04/03/2022    LDLCALC 97 01/25/2022    LDLCALC 26 06/23/2021     Lab Results   Component Value Date    TRIG 137 04/03/2022    TRIG 130 01/25/2022    TRIG 221 (H) 06/23/2021     No results found for: [place], [place]  Lab Results   Component Value Date    VLP1NZKKWCMY 5 550 (H) 04/03/2022     Lab Results   Component Value Date    HGBA1C 5 2 04/03/2022     Lab Results   Component Value Date    PSA 0 6 10/06/2020       Jose R Mercado DO

## 2022-04-19 DIAGNOSIS — G89.29 CHRONIC LEFT-SIDED LOW BACK PAIN WITH LEFT-SIDED SCIATICA: ICD-10-CM

## 2022-04-19 DIAGNOSIS — M54.42 CHRONIC LEFT-SIDED LOW BACK PAIN WITH LEFT-SIDED SCIATICA: ICD-10-CM

## 2022-04-19 RX ORDER — CITALOPRAM 10 MG/1
10 TABLET ORAL 2 TIMES DAILY
Qty: 60 TABLET | Refills: 0 | Status: SHIPPED | OUTPATIENT
Start: 2022-04-19 | End: 2022-05-25 | Stop reason: SDUPTHER

## 2022-04-22 ENCOUNTER — HOSPITAL ENCOUNTER (OUTPATIENT)
Dept: RADIOLOGY | Age: 67
Discharge: HOME | End: 2022-04-22
Payer: MEDICARE

## 2022-04-22 DIAGNOSIS — C34.92 ADENOCARCINOMA, LUNG, LEFT (HCC): ICD-10-CM

## 2022-04-22 DIAGNOSIS — C34.12 MALIGNANT NEOPLASM OF UPPER LOBE, LEFT BRONCHUS OR LUNG (HCC): ICD-10-CM

## 2022-04-22 LAB — GLUCOSE SERPL-MCNC: 107 MG/DL (ref 65–140)

## 2022-04-22 PROCEDURE — G1004 CDSM NDSC: HCPCS

## 2022-04-22 PROCEDURE — A9552 F18 FDG: HCPCS

## 2022-04-22 PROCEDURE — 82948 REAGENT STRIP/BLOOD GLUCOSE: CPT

## 2022-04-22 PROCEDURE — 78815 PET IMAGE W/CT SKULL-THIGH: CPT

## 2022-04-25 ENCOUNTER — OFFICE VISIT (OUTPATIENT)
Dept: NEUROSURGERY | Facility: CLINIC | Age: 67
End: 2022-04-25

## 2022-04-25 VITALS
DIASTOLIC BLOOD PRESSURE: 78 MMHG | TEMPERATURE: 96.9 F | WEIGHT: 178 LBS | SYSTOLIC BLOOD PRESSURE: 118 MMHG | BODY MASS INDEX: 25.48 KG/M2 | HEIGHT: 70 IN

## 2022-04-25 DIAGNOSIS — C79.31 BRAIN METASTASES (HCC): Primary | ICD-10-CM

## 2022-04-25 PROCEDURE — 99024 POSTOP FOLLOW-UP VISIT: CPT | Performed by: NEUROLOGICAL SURGERY

## 2022-04-25 NOTE — PROGRESS NOTES
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  We discussed natural history and diagnosis of metastatic neuroendocrine tumor  Recent PET scan only significant for intracranial disease  Who I explained the importance of him following up with medical oncology as well Radiation Oncology for the treatment of the rest of his intracranial disease  He will need both a surgical bed as well as his posterior fossa treated for these  They understand that this is of the upmost important  I will plan on seeing him back in 4 weeks for clinical follow-up  I ordered an MRI to aid in radiation planning  Chief Complaint: Post-op      HPI:   This is a 78-year-old gentleman with a history of neuroendocrine tumor status post lung resection who presents to the hospital with speech apraxia and motor apraxis  After initiation of steroids he had some significant improvement  Ultimately he underwent resection of his dominant left parietal hemorrhagic mass  He tolerated this very well  He is now 2 weeks after surgery and back at home  He has had some balance and dizziness issues  He has also had some difficulty sleeping  Denies any fevers, chills, nausea, vomiting, or falls  He does describe some visual depth perception difficulties  Review of systems obtained by the MA reviewed and updated below  Physical Exam  Vitals:    04/25/22 1259   BP: 118/78   Temp: (!) 96 9 °F (36 1 °C)     He is well appearing  Affect is appropriate  His BMI is Body mass index is 25 54 kg/m²  [facility] He is awake alert and oriented  Hearing and vision are grossly intact     His pupils are equal round reactive to light  His extraocular movements are intact  His face is symmetric  Tongue is midline  Facial sensation is intact and symmetric throughout  Shoulder shrug is 5/5  There is no drift or mild left dysmetria  Mild left dysdiadochokinesia  He has full strength in his bilateral upper and lower extremities  He has normal muscle tone muscle bulk  His biceps reflexes and patellar reflexes are 2+ and symmetric  Shelbi sign negative bilaterally  Sensation intact to light touch and pinprick throughout  His gait is slow      Incision well healed    The following portions of the patient's history were reviewed and updated as appropriate: allergies, current medications, past family history, past medical history, past social history, past surgical history and problem list     Active Ambulatory Problems     Diagnosis Date Noted    Renal cyst, right 12/05/2018    Acute idiopathic gout of left foot 11/06/2017    Back problem 06/30/2017    Depression with anxiety 11/06/2017    Essential hypertension 11/06/2017    Glaucoma 03/16/2018    Hypertriglyceridemia 11/06/2017    Lumbago with sciatica, left side 11/06/2017    Lumbar stenosis 03/20/2018    JUNAID (obstructive sleep apnea) 03/16/2018    Spinal stenosis of lumbar region with neurogenic claudication 01/09/2018    Mixed hyperlipidemia 03/20/2019    Bilateral hearing loss 02/11/2020    Hyperglycemia 03/08/2021    Cigarette nicotine dependence in remission 03/08/2021    Adenocarcinoma, lung, left ([address] ) 06/09/2021    Encounter for central line care 06/09/2021    Drug-induced neutropenia ([facility] ) 07/15/2021    Left non-suppurative otitis media 12/09/2021    Bilateral hearing loss due to cerumen impaction 12/09/2021    Cancer of upper lobe of left lung ([address] ) 11/04/2021    Chronic back pain 11/17/2021    Former cigarette smoker 11/17/2021    History of gout 11/17/2021    Hypertension 11/17/2021    Proctocolitis 12/25/2021    Pericardial effusion 12/25/2021    Constipation 12/25/2021    Labyrinthitis of both ears 01/04/2022    Stage 3a chronic kidney disease ([facility] ) 01/18/2022    Platelets decreased ([facility] ) 02/15/2022    Psoriasis 02/15/2022    Left parietal hemorrhagic tumor 04/02/2022    Thrombocytopenia ([facility] ) 04/05/2022    Goals of care, counseling/discussion 04/05/2022    Hypothyroidism 04/05/2022    Irregular heart rate 04/18/2022    Paroxysmal atrial fibrillation ([facility] ) 04/18/2022    Brain metastases ([place] [address] ) 04/25/2022     Resolved Ambulatory Problems     Diagnosis Date Noted    No Resolved Ambulatory Problems     Past Medical History:   Diagnosis Date    Hyperlipidemia     Lung cancer [facility])        Past Surgical History:   Procedure Laterality Date    BACK SURGERY      CRANIOTOMY Left 4/7/2022    Procedure: Image guided left parietal craniotomy for tumor resection;  Surgeon: Ekta Ramirez MD;  [facility];  Service: Neurosurgery    HEMORRHOID SURGERY      IR BIOPSY LUNG  5/6/2021    IR PORT PLACEMENT  6/17/2021    LAMINECTOMY  2018    L4-L5    LUNG SURGERY      left upper lobectomy    NJ BRONCHOSCOPY,DIAGNOSTIC N/A 5/25/2021    Procedure: BRONCHOSCOPY FLEXIBLE;  Surgeon: Samanta Morrison MD;  [facility];  Service: Thoracic    NJ MEDIASTINOSCOPY WITH LYMPH NODE BIOPSY/IES N/A 5/25/2021    Procedure: MEDIASTINOSCOPY, flexible bronchoscopy;  Surgeon: Samanta Morrison MD;  [facility];  Service: Thoracic    TONSILLECTOMY  1959         Current Outpatient Medications:     allopurinol (ZYLOPRIM) 100 mg tablet, Take 1 tablet (100 mg total) by mouth daily, Disp: 30 tablet, Rfl: 5    amLODIPine (NORVASC) 10 mg tablet, TAKE ONE TABLET BY MOUTH EVERY DAY, Disp: 90 tablet, Rfl: 3    Ascorbic Acid (vitamin C) 1000 MG tablet, Take 1,000 mg by mouth daily  , Disp: , Rfl:     B Complex Vitamins (B COMPLEX 1 PO), Take 1 tablet by mouth daily , Disp: , Rfl:     B Complex Vitamins (BL VITAMIN B COMPLEX PO), Take by mouth  , Disp: , Rfl:     betamethasone valerate (VALISONE) 0 1 % cream, Apply topically 2 (two) times a day (Patient taking differently: Apply topically as needed ), Disp: 45 g, Rfl: 1    betamethasone, augmented, (DIPROLENE-AF) 0 05 % cream,  , Disp: , Rfl:     calcipotriene (DOVONOX) 0 005 % ointment, Apply topically 2 (two) times a day As needed, Disp: , Rfl:     Cholecalciferol (VITAMIN D3 PO), Take 10,000 Units by mouth daily  , Disp: , Rfl:     citalopram (CeleXA) 10 mg tablet, Take 1 tablet (10 mg total) by mouth 2 (two) times a day, Disp: 60 tablet, Rfl: 0    Clobetasol Propionate (Impoyz) 0 025 % CREA, Apply topically, Disp: , Rfl:     dexamethasone (DECADRON) 2 mg tablet, Take 2mg PO Q6hr for 2 days, followed by 2mg PO Q8hr for 2 days, followed by 2mg Q12hr indefinitely thereafter, Disp: 66 tablet, Rfl: 0    doxepin (SINEquan) 25 mg capsule, TAKE ONE CAPSULE BY MOUTH EVERY DAY AT BEDTIME, Disp: 30 capsule, Rfl: 5    fluocinolone (SYNALAR) 0 01 % external solution,  , Disp: , Rfl:     gabapentin (NEURONTIN) 100 mg capsule,  , Disp: , Rfl:     Garlic 10 MG CAPS, Take 1 tablet by mouth daily , Disp: , Rfl:     Glucosamine-Chondroit-Vit C-Mn (GLUCOSAMINE 1500 COMPLEX) CAPS, daily , Disp: , Rfl:     ketoconazole (NIZORAL) 2 % cream, Apply topically 2 (two) times a day (Patient taking differently: Apply topically as needed ), Disp: 15 g, Rfl: 2    levothyroxine 75 mcg tablet, Take 0 5 tablets (37 5 mcg total) by mouth daily in the early morning, Disp: 15 tablet, Rfl: 0    meclizine (ANTIVERT) 25 mg tablet, Take 1 tablet (25 mg total) by mouth 4 (four) times a day as needed for dizziness, Disp: 30 tablet, Rfl: 0    metoprolol succinate (TOPROL-XL) 25 mg 24 hr tablet, Take 1 tablet (25 mg total) by mouth daily, Disp: 30 tablet, Rfl: 5    pantoprazole (PROTONIX) 40 mg tablet, Take 1 tablet (40 mg total) by mouth daily, Disp: 30 tablet, Rfl: 11    polyethylene glycol (GLYCOLAX) 17 GM/SCOOP powder, Take 17 g by mouth 2 (two) times a day, Disp: 850 g, Rfl: 0    rosuvastatin (CRESTOR) 10 MG tablet, TAKE ONE TABLET BY MOUTH EVERY DAY, Disp: 30 tablet, Rfl: 5    traMADol (ULTRAM) 50 mg tablet, TAKE 1 TABLET BY MOUTH EVERY 8 HOURS AS NEEDED FOR SEVERE PAIN, Disp: 30 tablet, Rfl: 0    triamcinolone (KENALOG) 0 1 % cream, , Disp: , Rfl:     zinc gluconate 50 mg tablet, Take 50 mg by mouth daily, Disp: , Rfl:     calcipotriene (DOVONEX) 0 005 % cream, , Disp: , Rfl:     colchicine (COLCRYS) 0 6 mg tablet, Take 1 tablet (0 6 mg total) by mouth 2 (two) times a day (Patient not taking: Reported on 2/15/2022 ), Disp: 30 tablet, Rfl: 5    Cyanocobalamin (Vitamin B 12) 500 MCG TABS, Take 1 tablet by mouth (Patient not taking: Reported on 3/23/2022 ), Disp: , Rfl:     folic acid (FOLVITE) 1 mg tablet, Take 1 tablet (1 mg total) by mouth daily (Patient not taking: Reported on 1/12/2022 ), Disp: 30 tablet, Rfl: 6    Glucosamine HCl 1500 MG TABS, Take by mouth (Patient not taking: Reported on 4/18/2022 ), Disp: , Rfl:     levETIRAcetam (KEPPRA) 500 mg tablet, Take 1 tablet (500 mg total) by mouth every 12 (twelve) hours for 6 doses, Disp: 6 tablet, Rfl: 0    MULTIPLE VITAMINS ESSENTIAL PO, Take by mouth (Patient not taking: Reported on 2/15/2022 ), Disp: , Rfl:     Multiple Vitamins-Minerals (MULTIVITAL-M) TABS, Take by mouth daily  (Patient not taking: Reported on 4/2/2022 ), Disp: , Rfl:     neomycin-polymyxin-hydrocortisone (CORTISPORIN) otic solution, Administer 4 drops into the left ear every 6 (six) hours (Patient not taking: Reported on 1/12/2022 ), Disp: 10 mL, Rfl: 0    ondansetron (ZOFRAN) 4 mg tablet, Take 1 tablet (4 mg total) by mouth every 6 (six) hours (Patient not taking: Reported on 1/18/2022 ), Disp: 12 tablet, Rfl: 0    ondansetron (ZOFRAN) 8 mg tablet, Take 1 tablet (8 mg total) by mouth every 8 (eight) hours as needed for nausea or vomiting (Patient not taking: Reported on 1/12/2022 ), Disp: 20 tablet, Rfl: 3    oxyCODONE (ROXICODONE) 5 immediate release tablet, , Disp: , Rfl:     tamsulosin (FLOMAX) 0 4 mg, , Disp: , Rfl:     Results/Data: We reviewed his preoperative and postoperative images in detail

## 2022-04-26 ENCOUNTER — RA CDI HCC (OUTPATIENT)
Dept: OTHER | Facility: HOSPITAL | Age: 67
End: 2022-04-26

## 2022-04-26 ENCOUNTER — HOSPITAL ENCOUNTER (OUTPATIENT)
Dept: RADIOLOGY | Facility: HOSPITAL | Age: 67
Discharge: HOME | End: 2022-04-26
Attending: NEUROLOGICAL SURGERY
Payer: MEDICARE

## 2022-04-26 DIAGNOSIS — C79.31 BRAIN METASTASES (HCC): ICD-10-CM

## 2022-04-26 PROCEDURE — 70553 MRI BRAIN STEM W/O & W/DYE: CPT

## 2022-04-26 PROCEDURE — A9585 GADOBUTROL INJECTION: HCPCS | Performed by: NEUROLOGICAL SURGERY

## 2022-04-26 PROCEDURE — G1004 CDSM NDSC: HCPCS

## 2022-04-26 RX ADMIN — GADOBUTROL 8 ML: 604.72 INJECTION INTRAVENOUS at 18:01

## 2022-04-26 NOTE — PROGRESS NOTES
[id number]  [facility]  coding opportunities          Chart Reviewed number of suggestions sent to Provider: 1     Patients Insurance     Medicare Insurance: [employer]

## 2022-04-28 ENCOUNTER — CLINICAL SUPPORT (OUTPATIENT)
Dept: RADIATION ONCOLOGY | Facility: CLINIC | Age: 67
End: 2022-04-28
Attending: RADIOLOGY
Payer: MEDICARE

## 2022-04-28 ENCOUNTER — TELEPHONE (OUTPATIENT)
Dept: NEUROSURGERY | Facility: CLINIC | Age: 67
End: 2022-04-28

## 2022-04-28 VITALS
WEIGHT: 185 LBS | BODY MASS INDEX: 26.54 KG/M2 | RESPIRATION RATE: 16 BRPM | DIASTOLIC BLOOD PRESSURE: 78 MMHG | OXYGEN SATURATION: 98 % | SYSTOLIC BLOOD PRESSURE: 120 MMHG | TEMPERATURE: 97.2 F | HEART RATE: 68 BPM

## 2022-04-28 DIAGNOSIS — C34.92 ADENOCARCINOMA, LUNG, LEFT (HCC): Primary | ICD-10-CM

## 2022-04-28 DIAGNOSIS — C34.92 SQUAMOUS CARCINOMA OF LUNG, LEFT (HCC): ICD-10-CM

## 2022-04-28 DIAGNOSIS — C34.92 ADENOCARCINOMA, LUNG, LEFT (HCC): Primary | Chronic | ICD-10-CM

## 2022-04-28 PROCEDURE — 77263 THER RADIOLOGY TX PLNG CPLX: CPT | Performed by: RADIOLOGY

## 2022-04-28 PROCEDURE — 99211 OFF/OP EST MAY X REQ PHY/QHP: CPT | Performed by: RADIOLOGY

## 2022-04-28 PROCEDURE — 99024 POSTOP FOLLOW-UP VISIT: CPT | Performed by: RADIOLOGY

## 2022-04-28 NOTE — TELEPHONE ENCOUNTER
[place], front office, received a phone call from Tati Ordoñez reporting MRI was marked for significant notification  Routing message to Dr Marcellus Millan  Patient is scheduled to see radiation oncology today

## 2022-04-28 NOTE — PROGRESS NOTES
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 and had left upper lung lobectomy  He had a consultation with us on 10/25/2021  At that time he opted to go to [facility] for surgery  He completed adjuvant radiation therapy on 2/22/22  Since completing radiation therapy, he was admitted to the hospital and found to have brain metastasis  He returns today for EOT follow up      3/23/22 Medical Oncology  discussed rechallenging him with the adjuvant immunotherapy again while on the lowest dose of prednisone to see if the psoriasis will will reappear or will be under control while on low-dose prednisone      4/2/22 Hospital admission for Left parietal hemorrhagic tumor; presented to the ER with confusion difficulty speaking     4/2/22 CT stroke alert  Left parietal 4 6 cm intraparenchymal hemorrhage with surrounding cerebral edema   No midline shift      4/3/22 CT head wo contrast  Left parietal region intraparenchymal hematoma unchanged in size measuring 6 7 cm in largest dimension    New irregular hyperdense focus at the left cerebellum likely to represent small amount of subarachnoid hemorrhage versus a new intraparenchymal hemorrhage      4/3/22 MRI brain w wo contrast   2 left cerebellar hemorrhagic metastases and probable 2 adjacent left parietal hemorrhagic metastasis, the more anterior, larger one having parenchymal hematoma and local edema and mass effect, correlating to the hemorrhage on the prior head CT       4/4/22 CT c/a/p w contrast  No findings of metastatic disease in the chest abdomen pelvis  No acute compression collapse of the vertebra seen  No lytic destructive lesion seen  Emphysema  Postsurgical changes from left upper lobectomy  A linear density seen in the left mid to lower lung likely scarring   Routine surveillance can be continued     4/7/22 Image guided left parietal craniotomy for tumor resection  Operative Findings:  Large hemorrhagic mass, significant amount of necrosis on frozen section      4/8/22 MRI brain w wo contrast  Status post treatment of left parietal metastatic lesion with postop change as described above  This is the initial postoperative examination and will serve as baseline for follow-up exams  No significant residual tumor noted within the resection cavity    2 metastatic lesions within the left cerebellar hemisphere are unchanged      4/19/22 PET CT   Subtle asymmetric FDG activity in the left cerebellum, possibly corresponding to one of patient's known left cerebellar metastatic lesions which were better characterized on recent MRI of the brain  2   No focal tracer activity characteristic of hypermetabolic malignancy involving the neck, chest, abdomen, pelvis, or osseous structures  Note is made of subtle FDG activity associated with nodular and somewhat linear airspace disease in the left lung adjacent to left cardiac border which I favor to reflect posttreatment/inflammatory changes with hypermetabolic malignancy considered less   likely   Short interval follow-up with CT of chest in 3 months is recommended to ensure stability and exclude subtle developing malignancy  Please see above for details and additional findings      4/25/22 Neurosurgery: post op visit  F/U 4 weeks  MRI ordered      4/26/2022 MRI brain  Decreased enhancement in left parietal resection cavity   No definite evidence of recurrent tumor    Slightly increased size of left posterolateral cerebellar metastatic hemorrhagic lesion  Unchanged left parietal cortex and left posterior cerebellar metastatic hemorrhagic lesions  The study was marked in EPIC for significant notification      5/3/22 Medical Oncology              Interval History:  Still with some balance problem  Historical Information   Oncology History   Adenocarcinoma, lung, left ([facility] [address] )   5/6/2021 Biopsy    [facility]  FINAL DIAGNOSIS     Lung, Left Upper Lobe, core Biopsy ([id number], 5/6/2021):    - Poorly differentiated non-small cell carcinoma, favor adenocarcinoma, with neuroendocrine differentiation of uncertain clinical significance (see letter below)  5/25/2021 Biopsy    A  Lymph Node, 4R, Excision:  - Two reactive lymph nodes with anthracosis (0/2)  B  Lymph Node, 2R, Excision:  - Two reactive lymph nodes with anthracosis (0/2)  C  Lymph Node, 2L, Excision:  - Three reactive lymph nodes with anthracosis (0/3)  D  Lymph Node, 4L, Excision:  - Two reactive lymph nodes with anthracosis (0/2)  E  Lymph Node, Level 7, Excision:  - Two reactive lymph nodes with anthracosis (0/2)              6/9/2021 Initial Diagnosis    Adenocarcinoma, lung, left ([facility] [address] )     6/25/2021 - 8/27/2021 Chemotherapy    cyanocobalamin, 1,000 mcg, Intramuscular, Once, 2 of 3 cycles  Administration: 1,000 mcg (8/6/2021), 1,000 mcg (6/25/2021)  pegfilgrastim (Keara Kirkland), 6 mg, Subcutaneous, Once, 3 of 5 cycles  Administration: 6 mg (7/16/2021), 6 mg (8/6/2021), 6 mg (8/27/2021)  fosaprepitant (EMEND) IVPB, 150 mg, Intravenous, Once, 4 of 6 cycles  Administration: 150 mg (6/25/2021), 150 mg (8/6/2021), 150 mg (8/27/2021), 150 mg (7/16/2021)  CARBOplatin (PARAPLATIN) IVPB (GO AUC DOSING), 496 mg, Intravenous, Once, 4 of 6 cycles  Administration: 496 mg (6/25/2021), 492 5 mg (8/6/2021), 462 mg (8/27/2021), 516 mg (7/16/2021)  pemetrexed (ALIMTA) chemo infusion, 975 mg, Intravenous, Once, 4 of 6 cycles  Administration: 1,000 mg (6/25/2021), 1,000 mg (8/6/2021), 1,000 mg (8/27/2021), 1,000 mg (7/16/2021)     12/16/2021 Surgery    Left Upper Lung Lobectomy at [facility] by Dr Sylvia Camarillo  Tumor size: 7 6 cm Percentage of viable tumor: 40%  Histologic Grade: Undifferentiated (G$)  Lymphovascular Invasion: Present (extensive)  Perineural Invasion: Not identified  Spread through Air Spaces: Present  Pleural Invasion: tumor invades to the visceral pleural surface  Tumor Extension: Hilar soft tissue  Bronchial Margin: no tumor seen  Vascular Margin: no tumor seen  Distance from Margin: distance of invasive tumor from closest margin: 0 1 cm  Closest margin: vascular margin  Neoadjuvant T staging: ypT2a  Neoadjuvant N staging: ypN0           1/18/2022 - 2/22/2022 Radiation    Treatments:  Course: C1    Plan ID Energy Fractions Dose per Fraction (cGy) Dose Correction (cGy) Total Dose Delivered (cGy) Elapsed Days   L Lung Med 6X 25 / 25 180 0 4,500 35      Treatment Dates:  1/18/2022 - 2/22/2022 1/27/2022 -  Chemotherapy    atezolizumab (TECENTRIQ) IVPB, 1,200 mg, Intravenous, Once, 2 of 6 cycles  Administration: 1,200 mg (1/27/2022), 1,200 mg (2/16/2022)     4/7/2022 Surgery    Final Diagnosis   A-B  Brain, Left parietal mass, Resection:  - 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, consistent with patient's known lung primary           Cancer of upper lobe of left lung ([address] )   11/4/2021 Initial Diagnosis    Cancer of upper lobe of left lung ([address] )     Brain metastases ([address] )   4/25/2022 Initial Diagnosis    Brain metastases ([address] )         Past Medical History:   Diagnosis Date    Hyperlipidemia     Hypertension     Lung [facility])      Past Surgical History:   Procedure Laterality Date    BACK SURGERY      CRANIOTOMY Left 4/7/2022    Procedure: Image guided left parietal craniotomy for tumor resection;  Surgeon: Krista Wyman MD;  [facility];  Service: Neurosurgery [address] IR BIOPSY LUNG  2021    IR PORT PLACEMENT  2021    LAMINECTOMY  2018    L4-L5    LUNG SURGERY      left upper lobectomy    AZ BRONCHOSCOPY,DIAGNOSTIC N/A 2021    Procedure: BRONCHOSCOPY FLEXIBLE;  Surgeon: Johan Padilla MD;  [facility];  Service: Thoracic    [place] MEDIASTINOSCOPY WITH LYMPH NODE BIOPSY/IES N/A 2021    Procedure: MEDIASTINOSCOPY, flexible bronchoscopy;  Surgeon: Johan Padilla MD;  [facility];  Service: Thoracic    TONSILLECTOMY         Social History   Social History     Substance and Sexual Activity   Alcohol Use Not Currently    Alcohol/week: 7 0 standard drinks    Types: 7 Cans of beer per week     Social History     Substance and Sexual Activity   Drug Use Yes    Types: Marijuana    Comment: seldom     Social History     Tobacco Use   Smoking Status Former Smoker    Packs/day: 1 00    Years: 40 00    Pack years: 40 00    Types: Cigarettes    Start date:     Quit date: 2017    Years since quittin 2   Smokeless Tobacco Never Used   Tobacco Comment    quit 2017         Meds/Allergies     Current Outpatient Medications:     allopurinol (ZYLOPRIM) 100 mg tablet, Take 1 tablet (100 mg total) by mouth daily, Disp: 30 tablet, Rfl: 5    amLODIPine (NORVASC) 10 mg tablet, TAKE ONE TABLET BY MOUTH EVERY DAY, Disp: 90 tablet, Rfl: 3    citalopram (CeleXA) 10 mg tablet, Take 1 tablet (10 mg total) by mouth 2 (two) times a day, Disp: 60 tablet, Rfl: 0    dexamethasone (DECADRON) 2 mg tablet, Take 2mg PO Q6hr for 2 days, followed by 2mg PO Q8hr for 2 days, followed by 2mg Q12hr indefinitely thereafter, Disp: 66 tablet, Rfl: 0    doxepin (SINEquan) 25 mg capsule, TAKE ONE CAPSULE BY MOUTH EVERY DAY AT BEDTIME, Disp: 30 capsule, Rfl: 5    levothyroxine 75 mcg tablet, Take 0 5 tablets (37 5 mcg total) by mouth daily in the early morning, Disp: 15 tablet, Rfl: 0    metoprolol succinate (TOPROL-XL) 25 mg 24 hr tablet, Take 1 tablet (25 mg total) by mouth daily, Disp: 30 tablet, Rfl: 5    polyethylene glycol (GLYCOLAX) 17 GM/SCOOP powder, Take 17 g by mouth 2 (two) times a day, Disp: 850 g, Rfl: 0    rosuvastatin (CRESTOR) 10 MG tablet, TAKE ONE TABLET BY MOUTH EVERY DAY, Disp: 30 tablet, Rfl: 5    Ascorbic Acid (vitamin C) 1000 MG tablet, Take 1,000 mg by mouth daily   (Patient not taking: Reported on 4/28/2022 ), Disp: , Rfl:     B Complex Vitamins (B COMPLEX 1 PO), Take 1 tablet by mouth daily  (Patient not taking: Reported on 4/28/2022 ), Disp: , Rfl:     B Complex Vitamins (BL VITAMIN B COMPLEX PO), Take by mouth   (Patient not taking: Reported on 4/28/2022 ), Disp: , Rfl:     betamethasone valerate (VALISONE) 0 1 % cream, Apply topically 2 (two) times a day (Patient not taking: Reported on 4/28/2022 ), Disp: 45 g, Rfl: 1    betamethasone, augmented, (DIPROLENE-AF) 0 05 % cream,   (Patient not taking: Reported on 4/28/2022 ), Disp: , Rfl:     calcipotriene (DOVONEX) 0 005 % cream, , Disp: , Rfl:     calcipotriene (DOVONOX) 0 005 % ointment, Apply topically 2 (two) times a day As needed (Patient not taking: Reported on 4/28/2022 ), Disp: , Rfl:     Cholecalciferol (VITAMIN D3 PO), Take 10,000 Units by mouth daily   (Patient not taking: Reported on 4/28/2022 ), Disp: , Rfl:     Clobetasol Propionate (Impoyz) 0 025 % CREA, Apply topically (Patient not taking: Reported on 4/28/2022 ), Disp: , Rfl:     colchicine (COLCRYS) 0 6 mg tablet, Take 1 tablet (0 6 mg total) by mouth 2 (two) times a day (Patient not taking: Reported on 2/15/2022 ), Disp: 30 tablet, Rfl: 5    Cyanocobalamin (Vitamin B 12) 500 MCG TABS, Take 1 tablet by mouth (Patient not taking: Reported on 3/23/2022 ), Disp: , Rfl:     fluocinolone (SYNALAR) 0 01 % external solution,   (Patient not taking: Reported on 4/28/2022 ), Disp: , Rfl:     folic acid (FOLVITE) 1 mg tablet, Take 1 tablet (1 mg total) by mouth daily (Patient not taking: Reported on 1/12/2022 ), Disp: 30 tablet, Rfl: 6    gabapentin (NEURONTIN) 100 mg capsule,   (Patient not taking: Reported on 4/28/2022 ), Disp: , Rfl:     Garlic 10 MG CAPS, Take 1 tablet by mouth daily  (Patient not taking: Reported on 4/28/2022 ), Disp: , Rfl:     Glucosamine HCl 1500 MG TABS, Take by mouth (Patient not taking: Reported on 4/18/2022 ), Disp: , Rfl:     Glucosamine-Chondroit-Vit C-Mn (GLUCOSAMINE 1500 COMPLEX) CAPS, daily  (Patient not taking: Reported on 4/28/2022 ), Disp: , Rfl:     ketoconazole (NIZORAL) 2 % cream, Apply topically 2 (two) times a day (Patient not taking: Reported on 4/28/2022 ), Disp: 15 g, Rfl: 2    levETIRAcetam (KEPPRA) 500 mg tablet, Take 1 tablet (500 mg total) by mouth every 12 (twelve) hours for 6 doses, Disp: 6 tablet, Rfl: 0    meclizine (ANTIVERT) 25 mg tablet, Take 1 tablet (25 mg total) by mouth 4 (four) times a day as needed for dizziness (Patient not taking: Reported on 4/28/2022 ), Disp: 30 tablet, Rfl: 0    MULTIPLE VITAMINS ESSENTIAL PO, Take by mouth (Patient not taking: Reported on 2/15/2022 ), Disp: , Rfl:     Multiple Vitamins-Minerals (MULTIVITAL-M) TABS, Take by mouth daily  (Patient not taking: Reported on 4/2/2022 ), Disp: , Rfl:     neomycin-polymyxin-hydrocortisone (CORTISPORIN) otic solution, Administer 4 drops into the left ear every 6 (six) hours (Patient not taking: Reported on 1/12/2022 ), Disp: 10 mL, Rfl: 0    ondansetron (ZOFRAN) 4 mg tablet, Take 1 tablet (4 mg total) by mouth every 6 (six) hours (Patient not taking: Reported on 1/18/2022 ), Disp: 12 tablet, Rfl: 0    ondansetron (ZOFRAN) 8 mg tablet, Take 1 tablet (8 mg total) by mouth every 8 (eight) hours as needed for nausea or vomiting (Patient not taking: Reported on 1/12/2022 ), Disp: 20 tablet, Rfl: 3    oxyCODONE (ROXICODONE) 5 immediate release tablet, , Disp: , Rfl:     pantoprazole (PROTONIX) 40 mg tablet, Take 1 tablet (40 mg total) by mouth daily (Patient not taking: Reported on 4/28/2022 ), Disp: 30 tablet, Rfl: 11    tamsulosin (FLOMAX) 0 4 mg, , Disp: , Rfl:     traMADol (ULTRAM) 50 mg tablet, TAKE 1 TABLET BY MOUTH EVERY 8 HOURS AS NEEDED FOR SEVERE PAIN (Patient not taking: Reported on 4/28/2022 ), Disp: 30 tablet, Rfl: 0    triamcinolone (KENALOG) 0 1 % cream, , Disp: , Rfl:     zinc gluconate 50 mg tablet, Take 50 mg by mouth daily (Patient not taking: Reported on 4/28/2022 ), Disp: , Rfl:   Allergies   Allergen Reactions    Bee Venom     Benazepril Other (See Comments)     Angioedema     Latex Other (See Comments)     Burning of eyes at the dentist from the gloves    Meloxicam GI Intolerance    Penicillin G Rash         Review of Systems   Constitutional: Positive for activity change and fatigue  Negative for appetite change, fever and unexpected weight change  HENT: Negative for congestion, facial swelling, mouth sores, nosebleeds, rhinorrhea, sneezing, sore throat and voice change  Eyes: Negative  Respiratory: Negative for cough and shortness of breath  Cardiovascular: Negative for chest pain, palpitations and leg swelling  Gastrointestinal: Negative for abdominal pain, blood in stool, nausea and vomiting  Endocrine: Negative  Genitourinary: Negative for difficulty urinating, dysuria, flank pain and hematuria  Musculoskeletal: Positive for gait problem  Negative for arthralgias, back pain, joint swelling and neck pain  Skin: Negative  Allergic/Immunologic: Negative  Neurological: Positive for dizziness and headaches  Negative for speech difficulty, weakness and numbness  Hematological: Negative  Psychiatric/Behavioral: Negative              OBJECTIVE:   /78   Pulse 68   Temp (!) 97 2 °F (36 2 °C)   Resp 16   Wt 83 9 kg (185 lb)   SpO2 98%   BMI 26 54 kg/m²   Pain Assessment:  1  Karnofsky: 80 - Normal activity with effort; some signs or symptoms of disease    Physical Exam  Constitutional:       General: He is not in acute distress  Appearance: Normal appearance  He is normal weight  HENT:      Head: Normocephalic  Nose: No congestion  Mouth/Throat:      Pharynx: Oropharynx is clear  Eyes:      Extraocular Movements: Extraocular movements intact  Pupils: Pupils are equal, round, and reactive to light  Cardiovascular:      Rate and Rhythm: Normal rate and regular rhythm  Heart sounds: Normal heart sounds  Pulmonary:      Effort: Pulmonary effort is normal       Breath sounds: Normal breath sounds  Abdominal:      Palpations: Abdomen is soft  There is no mass  Tenderness: There is no abdominal tenderness  Musculoskeletal:         General: No swelling  Normal range of motion  Cervical back: Normal range of motion and neck supple  Lymphadenopathy:      Cervical: No cervical adenopathy  Skin:     General: Skin is dry  Findings: No lesion or rash  Neurological:      General: No focal deficit present  Mental Status: He is alert and oriented to person, place, and time  Sensory: No sensory deficit  Motor: No weakness        Gait: Gait abnormal    Psychiatric:         Mood and Affect: Mood normal          Behavior: Behavior normal               RESULTS    Lab Results:   Recent Results (from the past 672 hour(s))   Fingerstick Glucose (POCT)    Collection Time: 04/02/22  7:28 PM   Result Value Ref Range    POC Glucose 104 65 - 140 mg/dl   CBC and Platelet    Collection Time: 04/02/22  7:42 PM   Result Value Ref Range    WBC 9 76 4 31 - 10 16 Thousand/uL    RBC 4 67 3 88 - 5 62 Million/uL    Hemoglobin 15 0 12 0 - 17 0 g/dL    Hematocrit 46 2 36 5 - 49 3 %    MCV 99 (H) 82 - 98 fL    MCH 32 1 26 8 - 34 3 pg    MCHC 32 5 31 4 - 37 4 g/dL    RDW 19 4 (H) 11 6 - 15 1 %    Platelets 714 (L) 423 - 390 Thousands/uL    MPV 8 6 (L) 8 9 - 12 7 fL   Protime-INR    Collection Time: 04/02/22  7:42 PM   Result Value Ref Range    Protime 12 1 11 6 - 14 5 seconds    INR 0  90 0 84 - 1 19   APTT    Collection Time: 04/02/22  7:42 PM   Result Value Ref Range    PTT 30 23 - 37 seconds   HS Troponin 0hr (reflex protocol)    Collection Time: 04/02/22  7:42 PM   Result Value Ref Range    hs TnI 0hr 6 "Refer to ACS Flowchart"- see link ng/L   Comprehensive metabolic panel    Collection Time: 04/02/22  7:42 PM   Result Value Ref Range    Sodium 136 135 - 147 mmol/L    Potassium 4 1 3 5 - 5 3 mmol/L    Chloride 99 96 - 108 mmol/L    CO2 28 21 - 32 mmol/L    ANION GAP 9 4 - 13 mmol/L    BUN 25 5 - 25 mg/dL    Creatinine 1 32 (H) 0 60 - 1 30 mg/dL    Glucose 96 65 - 140 mg/dL    Calcium 9 9 8 4 - 10 2 mg/dL    AST 24 13 - 39 U/L    ALT 36 7 - 52 U/L    Alkaline Phosphatase 55 34 - 104 U/L    Total Protein 7 1 6 4 - 8 4 g/dL    Albumin 4 1 3 5 - 5 0 g/dL    Total Bilirubin 0 49 0 20 - 1 00 mg/dL    eGFR 55 ml/min/1 73sq m   Ammonia    Collection Time: 04/02/22  7:42 PM   Result Value Ref Range    Ammonia 27 18 - 72 umol/L   Blood gas, venous    Collection Time: 04/02/22  7:42 PM   Result Value Ref Range    pH, Nirmal 7 390 7 300 - 7 400    pCO2, Nirmal 44 9 42 0 - 50 0 mm Hg    pO2, Nirmal 27 9 (L) 35 0 - 45 0 mm Hg    HCO3, Nirmal 26 6 24 - 30 mmol/L    Base Excess, Nirmal 1 1 mmol/L    O2 Content, Nirmal 11 8 ml/dL    O2 HGB, VENOUS 53 2 (L) 60 0 - 80 0 %   TSH, 3rd generation with Free T4 reflex    Collection Time: 04/02/22  7:42 PM   Result Value Ref Range    TSH 3RD GENERATON 6 162 (H) 0 450 - 5 330 uIU/mL   T4, free    Collection Time: 04/02/22  7:42 PM   Result Value Ref Range    Free T4 0 82 0 76 - 1 46 ng/dL   ECG 12 lead    Collection Time: 04/02/22  7:58 PM   Result Value Ref Range    Ventricular Rate 84 BPM    Atrial Rate 84 BPM    NY Interval 112 ms    QRSD Interval 88 ms    QT Interval 364 ms    QTC Interval 430 ms    P Axis 94 degrees    QRS Axis 172 degrees    T Wave Axis 115 degrees   ECG 12 lead    Collection Time: 04/02/22  7:59 PM   Result Value Ref Range    Ventricular Rate 83 BPM    Atrial Rate 83 BPM    NM Interval 106 ms    QRSD Interval 88 ms    QT Interval 374 ms    QTC Interval 439 ms    P Shushan 69 degrees    QRS Axis 16 degrees    T Wave Axis 64 degrees   COVID/FLU/RSV - 2 hour TAT    Collection Time: 04/02/22  8:04 PM    Specimen: Nose; Nares   Result Value Ref Range    SARS-CoV-2 Negative Negative    INFLUENZA A PCR Negative Negative    INFLUENZA B PCR Negative Negative    RSV PCR Negative Negative   Rapid drug screen, urine    Collection Time: 04/03/22  5:35 AM   Result Value Ref Range    Amph/Meth UR Negative Negative    Barbiturate Ur Negative Negative    Benzodiazepine Urine Negative Negative    Cocaine Urine Negative Negative    Methadone Urine Negative Negative    Opiate Urine Negative Negative    PCP Ur Negative Negative    THC Urine Positive (A) Negative    Oxycodone Urine Negative Negative   TSH, 3rd generation with Free T4 reflex    Collection Time: 04/03/22  5:35 AM   Result Value Ref Range    TSH 3RD GENERATON 5 550 (H) 0 358 - 3 740 uIU/mL   Hemoglobin A1c w/EAG Estimation    Collection Time: 04/03/22  5:35 AM   Result Value Ref Range    Hemoglobin A1C 5 2 Normal 3 8-5 6%; PreDiabetic 5 7-6 4%;  Diabetic >=6 5%; Glycemic control for adults with diabetes <7 0% %     mg/dl   Lipid Panel with Direct LDL reflex    Collection Time: 04/03/22  5:35 AM   Result Value Ref Range    Cholesterol 193 See Comment mg/dL    Triglycerides 137 See Comment mg/dL    HDL, Direct 70 >=40 mg/dL    LDL Calculated 96 0 - 100 mg/dL   Comprehensive metabolic panel    Collection Time: 04/03/22  5:35 AM   Result Value Ref Range    Sodium 136 136 - 145 mmol/L    Potassium 4 3 3 5 - 5 3 mmol/L    Chloride 104 100 - 108 mmol/L    CO2 29 21 - 32 mmol/L    ANION GAP 3 (L) 4 - 13 mmol/L    BUN 21 5 - 25 mg/dL    Creatinine 1 17 0 60 - 1 30 mg/dL    Glucose 108 65 - 140 mg/dL    Calcium 9 5 8 3 - 10 1 mg/dL    Corrected Calcium 10 0 8 3 - 10 1 mg/dL    AST 13 5 - 45 U/L    ALT 43 12 - 78 U/L    Alkaline Phosphatase 65 46 - 116 U/L    Total Protein 6 9 6 4 - 8 2 g/dL    Albumin 3 4 (L) 3 5 - 5 0 g/dL    Total Bilirubin 0 69 0 20 - 1 00 mg/dL    eGFR 63 ml/min/1 73sq m   Magnesium    Collection Time: 04/03/22  5:35 AM   Result Value Ref Range    Magnesium 2 2 1 6 - 2 6 mg/dL   Phosphorus    Collection Time: 04/03/22  5:35 AM   Result Value Ref Range    Phosphorus 4 0 2 3 - 4 1 mg/dL   CBC and differential    Collection Time: 04/03/22  5:35 AM   Result Value Ref Range    WBC 10 54 (H) 4 31 - 10 16 Thousand/uL    RBC 4 63 3 88 - 5 62 Million/uL    Hemoglobin 14 4 12 0 - 17 0 g/dL    Hematocrit 45 6 36 5 - 49 3 %    MCV 99 (H) 82 - 98 fL    MCH 31 1 26 8 - 34 3 pg    MCHC 31 6 31 4 - 37 4 g/dL    RDW 19 6 (H) 11 6 - 15 1 %    MPV 9 5 8 9 - 12 7 fL    Platelets 082 (L) 960 - 390 Thousands/uL    nRBC 0 /100 WBCs    Neutrophils Relative 88 (H) 43 - 75 %    Immat GRANS % 1 0 - 2 %    Lymphocytes Relative 6 (L) 14 - 44 %    Monocytes Relative 5 4 - 12 %    Eosinophils Relative 0 0 - 6 %    Basophils Relative 0 0 - 1 %    Neutrophils Absolute 9 30 (H) 1 85 - 7 62 Thousands/µL    Immature Grans Absolute 0 06 0 00 - 0 20 Thousand/uL    Lymphocytes Absolute 0 63 0 60 - 4 47 Thousands/µL    Monocytes Absolute 0 52 0 17 - 1 22 Thousand/µL    Eosinophils Absolute 0 02 0 00 - 0 61 Thousand/µL    Basophils Absolute 0 01 0 00 - 0 10 Thousands/µL   Protime-INR    Collection Time: 04/03/22  5:35 AM   Result Value Ref Range    Protime 12 4 11 6 - 14 5 seconds    INR 0 96 0 84 - 1 19   T4, free    Collection Time: 04/03/22  5:35 AM   Result Value Ref Range    Free T4 0 76 0 76 - 1 46 ng/dL   Echo follow up/limited w/ contrast if indicated    Collection Time: 04/03/22 11:36 AM   Result Value Ref Range    A4C EF 63 %    LVIDd 4 50 4 93 - 7 34 cm    LVIDS 2 80 3 00 - 4 54 cm    IVSd 1 00 cm    LVPWd 1 00 0 52 - 0 99 cm    FS 38 28 - 44 %    MV E' Tissue Velocity Septal 10 cm/s    MV E' Tissue Velocity Lateral 10 cm/s    E wave deceleration time 199 ms    MV Peak E Elijah 48 cm/s    MV Peak A Elijah 0 66 m/s    AV LVOT peak gradient 3 mmHg    LVOT peak VTI 14 92 cm    LVOT peak elijah 0 93 m/s    RVID d 3 5 cm    RA 2D Volume 66 0 mL    RAA A4C 22 5 cm2    Ao peak elijah retrograde 0 92 m/s    Ao VTI 14 29 cm    AV mean gradient 1 mmHg    LVOT mn grad 1 0 mmHg    AV peak gradient 3 mmHg    MV stenosis pressure 1/2 time 58 ms    MV valve area p 1/2 method 3 79 cm2    TR Peak Elijah 2 7 m/s    Triscuspid Valve Regurgitation Peak Gradient 30 0 mmHg    Ao root 3 50 cm    Asc Ao 3 5 2 04 - 3 06 cm    Aortic valve mean velocity 4 80 m/s    Tricuspid valve peak regurgitation velocity 2 73 m/s    Left ventricular stroke volume (2D) 60 00 mL    IVS 1 0 54 - 1 00 cm    LEFT VENTRICLE SYSTOLIC VOLUME (MOD BIPLANE) 2D 30 mL    LV DIASTOLIC VOLUME (MOD BIPLANE) 2D 91 mL    Left Atrium Area-systolic Four Chamber 13 cm2    LVSV, 2D 60 mL    AV Velocity Ratio 1 01     Ao asc z-score 3 70     ZLVPWD 2 04     ZLVIDS -2 18     ZLVIDD -2 90     ZIVSD 1 93     LV EF 65     Est  RA pres 8 0 mmHg    Right Ventricular Peak Systolic Pressure 67 20 mmHg   ECG 12 lead    Collection Time: 04/03/22  9:32 PM   Result Value Ref Range    Ventricular Rate 84 BPM    Atrial Rate 84 BPM    MT Interval 117 ms    QRSD Interval 83 ms    QT Interval 367 ms    QTC Interval 434 ms    P [place] 59 degrees    QRS Axis 20 degrees    T Wave Axis 68 degrees   CBC    Collection Time: 04/04/22  5:21 AM   Result Value Ref Range    WBC 9 54 4 31 - 10 16 Thousand/uL    RBC 4 25 3 88 - 5 62 Million/uL    Hemoglobin 13 3 12 0 - 17 0 g/dL    Hematocrit 40 2 36 5 - 49 3 %    MCV 95 82 - 98 fL    MCH 31 3 26 8 - 34 3 pg    MCHC 33 1 31 4 - 37 4 g/dL    RDW 18 8 (H) 11 6 - 15 1 %    Platelets 547 (L) 838 - 390 Thousands/uL    MPV 8 8 (L) 8 9 - 12 7 fL   Comprehensive metabolic panel    Collection Time: 04/04/22  5:21 AM   Result Value Ref Range    Sodium 139 136 - 145 mmol/L    Potassium 4 0 3 5 - 5 3 mmol/L    Chloride 108 100 - 108 mmol/L    CO2 22 21 - 32 mmol/L    ANION GAP 9 4 - 13 mmol/L    BUN 34 (H) 5 - 25 mg/dL    Creatinine 1 19 0 60 - 1 30 mg/dL    Glucose 135 65 - 140 mg/dL    Calcium 9 1 8 3 - 10 1 mg/dL    Corrected Calcium 9 8 8 3 - 10 1 mg/dL    AST 14 5 - 45 U/L    ALT 35 12 - 78 U/L    Alkaline Phosphatase 53 46 - 116 U/L    Total Protein 6 4 6 4 - 8 2 g/dL    Albumin 3 1 (L) 3 5 - 5 0 g/dL    Total Bilirubin 0 38 0 20 - 1 00 mg/dL    eGFR 62 ml/min/1 73sq m   Phosphorus    Collection Time: 04/04/22  5:21 AM   Result Value Ref Range    Phosphorus 3 6 2 3 - 4 1 mg/dL   Magnesium    Collection Time: 04/04/22  5:21 AM   Result Value Ref Range    Magnesium 2 5 1 6 - 2 6 mg/dL   Basic metabolic panel    Collection Time: 04/05/22  5:26 AM   Result Value Ref Range    Sodium 139 136 - 145 mmol/L    Potassium 3 9 3 5 - 5 3 mmol/L    Chloride 108 100 - 108 mmol/L    CO2 24 21 - 32 mmol/L    ANION GAP 7 4 - 13 mmol/L    BUN 36 (H) 5 - 25 mg/dL    Creatinine 1 14 0 60 - 1 30 mg/dL    Glucose 129 65 - 140 mg/dL    Calcium 9 0 8 3 - 10 1 mg/dL    eGFR 65 ml/min/1 73sq m   CBC and differential    Collection Time: 04/05/22  5:26 AM   Result Value Ref Range    WBC 10 01 4 31 - 10 16 Thousand/uL    RBC 4 26 3 88 - 5 62 Million/uL    Hemoglobin 13 3 12 0 - 17 0 g/dL    Hematocrit 40 8 36 5 - 49 3 %    MCV 96 82 - 98 fL    MCH 31 2 26 8 - 34 3 pg    MCHC 32 6 31 4 - 37 4 g/dL    RDW 18 8 (H) 11 6 - 15 1 %    MPV 9 6 8 9 - 12 7 fL    Platelets 347 (L) 916 - 390 Thousands/uL   Manual Differential(PHLEBS Do Not Order)    Collection Time: 04/05/22  5:26 AM   Result Value Ref Range    Segmented % 94 (H) 43 - 75 %    Lymphocytes % 4 (L) 14 - 44 %    Monocytes % 0 (L) 4 - 12 %    Eosinophils, % 0 0 - 6 %    Basophils % 0 0 - 1 %    Atypical Lymphocytes % 2 (H) <=0 %    Absolute Neutrophils 9 41 (H) 1 85 - 7 62 Thousand/uL    Lymphocytes Absolute 0 40 (L) 0 60 - 4 47 Thousand/uL    Monocytes Absolute 0 00 0 00 - 1 22 Thousand/uL    Eosinophils Absolute 0 00 0 00 - 0 40 Thousand/uL    Basophils Absolute 0 00 0 00 - 0 10 Thousand/uL    Total Counted      RBC Morphology Present     Anisocytosis Present     Platelet Estimate Decreased (A) Adequate    Artifact Present    Basic metabolic panel    Collection Time: 04/06/22  7:22 AM   Result Value Ref Range    Sodium 138 136 - 145 mmol/L    Potassium 3 9 3 5 - 5 3 mmol/L    Chloride 108 100 - 108 mmol/L    CO2 25 21 - 32 mmol/L    ANION GAP 5 4 - 13 mmol/L    BUN 32 (H) 5 - 25 mg/dL    Creatinine 1 10 0 60 - 1 30 mg/dL    Glucose 109 65 - 140 mg/dL    Calcium 9 3 8 3 - 10 1 mg/dL    eGFR 68 ml/min/1 73sq m   CBC and differential    Collection Time: 04/06/22  9:30 AM   Result Value Ref Range    WBC 7 72 4 31 - 10 16 Thousand/uL    RBC 4 59 3 88 - 5 62 Million/uL    Hemoglobin 14 5 12 0 - 17 0 g/dL    Hematocrit 45 4 36 5 - 49 3 %    MCV 99 (H) 82 - 98 fL    MCH 31 6 26 8 - 34 3 pg    MCHC 31 9 31 4 - 37 4 g/dL    RDW 18 6 (H) 11 6 - 15 1 %    MPV 9 7 8 9 - 12 7 fL    Platelets 124 (L) 028 - 390 Thousands/uL   Manual Differential(PHLEBS Do Not Order)    Collection Time: 04/06/22  9:30 AM   Result Value Ref Range    Segmented % 94 (H) 43 - 75 %    Lymphocytes % 2 (L) 14 - 44 %    Monocytes % 4 4 - 12 %    Eosinophils, % 0 0 - 6 %    Basophils % 0 0 - 1 %    Absolute Neutrophils 7 26 1 85 - 7 62 Thousand/uL    Lymphocytes Absolute 0 15 (L) 0 60 - 4 47 Thousand/uL    Monocytes Absolute 0 31 0 00 - 1 22 Thousand/uL    Eosinophils Absolute 0 00 0 00 - 0 40 Thousand/uL    Basophils Absolute 0 00 0 00 - 0 10 Thousand/uL    Total Counted      RBC Morphology Present     Anisocytosis Present     Platelet Estimate Decreased (A) Adequate    Artifact Present    Protime-INR    Collection Time: 04/06/22 11:53 AM   Result Value Ref Range    Protime 12 9 11 6 - 14 5 seconds    INR 1 01 0 84 - 1 19   APTT    Collection Time: 04/06/22 11:53 AM   Result Value Ref Range    PTT 29 23 - 37 seconds   Type and screen    Collection Time: 04/06/22 11:53 AM   Result Value Ref Range    ABO Grouping O     Rh Factor Positive     Antibody Screen Positive     Specimen Expiration Date [id number]    Antibody identification    Collection Time: 04/06/22 11:53 AM   Result Value Ref Range    ANTIBODY ID   #1 Anti-E    Blood typing, RBC antigens    Collection Time: 04/06/22 11:53 AM   Result Value Ref Range    E ANTIGEN Negative    UA w Reflex to Microscopic w Reflex to Culture    Collection Time: 04/06/22  5:08 PM    Specimen: Urine, Clean Catch   Result Value Ref Range    Color, UA Light Yellow     Clarity, UA Clear     Specific Gravity, UA 1 017 1 003 - 1 030    pH, UA 5 5 4 5, 5 0, 5 5, 6 0, 6 5, 7 0, 7 5, 8 0    Leukocytes, UA Negative Negative    Nitrite, UA Negative Negative    Protein, UA Negative Negative mg/dl    Glucose, UA Negative Negative mg/dl    Ketones, UA Negative Negative mg/dl    Urobilinogen, UA <2 0 <2 0 mg/dl mg/dl    Bilirubin, UA Negative Negative    Blood, UA Negative Negative   Basic metabolic panel    Collection Time: 04/07/22  5:39 AM   Result Value Ref Range    Sodium 138 136 - 145 mmol/L    Potassium 4 1 3 5 - 5 3 mmol/L    Chloride 104 100 - 108 mmol/L    CO2 28 21 - 32 mmol/L    ANION GAP 6 4 - 13 mmol/L    BUN 30 (H) 5 - 25 mg/dL    Creatinine 1 15 0 60 - 1 30 mg/dL    Glucose 121 65 - 140 mg/dL    Calcium 8 9 8 3 - 10 1 mg/dL    eGFR 65 ml/min/1 73sq m   CBC and differential    Collection Time: 04/07/22  5:39 AM   Result Value Ref Range    WBC 6 78 4 31 - 10 16 Thousand/uL    RBC 4 31 3 88 - 5 62 Million/uL    Hemoglobin 13 5 12 0 - 17 0 g/dL    Hematocrit 41 6 36 5 - 49 3 %    MCV 97 82 - 98 fL    MCH 31 3 26 8 - 34 3 pg    MCHC 32 5 31 4 - 37 4 g/dL    RDW 18 0 (H) 11 6 - 15 1 %    MPV 10 2 8 9 - 12 7 fL    Platelets 233 (L) 069 - 390 Thousands/uL   Manual Differential(PHLEBS Do Not Order)    Collection Time: 04/07/22  5:39 AM   Result Value Ref Range    Segmented % 98 (H) 43 - 75 %    Lymphocytes % 1 (L) 14 - 44 %    Monocytes % 1 (L) 4 - 12 %    Eosinophils, % 0 0 - 6 %    Basophils % 0 0 - 1 %    Absolute Neutrophils 6 64 1 85 - 7 62 Thousand/uL    Lymphocytes Absolute 0 07 (L) 0 60 - 4 47 Thousand/uL    Monocytes Absolute 0 07 0 00 - 1 22 Thousand/uL    Eosinophils Absolute 0 00 0 00 - 0 40 Thousand/uL    Basophils Absolute 0 00 0 00 - 0 10 Thousand/uL    Total Counted      RBC Morphology Present     Anisocytosis Present     Platelet Estimate Decreased (A) Adequate    Artifact Present    Tissue Exam    Collection Time: 04/07/22 11:06 AM   Result Value Ref Range    Case Report       Surgical Pathology Report                         Case: [id number]                                   Authorizing Provider:  Eliz Gonzalez MD          Collected:           04/07/2022 1106              Ordering Location:     [address] [place] [address]      Received:            04/07/2022 [address] [place] [address] Operating Room                                                      Pathologist:           Catrina Rivers MD                                                                  Intraop:               Catrina Rivers MD                                                                  Specimens:   A) - Brain, Left parietal mass                                                                      B) - Brain, Left parietal mass                                                             Final Diagnosis       A-B  Brain, Left parietal mass, Resection:  - 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, consistent with patient's known lung primary  Microscopic Description       Immunochemical stains performed on block B5 with appropriate controls show the tumor cells are positive for synaptophysin, chromogranin, negative for CK7, CK20, TTF-1, napsin A, p40  CD3 highlights scattered T lymphocytes  CD20 highlights scattered B lymphocytes        Note       Dr Roderick Garcia notified electronically ([employer]) by Dr Lorene Danielson on 4/11/2022 at 9:27 am     Intradepartmental consultation in agreement (CV)  Additional Information       All reported additional testing was performed with appropriately reactive controls  These tests were developed and their performance characteristics determined by [facility] or appropriate performing facility, though some tests may be performed on tissues which have not been validated for performance characteristics (such as staining performed on alcohol exposed cell blocks and decalcified tissues)  Results should be interpreted with caution and in the context of the patients clinical condition  These tests may not be cleared or approved by the [place]  Food and Drug Administration, though the FDA has determined that such clearance or approval is not necessary  These tests are used for clinical purposes and they should not be regarded as investigational or for research  This laboratory has been approved by [place] [address], designated as a high-complexity laboratory and is qualified to perform these tests  Interpretation performed at [address]      Intraoperative Consultation          AF  Extensively necrotic tumor  Dr Meka Stroud notified by Dr Amor Vargas on 4/7/2022 at 11:33 am   Interpretation performed at [place] [address]        Gross Description          A  The specimen is received fresh for frozen section, labeled with the patient's name and hospital number, and is designated "left parietal mass  The specimen consists of an aggregate of tan-red, friable soft tissue fragments measuring 1 8 x 0 8 x 0 5 cm  The specimen is entirely frozen and the residual tissue is submitted in [facility] in 1 cassette  YLu         B  The specimen is received in formalin, labeled with the patient's name and hospital number, and is designated "left parietal mass    The specimen consists of an aggregate of red-brown, friable, hemorrhagic tissue measuring 4 4 x 3 3 x 1 8 cm  The larger portions of tissue are sectioned revealing red-brown, hemorrhagic, friable cut surfaces  Entirely submitted  Nine cassettes  B1-8:  Tissue in Bio-wrap  B9:  Aggregate of tissue in an embedding bag    Note: The estimated total formalin fixation time based upon information provided by the submitting clinician and the standard processing schedule is under 72 hours    [place]       Fingerstick Glucose (POCT)    Collection Time: 04/07/22 12:50 PM   Result Value Ref Range    POC Glucose 138 65 - 140 mg/dl   Basic metabolic panel    Collection Time: 04/08/22  4:56 AM   Result Value Ref Range    Sodium 140 136 - 145 mmol/L    Potassium 4 0 3 5 - 5 3 mmol/L    Chloride 109 (H) 100 - 108 mmol/L    CO2 25 21 - 32 mmol/L    ANION GAP 6 4 - 13 mmol/L    BUN 28 (H) 5 - 25 mg/dL    Creatinine 1 09 0 60 - 1 30 mg/dL    Glucose 115 65 - 140 mg/dL    Calcium 8 1 (L) 8 3 - 10 1 mg/dL    eGFR 69 ml/min/1 73sq m   CBC    Collection Time: 04/08/22  4:56 AM   Result Value Ref Range    WBC 9 80 4 31 - 10 16 Thousand/uL    RBC 3 64 (L) 3 88 - 5 62 Million/uL    Hemoglobin 11 5 (L) 12 0 - 17 0 g/dL    Hematocrit 35 3 (L) 36 5 - 49 3 %    MCV 97 82 - 98 fL    MCH 31 6 26 8 - 34 3 pg    MCHC 32 6 31 4 - 37 4 g/dL    RDW 18 3 (H) 11 6 - 15 1 %    Platelets 91 (L) 403 - 390 Thousands/uL    MPV 9 6 8 9 - 12 7 fL   Protime-INR    Collection Time: 04/08/22  4:56 AM   Result Value Ref Range    Protime 13 9 11 6 - 14 5 seconds    INR 1 12 0 84 - 1 19   APTT    Collection Time: 04/08/22  4:56 AM   Result Value Ref Range    PTT 27 23 - 37 seconds   Magnesium    Collection Time: 04/08/22  4:56 AM   Result Value Ref Range    Magnesium 1 9 1 6 - 2 6 mg/dL   Phosphorus    Collection Time: 04/08/22  4:56 AM   Result Value Ref Range    Phosphorus 3 7 2 3 - 4 1 mg/dL   Prepare Leukoreduced RBC: 2 Units    Collection Time: 04/08/22  7:31 AM   Result Value Ref Range    Unit Product Code J4263D15     Unit Number [id number]     Unit ABO O     Unit RH POS     Crossmatch Compatible     Unit Dispense Status Return to [facility]     Unit Product Volume 350 mL    Unit Product Code O9249E11     Unit Number [id number]     Unit ABO O     Unit RH POS     Crossmatch Compatible     Unit Dispense Status Return to Inv     Unit Product Volume 350 mL   Prepare Leukoreduced Platelet Pheresis (1 pheresis product = 6-8 pooled units): 1 Product    Collection Time: 04/08/22  9:56 AM   Result Value Ref Range    Unit Product Code Z7274W56     Unit Number [id number]     Unit ABO A     Unit RH POS     Unit Dispense Status Return to Inv     Unit Product Volume 300 mL   Prepare Leukoreduced Platelet Pheresis (1 pheresis product = 6-8 pooled units): 1 Product    Collection Time: 04/09/22  5:55 AM   Result Value Ref Range    Unit Product Code U3675M16     Unit Number [id number]     Unit ABO A     Unit [facility] POS     Unit Dispense Status Presumed Trans     Unit Product Volume 300 mL   Basic metabolic panel    Collection Time: 04/09/22  6:29 AM   Result Value Ref Range    Sodium 137 136 - 145 mmol/L    Potassium 4 1 3 5 - 5 3 mmol/L    Chloride 105 100 - 108 mmol/L    CO2 28 21 - 32 mmol/L    ANION GAP 4 4 - 13 mmol/L    BUN 29 (H) 5 - 25 mg/dL    Creatinine 1 01 0 60 - 1 30 mg/dL    Glucose 123 65 - 140 mg/dL    Calcium 8 3 8 3 - 10 1 mg/dL    eGFR 76 ml/min/1 73sq m   CBC and differential    Collection Time: 04/09/22  6:29 AM   Result Value Ref Range    WBC 8 13 4 31 - 10 16 Thousand/uL    RBC 3 88 3 88 - 5 62 Million/uL    Hemoglobin 12 0 12 0 - 17 0 g/dL    Hematocrit 35 7 (L) 36 5 - 49 3 %    MCV 92 82 - 98 fL    MCH 30 9 26 8 - 34 3 pg    MCHC 33 6 31 4 - 37 4 g/dL    RDW 18 0 (H) 11 6 - 15 1 %    MPV 10 3 8 9 - 12 7 fL    Platelets 845 (L) 457 - 390 Thousands/uL   CBC    Collection Time: 04/10/22  6:09 AM   Result Value Ref Range    WBC 7 79 4 31 - 10 16 Thousand/uL    RBC 4 09 3 88 - 5 62 Million/uL    Hemoglobin 13 1 12 0 - 17 0 g/dL Hematocrit 37 1 36 5 - 49 3 %    MCV 91 82 - 98 fL    MCH 32 0 26 8 - 34 3 pg    MCHC 35 3 31 4 - 37 4 g/dL    RDW 17 9 (H) 11 6 - 15 1 %    Platelets 317 (L) 424 - 390 Thousands/uL    MPV 10 3 8 9 - 12 7 fL   Basic metabolic panel    Collection Time: 04/10/22  6:09 AM   Result Value Ref Range    Sodium 136 136 - 145 mmol/L    Potassium 4 1 3 5 - 5 3 mmol/L    Chloride 104 100 - 108 mmol/L    CO2 27 21 - 32 mmol/L    ANION GAP 5 4 - 13 mmol/L    BUN 30 (H) 5 - 25 mg/dL    Creatinine 1 04 0 60 - 1 30 mg/dL    Glucose 104 65 - 140 mg/dL    Calcium 8 6 8 3 - 10 1 mg/dL    eGFR 73 ml/min/1 73sq m   Fingerstick Glucose (POCT)    Collection Time: 04/22/22  2:02 PM   Result Value Ref Range    POC Glucose 107 65 - 140 mg/dl       Imaging Studies:CT head wo contrast    Result Date: 4/3/2022  Narrative: CT BRAIN - WITHOUT CONTRAST INDICATION:   Intracerebral hemorrhage  COMPARISON:  None  TECHNIQUE:  CT examination of the brain was performed  In addition to axial images, sagittal and coronal 2D reformatted images were created and submitted for interpretation  Radiation dose length product (DLP) for this visit:  731 47 mGy-cm   This examination, like all CT scans performed in the [facility], was performed utilizing techniques to minimize radiation dose exposure, including the use of iterative  reconstruction and automated exposure control  IMAGE QUALITY:  Diagnostic  FINDINGS: PARENCHYMA:  No intracranial mass or midline shift  No CT signs of acute infarction  6 7 cm intraparenchymal hematoma centered in the left parietal white matter unchanged in size with surrounding vasogenic edema  Irregular hyperdensity at the left cerebellum  VENTRICLES AND EXTRA-AXIAL SPACES:  Effacement of left occipital horn  Florentin Hernandez VISUALIZED ORBITS AND PARANASAL SINUSES:  Unremarkable   CALVARIUM AND EXTRACRANIAL SOFT TISSUES:  Normal      Impression: Left parietal region intraparenchymal hematoma unchanged in size measuring 6 7 cm in largest dimension  New irregular hyperdense focus at the left cerebellum likely to represent small amount of subarachnoid hemorrhage versus a new intraparenchymal hemorrhage  Workstation performed: [id number]     MRI brain with and without contrast    Result Date: 4/28/2022  Narrative: MRI BRAIN WITH AND WITHOUT CONTRAST INDICATION: C79 31: Secondary malignant neoplasm of brain  Brain metastasis  COMPARISON:  MRI brain with and without contrast 4/8/2022, 4/3/2022 TECHNIQUE: Sagittal T1, axial T2, axial FLAIR, axial T1, axial [place], axial diffusion  Sagittal, axial T1 postcontrast   Axial bravo postcontrast with coronal reconstructions  IV Contrast:  8 mL of Gadobutrol injection (SINGLE-DOSE)  IMAGE QUALITY:   Diagnostic  FINDINGS: BRAIN PARENCHYMA: Postsurgical changes of left parietal craniotomy for resection of metastatic lesion in left parietal lobe  Left parietal resection cavity with heterogeneous blood products, hemosiderin deposition, T2/FLAIR hyperintense signal abnormality, and decreased peripheral and internal enhancement  Unchanged dural thickening and enhancement in left parietal region adjacent to the craniotomy site, compatible with postsurgical change  Metastatic enhancing lesions with associated hemosiderin deposition as detailed below: -0 5 x 0 3 cm left parietal cortex lesion (11:92), unchanged  -2 1 x 1 4 cm left posterolateral cerebellar lesion (11:44), previously 1 9 x 1 3 cm  -1 6 x 1 4 cm left posterior cerebellar lesion (11:45), unchanged  Decreased mild surrounding vasogenic edema in left parietal lobe  Similar mild vasogenic edema in left cerebellum  No mass effect or midline shift  No diffusion-weighted signal abnormality to suggest acute infarction  VENTRICLES:  Normal for the patient's age   SELLA AND PITUITARY GLAND:  Normal  ORBITS:  Normal  PARANASAL SINUSES:  Moderate sized right maxillary mucus retention cyst  VASCULATURE:  Evaluation of the major intracranial vasculature demonstrates appropriate flow voids  CALVARIUM AND SKULL BASE:  Normal  MASTOIDS: Small right mastoid effusion  EXTRACRANIAL SOFT TISSUES:  Normal      Impression: Decreased enhancement in left parietal resection cavity  No definite evidence of recurrent tumor  Slightly increased size of left posterolateral cerebellar metastatic hemorrhagic lesion  Unchanged left parietal cortex and left posterior cerebellar metastatic hemorrhagic lesions  The study was marked in EPIC for significant notification  Workstation performed: [id number]     MRI brain w wo contrast    Result Date: 4/8/2022  Narrative: MRI BRAIN WITH AND WITHOUT CONTRAST INDICATION: s/p Left Parietal Craniotomy for tumor resection      Metastatic disease, lung carcinoma  COMPARISON:  4/3/2022 TECHNIQUE: Sagittal T1, axial T2, axial FLAIR, axial T1, axial [place], axial diffusion  Sagittal, axial T1 postcontrast   Axial bravo postcontrast with coronal reconstructions  IV Contrast:  7 mL of Gadobutrol injection (SINGLE-DOSE)  IMAGE QUALITY:   Diagnostic  FINDINGS: BRAIN PARENCHYMA:  Patient is status post left parietal craniotomy for resection of previously seen hemorrhagic mass within the parietal lobe  There is a resection cavity which is significantly decreased in size compared to the previous hemorrhagic mass  Resection cavity demonstrates complex internal signal representing a small amount of air, a moderate amount of blood products  Heterogeneous T1 signal prior to administration of contrast is noted within the resection cavity  After administration of contrast there is mild enhancement extending obliquely towards the craniotomy site  FLAIR imaging demonstrates moderate surrounding edema within the parietal lobe extending inferiorly into the superior occipital lobe  Diffusion imaging demonstrates minimal restricted diffusion posterior to the resection cavity towards the calvarium, suggesting small amount of postop ischemia   There is a moderate amount of air noted within the anterior extra-axial space on the left resulting in minor mass effect upon the anterior left frontal lobe  The previously identified 2 enhancing lesions within the left cerebellar hemisphere are grossly unchanged from the examination performed 5 days ago, best seen on series 10 image 9  No new enhancing lesions identified  VENTRICLES:  Normal for the patient's age  SELLA AND PITUITARY GLAND:  Normal  ORBITS:  Normal  PARANASAL SINUSES:  Stable retention cyst within the inferior aspect of the right maxillary sinus  VASCULATURE:  Evaluation of the major intracranial vasculature demonstrates appropriate flow voids  CALVARIUM AND SKULL BASE:  Patient has undergone left parietal craniotomy  Expected postop change within the extracranial soft tissues  Small amount of subacute extra-axial blood is seen underlying the craniotomy  EXTRACRANIAL SOFT TISSUES:  Normal      Impression: Status post treatment of left parietal metastatic lesion with postop change as described above  This is the initial postoperative examination and will serve as baseline for follow-up exams  No significant residual tumor noted within the resection cavity  2 metastatic lesions within the left cerebellar hemisphere are unchanged  Workstation performed: [id number]     MRI brain w wo contrast    Result Date: 4/3/2022  Narrative: MRI BRAIN WITH AND WITHOUT CONTRAST INDICATION: MRI w/ and w/o contrast    History of adenocarcinoma along with apraxia and dysphasia  Intracranial hemorrhage  COMPARISON:  6/18/2021; 4/3/2022 TECHNIQUE: Sagittal T1, axial T2, axial FLAIR, axial T1, axial [place], axial diffusion  Sagittal, axial T1 postcontrast   Axial bravo postcontrast with coronal reconstructions  IV Contrast:  8 mL of Gadobutrol injection (SINGLE-DOSE)  IMAGE QUALITY:   Diagnostic  FINDINGS: BRAIN PARENCHYMA:  There is a hemorrhagic mass in the left parietal lobe with surrounding edema and blooming artifact    Within this mass there is intrinsic T1 high signal indicative of blood products  However along the anterior margin of the lesion there is patchy internal enhancement image 18, series 10 worrisome for hemorrhagic metastasis in this patient with a history of lung cancer  Separately there is a small cortical focus of enhancement more posteriorly along the edge of the vasogenic edema in the left parietal lobe image 92, series 11, having a corresponding focus of blooming artifact on susceptibility weighted image 72, series 6 indicative of a satellite metastasis  2 additional small left cerebellar metastases are noted adjacent to one another both enhancing with edema and hemorrhagic elements measuring approximately 1 5 cm each on image 8, series 10  There is local sulcal effacement in the left parietal lobe without subfalcine herniation  There is no diffusion restriction  Cerebellar tonsils are normally positioned  VENTRICLES:  There is mild mass effect on the posterior body and occipital horn left lateral ventricle  SELLA AND PITUITARY GLAND:  Normal  ORBITS:  Normal  PARANASAL SINUSES:  There is a mucous retention cyst in the right maxillary sinus  VASCULATURE:  Evaluation of the major intracranial vasculature demonstrates appropriate flow voids  CALVARIUM AND SKULL BASE:  Normal  EXTRACRANIAL SOFT TISSUES:  Normal      Impression: 1   2 left cerebellar hemorrhagic metastases and probable 2 adjacent left parietal hemorrhagic metastasis, the more anterior, larger one having parenchymal hematoma and local edema and mass effect, correlating to the hemorrhage on the prior head CT  I personally discussed this study with Dr Enedina Ordoñez on 4/3/2022 at 2:19 PM  Workstation performed: [id number]     CT chest abdomen pelvis w contrast    Result Date: 4/4/2022  Narrative: CT CHEST, ABDOMEN AND PELVIS WITH IV CONTRAST INDICATION:   Metastatic disease evaluation Evaluate for metastatic disease   COMPARISON:  CT from December 6, 2020 4/20/2021, April 8, 2021 TECHNIQUE: CT examination of the chest, abdomen and pelvis was performed  Axial, sagittal, and coronal 2D reformatted images were created from the source data and submitted for interpretation  Radiation dose length product (DLP) for this visit:  1061 02 mGy-cm   This examination, like all CT scans performed in the [facility], was performed utilizing techniques to minimize radiation dose exposure, including the use of iterative reconstruction and automated exposure control  IV Contrast:  100 mL of iohexol (OMNIPAQUE) Enteric Contrast: Enteric contrast was administered  FINDINGS: CHEST LUNGS:  Trachea and central bronchi are patent Emphysema seen postsurgical changes of left upper lobectomy noted No new consolidation seen Linear density seen in the left mid to lower lung, in image 37 series 2 Left upper  lung granuloma seen, in image 75 series 604 The right upper lobe granuloma seen in image 40 series 604 PLEURA:  No pleural effusion or pneumothorax HEART/GREAT VESSELS: Heart is unremarkable for patient's age  No thoracic aortic aneurysm  Ascending aorta measures 3 7 cm The descending thoracic aorta measures 2 4 cm Coronary artery calcification seen MEDIASTINUM AND OWEN:  No significant mediastinal lymph node enlargement seen CHEST WALL AND LOWER NECK: A healing left 5th rib fractures ABDOMEN LIVER/BILIARY TREE:  No focal liver lesion seen GALLBLADDER:  Gallbladder appear unremarkable SPLEEN:  A hypodense splenic lesion seen, measuring 1 7 cm, stable since previous study PANCREAS:  Unremarkable  ADRENAL GLANDS:  Unremarkable  KIDNEYS/URETERS:  No hydronephrosis or urinary tract calculus  One or more sharply circumscribed subcentimeter renal hypodensities are present, too small to accurately characterize, and statistically most likely benign findings  According to recent literature (Radiology 2019) no further workup of these findings is recommended   Renal cysts are noted bilaterally STOMACH AND BOWEL:  Diverticulosis seen No abnormal dilation of the small bowel loops seen APPENDIX:  No findings to suggest appendicitis  ABDOMINOPELVIC CAVITY:  No ascites  No pneumoperitoneum  No lymphadenopathy  VESSELS:  Unremarkable for patient's age  PELVIS REPRODUCTIVE ORGANS:  Prostate appears unremarkable URINARY BLADDER:  Unremarkable  ABDOMINAL WALL/INGUINAL REGIONS:  Unremarkable  OSSEOUS STRUCTURES:  No acute fracture or destructive osseous lesion  Impression: No findings of metastatic disease in the chest abdomen pelvis  No acute compression collapse of the vertebra seen No lytic destructive lesion seen Emphysema Postsurgical changes from left upper lobectomy A linear density seen in the left mid to lower lung likely scarring  Routine surveillance can be continued Workstation performed: [id number]     NM PET CT skull base to mid thigh    Result Date: 4/25/2022  Narrative: PET/CT SCAN INDICATION:  C34 92: Malignant neoplasm of unspecified part of left bronchus or lung C34 12: Malignant neoplasm of upper lobe, left bronchus or lung   , restaging The following clinical information was copied directly from EPIC: h/o adenocarcinoma of Lt lung  s/p Lt parietal mass resection (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, c/w pt's known lung primary)  s/p Lt UL lobectomy  (poorly diff  non-small cell carcinoma, c/w large cell neuroendocrine carcinoma, nonkeratinizing poorly diff  squamous cell carcinoma)  Restaging MODIFIER: PS COMPARISON: MRI of the brain dated 4/8/2022, CT of chest, abdomen, and pelvis dated 4/4/2022, and PET/CT dated 9/27/2021 CELL TYPE:  poorly diff  non-small cell carcinoma, favor adenocarcinoma with neuroendocrine diff  (BX 5/6/21 lT ul LUNG +) TECHNIQUE:   8 23 mCi F-18-FDG administered IV  Multiplanar attenuation corrected and non attenuation corrected PET images were acquired 60 minutes post injection   Contiguous, low dose, axial CT sections were obtained from the skull vertex through the femurs   Intravenous contrast material was not utilized  This examination, like all CT scans performed in the [facility], was performed utilizing techniques to minimize radiation dose exposure, including the use of iterative reconstruction and automated exposure control  Fasting serum glucose: 107 mg/dl FINDINGS: VISUALIZED BRAIN:   Left parietal photopenic region corresponds to surgical resection site  Subtle asymmetric tracer activity involving the posterior/medial left cerebellum possibly related to one of the 2 known left cerebellar metastatic lesions which were better characterized on recent MRI of the brain; please note that this finding is subtle and patient's known left cerebellar masses are not significantly hypermetabolic and are best characterized on MRI  HEAD/NECK:   There is a physiologic distribution of FDG  No FDG avid cervical adenopathy is seen  CT images: Postsurgical changes from left parietal craniotomy  Cerebral atrophy  Intracranial atherosclerotic calcification is noted  On image 44 of series 3 there is subtle hyperdense lesion measuring 1 6 cm involving the lateral left cerebellum likely corresponding to one of the metastatic lesions which was better characterized on prior MRI  Right maxillary sinus mucous retention cyst versus mucosal polyp  CHEST:   Interval resection of patient's known hypermetabolic left upper lobe malignancy and adjacent mediastinal amena metastatic disease  Best seen on images 138 through 140 is subtle FDG activity associated with somewhat linear and somewhat nodular airspace disease involving the left lung adjacent to the left cardiac border with max SUV of 2 4 and corresponding to nodular density on image  138 of series 3 measuring 8 mm and on image 139 measuring 1 3 cm    While I favor these findings to reflect posttreatment inflammatory changes, short interval follow-up is recommended to exclude developing left lung malignancy/metastatic disease  Mild nonspecific FDG activity involving the mid esophagus without definite corresponding soft tissue abnormality  CT images: Right-sided chest port  Atherosclerotic vascular calcifications including those of the coronary arteries are noted  Postsurgical changes related to left upper lobectomy  Emphysematous changes involving the lungs  Evaluation for pulmonary nodules is limited on low-dose unenhanced CT secondary to respiratory motion artifact  ABDOMEN:   No FDG avid soft tissue lesions are seen  CT images: Poorly characterized on low-dose unenhanced CT are multiple hypodense bilateral renal lesions which were better characterized on contrast-enhanced CT dated April 4, 2022  Nonspecific mural thickening of the proximal to mid gastric wall  Atherosclerotic vascular calcifications are noted  Diverticulosis coli  PELVIS: No FDG avid soft tissue lesions are seen  CT images: Mild enlargement of the prostate gland  OSSEOUS STRUCTURES: No FDG avid lesions are seen  CT images: Degenerative changes are noted involving the spine  Impression: 1  Subtle asymmetric FDG activity in the left cerebellum, possibly corresponding to one of patient's known left cerebellar metastatic lesions which were better characterized on recent MRI of the brain  2   No focal tracer activity characteristic of hypermetabolic malignancy involving the neck, chest, abdomen, pelvis, or osseous structures  Note is made of subtle FDG activity associated with nodular and somewhat linear airspace disease in the left lung adjacent to left cardiac border which I favor to reflect posttreatment/inflammatory changes with hypermetabolic malignancy considered less likely  Short interval follow-up with CT of chest in 3 months is recommended to ensure stability and exclude subtle developing malignancy  Please see above for details and additional findings  The study was marked in EPIC for significant notification   The study was marked in Epic for follow-up  Workstation performed: [id number]     CT stroke alert brain    Result Date: 4/2/2022  Narrative: CT BRAIN - STROKE ALERT PROTOCOL INDICATION:   Right-sided weakness, expressive and receptive aphasia known history of cancer  COMPARISON:  None  TECHNIQUE:  CT examination of the brain was performed  In addition to axial images, coronal reformatted images were created and submitted for interpretation  Radiation dose length product (DLP) for this visit:  884 81 mGy-cm   This examination, like all CT scans performed in the [facility], was performed utilizing techniques to minimize radiation dose exposure, including the use of iterative  reconstruction and automated exposure control  IMAGE QUALITY:  Diagnostic  FINDINGS:  PARENCHYMA:  Left parietal 4 6 x 3 2 x 4 6 cm intraparenchymal hemorrhage with surrounding cerebral edema  No midline shift  VENTRICLES AND EXTRA-AXIAL SPACES:  Normal for patient's age  VISUALIZED ORBITS AND PARANASAL SINUSES:  Unremarkable  CALVARIUM AND EXTRACRANIAL SOFT TISSUES:   Normal      Impression: Left parietal 4 6 cm intraparenchymal hemorrhage with surrounding cerebral edema  No midline shift  I personally discussed this study with neurologist on 4/2/2022 at 7:45 PM  Workstation performed: [id number]     CTA stroke alert (head/neck)    Result Date: 4/2/2022  Narrative: CTA NECK AND BRAIN WITH CONTRAST INDICATION: Right-sided weakness, aphasia COMPARISON:   None  TECHNIQUE:   Post contrast imaging was performed after administration of iodinated contrast through the neck and brain  Post contrast axial 0 625 mm images timed to opacify the arterial system  3D rendering was performed on an independent workstation  MIP reconstructions performed  Coronal reconstructions were performed of the noncontrast portion of the brain  Radiation dose length product (DLP) for this visit:  415 61 mGy-cm     This examination, like all CT scans performed in the [facility], was performed utilizing techniques to minimize radiation dose exposure, including the use of iterative  reconstruction and automated exposure control  IV Contrast:  85 mL of iohexol (OMNIPAQUE)  IMAGE QUALITY:   Diagnostic FINDINGS: CERVICAL VASCULATURE AORTIC ARCH AND GREAT VESSELS:  Moderate atherosclerotic disease of the arch and great vessels  RIGHT VERTEBRAL ARTERY CERVICAL SEGMENT:  Severe stenosis at the origin  Severe critical near occlusive stenosis right proximal vertebral artery near its origin  Moderate segmental plaque stenosis throughout the right vertebral artery LEFT VERTEBRAL ARTERY CERVICAL SEGMENT: Nondominant and threadlike left vertebral artery RIGHT EXTRACRANIAL CAROTID SEGMENT:  Severe atherosclerotic disease of the bifurcation  Approximately 70-75% right proximal cervical ICA plaque stenosis LEFT EXTRACRANIAL CAROTID SEGMENT:  Normal caliber common carotid artery  Normal bifurcation and cervical internal carotid artery  No stenosis or dissection  NASCET criteria was used to determine the degree of internal carotid artery diameter stenosis  INTRACRANIAL VASCULATURE INTERNAL CAROTID ARTERIES:  Normal enhancement of the intracranial portions of the internal carotid arteries  Normal ophthalmic artery origins  Normal ICA terminus  ANTERIOR CIRCULATION:  Symmetric A1 segments and anterior cerebral arteries with normal enhancement  Normal anterior communicating artery  MIDDLE CEREBRAL ARTERY CIRCULATION:  M1 segment and middle cerebral artery branches demonstrate normal enhancement bilaterally  DISTAL VERTEBRAL ARTERIES:  Normal distal vertebral arteries  Posterior inferior cerebellar artery origins are normal  Normal vertebral basilar junction  BASILAR ARTERY:  Basilar artery is normal in caliber  Normal superior cerebellar arteries  POSTERIOR CEREBRAL ARTERIES: Both posterior cerebral arteries arises from the basilar tip    Both arteries demonstrate normal enhancement  Normal posterior communicating arteries  VENOUS STRUCTURES:  Normal  NON VASCULAR ANATOMY BONY STRUCTURES:  No acute osseous abnormality  SOFT TISSUES OF THE NECK:  Normal  THORACIC INLET:  Emphysematous changes     Impression: Severe right vertebral artery stenosis at the origin  Severe critical near occlusive stenosis right proximal vertebral artery near its origin spanning approximately 2 2 cm in cranial caudal dimension  Approximately 70-75% right proximal cervical ICA plaque stenosis  I personally discussed this study with neurologist on 4/2/2022 at 7:50 PM   Workstation performed: [id number]     Echo follow up/limited w/ contrast if indicated    Result Date: 4/3/2022  Narrative: Faye Robles  Left Ventricle: Left ventricular cavity size is normal  Wall thickness is normal  The left ventricular ejection fraction is 65%  Systolic function is normal  Wall motion is normal  Diastolic function is mildly abnormal, consistent with grade I (abnormal) relaxation    Tricuspid Valve: There is mild regurgitation  The estimated right ventricular systolic pressure is 38 99 mmHg  Assessment/Plan:  No orders of the defined types were placed in this encounter  Anay Nugent is a 74y o  year old male with 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 status post neoadjuvant chemotherapy followed by left upper lobectomy at [facility] October last year  He finished adjuvant radiation therapy in February 2022  In the interim he developed stroke-like symptoms and imaging showed 3-4 hemorrhagic metastasis and on April 7 he underwent left parietal craniotomy with tumor resection 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  PET-CT scan April 19 showed no hypermetabolic lesions in the neck, chest, abdomen, pelvis or bony structures      MRI of the brain April 26 showed decreased enhancement left parietal resection cavity but with increased size of the left cerebellum metastatic hemorrhagic lesions, unchanged the left parietal cortex and left posterior cerebellar metastatic lesions  Patient is down to 2 mg Decadron once a day and advised to continue or if he developed headaches to increase to 2 or 3 times a day  We called neurosurgical office to schedule him for consultation next Wednesday to be discussed at the neuro working conference  He probably is whole-brain radiation therapy because of neuroendocrine carcinoma diagnosis  We tentatively schedule him for whole-brain simulation the following day Thursday and start treatment if that will be the group consensus  Miryam Mccarthy MD  8/99/6978,8:10 PM    Portions of the record may have been created with voice recognition software   Occasional wrong word or "sound a like" substitutions may have occurred due to the inherent limitations of voice recognition software   Read the chart carefully and recognize, using context, where substitutions have occurred

## 2022-04-28 NOTE — PROGRESS NOTES
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  Patient went through armen-adjuvant chemotherapy with only partial response with Alimta and carboplatin  Thoracic oncology surgeon does not feel lesion is resectable by less than a left pneumonectomy which the patient may not tolerate  He had surgical consult at [facility] and had left upper lung lobectomy  He had a consultation with us on 10/25/2021  At that time he opted to go [place] [facility] [place]  He completed adjuvant radiation therapy on 2/22/22  Since completing radiation therapy, he was admitted to the hospital and found to have brain metastasis  He returns today for EOT follow up     3/23/22 Medical Oncology  discussed rechallenging him with the adjuvant immunotherapy again while on the lowest dose of prednisone to see if the psoriasis will will reappear or will be under control while on low-dose prednisone  4/2/22 Hospital admission for Left parietal hemorrhagic tumor; presented to the ER with confusion difficulty speaking    4/2/22 CT stroke alert  Left parietal 4 6 cm intraparenchymal hemorrhage with surrounding cerebral edema  No midline shift  4/3/22 CT head wo contrast  Left parietal region intraparenchymal hematoma unchanged in size measuring 6 7 cm in largest dimension  New irregular hyperdense focus at the left cerebellum likely to represent small amount of subarachnoid hemorrhage versus a new intraparenchymal hemorrhage  4/3/22 MRI brain w wo contrast   2 left cerebellar hemorrhagic metastases and probable 2 adjacent left parietal hemorrhagic metastasis, the more anterior, larger one having parenchymal hematoma and local edema and mass effect, correlating to the hemorrhage on the prior head CT      4/4/22 CT c/a/p w contrast  No findings of metastatic disease in the chest abdomen pelvis    No acute compression collapse of the vertebra seen  No lytic destructive lesion seen  Emphysema  Postsurgical changes from left upper lobectomy  A linear density seen in the left mid to lower lung likely scarring  Routine surveillance can be continued    4/7/22 Image guided left parietal craniotomy for tumor resection  Operative Findings:  Large hemorrhagic mass, significant amount of necrosis on frozen section  4/8/22 MRI brain w wo contrast  Status post treatment of left parietal metastatic lesion with postop change as described above  This is the initial postoperative examination and will serve as baseline for follow-up exams  No significant residual tumor noted within the resection cavity  2 metastatic lesions within the left cerebellar hemisphere are unchanged  4/19/22 PET CT   Subtle asymmetric FDG activity in the left cerebellum, possibly corresponding to one of patient's known left cerebellar metastatic lesions which were better characterized on recent MRI of the brain  2   No focal tracer activity characteristic of hypermetabolic malignancy involving the neck, chest, abdomen, pelvis, or osseous structures  Note is made of subtle FDG activity associated with nodular and somewhat linear airspace disease in the left lung adjacent to left cardiac border which I favor to reflect posttreatment/inflammatory changes with hypermetabolic malignancy considered less   likely  Short interval follow-up with CT of chest in 3 months is recommended to ensure stability and exclude subtle developing malignancy  Please see above for details and additional findings  4/25/22 Neurosurgery: post op visit  F/U 4 weeks  MRI ordered  4/26/2022 MRI brain  Decreased enhancement in left parietal resection cavity  No definite evidence of recurrent tumor  Slightly increased size of left posterolateral cerebellar metastatic hemorrhagic lesion  Unchanged left parietal cortex and left posterior cerebellar metastatic hemorrhagic lesions    The study was marked in EPIC for significant notification  5/3/22 Medical Oncology      Follow up visit     Oncology History   Adenocarcinoma, lung, left ([facility] [address] )   5/6/2021 Biopsy    [facility]  FINAL DIAGNOSIS     Lung, Left Upper Lobe, core Biopsy ([id number], 5/6/2021):    - Poorly differentiated non-small cell carcinoma, favor adenocarcinoma, with neuroendocrine differentiation of uncertain clinical significance (see letter below)  5/25/2021 Biopsy    A  Lymph Node, 4R, Excision:  - Two reactive lymph nodes with anthracosis (0/2)  B  Lymph Node, 2R, Excision:  - Two reactive lymph nodes with anthracosis (0/2)  C  Lymph Node, 2L, Excision:  - Three reactive lymph nodes with anthracosis (0/3)  D  Lymph Node, 4L, Excision:  - Two reactive lymph nodes with anthracosis (0/2)  E  Lymph Node, Level 7, Excision:  - Two reactive lymph nodes with anthracosis (0/2)              6/9/2021 Initial Diagnosis    Adenocarcinoma, lung, left ([facility] [address] )     6/25/2021 - 8/27/2021 Chemotherapy    cyanocobalamin, 1,000 mcg, Intramuscular, Once, 2 of 3 cycles  Administration: 1,000 mcg (8/6/2021), 1,000 mcg (6/25/2021)  pegfilgrastim ([place]), 6 mg, Subcutaneous, Once, 3 of 5 cycles  Administration: 6 mg (7/16/2021), 6 mg (8/6/2021), 6 mg (8/27/2021)  fosaprepitant (EMEND) IVPB, 150 mg, Intravenous, Once, 4 of 6 cycles  Administration: 150 mg (6/25/2021), 150 mg (8/6/2021), 150 mg (8/27/2021), 150 mg (7/16/2021)  CARBOplatin (PARAPLATIN) IVPB (GO AUC DOSING), 496 mg, Intravenous, Once, 4 of 6 cycles  Administration: 496 mg (6/25/2021), 492 5 mg (8/6/2021), 462 mg (8/27/2021), 516 mg (7/16/2021)  pemetrexed (ALIMTA) chemo infusion, 975 mg, Intravenous, Once, 4 of 6 cycles  Administration: 1,000 mg (6/25/2021), 1,000 mg (8/6/2021), 1,000 mg (8/27/2021), 1,000 mg (7/16/2021)     12/16/2021 Surgery    Left Upper Lung Lobectomy at [facility] by Dr Iggy Beckett  Tumor size: 7 6 cm Percentage of viable tumor: 40%  Histologic Grade: Undifferentiated (G$)  Lymphovascular Invasion: Present (extensive)  Perineural Invasion: Not identified  Spread through Air Spaces: Present  Pleural Invasion: tumor invades to the visceral pleural surface  Tumor Extension: Hilar soft tissue  Bronchial Margin: no tumor seen  Vascular Margin: no tumor seen  Distance from Margin: distance of invasive tumor from closest margin: 0 1 cm  Closest margin: vascular margin  Neoadjuvant T staging: ypT2a  Neoadjuvant N staging: ypN0           1/18/2022 - 2/22/2022 Radiation    Treatments:  Course: C1    Plan ID Energy Fractions Dose per Fraction (cGy) Dose Correction (cGy) Total Dose Delivered (cGy) Elapsed Days   L Lung Med 6X 25 / 25 180 0 4,500 35      Treatment Dates:  1/18/2022 - 2/22/2022 1/27/2022 -  Chemotherapy    atezolizumab (TECENTRIQ) IVPB, 1,200 mg, Intravenous, Once, 2 of 6 cycles  Administration: 1,200 mg (1/27/2022), 1,200 mg (2/16/2022)     4/7/2022 Surgery    Final Diagnosis   A-B  Brain, Left parietal mass, Resection:  - 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, consistent with patient's known lung primary  Cancer of upper lobe of left lung ([address] )   11/4/2021 Initial Diagnosis    Cancer of upper lobe of left lung ([address] )     Brain metastases ([address] )   4/25/2022 Initial Diagnosis    Brain metastases ([address] )         Review of Systems:  Review of Systems   Constitutional: Positive for activity change (due to dizziness) and fatigue  Negative for appetite change, chills, fever and unexpected weight change  HENT: Positive for hearing loss and nosebleeds (when he blows his nose; notice tissue is tinged pink)  Eyes:        Wears glasses   Respiratory: Positive for cough (productive cough, clear mucus) and shortness of breath  Cardiovascular: Negative  Negative for chest pain and leg swelling  Gastrointestinal: Negative for abdominal pain, constipation, diarrhea, nausea and vomiting  Endocrine: Negative  Genitourinary: Negative  Musculoskeletal: Positive for back pain (intermittent x 3 years)  Skin:        Psoriasis  Surgical incision on scalp is healing   Allergic/Immunologic: Negative  Neurological: Positive for dizziness and light-headedness  Negative for headaches  Hematological: Negative  Psychiatric/Behavioral: Positive for agitation  Negative for sleep disturbance and suicidal ideas         Clinical Trial: no        Covid Vaccine Status yes + booster    Health Maintenance   Topic Date Due    DTaP,Tdap,and Td Vaccines (1 - Tdap) Never done    COVID-19 Vaccine (3 - Moderna risk 4-dose series) 05/14/2021    Colorectal Cancer Screening  11/18/2021    BMI: Followup Plan  10/14/2022    Fall Risk  10/14/2022    Medicare Annual Wellness Visit (AWV)  10/14/2022    Pneumococcal Vaccine: 65+ Years (2 of 2 - PCV13) 01/18/2023    BMI: Adult  04/25/2023    Hepatitis C Screening  Completed    Influenza Vaccine  Completed    HIB Vaccine  Aged Out    Hepatitis B Vaccine  Aged Out    IPV Vaccine  Aged Out    Hepatitis A Vaccine  Aged Out    Meningococcal ACWY Vaccine  Aged Out    HPV Vaccine  Aged Out     Patient Active Problem List   Diagnosis    Renal cyst, right    Acute idiopathic gout of left foot    Back problem    Depression with anxiety    Essential hypertension    Glaucoma    Hypertriglyceridemia    Lumbago with sciatica, left side    Lumbar stenosis    JUNAID (obstructive sleep apnea)    Spinal stenosis of lumbar region with neurogenic claudication    Mixed hyperlipidemia    Bilateral hearing loss    Hyperglycemia    Cigarette nicotine dependence in remission    Adenocarcinoma, lung, left ([address] )    Encounter for central line care    Drug-induced neutropenia ([address] )    Left non-suppurative otitis media    Bilateral hearing loss due to cerumen impaction    Cancer of upper lobe of left lung ([address] )    Chronic back pain    Former cigarette smoker    History of gout    Hypertension    Proctocolitis    Pericardial effusion    Constipation    Labyrinthitis of both ears    Stage 3a chronic kidney disease (HCC)    Platelets decreased (HCC)    Psoriasis    Left parietal hemorrhagic tumor    Thrombocytopenia (HCC)    Goals of care, counseling/discussion    Hypothyroidism    Irregular heart rate    Paroxysmal atrial fibrillation (HCC)    Brain metastases (HCC)     Past Medical History:   Diagnosis Date    Hyperlipidemia     Hypertension     Lung cancer [facility])      Past Surgical History:   Procedure Laterality Date    BACK SURGERY      CRANIOTOMY Left 2022    Procedure: Image guided left parietal craniotomy for tumor resection;  Surgeon: Enid Witt MD;  [facility]: [facility];  Service: Neurosurgery    HEMORRHOID SURGERY      IR BIOPSY LUNG  2021    IR PORT PLACEMENT  2021    LAMINECTOMY  2018    L4-L5    LUNG SURGERY      left upper lobectomy    MS BRONCHOSCOPY,DIAGNOSTIC N/A 2021    Procedure: BRONCHOSCOPY FLEXIBLE;  Surgeon: Harjit Ashton MD;  [facility];  Service: Thoracic    MS MEDIASTINOSCOPY WITH LYMPH NODE BIOPSY/IES N/A 2021    Procedure: MEDIASTINOSCOPY, flexible bronchoscopy;  Surgeon: Harjit Ashton MD;  [facility];  Service: Thoracic    TONSILLECTOMY       Family History   Problem Relation Age of Onset    Nephrolithiasis Father      Social History     Socioeconomic History    Marital status: /Civil Union     Spouse name: Not on file    Number of children: Not on file    Years of education: Not on file    Highest education level: Not on file   Occupational History    Not on file   Tobacco Use    Smoking status: Former Smoker     Packs/day: 1 00     Years: 40 00     Pack years: 40 00     Types: Cigarettes     Start date:      Quit date: 2017     Years since quittin 2    Smokeless tobacco: Never Used    Tobacco comment: quit 2017   Vaping Use    Vaping Use: Never used   Substance and Sexual Activity    Alcohol use: Not Currently     Alcohol/week: 7 0 standard drinks     Types: 7 Cans of beer per week    Drug use: Yes     Types: Marijuana     Comment: seldom    Sexual activity: Not on file   Other Topics Concern    Not on file   Social History Narrative    Not on file     Social Determinants of Health     Financial Resource Strain: Not on file   Food Insecurity: No Food Insecurity    Worried About Running Out of Food in the Last Year: Never true    Felecia of Food in the Last Year: Never true   Transportation Needs: No Transportation Needs    Lack of Transportation (Medical): No    Lack of Transportation (Non-Medical):  No   Physical Activity: Not on file   Stress: Not on file   Social Connections: Not on file   Intimate Partner Violence: Not on file   Housing Stability: High Risk    Unable to Pay for Housing in the Last Year: No    Number of Places Lived in the Last Year: 1    Unstable Housing in the Last Year: Yes       Current Outpatient Medications:     allopurinol (ZYLOPRIM) 100 mg tablet, Take 1 tablet (100 mg total) by mouth daily, Disp: 30 tablet, Rfl: 5    amLODIPine (NORVASC) 10 mg tablet, TAKE ONE TABLET BY MOUTH EVERY DAY, Disp: 90 tablet, Rfl: 3    Ascorbic Acid (vitamin C) 1000 MG tablet, Take 1,000 mg by mouth daily  , Disp: , Rfl:     B Complex Vitamins (B COMPLEX 1 PO), Take 1 tablet by mouth daily , Disp: , Rfl:     B Complex Vitamins (BL VITAMIN B COMPLEX PO), Take by mouth  , Disp: , Rfl:     betamethasone valerate (VALISONE) 0 1 % cream, Apply topically 2 (two) times a day (Patient taking differently: Apply topically as needed ), Disp: 45 g, Rfl: 1    betamethasone, augmented, (DIPROLENE-AF) 0 05 % cream,  , Disp: , Rfl:     calcipotriene (DOVONEX) 0 005 % cream, , Disp: , Rfl:     calcipotriene (DOVONOX) 0 005 % ointment, Apply topically 2 (two) times a day As needed, Disp: , Rfl:     Cholecalciferol (VITAMIN D3 PO), Take 10,000 Units by mouth daily  , Disp: , Rfl:     citalopram (CeleXA) 10 mg tablet, Take 1 tablet (10 mg total) by mouth 2 (two) times a day, Disp: 60 tablet, Rfl: 0    Clobetasol Propionate (Impoyz) 0 025 % CREA, Apply topically, Disp: , Rfl:     colchicine (COLCRYS) 0 6 mg tablet, Take 1 tablet (0 6 mg total) by mouth 2 (two) times a day (Patient not taking: Reported on 2/15/2022 ), Disp: 30 tablet, Rfl: 5    Cyanocobalamin (Vitamin B 12) 500 MCG TABS, Take 1 tablet by mouth (Patient not taking: Reported on 3/23/2022 ), Disp: , Rfl:     dexamethasone (DECADRON) 2 mg tablet, Take 2mg PO Q6hr for 2 days, followed by 2mg PO Q8hr for 2 days, followed by 2mg Q12hr indefinitely thereafter, Disp: 66 tablet, Rfl: 0    doxepin (SINEquan) 25 mg capsule, TAKE ONE CAPSULE BY MOUTH EVERY DAY AT BEDTIME, Disp: 30 capsule, Rfl: 5    fluocinolone (SYNALAR) 0 01 % external solution,  , Disp: , Rfl:     folic acid (FOLVITE) 1 mg tablet, Take 1 tablet (1 mg total) by mouth daily (Patient not taking: Reported on 1/12/2022 ), Disp: 30 tablet, Rfl: 6    gabapentin (NEURONTIN) 100 mg capsule,  , Disp: , Rfl:     Garlic 10 MG CAPS, Take 1 tablet by mouth daily , Disp: , Rfl:     Glucosamine HCl 1500 MG TABS, Take by mouth (Patient not taking: Reported on 4/18/2022 ), Disp: , Rfl:     Glucosamine-Chondroit-Vit C-Mn (GLUCOSAMINE 1500 COMPLEX) CAPS, daily , Disp: , Rfl:     ketoconazole (NIZORAL) 2 % cream, Apply topically 2 (two) times a day (Patient taking differently: Apply topically as needed ), Disp: 15 g, Rfl: 2    levETIRAcetam (KEPPRA) 500 mg tablet, Take 1 tablet (500 mg total) by mouth every 12 (twelve) hours for 6 doses, Disp: 6 tablet, Rfl: 0    levothyroxine 75 mcg tablet, Take 0 5 tablets (37 5 mcg total) by mouth daily in the early morning, Disp: 15 tablet, Rfl: 0    meclizine (ANTIVERT) 25 mg tablet, Take 1 tablet (25 mg total) by mouth 4 (four) times a day as needed for dizziness, Disp: 30 tablet, Rfl: 0   metoprolol succinate (TOPROL-XL) 25 mg 24 hr tablet, Take 1 tablet (25 mg total) by mouth daily, Disp: 30 tablet, Rfl: 5    MULTIPLE VITAMINS ESSENTIAL PO, Take by mouth (Patient not taking: Reported on 2/15/2022 ), Disp: , Rfl:     Multiple Vitamins-Minerals (MULTIVITAL-M) TABS, Take by mouth daily  (Patient not taking: Reported on 4/2/2022 ), Disp: , Rfl:     neomycin-polymyxin-hydrocortisone (CORTISPORIN) otic solution, Administer 4 drops into the left ear every 6 (six) hours (Patient not taking: Reported on 1/12/2022 ), Disp: 10 mL, Rfl: 0    ondansetron (ZOFRAN) 4 mg tablet, Take 1 tablet (4 mg total) by mouth every 6 (six) hours (Patient not taking: Reported on 1/18/2022 ), Disp: 12 tablet, Rfl: 0    ondansetron (ZOFRAN) 8 mg tablet, Take 1 tablet (8 mg total) by mouth every 8 (eight) hours as needed for nausea or vomiting (Patient not taking: Reported on 1/12/2022 ), Disp: 20 tablet, Rfl: 3    oxyCODONE (ROXICODONE) 5 immediate release tablet, , Disp: , Rfl:     pantoprazole (PROTONIX) 40 mg tablet, Take 1 tablet (40 mg total) by mouth daily, Disp: 30 tablet, Rfl: 11    polyethylene glycol (GLYCOLAX) 17 GM/SCOOP powder, Take 17 g by mouth 2 (two) times a day, Disp: 850 g, Rfl: 0    rosuvastatin (CRESTOR) 10 MG tablet, TAKE ONE TABLET BY MOUTH EVERY DAY, Disp: 30 tablet, Rfl: 5    tamsulosin (FLOMAX) 0 4 mg, , Disp: , Rfl:     traMADol (ULTRAM) 50 mg tablet, TAKE 1 TABLET BY MOUTH EVERY 8 HOURS AS NEEDED FOR SEVERE PAIN, Disp: 30 tablet, Rfl: 0    triamcinolone (KENALOG) 0 1 % cream, , Disp: , Rfl:     zinc gluconate 50 mg tablet, Take 50 mg by mouth daily, Disp: , Rfl:   Allergies   Allergen Reactions    Bee Venom     Benazepril Other (See Comments)     Angioedema     Latex Other (See Comments)     Burning of eyes at the dentist from the gloves    Meloxicam GI Intolerance    Penicillin G Rash     There were no vitals filed for this visit

## 2022-04-29 ENCOUNTER — TELEPHONE (OUTPATIENT)
Dept: HEMATOLOGY ONCOLOGY | Facility: CLINIC | Age: 67
End: 2022-04-29

## 2022-04-29 NOTE — TELEPHONE ENCOUNTER
Returned a call to Gregoria Cr and made her aware that Dr Dorys Bergman is aware that pt had surgery on 4/7/22

## 2022-04-29 NOTE — TELEPHONE ENCOUNTER
CALL RETURN FORM   Reason for patient call? Patient's wife Shiraz Herndon is calling to let Dr Isamar Currie know her  has surgery on 4/7 at [facility]    Patient's primary oncologist? Dr Isamar Currie   Name of person the patient was calling for? Kentrell Suarez   Any additional information to add, if applicable? Yes best [phone]   Informed patient that the message will be forwarded to the team and someone will get back to them as soon as possible    Did you relay this information to the patient?  yes

## 2022-05-02 ENCOUNTER — APPOINTMENT (OUTPATIENT)
Dept: LAB | Facility: CLINIC | Age: 67
End: 2022-05-02
Payer: MEDICARE

## 2022-05-02 DIAGNOSIS — I48.0 PAROXYSMAL ATRIAL FIBRILLATION (HCC): ICD-10-CM

## 2022-05-02 DIAGNOSIS — I10 ESSENTIAL HYPERTENSION: Chronic | ICD-10-CM

## 2022-05-02 DIAGNOSIS — E03.8 OTHER SPECIFIED HYPOTHYROIDISM: ICD-10-CM

## 2022-05-02 DIAGNOSIS — E06.4 DRUG-INDUCED THYROIDITIS: ICD-10-CM

## 2022-05-02 DIAGNOSIS — C34.92 ADENOCARCINOMA, LUNG, LEFT (HCC): ICD-10-CM

## 2022-05-02 LAB
ALBUMIN SERPL BCP-MCNC: 2.9 G/DL (ref 3.5–5)
ALP SERPL-CCNC: 55 U/L (ref 46–116)
ALT SERPL W P-5'-P-CCNC: 36 U/L (ref 12–78)
ANION GAP SERPL CALCULATED.3IONS-SCNC: 1 MMOL/L (ref 4–13)
AST SERPL W P-5'-P-CCNC: 12 U/L (ref 5–45)
BILIRUB SERPL-MCNC: 0.31 MG/DL (ref 0.2–1)
BUN SERPL-MCNC: 28 MG/DL (ref 5–25)
CALCIUM ALBUM COR SERPL-MCNC: 9.9 MG/DL (ref 8.3–10.1)
CALCIUM SERPL-MCNC: 9 MG/DL (ref 8.3–10.1)
CHLORIDE SERPL-SCNC: 106 MMOL/L (ref 100–108)
CO2 SERPL-SCNC: 30 MMOL/L (ref 21–32)
CREAT SERPL-MCNC: 1.08 MG/DL (ref 0.6–1.3)
GFR SERPL CREATININE-BSD FRML MDRD: 70 ML/MIN/1.73SQ M
GLUCOSE P FAST SERPL-MCNC: 89 MG/DL (ref 65–99)
MAGNESIUM SERPL-MCNC: 2.2 MG/DL (ref 1.6–2.6)
POTASSIUM SERPL-SCNC: 4.3 MMOL/L (ref 3.5–5.3)
PROT SERPL-MCNC: 6.3 G/DL (ref 6.4–8.2)
SODIUM SERPL-SCNC: 137 MMOL/L (ref 136–145)
TSH SERPL DL<=0.05 MIU/L-ACNC: 2.38 UIU/ML (ref 0.45–4.5)

## 2022-05-02 PROCEDURE — 83735 ASSAY OF MAGNESIUM: CPT

## 2022-05-02 PROCEDURE — 80053 COMPREHEN METABOLIC PANEL: CPT

## 2022-05-02 PROCEDURE — 84443 ASSAY THYROID STIM HORMONE: CPT

## 2022-05-03 ENCOUNTER — HOSPITAL ENCOUNTER (OUTPATIENT)
Dept: INFUSION CENTER | Facility: HOSPITAL | Age: 67
Discharge: HOME | End: 2022-05-03
Payer: MEDICARE

## 2022-05-03 ENCOUNTER — HOSPITAL ENCOUNTER (OUTPATIENT)
Dept: NON INVASIVE DIAGNOSTICS | Facility: CLINIC | Age: 67
Discharge: HOME | End: 2022-05-03
Payer: MEDICARE

## 2022-05-03 ENCOUNTER — OFFICE VISIT (OUTPATIENT)
Dept: HEMATOLOGY ONCOLOGY | Facility: CLINIC | Age: 67
End: 2022-05-03
Payer: MEDICARE

## 2022-05-03 VITALS
HEART RATE: 80 BPM | HEIGHT: 70 IN | DIASTOLIC BLOOD PRESSURE: 78 MMHG | WEIGHT: 185 LBS | BODY MASS INDEX: 26.48 KG/M2 | SYSTOLIC BLOOD PRESSURE: 120 MMHG

## 2022-05-03 VITALS
HEART RATE: 77 BPM | HEIGHT: 70 IN | SYSTOLIC BLOOD PRESSURE: 140 MMHG | OXYGEN SATURATION: 97 % | TEMPERATURE: 98.6 F | RESPIRATION RATE: 18 BRPM | BODY MASS INDEX: 26.34 KG/M2 | WEIGHT: 184 LBS | DIASTOLIC BLOOD PRESSURE: 80 MMHG

## 2022-05-03 VITALS — TEMPERATURE: 98.7 F

## 2022-05-03 DIAGNOSIS — C34.92 ADENOCARCINOMA, LUNG, LEFT (HCC): Primary | ICD-10-CM

## 2022-05-03 DIAGNOSIS — C34.12 CANCER OF UPPER LOBE OF LEFT LUNG (HCC): Primary | ICD-10-CM

## 2022-05-03 DIAGNOSIS — I31.3 PERICARDIAL EFFUSION: ICD-10-CM

## 2022-05-03 DIAGNOSIS — E06.4 DRUG-INDUCED THYROIDITIS: ICD-10-CM

## 2022-05-03 DIAGNOSIS — Z45.2 ENCOUNTER FOR CENTRAL LINE CARE: ICD-10-CM

## 2022-05-03 DIAGNOSIS — R42 DIZZINESS: ICD-10-CM

## 2022-05-03 DIAGNOSIS — C79.31 BRAIN METASTASES (HCC): ICD-10-CM

## 2022-05-03 LAB
AORTIC ROOT: 3.4 CM
APICAL FOUR CHAMBER EJECTION FRACTION: 68 %
E WAVE DECELERATION TIME: 210 MS
E/A RATIO: 0.5
FRACTIONAL SHORTENING: 40 (ref 28–44)
INTERVENTRICULAR SEPTUM IN DIASTOLE (PARASTERNAL SHORT AXIS VIEW): 1.1 CM
INTERVENTRICULAR SEPTUM: 1.1 CM (ref 0.54–1.01)
LAAS-AP4: 9.7 CM2
LEFT ATRIUM AREA SYSTOLE SINGLE PLANE A4C: 9.1 CM2
LEFT ATRIUM SIZE: 3.7 CM
LEFT ATRIUM VOLUME INDEX (MOD BIPLANE): 18.4
LEFT INTERNAL DIMENSION IN SYSTOLE: 2.9 CM (ref 3.06–4.64)
LEFT VENTRICULAR INTERNAL DIMENSION IN DIASTOLE: 4.8 CM (ref 5.04–7.51)
LEFT VENTRICULAR POSTERIOR WALL IN END DIASTOLE: 1.1 CM (ref 0.52–0.99)
LEFT VENTRICULAR STROKE VOLUME: 73 ML
LVSV (TEICH): 73 ML
MV PEAK A VEL: 0.96 M/S
MV PEAK E VEL: 48 CM/S
MV STENOSIS PRESSURE HALF TIME: 61 MS
MV VALVE AREA P 1/2 METHOD: 3.6
RIGHT ATRIAL 2D VOLUME: 70 ML
RIGHT ATRIUM AREA SYSTOLE A4C: 23.5 CM2
RIGHT VENTRICLE ID DIMENSION: 5.2 CM
SL CV LV EF: 60
SL CV PED ECHO LEFT VENTRICLE DIASTOLIC VOLUME (MOD BIPLANE) 2D: 106 ML
SL CV PED ECHO LEFT VENTRICLE SYSTOLIC VOLUME (MOD BIPLANE) 2D: 33 ML
TR MAX PG: 47 MMHG
TR PEAK VELOCITY: 3.4 M/S
TRICUSPID VALVE PEAK REGURGITATION VELOCITY: 3.42 M/S
Z-SCORE OF INTERVENTRICULAR SEPTUM IN END DIASTOLE: 2.73

## 2022-05-03 PROCEDURE — 99215 OFFICE O/P EST HI 40 MIN: CPT | Performed by: INTERNAL MEDICINE

## 2022-05-03 PROCEDURE — 93325 DOPPLER ECHO COLOR FLOW MAPG: CPT

## 2022-05-03 PROCEDURE — 96523 IRRIG DRUG DELIVERY DEVICE: CPT

## 2022-05-03 PROCEDURE — 93308 TTE F-UP OR LMTD: CPT | Performed by: INTERNAL MEDICINE

## 2022-05-03 PROCEDURE — 93321 DOPPLER ECHO F-UP/LMTD STD: CPT

## 2022-05-03 PROCEDURE — 93321 DOPPLER ECHO F-UP/LMTD STD: CPT | Performed by: INTERNAL MEDICINE

## 2022-05-03 PROCEDURE — 93325 DOPPLER ECHO COLOR FLOW MAPG: CPT | Performed by: INTERNAL MEDICINE

## 2022-05-03 PROCEDURE — 93308 TTE F-UP OR LMTD: CPT

## 2022-05-03 RX ORDER — MECLIZINE HYDROCHLORIDE 25 MG/1
25 TABLET ORAL EVERY 8 HOURS PRN
Qty: 30 TABLET | Refills: 3 | Status: SHIPPED | OUTPATIENT
Start: 2022-05-03 | End: 2022-06-15 | Stop reason: SDUPTHER

## 2022-05-03 RX ORDER — PSYLLIUM SEED (WITH DEXTROSE)
POWDER (GRAM) ORAL
COMMUNITY
End: 2022-08-07

## 2022-05-03 NOTE — PLAN OF CARE
Problem: Potential for Falls  Goal: Patient will remain free of falls  Description: INTERVENTIONS:  - Keep Call bell within reach  - Keep care items and personal belongings within reach    Outcome: Progressing     Problem: INFECTION - ADULT  Goal: Absence or prevention of progression during hospitalization  Description: INTERVENTIONS:  - Assess and monitor for signs and symptoms of infection  - Monitor lab/diagnostic results  - Monitor all insertion sites, i e  indwelling lines, tubes, and drains  - Monitor endotracheal if appropriate and nasal secretions for changes in amount and color  - [place] appropriate cooling/warming therapies per order  - Administer medications as ordered  - Instruct and encourage patient and family to use good hand hygiene technique  - Identify and instruct in appropriate isolation precautions for identified infection/condition  Outcome: Progressing     Problem: Knowledge Deficit  Goal: Patient/family/caregiver demonstrates understanding of disease process, treatment plan, medications, and discharge instructions  Description: Complete learning assessment and assess knowledge base    Interventions:  - Provide teaching at level of understanding  - Provide teaching via preferred learning methods  Outcome: Progressing

## 2022-05-03 NOTE — PROGRESS NOTES
Hematology/Oncology Outpatient Follow-up  Chong Zapata 74 y o  male 1955 [id number]    Date:  5/3/2022        Assessment and Plan:  1  Cancer of upper lobe of left lung [facility])  I discussed with the patient and his wife the recent PET-CT scan on 04/22 which did not reveal obvious focal activity in the chest abdomen or pelvis or even the osseous structures  He was told that we will continue to monitor him closely without systemic treatment for the time being   - CBC and differential; Future  - Comprehensive metabolic panel; Future  - Magnesium; Future  - TSH, 3rd generation with Free T4 reflex; Future    2  Brain metastases [facility])  He is status post craniotomy and resection of left parietal mass which was compatible with metastatic neuroendocrine carcinoma  His previous pathology after lung resection from May of last year showed poorly differentiated non-small cell lung cancer favoring adenocarcinoma with neuroendocrine differentiation of uncertain clinical significance  The patient is about to be evaluated by the Radiation Oncology team who is most likely going to consider stereotactic radiation for the other brain metastasis  He is most likely going to need close monitoring with frequent MRIs  He is also on dexamethasone 2 mg once a day which should be continued for the time being  3  Dizziness    - meclizine (ANTIVERT) 25 mg tablet; Take 1 tablet (25 mg total) by mouth every 8 (eight) hours as needed for dizziness  Dispense: 30 tablet; Refill: 3    4  Drug-induced thyroiditis  His TSH is normal on the current dose of levothyroxine   - TSH, 3rd generation with Free T4 reflex; Future        HPI:  The patient came today for follow-up visit accompanied by his wife  After his last visit to our office on 03/23/2022 he complained neurological symptoms including headaches    He then had multiple imaging of the brain including an MRI on 04/03/2022 which showed 2 left cerebellar hemorrhagic metastasis and 2 adjacent left parietal hemorrhagic metastasis as well  The patient was then taken to the OR by the neuro surgical team on 04/07/2022 and had  craniotomy and resection of the hemorrhagic metastatic lesion from the left prior total area measuring 4 6 cm  Subsequent imaging from 04/26/2022 with another MRI showed decreased enhancement in the left parietal resected cavity but he has the slightly increased size of left posterolateral cerebellar metastatic hemorrhagic lesion  Unchanged left parietal cortex and left posterior cerebellar metastatic hemorrhagic lesions  He also had a PET-CT scan on 04/22/2022 which showed:  IMPRESSION:     1   Subtle asymmetric FDG activity in the left cerebellum, possibly corresponding to one of patient's known left cerebellar metastatic lesions which were better characterized on recent MRI of the brain      2  No focal tracer activity characteristic of hypermetabolic malignancy involving the neck, chest, abdomen, pelvis, or osseous structures      Note is made of subtle FDG activity associated with nodular and somewhat linear airspace disease in the left lung adjacent to left cardiac border which I favor to reflect posttreatment/inflammatory changes with hypermetabolic malignancy considered less   likely  Short interval follow-up with CT of chest in 3 months is recommended to ensure stability and exclude subtle developing malignancy  He continues to complain about dizziness/lightheadedness according to the wife  He is on Decadron 2 mg once a day  Recent blood work on 05/02/2022 showed white cell count of 5 3 with hemoglobin of 12 2 and platelet count of a 713  White cell differential with normal   Creatinine 1 0 with normal calcium liver enzymes    Magnesium was normal   TSH was normal   Oncology History   Adenocarcinoma, lung, left ([address] )   5/6/2021 Biopsy    [facility]  FINAL DIAGNOSIS     Lung, Left Upper Lobe, core Biopsy ([id number], 5/6/2021):    - Poorly differentiated non-small cell carcinoma, favor adenocarcinoma, with neuroendocrine differentiation of uncertain clinical significance (see letter below)  5/25/2021 Biopsy    A  Lymph Node, 4R, Excision:  - Two reactive lymph nodes with anthracosis (0/2)  B  Lymph Node, 2R, Excision:  - Two reactive lymph nodes with anthracosis (0/2)  C  Lymph Node, 2L, Excision:  - Three reactive lymph nodes with anthracosis (0/3)  D  Lymph Node, 4L, Excision:  - Two reactive lymph nodes with anthracosis (0/2)  E  Lymph Node, Level 7, Excision:  - Two reactive lymph nodes with anthracosis (0/2)              6/9/2021 Initial Diagnosis    Adenocarcinoma, lung, left ([facility] [address] )     6/25/2021 - 8/27/2021 Chemotherapy    cyanocobalamin, 1,000 mcg, Intramuscular, Once, 2 of 3 cycles  Administration: 1,000 mcg (8/6/2021), 1,000 mcg (6/25/2021)  pegfilgrastim (Arby Carrel), 6 mg, Subcutaneous, Once, 3 of 5 cycles  Administration: 6 mg (7/16/2021), 6 mg (8/6/2021), 6 mg (8/27/2021)  fosaprepitant (EMEND) IVPB, 150 mg, Intravenous, Once, 4 of 6 cycles  Administration: 150 mg (6/25/2021), 150 mg (8/6/2021), 150 mg (8/27/2021), 150 mg (7/16/2021)  CARBOplatin (PARAPLATIN) IVPB (GOG AUC DOSING), 496 mg, Intravenous, Once, 4 of 6 cycles  Administration: 496 mg (6/25/2021), 492 5 mg (8/6/2021), 462 mg (8/27/2021), 516 mg (7/16/2021)  pemetrexed (ALIMTA) chemo infusion, 975 mg, Intravenous, Once, 4 of 6 cycles  Administration: 1,000 mg (6/25/2021), 1,000 mg (8/6/2021), 1,000 mg (8/27/2021), 1,000 mg (7/16/2021)     12/16/2021 Surgery    Left Upper Lung Lobectomy at [facility] by Dr Sallie Colindres  Tumor size: 7 6 cm Percentage of viable tumor: 40%  Histologic Grade: Undifferentiated (G$)  Lymphovascular Invasion: Present (extensive)  Perineural Invasion: Not identified  Spread through Air Spaces: Present  Pleural Invasion: tumor invades to the visceral pleural surface  Tumor Extension: Hilar soft tissue  Bronchial Margin: no tumor seen  Vascular Margin: no tumor seen  Distance from Margin: distance of invasive tumor from closest margin: 0 1 cm  Closest margin: vascular margin  Neoadjuvant T staging: ypT2a  Neoadjuvant N staging: ypN0           1/18/2022 - 2/22/2022 Radiation    Treatments:  Course: C1    Plan ID Energy Fractions Dose per Fraction (cGy) Dose Correction (cGy) Total Dose Delivered (cGy) Elapsed Days   L Lung Med 6X 25 / 25 180 0 4,500 35      Treatment Dates:  1/18/2022 - 2/22/2022 1/27/2022 -  Chemotherapy    atezolizumab (TECENTRIQ) IVPB, 1,200 mg, Intravenous, Once, 2 of 6 cycles  Administration: 1,200 mg (1/27/2022), 1,200 mg (2/16/2022)     4/7/2022 Surgery    Final Diagnosis   A-B  Brain, Left parietal mass, Resection:  - 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, consistent with patient's known lung primary  Cancer of upper lobe of left lung ([address] )   11/4/2021 Initial Diagnosis    Cancer of upper lobe of left lung ([address] )     Brain metastases ([address] )   4/25/2022 Initial Diagnosis    Brain metastases ([address] )         Interval history:    ROS: Review of Systems   Constitutional: Positive for fatigue  Negative for chills and fever  HENT: Positive for hearing loss  Negative for ear pain and sore throat  Eyes: Negative for pain and visual disturbance  Respiratory: Positive for cough and shortness of breath  Cardiovascular: Negative for chest pain and palpitations  Gastrointestinal: Negative for abdominal pain and vomiting  Genitourinary: Negative for dysuria and hematuria  Musculoskeletal: Negative for arthralgias and back pain  Skin: Negative for color change and rash  Neurological: Positive for dizziness, light-headedness and headaches  Negative for seizures and syncope  All other systems reviewed and are negative        Past Medical History:   Diagnosis Date    Hyperlipidemia     Hypertension     Lung cancer [facility])        Past Surgical History: Procedure Laterality Date    BACK SURGERY      CRANIOTOMY Left 2022    Procedure: Image guided left parietal craniotomy for tumor resection;  Surgeon: Enid Witt MD;  [facility];  Service: Neurosurgery    [address] IR BIOPSY LUNG  2021    IR PORT PLACEMENT  2021    LAMINECTOMY  2018    L4-L5    LUNG SURGERY      left upper lobectomy    ME BRONCHOSCOPY,DIAGNOSTIC N/A 2021    Procedure: BRONCHOSCOPY FLEXIBLE;  Surgeon: Harjit Ashton MD;  [facility];  Service: Thoracic    ME MEDIASTINOSCOPY WITH LYMPH NODE BIOPSY/IES N/A 2021    Procedure: MEDIASTINOSCOPY, flexible bronchoscopy;  Surgeon: Harjit Ashton MD;  [facility];  Service: Thoracic    TONSILLECTOMY         Social History     Socioeconomic History    Marital status: /Civil Union     Spouse name: None    Number of children: None    Years of education: None    Highest education level: None   Occupational History    None   Tobacco Use    Smoking status: Former Smoker     Packs/day: 1 00     Years: 40 00     Pack years: 40 00     Types: Cigarettes     Start date:      Quit date: 2017     Years since quittin 2    Smokeless tobacco: Never Used    Tobacco comment: quit 2017   Vaping Use    Vaping Use: Never used   Substance and Sexual Activity    Alcohol use: Not Currently     Alcohol/week: 7 0 standard drinks     Types: 7 Cans of beer per week    Drug use: Yes     Types: Marijuana     Comment: seldom    Sexual activity: None   Other Topics Concern    None   Social History Narrative    None     Social Determinants of Health     Financial Resource Strain: Not on file   Food Insecurity: No Food Insecurity    Worried About Running Out of Food in the Last Year: Never true    Felecia of Food in the Last Year: Never true   Transportation Needs: No Transportation Needs    Lack of Transportation (Medical):  No    Lack of Transportation (Non-Medical):  No   Physical Activity: Not on file   Stress: Not on file   Social Connections: Not on file   Intimate Partner Violence: Not on file   Housing Stability: High Risk    Unable to Pay for Housing in the Last Year: No    Number of Places Lived in the Last Year: 1    Unstable Housing in the Last Year: Yes       Family History   Problem Relation Age of Onset    Nephrolithiasis Father        Allergies   Allergen Reactions    Bee Venom     Benazepril Other (See Comments)     Angioedema     Latex Other (See Comments)     Burning of eyes at the dentist from the gloves    Meloxicam GI Intolerance    Penicillin G Rash         Current Outpatient Medications:     allopurinol (ZYLOPRIM) 100 mg tablet, Take 1 tablet (100 mg total) by mouth daily, Disp: 30 tablet, Rfl: 5    amLODIPine (NORVASC) 10 mg tablet, TAKE ONE TABLET BY MOUTH EVERY DAY, Disp: 90 tablet, Rfl: 3    citalopram (CeleXA) 10 mg tablet, Take 1 tablet (10 mg total) by mouth 2 (two) times a day, Disp: 60 tablet, Rfl: 0    dexamethasone (DECADRON) 2 mg tablet, Take 2mg PO Q6hr for 2 days, followed by 2mg PO Q8hr for 2 days, followed by 2mg Q12hr indefinitely thereafter, Disp: 66 tablet, Rfl: 0    doxepin (SINEquan) 25 mg capsule, TAKE ONE CAPSULE BY MOUTH EVERY DAY AT BEDTIME, Disp: 30 capsule, Rfl: 5    levothyroxine 75 mcg tablet, Take 0 5 tablets (37 5 mcg total) by mouth daily in the early morning, Disp: 15 tablet, Rfl: 0    metoprolol succinate (TOPROL-XL) 25 mg 24 hr tablet, Take 1 tablet (25 mg total) by mouth daily, Disp: 30 tablet, Rfl: 5    pantoprazole (PROTONIX) 40 mg tablet, Take 1 tablet (40 mg total) by mouth daily, Disp: 30 tablet, Rfl: 11    polyethylene glycol (GLYCOLAX) 17 GM/SCOOP powder, Take 17 g by mouth 2 (two) times a day, Disp: 850 g, Rfl: 0    Psyllium (Metamucil) 28 3 % POWD, Take by mouth, Disp: , Rfl:     rosuvastatin (CRESTOR) 10 MG tablet, TAKE ONE TABLET BY MOUTH EVERY DAY, Disp: 30 tablet, Rfl: 5   Ascorbic Acid (vitamin C) 1000 MG tablet, Take 1,000 mg by mouth daily   (Patient not taking: Reported on 4/28/2022 ), Disp: , Rfl:     B Complex Vitamins (B COMPLEX 1 PO), Take 1 tablet by mouth daily  (Patient not taking: Reported on 4/28/2022 ), Disp: , Rfl:     B Complex Vitamins (BL VITAMIN B COMPLEX PO), Take by mouth   (Patient not taking: Reported on 4/28/2022 ), Disp: , Rfl:     betamethasone valerate (VALISONE) 0 1 % cream, Apply topically 2 (two) times a day (Patient not taking: Reported on 4/28/2022 ), Disp: 45 g, Rfl: 1    betamethasone, augmented, (DIPROLENE-AF) 0 05 % cream,   (Patient not taking: Reported on 4/28/2022 ), Disp: , Rfl:     calcipotriene (DOVONEX) 0 005 % cream, , Disp: , Rfl:     calcipotriene (DOVONOX) 0 005 % ointment, Apply topically 2 (two) times a day As needed (Patient not taking: Reported on 4/28/2022 ), Disp: , Rfl:     Cholecalciferol (VITAMIN D3 PO), Take 10,000 Units by mouth daily   (Patient not taking: Reported on 4/28/2022 ), Disp: , Rfl:     Clobetasol Propionate (Impoyz) 0 025 % CREA, Apply topically (Patient not taking: Reported on 4/28/2022 ), Disp: , Rfl:     colchicine (COLCRYS) 0 6 mg tablet, Take 1 tablet (0 6 mg total) by mouth 2 (two) times a day (Patient not taking: Reported on 2/15/2022 ), Disp: 30 tablet, Rfl: 5    Cyanocobalamin (Vitamin B 12) 500 MCG TABS, Take 1 tablet by mouth (Patient not taking: Reported on 3/23/2022 ), Disp: , Rfl:     fluocinolone (SYNALAR) 0 01 % external solution,   (Patient not taking: Reported on 4/28/2022 ), Disp: , Rfl:     folic acid (FOLVITE) 1 mg tablet, Take 1 tablet (1 mg total) by mouth daily (Patient not taking: Reported on 1/12/2022 ), Disp: 30 tablet, Rfl: 6    gabapentin (NEURONTIN) 100 mg capsule,   (Patient not taking: Reported on 4/28/2022 ), Disp: , Rfl:     Garlic 10 MG CAPS, Take 1 tablet by mouth daily  (Patient not taking: Reported on 4/28/2022 ), Disp: , Rfl:     Glucosamine HCl 1500 MG TABS, Take by mouth (Patient not taking: Reported on 4/18/2022 ), Disp: , Rfl:     Glucosamine-Chondroit-Vit C-Mn (GLUCOSAMINE 1500 COMPLEX) CAPS, daily  (Patient not taking: Reported on 4/28/2022 ), Disp: , Rfl:     ketoconazole (NIZORAL) 2 % cream, Apply topically 2 (two) times a day (Patient not taking: Reported on 4/28/2022 ), Disp: 15 g, Rfl: 2    levETIRAcetam (KEPPRA) 500 mg tablet, Take 1 tablet (500 mg total) by mouth every 12 (twelve) hours for 6 doses (Patient not taking: Reported on 5/3/2022 ), Disp: 6 tablet, Rfl: 0    meclizine (ANTIVERT) 25 mg tablet, Take 1 tablet (25 mg total) by mouth 4 (four) times a day as needed for dizziness (Patient not taking: Reported on 4/28/2022 ), Disp: 30 tablet, Rfl: 0    meclizine (ANTIVERT) 25 mg tablet, Take 1 tablet (25 mg total) by mouth every 8 (eight) hours as needed for dizziness, Disp: 30 tablet, Rfl: 3    MULTIPLE VITAMINS ESSENTIAL PO, Take by mouth (Patient not taking: Reported on 2/15/2022 ), Disp: , Rfl:     Multiple Vitamins-Minerals (MULTIVITAL-M) TABS, Take by mouth daily  (Patient not taking: Reported on 4/2/2022 ), Disp: , Rfl:     neomycin-polymyxin-hydrocortisone (CORTISPORIN) otic solution, Administer 4 drops into the left ear every 6 (six) hours (Patient not taking: Reported on 1/12/2022 ), Disp: 10 mL, Rfl: 0    ondansetron (ZOFRAN) 4 mg tablet, Take 1 tablet (4 mg total) by mouth every 6 (six) hours (Patient not taking: Reported on 1/18/2022 ), Disp: 12 tablet, Rfl: 0    ondansetron (ZOFRAN) 8 mg tablet, Take 1 tablet (8 mg total) by mouth every 8 (eight) hours as needed for nausea or vomiting (Patient not taking: Reported on 1/12/2022 ), Disp: 20 tablet, Rfl: 3    oxyCODONE (ROXICODONE) 5 immediate release tablet, , Disp: , Rfl:     tamsulosin (FLOMAX) 0 4 mg, , Disp: , Rfl:     traMADol (ULTRAM) 50 mg tablet, TAKE 1 TABLET BY MOUTH EVERY 8 HOURS AS NEEDED FOR SEVERE PAIN (Patient not taking: Reported on 4/28/2022 ), Disp: 30 tablet, Rfl: 0    triamcinolone (KENALOG) 0 1 % cream, , Disp: , Rfl:     zinc gluconate 50 mg tablet, Take 50 mg by mouth daily (Patient not taking: Reported on 4/28/2022 ), Disp: , Rfl:       Physical Exam:  /80 (BP Location: Right arm, Patient Position: Sitting, Cuff Size: Adult)   Pulse 77   Temp 98 6 °F (37 °C)   Resp 18   Ht 5' 10" (1 778 m)   Wt 83 5 kg (184 lb)   SpO2 97%   BMI 26 40 kg/m²     Physical Exam  Constitutional:       Appearance: He is well-developed  He is ill-appearing  HENT:      Head: Normocephalic and atraumatic  Eyes:      General: No scleral icterus  Right eye: No discharge  Left eye: No discharge  Conjunctiva/sclera: Conjunctivae normal       Pupils: Pupils are equal, round, and reactive to light  Neck:      Thyroid: No thyromegaly  Trachea: No tracheal deviation  Cardiovascular:      Rate and Rhythm: Normal rate and regular rhythm  Heart sounds: Normal heart sounds  No murmur heard  No friction rub  Pulmonary:      Effort: Pulmonary effort is normal  No respiratory distress  Breath sounds: Normal breath sounds  No wheezing or rales  Chest:      Chest wall: No tenderness  Abdominal:      General: There is distension  Palpations: Abdomen is soft  There is no hepatomegaly or splenomegaly  Tenderness: There is no abdominal tenderness  There is no guarding or rebound  Musculoskeletal:         General: No tenderness or deformity  Normal range of motion  Cervical back: Normal range of motion and neck supple  Lymphadenopathy:      Cervical: No cervical adenopathy  Skin:     General: Skin is warm and dry  Coloration: Skin is not pale  Findings: No erythema or rash  Neurological:      Mental Status: He is alert and oriented to person, place, and time  Cranial Nerves: No cranial nerve deficit  Coordination: Coordination normal       Deep Tendon Reflexes: Reflexes are normal and symmetric  Psychiatric:         Behavior: Behavior normal          Thought Content: Thought content normal          Judgment: Judgment normal            Labs:  Lab Results   Component Value Date    WBC 5 31 05/02/2022    HGB 12 2 05/02/2022    HCT 38 3 05/02/2022     (H) 05/02/2022     (L) 05/02/2022     Lab Results   Component Value Date    K 4 3 05/02/2022     05/02/2022    CO2 30 05/02/2022    BUN 28 (H) 05/02/2022    CREATININE 1 08 05/02/2022    GLUF 89 05/02/2022    CALCIUM 9 0 05/02/2022    CORRECTEDCA 9 9 05/02/2022    AST 12 05/02/2022    ALT 36 05/02/2022    ALKPHOS 55 05/02/2022    EGFR 70 05/02/2022     No results found for: TSH    Patient voiced understanding and agreement in the above discussion  Aware to contact our office with questions/symptoms in the interim

## 2022-05-04 ENCOUNTER — CLINICAL SUPPORT (OUTPATIENT)
Dept: RADIATION ONCOLOGY | Facility: HOSPITAL | Age: 67
End: 2022-05-04
Attending: RADIOLOGY
Payer: MEDICARE

## 2022-05-04 VITALS
BODY MASS INDEX: 26.17 KG/M2 | HEART RATE: 86 BPM | HEIGHT: 70 IN | TEMPERATURE: 97.6 F | WEIGHT: 182.8 LBS | OXYGEN SATURATION: 98 % | DIASTOLIC BLOOD PRESSURE: 76 MMHG | RESPIRATION RATE: 18 BRPM | SYSTOLIC BLOOD PRESSURE: 117 MMHG

## 2022-05-04 DIAGNOSIS — C79.31 BRAIN METASTASES (HCC): Primary | ICD-10-CM

## 2022-05-04 PROCEDURE — 99214 OFFICE O/P EST MOD 30 MIN: CPT | Performed by: RADIOLOGY

## 2022-05-04 PROCEDURE — 99211 OFF/OP EST MAY X REQ PHY/QHP: CPT | Performed by: RADIOLOGY

## 2022-05-04 NOTE — PROGRESS NOTES
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 status in May 2021  He is status post armen-adjuvant chemotherapy with only partial response with Alimta and carboplatin  Completed chemotherapy 2021  He had left upper lung lobectomy at [facility] on 2021  He completed adjuvant radiation therapy to the left lung/mediastinum on 2022       In the interim, on 2022 he developed stroke-like symptoms and imaging showed 3-4 hemorrhagic metastasis and on  he underwent left parietal craniotomy with tumor resection 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      He was last seen by Dr Hilario Lopes for radiation follow up on 2022  Referral placed for patient to be seen at neuro-oncology clinic to discuss radiation treatment options  He is tentatively set up for whole brain simulation on 22 at the [facility] should that be the groups consensus       He is seen today at neuro-oncology clinic today to discuss radiation therapy recommendation(s) for brain metastases          4/3/22 MRI brain-  1   2 left cerebellar hemorrhagic metastases and probable 2 adjacent left parietal hemorrhagic metastasis, the more anterior, larger one having parenchymal hematoma and local edema and mass effect, correlating to the hemorrhage on the prior head CT          22 Image guided left parietal craniotomy for tumor resection (Left)   A-B  Brain, Left parietal mass, Resection:  - 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, consistent with patient's known lung primary        22 MRI brain w wo contrast (s/p left parietal craniotomy for tumor resection)  IMPRESSION:   Status post treatment of left parietal metastatic lesion with postop change as described above  This is the initial postoperative examination and will serve as baseline for follow-up exams  No significant residual tumor noted within the resection cavity      2 metastatic lesions within the left cerebellar hemisphere are unchanged         22 PET CT  IMPRESSION:   1   Subtle asymmetric FDG activity in the left cerebellum, possibly corresponding to one of patient's known left cerebellar metastatic lesions which were better characterized on recent MRI of the brain    2   No focal tracer activity characteristic of hypermetabolic malignancy involving the neck, chest, abdomen, pelvis, or osseous structures    Note is made of subtle FDG activity associated with nodular and somewhat linear airspace disease in the left lung adjacent to left cardiac border which I favor to reflect posttreatment/inflammatory changes with hypermetabolic malignancy considered less   likely   Short interval follow-up with CT of chest in 3 months is recommended to ensure stability and exclude subtle developing malignancy  22 MRI brain w wo contrast  IMPRESSION:   Decreased enhancement in left parietal resection cavity   No definite evidence of recurrent tumor      Slightly increased size of left posterolateral cerebellar metastatic hemorrhagic lesion      Unchanged left parietal cortex and left posterior cerebellar metastatic hemorrhagic lesions         5/3/22 Dr Jennifer Villegas- Discussed recent PET scan, no obvious focal activity in the chest abdomen or pelvis  Continue to monitor closely without systemic treatment for the time being  Continue dexamethasone 2 mg daily for the time being   Meclizine as needed for dizziness        Upcomin22 Neurosurgery, Dr Erica Roth  22 Sunshine Rogers NP            Historical Information Oncology History   Adenocarcinoma, lung, left ([facility] [address] )   5/6/2021 Biopsy    [facility]  FINAL DIAGNOSIS     Lung, Left Upper Lobe, core Biopsy ([id number], 5/6/2021):    - Poorly differentiated non-small cell carcinoma, favor adenocarcinoma, with neuroendocrine differentiation of uncertain clinical significance (see letter below)  5/25/2021 Biopsy    A  Lymph Node, 4R, Excision:  - Two reactive lymph nodes with anthracosis (0/2)  B  Lymph Node, 2R, Excision:  - Two reactive lymph nodes with anthracosis (0/2)  C  Lymph Node, 2L, Excision:  - Three reactive lymph nodes with anthracosis (0/3)  D  Lymph Node, 4L, Excision:  - Two reactive lymph nodes with anthracosis (0/2)  E  Lymph Node, Level 7, Excision:  - Two reactive lymph nodes with anthracosis (0/2)              6/9/2021 Initial Diagnosis    Aden[facility], lung, [facility] ([facility] )     6/25/2021 - 8/27/2021 Chemotherapy    cyanocobalamin, 1,000 mcg, Intramuscular, Once, 2 of 3 cycles  Administration: 1,000 mcg (8/6/2021), 1,000 mcg (6/25/2021)  pegfilgrastim (Sherlon Median), 6 mg, Subcutaneous, Once, 3 of 5 cycles  Administration: 6 mg (7/16/2021), 6 mg (8/6/2021), 6 mg (8/27/2021)  fosaprepitant (EMEND) IVPB, 150 mg, Intravenous, Once, 4 of 6 cycles  Administration: 150 mg (6/25/2021), 150 mg (8/6/2021), 150 mg (8/27/2021), 150 mg (7/16/2021)  CARBOplatin (PARAPLATIN) IVPB ([facility] DOSING), 496 mg, Intravenous, Once, 4 of 6 cycles  Administration: 496 mg (6/25/2021), 492 5 mg (8/6/2021), 462 mg (8/27/2021), 516 mg (7/16/2021)  pemetrexed (ALIMTA) chemo infusion, 975 mg, Intravenous, Once, 4 of 6 cycles  Administration: 1,000 mg (6/25/2021), 1,000 mg (8/6/2021), 1,000 mg (8/27/2021), 1,000 mg (7/16/2021)     12/16/2021 Surgery    Left Upper Lung Lobectomy at [facility] by Dr Bishop Parker  Tumor size: 7 6 cm Percentage of viable tumor: 40%  Histologic Grade: Undifferentiated (G$)  Lymphovascular Invasion: Present (extensive)  Perineural Invasion: Not identified  Spread through Air Spaces: Present  Pleural Invasion: tumor invades to the visceral pleural surface  Tumor Extension: Hilar soft tissue  Bronchial Margin: no tumor seen  Vascular Margin: no tumor seen  Distance from Margin: distance of invasive tumor from closest margin: 0 1 cm  Closest margin: vascular margin  Neoadjuvant T staging: ypT2a  Neoadjuvant N staging: ypN0           1/18/2022 - 2/22/2022 Radiation    Treatments:  Course: C1    Plan ID Energy Fractions Dose per Fraction (cGy) Dose Correction (cGy) Total Dose Delivered (cGy) Elapsed Days   L Lung Med 6X 25 / 25 180 0 4,500 35      Treatment Dates:  1/18/2022 - 2/22/2022 1/27/2022 -  Chemotherapy    atezolizumab (TECENTRIQ) IVPB, 1,200 mg, Intravenous, Once, 2 of 6 cycles  Administration: 1,200 mg (1/27/2022), 1,200 mg (2/16/2022)     4/7/2022 Surgery    Final Diagnosis   A-B  Brain, Left parietal mass, Resection:  - 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, consistent with patient's known lung primary  Cancer of upper lobe of left lung ([address] )   11/4/2021 Initial Diagnosis    Cancer of upper lobe of left lung ([address] )     Brain metastases ([address] )   4/7/2022 Initial Diagnosis    Brain metastases ([address] )     4/7/2022 Surgery    Image guided left parietal craniotomy for tumor resection (Left)  Surgeon: Dr Kenia Barker    A-B  Brain, Left parietal mass, Resection:  - 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, consistent with patient's known lung primary              Past Medical History:   Diagnosis Date    Hyperlipidemia     Hypertension     Lung cancer [facility])      Past Surgical History:   Procedure Laterality Date    BACK SURGERY      CRANIOTOMY Left 4/7/2022    Procedure: Image guided left parietal craniotomy for tumor resection;  Surgeon: Sharda Cooley MD;  [facility];  Service: Neurosurgery    HEMORRHOID SURGERY      IR BIOPSY LUNG  2021    IR PORT PLACEMENT  2021    LAMINECTOMY  2018    L4-L5    LUNG SURGERY      left upper lobectomy    NC BRONCHOSCOPY,DIAGNOSTIC N/A 2021    Procedure: BRONCHOSCOPY FLEXIBLE;  Surgeon: Corbin Swartz MD;  [facility];  Service: Thoracic    NC MEDIASTINOSCOPY WITH LYMPH NODE BIOPSY/IES N/A 2021    Procedure: MEDIASTINOSCOPY, flexible bronchoscopy;  Surgeon: Corbin Swartz MD;  [facility];  Service: Thoracic    TONSILLECTOMY         Family History   Problem Relation Age of Onset    Nephrolithiasis Father     Lung cancer Maternal Grandfather        Social History   Social History     Substance and Sexual Activity   Alcohol Use Not Currently    Alcohol/week: 7 0 standard drinks    Types: 7 Cans of beer per week     Social History     Substance and Sexual Activity   Drug Use Not Currently    Types: Marijuana    Comment: seldom     Social History     Tobacco Use   Smoking Status Former Smoker    Packs/day: 1 00    Years: 40 00    Pack years: 40 00    Types: Cigarettes    Start date:     Quit date: 2017    Years since quittin 2   Smokeless Tobacco Never Used   Tobacco Comment    quit 2017         Meds/Allergies     Current Outpatient Medications:     allopurinol (ZYLOPRIM) 100 mg tablet, Take 1 tablet (100 mg total) by mouth daily, Disp: 30 tablet, Rfl: 5    amLODIPine (NORVASC) 10 mg tablet, TAKE ONE TABLET BY MOUTH EVERY DAY, Disp: 90 tablet, Rfl: 3    citalopram (CeleXA) 10 mg tablet, Take 1 tablet (10 mg total) by mouth 2 (two) times a day, Disp: 60 tablet, Rfl: 0    dexamethasone (DECADRON) 2 mg tablet, Take 2mg PO Q6hr for 2 days, followed by 2mg PO Q8hr for 2 days, followed by 2mg Q12hr indefinitely thereafter (Patient taking differently: Take 2 mg by mouth in the morning Take 2mg PO Q6hr for 2 days, followed by 2mg PO Q8hr for 2 days, followed by 2mg Q12hr indefinitely thereafter ), Disp: 66 tablet, Rfl: 0   doxepin (SINEquan) 25 mg capsule, TAKE ONE CAPSULE BY MOUTH EVERY DAY AT BEDTIME, Disp: 30 capsule, Rfl: 5    levothyroxine 75 mcg tablet, Take 0 5 tablets (37 5 mcg total) by mouth daily in the early morning, Disp: 15 tablet, Rfl: 0    meclizine (ANTIVERT) 25 mg tablet, Take 1 tablet (25 mg total) by mouth every 8 (eight) hours as needed for dizziness, Disp: 30 tablet, Rfl: 3    metoprolol succinate (TOPROL-XL) 25 mg 24 hr tablet, Take 1 tablet (25 mg total) by mouth daily, Disp: 30 tablet, Rfl: 5    pantoprazole (PROTONIX) 40 mg tablet, Take 1 tablet (40 mg total) by mouth daily, Disp: 30 tablet, Rfl: 11    polyethylene glycol (GLYCOLAX) 17 GM/SCOOP powder, Take 17 g by mouth 2 (two) times a day, Disp: 850 g, Rfl: 0    Psyllium (Metamucil) 28 3 % POWD, Take by mouth, Disp: , Rfl:     rosuvastatin (CRESTOR) 10 MG tablet, TAKE ONE TABLET BY MOUTH EVERY DAY, Disp: 30 tablet, Rfl: 5    Ascorbic Acid (vitamin C) 1000 MG tablet, Take 1,000 mg by mouth daily   (Patient not taking: Reported on 4/28/2022 ), Disp: , Rfl:     B Complex Vitamins (B COMPLEX 1 PO), Take 1 tablet by mouth daily  (Patient not taking: Reported on 4/28/2022 ), Disp: , Rfl:     B Complex Vitamins (BL VITAMIN B COMPLEX PO), Take by mouth   (Patient not taking: Reported on 4/28/2022 ), Disp: , Rfl:     betamethasone valerate (VALISONE) 0 1 % cream, Apply topically 2 (two) times a day (Patient not taking: Reported on 4/28/2022 ), Disp: 45 g, Rfl: 1    betamethasone, augmented, (DIPROLENE-AF) 0 05 % cream,   (Patient not taking: Reported on 4/28/2022 ), Disp: , Rfl:     calcipotriene (DOVONEX) 0 005 % cream, , Disp: , Rfl:     calcipotriene (DOVONOX) 0 005 % ointment, Apply topically 2 (two) times a day As needed (Patient not taking: Reported on 4/28/2022 ), Disp: , Rfl:     Cholecalciferol (VITAMIN D3 PO), Take 10,000 Units by mouth daily   (Patient not taking: Reported on 4/28/2022 ), Disp: , Rfl:     Clobetasol Propionate (Impoyz) 0  025 % CREA, Apply topically (Patient not taking: Reported on 4/28/2022 ), Disp: , Rfl:     colchicine (COLCRYS) 0 6 mg tablet, Take 1 tablet (0 6 mg total) by mouth 2 (two) times a day (Patient not taking: Reported on 2/15/2022 ), Disp: 30 tablet, Rfl: 5    Cyanocobalamin (Vitamin B 12) 500 MCG TABS, Take 1 tablet by mouth (Patient not taking: Reported on 3/23/2022 ), Disp: , Rfl:     fluocinolone (SYNALAR) 0 01 % external solution,   (Patient not taking: Reported on 4/28/2022 ), Disp: , Rfl:     folic acid (FOLVITE) 1 mg tablet, Take 1 tablet (1 mg total) by mouth daily (Patient not taking: Reported on 1/12/2022 ), Disp: 30 tablet, Rfl: 6    gabapentin (NEURONTIN) 100 mg capsule,   (Patient not taking: Reported on 4/28/2022 ), Disp: , Rfl:     Garlic 10 MG CAPS, Take 1 tablet by mouth daily  (Patient not taking: Reported on 4/28/2022 ), Disp: , Rfl:     Glucosamine HCl 1500 MG TABS, Take by mouth (Patient not taking: Reported on 4/18/2022 ), Disp: , Rfl:     Glucosamine-Chondroit-Vit C-Mn (GLUCOSAMINE 1500 COMPLEX) CAPS, daily  (Patient not taking: Reported on 4/28/2022 ), Disp: , Rfl:     ketoconazole (NIZORAL) 2 % cream, Apply topically 2 (two) times a day (Patient not taking: Reported on 4/28/2022 ), Disp: 15 g, Rfl: 2    levETIRAcetam (KEPPRA) 500 mg tablet, Take 1 tablet (500 mg total) by mouth every 12 (twelve) hours for 6 doses (Patient not taking: Reported on 5/3/2022 ), Disp: 6 tablet, Rfl: 0    meclizine (ANTIVERT) 25 mg tablet, Take 1 tablet (25 mg total) by mouth 4 (four) times a day as needed for dizziness (Patient not taking: Reported on 4/28/2022 ), Disp: 30 tablet, Rfl: 0    MULTIPLE VITAMINS ESSENTIAL PO, Take by mouth (Patient not taking: Reported on 2/15/2022 ), Disp: , Rfl:     Multiple Vitamins-Minerals (MULTIVITAL-M) TABS, Take by mouth daily  (Patient not taking: Reported on 4/2/2022 ), Disp: , Rfl:     neomycin-polymyxin-hydrocortisone (CORTISPORIN) otic solution, Administer 4 drops into the left ear every 6 (six) hours (Patient not taking: Reported on 1/12/2022 ), Disp: 10 mL, Rfl: 0    ondansetron (ZOFRAN) 4 mg tablet, Take 1 tablet (4 mg total) by mouth every 6 (six) hours (Patient not taking: Reported on 1/18/2022 ), Disp: 12 tablet, Rfl: 0    ondansetron (ZOFRAN) 8 mg tablet, Take 1 tablet (8 mg total) by mouth every 8 (eight) hours as needed for nausea or vomiting (Patient not taking: Reported on 1/12/2022 ), Disp: 20 tablet, Rfl: 3    oxyCODONE (ROXICODONE) 5 immediate release tablet, , Disp: , Rfl:     tamsulosin (FLOMAX) 0 4 mg, , Disp: , Rfl:     traMADol (ULTRAM) 50 mg tablet, TAKE 1 TABLET BY MOUTH EVERY 8 HOURS AS NEEDED FOR SEVERE PAIN (Patient not taking: Reported on 4/28/2022 ), Disp: 30 tablet, Rfl: 0    triamcinolone (KENALOG) 0 1 % cream, , Disp: , Rfl:     zinc gluconate 50 mg tablet, Take 50 mg by mouth daily (Patient not taking: Reported on 4/28/2022 ), Disp: , Rfl:   Allergies   Allergen Reactions    Bee Venom     Benazepril Other (See Comments)     Angioedema     Latex Other (See Comments)     Burning of eyes at the dentist from the gloves    Meloxicam GI Intolerance    Penicillin G Rash         Review of Systems   Constitutional: Positive for fatigue  HENT: Negative  Eyes: Negative  Respiratory: Negative  Cardiovascular: Negative  Gastrointestinal: Negative  Endocrine: Negative  Genitourinary: Negative  Musculoskeletal: Positive for back pain  Skin: Negative  Allergic/Immunologic: Negative  Neurological: Positive for dizziness (constant since surgery) and light-headedness (constant since surgery)  Romney he had to re-learn some things after surgery   Hematological: Negative  Psychiatric/Behavioral: Negative  Negative for suicidal ideas          Reports feeling depressed, declines referral to SW             OBJECTIVE:   /76   Pulse 86   Temp 97 6 °F (36 4 °C)   Resp 18   Ht 5' 10" (1 778 m)   Wt 82 9 kg (182 lb 12 8 oz)   SpO2 98%   BMI 26 23 kg/m²   Pain Assessment:  0  Performance Status: ECOG/Zubrod/WHO: 1 - Symptomatic but completely ambulatory    Physical Exam   He is conversing appropriately  He is ambulating independently  His breathing is unlabored  RESULTS  Lab Results    Chemistry        Component Value Date/Time    K 4 3 05/02/2022 1231     05/02/2022 1231    CO2 30 05/02/2022 1231    BUN 28 (H) 05/02/2022 1231    CREATININE 1 08 05/02/2022 1231        Component Value Date/Time    CALCIUM 9 0 05/02/2022 1231    ALKPHOS 55 05/02/2022 1231    AST 12 05/02/2022 1231    ALT 36 05/02/2022 1231            Lab Results   Component Value Date    WBC 5 31 05/02/2022    HGB 12 2 05/02/2022    HCT 38 3 05/02/2022     (H) 05/02/2022     (L) 05/02/2022         Imaging Studies  MRI brain with and without contrast    Result Date: 4/28/2022  Narrative: MRI BRAIN WITH AND WITHOUT CONTRAST INDICATION: C79 31: Secondary malignant neoplasm of brain  Brain metastasis  COMPARISON:  MRI brain with and without contrast 4/8/2022, 4/3/2022 TECHNIQUE: Sagittal T1, axial T2, axial FLAIR, axial T1, axial [place], axial diffusion  Sagittal, axial T1 postcontrast   Axial bravo postcontrast with coronal reconstructions  IV Contrast:  8 mL of Gadobutrol injection (SINGLE-DOSE)  IMAGE QUALITY:   Diagnostic  FINDINGS: BRAIN PARENCHYMA: Postsurgical changes of left parietal craniotomy for resection of metastatic lesion in left parietal lobe  Left parietal resection cavity with heterogeneous blood products, hemosiderin deposition, T2/FLAIR hyperintense signal abnormality, and decreased peripheral and internal enhancement  Unchanged dural thickening and enhancement in left parietal region adjacent to the craniotomy site, compatible with postsurgical change   Metastatic enhancing lesions with associated hemosiderin deposition as detailed below: -0 5 x 0 3 cm left parietal cortex lesion (11:92), unchanged  -2 1 x 1 4 cm left posterolateral cerebellar lesion (11:44), previously 1 9 x 1 3 cm  -1 6 x 1 4 cm left posterior cerebellar lesion (11:45), unchanged  Decreased mild surrounding vasogenic edema in left parietal lobe  Similar mild vasogenic edema in left cerebellum  No mass effect or midline shift  No diffusion-weighted signal abnormality to suggest acute infarction  VENTRICLES:  Normal for the patient's age  SELLA AND PITUITARY GLAND:  Normal  ORBITS:  Normal  PARANASAL SINUSES:  Moderate sized right maxillary mucus retention cyst  VASCULATURE:  Evaluation of the major intracranial vasculature demonstrates appropriate flow voids  CALVARIUM AND SKULL BASE:  Normal  MASTOIDS: Small right mastoid effusion  EXTRACRANIAL SOFT TISSUES:  Normal      Impression: Decreased enhancement in left parietal resection cavity  No definite evidence of recurrent tumor  Slightly increased size of left posterolateral cerebellar metastatic hemorrhagic lesion  Unchanged left parietal cortex and left posterior cerebellar metastatic hemorrhagic lesions  The study was marked in EPIC for significant notification  Workstation performed: [id number]     MRI brain w wo contrast    Result Date: 4/8/2022  Narrative: MRI BRAIN WITH AND WITHOUT CONTRAST INDICATION: s/p Left Parietal Craniotomy for tumor resection      Metastatic disease, lung carcinoma  COMPARISON:  4/3/2022 TECHNIQUE: Sagittal T1, axial T2, axial FLAIR, axial T1, axial [place], axial diffusion  Sagittal, axial T1 postcontrast   Axial bravo postcontrast with coronal reconstructions  IV Contrast:  7 mL of Gadobutrol injection (SINGLE-DOSE)  IMAGE QUALITY:   Diagnostic  FINDINGS: BRAIN PARENCHYMA:  Patient is status post left parietal craniotomy for resection of previously seen hemorrhagic mass within the parietal lobe  There is a resection cavity which is significantly decreased in size compared to the previous hemorrhagic mass    Resection cavity demonstrates complex internal signal representing a small amount of air, a moderate amount of blood products  Heterogeneous T1 signal prior to administration of contrast is noted within the resection cavity  After administration of contrast there is mild enhancement extending obliquely towards the craniotomy site  FLAIR imaging demonstrates moderate surrounding edema within the parietal lobe extending inferiorly into the superior occipital lobe  Diffusion imaging demonstrates minimal restricted diffusion posterior to the resection cavity towards the calvarium, suggesting small amount of postop ischemia  There is a moderate amount of air noted within the anterior extra-axial space on the left resulting in minor mass effect upon the anterior left frontal lobe  The previously identified 2 enhancing lesions within the left cerebellar hemisphere are grossly unchanged from the examination performed 5 days ago, best seen on series 10 image 9  No new enhancing lesions identified  VENTRICLES:  Normal for the patient's age  SELLA AND PITUITARY GLAND:  Normal  ORBITS:  Normal  PARANASAL SINUSES:  Stable retention cyst within the inferior aspect of the right maxillary sinus  VASCULATURE:  Evaluation of the major intracranial vasculature demonstrates appropriate flow voids  CALVARIUM AND SKULL BASE:  Patient has undergone left parietal craniotomy  Expected postop change within the extracranial soft tissues  Small amount of subacute extra-axial blood is seen underlying the craniotomy  EXTRACRANIAL SOFT TISSUES:  Normal      Impression: Status post treatment of left parietal metastatic lesion with postop change as described above  This is the initial postoperative examination and will serve as baseline for follow-up exams  No significant residual tumor noted within the resection cavity  2 metastatic lesions within the left cerebellar hemisphere are unchanged   Workstation performed: [id number]     CT chest abdomen pelvis w contrast    Result Date: 4/4/2022  Narrative: CT CHEST, ABDOMEN AND PELVIS WITH IV CONTRAST INDICATION:   Metastatic disease evaluation Evaluate for metastatic disease  COMPARISON:  CT from December 6, 2020 4/20/2021, April 8, 2021 TECHNIQUE: CT examination of the chest, abdomen and pelvis was performed  Axial, sagittal, and coronal 2D reformatted images were created from the source data and submitted for interpretation  Radiation dose length product (DLP) for this visit:  1061 02 mGy-cm   This examination, like all CT scans performed in the [facility], was performed utilizing techniques to minimize radiation dose exposure, including the use of iterative reconstruction and automated exposure control  IV Contrast:  100 mL of iohexol (OMNIPAQUE) Enteric Contrast: Enteric contrast was administered  FINDINGS: CHEST LUNGS:  Trachea and central bronchi are patent Emphysema seen postsurgical changes of left upper lobectomy noted No new consolidation seen Linear density seen in the left mid to lower lung, in image 37 series 2 Left upper  lung granuloma seen, in image 75 series 604 The right upper lobe granuloma seen in image 40 series 604 PLEURA:  No pleural effusion or pneumothorax HEART/GREAT VESSELS: Heart is unremarkable for patient's age  No thoracic aortic aneurysm  Ascending aorta measures 3 7 cm The descending thoracic aorta measures 2 4 cm Coronary artery calcification seen MEDIASTINUM AND OWEN:  No significant mediastinal lymph node enlargement seen CHEST WALL AND LOWER NECK: A healing left 5th rib fractures ABDOMEN LIVER/BILIARY TREE:  No focal liver lesion seen GALLBLADDER:  Gallbladder appear unremarkable SPLEEN:  A hypodense splenic lesion seen, measuring 1 7 cm, stable since previous study PANCREAS:  Unremarkable  ADRENAL GLANDS:  Unremarkable  KIDNEYS/URETERS:  No hydronephrosis or urinary tract calculus    One or more sharply circumscribed subcentimeter renal hypodensities are present, too small to accurately characterize, and statistically most likely benign findings  According to recent literature (Radiology 2019) no further workup of these findings is recommended  Renal cysts are noted bilaterally STOMACH AND BOWEL:  Diverticulosis seen No abnormal dilation of the small bowel loops seen APPENDIX:  No findings to suggest appendicitis  ABDOMINOPELVIC CAVITY:  No ascites  No pneumoperitoneum  No lymphadenopathy  VESSELS:  Unremarkable for patient's age  PELVIS REPRODUCTIVE ORGANS:  Prostate appears unremarkable URINARY BLADDER:  Unremarkable  ABDOMINAL WALL/INGUINAL REGIONS:  Unremarkable  OSSEOUS STRUCTURES:  No acute fracture or destructive osseous lesion  Impression: No findings of metastatic disease in the chest abdomen pelvis  No acute compression collapse of the vertebra seen No lytic destructive lesion seen Emphysema Postsurgical changes from left upper lobectomy A linear density seen in the left mid to lower lung likely scarring  Routine surveillance can be continued Workstation performed: [id number]     NM PET CT skull base to mid thigh    Result Date: 4/25/2022  Narrative: PET/CT SCAN INDICATION:  C34 92: Malignant neoplasm of unspecified part of left bronchus or lung C34 12: Malignant neoplasm of upper lobe, left bronchus or lung   , restaging The following clinical information was copied directly from EPIC: h/o adenocarcinoma of Lt lung  s/p Lt parietal mass resection (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, c/w pt's known lung primary)  s/p Lt UL lobectomy  (poorly diff  non-small cell carcinoma, c/w large cell neuroendocrine carcinoma, nonkeratinizing poorly diff  squamous cell carcinoma)  Restaging MODIFIER: PS COMPARISON: MRI of the brain dated 4/8/2022, CT of chest, abdomen, and pelvis dated 4/4/2022, and PET/CT dated 9/27/2021 CELL TYPE:  poorly diff  non-small cell carcinoma, favor adenocarcinoma with neuroendocrine diff   (BX 5/6/21 lT ul LUNG +) TECHNIQUE: 8 23 mCi F-18-FDG administered IV  Multiplanar attenuation corrected and non attenuation corrected PET images were acquired 60 minutes post injection  Contiguous, low dose, axial CT sections were obtained from the skull vertex through the femurs   Intravenous contrast material was not utilized  This examination, like all CT scans performed in the [facility], was performed utilizing techniques to minimize radiation dose exposure, including the use of iterative reconstruction and automated exposure control  Fasting serum glucose: 107 mg/dl FINDINGS: VISUALIZED BRAIN:   Left parietal photopenic region corresponds to surgical resection site  Subtle asymmetric tracer activity involving the posterior/medial left cerebellum possibly related to one of the 2 known left cerebellar metastatic lesions which were better characterized on recent MRI of the brain; please note that this finding is subtle and patient's known left cerebellar masses are not significantly hypermetabolic and are best characterized on MRI  HEAD/NECK:   There is a physiologic distribution of FDG  No FDG avid cervical adenopathy is seen  CT images: Postsurgical changes from left parietal craniotomy  Cerebral atrophy  Intracranial atherosclerotic calcification is noted  On image 44 of series 3 there is subtle hyperdense lesion measuring 1 6 cm involving the lateral left cerebellum likely corresponding to one of the metastatic lesions which was better characterized on prior MRI  Right maxillary sinus mucous retention cyst versus mucosal polyp  CHEST:   Interval resection of patient's known hypermetabolic left upper lobe malignancy and adjacent mediastinal amena metastatic disease   Best seen on images 138 through 140 is subtle FDG activity associated with somewhat linear and somewhat nodular airspace disease involving the left lung adjacent to the left cardiac border with max SUV of 2 4 and corresponding to nodular density on image  138 of series 3 measuring 8 mm and on image 139 measuring 1 3 cm  While I favor these findings to reflect posttreatment inflammatory changes, short interval follow-up is recommended to exclude developing left lung malignancy/metastatic disease  Mild nonspecific FDG activity involving the mid esophagus without definite corresponding soft tissue abnormality  CT images: Right-sided chest port  Atherosclerotic vascular calcifications including those of the coronary arteries are noted  Postsurgical changes related to left upper lobectomy  Emphysematous changes involving the lungs  Evaluation for pulmonary nodules is limited on low-dose unenhanced CT secondary to respiratory motion artifact  ABDOMEN:   No FDG avid soft tissue lesions are seen  CT images: Poorly characterized on low-dose unenhanced CT are multiple hypodense bilateral renal lesions which were better characterized on contrast-enhanced CT dated April 4, 2022  Nonspecific mural thickening of the proximal to mid gastric wall  Atherosclerotic vascular calcifications are noted  Diverticulosis coli  PELVIS: No FDG avid soft tissue lesions are seen  CT images: Mild enlargement of the prostate gland  OSSEOUS STRUCTURES: No FDG avid lesions are seen  CT images: Degenerative changes are noted involving the spine  Impression: 1  Subtle asymmetric FDG activity in the left cerebellum, possibly corresponding to one of patient's known left cerebellar metastatic lesions which were better characterized on recent MRI of the brain  2   No focal tracer activity characteristic of hypermetabolic malignancy involving the neck, chest, abdomen, pelvis, or osseous structures  Note is made of subtle FDG activity associated with nodular and somewhat linear airspace disease in the left lung adjacent to left cardiac border which I favor to reflect posttreatment/inflammatory changes with hypermetabolic malignancy considered less likely    Short interval follow-up with CT of chest in 3 months is recommended to ensure stability and exclude subtle developing malignancy  Please see above for details and additional findings  The study was marked in EPIC for significant notification  The study was marked in Epic for follow-up  Workstation performed: [id number]     Echo follow up/limited w/ contrast if indicated    Result Date: 5/3/2022  Narrative: Normie Glory  Left Ventricle: Left ventricular cavity size is normal  Wall thickness is normal  The left ventricular ejection fraction is 60%  Systolic function is normal  Wall motion is normal  Diastolic function is mildly abnormal, consistent with grade I (abnormal) relaxation    Right Ventricle: Right ventricular cavity size is mildly dilated    Right Atrium: The atrium is mildly dilated    Tricuspid Valve: There is mild to moderate regurgitation  The right ventricular systolic pressure is mildly to moderately elevated  RVSP 45 mm Hg  Pathology:  Final Diagnosis   A-B  Brain, Left parietal mass, Resection:  - 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, consistent with patient's known lung primary             ASSESSMENT  1  Brain metastases [facility])       Cancer Staging  No matching staging information was found for the patient  PLAN/DISCUSSION  No orders of the defined types were placed in this encounter  Madi Parrish is a 74y o  year old male with stage IV lung carcinoma  He recently underwent craniotomy for resection of a left parietal brain mass  The pathology is consistent with metastatic neuroendocrine carcinoma  His case was reviewed at [facility] today  I reviewed with the patient and his wife his MRI findings revealing surgical cavity along with a small adjacent metastasis  He has 2 lesions approximately 2 cm each in the left cerebellum  He currently has symptoms related to the cerebellar lesions  He is on steroids    I had a very detailed discussion with the patient and his wife regarding radiation options  We discussed standard treatment for small cell brain metastasis has been whole-brain radiation  We discussed the rationale and data supporting this  In addition we discussed that there is some retrospective data (fire trial) where SRS was utilized for small cell brain metastasis  That study found no overall difference in survival however there was decreased time to progression in the brain  I reviewed with him that there is a  phase 3 study that is open however not available at [address] [place] [address] yet where hippocampal sparing whole-brain treatment is compared to a stereotactic approach  Discussed the volume treated with each modality as well as its possible side effects  In particular with whole-brain radiation we discussed fatigue, hair loss, neuro cognitive changes including memory changes in the future  Hippocampal sparing approach can reduce some of the side effects  In the end after significant discussion the patient is electing to proceed with whole-brain radiation with hippocampal sparing  He already has a simulation appointment at the [facility] with Dr Mignon Rae  The plan will be to treat to dose of 3000 cGy given in 10 treatment fractions  Angie Choi MD  3/8/3552,6:76 AM      Portions of the record may have been created with voice recognition software  Occasional wrong word or "sound a like" substitutions may have occurred due to the inherent limitations of voice recognition software  Read the chart carefully and recognize, using context, where substitutions have occurred

## 2022-05-04 NOTE — PROGRESS NOTES
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 status in May 2021  He is status post armen-adjuvant chemotherapy with only partial response with Alimta and carboplatin  Completed chemotherapy 8/27/2021  He had left upper lung lobectomy at [facility] on 12/16/2021  He completed adjuvant radiation therapy to the left lung/mediastinum on 2/22/2022  In the interim, on 4/2/2022 he developed stroke-like symptoms and imaging showed 3-4 hemorrhagic metastasis and on April 7 he underwent left parietal craniotomy with tumor resection 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  He was last seen by Dr Lety Espinal for radiation follow up on 4/28/2022  Referral placed for patient to be seen at neuro-oncology clinic to discuss radiation treatment options  He is tentatively set up for whole brain simulation on 5/5/22 at the [facility] should that be the groups consensus  He is seen today at neuro-oncology clinic today to discuss radiation therapy recommendation(s) for brain metastases  4/3/22 MRI brain-  1   2 left cerebellar hemorrhagic metastases and probable 2 adjacent left parietal hemorrhagic metastasis, the more anterior, larger one having parenchymal hematoma and local edema and mass effect, correlating to the hemorrhage on the prior head CT        4/7/22 Image guided left parietal craniotomy for tumor resection (Left)   A-B  Brain, Left parietal mass, Resection:  - 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, consistent with patient's known lung primary  4/8/22 MRI brain w wo contrast (s/p left parietal craniotomy for tumor resection)  IMPRESSION:   Status post treatment of left parietal metastatic lesion with postop change as described above  This is the initial postoperative examination and will serve as baseline for follow-up exams   No significant residual tumor noted within the resection cavity  2 metastatic lesions within the left cerebellar hemisphere are unchanged  22 PET CT  IMPRESSION:   1  Subtle asymmetric FDG activity in the left cerebellum, possibly corresponding to one of patient's known left cerebellar metastatic lesions which were better characterized on recent MRI of the brain  2   No focal tracer activity characteristic of hypermetabolic malignancy involving the neck, chest, abdomen, pelvis, or osseous structures  Note is made of subtle FDG activity associated with nodular and somewhat linear airspace disease in the left lung adjacent to left cardiac border which I favor to reflect posttreatment/inflammatory changes with hypermetabolic malignancy considered less   likely  Short interval follow-up with CT of chest in 3 months is recommended to ensure stability and exclude subtle developing malignancy  Please see above for details and additional findings  22 MRI brain w wo contrast  IMPRESSION:   Decreased enhancement in left parietal resection cavity  No definite evidence of recurrent tumor  Slightly increased size of left posterolateral cerebellar metastatic hemorrhagic lesion  Unchanged left parietal cortex and left posterior cerebellar metastatic hemorrhagic lesions  5/3/22 Dr Doak Peabody- Discussed recent PET scan, no obvious focal activity in the chest abdomen or pelvis  Continue to monitor closely without systemic treatment for the time being  Continue dexamethasone 2 mg daily for the time being   Meclizine as needed for dizziness      [facility]com[facility]22 Neurosurgery, Dr Horton Waleska  22 Daniela Zapien NP      Oncology History   Adenocarcinoma, lung, left ([address] )   2021 Biopsy    [facility]  FINAL DIAGNOSIS     Lung, Left Upper Lobe, core Biopsy ([id number], 2021):    - Poorly differentiated non-small cell carcinoma, favor adenocarcinoma, with neuroendocrine differentiation of uncertain clinical significance (see letter below)  5/25/2021 Biopsy    A  Lymph Node, 4R, Excision:  - Two reactive lymph nodes with anthracosis (0/2)  B  Lymph Node, 2R, Excision:  - Two reactive lymph nodes with anthracosis (0/2)  C  Lymph Node, 2L, Excision:  - Three reactive lymph nodes with anthracosis (0/3)  D  Lymph Node, 4L, Excision:  - Two reactive lymph nodes with anthracosis (0/2)  E  Lymph Node, Level 7, Excision:  - Two reactive lymph nodes with anthracosis (0/2)              6/9/2021 Initial Diagnosis    Adenocarcinoma, lung, left ([facility] [address] )     6/25/2021 - 8/27/2021 Chemotherapy    cyanocobalamin, 1,000 mcg, Intramuscular, Once, 2 of 3 cycles  Administration: 1,000 mcg (8/6/2021), 1,000 mcg (6/25/2021)  pegfilgrastim (Lexy Daughters), 6 mg, Subcutaneous, Once, 3 of 5 cycles  Administration: 6 mg (7/16/2021), 6 mg (8/6/2021), 6 mg (8/27/2021)  fosaprepitant (EMEND) IVPB, 150 mg, Intravenous, Once, 4 of 6 cycles  Administration: 150 mg (6/25/2021), 150 mg (8/6/2021), 150 mg (8/27/2021), 150 mg (7/16/2021)  CARBOplatin (PARAPLATIN) IVPB (GOG AUC DOSING), 496 mg, Intravenous, Once, 4 of 6 cycles  Administration: 496 mg (6/25/2021), 492 5 mg (8/6/2021), 462 mg (8/27/2021), 516 mg (7/16/2021)  pemetrexed (ALIMTA) chemo infusion, 975 mg, Intravenous, Once, 4 of 6 cycles  Administration: 1,000 mg (6/25/2021), 1,000 mg (8/6/2021), 1,000 mg (8/27/2021), 1,000 mg (7/16/2021)     12/16/2021 Surgery    Left Upper Lung Lobectomy at [facility] by Dr Juanjo Gil  Tumor size: 7 6 cm Percentage of viable tumor: 40%  Histologic Grade: Undifferentiated (G$)  Lymphovascular Invasion: Present (extensive)  Perineural Invasion: Not identified  Spread through Air Spaces: Present  Pleural Invasion: tumor invades to the visceral pleural surface  Tumor Extension: Hilar soft tissue  Bronchial Margin: no tumor seen  Vascular Margin: no tumor seen  Distance from Margin: distance of invasive tumor from closest margin: 0 1 cm  Closest margin: vascular margin  Neoadjuvant T staging: ypT2a  Neoadjuvant N staging: ypN0           1/18/2022 - 2/22/2022 Radiation    Treatments:  Course: C1    Plan ID Energy Fractions Dose per Fraction (cGy) Dose Correction (cGy) Total Dose Delivered (cGy) Elapsed Days   L Lung Med 6X 25 / 25 180 0 4,500 35      Treatment Dates:  1/18/2022 - 2/22/2022 1/27/2022 -  Chemotherapy    atezolizumab (TECENTRIQ) IVPB, 1,200 mg, Intravenous, Once, 2 of 6 cycles  Administration: 1,200 mg (1/27/2022), 1,200 mg (2/16/2022)     4/7/2022 Surgery    Final Diagnosis   A-B  Brain, Left parietal mass, Resection:  - 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, consistent with patient's known lung primary  Cancer of upper lobe of left lung ([address] )   11/4/2021 Initial Diagnosis    Cancer of upper lobe of left lung ([address] )     Brain metastases ([address] )   4/7/2022 Initial Diagnosis    Brain metastases ([address] )     4/7/2022 Surgery    Image guided left parietal craniotomy for tumor resection (Left)  Surgeon: Dr Kenia Barker    A-B  Brain, Left parietal mass, Resection:  - 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, consistent with patient's known lung primary  Review of Systems:  Review of Systems   Constitutional: Positive for fatigue  HENT: Negative  Eyes: Negative  Respiratory: Negative  Cardiovascular: Negative  Gastrointestinal: Negative  Endocrine: Negative  Genitourinary: Negative  Musculoskeletal: Positive for back pain  Skin: Negative  Allergic/Immunologic: Negative  Neurological: Positive for dizziness (constant since surgery) and light-headedness (constant since surgery)  [place] he had to re-learn some things after surgery   Hematological: Negative  Psychiatric/Behavioral: Negative  Negative for suicidal ideas          Reports feeling depressed, declines referral to        Clinical Trial: no      Pain assessment: 3    Prior Radiation yes    Teaching to be done at time of sim    Implantable Devices (Port, pacemaker, pain stimulator) port    Hip Replacement no    Covid Vaccine Status up to date    Health Maintenance   Topic Date Due    DTaP,Tdap,and Td Vaccines (1 - Tdap) Never done    COVID-19 Vaccine (3 - Moderna risk 4-dose series) 05/14/2021    Colorectal Cancer Screening  11/18/2021    BMI: Followup Plan  10/14/2022    Fall Risk  10/14/2022    Medicare Annual Wellness Visit (AWV)  10/14/2022    Pneumococcal Vaccine: 65+ Years (2 of 2 - PCV13) 01/18/2023    BMI: Adult  05/04/2023    Hepatitis C Screening  Completed    Influenza Vaccine  Completed    HIB Vaccine  Aged Out    Hepatitis B Vaccine  Aged Out    IPV Vaccine  Aged Out    Hepatitis A Vaccine  Aged Out    Meningococcal ACWY Vaccine  Aged Out    HPV Vaccine  Aged Out       Past Medical History:   Diagnosis Date    Hyperlipidemia     Hypertension     Lung cancer ([address] )        Past Surgical History:   Procedure Laterality Date    BACK SURGERY      CRANIOTOMY Left 4/7/2022    Procedure: Image guided left parietal craniotomy for tumor resection;  Surgeon: Jose Rodríguez MD;  [facility]: [facility];  Service: Neurosurgery      [address]      IR BIOPSY LUNG  5/6/2021    IR PORT PLACEMENT  6/17/2021    LAMINECTOMY  2018    L4-L5    LUNG SURGERY      left upper lobectomy    MA BRONCHOSCOPY,DIAGNOSTIC N/A 5/25/2021    Procedure: BRONCHOSCOPY FLEXIBLE;  Surgeon: Stacey Murillo MD;  [facility];  Service: Thoracic    MA MEDIASTINOSCOPY WITH LYMPH NODE BIOPSY/IES N/A 5/25/2021    Procedure: MEDIASTINOSCOPY, flexible bronchoscopy;  Surgeon: Stacey Murillo MD;  [facility];  Service: Thoracic    TONSILLECTOMY  1959       Family History   Problem Relation Age of Onset    Nephrolithiasis Father     Lung cancer Maternal Grandfather        Social History     Tobacco Use    Smoking status: Former Smoker Packs/day: 1 00     Years: 40 00     Pack years: 40 00     Types: Cigarettes     Start date:      Quit date: 2017     Years since quittin 2    Smokeless tobacco: Never Used    Tobacco comment: quit 2017   Vaping Use    Vaping Use: Never used   Substance Use Topics    Alcohol use: Not Currently     Alcohol/week: 7 0 standard drinks     Types: 7 Cans of beer per week    Drug use: Not Currently     Types: Marijuana     Comment: seldom          Current Outpatient Medications:     allopurinol (ZYLOPRIM) 100 mg tablet, Take 1 tablet (100 mg total) by mouth daily, Disp: 30 tablet, Rfl: 5    amLODIPine (NORVASC) 10 mg tablet, TAKE ONE TABLET BY MOUTH EVERY DAY, Disp: 90 tablet, Rfl: 3    citalopram (CeleXA) 10 mg tablet, Take 1 tablet (10 mg total) by mouth 2 (two) times a day, Disp: 60 tablet, Rfl: 0    dexamethasone (DECADRON) 2 mg tablet, Take 2mg PO Q6hr for 2 days, followed by 2mg PO Q8hr for 2 days, followed by 2mg Q12hr indefinitely thereafter (Patient taking differently: Take 2 mg by mouth in the morning Take 2mg PO Q6hr for 2 days, followed by 2mg PO Q8hr for 2 days, followed by 2mg Q12hr indefinitely thereafter ), Disp: 66 tablet, Rfl: 0    doxepin (SINEquan) 25 mg capsule, TAKE ONE CAPSULE BY MOUTH EVERY DAY AT BEDTIME, Disp: 30 capsule, Rfl: 5    levothyroxine 75 mcg tablet, Take 0 5 tablets (37 5 mcg total) by mouth daily in the early morning, Disp: 15 tablet, Rfl: 0    meclizine (ANTIVERT) 25 mg tablet, Take 1 tablet (25 mg total) by mouth every 8 (eight) hours as needed for dizziness, Disp: 30 tablet, Rfl: 3    metoprolol succinate (TOPROL-XL) 25 mg 24 hr tablet, Take 1 tablet (25 mg total) by mouth daily, Disp: 30 tablet, Rfl: 5    pantoprazole (PROTONIX) 40 mg tablet, Take 1 tablet (40 mg total) by mouth daily, Disp: 30 tablet, Rfl: 11    polyethylene glycol (GLYCOLAX) 17 GM/SCOOP powder, Take 17 g by mouth 2 (two) times a day, Disp: 850 g, Rfl: 0    Psyllium (Metamucil) 28 3 % POWD, Take by mouth, Disp: , Rfl:     rosuvastatin (CRESTOR) 10 MG tablet, TAKE ONE TABLET BY MOUTH EVERY DAY, Disp: 30 tablet, Rfl: 5    Ascorbic Acid (vitamin C) 1000 MG tablet, Take 1,000 mg by mouth daily   (Patient not taking: Reported on 4/28/2022 ), Disp: , Rfl:     B Complex Vitamins (B COMPLEX 1 PO), Take 1 tablet by mouth daily  (Patient not taking: Reported on 4/28/2022 ), Disp: , Rfl:     B Complex Vitamins (BL VITAMIN B COMPLEX PO), Take by mouth   (Patient not taking: Reported on 4/28/2022 ), Disp: , Rfl:     betamethasone valerate (VALISONE) 0 1 % cream, Apply topically 2 (two) times a day (Patient not taking: Reported on 4/28/2022 ), Disp: 45 g, Rfl: 1    betamethasone, augmented, (DIPROLENE-AF) 0 05 % cream,   (Patient not taking: Reported on 4/28/2022 ), Disp: , Rfl:     calcipotriene (DOVONEX) 0 005 % cream, , Disp: , Rfl:     calcipotriene (DOVONOX) 0 005 % ointment, Apply topically 2 (two) times a day As needed (Patient not taking: Reported on 4/28/2022 ), Disp: , Rfl:     Cholecalciferol (VITAMIN D3 PO), Take 10,000 Units by mouth daily   (Patient not taking: Reported on 4/28/2022 ), Disp: , Rfl:     Clobetasol Propionate (Impoyz) 0 025 % CREA, Apply topically (Patient not taking: Reported on 4/28/2022 ), Disp: , Rfl:     colchicine (COLCRYS) 0 6 mg tablet, Take 1 tablet (0 6 mg total) by mouth 2 (two) times a day (Patient not taking: Reported on 2/15/2022 ), Disp: 30 tablet, Rfl: 5    Cyanocobalamin (Vitamin B 12) 500 MCG TABS, Take 1 tablet by mouth (Patient not taking: Reported on 3/23/2022 ), Disp: , Rfl:     fluocinolone (SYNALAR) 0 01 % external solution,   (Patient not taking: Reported on 4/28/2022 ), Disp: , Rfl:     folic acid (FOLVITE) 1 mg tablet, Take 1 tablet (1 mg total) by mouth daily (Patient not taking: Reported on 1/12/2022 ), Disp: 30 tablet, Rfl: 6    gabapentin (NEURONTIN) 100 mg capsule,   (Patient not taking: Reported on 4/28/2022 ), Disp: , Rfl:    Garlic 10 MG CAPS, Take 1 tablet by mouth daily  (Patient not taking: Reported on 4/28/2022 ), Disp: , Rfl:     Glucosamine HCl 1500 MG TABS, Take by mouth (Patient not taking: Reported on 4/18/2022 ), Disp: , Rfl:     Glucosamine-Chondroit-Vit C-Mn (GLUCOSAMINE 1500 COMPLEX) CAPS, daily  (Patient not taking: Reported on 4/28/2022 ), Disp: , Rfl:     ketoconazole (NIZORAL) 2 % cream, Apply topically 2 (two) times a day (Patient not taking: Reported on 4/28/2022 ), Disp: 15 g, Rfl: 2    levETIRAcetam (KEPPRA) 500 mg tablet, Take 1 tablet (500 mg total) by mouth every 12 (twelve) hours for 6 doses (Patient not taking: Reported on 5/3/2022 ), Disp: 6 tablet, Rfl: 0    meclizine (ANTIVERT) 25 mg tablet, Take 1 tablet (25 mg total) by mouth 4 (four) times a day as needed for dizziness (Patient not taking: Reported on 4/28/2022 ), Disp: 30 tablet, Rfl: 0    MULTIPLE VITAMINS ESSENTIAL PO, Take by mouth (Patient not taking: Reported on 2/15/2022 ), Disp: , Rfl:     Multiple Vitamins-Minerals (MULTIVITAL-M) TABS, Take by mouth daily  (Patient not taking: Reported on 4/2/2022 ), Disp: , Rfl:     neomycin-polymyxin-hydrocortisone (CORTISPORIN) otic solution, Administer 4 drops into the left ear every 6 (six) hours (Patient not taking: Reported on 1/12/2022 ), Disp: 10 mL, Rfl: 0    ondansetron (ZOFRAN) 4 mg tablet, Take 1 tablet (4 mg total) by mouth every 6 (six) hours (Patient not taking: Reported on 1/18/2022 ), Disp: 12 tablet, Rfl: 0    ondansetron (ZOFRAN) 8 mg tablet, Take 1 tablet (8 mg total) by mouth every 8 (eight) hours as needed for nausea or vomiting (Patient not taking: Reported on 1/12/2022 ), Disp: 20 tablet, Rfl: 3    oxyCODONE (ROXICODONE) 5 immediate release tablet, , Disp: , Rfl:     tamsulosin (FLOMAX) 0 4 mg, , Disp: , Rfl:     traMADol (ULTRAM) 50 mg tablet, TAKE 1 TABLET BY MOUTH EVERY 8 HOURS AS NEEDED FOR SEVERE PAIN (Patient not taking: Reported on 4/28/2022 ), Disp: 30 tablet, Rfl: 0    triamcinolone (KENALOG) 0 1 % cream, , Disp: , Rfl:     zinc gluconate 50 mg tablet, Take 50 mg by mouth daily (Patient not taking: Reported on 4/28/2022 ), Disp: , Rfl:     Allergies   Allergen Reactions    Bee Venom     Benazepril Other (See Comments)     Angioedema     Latex Other (See Comments)     Burning of eyes at the dentist from the gloves    Meloxicam GI Intolerance    Penicillin G Rash        Vitals:    05/04/22 0800   BP: 117/76   Pulse: 86   Resp: 18   Temp: 97 6 °F (36 4 °C)   SpO2: 98%   Weight: 82 9 kg (182 lb 12 8 oz)   Height: 5' 10" (1 778 m)       Pain Score:   3

## 2022-05-04 NOTE — LETTER
May 4, 2022     Yoselyn Cuello, [address] [facility] [place] [address]    Patient: Samantha Castillo   YOB: 1955   Date of Visit: 2022       Dear Dr Caitlin Bower:    Thank you for referring Oswaldo Johnson to me for evaluation  Below are my notes for this consultation  If you have questions, please do not hesitate to call me  I look forward to following your patient along with you  Sincerely,        Luiz Nunes MD        CC: No Recipients  Luiz Nunes MD  2022 11:37 AM  Sign when Signing Visit  Consultation - Radiation Oncology      RKO:[id number] : 1955  Encounter: [id number]  Patient Information: [address]  Chief Complaint   Patient presents with    Consult     Cancer Staging  No matching staging information was found for the patient  History of Present Illness     Samantha Castillo 1955 is a 74 y o  male diagnosed with 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 status in May 2021  He is status post armen-adjuvant chemotherapy with only partial response with Alimta and carboplatin  Completed chemotherapy 2021  He had left upper lung lobectomy at [facility] on 2021  He completed adjuvant radiation therapy to the left lung/mediastinum on 2022       In the interim, on 2022 he developed stroke-like symptoms and imaging showed 3-4 hemorrhagic metastasis and on  he underwent left parietal craniotomy with tumor resection 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      He was last seen by Dr Yoselyn Bermudez for radiation follow up on 2022  Referral placed for patient to be seen at neuro-oncology clinic to discuss radiation treatment options  He is tentatively set up for whole brain simulation on 22 at the [facility] should that be the groups consensus       He is seen today at neuro-oncology clinic today to discuss radiation therapy recommendation(s) for brain metastases        4/3/22 MRI brain-  1   2 left cerebellar hemorrhagic metastases and probable 2 adjacent left parietal hemorrhagic metastasis, the more anterior, larger one having parenchymal hematoma and local edema and mass effect, correlating to the hemorrhage on the prior head CT          4/7/22 Image guided left parietal craniotomy for tumor resection (Left)   A-B  Brain, Left parietal mass, Resection:  - 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, consistent with patient's known lung primary          4/8/22 MRI brain w wo contrast (s/p left parietal craniotomy for tumor resection)  IMPRESSION:   Status post treatment of left parietal metastatic lesion with postop change as described above  This is the initial postoperative examination and will serve as baseline for follow-up exams  No significant residual tumor noted within the resection cavity      2 metastatic lesions within the left cerebellar hemisphere are unchanged         4/22/22 PET CT  IMPRESSION:   1   Subtle asymmetric FDG activity in the left cerebellum, possibly corresponding to one of patient's known left cerebellar metastatic lesions which were better characterized on recent MRI of the brain    2   No focal tracer activity characteristic of hypermetabolic malignancy involving the neck, chest, abdomen, pelvis, or osseous structures    Note is made of subtle FDG activity associated with nodular and somewhat linear airspace disease in the left lung adjacent to left cardiac border which I favor to reflect posttreatment/inflammatory changes with hypermetabolic malignancy considered less   likely   Short interval follow-up with CT of chest in 3 months is recommended to ensure stability and exclude subtle developing malignancy    4/26/22 MRI brain w wo contrast  IMPRESSION:   Decreased enhancement in left parietal resection cavity   No definite evidence of recurrent tumor      Slightly increased size of left posterolateral cerebellar metastatic hemorrhagic lesion      Unchanged left parietal cortex and left posterior cerebellar metastatic hemorrhagic lesions         5/3/22 Dr Catrina Santana- Discussed recent PET scan, no obvious focal activity in the chest abdomen or pelvis  Continue to monitor closely without systemic treatment for the time being  Continue dexamethasone 2 mg daily for the time being  Meclizine as needed for dizziness        Upcomin22 Neurosurgery, Dr Rosalind Hampton  22 Yovani Renteria, TREY            Historical Information   Oncology History   Adenocarcinoma, lung, left ([facility] )   2021 Biopsy    [facility]  FINAL DIAGNOSIS     Lung, Left Upper Lobe, core Biopsy ([id number], 2021):    - Poorly differentiated non-small cell carcinoma, favor adenocarcinoma, with neuroendocrine differentiation of uncertain clinical significance (see letter below)  2021 Biopsy    A  Lymph Node, 4R, Excision:  - Two reactive lymph nodes with anthracosis (0/2)  B  Lymph Node, 2R, Excision:  - Two reactive lymph nodes with anthracosis (0/2)  C  Lymph Node, 2L, Excision:  - Three reactive lymph nodes with anthracosis (0/3)  D  Lymph Node, 4L, Excision:  - Two reactive lymph nodes with anthracosis (0/2)  E  Lymph Node, Level 7, Excision:  - Two reactive lymph nodes with anthracosis (0/2)              2021 Initial Diagnosis    Adenocarcinoma, lung, left ([facility] )     2021 - 2021 Chemotherapy    cyanocobalamin, 1,000 mcg, Intramuscular, Once, 2 of 3 cycles  Administration: 1,000 mcg (2021), 1,000 mcg (2021)  pegfilgrastim (Chryl Lefort), 6 mg, Subcutaneous, Once, 3 of 5 cycles  Administration: 6 mg (2021), 6 mg (2021), 6 mg (2021)  fosaprepitant (EMEND) IVPB, 150 mg, Intravenous, Once, 4 of 6 cycles  Administration: 150 mg (2021), 150 mg (2021), 150 mg (2021), 150 mg (2021)  CARBOplatin (PARAPLATIN) IVPB (GO AUC DOSING), 496 mg, Intravenous, Once, 4 of 6 cycles  Administration: 496 mg (6/25/2021), 492 5 mg (8/6/2021), 462 mg (8/27/2021), 516 mg (7/16/2021)  pemetrexed (ALIMTA) chemo infusion, 975 mg, Intravenous, Once, 4 of 6 cycles  Administration: 1,000 mg (6/25/2021), 1,000 mg (8/6/2021), 1,000 mg (8/27/2021), 1,000 mg (7/16/2021)     12/16/2021 Surgery    Left Upper Lung Lobectomy at [facility] by Dr Ayaka Ramirez  Tumor size: 7 6 cm Percentage of viable tumor: 40%  Histologic Grade: Undifferentiated (G$)  Lymphovascular Invasion: Present (extensive)  Perineural Invasion: Not identified  Spread through Air Spaces: Present  Pleural Invasion: tumor invades to the visceral pleural surface  Tumor Extension: Hilar soft tissue  Bronchial Margin: no tumor seen  Vascular Margin: no tumor seen  Distance from Margin: distance of invasive tumor from closest margin: 0 1 cm  Closest margin: vascular margin  Neoadjuvant T staging: ypT2a  Neoadjuvant N staging: ypN0           1/18/2022 - 2/22/2022 Radiation    Treatments:  Course: C1    Plan ID Energy Fractions Dose per Fraction (cGy) Dose Correction (cGy) Total Dose Delivered (cGy) Elapsed Days   L Lung Med 6X 25 / 25 180 0 4,500 35      Treatment Dates:  1/18/2022 - 2/22/2022 1/27/2022 -  Chemotherapy    atezolizumab (TECENTRIQ) IVPB, 1,200 mg, Intravenous, Once, 2 of 6 cycles  Administration: 1,200 mg (1/27/2022), 1,200 mg (2/16/2022)     4/7/2022 Surgery    Final Diagnosis   A-B  Brain, Left parietal mass, Resection:  - 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, consistent with patient's known lung primary  Cancer of upper lobe of left lung ([address] )   11/4/2021 Initial Diagnosis    Cancer of upper lobe of left lung ([address] )     Brain metastases ([address] )   4/7/2022 Initial Diagnosis    Brain metastases ([address] )     4/7/2022 Surgery    Image guided left parietal craniotomy for tumor resection (Left)  Surgeon: Dr Juan José Chery    A-B   Brain, Left parietal mass, Resection:  - 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, consistent with patient's known lung primary              Past Medical History:   Diagnosis Date    Hyperlipidemia     Hypertension     Lung cancer [facility])      Past Surgical History:   Procedure Laterality Date    BACK SURGERY      CRANIOTOMY Left 2022    Procedure: Image guided left parietal craniotomy for tumor resection;  Surgeon: Crow Carpenter MD;  [facility];  Service: Neurosurgery    HEMORRHOID SURGERY      IR BIOPSY LUNG  2021    IR PORT PLACEMENT  2021    LAMINECTOMY  2018    L4-L5    LUNG SURGERY      left upper lobectomy    FL BRONCHOSCOPY,DIAGNOSTIC N/A 2021    Procedure: BRONCHOSCOPY FLEXIBLE;  Surgeon: Alejandro Torres MD;  [facility];  Service: Thoracic    FL MEDIASTINOSCOPY WITH LYMPH NODE BIOPSY/IES N/A 2021    Procedure: MEDIASTINOSCOPY, flexible bronchoscopy;  Surgeon: Alejandro Torres MD;  [facility];  Service: Thoracic    TONSILLECTOMY         Family History   Problem Relation Age of Onset    Nephrolithiasis Father     Lung cancer Maternal Grandfather        Social History   Social History     Substance and Sexual Activity   Alcohol Use Not Currently    Alcohol/week: 7 0 standard drinks    Types: 7 Cans of beer per week     Social History     Substance and Sexual Activity   Drug Use Not Currently    Types: Marijuana    Comment: seldom     Social History     Tobacco Use   Smoking Status Former Smoker    Packs/day: 1 00    Years: 40 00    Pack years: 40 00    Types: Cigarettes    Start date:     Quit date: 2017    Years since quittin 2   Smokeless Tobacco Never Used   Tobacco Comment    quit 2017         Meds/Allergies     Current Outpatient Medications:     allopurinol (ZYLOPRIM) 100 mg tablet, Take 1 tablet (100 mg total) by mouth daily, Disp: 30 tablet, Rfl: 5    amLODIPine (NORVASC) 10 mg tablet, TAKE ONE TABLET BY MOUTH EVERY DAY, Disp: 90 tablet, Rfl: 3    citalopram (CeleXA) 10 mg tablet, Take 1 tablet (10 mg total) by mouth 2 (two) times a day, Disp: 60 tablet, Rfl: 0    dexamethasone (DECADRON) 2 mg tablet, Take 2mg PO Q6hr for 2 days, followed by 2mg PO Q8hr for 2 days, followed by 2mg Q12hr indefinitely thereafter (Patient taking differently: Take 2 mg by mouth in the morning Take 2mg PO Q6hr for 2 days, followed by 2mg PO Q8hr for 2 days, followed by 2mg Q12hr indefinitely thereafter ), Disp: 66 tablet, Rfl: 0    doxepin (SINEquan) 25 mg capsule, TAKE ONE CAPSULE BY MOUTH EVERY DAY AT BEDTIME, Disp: 30 capsule, Rfl: 5    levothyroxine 75 mcg tablet, Take 0 5 tablets (37 5 mcg total) by mouth daily in the early morning, Disp: 15 tablet, Rfl: 0    meclizine (ANTIVERT) 25 mg tablet, Take 1 tablet (25 mg total) by mouth every 8 (eight) hours as needed for dizziness, Disp: 30 tablet, Rfl: 3    metoprolol succinate (TOPROL-XL) 25 mg 24 hr tablet, Take 1 tablet (25 mg total) by mouth daily, Disp: 30 tablet, Rfl: 5    pantoprazole (PROTONIX) 40 mg tablet, Take 1 tablet (40 mg total) by mouth daily, Disp: 30 tablet, Rfl: 11    polyethylene glycol (GLYCOLAX) 17 GM/SCOOP powder, Take 17 g by mouth 2 (two) times a day, Disp: 850 g, Rfl: 0    Psyllium (Metamucil) 28 3 % POWD, Take by mouth, Disp: , Rfl:     rosuvastatin (CRESTOR) 10 MG tablet, TAKE ONE TABLET BY MOUTH EVERY DAY, Disp: 30 tablet, Rfl: 5    Ascorbic Acid (vitamin C) 1000 MG tablet, Take 1,000 mg by mouth daily   (Patient not taking: Reported on 4/28/2022 ), Disp: , Rfl:     B Complex Vitamins (B COMPLEX 1 PO), Take 1 tablet by mouth daily  (Patient not taking: Reported on 4/28/2022 ), Disp: , Rfl:     B Complex Vitamins (BL VITAMIN B COMPLEX PO), Take by mouth   (Patient not taking: Reported on 4/28/2022 ), Disp: , Rfl:     betamethasone valerate (VALISONE) 0 1 % cream, Apply topically 2 (two) times a day (Patient not taking: Reported on 4/28/2022 ), Disp: 45 g, Rfl: 1    betamethasone, augmented, (DIPROLENE-AF) 0 05 % cream,   (Patient not taking: Reported on 4/28/2022 ), Disp: , Rfl:     calcipotriene (DOVONEX) 0 005 % cream, , Disp: , Rfl:     calcipotriene (DOVONOX) 0 005 % ointment, Apply topically 2 (two) times a day As needed (Patient not taking: Reported on 4/28/2022 ), Disp: , Rfl:     Cholecalciferol (VITAMIN D3 PO), Take 10,000 Units by mouth daily   (Patient not taking: Reported on 4/28/2022 ), Disp: , Rfl:     Clobetasol Propionate (Impoyz) 0 025 % CREA, Apply topically (Patient not taking: Reported on 4/28/2022 ), Disp: , Rfl:     colchicine (COLCRYS) 0 6 mg tablet, Take 1 tablet (0 6 mg total) by mouth 2 (two) times a day (Patient not taking: Reported on 2/15/2022 ), Disp: 30 tablet, Rfl: 5    Cyanocobalamin (Vitamin B 12) 500 MCG TABS, Take 1 tablet by mouth (Patient not taking: Reported on 3/23/2022 ), Disp: , Rfl:     fluocinolone (SYNALAR) 0 01 % external solution,   (Patient not taking: Reported on 4/28/2022 ), Disp: , Rfl:     folic acid (FOLVITE) 1 mg tablet, Take 1 tablet (1 mg total) by mouth daily (Patient not taking: Reported on 1/12/2022 ), Disp: 30 tablet, Rfl: 6    gabapentin (NEURONTIN) 100 mg capsule,   (Patient not taking: Reported on 4/28/2022 ), Disp: , Rfl:     Garlic 10 MG CAPS, Take 1 tablet by mouth daily  (Patient not taking: Reported on 4/28/2022 ), Disp: , Rfl:     Glucosamine HCl 1500 MG TABS, Take by mouth (Patient not taking: Reported on 4/18/2022 ), Disp: , Rfl:     Glucosamine-Chondroit-Vit C-Mn (GLUCOSAMINE 1500 COMPLEX) CAPS, daily  (Patient not taking: Reported on 4/28/2022 ), Disp: , Rfl:     ketoconazole (NIZORAL) 2 % cream, Apply topically 2 (two) times a day (Patient not taking: Reported on 4/28/2022 ), Disp: 15 g, Rfl: 2    levETIRAcetam (KEPPRA) 500 mg tablet, Take 1 tablet (500 mg total) by mouth every 12 (twelve) hours for 6 doses (Patient not taking: Reported on 5/3/2022 ), Disp: 6 tablet, Rfl: 0    meclizine (ANTIVERT) 25 mg tablet, Take 1 tablet (25 mg total) by mouth 4 (four) times a day as needed for dizziness (Patient not taking: Reported on 4/28/2022 ), Disp: 30 tablet, Rfl: 0    MULTIPLE VITAMINS ESSENTIAL PO, Take by mouth (Patient not taking: Reported on 2/15/2022 ), Disp: , Rfl:     Multiple Vitamins-Minerals (MULTIVITAL-M) TABS, Take by mouth daily  (Patient not taking: Reported on 4/2/2022 ), Disp: , Rfl:     neomycin-polymyxin-hydrocortisone (CORTISPORIN) otic solution, Administer 4 drops into the left ear every 6 (six) hours (Patient not taking: Reported on 1/12/2022 ), Disp: 10 mL, Rfl: 0    ondansetron (ZOFRAN) 4 mg tablet, Take 1 tablet (4 mg total) by mouth every 6 (six) hours (Patient not taking: Reported on 1/18/2022 ), Disp: 12 tablet, Rfl: 0    ondansetron (ZOFRAN) 8 mg tablet, Take 1 tablet (8 mg total) by mouth every 8 (eight) hours as needed for nausea or vomiting (Patient not taking: Reported on 1/12/2022 ), Disp: 20 tablet, Rfl: 3    oxyCODONE (ROXICODONE) 5 immediate release tablet, , Disp: , Rfl:     tamsulosin (FLOMAX) 0 4 mg, , Disp: , Rfl:     traMADol (ULTRAM) 50 mg tablet, TAKE 1 TABLET BY MOUTH EVERY 8 HOURS AS NEEDED FOR SEVERE PAIN (Patient not taking: Reported on 4/28/2022 ), Disp: 30 tablet, Rfl: 0    triamcinolone (KENALOG) 0 1 % cream, , Disp: , Rfl:     zinc gluconate 50 mg tablet, Take 50 mg by mouth daily (Patient not taking: Reported on 4/28/2022 ), Disp: , Rfl:   Allergies   Allergen Reactions    Bee Venom     Benazepril Other (See Comments)     Angioedema     Latex Other (See Comments)     Burning of eyes at the dentist from the gloves    Meloxicam GI Intolerance    Penicillin G Rash         Review of Systems   Constitutional: Positive for fatigue  HENT: Negative  Eyes: Negative  Respiratory: Negative  Cardiovascular: Negative  Gastrointestinal: Negative  Endocrine: Negative  Genitourinary: Negative  Musculoskeletal: Positive for back pain  Skin: Negative  Allergic/Immunologic: Negative  Neurological: Positive for dizziness (constant since surgery) and light-headedness (constant since surgery)  Miranda he had to re-learn some things after surgery   Hematological: Negative  Psychiatric/Behavioral: Negative  Negative for suicidal ideas  Reports feeling depressed, declines referral to SW             OBJECTIVE:   /76   Pulse 86   Temp 97 6 °F (36 4 °C)   Resp 18   Ht 5' 10" (1 778 m)   Wt 82 9 kg (182 lb 12 8 oz)   SpO2 98%   BMI 26 23 kg/m²   Pain Assessment:  0  Performance Status: ECOG/Zubrod/WHO: 1 - Symptomatic but completely ambulatory    Physical Exam   He is conversing appropriately  He is ambulating independently  His breathing is unlabored  RESULTS  Lab Results    Chemistry        Component Value Date/Time    K 4 3 05/02/2022 1231     05/02/2022 1231    CO2 30 05/02/2022 1231    BUN 28 (H) 05/02/2022 1231    CREATININE 1 08 05/02/2022 1231        Component Value Date/Time    CALCIUM 9 0 05/02/2022 1231    ALKPHOS 55 05/02/2022 1231    AST 12 05/02/2022 1231    ALT 36 05/02/2022 1231            Lab Results   Component Value Date    WBC 5 31 05/02/2022    HGB 12 2 05/02/2022    HCT 38 3 05/02/2022     (H) 05/02/2022     (L) 05/02/2022         Imaging Studies  MRI brain with and without contrast    Result Date: 4/28/2022  Narrative: MRI BRAIN WITH AND WITHOUT CONTRAST INDICATION: C79 31: Secondary malignant neoplasm of brain  Brain metastasis  COMPARISON:  MRI brain with and without contrast 4/8/2022, 4/3/2022 TECHNIQUE: Sagittal T1, axial T2, axial FLAIR, axial T1, axial [place], axial diffusion  Sagittal, axial T1 postcontrast   Axial bravo postcontrast with coronal reconstructions  IV Contrast:  8 mL of Gadobutrol injection (SINGLE-DOSE)  IMAGE QUALITY:   Diagnostic   FINDINGS: BRAIN PARENCHYMA: Postsurgical changes of left parietal craniotomy for resection of metastatic lesion in left parietal lobe  Left parietal resection cavity with heterogeneous blood products, hemosiderin deposition, T2/FLAIR hyperintense signal abnormality, and decreased peripheral and internal enhancement  Unchanged dural thickening and enhancement in left parietal region adjacent to the craniotomy site, compatible with postsurgical change  Metastatic enhancing lesions with associated hemosiderin deposition as detailed below: -0 5 x 0 3 cm left parietal cortex lesion (11:92), unchanged  -2 1 x 1 4 cm left posterolateral cerebellar lesion (11:44), previously 1 9 x 1 3 cm  -1 6 x 1 4 cm left posterior cerebellar lesion (11:45), unchanged  Decreased mild surrounding vasogenic edema in left parietal lobe  Similar mild vasogenic edema in left cerebellum  No mass effect or midline shift  No diffusion-weighted signal abnormality to suggest acute infarction  VENTRICLES:  Normal for the patient's age  SELLA AND PITUITARY GLAND:  Normal  ORBITS:  Normal  PARANASAL SINUSES:  Moderate sized right maxillary mucus retention cyst  VASCULATURE:  Evaluation of the major intracranial vasculature demonstrates appropriate flow voids  CALVARIUM AND SKULL BASE:  Normal  MASTOIDS: Small right mastoid effusion  EXTRACRANIAL SOFT TISSUES:  Normal      Impression: Decreased enhancement in left parietal resection cavity  No definite evidence of recurrent tumor  Slightly increased size of left posterolateral cerebellar metastatic hemorrhagic lesion  Unchanged left parietal cortex and left posterior cerebellar metastatic hemorrhagic lesions  The study was marked in EPIC for significant notification  Workstation performed: [id number]     MRI brain w wo contrast    Result Date: 4/8/2022  Narrative: MRI BRAIN WITH AND WITHOUT CONTRAST INDICATION: s/p Left Parietal Craniotomy for tumor resection      Metastatic disease, lung carcinoma   COMPARISON:  4/3/2022 TECHNIQUE: Sagittal T1, axial T2, axial FLAIR, axial T1, axial [place], axial diffusion  Sagittal, axial T1 postcontrast   Axial bravo postcontrast with coronal reconstructions  IV Contrast:  7 mL of Gadobutrol injection (SINGLE-DOSE)  IMAGE QUALITY:   Diagnostic  FINDINGS: BRAIN PARENCHYMA:  Patient is status post left parietal craniotomy for resection of previously seen hemorrhagic mass within the parietal lobe  There is a resection cavity which is significantly decreased in size compared to the previous hemorrhagic mass  Resection cavity demonstrates complex internal signal representing a small amount of air, a moderate amount of blood products  Heterogeneous T1 signal prior to administration of contrast is noted within the resection cavity  After administration of contrast there is mild enhancement extending obliquely towards the craniotomy site  FLAIR imaging demonstrates moderate surrounding edema within the parietal lobe extending inferiorly into the superior occipital lobe  Diffusion imaging demonstrates minimal restricted diffusion posterior to the resection cavity towards the calvarium, suggesting small amount of postop ischemia  There is a moderate amount of air noted within the anterior extra-axial space on the left resulting in minor mass effect upon the anterior left frontal lobe  The previously identified 2 enhancing lesions within the left cerebellar hemisphere are grossly unchanged from the examination performed 5 days ago, best seen on series 10 image 9  No new enhancing lesions identified  VENTRICLES:  Normal for the patient's age  SELLA AND PITUITARY GLAND:  Normal  ORBITS:  Normal  PARANASAL SINUSES:  Stable retention cyst within the inferior aspect of the right maxillary sinus  VASCULATURE:  Evaluation of the major intracranial vasculature demonstrates appropriate flow voids  CALVARIUM AND SKULL BASE:  Patient has undergone left parietal craniotomy  Expected postop change within the extracranial soft tissues   Small amount of subacute extra-axial blood is seen underlying the craniotomy  EXTRACRANIAL SOFT TISSUES:  Normal      Impression: Status post treatment of left parietal metastatic lesion with postop change as described above  This is the initial postoperative examination and will serve as baseline for follow-up exams  No significant residual tumor noted within the resection cavity  2 metastatic lesions within the left cerebellar hemisphere are unchanged  Workstation performed: [id number]     CT chest abdomen pelvis w contrast    Result Date: 4/4/2022  Narrative: CT CHEST, ABDOMEN AND PELVIS WITH IV CONTRAST INDICATION:   Metastatic disease evaluation Evaluate for metastatic disease  COMPARISON:  CT from December 6, 2020 4/20/2021, April 8, 2021 TECHNIQUE: CT examination of the chest, abdomen and pelvis was performed  Axial, sagittal, and coronal 2D reformatted images were created from the source data and submitted for interpretation  Radiation dose length product (DLP) for this visit:  1061 02 mGy-cm   This examination, like all CT scans performed in the [facility], was performed utilizing techniques to minimize radiation dose exposure, including the use of iterative reconstruction and automated exposure control  IV Contrast:  100 mL of iohexol (OMNIPAQUE) Enteric Contrast: Enteric contrast was administered  FINDINGS: CHEST LUNGS:  Trachea and central bronchi are patent Emphysema seen postsurgical changes of left upper lobectomy noted No new consolidation seen Linear density seen in the left mid to lower lung, in image 37 series 2 Left upper  lung granuloma seen, in image 75 series 604 The right upper lobe granuloma seen in image 40 series 604 PLEURA:  No pleural effusion or pneumothorax HEART/GREAT VESSELS: Heart is unremarkable for patient's age  No thoracic aortic aneurysm   Ascending aorta measures 3 7 cm The descending thoracic aorta measures 2 4 cm Coronary artery calcification seen MEDIASTINUM AND OWEN:  No significant mediastinal lymph node enlargement seen CHEST WALL AND LOWER NECK: A healing left 5th rib fractures ABDOMEN LIVER/BILIARY TREE:  No focal liver lesion seen GALLBLADDER:  Gallbladder appear unremarkable SPLEEN:  A hypodense splenic lesion seen, measuring 1 7 cm, stable since previous study PANCREAS:  Unremarkable  ADRENAL GLANDS:  Unremarkable  KIDNEYS/URETERS:  No hydronephrosis or urinary tract calculus  One or more sharply circumscribed subcentimeter renal hypodensities are present, too small to accurately characterize, and statistically most likely benign findings  According to recent literature (Radiology 2019) no further workup of these findings is recommended  Renal cysts are noted bilaterally STOMACH AND BOWEL:  Diverticulosis seen No abnormal dilation of the small bowel loops seen APPENDIX:  No findings to suggest appendicitis  ABDOMINOPELVIC CAVITY:  No ascites  No pneumoperitoneum  No lymphadenopathy  VESSELS:  Unremarkable for patient's age  PELVIS REPRODUCTIVE ORGANS:  Prostate appears unremarkable URINARY BLADDER:  Unremarkable  ABDOMINAL WALL/INGUINAL REGIONS:  Unremarkable  OSSEOUS STRUCTURES:  No acute fracture or destructive osseous lesion  Impression: No findings of metastatic disease in the chest abdomen pelvis  No acute compression collapse of the vertebra seen No lytic destructive lesion seen Emphysema Postsurgical changes from left upper lobectomy A linear density seen in the left mid to lower lung likely scarring  Routine surveillance can be continued Workstation performed: [id number]     NM PET CT skull base to mid thigh    Result Date: 4/25/2022  Narrative: PET/CT SCAN INDICATION:  C34 92: Malignant neoplasm of unspecified part of left bronchus or lung C34 12: Malignant neoplasm of upper lobe, left bronchus or lung   , restaging The following clinical information was copied directly from EPIC: h/o adenocarcinoma of Lt lung   s/p Lt parietal mass resection (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, c/w pt's known lung primary)  s/p Lt UL lobectomy  (poorly diff  non-small cell carcinoma, c/w large cell neuroendocrine carcinoma, nonkeratinizing poorly diff  squamous cell carcinoma)  Restaging MODIFIER: PS COMPARISON: MRI of the brain dated 4/8/2022, CT of chest, abdomen, and pelvis dated 4/4/2022, and PET/CT dated 9/27/2021 CELL TYPE:  poorly diff  non-small cell carcinoma, favor adenocarcinoma with neuroendocrine diff  (BX 5/6/21 lT ul LUNG +) TECHNIQUE:   8 23 mCi F-18-FDG administered IV  Multiplanar attenuation corrected and non attenuation corrected PET images were acquired 60 minutes post injection  Contiguous, low dose, axial CT sections were obtained from the skull vertex through the femurs   Intravenous contrast material was not utilized  This examination, like all CT scans performed in the [facility], was performed utilizing techniques to minimize radiation dose exposure, including the use of iterative reconstruction and automated exposure control  Fasting serum glucose: 107 mg/dl FINDINGS: VISUALIZED BRAIN:   Left parietal photopenic region corresponds to surgical resection site  Subtle asymmetric tracer activity involving the posterior/medial left cerebellum possibly related to one of the 2 known left cerebellar metastatic lesions which were better characterized on recent MRI of the brain; please note that this finding is subtle and patient's known left cerebellar masses are not significantly hypermetabolic and are best characterized on MRI  HEAD/NECK:   There is a physiologic distribution of FDG  No FDG avid cervical adenopathy is seen  CT images: Postsurgical changes from left parietal craniotomy  Cerebral atrophy  Intracranial atherosclerotic calcification is noted    On image 44 of series 3 there is subtle hyperdense lesion measuring 1 6 cm involving the lateral left cerebellum likely corresponding to one of the metastatic lesions which was better characterized on prior MRI  Right maxillary sinus mucous retention cyst versus mucosal polyp  CHEST:   Interval resection of patient's known hypermetabolic left upper lobe malignancy and adjacent mediastinal amena metastatic disease  Best seen on images 138 through 140 is subtle FDG activity associated with somewhat linear and somewhat nodular airspace disease involving the left lung adjacent to the left cardiac border with max SUV of 2 4 and corresponding to nodular density on image  138 of series 3 measuring 8 mm and on image 139 measuring 1 3 cm  While I favor these findings to reflect posttreatment inflammatory changes, short interval follow-up is recommended to exclude developing left lung malignancy/metastatic disease  Mild nonspecific FDG activity involving the mid esophagus without definite corresponding soft tissue abnormality  CT images: Right-sided chest port  Atherosclerotic vascular calcifications including those of the coronary arteries are noted  Postsurgical changes related to left upper lobectomy  Emphysematous changes involving the lungs  Evaluation for pulmonary nodules is limited on low-dose unenhanced CT secondary to respiratory motion artifact  ABDOMEN:   No FDG avid soft tissue lesions are seen  CT images: Poorly characterized on low-dose unenhanced CT are multiple hypodense bilateral renal lesions which were better characterized on contrast-enhanced CT dated April 4, 2022  Nonspecific mural thickening of the proximal to mid gastric wall  Atherosclerotic vascular calcifications are noted  Diverticulosis coli  PELVIS: No FDG avid soft tissue lesions are seen  CT images: Mild enlargement of the prostate gland  OSSEOUS STRUCTURES: No FDG avid lesions are seen  CT images: Degenerative changes are noted involving the spine  Impression: 1    Subtle asymmetric FDG activity in the left cerebellum, possibly corresponding to one of patient's known left cerebellar metastatic lesions which were better characterized on recent MRI of the brain  2   No focal tracer activity characteristic of hypermetabolic malignancy involving the neck, chest, abdomen, pelvis, or osseous structures  Note is made of subtle FDG activity associated with nodular and somewhat linear airspace disease in the left lung adjacent to left cardiac border which I favor to reflect posttreatment/inflammatory changes with hypermetabolic malignancy considered less likely  Short interval follow-up with CT of chest in 3 months is recommended to ensure stability and exclude subtle developing malignancy  Please see above for details and additional findings  The study was marked in EPIC for significant notification  The study was marked in Epic for follow-up  Workstation performed: [id number]     Echo follow up/limited w/ contrast if indicated    Result Date: 5/3/2022  Narrative: Jj Yi  Left Ventricle: Left ventricular cavity size is normal  Wall thickness is normal  The left ventricular ejection fraction is 60%  Systolic function is normal  Wall motion is normal  Diastolic function is mildly abnormal, consistent with grade I (abnormal) relaxation    Right Ventricle: Right ventricular cavity size is mildly dilated    Right Atrium: The atrium is mildly dilated    Tricuspid Valve: There is mild to moderate regurgitation  The right ventricular systolic pressure is mildly to moderately elevated  RVSP 45 mm Hg  Pathology:  Final Diagnosis   A-B  Brain, Left parietal mass, Resection:  - 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, consistent with patient's known lung primary             ASSESSMENT  1  Brain metastases [facility])       Cancer Staging  No matching staging information was found for the patient  PLAN/DISCUSSION  No orders of the defined types were placed in this encounter  Lucia New is a 74y o  year old male with stage IV lung carcinoma    He recently underwent craniotomy for resection of a left parietal brain mass  The pathology is consistent with metastatic neuroendocrine carcinoma  His case was reviewed at Neuro-Oncology Clinic today  I reviewed with the patient and his wife his MRI findings revealing surgical cavity along with a small adjacent metastasis  He has 2 lesions approximately 2 cm each in the left cerebellum  He currently has symptoms related to the cerebellar lesions  He is on steroids  I had a very detailed discussion with the patient and his wife regarding radiation options  We discussed standard treatment for small cell brain metastasis has been whole-brain radiation  We discussed the rationale and data supporting this  In addition we discussed that there is some retrospective data (fire trial) where SRS was utilized for small cell brain metastasis  That study found no overall difference in survival however there was decreased time to progression in the brain  I reviewed with him that there is a  phase 3 study that is open however not available at [facility] yet where hippocampal sparing whole-brain treatment is compared to a stereotactic approach  Discussed the volume treated with each modality as well as its possible side effects  In particular with whole-brain radiation we discussed fatigue, hair loss, neuro cognitive changes including memory changes in the future  Hippocampal sparing approach can reduce some of the side effects  In the end after significant discussion the patient is electing to proceed with whole-brain radiation with hippocampal sparing  He already has a simulation appointment at the [facility] with Dr Mignon Rae  The plan will be to treat to dose of 3000 cGy given in 10 treatment fractions  Angie Choi MD  6/8/5544,2:18 AM      Portions of the record may have been created with voice recognition software    Occasional wrong word or "sound a like" substitutions may have occurred due to the inherent limitations of voice recognition software  Read the chart carefully and recognize, using context, where substitutions have occurred

## 2022-05-05 ENCOUNTER — APPOINTMENT (OUTPATIENT)
Dept: RADIATION ONCOLOGY | Facility: CLINIC | Age: 67
End: 2022-05-05
Attending: RADIOLOGY
Payer: MEDICARE

## 2022-05-05 PROCEDURE — 77334 RADIATION TREATMENT AID(S): CPT | Performed by: RADIOLOGY

## 2022-05-05 PROCEDURE — 77290 THER RAD SIMULAJ FIELD CPLX: CPT | Performed by: RADIOLOGY

## 2022-05-09 ENCOUNTER — EVALUATION (OUTPATIENT)
Dept: OCCUPATIONAL THERAPY | Facility: MEDICAL CENTER | Age: 67
End: 2022-05-09
Payer: MEDICARE

## 2022-05-09 ENCOUNTER — TELEPHONE (OUTPATIENT)
Dept: HEMATOLOGY ONCOLOGY | Facility: CLINIC | Age: 67
End: 2022-05-09

## 2022-05-09 ENCOUNTER — EVALUATION (OUTPATIENT)
Dept: PHYSICAL THERAPY | Facility: MEDICAL CENTER | Age: 67
End: 2022-05-09
Payer: MEDICARE

## 2022-05-09 DIAGNOSIS — I62.9 INTRACRANIAL HEMORRHAGE (HCC): Primary | ICD-10-CM

## 2022-05-09 PROCEDURE — 97163 PT EVAL HIGH COMPLEX 45 MIN: CPT

## 2022-05-09 PROCEDURE — 97167 OT EVAL HIGH COMPLEX 60 MIN: CPT

## 2022-05-09 NOTE — TELEPHONE ENCOUNTER
CALL RETURN FORM   Reason for patient call? patient's wife Burak Arthur is calling because she states [facility] pharmacy called and let them know that their request to refill predniSONE 20 mg tablet   Was denied  Patient's primary oncologist? Dr Dorys Bergman   Name of person the patient was calling for? Taylor Cowart   Any additional information to add, if applicable? Best phone number is [phone]   Informed patient that the message will be forwarded to the team and someone will get back to them as soon as possible    Did you relay this information to the patient?  yes

## 2022-05-09 NOTE — PROGRESS NOTES
PT Evaluation       Today's date: 2022  Patient name: Asher Cabrales  : 1955  MRN: [id number]  Referring provider: Pallavi Walker PA-C  Dx:   Encounter Diagnosis     ICD-10-CM    1  Left parietal hemorrhagic tumor  I62 9 Ambulatory referral to Physical Therapy     PT plan of care cert/re-cert         Assessment  Assessment details: Patient is a 74 y o  Male who presents to skilled outpatient PT with reports of gait and balance deficits today per his objective due to hx of cancer and current CA treatment  Patient challenged with overall balance today, patient noted as a fall risk with sit to stand activities today as well as challenges with overall stability with higher level dynamic activities with gait and endurance via 30 second sit to stand, 6 minute walk test next session due to fatigue  Patient weakness noted with MMT patient challenged with overall strength, patient having difficulties with overall instability as well as gait speed as stated before, fall risk from dynamic balance activities leading to fall risk with activities and current fatigue, decreased LE strength noted   Patient requires skilled PT in order to improve his overall balance and weakness in order to maximize function          Impairments: Abnormal coordination, Abnormal gait, Abnormal muscle tone, Abnormal or restricted ROM, Activity intolerance, Impaired balance, Impaired physical strength, Lacks appropriate HEP, Poor posture, Poor body mechanics, Pain with function, Safety issue, Weight-bearing intolerance, Scapular dyskinesis, Abnormal movement, Difficulty understanding and Abnormal muscle firing  Understanding of Dx/Px/POC: Excellent  Prognosis: Good    Patient verbalized understanding of POC  Please contact me if you have any questions or recommendations  Thank you for the referral and the opportunity to share in [facility] care          Plan  Plan details: Balance, Gait, Strengthening, Endurance, postural control, fatigued management    Patient would benefit from: PT Eval and OT Eval  Planned modality interventions: Biofeedback, Cryotherapy, TENS, Thermotherapy: Hydrocollator Packs and Traction  Planned therapy interventions: Abdominal trunk stabilization, ADL training, Balance, Balance/WB training, Breathing training, Body mechanics training, Coordination, Functional ROM exercises, Gait training, HEP, Joint mobilization, Manual therapy, Jamil taping, Motor coordination training, Neuromuscular re-education, Patient education, Postural training, Strengthening, Stretching, Therapeutic activities, Therapeutic exercises, Therapeutic training, Transfer training, Activity modification and Work reintegration  Frequency: 2x/wk  Duration in weeks: 12  Plan of Care beginning date: 5/9/2022  Plan of Care expiration date: 3 months - 8/9/2022  Treatment plan discussed with: Patient       Goals  Short Term Goals (4 weeks):    - Patient will improve time on TUG by 2 9 seconds to facilitate improved safety in all ambulation  - Patient will be independent in basic HEP 2-3 weeks  - Patient will improve 5xSTS score by 2 3 seconds  to promote improved LE functional strength needed for ADLs  - Patient will complete components of HiMAT to promote agility necessary for sports related tasks    Long Term Goals (12 weeks):  - Patient will be independent in a comprehensive home exercise program  - Patient will improve scoring on DGI by 2 6 points to progress safety  - Patient will improve gait speed by 0 18 m/s  to improve safety with community ambulation  - Patient will improve SMITH by 6 points in order to improve static balance and reduce risk for falls  - Patient will improve scoring on FGA by 4 points to progress safety with dynamic tasks  - Patient will be able to demonstrate HT in gait without veering  - Patient will improve 6 Minute Walk Test score by 190 feet to promote improved cardiovascular endurance  - Patient will report 50% reduction in near falls in order to improve safety with functional tasks and reduce his risk for falls  - Patient will report going on walks at least 3 days per week to promote independence and improved cardiovascular endurance  - Patient will be able to ascend/descend stairs reciprocally with 1 UE assist to promote independence and safety with ADLs  - Patient will report 50% reduction in near falls when ambulating on uneven terrain      Cut off score    All date taken from APTA Neuro Section or Rehab Measures      Arellano/56  MDC: 6 pts  Age Norms:  61-76: M - 54   F - 55  70-79: M - 47   F - 53  80-89: M - 48   F - 50 5xSTS: Jennifer et al 2010  MDC: 2 3 sec  Age Norms:  60-69: 11 1 sec  70-79: 12 6 sec  80-89: 14 8 sec   TUG  MDC: 4 14 sec  Cut off score:  >13 5 sec community dwelling adults  >32 2 frail elderly  <20 I for basic transfers  >30 dependent on transfers 10 Meter Walk Test: Ian parrish 2011  MDC: 0 18 m/s  20-29: M - 1 35 m   F - 1 34 m  30-39: M - 1 43 m   F - 1 34 m  40-49: M - 1 43 m   F - 1 39 m  50-59: M - 1 43 m   F - 1 31 m  60-69: M - 1 34 m   F - 1 24 m  70-79: M - 1 26 m   F - 1 13 m  80-89: M - 0 97 m   F - 0 94 m    Household Ambulator < 0 4 m/s  Limited Community Ambulator 0 4 - 0 8 m/s  [employer] Ambulator 0 8 - 1 2 m/s  Safely cross the street > 1 2 m/s   FGA  MCID: 4 pts  Geriatrics/community < 22/30 fall risk  Geriatrics/community < 20/30 unexplained falls    DGI  MDC: vestibular - 4 pts  MDC: geriatric/community - 3 pts  Falls risk <19/24 mCTSIB  Norm: 20-60 yrs  Eyes open firm: norm sway 0 21-0 48  Eyes closed firm: norm sway 0 48-0 99  Eyes open foam: norm sway 0 38-0 71  Eyes closed foam: norm sway 0 70-2 22   6 Minute Walk Test  MDC: 190 98 ft  MCID: 164 ft    Age Norms  60-69: M - 1876 ft (571 80 m)  F - 1765 ft (537 98 m)  70-79: M - 1729 ft (527 00 m)  F - 1545 ft (470 92 m)  80-89: M - 1368 ft (416 97 m)  F - 1286 ft (391 97 m) ABC: Lalo Huff, 2003  <67% increased risk for falls         Subjective    History of Present Illness  - Mechanism of injury: Patient is a 74year old male reporting to skilled PT with a L sided parietal Hemorrhagic tumor  Treatment course noted as follows, patient presents with wife today  Patient receiving treatment for 3 more tumors, course noted below:        "Adalid Haywood 1955 is a 74 y  o  male diagnosed with 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 status in May 2021  He is status post armen-adjuvant chemotherapy with only partial response with Alimta and carboplatin  Completed chemotherapy 8/27/2021  He had left upper lung lob[facility] at [facility] on 12/16/2021  He completed adjuvant radiation therapy to the left lung/mediastinum on 2/22/2022       In the interim, on 4/2/2022 he developed stroke-like symptoms and imaging showed 3-4 hemorrhagic metastasis and on April 7 he underwent left parietal craniotomy with tumor resection 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      He was last seen by Dr Otis Mcdowell for radiation follow up on 4/28/2022  Referral placed for patient to be seen at neuro-oncology clinic to discuss radiation treatment options  He is tentatively set up for whole brain simulation on 5/5/22 at the [facility] should that be the groups consensus       He is seen today at neuro-oncology clinic today to discuss radiation therapy recommendation(s) for brain metastases          4/3/22 MRI brain-  1   2 left cerebellar hemorrhagic metastases and probable 2 adjacent left parietal hemorrhagic metastasis, the more anterior, larger one having parenchymal hematoma and local edema and mass effect, correlating to the hemorrhage on the prior head CT          4/7/22 Image guided left parietal craniotomy for tumor resection (Left)   A-B   Brain, Left parietal mass, Resection:  - 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, consistent with patient's known lung primary          22 MRI brain w wo contrast (s/p left parietal craniotomy for tumor resection)  IMPRESSION:   Status post treatment of left parietal metastatic lesion with postop change as described above  This is the initial postoperative examination and will serve as baseline for follow-up exams  No significant residual tumor noted within the resection cavity      2 metastatic lesions within the left cerebellar hemisphere are unchanged         22 PET CT  IMPRESSION:   1   Subtle asymmetric FDG activity in the left cerebellum, possibly corresponding to one of patient's known left cerebellar metastatic lesions which were better characterized on recent MRI of the brain    2   No focal tracer activity characteristic of hypermetabolic malignancy involving the neck, chest, abdomen, pelvis, or osseous structures    Note is made of subtle FDG activity associated with nodular and somewhat linear airspace disease in the left lung adjacent to left cardiac border which I favor to reflect posttreatment/inflammatory changes with hypermetabolic malignancy considered less   likely   Short interval follow-up with CT of chest in 3 months is recommended to ensure stability and exclude subtle developing malignancy  22 MRI brain w wo contrast  IMPRESSION:   Decreased enhancement in left parietal resection cavity   No definite evidence of recurrent tumor      Slightly increased size of left posterolateral cerebellar metastatic hemorrhagic lesion      Unchanged left parietal cortex and left posterior cerebellar metastatic hemorrhagic lesions         5/3/22 Dr Mer Wilkerson- Discussed recent PET scan, no obvious focal activity in the chest abdomen or pelvis  Continue to monitor closely without systemic treatment for the time being  Continue dexamethasone 2 mg daily for the time being   Meclizine as needed for dizziness        Upcomin22 Neurosurgery, Dr Maira Ellis  22 Sunshine Rogers NP"      - Primary AD: None  - Assist level at home: Mod independent  - Decreased fine motor tasks: Yes      Pain  - Current pain ratin/10  - At best pain ratin/10  - At worst pain ratin/10  - Location: occasional low back discomfort  - Aggravating factors: Extreme bending    Social Support  - Steps to enter house: 0 steps  - Stairs in house: 10  - Lives in: multi level home  - Lives with: Wife    - Employment status: retired  - Hand dominance: R    Treatments  - Previous treatment: None  - Current treatment: OT Eval today  - Diagnostic Testing: PMH      Objective         LE MMT  - R Hip Flexion: 4/5  L Hip Flexion: 4/5  - R Hip Extension: 4/5  L Hip Extension: 4/5  - R Hip Abduction: 4/5  L Hip Abduction: 4/5  - R Hip Adduction: 4/5  L Hip Adduction: 4/5  - R Knee Extension: 4/5 L Knee Extension: 4/5  - R Knee Flexion: 4/5  L Knee Flexion: 4/5  - R Ankle DF: 4/5  L Ankle DF: 4/5  - R Ankle PF: 4/5  L Ankle PF: 4/5    Sensation  - Light touch: WNL bilaterally  - Deep pressure: WNL bilaterally  - Pain: WNL bilaterally  - Temperature: WNL bilaterally    Coordination  - Heel to Shin: WNL bilaterally   - Alternate Toe Taps: WNL bilaterally   - Finger to Nose: WNL bilaterally   - Finger to Finger: WNL bilaterally     Postural Screen  - Observation: mild forward head, rounded shoulders  - Improvement in symptoms with correction of posture: No    Gait  - Abnormalities: Short stride length, short step length           Outcome Measures Initial Eval  2022        5xSTS 19 11 sec        TUG 11 69 sec        10 meter   85 m/s        SMITH 43/56        FGA         DGI         mCTSIB  - FTEO (firm)  - FTEC (firm)  - FTEO (foam)  - FTEC (foam)   30 sec  30 sec  30 sec  30 sec        6MWT NV ft        30 second sit to stand 7 reps                            Precautions:   Past Medical History:   Diagnosis Date    Hyperlipidemia     Hypertension     Lung cancer ([facility] [address] )

## 2022-05-09 NOTE — TELEPHONE ENCOUNTER
Phoned pt's wife and let her know that pt is on Dexamethasone and pt is not taking Prednisone at this time

## 2022-05-09 NOTE — PROGRESS NOTES
OCCUPATIONAL THERAPY INITIAL EVALUATION    Today's Date: 2022  Patient Name: Gavin Ching  : 1955  MRN: [id number]  Referring Provider: Darrius San PA-C  Dx: Intracranial hemorrhage ([facility], [place] ) [I62 [id number]]    POC START DATE: 2022  POC END DATE: 2022  NEXT PN DUE: 2022  FREQUENCY: 2/wk for 12 weeks    SKILLED ANALYSIS:    Pt "Vaughn Said" presents to OT evaluation on 2022 secondary to Brain Metastases diagnosis received at the beginning of April  Pt has recent craniotomy procedure to remove large parietal tumor on L hemisphere (4 week post op at time of initial evaluation)  Pt reports that he has three smaller tumors at the cerebellum causing dizziness, lightheadedness, and nausea  Pt will be undergoing radiation starting on   Since diagnosis, pt reports difficulty with depth perception, memory, and RUE coordination  Pt exhibited ROM and MMT WNL on L/R today  Decreased  and pinch strength on R (dominant) when compared to L  Decreased fine motor coordination on R when compared to L as indicated by [employer]  MOCA cognitive assessment indicates moderate neurocognitive impairment  Pt will be seen for OT services 2x/wk for 12 weeks to address decreased fine motor coordination, depth perception/proprioception, and decreased cognition for improved participation in ADLs, IADLs, and leisure tasks  POC was reviewed with the pt, pt understands and agrees with POC  Subjective     "Vaughn Said" and his wife report has historians during today's evaluation  Pt's wife reports the following information: Pt has a history of Brain Metastases, diagnosis in the beginning of April  Recent crani procedure to remove large tumor in L parietal hemisphere on   Pt has three tumors in the brain (cerebellum) and will be undergoing 10 days of radiation starting 2022   Pt reports that since the procedure on  he has had dizziness, nausea, and lightheadedness  Difficulty with depth perception (picking up a cup, opening the fridge) and difficulty with memory  Reports that memory has improved since the surgery  Pt reports occasional difficulty with remembering things from the past (ex  Orientation of neighborhood and has lived there for 30 years)  Pt has a history of lung cancer in April 2021 and had L lung removed  Radiation: 10 days, just weekdays  Decreased handwriting legibility since diagnosis  Hand Dominant: R    Occupational Profile    ADLs: Wife guides him the shower to monitor  No difficulty with dressing  IADLS: Completing very little chores, but trying to do more to help his wife  Work/School: Retired  Previously Self-Employed   Sleep: Normal sleep patterns  Leisure: Music, Guitar      PATIENT GOAL: "To get my depth perception better "    HISTORY OF PRESENT ILLNESS:     PMH:   Past Medical History:   Diagnosis Date    Hyperlipidemia     Hypertension     Lung cancer ([address] )        Past Surgical Hx:   Past Surgical History:   Procedure Laterality Date    BACK SURGERY      CRANIOTOMY Left 4/7/2022    Procedure: Image guided left parietal craniotomy for tumor resection;  Surgeon: Lianne Lawton MD;  [facility];  Service: Neurosurgery    HEMORRHOID SURGERY      IR BIOPSY LUNG  5/6/2021    IR PORT PLACEMENT  6/17/2021    LAMINECTOMY  2018    L4-L5    LUNG SURGERY      left upper lobectomy    MD BRONCHOSCOPY,DIAGNOSTIC N/A 5/25/2021    Procedure: BRONCHOSCOPY FLEXIBLE;  Surgeon: Samuel Diallo MD;  [facility];  Service: Thoracic    MD MEDIASTINOSCOPY WITH LYMPH NODE BIOPSY/IES N/A 5/25/2021    Procedure: MEDIASTINOSCOPY, flexible bronchoscopy;  Surgeon: Samuel Diallo MD;  [facility];  Service: Thoracic    TONSILLECTOMY  1959        Pain Levels:      Lower back pain  8/10    Objective     9 HOLE PEG TEST: performed 9 hole peg test to assess dexterity/fine motor coordination with pt scoring 33 seconds on R hand (affected) and 30 seconds on L hand (unaffected) side  Further Observations in UE Assessment     PROPRIOCEPTION:      Finger to Nose Test: Good accuracy, slow movement    [place] Dexterity Test  RUE: 38s  LUE: 27s     Keven Cognitive Assessment Version 8 1 (MoCA V8 1)  Visuospatial/executive functionin  Naming:  3/3  Memory: 1st trial:  , (decreased hearing) 2nd trial:    Attention/concentration: 2  List of letters:   Serial Seven Subtraction: 1/3 w/ 1 errors  Language/sentence repetition:  02  Language Fluency:    Abstract/Correlational Thinkin/2  Delayed Recall:  05  Orientation:     Memory Index Score: 5/15  MoCA V1 8 1 Raw Score:  , MIS:  5/15, indicative of mdderate neurocognitive impairments       Vision Screen - COMPLETE NEXT SESSION     Impairments Section:  UE Strength:              LULA: RUE: 66/200 LUE: 80/200              Lateral Pinch: RUE: 12, LUE: 16                Range of Motion:  AROM:   RUE & LUE: WNL  -  shoulder flexion  - elbow flexion  -  elbow extension   -   wrist flexion  -  wrist extension    LUE within normal limits     MMT:  RUE & LUE 5/5 in all pivots   -  shoulder flexion  - elbow flexion  -  elbow extension   -   wrist flexion  -  wrist extension    LUE within normal limits     Pt is R hand dominant    GOALS:   Short Term Goals:  Pt will increase UE  strength by 3+ lbs to complete IADLs and leisure tasks (guitar)  Pt will increase delayed recall and recall 2/5 items on MOCA to complete IADLs  Pt will increase FMC by 3+ seconds on 9 hole peg to complete ADLs and IADLs   Pt will increase prehension patterns for improved tripod with utensil management with <20% droppage 4 weeks  Pt will increase proprioception of RUE hand to target for better accuracy with household management tasks  Pt will increase rate of manipulation for all FM tests for improved functional performance with salient tasks 4 weeks  Pt will exhibit improved visuospatial/executive functioning abilities as indicated by [facility] performance to 4/5  Pt will increase automaticity of RUE with functional reaching therapeutic activities  Long Term Goals: 8-12 weeks  Pt will increase UE  strength by 5+ lbs to complete IADLs and leisure tasks (guitar)  Pt will increase delayed recall and recall 5/5 items on MOCA to complete IADLs  Pt will increase FMC by 5+ seconds on 9 hole peg to complete ADLs and IADLs   Pt will increase prehension patterns for improved tripod with utensil management with <5% droppage   Pt will increase proprioception of RUE hand to target for better accuracy with household management tasks  Pt will increase rate of manipulation for all FM tests for improved functional performance with salient tasks   Pt will exhibit improved visuospatial/executive functioning abilities as indicated by [facility] performance to 4/5  Pt will consistently increase automaticity of RUE with functional reaching during daily tasks (ex   Opening the refrigerator, grasping a cup)    OTHER PLANNED THERAPY INTERVENTIONS:   Supine, seated, and in stance neuro re-ed  Tricep AG  NMES/FES  FMC/prehension  Timed Trials  Manual tx  Hand to target  Sensory re-ed  Seated functional reach: crossing midline  Supine place and hold  WBearing strategies   Closed chain activities  Open chain activities

## 2022-05-10 ENCOUNTER — OFFICE VISIT (OUTPATIENT)
Dept: CARDIOLOGY CLINIC | Facility: CLINIC | Age: 67
End: 2022-05-10
Payer: MEDICARE

## 2022-05-10 VITALS
HEART RATE: 79 BPM | RESPIRATION RATE: 16 BRPM | DIASTOLIC BLOOD PRESSURE: 70 MMHG | WEIGHT: 190 LBS | SYSTOLIC BLOOD PRESSURE: 120 MMHG | HEIGHT: 70 IN | BODY MASS INDEX: 27.2 KG/M2 | OXYGEN SATURATION: 98 %

## 2022-05-10 DIAGNOSIS — C34.90 NON-SMALL CELL LUNG CANCER, UNSPECIFIED LATERALITY (HCC): Primary | ICD-10-CM

## 2022-05-10 DIAGNOSIS — I25.10 CORONARY ARTERY DISEASE INVOLVING NATIVE HEART WITHOUT ANGINA PECTORIS, UNSPECIFIED VESSEL OR LESION TYPE: ICD-10-CM

## 2022-05-10 PROCEDURE — 99213 OFFICE O/P EST LOW 20 MIN: CPT | Performed by: INTERNAL MEDICINE

## 2022-05-10 NOTE — PROGRESS NOTES
Cardiology Follow Up    Cassandra Coreas  1955  [id number]  [facility] Oswaldo Hyatt [address]  [place] [id number] [phone]    There are no diagnoses linked to this encounter  Interval History:  25-year-old man with metastatic non small cell carcinoma who was noted at 1 2 have a pericardial effusion on a CT scan  Follow-up ECHO has shown no evidence of effusion  Unfortunately, he has been found to have metastatic cancer in the brain and had brain surgery to remove a tumor that was the size of an egg  He also has other lesions and is scheduled for whole-brain irradiation  He has known coronary disease but no cardiac symptoms  Wonders if he could go off the beta blocker which she takes on a low dose      Patient Active Problem List   Diagnosis    Renal cyst, right    Acute idiopathic gout of left foot    Back problem    Depression with anxiety    Essential hypertension    Glaucoma    Hypertriglyceridemia    Lumbago with sciatica, left side    Lumbar stenosis    JUNAID (obstructive sleep apnea)    Spinal stenosis of lumbar region with neurogenic claudication    Mixed hyperlipidemia    Bilateral hearing loss    Hyperglycemia    Cigarette nicotine dependence in remission    Adenocarcinoma, lung, left ([address] )    Encounter for central line care    Drug-induced neutropenia (HCC)    Left non-suppurative otitis media    Bilateral hearing loss due to cerumen impaction    Cancer of upper lobe of left lung (HCC)    Chronic back pain    Former cigarette smoker    History of gout    Hypertension    Proctocolitis    Pericardial effusion    Constipation    Labyrinthitis of both ears    Stage 3a chronic kidney disease (HCC)    Platelets decreased (HCC)    Psoriasis    Left parietal hemorrhagic tumor    Thrombocytopenia (HCC)    Goals of care, counseling/discussion    Hypothyroidism    Irregular heart rate    Paroxysmal atrial fibrillation (HCC)    Brain metastases (HCC)    Dizziness     Past Medical History:   Diagnosis Date    Hyperlipidemia     Hypertension     Lung cancer ([facility] [address] )      Social History     Socioeconomic History    Marital status: /Civil Union     Spouse name: Not on file    Number of children: Not on file    Years of education: Not on file    Highest education level: Not on file   Occupational History    Not on file   Tobacco Use    Smoking status: Former Smoker     Packs/day: 1 00     Years: 40 00     Pack years: 40 00     Types: Cigarettes     Start date:      Quit date: 2017     Years since quittin 2    Smokeless tobacco: Never Used    Tobacco comment: quit 2017   Vaping Use    Vaping Use: Never used   Substance and Sexual Activity    Alcohol use: Not Currently     Alcohol/week: 7 0 standard drinks     Types: 7 Cans of beer per week    Drug use: Not Currently     Types: Marijuana     Comment: seldom    Sexual activity: Not on file   Other Topics Concern    Not on file   Social History Narrative    Not on file     Social Determinants of Health     Financial Resource Strain: Not on file   Food Insecurity: No Food Insecurity    Worried About [address] [place] [address] in the Last Year: Never true    Felecia of Food in the Last Year: Never true   Transportation Needs: No Transportation Needs    Lack of Transportation (Medical): No    Lack of Transportation (Non-Medical):  No   Physical Activity: Not on file   Stress: Not on file   Social Connections: Not on file   Intimate Partner Violence: Not on file   Housing Stability: High Risk    Unable to Pay for Housing in the Last Year: No    Number of Places Lived in the Last Year: 1    Unstable Housing in the Last Year: Yes      Family History   Problem Relation Age of Onset    Nephrolithiasis Father     Lung cancer Maternal Grandfather      Past Surgical History:   Procedure Laterality Date    BACK SURGERY      CRANIOTOMY Left 4/7/2022    Procedure: Image guided left parietal craniotomy for tumor resection;  Surgeon: Amanda Moreira MD;  [facility];  Service: Neurosurgery    [address] [place] [address] IR BIOPSY LUNG  5/6/2021    IR PORT PLACEMENT  6/17/2021    LAMINECTOMY  2018    L4-L5    LUNG SURGERY      left upper lobectomy    TN BRONCHOSCOPY,DIAGNOSTIC N/A 5/25/2021    Procedure: BRONCHOSCOPY FLEXIBLE;  Surgeon: Chucky Villalba MD;  [facility];  Service: Thoracic    TN MEDIASTINOSCOPY WITH LYMPH NODE BIOPSY/IES N/A 5/25/2021    Procedure: MEDIASTINOSCOPY, flexible bronchoscopy;  Surgeon: Chucky Villalba MD;  [facility];  Service: Thoracic    TONSILLECTOMY  1959       Current Outpatient Medications:     allopurinol (ZYLOPRIM) 100 mg tablet, Take 1 tablet (100 mg total) by mouth daily, Disp: 30 tablet, Rfl: 5    amLODIPine (NORVASC) 10 mg tablet, TAKE ONE TABLET BY MOUTH EVERY DAY, Disp: 90 tablet, Rfl: 3    dexamethasone (DECADRON) 2 mg tablet, Take 2mg PO Q6hr for 2 days, followed by 2mg PO Q8hr for 2 days, followed by 2mg Q12hr indefinitely thereafter (Patient taking differently: Take 2 mg by mouth in the morning Take 2mg PO Q6hr for 2 days, followed by 2mg PO Q8hr for 2 days, followed by 2mg Q12hr indefinitely thereafter ), Disp: 66 tablet, Rfl: 0    doxepin (SINEquan) 25 mg capsule, TAKE ONE CAPSULE BY MOUTH EVERY DAY AT BEDTIME, Disp: 30 capsule, Rfl: 5    levothyroxine 75 mcg tablet, Take 0 5 tablets (37 5 mcg total) by mouth daily in the early morning, Disp: 15 tablet, Rfl: 0    meclizine (ANTIVERT) 25 mg tablet, Take 1 tablet (25 mg total) by mouth every 8 (eight) hours as needed for dizziness, Disp: 30 tablet, Rfl: 3    metoprolol succinate (TOPROL-XL) 25 mg 24 hr tablet, Take 1 tablet (25 mg total) by mouth daily, Disp: 30 tablet, Rfl: 5    polyethylene glycol (GLYCOLAX) 17 GM/SCOOP powder, Take 17 g by mouth 2 (two) times a day, Disp: 850 g, Rfl: 0    Psyllium (Metamucil) 28 3 % POWD, Take by mouth, Disp: , Rfl:     rosuvastatin (CRESTOR) 10 MG tablet, TAKE ONE TABLET BY MOUTH EVERY DAY, Disp: 30 tablet, Rfl: 5    Ascorbic Acid (vitamin C) 1000 MG tablet, Take 1,000 mg by mouth daily   (Patient not taking: Reported on 4/28/2022 ), Disp: , Rfl:     B Complex Vitamins (B COMPLEX 1 PO), Take 1 tablet by mouth daily  (Patient not taking: Reported on 4/28/2022 ), Disp: , Rfl:     B Complex Vitamins (BL VITAMIN B COMPLEX PO), Take by mouth   (Patient not taking: Reported on 4/28/2022 ), Disp: , Rfl:     betamethasone valerate (VALISONE) 0 1 % cream, Apply topically 2 (two) times a day (Patient not taking: Reported on 4/28/2022 ), Disp: 45 g, Rfl: 1    betamethasone, augmented, (DIPROLENE-AF) 0 05 % cream,   (Patient not taking: Reported on 4/28/2022 ), Disp: , Rfl:     calcipotriene (DOVONEX) 0 005 % cream, , Disp: , Rfl:     calcipotriene (DOVONOX) 0 005 % ointment, Apply topically 2 (two) times a day As needed (Patient not taking: Reported on 4/28/2022 ), Disp: , Rfl:     Cholecalciferol (VITAMIN D3 PO), Take 10,000 Units by mouth daily   (Patient not taking: Reported on 4/28/2022 ), Disp: , Rfl:     citalopram (CeleXA) 10 mg tablet, Take 1 tablet (10 mg total) by mouth 2 (two) times a day (Patient not taking: Reported on 5/10/2022 ), Disp: 60 tablet, Rfl: 0    Clobetasol Propionate (Impoyz) 0 025 % CREA, Apply topically (Patient not taking: Reported on 4/28/2022 ), Disp: , Rfl:     colchicine (COLCRYS) 0 6 mg tablet, Take 1 tablet (0 6 mg total) by mouth 2 (two) times a day (Patient not taking: Reported on 2/15/2022 ), Disp: 30 tablet, Rfl: 5    Cyanocobalamin (Vitamin B 12) 500 MCG TABS, Take 1 tablet by mouth (Patient not taking: Reported on 3/23/2022 ), Disp: , Rfl:     fluocinolone (SYNALAR) 0 01 % external solution,   (Patient not taking: Reported on 4/28/2022 ), Disp: , Rfl:     folic acid (FOLVITE) 1 mg tablet, Take 1 tablet (1 mg total) by mouth daily (Patient not taking: Reported on 1/12/2022 ), Disp: 30 tablet, Rfl: 6    gabapentin (NEURONTIN) 100 mg capsule,   (Patient not taking: Reported on 4/28/2022 ), Disp: , Rfl:     Garlic 10 MG CAPS, Take 1 tablet by mouth daily  (Patient not taking: Reported on 4/28/2022 ), Disp: , Rfl:     Glucosamine HCl 1500 MG TABS, Take by mouth (Patient not taking: Reported on 4/18/2022 ), Disp: , Rfl:     Glucosamine-Chondroit-Vit C-Mn (GLUCOSAMINE 1500 COMPLEX) CAPS, daily  (Patient not taking: Reported on 4/28/2022 ), Disp: , Rfl:     ketoconazole (NIZORAL) 2 % cream, Apply topically 2 (two) times a day (Patient not taking: Reported on 4/28/2022 ), Disp: 15 g, Rfl: 2    levETIRAcetam (KEPPRA) 500 mg tablet, Take 1 tablet (500 mg total) by mouth every 12 (twelve) hours for 6 doses (Patient not taking: Reported on 5/3/2022 ), Disp: 6 tablet, Rfl: 0    meclizine (ANTIVERT) 25 mg tablet, Take 1 tablet (25 mg total) by mouth 4 (four) times a day as needed for dizziness (Patient not taking: Reported on 4/28/2022 ), Disp: 30 tablet, Rfl: 0    MULTIPLE VITAMINS ESSENTIAL PO, Take by mouth (Patient not taking: Reported on 2/15/2022 ), Disp: , Rfl:     Multiple Vitamins-Minerals (MULTIVITAL-M) TABS, Take by mouth daily  (Patient not taking: Reported on 4/2/2022 ), Disp: , Rfl:     neomycin-polymyxin-hydrocortisone (CORTISPORIN) otic solution, Administer 4 drops into the left ear every 6 (six) hours (Patient not taking: Reported on 1/12/2022 ), Disp: 10 mL, Rfl: 0    ondansetron (ZOFRAN) 4 mg tablet, Take 1 tablet (4 mg total) by mouth every 6 (six) hours (Patient not taking: Reported on 1/18/2022 ), Disp: 12 tablet, Rfl: 0    ondansetron (ZOFRAN) 8 mg tablet, Take 1 tablet (8 mg total) by mouth every 8 (eight) hours as needed for nausea or vomiting (Patient not taking: Reported on 1/12/2022 ), Disp: 20 tablet, Rfl: 3    oxyCODONE (ROXICODONE) 5 immediate release tablet, , Disp: , Rfl:     pantoprazole (PROTONIX) 40 mg tablet, Take 1 tablet (40 mg total) by mouth daily (Patient not taking: Reported on 5/10/2022 ), Disp: 30 tablet, Rfl: 11    tamsulosin (FLOMAX) 0 4 mg, , Disp: , Rfl:     traMADol (ULTRAM) 50 mg tablet, TAKE 1 TABLET BY MOUTH EVERY 8 HOURS AS NEEDED FOR SEVERE PAIN (Patient not taking: Reported on 4/28/2022 ), Disp: 30 tablet, Rfl: 0    triamcinolone (KENALOG) 0 1 % cream, , Disp: , Rfl:     zinc gluconate 50 mg tablet, Take 50 mg by mouth daily (Patient not taking: Reported on 4/28/2022 ), Disp: , Rfl:   Allergies   Allergen Reactions    Bee Venom     Benazepril Other (See Comments)     Angioedema     Latex Other (See Comments)     Burning of eyes at the dentist from the gloves    Meloxicam GI Intolerance    Penicillin G Rash       Labs:  Hospital Outpatient Visit on 05/03/2022   Component Date Value    A4C EF 05/03/2022 68     LVIDd 05/03/2022 4 80     LVIDS 05/03/2022 2 90     IVSd 05/03/2022 1 10     LVPWd 05/03/2022 1 10     FS 05/03/2022 40     LA Volume Index (BP) 05/03/2022 18 4     E/A ratio 05/03/2022 0 50     E wave deceleration time 05/03/2022 210     MV Peak E Elijah 05/03/2022 48     MV Peak A Elijah 05/03/2022 0 96     RVID d 05/03/2022 5 2     LA size 05/03/2022 3 7     YARED A4C 05/03/2022 9 1     RA 2D Volume 05/03/2022 70 0     RAA A4C 05/03/2022 23 5     MV stenosis pressure 1/2* 05/03/2022 61     MV valve area p 1/2 meth* 05/03/2022 3 60     TR Peak Elijah 05/03/2022 3 4     Triscuspid Valve Regurgi* 05/03/2022 47 0     Ao root 05/03/2022 3 40     Tricuspid valve peak reg* 05/03/2022 3 42     Left ventricular stroke * 05/03/2022 73 00     IVS 05/03/2022 1 1     LEFT VENTRICLE SYSTOLIC * 53/23/0916 33     LV DIASTOLIC VOLUME (MOD* 43/30/1033 106     Left Atrium Area-systoli* 05/03/2022 9 7     LVSV, 2D 05/03/2022 73     LV EF 05/03/2022 60     ZIVSD 05/03/2022 2 73    Appointment on 05/02/2022   Component Date Value    Sodium 05/02/2022 137     Potassium 05/02/2022 4 3     Chloride 05/02/2022 106     CO2 05/02/2022 30     ANION GAP 05/02/2022 1*    BUN 05/02/2022 28*    Creatinine 05/02/2022 1 08     Glucose, Fasting 05/02/2022 89     Calcium 05/02/2022 9 0     Corrected Calcium 05/02/2022 9 9     AST 05/02/2022 12     ALT 05/02/2022 36     Alkaline Phosphatase 05/02/2022 55     Total Protein 05/02/2022 6 3*    Albumin 05/02/2022 2 9*    Total Bilirubin 05/02/2022 0 31     eGFR 05/02/2022 70     Magnesium 05/02/2022 2 2     TSH 3RD GENERATON 05/02/2022 2 380    Hospital Outpatient Visit on 04/22/2022   Component Date Value    POC Glucose 04/22/2022 107    No results displayed because visit has over 200 results        Admission on 04/02/2022, Discharged on 04/02/2022   Component Date Value    WBC 04/02/2022 9 76     RBC 04/02/2022 4 67     Hemoglobin 04/02/2022 15 0     Hematocrit 04/02/2022 46 2     MCV 04/02/2022 99*    MCH 04/02/2022 32 1     MCHC 04/02/2022 32 5     RDW 04/02/2022 19 4*    Platelets 76/83/1074 123*    MPV 04/02/2022 8 6*    Protime 04/02/2022 12 1     INR 04/02/2022 0 90     PTT 04/02/2022 30     hs TnI 0hr 04/02/2022 6     SARS-CoV-2 04/02/2022 Negative     INFLUENZA A PCR 04/02/2022 Negative     INFLUENZA B PCR 04/02/2022 Negative     RSV PCR 04/02/2022 Negative     Sodium 04/02/2022 136     Potassium 04/02/2022 4 1     Chloride 04/02/2022 99     CO2 04/02/2022 28     ANION GAP 04/02/2022 9     BUN 04/02/2022 25     Creatinine 04/02/2022 1 32*    Glucose 04/02/2022 96     Calcium 04/02/2022 9 9     AST 04/02/2022 24     ALT 04/02/2022 36     Alkaline Phosphatase 04/02/2022 55     Total Protein 04/02/2022 7 1     Albumin 04/02/2022 4 1     Total Bilirubin 04/02/2022 0 49     eGFR 04/02/2022 55     Ammonia 04/02/2022 27     pH, Nirmal 04/02/2022 7 390     pCO2, Nirmal 04/02/2022 44 9     pO2, Nirmal 04/02/2022 27 9*    HCO3, Nirmal 04/02/2022 26 6     Base Excess, Nirmal 04/02/2022 1 1     O2 Content, Nirmal 04/02/2022 11 8     O2 HGB, VENOUS 04/02/2022 53 2*    TSH 3RD GENERATON 04/02/2022 6 162*    Free T4 04/02/2022 0 82     POC Glucose 04/02/2022 104     Ventricular Rate 04/02/2022 84     Atrial Rate 04/02/2022 84     PA Interval 04/02/2022 112     QRSD Interval 04/02/2022 88     QT Interval 04/02/2022 364     QTC Interval 04/02/2022 430     P Axis 04/02/2022 94     QRS Axis 04/02/2022 172     T Wave Axis 04/02/2022 115     Ventricular Rate 04/02/2022 83     Atrial Rate 04/02/2022 83     PA Interval 04/02/2022 106     QRSD Interval 04/02/2022 88     QT Interval 04/02/2022 374     QTC Interval 04/02/2022 439     P Axis 04/02/2022 69     QRS Axis 04/02/2022 16     T Wave Axis 04/02/2022 64    Ancillary Orders on 03/25/2022   Component Date Value    WBC 05/02/2022 5 31     RBC 05/02/2022 3 80*    Hemoglobin 05/02/2022 12 2     Hematocrit 05/02/2022 38 3     MCV 05/02/2022 101*    MCH 05/02/2022 32 1     MCHC 05/02/2022 31 9     RDW 05/02/2022 18 3*    MPV 05/02/2022 11 0     Platelets 12/83/1030 110*    Segmented % 05/02/2022 66     Bands % 05/02/2022 9*    Lymphocytes % 05/02/2022 14     Monocytes % 05/02/2022 10     Eosinophils, % 05/02/2022 1     Basophils % 05/02/2022 0     Absolute Neutrophils 05/02/2022 3 98     Lymphocytes Absolute 05/02/2022 0 74     Monocytes Absolute 05/02/2022 0 53     Eosinophils Absolute 05/02/2022 0 05     Basophils Absolute 05/02/2022 0 00     Toxic Granulation 05/02/2022 Present     RBC Morphology 05/02/2022 Present     Anisocytosis 05/02/2022 Present     Macrocytes 05/02/2022 Present     Ovalocytes 05/02/2022 Present     Poikilocytes 05/02/2022 Present     Polychromasia 05/02/2022 Present     Tear Drop Cells 05/02/2022 Present     Platelet Estimate 66/42/3494 Decreased*   Appointment on 03/22/2022   Component Date Value    Magnesium 03/22/2022 2 5     TSH 3RD GENERATON 03/22/2022 8 920*    CRP 03/22/2022 <3 0     Sed Rate 03/22/2022 22*    WBC 03/22/2022 9 10     RBC 03/22/2022 4 37     Hemoglobin 03/22/2022 13 7     Hematocrit 03/22/2022 41 9     MCV 03/22/2022 96     MCH 03/22/2022 31 4     MCHC 03/22/2022 32 7     RDW 03/22/2022 19 9*    MPV 03/22/2022 10 2     Platelets 45/00/8907 148*    nRBC 03/22/2022 0     Neutrophils Relative 03/22/2022 80*    Immat GRANS % 03/22/2022 1     Lymphocytes Relative 03/22/2022 13*    Monocytes Relative 03/22/2022 6     Eosinophils Relative 03/22/2022 0     Basophils Relative 03/22/2022 0     Neutrophils Absolute 03/22/2022 7 29     Immature Grans Absolute 03/22/2022 0 06     Lymphocytes Absolute 03/22/2022 1 15     Monocytes Absolute 03/22/2022 0 58     Eosinophils Absolute 03/22/2022 0 01     Basophils Absolute 03/22/2022 0 01     Sodium 03/22/2022 139     Potassium 03/22/2022 3 9     Chloride 03/22/2022 104     CO2 03/22/2022 30     ANION GAP 03/22/2022 5     BUN 03/22/2022 22     Creatinine 03/22/2022 1 30     Glucose, Fasting 03/22/2022 84     Calcium 03/22/2022 9 4     AST 03/22/2022 22     ALT 03/22/2022 40     Alkaline Phosphatase 03/22/2022 65     Total Protein 03/22/2022 6 9     Albumin 03/22/2022 3 6     Total Bilirubin 03/22/2022 0 42     eGFR 03/22/2022 56     Free T4 03/22/2022 0 66*     Imaging: MRI brain with and without contrast    Result Date: 4/28/2022  Narrative: MRI BRAIN WITH AND WITHOUT CONTRAST INDICATION: C79 31: Secondary malignant neoplasm of brain  Brain metastasis  COMPARISON:  MRI brain with and without contrast 4/8/2022, 4/3/2022 TECHNIQUE: Sagittal T1, axial T2, axial FLAIR, axial T1, axial Richardson, axial diffusion  Sagittal, axial T1 postcontrast   Axial bravo postcontrast with coronal reconstructions  IV Contrast:  8 mL of Gadobutrol injection (SINGLE-DOSE)  IMAGE QUALITY:   Diagnostic  FINDINGS: BRAIN PARENCHYMA: Postsurgical changes of left parietal craniotomy for resection of metastatic lesion in left parietal lobe  Left parietal resection cavity with heterogeneous blood products, hemosiderin deposition, T2/FLAIR hyperintense signal abnormality, and decreased peripheral and internal enhancement  Unchanged dural thickening and enhancement in left parietal region adjacent to the craniotomy site, compatible with postsurgical change  Metastatic enhancing lesions with associated hemosiderin deposition as detailed below: -0 5 x 0 3 cm left parietal cortex lesion (11:92), unchanged  -2 1 x 1 4 cm left posterolateral cerebellar lesion (11:44), previously 1 9 x 1 3 cm  -1 6 x 1 4 cm left posterior cerebellar lesion (11:45), unchanged  Decreased mild surrounding vasogenic edema in left parietal lobe  Similar mild vasogenic edema in left cerebellum  No mass effect or midline shift  No diffusion-weighted signal abnormality to suggest acute infarction  VENTRICLES:  Normal for the patient's age  SELLA AND PITUITARY GLAND:  Normal  ORBITS:  Normal  PARANASAL SINUSES:  Moderate sized right maxillary mucus retention cyst  VASCULATURE:  Evaluation of the major intracranial vasculature demonstrates appropriate flow voids  CALVARIUM AND SKULL BASE:  Normal  MASTOIDS: Small right mastoid effusion  EXTRACRANIAL SOFT TISSUES:  Normal      Impression: Decreased enhancement in left parietal resection cavity  No definite evidence of recurrent tumor  Slightly increased size of left posterolateral cerebellar metastatic hemorrhagic lesion  Unchanged left parietal cortex and left posterior cerebellar metastatic hemorrhagic lesions  The study was marked in EPIC for significant notification  Workstation performed: [id number]     NM PET CT skull base to mid thigh    Result Date: 4/25/2022  Narrative: PET/CT SCAN INDICATION:  C34 92: Malignant neoplasm of unspecified part of left bronchus or lung C34 12:  Malignant neoplasm of upper lobe, left bronchus or lung   , restaging The following clinical information was copied directly from EPIC: h/o adenocarcinoma of Lt lung  s/p Lt parietal mass resection (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, c/w pt's known lung primary)  s/p Lt UL lobectomy  (poorly diff  non-small cell carcinoma, c/w large cell neuroendocrine carcinoma, nonkeratinizing poorly diff  squamous cell carcinoma)  Restaging MODIFIER: PS COMPARISON: MRI of the brain dated 4/8/2022, CT of chest, abdomen, and pelvis dated 4/4/2022, and PET/CT dated 9/27/2021 CELL TYPE:  poorly diff  non-small cell carcinoma, favor adenocarcinoma with neuroendocrine diff  (BX 5/6/21 lT ul LUNG +) TECHNIQUE:   8 23 mCi F-18-FDG administered IV  Multiplanar attenuation corrected and non attenuation corrected PET images were acquired 60 minutes post injection  Contiguous, low dose, axial CT sections were obtained from the skull vertex through the femurs   Intravenous contrast material was not utilized  This examination, like all CT scans performed in the [facility], was performed utilizing techniques to minimize radiation dose exposure, including the use of iterative reconstruction and automated exposure control  Fasting serum glucose: 107 mg/dl FINDINGS: VISUALIZED BRAIN:   Left parietal photopenic region corresponds to surgical resection site  Subtle asymmetric tracer activity involving the posterior/medial left cerebellum possibly related to one of the 2 known left cerebellar metastatic lesions which were better characterized on recent MRI of the brain; please note that this finding is subtle and patient's known left cerebellar masses are not significantly hypermetabolic and are best characterized on MRI  HEAD/NECK:   There is a physiologic distribution of FDG  No FDG avid cervical adenopathy is seen  CT images: Postsurgical changes from left parietal craniotomy  Cerebral atrophy  Intracranial atherosclerotic calcification is noted    On image 44 of series 3 there is subtle hyperdense lesion measuring 1 6 cm involving the lateral left cerebellum likely corresponding to one of the metastatic lesions which was better characterized on prior MRI  Right maxillary sinus mucous retention cyst versus mucosal polyp  CHEST:   Interval resection of patient's known hypermetabolic left upper lobe malignancy and adjacent mediastinal amena metastatic disease  Best seen on images 138 through 140 is subtle FDG activity associated with somewhat linear and somewhat nodular airspace disease involving the left lung adjacent to the left cardiac border with max SUV of 2 4 and corresponding to nodular density on image  138 of series 3 measuring 8 mm and on image 139 measuring 1 3 cm  While I favor these findings to reflect posttreatment inflammatory changes, short interval follow-up is recommended to exclude developing left lung malignancy/metastatic disease  Mild nonspecific FDG activity involving the mid esophagus without definite corresponding soft tissue abnormality  CT images: Right-sided chest port  Atherosclerotic vascular calcifications including those of the coronary arteries are noted  Postsurgical changes related to left upper lobectomy  Emphysematous changes involving the lungs  Evaluation for pulmonary nodules is limited on low-dose unenhanced CT secondary to respiratory motion artifact  ABDOMEN:   No FDG avid soft tissue lesions are seen  CT images: Poorly characterized on low-dose unenhanced CT are multiple hypodense bilateral renal lesions which were better characterized on contrast-enhanced CT dated April 4, 2022  Nonspecific mural thickening of the proximal to mid gastric wall  Atherosclerotic vascular calcifications are noted  Diverticulosis coli  PELVIS: No FDG avid soft tissue lesions are seen  CT images: Mild enlargement of the prostate gland  OSSEOUS STRUCTURES: No FDG avid lesions are seen  CT images: Degenerative changes are noted involving the spine  Impression: 1    Subtle asymmetric FDG activity in the left cerebellum, possibly corresponding to one of patient's known left cerebellar metastatic lesions which were better characterized on recent MRI of the brain  2   No focal tracer activity characteristic of hypermetabolic malignancy involving the neck, chest, abdomen, pelvis, or osseous structures  Note is made of subtle FDG activity associated with nodular and somewhat linear airspace disease in the left lung adjacent to left cardiac border which I favor to reflect posttreatment/inflammatory changes with hypermetabolic malignancy considered less likely  Short interval follow-up with CT of chest in 3 months is recommended to ensure stability and exclude subtle developing malignancy  Please see above for details and additional findings  The study was marked in EPIC for significant notification  The study was marked in Epic for follow-up  Workstation performed: [id number]     Echo follow up/limited w/ contrast if indicated    Result Date: 5/3/2022  Narrative: Warren Rebolledory  Left Ventricle: Left ventricular cavity size is normal  Wall thickness is normal  The left ventricular ejection fraction is 60%  Systolic function is normal  Wall motion is normal  Diastolic function is mildly abnormal, consistent with grade I (abnormal) relaxation    Right Ventricle: Right ventricular cavity size is mildly dilated    Right Atrium: The atrium is mildly dilated    Tricuspid Valve: There is mild to moderate regurgitation  The right ventricular systolic pressure is mildly to moderately elevated  RVSP 45 mm Hg  Review of Systems:  Review of Systems    Physical Exam:  120/70  Lungs clear  Regular rhythm  No murmurs or gallops  No edema  Discussion/Summary:    1  Coronary artery disease without angina pectoris  2  Metastatic non-small cell carcinoma  3  History of small pericardial effusion that has resolved  Recommendations:    1  I advised him that I thought he could go off his small dose of the beta-blocker    2  Return PRN Boyd Bonilla MD

## 2022-05-11 ENCOUNTER — APPOINTMENT (OUTPATIENT)
Dept: RADIATION ONCOLOGY | Facility: CLINIC | Age: 67
End: 2022-05-11
Attending: RADIOLOGY
Payer: MEDICARE

## 2022-05-13 ENCOUNTER — APPOINTMENT (OUTPATIENT)
Dept: RADIATION ONCOLOGY | Facility: CLINIC | Age: 67
End: 2022-05-13
Attending: RADIOLOGY
Payer: MEDICARE

## 2022-05-13 DIAGNOSIS — I62.9 INTRACRANIAL HEMORRHAGE (HCC): ICD-10-CM

## 2022-05-13 PROCEDURE — 77280 THER RAD SIMULAJ FIELD SMPL: CPT | Performed by: RADIOLOGY

## 2022-05-13 PROCEDURE — 77334 RADIATION TREATMENT AID(S): CPT | Performed by: RADIOLOGY

## 2022-05-13 PROCEDURE — 77295 3-D RADIOTHERAPY PLAN: CPT | Performed by: RADIOLOGY

## 2022-05-13 PROCEDURE — 77412 RADIATION TX DELIVERY LVL 3: CPT | Performed by: RADIOLOGY

## 2022-05-13 PROCEDURE — 77300 RADIATION THERAPY DOSE PLAN: CPT | Performed by: RADIOLOGY

## 2022-05-13 PROCEDURE — 77427 RADIATION TX MANAGEMENT X5: CPT | Performed by: RADIOLOGY

## 2022-05-13 PROCEDURE — 77387 GUIDANCE FOR RADJ TX DLVR: CPT | Performed by: RADIOLOGY

## 2022-05-13 NOTE — TELEPHONE ENCOUNTER
Called lAex CROUCH [phone]  Instructed him to take dexamethasone 2 mg BID per Dr Matilda Hill  Prescription sent to pharmacy

## 2022-05-14 RX ORDER — DEXAMETHASONE 2 MG/1
2 TABLET ORAL 2 TIMES DAILY WITH MEALS
Qty: 60 TABLET | Refills: 0 | Status: ON HOLD | OUTPATIENT
Start: 2022-05-14 | End: 2022-06-17 | Stop reason: SDUPTHER

## 2022-05-16 ENCOUNTER — APPOINTMENT (OUTPATIENT)
Dept: RADIATION ONCOLOGY | Facility: CLINIC | Age: 67
End: 2022-05-16
Attending: RADIOLOGY
Payer: MEDICARE

## 2022-05-16 ENCOUNTER — APPOINTMENT (OUTPATIENT)
Dept: OCCUPATIONAL THERAPY | Facility: MEDICAL CENTER | Age: 67
End: 2022-05-16
Payer: MEDICARE

## 2022-05-16 ENCOUNTER — APPOINTMENT (OUTPATIENT)
Dept: RADIATION ONCOLOGY | Facility: CLINIC | Age: 67
End: 2022-05-16
Payer: MEDICARE

## 2022-05-16 ENCOUNTER — APPOINTMENT (OUTPATIENT)
Dept: PHYSICAL THERAPY | Facility: MEDICAL CENTER | Age: 67
End: 2022-05-16
Payer: MEDICARE

## 2022-05-17 ENCOUNTER — APPOINTMENT (OUTPATIENT)
Dept: RADIATION ONCOLOGY | Facility: CLINIC | Age: 67
End: 2022-05-17
Attending: RADIOLOGY
Payer: MEDICARE

## 2022-05-17 PROCEDURE — 77387 GUIDANCE FOR RADJ TX DLVR: CPT | Performed by: RADIOLOGY

## 2022-05-17 PROCEDURE — 77412 RADIATION TX DELIVERY LVL 3: CPT | Performed by: RADIOLOGY

## 2022-05-17 PROCEDURE — G6002 STEREOSCOPIC X-RAY GUIDANCE: HCPCS | Performed by: RADIOLOGY

## 2022-05-18 ENCOUNTER — APPOINTMENT (OUTPATIENT)
Dept: RADIATION ONCOLOGY | Facility: CLINIC | Age: 67
End: 2022-05-18
Attending: RADIOLOGY
Payer: MEDICARE

## 2022-05-18 PROCEDURE — 77412 RADIATION TX DELIVERY LVL 3: CPT | Performed by: RADIOLOGY

## 2022-05-18 PROCEDURE — 77331 SPECIAL RADIATION DOSIMETRY: CPT | Performed by: RADIOLOGY

## 2022-05-19 ENCOUNTER — APPOINTMENT (OUTPATIENT)
Dept: OCCUPATIONAL THERAPY | Facility: MEDICAL CENTER | Age: 67
End: 2022-05-19
Payer: MEDICARE

## 2022-05-19 ENCOUNTER — RA CDI HCC (OUTPATIENT)
Dept: OTHER | Facility: HOSPITAL | Age: 67
End: 2022-05-19

## 2022-05-19 ENCOUNTER — APPOINTMENT (OUTPATIENT)
Dept: RADIATION ONCOLOGY | Facility: CLINIC | Age: 67
End: 2022-05-19
Attending: RADIOLOGY
Payer: MEDICARE

## 2022-05-19 ENCOUNTER — TELEPHONE (OUTPATIENT)
Dept: NEUROSURGERY | Facility: CLINIC | Age: 67
End: 2022-05-19

## 2022-05-19 ENCOUNTER — APPOINTMENT (OUTPATIENT)
Dept: PHYSICAL THERAPY | Facility: MEDICAL CENTER | Age: 67
End: 2022-05-19
Payer: MEDICARE

## 2022-05-19 DIAGNOSIS — M54.42 CHRONIC LEFT-SIDED LOW BACK PAIN WITH LEFT-SIDED SCIATICA: ICD-10-CM

## 2022-05-19 DIAGNOSIS — G89.29 CHRONIC LEFT-SIDED LOW BACK PAIN WITH LEFT-SIDED SCIATICA: ICD-10-CM

## 2022-05-19 PROCEDURE — 77412 RADIATION TX DELIVERY LVL 3: CPT | Performed by: RADIOLOGY

## 2022-05-19 RX ORDER — TRAMADOL HYDROCHLORIDE 50 MG/1
TABLET ORAL
Qty: 30 TABLET | Refills: 0 | Status: SHIPPED | OUTPATIENT
Start: 2022-05-19 | End: 2022-07-22

## 2022-05-19 NOTE — TELEPHONE ENCOUNTER
Received a call from Alta Enriquez from [facility]  She states that Jazmine Lehman is there for a cleaning and questioning if he may have a cleaning  Advised her that he should not have dental work for 3 months post crani/fusion unless absolutely necessary  She stated and understanding and was appreciative

## 2022-05-19 NOTE — PROGRESS NOTES
[facility]  coding opportunities       Chart reviewed, no opportunity found:   [address]        Patients Insurance     Medicare Insurance: [employer]

## 2022-05-20 ENCOUNTER — APPOINTMENT (OUTPATIENT)
Dept: RADIATION ONCOLOGY | Facility: CLINIC | Age: 67
End: 2022-05-20
Attending: RADIOLOGY
Payer: MEDICARE

## 2022-05-20 DIAGNOSIS — C79.31 BRAIN METASTASES (HCC): Primary | ICD-10-CM

## 2022-05-20 RX ORDER — MEMANTINE HYDROCHLORIDE 5 MG/1
TABLET ORAL
Qty: 21 TABLET | Refills: 0 | Status: SHIPPED | OUTPATIENT
Start: 2022-05-20 | End: 2022-06-03

## 2022-05-23 ENCOUNTER — APPOINTMENT (OUTPATIENT)
Dept: RADIATION ONCOLOGY | Facility: CLINIC | Age: 67
End: 2022-05-23
Attending: RADIOLOGY
Payer: MEDICARE

## 2022-05-23 ENCOUNTER — OFFICE VISIT (OUTPATIENT)
Dept: OCCUPATIONAL THERAPY | Facility: MEDICAL CENTER | Age: 67
End: 2022-05-23
Payer: MEDICARE

## 2022-05-23 ENCOUNTER — OFFICE VISIT (OUTPATIENT)
Dept: PHYSICAL THERAPY | Facility: MEDICAL CENTER | Age: 67
End: 2022-05-23
Payer: MEDICARE

## 2022-05-23 DIAGNOSIS — I62.9 INTRACRANIAL HEMORRHAGE (HCC): Primary | ICD-10-CM

## 2022-05-23 PROCEDURE — 97110 THERAPEUTIC EXERCISES: CPT

## 2022-05-23 PROCEDURE — 77412 RADIATION TX DELIVERY LVL 3: CPT | Performed by: RADIOLOGY

## 2022-05-23 PROCEDURE — 77336 RADIATION PHYSICS CONSULT: CPT | Performed by: RADIOLOGY

## 2022-05-23 PROCEDURE — 97530 THERAPEUTIC ACTIVITIES: CPT

## 2022-05-23 PROCEDURE — 97112 NEUROMUSCULAR REEDUCATION: CPT

## 2022-05-23 NOTE — PROGRESS NOTES
Occupational Therapy Daily Note:    Today's date: 2022  Patient name: Madi Parrish  : 1955  MRN: [id number]  Referring provider: Radha Joel PA-C  Dx:   Encounter Diagnosis   Name Primary?  Left parietal hemorrhagic tumor Yes       POC START DATE: 2022  POC END DATE: 2022  NEXT PN DUE: 2022  FREQUENCY: 2/wk for 12 weeks    Subjective: "I am constantly dizzy  I get some relief when I am sitting down  More relief when laying down " He has 4 radiation treatments so far and they put him on a medication for his short term memory loss  He started to have headaches about 4 days ago, increased steroid  A lot of fatigue "    Objective:     Vision       Comments                                        VISION SCREEN         GLASSES (prescription)          Symptoms Include dizziness, nausea, headache, eye strain and blurred vision     Last Eye Exam 6 months ago           Visual Acuity (near) R eye  20/70     L eye 20/40    Binocularity (near) orthotropia    Binocularity (far) orthotropia    Convergence  Diplopia reported at 10 cm    Accommodation Blurriness/loss of focus Reported at 16 cm    Zookal     Mobility             Pursuits jerky in all planes            Range of Motion WFL    Visual perceptual midline             Midline WNL            Horizon  WNL      Therapeutic Activity     Educated on Compensatory Memory Strategies  Green Chips Game #1  Immediate Memory: 7/10  Delayed Memory: 7/10  Min verbal cues for chunking utilization    Neuro Muscular Re-education     Depth Perception  Tennis Balls onto [place]es using Small Tongs   L/R while alternating girls names and boys names  Slowed memory processing with category naming     Assessment: Tolerated treatment well  Pt session began with vision screening today  Decreased visual acuity in R eye noted  Encouraged to consult neurologist or optometrist concerning visual acuity changes  Jerky pursuits and convergence insufficiency noted   Pt was educated on compensatory memory strategies  Decreased frustration tolerance with immediate and delayed recall therapeutic activity  Verbal cues required for utilization of chunking memory strategy to increase performance of memory recall  Pt exhibited slowed memory processing with alternating attention of category naming girls/boys names  No difficulty with depth perception noted, however, pt reports this skill is a concern at home while turning on a light switch and reaching for a glass  Will continue to progress pt as tolerated  Patient would benefit from continued skilled OT  Plan: Continued skilled OT per POC

## 2022-05-23 NOTE — PROGRESS NOTES
Daily Note     Today's date: 2022  Patient name: Trang Arthur  : 1955  MRN: [id number]  Referring provider: Matt Cuevas PA-C  Dx:   Encounter Diagnosis     ICD-10-CM    1  Left parietal hemorrhagic tumor  I62 9        Start Time:   Stop Time:   Total time in clinic (min): 45 minutes    Subjective: Patient reported to therapy today with his wife, reported that he was fatigued and have back pain from his treatment  Objective: See treatment diary below    On firm- 20 reps, 3 second holds  -Hip flexion  -Hip extension  -Hip abduction  -Mini squats  -Heel raises     -Step ups- forward and laterally- 20 reps, 3 second holds    -Side stepping- 10 cycles of 10 feet    -Physioball rollouts- forward- 20 reps, blue ball, 5 second holds, no change in back pain  Assessment: Tolerated treatment well  Patient demonstrated fatigue post treatment, exhibited good technique with therapeutic exercises and would benefit from continued PT to maximize function post surgery and with referring dx  Patient reports back pain after approximately 5 minutes of standing  No injury reported  Plan: Continue per plan of care  Progress treatment as tolerated  Outcome Measures Initial Eval  2022        5xSTS 19 11 sec        TUG 11 69 sec        10 meter   85 m/s        SARAH 43/56        FGA         DGI         mCTSIB  - FTEO (firm)  - FTEC (firm)  - FTEO (foam)  - FTEC (foam)   30 sec  30 sec  30 sec  30 sec        6MWT NV ft        30 second sit to stand 7 reps

## 2022-05-24 ENCOUNTER — APPOINTMENT (OUTPATIENT)
Dept: RADIATION ONCOLOGY | Facility: CLINIC | Age: 67
End: 2022-05-24
Attending: RADIOLOGY
Payer: MEDICARE

## 2022-05-24 PROCEDURE — 77412 RADIATION TX DELIVERY LVL 3: CPT | Performed by: STUDENT IN AN ORGANIZED HEALTH CARE EDUCATION/TRAINING PROGRAM

## 2022-05-24 PROCEDURE — 77427 RADIATION TX MANAGEMENT X5: CPT | Performed by: RADIOLOGY

## 2022-05-24 PROCEDURE — 77417 THER RADIOLOGY PORT IMAGE(S): CPT | Performed by: STUDENT IN AN ORGANIZED HEALTH CARE EDUCATION/TRAINING PROGRAM

## 2022-05-25 ENCOUNTER — APPOINTMENT (OUTPATIENT)
Dept: RADIATION ONCOLOGY | Facility: CLINIC | Age: 67
End: 2022-05-25
Attending: RADIOLOGY
Payer: MEDICARE

## 2022-05-25 ENCOUNTER — OFFICE VISIT (OUTPATIENT)
Dept: NEUROSURGERY | Facility: CLINIC | Age: 67
End: 2022-05-25

## 2022-05-25 VITALS
TEMPERATURE: 98.6 F | RESPIRATION RATE: 16 BRPM | BODY MASS INDEX: 27.26 KG/M2 | SYSTOLIC BLOOD PRESSURE: 112 MMHG | HEIGHT: 70 IN | DIASTOLIC BLOOD PRESSURE: 82 MMHG | HEART RATE: 89 BPM

## 2022-05-25 DIAGNOSIS — C79.31 BRAIN METASTASES (HCC): Primary | ICD-10-CM

## 2022-05-25 DIAGNOSIS — G89.29 CHRONIC LEFT-SIDED LOW BACK PAIN WITH LEFT-SIDED SCIATICA: ICD-10-CM

## 2022-05-25 DIAGNOSIS — M54.42 CHRONIC LEFT-SIDED LOW BACK PAIN WITH LEFT-SIDED SCIATICA: ICD-10-CM

## 2022-05-25 PROCEDURE — 77412 RADIATION TX DELIVERY LVL 3: CPT | Performed by: RADIOLOGY

## 2022-05-25 PROCEDURE — 99024 POSTOP FOLLOW-UP VISIT: CPT | Performed by: NEUROLOGICAL SURGERY

## 2022-05-25 RX ORDER — CITALOPRAM 10 MG/1
10 TABLET ORAL 2 TIMES DAILY
Qty: 60 TABLET | Refills: 0 | Status: SHIPPED | OUTPATIENT
Start: 2022-05-25 | End: 2022-07-05

## 2022-05-25 NOTE — PROGRESS NOTES
Patient Id: Melodie Keita is a 74 y o  male        Handedness: Right      Assessment/Plan:    There are no diagnoses linked to this encounter  Discussion Summary:   1  Status post left parietal craniotomy and resection of neuroendocrine metastatic tumor, 04/07/2022  2  Two 1 cm left cerebellar hemispheric metastasis  Currently undergoing whole-brain radiation  He has been tolerating this well  He continues to have some lightheaded and dizziness but has overall been largely stable and clinically improving  He has had a significant improvement in his speech and motor apraxias  I will see him back as needed should there be any concern for posterior fossa disease progression  Chief Complaint: Follow-up      HPI:   This is a 26-year-old gentleman with a history of neuroendocrine tumor status post lung resection who presents to the hospital with speech apraxia and motor apraxis  After initiation of steroids he had some significant improvement  Ultimately he underwent resection of his dominant left parietal hemorrhagic mass  He tolerated this very well  He is now 6 weeks out from surgery  He continues to do well  His biggest complaint is balance and intermittent lightheadedness  This can happen randomly  Often while sitting  Review of systems obtained by the MA reviewed and updated below  Review of Systems   Constitutional: Negative  HENT: Negative  Eyes: Positive for visual disturbance (a little bit of blurry vision- depth perception is off since surgery)  Respiratory: Positive for shortness of breath (some , with activity, climbing stairs)  Cardiovascular: Negative  Gastrointestinal: Negative  Endocrine: Negative  Genitourinary: Negative  Musculoskeletal: Positive for back pain (if he stands or walks for about 5 mins will get pain) and gait problem (feels unsteady at times, has fallen over)  Negative for myalgias and neck pain  Skin: Negative  Allergic/Immunologic: Negative  Neurological: Positive for dizziness (since surgery), light-headedness (since surgery) and headaches (everyday but intermit)  Negative for tremors, seizures, syncope (gets so light-headed that he feels like he wants to pass out), speech difficulty and numbness  Hematological: Negative  Psychiatric/Behavioral: Negative  Physical Exam  Vitals:    05/25/22 1523   BP: 112/82   Pulse: 89   Resp: 16   Temp: 98 6 °F (37 °C)   He is well appearing  Affect is appropriate  His BMI is Body mass index is 27 26 kg/m²  [place] He is awake alert and oriented  Hearing and vision are grossly intact  His pupils are equal round reactive to light  His extraocular movements are intact  His face is symmetric  Tongue is midline  Facial sensation is intact and symmetric throughout  Shoulder shrug is 5/5  There is no drift or dysmetria  He has full strength in his bilateral upper and lower extremities  He has normal muscle tone muscle bulk  His biceps reflexes and patellar reflexes are 2+ and symmetric  Shelbi sign negative bilaterally  Sensation intact to light touch and pinprick throughout  His gait is normal     Incision healing well  Mild scabbing      The following portions of the patient's history were reviewed and updated as appropriate: allergies, current medications, past family history, past medical history, past social history, past surgical history and problem list     Active Ambulatory Problems     Diagnosis Date Noted    Renal cyst, right 12/05/2018    Acute idiopathic gout of left foot 11/06/2017    Back problem 06/30/2017    Depression with anxiety 11/06/2017    Essential hypertension 11/06/2017    Glaucoma 03/16/2018    Hypertriglyceridemia 11/06/2017    Lumbago with sciatica, left side 11/06/2017    Lumbar stenosis 03/20/2018    JUNAID (obstructive sleep apnea) 03/16/2018    Spinal stenosis of lumbar region with neurogenic claudication 01/09/2018    Mixed hyperlipidemia 03/20/2019    Bilateral hearing loss 02/11/2020    Hyperglycemia 03/08/2021    Cigarette nicotine dependence in remission 03/08/2021    [facility] 06/09/2021    Encounter for central line care 06/09/2021    Drug-induced neutropenia ([facility] ) 07/15/2021    Left non-suppurative otitis media 12/09/2021    Bilateral hearing loss due to cerumen impaction 12/09/2021    Cancer of upper lobe of left lung ([facility] ) 11/04/2021    Chronic back pain 11/17/2021    Former cigarette smoker 11/17/2021    History of gout 11/17/2021    Hypertension 11/17/2021    Proctocolitis 12/25/2021    Pericardial effusion 12/25/2021    Constipation 12/25/2021    Labyrinthitis of both ears 01/04/2022    Stage 3a chronic kidney disease ([facility] ) 01/18/2022    Platelets decreased ([facility] ) 02/15/2022    Psoriasis 02/15/2022    Left parietal hemorrhagic tumor 04/02/2022    Thrombocytopenia ([facility] ) 04/05/2022    Goals of care, counseling/discussion 04/05/2022    Hypothyroidism 04/05/2022    Irregular heart rate 04/18/2022    Paroxysmal atrial fibrillation ([facility] ) 04/18/2022    Brain metastases ([facility] ) 04/25/2022    Dizziness 05/03/2022     Resolved Ambulatory Problems     Diagnosis Date Noted    No Resolved Ambulatory Problems     Past Medical History:   Diagnosis Date    Hyperlipidemia     Lung cancer [facility])        Past Surgical History:   Procedure Laterality Date    BACK SURGERY      CRANIOTOMY Left 4/7/2022    Procedure: Image guided left parietal craniotomy for tumor resection;  Surgeon: Aicha Garcia MD;  [facility];  Service: Neurosurgery    HEMORRHOID SURGERY      IR BIOPSY LUNG  5/6/2021    IR PORT PLACEMENT  6/17/2021    LAMINECTOMY  2018    L4-L5    LUNG SURGERY      left upper lobectomy    WY BRONCHOSCOPY,DIAGNOSTIC N/A 5/25/2021    Procedure: BRONCHOSCOPY FLEXIBLE;  Surgeon: Deisy Mukherjee MD;  [facility];  Service: Thoracic    WY MEDIASTINOSCOPY WITH LYMPH NODE BIOPSY/IES N/A 5/25/2021    Procedure: MEDIASTINOSCOPY, flexible bronchoscopy;  Surgeon: Alejandro Torres MD;  [facility];  Service: Thoracic    TONSILLECTOMY  1959         Current Outpatient Medications:     allopurinol (ZYLOPRIM) 100 mg tablet, Take 1 tablet (100 mg total) by mouth daily, Disp: 30 tablet, Rfl: 5    amLODIPine (NORVASC) 10 mg tablet, TAKE ONE TABLET BY MOUTH EVERY DAY, Disp: 90 tablet, Rfl: 3    citalopram (CeleXA) 10 mg tablet, Take 1 tablet (10 mg total) by mouth in the morning and 1 tablet (10 mg total) before bedtime  , Disp: 60 tablet, Rfl: 0    dexamethasone (DECADRON) 2 mg tablet, Take 1 tablet (2 mg total) by mouth in the morning and 1 tablet (2 mg total) in the evening  Take with meals  , Disp: 60 tablet, Rfl: 0    doxepin (SINEquan) 25 mg capsule, TAKE ONE CAPSULE BY MOUTH EVERY DAY AT BEDTIME, Disp: 30 capsule, Rfl: 5    levothyroxine 75 mcg tablet, Take 0 5 tablets (37 5 mcg total) by mouth daily in the early morning, Disp: 15 tablet, Rfl: 0    meclizine (ANTIVERT) 25 mg tablet, Take 1 tablet (25 mg total) by mouth every 8 (eight) hours as needed for dizziness, Disp: 30 tablet, Rfl: 3    memantine (NAMENDA) 5 mg tablet, Take 1 tablet (5 mg total) by mouth daily for 7 days, THEN 1 tablet (5 mg total) 2 (two) times a day for 7 days  , Disp: 21 tablet, Rfl: 0    metoprolol succinate (TOPROL-XL) 25 mg 24 hr tablet, Take 1 tablet (25 mg total) by mouth daily, Disp: 30 tablet, Rfl: 5    pantoprazole (PROTONIX) 40 mg tablet, Take 1 tablet (40 mg total) by mouth daily, Disp: 30 tablet, Rfl: 11    polyethylene glycol (GLYCOLAX) 17 GM/SCOOP powder, Take 17 g by mouth 2 (two) times a day, Disp: 850 g, Rfl: 0    Psyllium (Metamucil) 28 3 % POWD, Take by mouth, Disp: , Rfl:     rosuvastatin (CRESTOR) 10 MG tablet, TAKE ONE TABLET BY MOUTH EVERY DAY, Disp: 30 tablet, Rfl: 5    Ascorbic Acid (vitamin C) 1000 MG tablet, Take 1,000 mg by mouth daily   (Patient not taking: Reported on 4/28/2022 ), Disp: , Rfl:     B Complex Vitamins (B COMPLEX 1 PO), Take 1 tablet by mouth daily  (Patient not taking: Reported on 4/28/2022 ), Disp: , Rfl:     B Complex Vitamins (BL VITAMIN B COMPLEX PO), Take by mouth   (Patient not taking: Reported on 4/28/2022 ), Disp: , Rfl:     betamethasone valerate (VALISONE) 0 1 % cream, Apply topically 2 (two) times a day (Patient not taking: Reported on 4/28/2022 ), Disp: 45 g, Rfl: 1    betamethasone, augmented, (DIPROLENE-AF) 0 05 % cream,   (Patient not taking: Reported on 4/28/2022 ), Disp: , Rfl:     calcipotriene (DOVONEX) 0 005 % cream, , Disp: , Rfl:     calcipotriene (DOVONOX) 0 005 % ointment, Apply topically 2 (two) times a day As needed (Patient not taking: Reported on 4/28/2022 ), Disp: , Rfl:     Cholecalciferol (VITAMIN D3 PO), Take 10,000 Units by mouth daily   (Patient not taking: Reported on 4/28/2022 ), Disp: , Rfl:     Clobetasol Propionate (Impoyz) 0 025 % CREA, Apply topically (Patient not taking: Reported on 4/28/2022 ), Disp: , Rfl:     colchicine (COLCRYS) 0 6 mg tablet, Take 1 tablet (0 6 mg total) by mouth 2 (two) times a day (Patient not taking: Reported on 2/15/2022 ), Disp: 30 tablet, Rfl: 5    Cyanocobalamin (Vitamin B 12) 500 MCG TABS, Take 1 tablet by mouth (Patient not taking: Reported on 3/23/2022 ), Disp: , Rfl:     fluocinolone (SYNALAR) 0 01 % external solution,   (Patient not taking: Reported on 4/28/2022 ), Disp: , Rfl:     folic acid (FOLVITE) 1 mg tablet, Take 1 tablet (1 mg total) by mouth daily (Patient not taking: Reported on 1/12/2022 ), Disp: 30 tablet, Rfl: 6    gabapentin (NEURONTIN) 100 mg capsule,   (Patient not taking: Reported on 4/28/2022 ), Disp: , Rfl:     Garlic 10 MG CAPS, Take 1 tablet by mouth daily  (Patient not taking: Reported on 4/28/2022 ), Disp: , Rfl:     Glucosamine HCl 1500 MG TABS, Take by mouth (Patient not taking: Reported on 4/18/2022 ), Disp: , Rfl:    Glucosamine-Chondroit-Vit C-Mn (GLUCOSAMINE 1500 COMPLEX) CAPS, daily  (Patient not taking: Reported on 4/28/2022 ), Disp: , Rfl:     ketoconazole (NIZORAL) 2 % cream, Apply topically 2 (two) times a day (Patient not taking: Reported on 4/28/2022 ), Disp: 15 g, Rfl: 2    levETIRAcetam (KEPPRA) 500 mg tablet, Take 1 tablet (500 mg total) by mouth every 12 (twelve) hours for 6 doses (Patient not taking: Reported on 5/3/2022 ), Disp: 6 tablet, Rfl: 0    meclizine (ANTIVERT) 25 mg tablet, Take 1 tablet (25 mg total) by mouth 4 (four) times a day as needed for dizziness (Patient not taking: Reported on 4/28/2022 ), Disp: 30 tablet, Rfl: 0    MULTIPLE VITAMINS ESSENTIAL PO, Take by mouth (Patient not taking: Reported on 2/15/2022 ), Disp: , Rfl:     Multiple Vitamins-Minerals (MULTIVITAL-M) TABS, Take by mouth daily  (Patient not taking: Reported on 4/2/2022 ), Disp: , Rfl:     neomycin-polymyxin-hydrocortisone (CORTISPORIN) otic solution, Administer 4 drops into the left ear every 6 (six) hours (Patient not taking: Reported on 1/12/2022 ), Disp: 10 mL, Rfl: 0    ondansetron (ZOFRAN) 4 mg tablet, Take 1 tablet (4 mg total) by mouth every 6 (six) hours (Patient not taking: Reported on 1/18/2022 ), Disp: 12 tablet, Rfl: 0    ondansetron (ZOFRAN) 8 mg tablet, Take 1 tablet (8 mg total) by mouth every 8 (eight) hours as needed for nausea or vomiting (Patient not taking: Reported on 1/12/2022 ), Disp: 20 tablet, Rfl: 3    oxyCODONE (ROXICODONE) 5 immediate release tablet, , Disp: , Rfl:     tamsulosin (FLOMAX) 0 4 mg, , Disp: , Rfl:     traMADol (ULTRAM) 50 mg tablet, TAKE ONE TABLET BY MOUTH EVERY 8 HOURS AS NEEDED FOR SEVERE PAIN, Disp: 30 tablet, Rfl: 0    triamcinolone (KENALOG) 0 1 % cream, , Disp: , Rfl:     zinc gluconate 50 mg tablet, Take 50 mg by mouth daily (Patient not taking: Reported on 4/28/2022 ), Disp: , Rfl:     Results/Data:  No new Imaging

## 2022-05-26 ENCOUNTER — APPOINTMENT (OUTPATIENT)
Dept: RADIATION ONCOLOGY | Facility: CLINIC | Age: 67
End: 2022-05-26
Attending: RADIOLOGY
Payer: MEDICARE

## 2022-05-26 ENCOUNTER — OFFICE VISIT (OUTPATIENT)
Dept: PHYSICAL THERAPY | Facility: MEDICAL CENTER | Age: 67
End: 2022-05-26
Payer: MEDICARE

## 2022-05-26 ENCOUNTER — OFFICE VISIT (OUTPATIENT)
Dept: OCCUPATIONAL THERAPY | Facility: MEDICAL CENTER | Age: 67
End: 2022-05-26
Payer: MEDICARE

## 2022-05-26 DIAGNOSIS — I62.9 INTRACRANIAL HEMORRHAGE (HCC): Primary | ICD-10-CM

## 2022-05-26 PROCEDURE — 97530 THERAPEUTIC ACTIVITIES: CPT

## 2022-05-26 PROCEDURE — 77412 RADIATION TX DELIVERY LVL 3: CPT | Performed by: STUDENT IN AN ORGANIZED HEALTH CARE EDUCATION/TRAINING PROGRAM

## 2022-05-26 PROCEDURE — 97110 THERAPEUTIC EXERCISES: CPT

## 2022-05-26 NOTE — PROGRESS NOTES
Daily Note     Today's date: 2022  Patient name: Neil Guillaume  : 1955  MRN: [id number]  Referring provider: Samanta Cruz PA-C  Dx:   Encounter Diagnosis     ICD-10-CM    1  Left parietal hemorrhagic tumor  I62 9        Start Time: 352  Stop Time:   Total time in clinic (min): 46 minutes    Subjective: Patient reported to therapy today with his wife, reported that he was fatigued and have back pain from his treatment  Objective: See treatment diary below    On firm- 20 reps, 3 second holds  -Hip flexion  -Hip extension  -Hip abduction  -Mini squats  -Heel raises     -Step ups- forward and laterally- 20 reps, 3 second holds-8" step    Supine  -LTR- 40 reps  -Short arc quads- 20 reps, 3 second holds  -Knee to chest- 30 second holds 5 sets  -Bridges- 20 reps, 3 second holds  -Supine clamshells- 20 reps, 3 second holds            Assessment: Tolerated treatment well  Patient demonstrated fatigue post treatment, exhibited good technique with therapeutic exercises and would benefit from continued PT to maximize function post surgery and with referring dx  Patient reports back pain after approximately 5 minutes of standing  No injury reported  Patient tolerated interventions well, patient given supine exercises for HEP and performed with good verbal and physical performance and understanding  Plan: Continue per plan of care  Progress treatment as tolerated  Outcome Measures Initial Eval  2022        5xSTS 19 11 sec        TUG 11 69 sec        10 meter   85 m/s        SARAH 43/56        FGA         DGI         mCTSIB  - FTEO (firm)  - FTEC (firm)  - FTEO (foam)  - FTEC (foam)   30 sec  30 sec  30 sec  30 sec        6MWT NV ft        30 second sit to stand 7 reps

## 2022-05-26 NOTE — PROGRESS NOTES
Occupational Therapy Daily Note    Today's date: 2022  Patient name: India Jones  : 1955  MRN: [id number]  Referring provider: Nisha Zapien PA-C  Dx:   Encounter Diagnosis   Name Primary?  Left parietal hemorrhagic tumor Yes     POC START DATE: 2022  POC END DATE: 2022  NEXT PN DUE: 2022  FREQUENCY: 2/wk for 12 weeks    Subjective: "No changes since last time  Just still dizzy  Today was the 8th visit of radiation "    Objective: Therapeutic Activity    Word Scramble, Problem Solving  Task Terminated due to difficulty and decreased frustration tolerance   Transitioned to Immediate Recall with 9 words:  with 2 re-checks    [facility] Recall Story  Complete with approximately 75% accuracy    Assessment: Tolerated treatment well  Pt demonstrated significantly decreased frustration tolerance throughout entirety of today's session  Unwilling to complete 2 therapeutic activities provided by therapist  Pt demonstrated significantly decreased memory recall with two re-checks required, however, little effort was exhibited for task  Pt and therapist had a discussion about benefits of cognitive rehab for patient's diagnosis, however, pt and wife stated they do not see the benefits of occupational therapy at this time  Therapist stated to pt and wife that chart will be left open until they decide if they would like to continue  Patient would benefit from continued skilled OT  Plan: Continued skilled OT per POC

## 2022-05-27 ENCOUNTER — APPOINTMENT (OUTPATIENT)
Dept: RADIATION ONCOLOGY | Facility: CLINIC | Age: 67
End: 2022-05-27
Attending: RADIOLOGY
Payer: MEDICARE

## 2022-05-27 PROCEDURE — 77412 RADIATION TX DELIVERY LVL 3: CPT | Performed by: RADIOLOGY

## 2022-05-31 ENCOUNTER — APPOINTMENT (OUTPATIENT)
Dept: RADIATION ONCOLOGY | Facility: CLINIC | Age: 67
End: 2022-05-31
Attending: RADIOLOGY
Payer: MEDICARE

## 2022-05-31 ENCOUNTER — APPOINTMENT (OUTPATIENT)
Dept: PHYSICAL THERAPY | Facility: MEDICAL CENTER | Age: 67
End: 2022-05-31
Payer: MEDICARE

## 2022-05-31 ENCOUNTER — APPOINTMENT (OUTPATIENT)
Dept: OCCUPATIONAL THERAPY | Facility: MEDICAL CENTER | Age: 67
End: 2022-05-31
Payer: MEDICARE

## 2022-05-31 PROCEDURE — 77412 RADIATION TX DELIVERY LVL 3: CPT | Performed by: STUDENT IN AN ORGANIZED HEALTH CARE EDUCATION/TRAINING PROGRAM

## 2022-05-31 PROCEDURE — 77336 RADIATION PHYSICS CONSULT: CPT | Performed by: RADIOLOGY

## 2022-06-01 ENCOUNTER — TELEPHONE (OUTPATIENT)
Dept: RADIATION ONCOLOGY | Facility: CLINIC | Age: 67
End: 2022-06-01

## 2022-06-01 NOTE — TELEPHONE ENCOUNTER
Called Albertinasoha Davis  Spoke to his wife  Made her aware that Angélica Davis should take dexamethasone 2 mg on 6/3/22, 6/5/22 and 6/7/22 then stop it per Dr Tyrone Velasco  She verbalized understanding  Angélica Davis has been taking Dexamethasone 2 mg daily since 5/30/22  She stated that Gonzalobrittney York stopped doxepin a while ago because he didn't feel like it was helping him  Dr Tyrone Velasco will follow up on Namenda instructions  Mike Bishop currently being held

## 2022-06-03 DIAGNOSIS — E03.9 HYPOTHYROIDISM: ICD-10-CM

## 2022-06-03 RX ORDER — LEVOTHYROXINE SODIUM 0.07 MG/1
37.5 TABLET ORAL
Qty: 15 TABLET | Refills: 2 | Status: ON HOLD | OUTPATIENT
Start: 2022-06-03

## 2022-06-03 NOTE — TELEPHONE ENCOUNTER
Patients wife called and asked   About his levothyroxine Tanja  Have giving him a script and  When he was I the hospital   They upped his dosage   When  She went to refill this they gave  Him the original  25  I spoke to  Matty Walter she asked if you could  Reach out to the wife and see  Exactly what is going on       Thank you

## 2022-06-03 NOTE — TELEPHONE ENCOUNTER
Spoke with patient's wife Paresh Osborn, informed her that Max Larkin will continue the 37 5 mcg dose for now  Reminded Paresh Osborn to have patient get his labs done prior to his appointment with Max Larkin on 6/21  Advised that if any changes to the dose need to be made, it will be done at that time   Paresh Osborn verbalized understanding

## 2022-06-03 NOTE — TELEPHONE ENCOUNTER
Spoke with patient's wife  She stated you had started him on 25 mcg levothyroxine  But when he was in the hospital they discharged him on 37 5 mcg  She went to  a refill and was handed the original 25 mcg dose  Should he continue on the 37 5 mcg or go back to the 25 mcg? If he is to take the 37 5 mcg, he will need a new script for that dose

## 2022-06-08 ENCOUNTER — OFFICE VISIT (OUTPATIENT)
Dept: FAMILY MEDICINE CLINIC | Facility: CLINIC | Age: 67
End: 2022-06-08
Payer: MEDICARE

## 2022-06-08 VITALS
RESPIRATION RATE: 20 BRPM | OXYGEN SATURATION: 98 % | SYSTOLIC BLOOD PRESSURE: 128 MMHG | BODY MASS INDEX: 27.86 KG/M2 | HEIGHT: 70 IN | DIASTOLIC BLOOD PRESSURE: 80 MMHG | HEART RATE: 85 BPM | TEMPERATURE: 98.2 F | WEIGHT: 194.6 LBS

## 2022-06-08 DIAGNOSIS — G89.29 CHRONIC BILATERAL LOW BACK PAIN WITHOUT SCIATICA: ICD-10-CM

## 2022-06-08 DIAGNOSIS — D70.2 DRUG-INDUCED NEUTROPENIA (HCC): ICD-10-CM

## 2022-06-08 DIAGNOSIS — I48.0 PAROXYSMAL ATRIAL FIBRILLATION (HCC): ICD-10-CM

## 2022-06-08 DIAGNOSIS — C79.31 BRAIN METASTASES (HCC): ICD-10-CM

## 2022-06-08 DIAGNOSIS — C34.92 ADENOCARCINOMA, LUNG, LEFT (HCC): Chronic | ICD-10-CM

## 2022-06-08 DIAGNOSIS — H53.9 VISION CHANGES: ICD-10-CM

## 2022-06-08 DIAGNOSIS — R35.1 NOCTURIA: ICD-10-CM

## 2022-06-08 DIAGNOSIS — I10 ESSENTIAL HYPERTENSION: Primary | Chronic | ICD-10-CM

## 2022-06-08 DIAGNOSIS — J44.9 CHRONIC OBSTRUCTIVE PULMONARY DISEASE, UNSPECIFIED COPD TYPE (HCC): ICD-10-CM

## 2022-06-08 DIAGNOSIS — M54.50 CHRONIC BILATERAL LOW BACK PAIN WITHOUT SCIATICA: ICD-10-CM

## 2022-06-08 PROCEDURE — 99214 OFFICE O/P EST MOD 30 MIN: CPT | Performed by: FAMILY MEDICINE

## 2022-06-08 NOTE — ASSESSMENT & PLAN NOTE
Patient continued on metoprolol    Heart rate is irregular in atrial fibrillation rhythm but controlled ventricular response at 64 beats per minute continue metoprolol follow-up with cardiology yearly and I will follow up with him in 4 months

## 2022-06-08 NOTE — ASSESSMENT & PLAN NOTE
Patient notes visual changes since treatment with radiation therapy I would like him seen by Ophthalmology

## 2022-06-08 NOTE — ASSESSMENT & PLAN NOTE
This developed after prednisone and weight gain patient developed abdominal swelling and enlargement from eating from the prednisone and he will work on a stretching and strengthening program if not improving will follow-up with physical therapy

## 2022-06-08 NOTE — PROGRESS NOTES
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)     Patient has follow-up appointment at [facility] this month           Chronic obstructive pulmonary disease, unspecified COPD type ([facility] )       Cardiovascular and Mediastinum    Essential hypertension - Primary (Chronic)     Hypertension under stable control continuing with current medication regimen           Paroxysmal atrial fibrillation ([facility] )     Patient continued on metoprolol  Heart rate is irregular in atrial fibrillation rhythm but controlled ventricular response at 64 beats per minute continue metoprolol follow-up with cardiology yearly and I will follow up with him in 4 months              Nervous and Auditory    Brain metastases [facility])     Patient notes visual changes since treatment with radiation therapy I would like him seen by Ophthalmology              Other    Drug-induced neutropenia ([facility] )    Chronic back pain     This developed after prednisone and weight gain patient developed abdominal swelling and enlargement from eating from the prednisone and he will work on a stretching and strengthening program if not improving will follow-up with physical therapy             Other Visit Diagnoses     Vision changes        Relevant Orders    Ambulatory Referral to Ophthalmology            Subjective:      Patient ID: Beny Red is a 69 y o  male  Patient presents for 4 month follow-up evaluation he notes depth perception problems after radiation treatments to the brain  He has a slight nystagmus on lateral gaze  I would like him followed up by Ophthalmology  The following portions of the patient's history were reviewed and updated as appropriate: allergies, current medications, past family history, past medical history, past social history, past surgical history and problem list     Review of Systems   Constitutional: Negative for chills, fatigue and fever  HENT: Negative for congestion, nosebleeds, rhinorrhea, sinus pressure and sore throat  Eyes: Positive for visual disturbance  Negative for discharge and redness  Respiratory: Negative for cough and shortness of breath  Cardiovascular: Negative for chest pain, palpitations and leg swelling  Gastrointestinal: Negative for abdominal pain, blood in stool and nausea  Endocrine: Negative for cold intolerance, heat intolerance and polyuria  Genitourinary: Negative for dysuria and frequency  Musculoskeletal: Negative for arthralgias, back pain and myalgias  Skin: Negative for rash  Neurological: Negative for dizziness, weakness and headaches  Hematological: Negative for adenopathy  Psychiatric/Behavioral: Negative for behavioral problems and sleep disturbance  The patient is not nervous/anxious  Objective:      /80 (BP Location: Left arm, Patient Position: Sitting)   Pulse 85   Temp 98 2 °F (36 8 °C)   Resp 20   Ht 5' 10" (1 778 m)   Wt 88 3 kg (194 lb 9 6 oz)   SpO2 98%   BMI 27 92 kg/m²        Physical Exam  Vitals and nursing note reviewed  Constitutional:       Appearance: Normal appearance  He is well-developed and normal weight  HENT:      Head: Normocephalic and atraumatic  Right Ear: Tympanic membrane and external ear normal       Left Ear: Tympanic membrane and external ear normal       Nose: Nose normal       Mouth/Throat:      Mouth: Mucous membranes are moist       Pharynx: Oropharynx is clear  Eyes:      General: No scleral icterus  Extraocular Movements: Extraocular movements intact  Conjunctiva/sclera: Conjunctivae normal       Pupils: Pupils are equal, round, and reactive to light  Neck:      Thyroid: No thyromegaly  Vascular: No JVD  Cardiovascular:      Rate and Rhythm: Normal rate and regular rhythm  Pulses: Normal pulses  Heart sounds: Normal heart sounds  No murmur heard    Pulmonary:      Effort: Pulmonary effort is normal       Breath sounds: Normal breath sounds  No wheezing or rales  Chest:      Chest wall: No tenderness  Abdominal:      General: Bowel sounds are normal  There is no distension  Palpations: Abdomen is soft  There is no mass  Tenderness: There is no abdominal tenderness  There is no guarding or rebound  Musculoskeletal:         General: No tenderness or deformity  Normal range of motion  Cervical back: Normal range of motion and neck supple  Lymphadenopathy:      Cervical: No cervical adenopathy  Skin:     General: Skin is warm and dry  Capillary Refill: Capillary refill takes less than 2 seconds  Findings: No erythema or rash  Neurological:      General: No focal deficit present  Mental Status: He is alert and oriented to person, place, and time  Mental status is at baseline  Cranial Nerves: No cranial nerve deficit  Deep Tendon Reflexes: Reflexes are normal and symmetric  Reflexes normal    Psychiatric:         Mood and Affect: Mood normal          Behavior: Behavior normal          Thought Content: Thought content normal           Data:    Laboratory Results: I have personally reviewed the pertinent laboratory results/reports   Radiology/Other Diagnostic Testing Results: I have personally reviewed pertinent reports         Lab Results   Component Value Date    WBC 5 31 05/02/2022    HGB 12 2 05/02/2022    HCT 38 3 05/02/2022     (H) 05/02/2022     (L) 05/02/2022     Lab Results   Component Value Date    K 4 3 05/02/2022     05/02/2022    CO2 30 05/02/2022    BUN 28 (H) 05/02/2022    CREATININE 1 08 05/02/2022    GLUF 89 05/02/2022    CALCIUM 9 0 05/02/2022    CORRECTEDCA 9 9 05/02/2022    AST 12 05/02/2022    ALT 36 05/02/2022    ALKPHOS 55 05/02/2022    EGFR 70 05/02/2022     Lab Results   Component Value Date    CHOLESTEROL 193 04/03/2022    CHOLESTEROL 158 01/25/2022    CHOLESTEROL 112 06/23/2021     Lab Results   Component Value Date HDL 70 04/03/2022    HDL 35 (L) 01/25/2022    HDL 42 06/23/2021     Lab Results   Component Value Date    LDLCALC 96 04/03/2022    LDLCALC 97 01/25/2022    LDLCALC 26 06/23/2021     Lab Results   Component Value Date    TRIG 137 04/03/2022    TRIG 130 01/25/2022    TRIG 221 (H) 06/23/2021     No results found for: [place], [place]  Lab Results   Component Value Date    EBM7QEMXWFLH 2 380 05/02/2022     Lab Results   Component Value Date    HGBA1C 5 2 04/03/2022     Lab Results   Component Value Date    PSA 0 6 10/06/2020       Wes Cornejo DO

## 2022-06-08 NOTE — PATIENT INSTRUCTIONS
Lower Back Exercises   WHAT YOU NEED TO KNOW:   Lower back exercises help heal and strengthen your back muscles to prevent another injury  Ask your healthcare provider if you need to see a physical therapist for more advanced exercises  DISCHARGE INSTRUCTIONS:   Return to the emergency department if:   You have severe pain that prevents you from moving  Contact your healthcare provider if:   Your pain becomes worse  You have new pain  You have questions or concerns about your condition or care  Do lower back exercises safely:   Do the exercises on a mat or firm surface  (not on a bed) to support your spine and prevent low back pain  Move slowly and smoothly  Avoid fast or jerky motions  Breathe normally  Do not hold your breath  Stop if you feel pain  It is normal to feel some discomfort at first  Regular exercise will help decrease your discomfort over time  Lower back exercises: Your healthcare provider may recommend that you do back exercises 10 to 30 minutes each day  He may also recommend that you do exercises 1 to 3 times each day  Ask your healthcare provider which exercises are best for you and how often to do them  Ankle pumps:  Lie on your back  Move your foot up (with your toes pointing toward your head)  Then, move your foot down (with your toes pointing away from you)  Repeat this exercise 10 times on each side  Heel slides:  Lie on your back  Slowly bend one leg and then straighten it  Next, bend the other leg and then straighten it  Repeat 10 times on each side  Pelvic tilt:  Lie on your back with your knees bent and feet flat on the floor  Place your arms in a relaxed position beside your body  Tighten the muscles of your abdomen and flatten your back against the floor  Hold for 5 seconds  Repeat 5 times  Back stretch:  Lie on your back with your hands behind your head  Bend your knees and turn the lower half of your body to one side   Hold this position for 10 seconds  Repeat 3 times on each side  Straight leg raises:  Lie on your back with one leg straight  Bend the other knee  Tighten your abdomen and then slowly lift the straight leg up about 6 to 12 inches off the floor  Hold for 1 to 5 seconds  Lower your leg slowly  Repeat 10 times on each leg  Knee-to-chest:  Lie on your back with your knees bent and feet flat on the floor  Pull one of your knees toward your chest and hold it there for 5 seconds  Return your leg to the starting position  Lift the other knee toward your chest and hold for 5 seconds  Do this 5 times on each side  Cat and camel:  Place your hands and knees on the floor  Arch your back upward toward the ceiling and lower your head  Round out your spine as much as you can  Hold for 5 seconds  Lift your head upward and push your chest downward toward the floor  Hold for 5 seconds  Do 3 sets or as directed  Wall squats:  Stand with your back against a wall  Tighten the muscles of your abdomen  Slowly lower your body until your knees are bent at a 45 degree angle  Hold this position for 5 seconds  Slowly move back up to a standing position  Repeat 10 times  Curl up:  Lie on your back with your knees bent and feet flat on the floor  Place your hands, palms down, underneath the curve in your lower back  Next, with your elbows on the floor, lift your shoulders and chest 2 to 3 inches  Keep your head in line with your shoulders  Hold this position for 5 seconds  When you can do this exercise without pain for 10 to 15 seconds, you may add a rotation  While your shoulders and chest are lifted off the ground, turn slightly to the left and hold  Repeat on the other side  Bird dog:  Place your hands and knees on the floor  Keep your wrists directly below your shoulders and your knees directly below your hips  Pull your belly button in toward your spine  Do not flatten or arch your back   Tighten your abdominal muscles  Raise one arm straight out so that it is aligned with your head  Next, raise the leg opposite your arm  Hold this position for 15 seconds  Lower your arm and leg slowly and change sides  Do 5 sets  © Copyright [employer] 2022 Information is for End User's use only and may not be sold, redistributed or otherwise used for commercial purposes  All illustrations and images included in CareNotes® are the copyrighted property of [employer]  or [facility]   The above information is an  only  It is not intended as medical advice for individual conditions or treatments  Talk to your doctor, nurse or pharmacist before following any medical regimen to see if it is safe and effective for you

## 2022-06-08 NOTE — PROGRESS NOTES
BMI Counseling: Body mass index is 27 92 kg/m²  The BMI is above normal  Nutrition recommendations include reducing portion sizes, decreasing overall calorie intake, 3-5 servings of fruits/vegetables daily, reducing fast food intake, decreasing soda and/or juice intake, moderation in carbohydrate intake, increasing intake of lean protein, reducing intake of saturated fat and trans fat and reducing intake of cholesterol  Exercise recommendations include exercising 3-5 times per week

## 2022-06-14 ENCOUNTER — TELEPHONE (OUTPATIENT)
Dept: HEMATOLOGY ONCOLOGY | Facility: CLINIC | Age: 67
End: 2022-06-14

## 2022-06-14 NOTE — TELEPHONE ENCOUNTER
Clementina Phillips from the office of  Dr Keyonna Stevens calling in , requesting patient records  From visits with Dr Gretta Epley fax to [phone] attn: Dr Mila Jin  for patients upcoming appointment 6/30/22        Best call back number  [phone]

## 2022-06-15 ENCOUNTER — HOSPITAL ENCOUNTER (INPATIENT)
Facility: HOSPITAL | Age: 67
LOS: 1 days | Discharge: HOME | DRG: 054 | End: 2022-06-17
Attending: EMERGENCY MEDICINE | Admitting: INTERNAL MEDICINE
Payer: MEDICARE

## 2022-06-15 ENCOUNTER — APPOINTMENT (EMERGENCY)
Dept: CT IMAGING | Facility: HOSPITAL | Age: 67
DRG: 054 | End: 2022-06-15
Payer: MEDICARE

## 2022-06-15 ENCOUNTER — TELEPHONE (OUTPATIENT)
Dept: RADIATION ONCOLOGY | Facility: CLINIC | Age: 67
End: 2022-06-15

## 2022-06-15 DIAGNOSIS — C79.31 BRAIN METASTASES (HCC): ICD-10-CM

## 2022-06-15 DIAGNOSIS — R42 DIZZINESS: Primary | ICD-10-CM

## 2022-06-15 DIAGNOSIS — G47.00 INSOMNIA, UNSPECIFIED TYPE: ICD-10-CM

## 2022-06-15 DIAGNOSIS — R42 DIZZINESS: ICD-10-CM

## 2022-06-15 DIAGNOSIS — I62.9 INTRACRANIAL HEMORRHAGE (HCC): ICD-10-CM

## 2022-06-15 PROBLEM — N18.2 CKD (CHRONIC KIDNEY DISEASE) STAGE 2, GFR 60-89 ML/MIN: Status: ACTIVE | Noted: 2022-01-18

## 2022-06-15 LAB
4HR DELTA HS TROPONIN: 1 NG/L
ALBUMIN SERPL BCP-MCNC: 3.2 G/DL (ref 3.5–5)
ALP SERPL-CCNC: 90 U/L (ref 46–116)
ALT SERPL W P-5'-P-CCNC: 27 U/L (ref 12–78)
ANION GAP SERPL CALCULATED.3IONS-SCNC: 14 MMOL/L (ref 4–13)
APTT PPP: 41 SECONDS (ref 23–37)
AST SERPL W P-5'-P-CCNC: 17 U/L (ref 5–45)
BASOPHILS # BLD AUTO: 0.02 THOUSANDS/ΜL (ref 0–0.1)
BASOPHILS NFR BLD AUTO: 1 % (ref 0–1)
BILIRUB SERPL-MCNC: 0.38 MG/DL (ref 0.2–1)
BUN SERPL-MCNC: 13 MG/DL (ref 5–25)
CALCIUM ALBUM COR SERPL-MCNC: 9.8 MG/DL (ref 8.3–10.1)
CALCIUM SERPL-MCNC: 9.2 MG/DL (ref 8.3–10.1)
CARDIAC TROPONIN I PNL SERPL HS: 7 NG/L
CARDIAC TROPONIN I PNL SERPL HS: 8 NG/L
CHLORIDE SERPL-SCNC: 101 MMOL/L (ref 100–108)
CO2 SERPL-SCNC: 24 MMOL/L (ref 21–32)
CREAT SERPL-MCNC: 1.08 MG/DL (ref 0.6–1.3)
EOSINOPHIL # BLD AUTO: 0.08 THOUSAND/ΜL (ref 0–0.61)
EOSINOPHIL NFR BLD AUTO: 2 % (ref 0–6)
ERYTHROCYTE [DISTWIDTH] IN BLOOD BY AUTOMATED COUNT: 16.7 % (ref 11.6–15.1)
GFR SERPL CREATININE-BSD FRML MDRD: 69 ML/MIN/1.73SQ M
GLUCOSE SERPL-MCNC: 98 MG/DL (ref 65–140)
HCT VFR BLD AUTO: 37.7 % (ref 36.5–49.3)
HGB BLD-MCNC: 12.8 G/DL (ref 12–17)
IMM GRANULOCYTES # BLD AUTO: 0.02 THOUSAND/UL (ref 0–0.2)
IMM GRANULOCYTES NFR BLD AUTO: 1 % (ref 0–2)
INR PPP: 1 (ref 0.84–1.19)
LYMPHOCYTES # BLD AUTO: 0.67 THOUSANDS/ΜL (ref 0.6–4.47)
LYMPHOCYTES NFR BLD AUTO: 17 % (ref 14–44)
MCH RBC QN AUTO: 34.2 PG (ref 26.8–34.3)
MCHC RBC AUTO-ENTMCNC: 34 G/DL (ref 31.4–37.4)
MCV RBC AUTO: 101 FL (ref 82–98)
MONOCYTES # BLD AUTO: 0.35 THOUSAND/ΜL (ref 0.17–1.22)
MONOCYTES NFR BLD AUTO: 9 % (ref 4–12)
NEUTROPHILS # BLD AUTO: 2.83 THOUSANDS/ΜL (ref 1.85–7.62)
NEUTS SEG NFR BLD AUTO: 70 % (ref 43–75)
NRBC BLD AUTO-RTO: 0 /100 WBCS
NT-PROBNP SERPL-MCNC: 150 PG/ML
PLATELET # BLD AUTO: 152 THOUSANDS/UL (ref 149–390)
PMV BLD AUTO: 9.2 FL (ref 8.9–12.7)
POTASSIUM SERPL-SCNC: 3.5 MMOL/L (ref 3.5–5.3)
PROT SERPL-MCNC: 7.2 G/DL (ref 6.4–8.2)
PROTHROMBIN TIME: 12.8 SECONDS (ref 11.6–14.5)
RBC # BLD AUTO: 3.74 MILLION/UL (ref 3.88–5.62)
SODIUM SERPL-SCNC: 139 MMOL/L (ref 136–145)
WBC # BLD AUTO: 3.97 THOUSAND/UL (ref 4.31–10.16)

## 2022-06-15 PROCEDURE — 93005 ELECTROCARDIOGRAM TRACING: CPT

## 2022-06-15 PROCEDURE — 70450 CT HEAD/BRAIN W/O DYE: CPT

## 2022-06-15 PROCEDURE — 85730 THROMBOPLASTIN TIME PARTIAL: CPT | Performed by: PHYSICIAN ASSISTANT

## 2022-06-15 PROCEDURE — 36415 COLL VENOUS BLD VENIPUNCTURE: CPT | Performed by: PHYSICIAN ASSISTANT

## 2022-06-15 PROCEDURE — 85025 COMPLETE CBC W/AUTO DIFF WBC: CPT | Performed by: PHYSICIAN ASSISTANT

## 2022-06-15 PROCEDURE — 99220 PR INITIAL OBSERVATION CARE/DAY 70 MINUTES: CPT | Performed by: STUDENT IN AN ORGANIZED HEALTH CARE EDUCATION/TRAINING PROGRAM

## 2022-06-15 PROCEDURE — 71250 CT THORAX DX C-: CPT

## 2022-06-15 PROCEDURE — 85610 PROTHROMBIN TIME: CPT | Performed by: PHYSICIAN ASSISTANT

## 2022-06-15 PROCEDURE — 96360 HYDRATION IV INFUSION INIT: CPT

## 2022-06-15 PROCEDURE — 83880 ASSAY OF NATRIURETIC PEPTIDE: CPT | Performed by: PHYSICIAN ASSISTANT

## 2022-06-15 PROCEDURE — 99285 EMERGENCY DEPT VISIT HI MDM: CPT | Performed by: PHYSICIAN ASSISTANT

## 2022-06-15 PROCEDURE — 80053 COMPREHEN METABOLIC PANEL: CPT | Performed by: PHYSICIAN ASSISTANT

## 2022-06-15 PROCEDURE — 99285 EMERGENCY DEPT VISIT HI MDM: CPT

## 2022-06-15 PROCEDURE — 84484 ASSAY OF TROPONIN QUANT: CPT | Performed by: PHYSICIAN ASSISTANT

## 2022-06-15 PROCEDURE — 74176 CT ABD & PELVIS W/O CONTRAST: CPT

## 2022-06-15 RX ORDER — ATORVASTATIN CALCIUM 20 MG/1
20 TABLET, FILM COATED ORAL
Status: DISCONTINUED | OUTPATIENT
Start: 2022-06-16 | End: 2022-06-17 | Stop reason: HOSPADM

## 2022-06-15 RX ORDER — DOXEPIN HYDROCHLORIDE 25 MG/1
CAPSULE ORAL
Qty: 30 CAPSULE | Refills: 5 | Status: ON HOLD | OUTPATIENT
Start: 2022-06-15 | End: 2022-06-17 | Stop reason: CLARIF

## 2022-06-15 RX ORDER — DOXEPIN HYDROCHLORIDE 25 MG/1
25 CAPSULE ORAL
Status: DISCONTINUED | OUTPATIENT
Start: 2022-06-15 | End: 2022-06-17 | Stop reason: HOSPADM

## 2022-06-15 RX ORDER — LEVETIRACETAM 500 MG/1
500 TABLET ORAL EVERY 12 HOURS SCHEDULED
Status: DISCONTINUED | OUTPATIENT
Start: 2022-06-15 | End: 2022-06-17 | Stop reason: HOSPADM

## 2022-06-15 RX ORDER — LEVOTHYROXINE SODIUM 0.07 MG/1
37.5 TABLET ORAL
Status: DISCONTINUED | OUTPATIENT
Start: 2022-06-16 | End: 2022-06-17 | Stop reason: HOSPADM

## 2022-06-15 RX ORDER — PRAVASTATIN SODIUM 80 MG/1
80 TABLET ORAL
Status: DISCONTINUED | OUTPATIENT
Start: 2022-06-16 | End: 2022-06-15 | Stop reason: CLARIF

## 2022-06-15 RX ORDER — MECLIZINE HYDROCHLORIDE 25 MG/1
25 TABLET ORAL 4 TIMES DAILY PRN
Status: DISCONTINUED | OUTPATIENT
Start: 2022-06-15 | End: 2022-06-16

## 2022-06-15 RX ORDER — METOPROLOL SUCCINATE 25 MG/1
25 TABLET, EXTENDED RELEASE ORAL DAILY
Status: DISCONTINUED | OUTPATIENT
Start: 2022-06-16 | End: 2022-06-17 | Stop reason: HOSPADM

## 2022-06-15 RX ORDER — CITALOPRAM 20 MG/1
10 TABLET ORAL 2 TIMES DAILY
Status: DISCONTINUED | OUTPATIENT
Start: 2022-06-15 | End: 2022-06-17 | Stop reason: HOSPADM

## 2022-06-15 RX ORDER — DEXAMETHASONE 4 MG/1
2 TABLET ORAL EVERY 12 HOURS SCHEDULED
Status: DISCONTINUED | OUTPATIENT
Start: 2022-06-15 | End: 2022-06-16

## 2022-06-15 RX ORDER — PANTOPRAZOLE SODIUM 40 MG/1
40 TABLET, DELAYED RELEASE ORAL DAILY
Status: DISCONTINUED | OUTPATIENT
Start: 2022-06-16 | End: 2022-06-17 | Stop reason: HOSPADM

## 2022-06-15 RX ORDER — AMLODIPINE BESYLATE 10 MG/1
10 TABLET ORAL DAILY
Status: DISCONTINUED | OUTPATIENT
Start: 2022-06-16 | End: 2022-06-17 | Stop reason: HOSPADM

## 2022-06-15 RX ORDER — ALLOPURINOL 100 MG/1
100 TABLET ORAL DAILY
Status: DISCONTINUED | OUTPATIENT
Start: 2022-06-16 | End: 2022-06-17 | Stop reason: HOSPADM

## 2022-06-15 RX ORDER — MECLIZINE HYDROCHLORIDE 25 MG/1
25 TABLET ORAL EVERY 8 HOURS PRN
Qty: 30 TABLET | Refills: 3 | Status: ON HOLD | OUTPATIENT
Start: 2022-06-15 | End: 2022-06-17 | Stop reason: SDUPTHER

## 2022-06-15 RX ADMIN — LEVETIRACETAM 500 MG: 500 TABLET, FILM COATED ORAL at 22:07

## 2022-06-15 RX ADMIN — CITALOPRAM HYDROBROMIDE 10 MG: 20 TABLET ORAL at 22:08

## 2022-06-15 RX ADMIN — MECLIZINE HYDROCHLORIDE 25 MG: 25 TABLET ORAL at 22:08

## 2022-06-15 RX ADMIN — DEXAMETHASONE 2 MG: 4 TABLET ORAL at 22:08

## 2022-06-15 RX ADMIN — SODIUM CHLORIDE 1000 ML: 0.9 INJECTION, SOLUTION INTRAVENOUS at 16:07

## 2022-06-15 NOTE — ED PROVIDER NOTES
History  Chief Complaint   Patient presents with    Dizziness     Lightheaded and dizzy since April 7th after brain surg; 5 days of loss of appetite, R eye blurred vision, tinnitus, fatigue     68 yo with h/o Lung CA with mets to brain  Had left parietal hemorrhage april 2 and was seen at [facility]  Underwent crani and mass resection  Then had been undergoing radiation up until about 2 weeks ago  Now coming in with 3 days of dizziness/lightheadedness (not described as vertigo), blurred vision of right eye, and decreased appetite  No chest pain, sob, leg pain or swelling  History provided by:  Patient   used: No    Dizziness  Quality:  Lightheadedness  Severity:  Moderate  Onset quality:  Gradual  Duration:  1 week  Timing:  Constant  Progression:  Worsening  Chronicity:  Recurrent  Relieved by:  Nothing  Worsened by:  Nothing  Ineffective treatments:  None tried  Associated symptoms: no chest pain, no palpitations, no shortness of breath and no vomiting        Prior to Admission Medications   Prescriptions Last Dose Informant Patient Reported? Taking?    Ascorbic Acid (vitamin C) 1000 MG tablet  Self Yes No   Sig: Take 1,000 mg by mouth daily     Patient not taking: No sig reported   B Complex Vitamins (B COMPLEX 1 PO)  Self Yes No   Sig: Take 1 tablet by mouth daily    Patient not taking: No sig reported   B Complex Vitamins (BL VITAMIN B COMPLEX PO)  Self Yes No   Sig: Take by mouth     Patient not taking: No sig reported   Cholecalciferol (VITAMIN D3 PO)  Self Yes No   Sig: Take 10,000 Units by mouth daily     Patient not taking: No sig reported   Clobetasol Propionate (Impoyz) 0 025 % CREA  Self Yes No   Sig: Apply topically   Patient not taking: No sig reported   Cyanocobalamin (Vitamin B 12) 500 MCG TABS  Self Yes No   Sig: Take 1 tablet by mouth   Patient not taking: No sig reported   Garlic 10 MG CAPS  Self Yes No   Sig: Take 1 tablet by mouth daily    Patient not taking: No sig reported Glucosamine HCl 1500 MG TABS  Self Yes No   Sig: Take by mouth   Patient not taking: No sig reported   Glucosamine-Chondroit-Vit C-Mn (GLUCOSAMINE 1500 COMPLEX) CAPS  Self Yes No   Sig: daily    Patient not taking: No sig reported   MULTIPLE VITAMINS ESSENTIAL PO  Self Yes No   Sig: Take by mouth   Patient not taking: No sig reported   Multiple Vitamins-Minerals (MULTIVITAL-M) TABS  Self Yes No   Sig: Take by mouth daily    Patient not taking: No sig reported   Psyllium (Metamucil) 28 3 % POWD  Self Yes No   Sig: Take by mouth   allopurinol (ZYLOPRIM) 100 mg tablet  Self No No   Sig: Take 1 tablet (100 mg total) by mouth daily   amLODIPine (NORVASC) 10 mg tablet  Self No No   Sig: TAKE ONE TABLET BY MOUTH EVERY DAY   betamethasone valerate (VALISONE) 0 1 % cream  Self No No   Sig: Apply topically 2 (two) times a day   Patient not taking: No sig reported   betamethasone, augmented, (DIPROLENE-AF) 0 05 % cream  Self Yes No   Sig:     Patient not taking: No sig reported   calcipotriene (DOVONEX) 0 005 % cream  Self Yes No   Patient not taking: No sig reported   calcipotriene (DOVONOX) 0 005 % ointment  Self Yes No   Sig: Apply topically 2 (two) times a day As needed   Patient not taking: No sig reported   citalopram (CeleXA) 10 mg tablet   No No   Sig: Take 1 tablet (10 mg total) by mouth in the morning and 1 tablet (10 mg total) before bedtime  colchicine (COLCRYS) 0 6 mg tablet  Self No No   Sig: Take 1 tablet (0 6 mg total) by mouth 2 (two) times a day   Patient not taking: No sig reported   dexamethasone (DECADRON) 2 mg tablet   No No   Sig: Take 1 tablet (2 mg total) by mouth in the morning and 1 tablet (2 mg total) in the evening  Take with meals     doxepin (SINEquan) 25 mg capsule   No No   Sig: TAKE ONE CAPSULE BY MOUTH AT BEDTIME   fluocinolone (SYNALAR) 0 01 % external solution  Self Yes No   Sig:     Patient not taking: No sig reported   folic acid (FOLVITE) 1 mg tablet  Self No No   Sig: Take 1 tablet (1 mg total) by mouth daily   Patient not taking: Reported on 1/12/2022    gabapentin (NEURONTIN) 100 mg capsule  Self Yes No   Sig:     Patient not taking: No sig reported   ketoconazole (NIZORAL) 2 % cream  Self No No   Sig: Apply topically 2 (two) times a day   Patient not taking: No sig reported   levETIRAcetam (KEPPRA) 500 mg tablet   No No   Sig: Take 1 tablet (500 mg total) by mouth every 12 (twelve) hours for 6 doses   Patient not taking: Reported on 5/3/2022    levothyroxine 75 mcg tablet   No No   Sig: Take 0 5 tablets (37 5 mcg total) by mouth daily in the early morning   meclizine (ANTIVERT) 25 mg tablet  Self No No   Sig: Take 1 tablet (25 mg total) by mouth 4 (four) times a day as needed for dizziness   meclizine (ANTIVERT) 25 mg tablet   No No   Sig: Take 1 tablet (25 mg total) by mouth every 8 (eight) hours as needed for dizziness   metoprolol succinate (TOPROL-XL) 25 mg 24 hr tablet  Self No No   Sig: Take 1 tablet (25 mg total) by mouth daily   neomycin-polymyxin-hydrocortisone (CORTISPORIN) otic solution  Self No No   Sig: Administer 4 drops into the left ear every 6 (six) hours   Patient not taking: No sig reported   ondansetron (ZOFRAN) 4 mg tablet  Self No No   Sig: Take 1 tablet (4 mg total) by mouth every 6 (six) hours   Patient not taking: No sig reported   ondansetron (ZOFRAN) 8 mg tablet  Self No No   Sig: Take 1 tablet (8 mg total) by mouth every 8 (eight) hours as needed for nausea or vomiting   Patient not taking: No sig reported   oxyCODONE (ROXICODONE) 5 immediate release tablet  Self Yes No   Patient not taking: No sig reported   pantoprazole (PROTONIX) 40 mg tablet  Self No No   Sig: Take 1 tablet (40 mg total) by mouth daily   polyethylene glycol (GLYCOLAX) 17 GM/SCOOP powder  Self No No   Sig: Take 17 g by mouth 2 (two) times a day   rosuvastatin (CRESTOR) 10 MG tablet  Self No No   Sig: TAKE ONE TABLET BY MOUTH EVERY DAY   tamsulosin (FLOMAX) 0 4 mg  Self Yes No   Patient not taking: No sig reported   traMADol (ULTRAM) 50 mg tablet   No No   Sig: TAKE ONE TABLET BY MOUTH EVERY 8 HOURS AS NEEDED FOR SEVERE PAIN   triamcinolone (KENALOG) 0 1 % cream  Self Yes No   Patient not taking: No sig reported   zinc gluconate 50 mg tablet  Self Yes No   Sig: Take 50 mg by mouth daily   Patient not taking: Reported on 2022       Facility-Administered Medications: None       Past Medical History:   Diagnosis Date    Hyperlipidemia     Hypertension     Lung cancer [facility])        Past Surgical History:   Procedure Laterality Date    BACK SURGERY      CRANIOTOMY Left 2022    Procedure: Image guided left parietal craniotomy for tumor resection;  Surgeon: Jose Rodríguez MD;  [facility];  Service: Neurosurgery    [address] [place] [address] IR BIOPSY LUNG  2021    IR PORT PLACEMENT  2021    LAMINECTOMY  2018    L4-L5    LUNG SURGERY      left upper lobectomy    HI BRONCHOSCOPY,DIAGNOSTIC N/A 2021    Procedure: BRONCHOSCOPY FLEXIBLE;  Surgeon: Stacey Murillo MD;  [facility];  Service: Thoracic    HI MEDIASTINOSCOPY WITH LYMPH NODE BIOPSY/IES N/A 2021    Procedure: MEDIASTINOSCOPY, flexible bronchoscopy;  Surgeon: Stacey Murillo MD;  Location[facility];  Service: Thoracic    TONSILLECTOMY         Family History   Problem Relation Age of Onset    Nephrolithiasis Father     Lung cancer Maternal Grandfather      I have reviewed and agree with the history as documented      E-Cigarette/Vaping    E-Cigarette Use Never User      E-Cigarette/Vaping Substances    Nicotine No     THC No     CBD No     Flavoring No     Other No     Unknown No      Social History     Tobacco Use    Smoking status: Former Smoker     Packs/day: 1 00     Years: 40 00     Pack years: 40 00     Types: Cigarettes     Start date:      Quit date: 2017     Years since quittin 3    Smokeless tobacco: Never Used    Tobacco comment: quit 2017   Vaping Use    Vaping Use: Never used   Substance Use Topics    Alcohol use: Yes     Alcohol/week: 7 0 standard drinks     Types: 7 Cans of beer per week     Comment: 1-2 beers nightly    Drug use: Not Currently     Types: Marijuana     Comment: seldom       Review of Systems   Constitutional: Negative for chills and fever  HENT: Negative for ear pain and sore throat  Eyes: Negative for pain and visual disturbance  Respiratory: Negative for cough and shortness of breath  Cardiovascular: Negative for chest pain and palpitations  Gastrointestinal: Negative for abdominal pain and vomiting  Genitourinary: Negative for dysuria and hematuria  Musculoskeletal: Negative for arthralgias and back pain  Skin: Negative for color change and rash  Neurological: Positive for dizziness and light-headedness  Negative for seizures and syncope  All other systems reviewed and are negative  Physical Exam  Physical Exam  Vitals and nursing note reviewed  Constitutional:       Appearance: He is well-developed  HENT:      Head: Normocephalic and atraumatic  Eyes:      Conjunctiva/sclera: Conjunctivae normal    Cardiovascular:      Rate and Rhythm: Normal rate and regular rhythm  Heart sounds: No murmur heard  Pulmonary:      Effort: Pulmonary effort is normal  No respiratory distress  Breath sounds: Normal breath sounds  Abdominal:      Palpations: Abdomen is soft  Tenderness: There is no abdominal tenderness  Musculoskeletal:      Cervical back: Neck supple  Skin:     General: Skin is warm and dry  Neurological:      Mental Status: He is alert and oriented to person, place, and time  GCS: GCS eye subscore is 4  GCS verbal subscore is 5  GCS motor subscore is 6  Comments: GCS 15  AAOx3  No focal neuro deficits  CN II-XII grossly intact  Speech normal, no aphasia or dysarthria  No pronator drift  Cerebellar function normal  Finger to nose normal  PERRL  EOMI   Peripheral vision intact  No nystagmus  Upper and lower extremity strength 5/5 through   strength 5/5 b/l  Gross sensation intact b/l              Vital Signs  ED Triage Vitals [06/15/22 1259]   Temperature Pulse Respirations Blood Pressure SpO2   98 °F (36 7 °C) 83 18 160/75 95 %      Temp src Heart Rate Source Patient Position - Orthostatic VS BP Location FiO2 (%)   -- Monitor Lying Right arm --      Pain Score       --           Vitals:    06/15/22 1625 06/15/22 1628 06/15/22 1700 06/15/22 1730   BP: 150/76 150/78 151/79 142/85   Pulse: 102 103 91 93   Patient Position - Orthostatic VS: Standing - Orthostatic VS Standing for 3 minutes - Orthostatic VS           Visual Acuity      ED Medications  Medications   dexamethasone (DECADRON) tablet 2 mg (has no administration in time range)   sodium chloride 0 9 % bolus 1,000 mL (1,000 mL Intravenous New Bag 6/15/22 1607)       Diagnostic Studies  Results Reviewed     Procedure Component Value Units Date/Time    HS Troponin 0hr (reflex protocol) [[id number]]  (Normal) Collected: 06/15/22 1606    Lab Status: Final result Specimen: Blood from Arm, Right Updated: 06/15/22 1650     hs TnI 0hr 7 ng/L     HS Troponin I 2hr [[id number]]     Lab Status: No result Specimen: Blood     NT-BNP PRO [[id number]]  (Abnormal) Collected: 06/15/22 1606    Lab Status: Final result Specimen: Blood from Arm, Right Updated: 06/15/22 1647     NT-proBNP 150 pg/mL     Comprehensive metabolic panel [[id number]]  (Abnormal) Collected: 06/15/22 1606    Lab Status: Final result Specimen: Blood from Arm, Right Updated: 06/15/22 1641     Sodium 139 mmol/L      Potassium 3 5 mmol/L      Chloride 101 mmol/L      CO2 24 mmol/L      ANION GAP 14 mmol/L      BUN 13 mg/dL      Creatinine 1 08 mg/dL      Glucose 98 mg/dL      Calcium 9 2 mg/dL      Corrected Calcium 9 8 mg/dL      AST 17 U/L      ALT 27 U/L      Alkaline Phosphatase 90 U/L      Total Protein 7 2 g/dL      Albumin 3 2 g/dL      Total Bilirubin 0 38 mg/dL eGFR 69 ml/min/1 73sq m     Narrative:      [place] guidelines for Chronic Kidney Disease (CKD):     Stage 1 with normal or high GFR (GFR > 90 mL/min/1 73 square meters)    Stage 2 Mild CKD (GFR = 60-89 mL/min/1 73 square meters)    Stage 3A Moderate CKD (GFR = 45-59 mL/min/1 73 square meters)    Stage 3B Moderate CKD (GFR = 30-44 mL/min/1 73 square meters)    Stage 4 Severe CKD (GFR = 15-29 mL/min/1 73 square meters)    Stage 5 End Stage CKD (GFR <15 mL/min/1 73 square meters)  Note: GFR calculation is accurate only with a steady state creatinine    Protime-INR [[id number]]  (Normal) Collected: 06/15/22 1606    Lab Status: Final result Specimen: Blood from Arm, Right Updated: 06/15/22 1639     Protime 12 8 seconds      INR 1 00    APTT [[id number]]  (Abnormal) Collected: 06/15/22 1606    Lab Status: Final result Specimen: Blood from Arm, Right Updated: 06/15/22 1639     PTT 41 seconds     CBC and differential [[id number]]  (Abnormal) Collected: 06/15/22 1606    Lab Status: Final result Specimen: Blood from Arm, Right Updated: 06/15/22 1619     WBC 3 97 Thousand/uL      RBC 3 74 Million/uL      Hemoglobin 12 8 g/dL      Hematocrit 37 7 %       fL      MCH 34 2 pg      MCHC 34 0 g/dL      RDW 16 7 %      MPV 9 2 fL      Platelets 188 Thousands/uL      nRBC 0 /100 WBCs      Neutrophils Relative 70 %      Immat GRANS % 1 %      Lymphocytes Relative 17 %      Monocytes Relative 9 %      Eosinophils Relative 2 %      Basophils Relative 1 %      Neutrophils Absolute 2 83 Thousands/µL      Immature Grans Absolute 0 02 Thousand/uL      Lymphocytes Absolute 0 67 Thousands/µL      Monocytes Absolute 0 35 Thousand/µL      Eosinophils Absolute 0 08 Thousand/µL      Basophils Absolute 0 02 Thousands/µL                  CT head without contrast   Final Result by Chase Chaudhary MD (06/15 1606)      There is enlargement of the previously noted hemorrhagic left posterior parietal region which now measures 9 mm x 9 mm, previously measuring 5 mm x 5 mm, (image 30 series 2)      Development of encephalomalacia in the region of previously noted the hemorrhagic lesion in the left parietal region      The previously noted hemorrhagic left cerebellar lesion demonstrates heterogenous attenuation with stable size  I personally discussed this study with Akua Conner on 6/15/2022 at 4:06 PM                Workstation performed: [id number]         CT chest abdomen pelvis wo contrast    (Results Pending)              Procedures  ECG 12 Lead Documentation Only    Date/Time: 6/15/2022 4:28 PM  Performed by: Cesario Lal PA-C  Authorized by: Cesario Lal PA-C     ECG reviewed by me, the ED Provider: yes    Patient location:  ED  Interpretation:     Interpretation: normal    Quality:     Tracing quality:  Limited by artifact  Rate:     ECG rate:  94    ECG rate assessment: normal    Rhythm:     Rhythm: sinus rhythm    Ectopy:     Ectopy: none    QRS:     QRS axis:  Normal    QRS intervals:  Normal  Conduction:     Conduction: normal    ST segments:     ST segments:  Normal  T waves:     T waves: non-specific               ED Course  ED Course as of 06/15/22 1743   Wed Mateo 15, 2022   1642 Neurosurgery reviewing CT                                              MDM  Number of Diagnoses or Management Options  Brain metastases [facility]): new and requires workup  Dizziness: new and requires workup  Diagnosis management comments: DDx including but not limited to: Labyrinthitis, vestibular neuronitis,benign paroxysmal positional vertigo, Meniere's disease, metabolic abnormality, otitis media, tumor, intracranial process, cardiac etiology; doubt vertebral or carotid artery dissection  Plan: CT head to assess for any change in known mass or if new mass  Labs  Cardiac workup          Amount and/or Complexity of Data Reviewed  Clinical lab tests: ordered and reviewed  Tests in the radiology section of CPT®: ordered and reviewed  Independent visualization of images, tracings, or specimens: yes    Risk of Complications, Morbidity, and/or Mortality  Presenting problems: moderate  Management options: low  General comments: 70 yo with dizziness  CT head reveals slightly enlarged lesion and a second lesion which is overall stable, perhaps slightly smaller  Discussed with neurosurgery  Will admit for MRI brain to r/o new lesions  CT c/a/p  Begin decadron 2mg BID  Nsgx will f/u with results  Patient Progress  Patient progress: stable      Disposition  Final diagnoses:   Dizziness   Brain metastases [facility])     Time reflects when diagnosis was documented in both MDM as applicable and the Disposition within this note     Time User Action Codes Description Comment    6/15/2022  5:41 PM Natty Thompson Add [R42] Dizziness     6/15/2022  5:41 PM Natty Thompson Add [C79 31] Brain metastases [facility])       ED Disposition     ED Disposition   Admit    Condition   Stable    Date/Time   Wed Mateo 15, 2022  5:41 PM    Comment   Case was discussed with Dr Jaime Jama and the patient's admission status was agreed to be Admission Status: observation status to the service of Dr Jaime Jama   Follow-up Information    None         Patient's Medications   Discharge Prescriptions    No medications on file       No discharge procedures on file      PDMP Review       Value Time User    PDMP Reviewed  Yes 5/19/2022 12:23 PM Robinson Brantley DO          ED Provider  Electronically Signed by           Cesario Lal PA-C  06/15/22 2409

## 2022-06-15 NOTE — TELEPHONE ENCOUNTER
Wife called  Adalid C/O poor appetite, SOB on exertion, increased dizziness and lightheadedness, blurred vision in right eye, shuffling feet when walking, tinnitus, burning in ears and is spending about 95% of the day in bed for the last 4-5 days  Dr Hu Lawson aware and will call patient

## 2022-06-15 NOTE — ED NOTES
Patient transported to [address] [place] [address], [address] [place] [address]  06/15/22 1408

## 2022-06-16 ENCOUNTER — APPOINTMENT (OUTPATIENT)
Dept: MRI IMAGING | Facility: HOSPITAL | Age: 67
DRG: 054 | End: 2022-06-16
Payer: MEDICARE

## 2022-06-16 LAB
ANION GAP SERPL CALCULATED.3IONS-SCNC: 12 MMOL/L (ref 4–13)
ATRIAL RATE: 94 BPM
BUN SERPL-MCNC: 13 MG/DL (ref 5–25)
CALCIUM SERPL-MCNC: 8.6 MG/DL (ref 8.3–10.1)
CHLORIDE SERPL-SCNC: 105 MMOL/L (ref 100–108)
CO2 SERPL-SCNC: 22 MMOL/L (ref 21–32)
CREAT SERPL-MCNC: 1 MG/DL (ref 0.6–1.3)
ERYTHROCYTE [DISTWIDTH] IN BLOOD BY AUTOMATED COUNT: 16 % (ref 11.6–15.1)
GFR SERPL CREATININE-BSD FRML MDRD: 76 ML/MIN/1.73SQ M
GLUCOSE SERPL-MCNC: 158 MG/DL (ref 65–140)
HCT VFR BLD AUTO: 33.6 % (ref 36.5–49.3)
HGB BLD-MCNC: 11.4 G/DL (ref 12–17)
MCH RBC QN AUTO: 34.3 PG (ref 26.8–34.3)
MCHC RBC AUTO-ENTMCNC: 33.9 G/DL (ref 31.4–37.4)
MCV RBC AUTO: 101 FL (ref 82–98)
P AXIS: 52 DEGREES
PLATELET # BLD AUTO: 152 THOUSANDS/UL (ref 149–390)
PMV BLD AUTO: 9.5 FL (ref 8.9–12.7)
POTASSIUM SERPL-SCNC: 3.9 MMOL/L (ref 3.5–5.3)
PR INTERVAL: 116 MS
QRS AXIS: 33 DEGREES
QRSD INTERVAL: 86 MS
QT INTERVAL: 374 MS
QTC INTERVAL: 467 MS
RBC # BLD AUTO: 3.32 MILLION/UL (ref 3.88–5.62)
SODIUM SERPL-SCNC: 139 MMOL/L (ref 136–145)
T WAVE AXIS: 74 DEGREES
VENTRICULAR RATE: 94 BPM
WBC # BLD AUTO: 2.68 THOUSAND/UL (ref 4.31–10.16)

## 2022-06-16 PROCEDURE — 99232 SBSQ HOSP IP/OBS MODERATE 35: CPT | Performed by: INTERNAL MEDICINE

## 2022-06-16 PROCEDURE — 80048 BASIC METABOLIC PNL TOTAL CA: CPT | Performed by: STUDENT IN AN ORGANIZED HEALTH CARE EDUCATION/TRAINING PROGRAM

## 2022-06-16 PROCEDURE — 85027 COMPLETE CBC AUTOMATED: CPT | Performed by: STUDENT IN AN ORGANIZED HEALTH CARE EDUCATION/TRAINING PROGRAM

## 2022-06-16 PROCEDURE — 70553 MRI BRAIN STEM W/O & W/DYE: CPT

## 2022-06-16 PROCEDURE — A9585 GADOBUTROL INJECTION: HCPCS | Performed by: STUDENT IN AN ORGANIZED HEALTH CARE EDUCATION/TRAINING PROGRAM

## 2022-06-16 PROCEDURE — 93010 ELECTROCARDIOGRAM REPORT: CPT | Performed by: INTERNAL MEDICINE

## 2022-06-16 PROCEDURE — 36415 COLL VENOUS BLD VENIPUNCTURE: CPT | Performed by: STUDENT IN AN ORGANIZED HEALTH CARE EDUCATION/TRAINING PROGRAM

## 2022-06-16 PROCEDURE — 99024 POSTOP FOLLOW-UP VISIT: CPT | Performed by: PHYSICIAN ASSISTANT

## 2022-06-16 PROCEDURE — G1004 CDSM NDSC: HCPCS

## 2022-06-16 RX ORDER — SODIUM CHLORIDE 9 MG/ML
125 INJECTION, SOLUTION INTRAVENOUS CONTINUOUS
Status: DISCONTINUED | OUTPATIENT
Start: 2022-06-16 | End: 2022-06-17 | Stop reason: HOSPADM

## 2022-06-16 RX ORDER — DEXAMETHASONE 4 MG/1
4 TABLET ORAL EVERY 6 HOURS SCHEDULED
Status: DISCONTINUED | OUTPATIENT
Start: 2022-06-16 | End: 2022-06-17 | Stop reason: HOSPADM

## 2022-06-16 RX ORDER — MECLIZINE HYDROCHLORIDE 25 MG/1
25 TABLET ORAL EVERY 8 HOURS SCHEDULED
Status: DISCONTINUED | OUTPATIENT
Start: 2022-06-16 | End: 2022-06-17 | Stop reason: HOSPADM

## 2022-06-16 RX ADMIN — MECLIZINE HYDROCHLORIDE 25 MG: 25 TABLET ORAL at 08:14

## 2022-06-16 RX ADMIN — DEXAMETHASONE 4 MG: 4 TABLET ORAL at 21:27

## 2022-06-16 RX ADMIN — LEVETIRACETAM 500 MG: 500 TABLET, FILM COATED ORAL at 08:03

## 2022-06-16 RX ADMIN — CITALOPRAM HYDROBROMIDE 10 MG: 20 TABLET ORAL at 08:14

## 2022-06-16 RX ADMIN — AMLODIPINE BESYLATE 10 MG: 10 TABLET ORAL at 08:03

## 2022-06-16 RX ADMIN — LEVETIRACETAM 500 MG: 500 TABLET, FILM COATED ORAL at 21:27

## 2022-06-16 RX ADMIN — CITALOPRAM HYDROBROMIDE 10 MG: 20 TABLET ORAL at 21:27

## 2022-06-16 RX ADMIN — SODIUM CHLORIDE 125 ML/HR: 0.9 INJECTION, SOLUTION INTRAVENOUS at 21:41

## 2022-06-16 RX ADMIN — DEXAMETHASONE 2 MG: 4 TABLET ORAL at 08:03

## 2022-06-16 RX ADMIN — MECLIZINE HYDROCHLORIDE 25 MG: 25 TABLET ORAL at 21:27

## 2022-06-16 RX ADMIN — LEVOTHYROXINE SODIUM 37.5 MCG: 75 TABLET ORAL at 06:10

## 2022-06-16 RX ADMIN — ALLOPURINOL 100 MG: 100 TABLET ORAL at 08:14

## 2022-06-16 RX ADMIN — GADOBUTROL 8 ML: 604.72 INJECTION INTRAVENOUS at 09:28

## 2022-06-16 RX ADMIN — METOPROLOL SUCCINATE 25 MG: 25 TABLET, EXTENDED RELEASE ORAL at 08:03

## 2022-06-16 RX ADMIN — PANTOPRAZOLE SODIUM 40 MG: 40 TABLET, DELAYED RELEASE ORAL at 08:03

## 2022-06-16 NOTE — ASSESSMENT & PLAN NOTE
Present on admission history of thrombocytopenia  Presented with platelet of [id number]  No signs of active bleeding noted    Monitor CBC noted

## 2022-06-16 NOTE — ASSESSMENT & PLAN NOTE
Present on admission history of hypertension  Currently controlled  Resume home dose of Norvasc 10 mg daily, Toprol XL 25 mg daily

## 2022-06-16 NOTE — ASSESSMENT & PLAN NOTE
Present on admission history of lung cancer with Mets to brain  History of left parietal craniotomy and resection of metastatic tumor on 04/07/2022  Completed whole-brain radiation 2 weeks ago  Continue home dose Keppra 500 mg b i d  Follows with Neurosurgery/ Heme-Onc outpatient

## 2022-06-16 NOTE — ASSESSMENT & PLAN NOTE
69year old male patient with past medical history of lung cancer with Mets to brain status post left parietal craniotomy and resection of hemorrhagic mass  Completed radiation 2 weeks ago  has been having episodes of lightheadedness and dizziness  Presents to emergency room at [facility] complaining of dizziness associated with bilateral hearing impairment, bilateral tinnitus  Today's labs reviewed  Noted with mild leukopenia  CT head report reviewed and noted with enlargement of previously noted left parietal hemorrhagic region  Above finding has been discussed with on-call Neurosurgery and recommended to start on Decadron and follow-up with brain MRI as per ED report  MRI done - "Mixed treatment response since 4/26/2022   - Slightly increased size of left parietal cortex, slightly increased size of left posterolateral cerebellar, and slightly decreased size of left posterior cerebellar hemorrhagic metastatic lesions    - Decreased enhancement of left parietal resection cavity   Slightly decreased prominent nodular area of restricted diffusion along the left posterolateral margin of resection cavity which may be due to resolving blood products   No definite evidence of   recurrent tumor in left parietal resection cavity  - No acute infarction  "    I have asked neurosurgery to review the images again on 06/16/2022 - no recommendation for acute intervention    Patient still significantly symptomatic  Increase decadron to 4mg q6  Add meclizine scheduled  Start IVF  PT/OT  Patient requires continued stay

## 2022-06-16 NOTE — ASSESSMENT & PLAN NOTE
Present on admission history of thrombocytopenia  Presented with platelet of [id number] on admisison  No signs of active bleeding noted    Monitor CBC noted

## 2022-06-16 NOTE — ASSESSMENT & PLAN NOTE
Lab Results   Component Value Date    EGFR 69 06/15/2022    EGFR 70 05/02/2022    EGFR 73 04/10/2022    CREATININE 1 08 06/15/2022    CREATININE 1 08 05/02/2022    CREATININE 1 04 04/10/2022     Present on admission history of CKD stage 2  Currently creatinine stable at baseline

## 2022-06-16 NOTE — PHYSICAL THERAPY NOTE
Physical Therapy Cancellation     Patient's Name: King Zaragoza    Admitting Diagnosis  Dizziness [R42]    Problem List  Patient Active Problem List   Diagnosis    Renal cyst, right    Acute idiopathic gout of left foot    Back problem    Depression with anxiety    Essential hypertension    Glaucoma    Hypertriglyceridemia    Lumbago with sciatica, left side    Lumbar stenosis    JUNAID (obstructive sleep apnea)    Spinal stenosis of lumbar region with neurogenic claudication    Mixed hyperlipidemia    Bilateral hearing loss    Hyperglycemia    Cigarette nicotine dependence in remission    Adenocarcinoma, lung, left ([facility] [address] )    Encounter for central line care    Drug-induced neutropenia (HCC)    Left non-suppurative otitis media    Bilateral hearing loss due to cerumen impaction    Cancer of upper lobe of left lung (HCC)    Chronic back pain    Former cigarette smoker    History of gout    Hypertension    Proctocolitis    Pericardial effusion    Constipation    Labyrinthitis of both ears    CKD (chronic kidney disease) stage 2, GFR 60-89 ml/min    Platelets decreased (HCC)    Psoriasis    Left parietal hemorrhagic tumor    Thrombocytopenia (HCC)    Goals of care, counseling/discussion    Hypothyroidism    Irregular heart rate    Paroxysmal atrial fibrillation (HCC)    Brain metastases (HCC)    Dizziness    Chronic obstructive pulmonary disease, unspecified COPD type ([facility] )       Past Medical History  Past Medical History:   Diagnosis Date    Hyperlipidemia     Hypertension     Lung cancer [facility])        Past Surgical History  Past Surgical History:   Procedure Laterality Date    BACK SURGERY      CRANIOTOMY Left 4/7/2022    Procedure: Image guided left parietal craniotomy for tumor resection;  Surgeon: Amanda Moreira MD;  [facility];  Service: Neurosurgery    HEMORRHOID SURGERY      IR BIOPSY LUNG  5/6/2021    IR PORT PLACEMENT  6/17/2021    LAMINECTOMY  2018    L4-L5    LUNG SURGERY      left upper lobectomy [place] BRONCHOSCOPY,DIAGNOSTIC N/A 5/25/2021    Procedure: BRONCHOSCOPY FLEXIBLE;  Surgeon: Allie Driscoll MD;  Location[facility];  Service: Thoracic    TN MEDIASTINOSCOPY WITH LYMPH NODE BIOPSY/IES N/A 5/25/2021    Procedure: MEDIASTINOSCOPY, flexible bronchoscopy;  Surgeon: Allie Driscoll MD;  [facility];  Service: Thoracic    TONSILLECTOMY  1959 06/16/22 0842   PT Last Visit   PT Visit Date 06/16/22   Note Type   Note type Cancelled Session   Cancel Reasons Patient off floor/test   Additional Comments PT order received  Chart review performed  At this time, PT evaluation cancelled as pt unavailable, off the floor at [facility]  PT will follow and evaluate as appropriate             Severo Salk, PT

## 2022-06-16 NOTE — OCCUPATIONAL THERAPY NOTE
Occupational Therapy Cancellation Note        Patient Name: Trent Danilo Date: 6/16/2022 06/16/22 0845   OT Last Visit   OT Visit Date 06/16/22   Note Type   Note type Screen   Cancel Reasons Patient off floor/test   Additional Comments OT orders received  Patient off unit at this time for testing  OT will continue to monitor and follow patient as indicated

## 2022-06-16 NOTE — H&P
[address] [facility]  H&P- Chrsitine  1955, 69 y o  male MRN: [id number]  Unit/Bed#: ED 13 Encounter: [id number]  Primary Care Provider: Tex Murray DO   Date and time admitted to hospital: 6/15/2022  3:11 PM    * Dizziness  Assessment & Plan  69year old male patient with past medical history of lung cancer with Mets to brain status post left parietal craniotomy and resection of hemorrhagic mass  Completed radiation 2 weeks ago  has been having episodes of lightheadedness and dizziness  Presents to emergency room at [facility] complaining of dizziness associated with bilateral hearing impairment, bilateral tinnitus  Today's labs reviewed  Noted with mild leukopenia  CT head report reviewed and noted with enlargement of previously noted left parietal hemorrhagic region  Above finding has been discussed with on-call Neurosurgery and recommended to start on Decadron and follow-up with brain MRI as per ED report  On my encounter patient appears comfortable not in distress  DDx includes vertigo vs orthostatic hypotension vs meniere's  dz vs r/o posterior circulation CVA though less likely since symptoms intermittent  Continue IV Decadron 2 mg b i d  As per above  Follow-up with brain MRI  F/u with neurosurgery recommendation after brain MRI  Continue supportive care  Patient is agreeable with above plan  Unable to get hold of patient's wife at this time  Brain metastases [facility])  Assessment & Plan  Present on admission history of lung cancer with Mets to brain  History of left parietal craniotomy and resection of metastatic tumor on 04/07/2022  Completed whole-brain radiation 2 weeks ago  Continue home dose Keppra 500 mg b i d  Follows with Neurosurgery/ Heme-Onc outpatient  Paroxysmal atrial fibrillation [facility])  Assessment & Plan  Present on admission history of paroxysmal AFib  Currently EKG noted with normal sinus rhythm    Resume home dose of Toprol XL 25 mg daily  Not on anticoagulation  Hypothyroidism  Assessment & Plan  Present on admission history of hypothyroidism  Resume home dose of levothyroxine 37 5mcg daily  Of note:  Patient does not know much about his medication dosing therefore  follow-up with wife for medication reconciliation  Thrombocytopenia ([facility] [address] )  Assessment & Plan  Present on admission history of thrombocytopenia  Presented with platelet of [id number]  No signs of active bleeding noted  Monitor CBC noted    CKD (chronic kidney disease) stage 2, GFR 60-89 ml/min  Assessment & Plan  Lab Results   Component Value Date    EGFR 69 06/15/2022    EGFR 70 05/02/2022    EGFR 73 04/10/2022    CREATININE 1 08 06/15/2022    CREATININE 1 08 05/02/2022    CREATININE 1 04 04/10/2022     Present on admission history of CKD stage 2  Currently creatinine stable at baseline  Hypertension  Assessment & Plan  Present on admission history of hypertension  Currently controlled  Resume home dose of Norvasc 10 mg daily, Toprol XL 25 mg daily  JUNAID (obstructive sleep apnea)  Assessment & Plan  Present on admission history of obstructive sleep apnea  CPAP q h s  VTE Pharmacologic Prophylaxis:   Moderate Risk (Score 3-4) - Pharmacological DVT Prophylaxis Contraindicated  Sequential Compression Devices Ordered  Code Status: Level 1 - Full Code   Discussion with family: Attempted to update  (wife) via phone  Left voicemail  Called Wendi twice on   [phone]  Left a voice message around 9:00 pm      Anticipated Length of Stay: Patient will be admitted on an observation basis with an anticipated length of stay of less than 2 midnights secondary to Dizziness, pending brain MRI  Total Time for Visit, including Counseling / Coordination of Care: 60 minutes Greater than 50% of this total time spent on direct patient counseling and coordination of care      Chief Complaint:  Lightheadedness /Dizziness associated with bilateral hearing impairment, tinnitus, decreased appetite  History of Present Illness:    Adriana Real is a 69 y o  male with a PMH of paroxysmal AFib, obstructive sleep apnea, hypothyroidism, thrombocytopenia, CKD, hypertension, psoriasis, history of lung cancer with metastasis to brain, neuroendocrine tumor status post lung resection as well as status post left parietal craniotomy and resection of neuroendocrine tumor, completed whole-brain radiation therapy 2 weeks ago, who presents with complaining of lightheadedness  Patient reports lightheadedness gets worse with changing position gets better with resting  Associated with bilateral tinnitus, bilateral hearing impairment  Also complaing of blurred vision on the right side however not overly concerned of this symptom, decreased appetite, difficulty with writing with right hand  Patient reports that since surgery he  had this symptoms however he noted symptoms are progressively getting worse which prompted him to come to emergency room for evaluation  He does not know much of his medications and dosing since he relies on his wife for medications  Patient denies chest pain, dyspnea, fever, chills, nausea, vomiting, diarrhea, any new complaints  Case had been discussed with Neurosurgery and recommended to start on IV Decadron and follow-up with brain MRI per ED report  On my encounter patient appears comfortable not in distress  Denies any new complaints  No other events noted  Review of Systems:  Review of Systems   Constitutional: Negative for chills, diaphoresis and fever  HENT: Negative for congestion  Eyes: Positive for visual disturbance  Respiratory: Negative for chest tightness and shortness of breath  Cardiovascular: Negative for chest pain and palpitations  Gastrointestinal: Negative for abdominal pain, diarrhea, nausea and vomiting  Endocrine: Negative for polyuria  Genitourinary: Negative for dysuria     Musculoskeletal: Positive for gait problem  Negative for neck stiffness  Neurological: Positive for dizziness and light-headedness  Negative for tremors, syncope, facial asymmetry, speech difficulty, numbness and headaches  Psychiatric/Behavioral: Negative for agitation  Past Medical and Surgical History:   Past Medical History:   Diagnosis Date    Hyperlipidemia     Hypertension     Lung cancer [facility])        Past Surgical History:   Procedure Laterality Date    BACK SURGERY      CRANIOTOMY Left 4/7/2022    Procedure: Image guided left parietal craniotomy for tumor resection;  Surgeon: Krista Wyman MD;  [facility];  Service: Neurosurgery    HEMORRHOID SURGERY      IR BIOPSY LUNG  5/6/2021    IR PORT PLACEMENT  6/17/2021    LAMINECTOMY  2018    L4-L5    LUNG SURGERY      left upper lobectomy    AL BRONCHOSCOPY,DIAGNOSTIC N/A 5/25/2021    Procedure: BRONCHOSCOPY FLEXIBLE;  Surgeon: Pina Galvan MD;  [facility];  Service: Thoracic    AL MEDIASTINOSCOPY WITH LYMPH NODE BIOPSY/IES N/A 5/25/2021    Procedure: MEDIASTINOSCOPY, flexible bronchoscopy;  Surgeon: Pina Galvan MD;  [facility];  Service: Thoracic    TONSILLECTOMY  1959       Meds/Allergies:  Prior to Admission medications    Medication Sig Start Date End Date Taking?  Authorizing Provider   allopurinol (ZYLOPRIM) 100 mg tablet Take 1 tablet (100 mg total) by mouth daily 3/8/22   CORRINE England   amLODIPine (NORVASC) 10 mg tablet TAKE ONE TABLET BY MOUTH EVERY DAY 9/7/21   Rosa M Felix DO   Ascorbic Acid (vitamin C) 1000 MG tablet Take 1,000 mg by mouth daily    Patient not taking: No sig reported    Historical Provider, MD   B Complex Vitamins (B COMPLEX 1 PO) Take 1 tablet by mouth daily   Patient not taking: No sig reported    Historical Provider, MD   B Complex Vitamins (BL VITAMIN B COMPLEX PO) Take by mouth    Patient not taking: No sig reported 10/30/21   Historical Provider, MD betamethasone valerate (VALISONE) 0 1 % cream Apply topically 2 (two) times a day  Patient not taking: No sig reported 3/8/21   Dave Felix DO   betamethasone, augmented, (DIPROLENE-AF) 0 05 % cream    Patient not taking: No sig reported 1/6/22   Historical Provider, MD   calcipotriene (DOVONEX) 0 005 % cream  3/14/22   Historical Provider, MD   calcipotriene (DOVONOX) 0 005 % ointment Apply topically 2 (two) times a day As needed  Patient not taking: No sig reported    Historical Provider, MD   Cholecalciferol (VITAMIN D3 PO) Take 10,000 Units by mouth daily    Patient not taking: No sig reported    Historical Provider, MD   citalopram (CeleXA) 10 mg tablet Take 1 tablet (10 mg total) by mouth in the morning and 1 tablet (10 mg total) before bedtime  5/25/22   Johan Felix DO   Clobetasol Propionate (Impoyz) 0 025 % CREA Apply topically  Patient not taking: No sig reported    Historical Provider, MD   colchicine (COLCRYS) 0 6 mg tablet Take 1 tablet (0 6 mg total) by mouth 2 (two) times a day  Patient not taking: No sig reported 8/17/21   Dave Felix DO   Cyanocobalamin (Vitamin B 12) 500 MCG TABS Take 1 tablet by mouth  Patient not taking: No sig reported    Historical Provider, MD   dexamethasone (DECADRON) 2 mg tablet Take 1 tablet (2 mg total) by mouth in the morning and 1 tablet (2 mg total) in the evening  Take with meals   5/14/22   Edison Saunders MD   doxepin (SINEquan) 25 mg capsule TAKE ONE CAPSULE BY MOUTH AT BEDTIME 6/15/22   Johan Felix DO   fluocinolone (SYNALAR) 0 01 % external solution    Patient not taking: No sig reported 6/3/21   Historical Provider, MD   folic acid (FOLVITE) 1 mg tablet Take 1 tablet (1 mg total) by mouth daily  Patient not taking: Reported on 1/12/2022 12/7/21 1/6/22  Bruno Madrid MD   gabapentin (NEURONTIN) 100 mg capsule    Patient not taking: No sig reported 11/22/21   Historical Provider, MD   Garlic 10 MG CAPS Take 1 tablet by mouth daily   Patient not taking: No sig reported    Historical Provider, MD   Glucosamine HCl 1500 MG TABS Take by mouth  Patient not taking: No sig reported 10/30/21   Historical Provider, MD   Glucosamine-Chondroit-Vit C-Mn (GLUCOSAMINE 1500 COMPLEX) CAPS daily   Patient not taking: No sig reported    Historical Provider, MD   ketoconazole (NIZORAL) 2 % cream Apply topically 2 (two) times a day  Patient not taking: No sig reported 12/17/18   Leandro Lux DO   levETIRAcetam (KEPPRA) 500 mg tablet Take 1 tablet (500 mg total) by mouth every 12 (twelve) hours for 6 doses  Patient not taking: Reported on 5/3/2022  4/11/22 4/14/22  Sabas Loya PA-C   levothyroxine 75 mcg tablet Take 0 5 tablets (37 5 mcg total) by mouth daily in the early morning 6/3/22   Aleene Dandy, CRNP   meclizine (ANTIVERT) 25 mg tablet Take 1 tablet (25 mg total) by mouth 4 (four) times a day as needed for dizziness 12/22/21   Leandro Lux DO   meclizine (ANTIVERT) 25 mg tablet Take 1 tablet (25 mg total) by mouth every 8 (eight) hours as needed for dizziness 6/15/22   Leandro Lux DO   metoprolol succinate (TOPROL-XL) 25 mg 24 hr tablet Take 1 tablet (25 mg total) by mouth daily 4/18/22   Leandro Lux DO   MULTIPLE VITAMINS ESSENTIAL PO Take by mouth  Patient not taking: No sig reported 10/30/21   Historical Provider, MD   Multiple Vitamins-Minerals (MULTIVITAL-M) TABS Take by mouth daily   Patient not taking: No sig reported    Historical Provider, MD   neomycin-polymyxin-hydrocortisone (CORTISPORIN) otic solution Administer 4 drops into the left ear every 6 (six) hours  Patient not taking: No sig reported 12/9/21   Leandro Lux DO   ondansetron (ZOFRAN) 4 mg tablet Take 1 tablet (4 mg total) by mouth every 6 (six) hours  Patient not taking: No sig reported 12/24/21   Elizabeth Grider MD   ondansetron (ZOFRAN) 8 mg tablet Take 1 tablet (8 mg total) by mouth every 8 (eight) hours as needed for nausea or vomiting  Patient not taking: No sig reported 6/9/21   Deepali Jin MD   oxyCODONE (ROXICODONE) 5 immediate release tablet  11/30/21   Historical Provider, MD   pantoprazole (PROTONIX) 40 mg tablet Take 1 tablet (40 mg total) by mouth daily 2/14/22   Deepali Jin MD   polyethylene glycol (GLYCOLAX) 17 GM/SCOOP powder Take 17 g by mouth 2 (two) times a day 12/24/21   Manish Rizvi MD   Psyllium (Metamucil) 28 3 % POWD Take by mouth    Historical Provider, MD   rosuvastatin (CRESTOR) 10 MG tablet TAKE ONE TABLET BY MOUTH EVERY DAY 2/8/22   Tonna Ganser, DO   tamsulosin (FLOMAX) 0 4 mg  11/15/21   Historical Provider, MD   traMADol (ULTRAM) 50 mg tablet TAKE ONE TABLET BY MOUTH EVERY 8 HOURS AS NEEDED FOR SEVERE PAIN 5/19/22   Tonna Ganser, DO   triamcinolone (KENALOG) 0 1 % cream  3/14/22   Historical Provider, MD   zinc gluconate 50 mg tablet Take 50 mg by mouth daily  Patient not taking: Reported on 4/28/2022     Historical Provider, MD   doxepin (SINEquan) 25 mg capsule TAKE ONE CAPSULE BY MOUTH EVERY DAY AT BEDTIME 10/5/21 6/15/22  Johan Felix DO   meclizine (ANTIVERT) 25 mg tablet Take 1 tablet (25 mg total) by mouth every 8 (eight) hours as needed for dizziness 5/3/22 6/15/22  Deepali Jin MD     Patient does not know much of his medications since relies on his wife  Follow-up with patient's wife for medication reconciliation  Allergies:    Allergies   Allergen Reactions    Bee Venom     Benazepril Other (See Comments)     Angioedema     Latex Other (See Comments)     Burning of eyes at the dentist from the gloves    Meloxicam GI Intolerance    Penicillin G Rash       Social History:  Marital Status: /Civil Union   Patient Pre-hospital Living Situation: With spouse  Patient Pre-hospital Level of Mobility: unable to be assessed at time of evaluation  Patient Pre-hospital Diet Restrictions:  None reported  Substance Use History:   Social History     Substance and Sexual Activity Alcohol Use Yes    Alcohol/week: 7 0 standard drinks    Types: 7 Cans of beer per week    Comment: 1-2 beers nightly     Social History     Tobacco Use   Smoking Status Former Smoker    Packs/day: 1 00    Years: 40 00    Pack years: 40 00    Types: Cigarettes    Start date:     Quit date: 2017    Years since quittin 3   Smokeless Tobacco Never Used   Tobacco Comment    quit 2017     Social History     Substance and Sexual Activity   Drug Use Not Currently    Types: Marijuana    Comment: seldom       Family History:  Family History   Problem Relation Age of Onset    Nephrolithiasis Father     Lung cancer Maternal Grandfather        Physical Exam:     Vitals:   Blood Pressure: 142/71 (06/15/22 2100)  Pulse: 98 (06/15/22 2100)  Temperature: 98 °F (36 7 °C) (06/15/22 1804)  Temp Source: Oral (06/15/22 1804)  Respirations: 20 (06/15/22 1804)  Height: 5' 10" (177 8 cm) (06/15/22 1804)  Weight - Scale: 88 kg (194 lb) (06/15/22 1804)  SpO2: 96 % (06/15/22 2100)    Physical Exam  Constitutional:       General: He is not in acute distress  Appearance: Normal appearance  He is not ill-appearing  Comments: Elderly male patient, acutely nontoxic appearing  HENT:      Head: Normocephalic and atraumatic  Eyes:      Pupils: Pupils are equal, round, and reactive to light  Cardiovascular:      Rate and Rhythm: Normal rate  Pulses: Normal pulses  Heart sounds: Normal heart sounds  Pulmonary:      Effort: Pulmonary effort is normal  No respiratory distress  Breath sounds: Normal breath sounds  No stridor  No wheezing or rhonchi  Abdominal:      General: Bowel sounds are normal  There is no distension  Palpations: Abdomen is soft  Tenderness: There is no abdominal tenderness  Musculoskeletal:      Cervical back: Normal range of motion and neck supple  No rigidity  Right lower leg: No edema  Left lower leg: No edema     Neurological:      General: No focal deficit present  Mental Status: He is alert and oriented to person, place, and time  Mental status is at baseline  Comments: Motor strength equal and symmetric in all 4 extremities  No gross focal motor neurological deficit noted on exam    Psychiatric:         Behavior: Behavior normal           Additional Data:     Lab Results:  Results from last 7 days   Lab Units 06/15/22  1606   WBC Thousand/uL 3 97*   HEMOGLOBIN g/dL 12 8   HEMATOCRIT % 37 7   PLATELETS Thousands/uL 152   NEUTROS PCT % 70   LYMPHS PCT % 17   MONOS PCT % 9   EOS PCT % 2     Results from last 7 days   Lab Units 06/15/22  1606   SODIUM mmol/L 139   POTASSIUM mmol/L 3 5   CHLORIDE mmol/L 101   CO2 mmol/L 24   BUN mg/dL 13   CREATININE mg/dL 1 08   ANION GAP mmol/L 14*   CALCIUM mg/dL 9 2   ALBUMIN g/dL 3 2*   TOTAL BILIRUBIN mg/dL 0 38   ALK PHOS U/L 90   ALT U/L 27   AST U/L 17   GLUCOSE RANDOM mg/dL 98     Results from last 7 days   Lab Units 06/15/22  1606   INR  1 00                   Imaging: Reviewed radiology reports from this admission including: CT head  CT chest abdomen pelvis wo contrast   Final Result by Josselyn Mendez MD (06/15 1832)         No evidence of metastatic disease or acute process in the chest, abdomen or pelvis  Workstation performed: [id number]         CT head without contrast   Final Result by Wendy Bright MD (06/15 1606)      There is enlargement of the previously noted hemorrhagic left posterior parietal region which now measures 9 mm x 9 mm, previously measuring 5 mm x 5 mm, (image 30 series 2)      Development of encephalomalacia in the region of previously noted the hemorrhagic lesion in the left parietal region      The previously noted hemorrhagic left cerebellar lesion demonstrates heterogenous attenuation with stable size               I personally discussed this study with Tyler Castillo on 6/15/2022 at 4:06 PM                Workstation performed: [id number] MRI inpatient order    (Results Pending)       EKG and Other Studies Reviewed on Admission:   · EKG: Normal sinus rhythm with occasional PVC       ** Please Note: This note has been constructed using a voice recognition system   **

## 2022-06-16 NOTE — ASSESSMENT & PLAN NOTE
Lab Results   Component Value Date    EGFR 76 06/16/2022    EGFR 69 06/15/2022    EGFR 70 05/02/2022    CREATININE 1 00 06/16/2022    CREATININE 1 08 06/15/2022    CREATININE 1 08 05/02/2022     Present on admission history of CKD stage 2  Currently creatinine stable at baseline

## 2022-06-16 NOTE — PROGRESS NOTES
[address] [facility]  Progress Note - Madi Parrish 1955, 69 y o  male MRN: [id number]  Unit/Bed#: ED 13 Encounter: [id number]  Primary Care Provider: Loni Galaviz DO   Date and time admitted to hospital: 6/15/2022  3:11 PM    Brain metastases [facility])  Assessment & Plan  Present on admission history of lung cancer with Mets to brain  History of left parietal craniotomy and resection of metastatic tumor on 04/07/2022  Completed whole-brain radiation 2 weeks ago  Continue home dose Keppra 500 mg b i d  Follows with Neurosurgery/ Heme-Onc outpatient  Paroxysmal atrial fibrillation [facility])  Assessment & Plan  Present on admission history of paroxysmal AFib  Currently EKG noted with normal sinus rhythm  Resume home dose of Toprol XL 25 mg daily  Not on anticoagulation  Hypothyroidism  Assessment & Plan  Present on admission history of hypothyroidism  Resume home dose of levothyroxine 37 5mcg daily  Of note:  Patient does not know much about his medication dosing therefore  follow-up with wife for medication reconciliation  Thrombocytopenia ([facility] [address] )  Assessment & Plan  Present on admission history of thrombocytopenia  Presented with platelet of [id number] on admisison  No signs of active bleeding noted  Monitor CBC noted    CKD (chronic kidney disease) stage 2, GFR 60-89 ml/min  Assessment & Plan  Lab Results   Component Value Date    EGFR 76 06/16/2022    EGFR 69 06/15/2022    EGFR 70 05/02/2022    CREATININE 1 00 06/16/2022    CREATININE 1 08 06/15/2022    CREATININE 1 08 05/02/2022     Present on admission history of CKD stage 2  Currently creatinine stable at baseline  Hypertension  Assessment & Plan  Present on admission history of hypertension  Currently controlled  Resume home dose of Norvasc 10 mg daily, Toprol XL 25 mg daily  JUNAID (obstructive sleep apnea)  Assessment & Plan  Present on admission history of obstructive sleep apnea  CPAP q h s       * Dizziness  Assessment & Plan  69year old male patient with past medical history of lung cancer with Mets to brain status post left parietal craniotomy and resection of hemorrhagic mass  Completed radiation 2 weeks ago  has been having episodes of lightheadedness and dizziness  Presents to emergency room at [facility] complaining of dizziness associated with bilateral hearing impairment, bilateral tinnitus  Today's labs reviewed  Noted with mild leukopenia  CT head report reviewed and noted with enlargement of previously noted left parietal hemorrhagic region  Above finding has been discussed with on-call Neurosurgery and recommended to start on Decadron and follow-up with brain MRI as per ED report  MRI done - "Mixed treatment response since 4/26/2022   - Slightly increased size of left parietal cortex, slightly increased size of left posterolateral cerebellar, and slightly decreased size of left posterior cerebellar hemorrhagic metastatic lesions    - Decreased enhancement of left parietal resection cavity   Slightly decreased prominent nodular area of restricted diffusion along the left posterolateral margin of resection cavity which may be due to resolving blood products  No definite evidence of   recurrent tumor in left parietal resection cavity  - No acute infarction  "    I have asked neurosurgery to review the images again on 06/16/2022 - no recommendation for acute intervention    Patient still significantly symptomatic  Increase decadron to 4mg q6  Add meclizine scheduled  Start IVF  PT/OT  Patient requires continued stay          VTE Pharmacologic Prophylaxis:   Pharmacologic: Pharmacologic VTE Prophylaxis contraindicated due to Intracranial lesions with bleeding  Mechanical VTE Prophylaxis in Place: Yes    Patient Centered Rounds: I have performed bedside rounds with nursing staff today      Discussions with Specialists or Other Care Team Provider: Discussed with care management team    Education and Discussions with Family / Patient: Patient, wife at the bedside    Time Spent for Care: 45 minutes  More than 50% of total time spent on counseling and coordination of care as described above  Current Length of Stay: 0 day(s)    Current Patient Status: Observation   Certification Statement: The patient will continue to require additional inpatient hospital stay due to need for neurosurgical evaluation    Discharge Plan: Once stable    Code Status: Level 1 - Full Code      Subjective:     Patient evaluated this afternoon  Continues to complain of significant dizziness and lightheadedness, significant difficulty with ambulation coordination  Objective:     Vitals:   Temp (24hrs), Av °F (36 7 °C), Min:98 °F (36 7 °C), Max:98 °F (36 7 °C)    Temp:  [98 °F (36 7 °C)] 98 °F (36 7 °C)  HR:  [] 96  Resp:  [13-38] 20  BP: (116-151)/(68-91) 126/76  SpO2:  [93 %-98 %] 95 %  Body mass index is 27 84 kg/m²  Input and Output Summary (last 24 hours): Intake/Output Summary (Last 24 hours) at 2022 1500  Last data filed at 6/15/2022 1707  Gross per 24 hour   Intake 1000 ml   Output --   Net 1000 ml       Physical Exam:     Physical Exam  Vitals and nursing note reviewed  Constitutional:       Appearance: Normal appearance  Comments: Male patient in bed, awake   HENT:      Head: Normocephalic and atraumatic  Right Ear: External ear normal       Left Ear: External ear normal       Nose: Nose normal  No congestion or rhinorrhea  Mouth/Throat:      Mouth: Mucous membranes are moist       Pharynx: Oropharynx is clear  No oropharyngeal exudate or posterior oropharyngeal erythema  Eyes:      General: No scleral icterus  Right eye: No discharge  Left eye: No discharge  Pupils: Pupils are equal, round, and reactive to light  Neck:      Vascular: No carotid bruit  Cardiovascular:      Rate and Rhythm: Normal rate and regular rhythm  Pulses: Normal pulses  Heart sounds: No murmur heard  No friction rub  No gallop  Pulmonary:      Effort: Pulmonary effort is normal  No respiratory distress  Breath sounds: Normal breath sounds  No stridor  No wheezing, rhonchi or rales  Abdominal:      General: Abdomen is flat  Bowel sounds are normal  There is no distension  Palpations: Abdomen is soft  There is no mass  Tenderness: There is no abdominal tenderness  There is no guarding or rebound  Hernia: No hernia is present  Musculoskeletal:         General: No swelling, tenderness, deformity or signs of injury  Normal range of motion  Cervical back: Normal range of motion  No rigidity  No muscular tenderness  Lymphadenopathy:      Cervical: No cervical adenopathy  Skin:     General: Skin is warm and dry  Capillary Refill: Capillary refill takes less than 2 seconds  Coloration: Skin is not jaundiced or pale  Findings: No bruising or erythema  Neurological:      General: No focal deficit present  Mental Status: He is alert and oriented to person, place, and time  Mental status is at baseline  Cranial Nerves: No cranial nerve deficit  Sensory: No sensory deficit  Motor: No weakness  Coordination: Coordination normal       Deep Tendon Reflexes: Reflexes normal    Psychiatric:         Mood and Affect: Mood normal          Behavior: Behavior normal          Thought Content:  Thought content normal          Judgment: Judgment normal            Additional Data:     Labs:    Results from last 7 days   Lab Units 06/16/22  0611 06/15/22  1606   WBC Thousand/uL 2 68* 3 97*   HEMOGLOBIN g/dL 11 4* 12 8   HEMATOCRIT % 33 6* 37 7   PLATELETS Thousands/uL 152 152   NEUTROS PCT %  --  70   LYMPHS PCT %  --  17   MONOS PCT %  --  9   EOS PCT %  --  2     Results from last 7 days   Lab Units 06/16/22  0611 06/15/22  1606   SODIUM mmol/L 139 139   POTASSIUM mmol/L 3 9 3 5   CHLORIDE mmol/L 105 101   CO2 mmol/L 22 24   BUN mg/dL 13 13   CREATININE mg/dL 1 00 1 08   ANION GAP mmol/L 12 14*   CALCIUM mg/dL 8 6 9 2   ALBUMIN g/dL  --  3 2*   TOTAL BILIRUBIN mg/dL  --  0 38   ALK PHOS U/L  --  90   ALT U/L  --  27   AST U/L  --  17   GLUCOSE RANDOM mg/dL 158* 98     Results from last 7 days   Lab Units 06/15/22  1606   INR  1 00                       * I Have Reviewed All Lab Data Listed Above  * Additional Pertinent Lab Tests Reviewed: Cm 66 Admission Reviewed    Recent Cultures (last 7 days):           Last 24 Hours Medication List:   Current Facility-Administered Medications   Medication Dose Route Frequency Provider Last Rate    allopurinol  100 mg Oral Daily Raphael BANSAL MD      amLODIPine  10 mg Oral Daily Raphael BANSAL MD      atorvastatin  20 mg Oral Daily With Dinner Carlos Dangelo MD      citalopram  10 mg Oral BID Carlos Dangelo MD      dexamethasone  4 mg Oral Q6H Vishnu Brooks MD      doxepin  25 mg Oral HS Carlos Dangelo MD      levETIRAcetam  500 mg Oral Q12H [facility] Raphael Chan MD      levothyroxine  37 5 mcg Oral Early Morning Carlos Dangelo MD      meclizine  25 mg Oral [facility] Natty Augustin MD      metoprolol succinate  25 mg Oral Daily Carlos Dangelo MD      pantoprazole  40 mg Oral Daily Carlos Dangelo MD      sodium chloride  125 mL/hr Intravenous Continuous Natty Augustin MD          Today, Patient Was Seen By: Natty Augustin MD    ** Please Note: Dictation voice to text software may have been used in the creation of this document   **

## 2022-06-16 NOTE — PLAN OF CARE
Problem: Potential for Falls  Goal: Patient will remain free of falls  Description: INTERVENTIONS:  - Educate patient/family on patient safety including physical limitations  - Instruct patient to call for assistance with activity   - Consult OT/PT to assist with strengthening/mobility   - Keep Call bell within reach  - Keep bed low and locked with side rails adjusted as appropriate  - Keep care items and personal belongings within reach  - Initiate and maintain comfort rounds  - Make Fall Risk Sign visible to staff  - Apply yellow socks and bracelet for high fall risk patients  - Consider moving patient to room near nurses station  Outcome: Progressing     Problem: MOBILITY - ADULT  Goal: Maintain or return to baseline ADL function  Description: INTERVENTIONS:  -  Assess patient's ability to carry out ADLs; assess patient's baseline for ADL function and identify physical deficits which impact ability to perform ADLs (bathing, care of mouth/teeth, toileting, grooming, dressing, etc )  - Assess/evaluate cause of self-care deficits   - Assess range of motion  - Assess patient's mobility; develop plan if impaired  - Assess patient's need for assistive devices and provide as appropriate  - Encourage maximum independence but intervene and supervise when necessary  - Involve family in performance of ADLs  - Assess for home care needs following discharge   - Consider OT consult to assist with ADL evaluation and planning for discharge  - Provide patient education as appropriate  Outcome: Progressing  Goal: Maintains/Returns to pre admission functional level  Description: INTERVENTIONS:  - Perform BMAT or MOVE assessment daily    - Set and communicate daily mobility goal to care team and patient/family/caregiver     - Collaborate with rehabilitation services on mobility goals if consulted  - Out of bed for toileting  - Record patient progress and toleration of activity level   Outcome: Progressing     Problem: PAIN - ADULT  Goal: Verbalizes/displays adequate comfort level or baseline comfort level  Description: Interventions:  - Encourage patient to monitor pain and request assistance  - Assess pain using appropriate pain scale  - Administer analgesics based on type and severity of pain and evaluate response  - Implement non-pharmacological measures as appropriate and evaluate response  - Consider cultural and social influences on pain and pain management  - Notify physician/advanced practitioner if interventions unsuccessful or patient reports new pain  Outcome: Progressing     Problem: INFECTION - ADULT  Goal: Absence or prevention of progression during hospitalization  Description: INTERVENTIONS:  - Assess and monitor for signs and symptoms of infection  - Monitor lab/diagnostic results  - Monitor all insertion sites, i e  indwelling lines, tubes, and drains  - Monitor endotracheal if appropriate and nasal secretions for changes in amount and color  - [place] appropriate cooling/warming therapies per order  - Administer medications as ordered  - Instruct and encourage patient and family to use good hand hygiene technique  - Identify and instruct in appropriate isolation precautions for identified infection/condition  Outcome: Progressing  Goal: Absence of fever/infection during neutropenic period  Description: INTERVENTIONS:  - Monitor WBC    Outcome: Progressing     Problem: SAFETY ADULT  Goal: Patient will remain free of falls  Description: INTERVENTIONS:  - Educate patient/family on patient safety including physical limitations  - Instruct patient to call for assistance with activity   - Consult OT/PT to assist with strengthening/mobility   - Keep Call bell within reach  - Keep bed low and locked with side rails adjusted as appropriate  - Keep care items and personal belongings within reach  - Initiate and maintain comfort rounds  - Make Fall Risk Sign visible to staff  - Apply yellow socks and bracelet for high fall risk patients  - Consider moving patient to room near nurses station  Outcome: Progressing  Goal: Maintain or return to baseline ADL function  Description: INTERVENTIONS:  -  Assess patient's ability to carry out ADLs; assess patient's baseline for ADL function and identify physical deficits which impact ability to perform ADLs (bathing, care of mouth/teeth, toileting, grooming, dressing, etc )  - Assess/evaluate cause of self-care deficits   - Assess range of motion  - Assess patient's mobility; develop plan if impaired  - Assess patient's need for assistive devices and provide as appropriate  - Encourage maximum independence but intervene and supervise when necessary  - Involve family in performance of ADLs  - Assess for home care needs following discharge   - Consider OT consult to assist with ADL evaluation and planning for discharge  - Provide patient education as appropriate  Outcome: Progressing  Goal: Maintains/Returns to pre admission functional level  Description: INTERVENTIONS:  - Perform BMAT or MOVE assessment daily    - Set and communicate daily mobility goal to care team and patient/family/caregiver     - Collaborate with rehabilitation services on mobility goals if consulted  - Out of bed for toileting  - Record patient progress and toleration of activity level   Outcome: Progressing     Problem: DISCHARGE PLANNING  Goal: Discharge to home or other facility with appropriate resources  Description: INTERVENTIONS:  - Identify barriers to discharge w/patient and caregiver  - Arrange for needed discharge resources and transportation as appropriate  - Identify discharge learning needs (meds, wound care, etc )  - Arrange for interpretive services to assist at discharge as needed  - Refer to Case Management Department for coordinating discharge planning if the patient needs post-hospital services based on physician/advanced practitioner order or complex needs related to functional status, cognitive ability, or social support system  Outcome: Progressing     Problem: Knowledge Deficit  Goal: Patient/family/caregiver demonstrates understanding of disease process, treatment plan, medications, and discharge instructions  Description: Complete learning assessment and assess knowledge base    Interventions:  - Provide teaching at level of understanding  - Provide teaching via preferred learning methods  Outcome: Progressing

## 2022-06-16 NOTE — TELEMEDICINE
On-Call Telephone Note    Contacted by Dr Ervin Faria at [facility] 69 y o  male MRN: [id number]  Unit/Bed#: ED 13 Encounter: [id number]    Per provider report, patient presents with worsening dizziness, lightheadedness, tinnitus and visual complaints  He is s/p left parietal craniotomy and resection of neuroendocrine mets tumor on 4/7/2022 as well as whole brain radiation  Available past medical history,social history, surgical history, medication list, drug allergies and review of systems were reviewed  /76 (BP Location: Left arm)   Pulse 96   Temp 98 °F (36 7 °C) (Oral)   Resp 20   Ht 5' 10" (1 778 m)   Wt 88 kg (194 lb)   SpO2 95%   BMI 27 84 kg/m²      Clinical exam per provider report, continues with above complaints       Imaging personally reviewed  MRI brain w wo contrast 6/16/2022:  Mixed treatment response since 4/26/2022  - Slightly increased size of left parietal cortex, slightly increased size of left posterolateral cerebellar, and slightly decreased size of left posterior cerebellar hemorrhagic metastatic lesions   - Decreased enhancement of left parietal resection cavity  Slightly decreased prominent nodular area of restricted diffusion along the left posterolateral margin of resection cavity which may be due to resolving blood products  No definite evidence of   recurrent tumor in left parietal resection cavity   - No acute infarction  Assessment and Plan  1  Imaging reviewed with Dr Roderick Garcia - while there are slight interval changes in tumor sizes, both increases and decreases in size, these changes are minimal without evidence of hydrocephalous, mass effect, ventriclar compression, etc   2  Would not transfer, no need for additional surgery  3  Recommend ongoing medical management  4  Recommend oncology consult for additional recommendations; neuroendocrine process does better with medical management over operative management  5   Signed off, please call with questions or concerns    All questions answered  Provider is in agreement with the course of action  A total of 20 minutes was spent discussing the presentation, physical exam and formulating a management plan with the provider

## 2022-06-16 NOTE — RESPIRATORY THERAPY NOTE
RT Ventilator Management Note  Diana Anand 69 y o  male MRN: [id number]  Unit/Bed#: ED 13 Encounter: [id number]      Daily Screen    No data found in the last 10 encounters             Physical Exam:          Resp Comments: (P) Pt  stated that he does not use the CPAP at home and does not intend to use one while at this hospital

## 2022-06-16 NOTE — ASSESSMENT & PLAN NOTE
Present on admission history of paroxysmal AFib  Currently EKG noted with normal sinus rhythm  Resume home dose of Toprol XL 25 mg daily  Not on anticoagulation

## 2022-06-16 NOTE — ASSESSMENT & PLAN NOTE
69year old male patient with past medical history of lung cancer with Mets to brain status post left parietal craniotomy and resection of hemorrhagic mass  Completed radiation 2 weeks ago  has been having episodes of lightheadedness and dizziness  Presents to emergency room at [facility] complaining of dizziness associated with bilateral hearing impairment, bilateral tinnitus  Today's labs reviewed  Noted with mild leukopenia  CT head report reviewed and noted with enlargement of previously noted left parietal hemorrhagic region  Above finding has been discussed with on-call Neurosurgery and recommended to start on Decadron and follow-up with brain MRI as per ED report  On my encounter patient appears comfortable not in distress  DDx includes vertigo vs orthostatic hypotension vs meniere's  dz vs r/o posterior circulation CVA though less likely since symptoms intermittent  Continue IV Decadron 2 mg b i d  As per above  Follow-up with brain MRI  F/u with neurosurgery recommendation after brain MRI  Continue supportive care  Patient is agreeable with above plan  Unable to get hold of patient's wife at this time

## 2022-06-16 NOTE — ASSESSMENT & PLAN NOTE
Present on admission history of hypothyroidism  Resume home dose of levothyroxine 37 5mcg daily  Of note:  Patient does not know much about his medication dosing therefore  follow-up with wife for medication reconciliation

## 2022-06-17 VITALS
TEMPERATURE: 97.9 F | RESPIRATION RATE: 12 BRPM | HEIGHT: 70 IN | WEIGHT: 194 LBS | HEART RATE: 81 BPM | SYSTOLIC BLOOD PRESSURE: 135 MMHG | BODY MASS INDEX: 27.77 KG/M2 | OXYGEN SATURATION: 95 % | DIASTOLIC BLOOD PRESSURE: 82 MMHG

## 2022-06-17 LAB
ANION GAP SERPL CALCULATED.3IONS-SCNC: 11 MMOL/L (ref 4–13)
BASOPHILS # BLD AUTO: 0.01 THOUSANDS/ΜL (ref 0–0.1)
BASOPHILS NFR BLD AUTO: 0 % (ref 0–1)
BUN SERPL-MCNC: 17 MG/DL (ref 5–25)
CALCIUM SERPL-MCNC: 9.3 MG/DL (ref 8.3–10.1)
CHLORIDE SERPL-SCNC: 104 MMOL/L (ref 100–108)
CO2 SERPL-SCNC: 25 MMOL/L (ref 21–32)
CREAT SERPL-MCNC: 1.09 MG/DL (ref 0.6–1.3)
EOSINOPHIL # BLD AUTO: 0 THOUSAND/ΜL (ref 0–0.61)
EOSINOPHIL NFR BLD AUTO: 0 % (ref 0–6)
ERYTHROCYTE [DISTWIDTH] IN BLOOD BY AUTOMATED COUNT: 15.9 % (ref 11.6–15.1)
GFR SERPL CREATININE-BSD FRML MDRD: 68 ML/MIN/1.73SQ M
GLUCOSE SERPL-MCNC: 160 MG/DL (ref 65–140)
HCT VFR BLD AUTO: 33.6 % (ref 36.5–49.3)
HGB BLD-MCNC: 11.4 G/DL (ref 12–17)
IMM GRANULOCYTES # BLD AUTO: 0.03 THOUSAND/UL (ref 0–0.2)
IMM GRANULOCYTES NFR BLD AUTO: 1 % (ref 0–2)
LYMPHOCYTES # BLD AUTO: 0.37 THOUSANDS/ΜL (ref 0.6–4.47)
LYMPHOCYTES NFR BLD AUTO: 8 % (ref 14–44)
MCH RBC QN AUTO: 33.8 PG (ref 26.8–34.3)
MCHC RBC AUTO-ENTMCNC: 33.9 G/DL (ref 31.4–37.4)
MCV RBC AUTO: 100 FL (ref 82–98)
MONOCYTES # BLD AUTO: 0.12 THOUSAND/ΜL (ref 0.17–1.22)
MONOCYTES NFR BLD AUTO: 3 % (ref 4–12)
NEUTROPHILS # BLD AUTO: 4.28 THOUSANDS/ΜL (ref 1.85–7.62)
NEUTS SEG NFR BLD AUTO: 88 % (ref 43–75)
NRBC BLD AUTO-RTO: 0 /100 WBCS
PLATELET # BLD AUTO: 181 THOUSANDS/UL (ref 149–390)
PMV BLD AUTO: 9.3 FL (ref 8.9–12.7)
POTASSIUM SERPL-SCNC: 3.9 MMOL/L (ref 3.5–5.3)
RBC # BLD AUTO: 3.37 MILLION/UL (ref 3.88–5.62)
SODIUM SERPL-SCNC: 140 MMOL/L (ref 136–145)
WBC # BLD AUTO: 4.81 THOUSAND/UL (ref 4.31–10.16)

## 2022-06-17 PROCEDURE — 99239 HOSP IP/OBS DSCHRG MGMT >30: CPT | Performed by: INTERNAL MEDICINE

## 2022-06-17 PROCEDURE — 85025 COMPLETE CBC W/AUTO DIFF WBC: CPT | Performed by: INTERNAL MEDICINE

## 2022-06-17 PROCEDURE — 97166 OT EVAL MOD COMPLEX 45 MIN: CPT

## 2022-06-17 PROCEDURE — 97162 PT EVAL MOD COMPLEX 30 MIN: CPT

## 2022-06-17 PROCEDURE — 80048 BASIC METABOLIC PNL TOTAL CA: CPT | Performed by: INTERNAL MEDICINE

## 2022-06-17 RX ORDER — MECLIZINE HYDROCHLORIDE 25 MG/1
25 TABLET ORAL EVERY 8 HOURS PRN
Qty: 30 TABLET | Refills: 0 | Status: ON HOLD | OUTPATIENT
Start: 2022-06-17

## 2022-06-17 RX ORDER — DEXAMETHASONE 4 MG/1
4 TABLET ORAL 2 TIMES DAILY WITH MEALS
Qty: 60 TABLET | Refills: 0 | Status: SHIPPED | OUTPATIENT
Start: 2022-06-17 | End: 2022-08-07

## 2022-06-17 RX ADMIN — DEXAMETHASONE 4 MG: 4 TABLET ORAL at 05:49

## 2022-06-17 RX ADMIN — AMLODIPINE BESYLATE 10 MG: 10 TABLET ORAL at 09:56

## 2022-06-17 RX ADMIN — LEVOTHYROXINE SODIUM 37.5 MCG: 75 TABLET ORAL at 05:49

## 2022-06-17 RX ADMIN — METOPROLOL SUCCINATE 25 MG: 25 TABLET, EXTENDED RELEASE ORAL at 09:56

## 2022-06-17 RX ADMIN — DEXAMETHASONE 4 MG: 4 TABLET ORAL at 09:56

## 2022-06-17 RX ADMIN — LEVETIRACETAM 500 MG: 500 TABLET, FILM COATED ORAL at 09:56

## 2022-06-17 RX ADMIN — PANTOPRAZOLE SODIUM 40 MG: 40 TABLET, DELAYED RELEASE ORAL at 09:56

## 2022-06-17 RX ADMIN — ALLOPURINOL 100 MG: 100 TABLET ORAL at 09:56

## 2022-06-17 RX ADMIN — CITALOPRAM HYDROBROMIDE 10 MG: 20 TABLET ORAL at 09:56

## 2022-06-17 RX ADMIN — MECLIZINE HYDROCHLORIDE 25 MG: 25 TABLET ORAL at 05:49

## 2022-06-17 NOTE — DISCHARGE INSTR - AVS FIRST PAGE
Take dexamethasone 4mg twice a day - new script sent    Take meclizine 25 mg every 8 hours    Stay well hydrated    Follow-up with Neurosurgery    Follow-up with Oncology

## 2022-06-17 NOTE — DISCHARGE SUMMARY
[address] [facility]  Discharge- Elba Patricio 1955, 69 y o  male MRN: [id number]  Unit/Bed#: -02 Encounter: [id number]  Primary Care Provider: Abdiel Yao DO   Date and time admitted to hospital: 6/15/2022  3:11 PM    Discharge diagnosis:    Dizziness, acute on chronic, likely multifactorial including dehydration, lack of steroids which were recently discontinued  History of lung cancer with brain metastasis  Chronic kidney disease  Hypertension  Hypothyroidism  Paroxysmal atrial fibrillation  Thrombocytopenia  Obstructive sleep apnea    Discharging Physician / Practitioner: Kathy Arellano MD  PCP: Abdiel Yao DO  Admission Date:   Admission Orders (From admission, onward)     Ordered        06/16/22 1540  Inpatient Admission  Once            06/15/22 1742  Place in Observation  Once                      Discharge Date: 06/17/22    Consultations During Hospital Stay:  · Neurosurgery    Significant Findings / Test Results:   MRI brain w wo contrast [[id number]] Collected: 06/16/22 1028   Order Status: Completed Updated: 06/16/22 1049   Narrative:     MRI BRAIN WITH AND WITHOUT CONTRAST     INDICATION: History of left parietal craniotomy due to metastatic hemorrhagic mass   Presents with complaining of dizziness        COMPARISON:  CT head without contrast 6/15/2022   MRI brain with and without contrast 4/26/2022        TECHNIQUE:   Sagittal T1, axial T2, axial FLAIR, axial T1, axial [place], axial diffusion  Sagittal, axial T1 postcontrast   Axial bravo postcontrast with coronal reconstructions          IV Contrast:  8 mL of Gadobutrol injection (SINGLE-DOSE)       IMAGE QUALITY:   Diagnostic       FINDINGS:     BRAIN PARENCHYMA: Postsurgical changes of left parietal craniotomy for resection of left parietal mass   Unchanged left parietal resection cavity with encephalomalacia, gliosis, and peripheral hemosiderin deposition   Slightly decreased prominent nodular   Jyothi Islas of restricted diffusion along the left posterolateral margin of resection cavity which may be due to resolving blood products (3:20)   Decreased enhancement of left parietal resection cavity with small residual posterior focal linear enhancement      No definite evidence of recurrent tumor in left parietal resection cavity  Hemorrhagic enhancing metastatic lesions, as detailed below:   -0 7 x 0 5 cm left parietal cortex lesion (11:95), previously 0 5 x 0 3 cm    -2 3 x 1 8 cm left posterolateral cerebellar lesion (11:45), previously 2 1 x 1 4 cm   Mild perilesional vasogenic edema    -1 0 x 0 8 cm left posterior cerebellar lesion (11:45), previously 1 6 x 1 4 cm  No mass effect or midline shift  No diffusion-weighted signal abnormality to suggest acute infarction  VENTRICLES:  Normal for the patient's age  SELLA AND PITUITARY GLAND:  Normal      ORBITS:  Normal      PARANASAL SINUSES:  Moderate size right maxillary mucus retention cyst      VASCULATURE:  Evaluation of the major intracranial vasculature demonstrates appropriate flow voids  CALVARIUM AND SKULL BASE:  Normal      MASTOIDS: Small bilateral mastoid effusions (right greater than left)  EXTRACRANIAL SOFT TISSUES:  Normal     Impression:       Mixed treatment response since 4/26/2022   - Slightly increased size of left parietal cortex, slightly increased size of left posterolateral cerebellar, and slightly decreased size of left posterior cerebellar hemorrhagic metastatic lesions    - Decreased enhancement of left parietal resection cavity   Slightly decreased prominent nodular area of restricted diffusion along the left posterolateral margin of resection cavity which may be due to resolving blood products  No definite evidence of   recurrent tumor in left parietal resection cavity    - No acute infarction       Workstation performed: [id number]    CT chest abdomen pelvis wo contrast [[id number]] Collected: 06/15/22 3549   Order Status: Completed Updated: 06/15/22 1833   Narrative:     CT CHEST, ABDOMEN AND PELVIS WITHOUT IV CONTRAST     INDICATION: Dizziness right visual disturbance and anorexia        COMPARISON:  4/4/2022  TECHNIQUE: CT examination of the chest, abdomen and pelvis was performed without intravenous contrast  This examination was performed without intravenous contrast in the context of the critical nationwide Omnipaque shortage  Axial, sagittal, and coronal   2D reformatted images were created from the source data and submitted for interpretation  Radiation dose length product (DLP) for this visit:  243 mGy-cm    This examination, like all CT scans performed in the [facility], was performed utilizing techniques to minimize radiation dose exposure, including the use of iterative   reconstruction and automated exposure control  Enteric contrast was administered  FINDINGS:     CHEST     LUNGS:  Postsurgical change from left upper lobectomy   Scarring in the anteromedial aspect of the left lower lobe likely reflecting radiotherapy changes   Mild to moderate panlobular emphysema  Teryl Block is no tracheal or endobronchial lesion  PLEURA:  No effusion  HEART/GREAT VESSELS: Normal heart size   No thoracic aortic aneurysm  MEDIASTINUM AND OWEN:  No lymphadenopathy  CHEST WALL AND LOWER NECK:  Unremarkable  ABDOMEN     LIVER/BILIARY TREE:  Unremarkable  GALLBLADDER:  No calcified gallstones  No pericholecystic inflammatory change  SPLEEN:  Unremarkable  PANCREAS:  Unremarkable  ADRENAL GLANDS:  Unremarkable  KIDNEYS/URETERS:  Exophytic right renal cysts measuring up to 1 9 cm   Cortical 1 4 cm cyst   Left renal exophytic cysts measuring up to 2 6 cm and possible subcentimeter cortical cysts   No nephrolithiasis or obstructive uropathy     STOMACH AND BOWEL:  Diverticulosis without evidence of diverticulitis or colitis   No findings to indicate bowel obstruction  APPENDIX:  Normal appendix  ABDOMINOPELVIC CAVITY:  No free intraperitoneal air, fluid collection or lymphadenopathy  VESSELS:  Extensive calcified plaque throughout the abdominal aorta and iliac arteries  PELVIS     REPRODUCTIVE ORGANS:  Mild prostate enlargement  URINARY BLADDER:  Unremarkable  ABDOMINAL WALL/INGUINAL REGIONS:  Unremarkable  OSSEOUS STRUCTURES:  Postsurgical change in the left ribs from thoracotomy  Impression:         No evidence of metastatic disease or acute process in the chest, abdomen or pelvis  Workstation performed: [id number]    CT head without contrast [[id number]] Collected: 06/15/22 1551   Order Status: Completed Updated: 06/15/22 1607   Narrative:     CT BRAIN - WITHOUT CONTRAST     INDICATION:   dizzy, blurred vision right eye  COMPARISON:  April 3, 2022, previous MRI from April 26, 2022     TECHNIQUE:  CT examination of the brain was performed   In addition to axial images, sagittal and coronal 2D reformatted images were created and submitted for interpretation  Radiation dose length product (DLP) for this visit:  471 mGy-cm    This examination, like all CT scans performed in the [facility], was performed utilizing techniques to minimize radiation dose exposure, including the use of iterative   reconstruction and automated exposure control        IMAGE QUALITY:  Diagnostic       FINDINGS:     PARENCHYMA: Mixed attenuation lesion seen in the left cerebellum, measuring 1 6 x 1 8 cm   Previously this was measuring about 1 8 x 2 1 cm   On the previous study hemorrhage was noted in the left parietal region  Bevelyn Abdirahman is replaced by area of encephalomalacia   There are overlying postsurgical changes from left parietal craniectomy     There is a posterior left parietal subcortical lesion, measures 9 mm x 9 mm   This is larger from the previous study   Previously this was measuring 5 mm x 5 mm, seen in image 18 series 10 of the MRI from April 26, 2022     VENTRICLES AND EXTRA-AXIAL SPACES:  Ventricles remain unchanged     VISUALIZED ORBITS AND PARANASAL SINUSES:  Unremarkable  Chronic mucosal thickening seen right maxillary sinus   CALVARIUM AND EXTRACRANIAL SOFT TISSUES:  There are postsurgical changes from the left parietal craniectomy   Mild high attenuation seen in the left posterior parietal region underlying the craniectomy flap, postoperative    Impression:       There is enlargement of the previously noted hemorrhagic left posterior parietal region which now measures 9 mm x 9 mm, previously measuring 5 mm x 5 mm, (image 30 series 2)     Development of encephalomalacia in the region of previously noted the hemorrhagic lesion in the left parietal region     The previously noted hemorrhagic left cerebellar lesion demonstrates heterogenous attenuation with stable size  Outpatient follow-up Requested:  · Primary care physician  · Oncology  · Neurosurgery    Complications:  None    Reason for Admission:  Dizziness    HPI:  Arlene Reddy is a 69 y o  male with a PMH of paroxysmal AFib, obstructive sleep apnea, hypothyroidism, thrombocytopenia, CKD, hypertension, psoriasis, history of lung cancer with metastasis to brain, neuroendocrine tumor status post lung resection as well as status post left parietal craniotomy and resection of neuroendocrine tumor, completed whole-brain radiation therapy 2 weeks ago, who presents with complaining of lightheadedness  Patient reports lightheadedness gets worse with changing position gets better with resting  Associated with bilateral tinnitus, bilateral hearing impairment  Also complaing of blurred vision on the right side however not overly concerned of this symptom, decreased appetite, difficulty with writing with right hand    Patient reports that since surgery he  had this symptoms however he noted symptoms are progressively getting worse which prompted him to come to emergency room for evaluation  He does not know much of his medications and dosing since he relies on his wife for medications  Patient denies chest pain, dyspnea, fever, chills, nausea, vomiting, diarrhea, any new complaints  Case had been discussed with Neurosurgery and recommended to start on IV Decadron and follow-up with brain MRI per ED report  On my encounter patient appears comfortable not in distress  Denies any new complaints  No other events noted  Hospital Course:     Patient was hospitalized, treated with meclizine, IV fluids  A repeat MRI of the brain was done, MRI still shows "Slightly increased size of left parietal cortex, slightly increased size of left posterolateral cerebellar, and slightly decreased size of left posterior cerebellar hemorrhagic metastatic lesions    - Decreased enhancement of left parietal resection cavity   Slightly decreased prominent nodular area of restricted diffusion along the left posterolateral margin of resection cavity which may be due to resolving blood products  No definite evidence of   recurrent tumor in left parietal resection cavity  "  Imaging was reviewed by Neurosurgery, they did not recommend any acute intervention  Patient did mention that he recently discontinue his steroids and that could be accounting for his symptoms as well, he was subsequently started again on Decadron and he appears to feel better with this  He does have some dizziness which is chronic  He was able to ambulate with PT OT without difficulty and the recommend outpatient services  Patient otherwise being discharged in stable condition, he is tolerating p o  Intake and is hemodynamically stable  He should follow-up with primary care physician, Oncology, Neurosurgery    Condition at Discharge: fair     Discharge Day Visit / Exam:     Subjective:    Patient evaluated this morning    Dizziness significantly improved although not completely resolved yet it appears to be his baseline  He is otherwise ambulating without difficulty  Tolerating p o  Intake  Hemodynamically stable  Denies nausea vomiting diarrhea constipation  Other events reported  Vitals: Blood Pressure: 135/82 (06/17/22 0750)  Pulse: 81 (06/16/22 2146)  Temperature: 97 9 °F (36 6 °C) (06/17/22 0750)  Temp Source: Oral (06/16/22 0800)  Respirations: 12 (06/17/22 0750)  Height: 5' 10" (177 8 cm) (06/15/22 1804)  Weight - Scale: 88 kg (194 lb) (06/15/22 1804)  SpO2: 95 % (06/16/22 2146)     Exam:   Physical Exam  Vitals and nursing note reviewed  Constitutional:       Appearance: Normal appearance  Comments: Male patient in bed, awake   HENT:      Head: Normocephalic and atraumatic  Right Ear: External ear normal       Left Ear: External ear normal       Nose: Nose normal  No congestion or rhinorrhea  Mouth/Throat:      Mouth: Mucous membranes are moist       Pharynx: Oropharynx is clear  No oropharyngeal exudate or posterior oropharyngeal erythema  Eyes:      General: No scleral icterus  Right eye: No discharge  Left eye: No discharge  Pupils: Pupils are equal, round, and reactive to light  Neck:      Vascular: No carotid bruit  Cardiovascular:      Rate and Rhythm: Normal rate and regular rhythm  Pulses: Normal pulses  Heart sounds: No murmur heard  No friction rub  No gallop  Pulmonary:      Effort: Pulmonary effort is normal  No respiratory distress  Breath sounds: Normal breath sounds  No stridor  No wheezing, rhonchi or rales  Abdominal:      General: Abdomen is flat  Bowel sounds are normal  There is no distension  Palpations: Abdomen is soft  There is no mass  Tenderness: There is no abdominal tenderness  There is no guarding or rebound  Hernia: No hernia is present  Musculoskeletal:         General: No swelling, tenderness, deformity or signs of injury  Normal range of motion  Cervical back: Normal range of motion   No rigidity  No muscular tenderness  Lymphadenopathy:      Cervical: No cervical adenopathy  Skin:     General: Skin is warm and dry  Capillary Refill: Capillary refill takes less than 2 seconds  Coloration: Skin is not jaundiced or pale  Findings: No bruising or erythema  Neurological:      General: No focal deficit present  Mental Status: He is alert and oriented to person, place, and time  Mental status is at baseline  Cranial Nerves: No cranial nerve deficit  Sensory: No sensory deficit  Motor: No weakness  Coordination: Coordination normal       Deep Tendon Reflexes: Reflexes normal       Comments: GCS 15, AAO x4, no facial mimic changes, no gross motor or sensitive deficits noted  Finger-to-nose normal   Psychiatric:         Mood and Affect: Mood normal          Behavior: Behavior normal          Thought Content: Thought content normal          Judgment: Judgment normal            Discussion with Family:  Wife has been updated previously    Discharge instructions/Information to patient and family:   See after visit summary for information provided to patient and family  Provisions for Follow-Up Care:  See after visit summary for information related to follow-up care and any pertinent home health orders  Disposition:     Home    For Discharges to [facility]:   · Not Applicable to this Patient - Not Applicable to this Patient    Planned Readmission: No     Discharge Statement:  I spent 45 minutes discharging the patient  This time was spent on the day of discharge  I had direct contact with the patient on the day of discharge  Greater than 50% of the total time was spent examining patient, answering all patient questions, arranging and discussing plan of care with patient as well as directly providing post-discharge instructions  Additional time then spent on discharge activities      Discharge Medications:  See after visit summary for reconciled discharge medications provided to patient and family        ** Please Note: This note has been constructed using a voice recognition system **

## 2022-06-17 NOTE — PLAN OF CARE
Problem: OCCUPATIONAL THERAPY ADULT  Goal: Performs self-care activities at highest level of function for planned discharge setting  See evaluation for individualized goals  Description: Treatment Interventions: ADL retraining, Functional transfer training, Visual perceptual retraining, UE strengthening/ROM, Endurance training, Patient/family training, Compensatory technique education, Activityengagement  Equipment Recommended: Bedside commode       See flowsheet documentation for full assessment, interventions and recommendations  Note: Limitation: Decreased ADL status, Decreased UE strength, Decreased endurance, Decreased self-care trans, Decreased high-level ADLs  Prognosis: Good  Assessment: Patient is a 69 y o  male seen for OT evaluation s/p admit to [facility]  on 6/15/2022 w/Dizziness  Commorbidities affecting patient's functional performance at time of assessment include: brain metastases, CKD, HTN, A-fib, and thrombocytopenia  Orders placed for OT evaluation and treatment and up with assistance  Performed at least two patient identifiers during session including name and wristband  Prior to admission, Patient requires assistance with ADLs/IADLs, ambulatory with no AD, and lives with wife in a three story house  Personal factors affecting patient at time of initial evaluation include: steps to enter, difficulty performing ADLs and difficulty performing IADLs  Upon evaluation, patient requires supervision assist for UB ADLs, minimal  assist for LB ADLs, transfers and functional ambulation in room and bathroom with supervision and contact guard assist, with the use of no AD  Patient is oriented x 4  Occupational performance is affected by the following deficits: decreased muscle strength, dynamic sit/ stand balance deficit with poor standing tolerance time for self care and functional mobility, decreased activity tolerance and delayed righting and equilibrium reactions   Patient to benefit from continued Occupational Therapy treatment while in the hospital to address deficits as defined above and maximize level of functional independence with ADLs and functional mobility  Occupational Performance areas to address include: grooming , bathing/ shower, dressing, toilet hygiene, transfer to all surfaces, functional ambulation, medication routine/ management, IADLS: Household maintenance and IADLs: safety procedures  From OT standpoint, recommendation at time of d/c would be Home with outpatient rehabilitation       OT Discharge Recommendation: Home with outpatient rehabilitation  OT - OK to Discharge: Yes (Once medically cleared)     Cami Carrasco OT

## 2022-06-17 NOTE — PLAN OF CARE
Problem: PHYSICAL THERAPY ADULT  Goal: Performs mobility at highest level of function for planned discharge setting  See evaluation for individualized goals  Description: Treatment/Interventions: Elevations, Therapeutic exercise, Endurance training, Gait training, OT, Spoke to nursing, Patient/family training, Functional transfer training, LE strengthening/ROM  Equipment Recommended: Other (Comment) (none)       See flowsheet documentation for full assessment, interventions and recommendations  6/17/2022 1523 by Luis Hart PT  Note: Prognosis: Good  Problem List: Decreased mobility, Impaired balance, Decreased endurance, Decreased strength  Assessment: Pt is a 69 y o  male seen for moderate complexity PT evalution on 6/17/22 s/p admit to [address] [place] [address] on 6/15/22 with chief complaint of lightheadedness and dizziness  PT consulted to evaluate and treat pt's functional mobility and safe d/c/ needs  Pt lives in two level home with FOS to bedroom  Pt receives assistance from spouse for ADLs/IADLs at baseline, and does not use AD for mobility  Upon evaluation, pt presented with decreased strength, balance, mobility, and endurance  Pt required supervision for bed mobility and transfers, CGA x1 for ambulation and stair navigation  Pertient PMH and comorbidites include COPD, irregular heart rate, pericardial effusion, gout, chronic back pain, 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, spinal stenosis of lumbar region with neurogenic claudication, left sided lumbago with sciatica, depression with anxiety, essential hypertension, hyperglycemia, JUNAID, CKD, thrombocytopenia, brain metastases, hypothyroidism, and paorxysmal atrial fibrillation  Personal factors limiting pt at time of eval include increase assistance with mobility, abnormal lab values, and stairs to navigate in home  Pt's overall rehab potential and prognosis is good for return to PLOF   Pt would benefit from further skilled PT to address activity limitations, participation restrictions, and impairments noted above  Assessment tools: 22/24 [facility], 3 modified andrews, 55/100 barthel index  PT's d/c recommendation at this time is home with outpatient rehabiliation to focus on higher level balance  PT will continue to assess pt throughout hospital stay to address POC and meet STGs  Barriers to Discharge: Other (Comment), Inaccessible home environment  Barriers to Discharge Comments: pt able to navigate FOS w/ min assist of 1     PT Discharge Recommendation: Home with outpatient rehabilitation (focus on higher level balance training)          See flowsheet documentation for full assessment  6/17/2022 1523 by Jaleel Stearns PT  Note: Prognosis: Good  Problem List: Decreased mobility, Impaired balance, Decreased endurance, Decreased strength  Assessment: Pt is a 69 y o  male seen for moderate complexity PT evalution on 6/17/22 s/p admit to [address] [facility] on 6/15/22 with chief complaint of lightheadedness and dizziness  PT consulted to evaluate and treat pt's functional mobility and safe d/c/ needs  Pt lives in two level home with FOS to bedroom  Pt receives assistance from spouse for ADLs/IADLs at baseline, and does not use AD for mobility  Upon evaluation, pt presented with decreased strength, balance, mobility, and endurance  Pt required supervision for bed mobility and transfers, CGA x1 for ambulation and stair navigation  Pertient PMH and comorbidites include COPD, irregular heart rate, pericardial effusion, gout, chronic back pain, 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, spinal stenosis of lumbar region with neurogenic claudication, left sided lumbago with sciatica, depression with anxiety, essential hypertension, hyperglycemia, JUNAID, CKD, thrombocytopenia, brain metastases, hypothyroidism, and paorxysmal atrial fibrillation   Personal factors limiting pt at time of eval include increase assistance with mobility, abnormal lab values, and stairs to navigate in home  Pt's overall rehab potential and prognosis is good for return to PLOF  Pt would benefit from further skilled PT to address activity limitations, participation restrictions, and impairments noted above  Assessment tools: 22/24 [facility], 3 modified andrews, 55/100 barthel index  PT's d/c recommendation at this time is home with outpatient rehabiliation to focus on higher level balance  PT will continue to assess pt throughout hospital stay to address POC and meet STGs  Barriers to Discharge: Other (Comment), Inaccessible home environment  Barriers to Discharge Comments: pt able to navigate FOS w/ min assist of 1     PT Discharge Recommendation: Home with outpatient rehabilitation (focus on higher level balance training)          See flowsheet documentation for full assessment

## 2022-06-17 NOTE — OCCUPATIONAL THERAPY NOTE
Occupational Therapy Evaluation      [facility]    6/17/2022    Patient Active Problem List   Diagnosis    Renal cyst, right    Acute idiopathic gout of left foot    Back problem    Depression with anxiety    Essential hypertension    Glaucoma    Hypertriglyceridemia    Lumbago with sciatica, left side    Lumbar stenosis    JUNAID (obstructive sleep apnea)    Spinal stenosis of lumbar region with neurogenic claudication    Mixed hyperlipidemia    Bilateral hearing loss    Hyperglycemia    Cigarette nicotine dependence in remission    Adenocarcinoma, lung, left ([facility] [address] )    Encounter for central line care    Drug-induced neutropenia (HCC)    Left non-suppurative otitis media    Bilateral hearing loss due to cerumen impaction    Cancer of upper lobe of left lung (HCC)    Chronic back pain    Former cigarette smoker    History of gout    Hypertension    Proctocolitis    Pericardial effusion    Constipation    Labyrinthitis of both ears    CKD (chronic kidney disease) stage 2, GFR 60-89 ml/min    Platelets decreased (HCC)    Psoriasis    Left parietal hemorrhagic tumor    Thrombocytopenia (HCC)    Goals of care, counseling/discussion    Hypothyroidism    Irregular heart rate    Paroxysmal atrial fibrillation (HCC)    Brain metastases (HCC)    Dizziness    Chronic obstructive pulmonary disease, unspecified COPD type ([facility] )       Past Medical History:   Diagnosis Date    Hyperlipidemia     Hypertension     Lung cancer [facility])        Past Surgical History:   Procedure Laterality Date    BACK SURGERY      CRANIOTOMY Left 4/7/2022    Procedure: Image guided left parietal craniotomy for tumor resection;  Surgeon: Jorge Oliva MD;  [facility] MAIN [facility];  Service: Neurosurgery    HEMORRHOID SURGERY      IR BIOPSY LUNG  5/6/2021    IR PORT PLACEMENT  6/17/2021    LAMINECTOMY  2018    L4-L5    LUNG SURGERY      left upper lobectomy    [place] BRONCHOSCOPY,DIAGNOSTIC N/A 5/25/2021    Procedure: BRONCHOSCOPY FLEXIBLE;  Surgeon: Magdiel Love Mary Tolentino MD;  Location: [facility] MAIN OR;  Service: Thoracic    MA MEDIASTINOSCOPY WITH LYMPH NODE BIOPSY/IES N/A 5/25/2021    Procedure: MEDIASTINOSCOPY, flexible bronchoscopy;  Surgeon: Pina Galvan MD;  Location: [facility] MAIN [facility];  Service: Thoracic    TONSILLECTOMY  1959 06/17/22 0902   OT Last Visit   OT Visit Date 06/17/22   Note Type   Note type Evaluation   Additional Comments Pt agreeable to OT eval  Upon arrival pt supine in bed with HOB elevated  Restrictions/Precautions   Weight Bearing Precautions Per Order No   Braces or Orthoses   (none per pt)   Other Precautions Fall Risk;Multiple lines; Chair Alarm; Bed Alarm   Pain Assessment   Pain Assessment Tool 0-10   Pain Score No Pain   Home Living   Type of Home House   Home Layout Multi-level;Bed/bath upstairs; Access  (0 SHANTA via basement; 2 FOS to bedroom level)   Bathroom Shower/Tub Tub/shower unit   Bathroom Toilet Standard   Bathroom Equipment   (no DME reported)   [address]   (no AD used at baseline)   Prior Function   Level of [place] Needs assistance with IADLs   Lives With Spouse   Receives Help From Family   ADL Assistance Needs assistance  (wife assists c LB ADLs)   IADLs Needs assistance  (Wife does cooking and laundry)   Falls in the last 6 months 1 to 4  (2 falls per pt)   Vocational Retired   Comments (-) driving; wife assists with transportation   Lifestyle   Autonomy Patient requires assistance with ADLs/IADLs, ambulatory with no AD, and lives with wife in a three story house   Reciprocal Relationships Wife assists c ADLs/IADLs   Service to Others Retired   [address] Enjoys sitting on the porch   ADL   Eating Assistance 6  Modified independent   Grooming Assistance 6  [employer] [address] 5  [address] [facility] 4  [address] 5  [address] [facility] Toileting Assistance  4  Minimal Assistance   Additional Comments ADL assist levels based on functional performance during OT eval    Bed Mobility   Supine to Sit 5  Supervision   Additional items Assist x 1;HOB elevated; Bedrails; Increased time required   Sit to Supine   (DNT: pt seated OOB in recliner at end of eval)   Additional Comments Pt denied lightheaded/dizziness with transitional movements   Transfers   Sit to Stand 5  Supervision   Additional items Assist x 1; Increased time required;Verbal cues   Stand to Sit 5  Supervision   Additional items Assist x 1; Armrests; Increased time required;Verbal cues   Additional Comments Verbal cues provided for safety and importance of pacing through tasks  Functional Mobility   Functional Mobility   (CGA)   Additional Comments Patient ambulated household distance in hallway with no overt SOB  Pt noted to have one posterior LOB  Additional items   (no AD used)   Balance   Static Sitting Good   Dynamic Sitting Fair +   Static Standing Fair   Dynamic Standing Fair -   Activity Tolerance   Activity Tolerance Patient tolerated treatment well   Medical Staff Made Aware Pt seen as a co-eval with PT due to the patient's co-morbidities & clinically unstable presentation   Nurse Made Aware RN made aware of outcomes/recs   RUE Assessment   RUE Assessment WFL  (full AROM, 4/5 MMT)   LUE Assessment   LUE Assessment WFL  (full AROM, 4/5 MMT)   Hand Function   Gross Motor Coordination Functional   Fine Motor Coordination Functional   Sensation   Light Touch No apparent deficits   Vision-Basic Assessment   Current Vision Wears glasses all the time   Patient Visual Report   (pt reports chronic depth perception impairment)   Vision - Complex Assessment   Acuity Able to read clock/calendar on wall without difficulty; Able to read employee name badge without difficulty   Cognition   Overall Cognitive Status [facility]   Arousal/Participation Alert; Responsive; Cooperative   Attention Within functional limits   Orientation Level Oriented X4   Memory Within functional limits   Following Commands Follows all commands and directions without difficulty   Comments Mini-Cog assessment performed today  Version 1 was given  Patient able to recall 3/3 words  Patient able to draw a normal clock with the incorrect time  Total score of test was 4/5 indicating no further screening of cognition is required  Cognition Assessment Tools   (Mini-cog)   Score 4   Assessment   Limitation Decreased ADL status; Decreased UE strength;Decreased endurance;Decreased self-care trans;Decreased high-level ADLs   Prognosis Good   Assessment Patient is a 69 y o  male seen for OT evaluation s/p admit to [facility]  on 6/15/2022 w/Dizziness  Commorbidities affecting patient's functional performance at time of assessment include: brain metastases, CKD, HTN, A-fib, and thrombocytopenia  Orders placed for OT evaluation and treatment and up with assistance  Performed at least two patient identifiers during session including name and wristband  Prior to admission, Patient requires assistance with ADLs/IADLs, ambulatory with no AD, and lives with wife in a three story house  Personal factors affecting patient at time of initial evaluation include: steps to enter, difficulty performing ADLs and difficulty performing IADLs  Upon evaluation, patient requires supervision assist for UB ADLs, minimal  assist for LB ADLs, transfers and functional ambulation in room and bathroom with supervision and contact guard assist, with the use of no AD  Patient is oriented x 4  Occupational performance is affected by the following deficits: decreased muscle strength, dynamic sit/ stand balance deficit with poor standing tolerance time for self care and functional mobility, decreased activity tolerance and delayed righting and equilibrium reactions   Patient to benefit from continued Occupational Therapy treatment while in the hospital to address deficits as defined above and maximize level of functional independence with ADLs and functional mobility  Occupational Performance areas to address include: grooming , bathing/ shower, dressing, toilet hygiene, transfer to all surfaces, functional ambulation, medication routine/ management, IADLS: Household maintenance and IADLs: safety procedures  From OT standpoint, recommendation at time of d/c would be Home with outpatient rehabilitation  Goals   Patient Goals to get better   Plan   Treatment Interventions ADL retraining;Functional transfer training;Visual perceptual retraining;UE strengthening/ROM; Endurance training;Patient/family training; Compensatory technique education; Activityengagement   Goal Expiration Date 06/27/22   OT Treatment Day 0   OT Frequency 3-5x/wk   Recommendation   OT Discharge Recommendation Home with outpatient rehabilitation   Equipment Recommended Bedside commode   Commode Type Standard   OT - OK to Discharge Yes  (Once medically cleared)   Additional Comments  At end of eval, pt seated OOB in recliner with all needs met   Additional Comments 2 The patient's raw score on the AM-PAC Daily Activity inpatient short form is 20, standardized score is 42 03, greater than 39 4  Patients at this level are likely to benefit from discharge to home  Please refer to the recommendation of the Occupational Therapist for safe discharge planning     AM-PAC Daily Activity Inpatient   Lower Body Dressing 3   Bathing 3   Toileting 3   Upper Body Dressing 3   Grooming 4   Eating 4   Daily Activity Raw Score 20   Daily Activity Standardized Score (Calc for Raw Score >=11) 42 03   AM-[facility] Applied Cognition Inpatient   Following a Speech/Presentation 4   Understanding Ordinary Conversation 4   Taking Medications 3   Remembering Where Things Are Placed or Put Away 3   Remembering List of 4-5 Errands 3   Taking Care of Complicated Tasks 3   Applied Cognition Raw Score 20   Applied Cognition Standardized Score 41 76     GOALS:    *ADL transfers with (I) for inc'd independence with ADLs/purposeful tasks    *UB ADL with (I) for inc'd independence with self cares    *LB ADL with (I) using AE prn for inc'd independence with self cares    *Toileting with (I) for clothing management and hygiene for return to PLOF with personal care    *Increase stand tolerance x 5  m for inc'd tolerance with standing purposeful tasks    *Participate in 10m UE therex to increase overall stamina/activity tolerance for purposeful tasks    *Bed mobility- (I) for inc'd independence to manage own comfort and initiate EOB & OOB purposeful tasks    *Patient will verbalize 3 safety awareness/ principles to prevent falls in the home setting  *Patient will increase OOB/sitting tolerance to 2-4 hours per day to increase participation in self-care and leisure tasks with no s/s of exertion       Nichol Noel, OTD, OTR/L

## 2022-06-17 NOTE — PROGRESS NOTES
Pastoral Care Progress Note    2022  Patient: Christine Yu : 1955  Admission Date & Time: 6/15/2022 1511  MRN: [id number] [facility]: [id number]      This  responded to a referral from an ED physician to check-in w patient having a difficult recovery from brain tumor surgery  This  spent an hour w pt and pt's wife, Laurence Wright) and offered active listening, compassionate presence, and emotional support  Pt and wife share a deep love and offer one another strong support  Pt has been receiving medical treatment (for lung CA mets to brain) for the last 14 months and is feeling worn down by the process and frustrated by his inability to live his life as ably as he did pre-diagnosis; pt's wife has been the sole care giver during his recovery and feels terribly for the suffering her  is experiencing  This  provided safe space for pt and wife to process some of their stress and trauma of the last year and offered grief counseling, stress management techniques, anxiety containment, humor, and kindness                 Chaplaincy Interventions Utilized:   Empowerment: Clarified, confirmed, or reviewed information from treatment team , Encouraged focus on present, Facilitated group experience, Normalized experience of patient/family, Provided anticipatory guidance, Provided anxiety containment, and Provided chaplaincy education    Exploration: Explored emotional needs & resources, Explored relational needs & resources, Explored spiritual needs & resources, Facilitated story telling, and Identified, evaluated & reinforced appropriate coping strategies    Collaboration: Consulted with interdisciplinary team    Relationship Building: Cultivated a relationship of care and support, Listened empathically, Hospitality, Provided relationship counseling, and Provided silent and supportive presence    Ritual: Provided prayer      Chaplaincy Outcomes Achieved:  Catharsis, Distress reduced, Expressed gratitude, Expressed humor, Expressed intermediate hope, Identified meaningful connections, Tearfully processed emotions, and Verbally processed emotions        Spiritual Coping Strategies Utilized:   Connectedness and Spiritual comfort

## 2022-06-17 NOTE — PHYSICAL THERAPY NOTE
Physical Therapy Evaluation   Time in: 0839  Time out: 0901  Total evaluation time: 22 minutes    Patient's Name: nIdia Jones    Admitting Diagnosis  Dizziness [R42]  Brain metastases ([facility] [address] ) [C79 31]    Problem List  Patient Active Problem List   Diagnosis    Renal cyst, right    Acute idiopathic gout of left foot    Back problem    Depression with anxiety    Essential hypertension    Glaucoma    Hypertriglyceridemia    Lumbago with sciatica, left side    Lumbar stenosis    JUNAID (obstructive sleep apnea)    Spinal stenosis of lumbar region with neurogenic claudication    Mixed hyperlipidemia    Bilateral hearing loss    Hyperglycemia    Cigarette nicotine dependence in remission    Adenocarcinoma, lung, left ([address] )    Encounter for central line care    Drug-induced neutropenia (HCC)    Left non-suppurative otitis media    Bilateral hearing loss due to cerumen impaction    Cancer of upper lobe of left lung (HCC)    Chronic back pain    Former cigarette smoker    History of gout    Hypertension    Proctocolitis    Pericardial effusion    Constipation    Labyrinthitis of both ears    CKD (chronic kidney disease) stage 2, GFR 60-89 ml/min    Platelets decreased (HCC)    Psoriasis    Left parietal hemorrhagic tumor    Thrombocytopenia (HCC)    Goals of care, counseling/discussion    Hypothyroidism    Irregular heart rate    Paroxysmal atrial fibrillation (HCC)    Brain metastases (HCC)    Dizziness    Chronic obstructive pulmonary disease, unspecified COPD type ([facility] [address] )       Past Medical History  Past Medical History:   Diagnosis Date    Hyperlipidemia     Hypertension     Lung cancer [facility])        Past Surgical History  Past Surgical History:   Procedure Laterality Date    BACK SURGERY      CRANIOTOMY Left 4/7/2022    Procedure: Image guided left parietal craniotomy for tumor resection;  Surgeon: Aroldo Mariscal MD;  Location: [facility] MA[facility];  Service: Neurosurgery    HEMORRHOID SURGERY      IR BIOPSY LUNG  5/6/2021    IR PORT PLACEMENT  6/17/2021    LAMINECTOMY  2018    L4-L5    LUNG SURGERY      left upper lobectomy    [place] BRONCHOSCOPY,DIAGNOSTIC N/A 5/25/2021    Procedure: BRONCHOSCOPY FLEXIBLE;  Surgeon: Joo Miller MD;  Location: [facility] MAIN OR;  Service: Thoracic    [place] MEDIASTINOSCOPY WITH LYMPH NODE BIOPSY/IES N/A 5/25/2021    Procedure: MEDIASTINOSCOPY, flexible bronchoscopy;  Surgeon: Joo Miller MD;  Location: [facility] MAIN OR;  Service: Thoracic    TONSILLECTOMY  1959       PT performed at least 2 patient identifiers during session: Name and wristband  06/17/22 0840   PT Last Visit   PT Visit Date 06/17/22   Note Type   Note type Evaluation   Pain Assessment   Pain Assessment Tool 0-10   Pain Score No Pain   Restrictions/Precautions   Weight Bearing Precautions Per Order No   Braces or Orthoses   (none per pt)   Other Precautions Telemetry;Multiple lines; Chair Alarm; Fall Risk   Home Living   Type of [address] [place] [address] Two level;Bed/bath upstairs  (no SHANTA, FOS to kitchen, FOS to bedroom)   Bathroom Shower/Tub Tub/shower unit   Bathroom Toilet Standard   Bathroom Equipment Other (Comment)  (none at this time)   [address]   (none used at baseline)   Additional Comments enjoys sitting out on porch   Prior Function   Level of [place] Needs assistance with IADLs   Lives With Spouse   Receives Help From Family   ADL Assistance Needs assistance  (wife helps with bathing, dressing)   IADLs Needs assistance   Falls in the last 6 months 1 to 4  (2 falls)   Vocational Retired   Comments - , wife drives/assists with transportation   General   Family/Caregiver Present No   Cognition   Overall Cognitive Status WFL   Arousal/Participation Alert   Attention Within functional limits   Orientation Level Oriented to person;Oriented to situation;Oriented to place   Memory Within functional limits   Following Commands Follows all commands and directions without difficulty Comments pt agreeable to PT eval   Subjective   Subjective "I think my dizziness is from stopping my prednisone"   RUE Assessment   RUE Assessment   (refer to OT assessment)   LUE Assessment   LUE Assessment   (refer to OT assessment)   RLE Assessment   RLE Assessment WFL  (at least 3+/5 demonstrated by functional mobility)   LLE Assessment   LLE Assessment WFL  (at least 3+/5 demonstrated by functional mobility)   Vision-Basic Assessment   Current Vision Wears glasses all the time   Coordination   Movements are Fluid and Coordinated 1   Sensation [facility]   Light Touch   RLE Light Touch Grossly intact   LLE Light Touch Grossly intact   Bed Mobility   Supine to Sit 5  Supervision   Additional items Bedrails;HOB elevated;Assist x 1   Sit to Supine   (DNT: pt seated OOB in recliner at end of eval)   Additional Comments Pt denied lightheaded/dizziness with transitional movements   Transfers   Sit to Stand 5  Supervision   Additional items HOB elevated; Bedrails;Assist x 1   Stand to Sit 5  Supervision   Additional items Armrests;Assist x 1   Ambulation/Elevation   Gait pattern Narrow SUSANNE; Inconsistent wilber; Step through pattern   Gait Assistance 4  Minimal assist  (occasional CGA)   Additional items Assist x 1   Assistive Device None   Distance 100'   Stair Management Assistance 4  Minimal assist  (CGA)   Additional items Assist x 1;Verbal cues; Tactile cues   Stair Management Technique Step to pattern; One rail R;One rail L  (unilateral UE support via rail)   Number of Stairs 13   Balance   Static Sitting Good   Dynamic Sitting Good   Static Standing Fair   Dynamic Standing Fair -   Ambulatory Fair -   Endurance Deficit   Endurance Deficit Yes   Endurance Deficit Description increased SOB with mobility   Activity Tolerance   Activity Tolerance Patient tolerated treatment well   Medical Staff Made Aware OT Dennise Samuels notified of PT rec   Nurse Made Aware RN Althea   Assessment   Prognosis Good   Problem List Decreased mobility; Impaired balance;Decreased endurance;Decreased strength   Assessment Pt is a 69 y o  male seen for moderate complexity PT evalution on 6/17/22 s/p admit to [address] on 6/15/22 with chief complaint of lightheadedness and dizziness  PT consulted to evaluate and treat pt's functional mobility and safe d/c/ needs  Pt lives in two level home with FOS to bedroom  Pt receives assistance from spouse for ADLs/IADLs at baseline, and does not use AD for mobility  Upon evaluation, pt presented with decreased strength, balance, mobility, and endurance  Pt required supervision for bed mobility and transfers, CGA x1 for ambulation and stair navigation  Pertient PMH and comorbidites include COPD, irregular heart rate, pericardial effusion, gout, chronic back pain, 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, spinal stenosis of lumbar region with neurogenic claudication, left sided lumbago with sciatica, depression with anxiety, essential hypertension, hyperglycemia, JUNAID, CKD, thrombocytopenia, brain metastases, hypothyroidism, and paorxysmal atrial fibrillation  Personal factors limiting pt at time of eval include increase assistance with mobility, abnormal lab values, and stairs to navigate in home  Pt's overall rehab potential and prognosis is good for return to PLOF  Pt would benefit from further skilled PT to address activity limitations, participation restrictions, and impairments noted above  Assessment tools: 22/24 [facility], 3 modified andrews, 55/100 barthel index  PT's d/c recommendation at this time is home with outpatient rehabiliation to focus on higher level balance  PT will continue to assess pt throughout hospital stay to address POC and meet STGs  Barriers to Discharge Other (Comment); Inaccessible home environment   Barriers to Discharge Comments pt able to navigate FOS w/ min assist of 1   Goals   Patient Goals to go home   STG Expiration Date 06/27/22   Short Term Goal #1 In 10 days: pt will ambulate >150' w/o use of AD and mod I to navigate community distances safely, pt will improve balance scores by 1 point to improve overall dynamic mobility and balance when ambulating, pt will navigate FOS w/ supervision and unilateral UE support via rail to ascend stairs safely to bedroom  pt to improve B LE strength 1/2 grade to facilitate functional independence   Plan   Treatment/Interventions Elevations; Therapeutic exercise; Endurance training;Gait training;OT;Spoke to nursing; Patient/family training;Functional transfer training;LE strengthening/ROM   PT Frequency 2-3x/wk   Recommendation   PT Discharge Recommendation Home with outpatient rehabilitation  (focus on higher level balance training)   Equipment Recommended Other (Comment)  (none)   AM-PAC Basic Mobility Inpatient   Turning in Bed Without Bedrails 4   Lying on Back to Sitting on Edge of Flat Bed 4   Moving Bed to Chair 4   Standing Up From Chair 4   Walk in Room 3   Climb 3-5 Stairs 3   Basic Mobility Inpatient Raw Score 22   Basic Mobility Standardized Score 47 4   Highest Level Of Mobility   -HLM Goal 7: Walk 25 feet or more   JH-HLM Achieved 7: Walk 25 feet or more   Modified Seanor Scale   Modified Donell Scale 3   Barthel Index   Feeding 10   Bathing 0   Grooming Score 0   Dressing Score 5   Bladder Score 10   Bowels Score 10   Toilet Use Score 5   Transfers (Bed/Chair) Score 10   Mobility (Level Surface) Score 0   Stairs Score 5   Barthel Index Score 55   End of Consult   Patient Position at End of Consult Bedside chair; All needs within reach;Bed/Chair alarm activated       Michelle Johnson, HEIDE

## 2022-06-20 ENCOUNTER — TRANSITIONAL CARE MANAGEMENT (OUTPATIENT)
Dept: FAMILY MEDICINE CLINIC | Facility: CLINIC | Age: 67
End: 2022-06-20

## 2022-06-20 ENCOUNTER — TELEPHONE (OUTPATIENT)
Dept: NEUROSURGERY | Facility: CLINIC | Age: 67
End: 2022-06-20

## 2022-06-20 ENCOUNTER — APPOINTMENT (OUTPATIENT)
Dept: LAB | Facility: CLINIC | Age: 67
End: 2022-06-20
Payer: MEDICARE

## 2022-06-20 DIAGNOSIS — I10 ESSENTIAL HYPERTENSION: Chronic | ICD-10-CM

## 2022-06-20 DIAGNOSIS — C34.12 CANCER OF UPPER LOBE OF LEFT LUNG (HCC): ICD-10-CM

## 2022-06-20 DIAGNOSIS — E06.4 DRUG-INDUCED THYROIDITIS: ICD-10-CM

## 2022-06-20 DIAGNOSIS — R35.1 NOCTURIA: ICD-10-CM

## 2022-06-20 DIAGNOSIS — C34.92 ADENOCARCINOMA, LUNG, LEFT (HCC): Chronic | ICD-10-CM

## 2022-06-20 DIAGNOSIS — I48.0 PAROXYSMAL ATRIAL FIBRILLATION (HCC): ICD-10-CM

## 2022-06-20 DIAGNOSIS — C79.31 BRAIN METASTASES (HCC): ICD-10-CM

## 2022-06-20 LAB
ALBUMIN SERPL BCP-MCNC: 3.2 G/DL (ref 3.5–5)
ALP SERPL-CCNC: 65 U/L (ref 46–116)
ALT SERPL W P-5'-P-CCNC: 33 U/L (ref 12–78)
ANION GAP SERPL CALCULATED.3IONS-SCNC: 9 MMOL/L (ref 4–13)
ANISOCYTOSIS BLD QL SMEAR: PRESENT
AST SERPL W P-5'-P-CCNC: 18 U/L (ref 5–45)
BASOPHILS # BLD MANUAL: 0 THOUSAND/UL (ref 0–0.1)
BASOPHILS NFR MAR MANUAL: 0 % (ref 0–1)
BILIRUB SERPL-MCNC: 0.36 MG/DL (ref 0.2–1)
BUN SERPL-MCNC: 28 MG/DL (ref 5–25)
CALCIUM ALBUM COR SERPL-MCNC: 10 MG/DL (ref 8.3–10.1)
CALCIUM SERPL-MCNC: 9.4 MG/DL (ref 8.3–10.1)
CHLORIDE SERPL-SCNC: 103 MMOL/L (ref 100–108)
CHOLEST SERPL-MCNC: 164 MG/DL
CO2 SERPL-SCNC: 25 MMOL/L (ref 21–32)
CREAT SERPL-MCNC: 1.13 MG/DL (ref 0.6–1.3)
EOSINOPHIL # BLD MANUAL: 0 THOUSAND/UL (ref 0–0.4)
EOSINOPHIL NFR BLD MANUAL: 0 % (ref 0–6)
ERYTHROCYTE [DISTWIDTH] IN BLOOD BY AUTOMATED COUNT: 16.8 % (ref 11.6–15.1)
GFR SERPL CREATININE-BSD FRML MDRD: 65 ML/MIN/1.73SQ M
GLUCOSE P FAST SERPL-MCNC: 128 MG/DL (ref 65–99)
HCT VFR BLD AUTO: 36.9 % (ref 36.5–49.3)
HDLC SERPL-MCNC: 43 MG/DL
HGB BLD-MCNC: 12 G/DL (ref 12–17)
LDLC SERPL CALC-MCNC: 93 MG/DL (ref 0–100)
LYMPHOCYTES # BLD AUTO: 0.47 THOUSAND/UL (ref 0.6–4.47)
LYMPHOCYTES # BLD AUTO: 9 % (ref 14–44)
MACROCYTES BLD QL AUTO: PRESENT
MAGNESIUM SERPL-MCNC: 2.1 MG/DL (ref 1.6–2.6)
MCH RBC QN AUTO: 33 PG (ref 26.8–34.3)
MCHC RBC AUTO-ENTMCNC: 32.5 G/DL (ref 31.4–37.4)
MCV RBC AUTO: 101 FL (ref 82–98)
METAMYELOCYTES NFR BLD MANUAL: 1 % (ref 0–1)
MONOCYTES # BLD AUTO: 0.1 THOUSAND/UL (ref 0–1.22)
MONOCYTES NFR BLD: 2 % (ref 4–12)
NEUTROPHILS # BLD MANUAL: 4.58 THOUSAND/UL (ref 1.85–7.62)
NEUTS BAND NFR BLD MANUAL: 3 % (ref 0–8)
NEUTS SEG NFR BLD AUTO: 85 % (ref 43–75)
NONHDLC SERPL-MCNC: 121 MG/DL
NRBC BLD AUTO-RTO: 2 /100 WBC (ref 0–2)
PLATELET # BLD AUTO: 236 THOUSANDS/UL (ref 149–390)
PLATELET BLD QL SMEAR: ADEQUATE
PMV BLD AUTO: 10 FL (ref 8.9–12.7)
POLYCHROMASIA BLD QL SMEAR: PRESENT
POTASSIUM SERPL-SCNC: 4.2 MMOL/L (ref 3.5–5.3)
PROT SERPL-MCNC: 6.9 G/DL (ref 6.4–8.2)
RBC # BLD AUTO: 3.64 MILLION/UL (ref 3.88–5.62)
RBC MORPH BLD: PRESENT
SODIUM SERPL-SCNC: 137 MMOL/L (ref 136–145)
TRIGL SERPL-MCNC: 140 MG/DL
TSH SERPL DL<=0.05 MIU/L-ACNC: 0.47 UIU/ML (ref 0.45–4.5)
WBC # BLD AUTO: 5.21 THOUSAND/UL (ref 4.31–10.16)

## 2022-06-20 PROCEDURE — 84154 ASSAY OF PSA FREE: CPT

## 2022-06-20 PROCEDURE — 85007 BL SMEAR W/DIFF WBC COUNT: CPT

## 2022-06-20 PROCEDURE — 85027 COMPLETE CBC AUTOMATED: CPT

## 2022-06-20 PROCEDURE — 80053 COMPREHEN METABOLIC PANEL: CPT

## 2022-06-20 PROCEDURE — 83735 ASSAY OF MAGNESIUM: CPT

## 2022-06-20 PROCEDURE — 84443 ASSAY THYROID STIM HORMONE: CPT

## 2022-06-20 PROCEDURE — 84153 ASSAY OF PSA TOTAL: CPT

## 2022-06-20 PROCEDURE — 36415 COLL VENOUS BLD VENIPUNCTURE: CPT

## 2022-06-20 PROCEDURE — 80061 LIPID PANEL: CPT

## 2022-06-20 NOTE — TELEPHONE ENCOUNTER
Patient's spouse (Domingo Mccormick) called the office and lm stating that patient was admitted from 6/15/22 - 6/17/22  She was wondering if Dr Danae Bianchi needs or wants to see the patient  She was also wondering if any of the other doctor's taking care of patient had reached out to Dr Danae Bianchi  She asked for call back at # [phone]  Patient was last seen by Dr Danae Bianchi on 5/25/22 for Brain Mets s/p left parietal craniotomy and resection of neuroendocrine metastatic tumor, 04/07/2022  Patient was admitted recently for dizziness and fu instructions stated he needs to fu with Dr Danae Bianchi  Assessment and Plan from [facility] note: 1  Imaging reviewed with Dr Danae Bianchi - while there are slight interval changes in tumor sizes, both increases and decreases in size, these changes are minimal without evidence of hydrocephalous, mass effect, ventriclar compression, etc   2 Would not transfer, no need for additional surgery  3  Recommend ongoing medical management  4  Recommend oncology consult for additional recommendations; neuroendocrine process does better with medical management over operative management  5  Signed off, please call with questions or concerns    Will send msg to Dr Danae Bianchi to advise if appointment needed

## 2022-06-20 NOTE — TELEPHONE ENCOUNTER
Not at this time, he should still have rad onc and med onc follow up  Would not jump back to surgery at this time

## 2022-06-21 ENCOUNTER — HOSPITAL ENCOUNTER (OUTPATIENT)
Dept: INFUSION CENTER | Facility: HOSPITAL | Age: 67
Discharge: HOME | End: 2022-06-21
Payer: MEDICARE

## 2022-06-21 ENCOUNTER — OFFICE VISIT (OUTPATIENT)
Dept: HEMATOLOGY ONCOLOGY | Facility: CLINIC | Age: 67
End: 2022-06-21
Payer: MEDICARE

## 2022-06-21 VITALS
HEIGHT: 70 IN | RESPIRATION RATE: 16 BRPM | SYSTOLIC BLOOD PRESSURE: 116 MMHG | WEIGHT: 194 LBS | TEMPERATURE: 97.9 F | HEART RATE: 91 BPM | BODY MASS INDEX: 27.77 KG/M2 | DIASTOLIC BLOOD PRESSURE: 78 MMHG | OXYGEN SATURATION: 98 %

## 2022-06-21 VITALS — TEMPERATURE: 98.1 F

## 2022-06-21 DIAGNOSIS — Z45.2 ENCOUNTER FOR CENTRAL LINE CARE: ICD-10-CM

## 2022-06-21 DIAGNOSIS — D53.9 NUTRITIONAL ANEMIA: ICD-10-CM

## 2022-06-21 DIAGNOSIS — C34.12 MALIGNANT NEOPLASM OF UPPER LOBE, LEFT BRONCHUS OR LUNG (HCC): Primary | ICD-10-CM

## 2022-06-21 DIAGNOSIS — D75.89 MACROCYTOSIS: ICD-10-CM

## 2022-06-21 DIAGNOSIS — C79.31 BRAIN METASTASES (HCC): ICD-10-CM

## 2022-06-21 DIAGNOSIS — E06.4 DRUG-INDUCED THYROIDITIS: ICD-10-CM

## 2022-06-21 DIAGNOSIS — C34.92 ADENOCARCINOMA, LUNG, LEFT (HCC): Primary | ICD-10-CM

## 2022-06-21 LAB
PSA FREE MFR SERPL: 30 %
PSA FREE SERPL-MCNC: 0.12 NG/ML
PSA SERPL-MCNC: 0.4 NG/ML (ref 0–4)

## 2022-06-21 PROCEDURE — 99214 OFFICE O/P EST MOD 30 MIN: CPT | Performed by: NURSE PRACTITIONER

## 2022-06-21 PROCEDURE — 96523 IRRIG DRUG DELIVERY DEVICE: CPT

## 2022-06-21 NOTE — PROGRESS NOTES
Hematology/Oncology Outpatient Follow-up  Nell Kevin 69 y o  male 1955 [id number]    Date:  6/21/2022      Assessment and Plan:  1  Malignant neoplasm of upper lobe, left bronchus or lung (HCC)  Patient's recent CT imaging of the chest abdomen pelvis did not show any evidence of disease  Decision was made his last office visit to hold any systemic treatment and continue to monitor closely  Recommend he follow-up again with repeat labs/repeat CT imaging of the chest abdomen and pelvis in 3 months from now or sooner should the need arise  Encouraged him to continue to follow-up with his radiation oncology team for monitoring of his brain Mets  He did recently complete whole-brain radiation 05/31/2022 and will be seeing Radiation Oncology tomorrow  He was seen by Neurosurgery during his recent will admission who reviewed his repeat MRI of the brain  They did not have any further recommendations for any further surgical intervention  Patient continues to be frustrated with the ongoing fatigue and dizziness/lightheadedness  Did get some mild improvement after resuming dexamethasone 4 mg twice a day  He is not currently established with palliative care  Did discuss the role of palliative care and highly recommend establishing care with palliative care to assist with symptom management/etc  for his cancer related symptoms  He is agreeable to this  Referral placed  - CBC and differential; Future  - Comprehensive metabolic panel; Future  - TSH, 3rd generation with Free T4 reflex; Future  - CT chest abdomen pelvis w contrast; Future  - Ambulatory referral to Palliative Care; Future    2  Brain metastases ([facility] [address] )  As above #1     - Ambulatory referral to Palliative Care; Future    3  Drug-induced thyroiditis  Patient developed hypothyroidism after he was started on immunotherapy previously  Most recent TSH from yesterday is in the normal range    He will continue his current dose of levothyroxine 37 5 mcg daily  4  Macrocytosis  Patient's wife states that patient just recently resumed his B12 supplement     - TSH, 3rd generation with Free T4 reflex; Future  - Folate; Future  - Vitamin B12; Future      HPI:  Patient presents today for a follow-up visit accompanied by his wife  He completed his whole-brain radiation recently 05/31/2022  Is scheduled to see his radiation oncologist tomorrow  He apparently had to be readmitted to the hospital 6/15 through 6/17 after he developed significant dizziness and lightheadedness  His wife mentions that his symptoms worse so bad that he could barely get out of bed  Was felt that his worsening symptoms may have been due to tapering his steroids too quickly  Was placed back on dexamethasone 4 mg twice a day with some improvement  Was seen by Neurosurgery during his hospital admission who did not have any further recommendations from a surgical standpoint and recommended continuing care with medical/radiation oncology  He continues to report significant fatigue and dizziness that is affecting his quality of life; states that if it was not for the dizziness and fatigue he would be doing very well  His wife mentions that he does become forgetful at times and forgets what he is doing  His balance is off at times as well she particularly notices this after they get out of the truck but do mention that his old truck is quite rough when driving  His most recent laboratory studies from yesterday showed normal white cells and platelets, he is not anemic H&H low normal 12 0/36 9, MCV elevated 101  Creatinine 1 13, GFR 65, glucose 128, albumin 3 2 remaining metabolic panel is appropriate  Magnesium is normal   TSH is normal 0 466; he has been taking levothyroxine 37 5 mcg daily      He did have another MRI of the brain with and without contrast during his recent hospital admission 06/16/2022 which showed:  IMPRESSION:  Mixed treatment response since 4/26/2022  - Slightly increased size of left parietal cortex, slightly increased size of left posterolateral cerebellar, and slightly decreased size of left posterior cerebellar hemorrhagic metastatic lesions   - Decreased enhancement of left parietal resection cavity  Slightly decreased prominent nodular area of restricted diffusion along the left posterolateral margin of resection cavity which may be due to resolving blood products  No definite evidence of   recurrent tumor in left parietal resection cavity   - No acute infarction  He also had another CT scan of the chest abdomen pelvis without contrast due to national shortage 06/15/2022 which showed:  IMPRESSION:  No evidence of metastatic disease or acute process in the chest, abdomen or pelvis  Oncology History Overview Note   Patient with history of hypertension and hyperlipidemia  He also had extensive history of smoking for 40 years or more  He quit smoking  In 2017  The patient apparently had low-dose lung CT scans for screening since he was heavy smoker on 04/08/2021  The CT scan of fortunately revealed 6 2 cm mass in the paramediastinal left upper lobe with COPD features  Subsequently, he had a PET-CT scan on 05/04/2021 which showed:  IMPRESSION:  1  Lobulated left upper paramediastinal lung mass extending to the left suprahilar region measuring 5 2 x 6 5 x 6 cm demonstrates intense FDG activity, most concerning for malignancy  Adjacent interstitial thickening and groundglass densities may   represent tumor infiltration  Tissue sampling recommended  2   Mildly hypermetabolic AP window mediastinal nodes concerning for early metastases  3   Additional mildly hypermetabolic branching nodular density in the left upper posterior lung may be inflammatory/infectious versus additional site of tumor  4   No hypermetabolic metastases in the neck, abdomen, pelvis       A biopsy of the left lung mass was done on 05/06/2021 which showed:   Poorly differentiated non-small cell carcinoma, favor adenocarcinoma, with neuroendocrine differentiation of uncertain clinical significance  The patient then underwent a mediastinoscopy on 05/25/2021 for staging  Multiple mediastinal lymph node from different levels were excised all lymph nodes came back negative for metastatic disease  The patient was felt to be a surgical candidate and was sent to us to consider  Neoadjuvant chemotherapy  He underwent a left upper lobectomy and mediastinal lymph node dissection on 11/17/2021 at [facility] after he completed 4 cycles worth of neoadjuvant chemotherapy with Alimta and carboplatin which was done by August 2021  His final pathology showed a ypT2a N0 M0 poorly differentiated non-small, large cell neuroendocrine tumor  This was an R0 resection but the margin near the mediastinal fat is close  In addition, in the left upper lobe he had a separate stage I squamous cell carcinoma  He was seen by the Radiation Oncology team postop who pursued radiation treatment to the mediastinum x25 fractions completed 2/22/22 since he had close margin which was recommended by the thoracic surgical team at [facility]  He was educated about the recent data regarding the potential benefit of adjuvant immunotherapy with atezolizumab post chemotherapy for stage II and III resected non-small cell lung cancer which showed disease-free survival benefit specially with the pathology of PDL1 of 1% or more  Was started on the atezolizumab at the dose of 1200 mg on every 3 week basis 1/27/22 with goal of 1 year worth adjuvant treatment to decrease the chance of recurrence       He presented to the ED on 4/2/22 with confusion and brain MRI 4/3/22 revealed brain metastasis  IMPRESSION:   1   2 left cerebellar hemorrhagic metastases and probable 2 adjacent left parietal hemorrhagic metastasis, the more anterior, larger one having parenchymal hematoma and local edema and mass effect, correlating to the hemorrhage on the prior head CT  He then underwent craniotomy for resection of a left parietal brain mass on 4/7/22  He completed whole brain radiation on 5/31/22  Repeat PET scan 04/22/2022 showed:  IMPRESSION:     1  Subtle asymmetric FDG activity in the left cerebellum, possibly corresponding to one of patient's known left cerebellar metastatic lesions which were better characterized on recent MRI of the brain  2   No focal tracer activity characteristic of hypermetabolic malignancy involving the neck, chest, abdomen, pelvis, or osseous structures  Note is made of subtle FDG activity associated with nodular and somewhat linear airspace disease in the left lung adjacent to left cardiac border which I favor to reflect posttreatment/inflammatory changes with hypermetabolic malignancy considered less   likely  Short interval follow-up with CT of chest in 3 months is recommended to ensure stability and exclude subtle developing malignancy  5/2022-decision was made to place systemic treatment/immunotherapy on hold and continue close monitoring since he did not have any obvious active disease in the chest abdomen pelvis or osseous structures  Adenocarcinoma, lung, left ([address] )   5/6/2021 Biopsy    [facility]  FINAL DIAGNOSIS     Lung, Left Upper Lobe, core Biopsy ([id number], 5/6/2021):    - Poorly differentiated non-small cell carcinoma, favor adenocarcinoma, with neuroendocrine differentiation of uncertain clinical significance (see letter below)  5/25/2021 Biopsy    A  Lymph Node, 4R, Excision:  - Two reactive lymph nodes with anthracosis (0/2)  B  Lymph Node, 2R, Excision:  - Two reactive lymph nodes with anthracosis (0/2)  C  Lymph Node, 2L, Excision:  - Three reactive lymph nodes with anthracosis (0/3)  D  Lymph Node, 4L, Excision:  - Two reactive lymph nodes with anthracosis (0/2)       E  Lymph Node, Level 7, Excision:  - Two reactive lymph nodes with anthracosis (0/2)              6/9/2021 Initial Diagnosis    Adenocarcinoma, lung, left ([facility] [address] )     6/25/2021 - 8/27/2021 Chemotherapy    cyanocobalamin, 1,000 mcg, Intramuscular, Once, 2 of 3 cycles  Administration: 1,000 mcg (8/6/2021), 1,000 mcg (6/25/2021)  pegfilgrastim (Lexy Daughters), 6 mg, Subcutaneous, Once, 3 of 5 cycles  Administration: 6 mg (7/16/2021), 6 mg (8/6/2021), 6 mg (8/27/2021)  fosaprepitant (EMEND) IVPB, 150 mg, Intravenous, Once, 4 of 6 cycles  Administration: 150 mg (6/25/2021), 150 mg (8/6/2021), 150 mg (8/27/2021), 150 mg (7/16/2021)  CARBOplatin (PARAPLATIN) IVPB (GOG AUC DOSING), 496 mg, Intravenous, Once, 4 of 6 cycles  Administration: 496 mg (6/25/2021), 492 5 mg (8/6/2021), 462 mg (8/27/2021), 516 mg (7/16/2021)  pemetrexed (ALIMTA) chemo infusion, 975 mg, Intravenous, Once, 4 of 6 cycles  Administration: 1,000 mg (6/25/2021), 1,000 mg (8/6/2021), 1,000 mg (8/27/2021), 1,000 mg (7/16/2021)     12/16/2021 Surgery    Left Upper Lung Lobectomy at [facility] by Dr Juanjo Gil  Tumor size: 7 6 cm Percentage of viable tumor: 40%  Histologic Grade: Undifferentiated (G$)  Lymphovascular Invasion: Present (extensive)  Perineural Invasion: Not identified  Spread through Air Spaces: Present  Pleural Invasion: tumor invades to the visceral pleural surface  Tumor Extension: Hilar soft tissue  Bronchial Margin: no tumor seen  Vascular Margin: no tumor seen  Distance from Margin: distance of invasive tumor from closest margin: 0 1 cm  Closest margin: vascular margin  Neoadjuvant T staging: ypT2a  Neoadjuvant N staging: ypN0           1/18/2022 - 2/22/2022 Radiation    Treatments:  Course: C1    Plan ID Energy Fractions Dose per Fraction (cGy) Dose Correction (cGy) Total Dose Delivered (cGy) Elapsed Days   L Lung Med 6X 25 / 25 180 0 4,500 35      Treatment Dates:  1/18/2022 - 2/22/2022 1/27/2022 -  Chemotherapy    atezolizumab (TECENTRIQ) IVPB, 1,200 mg, Intravenous, Once, 2 of 6 cycles  Administration: 1,200 mg (1/27/2022), 1,200 mg (2/16/2022)     4/7/2022 Surgery    Final Diagnosis   A-B  Brain, Left parietal mass, Resection:  - 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, consistent with patient's known lung primary  5/13/2022 - 5/31/2022 Radiation    Treatments:  Course: C2    Plan ID Energy Fractions Dose per Fraction (cGy) Dose Correction (cGy) Total Dose Delivered (cGy) Elapsed Days   WHOLE BRAIN 6X 10 / 10 300 0 3,000 18      Treatment Dates:  5/13/2022 - 5/31/2022  Cancer of upper lobe of left lung ([address] )   11/4/2021 Initial Diagnosis    Cancer of upper lobe of left lung ([address] )     5/13/2022 - 5/31/2022 Radiation    Treatments:  Course: C2    Plan ID Energy Fractions Dose per Fraction (cGy) Dose Correction (cGy) Total Dose Delivered (cGy) Elapsed Days   WHOLE BRAIN 6X 10 / 10 300 0 3,000 18      Treatment Dates:  5/13/2022 - 5/31/2022  Brain metastases ([address] )   4/7/2022 Initial Diagnosis    Brain metastases ([address] )     4/7/2022 Surgery    Image guided left parietal craniotomy for tumor resection (Left)  Surgeon: Dr Griselda Finders    A-B  Brain, Left parietal mass, Resection:  - 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, consistent with patient's known lung primary  5/13/2022 - 5/31/2022 Radiation    Treatments:  Course: C2    Plan ID Energy Fractions Dose per Fraction (cGy) Dose Correction (cGy) Total Dose Delivered (cGy) Elapsed Days   WHOLE BRAIN 6X 10 / 10 300 0 3,000 18      Treatment Dates:  5/13/2022 - 5/31/2022  Interval history:    ROS: Review of Systems   Constitutional: Positive for activity change, appetite change, fatigue and unexpected weight change (Reports that he gained 10 lb on steroid)  Negative for chills and fever  HENT: Positive for hearing loss  Negative for congestion, mouth sores, nosebleeds, sore throat and trouble swallowing  Eyes: Negative  Respiratory: Positive for cough (mild) and shortness of breath  Negative for chest tightness  Cardiovascular: Negative for chest pain, palpitations and leg swelling  Gastrointestinal: Negative for abdominal distention, abdominal pain, blood in stool, constipation, diarrhea, nausea and vomiting  Genitourinary: Negative for difficulty urinating, dysuria, frequency, hematuria and urgency  Musculoskeletal: Negative for arthralgias, gait problem, joint swelling and myalgias  Skin: Positive for color change and rash  Negative for pallor  Neurological: Positive for dizziness, light-headedness and headaches  Negative for weakness and numbness  Hematological: Negative for adenopathy  Psychiatric/Behavioral: Positive for dysphoric mood and sleep disturbance  The patient is not nervous/anxious          Past Medical History:   Diagnosis Date    Hyperlipidemia     Hypertension     Lung cancer [facility])        Past Surgical History:   Procedure Laterality Date    BACK SURGERY      CRANIOTOMY Left 4/7/2022    Procedure: Image guided left parietal craniotomy for tumor resection;  Surgeon: Kaity Granado MD;  [facility];  Service: Neurosurgery    HEMORRHOID SURGERY      IR BIOPSY LUNG  5/6/2021    IR PORT PLACEMENT  6/17/2021    LAMINECTOMY  2018    L4-L5    LUNG SURGERY      left upper lobectomy    NE BRONCHOSCOPY,DIAGNOSTIC N/A 5/25/2021    Procedure: BRONCHOSCOPY FLEXIBLE;  Surgeon: Lillian Oconnor MD;  [facility]: [facility];  Service: Thoracic    NE MEDIASTINOSCOPY WITH LYMPH NODE BIOPSY/IES N/A 5/25/2021    Procedure: MEDIASTINOSCOPY, flexible bronchoscopy;  Surgeon: Lillian Oconnor MD;  [facility];  Service: Thoracic    TONSILLECTOMY  1959       Social History     Socioeconomic History    Marital status: /Civil Union     Spouse name: None    Number of children: None    Years of education: None    Highest education level: None   Occupational History    None Tobacco Use    Smoking status: Former Smoker     Packs/day: 1 00     Years: 40 00     Pack years: 40 00     Types: Cigarettes     Start date:      Quit date: 2017     Years since quittin 3    Smokeless tobacco: Never Used    Tobacco comment: quit 2017   Vaping Use    Vaping Use: Never used   Substance and Sexual Activity    Alcohol use: Yes     Alcohol/week: 7 0 standard drinks     Types: 7 Cans of beer per week     Comment: 1-2 beers nightly    Drug use: Not Currently     Types: Marijuana     Comment: seldom    Sexual activity: None   Other Topics Concern    None   Social History Narrative    None     Social Determinants of Health     Financial Resource Strain: Not on file   Food Insecurity: No Food Insecurity    Worried About Running Out of Food in the Last Year: Never true    Felecia of Food in the Last Year: Never true   Transportation Needs: No Transportation Needs    Lack of Transportation (Medical): No    Lack of Transportation (Non-Medical):  No   Physical Activity: Not on file   Stress: Not on file   Social Connections: Not on file   Intimate Partner Violence: Not on file   Housing Stability: High Risk    Unable to Pay for Housing in the Last Year: No    Number of Places Lived in the Last Year: 1    Unstable Housing in the Last Year: Yes       Family History   Problem Relation Age of Onset    Nephrolithiasis Father     Lung cancer Maternal Grandfather        Allergies   Allergen Reactions    Bee Venom     Benazepril Other (See Comments)     Angioedema     Latex Other (See Comments)     Burning of eyes at the dentist from the gloves    Meloxicam GI Intolerance    Penicillin G Rash         Current Outpatient Medications:     allopurinol (ZYLOPRIM) 100 mg tablet, Take 1 tablet (100 mg total) by mouth daily, Disp: 30 tablet, Rfl: 5    amLODIPine (NORVASC) 10 mg tablet, TAKE ONE TABLET BY MOUTH EVERY DAY, Disp: 90 tablet, Rfl: 3    Ascorbic Acid (vitamin C) 1000 MG tablet, Take 1,000 mg by mouth daily, Disp: , Rfl:     B Complex Vitamins (B COMPLEX 1 PO), Take 1 tablet by mouth daily, Disp: , Rfl:     citalopram (CeleXA) 10 mg tablet, Take 1 tablet (10 mg total) by mouth in the morning and 1 tablet (10 mg total) before bedtime  , Disp: 60 tablet, Rfl: 0    dexamethasone (DECADRON) 4 mg tablet, Take 1 tablet (4 mg total) by mouth 2 (two) times a day with meals, Disp: 60 tablet, Rfl: 0    levothyroxine 75 mcg tablet, Take 0 5 tablets (37 5 mcg total) by mouth daily in the early morning, Disp: 15 tablet, Rfl: 2    meclizine (ANTIVERT) 25 mg tablet, Take 1 tablet (25 mg total) by mouth every 8 (eight) hours as needed for dizziness, Disp: 30 tablet, Rfl: 0    metoprolol succinate (TOPROL-XL) 25 mg 24 hr tablet, Take 1 tablet (25 mg total) by mouth daily, Disp: 30 tablet, Rfl: 5    pantoprazole (PROTONIX) 40 mg tablet, Take 1 tablet (40 mg total) by mouth daily, Disp: 30 tablet, Rfl: 11    polyethylene glycol (GLYCOLAX) 17 GM/SCOOP powder, Take 17 g by mouth 2 (two) times a day, Disp: 850 g, Rfl: 0    Psyllium (Metamucil) 28 3 % POWD, Take by mouth, Disp: , Rfl:     rosuvastatin (CRESTOR) 10 MG tablet, TAKE ONE TABLET BY MOUTH EVERY DAY, Disp: 30 tablet, Rfl: 5    traMADol (ULTRAM) 50 mg tablet, TAKE ONE TABLET BY MOUTH EVERY 8 HOURS AS NEEDED FOR SEVERE PAIN, Disp: 30 tablet, Rfl: 0      Physical Exam:  /78 (BP Location: Right arm, Patient Position: Sitting, Cuff Size: Adult)   Pulse 91   Temp 97 9 °F (36 6 °C)   Resp 16   Ht 5' 10" (1 778 m)   Wt 88 kg (194 lb)   SpO2 98%   BMI 27 84 kg/m²     Physical Exam  Vitals reviewed  Constitutional:       General: He is not in acute distress  Appearance: He is well-developed  He is ill-appearing  He is not diaphoretic  HENT:      Head: Normocephalic and atraumatic  Eyes:      General: Lids are normal  No scleral icterus       Conjunctiva/sclera: Conjunctivae normal       Pupils: Pupils are equal, round, and reactive to light  Neck:      Thyroid: No thyromegaly  Cardiovascular:      Rate and Rhythm: Normal rate and regular rhythm  Occasional extrasystoles are present  Heart sounds: Normal heart sounds  No murmur heard  Pulmonary:      Effort: Pulmonary effort is normal  No respiratory distress  Chest:   Breasts:      Right: No axillary adenopathy  Left: No axillary adenopathy  Abdominal:      General: There is no distension  Palpations: Abdomen is soft  There is no hepatomegaly or splenomegaly  Tenderness: There is no abdominal tenderness  Hernia: A hernia is present  Musculoskeletal:         General: No swelling  Normal range of motion  Cervical back: Normal range of motion and neck supple  Lymphadenopathy:      Cervical: No cervical adenopathy  Upper Body:      Right upper body: No axillary adenopathy  Left upper body: No axillary adenopathy  Skin:     General: Skin is warm and dry  Coloration: Skin is not pale  Findings: No erythema or rash  Neurological:      General: No focal deficit present  Mental Status: He is alert  Comments: forgetful   Psychiatric:         Mood and Affect: Mood is depressed  Affect is blunt  Behavior: Behavior normal  Behavior is cooperative  Thought Content: Thought content normal          Judgment: Judgment normal            Labs:  Lab Results   Component Value Date    WBC 5 21 06/20/2022    HGB 12 0 06/20/2022    HCT 36 9 06/20/2022     (H) 06/20/2022     06/20/2022     Lab Results   Component Value Date    K 4 2 06/20/2022     06/20/2022    CO2 25 06/20/2022    BUN 28 (H) 06/20/2022    CREATININE 1 13 06/20/2022    GLUF 128 (H) 06/20/2022    CALCIUM 9 4 06/20/2022    CORRECTEDCA 10 0 06/20/2022    AST 18 06/20/2022    ALT 33 06/20/2022    ALKPHOS 65 06/20/2022    EGFR 65 06/20/2022     Patient voiced understanding and agreement in the above discussion   Aware to contact our office with questions/symptoms in the interim  This note has been generated by voice recognition software system  Therefore, there may be spelling, grammar, and or syntax errors  Please contact if questions arise

## 2022-06-21 NOTE — PHYSICIAN ADVISOR
Current patient class: Inpatient  The patient is currently on Hospital Day: 3 at [address]      This patient was originally admitted to the hospital under observation class  After admission the patient was reevaluated and determined to require further hospitalization  The patient was then documented to require at least a 2nd midnight in the hospital  As such the patient was then expected to satisfy the 2 midnight benchmark and was therefore eligible for inpatient admission  After review of the relevant documentation, labs, vital signs and test results, the patient is appropriate for INPATIENT ADMISSION  Admission to the hospital as an inpatient is a complex decision making process which requires the practitioner to consider the patients presenting complaint, history and physical examination and all relevant testing  With this in mind, in this case, the patient was deemed appropriate for INPATIENT ADMISSION  After review of the documentation and testing available at the time of the admission I concur with this clinical determination of medical necessity  Rationale is as follows: The patient is a 20-year-old male who presented to the hospital with lightheadedness  He has lung cancer with brain metastases and underwent craniotomy and resection of hemorrhagic mass  He completed radiation two weeks ago  He also has bilateral hearing impairment and tinnitus  He was placed under observation class  Decadron intravenous was started  MRI was ordered  After the first midnight he was having significant dizziness, lightheadedness, and difficulty with ambulation coordination  He was changed to an inpatient admission which was appropriate  Decadron was increased to 4 mg every 6 hours  Intravenous fluids were started  He stabilized for discharge after two midnights    His dizziness was considered multifactorial   He improved significantly with steroids, which had been recently discontinued as an outpatient      The patients vitals on arrival were   ED Triage Vitals   Temperature Pulse Respirations Blood Pressure SpO2   06/15/22 1259 06/15/22 1259 06/15/22 1259 06/15/22 1259 06/15/22 1259   98 °F (36 7 °C) 83 18 160/75 95 %      Temp Source Heart Rate Source Patient Position - Orthostatic VS BP Location FiO2 (%)   06/15/22 1804 06/15/22 1259 06/15/22 1259 06/15/22 1259 --   Oral Monitor Lying Right arm       Pain Score       06/15/22 1745       No Pain           Past Medical History:   Diagnosis Date    Hyperlipidemia     Hypertension     Lung cancer [facility])      Past Surgical History:   Procedure Laterality Date    BACK SURGERY      CRANIOTOMY Left 4/7/2022    Procedure: Image guided left parietal craniotomy for tumor resection;  Surgeon: Eliz Gonzalez MD;  [facility];  Service: Neurosurgery    HEMORRHOID SURGERY      IR BIOPSY LUNG  5/6/2021    IR PORT PLACEMENT  6/17/2021    LAMINECTOMY  2018    L4-L5    LUNG SURGERY      left upper lobectomy    ID BRONCHOSCOPY,DIAGNOSTIC N/A 5/25/2021    Procedure: BRONCHOSCOPY FLEXIBLE;  Surgeon: Kimberly Pittman MD;  [facility];  Service: Thoracic    ID MEDIASTINOSCOPY WITH LYMPH NODE BIOPSY/IES N/A 5/25/2021    Procedure: MEDIASTINOSCOPY, flexible bronchoscopy;  Surgeon: Kimberly Pittman MD;  [facility]: [facility];  Service: Thoracic   [facility]       The patient was admitted to the hospital at  3:40 PM on 6/16/22 for the following diagnosis:  Dizziness [R42]  Brain metastases ([facility] [address] ) [C79 31]    Consults have been placed to:   None    Vitals:    06/16/22 1630 06/16/22 1823 06/16/22 2146 06/17/22 0750   BP: 124/78 140/81 123/81 135/82   BP Location: Right arm      Pulse: 86 95 81    Resp: 21 20 17 12   Temp:  99 °F (37 2 °C) 98 °F (36 7 °C) 97 9 °F (36 6 °C)   TempSrc:       SpO2: 95% 96% 95%    Weight:       Height:           Most recent labs:    Recent Labs     06/20/22  1227   WBC 5 21   HGB 12 0 HCT 36 9      K 4 2   CALCIUM 9 4   BUN 28*   CREATININE 1 13   AST 18   ALT 33   ALKPHOS 65       Scheduled Meds:  Continuous Infusions:No current facility-administered medications for this encounter  PRN Meds:      Surgical procedures (if appropriate):

## 2022-06-21 NOTE — PROGRESS NOTES
Port flushed freely  Good blood return noted  Port deaccessed without issue  Patient tolerated well  AVS given to patient  Patient ambulated off unit without incident  All personal belongings taken with patient

## 2022-06-21 NOTE — Clinical Note
Maria Del Carmen Lockhart is setting up patient 3 month follow-up- giving him labs  Port flushes every 6-8 weeks-he is going for 1 today and will have infusions schedule his next  New referral was placed for palliative care  He will need CT scan of the chest abdomen and pelvis (with IV contrast if available at that time) before his next office visit-[facility] Kait Ramírez is going to given his oral contrast for CT scan)

## 2022-06-22 ENCOUNTER — CLINICAL SUPPORT (OUTPATIENT)
Dept: RADIATION ONCOLOGY | Facility: CLINIC | Age: 67
End: 2022-06-22
Attending: RADIOLOGY
Payer: MEDICARE

## 2022-06-22 ENCOUNTER — TELEPHONE (OUTPATIENT)
Dept: HEMATOLOGY ONCOLOGY | Facility: HOSPITAL | Age: 67
End: 2022-06-22

## 2022-06-22 VITALS
SYSTOLIC BLOOD PRESSURE: 140 MMHG | OXYGEN SATURATION: 98 % | HEART RATE: 75 BPM | DIASTOLIC BLOOD PRESSURE: 85 MMHG | TEMPERATURE: 98 F | WEIGHT: 195.5 LBS | RESPIRATION RATE: 16 BRPM | BODY MASS INDEX: 28.05 KG/M2

## 2022-06-22 DIAGNOSIS — C79.31 BRAIN METASTASES (HCC): Primary | ICD-10-CM

## 2022-06-22 PROCEDURE — 99024 POSTOP FOLLOW-UP VISIT: CPT | Performed by: RADIOLOGY

## 2022-06-22 PROCEDURE — 99211 OFF/OP EST MAY X REQ PHY/QHP: CPT | Performed by: RADIOLOGY

## 2022-06-22 NOTE — TELEPHONE ENCOUNTER
Called patient and spoke to patients wife regarding CT scan appt  Patient's wife confirmed the appt date and time and location

## 2022-06-22 NOTE — PROGRESS NOTES
Melodie Keita 1955 is a 69 y o  male Patient with history of hypertension and hyperlipidemia  He also had extensive history of smoking for 40 years or more  He quit smoking  In 2017  The patient apparently had low-dose lung CT scans for screening since he was heavy smoker on 04/08/2021  The CT scan of fortunately revealed 6 2 cm mass in the paramediastinal left upper lobe with COPD features  Subsequently, he had a PET-CT scan on 05/04/2021 which showed:  IMPRESSION:  1  Lobulated left upper paramediastinal lung mass extending to the left suprahilar region measuring 5 2 x 6 5 x 6 cm demonstrates intense FDG activity, most concerning for malignancy  Adjacent interstitial thickening and groundglass densities may   represent tumor infiltration  Tissue sampling recommended  2   Mildly hypermetabolic AP window mediastinal nodes concerning for early metastases  3   Additional mildly hypermetabolic branching nodular density in the left upper posterior lung may be inflammatory/infectious versus additional site of tumor  4   No hypermetabolic metastases in the neck, abdomen, pelvis  A biopsy of the left lung mass was done on 05/06/2021 which showed:   Poorly differentiated non-small cell carcinoma, favor adenocarcinoma, with neuroendocrine differentiation of uncertain clinical significance  The patient then underwent a mediastinoscopy on 05/25/2021 for staging  Multiple mediastinal lymph node from different levels were excised all lymph nodes came back negative for metastatic disease  The patient was felt to be a surgical candidate and was sent to us to consider  Neoadjuvant chemotherapy  He underwent a left upper lobectomy and mediastinal lymph node dissection on 11/17/2021 at [facility] after he completed 4 cycles worth of neoadjuvant chemotherapy with Alimta and carboplatin which was done by August 2021    His final pathology showed a ypT2a N0 M0 poorly differentiated non-small, large cell neuroendocrine tumor  This was an R0 resection but the margin near the mediastinal fat is close  In addition, in the left upper lobe he had a separate stage I squamous cell carcinoma  He was seen by the Radiation Oncology team postop who pursued radiation treatment to the mediastinum x25 fractions completed 2/22/22 since he had close margin which was recommended by the thoracic surgical team at [facility]  He was educated about the recent data regarding the potential benefit of adjuvant immunotherapy with atezolizumab post chemotherapy for stage II and III resected non-small cell lung cancer which showed disease-free survival benefit specially with the pathology of PDL1 of 1% or more  Was started on the atezolizumab at the dose of 1200 mg on every 3 week basis 1/27/22 with goal of 1 year worth adjuvant treatment to decrease the chance of recurrence  He presented to the ED on 4/2/22 with confusion and brain MRI 4/3/22 revealed brain metastasis  IMPRESSION:   1   2 left cerebellar hemorrhagic metastases and probable 2 adjacent left parietal hemorrhagic metastasis, the more anterior, larger one having parenchymal hematoma and local edema and mass effect, correlating to the hemorrhage on the prior head CT  He then underwent craniotomy for resection of a left parietal brain mass on 4/7/22  He completed whole brain radiation on 5/31/22  Repeat PET scan 04/22/2022 showed:  IMPRESSION:     1  Subtle asymmetric FDG activity in the left cerebellum, possibly corresponding to one of patient's known left cerebellar metastatic lesions which were better characterized on recent MRI of the brain  2   No focal tracer activity characteristic of hypermetabolic malignancy involving the neck, chest, abdomen, pelvis, or osseous structures      Note is made of subtle FDG activity associated with nodular and somewhat linear airspace disease in the left lung adjacent to left cardiac border which I favor to reflect posttreatment/inflammatory changes with hypermetabolic malignancy considered less   likely  Short interval follow-up with CT of chest in 3 months is recommended to ensure stability and exclude subtle developing malignancy  5/2022-decision was made to place systemic treatment/immunotherapy on hold and continue close monitoring since he did not have any obvious active disease in the chest abdomen pelvis or osseous structures  6/15/2022 - 6/17/22 To ED with dizziness treated with meclizine, IV fluids  A repeat MRI of the brain was done, MRI still shows "Slightly increased size of left parietal cortex, slightly increased size of left posterolateral cerebellar, and slightly decreased size of left posterior cerebellar hemorrhagic metastatic lesions    - Decreased enhancement of left parietal resection cavity  Slightly decreased prominent nodular area of restricted diffusion along the left posterolateral margin of resection cavity which may be due to resolving blood products  No definite evidence of   recurrent tumor in left parietal resection cavity  "  Imaging was reviewed by Neurosurgery, they did not recommend any acute intervention  Patient did mention that he recently discontinue his steroids and that could be accounting for his symptoms as well, he was subsequently started again on Decadron and he appears to feel better with this  He does have some dizziness which is chronic  6/15/2022 CT scan abdomen and pelvis:No evidence of metastatic disease or acute process in the chest, abdomen or pelvis      6/15/2022 CT head:  There is enlargement of the previously noted hemorrhagic left posterior parietal region which now measures 9 mm x 9 mm, previously measuring 5 mm x 5 mm, (image 30 series 2)   Development of encephalomalacia in the region of previously noted the hemorrhagic lesion in the left parietal region   The previously noted hemorrhagic left cerebellar lesion demonstrates heterogenous attenuation with stable size  6/16/2022 MRI brainMixed treatment response since 4/26/2022  - Slightly increased size of left parietal cortex, slightly increased size of left posterolateral cerebellar, and slightly decreased size of left posterior cerebellar hemorrhagic metastatic lesions   - Decreased enhancement of left parietal resection cavity  Slightly decreased prominent nodular area of restricted diffusion along the left posterolateral margin of resection cavity which may be due to resolving blood products  No definite evidence of   recurrent tumor in left parietal resection cavity   - No acute infarction  Follow up visit     Oncology History Overview Note   Patient with history of hypertension and hyperlipidemia  He also had extensive history of smoking for 40 years or more  He quit smoking  In 2017  The patient apparently had low-dose lung CT scans for screening since he was heavy smoker on 04/08/2021  The CT scan of fortunately revealed 6 2 cm mass in the paramediastinal left upper lobe with COPD features  Subsequently, he had a PET-CT scan on 05/04/2021 which showed:  IMPRESSION:  1  Lobulated left upper paramediastinal lung mass extending to the left suprahilar region measuring 5 2 x 6 5 x 6 cm demonstrates intense FDG activity, most concerning for malignancy  Adjacent interstitial thickening and groundglass densities may   represent tumor infiltration  Tissue sampling recommended  2   Mildly hypermetabolic AP window mediastinal nodes concerning for early metastases  3   Additional mildly hypermetabolic branching nodular density in the left upper posterior lung may be inflammatory/infectious versus additional site of tumor  4   No hypermetabolic metastases in the neck, abdomen, pelvis       A biopsy of the left lung mass was done on 05/06/2021 which showed:   Poorly differentiated non-small cell carcinoma, favor adenocarcinoma, with neuroendocrine differentiation of uncertain clinical significance  The patient then underwent a mediastinoscopy on 05/25/2021 for staging  Multiple mediastinal lymph node from different levels were excised all lymph nodes came back negative for metastatic disease  The patient was felt to be a surgical candidate and was sent to us to consider  Neoadjuvant chemotherapy  He underwent a left upper lobectomy and mediastinal lymph node dissection on 11/17/2021 at [facility] after he completed 4 cycles worth of neoadjuvant chemotherapy with Alimta and carboplatin which was done by August 2021  His final pathology showed a ypT2a N0 M0 poorly differentiated non-small, large cell neuroendocrine tumor  This was an R0 resection but the margin near the mediastinal fat is close  In addition, in the left upper lobe he had a separate stage I squamous cell carcinoma  He was seen by the Radiation Oncology team postop who pursued radiation treatment to the mediastinum x25 fractions completed 2/22/22 since he had close margin which was recommended by the thoracic surgical team at [facility]  He was educated about the recent data regarding the potential benefit of adjuvant immunotherapy with atezolizumab post chemotherapy for stage II and III resected non-small cell lung cancer which showed disease-free survival benefit specially with the pathology of PDL1 of 1% or more  Was started on the atezolizumab at the dose of 1200 mg on every 3 week basis 1/27/22 with goal of 1 year worth adjuvant treatment to decrease the chance of recurrence  He presented to the ED on 4/2/22 with confusion and brain MRI 4/3/22 revealed brain metastasis  IMPRESSION:   1   2 left cerebellar hemorrhagic metastases and probable 2 adjacent left parietal hemorrhagic metastasis, the more anterior, larger one having parenchymal hematoma and local edema and mass effect, correlating to the hemorrhage on the prior head CT      He then underwent craniotomy for resection of a left parietal brain mass on 4/7/22  He completed whole brain radiation on 5/31/22  Repeat PET scan 04/22/2022 showed:  IMPRESSION:     1  Subtle asymmetric FDG activity in the left cerebellum, possibly corresponding to one of patient's known left cerebellar metastatic lesions which were better characterized on recent MRI of the brain  2   No focal tracer activity characteristic of hypermetabolic malignancy involving the neck, chest, abdomen, pelvis, or osseous structures  Note is made of subtle FDG activity associated with nodular and somewhat linear airspace disease in the left lung adjacent to left cardiac border which I favor to reflect posttreatment/inflammatory changes with hypermetabolic malignancy considered less   likely  Short interval follow-up with CT of chest in 3 months is recommended to ensure stability and exclude subtle developing malignancy  5/2022-decision was made to place systemic treatment/immunotherapy on hold and continue close monitoring since he did not have any obvious active disease in the chest abdomen pelvis or osseous structures  6/15/2022 - 6/17/22 To ED with dizziness treated with meclizine, IV fluids  A repeat MRI of the brain was done, MRI still shows "Slightly increased size of left parietal cortex, slightly increased size of left posterolateral cerebellar, and slightly decreased size of left posterior cerebellar hemorrhagic metastatic lesions    - Decreased enhancement of left parietal resection cavity  Slightly decreased prominent nodular area of restricted diffusion along the left posterolateral margin of resection cavity which may be due to resolving blood products  No definite evidence of   recurrent tumor in left parietal resection cavity  "  Imaging was reviewed by Neurosurgery, they did not recommend any acute intervention    Patient did mention that he recently discontinue his steroids and that could be accounting for his symptoms as well, he was subsequently started again on Decadron and he appears to feel better with this  He does have some dizziness which is chronic  6/15/2022 CT scan abdomen and pelvis:No evidence of metastatic disease or acute process in the chest, abdomen or pelvis      6/15/2022 CT head: There is enlargement of the previously noted hemorrhagic left posterior parietal region which now measures 9 mm x 9 mm, previously measuring 5 mm x 5 mm, (image 30 series 2)   Development of encephalomalacia in the region of previously noted the hemorrhagic lesion in the left parietal region   The previously noted hemorrhagic left cerebellar lesion demonstrates heterogenous attenuation with stable size  6/16/2022 MRI brainMixed treatment response since 4/26/2022  - Slightly increased size of left parietal cortex, slightly increased size of left posterolateral cerebellar, and slightly decreased size of left posterior cerebellar hemorrhagic metastatic lesions   - Decreased enhancement of left parietal resection cavity  Slightly decreased prominent nodular area of restricted diffusion along the left posterolateral margin of resection cavity which may be due to resolving blood products  No definite evidence of   recurrent tumor in left parietal resection cavity   - No acute infarction  Adenocarcinoma, lung, left ([address] )   5/6/2021 Biopsy    [facility]  FINAL DIAGNOSIS     Lung, Left Upper Lobe, core Biopsy ([id number], 5/6/2021):    - Poorly differentiated non-small cell carcinoma, favor adenocarcinoma, with neuroendocrine differentiation of uncertain clinical significance (see letter below)  5/25/2021 Biopsy    A  Lymph Node, 4R, Excision:  - Two reactive lymph nodes with anthracosis (0/2)  B  Lymph Node, 2R, Excision:  - Two reactive lymph nodes with anthracosis (0/2)  C  Lymph Node, 2L, Excision:  - Three reactive lymph nodes with anthracosis (0/3)       D  Lymph Node, 4L, Excision:  - Two reactive lymph nodes with anthracosis (0/2)  E  Lymph Node, Level 7, Excision:  - Two reactive lymph nodes with anthracosis (0/2)              6/9/2021 Initial Diagnosis    Adenocarcinoma, lung, left ([address] )     6/25/2021 - 8/27/2021 Chemotherapy    cyanocobalamin, 1,000 mcg, Intramuscular, Once, 2 of 3 cycles  Administration: 1,000 mcg (8/6/2021), 1,000 mcg (6/25/2021)  pegfilgrastim (Vickki Ursula), 6 mg, Subcutaneous, Once, 3 of 5 cycles  Administration: 6 mg (7/16/2021), 6 mg (8/6/2021), 6 mg (8/27/2021)  fosaprepitant (EMEND) IVPB, 150 mg, Intravenous, Once, 4 of 6 cycles  Administration: 150 mg (6/25/2021), 150 mg (8/6/2021), 150 mg (8/27/2021), 150 mg (7/16/2021)  CARBOplatin (PARAPLATIN) IVPB (GOG AUC DOSING), 496 mg, Intravenous, Once, 4 of 6 cycles  Administration: 496 mg (6/25/2021), 492 5 mg (8/6/2021), 462 mg (8/27/2021), 516 mg (7/16/2021)  pemetrexed (ALIMTA) chemo infusion, 975 mg, Intravenous, Once, 4 of 6 cycles  Administration: 1,000 mg (6/25/2021), 1,000 mg (8/6/2021), 1,000 mg (8/27/2021), 1,000 mg (7/16/2021)     12/16/2021 Surgery    Left Upper Lung Lobectomy at [facility] by Dr Carlie Dean  Tumor size: 7 6 cm Percentage of viable tumor: 40%  Histologic Grade: Undifferentiated (G$)  Lymphovascular Invasion: Present (extensive)  Perineural Invasion: Not identified  Spread through Air Spaces: Present  Pleural Invasion: tumor invades to the visceral pleural surface  Tumor Extension: Hilar soft tissue  Bronchial Margin: no tumor seen  Vascular Margin: no tumor seen  Distance from Margin: distance of invasive tumor from closest margin: 0 1 cm  Closest margin: vascular margin  Neoadjuvant T staging: ypT2a  Neoadjuvant N staging: ypN0           1/18/2022 - 2/22/2022 Radiation    Treatments:  Course: C1    Plan ID Energy Fractions Dose per Fraction (cGy) Dose Correction (cGy) Total Dose Delivered (cGy) Elapsed Days   L Lung Med 6X 25 / 25 180 0 4,500 35      Treatment Dates:  1/18/2022 - 2/22/2022 1/27/2022 -  Chemotherapy    atezolizumab (TECENTRIQ) IVPB, 1,200 mg, Intravenous, Once, 2 of 6 cycles  Administration: 1,200 mg (1/27/2022), 1,200 mg (2/16/2022)     4/7/2022 Surgery    Final Diagnosis   A-B  Brain, Left parietal mass, Resection:  - 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, consistent with patient's known lung primary  5/13/2022 - 5/31/2022 Radiation    Treatments:  Course: C2    Plan ID Energy Fractions Dose per Fraction (cGy) Dose Correction (cGy) Total Dose Delivered (cGy) Elapsed Days   WHOLE BRAIN 6X 10 / 10 300 0 3,000 18      Treatment Dates:  5/13/2022 - 5/31/2022  Cancer of upper lobe of left lung ([facility] [address] )   11/4/2021 Initial Diagnosis    Cancer of upper lobe of left lung ([facility] [address] )     5/13/2022 - 5/31/2022 Radiation    Treatments:  Course: C2    Plan ID Energy Fractions Dose per Fraction (cGy) Dose Correction (cGy) Total Dose Delivered (cGy) Elapsed Days   WHOLE BRAIN 6X 10 / 10 300 0 3,000 18      Treatment Dates:  5/13/2022 - 5/31/2022  Brain metastases ([address] )   4/7/2022 Initial Diagnosis    Brain metastases ([address] )     4/7/2022 Surgery    Image guided left parietal craniotomy for tumor resection (Left)  Surgeon: Dr Yeny Sosa    A-B  Brain, Left parietal mass, Resection:  - 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, consistent with patient's known lung primary  5/13/2022 - 5/31/2022 Radiation    Treatments:  Course: C2    Plan ID Energy Fractions Dose per Fraction (cGy) Dose Correction (cGy) Total Dose Delivered (cGy) Elapsed Days   WHOLE BRAIN 6X 10 / 10 300 0 3,000 18      Treatment Dates:  5/13/2022 - 5/31/2022  Review of Systems:  Review of Systems   Constitutional: Positive for appetite change (improved) and fatigue (extreme fatigue)  HENT: Positive for hearing loss (bilateral loss) and voice change (tight and high when singing)           Decreased smell   Eyes: Positive for visual disturbance (right eye blurry, unchanged)  Decreased depth perception   Respiratory: Positive for cough and shortness of breath (with exertion)  Cardiovascular: Negative  Gastrointestinal: Negative  Endocrine: Negative  Genitourinary: Negative  Musculoskeletal: Positive for back pain and gait problem (slower related to back pain and sob  shuffling  Feels off balance  ,)  Facial swelling   Skin: Negative  Allergic/Immunologic: Negative  Neurological: Positive for dizziness, weakness and light-headedness (all day)  Hematological: Negative  Psychiatric/Behavioral: Positive for confusion (confused with daily tasks at times), decreased concentration (memory is poor) and dysphoric mood (reports depressed most days, denies SI )  The patient is nervous/anxious          Clinical Trial: no    IPSS Questionnaire (AUA-7):    Teaching    Covid Vaccine Status    Health Maintenance   Topic Date Due    DTaP,Tdap,and Td Vaccines (1 - Tdap) Never done    COVID-19 Vaccine (3 - Moderna risk series) 05/14/2021    Colorectal Cancer Screening  11/18/2021    Medicare Annual Wellness Visit (AWV)  10/14/2022    Pneumococcal Vaccine: 65+ Years (2 - PCV) 01/18/2023    Fall Risk  05/09/2023    BMI: Followup Plan  06/08/2023    BMI: Adult  06/21/2023    Hepatitis C Screening  Completed    Influenza Vaccine  Completed    HIB Vaccine  Aged Out    Hepatitis B Vaccine  Aged Out    IPV Vaccine  Aged Out    Hepatitis A Vaccine  Aged Out    Meningococcal ACWY Vaccine  Aged Out    HPV Vaccine  Aged Out     Patient Active Problem List   Diagnosis    Renal cyst, right    Acute idiopathic gout of left foot    Back problem    Depression with anxiety    Essential hypertension    Glaucoma    Hypertriglyceridemia    Lumbago with sciatica, left side    Lumbar stenosis    JUNAID (obstructive sleep apnea)    Spinal stenosis of lumbar region with neurogenic claudication    Mixed hyperlipidemia  Bilateral hearing loss    Hyperglycemia    Cigarette nicotine dependence in remission    Adenocarcinoma, lung, left ([facility] [address]    Encounter for central line care    Drug-induced neutropenia (HCC)    Left non-suppurative otitis media    Bilateral hearing loss due to cerumen impaction    Cancer of upper lobe of left lung (HCC)    Chronic back pain    Former cigarette smoker    History of gout    Hypertension    Proctocolitis    Pericardial effusion    Constipation    Labyrinthitis of both ears    CKD (chronic kidney disease) stage 2, GFR 60-89 ml/min    Platelets decreased (HCC)    Psoriasis    Left parietal hemorrhagic tumor    Thrombocytopenia (HCC)    Goals of care, counseling/discussion    Hypothyroidism    Irregular heart rate    Paroxysmal atrial fibrillation (HCC)    Brain metastases (HCC)    Dizziness    Chronic obstructive pulmonary disease, unspecified COPD type (HCC)     Past Medical History:   Diagnosis Date    Hyperlipidemia     Hypertension     Lung cancer ([facility] [address] )      Past Surgical History:   Procedure Laterality Date    BACK SURGERY      CRANIOTOMY Left 4/7/2022    Procedure: Image guided left parietal craniotomy for tumor resection;  Surgeon: Osorio Retana MD;  [facility]: [facility];  Service: Neurosurgery    HEMORRHOID SURGERY      IR BIOPSY LUNG  5/6/2021    IR PORT PLACEMENT  6/17/2021    LAMINECTOMY  2018    L4-L5    LUNG SURGERY      left upper lobectomy    PA BRONCHOSCOPY,DIAGNOSTIC N/A 5/25/2021    Procedure: BRONCHOSCOPY FLEXIBLE;  Surgeon: Hubert Alvarez MD;  [facility]: [facility];  Service: Thoracic    PA MEDIASTINOSCOPY WITH LYMPH NODE BIOPSY/IES N/A 5/25/2021    Procedure: MEDIASTINOSCOPY, flexible bronchoscopy;  Surgeon: Hubert Alvarez MD;  [facility]: [facility];  Service: Thoracic    TONSILLECTOMY  1959     Family History   Problem Relation Age of Onset    Nephrolithiasis Father     Lung cancer Maternal Grandfather      Social History Socioeconomic History    Marital status: /Civil Union     Spouse name: Not on file    Number of children: Not on file    Years of education: Not on file    Highest education level: Not on file   Occupational History    Not on file   Tobacco Use    Smoking status: Former Smoker     Packs/day: 1 00     Years: 40 00     Pack years: 40 00     Types: Cigarettes     Start date:      Quit date: 2017     Years since quittin 3    Smokeless tobacco: Never Used    Tobacco comment: quit 2017   Vaping Use    Vaping Use: Never used   Substance and Sexual Activity    Alcohol use: Yes     Alcohol/week: 7 0 standard drinks     Types: 7 Cans of beer per week     Comment: 1-2 beers nightly    Drug use: Not Currently     Types: Marijuana     Comment: seldom    Sexual activity: Not on file   Other Topics Concern    Not on file   Social History Narrative    Not on file     Social Determinants of Health     Financial Resource Strain: Not on file   Food Insecurity: No Food Insecurity    Worried About Running Out of Food in the Last Year: Never true    Felecia of Food in the Last Year: Never true   Transportation Needs: No Transportation Needs    Lack of Transportation (Medical): No    Lack of Transportation (Non-Medical):  No   Physical Activity: Not on file   Stress: Not on file   Social Connections: Not on file   Intimate Partner Violence: Not on file   Housing Stability: High Risk    Unable to Pay for Housing in the Last Year: No    Number of Places Lived in the Last Year: 1    Unstable Housing in the Last Year: Yes       Current Outpatient Medications:     allopurinol (ZYLOPRIM) 100 mg tablet, Take 1 tablet (100 mg total) by mouth daily, Disp: 30 tablet, Rfl: 5    amLODIPine (NORVASC) 10 mg tablet, TAKE ONE TABLET BY MOUTH EVERY DAY, Disp: 90 tablet, Rfl: 3    Ascorbic Acid (vitamin C) 1000 MG tablet, Take 1,000 mg by mouth daily, Disp: , Rfl:     B Complex Vitamins (B COMPLEX 1 PO), Take 1 tablet by mouth daily, Disp: , Rfl:     citalopram (CeleXA) 10 mg tablet, Take 1 tablet (10 mg total) by mouth in the morning and 1 tablet (10 mg total) before bedtime  , Disp: 60 tablet, Rfl: 0    dexamethasone (DECADRON) 4 mg tablet, Take 1 tablet (4 mg total) by mouth 2 (two) times a day with meals, Disp: 60 tablet, Rfl: 0    levothyroxine 75 mcg tablet, Take 0 5 tablets (37 5 mcg total) by mouth daily in the early morning, Disp: 15 tablet, Rfl: 2    meclizine (ANTIVERT) 25 mg tablet, Take 1 tablet (25 mg total) by mouth every 8 (eight) hours as needed for dizziness, Disp: 30 tablet, Rfl: 0    metoprolol succinate (TOPROL-XL) 25 mg 24 hr tablet, Take 1 tablet (25 mg total) by mouth daily, Disp: 30 tablet, Rfl: 5    pantoprazole (PROTONIX) 40 mg tablet, Take 1 tablet (40 mg total) by mouth daily, Disp: 30 tablet, Rfl: 11    polyethylene glycol (GLYCOLAX) 17 GM/SCOOP powder, Take 17 g by mouth 2 (two) times a day, Disp: 850 g, Rfl: 0    Psyllium (Metamucil) 28 3 % POWD, Take by mouth, Disp: , Rfl:     rosuvastatin (CRESTOR) 10 MG tablet, TAKE ONE TABLET BY MOUTH EVERY DAY, Disp: 30 tablet, Rfl: 5    traMADol (ULTRAM) 50 mg tablet, TAKE ONE TABLET BY MOUTH EVERY 8 HOURS AS NEEDED FOR SEVERE PAIN, Disp: 30 tablet, Rfl: 0  Allergies   Allergen Reactions    Bee Venom     Benazepril Other (See Comments)     Angioedema     Latex Other (See Comments)     Burning of eyes at the dentist from the gloves    Meloxicam GI Intolerance    Penicillin G Rash     Vitals:    06/22/22 1349   BP: 140/85   Pulse: 75   Resp: 16   Temp: 98 °F (36 7 °C)   SpO2: 98%   Weight: 88 7 kg (195 lb 8 oz)      Pain Score: 0-No pain

## 2022-06-22 NOTE — PROGRESS NOTES
Follow-up - Radiation Oncology   Jami Conroy 1955 69 y o  male [id number]      History of Present Illness   Cancer Staging  No matching staging information was found for the patient  Jami Conroy 1955 is a 69 y o  male Patient with history of hypertension and hyperlipidemia  Rubia Marino also had extensive history of smoking for 40 years or more  Rubia Marino quit smoking  In 2017  The patient apparently had low-dose lung CT scans for screening since he was heavy smoker on 04/08/2021  The CT scan of fortunately revealed 6 2 cm mass in the paramediastinal left upper lobe with COPD features   Subsequently, he had a PET-CT scan on 05/04/2021 which showed:  IMPRESSION:  1   Lobulated left upper paramediastinal lung mass extending to the left suprahilar region measuring 5 2 x 6 5 x 6 cm demonstrates intense FDG activity, most concerning for malignancy   Adjacent interstitial thickening and groundglass densities may   represent tumor infiltration   Tissue sampling recommended  2   Mildly hypermetabolic AP window mediastinal nodes concerning for early metastases  3   Additional mildly hypermetabolic branching nodular density in the left upper posterior lung may be inflammatory/infectious versus additional site of tumor  4   No hypermetabolic metastases in the neck, abdomen, pelvis      A biopsy of the left lung mass was done on 05/06/2021 which showed:   Poorly differentiated non-small cell carcinoma, favor adenocarcinoma, with neuroendocrine differentiation of uncertain clinical significance      The patient then underwent a mediastinoscopy on 05/25/2021 for staging   Multiple mediastinal lymph node from different levels were excised all lymph nodes came back negative for metastatic disease    The patient was felt to be a surgical candidate and was sent to us to consider  Neoadjuvant chemotherapy      He underwent a left upper lobectomy and mediastinal lymph node dissection on 11/17/2021 at [facility] after he completed 4 cycles worth of neoadjuvant chemotherapy with Alimta and carboplatin which was done by August 2021  His final pathology showed a ypT2a N0 M0 poorly differentiated non-small, large cell neuroendocrine tumor   This was an R0 resection but the margin near the mediastinal fat is close     In addition, in the left upper lobe he had a separate stage I squamous cell carcinoma        He was seen by the Radiation Oncology team postop who pursued radiation treatment to the mediastinum x25 fractions completed 2/22/22 since he had close margin which was recommended by the thoracic surgical team at [facility]  He was educated about the recent data regarding the potential benefit of adjuvant immunotherapy with atezolizumab post chemotherapy for stage II and III resected non-small cell lung cancer which showed disease-free survival benefit specially with the pathology of PDL1 of 1% or more  Was started on the atezolizumab at the dose of 1200 mg on every 3 week basis 1/27/22 with goal of 1 year worth adjuvant treatment to decrease the chance of recurrence       He presented to the ED on 4/2/22 with confusion and brain MRI 4/3/22 revealed brain metastasis  IMPRESSION:   1   2 left cerebellar hemorrhagic metastases and probable 2 adjacent left parietal hemorrhagic metastasis, the more anterior, larger one having parenchymal hematoma and local edema and mass effect, correlating to the hemorrhage on the prior head CT      He then underwent craniotomy for resection of a left parietal brain mass on 4/7/22  He completed whole brain radiation on 5/31/22       Repeat PET scan 04/22/2022 showed:  IMPRESSION:     1   Subtle asymmetric FDG activity in the left cerebellum, possibly corresponding to one of patient's known left cerebellar metastatic lesions which were better characterized on recent MRI of the brain    2   No focal tracer activity characteristic of hypermetabolic malignancy involving the neck, chest, abdomen, pelvis, or osseous structures      Note is made of subtle FDG activity associated with nodular and somewhat linear airspace disease in the left lung adjacent to left cardiac border which I favor to reflect posttreatment/inflammatory changes with hypermetabolic malignancy considered less   likely   Short interval follow-up with CT of chest in 3 months is recommended to ensure stability and exclude subtle developing malignancy      5/2022-decision was made to place systemic treatment/immunotherapy on hold and continue close monitoring since he did not have any obvious active disease in the chest abdomen pelvis or osseous structures      6/15/2022 - 6/17/22 To ED with dizziness treated with meclizine, IV fluids   A repeat MRI of the brain was done, MRI still shows "Slightly increased size of left parietal cortex, slightly increased size of left posterolateral cerebellar, and slightly decreased size of left posterior cerebellar hemorrhagic metastatic lesions    - Decreased enhancement of left parietal resection cavity   Slightly decreased prominent nodular area of restricted diffusion along the left posterolateral margin of resection cavity which may be due to resolving blood products  No definite evidence of   recurrent tumor in left parietal resection cavity  "  Imaging was reviewed by Neurosurgery, they did not recommend any acute intervention   Patient did mention that he recently discontinue his steroids and that could be accounting for his symptoms as well, he was subsequently started again on Decadron and he appears to feel better with this  Marlene Long does have some dizziness which is chronic         6/15/2022 CT scan abdomen and pelvis:No evidence of metastatic disease or acute process in the chest, abdomen or pelvis        6/15/2022 CT head:  There is enlargement of the previously noted hemorrhagic left posterior parietal region which now measures 9 mm x 9 mm, previously measuring 5 mm x 5 mm, (image 30 series 2)   Development of encephalomalacia in the region of previously noted the hemorrhagic lesion in the left parietal region   The previously noted hemorrhagic left cerebellar lesion demonstrates heterogenous attenuation with stable size  6/16/2022 MRI brainMixed treatment response since 4/26/2022  - Slightly increased size of left parietal cortex, slightly increased size of left posterolateral cerebellar, and slightly decreased size of left posterior cerebellar hemorrhagic metastatic lesions   - Decreased enhancement of left parietal resection cavity   Slightly decreased prominent nodular area of restricted diffusion along the left posterolateral margin of resection cavity which may be due to resolving blood products  No definite evidence of   recurrent tumor in left parietal resection cavity   - No acute infarction                               Interval History:  He was briefly admitted to the hospital for neurologic symptoms now better with steroids  Historical Information   Oncology History   Adenocarcinoma, lung, left ([address] )   5/6/2021 Biopsy    [facility]  FINAL DIAGNOSIS     Lung, Left Upper Lobe, core Biopsy ([id number], 5/6/2021):    - Poorly differentiated non-small cell carcinoma, favor adenocarcinoma, with neuroendocrine differentiation of uncertain clinical significance (see letter below)  5/25/2021 Biopsy    A  Lymph Node, 4R, Excision:  - Two reactive lymph nodes with anthracosis (0/2)  B  Lymph Node, 2R, Excision:  - Two reactive lymph nodes with anthracosis (0/2)  C  Lymph Node, 2L, Excision:  - Three reactive lymph nodes with anthracosis (0/3)  D  Lymph Node, 4L, Excision:  - Two reactive lymph nodes with anthracosis (0/2)  E  Lymph Node, Level 7, Excision:  - Two reactive lymph nodes with anthracosis (0/2)              6/9/2021 Initial Diagnosis    Adenocarcinoma, lung, left ([address] )     6/25/2021 - 8/27/2021 Chemotherapy    cyanocobalamin, 1,000 mcg, Intramuscular, Once, 2 of 3 cycles  Administration: 1,000 mcg (8/6/2021), 1,000 mcg (6/25/2021)  pegfilgrastim (Mei Barr), 6 mg, Subcutaneous, Once, 3 of 5 cycles  Administration: 6 mg (7/16/2021), 6 mg (8/6/2021), 6 mg (8/27/2021)  fosaprepitant (EMEND) IVPB, 150 mg, Intravenous, Once, 4 of 6 cycles  Administration: 150 mg (6/25/2021), 150 mg (8/6/2021), 150 mg (8/27/2021), 150 mg (7/16/2021)  CARBOplatin (PARAPLATIN) IVPB (GOG AUC DOSING), 496 mg, Intravenous, Once, 4 of 6 cycles  Administration: 496 mg (6/25/2021), 492 5 mg (8/6/2021), 462 mg (8/27/2021), 516 mg (7/16/2021)  pemetrexed (ALIMTA) chemo infusion, 975 mg, Intravenous, Once, 4 of 6 cycles  Administration: 1,000 mg (6/25/2021), 1,000 mg (8/6/2021), 1,000 mg (8/27/2021), 1,000 mg (7/16/2021)     12/16/2021 Surgery    Left Upper Lung Lobectomy at [facility] by Dr Sunshine Gold  Tumor size: 7 6 cm Percentage of viable tumor: 40%  Histologic Grade: Undifferentiated (G$)  Lymphovascular Invasion: Present (extensive)  Perineural Invasion: Not identified  Spread through Air Spaces: Present  Pleural Invasion: tumor invades to the visceral pleural surface  Tumor Extension: Hilar soft tissue  Bronchial Margin: no tumor seen  Vascular Margin: no tumor seen  Distance from Margin: distance of invasive tumor from closest margin: 0 1 cm  Closest margin: vascular margin  Neoadjuvant T staging: ypT2a  Neoadjuvant N staging: ypN0           1/18/2022 - 2/22/2022 Radiation    Treatments:  Course: C1    Plan ID Energy Fractions Dose per Fraction (cGy) Dose Correction (cGy) Total Dose Delivered (cGy) Elapsed Days   L Lung Med 6X 25 / 25 180 0 4,500 35      Treatment Dates:  1/18/2022 - 2/22/2022 1/27/2022 -  Chemotherapy    atezolizumab (TECENTRIQ) IVPB, 1,200 mg, Intravenous, Once, 2 of 6 cycles  Administration: 1,200 mg (1/27/2022), 1,200 mg (2/16/2022)     4/7/2022 Surgery    Final Diagnosis   A-B   Brain, Left parietal mass, Resection:  - 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, consistent with patient's known lung primary  5/13/2022 - 5/31/2022 Radiation    Treatments:  Course: C2    Plan ID Energy Fractions Dose per Fraction (cGy) Dose Correction (cGy) Total Dose Delivered (cGy) Elapsed Days   WHOLE BRAIN 6X 10 / 10 300 0 3,000 18      Treatment Dates:  5/13/2022 - 5/31/2022  Cancer of upper lobe of left lung ([address] )   11/4/2021 Initial Diagnosis    Cancer of upper lobe of left lung ([address] )     5/13/2022 - 5/31/2022 Radiation    Treatments:  Course: C2    Plan ID Energy Fractions Dose per Fraction (cGy) Dose Correction (cGy) Total Dose Delivered (cGy) Elapsed Days   WHOLE BRAIN 6X 10 / 10 300 0 3,000 18      Treatment Dates:  5/13/2022 - 5/31/2022  Brain metastases ([address] )   4/7/2022 Initial Diagnosis    Brain metastases ([address] )     4/7/2022 Surgery    Image guided left parietal craniotomy for tumor resection (Left)  Surgeon: Dr Handy Avelar    A-B  Brain, Left parietal mass, Resection:  - 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, consistent with patient's known lung primary  5/13/2022 - 5/31/2022 Radiation    Treatments:  Course: C2    Plan ID Energy Fractions Dose per Fraction (cGy) Dose Correction (cGy) Total Dose Delivered (cGy) Elapsed Days   WHOLE BRAIN 6X 10 / 10 300 0 3,000 18      Treatment Dates:  5/13/2022 - 5/31/2022            Past Medical History:   Diagnosis Date    Hyperlipidemia     Hypertension     Lung [facility])      Past Surgical History:   Procedure Laterality Date    BACK SURGERY      CRANIOTOMY Left 4/7/2022    Procedure: Image guided left parietal craniotomy for tumor resection;  Surgeon: Ada Ellis MD;  [facility];  Service: Neurosurgery    HEMORRHOID SURGERY      IR BIOPSY LUNG  5/6/2021    IR PORT PLACEMENT  6/17/2021    LAMINECTOMY  2018    L4-L5    LUNG SURGERY      left upper lobectomy    [place] [address] N/A 2021    Procedure: BRONCHOSCOPY FLEXIBLE;  Surgeon: Johan Padilla MD;  [facility];  Service: Thoracic    [place] MEDIASTINOSCOPY WITH LYMPH NODE BIOPSY/IES N/A 2021    Procedure: MEDIASTINOSCOPY, flexible bronchoscopy;  Surgeon: Johan Padilla MD;  [facility];  Service: Thoracic    TONSILLECTOMY         Social History   Social History     Substance and Sexual Activity   Alcohol Use Yes    Alcohol/week: 7 0 standard drinks    Types: 7 Cans of beer per week    Comment: 1-2 beers nightly     Social History     Substance and Sexual Activity   Drug Use Not Currently    Types: Marijuana    Comment: seldom     Social History     Tobacco Use   Smoking Status Former Smoker    Packs/day: 1 00    Years: 40 00    Pack years: 40 00    Types: Cigarettes    Start date:     Quit date: 2017    Years since quittin 3   Smokeless Tobacco Never Used   Tobacco Comment    quit 2017         Meds/Allergies     Current Outpatient Medications:     allopurinol (ZYLOPRIM) 100 mg tablet, Take 1 tablet (100 mg total) by mouth daily, Disp: 30 tablet, Rfl: 5    amLODIPine (NORVASC) 10 mg tablet, TAKE ONE TABLET BY MOUTH EVERY DAY, Disp: 90 tablet, Rfl: 3    Ascorbic Acid (vitamin C) 1000 MG tablet, Take 1,000 mg by mouth daily, Disp: , Rfl:     B Complex Vitamins (B COMPLEX 1 PO), Take 1 tablet by mouth daily, Disp: , Rfl:     citalopram (CeleXA) 10 mg tablet, Take 1 tablet (10 mg total) by mouth in the morning and 1 tablet (10 mg total) before bedtime  , Disp: 60 tablet, Rfl: 0    dexamethasone (DECADRON) 4 mg tablet, Take 1 tablet (4 mg total) by mouth 2 (two) times a day with meals, Disp: 60 tablet, Rfl: 0    levothyroxine 75 mcg tablet, Take 0 5 tablets (37 5 mcg total) by mouth daily in the early morning, Disp: 15 tablet, Rfl: 2    meclizine (ANTIVERT) 25 mg tablet, Take 1 tablet (25 mg total) by mouth every 8 (eight) hours as needed for dizziness, Disp: 30 tablet, Rfl: 0    metoprolol succinate (TOPROL-XL) 25 mg 24 hr tablet, Take 1 tablet (25 mg total) by mouth daily, Disp: 30 tablet, Rfl: 5    pantoprazole (PROTONIX) 40 mg tablet, Take 1 tablet (40 mg total) by mouth daily, Disp: 30 tablet, Rfl: 11    polyethylene glycol (GLYCOLAX) 17 GM/SCOOP powder, Take 17 g by mouth 2 (two) times a day, Disp: 850 g, Rfl: 0    Psyllium (Metamucil) 28 3 % POWD, Take by mouth, Disp: , Rfl:     rosuvastatin (CRESTOR) 10 MG tablet, TAKE ONE TABLET BY MOUTH EVERY DAY, Disp: 30 tablet, Rfl: 5    traMADol (ULTRAM) 50 mg tablet, TAKE ONE TABLET BY MOUTH EVERY 8 HOURS AS NEEDED FOR SEVERE PAIN, Disp: 30 tablet, Rfl: 0  Allergies   Allergen Reactions    Bee Venom     Benazepril Other (See Comments)     Angioedema     Latex Other (See Comments)     Burning of eyes at the dentist from the gloves    Meloxicam GI Intolerance    Penicillin G Rash         Review of Systems   Constitutional: Positive for activity change, appetite change and fatigue  Negative for fever and unexpected weight change  HENT: Negative for congestion, ear pain, hearing loss, nosebleeds, rhinorrhea, sneezing, sore throat, trouble swallowing and voice change  Eyes: Negative for photophobia and visual disturbance  Respiratory: Negative for cough, shortness of breath and wheezing  Cardiovascular: Negative for chest pain, palpitations and leg swelling  Gastrointestinal: Negative for abdominal distention, abdominal pain, blood in stool and nausea  Endocrine: Negative  Genitourinary: Negative for difficulty urinating, dysuria, flank pain and hematuria  Musculoskeletal: Positive for gait problem  Negative for arthralgias, back pain, joint swelling, myalgias and neck pain  Skin: Negative  Allergic/Immunologic: Negative  Neurological: Positive for dizziness  Negative for speech difficulty, weakness, light-headedness and numbness  Hematological: Negative  Psychiatric/Behavioral: Positive for confusion  OBJECTIVE:   /85   Pulse 75   Temp 98 °F (36 7 °C)   Resp 16   Wt 88 7 kg (195 lb 8 oz)   SpO2 98%   BMI 28 05 kg/m²   Pain Assessment:  0  Karnofsky: 80 - Normal activity with effort; some signs or symptoms of disease    Physical Exam  Constitutional:       General: He is not in acute distress  Appearance: Normal appearance  He is normal weight  HENT:      Nose: No congestion  Mouth/Throat:      Pharynx: Oropharynx is clear  Eyes:      Extraocular Movements: Extraocular movements intact  Pupils: Pupils are equal, round, and reactive to light  Cardiovascular:      Rate and Rhythm: Normal rate and regular rhythm  Heart sounds: Normal heart sounds  Pulmonary:      Effort: Pulmonary effort is normal       Breath sounds: Normal breath sounds  Abdominal:      Palpations: Abdomen is soft  There is no mass  Tenderness: There is no abdominal tenderness  Musculoskeletal:         General: No swelling or tenderness  Normal range of motion  Cervical back: Normal range of motion and neck supple  Lymphadenopathy:      Cervical: No cervical adenopathy  Skin:     General: Skin is dry  Coloration: Skin is not jaundiced  Findings: No lesion or rash  Neurological:      General: No focal deficit present  Mental Status: He is alert and oriented to person, place, and time  Mental status is at baseline        Gait: Gait abnormal    Psychiatric:         Mood and Affect: Mood normal          Behavior: Behavior normal               RESULTS    Lab Results:   Recent Results (from the past 672 hour(s))   CBC and differential    Collection Time: 06/15/22  4:06 PM   Result Value Ref Range    WBC 3 97 (L) 4 31 - 10 16 Thousand/uL    RBC 3 74 (L) 3 88 - 5 62 Million/uL    Hemoglobin 12 8 12 0 - 17 0 g/dL    Hematocrit 37 7 36 5 - 49 3 %     (H) 82 - 98 fL    MCH 34 2 26 8 - 34 3 pg    MCHC 34 0 31 4 - 37 4 g/dL RDW 16 7 (H) 11 6 - 15 1 %    MPV 9 2 8 9 - 12 7 fL    Platelets 813 432 - 789 Thousands/uL    nRBC 0 /100 WBCs    Neutrophils Relative 70 43 - 75 %    Immat GRANS % 1 0 - 2 %    Lymphocytes Relative 17 14 - 44 %    Monocytes Relative 9 4 - 12 %    Eosinophils Relative 2 0 - 6 %    Basophils Relative 1 0 - 1 %    Neutrophils Absolute 2 83 1 85 - 7 62 Thousands/µL    Immature Grans Absolute 0 02 0 00 - 0 20 Thousand/uL    Lymphocytes Absolute 0 67 0 60 - 4 47 Thousands/µL    Monocytes Absolute 0 35 0 17 - 1 22 Thousand/µL    Eosinophils Absolute 0 08 0 00 - 0 61 Thousand/µL    Basophils Absolute 0 02 0 00 - 0 10 Thousands/µL   Protime-INR    Collection Time: 06/15/22  4:06 PM   Result Value Ref Range    Protime 12 8 11 6 - 14 5 seconds    INR 1 00 0 84 - 1 19   APTT    Collection Time: 06/15/22  4:06 PM   Result Value Ref Range    PTT 41 (H) 23 - 37 seconds   Comprehensive metabolic panel    Collection Time: 06/15/22  4:06 PM   Result Value Ref Range    Sodium 139 136 - 145 mmol/L    Potassium 3 5 3 5 - 5 3 mmol/L    Chloride 101 100 - 108 mmol/L    CO2 24 21 - 32 mmol/L    ANION GAP 14 (H) 4 - 13 mmol/L    BUN 13 5 - 25 mg/dL    Creatinine 1 08 0 60 - 1 30 mg/dL    Glucose 98 65 - 140 mg/dL    Calcium 9 2 8 3 - 10 1 mg/dL    Corrected Calcium 9 8 8 3 - 10 1 mg/dL    AST 17 5 - 45 U/L    ALT 27 12 - 78 U/L    Alkaline Phosphatase 90 46 - 116 U/L    Total Protein 7 2 6 4 - 8 2 g/dL    Albumin 3 2 (L) 3 5 - 5 0 g/dL    Total Bilirubin 0 38 0 20 - 1 00 mg/dL    eGFR 69 ml/min/1 73sq m   HS Troponin 0hr (reflex protocol)    Collection Time: 06/15/22  4:06 PM   Result Value Ref Range    hs TnI 0hr 7 "Refer to ACS Flowchart"- see link ng/L   NT-BNP PRO    Collection Time: 06/15/22  4:06 PM   Result Value Ref Range    NT-proBNP 150 (H) <125 pg/mL   ECG 12 lead    Collection Time: 06/15/22  4:16 PM   Result Value Ref Range    Ventricular Rate 94 BPM    Atrial Rate 94 BPM    NH Interval 116 ms    QRSD Interval 86 ms    QT Interval 374 ms    QTC Interval 467 ms    P Axis 52 degrees    QRS Axis 33 degrees    T Wave [place] 74 degrees   HS Troponin I 4hr    Collection Time: 06/15/22  7:59 PM   Result Value Ref Range    hs TnI 4hr 8 "Refer to ACS Flowchart"- see link ng/L    Delta 4hr hsTnI 1 <20 ng/L   CBC and Platelet    Collection Time: 06/16/22  6:11 AM   Result Value Ref Range    WBC 2 68 (L) 4 31 - 10 16 Thousand/uL    RBC 3 32 (L) 3 88 - 5 62 Million/uL    Hemoglobin 11 4 (L) 12 0 - 17 0 g/dL    Hematocrit 33 6 (L) 36 5 - 49 3 %     (H) 82 - 98 fL    MCH 34 3 26 8 - 34 3 pg    MCHC 33 9 31 4 - 37 4 g/dL    RDW 16 0 (H) 11 6 - 15 1 %    Platelets 853 595 - 062 Thousands/uL    MPV 9 5 8 9 - 12 7 fL   Basic metabolic panel    Collection Time: 06/16/22  6:11 AM   Result Value Ref Range    Sodium 139 136 - 145 mmol/L    Potassium 3 9 3 5 - 5 3 mmol/L    Chloride 105 100 - 108 mmol/L    CO2 22 21 - 32 mmol/L    ANION GAP 12 4 - 13 mmol/L    BUN 13 5 - 25 mg/dL    Creatinine 1 00 0 60 - 1 30 mg/dL    Glucose 158 (H) 65 - 140 mg/dL    Calcium 8 6 8 3 - 10 1 mg/dL    eGFR 76 ml/min/1 73sq m   CBC and differential    Collection Time: 06/17/22  4:48 AM   Result Value Ref Range    WBC 4 81 4 31 - 10 16 Thousand/uL    RBC 3 37 (L) 3 88 - 5 62 Million/uL    Hemoglobin 11 4 (L) 12 0 - 17 0 g/dL    Hematocrit 33 6 (L) 36 5 - 49 3 %     (H) 82 - 98 fL    MCH 33 8 26 8 - 34 3 pg    MCHC 33 9 31 4 - 37 4 g/dL    RDW 15 9 (H) 11 6 - 15 1 %    MPV 9 3 8 9 - 12 7 fL    Platelets 203 837 - 903 Thousands/uL    nRBC 0 /100 WBCs    Neutrophils Relative 88 (H) 43 - 75 %    Immat GRANS % 1 0 - 2 %    Lymphocytes Relative 8 (L) 14 - 44 %    Monocytes Relative 3 (L) 4 - 12 %    Eosinophils Relative 0 0 - 6 %    Basophils Relative 0 0 - 1 %    Neutrophils Absolute 4 28 1 85 - 7 62 Thousands/µL    Immature Grans Absolute 0 03 0 00 - 0 20 Thousand/uL    Lymphocytes Absolute 0 37 (L) 0 60 - 4 47 Thousands/µL    Monocytes Absolute 0 12 (L) 0 17 - 1 22 Thousand/µL    Eosinophils Absolute 0 00 0 00 - 0 61 Thousand/µL    Basophils Absolute 0 01 0 00 - 0 10 Thousands/µL   Basic metabolic panel    Collection Time: 06/17/22  4:48 AM   Result Value Ref Range    Sodium 140 136 - 145 mmol/L    Potassium 3 9 3 5 - 5 3 mmol/L    Chloride 104 100 - 108 mmol/L    CO2 25 21 - 32 mmol/L    ANION GAP 11 4 - 13 mmol/L    BUN 17 5 - 25 mg/dL    Creatinine 1 09 0 60 - 1 30 mg/dL    Glucose 160 (H) 65 - 140 mg/dL    Calcium 9 3 8 3 - 10 1 mg/dL    eGFR 68 ml/min/1 73sq m   CBC and differential    Collection Time: 06/20/22 12:27 PM   Result Value Ref Range    WBC 5 21 4 31 - 10 16 Thousand/uL    RBC 3 64 (L) 3 88 - 5 62 Million/uL    Hemoglobin 12 0 12 0 - 17 0 g/dL    Hematocrit 36 9 36 5 - 49 3 %     (H) 82 - 98 fL    MCH 33 0 26 8 - 34 3 pg    MCHC 32 5 31 4 - 37 4 g/dL    RDW 16 8 (H) 11 6 - 15 1 %    MPV 10 0 8 9 - 12 7 fL    Platelets 386 194 - 660 Thousands/uL   Comprehensive metabolic panel    Collection Time: 06/20/22 12:27 PM   Result Value Ref Range    Sodium 137 136 - 145 mmol/L    Potassium 4 2 3 5 - 5 3 mmol/L    Chloride 103 100 - 108 mmol/L    CO2 25 21 - 32 mmol/L    ANION GAP 9 4 - 13 mmol/L    BUN 28 (H) 5 - 25 mg/dL    Creatinine 1 13 0 60 - 1 30 mg/dL    Glucose, Fasting 128 (H) 65 - 99 mg/dL    Calcium 9 4 8 3 - 10 1 mg/dL    Corrected Calcium 10 0 8 3 - 10 1 mg/dL    AST 18 5 - 45 U/L    ALT 33 12 - 78 U/L    Alkaline Phosphatase 65 46 - 116 U/L    Total Protein 6 9 6 4 - 8 2 g/dL    Albumin 3 2 (L) 3 5 - 5 0 g/dL    Total Bilirubin 0 36 0 20 - 1 00 mg/dL    eGFR 65 ml/min/1 73sq m   Magnesium    Collection Time: 06/20/22 12:27 PM   Result Value Ref Range    Magnesium 2 1 1 6 - 2 6 mg/dL   TSH, 3rd generation with Free T4 reflex    Collection Time: 06/20/22 12:27 PM   Result Value Ref Range    TSH 3RD GENERATON 0 466 0 450 - 4 500 uIU/mL   Lipid panel    Collection Time: 06/20/22 12:27 PM   Result Value Ref Range    Cholesterol 164 See Comment mg/dL Triglycerides 140 See Comment mg/dL    HDL, Direct 43 >=40 mg/dL    LDL Calculated 93 0 - 100 mg/dL    Non-HDL-Chol (CHOL-HDL) 121 mg/dl   PSA, total and free    Collection Time: 06/20/22 12:27 PM   Result Value Ref Range    Prostate Specific Antigen Total 0 4 0 0 - 4 0 ng/mL    PSA, Free 0 12 N/A ng/mL    PSA, Free Pct 30 0 %   Manual Differential(PHLEBS Do Not Order)    Collection Time: 06/20/22 12:27 PM   Result Value Ref Range    Segmented % 85 (H) 43 - 75 %    Bands % 3 0 - 8 %    Lymphocytes % 9 (L) 14 - 44 %    Monocytes % 2 (L) 4 - 12 %    Eosinophils, % 0 0 - 6 %    Basophils % 0 0 - 1 %    Metamyelocytes% 1 0 - 1 %    Absolute Neutrophils 4 58 1 85 - 7 62 Thousand/uL    Lymphocytes Absolute 0 47 (L) 0 60 - 4 47 Thousand/uL    Monocytes Absolute 0 10 0 00 - 1 22 Thousand/uL    Eosinophils Absolute 0 00 0 00 - 0 40 Thousand/uL    Basophils Absolute 0 00 0 00 - 0 10 Thousand/uL    Total Counted      nRBC 2 0 - 2 /100 WBC    RBC Morphology Present     Anisocytosis Present     Macrocytes Present     Polychromasia Present     Platelet Estimate Adequate Adequate       Imaging Studies:CT chest abdomen pelvis wo contrast    Result Date: 6/15/2022  Narrative: CT CHEST, ABDOMEN AND PELVIS WITHOUT IV CONTRAST INDICATION: Dizziness right visual disturbance and anorexia    COMPARISON:  4/4/2022  TECHNIQUE: CT examination of the chest, abdomen and pelvis was performed without intravenous contrast  This examination was performed without intravenous contrast in the context of the critical nationwide Omnipaque shortage  Axial, sagittal, and coronal 2D reformatted images were created from the source data and submitted for interpretation  Radiation dose length product (DLP) for this visit:  690 mGy-cm     This examination, like all CT scans performed in the [facility], was performed utilizing techniques to minimize radiation dose exposure, including the use of iterative reconstruction and automated exposure control  Enteric contrast was administered  FINDINGS: CHEST LUNGS:  Postsurgical change from left upper lobectomy  Scarring in the anteromedial aspect of the left lower lobe likely reflecting radiotherapy changes  Mild to moderate panlobular emphysema  There is no tracheal or endobronchial lesion  PLEURA:  No effusion  HEART/GREAT VESSELS: Normal heart size  No thoracic aortic aneurysm  MEDIASTINUM AND OWEN:  No lymphadenopathy  CHEST WALL AND LOWER NECK:  Unremarkable  ABDOMEN LIVER/BILIARY TREE:  Unremarkable  GALLBLADDER:  No calcified gallstones  No pericholecystic inflammatory change  SPLEEN:  Unremarkable  PANCREAS:  Unremarkable  ADRENAL GLANDS:  Unremarkable  KIDNEYS/URETERS:  Exophytic right renal cysts measuring up to 1 9 cm  Cortical 1 4 cm cyst   Left renal exophytic cysts measuring up to 2 6 cm and possible subcentimeter cortical cysts  No nephrolithiasis or obstructive uropathy STOMACH AND BOWEL:  Diverticulosis without evidence of diverticulitis or colitis  No findings to indicate bowel obstruction  APPENDIX:  Normal appendix  ABDOMINOPELVIC CAVITY:  No free intraperitoneal air, fluid collection or lymphadenopathy  VESSELS:  Extensive calcified plaque throughout the abdominal aorta and iliac arteries  PELVIS REPRODUCTIVE ORGANS:  Mild prostate enlargement  URINARY BLADDER:  Unremarkable  ABDOMINAL WALL/INGUINAL REGIONS:  Unremarkable  OSSEOUS STRUCTURES:  Postsurgical change in the left ribs from thoracotomy  Impression: No evidence of metastatic disease or acute process in the chest, abdomen or pelvis  Workstation performed: [id number]     CT head without contrast    Result Date: 6/15/2022  Narrative: CT BRAIN - WITHOUT CONTRAST INDICATION:   dizzy, blurred vision right eye  COMPARISON:  April 3, 2022, previous MRI from April 26, 2022 TECHNIQUE:  CT examination of the brain was performed    In addition to axial images, sagittal and coronal 2D reformatted images were created and submitted for interpretation  Radiation dose length product (DLP) for this visit:  851 mGy-cm   This examination, like all CT scans performed in the [facility], was performed utilizing techniques to minimize radiation dose exposure, including the use of iterative reconstruction and automated exposure control  IMAGE QUALITY:  Diagnostic  FINDINGS: PARENCHYMA: Mixed attenuation lesion seen in the left cerebellum, measuring 1 6 x 1 8 cm  Previously this was measuring about 1 8 x 2 1 cm On the previous study hemorrhage was noted in the left parietal region  Hemorrhage is replaced by area of encephalomalacia There are overlying postsurgical changes from left parietal craniectomy There is a posterior left parietal subcortical lesion, measures 9 mm x 9 mm  This is larger from the previous study  Previously this was measuring 5 mm x 5 mm, seen in image 18 series 10 of the MRI from April 26, 2022 VENTRICLES AND EXTRA-AXIAL SPACES:  Ventricles remain unchanged VISUALIZED ORBITS AND PARANASAL SINUSES:  Unremarkable  Chronic mucosal thickening seen right maxillary sinus CALVARIUM AND EXTRACRANIAL SOFT TISSUES:  There are postsurgical changes from the left parietal craniectomy Mild high attenuation seen in the left posterior parietal region underlying the craniectomy flap, postoperative     Impression: There is enlargement of the previously noted hemorrhagic left posterior parietal region which now measures 9 mm x 9 mm, previously measuring 5 mm x 5 mm, (image 30 series 2) Development of encephalomalacia in the region of previously noted the hemorrhagic lesion in the left parietal region The previously noted hemorrhagic left cerebellar lesion demonstrates heterogenous attenuation with stable size    I personally discussed this study with Kyree Hollingsworth on 6/15/2022 at 4:06 PM  Workstation performed: [id number]     MRI brain w wo contrast    Result Date: 6/16/2022  Narrative: MRI BRAIN WITH AND WITHOUT CONTRAST INDICATION: History of left parietal craniotomy due to metastatic hemorrhagic mass  Presents with complaining of dizziness    COMPARISON:  CT head without contrast 6/15/2022  MRI brain with and without contrast 4/26/2022    TECHNIQUE: Sagittal T1, axial T2, axial FLAIR, axial T1, axial [place], axial diffusion  Sagittal, axial T1 postcontrast   Axial bravo postcontrast with coronal reconstructions  IV Contrast:  8 mL of Gadobutrol injection (SINGLE-DOSE)  IMAGE QUALITY:   Diagnostic  FINDINGS: BRAIN PARENCHYMA: Postsurgical changes of left parietal craniotomy for resection of left parietal mass  Unchanged left parietal resection cavity with encephalomalacia, gliosis, and peripheral hemosiderin deposition  Slightly decreased prominent nodular  area of restricted diffusion along the left posterolateral margin of resection cavity which may be due to resolving blood products (3:20)  Decreased enhancement of left parietal resection cavity with small residual posterior focal linear enhancement  No definite evidence of recurrent tumor in left parietal resection cavity  Hemorrhagic enhancing metastatic lesions, as detailed below: -0 7 x 0 5 cm left parietal cortex lesion (11:95), previously 0 5 x 0 3 cm  -2 3 x 1 8 cm left posterolateral cerebellar lesion (11:45), previously 2 1 x 1 4 cm  Mild perilesional vasogenic edema  -1 0 x 0 8 cm left posterior cerebellar lesion (11:45), previously 1 6 x 1 4 cm  No mass effect or midline shift  No diffusion-weighted signal abnormality to suggest acute infarction  VENTRICLES:  Normal for the patient's age  SELLA AND PITUITARY GLAND:  Normal  ORBITS:  Normal  PARANASAL SINUSES:  Moderate size right maxillary mucus retention cyst  VASCULATURE:  Evaluation of the major intracranial vasculature demonstrates appropriate flow voids  CALVARIUM AND SKULL BASE:  Normal  MASTOIDS: Small bilateral mastoid effusions (right greater than left)   EXTRACRANIAL SOFT TISSUES:  Normal      Impression: Mixed treatment response since 4/26/2022 - Slightly increased size of left parietal cortex, slightly increased size of left posterolateral cerebellar, and slightly decreased size of left posterior cerebellar hemorrhagic metastatic lesions  - Decreased enhancement of left parietal resection cavity  Slightly decreased prominent nodular area of restricted diffusion along the left posterolateral margin of resection cavity which may be due to resolving blood products  No definite evidence of recurrent tumor in left parietal resection cavity  - No acute infarction  Workstation performed: [id number]           Assessment/Plan:  No orders of the defined types were placed in this encounter  Adriana Scrapbrittney is a 69y o  year old male with 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 status post resection of primary tumor left upper lung followed by adjuvant radiation therapy and whole-brain radiation therapy for metastases following resection of large metastatic posterior mass  The latter he completed a month ago    Patient is back on steroids 4 mg Decadron twice a day for worsening symptoms of imbalance, dizziness and confusion  MRI of the brain suggests progression of disease in the left posterior brain  He has an appointment to see [facility] thoracic oncologist surgeon Dr Castro Tatum next week  I discussed with patient consult with the neuroworking group team next week about radio surgery of the left posterior fossa lesion  Desiree Perez MD  8/76/9824,9:47 PM    Portions of the record may have been created with voice recognition software   Occasional wrong word or "sound a like" substitutions may have occurred due to the inherent limitations of voice recognition software   Read the chart carefully and recognize, using context, where substitutions have occurred

## 2022-06-23 ENCOUNTER — OFFICE VISIT (OUTPATIENT)
Dept: FAMILY MEDICINE CLINIC | Facility: CLINIC | Age: 67
End: 2022-06-23
Payer: MEDICARE

## 2022-06-23 VITALS
SYSTOLIC BLOOD PRESSURE: 130 MMHG | HEIGHT: 70 IN | HEART RATE: 80 BPM | DIASTOLIC BLOOD PRESSURE: 80 MMHG | TEMPERATURE: 96.2 F | RESPIRATION RATE: 18 BRPM | WEIGHT: 198.4 LBS | OXYGEN SATURATION: 97 % | BODY MASS INDEX: 28.4 KG/M2

## 2022-06-23 DIAGNOSIS — H83.03 LABYRINTHITIS OF BOTH EARS: ICD-10-CM

## 2022-06-23 DIAGNOSIS — C79.31 BRAIN METASTASES (HCC): Primary | ICD-10-CM

## 2022-06-23 DIAGNOSIS — I48.0 PAROXYSMAL ATRIAL FIBRILLATION (HCC): ICD-10-CM

## 2022-06-23 DIAGNOSIS — I10 ESSENTIAL HYPERTENSION: Chronic | ICD-10-CM

## 2022-06-23 DIAGNOSIS — C34.92 ADENOCARCINOMA, LUNG, LEFT (HCC): Chronic | ICD-10-CM

## 2022-06-23 PROCEDURE — 99495 TRANSJ CARE MGMT MOD F2F 14D: CPT | Performed by: FAMILY MEDICINE

## 2022-06-23 NOTE — ASSESSMENT & PLAN NOTE
Patient has follow-up with Hematology-Oncology will be discussed at the tumor board and will be seeing Dr Cecille Gillette in [place]

## 2022-06-23 NOTE — ASSESSMENT & PLAN NOTE
Heart is irregular irregular with controlled ventricular response at 74 continue with metoprolol no change in dosage

## 2022-06-23 NOTE — ASSESSMENT & PLAN NOTE
Patient is post hospitalization today transition of care management for dizziness  He was worked up with a CT of the head and chest and subsequently MRI of his brain showing 2 lesions slightly enlarged 1 lesion reduced in size  He has follow-up evaluation by Hematology-Oncology and they will be discussing this at the tumor board  Additionally he will be Dr Caryl Vázquez in [place] next week  For now I will ask him to decrease his Lexapro to a 10 mg once daily dose and stop meclizine to see if this improves slightly some of the symptoms he is feeling  Patient and his wife were agreeable at this time

## 2022-06-23 NOTE — ASSESSMENT & PLAN NOTE
Hypertension stable continue same dosage on medication at this point and follow-up closely watching for signs of vasovagal symptoms if blood pressure drops

## 2022-06-23 NOTE — PROGRESS NOTES
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)     Patient has follow-up with Hematology-Oncology will be discussed at the tumor board and will be seeing Dr Ramsey Pitt in [place]              Cardiovascular and Mediastinum    Essential hypertension (Chronic)     Hypertension stable continue same dosage on medication at this point and follow-up closely watching for signs of vasovagal symptoms if blood pressure drops           Paroxysmal atrial fibrillation (HCC)     Heart is irregular irregular with controlled ventricular response at 74 continue with metoprolol no change in dosage              Nervous and Auditory    Labyrinthitis of both ears     No sign of labyrinth itis now on exam tympanic membranes bilaterally clear and intact           Brain metastases [facility]) - Primary     Patient is post hospitalization today transition of care management for dizziness  He was worked up with a CT of the head and chest and subsequently MRI of his brain showing 2 lesions slightly enlarged 1 lesion reduced in size  He has follow-up evaluation by Hematology-Oncology and they will be discussing this at the tumor board  Additionally he will be Dr Ramsey Pitt in [place] next week  For now I will ask him to decrease his Lexapro to a 10 mg once daily dose and stop meclizine to see if this improves slightly some of the symptoms he is feeling  Patient and his wife were agreeable at this time  Subjective:      Patient ID: Mattie Mata is a 69 y o  male  Hospital follow-up evaluation      The following portions of the patient's history were reviewed and updated as appropriate: allergies, current medications, past family history, past medical history, past social history, past surgical history and problem list     Review of Systems   Constitutional: Positive for fatigue  Negative for chills and fever     HENT: Negative for congestion, nosebleeds, rhinorrhea, sinus pressure and sore throat  Eyes: Negative for discharge and redness  Respiratory: Negative for cough and shortness of breath  Cardiovascular: Negative for chest pain, palpitations and leg swelling  Gastrointestinal: Negative for abdominal pain, blood in stool and nausea  Endocrine: Negative for cold intolerance, heat intolerance and polyuria  Genitourinary: Negative for dysuria and frequency  Musculoskeletal: Negative for arthralgias, back pain and myalgias  Skin: Negative for rash  Neurological: Positive for dizziness  Negative for weakness and headaches  Hematological: Negative for adenopathy  Psychiatric/Behavioral: Negative for behavioral problems and sleep disturbance  The patient is not nervous/anxious  Objective:      /80 (BP Location: Left arm, Patient Position: Sitting)   Pulse 80   Temp (!) 96 2 °F (35 7 °C)   Resp 18   Ht 5' 10" (1 778 m)   Wt 90 kg (198 lb 6 4 oz)   SpO2 97%   BMI 28 47 kg/m²        Physical Exam  Vitals and nursing note reviewed  Constitutional:       Appearance: He is well-developed  HENT:      Head: Normocephalic and atraumatic  Right Ear: External ear normal       Left Ear: External ear normal       Nose: Nose normal    Eyes:      General: No scleral icterus  Conjunctiva/sclera: Conjunctivae normal       Pupils: Pupils are equal, round, and reactive to light  Neck:      Thyroid: No thyromegaly  Vascular: No JVD  Cardiovascular:      Rate and Rhythm: Normal rate and regular rhythm  Heart sounds: Normal heart sounds  No murmur heard  Pulmonary:      Effort: Pulmonary effort is normal       Breath sounds: Normal breath sounds  No wheezing or rales  Chest:      Chest wall: No tenderness  Abdominal:      General: Bowel sounds are normal  There is no distension  Palpations: Abdomen is soft  There is no mass  Tenderness: There is no abdominal tenderness   There is no guarding or rebound  Musculoskeletal:         General: No tenderness or deformity  Normal range of motion  Cervical back: Normal range of motion and neck supple  Lymphadenopathy:      Cervical: No cervical adenopathy  Skin:     General: Skin is warm and dry  Findings: No erythema or rash  Neurological:      Mental Status: He is alert and oriented to person, place, and time  Cranial Nerves: No cranial nerve deficit  Deep Tendon Reflexes: Reflexes are normal and symmetric  Reflexes normal    Psychiatric:         Behavior: Behavior normal          Thought Content: Thought content normal          Judgment: Judgment normal           Data:    Laboratory Results: I have personally reviewed the pertinent laboratory results/reports   Radiology/Other Diagnostic Testing Results: I have personally reviewed pertinent reports         Lab Results   Component Value Date    WBC 5 21 06/20/2022    HGB 12 0 06/20/2022    HCT 36 9 06/20/2022     (H) 06/20/2022     06/20/2022     Lab Results   Component Value Date    K 4 2 06/20/2022     06/20/2022    CO2 25 06/20/2022    BUN 28 (H) 06/20/2022    CREATININE 1 13 06/20/2022    GLUF 128 (H) 06/20/2022    CALCIUM 9 4 06/20/2022    CORRECTEDCA 10 0 06/20/2022    AST 18 06/20/2022    ALT 33 06/20/2022    ALKPHOS 65 06/20/2022    EGFR 65 06/20/2022     Lab Results   Component Value Date    CHOLESTEROL 164 06/20/2022    CHOLESTEROL 193 04/03/2022    CHOLESTEROL 158 01/25/2022     Lab Results   Component Value Date    HDL 43 06/20/2022    HDL 70 04/03/2022    HDL 35 (L) 01/25/2022     Lab Results   Component Value Date    LDLCALC 93 06/20/2022    LDLCALC 96 04/03/2022    LDLCALC 97 01/25/2022     Lab Results   Component Value Date    TRIG 140 06/20/2022    TRIG 137 04/03/2022    TRIG 130 01/25/2022     No results found for: [place], [place]  Lab Results   Component Value Date    TVR3HZLTSDPP 0 466 06/20/2022     Lab Results   Component Value Date    HGBA1C 5 2 04/03/2022     Lab Results   Component Value Date    PSA 0 4 06/20/2022       Sonido Andino, DO

## 2022-06-27 ENCOUNTER — TELEPHONE (OUTPATIENT)
Dept: RADIATION ONCOLOGY | Facility: CLINIC | Age: 67
End: 2022-06-27

## 2022-06-27 NOTE — TELEPHONE ENCOUNTER
RAD ONC - call received from patients wife Marilyn Chadwick this morning  EOT WB EBRT- 5/31/22  Seen is follow up w/ Dr Hilario Lopes 6/22/22 who discussed a Consult w/ [address] @ [facility]  Patient was questioning if this appointment was still available for this Wednesday 6/29  Their visit with Dr Niranjan Sosa in [employer] will be virtual so they will not be OO alicja Mendoza that FD/Nursing will discuss w/ Dr Hilario Lopes   KD

## 2022-06-28 NOTE — TELEPHONE ENCOUNTER
Dr Sidra Zapata requested Ezeuqiel Alt be discussed at [facility] meeting  Called Mrs Jagruti Coronado and made her aware  We will follow up with them after discussion at Neuro working group

## 2022-07-06 NOTE — PROGRESS NOTES
Patient's case was presented this morning at the Neuro Oncology Case Review as requested by Dr Noemi Monzon      After review the group suggested continue with surveillance with repeat scan in 6-8 weeks  Dr Noemi Monzon was present on the call and is aware of the recommendation          The final treatment plan will be left at the discretion of the patient and the treating physician      DISCLAIMERS:  TO THE TREATING PHYSICIAN:  This conference is a meeting of clinicians from various specialty areas who evaluate and discuss patients for whom a multidisciplinary treatment approach is being considered  Please note that the above opinion was a consensus of the conference attendees and is intended only to assist in quality care of the discussed patient  [facility] responsibility for follow up on the input given during the conference, along with any final decisions regarding plan of care, is that of the patient and the patient's provider  Accordingly, appointments have only been recommended based on this information; and have NOT been scheduled unless otherwise noted       TO THE PATIENT:  This summary is a brief record of major aspects of your cancer treatment  You may choose to can share a your copy with any of your doctors or nurses  However, this is not a detailed or comprehensive record of your care

## 2022-07-07 NOTE — TELEPHONE ENCOUNTER
Tejas Gonzalez (wife) called  Made her aware of the recommendation from [address] [place] [address] for repeat imaging in 6-8 weeks and that Dr Jamie Saavedra will follow up with her next week when he is back in the office

## 2022-07-24 PROBLEM — I48.91 ATRIAL FIBRILLATION WITH RAPID VENTRICULAR RESPONSE (HCC): Status: ACTIVE | Noted: 2022-04-18

## 2022-07-24 PROBLEM — C7A.8 NEUROENDOCRINE CARCINOMA METASTATIC TO BRAIN (HCC): Status: ACTIVE | Noted: 2022-01-01

## 2022-07-24 PROBLEM — C7B.8 NEUROENDOCRINE CARCINOMA METASTATIC TO BRAIN (HCC): Status: ACTIVE | Noted: 2022-04-25

## 2022-07-24 NOTE — ED PROVIDER NOTES
History  Chief Complaint   Patient presents with    Dizziness    Nausea     PT arrived VIA EMS  PT states he has been feeling dizzy and nauseous x2 days  Uncontrolled AFIB HR at 164  20 mg cardizem given by EMS  No history of AFIB  History of brain and lung cancer  69-year-old male history of hypertension, hyperlipidemia, lung cancer presenting with lightheadedness and AFib RVR  Patient reports having worsening lightheadedness and general malaise for the past couple days  Significantly worse today  He denies any headache or chest pain  Does report palpitations  Also some nausea without any vomiting  On EMS arrival, patient was AFib RVR to the 160s 170s  Given Cardizem bolus with some improvement  Still nauseous and having palpitations and lightheadedness  Denies any other complaints  Prior to Admission Medications   Prescriptions Last Dose Informant Patient Reported? Taking?    Ascorbic Acid (vitamin C) 1000 MG tablet  Self Yes Yes   Sig: Take 1,000 mg by mouth daily   B Complex Vitamins (B COMPLEX 1 PO)  Self Yes Yes   Sig: Take 1 tablet by mouth daily   Psyllium (Metamucil) 28 3 % POWD  Self Yes No   Sig: Take by mouth   allopurinol (ZYLOPRIM) 100 mg tablet  Self No Yes   Sig: Take 1 tablet (100 mg total) by mouth daily   amLODIPine (NORVASC) 10 mg tablet  Self No Yes   Sig: TAKE ONE TABLET BY MOUTH EVERY DAY   citalopram (CeleXA) 10 mg tablet   No Yes   Sig: TAKE 1 TABLET BY MOUTH IN THE MORNING AND 1 TABLET BY MOUTH BEFORE BEDTIME   Patient taking differently: Take 10 mg by mouth daily   dexamethasone (DECADRON) 4 mg tablet  Self No Yes   Sig: Take 1 tablet (4 mg total) by mouth 2 (two) times a day with meals   levothyroxine 75 mcg tablet  Self No Yes   Sig: Take 0 5 tablets (37 5 mcg total) by mouth daily in the early morning   meclizine (ANTIVERT) 25 mg tablet Not Taking at Unknown time Self No No   Sig: Take 1 tablet (25 mg total) by mouth every 8 (eight) hours as needed for dizziness Patient not taking: Reported on 7/24/2022   metoprolol succinate (TOPROL-XL) 25 mg 24 hr tablet  Self No Yes   Sig: Take 1 tablet (25 mg total) by mouth daily   pantoprazole (PROTONIX) 40 mg tablet  Self No Yes   Sig: Take 1 tablet (40 mg total) by mouth daily   polyethylene glycol (GLYCOLAX) 17 GM/SCOOP powder  Self No Yes   Sig: Take 17 g by mouth 2 (two) times a day   rosuvastatin (CRESTOR) 10 MG tablet  Self No Yes   Sig: TAKE ONE TABLET BY MOUTH EVERY DAY   traMADol (ULTRAM) 50 mg tablet   No Yes   Sig: TAKE ONE TABLET BY MOUTH EVERY 8 HOURS AS NEEDED FOR SEVERE PAIN  Facility-Administered Medications: None       Past Medical History:   Diagnosis Date    Brain cancer ([facility] [address] )     Hyperlipidemia     Hypertension     Lung cancer [facility])        Past Surgical History:   Procedure Laterality Date    BACK SURGERY      CRANIOTOMY Left 4/7/2022    Procedure: Image guided left parietal craniotomy for tumor resection;  Surgeon: Rashard Arevalo MD;  [facility];  Service: Neurosurgery    HEMORRHOID SURGERY      IR BIOPSY LUNG  5/6/2021    IR PORT PLACEMENT  6/17/2021    LAMINECTOMY  2018    L4-L5    LUNG SURGERY      left upper lobectomy    IA BRONCHOSCOPY,DIAGNOSTIC N/A 5/25/2021    Procedure: BRONCHOSCOPY FLEXIBLE;  Surgeon: Fe Coates MD;  [facility];  Service: Thoracic    IA MEDIASTINOSCOPY WITH LYMPH NODE BIOPSY/IES N/A 5/25/2021    Procedure: MEDIASTINOSCOPY, flexible bronchoscopy;  Surgeon: Fe Coates MD;  [facility];  Service: Thoracic    TONSILLECTOMY  1959       Family History   Problem Relation Age of Onset    Nephrolithiasis Father     Lung cancer Maternal Grandfather      I have reviewed and agree with the history as documented      E-Cigarette/Vaping    E-Cigarette Use Never User      E-Cigarette/Vaping Substances    Nicotine No     THC No     CBD No     Flavoring No     Other No     Unknown No      Social History     Tobacco Use    Smoking status: Former Smoker     Packs/day: 1 00     Years: 40 00     Pack years: 40 00     Types: Cigarettes     Start date:      Quit date: 2017     Years since quittin 4    Smokeless tobacco: Never Used    Tobacco comment: quit 2017   Vaping Use    Vaping Use: Never used   Substance Use Topics    Alcohol use: Yes     Alcohol/week: 7 0 standard drinks     Types: 7 Cans of beer per week     Comment: 1-2 beers nightly    Drug use: Not Currently     Types: Marijuana     Comment: seldom       Review of Systems   Constitutional: Negative for appetite change, chills, diaphoresis, fever and unexpected weight change  HENT: Negative for congestion and rhinorrhea  Eyes: Negative for photophobia and visual disturbance  Respiratory: Positive for shortness of breath  Negative for cough and chest tightness  Cardiovascular: Positive for palpitations  Negative for chest pain and leg swelling  Gastrointestinal: Positive for nausea  Negative for abdominal distention, abdominal pain, blood in stool, constipation, diarrhea and vomiting  Genitourinary: Negative for dysuria and hematuria  Musculoskeletal: Negative for back pain, joint swelling, neck pain and neck stiffness  Skin: Negative for color change, pallor, rash and wound  Neurological: Positive for light-headedness  Negative for dizziness, syncope, weakness and headaches  Psychiatric/Behavioral: Negative for agitation  All other systems reviewed and are negative  Physical Exam  Physical Exam  Vitals and nursing note reviewed  Constitutional:       General: He is in acute distress  Appearance: He is well-developed  He is ill-appearing  He is not toxic-appearing or diaphoretic  HENT:      Head: Normocephalic and atraumatic  Nose: Nose normal  No congestion or rhinorrhea  Mouth/Throat:      Mouth: Mucous membranes are moist       Pharynx: Oropharynx is clear   No oropharyngeal exudate or posterior oropharyngeal erythema  Eyes:      General: No scleral icterus  Right eye: No discharge  Left eye: No discharge  Extraocular Movements: Extraocular movements intact  Conjunctiva/sclera: Conjunctivae normal       Pupils: Pupils are equal, round, and reactive to light  Neck:      Vascular: No JVD  Trachea: No tracheal deviation  Comments: Supple  Normal range of motion  Cardiovascular:      Rate and Rhythm: Tachycardia present  Rhythm irregular  Heart sounds: Normal heart sounds  No murmur heard  No friction rub  No gallop  Comments: Tachy to 160s and irregular irregular rhythm  Pulmonary:      Effort: Pulmonary effort is normal  No respiratory distress  Breath sounds: Normal breath sounds  No stridor  No wheezing or rales  Comments: Clear to auscultation bilaterally  Chest:      Chest wall: No tenderness  Abdominal:      General: Bowel sounds are normal  There is no distension  Palpations: Abdomen is soft  Tenderness: There is no abdominal tenderness  There is no right CVA tenderness, left CVA tenderness, guarding or rebound  Comments: Soft, nontender, nondistended  Normal bowel sounds throughout   Musculoskeletal:         General: No swelling, tenderness, deformity or signs of injury  Normal range of motion  Cervical back: Normal range of motion and neck supple  No rigidity  No muscular tenderness  Right lower leg: No edema  Left lower leg: No edema  Lymphadenopathy:      Cervical: No cervical adenopathy  Skin:     General: Skin is warm and dry  Coloration: Skin is not pale  Findings: No erythema or rash  Neurological:      General: No focal deficit present  Mental Status: He is alert  Mental status is at baseline  Sensory: No sensory deficit  Motor: No weakness or abnormal muscle tone  Coordination: Coordination normal       Gait: Gait normal       Comments: Alert    Strength and sensation grossly intact  Ambulatory without difficulty at baseline  Psychiatric:         Behavior: Behavior normal          Thought Content:  Thought content normal          Vital Signs  ED Triage Vitals   Temperature Pulse Respirations Blood Pressure SpO2   07/24/22 1337 07/24/22 1337 07/24/22 1337 07/24/22 1340 07/24/22 1340   98 7 °F (37 1 °C) (!) 127 16 104/79 95 %      Temp Source Heart Rate Source Patient Position - Orthostatic VS BP Location FiO2 (%)   07/24/22 1337 07/24/22 1337 07/24/22 1337 07/24/22 1340 --   Oral Monitor Lying Left arm       Pain Score       07/24/22 1347       6           Vitals:    07/24/22 1915 07/24/22 2000 07/24/22 2100 07/24/22 2230   BP:  130/75 125/73 138/72   Pulse: 93 95 (!) 109 105   Patient Position - Orthostatic VS:  Lying           Visual Acuity  Visual Acuity    Flowsheet Row Most Recent Value   L Pupil Size (mm) 3   R Pupil Size (mm) 3          ED Medications  Medications   diltiazem (CARDIZEM) injection 25 mg (0 mg Intravenous Given to EMS 7/24/22 1348)   sodium chloride 0 9 % bolus 1,000 mL (0 mL Intravenous Stopped 7/24/22 1453)   diltiazem (CARDIZEM) injection 10 mg (10 mg Intravenous Given 7/24/22 1400)   diltiazem (CARDIZEM) 125 mg in sodium chloride 0 9 % 125 mL infusion (10 mg/hr Intravenous Rate/Dose Change 7/24/22 1757)   ondansetron (ZOFRAN) injection 4 mg (4 mg Intravenous Given 7/24/22 1359)   metoprolol tartrate (LOPRESSOR) tablet 25 mg (25 mg Oral Given 7/24/22 1517)       Diagnostic Studies  Results Reviewed     Procedure Component Value Units Date/Time    HS Troponin I 4hr [[id number]]  (Normal) Collected: 07/24/22 1841    Lab Status: Final result Specimen: Blood from Arm, Left Updated: 07/24/22 1915     hs TnI 4hr 21 ng/L      Delta 4hr hsTnI 4 ng/L     HS Troponin I 2hr [[id number]]  (Normal) Collected: 07/24/22 1612    Lab Status: Final result Specimen: Blood from Arm, Left Updated: 07/24/22 1642     hs TnI 2hr 19 ng/L      Delta 2hr hsTnI 2 ng/L     HS Troponin 0hr (reflex protocol) [[id number]]  (Normal) Collected: 07/24/22 1355    Lab Status: Final result Specimen: Blood from Hand, Right Updated: 07/24/22 1427     hs TnI 0hr 17 ng/L     Comprehensive metabolic panel [[id number]]  (Abnormal) Collected: 07/24/22 1355    Lab Status: Final result Specimen: Blood from Hand, Right Updated: 07/24/22 1419     Sodium 131 mmol/L      Potassium 4 0 mmol/L      Chloride 92 mmol/L      CO2 23 mmol/L      ANION GAP 16 mmol/L      BUN 22 mg/dL      Creatinine 1 06 mg/dL      Glucose 183 mg/dL      Calcium 8 6 mg/dL      Corrected Calcium 9 6 mg/dL      AST 19 U/L      ALT 66 U/L      Alkaline Phosphatase 58 U/L      Total Protein 6 6 g/dL      Albumin 2 8 g/dL      Total Bilirubin 0 56 mg/dL      eGFR 71 ml/min/1 73sq m     Narrative:      National Kidney Disease Foundation guidelines for Chronic Kidney Disease (CKD):     Stage 1 with normal or high GFR (GFR > 90 mL/min/1 73 square meters)    Stage 2 Mild CKD (GFR = 60-89 mL/min/1 73 square meters)    Stage 3A Moderate CKD (GFR = 45-59 mL/min/1 73 square meters)    Stage 3B Moderate CKD (GFR = 30-44 mL/min/1 73 square meters)    Stage 4 Severe CKD (GFR = 15-29 mL/min/1 73 square meters)    Stage 5 End Stage CKD (GFR <15 mL/min/1 73 square meters)  Note: GFR calculation is accurate only with a steady state creatinine    CBC and differential [[id number]]  (Abnormal) Collected: 07/24/22 1355    Lab Status: Final result Specimen: Blood from Hand, Right Updated: 07/24/22 1414     WBC 6 37 Thousand/uL      RBC 4 24 Million/uL      Hemoglobin 14 7 g/dL      Hematocrit 42 4 %       fL      MCH 34 7 pg      MCHC 34 7 g/dL      RDW 15 9 %      MPV 10 1 fL      Platelets 90 Thousands/uL      nRBC 0 /100 WBCs      Neutrophils Relative 89 %      Immat GRANS % 2 %      Lymphocytes Relative 4 %      Monocytes Relative 5 %      Eosinophils Relative 0 %      Basophils Relative 0 %      Neutrophils Absolute 5 65 Thousands/µL      Immature Grans Absolute 0 13 Thousand/uL      Lymphocytes Absolute 0 25 Thousands/µL      Monocytes Absolute 0 33 Thousand/µL      Eosinophils Absolute 0 00 Thousand/µL      Basophils Absolute 0 01 Thousands/µL                  CT head without contrast   Final Result by Alex Coto MD (07/24 1719)      The left cerebellar mass has increased in size, now measuring 3 9 x 3 6 cm  There is associated vasogenic edema with mass effect upon the 4th ventricle  There is a new 5 mm high density nodule in the left cerebellum  Left posterior parietal cortical nodule appears smaller than prior  The study was marked in [facility] for immediate notification  Workstation performed: [id number]         XR chest 2 views   Final Result by Yeimi Rogers MD (07/25 2298)      No acute cardiopulmonary disease  Hyperinflation                    Workstation performed: [id number]                    Procedures  CriticalCare Time  Performed by: Eliane Gonzalez MD  Authorized by: Eliane Gonzalez MD     Critical care provider statement:     Critical care time (minutes):  44    Critical care start time:  7/24/2022 1:31 PM    Critical care end time:  7/24/2022 2:15 PM    Critical care time was exclusive of:  Separately billable procedures and treating other patients    Critical care was necessary to treat or prevent imminent or life-threatening deterioration of the following conditions:  Circulatory failure and dehydration    Critical care was time spent personally by me on the following activities:  Blood draw for specimens, development of treatment plan with patient or surrogate, obtaining history from patient or surrogate, evaluation of patient's response to treatment, examination of patient, review of old charts, re-evaluation of patient's condition, ordering and review of radiographic studies, ordering and review of laboratory studies, ordering and performing treatments and interventions and interpretation of cardiac output measurements             ED Course                               SBIRT 22yo+    Flowsheet Row Most Recent Value   SBIRT (23 yo +)    In order to provide better care to our patients, we are screening all of our patients for alcohol and drug use  Would it be okay to ask you these screening questions? Yes Filed at: 07/24/2022 1404   Initial Alcohol Screen: US AUDIT-C     1  How often do you have a drink containing alcohol? 0 Filed at: 07/24/2022 1404   2  How many drinks containing alcohol do you have on a typical day you are drinking? 0 Filed at: 07/24/2022 1404   3a  Male UNDER 65: How often do you have five or more drinks on one occasion? 0 Filed at: 07/24/2022 1404   3b  FEMALE Any Age, or MALE 65+: How often do you have 4 or more drinks on one occassion? 0 Filed at: 07/24/2022 1404   Audit-C Score 0 Filed at: 07/24/2022 1404   LANA: How many times in the past year have you    Used an illegal drug or used a prescription medication for non-medical reasons? Never Filed at: 07/24/2022 1404                    MDM  Number of Diagnoses or Management Options  Atrial fibrillation with rapid ventricular response (HCC)  Cerebellar mass  Lightheadedness  SOB (shortness of breath)  Vasogenic brain edema (HCC)  Diagnosis management comments: 61-year-old male history of hypertension, hyperlipidemia, lung cancer presenting with lightheadedness and AFib RVR  Previous episode of AFib but not on anticoagulation given brain metastasis or any significant rate control medication  Does take metoprolol 25 mg for blood pressure  Plan for cardiac evaluation plus belly labs  CT head given lightheadedness and brain metastasis  IV fluids and additional Cardizem bolus and infusion  Oral rate control medications  Frequent reassessment  EKG AFib  with PVC  No acute ST changes  Rate improved mainly 100s to 120s however max on Cardizem infusion  Will give additional oral rate control medications    Heart rate significantly improved now predominantly 80s to 90s on Cardizem infusion  Symptoms somewhat improved  Still feeling lightheaded  CT imaging notable for increasing cerebellar mass now with increased edema with mass effect on 4th ventricle  Discussed with Neurosurgery and Critical Care who recommend transfer to [place]         Amount and/or Complexity of Data Reviewed  Clinical lab tests: reviewed and ordered  Tests in the radiology section of CPT®: ordered and reviewed  Tests in the medicine section of CPT®: reviewed and ordered  Decide to obtain previous medical records or to obtain history from someone other than the patient: yes  Obtain history from someone other than the patient: yes  Review and summarize past medical records: yes  Independent visualization of images, tracings, or specimens: yes    Risk of Complications, Morbidity, and/or Mortality  Presenting problems: high  Diagnostic procedures: high  Management options: high    Patient Progress  Patient progress: improved      Disposition  Final diagnoses:   Lightheadedness   Atrial fibrillation with rapid ventricular response ([address] )   SOB (shortness of breath)   Cerebellar mass   Vasogenic brain edema ([address] )     Time reflects when diagnosis was documented in both MDM as applicable and the Disposition within this note     Time User Action Codes Description Comment    7/24/2022  3:46 PM Reesa Majors Add [R42] Lightheadedness     7/24/2022  3:47 PM Reesa Majors Add [I48 91] Atrial fibrillation with rapid ventricular response ([address] )     7/24/2022  3:47 PM Reesa Majors Modify [R42] Lightheadedness     7/24/2022  3:47 PM Reesa Majors Modify [I48 91] Atrial fibrillation with rapid ventricular response ([address] )     7/24/2022  3:47 PM Reesa Majors Add [R06 02] SOB (shortness of breath)     7/24/2022  8:36 PM Reesa Majors Add [G93 89] Cerebellar mass     7/24/2022  8:37 PM Reesa Majors Add [G93 6] Vasogenic brain edema [facility])       ED Disposition     ED Disposition   Transfer to Another Facility-In Network    Condition   --    Date/Time   Sun Jul 24, 2022  9:14 PM    Comment   Lisbeth Morgan should be transferred out to [facility]             MD Documentation    Bharti Alvarado Most Recent Value   Patient Condition The patient has been stabilized such that within reasonable medical probability, no material deterioration of the patient condition or the condition of the unborn child(gilma) is likely to result from the transfer   Reason for Transfer Level of Care needed not available at this facility   Benefits of Transfer Specialized equipment and/or services available at the receiving facility (Include comment)________________________  [Neurosurgery]   Accepting Physician [facility] Name, [facility]   Sending  Thea Bergeron      RN Documentation    [address] Name, [address] [facility]      Follow-up Information    None         Discharge Medication List as of 7/24/2022 11:50 PM      CONTINUE these medications which have NOT CHANGED    Details   allopurinol (ZYLOPRIM) 100 mg tablet Take 1 tablet (100 mg total) by mouth daily, Starting Tue 3/8/2022, Normal      amLODIPine (NORVASC) 10 mg tablet TAKE ONE TABLET BY MOUTH EVERY DAY, Normal      Ascorbic Acid (vitamin C) 1000 MG tablet Take 1,000 mg by mouth daily, Historical Med      B Complex Vitamins (B COMPLEX 1 PO) Take 1 tablet by mouth daily, Historical Med      citalopram (CeleXA) 10 mg tablet TAKE 1 TABLET BY MOUTH IN THE MORNING AND 1 TABLET BY MOUTH BEFORE BEDTIME, Normal      dexamethasone (DECADRON) 4 mg tablet Take 1 tablet (4 mg total) by mouth 2 (two) times a day with meals, Starting Fri 6/17/2022, Normal      levothyroxine 75 mcg tablet Take 0 5 tablets (37 5 mcg total) by mouth daily in the early morning, Starting Fri 6/3/2022, Normal      meclizine (ANTIVERT) 25 mg tablet Take 1 tablet (25 mg total) by mouth every 8 (eight) hours as needed for dizziness, Starting Fri 6/17/2022, Normal      metoprolol succinate (TOPROL-XL) 25 mg 24 hr tablet Take 1 tablet (25 mg total) by mouth daily, Starting Mon 4/18/2022, Normal      pantoprazole (PROTONIX) 40 mg tablet Take 1 tablet (40 mg total) by mouth daily, Starting Mon 2/14/2022, Normal      polyethylene glycol (GLYCOLAX) 17 GM/SCOOP powder Take 17 g by mouth 2 (two) times a day, Starting Fri 12/24/2021, Normal      Psyllium (Metamucil) 28 3 % POWD Take by mouth, Historical Med      rosuvastatin (CRESTOR) 10 MG tablet TAKE ONE TABLET BY MOUTH EVERY DAY, Normal      traMADol (ULTRAM) 50 mg tablet TAKE ONE TABLET BY MOUTH EVERY 8 HOURS AS NEEDED FOR SEVERE PAIN , Normal             No discharge procedures on file      PDMP Review       Value Time User    PDMP Reviewed  Yes 7/24/2022  9:33 PM Delta Mode, PA-C          ED Provider  Electronically Signed by           Jacob Viramontes MD  07/26/22 7820

## 2022-07-24 NOTE — Clinical Note
Case was discussed with [facility] and the patient's admission status was agreed to be Admission Status: inpatient status to the service of Dr Abigail Zhu

## 2022-07-24 NOTE — QUICK NOTE
Medication requested via nursing med message at [phone]  Replied that medication was tubed to 111 station at [id number]

## 2022-07-25 NOTE — ASSESSMENT & PLAN NOTE
Patient presenting to the emergency department with complaints of worsening chronic lightheadedness over the past several days  Today lightheadedness acutely increased, prompting ED visit  Currently reports feeling lightheaded and tired, and denies other symptom/complaint at this time      /73   Pulse (!) 109   Temp 98 7 °F (37 1 °C) (Oral)   Resp 19   SpO2 95%     · Presenting with HR in the 170's   · Reporting increasing chronic lightheadedness as below   · Troponin 17; 2Hdelta: 2; 4Hdelta: 4  · Currently stable in the 's on 10mg cardizem infusion   · S/P 50mg PO metoprolol in ED   · Not currently an outpatient anticoagulation, will hold on initiation at this time due to prior bleeding history and risk of bleeding w/ developing brain edema/mass effect as below  · Tele monitoring   · Continue cardizem infusion   · Continue home daily 25mg PO metoprolol

## 2022-07-25 NOTE — H&P
[address] [facility]  H&P- Nellie Escalera 1955, 69 y o  male MRN: [id number]  Unit/Bed#: 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 [facility])  Assessment & Plan  · Primary left lung adenocarcinoma   · Reporting lightheadedness for the past several months, which has increased over the past several days  · CT today showing increase in size of left cerebellar mass with associated vasogenic edema with mass effect upon the 4th ventricle as well as new 5 mm high-density nodule in the left cerebellum  · Patient is AAOx4, neuro exam is nonfocal  · Is awaiting transfer to [facility] neuro exams while awaiting transfer    · Continue home BID dexamethasone  · Patient affirms full code status at this time    From sign out, patient recommended to be placed on Decadron 4 mg every 6 hours by neuro surgery  MRI within without Bravo sequences  Holding all anticoagulation and antiplatelet medication  Atrial fibrillation with rapid ventricular response (HCC)  Assessment & Plan  Currently on Cardizem drip  Telemetry and titration of Cardizem drip for heart rate less than 100  Metoprolol succinate to continue  Since atrial fibrillation is relatively new and patient with out previous history; will place Cardiology consult  Unfortunately, patient cannot be placed on anticoagulation or anti-platelet therapy due to metastatic brain disease  Hypothyroidism  Assessment & Plan  Continue levothyroxine  TSH  History of gout  Assessment & Plan  On allopurinol 100 mg daily  Mixed hyperlipidemia  Assessment & Plan  On pravastatin 80 mg in substitution for Crestor 10 mg    Essential hypertension  Assessment & Plan  On metoprolol succinate 25 mg daily  Continue Norvasc          VTE Prophylaxis: Pharmacologic VTE Prophylaxis contraindicated due to On hold as recommended by Neurosurgery  / sequential compression device   Code Status:  Full code for now  POLST: There is no POLST form on file for this patient (pre-hospital)    Anticipated Length of Stay:  Patient will be admitted on an Inpatient basis with an anticipated length of stay of  greater than 2 midnights  Justification for Hospital Stay: Please see detailed plans noted above  Chief Complaint:     Worsening dizziness  History of Present Illness:  Francois Gasca is a 69 y o  male who has past medical history significant for neuroendocrine tumor with status post resection of the lung and brain and known cerebellum metastatic disease  Currently, patient is being treated with radiation therapy  He presents to the emergency room in [facility] with increasing lightheadedness  This has been going on for the past few days  He was then found out to be in atrial fibrillation with rapid ventricular response with heart rate in the 170s  Patient received his metoprolol succinate tablet at 50 mg including a Lopressor tablet of 25 mg  Despite that patient's heart rate was elevated  Started on Cardizem injection 25 mg x 1 with subsequent dose of 10 mg x 1 then a drip started at 10 milligram/hour  Currently heart rate is in the 90 to the early 100 range  However, CT of the head has mild fourth ventricle compression  He is also complaining of right-sided chest discomfort which is local and usual however aggravated by the ride  Patient states that he continuously feels dizziness and lightheadedness somewhat the room is moving in barely spinning        Review of Systems:    Constitutional:  Denies fever or chills   Eyes:  Denies change in visual acuity   HENT:  Denies nasal congestion or sore throat   Respiratory:  Denies cough or shortness of breath   Cardiovascular:  Denies chest pain or edema   GI:  Denies abdominal pain, nausea, vomiting, bloody stools or diarrhea   :  Denies dysuria Musculoskeletal:  Denies back pain or joint pain   Integument:  Denies rash   Neurologic:  Denies headache, focal weakness or sensory changes but with a moving sensation  Endocrine:  Denies polyuria or polydipsia   Lymphatic:  Denies swollen glands   Psychiatric:  Denies depression or anxiety     Past Medical and Surgical History:   Past Medical History:   Diagnosis Date    Brain cancer ([facility] [address] )     Hyperlipidemia     Hypertension     Lung cancer ([facility] [address] )      Past Surgical History:   Procedure Laterality Date    BACK SURGERY      CRANIOTOMY Left 4/7/2022    Procedure: Image guided left parietal craniotomy for tumor resection;  Surgeon: Leandro Galvez MD;  [facility]: [facility];  Service: Neurosurgery    HEMORRHOID SURGERY      IR BIOPSY LUNG  5/6/2021    IR PORT PLACEMENT  6/17/2021    LAMINECTOMY  2018    L4-L5    LUNG SURGERY      left upper lobectomy    FL BRONCHOSCOPY,DIAGNOSTIC N/A 5/25/2021    Procedure: BRONCHOSCOPY FLEXIBLE;  Surgeon: Karol Vergara MD;  Location: [facility];  Service: Thoracic    FL MEDIASTINOSCOPY WITH LYMPH NODE BIOPSY/IES N/A 5/25/2021    Procedure: MEDIASTINOSCOPY, flexible bronchoscopy;  Surgeon: Karol Vergara MD;  [facility];  Service: Thoracic    TONSILLECTOMY  1959       Meds/Allergies:  Medications Prior to Admission   Medication    allopurinol (ZYLOPRIM) 100 mg tablet    amLODIPine (NORVASC) 10 mg tablet    Ascorbic Acid (vitamin C) 1000 MG tablet    B Complex Vitamins (B COMPLEX 1 PO)    citalopram (CeleXA) 10 mg tablet    dexamethasone (DECADRON) 4 mg tablet    levothyroxine 75 mcg tablet    meclizine (ANTIVERT) 25 mg tablet    metoprolol succinate (TOPROL-XL) 25 mg 24 hr tablet    pantoprazole (PROTONIX) 40 mg tablet    polyethylene glycol (GLYCOLAX) 17 GM/SCOOP powder    Psyllium (Metamucil) 28 3 % POWD    rosuvastatin (CRESTOR) 10 MG tablet    traMADol (ULTRAM) 50 mg tablet       Allergies:    Allergies   Allergen Reactions    Bee Venom     Benazepril Other (See Comments)     Angioedema     Latex Other (See Comments)     Burning of eyes at the dentist from the gloves    Meloxicam GI Intolerance    Penicillin G Rash     History:  Marital Status: /Civil Union   Occupation:  Lives at home currently retired  Patient Pre-hospital Living Situation:   Patient Pre-hospital Level of Mobility:  Ambulatory  Patient Pre-hospital Diet Restrictions:  Regular  Substance Use History:   Social History     Substance and Sexual Activity   Alcohol Use Yes    Alcohol/week: 7 0 standard drinks    Types: 7 Cans of beer per week    Comment: 1-2 beers nightly     Social History     Tobacco Use   Smoking Status Former Smoker    Packs/day: 1 00    Years: 40 00    Pack years: 40 00    Types: Cigarettes    Start date:     Quit date: 2017    Years since quittin 4   Smokeless Tobacco Never Used   Tobacco Comment    quit 2017     Social History     Substance and Sexual Activity   Drug Use Not Currently    Types: Marijuana    Comment: seldom       Family History:  Family History   Problem Relation Age of Onset    Nephrolithiasis Father     Lung cancer Maternal Grandfather        Physical Exam:     Vitals:   Blood Pressure: 109/80 (22)  Pulse: 100 (22 0040)  Respirations: 18 (22)  Height: 5' 10" (177 8 cm) (22)  Weight - Scale: 89 1 kg (196 lb 6 9 oz) (22)  SpO2: 98 % (22)    Constitutional:  Well developed, well nourished, no acute distress, non-toxic appearance   Eyes:  PERRL, conjunctiva normal   HENT:  Atraumatic, external ears normal, nose normal, oropharynx moist, no pharyngeal exudates   Neck- normal range of motion, no tenderness, supple   Respiratory:  No respiratory distress, normal breath sounds, no rales, no wheezing   Cardiovascular:  Normal rate, normal rhythm, no murmurs, no gallops, no rubs   GI:  Soft, nondistended, normal bowel sounds, nontender, no organomegaly, no mass, no rebound, no guarding   :  No costovertebral angle tenderness   Musculoskeletal:  No edema, no tenderness, no deformities  Back- no tenderness  Integument:  Well hydrated, no rash   Lymphatic:  No lymphadenopathy noted   Neurologic:  Alert &awake, communicative, CN 2-12 normal, noted presence of left to right nystagmus, normal motor function, normal sensory function, no focal deficits noted   Psychiatric:  Speech and behavior appropriate       Lab Results: I have personally reviewed pertinent reports  Results from last 7 days   Lab Units 07/24/22  1355   WBC Thousand/uL 6 37   HEMOGLOBIN g/dL 14 7   HEMATOCRIT % 42 4   PLATELETS Thousands/uL 90*   NEUTROS PCT % 89*   LYMPHS PCT % 4*   MONOS PCT % 5   EOS PCT % 0     Results from last 7 days   Lab Units 07/24/22  1355   POTASSIUM mmol/L 4 0   CHLORIDE mmol/L 92*   CO2 mmol/L 23   BUN mg/dL 22   CREATININE mg/dL 1 06   CALCIUM mg/dL 8 6   ALK PHOS U/L 58   ALT U/L 66   AST U/L 19               Imaging: I have personally reviewed pertinent reports  CT head without contrast    Result Date: 7/24/2022  Narrative: CT BRAIN - WITHOUT CONTRAST INDICATION:   Syncope, recurrent Brain metastases suspected Lightheadedness, brain metastasis  COMPARISON:  6/15/2022 TECHNIQUE:  CT examination of the brain was performed  In addition to axial images, sagittal and coronal 2D reformatted images were created and submitted for interpretation  Radiation dose length product (DLP) for this visit:  864 mGy-cm   This examination, like all CT scans performed in the [facility], was performed utilizing techniques to minimize radiation dose exposure, including the use of iterative reconstruction and automated exposure control  IMAGE QUALITY:  Diagnostic  FINDINGS: PARENCHYMA:  Left cerebellar dense mass measures 3 9 x 3 6 cm, previously measuring 1 8 x 1 6 cm  There is associated vasogenic edema with mass effect upon the 4th ventricle  Adjacent to the mass is a 5 mm density (2/12)  Left parietal cortical lesion measures 6 mm, previously measuring 9 mm  There is redemonstration of left parietal encephalomalacia  VENTRICLES AND EXTRA-AXIAL SPACES:  Normal for the patient's age  VISUALIZED ORBITS AND PARANASAL SINUSES:  There is a retention cyst versus polyp in the right maxillary sinus  There is opacity in the right mastoid air cells  CALVARIUM AND EXTRACRANIAL SOFT TISSUES:  Left parietal craniotomy  Impression: The left cerebellar mass has increased in size, now measuring 3 9 x 3 6 cm  There is associated vasogenic edema with mass effect upon the 4th ventricle  There is a new 5 mm high density nodule in the left cerebellum  Left posterior parietal cortical nodule appears smaller than prior  The study was marked in [facility] for immediate notification  Workstation performed: [id number]         ** Please Note: Dragon 360 Dictation voice to text software was used in the creation of this document   **

## 2022-07-25 NOTE — ASSESSMENT & PLAN NOTE
· Primary left lung adenocarcinoma   · Reporting lightheadedness for the past several months, which has increased over the past several days  · CT today showing increase in size of left cerebellar mass with associated vasogenic edema with mass effect upon the 4th ventricle as well as new 5 mm high-density nodule in the left cerebellum  · Patient is AAOx4, neuro exam is nonfocal  · Is awaiting transfer to [facility] neuro exams while awaiting transfer    · Continue home BID dexamethasone  · Patient affirms full code status at this time    From sign out, patient recommended to be placed on Decadron 4 mg every 6 hours by neuro surgery  MRI within without Bravo sequences  Holding all anticoagulation and antiplatelet medication

## 2022-07-25 NOTE — QUICK NOTE
Spoke with ED at [facility] prior to transfer  Patient had been found to be in afib with RVR which was adequately treated  Patient had been complaining of increased dizziness and a CT scan was obtained that showed significant change in size of known cerebellar met when compared to a CT scan in June  Given this rapid change and likelihood of need for neurosurgical intervention in the near future, we determined that patient would be transferred to [facility] for admission by [facility] and neurosurgerical consult  I discussed case with Dr Mcintyre who was in agreement  Upon arrival to [facility], patient found to have some nystagmus but otherwise neurologically intact  Plan:  1  MRI w/wo contrast with BRAVO sequence  2  Increase decadron to 4mg Q 6hrs  3  Hold AP/AC until surgical plan can be determined with neurosurgery  4  Q2hr neuro checks with stat head CT and neurosurgery/NCC notification of any changes  5  AFib management per primary    Will be available for any additional questions or needs

## 2022-07-25 NOTE — PLAN OF CARE
Problem: PAIN - ADULT  Goal: Verbalizes/displays adequate comfort level or baseline comfort level  Description: Interventions:  - Encourage patient to monitor pain and request assistance  - Assess pain using appropriate pain scale  - Administer analgesics based on type and severity of pain and evaluate response  - Implement non-pharmacological measures as appropriate and evaluate response  - Consider cultural and social influences on pain and pain management  - Notify physician/advanced practitioner if interventions unsuccessful or patient reports new pain  Outcome: Progressing     Problem: SAFETY ADULT  Goal: Patient will remain free of falls  Description: INTERVENTIONS:  - Educate patient/family on patient safety including physical limitations  - Instruct patient to call for assistance with activity   - Consult OT/PT to assist with strengthening/mobility   - Keep Call bell within reach  - Keep bed low and locked with side rails adjusted as appropriate  - Keep care items and personal belongings within reach  - Initiate and maintain comfort rounds  - Make Fall Risk Sign visible to staff  - Offer Toileting every  Hours, in advance of need  - Initiate/Maintain alarm  - Obtain necessary fall risk management equipment:   - Apply yellow socks and bracelet for high fall risk patients  - Consider moving patient to room near nurses station  Outcome: Progressing  Goal: Maintain or return to baseline ADL function  Description: INTERVENTIONS:  -  Assess patient's ability to carry out ADLs; assess patient's baseline for ADL function and identify physical deficits which impact ability to perform ADLs (bathing, care of mouth/teeth, toileting, grooming, dressing, etc )  - Assess/evaluate cause of self-care deficits   - Assess range of motion  - Assess patient's mobility; develop plan if impaired  - Assess patient's need for assistive devices and provide as appropriate  - Encourage maximum independence but intervene and supervise when necessary  - Involve family in performance of ADLs  - Assess for home care needs following discharge   - Consider OT consult to assist with ADL evaluation and planning for discharge  - Provide patient education as appropriate  Outcome: Progressing  Goal: Maintains/Returns to pre admission functional level  Description: INTERVENTIONS:  - Perform BMAT or MOVE assessment daily    - Set and communicate daily mobility goal to care team and patient/family/caregiver  - Collaborate with rehabilitation services on mobility goals if consulted  - Perform Range of Motion  times a day  - Reposition patient every  hours    - Dangle patient  times a day  - Stand patient  times a day  - Ambulate patient  times a day  - Out of bed to chair  times a day   - Out of bed for meals  times a day  - Out of bed for toileting  - Record patient progress and toleration of activity level   Outcome: Progressing     Problem: NEUROSENSORY - ADULT  Goal: Achieves stable or improved neurological status  Description: INTERVENTIONS  - Monitor and report changes in neurological status  - Monitor vital signs such as temperature, blood pressure, glucose, and any other labs ordered   - Initiate measures to prevent increased intracranial pressure  - Monitor for seizure activity and implement precautions if appropriate      Outcome: Progressing  Goal: Remains free of injury related to seizures activity  Description: INTERVENTIONS  - Maintain airway, patient safety  and administer oxygen as ordered  - Monitor patient for seizure activity, document and report duration and description of seizure to physician/advanced practitioner  - If seizure occurs,  ensure patient safety during seizure  - Reorient patient post seizure  - Seizure pads on all 4 side rails  - Instruct patient/family to notify RN of any seizure activity including if an aura is experienced  - Instruct patient/family to call for assistance with activity based on nursing assessment  - Administer anti-seizure medications if ordered    Outcome: Progressing  Goal: Achieves maximal functionality and self care  Description: INTERVENTIONS  - Monitor swallowing and airway patency with patient fatigue and changes in neurological status  - Encourage and assist patient to increase activity and self care     - Encourage visually impaired, hearing impaired and aphasic patients to use assistive/communication devices  Outcome: Progressing     Problem: CARDIOVASCULAR - ADULT  Goal: Maintains optimal cardiac output and hemodynamic stability  Description: INTERVENTIONS:  - Monitor I/O, vital signs and rhythm  - Monitor for S/S and trends of decreased cardiac output  - Administer and titrate ordered vasoactive medications to optimize hemodynamic stability  - Assess quality of pulses, skin color and temperature  - Assess for signs of decreased coronary artery perfusion  - Instruct patient to report change in severity of symptoms  Outcome: Progressing  Goal: Absence of cardiac dysrhythmias or at baseline rhythm  Description: INTERVENTIONS:  - Continuous cardiac monitoring, vital signs, obtain 12 lead EKG if ordered  - Administer antiarrhythmic and heart rate control medications as ordered  - Monitor electrolytes and administer replacement therapy as ordered  Outcome: Progressing     Problem: SKIN/TISSUE INTEGRITY - ADULT  Goal: Skin Integrity remains intact(Skin Breakdown Prevention)  Description: Assess:  -Perform Garry assessment every   -Clean and moisturize skin every   -Inspect skin when repositioning, toileting, and assisting with ADLS  -Assess under medical devices such as  every   -Assess extremities for adequate circulation and sensation     Bed Management:  -Have minimal linens on bed & keep smooth, unwrinkled  -Change linens as needed when moist or perspiring  -Avoid sitting or lying in one position for more than  hours while in bed  -Keep HOB at degrees     Toileting:  -Offer bedside commode  -Assess for incontinence every   -Use incontinent care products after each incontinent episode such as     Activity:  -Mobilize patient  times a day  -Encourage activity and walks on unit  -Encourage or provide ROM exercises   -Turn and reposition patient every  Hours  -Use appropriate equipment to lift or move patient in bed  -Instruct/ Assist with weight shifting every  when out of bed in chair  -Consider limitation of chair time  hour intervals    Skin Care:  -Avoid use of baby powder, tape, friction and shearing, hot water or constrictive clothing  -Relieve pressure over bony prominences using   -Do not massage red bony areas    Next Steps:  -Teach patient strategies to minimize risks such as    -Consider consults to  interdisciplinary teams such as   Outcome: Progressing  Goal: Incision(s), wounds(s) or drain site(s) healing without S/S of infection  Description: INTERVENTIONS  - Assess and document dressing, incision, wound bed, drain sites and surrounding tissue  - Provide patient and family education  - Perform skin care/dressing changes every   Outcome: Progressing  Goal: Pressure injury heals and does not worsen  Description: Interventions:  - Implement low air loss mattress or specialty surface (Criteria met)  - Apply silicone foam dressing  - Instruct/assist with weight shifting every  minutes when in chair   - Limit chair time to  hour intervals  - Use special pressure reducing interventions such as  when in chair   - Apply fecal or urinary incontinence containment device   - Perform passive or active ROM every   - Turn and reposition patient & offload bony prominences every  hours   - Utilize friction reducing device or surface for transfers   - Consider consults to  interdisciplinary teams such as   - Use incontinent care products after each incontinent episode such as   - Consider nutrition services referral as needed  Outcome: Progressing

## 2022-07-25 NOTE — CONSULTS
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 [facility])  Active Problems:    Essential hypertension    Mixed hyperlipidemia    History of gout    Hypothyroidism    Atrial fibrillation with rapid ventricular response ([facility] [address] )    Assessment/Plan:    1  Atrial fibrillation with rapid ventricular rate   ·  Patient has no prior history of Afib  Antiplatelet/coag therapy contraindicated due to metastatic disease in cerebellum which has potential to hemorrhage  The patient is currently taking metoprolol succinate 25 mg PO once daily and is on cardizem drip 125 mg    · Plan to increase metoprolol to 50 mg PO once daily and avoid cardioversion at this time  · Continue to monitor telemetry and vitals   Subjective     CC: Lightheadedness/dizziness, worsened SOB from baseline  HPI: Dennise Lal 69y o  year old male who presents with dizziness and worsened SOB  Denies chest pain, shortness of breath, palpitations, orthopnea, PND, pedal edema, syncope, presyncope, diaphoresis, nausea/vomiting      Remainder of ROS done and negative    Telemetry: Tachycardia into 170s, some episodes of Vtach     EKG: Afib with RVR     ECHO: Last 5/3/22, EF 60%, normal wall motion, mild tricuspid regurgitation       CHEST X-RAY: No acute cardiopulmonary changes       Family History:   Family History   Problem Relation Age of Onset    Nephrolithiasis Father     Lung cancer Maternal Grandfather      Historical Information   Past Medical History:   Diagnosis Date    [facility]     Hyperlipidemia     Hypertension     Lung cancer [facility])      Past Surgical History:   Procedure Laterality Date    BACK SURGERY      CRANIOTOMY Left 4/7/2022    Procedure: Image guided left parietal craniotomy for tumor resection;  Surgeon: Maryellen Jean-Baptiste MD;  Location: [facility];  Service: Neurosurgery    [facility] IR BIOPSY LUNG  2021    IR PORT PLACEMENT  2021    LAMINECTOMY  2018    L4-L5    LUNG SURGERY      left upper lobectomy    SD BRONCHOSCOPY,DIAGNOSTIC N/A 2021    Procedure: BRONCHOSCOPY FLEXIBLE;  Surgeon: Jordan Drew MD;  [facility]: [facility];  Service: Thoracic    SD MEDIASTINOSCOPY WITH LYMPH NODE BIOPSY/IES N/A 2021    Procedure: MEDIASTINOSCOPY, flexible bronchoscopy;  Surgeon: Jordan Drew MD;  Location: [facility];  Service: Thoracic    TONSILLECTOMY       Social History   Social History     Substance and Sexual Activity   Alcohol Use Yes    Alcohol/week: 7 0 standard drinks    Types: 7 Cans of beer per week    Comment: 1-2 beers nightly     Social History     Substance and Sexual Activity   Drug Use Not Currently    Types: Marijuana    Comment: seldom     Social History     Tobacco Use   Smoking Status Former Smoker    Packs/day: 1 00    Years: 40 00    Pack years: 40 00    Types: Cigarettes    Start date:     Quit date: 2017    Years since quittin 4   Smokeless Tobacco Never Used   Tobacco Comment    quit 2017     Family History:   Family History   Problem Relation Age of Onset    Nephrolithiasis Father     Lung cancer Maternal Grandfather        Review of Systems:  Review of Systems   Respiratory: Positive for shortness of breath  Neurological: Positive for dizziness  All other systems reviewed and are negative            Scheduled Meds:  Current Facility-Administered Medications   Medication Dose Route Frequency Provider Last Rate    acetaminophen  650 mg Oral Q6H PRN Garry Quintana MD      allopurinol  100 mg Oral Daily Garry Quintana MD      aluminum-magnesium hydroxide-simethicone  30 mL Oral Q6H PRN Garry Quintana MD      amLODIPine  10 mg Oral Daily Sarkis Olivera Jerrell Jacques MD      vitamin C  1,000 mg Oral Daily Megan Ruiz MD      atorvastatin  20 mg Oral Daily With Kiara Maza MD      citalopram  10 mg Oral BID Megan Ruiz MD      dexamethasone  4 mg Intravenous Q6H [address] Megan Ruiz MD      diltiazem (CARDIZEM) 125 mg in sodium chloride 0 9% 125 mL infusion  1-15 mg/hr Intravenous Continuous Megan Ruiz MD 12 5 mg/hr (22)    HYDROmorphone  0 2 mg Intravenous Q3H PRN Megan Ruiz MD      levothyroxine  37 5 mcg Oral Early Morning Megan Ruiz MD      metoprolol succinate  25 mg Oral Daily Megan Ruiz MD      ondansetron  4 mg Intravenous Q6H PRN Megan Ruiz MD      oxyCODONE  5 mg Oral Q4H PRN Megan Ruiz MD      pantoprazole  40 mg Oral Early Morning Megan Ruiz MD      polyethylene glycol  17 g Oral BID Megan Ruiz MD      temazepam  7 5 mg Oral HS PRN Megan Ruiz MD      traMADol  50 mg Oral Q6H PRN Megan Ruiz MD       Continuous Infusions:diltiazem (CARDIZEM) 125 mg in sodium chloride 0 9% 125 mL infusion, 1-15 mg/hr, Last Rate: 12 5 mg/hr (22)      PRN Meds:   acetaminophen    aluminum-magnesium hydroxide-simethicone    HYDROmorphone    ondansetron    oxyCODONE    temazepam    traMADol  all current active meds have been reviewed    Allergies   Allergen Reactions    Bee Venom     Benazepril Other (See Comments)     Angioedema     Latex Other (See Comments)     Burning of eyes at the dentist from the gloves    Meloxicam GI Intolerance    Penicillin G Rash       Objective   Vitals: Temp (24hrs), Av 3 °F (36 8 °C), Min:98 1 °F (36 7 °C), Max:98 4 °F (36 9 °C)  Current: Temperature: 98 1 °F (36 7 °C)  Patient Vitals for the past 24 hrs:   BP Temp Temp src Pulse Resp SpO2 Height Weight   22 0815 -- -- -- -- -- 99 % -- --   22 0710 147/87 98 1 °F (36 7 °C) Oral 105 -- 99 % -- -- 07/25/22 0300 (!) 146/102 -- -- (!) 107 -- -- -- --   07/25/22 0040 -- -- -- 100 -- -- -- --   07/25/22 0037 109/80 98 4 °F (36 9 °C) Oral 89 18 98 % 5' 10" (1 778 m) 89 1 kg (196 lb 6 9 oz)    Body mass index is 28 18 kg/m²  Orthostatic Blood Pressures    Flowsheet Row Most Recent Value   Blood Pressure 147/87 filed at 07/25/2022 0710   Patient Position - Orthostatic VS Lying filed at 07/25/2022 0710              Invasive Devices  Report    Central Venous Catheter Line  Duration           Port A Cath 06/17/21 Right Chest 402 days          Peripheral Intravenous Line  Duration           Peripheral IV 07/24/22 Left Hand <1 day    Peripheral IV 07/24/22 Right Hand <1 day                Physical Exam:  Physical Exam  Constitutional:       Appearance: Normal appearance  Cardiovascular:      Rate and Rhythm: Tachycardia present  Rhythm irregular  Pulses: Normal pulses  Heart sounds: Normal heart sounds  Pulmonary:      Effort: Pulmonary effort is normal       Breath sounds: Normal breath sounds  Skin:     General: Skin is warm and dry  Neurological:      Mental Status: He is alert               Lab Results:   Results from last 7 days   Lab Units 07/25/22  0443 07/24/22  1355   WBC Thousand/uL 6 21 6 37   HEMOGLOBIN g/dL 13 9 14 7   HEMATOCRIT % 42 0 42 4   PLATELETS Thousands/uL 76* 90*   NEUTROS PCT %  --  89*   MONOS PCT %  --  5   MONO PCT % 4  --       Results from last 7 days   Lab Units 07/25/22  0443 07/24/22  1355   SODIUM mmol/L 132* 131*   POTASSIUM mmol/L 3 9 4 0   CHLORIDE mmol/L 99 92*   CO2 mmol/L 23 23   BUN mg/dL 20 22   CREATININE mg/dL 0 73 1 06   CALCIUM mg/dL 8 7 8 6   ALK PHOS U/L 53 58   ALT U/L 55 66   AST U/L 23 19   MAGNESIUM mg/dL 2 4  --    EGFR ml/min/1 73sq m 94 71                 No results found for: PHART, UXR1YNL, PO2ART, ODT7YZE, B1FEEUHT, BEART, SOURCE  No components found for: HIV1X2  Lab Results   Component Value Date    HEPCAB Non-reactive 10/06/2020     No results found for: SPEP, UPEP   Lab Results   Component Value Date    HGBA1C 5 2 04/03/2022    HGBA1C 5 4 01/25/2022    HGBA1C 5 3 06/23/2021     No results found for: CHOL   Lab Results   Component Value Date    HDL 43 06/20/2022    HDL 70 04/03/2022    HDL 35 (L) 01/25/2022      Lab Results   Component Value Date    LDLCALC 93 06/20/2022    LDLCALC 96 04/03/2022    LDLCALC 97 01/25/2022      Lab Results   Component Value Date    TRIG 140 06/20/2022    TRIG 137 04/03/2022    TRIG 130 01/25/2022     No components found for: PROCAL          Imaging: I have personally reviewed pertinent reports          Tanya Judge, [facility]

## 2022-07-25 NOTE — ASSESSMENT & PLAN NOTE
· A-fib with RVR still present   · Previously noted in Outpatient PCP note   · Currently on diltiazem gtt

## 2022-07-25 NOTE — ED NOTES
Transfer information:      Transfer to: [id number]    Accepting: Dr Jeff Santana up: Julissa Velasquez 7/25 [facility]    Report: [phone]     Kaleigh Mathias RN  07/24/22 5257

## 2022-07-25 NOTE — EMTALA/ACUTE CARE TRANSFER
[address] [place] [address]  Dept: [phone]      EMTALA TRANSFER CONSENT    NAME Bridget Mcmanus                                         1955                              MRN [id number]    I have been informed of my rights regarding examination, treatment, and transfer   by Dr Dallin Woodson MD    Benefits: Specialized equipment and/or services available at the receiving facility (Include comment)________________________ (Neurosurgery)    Risks:        Consent for Transfer:  I acknowledge that my medical condition has been evaluated and explained to me by the emergency department physician or other qualified medical person and/or my attending physician, who has recommended that I be transferred to the service of  Accepting Physician: Katerina Mcguire at [address] Name, [address] : [place]  The above potential benefits of such transfer, the potential risks associated with such transfer, and the probable risks of not being transferred have been explained to me, and I fully understand them  The doctor has explained that, in my case, the benefits of transfer outweigh the risks  I agree to be transferred  I authorize the performance of emergency medical procedures and treatments upon me in both transit and upon arrival at the receiving facility  Additionally, I authorize the release of any and all medical records to the receiving facility and request they be transported with me, if possible  I understand that the safest mode of transportation during a medical emergency is an ambulance and that the Hospital advocates the use of this mode of transport  Risks of traveling to the receiving facility by car, including absence of medical control, life sustaining equipment, such as oxygen, and medical personnel has been explained to me and I fully understand them      (VIANCA CORRECT BOX BELOW)  [  ]  I consent to the stated transfer and to be transported by ambulance/helicopter  [  ]  I consent to the stated transfer, but refuse transportation by ambulance and accept full responsibility for my transportation by car  I understand the risks of non-ambulance transfers and I exonerate the Hospital and its staff from any deterioration in my condition that results from this refusal     X___________________________________________    DATE  22  TIME________  Signature of patient or legally responsible individual signing on patient behalf           RELATIONSHIP TO PATIENT_________________________          Provider Certification    NAME Braydon Russo                                         1955                              MRN [id number]    A medical screening exam was performed on the above named patient  Based on the examination:    Condition Necessitating Transfer The primary encounter diagnosis was Atrial fibrillation with rapid ventricular response ([facility] [address] )  Diagnoses of Lightheadedness, SOB (shortness of breath), Cerebellar mass, and Vasogenic brain edema (HCC) were also pertinent to this visit      Patient Condition: The patient has been stabilized such that within reasonable medical probability, no material deterioration of the patient condition or the condition of the unborn child(gilma) is likely to result from the transfer    Reason for Transfer: Level of Care needed not available at this facility    Transfer Requirements: Facility [employer] available and qualified personnel available for treatment as acknowledged by    · Agreed to accept transfer and to provide appropriate medical treatment as acknowledged by       Gary  · Appropriate medical records of the examination and treatment of the patient are provided at the time of transfer   [address], [address] _______  · Transfer will be performed by qualified personnel from    and appropriate transfer equipment as required, including the use of necessary and appropriate life support measures  Provider Certification: I have examined the patient and explained the following risks and benefits of being transferred/refusing transfer to the patient/family:         Based on these reasonable risks and benefits to the patient and/or the unborn child(gilma), and based upon the information available at the time of the patients examination, I certify that the medical benefits reasonably to be expected from the provision of appropriate medical treatments at another medical facility outweigh the increasing risks, if any, to the individuals medical condition, and in the case of labor to the unborn child, from effecting the transfer      X____________________________________________ DATE 07/24/22        TIME_______      ORIGINAL - SEND TO MEDICAL RECORDS   COPY - SEND WITH PATIENT DURING TRANSFER

## 2022-07-25 NOTE — ASSESSMENT & PLAN NOTE
Currently on Cardizem drip  Telemetry and titration of Cardizem drip for heart rate less than 100  Metoprolol succinate to continue  Since atrial fibrillation is relatively new and patient with out previous history; will place Cardiology consult  Unfortunately, patient cannot be placed on anticoagulation or anti-platelet therapy due to metastatic brain disease

## 2022-07-25 NOTE — CONSULTS
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)  Assessment & Plan   Patient presented with lightheadedness, tinnitus and visual complaints   S/p L parietal craniotomy for  Resection of tumor on 4/7/2022 with Dr Ayah Vogel Pathology from prior surgery- neuroendocrine tumor   Patient completed WBRT around 5/31/22    Imaging:    CT head 7/24/2022: L cerebellar mass has increased in size measuring 3 9 x 3 6cm  associated edema and mass effect upon the 4th ventricle  New 5mm hyperdensity in the left cerebellum  L posterior parietal cortical nodule appears smaller     Plan:   · ongoing frequent neurological checks  · Recommend STAT CT head for decline in GCS >2 points in 1 hour  · Recommend MRI brain with and without contrast  Order placed at this time  · Defer keppra given infratentorial location  · Decadron 4mg Q6 hours  · DVT ppx: SCD's only at this time until MRI can be obtained  Increase in size of mass could be related to hemorrhage given hyperdense appearance on CT imaging  · Hold all AC/AP medication at this time  · Patient currently in a-fib with RVR- Recommend rate control as patient likely to require surgery  · PT/OT evaluation  · Ongoing medical management and pain control per primary team    · CM following for dispo planning  · Patient wishes to continue all care  Based on MRI results could consider surgery as soon as Thursday but timing and extent of intervention TBD  · Neurosurgery will continue to follow with completion of MRI brain  Call with questions or concerns         Atrial fibrillation with rapid ventricular response (HCC)  Assessment & Plan  · A-fib with RVR still present   · Previously noted in Outpatient PCP note   · Currently on diltiazem gtt      History of Present Illness   HPI: Lisbeth Morgan is a 69 y o  male with PMH including lung adenocarcinoma with neuroendocrine features, hyperlipidemia, hypertension, atrial fibrillation who presents with complaint of headaches, had dizziness, tinnitus  Patient states it is worse with standing or changing positions  It is present at rest as well  Patient denies any visual field deficits but states he does not have his glasses so the television this morning as a little bit blurry  He denies any focal weakness in his arms or legs  Patient denies any gait instability or ambulatory dysfunction  No bowel or bladder dysfunction  Patient underwent a craniotomy for resection of a brain mass on 04/07/2022  Patient had a history of lung cancer with pathology consistent with lung adenocarcinoma neuroendocrine features  Brain mass pathology consistent with neuroendocrine tumor  He underwent 6 sessions of whole brain therapy from 5/13-5/31/2022  Patient had an MRI from June which did show a cerebellar mass  Unfortunately at this time recent imaging has shown a significant increase in the size of the occupying lesion  On CT scan the lesion is primarily hyperdense with a hypodense center  This hypodense center could represent a solid tumor that was previously noted and the hyperdensity could be hemorrhage  Review of Systems   Constitutional: Negative for activity change, appetite change and fatigue  HENT: Positive for tinnitus  Negative for hearing loss, sore throat and voice change  Eyes: Positive for visual disturbance  Respiratory: Negative for chest tightness and shortness of breath  Cardiovascular: Negative for chest pain  Gastrointestinal: Negative  Genitourinary: Negative  Musculoskeletal: Negative  Skin: Negative  Neurological: Positive for dizziness and light-headedness  Psychiatric/Behavioral: Negative for agitation, behavioral problems and confusion         Historical Information   Past Medical History:   Diagnosis Date    Brain cancer ([facility] [address] )     Hyperlipidemia     Hypertension     Lung cancer [facility])      Past Surgical History:   Procedure Laterality Date    BACK SURGERY      CRANIOTOMY Left 2022    Procedure: Image guided left parietal craniotomy for tumor resection;  Surgeon: Ugo Galvin MD;  [facility];  Service: Neurosurgery    HEMORRHOID SURGERY      IR BIOPSY LUNG  2021    IR PORT PLACEMENT  2021    LAMINECTOMY  2018    L4-L5    LUNG SURGERY      left upper lobectomy    [place] BRONCHOSCOPY,DIAGNOSTIC N/A 2021    Procedure: BRONCHOSCOPY FLEXIBLE;  Surgeon: Moises Bermudez MD;  Location[facility];  Service: Thoracic    [place] MEDIASTINOSCOPY WITH LYMPH NODE BIOPSY/IES N/A 2021    Procedure: MEDIASTINOSCOPY, flexible bronchoscopy;  Surgeon: Moises Bermudez MD;  [facility];  Service: Thoracic    TONSILLECTOMY       Social History     Substance and Sexual Activity   Alcohol Use Yes    Alcohol/week: 7 0 standard drinks    Types: 7 Cans of beer per week    Comment: 1-2 beers nightly     Social History     Substance and Sexual Activity   Drug Use Not Currently    Types: Marijuana    Comment: seldom     Social History     Tobacco Use   Smoking Status Former Smoker    Packs/day: 1 00    Years: 40 00    Pack years: 40 00    Types: Cigarettes    Start date:     Quit date: 2017    Years since quittin 4   Smokeless Tobacco Never Used   Tobacco Comment    quit 2017     Family History   Problem Relation Age of Onset    Nephrolithiasis Father     Lung cancer Maternal Grandfather        Meds/Allergies   all current active meds have been reviewed, current meds:   Current Facility-Administered Medications   Medication Dose Route Frequency    acetaminophen (TYLENOL) tablet 650 mg  650 mg Oral Q6H PRN    allopurinol (ZYLOPRIM) tablet 100 mg  100 mg Oral Daily    aluminum-magnesium hydroxide-simethicone (MYLANTA) oral suspension 30 mL  30 mL Oral Q6H PRN    amLODIPine (NORVASC) tablet 10 mg  10 mg Oral Daily    ascorbic acid (VITAMIN C) tablet 1,000 mg  1,000 mg Oral Daily    atorvastatin (LIPITOR) tablet 20 mg  20 mg Oral Daily With Dinner    citalopram (CeleXA) tablet 10 mg  10 mg Oral BID    dexamethasone (DECADRON) injection 4 mg  4 mg Intravenous Q6H [facility]    diltiazem (CARDIZEM) 125 mg in sodium chloride 0 9 % 125 mL infusion  1-15 mg/hr Intravenous Continuous    HYDROmorphone HCl (DILAUDID) injection 0 2 mg  0 2 mg Intravenous Q3H PRN    levothyroxine tablet 37 5 mcg  37 5 mcg Oral Early Morning    metoprolol succinate (TOPROL-XL) 24 hr tablet 25 mg  25 mg Oral Daily    ondansetron (ZOFRAN) injection 4 mg  4 mg Intravenous Q6H PRN    oxyCODONE (ROXICODONE) IR tablet 5 mg  5 mg Oral Q4H PRN    pantoprazole (PROTONIX) EC tablet 40 mg  40 mg Oral Early Morning    polyethylene glycol (MIRALAX) packet 17 g  17 g Oral BID    temazepam (RESTORIL) capsule 7 5 mg  7 5 mg Oral HS PRN    traMADol (ULTRAM) tablet 50 mg  50 mg Oral Q6H PRN    and PTA meds:   Prior to Admission Medications   Prescriptions Last Dose Informant Patient Reported? Taking?    Ascorbic Acid (vitamin C) 1000 MG tablet  Self Yes No   Sig: Take 1,000 mg by mouth daily   B Complex Vitamins (B COMPLEX 1 PO)  Self Yes No   Sig: Take 1 tablet by mouth daily   Psyllium (Metamucil) 28 3 % POWD  Self Yes No   Sig: Take by mouth   allopurinol (ZYLOPRIM) 100 mg tablet  Self No No   Sig: Take 1 tablet (100 mg total) by mouth daily   amLODIPine (NORVASC) 10 mg tablet  Self No No   Sig: TAKE ONE TABLET BY MOUTH EVERY DAY   citalopram (CeleXA) 10 mg tablet   No No   Sig: TAKE 1 TABLET BY MOUTH IN THE MORNING AND 1 TABLET BY MOUTH BEFORE BEDTIME   Patient taking differently: Take 10 mg by mouth daily   dexamethasone (DECADRON) 4 mg tablet  Self No No   Sig: Take 1 tablet (4 mg total) by mouth 2 (two) times a day with meals   levothyroxine 75 mcg tablet  Self No No   Sig: Take 0 5 tablets (37 5 mcg total) by mouth daily in the early morning   meclizine (ANTIVERT) 25 mg tablet  Self No No   Sig: Take 1 tablet (25 mg total) by mouth every 8 (eight) hours as needed for dizziness   Patient not taking: Reported on 7/24/2022   metoprolol succinate (TOPROL-XL) 25 mg 24 hr tablet  Self No No   Sig: Take 1 tablet (25 mg total) by mouth daily   pantoprazole (PROTONIX) 40 mg tablet  Self No No   Sig: Take 1 tablet (40 mg total) by mouth daily   polyethylene glycol (GLYCOLAX) 17 GM/SCOOP powder  Self No No   Sig: Take 17 g by mouth 2 (two) times a day   rosuvastatin (CRESTOR) 10 MG tablet  Self No No   Sig: TAKE ONE TABLET BY MOUTH EVERY DAY   traMADol (ULTRAM) 50 mg tablet   No No   Sig: TAKE ONE TABLET BY MOUTH EVERY 8 HOURS AS NEEDED FOR SEVERE PAIN  Facility-Administered Medications: None     Allergies   Allergen Reactions    Bee Venom     Benazepril Other (See Comments)     Angioedema     Latex Other (See Comments)     Burning of eyes at the dentist from the gloves    Meloxicam GI Intolerance    Penicillin G Rash       Objective   I/O       07/23 0701  07/24 0700 07/24 0701  07/25 0700 07/25 0701 07/26 0700    P  O   0 480    I V  (mL/kg)  0 7 (0) 71 3 (0 8)    Total Intake(mL/kg)  0 7 (0) 551 3 (6 2)    Urine (mL/kg/hr)  400 400 (1 1)    Stool   0    Total Output  400 400    Net  -399 3 +151 3           Unmeasured Stool Occurrence   1 x          Physical Exam  Constitutional:       Appearance: Normal appearance  He is well-developed  HENT:      Head: Normocephalic and atraumatic  Eyes:      Extraocular Movements: Extraocular movements intact and EOM normal       Pupils: Pupils are equal, round, and reactive to light  Comments: No VF deficits on examination  Neck:      Vascular: No JVD  Cardiovascular:      Rate and Rhythm: Normal rate  Pulmonary:      Effort: Pulmonary effort is normal    Abdominal:      General: There is no distension  Palpations: Abdomen is soft  Tenderness: There is no abdominal tenderness  Musculoskeletal:         General: No tenderness or deformity  Normal range of motion  Cervical back: Normal range of motion and neck supple  No tenderness  Skin:     General: Skin is warm and dry  Neurological:      Mental Status: He is alert and oriented to person, place, and time  Cranial Nerves: No cranial nerve deficit  Sensory: No sensory deficit  Motor: No weakness  Deep Tendon Reflexes: Reflexes are normal and symmetric  Psychiatric:         Speech: Speech normal          Behavior: Behavior normal          Thought Content: Thought content normal        Neurologic Exam     Mental Status   Oriented to person, place, and time  Attention: normal    Speech: speech is normal   Level of consciousness: alert  Knowledge: good  Normal comprehension  Cranial Nerves     CN III, IV, VI   Pupils are equal, round, and reactive to light  Extraocular motions are normal    Right pupil: Size: 4 mm  Shape: regular  Reactivity: brisk  Left pupil: Size: 4 mm  Shape: regular  Reactivity: brisk  Upgaze: normal  Downgaze: normal    CN V   Facial sensation intact  CN VII   Facial expression full, symmetric       CN VIII   CN VIII normal    Hearing: intact    CN XI   Right trapezius strength: normal  Left trapezius strength: normal    CN XII   CN XII normal    Tongue deviation: none    Motor Exam   Muscle bulk: normal  Right arm tone: normal  Left arm tone: normal  Right leg tone: normal  Left leg tone: normal    Strength   Right deltoid: 5/5  Left deltoid: 5/5  Right biceps: 5/5  Left biceps: 5/5  Right triceps: 5/5  Left triceps: 5/5  Right wrist flexion: 5/5  Left wrist flexion: 5/5  Right wrist extension: 5/5  Left wrist extension: 5/5  Right iliopsoas: 5/5  Left iliopsoas: 5/5  Right quadriceps: 5/5  Left quadriceps: 5/5  Right hamstrin/5  Left hamstrin/5  Right anterior tibial: 5/5  Left anterior tibial: 5/5  Right gastroc: 5/5  Left gastroc: 5/5    Sensory Exam   Light touch normal      Gait, Coordination, and Reflexes     Tremor   Resting tremor: absent  Action tremor: absent        Vitals:Blood pressure 147/87, pulse (!) 136, temperature 98 1 °F (36 7 °C), temperature source Oral, resp  rate 18, height 5' 10" (1 778 m), weight 89 1 kg (196 lb 6 9 oz), SpO2 99 %  ,Body mass index is 28 18 kg/m²  Lab Results:   Results from last 7 days   Lab Units 22  0443 22  1355   WBC Thousand/uL 6 21 6 37   HEMOGLOBIN g/dL 13 9 14 7   HEMATOCRIT % 42 0 42 4   PLATELETS Thousands/uL 76* 90*   NEUTROS PCT %  --  89*   MONOS PCT %  --  5   MONO PCT % 4  --      Results from last 7 days   Lab Units 22  0443 22  1355   POTASSIUM mmol/L 3 9 4 0   CHLORIDE mmol/L 99 92*   CO2 mmol/L 23 23   BUN mg/dL 20 22   CREATININE mg/dL 0 73 1 06   CALCIUM mg/dL 8 7 8 6   ALK PHOS U/L 53 58   ALT U/L 55 66   AST U/L 23 19     Results from last 7 days   Lab Units 22  0443   MAGNESIUM mg/dL 2 4             No results found for: TROPONINT  ABG:No results found for: PHART, DCG1UIW, PO2ART, DBK9UWO, A9SEBGIF, BEART, SOURCE    Imaging Studies: I have personally reviewed pertinent reports  and I have personally reviewed pertinent films in PACS    No results found  EKG, Pathology, and Other Studies: I have personally reviewed pertinent reports  VTE Prophylaxis: Sequential compression device Isaias Garcia)     Code Status: Level 1 - Full Code  Advance Directive and Living Will:      Power of :    POLST:      Counseling / Coordination of Care  I spent 30 minutes with the patient

## 2022-07-25 NOTE — CONSULTS
Tal 1955, 69 y o  male MRN: [id number]  Unit/Bed#: FT 04 Encounter: [id number]  Primary Care Provider: Maria Fernanda Colby DO   Date and time admitted to hospital: 7/24/2022  1:32 PM    Consults    * Atrial fibrillation with rapid ventricular response [facility])  Assessment & Plan  Patient presenting to the emergency department with complaints of worsening chronic lightheadedness over the past several days  Today lightheadedness acutely increased, prompting ED visit  Currently reports feeling lightheaded and tired, and denies other symptom/complaint at this time      /73   Pulse (!) 109   Temp 98 7 °F (37 1 °C) (Oral)   Resp 19   SpO2 95%     · Presenting with HR in the 170's   · Reporting increasing chronic lightheadedness as below   · Troponin 17; 2Hdelta: 2; 4Hdelta: 4  · Currently stable in the 's on 10mg cardizem infusion   · S/P 50mg PO metoprolol in ED   · Not currently an outpatient anticoagulation, will hold on initiation at this time due to prior bleeding history and risk of bleeding w/ developing brain edema/mass effect as below  · Tele monitoring   · Continue cardizem infusion   · Continue home daily 25mg PO metoprolol    Brain metastases (HCC)  Assessment & Plan  · Primary left lung adenocarcinoma   · Reporting lightheadedness for the past several months, which has increased over the past several days  · CT today showing increase in size of left cerebellar mass with associated vasogenic edema with mass effect upon the 4th ventricle as well as new 5 mm high-density nodule in the left cerebellum  · Patient is AAOx4, neuro exam is nonfocal  · Is awaiting transfer to [facility] neuro exams while awaiting transfer    · Continue home BID dexamethasone  · Patient affirms full code status at this time    Hypothyroidism  Assessment & Plan  · Continue home levothyroxine     Thrombocytopenia (HCC)  Assessment & Plan  · Platelets 90  · No denies S/S of bleeding; monitor  · Trend CBC    CKD (chronic kidney disease) stage 2, GFR 60-89 ml/min  Assessment & Plan  Lab Results   Component Value Date    EGFR 71 07/24/2022    EGFR 65 06/20/2022    EGFR 68 06/17/2022    CREATININE 1 06 07/24/2022    CREATININE 1 13 06/20/2022    CREATININE 1 09 06/17/2022     · Creatinine 1 06; appears to be around baseline  · Avoid nephrotoxic agents  · AM BMP    JUNAID (obstructive sleep apnea)  Assessment & Plan  · qHS CPAP ordered    Essential hypertension  Assessment & Plan  · Controlled on home regimen  · Continue home metoprolol and amlodipine  · Monitor per unit protocol       VTE Prophylaxis: VTE Score: 5 High Risk (Score >/= 5) - Pharmacological DVT Prophylaxis Contraindicated  Sequential Compression Devices Ordered  Recommendations for Discharge:  · Proceed with transfer to [address] [place] [address] / Coordination of Care Time: 60 minutes Greater than 50% of total time spent on patient counseling and coordination of care  Collaboration of Care: Were Recommendations Directly Discussed with Primary Treatment Team? Yes    History of Present Illness:  Braydon Russo is a 69 y o  male who is originally admitted to the ED service due to increasing lightheadedness  We are consulted for management of concomitant medical conditions  Patient presenting to the emergency department with complaints of worsening chronic lightheadedness over the past several days  Today lightheadedness acutely increased, prompting ED visit  Currently reports he is lightheaded and tired, and denies other symptom/complaint at this time  All questions answered at the bedside to the best of my ability  Review of Systems:  Review of Systems   Constitutional: Positive for fatigue  Negative for activity change, appetite change, chills, diaphoresis and fever  HENT: Negative for congestion, ear pain, nosebleeds and trouble swallowing      Eyes: Negative for pain and visual disturbance  Respiratory: Negative for apnea, cough, chest tightness, shortness of breath and wheezing  Cardiovascular: Negative for chest pain, palpitations and leg swelling  Gastrointestinal: Negative for abdominal distention, abdominal pain, blood in stool, constipation, diarrhea, nausea and vomiting  Endocrine: Negative for cold intolerance, heat intolerance and polyuria  Genitourinary: Negative for difficulty urinating, dysuria, flank pain and hematuria  Musculoskeletal: Negative for arthralgias, neck pain and neck stiffness  Skin: Negative for color change, rash and wound  Neurological: Positive for light-headedness  Negative for dizziness, tremors, syncope, weakness, numbness and headaches  Hematological: Negative for adenopathy  All other systems reviewed and are negative  Past Medical and Surgical History:   Past Medical History:   Diagnosis Date    [facility] cancer ([facility] [address] )     Hyperlipidemia     Hypertension     Lung cancer [facility])        Past Surgical History:   Procedure Laterality Date    BACK SURGERY      CRANIOTOMY Left 4/7/2022    Procedure: Image guided left parietal craniotomy for tumor resection;  Surgeon: Ugo Galvin MD;  [facility];  Service: Neurosurgery    HEMORRHOID SURGERY      IR BIOPSY LUNG  5/6/2021    IR PORT PLACEMENT  6/17/2021    LAMINECTOMY  2018    L4-L5    LUNG SURGERY      left upper lobectomy    [place] BRONCHOSCOPY,DIAGNOSTIC N/A 5/25/2021    Procedure: BRONCHOSCOPY FLEXIBLE;  Surgeon: Moises Bermudez MD;  Location: [facility];  Service: Thoracic    [place] MEDIASTINOSCOPY WITH LYMPH NODE BIOPSY/IES N/A 5/25/2021    Procedure: MEDIASTINOSCOPY, flexible bronchoscopy;  Surgeon: Moises Bermudez MD;  [facility]: [facility];  Service: Thoracic    TONSILLECTOMY  1959       Meds/Allergies:  all medications and allergies reviewed    Allergies:    Allergies   Allergen Reactions    Bee Venom     Benazepril Other (See Comments)     Angioedema  Latex Other (See Comments)     Burning of eyes at the dentist from the gloves    Meloxicam GI Intolerance    Penicillin G Rash       Social History:  Marital Status: /Civil Union  Substance Use History:   Social History     Substance and Sexual Activity   Alcohol Use Yes    Alcohol/week: 7 0 standard drinks    Types: 7 Cans of beer per week    Comment: 1-2 beers nightly     Social History     Tobacco Use   Smoking Status Former Smoker    Packs/day: 1 00    Years: 40 00    Pack years: 40 00    Types: Cigarettes    Start date:     Quit date: 2017    Years since quittin 4   Smokeless Tobacco Never Used   Tobacco Comment    quit 2017     Social History     Substance and Sexual Activity   Drug Use Not Currently    Types: Marijuana    Comment: seldom       Family History:  Family History   Problem Relation Age of Onset    Nephrolithiasis Father     Lung cancer Maternal Grandfather        Physical Exam:   Vitals:   Blood Pressure: 138/72 (22)  Pulse: 105 (22)  Temperature: 98 7 °F (37 1 °C) (22 1337)  Temp Source: Oral (22 1337)  Respirations: 16 (22)  SpO2: 99 % (22)    Physical Exam  Vitals and nursing note reviewed  Constitutional:       General: He is not in acute distress  Appearance: Normal appearance  He is ill-appearing  HENT:      Head: Normocephalic and atraumatic  Right Ear: External ear normal       Left Ear: External ear normal       Nose: Nose normal       Mouth/Throat:      Mouth: Mucous membranes are moist    Eyes:      Pupils: Pupils are equal, round, and reactive to light  Cardiovascular:      Rate and Rhythm: Normal rate and regular rhythm  Pulses: Normal pulses  Heart sounds: Normal heart sounds  No murmur heard  Pulmonary:      Effort: Pulmonary effort is normal  No respiratory distress  Breath sounds: Normal breath sounds  No wheezing or rales     Chest:      Chest wall: No tenderness  Abdominal:      General: Bowel sounds are normal  There is no distension  Palpations: Abdomen is soft  There is no mass  Tenderness: There is no abdominal tenderness  There is no guarding  Musculoskeletal:         General: No swelling or tenderness  Cervical back: Normal range of motion and neck supple  No rigidity or tenderness  Right lower leg: No edema  Left lower leg: No edema  Skin:     General: Skin is warm and dry  Capillary Refill: Capillary refill takes less than 2 seconds  Findings: No lesion or rash  Neurological:      General: No focal deficit present  Mental Status: He is alert  Psychiatric:         Mood and Affect: Mood normal           Additional Data:   Lab Results:    Results from last 7 days   Lab Units 07/24/22  1355   WBC Thousand/uL 6 37   HEMOGLOBIN g/dL 14 7   HEMATOCRIT % 42 4   PLATELETS Thousands/uL 90*   NEUTROS PCT % 89*   LYMPHS PCT % 4*   MONOS PCT % 5   EOS PCT % 0     Results from last 7 days   Lab Units 07/24/22  1355   SODIUM mmol/L 131*   POTASSIUM mmol/L 4 0   CHLORIDE mmol/L 92*   CO2 mmol/L 23   BUN mg/dL 22   CREATININE mg/dL 1 06   ANION GAP mmol/L 16*   CALCIUM mg/dL 8 6   ALBUMIN g/dL 2 8*   TOTAL BILIRUBIN mg/dL 0 56   ALK PHOS U/L 58   ALT U/L 66   AST U/L 19   GLUCOSE RANDOM mg/dL 183*             Lab Results   Component Value Date/Time    HGBA1C 5 2 04/03/2022 05:35 AM    HGBA1C 5 4 01/25/2022 11:57 AM    HGBA1C 5 3 06/23/2021 12:23 PM               Imaging: Reviewed radiology reports from this admission including: chest xray and CT head  CT head without contrast   Final Result by Yun Perez MD (07/24 1719)      The left cerebellar mass has increased in size, now measuring 3 9 x 3 6 cm  There is associated vasogenic edema with mass effect upon the 4th ventricle  There is a new 5 mm high density nodule in the left cerebellum  Left posterior parietal cortical nodule appears smaller than prior  The study was marked in [facility] for immediate notification  Workstation performed: [id number]         XR chest 2 views    (Results Pending)       EKG, Pathology, and Other Studies Reviewed on Admission:   · EKG: Atrial fibrillation    ** Please Note: This note may have been constructed using a voice recognition system   **

## 2022-07-25 NOTE — OCCUPATIONAL THERAPY NOTE
Occupational Therapy Screen        Patient Name: Alley Radford Date: 7/25/2022 07/25/22 1136   OT Last Visit   OT Visit Date 07/25/22   Note Type   Note type Screen   Additional Comments Chart reviewed  Spoke to neurosx- pt for potential surgery this thursday  OT will d/c orders, please re-consult post op  Continue participation in ADLs and functional mobility w/ NSG staff as appropriate       Vita Ramirez MS, OTR/L

## 2022-07-25 NOTE — ASSESSMENT & PLAN NOTE
· Primary left lung adenocarcinoma   · Reporting lightheadedness for the past several months, which has increased over the past several days  · CT today showing increase in size of left cerebellar mass with associated vasogenic edema with mass effect upon the 4th ventricle as well as new 5 mm high-density nodule in the left cerebellum  · Patient is AAOx4, neuro exam is nonfocal  · Is awaiting transfer to [facility] neuro exams while awaiting transfer    · Continue home BID dexamethasone  · Patient affirms full code status at this time

## 2022-07-25 NOTE — ASSESSMENT & PLAN NOTE
· Controlled on home regimen  · Continue home metoprolol and amlodipine  · Monitor per unit protocol

## 2022-07-25 NOTE — ASSESSMENT & PLAN NOTE
 Patient presented with lightheadedness, tinnitus and visual complaints   S/p L parietal craniotomy for  Resection of tumor on 4/7/2022 with Dr Erin Coyle Pathology from prior surgery- neuroendocrine tumor   Patient completed WBRT around 5/31/22    Imaging:    CT head 7/24/2022: L cerebellar mass has increased in size measuring 3 9 x 3 6cm  associated edema and mass effect upon the 4th ventricle  New 5mm hyperdensity in the left cerebellum  L posterior parietal cortical nodule appears smaller     Plan:   · ongoing frequent neurological checks  · Recommend STAT CT head for decline in GCS >2 points in 1 hour  · Recommend MRI brain with and without contrast  Order placed at this time  · Defer keppra given infratentorial location  · Decadron 4mg Q6 hours  · DVT ppx: SCD's only at this time until MRI can be obtained  Increase in size of mass could be related to hemorrhage given hyperdense appearance on CT imaging  · Hold all AC/AP medication at this time  · Patient currently in a-fib with RVR- Recommend rate control as patient likely to require surgery  · PT/OT evaluation  · Ongoing medical management and pain control per primary team    · CM following for dispo planning  · Patient wishes to continue all care  Based on MRI results could consider surgery as soon as Thursday but timing and extent of intervention TBD  · Neurosurgery will continue to follow with completion of MRI brain  Call with questions or concerns

## 2022-07-25 NOTE — ASSESSMENT & PLAN NOTE
Lab Results   Component Value Date    EGFR 71 07/24/2022    EGFR 65 06/20/2022    EGFR 68 06/17/2022    CREATININE 1 06 07/24/2022    CREATININE 1 13 06/20/2022    CREATININE 1 09 06/17/2022     · Creatinine 1 06; appears to be around baseline  · Avoid nephrotoxic agents  · AM BMP

## 2022-07-25 NOTE — CASE MANAGEMENT
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 ([place] [address] ) 2022    Irregular heart rate 2022    Atrial fibrillation with rapid ventricular response ([facility] ) 2022    Thrombocytopenia ([place] [address] ) 2022    Goals of care, counseling/discussion 2022    Hypothyroidism 2022    Left parietal hemorrhagic tumor 2022    Platelets decreased ([place] [address] ) 02/15/2022    Psoriasis 02/15/2022    CKD (chronic kidney disease) stage 2, GFR 60-89 ml/min 2022    Labyrinthitis of both ears 2022    Proctocolitis 2021    Pericardial effusion 2021    Constipation 2021    Left non-suppurative otitis media 2021    Bilateral hearing loss due to cerumen impaction 2021    Chronic back pain 2021    Former cigarette smoker 2021    History of gout 2021    Hypertension 2021    Cancer of upper lobe of left lung ([facility] ) 2021    Drug-induced neutropenia ([place] [address] ) 07/15/2021    Adenocarcinoma, lung, left ([facility] ) 2021    Encounter for central line care 2021    Hyperglycemia 2021    Cigarette nicotine dependence in remission 2021    Bilateral hearing loss 2020    Mixed hyperlipidemia 2019    Renal cyst, right 2018    Lumbar stenosis 2018    Glaucoma 2018    JUNAID (obstructive sleep apnea) 2018    Spinal stenosis of lumbar region with neurogenic claudication 2018    Acute idiopathic gout of left foot 2017    Depression with anxiety 2017    Essential hypertension 11/06/2017    Hypertriglyceridemia 11/06/2017    Lumbago with sciatica, left side 11/06/2017    Back problem 06/30/2017      LOS (days): 0  Geometric Mean LOS (GMLOS) (days): 3 50  Days to GMLOS:2 8     OBJECTIVE:    Risk of Unplanned Readmission Score: 20 71         Current admission status: Inpatient       Preferred Pharmacy:   [facility] [address], [facility] [address]  [facility]  [place] [address]  Phone: [phone] Fax: [phone]    Primary Care Provider: Isabel Fulton,     Primary Insurance: MEDICARE  Secondary Insurance: AARP    ASSESSMENT:  Gillian Ott Proxies    There are no active Health Care Proxies on file  Advance Directives  Does patient have a [address]?: No  Was patient offered paperwork?: Yes (declined)  Does patient currently have a Health Care decision maker?: Yes, please see Health Care Proxy section  Does patient have Advance Directives?: Yes  Advance Directives: Living will (requested copy)  Primary Contact: Wendi Haywood-wife    Readmission Root Cause  30 Day Readmission: No    Patient Information  Admitted from[de-identified] Home  Mental Status: Other (Comment) (sleeping)  During Assessment patient was accompanied by: Spouse  Assessment information provided by[de-identified] Spouse  Support Systems: Spouse/significant other  [place] of Residence: [place] [address] do you live in?: [place]  Home entry access options   Select all that apply : No steps to enter home  Type of Current Residence: 2 story home  Upon entering residence, is there a bedroom on the main floor (no further steps)?: No  A bedroom is located on the following floor levels of residence (select all that apply):: 2nd Floor  Upon entering residence, is there a bathroom on the main floor (no further steps)?: No  Indicate which floors of current residence have a bathroom (select all the apply):: Basement, 2nd Floor  Number of steps to basement from main floor: One Flight  Number of steps to 2nd floor from main floor: One Flight  In the last 12 months, was there a time when you were not able to pay the mortgage or rent on time?: No  In the last 12 months, how many places have you lived?: 1  In the last 12 months, was there a time when you did not have a steady place to sleep or slept in a shelter (including now)?: No  Homeless/housing insecurity resource given?: N/A  Living Arrangements: Lives w/ Spouse/significant other  Is patient a ?: No    Activities of Daily Living Prior to Admission  Functional Status: Assistance  Completes ADLs independently?: No  Level of ADL dependence: Assistance (wife helps with dressing and shower)  Ambulates independently?: Yes  Does patient use assisted devices?: No  Does patient currently own DME?: No  Does patient have a history of Outpatient Therapy (PT/OT)?: No  Does the patient have a history of Short-Term Rehab?: No  Does patient have a history of HHC?: No  [facility] No    Patient Information Continued  Income Source: Pension/half-way  Does patient have prescription coverage?: Yes  Within the past 12 months, you worried that your food would run out before you got the money to buy more : Never true  Within the past 12 months, the food you bought just didn't last and you didn't have money to get more : Never true  Food insecurity resource given?: N/A  Does patient receive dialysis treatments?: No  Does patient have a history of substance abuse?: No  Does patient have a history of Mental Health Diagnosis?: No    Means of Transportation  Means of Transport to [facility] [place]de[facility]] Family transport  In the past 12 months, has lack of transportation kept you from medical appointments or from getting medications?: No  In the past 12 months, has lack of transportation kept you from meetings, work, or from getting things needed for daily living?: No  Was application for public transport provided?: N/A

## 2022-07-25 NOTE — PHYSICAL THERAPY NOTE
Physical Therapy Screen       07/25/22 1442   PT Last Visit   PT Visit Date 07/25/22   Note Type   Note type Screen   Cancel Reasons Medical status   Additional Comments PT orders received, chart reviewed  Soke to NSx who reports likely sx intervention for later this week, requests hold on intial eval until post-op  PT to DC from caseload at this time  Please re-consult post-op when appropriate  Thank you       Narciso Erazo, PT, DPT

## 2022-07-26 NOTE — ASSESSMENT & PLAN NOTE
· Primary left lung adenocarcinoma   · Reporting lightheadedness for the past several months, which has increased over the past several days  · CT today showing increase in size of left cerebellar mass with associated vasogenic edema with mass effect upon the 4th ventricle as well as new 5 mm high-density nodule in the left cerebellum  · Patient is AAOx4, neuro exam is nonfocal  · Possible neuro surgical intervention for brain mass resection  · Continue with neuro checks  · Continue with dexamethasone  · Patient affirms full code status at this time    From sign out, patient recommended to be placed on Decadron 4 mg every 6 hours by neuro surgery  MRI within without Bravo sequences  Holding all anticoagulation and antiplatelet medication

## 2022-07-26 NOTE — PROGRESS NOTES
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 [facility])  Active Problems:    Essential hypertension    Mixed hyperlipidemia    History of gout    Hypothyroidism    Atrial fibrillation with rapid ventricular response ([address] )    1  Paroxysmal atrial fibrillation with RVR  Prior atrial fibrillation, likely post procedure  Initiated on po cardizem  bid on 7/26,  now off cardizem gtt  Rate controlled on po cardixem and toprol xl  AC: on hold per NS and oncology recommendations    2  Hypertension  Blood pressure ranging between 100-140/60-90  Can continue current regimen, if BP remains persistently elevated, can uptitrate the BB    3  Hyperlipidemia    4  Neuroendocrine carcinoma with brain metastasis and hemorrhage  Currently being managed by oncology and NS, tentative surgery on 7/28    5  Preop risk assessment  Awaiting resection of the cerebellar mass  Reviewed recent echo from 5/3/2022: preserved EF, normal WM, grade 1 diastolic relaxation, mildly dilated RA, mild-mod TR, RVSP 45, no significant AV disease  No prior history of ischemic heart disease  Former smoker (40 py)  METs  No prior cardiac events post surgery  Revised cardiovascluar risk index: )      Plan:  1  Continue toprol XL 25 mg bid, can consider dose increase if hypertensive  2  Continue po cardizem  bid  3  Continue to hold amlodipine  4  Hold anticoagulation in the setting of brain mets with hemorrhage  5  Patients at low risk for becki operative cardiac events  Subjective:   Patient seen and examined  No significant events overnight  Tired and fatigues, no shortness of breath, chest pain    Objective:     Vitals: Blood pressure (!) 142/104, pulse 89, temperature (!) 96 4 °F (35 8 °C), temperature source Oral, resp   rate 18, height 5' 10" (1 778 m), weight 87 4 kg (192 lb 10 9 oz), SpO2 94 %  , Body mass index is 29 3 kg/m² ,   Orthostatic Blood Pressures    Flowsheet Row Most Recent Value   Blood Pressure 142/104 filed at 07/26/2022 1119   Patient Position - Orthostatic VS Lying filed at 07/26/2022 0400            Intake/Output Summary (Last 24 hours) at 7/26/2022 1255  Last data filed at 7/26/2022 0600  Gross per 24 hour   Intake 602 41 ml   Output 750 ml   Net -147 59 ml       Telemetry: atrial fibrillation, rate controlled      Physical Exam:  Physical Exam  Vitals reviewed  Constitutional:       General: He is not in acute distress  HENT:      Head: Normocephalic  Nose: Nose normal       Mouth/Throat:      Mouth: Mucous membranes are moist    Eyes:      Pupils: Pupils are equal, round, and reactive to light  Cardiovascular:      Rate and Rhythm: Normal rate  Rhythm irregular  Pulses: Normal pulses  Heart sounds: No murmur heard  Pulmonary:      Breath sounds: Rales (occ, b/l) present  No wheezing  Musculoskeletal:      Right lower leg: No edema  Left lower leg: No edema  Skin:     General: Skin is warm  Capillary Refill: Capillary refill takes less than 2 seconds  Neurological:      Mental Status: He is alert  Mental status is at baseline     Psychiatric:         Mood and Affect: Mood normal        Medications:      Current Facility-Administered Medications:     acetaminophen (TYLENOL) tablet 650 mg, 650 mg, Oral, Q6H PRN, Eve Burk MD    allopurinol (ZYLOPRIM) tablet 100 mg, 100 mg, Oral, Daily, Eev Burk MD, 100 mg at 07/26/22 0857    aluminum-magnesium hydroxide-simethicone (MYLANTA) oral suspension 30 mL, 30 mL, Oral, Q6H PRN, Eve Burk MD    ascorbic acid (VITAMIN C) tablet 1,000 mg, 1,000 mg, Oral, Daily, Eve Burk MD, 1,000 mg at 07/26/22 0857    atorvastatin (LIPITOR) tablet 20 mg, 20 mg, Oral, Daily With Ruiz Lao MD, 20 mg at 07/25/22 1639    citalopram (CeleXA) tablet 10 mg, 10 mg, Oral, BID, Donya Swartz MD, 10 mg at 07/26/22 0857    dexamethasone (DECADRON) injection 4 mg, 4 mg, Intravenous, Q6H [facility], Donya Swartz MD, 4 mg at 07/26/22 1205    diltiazem (CARDIZEM SR) 12 hr capsule 120 mg, 120 mg, Oral, Q12H [facility], Christine Hernandez MD, 120 mg at 07/26/22 0857    HYDROmorphone HCl (DILAUDID) injection 0 2 mg, 0 2 mg, Intravenous, Q3H PRN, Donya Swartz MD    levothyroxine tablet 37 5 mcg, 37 5 mcg, Oral, Early Morning, Sarkis Aguila MD, 37 5 mcg at 07/26/22 0500    meclizine (ANTIVERT) tablet 25 mg, 25 mg, Oral, Q8H PRN, Shannon Turpin PA-C, 25 mg at 07/26/22 0858    metoprolol succinate (TOPROL-XL) 24 hr tablet 25 mg, 25 mg, Oral, BID, Christine Hernandez MD, 25 mg at 07/26/22 0857    ondansetron (ZOFRAN) injection 4 mg, 4 mg, Intravenous, Q6H PRN, Donya Swartz MD, 4 mg at 07/25/22 1639    oxyCODONE (ROXICODONE) IR tablet 5 mg, 5 mg, Oral, Q4H PRN, Donya Swartz MD, 5 mg at 07/25/22 2221    pantoprazole (PROTONIX) EC tablet 40 mg, 40 mg, Oral, Early Morning, Donya Swartz MD, 40 mg at 07/26/22 0500    polyethylene glycol (MIRALAX) packet 17 g, 17 g, Oral, BID, Donya Swartz MD, 17 g at 07/25/22 0825    temazepam (RESTORIL) capsule 7 5 mg, 7 5 mg, Oral, HS PRN, Donya Swartz MD, 7 5 mg at 07/25/22 0052    traMADol (ULTRAM) tablet 50 mg, 50 mg, Oral, Q6H PRN, Donya Swartz MD     Labs & Results:        Results from last 7 days   Lab Units 07/25/22  0443 07/24/22  1355   WBC Thousand/uL 6 21 6 37   HEMOGLOBIN g/dL 13 9 14 7   HEMATOCRIT % 42 0 42 4   PLATELETS Thousands/uL 76* 90*         Results from last 7 days   Lab Units 07/25/22  0443 07/24/22  1355   POTASSIUM mmol/L 3 9 4 0   CHLORIDE mmol/L 99 92*   CO2 mmol/L 23 23   BUN mg/dL 20 22   CREATININE mg/dL 0 73 1 06   CALCIUM mg/dL 8 7 8 6   ALK PHOS U/L 53 58   ALT U/L 55 66   AST U/L 23 19         Results from last 7 days   Lab Units 07/25/22  0443   MAGNESIUM mg/dL 2 4         EKG personally reviewed by Yady Bond MD

## 2022-07-26 NOTE — RESTORATIVE TECHNICIAN NOTE
Restorative Technician Note      Patient Name: Ashok Arellano     Note Type: Mobility  Patient Position Upon Consult: Supine  Activity Performed: Ambulated; Stood  Assistive Device: Roller walker  Patient Position at [employer] of Consult: Bedside chair;  All needs within reach; Bed/Chair alarm activated

## 2022-07-26 NOTE — CONSULTS
Medical Oncology/Hematology Consult Note  Lisbeth Morgan, male, 69 y o , 1955,  PPHP 508/PPHP [id number], [id number]     Assessment and Plan    Lisbeth Morgna is a 78-year-old man with a history of neuroendocrine tumor s/p resection of lung-, brain-, and known cerebellar metastases, on radiation therapy, who presented on the night of 7/24 - 7/25 with lightheadedness, now admitted with oncology consulted for management of his metastatic cancer  #Neuroendocrine tumor, s/p resection of lung-, brain-, and known cerebellar metastases  Assessment:  - MRI of the brain, with and without contrast, on 7/25/2022 showed a mixed treatment-response, with a 4 3-cm hemorrhagic metastasis in the L cerebellum that was increased in size, a 0 6-cm hemorrhagic metastasis posterior to this lesion that was decreased in size, and a 0 5-cm L parietal hemorrhagic lesion posterior to the L parietal resection-cavity, that was slightly decreased in size  Plan:  - Continue frequent neurological checks  - Continue dexamethasone 4 mg IV q6h   - No anticoagulation or antiplatelet medication is to be given, and DVT prophylaxis is limited to SCD's only, due to the concern for hemorrhage associated with brain-metastases  - Neurosurgery has been consulted, and is following the patient  #Atrial fibrillation with rapid ventricular response  - Continue diltiazem 120 mg po q12h  #Thrombocytopenia  Assessment:  - The platelet-count was 48J/NE on 7/25/2022, down from 90K/uL on 7/24/2022, with chronically, mildly decreased levels of 91K-148K/uL from Feb 2022 to April 2022, and normal levels of 152K-236K in June 2022  Plan:  - Continue to monitor the platelet-count, in daily CBC's  #Macrocytosis  Assessment:  - The patient had a mild macrocytosis, with an MCV of 103 on 7/7/25/2022  Plan:  - Check for possible deficiency of vitamin B12 (measure the levels of vitamin B12 and methylmalonic acid) or folate (measure the level of folate)    - F/u re: ordered peripheral blood smear with pathologist-review  Outpatient follow up plan: N/A    Communication with patient/family:  I did update the patient, as well as his wife  Communication with team:  Did communicate with A[place], primary team     I did review this patient with Dr Osorio Rogel and he is in agreement with this plan  Reason for consultation: Oncology was consulted, with a stated reason of "patient known to your service; worsening NE tumor with brain mets "    History of present illness: Nellie Escalera is a 68-year-old man with a history of neuroendocrine tumor s/p resection of lung-, brain-, and known cerebellar metastases  The patient is currently receiving radiation-therapy  He presented to the Emergency Department at [facility] on the night of 7/24/2022-7/25/2022 with increasing lightheadedness, which had been present for a few days  The patient was found to be in atrial fibrillation with rapid ventricular response, with a heart-rate in the 170's  The patient received metoprolol succinate (50 mg) and Lopressor (25 mg)  Due to continued elevation of the heart-rate, the patient was given Cardizem (25 mg), then another dose of Cardizem (10 mg), and then a Cardizem drip of 10 mg/hr  CT of the head showed mild compression of the fourth ventricle  On 7/26/2022, the patient said that he was feeling "very dizzy " He said that this dizziness had been present throughout the past two days, and that nothing about the dizziness had changed over that time-period  The patient said that he did not feel like the room was spinning  He said that his dizziness is constant, throughout the day  Review of Systems:  Review of Systems   Constitutional: Negative for chills and fever  HENT: Negative for congestion and sore throat  Eyes: Positive for visual disturbance  Negative for photophobia  Respiratory: Negative for cough and shortness of breath      Cardiovascular: Negative for chest pain, palpitations and leg swelling  Gastrointestinal: Negative for constipation, diarrhea, nausea and vomiting  Genitourinary: Negative for difficulty urinating and dysuria  Musculoskeletal: Positive for back pain  Negative for arthralgias  Skin: Positive for color change  Negative for rash  Neurological: Positive for dizziness and light-headedness  Negative for weakness  The patient characterized his visual disturbance as his depth-perception being off, and said that reading and writing were difficult due to decreased acuity  The "color change" of the patient's skin, mentioned above in the Review of Systems, consisted of a few red spots on each of his forearms, which he said had been present for months  Oncology History:   Cancer Staging  No matching staging information was found for the patient  Oncology History   Adenocarcinoma, lung, left ([facility] [address] )   5/6/2021 Biopsy    [facility]  FINAL DIAGNOSIS     Lung, Left Upper Lobe, core Biopsy ([id number], 5/6/2021):    - Poorly differentiated non-small cell carcinoma, favor adenocarcinoma, with neuroendocrine differentiation of uncertain clinical significance (see letter below)  5/25/2021 Biopsy    A  Lymph Node, 4R, Excision:  - Two reactive lymph nodes with anthracosis (0/2)  B  Lymph Node, 2R, Excision:  - Two reactive lymph nodes with anthracosis (0/2)  C  Lymph Node, 2L, Excision:  - Three reactive lymph nodes with anthracosis (0/3)  D  Lymph Node, 4L, Excision:  - Two reactive lymph nodes with anthracosis (0/2)  E  Lymph Node, Level 7, Excision:  - Two reactive lymph nodes with anthracosis (0/2)              6/9/2021 Initial Diagnosis    Adenocarcinoma, lung, left ([facility] [address] )     6/25/2021 - 8/27/2021 Chemotherapy    cyanocobalamin, 1,000 mcg, Intramuscular, Once, 2 of 3 cycles  Administration: 1,000 mcg (8/6/2021), 1,000 mcg (6/25/2021)  pegfilgrastim (Genene Drummer), 6 mg, Subcutaneous, Once, 3 of 5 cycles  Administration: 6 mg (7/16/2021), 6 mg (8/6/2021), 6 mg (8/27/2021)  fosaprepitant (EMEND) IVPB, 150 mg, Intravenous, Once, 4 of 6 cycles  Administration: 150 mg (6/25/2021), 150 mg (8/6/2021), 150 mg (8/27/2021), 150 mg (7/16/2021)  CARBOplatin (PARAPLATIN) IVPB ([facility] DOSING), 496 mg, Intravenous, Once, 4 of 6 cycles  Administration: 496 mg (6/25/2021), 492 5 mg (8/6/2021), 462 mg (8/27/2021), 516 mg (7/16/2021)  pemetrexed (ALIMTA) chemo infusion, 975 mg, Intravenous, Once, 4 of 6 cycles  Administration: 1,000 mg (6/25/2021), 1,000 mg (8/6/2021), 1,000 mg (8/27/2021), 1,000 mg (7/16/2021)     12/16/2021 Surgery    Left Upper Lung Lobectomy at [facility] by Dr Casandra Paget  Tumor size: 7 6 cm Percentage of viable tumor: 40%  Histologic Grade: Undifferentiated (G$)  Lymphovascular Invasion: Present (extensive)  Perineural Invasion: Not identified  Spread through Air Spaces: Present  Pleural Invasion: tumor invades to the visceral pleural surface  Tumor Extension: Hilar soft tissue  Bronchial Margin: no tumor seen  Vascular Margin: no tumor seen  Distance from Margin: distance of invasive tumor from closest margin: 0 1 cm  Closest margin: vascular margin  Neoadjuvant T staging: ypT2a  Neoadjuvant N staging: ypN0           1/18/2022 - 2/22/2022 Radiation    Treatments:  Course: C1    Plan ID Energy Fractions Dose per Fraction (cGy) Dose Correction (cGy) Total Dose Delivered (cGy) Elapsed Days   L Lung Med 6X 25 / 25 180 0 4,500 35      Treatment Dates:  1/18/2022 - 2/22/2022 1/27/2022 -  Chemotherapy    atezolizumab (TECENTRIQ) IVPB, 1,200 mg, Intravenous, Once, 2 of 6 cycles  Administration: 1,200 mg (1/27/2022), 1,200 mg (2/16/2022)     4/7/2022 Surgery    Final Diagnosis   A-B  Brain, Left parietal mass, Resection:  - 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, consistent with patient's known lung primary           5/13/2022 - 5/31/2022 Radiation    Treatments:  Course: C2    Plan ID Energy Fractions Dose per Fraction (cGy) Dose Correction (cGy) Total Dose Delivered (cGy) Elapsed Days   WHOLE BRAIN 6X 10 / 10 300 0 3,000 18      Treatment Dates:  5/13/2022 - 5/31/2022  Cancer of upper lobe of left lung ([facility] [address] )   11/4/2021 Initial Diagnosis    Cancer of upper lobe of left lung ([facility] [address] )     5/13/2022 - 5/31/2022 Radiation    Treatments:  Course: C2    Plan ID Energy Fractions Dose per Fraction (cGy) Dose Correction (cGy) Total Dose Delivered (cGy) Elapsed Days   WHOLE BRAIN 6X 10 / 10 300 0 3,000 18      Treatment Dates:  5/13/2022 - 5/31/2022  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 [facility])   4/7/2022 Initial Diagnosis    Brain metastases ([facility] [address] )     4/7/2022 Surgery    Image guided left parietal craniotomy for tumor resection (Left)  Surgeon: Dr Sonya Monroy    A-B  Brain, Left parietal mass, Resection:  - 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, consistent with patient's known lung primary  5/13/2022 - 5/31/2022 Radiation    Treatments:  Course: C2    Plan ID Energy Fractions Dose per Fraction (cGy) Dose Correction (cGy) Total Dose Delivered (cGy) Elapsed Days   WHOLE BRAIN 6X 10 / 10 300 0 3,000 18      Treatment Dates:  5/13/2022 - 5/31/2022            Past Medical History:   Past Medical History:   Diagnosis Date    Brain cancer ([facility] [address] )     Hyperlipidemia     Hypertension     Lung cancer [facility])        Past Surgical History:   Procedure Laterality Date    BACK SURGERY      CRANIOTOMY Left 4/7/2022    Procedure: Image guided left parietal craniotomy for tumor resection;  Surgeon: Madison Patton MD;  [facility];  Service: Neurosurgery    HEMORRHOID SURGERY      IR BIOPSY LUNG  5/6/2021    IR PORT PLACEMENT  6/17/2021    LAMINECTOMY  2018    L4-L5    LUNG SURGERY      left upper lobectomy    GA BRONCHOSCOPY,DIAGNOSTIC N/A 5/25/2021    Procedure: BRONCHOSCOPY FLEXIBLE;  Surgeon: Deny Chan Abundio Claire MD;  [facility];  Service: Thoracic    OR MEDIASTINOSCOPY WITH LYMPH NODE BIOPSY/IES N/A 2021    Procedure: MEDIASTINOSCOPY, flexible bronchoscopy;  Surgeon: Corey Anaya MD;  [facility];  Service: Thoracic    TONSILLECTOMY         Family History   Problem Relation Age of Onset    Nephrolithiasis Father     Lung cancer Maternal Grandfather      The patient said that, contrary to his prior belief, there was no family history of cancer  He said that his previously reported history of lung cancer in his maternal grandfather had been in error  Social History     Socioeconomic History    Marital status: /Civil Union     Spouse name: Not on file    Number of children: Not on file    Years of education: Not on file    Highest education level: Not on file   Occupational History    Not on file   Tobacco Use    Smoking status: Former Smoker     Packs/day: 1 00     Years: 40 00     Pack years: 40 00     Types: Cigarettes     Start date:      Quit date: 2017     Years since quittin 4    Smokeless tobacco: Never Used    Tobacco comment: quit 2017   Vaping Use    Vaping Use: Never used   Substance and Sexual Activity    Alcohol use: Yes     Alcohol/week: 7 0 standard drinks     Types: 7 Cans of beer per week     Comment: 1-2 beers nightly    Drug use: Not Currently     Types: Marijuana     Comment: seldom    Sexual activity: Not on file   Other Topics Concern    Not on file   Social History Narrative    Not on file     Social Determinants of Health     Financial Resource Strain: Not on file   Food Insecurity: No Food Insecurity    Worried About Running Out of Food in the Last Year: Never true    Felecia of Food in the Last Year: Never true   Transportation Needs: No Transportation Needs    Lack of Transportation (Medical): No    Lack of Transportation (Non-Medical):  No   Physical Activity: Not on file   Stress: Not on file   Social Connections: Not on file   Intimate Partner Violence: Not on file   Housing Stability: Low Risk     Unable to Pay for Housing in the Last Year: No    Number of Places Lived in the Last Year: 1    Unstable Housing in the Last Year: No     The patient stated that he quit consuming alcohol, approximately one month ago      Current Facility-Administered Medications:     acetaminophen (TYLENOL) tablet 650 mg, 650 mg, Oral, Q6H PRN, Amirah Queen MD    allopurinol (ZYLOPRIM) tablet 100 mg, 100 mg, Oral, Daily, Amirah Queen MD, 100 mg at 07/26/22 0857    aluminum-magnesium hydroxide-simethicone (MYLANTA) oral suspension 30 mL, 30 mL, Oral, Q6H PRN, Amirah Queen MD    ascorbic acid (VITAMIN C) tablet 1,000 mg, 1,000 mg, Oral, Daily, Amirah Queen MD, 1,000 mg at 07/26/22 0857    atorvastatin (LIPITOR) tablet 20 mg, 20 mg, Oral, Daily With Bassam Tsai MD, 20 mg at 07/25/22 1639    citalopram (CeleXA) tablet 10 mg, 10 mg, Oral, BID, Amirah Queen MD, 10 mg at 07/26/22 0857    dexamethasone (DECADRON) injection 4 mg, 4 mg, Intravenous, Q6H [address], Amirah Queen MD, 4 mg at 07/26/22 1205    diltiazem (CARDIZEM SR) 12 hr capsule 120 mg, 120 mg, Oral, Q12H [address], Mago Mak MD, 120 mg at 07/26/22 0857    HYDROmorphone HCl (DILAUDID) injection 0 2 mg, 0 2 mg, Intravenous, Q3H PRN, Amirah Queen MD    levothyroxine tablet 37 5 mcg, 37 5 mcg, Oral, Early Morning, Sarkis Sam MD, 37 5 mcg at 07/26/22 0500    meclizine (ANTIVERT) tablet 25 mg, 25 mg, Oral, Q8H PRN, Komal Ramírez PA-C, 25 mg at 07/26/22 0858    metoprolol succinate (TOPROL-XL) 24 hr tablet 25 mg, 25 mg, Oral, BID, Mago Mak MD, 25 mg at 07/26/22 0857    ondansetron (ZOFRAN) injection 4 mg, 4 mg, Intravenous, Q6H PRN, Amirah Queen MD, 4 mg at 07/25/22 1639    oxyCODONE (ROXICODONE) IR tablet 5 mg, 5 mg, Oral, Q4H PRN, Amirah Queen MD, 5 mg at 07/25/22 2221    pantoprazole (PROTONIX) EC tablet 40 mg, 40 mg, Oral, Early Morning, Jami Sheth MD, 40 mg at 07/26/22 0500    polyethylene glycol (MIRALAX) packet 17 g, 17 g, Oral, BID, Jami Sheth MD, 17 g at 07/25/22 0825    temazepam (RESTORIL) capsule 7 5 mg, 7 5 mg, Oral, HS PRN, Jami Sheth MD, 7 5 mg at 07/25/22 0052    traMADol (ULTRAM) tablet 50 mg, 50 mg, Oral, Q6H PRN, Jami Sheth MD    Medications Prior to Admission   Medication    allopurinol (ZYLOPRIM) 100 mg tablet    amLODIPine (NORVASC) 10 mg tablet    Ascorbic Acid (vitamin C) 1000 MG tablet    B Complex Vitamins (B COMPLEX 1 PO)    citalopram (CeleXA) 10 mg tablet    dexamethasone (DECADRON) 4 mg tablet    levothyroxine 75 mcg tablet    meclizine (ANTIVERT) 25 mg tablet    metoprolol succinate (TOPROL-XL) 25 mg 24 hr tablet    pantoprazole (PROTONIX) 40 mg tablet    polyethylene glycol (GLYCOLAX) 17 GM/SCOOP powder    Psyllium (Metamucil) 28 3 % POWD    rosuvastatin (CRESTOR) 10 MG tablet    traMADol (ULTRAM) 50 mg tablet       Allergies   Allergen Reactions    Bee Venom     Benazepril Other (See Comments)     Angioedema     Latex Other (See Comments)     Burning of eyes at the dentist from the gloves    Meloxicam GI Intolerance    Penicillin G Rash         Physical Exam:     BP (!) 142/104   Pulse 89   Temp (!) 96 4 °F (35 8 °C) (Oral)   Resp 18   Ht 5' 10" (1 778 m)   Wt 87 4 kg (192 lb 10 9 oz)   SpO2 94%   BMI 29 30 kg/m²     Physical Exam  Constitutional:       General: He is not in acute distress  Appearance: He is not diaphoretic  HENT:      Head: Normocephalic and atraumatic  Nose: Nose normal    Eyes:      General: No scleral icterus  Conjunctiva/sclera: Conjunctivae normal    Cardiovascular:      Rate and Rhythm: Normal rate and regular rhythm  Heart sounds: No murmur heard  No friction rub  No gallop     Pulmonary:      Breath sounds: Normal breath sounds  No wheezing, rhonchi or rales  Abdominal:      General: Bowel sounds are normal  There is no distension  Palpations: Abdomen is soft  Tenderness: There is no abdominal tenderness  Musculoskeletal:      Right lower leg: No edema  Left lower leg: No edema  Skin:     General: Skin is warm and dry  Neurological:      Cranial Nerves: No cranial nerve deficit  Motor: No weakness  Psychiatric:         Mood and Affect: Mood normal          Behavior: Behavior normal          Thought Content:  Thought content normal          Recent Results (from the past 48 hour(s))   CBC and differential    Collection Time: 07/24/22  1:55 PM   Result Value Ref Range    WBC 6 37 4 31 - 10 16 Thousand/uL    RBC 4 24 3 88 - 5 62 Million/uL    Hemoglobin 14 7 12 0 - 17 0 g/dL    Hematocrit 42 4 36 5 - 49 3 %     (H) 82 - 98 fL    MCH 34 7 (H) 26 8 - 34 3 pg    MCHC 34 7 31 4 - 37 4 g/dL    RDW 15 9 (H) 11 6 - 15 1 %    MPV 10 1 8 9 - 12 7 fL    Platelets 90 (L) 791 - 390 Thousands/uL    nRBC 0 /100 WBCs    Neutrophils Relative 89 (H) 43 - 75 %    Immat GRANS % 2 0 - 2 %    Lymphocytes Relative 4 (L) 14 - 44 %    Monocytes Relative 5 4 - 12 %    Eosinophils Relative 0 0 - 6 %    Basophils Relative 0 0 - 1 %    Neutrophils Absolute 5 65 1 85 - 7 62 Thousands/µL    Immature Grans Absolute 0 13 0 00 - 0 20 Thousand/uL    Lymphocytes Absolute 0 25 (L) 0 60 - 4 47 Thousands/µL    Monocytes Absolute 0 33 0 17 - 1 22 Thousand/µL    Eosinophils Absolute 0 00 0 00 - 0 61 Thousand/µL    Basophils Absolute 0 01 0 00 - 0 10 Thousands/µL   Comprehensive metabolic panel    Collection Time: 07/24/22  1:55 PM   Result Value Ref Range    Sodium 131 (L) 135 - 147 mmol/L    Potassium 4 0 3 5 - 5 3 mmol/L    Chloride 92 (L) 96 - 108 mmol/L    CO2 23 21 - 32 mmol/L    ANION GAP 16 (H) 4 - 13 mmol/L    BUN 22 5 - 25 mg/dL    Creatinine 1 06 0 60 - 1 30 mg/dL    Glucose 183 (H) 65 - 140 mg/dL    Calcium 8 6 8 3 - 10 1 mg/dL Corrected Calcium 9 6 8 3 - 10 1 mg/dL    AST 19 5 - 45 U/L    ALT 66 12 - 78 U/L    Alkaline Phosphatase 58 46 - 116 U/L    Total Protein 6 6 6 4 - 8 4 g/dL    Albumin 2 8 (L) 3 5 - 5 0 g/dL    Total Bilirubin 0 56 0 20 - 1 00 mg/dL    eGFR 71 ml/min/1 73sq m   HS Troponin 0hr (reflex protocol)    Collection Time: 07/24/22  1:55 PM   Result Value Ref Range    hs TnI 0hr 17 "Refer to ACS Flowchart"- see link ng/L   HS Troponin I 2hr    Collection Time: 07/24/22  4:12 PM   Result Value Ref Range    hs TnI 2hr 19 "Refer to ACS Flowchart"- see link ng/L    Delta 2hr hsTnI 2 <20 ng/L   HS Troponin I 4hr    Collection Time: 07/24/22  6:41 PM   Result Value Ref Range    hs TnI 4hr 21 "Refer to ACS Flowchart"- see link ng/L    Delta 4hr hsTnI 4 <20 ng/L   Comprehensive metabolic panel    Collection Time: 07/25/22  4:43 AM   Result Value Ref Range    Sodium 132 (L) 135 - 147 mmol/L    Potassium 3 9 3 5 - 5 3 mmol/L    Chloride 99 96 - 108 mmol/L    CO2 23 21 - 32 mmol/L    ANION GAP 10 4 - 13 mmol/L    BUN 20 5 - 25 mg/dL    Creatinine 0 73 0 60 - 1 30 mg/dL    Glucose 117 65 - 140 mg/dL    Calcium 8 7 8 3 - 10 1 mg/dL    Corrected Calcium 9 9 8 3 - 10 1 mg/dL    AST 23 5 - 45 U/L    ALT 55 12 - 78 U/L    Alkaline Phosphatase 53 46 - 116 U/L    Total Protein 6 2 (L) 6 4 - 8 4 g/dL    Albumin 2 5 (L) 3 5 - 5 0 g/dL    Total Bilirubin 0 68 0 20 - 1 00 mg/dL    eGFR 94 ml/min/1 73sq m   Magnesium    Collection Time: 07/25/22  4:43 AM   Result Value Ref Range    Magnesium 2 4 1 6 - 2 6 mg/dL   CBC and differential    Collection Time: 07/25/22  4:43 AM   Result Value Ref Range    WBC 6 21 4 31 - 10 16 Thousand/uL    RBC 4 06 3 88 - 5 62 Million/uL    Hemoglobin 13 9 12 0 - 17 0 g/dL    Hematocrit 42 0 36 5 - 49 3 %     (H) 82 - 98 fL    MCH 34 2 26 8 - 34 3 pg    MCHC 33 1 31 4 - 37 4 g/dL    RDW 16 2 (H) 11 6 - 15 1 %    MPV 9 8 8 9 - 12 7 fL    Platelets 76 (L) 756 - 390 Thousands/uL   Manual Differential(PHLEBS Do Not Order)    Collection Time: 07/25/22  4:43 AM   Result Value Ref Range    Segmented % 83 (H) 43 - 75 %    Bands % 9 (H) 0 - 8 %    Lymphocytes % 2 (L) 14 - 44 %    Monocytes % 4 4 - 12 %    Eosinophils, % 0 0 - 6 %    Basophils % 0 0 - 1 %    Myelocytes % 1 0 - 1 %    Atypical Lymphocytes % 1 (H) <=0 %    Absolute Neutrophils 5 71 1 85 - 7 62 Thousand/uL    Lymphocytes Absolute 0 12 (L) 0 60 - 4 47 Thousand/uL    Monocytes Absolute 0 25 0 00 - 1 22 Thousand/uL    Eosinophils Absolute 0 00 0 00 - 0 40 Thousand/uL    Basophils Absolute 0 00 0 00 - 0 10 Thousand/uL    Total Counted      RBC Morphology Present     Anisocytosis Present     Macrocytes Present     Polychromasia Present     Platelet Estimate Decreased (A) Adequate   Folate    Collection Time: 07/25/22  4:43 AM   Result Value Ref Range    Folate >20 0 (H) 3 1 - 17 5 ng/mL       XR chest 2 views    Result Date: 7/25/2022  Narrative: CHEST INDICATION:   AFib RVR, lightheadedness, palpitations  COMPARISON:  01/24/2017  CT scan on 06/15/2022 EXAM PERFORMED/VIEWS:  XR CHEST PA & LATERAL Images: 3 FINDINGS: Cardiomediastinal silhouette appears unremarkable  Right-sided MediPort terminating in the SVC  The lungs are hyperinflated  Postoperative changes in the left lung  Scarring in the left midlung  No pneumothorax or pleural effusion  Osseous structures appear within normal limits for patient age  Impression: No acute cardiopulmonary disease  Hyperinflation  Workstation performed: [id number]     CT head without contrast    Result Date: 7/24/2022  Narrative: CT BRAIN - WITHOUT CONTRAST INDICATION:   Syncope, recurrent Brain metastases suspected Lightheadedness, brain metastasis  COMPARISON:  6/15/2022 TECHNIQUE:  CT examination of the brain was performed  In addition to axial images, sagittal and coronal 2D reformatted images were created and submitted for interpretation  Radiation dose length product (DLP) for this visit:  864 mGy-cm     This examination, like all CT scans performed in the [facility], was performed utilizing techniques to minimize radiation dose exposure, including the use of iterative reconstruction and automated exposure control  IMAGE QUALITY:  Diagnostic  FINDINGS: PARENCHYMA:  Left cerebellar dense mass measures 3 9 x 3 6 cm, previously measuring 1 8 x 1 6 cm  There is associated vasogenic edema with mass effect upon the 4th ventricle  Adjacent to the mass is a 5 mm density (2/12)  Left parietal cortical lesion measures 6 mm, previously measuring 9 mm  There is redemonstration of left parietal encephalomalacia  VENTRICLES AND EXTRA-AXIAL SPACES:  Normal for the patient's age  VISUALIZED ORBITS AND PARANASAL SINUSES:  There is a retention cyst versus polyp in the right maxillary sinus  There is opacity in the right mastoid air cells  CALVARIUM AND EXTRACRANIAL SOFT TISSUES:  Left parietal craniotomy  Impression: The left cerebellar mass has increased in size, now measuring 3 9 x 3 6 cm  There is associated vasogenic edema with mass effect upon the 4th ventricle  There is a new 5 mm high density nodule in the left cerebellum  Left posterior parietal cortical nodule appears smaller than prior  The study was marked in [facility] for immediate notification  Workstation performed: [id number]     MRI brain w wo contrast    Result Date: 7/25/2022  Narrative: MRI BRAIN WITH AND WITHOUT CONTRAST INDICATION: brain mass  COMPARISON:  CT head without contrast 7/24/2022, 6/15/2022  MRI brain with and without contrast 4/26/2022, 4/8/2022  TECHNIQUE: Sagittal T1, axial T2, axial FLAIR, axial T1, axial [place], axial diffusion  Sagittal, axial T1 postcontrast   Axial bravo postcontrast with coronal reconstructions  IV Contrast:  9 mL of Gadobutrol injection (SINGLE-DOSE)  IMAGE QUALITY:   Diagnostic  FINDINGS: BRAIN PARENCHYMA: Postsurgical changes of left parietal craniotomy for resection of left parietal mass    Unchanged left parietal resection cavity with encephalomalacia, gliosis, and peripheral hemosiderin deposition  No abnormal masslike enhancement in left parietal resection cavity to suggest recurrent disease  A few enhancing hemorrhagic metastatic lesions, for reference: - 0 5 x 0 5 cm hemorrhagic enhancing lesion in left parietal lobe posterior to left parietal resection cavity (12:81), previously 0 8 x 0 7 cm on CT head 6/15/2022 but unchanged since 7/24/2022  - 4 3 x 3 6 cm hemorrhagic enhancing mass in left cerebellum (12:38), previously 1 8 x 1 4 cm  Unchanged mass effect on the 4th ventricle since yesterday's CT head without contrast   Moderate surrounding perilesional vasogenic edema in left cerebellum, posterior cerebellar vermis, and right posterior paramedian cerebellum  - 0 6 x 0 6 cm hemorrhagic enhancing mass posterior to the dominant left cerebellar mass (12:40), previously 1 5 x 1 4 cm cm  No midline shift  No diffusion-weighted signal abnormality to suggest acute infarction  A few small scattered hyperintensities on T2/FLAIR imaging are noted in the periventricular and subcortical white matter demonstrating an appearance that is statistically most likely to represent minimal microangiopathic change  VENTRICLES:  Unchanged mass effect on the 4th ventricle  No obstructive hydrocephalus  No intraventricular hemorrhage  SELLA AND PITUITARY GLAND:  Normal  ORBITS:  Unchanged bilateral globe proptosis  PARANASAL SINUSES:  Moderate size right maxillary mucus retention cyst  VASCULATURE:  Evaluation of the major intracranial vasculature demonstrates appropriate flow voids  CALVARIUM AND SKULL BASE: Left parietal craniotomy  Small bilateral mastoid effusions (right worse than left)   EXTRACRANIAL SOFT TISSUES:  Normal      Impression: Mixed treatment response - 4 3 cm hemorrhagic metastatic lesion in left cerebellum, increased in size since CT head 6/15/2022 but unchanged since 7/24/2022 - this is likely due to interval hemorrhage of known left cerebellar lesion  Moderate surrounding perilesional vasogenic edema in left cerebellum, posterior cerebellar vermis, and right posterior paramedian cerebellum with mild mass effect on the 4th ventricle  - 0 6 cm hemorrhagic metastatic lesion posterior to the dominant left cerebellar mass, decreased in size  - 0 5 cm left parietal lobe hemorrhagic lesion posterior to left parietal resection cavity, slightly decreased in size  - No evidence of recurrent disease in left parietal resection cavity  The study was marked in [facility] for immediate notification  Workstation performed: [id number]       Labs and pertinent reports reviewed  This note has been generated by voice recognition software system  Therefore, there may be spelling, grammar, and or syntax errors  Please contact if questions arise

## 2022-07-26 NOTE — ASSESSMENT & PLAN NOTE
 Patient presented with lightheadedness, tinnitus and visual complaints   S/p L parietal craniotomy for  Resection of tumor on 4/7/2022 with Dr Arizmendi Brain Pathology from prior surgery- neuroendocrine tumor   Patient completed WBRT around 5/31/22    Imaging:    CT chest abdomen and pelvis 07/26/2022: Multiple new bilateral irregular lung nodule suspicious for metastasis  No findings of metastatic disease in the abdomen or pelvis   MRI brain 7/25/2022:  4 3 cm hemorrhagic met in the left cerebellum increased in size since 06/15 but stable compared to imaging from day prior  Enlargement in size likely due to interval hemorrhage  Surrounding perilesional edema and mass effect  0 6 cm hemorrhagic metastasis posterior to the dominant left cerebellar mass decreased in size  0 5 cm left parietal lobe hemorrhagic lesion posterior to the prior left parietal resection cavity also slightly decreased in size  No evidence of recurrent disease in the prior resection cavity  Plan:   · ongoing frequent neurological checks  · Recommend STAT CT head for decline in GCS >2 points in 1 hour  · MRI brain completed   · CT CAP completed for updated staging- new small lung lesions noted- Follow up recs from med onc  · Defer keppra given infratentorial location  · Decadron 4mg Q6 hours  · DVT ppx: SCD's only at this time  · Hold all AC/AP medication at this time  · Patient currently in a-fib with RVR- Recommend rate control as patient likely to require surgery  · Increased HR today with initiation of oral BB   · Cardiology following and appreciate recommendations  · PT/OT evaluation  · Ongoing medical management and pain control per primary team    · CM following for dispo planning  · Patient wishes to continue all care  Tentative plan for OR Thursday with Dr Jo Jordan  Will have further discussion tomorrow with patient and wife  · Neurosurgery will continue to follow  Call with questions or concerns

## 2022-07-26 NOTE — PROGRESS NOTES
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 [facility])  Assessment & Plan   Patient presented with lightheadedness, tinnitus and visual complaints   S/p L parietal craniotomy for  Resection of tumor on 4/7/2022 with Dr Ebony Martinez Pathology from prior surgery- neuroendocrine tumor   Patient completed WBRT around 5/31/22    Imaging:    CT chest abdomen and pelvis 07/26/2022: Multiple new bilateral irregular lung nodule suspicious for metastasis  No findings of metastatic disease in the abdomen or pelvis   MRI brain 7/25/2022:  4 3 cm hemorrhagic met in the left cerebellum increased in size since 06/15 but stable compared to imaging from day prior  Enlargement in size likely due to interval hemorrhage  Surrounding perilesional edema and mass effect  0 6 cm hemorrhagic metastasis posterior to the dominant left cerebellar mass decreased in size  0 5 cm left parietal lobe hemorrhagic lesion posterior to the prior left parietal resection cavity also slightly decreased in size  No evidence of recurrent disease in the prior resection cavity  Plan:   · ongoing frequent neurological checks  · Recommend STAT CT head for decline in GCS >2 points in 1 hour  · MRI brain completed   · CT CAP completed for updated staging- new small lung lesions noted- Follow up recs from med onc  · Defer keppra given infratentorial location  · Decadron 4mg Q6 hours  · DVT ppx: SCD's only at this time  · Hold all AC/AP medication at this time  · Patient currently in a-fib with RVR- Recommend rate control as patient likely to require surgery  · Increased HR today with initiation of oral BB   · Cardiology following and appreciate recommendations  · PT/OT evaluation  · Ongoing medical management and pain control per primary team    · CM following for dispo planning  · Patient wishes to continue all care  Tentative plan for OR Thursday with Dr Caesar Crews  Will have further discussion tomorrow with patient and wife  · Neurosurgery will continue to follow  Call with questions or concerns  Atrial fibrillation with rapid ventricular response (HCC)  Assessment & Plan  · A-fib with RVR on admission   · Previously noted in Outpatient PCP note   · Transitioned to oral BB with improved rate control  Subjective/Objective   Chief Complaint: same as before     Subjective: Patient states he still feels horrible  He continues with the same symptoms as prior  Dizziness and light headedness  He has no acute issues or concerns  States he does not feel any better or any worse compared to yesterday  Patient and wife eager to known results of imaging and next steps  Both are focus on disease related care and do not wish to proceed with hospice discussion at this time  Objective: Sitting in bedside chair at this time  I/O       07/24 0701 07/25 0700 07/25 0701 07/26 0700 07/26 0701 07/27 0700    P  O  0 1060     I V  (mL/kg) 0 7 (0) 313 7 (3 6)     Total Intake(mL/kg) 0 7 (0) 1373 7 (15 7)     Urine (mL/kg/hr) 400 1150 (0 5)     Stool  0     Total Output 400 1150     Net -399 3 +223 7            Unmeasured Stool Occurrence  1 x           Invasive Devices  Report    Central Venous Catheter Line  Duration           Port A Cath 06/17/21 Right Chest 404 days          Peripheral Intravenous Line  Duration           Peripheral IV 07/24/22 Left Hand 2 days    Peripheral IV 07/24/22 Right Hand 2 days    Peripheral IV 07/25/22 Left Antecubital <1 day                Physical Exam:  Vitals: Blood pressure (!) 142/104, pulse 89, temperature (!) 96 4 °F (35 8 °C), temperature source Oral, resp   rate 18, height 5' 10" (1 778 m), weight 87 4 kg (192 lb 10 9 oz), SpO2 94 % ,Body mass index is 29 3 kg/m²  General appearance: alert, appears stated age, cooperative and no distress  Head: Normocephalic, without obvious abnormality, atraumatic  Eyes: EOMI, PERRL  No VF cuts  Neck: supple, symmetrical, trachea midline  Back: no kyphosis present  Lungs: non labored breathing  Heart: regular heart rate  Neurologic:   Mental status: Alert, oriented x3, thought content appropriate  Cranial nerves: grossly intact (Cranial nerves II-XII)  Sensory: normal to light touch   Motor: moving all extremities without focal weakness 5/5    Lab Results:  Results from last 7 days   Lab Units 07/25/22  0443 07/24/22  1355   WBC Thousand/uL 6 21 6 37   HEMOGLOBIN g/dL 13 9 14 7   HEMATOCRIT % 42 0 42 4   PLATELETS Thousands/uL 76* 90*   NEUTROS PCT %  --  89*   MONOS PCT %  --  5   MONO PCT % 4  --      Results from last 7 days   Lab Units 07/25/22  0443 07/24/22  1355   POTASSIUM mmol/L 3 9 4 0   CHLORIDE mmol/L 99 92*   CO2 mmol/L 23 23   BUN mg/dL 20 22   CREATININE mg/dL 0 73 1 06   CALCIUM mg/dL 8 7 8 6   ALK PHOS U/L 53 58   ALT U/L 55 66   AST U/L 23 19     Results from last 7 days   Lab Units 07/25/22  0443   MAGNESIUM mg/dL 2 4             No results found for: TROPONINT  ABG:No results found for: PHART, QJO3CBU, PO2ART, SDZ4CKL, F2ZEWNEU, BEART, SOURCE    Imaging Studies: I have personally reviewed pertinent reports  and I have personally reviewed pertinent films in PACS  MRI brain w wo contrast    Result Date: 7/25/2022  Impression: Mixed treatment response - 4 3 cm hemorrhagic metastatic lesion in left cerebellum, increased in size since CT head 6/15/2022 but unchanged since 7/24/2022 - this is likely due to interval hemorrhage of known left cerebellar lesion    Moderate surrounding perilesional vasogenic edema in left cerebellum, posterior cerebellar vermis, and right posterior paramedian cerebellum with mild mass effect on the 4th ventricle  - 0 6 cm hemorrhagic metastatic lesion posterior to the dominant left cerebellar mass, decreased in size  - 0 5 cm left parietal lobe hemorrhagic lesion posterior to left parietal resection cavity, slightly decreased in size  - No evidence of recurrent disease in left parietal resection cavity  The study was marked in [facility] for immediate notification  Workstation performed: [id number]     CT chest abdomen pelvis w contrast    Result Date: 7/26/2022  Impression: 1  Multiple new bilateral irregular lung nodules suspicious for metastases  2   No findings of metastatic disease in the abdomen or pelvis  The study was marked in [facility] for immediate notification  Workstation performed: [id number]       EKG, Pathology, and Other Studies: I have personally reviewed pertinent reports  VTE Pharmacologic Prophylaxis: None at this time given hemorrhagic mets       VTE Mechanical Prophylaxis: sequential compression device

## 2022-07-26 NOTE — PROGRESS NOTES
[address] [facility]  Progress Note - Dami Frias 1955, 69 y o  male MRN: [id number]  Unit/Bed#: 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 [facility])  Assessment & Plan  · Primary left lung adenocarcinoma   · Reporting lightheadedness for the past several months, which has increased over the past several days  · CT today showing increase in size of left cerebellar mass with associated vasogenic edema with mass effect upon the 4th ventricle as well as new 5 mm high-density nodule in the left cerebellum  · Patient is AAOx4, neuro exam is nonfocal  · Possible neuro surgical intervention for brain mass resection  · Continue with neuro checks  · Continue with dexamethasone  · Patient affirms full code status at this time    From sign out, patient recommended to be placed on Decadron 4 mg every 6 hours by neuro surgery  MRI within without Bravo sequences  Holding all anticoagulation and antiplatelet medication  Essential hypertension  Assessment & Plan  On metoprolol succinate 25 mg daily  Continue Norvasc  Atrial fibrillation with rapid ventricular response (HCC)  Assessment & Plan  Currently on Cardizem drip  Telemetry and titration of Cardizem drip for heart rate less than 100  Metoprolol succinate to continue  Since atrial fibrillation is relatively new and patient with out previous history; will place Cardiology consult  Unfortunately, patient cannot be placed on anticoagulation or anti-platelet therapy due to metastatic brain disease  Hypothyroidism  Assessment & Plan  Continue levothyroxine  TSH  History of gout  Assessment & Plan  On allopurinol 100 mg daily      Mixed hyperlipidemia  Assessment & Plan  On pravastatin 80 mg in substitution for Crestor 10 mg          Patient Centered Rounds: I have performed bedside rounds with nursing staff today  Time Spent for Care: 15 minutes  More than 50% of total time spent on counseling and coordination of care as described above  Current Length of Stay: 1 day(s)    Current Patient Status: Inpatient       Code Status: Level 1 - Full Code      Subjective:   nad    Objective:     Vitals:   Temp (24hrs), Av 8 °F (36 6 °C), Min:96 9 °F (36 1 °C), Max:98 4 °F (36 9 °C)    Temp:  [96 9 °F (36 1 °C)-98 4 °F (36 9 °C)] 96 9 °F (36 1 °C)  HR:  [] 73  Resp:  [18-20] 18  BP: ()/(64-93) 131/92  SpO2:  [92 %-98 %] 96 %  Body mass index is 29 3 kg/m²  Input and Output Summary (last 24 hours): Intake/Output Summary (Last 24 hours) at 2022 0939  Last data filed at 2022 0600  Gross per 24 hour   Intake 822 41 ml   Output 750 ml   Net 72 41 ml       Physical Exam:     Physical Exam  Constitutional:       Appearance: Normal appearance  HENT:      Head: Normocephalic and atraumatic  Cardiovascular:      Rate and Rhythm: Normal rate  Heart sounds: No murmur heard  Pulmonary:      Effort: Pulmonary effort is normal       Breath sounds: Normal breath sounds  Abdominal:      General: Abdomen is flat  Palpations: Abdomen is soft  Neurological:      General: No focal deficit present  Mental Status: He is alert and oriented to person, place, and time           Additional Data:     Labs:    Results from last 7 days   Lab Units 22  0443 22  1355   WBC Thousand/uL 6 21 6 37   HEMOGLOBIN g/dL 13 9 14 7   HEMATOCRIT % 42 0 42 4   PLATELETS Thousands/uL 76* 90*   NEUTROS PCT %  --  89*   LYMPHS PCT %  --  4*   LYMPHO PCT % 2*  --    MONOS PCT %  --  5   MONO PCT % 4  --    EOS PCT % 0 0     Results from last 7 days   Lab Units 22  0443   POTASSIUM mmol/L 3 9   CHLORIDE mmol/L 99   CO2 mmol/L 23   BUN mg/dL 20   CREATININE mg/dL 0 73   CALCIUM mg/dL 8 7   ALK PHOS U/L 53   ALT U/L 55   AST U/L 23           * I Have Reviewed All Lab Data Listed Above  * Additional Pertinent Lab Tests Reviewed: All Labs Within Last 24 Hours Reviewed        Recent Cultures (last 7 days):           Last 24 Hours Medication List:   Current Facility-Administered Medications   Medication Dose Route Frequency Provider Last Rate    acetaminophen  650 mg Oral Q6H PRN Rip Read MD      allopurinol  100 mg Oral Daily Rip Read MD      aluminum-magnesium hydroxide-simethicone  30 mL Oral Q6H PRN Rip Read MD      vitamin C  1,000 mg Oral Daily Rip Read MD      atorvastatin  20 mg Oral Daily With Lisy Grandchild, MD      citalopram  10 mg Oral BID Rip Read MD      dexamethasone  4 mg Intravenous Q6H [facility] Rip Read MD      diltiazem  120 mg Oral Q12H [facility] Pilar Tracey MD      HYDROmorphone  0 2 mg Intravenous Q3H PRN Rip Read MD      levothyroxine  37 5 mcg Oral Early Morning Rip Read MD      meclizine  25 mg Oral Q8H PRN Shalini Connie PA-ELIZABETH      metoprolol succinate  25 mg Oral BID Pilar Tracey MD      ondansetron  4 mg Intravenous Q6H PRN Rip Read MD      oxyCODONE  5 mg Oral Q4H PRN Rip Read MD      pantoprazole  40 mg Oral Early Morning Rip Read MD      polyethylene glycol  17 g Oral BID Rip Read MD      temazepam  7 5 mg Oral HS PRN Rip Read MD      traMADol  50 mg Oral Q6H PRN Rip Read MD          Today, Patient Was Seen By: Maryln Mohs, DO    ** Please Note: Dictation voice to text software may have been used in the creation of this document   **

## 2022-07-26 NOTE — ED PROVIDER NOTES
History  Chief Complaint   Patient presents with    Dizziness    Nausea     PT arrived VIA EMS  PT states he has been feeling dizzy and nauseous x2 days  Uncontrolled AFIB HR at 164  20 mg cardizem given by EMS  No history of AFIB  History of brain and lung cancer  Dizziness  Associated symptoms: nausea    Nausea  The primary symptoms include nausea  Prior to Admission Medications   Prescriptions Last Dose Informant Patient Reported? Taking?    Ascorbic Acid (vitamin C) 1000 MG tablet  Self Yes Yes   Sig: Take 1,000 mg by mouth daily   B Complex Vitamins (B COMPLEX 1 PO)  Self Yes Yes   Sig: Take 1 tablet by mouth daily   Psyllium (Metamucil) 28 3 % POWD  Self Yes No   Sig: Take by mouth   allopurinol (ZYLOPRIM) 100 mg tablet  Self No Yes   Sig: Take 1 tablet (100 mg total) by mouth daily   amLODIPine (NORVASC) 10 mg tablet  Self No Yes   Sig: TAKE ONE TABLET BY MOUTH EVERY DAY   citalopram (CeleXA) 10 mg tablet   No Yes   Sig: TAKE 1 TABLET BY MOUTH IN THE MORNING AND 1 TABLET BY MOUTH BEFORE BEDTIME   Patient taking differently: Take 10 mg by mouth daily   dexamethasone (DECADRON) 4 mg tablet  Self No Yes   Sig: Take 1 tablet (4 mg total) by mouth 2 (two) times a day with meals   levothyroxine 75 mcg tablet  Self No Yes   Sig: Take 0 5 tablets (37 5 mcg total) by mouth daily in the early morning   meclizine (ANTIVERT) 25 mg tablet Not Taking at Unknown time Self No No   Sig: Take 1 tablet (25 mg total) by mouth every 8 (eight) hours as needed for dizziness   Patient not taking: Reported on 7/24/2022   metoprolol succinate (TOPROL-XL) 25 mg 24 hr tablet  Self No Yes   Sig: Take 1 tablet (25 mg total) by mouth daily   pantoprazole (PROTONIX) 40 mg tablet  Self No Yes   Sig: Take 1 tablet (40 mg total) by mouth daily   polyethylene glycol (GLYCOLAX) 17 GM/SCOOP powder  Self No Yes   Sig: Take 17 g by mouth 2 (two) times a day   rosuvastatin (CRESTOR) 10 MG tablet  Self No Yes   Sig: TAKE ONE TABLET BY MOUTH EVERY DAY   traMADol (ULTRAM) 50 mg tablet   No Yes   Sig: TAKE ONE TABLET BY MOUTH EVERY 8 HOURS AS NEEDED FOR SEVERE PAIN  Facility-Administered Medications: None       Past Medical History:   Diagnosis Date    Brain cancer ([facility] [address] )     Hyperlipidemia     Hypertension     Lung cancer [facility])        Past Surgical History:   Procedure Laterality Date    BACK SURGERY      CRANIOTOMY Left 2022    Procedure: Image guided left parietal craniotomy for tumor resection;  Surgeon: Karolina Thurman MD;  [facility];  Service: Neurosurgery    HEMORRHOID SURGERY      IR BIOPSY LUNG  2021    IR PORT PLACEMENT  2021    LAMINECTOMY  2018    L4-L5    LUNG SURGERY      left upper lobectomy    NJ BRONCHOSCOPY,DIAGNOSTIC N/A 2021    Procedure: BRONCHOSCOPY FLEXIBLE;  Surgeon: Kleber Schuler MD;  [facility];  Service: Thoracic    [place] MEDIASTINOSCOPY WITH LYMPH NODE BIOPSY/IES N/A 2021    Procedure: MEDIASTINOSCOPY, flexible bronchoscopy;  Surgeon: Kleber Schuler MD;  [facility];  Service: Thoracic    TONSILLECTOMY         Family History   Problem Relation Age of Onset    Nephrolithiasis Father     Lung cancer Maternal Grandfather      I have reviewed and agree with the history as documented  E-Cigarette/Vaping    E-Cigarette Use Never User      E-Cigarette/Vaping Substances    Nicotine No     THC No     CBD No     Flavoring No     Other No     Unknown No      Social History     Tobacco Use    Smoking status: Former Smoker     Packs/day: 1 00     Years: 40 00     Pack years: 40 00     Types: Cigarettes     Start date:      Quit date: 2017     Years since quittin 4    Smokeless tobacco: Never Used    Tobacco comment: quit 2017   Vaping Use    Vaping Use: Never used   Substance Use Topics    Alcohol use:  Yes     Alcohol/week: 7 0 standard drinks     Types: 7 Cans of beer per week     Comment: 1-2 beers nightly    Drug use: Not Currently     Types: Marijuana     Comment: seldom       Review of Systems   Gastrointestinal: Positive for nausea  Neurological: Positive for dizziness         Physical Exam  Physical Exam    Vital Signs  ED Triage Vitals   Temperature Pulse Respirations Blood Pressure SpO2   07/24/22 1337 07/24/22 1337 07/24/22 1337 07/24/22 1340 07/24/22 1340   98 7 °F (37 1 °C) (!) 127 16 104/79 95 %      Temp Source Heart Rate Source Patient Position - Orthostatic VS BP Location FiO2 (%)   07/24/22 1337 07/24/22 1337 07/24/22 1337 07/24/22 1340 --   Oral Monitor Lying Left arm       Pain Score       07/24/22 1347       6           Vitals:    07/24/22 1915 07/24/22 2000 07/24/22 2100 07/24/22 2230   BP:  130/75 125/73 138/72   Pulse: 93 95 (!) 109 105   Patient Position - Orthostatic VS:  Lying           Visual Acuity  Visual Acuity    Flowsheet Row Most Recent Value   L Pupil Size (mm) 3   R Pupil Size (mm) 3          ED Medications  Medications   diltiazem (CARDIZEM) injection 25 mg (0 mg Intravenous Given to EMS 7/24/22 1348)   sodium chloride 0 9 % bolus 1,000 mL (0 mL Intravenous Stopped 7/24/22 1453)   diltiazem (CARDIZEM) injection 10 mg (10 mg Intravenous Given 7/24/22 1400)   diltiazem (CARDIZEM) 125 mg in sodium chloride 0 9 % 125 mL infusion (10 mg/hr Intravenous Rate/Dose Change 7/24/22 1757)   ondansetron (ZOFRAN) injection 4 mg (4 mg Intravenous Given 7/24/22 1359)   metoprolol tartrate (LOPRESSOR) tablet 25 mg (25 mg Oral Given 7/24/22 1517)       Diagnostic Studies  Results Reviewed     Procedure Component Value Units Date/Time    HS Troponin I 4hr [[id number]]  (Normal) Collected: 07/24/22 1841    Lab Status: Final result Specimen: Blood from Arm, Left Updated: 07/24/22 1915     hs TnI 4hr 21 ng/L      Delta 4hr hsTnI 4 ng/L     HS Troponin I 2hr [[id number]]  (Normal) Collected: 07/24/22 1612    Lab Status: Final result Specimen: Blood from Arm, Left Updated: 07/24/22 1642     hs TnI 2hr 19 ng/L      Delta 2hr hsTnI 2 ng/L     HS Troponin 0hr (reflex protocol) [[id number]]  (Normal) Collected: 07/24/22 1355    Lab Status: Final result Specimen: Blood from Hand, Right Updated: 07/24/22 1427     hs TnI 0hr 17 ng/L     Comprehensive metabolic panel [[id number]]  (Abnormal) Collected: 07/24/22 1355    Lab Status: Final result Specimen: Blood from Hand, Right Updated: 07/24/22 1419     Sodium 131 mmol/L      Potassium 4 0 mmol/L      Chloride 92 mmol/L      CO2 23 mmol/L      ANION GAP 16 mmol/L      BUN 22 mg/dL      Creatinine 1 06 mg/dL      Glucose 183 mg/dL      Calcium 8 6 mg/dL      Corrected Calcium 9 6 mg/dL      AST 19 U/L      ALT 66 U/L      Alkaline Phosphatase 58 U/L      Total Protein 6 6 g/dL      Albumin 2 8 g/dL      Total Bilirubin 0 56 mg/dL      eGFR 71 ml/min/1 73sq m     Narrative:      National Kidney Disease Foundation guidelines for Chronic Kidney Disease (CKD):     Stage 1 with normal or high GFR (GFR > 90 mL/min/1 73 square meters)    Stage 2 Mild CKD (GFR = 60-89 mL/min/1 73 square meters)    Stage 3A Moderate CKD (GFR = 45-59 mL/min/1 73 square meters)    Stage 3B Moderate CKD (GFR = 30-44 mL/min/1 73 square meters)    Stage 4 Severe CKD (GFR = 15-29 mL/min/1 73 square meters)    Stage 5 End Stage CKD (GFR <15 mL/min/1 73 square meters)  Note: GFR calculation is accurate only with a steady state creatinine    CBC and differential [[id number]]  (Abnormal) Collected: 07/24/22 1355    Lab Status: Final result Specimen: Blood from Hand, Right Updated: 07/24/22 1414     WBC 6 37 Thousand/uL      RBC 4 24 Million/uL      Hemoglobin 14 7 g/dL      Hematocrit 42 4 %       fL      MCH 34 7 pg      MCHC 34 7 g/dL      RDW 15 9 %      MPV 10 1 fL      Platelets 90 Thousands/uL      nRBC 0 /100 WBCs      Neutrophils Relative 89 %      Immat GRANS % 2 %      Lymphocytes Relative 4 %      Monocytes Relative 5 %      Eosinophils Relative 0 %      Basophils Relative 0 %      Neutrophils Absolute 5 65 Thousands/µL      Immature Grans Absolute 0 13 Thousand/uL      Lymphocytes Absolute 0 25 Thousands/µL      Monocytes Absolute 0 33 Thousand/µL      Eosinophils Absolute 0 00 Thousand/µL      Basophils Absolute 0 01 Thousands/µL                  CT head without contrast   Final Result by Irene Ptael MD (07/24 1719)      The left cerebellar mass has increased in size, now measuring 3 9 x 3 6 cm  There is associated vasogenic edema with mass effect upon the 4th ventricle  There is a new 5 mm high density nodule in the left cerebellum  Left posterior parietal cortical nodule appears smaller than prior  The study was marked in [facility] for immediate notification  Workstation performed: [id number]         XR chest 2 views   Final Result by Yomaira Guzman MD (07/25 7716)      No acute cardiopulmonary disease  Hyperinflation  Workstation performed: [id number]                    Procedures  Procedures         ED Course                               SBIRT 20yo+    Flowsheet Row Most Recent Value   SBIRT (25 yo +)    In order to provide better care to our patients, we are screening all of our patients for alcohol and drug use  Would it be okay to ask you these screening questions? Yes Filed at: 07/24/2022 1404   Initial Alcohol Screen: US AUDIT-C     1  How often do you have a drink containing alcohol? 0 Filed at: 07/24/2022 1404   2  How many drinks containing alcohol do you have on a typical day you are drinking? 0 Filed at: 07/24/2022 1404   3a  Male UNDER 65: How often do you have five or more drinks on one occasion? 0 Filed at: 07/24/2022 1404   3b  FEMALE Any Age, or MALE 65+: How often do you have 4 or more drinks on one occassion? 0 Filed at: 07/24/2022 1404   Audit-C Score 0 Filed at: 07/24/2022 1404   LANA: How many times in the past year have you    Used an illegal drug or used a prescription medication for non-medical reasons?  Never Filed at: 07/24/2022 1404                    [facility]  Number of Diagnoses or Management Options  Risk of Complications, Morbidity, and/or Mortality  General comments: Pt transferred to another facility        Disposition  Final diagnoses:   Lightheadedness   Atrial fibrillation with rapid ventricular response ([address] )   SOB (shortness of breath)   Cerebellar mass   Vasogenic brain edema ([address] )     Time reflects when diagnosis was documented in both MDM as applicable and the Disposition within this note     Time User Action Codes Description Comment    7/24/2022  3:46 PM Kirit Ac Add [R42] Lightheadedness     7/24/2022  3:47 PM Kirit Caballo Add [I48 91] Atrial fibrillation with rapid ventricular response ([address] )     7/24/2022  3:47 PM Kirit Caballo Modify [R42] Lightheadedness     7/24/2022  3:47 PM Kirit Caballo Modify [I48 91] Atrial fibrillation with rapid ventricular response ([address] )     7/24/2022  3:47 PM Kirit Caballo Add [R06 02] SOB (shortness of breath)     7/24/2022  8:36 PM Kirit Ac Add [G93 89] Cerebellar mass     7/24/2022  8:37 PM Kirit Caballo Add [G93 6] Vasogenic brain edema [facility])       ED Disposition     ED Disposition   Transfer to Another Facility-In Network    Condition   --    Date/Time   Sun Jul 24, 2022 2114    Comment   Dami Frias should be transferred out to [facility]             MD Documentation    [id number] [facility] Most Recent Value   Patient Condition The patient has been stabilized such that within reasonable medical probability, no material deterioration of the patient condition or the condition of the unborn child(gilma) is likely to result from the transfer   Reason for Transfer Level of Care needed not available at this facility   Benefits of Transfer Specialized equipment and/or services available at the receiving facility (Include comment)________________________  [Neurosurgery]   Accepting Physician [address] Name, [address]  [facility] [address] [facility] Name, [address] [facility]      Follow-up Information    None         Discharge Medication List as of 7/24/2022 11:50 PM      CONTINUE these medications which have NOT CHANGED    Details   allopurinol (ZYLOPRIM) 100 mg tablet Take 1 tablet (100 mg total) by mouth daily, Starting Tue 3/8/2022, Normal      amLODIPine (NORVASC) 10 mg tablet TAKE ONE TABLET BY MOUTH EVERY DAY, Normal      Ascorbic Acid (vitamin C) 1000 MG tablet Take 1,000 mg by mouth daily, Historical Med      B Complex Vitamins (B COMPLEX 1 PO) Take 1 tablet by mouth daily, Historical Med      citalopram (CeleXA) 10 mg tablet TAKE 1 TABLET BY MOUTH IN THE MORNING AND 1 TABLET BY MOUTH BEFORE BEDTIME, Normal      dexamethasone (DECADRON) 4 mg tablet Take 1 tablet (4 mg total) by mouth 2 (two) times a day with meals, Starting Fri 6/17/2022, Normal      levothyroxine 75 mcg tablet Take 0 5 tablets (37 5 mcg total) by mouth daily in the early morning, Starting Fri 6/3/2022, Normal      metoprolol succinate (TOPROL-XL) 25 mg 24 hr tablet Take 1 tablet (25 mg total) by mouth daily, Starting Mon 4/18/2022, Normal      pantoprazole (PROTONIX) 40 mg tablet Take 1 tablet (40 mg total) by mouth daily, Starting Mon 2/14/2022, Normal      polyethylene glycol (GLYCOLAX) 17 GM/SCOOP powder Take 17 g by mouth 2 (two) times a day, Starting Fri 12/24/2021, Normal      rosuvastatin (CRESTOR) 10 MG tablet TAKE ONE TABLET BY MOUTH EVERY DAY, Normal      traMADol (ULTRAM) 50 mg tablet TAKE ONE TABLET BY MOUTH EVERY 8 HOURS AS NEEDED FOR SEVERE PAIN , Normal      meclizine (ANTIVERT) 25 mg tablet Take 1 tablet (25 mg total) by mouth every 8 (eight) hours as needed for dizziness, Starting Fri 6/17/2022, Normal      Psyllium (Metamucil) 28 3 % POWD Take by mouth, Historical Med             No discharge procedures on file      PDMP Review       Value Time User    PDMP Reviewed  Yes 7/24/2022  9:33 PM Ronda Bazan PA-C ED Provider  Electronically Signed by           Santa Rosenthal MD  07/26/22 2795

## 2022-07-26 NOTE — ASSESSMENT & PLAN NOTE
Currently on Cardizem drip  Note cardiology input  Rate appears to be improved  Metoprolol succinate to continue  Unfortunately, patient cannot be placed on anticoagulation or anti-platelet therapy due to metastatic brain disease

## 2022-07-26 NOTE — PROGRESS NOTES
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 - status post neoadjuvant Alimta and Carboplatin followed by resection of primary tumor in the left upper lung followed by adjuvant radiation therapy and resection of mass in the posterior brain followed by whole-brain radiation therapy which he completed on 05/31/2022  MRI brain done on 06/16/2022 showed mixed treatment response  Plan was for repeat imaging in 6 to 8 weeks  Presented with dizziness to [facility] yesterday and CT showed increase in size of left cerebellar mass with associated vasogenic edema with mass effect upon the 4th ventricle  He was emergently transferred to [facility] for neurosurgery evaluation  His dose of Decadron was increased from 4 mg twice daily to 4 mg q 6 hours  MRI brain done today shows mixed treatment response with 4 3 cm hemorrhagic metastatic lesion in the left cerebellum increased in size since CT head on 06/15/2022  The 0 6 cm hemorrhagic metastatic lesion posterior to the dominant left cerebellar mass and the 0 5 cm left parietal resection cavity has decreased in size  Neurosurgery following - have consulted medical oncology and radiation oncology, CT chest/abdomen/pelvis ordered for restaging, plan for surgery to be determined after MRI report review  Ct Decadron 4 mg q 6 hours, q 2 hour neuro checks  Neurosurgery input appreciated  2  AFib with RVR - per records and discussion with wife he has a history of paroxysmal AFib  Noted to be in RVR on presentation to [facility] and was begun on a Cardizem drip  Dose of metoprolol succinate increased from 25 mg daily to 25 mg twice daily and Cardizem 120 mg PO twice daily added  Attempt to wean off Cardizem drip  Cardiology input appreciated  No anticoagulation due to #1  3  Hypertension - Amlodipine discontinued as patient begun on Cardizem  Dose of Cardizem and Toprol-XL as above  BP acceptable    4  Depression - continue Celexa at home dose of 10 mg twice daily  5  Hypothyroidism - continue levothyroxine 37 5 mcg daily  6   Hyperlipidemia - home Crestor changed to equivalent atorvastatin      Discussed with wife at the bedside

## 2022-07-26 NOTE — ASSESSMENT & PLAN NOTE
· A-fib with RVR on admission   · Previously noted in Outpatient PCP note   · Transitioned to oral BB with improved rate control

## 2022-07-27 NOTE — ASSESSMENT & PLAN NOTE
· Platelets currently 90 on admission and 76 on CBC 7/26/2022  · CBC this morning still pending  · Peripheral smear and additional labs work completed this morning per oncology recommendations  · Appreciate ongoing oncology evaluation and management  · Plan to transfuse 1 unit of platelets now  Will plan to repeat labs in 6 hours to monitor for response  · Will order 1 unit of platelets and 2 units of blood on hold for the OR tomorrow

## 2022-07-27 NOTE — RESTORATIVE TECHNICIAN NOTE
Restorative Technician Note      Patient Name: Fabiola Andrade     Note Type: Mobility  Patient Position Upon Consult: Supine  Activity Performed: Repositioned

## 2022-07-27 NOTE — PROGRESS NOTES
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 [facility])  Assessment & Plan   Patient presented with lightheadedness, tinnitus and visual complaints   S/p L parietal craniotomy for  Resection of tumor on 4/7/2022 with Dr Sandy Montes Pathology from prior surgery- neuroendocrine tumor   Patient completed WBRT around 5/31/22    Imaging:    CT chest abdomen and pelvis 07/26/2022: Multiple new bilateral irregular lung nodule suspicious for metastasis  No findings of metastatic disease in the abdomen or pelvis   MRI brain 7/25/2022:  4 3 cm hemorrhagic met in the left cerebellum increased in size since 06/15 but stable compared to imaging from day prior  Enlargement in size likely due to interval hemorrhage  Surrounding perilesional edema and mass effect  0 6 cm hemorrhagic metastasis posterior to the dominant left cerebellar mass decreased in size  0 5 cm left parietal lobe hemorrhagic lesion posterior to the prior left parietal resection cavity also slightly decreased in size  No evidence of recurrent disease in the prior resection cavity  Plan:   · ongoing frequent neurological checks  · Recommend STAT CT head for decline in GCS >2 points in 1 hour  · MRI brain completed   · CT CAP completed for updated staging- new small lung lesions noted- Follow up recs from med onc  · Defer keppra given infratentorial location  · Decadron 4mg Q6 hours  · DVT ppx: SCD's only at this time  · Hold all AC/AP medication at this time  · Patient currently in a-fib with RVR- Recommend rate control as patient likely to require surgery  · Improved HR control with initiation of oral BB   · Cardiology following and appreciate recommendations  · PT/OT evaluation  · Ongoing medical management and pain control per primary team    · Recommend fluid restriction to assist with increasing serum sodium   · Spoke with SLIM attending personally  · CM following for dispo planning  · Patient wishes to continue all care  Tentative plan for OR tomorrow with Dr Emily Elizondo  Will have further discussion tomorrow with patient and wife  · Neurosurgery will continue to follow  Call with questions or concerns  Thrombocytopenia ([address] )  Assessment & Plan  · Platelets currently 90 on admission and 76 on CBC 7/26/2022  · CBC this morning still pending  · Peripheral smear and additional labs work completed this morning per oncology recommendations  · Appreciate ongoing oncology evaluation and management  · Plan to transfuse 1 unit of platelets now  Will plan to repeat labs in 6 hours to monitor for response  · Will order 1 unit of platelets and 2 units of blood on hold for the OR tomorrow  Atrial fibrillation with rapid ventricular response (HCC)  Assessment & Plan  · A-fib with RVR on admission   · Previously noted in Outpatient PCP note   · Transitioned to oral BB with improved rate control  Subjective/Objective   Chief Complaint: Same     Subjective: Patient continues with the same complaints as prior in addition to a headache  He received some pain medication from nursing staff and just recently fell asleep  Objective: laying in bed in NAD    I/O       07/25 0701 07/26 0700 07/26 0701 07/27 0700 07/27 0701 07/28 0700    P  O  1060 460     I V  (mL/kg) 313 7 (3 6)      Total Intake(mL/kg) 1373 7 (15 7) 460 (5 2)     Urine (mL/kg/hr) 1150 (0 5) 600 (0 3)     Stool 0      Total Output 1150 600     Net +223 7 -140            Unmeasured Stool Occurrence 1 x            Invasive Devices  Report    Central Venous Catheter Line  Duration           Port A Cath 06/17/21 Right Chest 404 days          Peripheral Intravenous Line  Duration Peripheral IV 07/24/22 Left Hand 2 days    Peripheral IV 07/24/22 Right Hand 2 days    Peripheral IV 07/25/22 Left Antecubital 1 day                Physical Exam:  Vitals: Blood pressure 140/81, pulse 78, temperature 97 6 °F (36 4 °C), resp  rate 18, height 5' 10" (1 778 m), weight 89 kg (196 lb 3 4 oz), SpO2 96 %  ,Body mass index is 29 83 kg/m²  General appearance: alert, appears stated age, cooperative and no distress  Head: Normocephalic  Prior cranial incision in the L parietal region healing well  Eyes: EOMI, PERRL  Neck: supple, symmetrical, trachea midline   Back: no kyphosis present  Lungs: non labored breathing  Heart: regular heart rate  Neurologic:   Mental status: Alert, oriented x3, thought content appropriate  Cranial nerves: grossly intact (Cranial nerves II-XII)  Sensory: normal to light touch   Motor: moving all extremities without focal weakness 5/5 strength     Lab Results:  Results from last 7 days   Lab Units 07/25/22  0443 07/24/22  1355   WBC Thousand/uL 6 21 6 37   HEMOGLOBIN g/dL 13 9 14 7   HEMATOCRIT % 42 0 42 4   PLATELETS Thousands/uL 76* 90*   NEUTROS PCT %  --  89*   MONOS PCT %  --  5   MONO PCT % 4  --      Results from last 7 days   Lab Units 07/27/22  1100 07/25/22  0443 07/24/22  1355   POTASSIUM mmol/L 4 2 3 9 4 0   CHLORIDE mmol/L 97 99 92*   CO2 mmol/L 26 23 23   BUN mg/dL 34* 20 22   CREATININE mg/dL 0 95 0 73 1 06   CALCIUM mg/dL 9 0 8 7 8 6   ALK PHOS U/L  --  53 58   ALT U/L  --  55 66   AST U/L  --  23 19     Results from last 7 days   Lab Units 07/25/22  0443   MAGNESIUM mg/dL 2 4             No results found for: TROPONINT  ABG:No results found for: PHART, BYS3ZKH, PO2ART, AUV5TWC, Q2CNCBZX, BEART, SOURCE    Imaging Studies: I have personally reviewed pertinent reports     and I have personally reviewed pertinent films in PACS  MRI brain w wo contrast    Result Date: 7/25/2022  Impression: Mixed treatment response - 4 3 cm hemorrhagic metastatic lesion in left cerebellum, increased in size since CT head 6/15/2022 but unchanged since 7/24/2022 - this is likely due to interval hemorrhage of known left cerebellar lesion  Moderate surrounding perilesional vasogenic edema in left cerebellum, posterior cerebellar vermis, and right posterior paramedian cerebellum with mild mass effect on the 4th ventricle  - 0 6 cm hemorrhagic metastatic lesion posterior to the dominant left cerebellar mass, decreased in size  - 0 5 cm left parietal lobe hemorrhagic lesion posterior to left parietal resection cavity, slightly decreased in size  - No evidence of recurrent disease in left parietal resection cavity  The study was marked in [facility] for immediate notification  Workstation performed: [id number]     CT chest abdomen pelvis w contrast    Result Date: 7/26/2022  Impression: 1  Multiple new bilateral irregular lung nodules suspicious for metastases  2   No findings of metastatic disease in the abdomen or pelvis  The study was marked in [facility] for immediate notification  Workstation performed: [id number]       EKG, Pathology, and Other Studies: I have personally reviewed pertinent reports        VTE Pharmacologic Prophylaxis: Deferred given hemorrhagic met and thrombocytopenia     VTE Mechanical Prophylaxis: sequential compression device

## 2022-07-27 NOTE — PROGRESS NOTES
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 [facility])  Active Problems:    Essential hypertension    Mixed hyperlipidemia    History of gout    Hypothyroidism    Atrial fibrillation with rapid ventricular response (HCC)    Assessment/Plan:    1  Atrial fibrillation with rapid ventricular response  ·  Patient is currently on cardizem  mg Q12HR, metoprolol 25 mg PO  HR has improved, maintained below 100 with paroxysmal episodes of Afib  Patient cannot be placed on anticoagulation due to hemorrhagic metastatic cerebellar tumor  Patient may be started on amiodarone due to afib becoming paroxysmal    · Continue current medication regimen mentioned above  · Continue to hold amlodipine   · Begin amiodarone 400 mg BID   · Continue to monitor telemetry     Subjective     The patient was seen at bedside today and feels "awful " He endorses shortness of breath, nausea, lightheadedness, and fatigue  He discussed having his metastatic brain tumor being removed tomorrow  The patient does not report any improvement or worsening of his symptoms  HPI    Porter Forman is a 69year old male PMH neuroendocrine tumor s/p lung and brain resection and known metastatic cerebellar hemorrhagic tumor  Currently being treated with radiation therapy  He presented to [facility] for increasing lightheadedness and shortness of breath, was found to be in Afib  Patient has a history of afib episode following lung resection in 2021, was discharged on 25 mg metoprolol once daily  The patient was brought to [facility] on 7/25 for removal of brain metastasis  He is currently on metoprolol 25 mg BID, cardizem 120 mg BID, and is beginning amiodarone 400 mg BID as of 7/27   Anticoagulation/antiplatelet therapy is contraindicated for this patient due to nature of brain metastasis  Objective     Vitals: Temp (24hrs), Av °F (36 1 °C), Min:96 4 °F (35 8 °C), Max:97 8 °F (36 6 °C)  Current: Temperature: (!) 97 4 °F (36 3 °C)  Patient Vitals for the past 24 hrs:   BP Temp Temp src Pulse Resp SpO2 Weight   22 0725 140/81 (!) 97 4 °F (36 3 °C) -- 78 -- -- --   22 0600 -- -- -- -- -- -- 89 kg (196 lb 3 4 oz)   22 2221 139/82 (!) 96 8 °F (36 °C) Oral 92 18 96 % --   22 2126 133/79 -- -- 98 -- -- --   22 1900 162/82 97 8 °F (36 6 °C) Oral 64 18 98 % --   22 1532 140/86 (!) 96 8 °F (36 °C) Axillary 91 20 100 % --   22 1119 (!) 142/104 (!) 96 4 °F (35 8 °C) Oral 89 18 94 % --    Body mass index is 29 83 kg/m²  Physical Exam:  Physical Exam  Constitutional:       Appearance: Normal appearance  Cardiovascular:      Rate and Rhythm: Normal rate  Rhythm irregular  Pulses: Normal pulses  Heart sounds: Normal heart sounds  Pulmonary:      Effort: Pulmonary effort is normal       Breath sounds: Normal breath sounds  Skin:     General: Skin is warm and dry  Neurological:      Mental Status: He is alert           Invasive Devices  Report    Central Venous Catheter Line  Duration           Port A Cath 21 Right Chest 404 days          Peripheral Intravenous Line  Duration           Peripheral IV 22 Left Hand 2 days    Peripheral IV 22 Right Hand 2 days    Peripheral IV 22 Left Antecubital 1 day                    Labs:   Results from last 7 days   Lab Units 22  0443 22  1355   WBC Thousand/uL 6 21 6 37   HEMOGLOBIN g/dL 13 9 14 7   HEMATOCRIT % 42 0 42 4   PLATELETS Thousands/uL 76* 90*   NEUTROS PCT %  --  89*   MONOS PCT %  --  5   MONO PCT % 4  --       Results from last 7 days   Lab Units 22  0443 07/24/22  1355   SODIUM mmol/L 132* 131*   POTASSIUM mmol/L 3 9 4 0   CHLORIDE mmol/L 99 92*   CO2 mmol/L 23 23   BUN mg/dL 20 22 CREATININE mg/dL 0 73 1 06   CALCIUM mg/dL 8 7 8 6   ALK PHOS U/L 53 58   ALT U/L 55 66   AST U/L 23 19   MAGNESIUM mg/dL 2 4  --    EGFR ml/min/1 73sq m 94 71                 No results found for: PHART, BTW3ETY, PO2ART, JUV1NBJ, D0TPPGWK, BEART, SOURCE  No components found for: HIV1X2  Lab Results   Component Value Date    HEPCAB Non-reactive 10/06/2020     No results found for: SPEP, UPEP   Lab Results   Component Value Date    HGBA1C 5 2 04/03/2022    HGBA1C 5 4 01/25/2022    HGBA1C 5 3 06/23/2021     No results found for: CHOL   Lab Results   Component Value Date    HDL 43 06/20/2022    HDL 70 04/03/2022    HDL 35 (L) 01/25/2022      Lab Results   Component Value Date    LDLCALC 93 06/20/2022    LDLCALC 96 04/03/2022    LDLCALC 97 01/25/2022      Lab Results   Component Value Date    TRIG 140 06/20/2022    TRIG 137 04/03/2022    TRIG 130 01/25/2022     No components found for: PROCAL      Micro:      Urinalysis:  Lab Results   Component Value Date    BDZUR Negative 04/03/2022    COCAINEUR Negative 04/03/2022    OPIATEUR Negative 04/03/2022    PCPUR Negative 04/03/2022    THCUR Positive (A) 04/03/2022          Invalid input(s): URIBILINOGEN        Intake and Outputs:  I/O       07/25 0701  07/26 0700 07/26 0701  07/27 0700 07/27 0701  07/28 0700    P  O  1060 460     I V  (mL/kg) 313 7 (3 6)      Total Intake(mL/kg) 1373 7 (15 7) 460 (5 2)     Urine (mL/kg/hr) 1150 (0 5) 600 (0 3)     Stool 0      Total Output 1150 600     Net +223 7 -140            Unmeasured Stool Occurrence 1 x          Nutrition:  Diet Cardiovascular; Cardiac  Radiology Results:   CT chest abdomen pelvis w contrast   Final Result by Kiara Delong MD (07/26 0286)      1  Multiple new bilateral irregular lung nodules suspicious for metastases  2   No findings of metastatic disease in the abdomen or pelvis  The study was marked in [facility] for immediate notification                       Workstation performed: [id number]         MRI brain w wo contrast   Final Result by Rudy Sanders MD (07/25 1713)      Mixed treatment response   - 4 3 cm hemorrhagic metastatic lesion in left cerebellum, increased in size since CT head 6/15/2022 but unchanged since 7/24/2022 - this is likely due to interval hemorrhage of known left cerebellar lesion  Moderate surrounding perilesional vasogenic    edema in left cerebellum, posterior cerebellar vermis, and right posterior paramedian cerebellum with mild mass effect on the 4th ventricle    - 0 6 cm hemorrhagic metastatic lesion posterior to the dominant left cerebellar mass, decreased in size    - 0 5 cm left parietal lobe hemorrhagic lesion posterior to left parietal resection cavity, slightly decreased in size    - No evidence of recurrent disease in left parietal resection cavity  The study was marked in [facility] for immediate notification                       Workstation performed: [id number]           Scheduled Medications:  allopurinol, 100 mg, Daily  vitamin C, 1,000 mg, Daily  atorvastatin, 20 mg, Daily With Dinner  citalopram, 10 mg, BID  dexamethasone, 4 mg, Q6H BASIA  diltiazem, 120 mg, Q12H [address]  levothyroxine, 37 5 mcg, Early Morning  metoprolol succinate, 25 mg, BID  pantoprazole, 40 mg, Early Morning  polyethylene glycol, 17 g, BID      PRN MEDS:  acetaminophen, 650 mg, Q6H PRN  aluminum-magnesium hydroxide-simethicone, 30 mL, Q6H PRN  HYDROmorphone, 0 2 mg, Q3H PRN  meclizine, 25 mg, Q8H PRN  ondansetron, 4 mg, Q6H PRN  oxyCODONE, 5 mg, Q4H PRN  temazepam, 7 5 mg, HS PRN  traMADol, 50 mg, Q6H PRN      Last 24 Hour Meds: :   Medication Administration - last 24 hours from 07/26/2022 0847 to 07/27/2022 0847       Date/Time Order Dose Route Action Action by     07/27/2022 0834 allopurinol (ZYLOPRIM) tablet 100 mg 100 mg Oral Given Rock Ceballos RN     07/26/2022 0857 allopurinol (ZYLOPRIM) tablet 100 mg 100 mg Oral Given Hermelinda Ledbetter RN     07/27/2022 1902 ascorbic acid (VITAMIN C) tablet 1,000 mg 1,000 mg Oral Given Lashanda Pinon RN     07/26/2022 0240 ascorbic acid (VITAMIN C) tablet 1,000 mg 1,000 mg Oral Given Emerson Rothman RN     07/27/2022 5265 levothyroxine tablet 37 5 mcg 37 5 mcg Oral Given Rudy Tao     07/27/2022 0512 pantoprazole (PROTONIX) EC tablet 40 mg 40 mg Oral Given Rudy Tao     07/26/2022 0900 polyethylene glycol (MIRALAX) packet 17 g 17 g Oral Not Given Emerson Rothman RN     07/26/2022 1752 atorvastatin (LIPITOR) tablet 20 mg 20 mg Oral Given JUAN J Owen     07/27/2022 0840 ondansetron (ZOFRAN) injection 4 mg 4 mg Intravenous Given Lashanda Pinon RN     07/27/2022 0511 dexamethasone (DECADRON) injection 4 mg 4 mg Intravenous Given Rduystalin Tao     07/27/2022 0019 dexamethasone (DECADRON) injection 4 mg 4 mg Intravenous Given Rudy Tao     07/26/2022 1754 dexamethasone (DECADRON) injection 4 mg 4 mg Intravenous Given [employer], RN     07/26/2022 1205 dexamethasone (DECADRON) injection 4 mg 4 mg Intravenous Given Emerson Rothman RN     07/27/2022 3610 citalopram (CeleXA) tablet 10 mg 10 mg Oral Given Lashanda Pinon RN     07/26/2022 2126 citalopram (CeleXA) tablet 10 mg 10 mg Oral Given Rudy Tao     07/26/2022 0857 citalopram (CeleXA) tablet 10 mg 10 mg Oral Given Emerson Rothman RN     07/27/2022 1314 metoprolol succinate (TOPROL-XL) 24 hr tablet 25 mg 25 mg Oral Given Lashanda Pinon RN     07/26/2022 2126 metoprolol succinate (TOPROL-XL) 24 hr tablet 25 mg 25 mg Oral Given Rudy Tao     07/26/2022 0857 metoprolol succinate (TOPROL-XL) 24 hr tablet 25 mg 25 mg Oral Given Emerson Rothman RN     07/27/2022 0836 diltiazem (CARDIZEM SR) 12 hr capsule 120 mg 120 mg Oral Given Lashanda Pinon RN     07/26/2022 2126 diltiazem (CARDIZEM SR) 12 hr capsule 120 mg 120 mg Oral Given Rudy Dinh     07/26/2022 0857 diltiazem (CARDIZEM SR) 12 hr capsule 120 mg 120 mg Oral Given Sallie Reece, JUAN J     07/27/2022 0840 meclizine (ANTIVERT) tablet 25 mg 25 mg Oral Given Theadora Seal, RN     07/26/2022 2114 meclizine (ANTIVERT) tablet 25 mg 25 mg Oral Given Sallie Reece, JUAN J     07/27/2022 0835 polyethylene glycol (MIRALAX) packet 17 g 17 g Oral Given Theadora Seal, RN     07/26/2022 1754 polyethylene glycol (MIRALAX) packet 17 g 17 g Oral Given [employer], RN              [address] [facility]      PLEASE NOTE:  This encounter was completed utilizing the [employer] Direct Speech Voice Recognition Software  Grammatical errors, random word insertions, pronoun errors and incomplete sentences are occasional consequences of the system due to software limitations, ambient noise and hardware issues  These may be missed by proof reading prior to affixing electronic signature  Any questions or concerns about the content, text or information contained within the body of this dictation should be directly addressed to the physician for clarification  Please do not hesitate to call me directly if you have any any questions or concerns

## 2022-07-27 NOTE — ASSESSMENT & PLAN NOTE
 Patient presented with lightheadedness, tinnitus and visual complaints   S/p L parietal craniotomy for  Resection of tumor on 4/7/2022 with Dr Carroll Dumont Pathology from prior surgery- neuroendocrine tumor   Patient completed WBRT around 5/31/22    Imaging:    CT chest abdomen and pelvis 07/26/2022: Multiple new bilateral irregular lung nodule suspicious for metastasis  No findings of metastatic disease in the abdomen or pelvis   MRI brain 7/25/2022:  4 3 cm hemorrhagic met in the left cerebellum increased in size since 06/15 but stable compared to imaging from day prior  Enlargement in size likely due to interval hemorrhage  Surrounding perilesional edema and mass effect  0 6 cm hemorrhagic metastasis posterior to the dominant left cerebellar mass decreased in size  0 5 cm left parietal lobe hemorrhagic lesion posterior to the prior left parietal resection cavity also slightly decreased in size  No evidence of recurrent disease in the prior resection cavity  Plan:   · ongoing frequent neurological checks  · Recommend STAT CT head for decline in GCS >2 points in 1 hour  · MRI brain completed   · CT CAP completed for updated staging- new small lung lesions noted- Follow up recs from med onc  · Defer keppra given infratentorial location  · Decadron 4mg Q6 hours  · DVT ppx: SCD's only at this time  · Hold all AC/AP medication at this time  · Patient currently in a-fib with RVR- Recommend rate control as patient likely to require surgery  · Improved HR control with initiation of oral BB   · Cardiology following and appreciate recommendations  · PT/OT evaluation  · Ongoing medical management and pain control per primary team    · Recommend fluid restriction to assist with increasing serum sodium   · Spoke with SLIM attending personally  · CM following for dispo planning  · Patient wishes to continue all care  Tentative plan for OR tomorrow with Dr Dove Meth   Will have further discussion tomorrow with patient and wife  · Neurosurgery will continue to follow  Call with questions or concerns

## 2022-07-27 NOTE — PROGRESS NOTES
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 [facility])  Assessment & Plan  · Primary left lung adenocarcinoma   · Reporting lightheadedness for the past several months, which has increased over the past several days  · CT today showing increase in size of left cerebellar mass with associated vasogenic edema with mass effect upon the 4th ventricle as well as new 5 mm high-density nodule in the left cerebellum  · Patient is AAOx4, neuro exam is nonfocal  · Possible neuro surgical intervention for brain mass resection  · Continue with neuro checks  · Continue with dexamethasone  · Patient affirms full code status at this time    From sign out, patient recommended to be placed on Decadron 4 mg every 6 hours by neuro surgery  MRI within without Bravo sequences  Holding all anticoagulation and antiplatelet medication  Essential hypertension  Assessment & Plan  On metoprolol succinate 25 mg daily  Continue Norvasc  Atrial fibrillation with rapid ventricular response (HCC)  Assessment & Plan  Currently on Cardizem drip  Note cardiology input  Rate appears to be improved  Metoprolol succinate to continue  Unfortunately, patient cannot be placed on anticoagulation or anti-platelet therapy due to metastatic brain disease  Hypothyroidism  Assessment & Plan  Continue levothyroxine  TSH  Mixed hyperlipidemia  Assessment & Plan  On pravastatin 80 mg in substitution for Crestor 10 mg        Patient Centered Rounds: I have performed bedside rounds with nursing staff today  Time Spent for Care: 15 minutes  More than 50% of total time spent on counseling and coordination of care as described above      Current Length of Stay: 2 day(s)    Current Patient Status: Inpatient           Code Status: Level 1 - Full Code      Subjective:   nad    Objective:     Vitals:   Temp (24hrs), Av °F (36 1 °C), Min:96 4 °F (35 8 °C), Max:97 8 °F (36 6 °C)    Temp:  [96 4 °F (35 8 °C)-97 8 °F (36 6 °C)] 97 4 °F (36 3 °C)  HR:  [64-98] 78  Resp:  [18-20] 18  BP: (133-162)/() 140/81  SpO2:  [94 %-100 %] 96 %  Body mass index is 29 83 kg/m²  Input and Output Summary (last 24 hours): Intake/Output Summary (Last 24 hours) at 2022 1036  Last data filed at 2022 0025  Gross per 24 hour   Intake 460 ml   Output 600 ml   Net -140 ml       Physical Exam:     Physical Exam  Constitutional:       Appearance: Normal appearance  HENT:      Head: Normocephalic and atraumatic  Cardiovascular:      Rate and Rhythm: Normal rate and regular rhythm  Pulmonary:      Effort: Pulmonary effort is normal       Breath sounds: Normal breath sounds  Abdominal:      General: Abdomen is flat  Palpations: Abdomen is soft  Musculoskeletal:         General: No swelling  Normal range of motion  Neurological:      General: No focal deficit present  Mental Status: He is alert and oriented to person, place, and time     Psychiatric:         Mood and Affect: Mood normal          Behavior: Behavior normal          Additional Data:     Labs:    Results from last 7 days   Lab Units 22  0443 22  1355   WBC Thousand/uL 6 21 6 37   HEMOGLOBIN g/dL 13 9 14 7   HEMATOCRIT % 42 0 42 4   PLATELETS Thousands/uL 76* 90*   NEUTROS PCT %  --  89*   LYMPHS PCT %  --  4*   LYMPHO PCT % 2*  --    MONOS PCT %  --  5   MONO PCT % 4  --    EOS PCT % 0 0     Results from last 7 days   Lab Units 22  0443   POTASSIUM mmol/L 3 9   CHLORIDE mmol/L 99   CO2 mmol/L 23   BUN mg/dL 20   CREATININE mg/dL 0 73   CALCIUM mg/dL 8 7   ALK PHOS U/L 53   ALT U/L 55   AST U/L 23           Recent Cultures (last 7 days):           Last 24 Hours Medication List: Current Facility-Administered Medications   Medication Dose Route Frequency Provider Last Rate    acetaminophen  650 mg Oral Q6H PRN Josué Mcrae MD      allopurinol  100 mg Oral Daily Josué Mcrae MD      aluminum-magnesium hydroxide-simethicone  30 mL Oral Q6H PRN Josué Mcrae MD      vitamin C  1,000 mg Oral Daily Josué Mcrae MD      atorvastatin  20 mg Oral Daily With Olamide Bates MD      citalopram  10 mg Oral BID Josué Mcrae MD      dexamethasone  4 mg Intravenous Q6H [address] Josué Mcrae MD      diltiazem  120 mg Oral Q12H [address] Efra Cuevas MD      HYDROmorphone  0 2 mg Intravenous Q3H PRN Josué Mcrae MD      levothyroxine  37 5 mcg Oral Early Morning Josué Mcrae MD      meclizine  25 mg Oral Q8H PRN Nevin Wilcox PA-C      metoprolol succinate  25 mg Oral BID Efra Cuevas MD      ondansetron  4 mg Intravenous Q6H PRN Josué Mcrae MD      oxyCODONE  5 mg Oral Q4H PRN Josué Mcrae MD      pantoprazole  40 mg Oral Early Morning Josué Mcrae MD      polyethylene glycol  17 g Oral BID Josué Mcrae MD      temazepam  7 5 mg Oral HS PRN Josué Mcrae MD      traMADol  50 mg Oral Q6H PRN Josué Mcrae MD          Today, Patient Was Seen By: Paul Gates DO    ** Please Note: Dictation voice to text software may have been used in the creation of this document   **

## 2022-07-28 NOTE — PROGRESS NOTES
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 [facility])  Active Problems:    Essential hypertension    Mixed hyperlipidemia    History of gout    Thrombocytopenia (HCC)    Hypothyroidism    Atrial fibrillation with rapid ventricular response ([facility] [address] )      Plan:  Patient's wife at bedside  He has no chest pain or significant dyspnea  Telemetry demonstrates atrial fibrillation with a controlled ventricular response  Given paroxysms of sinus rhythm yesterday amiodarone was initiated  Neurosurgical note appreciated  Patient for brain surgery today  BMP today with potassium of 3 7 and creatinine of 0 67  Hemoglobin 12 5  Platelet count 99927  Will follow postoperatively  Subjective:   Patient seen and examined  No significant events overnight   negative  Objective:     Vitals: Blood pressure 138/85, pulse 75, temperature (!) 95 8 °F (35 4 °C), temperature source Tympanic, resp  rate 18, height 5' 10" (1 778 m), weight 89 5 kg (197 lb 5 oz), SpO2 92 %  , Body mass index is 30 kg/m² ,   Orthostatic Blood Pressures    Flowsheet Row Most Recent Value   Blood Pressure 138/85 filed at 07/28/2022 0400   Patient Position - Orthostatic VS Lying filed at 07/27/2022 1908      ,      Intake/Output Summary (Last 24 hours) at 7/28/2022 0918  Last data filed at 7/28/2022 0501  Gross per 24 hour   Intake 877 91 ml   Output 1200 ml   Net -322 09 ml       No significant arrhythmias seen on telemetry review         Physical Exam:    GEN: Carlos Degroot   NECK: supple, no carotid bruits, no JVD or HJR  HEART: normal rate, regular rhythm, normal S1 and S2, no murmurs, clicks, gallops or rubs   LUNGS: clear to auscultation bilaterally; no wheezes, rales, or rhonchi   ABDOMEN: normal bowel sounds, soft, no tenderness, no distention  EXTREMITIES: peripheral pulses normal; no clubbing, cyanosis, or edema  SKIN: warm and well perfused, no suspicious lesions on exposed skin    Labs & Results:    Admission on 07/25/2022   Component Date Value    Sodium 07/25/2022 132 (A)    Potassium 07/25/2022 3 9     Chloride 07/25/2022 99     CO2 07/25/2022 23     ANION GAP 07/25/2022 10     BUN 07/25/2022 20     Creatinine 07/25/2022 0 73     Glucose 07/25/2022 117     Calcium 07/25/2022 8 7     Corrected Calcium 07/25/2022 9 9     AST 07/25/2022 23     ALT 07/25/2022 55     Alkaline Phosphatase 07/25/2022 53     Total Protein 07/25/2022 6 2 (A)    Albumin 07/25/2022 2 5 (A)    Total Bilirubin 07/25/2022 0 68     eGFR 07/25/2022 94     Magnesium 07/25/2022 2 4     WBC 07/25/2022 6 21     RBC 07/25/2022 4 06     Hemoglobin 07/25/2022 13 9     Hematocrit 07/25/2022 42 0     MCV 07/25/2022 103 (A)    MCH 07/25/2022 34 2     MCHC 07/25/2022 33 1     RDW 07/25/2022 16 2 (A)    MPV 07/25/2022 9 8     Platelets 74/63/1374 76 (A)    Segmented % 07/25/2022 83 (A)    Bands % 07/25/2022 9 (A)    Lymphocytes % 07/25/2022 2 (A)    Monocytes % 07/25/2022 4     Eosinophils, % 07/25/2022 0     Basophils % 07/25/2022 0     Myelocytes % 07/25/2022 1     Atypical Lymphocytes % 07/25/2022 1 (A)    Absolute Neutrophils 07/25/2022 5 71     Lymphocytes Absolute 07/25/2022 0 12 (A)    Monocytes Absolute 07/25/2022 0 25     Eosinophils Absolute 07/25/2022 0 00     Basophils Absolute 07/25/2022 0 00     RBC Morphology 07/25/2022 Present     Anisocytosis 07/25/2022 Present     Macrocytes 07/25/2022 Present     Polychromasia 07/25/2022 Present     Platelet Estimate 34/20/1150 Decreased (A)    Folate 07/25/2022 >20 0 (A)    Vitamin B-12 07/27/2022 936 (A)    Segmented Neutrophils Ma* 07/27/2022 93 (A)    Bands Manual 07/27/2022 4     Lymphocytes Manual 07/27/2022 3 (A)    Total Counted 07/27/2022 100     RBC Morphology 07/27/2022 Present     Macrocytes 07/27/2022 Present     Sravanthi Cells 07/27/2022 Present     Platelet Estimate 67/22/6176 Unable to Estimate due to clumped platelets (A)    Path Review 07/27/2022 Thrombocytopenia with no specific morphologic abnormality identified  Clinical history of metastatic neuroendocrine tumor   WBC 07/27/2022 7 60     RBC 07/27/2022 3 94     Hemoglobin 07/27/2022 13 5     Hematocrit 07/27/2022 39 5     MCV 07/27/2022 100 (A)    MCH 07/27/2022 34 3     MCHC 07/27/2022 34 2     RDW 07/27/2022 15 5 (A)    MPV 07/27/2022 9 8     Platelets 11/79/7866 69 (A)    nRBC 07/27/2022 0     Sodium 07/27/2022 131 (A)    Potassium 07/27/2022 4 2     Chloride 07/27/2022 97     CO2 07/27/2022 26     ANION GAP 07/27/2022 8     BUN 07/27/2022 34 (A)    Creatinine 07/27/2022 0 95     Glucose 07/27/2022 177 (A)    Calcium 07/27/2022 9 0     eGFR 07/27/2022 81     ABO Grouping 07/27/2022 O     Rh Factor 07/27/2022 Positive     Antibody Screen 07/27/2022 Positive     Specimen Expiration Date 07/27/2022 [id number]     Unit Product Code 07/28/2022 [id number]     Unit Number 07/28/2022 [id number]     Unit ABO 07/28/2022 A     Unit [address] 07/28/2022 POS     Unit Dispense Status 07/28/2022 Presumed Trans     Unit Product Volume 07/28/2022 300     ANTIBODY ID   #1 07/27/2022 Anti-E     Sodium 07/27/2022 129 (A)    Potassium 07/27/2022 4 1     Chloride 07/27/2022 96     CO2 07/27/2022 27     ANION GAP 07/27/2022 6     BUN 07/27/2022 32 (A)    Creatinine 07/27/2022 0 99     Glucose 07/27/2022 182 (A)    Calcium 07/27/2022 8 6     eGFR 07/27/2022 77     WBC 07/27/2022 7 22     RBC 07/27/2022 3 88     Hemoglobin 07/27/2022 13 3     Hematocrit 07/27/2022 39 0     MCV 07/27/2022 101 (A)    MCH 07/27/2022 34 3     MCHC 07/27/2022 34 1     RDW 07/27/2022 15 5 (A)    Platelets 55/62/1913 106 (A)    MPV 07/27/2022 9 8     Unit Product Code 07/27/2022 [id number]     Unit Number 07/27/2022 [id number]     Unit ABO 07/27/2022 [place] Unit [facility] 07/27/2022 POS     Crossmatch 07/27/2022 Compatible     Unit Dispense Status 07/27/2022 Crossmatched     Unit Product Volume 07/27/2022 350     Unit Product Code 07/27/2022 [id number]     Unit Number 07/27/2022 [id number]     Unit ABO 07/27/2022 O     Unit RH 07/27/2022 POS     Crossmatch 07/27/2022 Compatible     Unit Dispense Status 07/27/2022 Crossmatched     Unit Product Volume 07/27/2022 350     Unit Product Code 07/28/2022 [id number]     Unit Number 07/28/2022 [id number]     Unit ABO 07/28/2022 A     Unit [facility] 07/28/2022 POS     Unit Dispense Status 07/28/2022 Issued     Unit Product Volume 07/28/2022 300     WBC 07/28/2022 6 60     RBC 07/28/2022 3 66 (A)    Hemoglobin 07/28/2022 12 5     Hematocrit 07/28/2022 36 7     MCV 07/28/2022 100 (A)    MCH 07/28/2022 34 2     MCHC 07/28/2022 34 1     RDW 07/28/2022 15 1     MPV 07/28/2022 10 2     Platelets 37/95/6694 89 (A)    nRBC 07/28/2022 0     Neutrophils Relative 07/28/2022 93 (A)    Immat GRANS % 07/28/2022 1     Lymphocytes Relative 07/28/2022 3 (A)    Monocytes Relative 07/28/2022 3 (A)    Eosinophils Relative 07/28/2022 0     Basophils Relative 07/28/2022 0     Neutrophils Absolute 07/28/2022 6 10     Immature Grans Absolute 07/28/2022 0 07     Lymphocytes Absolute 07/28/2022 0 21 (A)    Monocytes Absolute 07/28/2022 0 20     Eosinophils Absolute 07/28/2022 0 00     Basophils Absolute 07/28/2022 0 02     Sodium 07/28/2022 134 (A)    Potassium 07/28/2022 3 7     Chloride 07/28/2022 101     CO2 07/28/2022 28     ANION GAP 07/28/2022 5     BUN 07/28/2022 28 (A)    Creatinine 07/28/2022 0 67     Glucose 07/28/2022 137     Calcium 07/28/2022 7 6 (A)    eGFR 07/28/2022 98        XR chest 2 views    Result Date: 7/25/2022  Narrative: CHEST INDICATION:   AFib RVR, lightheadedness, palpitations  COMPARISON:  01/24/2017    CT scan on 06/15/2022 EXAM PERFORMED/VIEWS:  XR CHEST PA & LATERAL Images: 3 FINDINGS: Cardiomediastinal silhouette appears unremarkable  Right-sided MediPort terminating in the SVC  The lungs are hyperinflated  Postoperative changes in the left lung  Scarring in the left midlung  No pneumothorax or pleural effusion  Osseous structures appear within normal limits for patient age  Impression: No acute cardiopulmonary disease  Hyperinflation  Workstation performed: [id number]     CT head without contrast    Result Date: 7/24/2022  Narrative: CT BRAIN - WITHOUT CONTRAST INDICATION:   Syncope, recurrent Brain metastases suspected Lightheadedness, brain metastasis  COMPARISON:  6/15/2022 TECHNIQUE:  CT examination of the brain was performed  In addition to axial images, sagittal and coronal 2D reformatted images were created and submitted for interpretation  Radiation dose length product (DLP) for this visit:  864 mGy-cm   This examination, like all CT scans performed in the [facility], was performed utilizing techniques to minimize radiation dose exposure, including the use of iterative reconstruction and automated exposure control  IMAGE QUALITY:  Diagnostic  FINDINGS: PARENCHYMA:  Left cerebellar dense mass measures 3 9 x 3 6 cm, previously measuring 1 8 x 1 6 cm  There is associated vasogenic edema with mass effect upon the 4th ventricle  Adjacent to the mass is a 5 mm density (2/12)  Left parietal cortical lesion measures 6 mm, previously measuring 9 mm  There is redemonstration of left parietal encephalomalacia  VENTRICLES AND EXTRA-AXIAL SPACES:  Normal for the patient's age  VISUALIZED ORBITS AND PARANASAL SINUSES:  There is a retention cyst versus polyp in the right maxillary sinus  There is opacity in the right mastoid air cells  CALVARIUM AND EXTRACRANIAL SOFT TISSUES:  Left parietal craniotomy  Impression: The left cerebellar mass has increased in size, now measuring 3 9 x 3 6 cm   There is associated vasogenic edema with mass effect upon the 4th ventricle  There is a new 5 mm high density nodule in the left cerebellum  Left posterior parietal cortical nodule appears smaller than prior  The study was marked in [facility] for immediate notification  Workstation performed: [id number]     MRI brain w wo contrast    Result Date: 7/25/2022  Narrative: MRI BRAIN WITH AND WITHOUT CONTRAST INDICATION: brain mass  COMPARISON:  CT head without contrast 7/24/2022, 6/15/2022  MRI brain with and without contrast 4/26/2022, 4/8/2022  TECHNIQUE: Sagittal T1, axial T2, axial FLAIR, axial T1, axial Belcher, axial diffusion  Sagittal, axial T1 postcontrast   Axial bravo postcontrast with coronal reconstructions  IV Contrast:  9 mL of Gadobutrol injection (SINGLE-DOSE)  IMAGE QUALITY:   Diagnostic  FINDINGS: BRAIN PARENCHYMA: Postsurgical changes of left parietal craniotomy for resection of left parietal mass  Unchanged left parietal resection cavity with encephalomalacia, gliosis, and peripheral hemosiderin deposition  No abnormal masslike enhancement in left parietal resection cavity to suggest recurrent disease  A few enhancing hemorrhagic metastatic lesions, for reference: - 0 5 x 0 5 cm hemorrhagic enhancing lesion in left parietal lobe posterior to left parietal resection cavity (12:81), previously 0 8 x 0 7 cm on CT head 6/15/2022 but unchanged since 7/24/2022  - 4 3 x 3 6 cm hemorrhagic enhancing mass in left cerebellum (12:38), previously 1 8 x 1 4 cm  Unchanged mass effect on the 4th ventricle since yesterday's CT head without contrast   Moderate surrounding perilesional vasogenic edema in left cerebellum, posterior cerebellar vermis, and right posterior paramedian cerebellum  - 0 6 x 0 6 cm hemorrhagic enhancing mass posterior to the dominant left cerebellar mass (12:40), previously 1 5 x 1 4 cm cm  No midline shift  No diffusion-weighted signal abnormality to suggest acute infarction   A few small scattered hyperintensities on T2/FLAIR imaging are noted in the periventricular and subcortical white matter demonstrating an appearance that is statistically most likely to represent minimal microangiopathic change  VENTRICLES:  Unchanged mass effect on the 4th ventricle  No obstructive hydrocephalus  No intraventricular hemorrhage  SELLA AND PITUITARY GLAND:  Normal  ORBITS:  Unchanged bilateral globe proptosis  PARANASAL SINUSES:  Moderate size right maxillary mucus retention cyst  VASCULATURE:  Evaluation of the major intracranial vasculature demonstrates appropriate flow voids  CALVARIUM AND SKULL BASE: Left parietal craniotomy  Small bilateral mastoid effusions (right worse than left)  EXTRACRANIAL SOFT TISSUES:  Normal      Impression: Mixed treatment response - 4 3 cm hemorrhagic metastatic lesion in left cerebellum, increased in size since CT head 6/15/2022 but unchanged since 7/24/2022 - this is likely due to interval hemorrhage of known left cerebellar lesion  Moderate surrounding perilesional vasogenic edema in left cerebellum, posterior cerebellar vermis, and right posterior paramedian cerebellum with mild mass effect on the 4th ventricle  - 0 6 cm hemorrhagic metastatic lesion posterior to the dominant left cerebellar mass, decreased in size  - 0 5 cm left parietal lobe hemorrhagic lesion posterior to left parietal resection cavity, slightly decreased in size  - No evidence of recurrent disease in left parietal resection cavity  The study was marked in [facility] for immediate notification  Workstation performed: [id number]     CT chest abdomen pelvis w contrast    Result Date: 7/26/2022  Narrative: CT CHEST, ABDOMEN AND PELVIS WITH IV CONTRAST INDICATION:   Brain/CNS neoplasm, staging Non-small cell lung cancer (NSCLC), monitor Non-small cell lung cancer (NSCLC), metastatic, assess treatment response h/o lung cancer, new brain mets  Evaluate for staging   COMPARISON:  CT chest abdomen pelvis 6/15/2022 TECHNIQUE: CT examination of the chest, abdomen and pelvis was performed  Axial, sagittal, and coronal 2D reformatted images were created from the source data and submitted for interpretation  Radiation dose length product (DLP) for this visit:  1208 13 mGy-cm   This examination, like all CT scans performed in the [facility], was performed utilizing techniques to minimize radiation dose exposure, including the use of iterative reconstruction and automated exposure control  IV Contrast:  65 mL of iohexol (OMNIPAQUE) Enteric contrast was not administered  FINDINGS: CHEST LUNGS:  Moderate emphysema  Postsurgical changes from left upper lobectomy  There are multiple new bilateral irregular lung nodules suspicious for metastases, with examples as follows: -Right upper lobe juxtapleural nodule measuring 1 6 x 2 5 cm (series 3 image 48)  -Right upper lobe perifissural nodule measuring 1 4 x 1 8 cm (series 3 image 61)  -Left lower lobe juxtapleural nodule measuring 1 9 x 1 5 cm (series 3 image 93)  -Left lower lobe nodule measuring 1 6 x 1 3 cm (series 3 image 70)  PLEURA:  Unremarkable  HEART/GREAT VESSELS:  Heart is unremarkable for patient's age  No thoracic aortic aneurysm  MEDIASTINUM AND OWEN:  Unremarkable  CHEST WALL AND LOWER NECK:  Right chest wall port with the tip in the SVC  ABDOMEN LIVER/BILIARY TREE:  Hepatic steatosis  Otherwise unremarkable  GALLBLADDER:  No calcified gallstones  No pericholecystic inflammatory change  SPLEEN:  Nonspecific hypodense splenic lesion measuring approximately 1 7 cm, seen on prior studies since 10/10/2017  PANCREAS:  Unremarkable  ADRENAL GLANDS:  Unremarkable  KIDNEYS/URETERS:  Bilateral renal cysts and subcentimeter too small to characterize hypodensities  No hydronephrosis  STOMACH AND BOWEL:  Colonic diverticulosis without findings of acute diverticulitis  APPENDIX:  Normal  ABDOMINOPELVIC CAVITY:  No ascites  No pneumoperitoneum  No lymphadenopathy  VESSELS:  Marked atherosclerotic changes    No abdominal aortic aneurysm  PELVIS REPRODUCTIVE ORGANS:  Unremarkable for patient's age  URINARY BLADDER:  Diffusely increased density fluid within the bladder  Otherwise unremarkable  ABDOMINAL WALL/INGUINAL REGIONS:  Unremarkable  OSSEOUS STRUCTURES:  No acute fracture or destructive osseous lesion  Degenerative changes of the spine  Postsurgical change in left ribs from thoracotomy  Impression: 1  Multiple new bilateral irregular lung nodules suspicious for metastases  2   No findings of metastatic disease in the abdomen or pelvis  The study was marked in [facility] for immediate notification  Workstation performed: [id number]       EKG personally reviewed by Dilip Abbasi MD      Counseling / Coordination of Care  Total floor / unit time spent today 30 minutes  Greater than 50% of total time was spent with the patient and / or family counseling and / or coordination of care

## 2022-07-28 NOTE — H&P
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 s/p lung resection in November 2021, chemotherapy, and radiation, new onset AFib, hypothyroidism, hypertension, gout, hyperlipidemia who presented to the [facility] with lightheadedness  Found to be in rapid AFib with RVR  Required Cardizem drip, transitioned to PO cardizem  CTH this admission showed increased cerebellar mass (3 9 cm, prior 1 8 cm) with edema and mass effect on the 4th ventricle and new nodule in left cerebellum  MRI brain showed 4 3 cm hemorrhagic metastatic lesion in left cerebellum , 0 6 cm for adjunct metastatic lesion left cerebellum, 0 5 cm left parietal lobe hemorrhagic lesion  Underwent left retrosigmoid/posterior fossa craniotomy on 7/28  Current continue complains of pain around incision site  Denies chest pain, palpitations, dyspnea, nausea, vomiting, abdominal pain, numbness, weakness  History obtained from chart review and the patient   -------------------------------------------------------------------------------------------------------------  Assessment and Plan:    Neuro:    Diagnosis:  Metastatic left lung adenocarcinoma with metastasis to brain  o S/P lung resection, chemotherapy, or radiation  o Pod 0 posterior fossa craniotomy  o Plan:   - Decadron taper ending 8/9  - Neurochecks Q 1  - Sodium goal greater than 133  - Platelet goal greater than 100  - MRI brain w/wo  · Analgesia:    · Dilaudid 0 2 mg Q 4 p r n , oxycodone 2 5 mg q 4 p r n , oxycodone 5 mg Q 4 p r n    · Nausea:  · Scopolamine patch, Zofran 4 mg q 6 p r n  · Anxiety/depression  · Continue home citalopram 10 mg    CV:    Diagnosis:  Atrial fibrillation with RVR  o No prior history of AFib  o Presented with lightheadedness and rates into the 170s  o Required Cardizem drip then transition to p o   o Plan:   - Continue amiodarone 400 mg b i d , diltiazem 120 mg b i d , Toprol 25 mg b i d   - Holding anticoagulation   - Continue to monitor on tele  - Cardiology following - appreciate recommendations  · Hypertension:  · Home regimen amlodipine 10 mg, Toprol 25 mg  · Plan:  · Hold amlodipine  · Continue Toprol  · Hyperlipidemia:  · Home regimen:  rosuvastatin 10 mg  · Atorvastatin 20 mg while inpatient    Pulm:   Diagnosis:  Metastatic left lung adenocarcinoma  - S/P left upper lobe resection, radiation, chemotherapy  o Oncology following - appreciate recommendations    GI:    Diagnosis:  GERD  o Continue home Protonix 40 mg p o       :    Diagnosis:  No acute issues      F/E/N:    Fluids:  NS 75   Electrolytes:  Trend and replete as needed   Nutrition:  Regular diet      Heme/Onc:    Diagnosis: Thrombocytopenia  - Baseline 90s to 100  - Continue to monitor   DVT prophylaxis:  Holding anticoagulation, SCDs      Endo:    Diagnosis:  Hypothyroidism  - Continue home levothyroxine 37 5 mcg      ID:    Diagnosis:  No acute issues      MSK/Skin:    Diagnosis:  Gout  o Continue home allopurinol 100 mcg   PT/OT when appropriate   Turning/repositioning    LDA:   Right chest port present on admission   Peripheral left AC day 2   Peripheral right day 1   Right arterial line day 1   Corea catheter day 1      Disposition: Admit to Critical Care   Code Status: Level 1 - Full Code  --------------------------------------------------------------------------------------------------------------  Review of Systems    A 12-point, complete review of systems was reviewed and negative except as stated above     Physical Exam  Vitals reviewed     Constitutional: General: He is not in acute distress  HENT:      Head: Normocephalic and atraumatic  Nose: No rhinorrhea  Eyes:      General: No scleral icterus  Pupils: Pupils are equal, round, and reactive to light  Comments: Dysconjugate gaze when looking superiorly   Cardiovascular:      Rate and Rhythm: Normal rate and regular rhythm  Pulses: Normal pulses  Heart sounds: Normal heart sounds  No murmur heard  No friction rub  No gallop  Pulmonary:      Effort: Pulmonary effort is normal  No respiratory distress  Breath sounds: Normal breath sounds  No wheezing, rhonchi or rales  Abdominal:      General: Bowel sounds are normal  There is no distension  Palpations: Abdomen is soft  Tenderness: There is no abdominal tenderness  There is no guarding  Musculoskeletal:      Right lower leg: No edema  Left lower leg: No edema  Skin:     General: Skin is warm and dry  Capillary Refill: Capillary refill takes less than 2 seconds  Coloration: Skin is not jaundiced  Neurological:      Mental Status: He is alert  Sensory: No sensory deficit  Coordination: Coordination normal (Finger-to-nose bilaterally)        Comments: CN 2-12 intact, bilateral biceps, triceps, shoulder abduction strength 5/5,  strength symmetric, bilateral knee extension 5/5, can raise both lower extremities off the bed, foot plantar flexion 5/5 bilaterally, no dysarthria   Psychiatric:         Behavior: Behavior normal        --------------------------------------------------------------------------------------------------------------  Vitals:   Vitals:    07/28/22 1445 07/28/22 1500 07/28/22 1530 07/28/22 1545   BP: 131/81 131/68  119/74   Pulse: 80 82 82 84   Resp: 15 16 16 16   Temp:  (!) 97 2 °F (36 2 °C)  97 6 °F (36 4 °C)   TempSrc:       SpO2: 97% 98% 99% 98%   Weight:    91 1 kg (200 lb 13 4 oz)   Height:    5' 8" (1 727 m)     Temp  Min: 95 8 °F (35 4 °C)  Max: 98 4 °F (36 9 °C)  IBW (Ideal Body Weight): 68 4 kg  Height: 5' 8" (172 7 cm)  Body mass index is 30 54 kg/m²  N/A    Laboratory and Diagnostics:  Results from last 7 days   Lab Units 07/28/22  1337 07/28/22  1002 07/28/22 0447 07/27/22  1824 07/27/22  1100 07/25/22 0443 07/24/22  1355   WBC Thousand/uL 6 41  --  6 60 7 22 7 60 6 21 6 37   HEMOGLOBIN g/dL 11 7*  --  12 5 13 3 13 5 13 9 14 7   I STAT HEMOGLOBIN g/dl  --  9 5*  --   --   --   --   --    HEMATOCRIT % 33 6*  --  36 7 39 0 39 5 42 0 42 4   HEMATOCRIT, ISTAT %  --  28*  --   --   --   --   --    PLATELETS Thousands/uL 204  --  89* 106* 69* 76* 90*   NEUTROS PCT %  --   --  93*  --   --   --  89*   BANDS PCT % 1  --   --   --   --  9*  --    MONOS PCT %  --   --  3*  --   --   --  5   MONO PCT % 2*  --   --   --   --  4  --      Results from last 7 days   Lab Units 07/28/22  1002 07/28/22 0447 07/27/22 1824 07/27/22 1100 07/25/22 0443 07/24/22  1355   SODIUM mmol/L  --  134* 129* 131* 132* 131*   POTASSIUM mmol/L  --  3 7 4 1 4 2 3 9 4 0   CHLORIDE mmol/L  --  101 96 97 99 92*   CO2 mmol/L  --  28 27 26 23 23   CO2, I-STAT mmol/L 30  --   --   --   --   --    ANION GAP mmol/L  --  5 6 8 10 16*   BUN mg/dL  --  28* 32* 34* 20 22   CREATININE mg/dL  --  0 67 0 99 0 95 0 73 1 06   CALCIUM mg/dL  --  7 6* 8 6 9 0 8 7 8 6   GLUCOSE RANDOM mg/dL  --  137 182* 177* 117 183*   ALT U/L  --   --   --   --  55 66   AST U/L  --   --   --   --  23 19   ALK PHOS U/L  --   --   --   --  53 58   ALBUMIN g/dL  --   --   --   --  2 5* 2 8*   TOTAL BILIRUBIN mg/dL  --   --   --   --  0 68 0 56     Results from last 7 days   Lab Units 07/25/22  0443   MAGNESIUM mg/dL 2 4                   ABG:    VBG:          Micro:        EKG:  Atrial fibrillation with RVR  Imaging: I have personally reviewed pertinent reports        Historical Information   Past Medical History:   Diagnosis Date    Brain cancer ([facility] [address] )     Hyperlipidemia     Hypertension     Lung cancer [facility])      Past Surgical History:   Procedure Laterality Date    BACK SURGERY      CRANIOTOMY Left 2022    Procedure: Image guided left parietal craniotomy for tumor resection;  Surgeon: Naldo Asher MD;  [facility]: [facility];  Service: Neurosurgery    [address] [place] [address] IR BIOPSY LUNG  2021    IR PORT PLACEMENT  2021    LAMINECTOMY  2018    L4-L5    LUNG SURGERY      left upper lobectomy    FL BRONCHOSCOPY,DIAGNOSTIC N/A 2021    Procedure: BRONCHOSCOPY FLEXIBLE;  Surgeon: Milena Lawrence MD;  [facility];  Service: Thoracic    FL MEDIASTINOSCOPY WITH LYMPH NODE BIOPSY/IES N/A 2021    Procedure: MEDIASTINOSCOPY, flexible bronchoscopy;  Surgeon: Milena Lawrence MD;  Location[facility];  Service: Thoracic    TONSILLECTOMY       Social History   Social History     Substance and Sexual Activity   Alcohol Use Yes    Alcohol/week: 7 0 standard drinks    Types: 7 Cans of beer per week    Comment: 1-2 beers nightly     Social History     Substance and Sexual Activity   Drug Use Not Currently    Types: Marijuana    Comment: seldom     Social History     Tobacco Use   Smoking Status Former Smoker    Packs/day: 1 00    Years: 40 00    Pack years: 40 00    Types: Cigarettes    Start date:     Quit date: 2017    Years since quittin 4   Smokeless Tobacco Never Used   Tobacco Comment    quit 2017     Exercise History:  N/a  Family History:   Family History   Problem Relation Age of Onset    Nephrolithiasis Father     Lung cancer Maternal Grandfather      I have reviewed this patient's family history and commented on sigificant items within the HPI      Medications:  Current Facility-Administered Medications   Medication Dose Route Frequency    acetaminophen (TYLENOL) tablet 975 mg  975 mg Oral Q8H [address]    allopurinol (ZYLOPRIM) tablet 100 mg  100 mg Oral Daily    aluminum-magnesium hydroxide-simethicone (MYLANTA) oral suspension 30 mL  30 mL Oral Q6H PRN    amiodarone tablet 400 mg  400 mg Oral BID With Meals    ascorbic acid (VITAMIN C) tablet 1,000 mg  1,000 mg Oral Daily    atorvastatin (LIPITOR) tablet 20 mg  20 mg Oral Daily With Dinner    bisacodyl (DULCOLAX) rectal suppository 10 mg  10 mg Rectal Daily PRN    ceFAZolin (ANCEF) IVPB (premix in dextrose) 2,000 mg 50 mL  2,000 mg Intravenous Q8H    citalopram (CeleXA) tablet 10 mg  10 mg Oral BID    dexamethasone (DECADRON) tablet 4 mg  4 mg Oral Q6H [address]    Followed by   Sanjuanita Kebede ON 7/30/2022] dexamethasone (DECADRON) tablet 4 mg  4 mg Oral Q8H [address]    Followed by   Sanjuanita Kebede ON 8/1/2022] dexamethasone (DECADRON) tablet 2 mg  2 mg Oral Q6H [address]    Followed by   Sanjuanita Kebede ON 8/4/2022] dexamethasone (DECADRON) tablet 2 mg  2 mg Oral Q8H [address]    Followed by   Sanjuanita Kebede ON 8/5/2022] dexamethasone (DECADRON) tablet 2 mg  2 mg Oral Q12H [address]    Followed by   Sanjuanita Kebede ON 8/8/2022] dexamethasone (DECADRON) tablet 2 mg  2 mg Oral Q24H [address]    diltiazem (CARDIZEM SR) 12 hr capsule 120 mg  120 mg Oral Q12H [address]    docusate sodium (COLACE) capsule 100 mg  100 mg Oral BID    HYDROmorphone HCl (DILAUDID) injection 0 2 mg  0 2 mg Intravenous Q4H PRN    levothyroxine tablet 37 5 mcg  37 5 mcg Oral Early Morning    meclizine (ANTIVERT) tablet 25 mg  25 mg Oral Q8H PRN    methocarbamol (ROBAXIN) tablet 500 mg  500 mg Oral Q6H [address]    metoprolol succinate (TOPROL-XL) 24 hr tablet 25 mg  25 mg Oral BID    ondansetron (ZOFRAN) injection 4 mg  4 mg Intravenous Q6H PRN    oxyCODONE (ROXICODONE) IR tablet 2 5 mg  2 5 mg Oral Q4H PRN    oxyCODONE (ROXICODONE) IR tablet 5 mg  5 mg Oral Q4H PRN    pantoprazole (PROTONIX) EC tablet 40 mg  40 mg Oral Early Morning    polyethylene glycol (MIRALAX) packet 17 g  17 g Oral BID    scopolamine (TRANSDERM-SCOP) 1 mg/3 days TD 72 hr patch 1 patch  1 patch Transdermal Q72H    [START ON 7/29/2022] senna (SENOKOT) tablet 8 6 mg  1 tablet Oral Daily    sodium chloride 0 9 % infusion  75 mL/hr Intravenous Continuous    temazepam (RESTORIL) capsule 7 5 mg  7 5 mg Oral HS PRN     Home medications:  Prior to Admission Medications   Prescriptions Last Dose Informant Patient Reported? Taking? Ascorbic Acid (vitamin C) 1000 MG tablet  Self Yes No   Sig: Take 1,000 mg by mouth daily   B Complex Vitamins (B COMPLEX 1 PO)  Self Yes No   Sig: Take 1 tablet by mouth daily   Psyllium (Metamucil) 28 3 % POWD  Self Yes No   Sig: Take by mouth   allopurinol (ZYLOPRIM) 100 mg tablet  Self No No   Sig: Take 1 tablet (100 mg total) by mouth daily   amLODIPine (NORVASC) 10 mg tablet  Self No No   Sig: TAKE ONE TABLET BY MOUTH EVERY DAY   citalopram (CeleXA) 10 mg tablet   No No   Sig: TAKE 1 TABLET BY MOUTH IN THE MORNING AND 1 TABLET BY MOUTH BEFORE BEDTIME   Patient taking differently: Take 10 mg by mouth daily   dexamethasone (DECADRON) 4 mg tablet  Self No No   Sig: Take 1 tablet (4 mg total) by mouth 2 (two) times a day with meals   levothyroxine 75 mcg tablet  Self No No   Sig: Take 0 5 tablets (37 5 mcg total) by mouth daily in the early morning   meclizine (ANTIVERT) 25 mg tablet  Self No No   Sig: Take 1 tablet (25 mg total) by mouth every 8 (eight) hours as needed for dizziness   Patient not taking: Reported on 7/24/2022   metoprolol succinate (TOPROL-XL) 25 mg 24 hr tablet  Self No No   Sig: Take 1 tablet (25 mg total) by mouth daily   pantoprazole (PROTONIX) 40 mg tablet  Self No No   Sig: Take 1 tablet (40 mg total) by mouth daily   polyethylene glycol (GLYCOLAX) 17 GM/SCOOP powder  Self No No   Sig: Take 17 g by mouth 2 (two) times a day   rosuvastatin (CRESTOR) 10 MG tablet  Self No No   Sig: TAKE ONE TABLET BY MOUTH EVERY DAY   traMADol (ULTRAM) 50 mg tablet   No No   Sig: TAKE ONE TABLET BY MOUTH EVERY 8 HOURS AS NEEDED FOR SEVERE PAIN  Facility-Administered Medications: None     Allergies:   Allergies   Allergen Reactions    Bee Venom     Benazepril Other (See Comments)     Angioedema     Latex Other (See Comments)     Burning of eyes at the dentist from the gloves    Meloxicam GI Intolerance    Penicillin G Rash     ------------------------------------------------------------------------------------------------------------  Advance Directive and Living Will:      Power of :    POLST:    ------------------------------------------------------------------------------------------------------------  Anticipated Length of Stay is > 2 midnights    Care Time Delivered:   Time spent:  25 minutes      Kitty Holt DO        Portions of the record may have been created with voice recognition software  Occasional wrong word or "sound a like" substitutions may have occurred due to the inherent limitations of voice recognition software    Read the chart carefully and recognize, using context, where substitutions have occurred

## 2022-07-28 NOTE — OP NOTE
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 ([address] ) [C7A 8, C7B 8]    Procedure(s) (LRB):  left retrosigmoid/posterior fossa craniotomy for resction of mass with image guidence (Left)    Specimen(s):  ID Type Source Tests Collected by Time Destination   1 : Left Posterior Fossa Mass Tissue Brain TISSUE EXAM Carri Pinon MD 2022 1114    2 : Left posterior Fossa Mass Tissue Brain TISSUE EXAM Carri Pinon MD 2022 1114        Estimated Blood Loss:   Minimal    Drains:  Urethral Catheter Non-latex 16 Fr  (Active)   Collection Container Standard drainage bag 22 0935   Number of days: 0       Anesthesia Type:   General    Operative Indications:  Adenocarcinoma, lung, left ([address] ) [C34 92]  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 ([address] ) [C7A 8, C7B 8]  This is a very unfortunate 38-year-old gentleman previously diagnosed with neuroendocrine lung cancer who had multiple intracranial mid metastatic lesions and underwent a supratentorial craniotomy and resection of mass in 2022  Unfortunately, due to his cranial burden he underwent whole-brain radiation  Unfortunately his posterior fossa metastasis had a mild response to radiation but then subsequently hemorrhage causing significant posterior fossa mass effect  This in combination of atrial fibrillation with a rapid ventricular response brought into the emergency room  His heart rate was controlled, platelets transfused for thrombocytopenia, and sodium corrected    Due to the size of his hematoma/tumor we discussed the risks and benefits of resection including bleeding, infection, stroke, seizure, paralysis, spinal fluid leak, and death  He understands that he is at higher risk for complication due to his whole-brain radiation, thrombocytopenia, and cardiac status  Operative Findings:  Frozen pathology consistent with metastatic carcinoma  Complications:   None    Procedure and Technique:  After obtaining written informed consent patient was brought to the operating room  Endotracheal anesthesia was induced  Arterial line Corea catheter placed by Anesthesia  The patient received Lasix, Decadron, mannitol, and Keppra  He was placed in a right lateral decubitus position with his left side up  He was placed in a [place] head martinez  Neuro navigation was then registered and confirmed to be accurate  Image guidance from the [employer] was used throughout the duration of the case  Images were loaded into the system and used for preoperative planning as well as intraoperative guidance  It was critically important through the duration of the case for navigation and avoidance of critical structures  A retrosigmoid craniotomy was then planned in neuro navigation was used to sudhir the transverse and sigmoid sinuses as well as the tumor  A curvilinear incision behind his ear was planned  His head was then prepped and draped in the usual sterile fashion  A surgical time-out was performed  20 cc of 1% lidocaine with 100,000 epinephrine was infiltrated along the planned incision  Next a 10 blade was used open the incision  Monopolar cautery was used to dissect the underlying soft tissue from the underlying fascia  The fascia was then opened with a monopolar cautery  The muscle was then reflected and dissected in a subperiosteal fashion  Cerebellar retractors used to maintain exposure  Next a bur hole was made right on the asterion    An MA drill was used to extend the craniotomy inferiorly superiorly and medially  The craniotomy was then elevated and saved on the back table in a bacitracin-soaked sponge  Next the dura was opened in a stellate fashion  Immediately hemosiderin cortex was visible  Small corticectomy was made in the left cerebellar hemisphere  I immediately encountered some soft liquid clot  This was evacuated  Next a work some firm [place] around the remaining tumor and hematoma  Once circumferentially dissected I was able to remove this  A portion was sent as frozen specimen returned consistent with metastatic carcinoma  The surgical resection cavity was then carefully inspected and hemostasis was achieved here  Next I turned my attention to the smaller of the 2 metastatic lesions more medially  I made a small corticectomy and dissected with a goal of resecting this mass  Once satisfied with my resection here hemostasis was achieved in both surgical cavities were lined with Surgicel  After satisfied with my hemostasis the wound was copiously irrigated antibiotic irrigation  Next the dura was reapproximated using 4-0 Nurolon  There were 2 small areas of leak  A muscle patch was used over both of these areas and DuraSeal was placed over it  Next dura repair was placed over this and once again DuraSeal was placed over that  Gelfoam was placed over it and the bone flap was replaced using titanium plating system  The wound was then copiously irrigated antibiotic irrigation  0 Vicryl were then used to close the muscle and fascia  2-0 Vicryl were used to close the deep dermal layer  The skin was closed using a running 3-0 nylon  Sterile dressing was then applied  Patient was then awoken from general anesthetic found to be in his baseline neurologic condition  Surgical sign-out was performed  There was no qualified resident aid in this case    As such, due to its complex nature Darien Bautista was present and assisted for the duration of the case including exposure, resection, and closure         I was present for the entire procedure    Patient Disposition:  PACU       SIGNATURE: Marya Ferreira MD  DATE: July 28, 2022  TIME: 1:32 PM

## 2022-07-28 NOTE — PROGRESS NOTES
07/28/22 1902   Clinical Encounter Type   Visited With Family; Health care provider   Routine Visit Introduction

## 2022-07-28 NOTE — ANESTHESIA PROCEDURE NOTES
Arterial Line Insertion  Performed by: Phoenix Jeffries CRNA  Authorized by: Mickey Daigle   Consent: Verbal consent obtained  Written consent obtained  Risks and benefits: risks, benefits and alternatives were discussed  Consent given by: patient  Patient understanding: patient states understanding of the procedure being performed  Patient consent: the patient's understanding of the procedure matches consent given  Procedure consent: procedure consent matches procedure scheduled  Relevant documents: relevant documents present and verified  Patient identity confirmed: arm band  Preparation: Patient was prepped and draped in the usual sterile fashion    Indications: hemodynamic monitoring  Orientation:  Right  Location: radial artery  Sedation:  Patient sedated: yes (Under GA)    Procedure Details:  Saturnino's test normal: yes  Needle gauge: 20  Number of attempts: 1    Post-procedure:  Post-procedure: dressing applied  Waveform: good waveform

## 2022-07-28 NOTE — ANESTHESIA POSTPROCEDURE EVALUATION
Post-Op Assessment Note    CV Status:  Stable    Pain management: adequate     Mental Status:  Lethargic and confused   Hydration Status:  Stable   PONV Controlled:  None   Airway Patency:  Patent      Post Op Vitals Reviewed: Yes      Staff: CRNA, Anesthesiologist         No complications documented      BP   150/86   Temp  97 7   Pulse  72   Resp   10   SpO2   97% on face mask 6L

## 2022-07-28 NOTE — ANESTHESIA PREPROCEDURE EVALUATION
Procedure:  left retrosigmoid/posterior fossa craniotomy for resction of mass with image guidence (Left Head)  This is a 69 y o  male who presents for left retrosigmoid/posterior fossa craniotomy for resction of mass with image guidence (Left Head) on 07/28/22  -- VITALS --  /75 (BP Location: Right arm)   Pulse 94   Temp (!) 97 3 °F (36 3 °C) (Oral)   Resp 18   Ht 5' 10" (1 778 m)   Wt 89 kg (196 lb 3 4 oz)   SpO2 94%   BMI 29 83 kg/m²   BP Readings from Last 3 Encounters:   07/27/22 152/75   07/24/22 138/72   06/23/22 130/80     -- LABS --  Results from Last 12 Months   Lab Units 07/27/22  1824 07/27/22  1100 07/25/22  0443 07/24/22  1355 06/20/22  1227 04/09/22  0629 04/08/22  0456 04/05/22  0526 04/04/22  0521   SODIUM mmol/L 129* 131* 132* 131* 137   < > 140   < > 139   POTASSIUM mmol/L 4 1 4 2 3 9 4 0 4 2   < > 4 0   < > 4 0   CHLORIDE mmol/L 96 97 99 92* 103   < > 109*   < > 108   CO2 mmol/L 27 26 23 23 25   < > 25   < > 22   ANION GAP mmol/L 6 8 10 16* 9   < > 6   < > 9   BUN mg/dL 32* 34* 20 22 28*   < > 28*   < > 34*   CREATININE mg/dL 0 99 0 95 0 73 1 06 1 13   < > 1 09   < > 1 19   CALCIUM mg/dL 8 6 9 0 8 7 8 6 9 4   < > 8 1*   < > 9 1   GLUCOSE RANDOM mg/dL 182* 177* 117 183*  --    < > 115   < > 135   PHOSPHORUS mg/dL  --   --   --   --   --   --  3 7  --  3 6   MAGNESIUM mg/dL  --   --  2 4  --  2 1   < > 1 9  --  2 5   AST U/L  --   --  23 19 18   < >  --   --  14   ALT U/L  --   --  55 66 33   < >  --   --  35   ALK PHOS U/L  --   --  53 58 65   < >  --   --  53   TOTAL BILIRUBIN mg/dL  --   --  0 68 0 56 0 36   < >  --   --  0 38   ALBUMIN g/dL  --   --  2 5* 2 8* 3 2*   < >  --   --  3 1*    < > = values in this interval not displayed       Results from last 7 days   Lab Units 07/27/22  1824 07/27/22  1100 07/25/22  0443 07/24/22  1355   SODIUM mmol/L 129* 131* 132* 131*   POTASSIUM mmol/L 4 1 4 2 3 9 4 0   CHLORIDE mmol/L 96 97 99 92*   CO2 mmol/L 27 26 23 23   ANION GAP mmol/L 6 8 10 16*   BUN mg/dL 32* 34* 20 22   CREATININE mg/dL 0 99 0 95 0 73 1 06   CALCIUM mg/dL 8 6 9 0 8 7 8 6   GLUCOSE RANDOM mg/dL 182* 177* 117 183*   MAGNESIUM mg/dL  --   --  2 4  --    AST U/L  --   --  23 19   ALT U/L  --   --  55 66   ALK PHOS U/L  --   --  53 58   TOTAL BILIRUBIN mg/dL  --   --  0 68 0 56   ALBUMIN g/dL  --   --  2 5* 2 8*     Results from Last 12 Months   Lab Units 07/27/22 1824 07/27/22  1100   WBC Thousand/uL 7 22 7 60   HEMOGLOBIN g/dL 13 3 13 5   HEMATOCRIT % 39 0 39 5   PLATELETS Thousands/uL 106* 69*     Results from last 7 days   Lab Units 07/27/22 1824 07/27/22  1100   WBC Thousand/uL 7 22 7 60   HEMOGLOBIN g/dL 13 3 13 5   HEMATOCRIT % 39 0 39 5   PLATELETS Thousands/uL 106* 69*     - Coags:      -- EKG --  Rate:     ECG rate:  94     ECG rate assessment: normal     Rhythm:     Rhythm: sinus rhythm     Ectopy:     Ectopy: none     QRS:     QRS axis:  Normal     QRS intervals:  Normal   Conduction:     Conduction: normal     ST segments:     ST segments:  Normal   T waves:     T waves: non-specific      -- ECHO/RELEVANT IMAGING --  5/2022 TTE  Interpretation Summary         Left Ventricle: Left ventricular cavity size is normal  Wall thickness is normal  The left ventricular ejection fraction is 60%  Systolic function is normal  Wall motion is normal  Diastolic function is mildly abnormal, consistent with grade I (abnormal) relaxation    Right Ventricle: Right ventricular cavity size is mildly dilated    Right Atrium: The atrium is mildly dilated    Tricuspid Valve: There is mild to moderate regurgitation  The right ventricular systolic pressure is mildly to moderately elevated  RVSP 45 mm Hg       -- ANESTHESIA RISK ASSESSMENT / QUESTIONNAIRE --   (1) Personal or family history of anesthesia related complications: n   (2) Relevant airway history: Technique: Stylet, Video laryngoscopy; Type: Hi-Lo, Cuffed, Oral; Tube Size: 8 mm;  Laryngoscope: Mac; Blade Size: 3; Location: Oral; Grade View: 2a; Insertion Attempts: 1   (3) Patient took the following medications this morning: see MAR   (4) Patient meets ASA NPO guidelines: y   (5) Cardiac assessment       (a) Does the patient have severe, modifiable cardiac conditions including MI (w/i 14 dys of angioplasty, 1 mo after BMS, 2 mo after no intervention, 3-6 mo after ELIAN), decompensated CHF, decompensated valvular Dz, unstable arrhythmias, or unstable pulmonary HTN?: n      (b) Calculate Theodore's RCRI (Surgical risk, ICM, CHF, Cr >2, CVA/TIA, IDDM): 1      (c) Quote MACE risk based on RCRI: 0= 0 4%; 1= 0 6%; 2= 7%; 3= 11%      (d) If RCRI >1, is exercise tolerance METs >4?: See above      (e) If RCRI >1 and METS <4 (or indeterminate), then is stress testing appropriate?: n/a    -- ANESTHESIA SHARED DECISION MAKING --  Benefits discussed (Joanie Shrestha 81 [id number], PMID [id number]): Amnesia, Analgesia, Specialized anesthesiology support reduces mortality for major surgery by about 100-fold  Mortality risks associated with anesthesia discussed (PMID [id number]): ASA-PS I 1:250,000; ASA-PS II 1:20,000; ASA-PS III 1:3,500; ASA-PS IV 1:1,800  Risks by organ systems discussed included were but not limited to (PMID [id number]):  (1) Drug reactions / Allergic reactions / Overdose adverse events  (2) Pulmonary / Airway adverse events: Dental injury, Throat pain, Hypoxia, Prolonged intubation  (3) Cardiac and Vascular adverse events: PeriOp MI or other MACE  Risk of bleeding or infection related to relevant vascular access (Peripheral, Central, Arterial, or other line placement)  Risks associated with bypass circuits if relevant  (4) Neuro adverse events: IntraOp awareness, Stroke, POCD  Risk of bleeding or infection associated with neuraxial anesthesia if relevant    (5) FEN / GI / Vomiting risks: Aspiration, PONV     -- RELEVANT ANESTHETIC CONSIDERATIONS --  Videoscope, A line, tolerated ancef previously,       Relevant Problems   CARDIO   (+) Atrial fibrillation with rapid ventricular response (HCC)   (+) Essential hypertension   (+) Hypertension   (+) Hypertriglyceridemia   (+) Mixed hyperlipidemia      ENDO   (+) Hypothyroidism      /RENAL   (+) CKD (chronic kidney disease) stage 2, GFR 60-89 ml/min   (+) Renal cyst, right      HEMATOLOGY   (+) Platelets decreased (HCC)   (+) Thrombocytopenia (HCC)      MUSCULOSKELETAL   (+) Acute idiopathic gout of left foot   (+) Chronic back pain   (+) Lumbago with sciatica, left side      NEURO/PSYCH   (+) Chronic back pain   (+) Depression with anxiety   (+) History of gout      PULMONARY   (+) Chronic obstructive pulmonary disease, unspecified COPD type (HCC)   (+) JUNAID (obstructive sleep apnea)      Respiratory   (+) Adenocarcinoma, lung, left (HCC)      Other   (+) Former cigarette smoker        Physical Exam    Airway    Mallampati score: III  TM Distance: >3 FB  Neck ROM: limited     Dental       Cardiovascular  Cardiovascular exam normal    Pulmonary  Decreased breath sounds,     Other Findings        Anesthesia Plan  ASA Score- 3     Anesthesia Type- general with ASA Monitors  Additional Monitors: arterial line  Airway Plan: ETT  Plan Factors-    Chart reviewed  EKG reviewed  Imaging results reviewed  Existing labs reviewed  Induction- intravenous  Postoperative Plan- Plan for postoperative opioid use  Informed Consent- Anesthetic plan and risks discussed with patient  I personally reviewed this patient with the CRNA  Discussed and agreed on the Anesthesia Plan with the CRNA  Theo Child

## 2022-07-28 NOTE — PROGRESS NOTES
Pastoral Care Progress Note    2022  Patient: Mike Mills : 1955  Admission Date & Time: 2022 0026  MRN: [id number] CSN: [id number]      Referral from nurse to support wife  has cancer in brain and had surgery today  Provided emotional and listening support

## 2022-07-28 NOTE — PROGRESS NOTES
Neurosurgery Post Op Note    POD# 0 left retrosigmoid/posterior fossa craniotomy for resction of mass with image guidence    Patient states he is doing terrible right now  Has a horrible headache and nausea  No other complaints at this time  Platelets improved to 204 postoperatively  Post op exam:  General appearance: tired but arousable, appears stated age, cooperative and no distress  Head: dressing over incision intact  Eyes: able to track finger with some very slight limited EOM to the right, may be due to patient positioning in bed  Neck: supple, symmetrical, trachea midline   Lungs: non labored breathing  Heart: regular heart rate  Neurologic:   Mental status: tired but arousable, oriented x 3, follows commands, thought content appropriate  Cranial nerves: grossly intact (Cranial nerves II-XII) - very slight drooping left NLF mostly corrected with smiling  Sensory: normal to LT  Motor: moving all extremities without focal weakness     Plan:  · Dispo:  Plan to go to ICU for higher level of care for about 24 hours, will be [facility] primary team  · Imaging:  MRI brain w wo contrast within 24 hours of surgery, MRI aware  · Pain control:  Multimodal pain regimen ordered (geriatric)  · Additional medications:  Dex wean, no need for keppra, scopolamine patches for nausea  · Neuro checks: q1 hour neuro checks in the ICU  · Medical management:  Needs to have goal Na > 133 and watch for SIADH; also needs plts > 100 postoperatively; labs ordered for the morning for ongoing monitoring  · Mobilize: PT / OT in the morning    Neurosurgery will continue to follow

## 2022-07-29 NOTE — PROGRESS NOTES
Pastoral Care Progress Note    2022  Patient: Bridget Mcmanus : 1955  Admission Date & Time: 2022 0026  MRN: [id number] CSN: [id number]           provided spiritual support and hospitality to Renetta, patient's wife  Patient resting/sleeping comfortably  Wendi shared patient's love of bluegrass music---he's an excellent musician and wright  This gives both of them great nikki  Both Annette Michel and Arnoldkeshia have a wide support system of friends  Spiritiual care remains available if needed or requested                Chaplaincy Interventions Utilized:   Empowerment: Clarified, confirmed, or reviewed information from treatment team     Relationship Building: Cultivated a relationship of care and support, Listened empathically, and Hospitality     22 1500   Clinical Encounter Type   Visited With Patient and family together   Routine Visit Follow-up

## 2022-07-29 NOTE — ASSESSMENT & PLAN NOTE
· A-fib with RVR on admission   · Previously noted in Outpatient PCP note   · Transitioned to oral BB with improved rate control but still with HR >100   · Continue rate control at this time

## 2022-07-29 NOTE — PLAN OF CARE
Problem: OCCUPATIONAL THERAPY ADULT  Goal: Performs self-care activities at highest level of function for planned discharge setting  See evaluation for individualized goals  Description: Treatment Interventions: ADL retraining, Visual perceptual retraining, Functional transfer training, UE strengthening/ROM, Endurance training, Cognitive reorientation, Patient/family training, Equipment evaluation/education, Compensatory technique education, Continued evaluation, Energy conservation, Activityengagement          See flowsheet documentation for full assessment, interventions and recommendations  7/29/2022 1515 by Teddy Mayfield OT  Note: Limitation: Decreased ADL status, Decreased UE ROM, Decreased UE strength, Decreased Safe judgement during ADL, Decreased cognition, Decreased endurance, Visual deficit, Decreased self-care trans, Decreased high-level ADLs  Prognosis: Asher Salcedo    Pt is a 68 yo male who originally presented to [facility] with lightheadedness and was found to be in rapid A-fib with RVR  CTH showed increased cerebellar mass with edema and mass effect on the 4th ventricle  PT w/ history of 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 s/p lung resection in November 2021  Pt transferred to \[facility]\"" for neurosurgery intervention and 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  Pt s/p " left retrosigmoid/posterior fossa craniotomy for resction of mass with image guidence (Left)" on 7/28  Pt  has a past medical history of Brain cancer ([address] ), Hyperlipidemia, Hypertension, and Lung cancer ([address] )  Pt with active OT orders and OT consulted to assess pt's functional status and occupational performance to determine safe d/c needs  Pt seen for co-eval with PT to increase safety, decrease fall risk, and maximize functional/occupational performance  Pt lives with his wife in a 2  with 0 SHANTA per CM  PTA, pt required assistance w/ ADLs/iADLs and reports using no DME for functional mobility  (-) driving  Currently, pt performing bed mobility with Mod A x2 for UB postural support/LE management, STS with Mod A x2 with b/l HHA, functional mobility with Max Ax2 w/ HHA, UB ADLs with Mod A, and LB ADLs with Max A  Pt demonstrates occupational deficits which are a result of the following limitations/impairments: cognition, balance, endurance/activity tolerance, postural/trunk control, strength, pain, and safety awareness  From an OT standpoint, recommend discharge to post-acute rehab once medically stable  The patient's raw score on the -PAC Daily Activity inpatient short form is 14, standardized score is 33 39, less than 39 4  Patients at this level are likely to benefit from discharge to post-acute rehabilitation services  Please refer to the recommendation of the Occupational Therapist for safe discharge planning  Pt would benefit from skilled OT services 3-5/wk to address immediate acute care needs and underlying performance skills to promote safety, decrease fall risk, and enhance occupational performance to return to PLOF  Goals to be met within the next 10-14 days          OT Discharge Recommendation: Post acute rehabilitation services

## 2022-07-29 NOTE — PHYSICAL THERAPY NOTE
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 [facility])  Active Problems:    Essential hypertension    Mixed hyperlipidemia    History of gout    Thrombocytopenia (HCC)    Hypothyroidism    Atrial fibrillation with rapid ventricular response [facility])       Past Medical History  Past Medical History:   Diagnosis Date    Brain cancer ([address] )     Hyperlipidemia     Hypertension     Lung cancer [facility])         Past Surgical History  Past Surgical History:   Procedure Laterality Date    BACK SURGERY      CRANIOTOMY Left 4/7/2022    Procedure: Image guided left parietal craniotomy for tumor resection;  Surgeon: Leandro Galvez MD;  [facility];  Service: Neurosurgery    CRANIOTOMY Left 7/28/2022    Procedure: left retrosigmoid/posterior fossa craniotomy for resction of mass with image guidence;  Surgeon: Leandro Galvez MD;  [facility];  Service: Neurosurgery    HEMORRHOID SURGERY      IR BIOPSY LUNG  5/6/2021    IR PORT PLACEMENT  6/17/2021    LAMINECTOMY  2018    L4-L5    LUNG SURGERY      left upper lobectomy    AL BRONCHOSCOPY,DIAGNOSTIC N/A 5/25/2021    Procedure: BRONCHOSCOPY FLEXIBLE;  Surgeon: Karol Vergara MD;  [facility];  Service: Thoracic    AL MEDIASTINOSCOPY WITH LYMPH NODE BIOPSY/IES N/A 5/25/2021    Procedure: MEDIASTINOSCOPY, flexible bronchoscopy;  Surgeon: Karol Vergara MD;  [facility];  Service: Thoracic    TONSILLECTOMY  1959 07/29/22 0850   PT Last Visit   PT Visit Date 07/29/22   Note Type   Note type Evaluation   Pain Assessment   Pain Assessment Tool 0-10   Pain Score 8   Pain Location/Orientation Location: Head   Pain Onset/Description Onset: Ongoing;Frequency: Constant/Continuous   Hospital Pain Intervention(s) Medication (See MAR); Repositioned; Rest Restrictions/Precautions   Weight Bearing Precautions Per Order No   Other Precautions Cognitive; Chair Alarm; Bed Alarm;Multiple lines;Telemetry;Pain;Visual impairment  (a line)   Home Living   Type of Home House   Home Layout Two level;Bed/bath upstairs  (1 flight inside)   Bathroom Shower/Tub Tub/shower unit   Bathroom Toilet Standard   Bathroom Equipment   (none)   Home Equipment   (none)   Additional Comments Pt reports living with wife in [place] with 1 flight up inside  Pt states there are 'few' SHANTA but CM note states 0 SHANTA  Pt has no AD/DME   Prior Function   Level of [place] Needs assistance with IADLs   Lives With Spouse   Receives Help From Family   ADL Assistance Needs assistance   IADLs Needs assistance   Falls in the last 6 months 0   Vocational Retired   Comments Pt requires assistance with ADL/IADLs  Pt ambulates independently and is retired  (-)    General   Family/Caregiver Present No   Cognition   Overall Cognitive Status WFL   Arousal/Participation Cooperative   Orientation Level Oriented to person;Oriented to place; Disoriented to time;Disoriented to situation   Memory Within functional limits   Following Commands Follows one step commands without difficulty   Subjective   Subjective Pt was supine in bed upon PT arrival   Pt reports 7 5/10 headache and feeling dizzy t/o the session  Pt also states he has blurry vision     RLE Assessment   RLE Assessment X   Strength RLE   R Hip Flexion 3+/5   R Hip ABduction 4/5   R Knee Extension 4/5   R Ankle Dorsiflexion 4/5   R Ankle Plantar Flexion 4/5   LLE Assessment   LLE Assessment X   Strength LLE   L Hip Flexion 3+/5   L Hip ABduction 4/5   L Knee Extension 4/5   L Ankle Dorsiflexion 4/5   L Ankle Plantar Flexion 4/5   Vision-Basic Assessment   Patient Visual Report Nausea/blurring vision with head movement;Blurring of vision when changing focal distance   Coordination   Heel to [place] Other (Comment):  (Pt unable to perform test d/t fatigue) Rapid Alternating Movements Intact   Light Touch   RLE Light Touch Grossly intact   LLE Light Touch Grossly intact   Bed Mobility   Supine to Sit 3  Moderate assistance   Additional items Assist x 2;HOB elevated; Increased time required;Verbal cues;LE management   Sit to Supine Unable to assess   Additional Comments Pt sat EOB w/ min-modA and demo'd posterior/R lateral lean   Transfers   Sit to Stand 3  Moderate assistance   Additional items Assist x 2; Increased time required;Verbal cues   Stand to Sit 3  Moderate assistance   Additional items Assist x 2; Increased time required;Verbal cues   Additional Comments B/L HHA and knee blocks for support; VCs for proper hand placement, forward wt shift, and upright posture   Ambulation/Elevation   Gait pattern Improper Weight shift; Foward flexed; Inconsistent wilber; Short stride; Excessively slow   Gait Assistance 2  Maximal assist   Additional items Assist x 2;Verbal cues; Tactile cues   Assistive Device Other (Comment)  (b/l HHA)   Distance 3 ft to dropped arm chair   Ambulation/Elevation Additional Comments B/L HHA for support; VC/TCs for initiation, proper step sequencing, and safety   Balance   Static Sitting Poor   Dynamic Sitting Poor -   Static Standing Poor -   Dynamic Standing Poor -   Ambulatory Poor -   Endurance Deficit   Endurance Deficit Yes   Endurance Deficit Description fatigue, headaches/dizziness   Activity Tolerance   Activity Tolerance Patient limited by fatigue   Medical Staff Made Aware OT   Nurse Made Aware RN cleared pt for therapy   Assessment   Prognosis Guarded   Problem List Decreased strength;Decreased endurance; Impaired balance;Decreased mobility; Decreased cognition;Pain; Impaired vision  (headaches, dizziness)   Assessment Pt is a 68 y/o male admitted to [facility] for increased lightheadedness then transferred to [address] [place] [address] on 7/25/2022 for after imaging revealed a significant change in size of known cerebellar met    Pt underwent L retrosigmoid/posterior fossa craniotomy for resection of mass on 7/28  Pt's Dx and personal factors are 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, thrombocytopenia, afib w/ RVR, 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, nausea, anxiety/depression, HTN, HLD, GERD, hypothyroidism, and gout  Pt lives with spouse in a [facility] with "few" SHANTA (per CM: 0 SHANTA) and 1 flight up inside  Pt's main bed/bath on 2nd floor  Pt requires assistance with ADL/IADLs but ambulates independently  Pt has no AD/DME and is retired  Pt complained of dizziness when seated EOB, BP reading (135/90mmHg)  During the examination, pt demonstrated fair strength of BLE, impaired sitting and standing balance and tolerance, decrease activity tolerance, impaired endurance, impaired vision, headaches/dizziness, gait abnormalities, cognitive deficits, and fall risk which limits pt to perform ADLs independently and increases risks for falls  Pt will benefit with skilled PT interventions while in the hospital to address the impairments stated above  Pt is recommended to rehab upon D/C  At the end of the session, pt was OOB in chair with call bell within reach and chair alarm on  Nurse was present t/o session  Barriers to Discharge Inaccessible home environment;Decreased caregiver support   Goals   STG Expiration Date 08/12/22   Short Term Goal #1 STG 1: Pt will perform supine <-> sit at [place] x1 to demonstrate improved bed mobility and decrease any risk for skin breakdown  STG 2: Pt will perform sit <-> stand at [place] x1 with least restrictive AD to demonstrate improved transfer mobility so the pt can get in/out of bed safely  STG 3: Pt will ambulate 300ft at [place] x1 with least restrictive AD to improve walking tolerance and endurance so pt can safely interact with their environment    STG 4: Pt will sit EOB at S for 20 min while performing dynamic sitting balances exercises without any LOB to improve sitting balance and tolerance  STG 5: Pt will stand at [facility] x1 with least restrictive AD for 5 min while performing dynamic standing balance exercises without any LOB to improve standing balance and tolerance  PT Treatment Day 0   Plan   Treatment/Interventions Functional transfer training;LE strengthening/ROM; Therapeutic exercise; Endurance training;Cognitive reorientation;Equipment eval/education; Bed mobility;Gait training;OT   PT Frequency 3-5x/wk   Recommendation   PT Discharge Recommendation Post acute rehabilitation services   AM-PAC Basic Mobility Inpatient   Turning in Bed Without Bedrails 2   Lying on Back to Sitting on Edge of Flat Bed 1   Moving Bed to Chair 1   Standing Up From Chair 1   Walk in Room 1   Climb 3-5 Stairs 1   Basic Mobility Inpatient Raw Score 7   Turning Head Towards Sound 4   Follow Simple Instructions 4   Low Function Basic Mobility Raw Score 15   Low Function Basic Mobility Standardized Score 23 9   Highest Level Of Mobility   JH-HLM Goal 2: Bed activities/Dependent transfer   JH-HLM Achieved 4: Move to chair/commode     Servando, SPT

## 2022-07-29 NOTE — ASSESSMENT & PLAN NOTE
· Continue to monitor at this time  · Peripheral smear and additional labs work completed this morning per oncology recommendations  · Appreciate ongoing oncology evaluation and management  · Plan to maintain platelets >160,571 in the acute post operative setting  · Transfuse as needed

## 2022-07-29 NOTE — PLAN OF CARE
Goal: Maintain or return to baseline ADL function  Description: INTERVENTIONS:  -  Assess patient's ability to carry out ADLs; assess patient's baseline for ADL function and identify physical deficits which impact ability to perform ADLs (bathing, care of mouth/teeth, toileting, grooming, dressing, etc )  - Assess/evaluate cause of self-care deficits   - Assess range of motion  - Assess patient's mobility; develop plan if impaired  - Assess patient's need for assistive devices and provide as appropriate  - Encourage maximum independence but intervene and supervise when necessary  - Involve family in performance of ADLs  - Assess for home care needs following discharge   - Consider OT consult to assist with ADL evaluation and planning for discharge  - Provide patient education as appropriate  Outcome: Progressing    Problem: NEUROSENSORY - ADULT  Goal: Achieves stable or improved neurological status  Description: INTERVENTIONS  - Monitor and report changes in neurological status  - Monitor vital signs such as temperature, blood pressure, glucose, and any other labs ordered   - Initiate measures to prevent increased intracranial pressure  - Monitor for seizure activity and implement precautions if appropriate      Outcome: Progressing  Goal: Remains free of injury related to seizures activity  Description: INTERVENTIONS  - Maintain airway, patient safety  and administer oxygen as ordered  - Monitor patient for seizure activity, document and report duration and description of seizure to physician/advanced practitioner  - If seizure occurs,  ensure patient safety during seizure  - Reorient patient post seizure  - Seizure pads on all 4 side rails  - Instruct patient/family to notify RN of any seizure activity including if an aura is experienced  - Instruct patient/family to call for assistance with activity based on nursing assessment  - Administer anti-seizure medications if ordered    Outcome: Progressing  Goal: Achieves maximal functionality and self care  Description: INTERVENTIONS  - Monitor swallowing and airway patency with patient fatigue and changes in neurological status  - Encourage and assist patient to increase activity and self care     - Encourage visually impaired, hearing impaired and aphasic patients to use assistive/communication devices  Outcome: Progressing

## 2022-07-29 NOTE — OCCUPATIONAL THERAPY NOTE
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 [facility])  Active Problems:    Essential hypertension    Mixed hyperlipidemia    History of gout    Thrombocytopenia (HCC)    Hypothyroidism    Atrial fibrillation with rapid ventricular response [facility])    Past Medical History  Past Medical History:   Diagnosis Date    Brain cancer ([address] )     Hyperlipidemia     Hypertension     Lung cancer [facility])      Past Surgical History  Past Surgical History:   Procedure Laterality Date    BACK SURGERY      CRANIOTOMY Left 4/7/2022    Procedure: Image guided left parietal craniotomy for tumor resection;  Surgeon: Kelly Campbell MD;  [facility];  Service: Neurosurgery    CRANIOTOMY Left 7/28/2022    Procedure: left retrosigmoid/posterior fossa craniotomy for resction of mass with image guidence;  Surgeon: Kelly Campbell MD;  [facility];  Service: Neurosurgery    [address] [place] [address] IR BIOPSY LUNG  5/6/2021    IR PORT PLACEMENT  6/17/2021    LAMINECTOMY  2018    L4-L5    LUNG SURGERY      left upper lobectomy    VT BRONCHOSCOPY,DIAGNOSTIC N/A 5/25/2021    Procedure: BRONCHOSCOPY FLEXIBLE;  Surgeon: Arnie Arthur MD;  [facility];  Service: Thoracic    VT MEDIASTINOSCOPY WITH LYMPH NODE BIOPSY/IES N/A 5/25/2021    Procedure: MEDIASTINOSCOPY, flexible bronchoscopy;  Surgeon: Arnie Arthur MD;  [facility];  Service: Thoracic   [facility]         07/29/22 0820   OT Last Visit   OT Visit Date 07/29/22   Note Type   Note type Evaluation   Restrictions/Precautions   Weight Bearing Precautions Per Order No   Other Precautions Cognitive; Chair Alarm; Bed Alarm;Multiple lines;Telemetry; Fall Risk;Pain  (A-line)   Pain Assessment   Pain Assessment Tool 0-10   Pain Score 8   Pain Location/Orientation Location: Head   Effect of Pain on Daily Activities Imapcts ability to engage in valued occupations   Hospital Pain Intervention(s) Repositioned; Ambulation/increased activity; Emotional support   Home Living   Type of Home House  (2 SH)   Home Layout Two level;Bed/bath upstairs   Bathroom Shower/Tub Tub/shower unit   Bathroom Toilet Standard   Bathroom Equipment Other (Comment)  (no DME)   [address] Other (Comment)  (no DME)   Additional Comments PT reports living in 2 SH with wife; reports few SHANTA however per CM, 0 SHANTA; no DME PTA   Prior Function   Level of Arlee Needs assistance with ADLs and functional mobility; Needs assistance with IADLs   Lives With Spouse   Receives Help From Family   ADL Assistance Needs assistance   IADLs Needs assistance   Falls in the last 6 months 0   Vocational Retired   Comments (-) driving; no DME PTA   Lifestyle   Autonomy PTA, pt reports requiring assistance with ADLs/IADLs and reports using no DME for functional mobility   Reciprocal Relationships Lives with his wife   Service to Others Retired, reports previously working as    Intrinsic Gratification Enjoys playing [employer]   Psychosocial   Psychosocial (WDL) X   Patient Behaviors/Mood Flat affect   Subjective   Subjective "I am dizzy "   ADL   Where Assessed Edge of bed   Eating Assistance 4  Minimal Assistance   Grooming Assistance 3  Moderate Assistance   UB Bathing Assistance 3  Moderate Assistance   LB Bathing Assistance 2  Maximal Assistance   UB Dressing Assistance 3  Moderate Assistance   LB Dressing Assistance 2  Maximal [address]  2  Maximal Assistance   Functional Assistance 2  Maximal Assistance   Bed Mobility   Supine to Sit 3  Moderate assistance   Additional items Assist x 2;HOB elevated; Increased time required;Verbal cues;LE management   Additional Comments Mod Ax2 for UB postural support and LE management; pt sat EOB and presented w/ heavy right lateral lean; @ end of session, pt left sitting upright in recliner with all functional needs in reach   Transfers   Sit to Stand 3  Moderate assistance   Additional items Assist x 2; Increased time required;Verbal cues   Stand to Sit 3  Moderate assistance   Additional items Assist x 2; Increased time required;Verbal cues   Additional Comments Mod A x2 with b/l HHA for safety/support with verbal cues for safety, proper technique, and LE sequencing/management   Functional Mobility   Functional Mobility 2  Maximal assistance   Additional Comments Max Ax2 w/ b/l HHA for safety/support   Balance   Static Sitting Poor   Dynamic Sitting Poor -   Static Standing Poor -   Dynamic Standing Poor -   Ambulatory Poor -   Activity Tolerance   Activity Tolerance Patient limited by fatigue;Patient limited by pain   Medical Staff Made Aware PT Loral Needs, PT student Tobi   Nurse Made Aware RN cleared for OT eval   RUE Assessment   RUE Assessment WFL  (Did not formally assess 2* pain, lethargy, A-line; appeared WFL during functional transfers/activity)   LUE Assessment   LUE Assessment WFL   Hand Function   Gross Motor Coordination Functional   Fine Motor Coordination Functional   Sensation   Light Touch No apparent deficits   Proprioception   Proprioception No apparent deficits   Vision-Basic Assessment   Patient Visual Report Nausea/blurring vision with head movement   Vision - Complex Assessment   Tracking Decreased smoothness of vertical tracking;Decreased smoothness of horizontal tracking;Decreased smoothness of eye movement to R superior field;Decreased smoothness of eye movement to L superior field;Decreased smoothness of eye movement to R inferior field;Decreased smoothness of eye movement to L inferior field   Saccades Decreased speed of saccadic movement;Decreased speed of pursuit between targets   Additional Comments WIll continue to assess; reporting blurry vision   Perception   Inattention/Neglect Appears intact   Cognition   Overall Cognitive Status Impaired   Arousal/Participation Alert;Arousable;Responsive; Cooperative;Lethargic   Attention Attends with cues to redirect   Orientation Level Oriented to person;Oriented to place; Disoriented to time;Disoriented to situation   Memory Decreased recall of precautions;Decreased recall of recent events   Following Commands Follows one step commands with increased time or repetition   Comments A/O x2; pt presents with flat affect and increased lethagry, requiring verbal cues for safety, redirection; decreased command following noted @ times, will continue to assess as able and appropriate   Assessment   Limitation Decreased ADL status; Decreased UE ROM; Decreased UE strength;Decreased Safe judgement during ADL;Decreased cognition;Decreased endurance;Visual deficit; Decreased self-care trans;Decreased high-level ADLs   Prognosis Fair   Goals   Patient Goals To go home   LTG Time Frame 10-14   Long Term Goal #1 See OT goals listed below   Plan   Treatment Interventions ADL retraining;Visual perceptual retraining;Functional transfer training;UE strengthening/ROM; Endurance training;Cognitive reorientation;Patient/family training;Equipment evaluation/education; Compensatory technique education;Continued evaluation; Energy conservation; Activityengagement   Goal Expiration Date 08/12/22   OT Frequency 3-5x/wk   Assessment   Assessment Pt is a 70 yo male who originally presented to [facility] with lightheadedness and was found to be in rapid A-fib with RVR  CTH showed increased cerebellar mass with edema and mass effect on the 4th ventricle  PT w/ history of 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 s/p lung resection in November 2021  [facility] intervention and 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  Pt s/p " left retrosigmoid/posterior fossa craniotomy for resction of mass with image guidence (Left)" on 7/28   Pt  has a past medical history of Brain cancer ([facility] [address] ), Hyperlipidemia, Hypertension, and Lung cancer ([facility] [address] )  Pt with active OT orders and OT consulted to assess pt's functional status and occupational performance to determine safe d/c needs  Pt seen for co-eval with PT to increase safety, decrease fall risk, and maximize functional/occupational performance  Pt lives with his wife in a 2 SH with 0 SHANTA per CM  PTA, pt required assistance w/ ADLs/iADLs and reports using no DME for functional mobility  (-) driving  Currently, pt performing bed mobility with Mod A x2 for UB postural support/LE management, STS with Mod A x2 with b/l HHA, functional mobility with Max Ax2 w/ HHA, UB ADLs with Mod A, and LB ADLs with Max A  Pt demonstrates occupational deficits which are a result of the following limitations/impairments: cognition, balance, endurance/activity tolerance, postural/trunk control, strength, pain, and safety awareness  From an OT standpoint, recommend discharge to post-acute rehab once medically stable  The patient's raw score on the AM-PAC Daily Activity inpatient short form is 14, standardized score is 33 39, less than 39 4  Patients at this level are likely to benefit from discharge to post-acute rehabilitation services  Please refer to the recommendation of the Occupational Therapist for safe discharge planning  Pt would benefit from skilled OT services 3-5/wk to address immediate acute care needs and underlying performance skills to promote safety, decrease fall risk, and enhance occupational performance to return to PLOF  Goals to be met within the next 10-14 days     Recommendation   OT Discharge Recommendation Post acute rehabilitation services   AM-PAC Daily Activity Inpatient   Lower Body Dressing 2   Bathing 2   Toileting 2   Upper Body Dressing 2   Grooming 3   Eating 3   Daily Activity Raw Score 14   Daily Activity Standardized Score (Calc for Raw Score >=11) 33 39   AM-PAC Applied Cognition Inpatient   Following a Speech/Presentation 1   Understanding Ordinary Conversation 2   Taking Medications 2   Remembering Where Things Are Placed or Put Away 2   Remembering List of 4-5 Errands 2   Taking Care of Complicated Tasks 2   Applied Cognition Raw Score 11   Applied Cognition Standardized Score 27 03      OT GOALS:    Pt will improve functional mobility during ADL/IADL/leisure tasks with Mod I using AE/DME prn  Pt will improve activity tolerance/functional endurance during ADL/IADL/leisure tasks for at least 20 minutes to improve occupational performance and engagement in valued occupations using AE/DME prn  Pt will be attentive 100% of the time during ongoing visual screens to rule out any further visual impairments or changes  Pt will engage in ongoing functional/formal cognitive assessments to assist with safe d/c planning and increase safety during functional tasks  Pt will be A/Ox4 100% of the time and follow at least 2 step commands during functional activities with no verbal cues to increase attention, safety, and engagement in ADL/IADL/leisure tasks  Pt will participate in simulated IADL management task w/ Mod I to increase independence and engagement in valued occupations w/ good balance/safety  Pt will improve static/dynamic sitting balance for at least 15 minutes with Mod I during functional tasks to improve independence and engagement in ADL/IADL/leisure activities  Pt will improve dynamic standing balance for at least 15 minutes with Mod I during functional tasks to improve independence and engagement in ADL/IADL/leisure activities  Pt will complete functional transfers on and off all surfaces used in daily routines with Mod I for safety to maximize functional/occupational performance  Pt will complete all bed mobility tasks with Mod I to serve as a prerequisite for EOB/OOB ADL/IADL/leisure tasks, optimize positioning/comfort, and increase functional independence      Pt will independently demonstrate good carryover of safety precautions and pt/caregiver education/training during ADL/IADL/leisure tasks with energy conservation techniques s/p skilled instruction without verbal cues  Pt will complete UB ADL tasks with Mod I using AE/DME prn to increase functional independence in ADL/IADL/leisure tasks  Pt will complete LB ADL tasks with Mod I using AE/DME prn to increase functional independence in ADL/IADL/leisure tasks  Pt will engage in depression screen/leisure interest checklist w/ G participation to monitor s/s depression and identify 3 positive coping strategies to assist w/ emotional regulation and management      Balta Cifuentes, MS, OTR/L

## 2022-07-29 NOTE — ASSESSMENT & PLAN NOTE
 POD 1 from L retrosigmoid/posterior fossa craniotomy for resection of mass with Dr Giancarlo Anglin (7/28/22)   Patient presented with lightheadedness, tinnitus and visual complaints   S/p L parietal craniotomy for  Resection of tumor on 4/7/2022 with Dr Tanya Gustafson Pathology from prior surgery- neuroendocrine tumor   Patient completed WBRT around 5/31/22    Imaging:    MRI brain 7/28/2022:  Postoperative changes status post resection of left cerebellar metastasis with expected postoperative blood products  Small region of marginal perioperative ischemia  Mildly increased edema  Mildly increased 4th ventricular mass effect  No hydrocephalus  Small amount of enhancement at the operative margin  Plan:   · ongoing frequent neurological checks  · Recommend STAT CT head for decline in GCS >2 points in 1 hour  · MRI brain completed post operatively   · CT CAP completed pre-operatively- Follow up recs from med onc  · Defer keppra given infratentorial location  · Decadron 4mg Q6 hours with taper entered  · DVT ppx: SCD's only at this time  · Cleared to start HSQ at this time from neurosurgery standpoint  Stop if platelets decline  · Hold all AC/AP medication at this time  · Patient currently in a-fib with RVR- continue medication titration for rate control  · PT/OT evaluation  · Ongoing medical management and pain control per primary team    · Continue to monitor hyponatremia  Na 130 this morning  · Platelet >978 post operatively  138 this morning  Transfuse as necessary  · Continue symptom management  · CM following for dispo planning  · Neurosurgery will continue to follow  Call with questions or concerns

## 2022-07-29 NOTE — PROGRESS NOTES
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 [facility])  Assessment & Plan   POD 1 from L retrosigmoid/posterior fossa craniotomy for resection of mass with Dr Giancarlo Anglin (7/28/22)   Patient presented with lightheadedness, tinnitus and visual complaints   S/p L parietal craniotomy for  Resection of tumor on 4/7/2022 with Dr Tanya Gustafson Pathology from prior surgery- neuroendocrine tumor   Patient completed WBRT around 5/31/22    Imaging:    MRI brain 7/28/2022:  Postoperative changes status post resection of left cerebellar metastasis with expected postoperative blood products  Small region of marginal perioperative ischemia  Mildly increased edema  Mildly increased 4th ventricular mass effect  No hydrocephalus  Small amount of enhancement at the operative margin  Plan:   · ongoing frequent neurological checks  · Recommend STAT CT head for decline in GCS >2 points in 1 hour  · MRI brain completed post operatively   · CT CAP completed pre-operatively- Follow up recs from med onc  · Defer keppra given infratentorial location  · Decadron 4mg Q6 hours with taper entered  · DVT ppx: SCD's only at this time  · Cleared to start HSQ at this time from neurosurgery standpoint  Stop if platelets decline  · Hold all AC/AP medication at this time  · Patient currently in a-fib with RVR- continue medication titration for rate control  · PT/OT evaluation  · Ongoing medical management and pain control per primary team    · Continue to monitor hyponatremia  Na 130 this morning  · Platelet >946 post operatively  138 this morning  Transfuse as necessary  · Continue symptom management  · CM following for dispo planning     · Neurosurgery will continue to follow  Call with questions or concerns  Thrombocytopenia ([facility] [address] )  Assessment & Plan  · Continue to monitor at this time  · Peripheral smear and additional labs work completed this morning per oncology recommendations  · Appreciate ongoing oncology evaluation and management  · Plan to maintain platelets >290,591 in the acute post operative setting  · Transfuse as needed  Atrial fibrillation with rapid ventricular response (HCC)  Assessment & Plan  · A-fib with RVR on admission   · Previously noted in Outpatient PCP note   · Transitioned to oral BB with improved rate control but still with HR >100   · Continue rate control at this time  Subjective/Objective   Chief Complaint: Terrible     Subjective: Patient states he still feels terrible this morning  He is complaining dizziness, lightheadedness, nausea and headache  He describes the headache as being generalized in everywhere  Was given some Zofran around 5:00 a m  And just received another dose Reglan and now for the nausea  Does refer some dizziness insulin blurry vision worse when patient was standing to ambulate in to bedside chair with therapy  Objective: laying in bed in NAD    I/O       07/27 0701 07/28 0700 07/28 0701 07/29 0700 07/29 0701 07/30 0700    P  O   120     I V  (mL/kg) 956 3 (10 7) 1771 3 (19 4) 600 (6 6)    Blood 263 3 200     IV Piggyback  50     Total Intake(mL/kg) 1219 6 (13 6) 2141 3 (23 5) 600 (6 6)    Urine (mL/kg/hr) 1200 (0 6) 3775 (1 7) 300 (1 3)    Total Output 1200 3775 300    Net +19 6 -1633 8 +300                 Invasive Devices  Report    Central Venous Catheter Line  Duration           Port A Cath 06/17/21 Right Chest 406 days          Peripheral Intravenous Line  Duration           Peripheral IV 07/28/22 Right;Proximal Forearm 1 day    Peripheral IV 07/29/22 Left;Dorsal (posterior) Hand <1 day          Arterial Line  Duration           Arterial Line 07/28/22 Right Radial 1 day Drain  Duration           Urethral Catheter Non-latex 16 Fr  1 day                Physical Exam:  Vitals: Blood pressure 153/97, pulse 102, temperature 99 1 °F (37 3 °C), temperature source Oral, resp  rate 20, height 5' 8" (1 727 m), weight 91 3 kg (201 lb 4 5 oz), SpO2 96 %  ,Body mass index is 30 6 kg/m²  General appearance: alert, appears stated age, cooperative and no distress  Head: Normocephalic  Prior incision healing well  Dressing overlying new incision without drainage noted at this time  Eyes: PERRL  Some right sided nystagmus  Neck: supple, symmetrical, trachea midline   Back: no kyphosis present  Lungs: non labored breathing  Heart: regular heart rate  Neurologic:   Mental status: Alert, oriented x3, thought content appropriate  Cranial nerves: grossly intact (Cranial nerves II-XII)  Sensory: normal to light touch   Motor: moving all extremities without focal weakness 5/5    Lab Results:  Results from last 7 days   Lab Units 07/29/22  0535 07/28/22  1337 07/28/22  1002 07/28/22  0447 07/27/22  1100 07/25/22  0443 07/24/22  1355   WBC Thousand/uL 6 90 6 41  --  6 60   < > 6 21 6 37   HEMOGLOBIN g/dL 11 8* 11 7*  --  12 5   < > 13 9 14 7   I STAT HEMOGLOBIN g/dl  --   --  9 5*  --   --   --   --    HEMATOCRIT % 34 3* 33 6*  --  36 7   < > 42 0 42 4   HEMATOCRIT, ISTAT %  --   --  28*  --   --   --   --    PLATELETS Thousands/uL 158 204  --  89*   < > 76* 90*   NEUTROS PCT %  --   --   --  93*  --   --  89*   MONOS PCT %  --   --   --  3*  --   --  5   MONO PCT %  --  2*  --   --   --  4  --     < > = values in this interval not displayed       Results from last 7 days   Lab Units 07/29/22  0535 07/28/22  1002 07/28/22  0447 07/27/22  1824 07/27/22  1100 07/25/22  0443 07/24/22  1355   POTASSIUM mmol/L 3 6  --  3 7 4 1   < > 3 9 4 0   CHLORIDE mmol/L 96  --  101 96   < > 99 92*   CO2 mmol/L 27  --  28 27   < > 23 23   CO2, I-STAT mmol/L  --  30  --   --   --   --   --    BUN mg/dL 19  --  28* 32*   < > 20 22   CREATININE mg/dL 0 61  --  0 67 0 99   < > 0 73 1 06   CALCIUM mg/dL 8 6  --  7 6* 8 6   < > 8 7 8 6   ALK PHOS U/L  --   --   --   --   --  53 58   ALT U/L  --   --   --   --   --  55 66   AST U/L  --   --   --   --   --  23 19   GLUCOSE, ISTAT mg/dl  --  138  --   --   --   --   --     < > = values in this interval not displayed  Results from last 7 days   Lab Units 07/25/22  0443   MAGNESIUM mg/dL 2 4         Results from last 7 days   Lab Units 07/29/22  0535   INR  0 99   PTT seconds 26     No results found for: TROPONINT  ABG:No results found for: PHART, EIE1FLY, PO2ART, PNN4DAC, C1SNKUNP, BEART, SOURCE    Imaging Studies: I have personally reviewed pertinent reports  and I have personally reviewed pertinent films in PACS  MRI brain w wo contrast    Result Date: 7/28/2022  Impression: New postoperative changes status post resection of left cerebellar metastases with expected postoperative blood products  Small region of marginal postoperative ischemia  Mildly increased edema  Mass effect on the fourth ventricle is also slightly increased    No hydrocephalus  Small amount of enhancement at the operative margin may be postoperative  Recommend follow-up  Stable postsurgical changes in the left parietal lobe without locally recurrent disease  Stable 5 mm left parietal lobe lesion adjacent to resection cavity with T1 shortening and no definite residual enhancement    The study was marked in EPIC for immediate notification  Workstation performed: [id number]     MRI brain w wo contrast    Result Date: 7/25/2022  Impression: Mixed treatment response - 4 3 cm hemorrhagic metastatic lesion in left cerebellum, increased in size since CT head 6/15/2022 but unchanged since 7/24/2022 - this is likely due to interval hemorrhage of known left cerebellar lesion    Moderate surrounding perilesional vasogenic edema in left cerebellum, posterior cerebellar vermis, and right posterior paramedian cerebellum with mild mass effect on the 4th ventricle  - 0 6 cm hemorrhagic metastatic lesion posterior to the dominant left cerebellar mass, decreased in size  - 0 5 cm left parietal lobe hemorrhagic lesion posterior to left parietal resection cavity, slightly decreased in size  - No evidence of recurrent disease in left parietal resection cavity  The study was marked in [facility] for immediate notification  Workstation performed: [id number]     CT chest abdomen pelvis w contrast    Result Date: 7/26/2022  Impression: 1  Multiple new bilateral irregular lung nodules suspicious for metastases  2   No findings of metastatic disease in the abdomen or pelvis  The study was marked in [facility] for immediate notification  Workstation performed: [id number]       EKG, Pathology, and Other Studies: I have personally reviewed pertinent reports        VTE Pharmacologic Prophylaxis: Heparin to start today    VTE Mechanical Prophylaxis: sequential compression device

## 2022-07-29 NOTE — PROGRESS NOTES
Progress Note - Critical Care   Dennise Lal 69 y o  male MRN: [id number]  Unit/Bed#: ICU 04 Encounter: [id number]    SUBJECTIVE:  Headache 7/10 around incision site  Nauseous  HPI/24HR Event:  Dennise Lal is a 69 y o  male with history of 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 s/p lung resection in November 2021, chemotherapy, and radiation, new onset AFib, hypothyroidism, hypertension, gout, hyperlipidemia who presented to the [facility] with lightheadedness  Found to be in rapid AFib with RVR  Required Cardizem drip, transitioned to PO cardizem  CTH this admission showed increased cerebellar mass (3 9 cm, prior 1 8 cm) with edema and mass effect on the 4th ventricle and new nodule in left cerebellum  MRI brain showed 4 3 cm hemorrhagic metastatic lesion in left cerebellum , 0 6 cm for adjunct metastatic lesion left cerebellum, 0 5 cm left parietal lobe hemorrhagic lesion  Underwent left retrosigmoid/posterior fossa craniotomy on 7/28  Current continue complains of pain around incision site  Denies chest pain, palpitations, dyspnea, nausea, vomiting, abdominal pain, numbness, weakness  Overnight events:  Completed MRI  ROS  Review of Systems   Constitutional: Negative for chills and fever  Respiratory: Negative for shortness of breath  Cardiovascular: Negative for chest pain and palpitations  Gastrointestinal: Positive for nausea  Negative for abdominal pain and vomiting  Musculoskeletal: Negative for back pain  Neurological: Positive for headaches  Negative for speech difficulty, weakness, light-headedness and numbness  Invasive lines and devices:   Invasive Devices  Report    Central Venous Catheter Line  Duration           Port A Cath 06/17/21 Right Chest 406 days          Peripheral Intravenous Line  Duration           Peripheral IV 07/28/22 Right;Proximal Forearm <1 day    Peripheral IV 07/29/22 Left;Dorsal (posterior) Hand <1 day Arterial Line  Duration           Arterial Line 22 Right Radial <1 day          Drain  Duration           Urethral Catheter Non-latex 16 Fr  <1 day                   Physical exam  Physical Exam  Vitals reviewed  Constitutional:       General: He is not in acute distress  Comments: Tired   HENT:      Head: Normocephalic and atraumatic  Nose: No rhinorrhea  Eyes:      General: No scleral icterus  Extraocular Movements: Extraocular movements intact  Pupils: Pupils are equal, round, and reactive to light  Cardiovascular:      Rate and Rhythm: Normal rate and regular rhythm  Pulses: Normal pulses  Heart sounds: Normal heart sounds  No murmur heard  No friction rub  No gallop  Pulmonary:      Effort: Pulmonary effort is normal  No respiratory distress  Breath sounds: Normal breath sounds  No wheezing, rhonchi or rales  Abdominal:      General: Bowel sounds are normal  There is no distension  Palpations: Abdomen is soft  Tenderness: There is no abdominal tenderness  There is no guarding  Musculoskeletal:      Right lower leg: No edema  Left lower leg: No edema  Skin:     General: Skin is warm and dry  Capillary Refill: Capillary refill takes less than 2 seconds  Coloration: Skin is not jaundiced  Neurological:      Mental Status: He is alert  Coordination: Coordination abnormal (Past pointing with finger-to-nose bilaterally)        Comments: CN 2-12 intact, bilateral biceps and triceps strength 5/5, bilateral knee flexion and extension 5/5, plantar flexion 5/5, sensation grossly intact, no dysarthria   Psychiatric:         Behavior: Behavior normal          Vitals  Temperature:   Temp (24hrs), Av 5 °F (36 4 °C), Min:95 8 °F (35 4 °C), Max:98 6 °F (37 °C)    Current: Temperature: 98 6 °F (37 °C)    Vitals:    22 0300 22 0400 22 0500 22 0600   BP: 138/78 143/75 150/80 127/80   BP Location:  Left arm Pulse: 70 74 78 76   Resp: 17 17 22 19   Temp:  98 6 °F (37 °C)     TempSrc:  Oral     SpO2: 92% 94% 95% 95%   Weight:    91 3 kg (201 lb 4 5 oz)   Height:         Arterial Line BP: 164/70  Arterial Line MAP (mmHg): 98 mmHg    Labs:   Results from last 7 days   Lab Units 07/29/22  0535 07/28/22  1337 07/28/22  1002 07/28/22  0447 07/27/22  1100 07/25/22  0443 07/24/22  1355   WBC Thousand/uL 6 90 6 41  --  6 60   < > 6 21 6 37   HEMOGLOBIN g/dL 11 8* 11 7*  --  12 5   < > 13 9 14 7   I STAT HEMOGLOBIN g/dl  --   --  9 5*  --   --   --   --    HEMATOCRIT % 34 3* 33 6*  --  36 7   < > 42 0 42 4   HEMATOCRIT, ISTAT %  --   --  28*  --   --   --   --    PLATELETS Thousands/uL 158 204  --  89*   < > 76* 90*   NEUTROS PCT %  --   --   --  93*  --   --  89*   MONOS PCT %  --   --   --  3*  --   --  5   MONO PCT %  --  2*  --   --   --  4  --     < > = values in this interval not displayed  Results from last 7 days   Lab Units 07/29/22  0535 07/28/22  1002 07/28/22  0447 07/27/22  1824 07/27/22  1100 07/25/22  0443 07/24/22  1355   SODIUM mmol/L 130*  --  134* 129*   < > 132* 131*   POTASSIUM mmol/L 3 6  --  3 7 4 1   < > 3 9 4 0   CHLORIDE mmol/L 96  --  101 96   < > 99 92*   CO2 mmol/L 27  --  28 27   < > 23 23   CO2, I-STAT mmol/L  --  30  --   --   --   --   --    BUN mg/dL 19  --  28* 32*   < > 20 22   CREATININE mg/dL 0 61  --  0 67 0 99   < > 0 73 1 06   CALCIUM mg/dL 8 6  --  7 6* 8 6   < > 8 7 8 6   ALK PHOS U/L  --   --   --   --   --  53 58   ALT U/L  --   --   --   --   --  55 66   AST U/L  --   --   --   --   --  23 19   GLUCOSE, ISTAT mg/dl  --  138  --   --   --   --   --     < > = values in this interval not displayed  Results from last 7 days   Lab Units 07/25/22  0443   MAGNESIUM mg/dL 2 4          Results from last 7 days   Lab Units 07/29/22  0535   INR  0 99   PTT seconds 26           Other Labs    Intake and Outputs:  I/O       07/27 0701 07/28 0700 07/28 0701 07/29 0700    P  O   120 I V  (mL/kg) 956 3 (10 7) 1771 3 (19 4)    Blood 263 3 200    IV Piggyback  50    Total Intake(mL/kg) 1219 6 (13 6) 2141 3 (23 5)    Urine (mL/kg/hr) 1200 (0 6) 3775 (1 7)    Total Output 1200 3775    Net +19 6 -1633 8                Imaging Studies    Assessment & Plan:   · Diagnosis:  Metastatic left lung adenocarcinoma with metastasis to brain  ? S/P lung resection, chemotherapy, and radiation  ? Pod 1 posterior fossa craniotomy  ? Post op MRI: postop blood products, small region of marginal postop ischemia, mild increased edema, slightly increased mass effect on 4th ventricle, small enhancement operative margin, stable left parietal lobe lesion  ? Plan:   § Decadron taper ending 8/9  § Neurochecks Q 1  § Complete Ancef day 2/2  § Sodium goal greater than 133  § Start salt tab 2g   § Platelet goal greater than 100  · Analgesia:    § Dilaudid 0 2 mg Q 4 p r n , oxycodone 2 5 mg q 4 p r n , oxycodone 5 mg Q 4 p r n  · Nausea:  § Scopolamine patch, Zofran 4 mg q 6 p r n , Reglan 10 mg q 6 p r n  · Anxiety/depression  § Continue home citalopram 10 mg     CV:   · Diagnosis:  Atrial fibrillation with RVR  ? No prior history of AFib  ? Presented with lightheadedness and rates into the 170s  ? Required Cardizem drip then transition to p o   ? Plan:   § Continue amiodarone 400 mg b i d , diltiazem 120 mg b i d , Toprol 25 mg b i d   § Holding anticoagulation   § Continue to monitor on tele  § Cardiology following - appreciate recommendations  · Hypertension:  § Home regimen amlodipine 10 mg, Toprol 25 mg  § Plan:  § Hold amlodipine  § Continue Toprol  · Hyperlipidemia:  § Home regimen:  rosuvastatin 10 mg  § Atorvastatin 20 mg while inpatient     Pulm:  · Diagnosis:  Metastatic left lung adenocarcinoma  § S/P left upper lobe resection, radiation, chemotherapy  § CT CAP:  Multiple new bilateral irregular lung nodules suspicious for metastases, no metastatic disease in abdomen or pelvis  ?  Oncology following - appreciate recommendations     GI:   · Diagnosis:  GERD  ? Continue home Protonix 40 mg p o         :   · Diagnosis:  No acute issues        F/E/N:   · Fluids:  discontinue NS 75  · Electrolytes:  Trend and replete as needed  · Nutrition:  Regular diet with 2 L fluid restriction        Heme/Onc:   · Diagnosis: Thrombocytopenia  § Baseline 90s to 100  § Continue to monitor  · DVT prophylaxis:  Holding anticoagulation, SCDs        Endo:   · Diagnosis:  Hypothyroidism  § Continue home levothyroxine 37 5 mcg        ID:   · Diagnosis:  No acute issues        MSK/Skin:   · Diagnosis:  Gout  ? Continue home allopurinol 100 mcg  · PT/OT when appropriate  · Turning/repositioning     LDA:  · Right chest port present on admission  · Peripheral left AC day 3  · Peripheral right day 2  · Right arterial line day 2  · Corea catheter day 2    Disposition: continue critical care        Non-Invasive/Invasive Ventilation Settings:  Respiratory  Report   Lab Data (Last 4 hours)    None         O2/Vent Data (Last 4 hours)    None              No results found for: PHART, MKN2YSH, PO2ART, MNS5APK, G0NEWKOV, BEART, SOURCE  SpO2: SpO2: 95 %    Imaging:   MRI brain w wo contrast   Final Result by Jose D Green MD (07/28 2245)      New postoperative changes status post resection of left cerebellar metastases with expected postoperative blood products  Small region of marginal postoperative ischemia  Mildly increased edema  Mass effect on the fourth ventricle is also slightly    increased    No hydrocephalus  Small amount of enhancement at the operative margin may be postoperative  Recommend follow-up  Stable postsurgical changes in the left parietal lobe without locally recurrent disease  Stable 5 mm left parietal lobe lesion adjacent to resection cavity with T1 shortening and no definite residual enhancement         The study was marked in EPIC for immediate notification        Workstation performed: [id number]         CT chest abdomen pelvis w contrast   Final Result by Mihir Cr MD (07/26 9544)      1  Multiple new bilateral irregular lung nodules suspicious for metastases  2   No findings of metastatic disease in the abdomen or pelvis  The study was marked in [facility] for immediate notification  Workstation performed: [id number]         MRI brain w wo contrast   Final Result by Fletcher Pierce MD (07/25 8685)      Mixed treatment response   - 4 3 cm hemorrhagic metastatic lesion in left cerebellum, increased in size since CT head 6/15/2022 but unchanged since 7/24/2022 - this is likely due to interval hemorrhage of known left cerebellar lesion  Moderate surrounding perilesional vasogenic    edema in left cerebellum, posterior cerebellar vermis, and right posterior paramedian cerebellum with mild mass effect on the 4th ventricle    - 0 6 cm hemorrhagic metastatic lesion posterior to the dominant left cerebellar mass, decreased in size    - 0 5 cm left parietal lobe hemorrhagic lesion posterior to left parietal resection cavity, slightly decreased in size    - No evidence of recurrent disease in left parietal resection cavity  The study was marked in [facility] for immediate notification  Workstation performed: [id number]           I have personally reviewed pertinent reports  Micro:  No results found for: Morris Zaragoza, SPUTUMCULTUR      Allergies:    Allergies   Allergen Reactions    Bee Venom     Benazepril Other (See Comments)     Angioedema     Latex Other (See Comments)     Burning of eyes at the dentist from the gloves    Meloxicam GI Intolerance    Penicillin G Rash         Medications:   Scheduled Meds:  Current Facility-Administered Medications   Medication Dose Route Frequency Provider Last Rate    acetaminophen  975 mg Oral Q8H [address] Mulu Ferrari PA-C      allopurinol  100 mg Oral Daily Mulu Ferrari PA-C      aluminum-magnesium hydroxide-simethicone  30 mL Oral Q6H PRN Donivan JOSE EDUARDO Dillard      amiodarone  400 mg Oral BID With Meals Mulu Ferrari PA-C      vitamin C  1,000 mg Oral Daily Mulu Ferrari PA-C      atorvastatin  20 mg Oral Daily With [employer], PA-C      bisacodyl  10 mg Rectal Daily PRN Gloriayang Ferrari PA-C      cefazolin  2,000 mg Intravenous Q8H Mulu Ferrari PA-C 2,000 mg (07/29/22 0504)    citalopram  10 mg Oral BID Mulu Ferrari PA-C      dexamethasone  4 mg Oral Q6H [facility] Mulu Ferrari PA-C      Followed by   Shelly Herrera ON 7/30/2022] dexamethasone  4 mg Oral Q8H [facility] Mulu Ferrari PA-C      Followed by   Shelly Herrera ON 8/1/2022] dexamethasone  2 mg Oral Q6H [facility] Mulu Ferrari PA-C      Followed by   Shelly Herrera ON 8/4/2022] dexamethasone  2 mg Oral Q8H [facility] Mulu Ferrari PA-C      Followed by   Shelly Herrera ON 8/5/2022] dexamethasone  2 mg Oral Q12H [facility] Mulu Ferrari PA-C      Followed by   Shelly Herrera ON 8/8/2022] dexamethasone  2 mg Oral Q24H [facility] Mulu Ferrari PA-C      diltiazem  120 mg Oral Q12H [facility] Mulu Ferrari PA-C      docusate sodium  100 mg Oral BID Gloriayang Ferrari PA-C      HYDROmorphone  0 2 mg Intravenous Q4H PRN Donivan JOSE EDUARDO Dillard      levothyroxine  37 5 mcg Oral Early Morning Mulu Ferrari PA-C      meclizine  25 mg Oral Q8H PRN Gloriayang Ferrari PA-C      methocarbamol  500 mg Oral Q6H [facility] Mulu Ferrari PA-C      metoprolol succinate  25 mg Oral BID Mulu Ferrari PA-C      ondansetron  4 mg Intravenous Q6H PRN Donivan JOSE EDUARDO Dillard      oxyCODONE  2 5 mg Oral Q4H PRN Donivan JOSE EDUARDO Dillard      oxyCODONE  5 mg Oral Q4H PRN Gloria Ferrari PA-C      pantoprazole  40 mg Oral Early Morning Mulu Ferrari PA-C      polyethylene glycol  17 g Oral BID Gloria Ferrari PA-C      scopolamine  1 patch Transdermal Q72H Mulu Ferrari PA-C      senna  1 tablet Oral Daily Mulu Ferrari PA-C      sodium chloride  75 mL/hr Intravenous Continuous Mulu Ferrari PA-C 75 mL/hr (07/28/22 2036)    temazepam  7 5 mg Oral HS PRN Mulu Ferrari PA-C       Continuous Infusions:sodium chloride, 75 mL/hr, Last Rate: 75 mL/hr (07/28/22 2036)      PRN Meds:  aluminum-magnesium hydroxide-simethicone, 30 mL, Q6H PRN  bisacodyl, 10 mg, Daily PRN  HYDROmorphone, 0 2 mg, Q4H PRN  meclizine, 25 mg, Q8H PRN  ondansetron, 4 mg, Q6H PRN  oxyCODONE, 2 5 mg, Q4H PRN  oxyCODONE, 5 mg, Q4H PRN  temazepam, 7 5 mg, HS PRN        Counseling / Coordination of Care  Time spent:  25 minutes     Code Status: Level 1 - Full Code     Portions of the record may have been created with voice recognition software  Occasional wrong word or "sound a like" substitutions may have occurred due to the inherent limitations of voice recognition software  Read the chart carefully and recognize, using context, where substitutions have occurred       Hemalatha Willett DO  Internal Medicine Resident, PGY2  6:39 AM

## 2022-07-29 NOTE — PLAN OF CARE
Problem: PHYSICAL THERAPY ADULT  Goal: Performs mobility at highest level of function for planned discharge setting  See evaluation for individualized goals  Description: Treatment/Interventions: Functional transfer training, LE strengthening/ROM, Therapeutic exercise, Endurance training, Cognitive reorientation, Equipment eval/education, Bed mobility, Gait training, OT          See flowsheet documentation for full assessment, interventions and recommendations  Note: Prognosis: Guarded  Problem List: Decreased strength, Decreased endurance, Impaired balance, Decreased mobility, Decreased cognition, Pain, Impaired vision (headaches, dizziness)  Assessment: Pt is a 70 y/o male admitted to [facility] for increased lightheadedness then transferred to [address] [place] [address] on 7/25/2022 for after imaging revealed a significant change in size of known cerebellar met  Pt underwent L retrosigmoid/posterior fossa craniotomy for resection of mass on 7/28  Pt's Dx and personal factors are 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, thrombocytopenia, afib w/ RVR, 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, nausea, anxiety/depression, HTN, HLD, GERD, hypothyroidism, and gout  Pt lives with spouse in a [facility] with "few" SHANTA (per CM: 0 SHANTA) and 1 flight up inside  Pt's main bed/bath on 2nd floor  Pt requires assistance with ADL/IADLs but ambulates independently  Pt has no AD/DME and is retired  Pt complained of dizziness when seated EOB, BP reading (135/90mmHg)  During the examination, pt demonstrated fair strength of BLE, impaired sitting and standing balance and tolerance, decrease activity tolerance, impaired endurance, impaired vision, headaches/dizziness, gait abnormalities, cognitive deficits, and fall risk which limits pt to perform ADLs independently and increases risks for falls  Pt will benefit with skilled PT interventions while in the hospital to address the impairments stated above  Pt is recommended to rehab upon D/C  At the end of the session, pt was OOB in chair with call bell within reach and chair alarm on  Nurse was present t/o session  Barriers to Discharge: Inaccessible home environment, Decreased caregiver support     PT Discharge Recommendation: Post acute rehabilitation services    See flowsheet documentation for full assessment

## 2022-07-29 NOTE — PLAN OF CARE
Problem: PAIN - ADULT  Goal: Verbalizes/displays adequate comfort level or baseline comfort level  Description: Interventions:  - Encourage patient to monitor pain and request assistance  - Assess pain using appropriate pain scale  - Administer analgesics based on type and severity of pain and evaluate response  - Implement non-pharmacological measures as appropriate and evaluate response  - Consider cultural and social influences on pain and pain management  - Notify physician/advanced practitioner if interventions unsuccessful or patient reports new pain  Outcome: Progressing     Problem: SAFETY ADULT  Goal: Patient will remain free of falls  Description: INTERVENTIONS:  - Educate patient/family on patient safety including physical limitations  - Instruct patient to call for assistance with activity   - Consult OT/PT to assist with strengthening/mobility   - Keep Call bell within reach  - Keep bed low and locked with side rails adjusted as appropriate  - Keep care items and personal belongings within reach  - Initiate and maintain comfort rounds  - Make Fall Risk Sign visible to staff  - Offer Toileting every 2 Hours, in advance of need  - Initiate/Maintain Bed/Chair alarm  - Obtain necessary fall risk management equipment  - Apply yellow socks and bracelet for high fall risk patients  - Consider moving patient to room near nurses station  Outcome: Progressing  Goal: Maintain or return to baseline ADL function  Description: INTERVENTIONS:  -  Assess patient's ability to carry out ADLs; assess patient's baseline for ADL function and identify physical deficits which impact ability to perform ADLs (bathing, care of mouth/teeth, toileting, grooming, dressing, etc )  - Assess/evaluate cause of self-care deficits   - Assess range of motion  - Assess patient's mobility; develop plan if impaired  - Assess patient's need for assistive devices and provide as appropriate  - Encourage maximum independence but intervene and supervise when necessary  - Involve family in performance of ADLs  - Assess for home care needs following discharge   - Consider OT consult to assist with ADL evaluation and planning for discharge  - Provide patient education as appropriate  Outcome: Progressing  Goal: Maintains/Returns to pre admission functional level  Description: INTERVENTIONS:  - Perform BMAT or MOVE assessment daily    - Set and communicate daily mobility goal to care team and patient/family/caregiver  - Collaborate with rehabilitation services on mobility goals if consulted  - Perform Range of Motion 4 times a day  - Reposition patient every 2 hours    - Dangle patient 2 times a day  - Stand patient 2 times a day  - Ambulate patient 2 times a day  - Out of bed to chair 3 times a day   - Out of bed for meals 3 times a day  - Out of bed for toileting  - Record patient progress and toleration of activity level   Outcome: Progressing     Problem: NEUROSENSORY - ADULT  Goal: Achieves stable or improved neurological status  Description: INTERVENTIONS  - Monitor and report changes in neurological status  - Monitor vital signs such as temperature, blood pressure, glucose, and any other labs ordered   - Initiate measures to prevent increased intracranial pressure  - Monitor for seizure activity and implement precautions if appropriate      Outcome: Progressing  Goal: Remains free of injury related to seizures activity  Description: INTERVENTIONS  - Maintain airway, patient safety  and administer oxygen as ordered  - Monitor patient for seizure activity, document and report duration and description of seizure to physician/advanced practitioner  - If seizure occurs,  ensure patient safety during seizure  - Reorient patient post seizure  - Seizure pads on all 4 side rails  - Instruct patient/family to notify RN of any seizure activity including if an aura is experienced  - Instruct patient/family to call for assistance with activity based on nursing assessment  - Administer anti-seizure medications if ordered    Outcome: Progressing  Goal: Achieves maximal functionality and self care  Description: INTERVENTIONS  - Monitor swallowing and airway patency with patient fatigue and changes in neurological status  - Encourage and assist patient to increase activity and self care     - Encourage visually impaired, hearing impaired and aphasic patients to use assistive/communication devices  Outcome: Progressing     Problem: CARDIOVASCULAR - ADULT  Goal: Maintains optimal cardiac output and hemodynamic stability  Description: INTERVENTIONS:  - Monitor I/O, vital signs and rhythm  - Monitor for S/S and trends of decreased cardiac output  - Administer and titrate ordered vasoactive medications to optimize hemodynamic stability  - Assess quality of pulses, skin color and temperature  - Assess for signs of decreased coronary artery perfusion  - Instruct patient to report change in severity of symptoms  Outcome: Progressing  Goal: Absence of cardiac dysrhythmias or at baseline rhythm  Description: INTERVENTIONS:  - Continuous cardiac monitoring, vital signs, obtain 12 lead EKG if ordered  - Administer antiarrhythmic and heart rate control medications as ordered  - Monitor electrolytes and administer replacement therapy as ordered  Outcome: Progressing     Problem: SKIN/TISSUE INTEGRITY - ADULT  Goal: Skin Integrity remains intact(Skin Breakdown Prevention)  Description: Assess:  -Perform Garry assessment every shift  -Clean and moisturize skin every shift  -Inspect skin when repositioning, toileting, and assisting with ADLS  -Assess under medical devices every shift  -Assess extremities for adequate circulation and sensation     Bed Management:  -Have minimal linens on bed & keep smooth, unwrinkled  -Change linens as needed when moist or perspiring  -Avoid sitting or lying in one position for more than 2 hours while in bed  -Keep HOB at 30 degrees     Toileting:  -Offer bedside commode  -Assess for incontinence every 2 hours  -Use incontinent care products after each incontinent episode     Activity:  -Mobilize patient 2 times a day  -Encourage activity and walks on unit  -Encourage or provide ROM exercises   -Turn and reposition patient every 2 Hours  -Use appropriate equipment to lift or move patient in bed  -Instruct/ Assist with weight shifting every 1 hour when out of bed in chair  -Consider limitation of chair time 4 hour intervals    Skin Care:  -Avoid use of baby powder, tape, friction and shearing, hot water or constrictive clothing  -Relieve pressure over bony prominences   -Do not massage red bony areas    Outcome: Progressing  Goal: Incision(s), wounds(s) or drain site(s) healing without S/S of infection  Description: INTERVENTIONS  - Assess and document dressing, incision, wound bed, drain sites and surrounding tissue  - Provide patient and family education  - Perform skin care/dressing changes every shift and PRN  Outcome: Progressing  Goal: Pressure injury heals and does not worsen  Description: Interventions:  - Implement low air loss mattress or specialty surface (Criteria met)  - Apply silicone foam dressing  - Instruct/assist with weight shifting every 30 minutes when in chair   - Limit chair time to 4 hour intervals  - Use special pressure reducing interventions when in chair   - Apply fecal or urinary incontinence containment device   - Perform passive or active ROM every 2 hours  - Turn and reposition patient & offload bony prominences every 2 hours   - Utilize friction reducing device or surface for transfers   - Use incontinent care products after each incontinent episode   - Consider nutrition services referral as needed  Outcome: Progressing     Problem: Potential for Falls  Goal: Patient will remain free of falls  Description: INTERVENTIONS:  - Educate patient/family on patient safety including physical limitations  - Instruct patient to call for assistance with activity   - Consult OT/PT to assist with strengthening/mobility   - Keep Call bell within reach  - Keep bed low and locked with side rails adjusted as appropriate  - Keep care items and personal belongings within reach  - Initiate and maintain comfort rounds  - Make Fall Risk Sign visible to staff  - Offer Toileting every 2 Hours, in advance of need  - Initiate/Maintain Bed/Chair alarm  - Obtain necessary fall risk management equipment  - Apply yellow socks and bracelet for high fall risk patients  - Consider moving patient to room near nurses station  Outcome: Progressing     Problem: Prexisting or High Potential for Compromised Skin Integrity  Goal: Skin integrity is maintained or improved  Description: INTERVENTIONS:  - Identify patients at risk for skin breakdown  - Assess and monitor skin integrity  - Assess and monitor nutrition and hydration status  - Monitor labs   - Assess for incontinence   - Turn and reposition patient  - Assist with mobility/ambulation  - Relieve pressure over bony prominences  - Avoid friction and shearing  - Provide appropriate hygiene as needed including keeping skin clean and dry  - Evaluate need for skin moisturizer/barrier cream  - Collaborate with interdisciplinary team   - Patient/family teaching  - Consider wound care consult   Outcome: Progressing     Problem: MOBILITY - ADULT  Goal: Maintain or return to baseline ADL function  Description: INTERVENTIONS:  -  Assess patient's ability to carry out ADLs; assess patient's baseline for ADL function and identify physical deficits which impact ability to perform ADLs (bathing, care of mouth/teeth, toileting, grooming, dressing, etc )  - Assess/evaluate cause of self-care deficits   - Assess range of motion  - Assess patient's mobility; develop plan if impaired  - Assess patient's need for assistive devices and provide as appropriate  - Encourage maximum independence but intervene and supervise when necessary  - Involve family in performance of ADLs  - Assess for home care needs following discharge   - Consider OT consult to assist with ADL evaluation and planning for discharge  - Provide patient education as appropriate  Outcome: Progressing  Goal: Maintains/Returns to pre admission functional level  Description: INTERVENTIONS:  - Perform BMAT or MOVE assessment daily    - Set and communicate daily mobility goal to care team and patient/family/caregiver  - Collaborate with rehabilitation services on mobility goals if consulted  - Perform Range of Motion 4 times a day  - Reposition patient every 2 hours    - Dangle patient 2 times a day  - Stand patient 2 times a day  - Ambulate patient 2 times a day  - Out of bed to chair 3 times a day   - Out of bed for meals 3 times a day  - Out of bed for toileting  - Record patient progress and toleration of activity level   Outcome: Progressing     Problem: RESPIRATORY - ADULT  Goal: Achieves optimal ventilation and oxygenation  Description: INTERVENTIONS:  - Assess for changes in respiratory status  - Assess for changes in mentation and behavior  - Position to facilitate oxygenation and minimize respiratory effort  - Oxygen administered by appropriate delivery if ordered  - Initiate smoking cessation education as indicated  - Encourage broncho-pulmonary hygiene including cough, deep breathe, Incentive Spirometry  - Assess the need for suctioning and aspirate as needed  - Assess and instruct to report SOB or any respiratory difficulty  - Respiratory Therapy support as indicated  Outcome: Progressing     Problem: GASTROINTESTINAL - ADULT  Goal: Minimal or absence of nausea and/or vomiting  Description: INTERVENTIONS:  - Administer IV fluids if ordered to ensure adequate hydration  - Maintain NPO status until nausea and vomiting are resolved  - Nasogastric tube if ordered  - Administer ordered antiemetic medications as needed  - Provide nonpharmacologic comfort measures as appropriate  - Advance diet as tolerated, if ordered  - Consider nutrition services referral to assist patient with adequate nutrition and appropriate food choices  Outcome: Progressing  Goal: Maintains or returns to baseline bowel function  Description: INTERVENTIONS:  - Assess bowel function  - Encourage oral fluids to ensure adequate hydration  - Administer IV fluids if ordered to ensure adequate hydration  - Administer ordered medications as needed  - Encourage mobilization and activity  - Consider nutritional services referral to assist patient with adequate nutrition and appropriate food choices  Outcome: Progressing  Goal: Maintains adequate nutritional intake  Description: INTERVENTIONS:  - Monitor percentage of each meal consumed  - Identify factors contributing to decreased intake, treat as appropriate  - Assist with meals as needed  - Monitor I&O, weight, and lab values if indicated  - Obtain nutrition services referral as needed  Outcome: Progressing     Problem: GENITOURINARY - ADULT  Goal: Maintains or returns to baseline urinary function  Description: INTERVENTIONS:  - Assess urinary function  - Encourage oral fluids to ensure adequate hydration if ordered  - Administer IV fluids as ordered to ensure adequate hydration  - Administer ordered medications as needed  - Offer frequent toileting  - Follow urinary retention protocol if ordered  Outcome: Progressing     Problem: METABOLIC, FLUID AND ELECTROLYTES - ADULT  Goal: Electrolytes maintained within normal limits  Description: INTERVENTIONS:  - Monitor labs and assess patient for signs and symptoms of electrolyte imbalances  - Administer electrolyte replacement as ordered  - Monitor response to electrolyte replacements, including repeat lab results as appropriate  - Instruct patient on fluid and nutrition as appropriate  Outcome: Progressing  Goal: Fluid balance maintained  Description: INTERVENTIONS:  - Monitor labs   - Monitor I/O and WT  - Instruct patient on fluid and nutrition as appropriate  - Assess for signs & symptoms of volume excess or deficit  Outcome: Progressing     Problem: HEMATOLOGIC - ADULT  Goal: Maintains hematologic stability  Description: INTERVENTIONS  - Assess for signs and symptoms of bleeding or hemorrhage  - Monitor labs  - Administer supportive blood products/factors as ordered and appropriate  Outcome: Progressing     Problem: MUSCULOSKELETAL - ADULT  Goal: Maintain or return mobility to safest level of function  Description: INTERVENTIONS:  - Assess patient's ability to carry out ADLs; assess patient's baseline for ADL function and identify physical deficits which impact ability to perform ADLs (bathing, care of mouth/teeth, toileting, grooming, dressing, etc )  - Assess/evaluate cause of self-care deficits   - Assess range of motion  - Assess patient's mobility  - Assess patient's need for assistive devices and provide as appropriate  - Encourage maximum independence but intervene and supervise when necessary  - Involve family in performance of ADLs  - Assess for home care needs following discharge   - Consider OT consult to assist with ADL evaluation and planning for discharge  - Provide patient education as appropriate  Outcome: Progressing     Problem: Nutrition/Hydration-ADULT  Goal: Nutrient/Hydration intake appropriate for improving, restoring or maintaining nutritional needs  Description: Monitor and assess patient's nutrition/hydration status for malnutrition  Collaborate with interdisciplinary team and initiate plan and interventions as ordered  Monitor patient's weight and dietary intake as ordered or per policy  Utilize nutrition screening tool and intervene as necessary  Determine patient's food preferences and provide high-protein, high-caloric foods as appropriate       INTERVENTIONS:  - Monitor oral intake, urinary output, labs, and treatment plans  - Assess nutrition and hydration status and recommend course of action  - Evaluate amount of meals eaten  - Assist patient with eating if necessary   - Allow adequate time for meals  - Recommend/ encourage appropriate diets, oral nutritional supplements, and vitamin/mineral supplements  - Order, calculate, and assess calorie counts as needed  - Recommend, monitor, and adjust tube feedings and TPN/PPN based on assessed needs  - Assess need for intravenous fluids  - Provide specific nutrition/hydration education as appropriate  - Include patient/family/caregiver in decisions related to nutrition  Outcome: Progressing     Problem: INFECTION - ADULT  Goal: Absence or prevention of progression during hospitalization  Description: INTERVENTIONS:  - Assess and monitor for signs and symptoms of infection  - Monitor lab/diagnostic results  - Monitor all insertion sites, i e  indwelling lines, tubes, and drains  - Monitor endotracheal if appropriate and nasal secretions for changes in amount and color  - [place] appropriate cooling/warming therapies per order  - Administer medications as ordered  - Instruct and encourage patient and family to use good hand hygiene technique  - Identify and instruct in appropriate isolation precautions for identified infection/condition  Outcome: Progressing     Problem: DISCHARGE PLANNING  Goal: Discharge to home or other facility with appropriate resources  Description: INTERVENTIONS:  - Identify barriers to discharge w/patient and caregiver  - Arrange for needed discharge resources and transportation as appropriate  - Identify discharge learning needs (meds, wound care, etc )  - Arrange for interpretive services to assist at discharge as needed  - Refer to Case Management Department for coordinating discharge planning if the patient needs post-hospital services based on physician/advanced practitioner order or complex needs related to functional status, cognitive ability, or social support system  Outcome: Progressing     Problem: Knowledge Deficit  Goal: Patient/family/caregiver demonstrates understanding of disease process, treatment plan, medications, and discharge instructions  Description: Complete learning assessment and assess knowledge base    Interventions:  - Provide teaching at level of understanding  - Provide teaching via preferred learning methods  Outcome: Progressing     Problem: COPING  Goal: Pt/Family able to verbalize concerns and demonstrate effective coping strategies  Description: INTERVENTIONS:  - Assist patient/family to identify coping skills, available support systems and cultural and spiritual values  - Provide emotional support, including active listening and acknowledgement of concerns of patient and caregivers  - Reduce environmental stimuli, as able  - Provide patient education  - Assess for spiritual pain/suffering and initiate spiritual care, including notification of Pastoral Care or casey based community as needed  - Assess effectiveness of coping strategies  Outcome: Progressing  Goal: Will report anxiety at manageable levels  Description: INTERVENTIONS:  - Administer medication as ordered  - Teach and encourage coping skills  - Provide emotional support  - Assess patient/family for anxiety and ability to cope  Outcome: Progressing

## 2022-07-29 NOTE — PROGRESS NOTES
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 [facility])  Active Problems:    Essential hypertension    Mixed hyperlipidemia    History of gout    Thrombocytopenia (HCC)    Hypothyroidism    Atrial fibrillation with rapid ventricular response (HCC)    Assessment/Plan:    1  Atrial fibrillation with RVR  ·  Patient has prior history of post-operative Afib in  following lung tissue resection for neuroendocrine tumor  Currently on cardizem  mg Q12H, metoprolol succinate increased to 50 mg BID, amiodarone  mg BID transitioned to IV with loading dose of 150 mg IV bolus  · Currently rate controlled with rates in 80s  · Continue to monitor telemetry   · Continue medication regimen as mentioned above       Subjective     Patient was seen at bedside and appears somnolent and in discomfort  Patient reports feeling fatigued and nauseated  Denies SOB, CP, palpitations           Objective     Vitals: Temp (24hrs), Av 9 °F (36 6 °C), Min:97 °F (36 1 °C), Max:99 1 °F (37 3 °C)  Current: Temperature: 99 1 °F (37 3 °C)  Patient Vitals for the past 24 hrs:   BP Temp Temp src Pulse Resp SpO2 Height Weight   22 1130 140/73 -- -- 82 15 95 % -- --   22 1000 154/91 -- -- (!) 114 20 95 % -- --   22 0900 159/92 -- -- 96 18 95 % -- --   22 0857 153/97 -- -- 102 20 96 % -- --   22 0840 135/95 -- -- 96 20 97 % -- --   22 0800 142/83 99 1 °F (37 3 °C) Oral 86 16 95 % -- --   22 0600 127/80 -- -- 76 19 95 % -- 91 3 kg (201 lb 4 5 oz)   22 0500 150/80 -- -- 78 22 95 % -- --   22 0400 143/75 98 6 °F (37 °C) Oral 74 17 94 % -- --   22 0300 138/78 -- -- 70 17 92 % -- --   22 0200 121/73 -- -- 66 14 93 % -- --   22 0100 144/76 -- -- 74 18 94 % -- --   07/29/22 0000 147/79 98 4 °F (36 9 °C) Oral 76 21 94 % -- --   07/28/22 2300 140/80 -- -- 78 14 94 % -- --   07/28/22 2021 140/83 -- -- 89 -- -- -- --   07/28/22 2000 139/87 98 °F (36 7 °C) Oral 84 14 98 % -- --   07/28/22 1900 147/82 -- -- 84 12 98 % -- --   07/28/22 1800 146/81 -- -- 82 15 96 % -- --   07/28/22 1700 137/78 -- -- 84 12 -- -- --   07/28/22 1600 128/73 97 6 °F (36 4 °C) Oral 80 12 98 % -- --   07/28/22 1545 119/74 97 6 °F (36 4 °C) -- 84 16 98 % 5' 8" (1 727 m) 91 1 kg (200 lb 13 4 oz)   07/28/22 1530 -- -- -- 82 16 99 % -- --   07/28/22 1500 131/68 (!) 97 2 °F (36 2 °C) -- 82 16 98 % -- --   07/28/22 1445 131/81 -- -- 80 15 97 % -- --   07/28/22 1430 127/77 -- -- 80 14 97 % -- --   07/28/22 1415 134/76 -- -- 80 15 98 % -- --   07/28/22 1400 133/84 (!) 97 °F (36 1 °C) -- 80 21 96 % -- --   07/28/22 1345 150/86 -- -- 82 (!) 23 98 % -- --   07/28/22 1330 150/86 97 7 °F (36 5 °C) Tympanic 77 18 98 % -- --    Body mass index is 30 6 kg/m²  Physical Exam:  Physical Exam  Constitutional:       Appearance: Normal appearance  Cardiovascular:      Rate and Rhythm: Tachycardia present  Rhythm irregular  Pulses: Normal pulses  Heart sounds: Normal heart sounds  Pulmonary:      Effort: Pulmonary effort is normal       Breath sounds: Normal breath sounds  Skin:     General: Skin is warm and dry  Neurological:      Mental Status: He is alert           Invasive Devices  Report    Central Venous Catheter Line  Duration           Port A Cath 06/17/21 Right Chest 406 days          Peripheral Intravenous Line  Duration           Peripheral IV 07/28/22 Right;Proximal Forearm 1 day    Peripheral IV 07/29/22 Left;Dorsal (posterior) Hand <1 day          Arterial Line  Duration           Arterial Line 07/28/22 Right Radial 1 day          Drain  Duration           Urethral Catheter Non-latex 16 Fr  1 day                    Labs:   Results from last 7 days   Lab Units 07/29/22  0535 07/28/22  9761 07/28/22  1002 07/28/22 0447 07/27/22  1824 07/27/22  1100 07/25/22  0443 07/24/22  1355   WBC Thousand/uL 6 90 6 41  --  6 60 7 22 7 60 6 21 6 37   HEMOGLOBIN g/dL 11 8* 11 7*  --  12 5 13 3 13 5 13 9 14 7   I STAT HEMOGLOBIN g/dl  --   --  9 5*  --   --   --   --   --    HEMATOCRIT % 34 3* 33 6*  --  36 7 39 0 39 5 42 0 42 4   HEMATOCRIT, ISTAT %  --   --  28*  --   --   --   --   --    PLATELETS Thousands/uL 158 204  --  89* 106* 69* 76* 90*   NEUTROS PCT %  --   --   --  93*  --   --   --  89*   MONOS PCT %  --   --   --  3*  --   --   --  5   MONO PCT %  --  2*  --   --   --   --  4  --       Results from last 7 days   Lab Units 07/29/22  1118 07/29/22  0535 07/28/22  1002 07/28/22 0447 07/27/22  1824 07/27/22  1100 07/25/22  0443 07/24/22  1355   SODIUM mmol/L 129* 130*  --  134* 129* 131* 132* 131*   POTASSIUM mmol/L 3 7 3 6  --  3 7 4 1 4 2 3 9 4 0   CHLORIDE mmol/L 96 96  --  101 96 97 99 92*   CO2 mmol/L 26 27  --  28 27 26 23 23   CO2, I-STAT mmol/L  --   --  30  --   --   --   --   --    BUN mg/dL 21 19  --  28* 32* 34* 20 22   CREATININE mg/dL 0 90 0 61  --  0 67 0 99 0 95 0 73 1 06   CALCIUM mg/dL 8 5 8 6  --  7 6* 8 6 9 0 8 7 8 6   ALK PHOS U/L  --   --   --   --   --   --  53 58   ALT U/L  --   --   --   --   --   --  55 66   AST U/L  --   --   --   --   --   --  23 19   GLUCOSE, ISTAT mg/dl  --   --  138  --   --   --   --   --    MAGNESIUM mg/dL  --   --   --   --   --   --  2 4  --    INR   --  0 99  --   --   --   --   --   --    PTT seconds  --  26  --   --   --   --   --   --    EGFR ml/min/1 73sq m 86 101  --  98 77 81 94 71     Results from last 7 days   Lab Units 07/29/22  0535   INR  0 99   PTT seconds 26             No results found for: PHART, WEA1EME, PO2ART, YLK9EVN, L6GYOALD, BEART, SOURCE  No components found for: HIV1X2  Lab Results   Component Value Date    HEPCAB Non-reactive 10/06/2020     No results found for: SPEP, UPEP   Lab Results   Component Value Date    HGBA1C 5 2 04/03/2022    HGBA1C 5 4 01/25/2022    HGBA1C 5 3 06/23/2021     No results found for: CHOL   Lab Results   Component Value Date    HDL 43 06/20/2022    HDL 70 04/03/2022    HDL 35 (L) 01/25/2022      Lab Results   Component Value Date    LDLCALC 93 06/20/2022    LDLCALC 96 04/03/2022    LDLCALC 97 01/25/2022      Lab Results   Component Value Date    TRIG 140 06/20/2022    TRIG 137 04/03/2022    TRIG 130 01/25/2022     No components found for: PROCAL      Micro:      Urinalysis:  Lab Results   Component Value Date    BDZUR Negative 04/03/2022    COCAINEUR Negative 04/03/2022    OPIATEUR Negative 04/03/2022    PCPUR Negative 04/03/2022    THCUR Positive (A) 04/03/2022          Invalid input(s): URIBILINOGEN        Intake and Outputs:  I/O       07/27 0701  07/28 0700 07/28 0701  07/29 0700 07/29 0701  07/30 0700    P  O   120 480    I V  (mL/kg) 956 3 (10 7) 1771 3 (19 4) 900 (9 9)    Blood 263 3 200     IV Piggyback  50     Total Intake(mL/kg) 1219 6 (13 6) 2141 3 (23 5) 1380 (15 1)    Urine (mL/kg/hr) 1200 (0 6) 3775 (1 7) 345 (0 7)    Total Output 1200 3775 345    Net +19 6 -1633 8 +1035           Unmeasured Urine Occurrence   1 x        Nutrition:  Diet Regular; Regular House  Radiology Results:   MRI brain w wo contrast   Final Result by Elo Fontaine MD (07/28 2245)      New postoperative changes status post resection of left cerebellar metastases with expected postoperative blood products  Small region of marginal postoperative ischemia  Mildly increased edema  Mass effect on the fourth ventricle is also slightly    increased    No hydrocephalus  Small amount of enhancement at the operative margin may be postoperative  Recommend follow-up  Stable postsurgical changes in the left parietal lobe without locally recurrent disease  Stable 5 mm left parietal lobe lesion adjacent to resection cavity with T1 shortening and no definite residual enhancement         The study was marked in EPIC for immediate notification  Workstation performed: [id number]         CT chest abdomen pelvis w contrast   Final Result by Regina Alves MD (07/26 7696)      1  Multiple new bilateral irregular lung nodules suspicious for metastases  2   No findings of metastatic disease in the abdomen or pelvis  The study was marked in [facility] for immediate notification  Workstation performed: [id number]         MRI brain w wo contrast   Final Result by Parvin Pearce MD (07/25 4783)      Mixed treatment response   - 4 3 cm hemorrhagic metastatic lesion in left cerebellum, increased in size since CT head 6/15/2022 but unchanged since 7/24/2022 - this is likely due to interval hemorrhage of known left cerebellar lesion  Moderate surrounding perilesional vasogenic    edema in left cerebellum, posterior cerebellar vermis, and right posterior paramedian cerebellum with mild mass effect on the 4th ventricle    - 0 6 cm hemorrhagic metastatic lesion posterior to the dominant left cerebellar mass, decreased in size    - 0 5 cm left parietal lobe hemorrhagic lesion posterior to left parietal resection cavity, slightly decreased in size    - No evidence of recurrent disease in left parietal resection cavity  The study was marked in [facility] for immediate notification                       Workstation performed: [id number]           Scheduled Medications:  acetaminophen, 975 mg, Q8H [address]  allopurinol, 100 mg, Daily  amiodarone (CORDARONE) IV bolus, 150 mg, Once  vitamin C, 1,000 mg, Daily  atorvastatin, 20 mg, Daily With Dinner  citalopram, 10 mg, BID  dexamethasone, 4 mg, Q6H [address]   Followed by  [START ON 7/30/2022] dexamethasone, 4 mg, Q8H [address]   Followed by  Dana Hudson ON 8/1/2022] dexamethasone, 2 mg, Q6H [address]   Followed by  Dana Hudson ON 8/4/2022] dexamethasone, 2 mg, Q8H [address]   Followed by  [START ON 8/5/2022] dexamethasone, 2 mg, Q12H [address]   Followed by  [START ON 8/8/2022] dexamethasone, 2 mg, Q24H [address]  diltiazem, 120 mg, Q12H [facility]  docusate sodium, 100 mg, BID  heparin (porcine), 5,000 Units, Q8H [facility]  levothyroxine, 37 5 mcg, Early Morning  methocarbamol, 500 mg, Q6H [facility]  metoprolol succinate, 50 mg, BID  pantoprazole, 40 mg, Early Morning  polyethylene glycol, 17 g, BID  scopolamine, 1 patch, Q72H  senna, 1 tablet, Daily      PRN MEDS:  aluminum-magnesium hydroxide-simethicone, 30 mL, Q6H PRN  bisacodyl, 10 mg, Daily PRN  HYDROmorphone, 0 2 mg, Q4H PRN  meclizine, 25 mg, Q8H PRN  metoclopramide, 10 mg, Q6H PRN  ondansetron, 4 mg, Q6H PRN  oxyCODONE, 2 5 mg, Q4H PRN  oxyCODONE, 5 mg, Q4H PRN  temazepam, 7 5 mg, HS PRN      Last 24 Hour Meds: :   Medication Administration - last 24 hours from 07/28/2022 1246 to 07/29/2022 1246       Date/Time Order Dose Route Action Action by     07/29/2022 0902 allopurinol (ZYLOPRIM) tablet 100 mg 100 mg Oral Given Fede Long, JUAN J     07/28/2022 1545 allopurinol (ZYLOPRIM) tablet 100 mg   Oral MAR Unhold Ana Bello, JUAN J     07/29/2022 1207 ascorbic acid (VITAMIN C) tablet 1,000 mg 1,000 mg Oral Given Fede Long, JUAN J     07/28/2022 1545 ascorbic acid (VITAMIN C) tablet 1,000 mg   Oral MAR Unhold Ana Bello, JUAN J     07/29/2022 0504 levothyroxine tablet 37 5 mcg 37 5 mcg Oral Given Nay Jennifer, RN     07/28/2022 1545 levothyroxine tablet 37 5 mcg   Oral MAR Unhold Shannon Corbin, JUAN J     07/29/2022 0504 pantoprazole (PROTONIX) EC tablet 40 mg 40 mg Oral Given Nay Rodriguez, RN     07/28/2022 1545 pantoprazole (PROTONIX) EC tablet 40 mg   Oral MAR Unhold Shannon Corbin, JUAN J     07/28/2022 1701 atorvastatin (LIPITOR) tablet 20 mg 20 mg Oral Given Ana Bello, JUAN J     07/28/2022 1545 atorvastatin (LIPITOR) tablet 20 mg   Oral MAR Unhold Ana Bello, JUAN J     07/28/2022 1545 traMADol (ULTRAM) tablet 50 mg   Oral MAR Unhold Mulu Ferrari PA-C     07/28/2022 1545 acetaminophen (TYLENOL) tablet 650 mg   Oral MAR Kurt Paige PA-C     07/29/2022 7718 ondansetron (ZOFRAN) injection 4 mg 4 mg Intravenous Given Cornell Melo, JUAN J     07/28/2022 1545 ondansetron (ZOFRAN) injection 4 mg   Intravenous MAR Unhold Shannon Corbin, JUAN J     07/28/2022 1249 ondansetron (ZOFRAN) injection 4 mg 4 mg Intravenous Given Nicolas Montilla, LAITH     07/28/2022 1545 aluminum-magnesium hydroxide-simethicone (MYLANTA) oral suspension 30 mL   Oral MAR Unhold Shannon Corbin RN     07/28/2022 1545 dexamethasone (DECADRON) injection 4 mg   Intravenous MAR Un[place] Mulu Ferrari PA-C     07/28/2022 1545 oxyCODONE (ROXICODONE) IR tablet 5 mg   Oral MAR Un[place] Mulu Ferrari PA-C     07/28/2022 1545 HYDROmorphone HCl (DILAUDID) injection 0 2 mg   Intravenous MAR Unhold Mulu Ferrari PA-C     07/28/2022 1545 temazepam (RESTORIL) capsule 7 5 mg   Oral MAR Unhold Shannon Corbin, JUAN J     07/29/2022 0902 citalopram (CeleXA) tablet 10 mg 10 mg Oral Given Dustin Caraballo, RN     07/28/2022 2021 citalopram (CeleXA) tablet 10 mg 10 mg Oral Given Cornell Melo, RN     07/28/2022 1545 citalopram (CeleXA) tablet 10 mg   Oral MAR Unhold Shannon Corbin, JUAN J     07/29/2022 0902 metoprolol succinate (TOPROL-XL) 24 hr tablet 25 mg 25 mg Oral Given Dustin Caraballo, RN     07/28/2022 2021 metoprolol succinate (TOPROL-XL) 24 hr tablet 25 mg 25 mg Oral Given Cornell Melo, RN     07/28/2022 1545 metoprolol succinate (TOPROL-XL) 24 hr tablet 25 mg   Oral MAR Unhold Shannon Corbin, JUAN J     07/29/2022 0902 diltiazem (CARDIZEM SR) 12 hr capsule 120 mg 120 mg Oral Given Dustin Caraballo, RN     07/28/2022 2022 diltiazem (CARDIZEM SR) 12 hr capsule 120 mg 120 mg Oral Given Cornell Melo RN     07/28/2022 1545 diltiazem (CARDIZEM SR) 12 hr capsule 120 mg   Oral MAR Unhold Corrinne Ina, RN     07/28/2022 1545 meclizine (ANTIVERT) tablet 25 mg   Oral MAR Unhold Corrinne JUAN J Ayala     07/29/2022 1207 polyethylene glycol (MIRALAX) packet 17 g 17 g Oral Given Dustin Caraballo RN     07/28/2022 1701 polyethylene glycol (MIRALAX) packet 17 g 17 g Oral Given Corrinne Ina, RN     07/28/2022 1545 polyethylene glycol (MIRALAX) packet 17 g   Oral MAR Unhold Corrinne Ina, RN     07/29/2022 8216 amiodarone tablet 400 mg 400 mg Oral Given Dustin Caraballo, RN     07/28/2022 1656 amiodarone tablet 400 mg 400 mg Oral Given Corrinne Ina, RN     07/28/2022 1545 amiodarone tablet 400 mg   Oral MAR Unhold Shannon Corbin, RN     07/28/2022 1553 sodium chloride 0 9 % infusion 0 mL/hr Intravenous Stopped Shannon Corbin, JUAN J     07/28/2022 1440 fentaNYL (SUBLIMAZE) injection 12 5 mcg 12 5 mcg Intravenous Given Kenyetta Dumont, JUAN J     07/28/2022 1430 fentaNYL (SUBLIMAZE) injection 12 5 mcg 12 5 mcg Intravenous Given Kenyetta Dumont, JUAN J     07/28/2022 1422 fentaNYL (SUBLIMAZE) injection 12 5 mcg 12 5 mcg Intravenous Given Kenyetta Dumont, JUAN J     07/28/2022 1417 fentaNYL (SUBLIMAZE) injection 12 5 mcg 12 5 mcg Intravenous Given Kenyetta Dumont, JUAN J     07/28/2022 1416 ondansetron (ZOFRAN) injection 4 mg 4 mg Intravenous Given Kenyetta Dumont, JUAN J     07/28/2022 2036 sodium chloride 0 9 % infusion 75 mL/hr Intravenous [facility], RN     07/28/2022 1548 sodium chloride 0 9 % infusion 75 mL/hr Intravenous [address] Corrinne Ina, JUAN J     07/29/2022 0902 docusate sodium (COLACE) capsule 100 mg 100 mg Oral Given Dustin Caraballo, RN     07/28/2022 1701 docusate sodium (COLACE) capsule 100 mg 100 mg Oral Given Corrinne Ina, RN     07/29/2022 1207 senna (SENOKOT) tablet 8 6 mg 8 6 mg Oral Given Dustin Caraballo, RN     07/29/2022 1216 ceFAZolin (ANCEF) IVPB (premix in dextrose) 2,000 mg 50 mL 2,000 mg Intravenous [address] Dustin Caraballo, RN     07/29/2022 1060 ceFAZolin (ANCEF) IVPB (premix in dextrose) 2,000 mg 50 mL 2,000 mg Intravenous [address] [facility] Nava RN     07/28/2022 2021 ceFAZolin (ANCEF) IVPB (premix in dextrose) 2,000 mg 50 mL 2,000 mg Intravenous [address] [facility] Nava RN     07/29/2022 1151 dexamethasone (DECADRON) tablet 4 mg 4 mg Oral Given Nguyễn Casas, RN     07/29/2022 0504 dexamethasone (DECADRON) tablet 4 mg 4 mg Oral Given Low Camejo, RN     07/29/2022 0100 dexamethasone (DECADRON) tablet 4 mg 4 mg Oral Given Low Camejo, RN     07/28/2022 1701 dexamethasone (DECADRON) tablet 4 mg 4 mg Oral Given Shannon Corbin, RN     07/28/2022 1505 scopolamine (TRANSDERM-SCOP) 1 mg/3 days TD 72 hr patch 1 patch 1 patch Transdermal Medication Applied [facility], RN     07/29/2022 0504 acetaminophen (TYLENOL) tablet 975 mg 975 mg Oral Given Low Camejo, RN     07/28/2022 2021 acetaminophen (TYLENOL) tablet 975 mg 975 mg Oral Given Low Camejo, RN     07/29/2022 3633 oxyCODONE (ROXICODONE) IR tablet 5 mg 5 mg Oral Given Nguyễn Casas, RN     07/28/2022 1702 oxyCODONE (ROXICODONE) IR tablet 5 mg 5 mg [facility]     07/29/2022 0025 HYDROmorphone HCl (DILAUDID) injection 0 2 mg 0 2 mg Intravenous Not Given Low Camejo, RN     07/28/2022 1610 HYDROmorphone HCl (DILAUDID) injection 0 2 mg 0 2 mg [facility]ous [facility]     07/29/2022 1159 methocarbamol (ROBAXIN) tablet 500 mg 500 mg Oral Given Nguyễn Casas, RN     07/29/2022 0504 methocarbamol (ROBAXIN) tablet 500 mg 500 mg Oral Given Low Camejo, RN     07/29/2022 0100 methocarbamol (ROBAXIN) tablet 500 mg 500 mg Oral Given Low Camejo, RN     07/28/2022 1701 methocarbamol (ROBAXIN) tablet 500 mg 500 mg Oral Given Prudence Eileen, RN     07/28/2022 1530 HYDROmorphone HCl (DILAUDID) injection 0 2 mg 0 2 mg Intravenous Given [facility], RN     07/28/2022 2141 Gadobutrol injection (SINGLE-DOSE) SOLN 10 mL 10 mL Intravenous Given Evelyn Hutchison     07/29/2022 0829 potassium chloride oral solution 40 mEq 40 mEq Oral Not Given Nguyễn Frees, JUAN J     07/29/2022 1159 potassium chloride (K-DUR,KLOR-CON) CR tablet 40 mEq 40 mEq Oral Given Nguyễn Frees, JUAN J     07/29/2022 0815 metoclopramide (REGLAN) injection 10 mg 10 mg Intravenous Given Rosalinda Griffith RN              [facility]    PLEASE NOTE:  This encounter was completed utilizing the M- [employer]Fluency Direct Speech Voice Recognition Software  Grammatical errors, random word insertions, pronoun errors and incomplete sentences are occasional consequences of the system due to software limitations, ambient noise and hardware issues  These may be missed by proof reading prior to affixing electronic signature  Any questions or concerns about the content, text or information contained within the body of this dictation should be directly addressed to the physician for clarification  Please do not hesitate to call me directly if you have any any questions or concerns

## 2022-07-30 NOTE — PROGRESS NOTES
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 s/p lung resection in November 2021, chemotherapy, and radiation, new onset AFib, hypothyroidism, hypertension, gout, hyperlipidemia who presented [facility] with lightheadedness   Found to be in rapid AFib with RVR   Required Cardizem drip, transitioned to PO cardizem  [address] this admission showed increased cerebellar mass (3 9 cm, prior 1 8 cm) with edema and mass effect on the 4th ventricle and new nodule in left cerebellum   MRI brain showed 4 3 cm hemorrhagic metastatic lesion in left cerebellum , 0 6 cm for adjunct metastatic lesion left cerebellum, 0 5 cm left parietal lobe hemorrhagic lesion   Underwent left retrosigmoid/posterior fossa craniotomy on 7/28   Current continue complains of pain around incision site   Denies chest pain, palpitations, dyspnea, nausea, vomiting, abdominal pain, numbness, weakness  Started salt tabs and 2 L fluid restriction for suspected SIADH  Overnight events:  None  ROS  Review of Systems   Constitutional: Negative for chills, diaphoresis and fever  Eyes: Positive for visual disturbance (Double vision)  Respiratory: Negative for cough and shortness of breath  Cardiovascular: Negative for chest pain and palpitations  Gastrointestinal: Negative for abdominal pain, constipation, diarrhea, nausea and vomiting  Genitourinary: Negative for difficulty urinating, dysuria and hematuria  Musculoskeletal: Negative for back pain  Skin: Negative for rash  Neurological: Negative for dizziness, weakness, light-headedness, numbness and headaches  Invasive lines and devices:   Invasive Devices  Report    Central Venous Catheter Line  Duration           Port A Cath 06/17/21 Right Chest 407 days          Peripheral Intravenous Line  Duration           Peripheral IV 07/28/22 Right;Proximal Forearm 2 days    Peripheral IV 07/29/22 Distal;Left;Upper;Ventral (anterior) Arm <1 day          Arterial Line  Duration           Arterial Line 07/28/22 Right Radial 1 day          Drain  Duration           External Urinary Catheter Small <1 day                   Physical exam  Physical Exam  Vitals reviewed  Constitutional:       General: He is not in acute distress  HENT:      Head: Normocephalic and atraumatic  Nose: No rhinorrhea  Eyes:      General: No scleral icterus  Pupils: Pupils are equal, round, and reactive to light  Comments: Peripheral vision intact, no diplopia at approximately 1 ft   Cardiovascular:      Rate and Rhythm: Normal rate and regular rhythm  Pulses: Normal pulses  Heart sounds: Normal heart sounds  No murmur heard  No friction rub  No gallop  Pulmonary:      Effort: Pulmonary effort is normal  No respiratory distress  Breath sounds: Normal breath sounds  No wheezing, rhonchi or rales  Abdominal:      General: Bowel sounds are normal  There is no distension  Palpations: Abdomen is soft  Tenderness: There is no abdominal tenderness  There is no guarding  Musculoskeletal:      Right lower leg: No edema  Left lower leg: No edema  Skin:     General: Skin is warm and dry  Capillary Refill: Capillary refill takes less than 2 seconds  Coloration: Skin is not jaundiced  Neurological:      Mental Status: He is alert and oriented to person, place, and time  Cranial Nerves: Cranial nerve deficit (Left lateral gaze decreased compared to right) present  Sensory: No sensory deficit  Coordination: Coordination abnormal (Finger-to-nose past pointing bilaterally)        Comments: Shoulder abduction 5/5, biceps strength 5/5, hand  5/5, triceps strength 5/5, 5/5 strength in hip flexion, knee extension and flexion, plantar flexion, no dysarthria   Psychiatric:         Behavior: Behavior normal          Vitals  Temperature:   Temp (24hrs), Av 4 °F (36 9 °C), Min:98 3 °F (36 8 °C), Max:98 6 °F (37 °C)    Current: Temperature: 98 4 °F (36 9 °C)    Vitals:    22 0600 22 0700 22 0745 22 0840   BP: 144/84 150/92 143/90 150/88   BP Location:   Right arm Right arm   Pulse: 68 70 74 76   Resp: 14 17 16 16   Temp:   98 4 °F (36 9 °C)    TempSrc:   Oral    SpO2: 93% 94% 95% 98%   Weight: 88 1 kg (194 lb 3 6 oz)      Height:         Arterial Line BP: 174/86  Arterial Line MAP (mmHg): 116 mmHg    Labs:   Results from last 7 days   Lab Units 22  0526 22  1459 22  0535 22  1337 22  1002 22  0447 22  1100 22  0443 22  1355   WBC Thousand/uL 7 83 7 80 6 90 6 41  --  6 60   < > 6 21 6 37   HEMOGLOBIN g/dL 12 4 11 7* 11 8* 11 7*  --  12 5   < > 13 9 14 7   I STAT HEMOGLOBIN   --   --   --   --    < >  --   --   --   --    HEMATOCRIT % 34 9* 33 8* 34 3* 33 6*  --  36 7   < > 42 0 42 4   HEMATOCRIT, ISTAT   --   --   --   --    < >  --   --   --   --    PLATELETS Thousands/uL 133* 152 158 204  --  89*   < > 76* 90*   NEUTROS PCT %  --   --   --   --   --  93*  --   --  89*   MONOS PCT %  --   --   --   --   --  3*  --   --  5   MONO PCT %  --   --   --  2*  --   --   --  4  --     < > = values in this interval not displayed        Results from last 7 days   Lab Units 22  0526 22  0109 22  1844 22  0535 22  1002 22  1100 22  0443 22  1355   SODIUM mmol/L 126* 127* 127*   < >  --    < > 132* 131*   POTASSIUM mmol/L 4 0 4 2 4 2   < >  --    < > 3 9 4 0   CHLORIDE mmol/L 92* 95* 95*   < >  --    < > 99 92*   CO2 mmol/L 28 28 27   < >  --    < > 23 23   CO2, I-STAT mmol/L  --   --   --   --  30  --   --   --    BUN mg/dL 17 18 15   < >  --    < > 20 22   CREATININE mg/dL 0 66 0 57* 0 64   < >  --    < > 0 73 1 06   CALCIUM mg/dL 8 6 8 6 8 9   < >  --    < > 8 7 8 6   ALK PHOS U/L  --   --   --   --   --   --  53 58   ALT U/L  --   --   --   --   --   --  55 66   AST U/L  --   --   --   --   --   --  23 19   GLUCOSE, ISTAT mg/dl  --   --   --   --  138  --   --   --     < > = values in this interval not displayed  Results from last 7 days   Lab Units 07/30/22  0526 07/29/22  1118 07/25/22  0443   MAGNESIUM mg/dL 2 2 2 0 2 4     Results from last 7 days   Lab Units 07/30/22  0526   PHOSPHORUS mg/dL 2 7      Results from last 7 days   Lab Units 07/29/22  0535   INR  0 99   PTT seconds 26           Other Labs    Intake and Outputs:  I/O       07/28 0701  07/29 0700 07/29 0701 07/30 0700    P  O  120 960    I V  (mL/kg) 1771 3 (19 4) 1453 5 (15 9)    Blood 200     IV Piggyback 50 100    Total Intake(mL/kg) 2141 3 (23 5) 2513 5 (27 5)    Urine (mL/kg/hr) 3775 (1 7) 2500 (1 1)    Total Output 3775 2500    Net -1633 8 +13 5          Unmeasured Urine Occurrence  1 x          Imaging Studies      Assessment & Plan:   Neuro:   · Diagnosis:  Metastatic left lung adenocarcinoma with metastasis to brain  ? S/P lung resection, chemotherapy, and radiation  ? Pod 1 posterior fossa craniotomy  ? Post op MRI: postop blood products, small region of marginal postop ischemia, mild increased edema, slightly increased mass effect on 4th ventricle, small enhancement operative margin, stable left parietal lobe lesion  ? Completed postop Ancef 7/29  ? Plan:   § Change in neuro exam this morning, Follow-up CTA  § Decadron taper ending 8/9  § Neurochecks Q 2 hrs during the day, q 4 at night  · Continue salt tab 2g   · Start normal saline 25 cc  · Continue Q 6 BMP  § Sodium goal greater than 133  § Platelet goal greater than 100  · Analgesia:    § Dilaudid 0 2 mg Q 4 p r n , oxycodone 2 5 mg q 4 p r n , oxycodone 5 mg Q 4 p r n    · Nausea:  § Scopolamine patch, Zofran 4 mg q 6 p r n , Reglan 10 mg q 6 p r n  · Anxiety/depression  § Continue home citalopram 10 mg     CV:   · Diagnosis:  Atrial fibrillation with RVR  ? No prior history of AFib  ? Presented with lightheadedness and rates into the 170s  ? Required Cardizem drip then transition to p o   ? Plan:   § S/p amio bolus 7/29  § Continue amiodarone drip, diltiazem 120 mg b i d , Toprol 50 mg b i d   § Holding anticoagulation   § Continue to monitor on tele  § Cardiology following - appreciate recommendations  · Hypertension:  § Home regimen amlodipine 10 mg, Toprol 25 mg  § Plan:  § Hold amlodipine  § Continue Toprol 50 mg BID  · Hyperlipidemia:  § Home regimen:  rosuvastatin 10 mg  § Atorvastatin 20 mg while inpatient     Pulm:  · Diagnosis:  Metastatic left lung adenocarcinoma  § S/P left upper lobe resection, radiation, chemotherapy  § CT CAP:  Multiple new bilateral irregular lung nodules suspicious for metastases, no metastatic disease in abdomen or pelvis  ? Oncology following - appreciate recommendations     GI:   · Diagnosis:  GERD  ? Continue home Protonix 40 mg p o         :   · Diagnosis:  No acute issues        F/E/N:   · Fluids:  discontinue NS 75  · Electrolytes:  Trend and replete as needed  · Nutrition:  Regular diet with 2 L fluid restriction        Heme/Onc:   · Diagnosis:  Thrombocytopenia  § Baseline 90s to 100  § Continue to monitor, transfuse if < 100  § Goal platelets > 178  · DVT prophylaxis:  Holding anticoagulation, SCDs        Endo:   · Diagnosis:  Hypothyroidism  § Continue home levothyroxine 37 5 mcg        ID:   · Diagnosis:  No acute issues        MSK/Skin:   · Diagnosis:  Gout  ?  Continue home allopurinol 100 mcg  · PT/OT when appropriate  · Turning/repositioning     LDA:  · Right chest port present on admission  · Peripheral right forearm day 1  · Peripheral left arm day 1  · Right arterial line day 2  · External urinary catheter day 1    Disposition: Continue critical care        Non-Invasive/Invasive Ventilation Settings:  Respiratory  Report   Lab Data (Last 4 hours)    None         O2/Vent Data (Last 4 hours)    None              No results found for: PHART, WDM5ZLZ, PO2ART, MZJ8ZJZ, C1DZDTND, BEART, SOURCE  SpO2: SpO2: 98 %    Imaging:   MRI brain w wo contrast   Final Result by Shweta Queen MD (07/28 2245)      New postoperative changes status post resection of left cerebellar metastases with expected postoperative blood products  Small region of marginal postoperative ischemia  Mildly increased edema  Mass effect on the fourth ventricle is also slightly    increased    No hydrocephalus  Small amount of enhancement at the operative margin may be postoperative  Recommend follow-up  Stable postsurgical changes in the left parietal lobe without locally recurrent disease  Stable 5 mm left parietal lobe lesion adjacent to resection cavity with T1 shortening and no definite residual enhancement         The study was marked in EPIC for immediate notification  Workstation performed: [id number]         CT chest abdomen pelvis w contrast   Final Result by Arnoldo Bowers MD (07/26 2037)      1  Multiple new bilateral irregular lung nodules suspicious for metastases  2   No findings of metastatic disease in the abdomen or pelvis  The study was marked in [facility] for immediate notification  Workstation performed: [id number]         MRI brain w wo contrast   Final Result by Uriel Nazario MD (07/25 1713)      Mixed treatment response   - 4 3 cm hemorrhagic metastatic lesion in left cerebellum, increased in size since CT head 6/15/2022 but unchanged since 7/24/2022 - this is likely due to interval hemorrhage of known left cerebellar lesion    Moderate surrounding perilesional vasogenic    edema in left cerebellum, posterior cerebellar vermis, and right posterior paramedian cerebellum with mild mass effect on the 4th ventricle    - 0 6 cm hemorrhagic metastatic lesion posterior to the dominant left cerebellar mass, decreased in size    - 0 5 cm left parietal lobe hemorrhagic lesion posterior to left parietal resection cavity, slightly decreased in size    - No evidence of recurrent disease in left parietal resection cavity  The study was marked in [facility] for immediate notification  Workstation performed: [id number]         CT head wo contrast    (Results Pending)     I have personally reviewed pertinent reports  Micro:  No results found for: Beola [place], SPUTUMCULTUR      Allergies:    Allergies   Allergen Reactions    Bee Venom     Benazepril Other (See Comments)     Angioedema     Latex Other (See Comments)     Burning of eyes at the dentist from the gloves    Meloxicam GI Intolerance    Penicillin G Rash         Medications:   Scheduled Meds:  Current Facility-Administered Medications   Medication Dose Route Frequency Provider Last Rate    acetaminophen  975 mg Oral Q8H [address] MuluMJ Hardin-ELIZABETH      allopurinol  100 mg Oral Daily Mulu MJ Carmen-ELIZABETH      aluminum-magnesium hydroxide-simethicone  30 mL Oral Q6H PRN Kelly Ferrari PA-C      amiodarone  0 5 mg/min Intravenous Continuous Sathya Kemp MD 0 5 mg/min (07/29/22 2003)    amiodarone  400 mg Oral BID With Meals Mike Jordan MD      vitamin C  1,000 mg Oral Daily Mulu Ferrari PA-C      atorvastatin  20 mg Oral Daily With [employer], PA-C      bisacodyl  10 mg Rectal Daily PRN Kelly Ferrari PA-C      citalopram  10 mg Oral BID Mulu Ferrari PA-C      dexamethasone  4 mg Oral Q6H [address] MJ Gibson-C      Followed by   Cecille Vinson dexamethasone  4 mg Oral Q8H [address] Mulu KEISHA Ferrari PA-C      Followed by   Luis Pierre ON 8/1/2022] dexamethasone  2 mg Oral Q6H [address] Muluanupam Ferrari PA-C      Followed by   Luischip Pierre ON 8/4/2022] dexamethasone  2 mg Oral Q8H [address] MuluMJ Hardin-C      Followed by   Luis Pierre ON 8/5/2022] dexamethasone  2 mg Oral Q12H [facility] Mulu Ferrari PA-C      Followed by   Taylor Travis ON 8/8/2022] dexamethasone  2 mg Oral Q24H [facility] Mulu Ferrari PA-C      diltiazem  120 mg Oral Q12H [facility] Mulu Ferrari PA-C      docusate sodium  100 mg Oral BID Mulu Ferrari PA-C      fentanyl citrate (PF) (FOR EMS ONLY)           heparin (porcine)  5,000 Units Subcutaneous [facility] Roldan Carter PA-C      levothyroxine  37 5 mcg Oral Early Morning Mulu Ferrari PA-C      meclizine  25 mg Oral Q8H PRN Chase Ferrari PA-C      methocarbamol  500 mg Oral Q6H [facility] Mulu Ferrari PA-C      metoclopramide  10 mg Intravenous Q6H PRN Liam Holt DO      metoprolol succinate  50 mg Oral BID Janice Hernandez MD      ondansetron  4 mg Intravenous Q6H PRN Vaughn Frye PA-C      oxyCODONE  2 5 mg Oral Q4H PRN Chase Ferrari PA-C      pantoprazole  40 mg Oral Early Morning Mulu Ferrari PA-C      polyethylene glycol  17 g Oral BID Mulu Ferrari PA-C      scopolamine  1 patch Transdermal Q72H Mulu Ferrari PA-C      senna  1 tablet Oral Daily Mulu Ferrari PA-C      sodium chloride  2 g Oral TID With Meals Kitty Holt DO      temazepam  7 5 mg Oral HS PRN Mulu Ferrari PA-C       Continuous Infusions:amiodarone, 0 5 mg/min, Last Rate: 0 5 mg/min (07/29/22 2003)      PRN Meds:  aluminum-magnesium hydroxide-simethicone, 30 mL, Q6H PRN  bisacodyl, 10 mg, Daily PRN  meclizine, 25 mg, Q8H PRN  metoclopramide, 10 mg, Q6H PRN  ondansetron, 4 mg, Q6H PRN  oxyCODONE, 2 5 mg, Q4H PRN  temazepam, 7 5 mg, HS PRN        Counseling / Coordination of Care  Time spent: 30 minutes   Code Status: Level 1 - Full Code     Portions of the record may have been created with voice recognition software  Occasional wrong word or "sound a like" substitutions may have occurred due to the inherent limitations of voice recognition software    Read the chart carefully and recognize, using context, where substitutions have occurred       Armand Londono DO  Internal Medicine Resident, PGY2  9:20 AM

## 2022-07-30 NOTE — ASSESSMENT & PLAN NOTE
· A-fib with RVR on admission   · Previously noted in Outpatient PCP note   · Patient currently on amiodarone drip, PO amiodarone, Cardizem, and metoprolol  · Cardiology following, input appreciated  · Continue rate control at this time

## 2022-07-30 NOTE — PROGRESS NOTES
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 [facility])  Active Problems:    Essential hypertension    Mixed hyperlipidemia    History of gout    Thrombocytopenia ([facility] [address] )    Hypothyroidism    At[facility]      Plan:  Patient appears comfortable this morning  He has no chest pain or significant dyspnea  Continues on intravenous amiodarone  Predominantly in sinus rhythm with less ventricular ectopy noted  Blood pressure improved  Not a candidate at this point for anticoagulation in reference to PAF  BMP with potassium of 4 0 and creatinine of 0 66  Hemoglobin 12 4  Sodium 126 compared to 01/27 yesterday  Will start oral amiodarone to continue load and overlap with intravenous amiodarone  Subjective:   Patient seen and examined  Objective:     Vitals: Blood pressure 138/83, pulse 64, temperature 98 3 °F (36 8 °C), temperature source Oral, resp  rate 15, height 5' 8" (1 727 m), weight 91 3 kg (201 lb 4 5 oz), SpO2 94 %  , Body mass index is 30 6 kg/m² ,   Orthostatic Blood Pressures    Flowsheet Row Most Recent Value   Blood Pressure 138/83 filed at 07/30/2022 0300   Patient Position - Orthostatic VS Lying filed at 07/27/2022 1908      ,      Intake/Output Summary (Last 24 hours) at 7/30/2022 0730  Last data filed at 7/30/2022 0200  Gross per 24 hour   Intake 2513 46 ml   Output 2500 ml   Net 13 46 ml       No significant arrhythmias seen on telemetry review         Physical Exam:    GEN: Randal Harmon   NECK: supple, no carotid bruits, no JVD or HJR  HEART: normal rate, regular rhythm, normal S1 and S2, no murmurs, clicks, gallops or rubs   LUNGS: clear to auscultation bilaterally; no wheezes, rales, or rhonchi   ABDOMEN: normal bowel sounds, soft, no tenderness, no distention  EXTREMITIES: peripheral pulses normal; no clubbing, cyanosis, or edema  SKIN: warm and well perfused, no suspicious lesions on exposed skin    Labs & Results:    No results displayed because visit has over 200 results  XR chest 2 views    Result Date: 7/25/2022  Narrative: CHEST INDICATION:   AFib RVR, lightheadedness, palpitations  COMPARISON:  01/24/2017  CT scan on 06/15/2022 EXAM PERFORMED/VIEWS:  XR CHEST PA & LATERAL Images: 3 FINDINGS: Cardiomediastinal silhouette appears unremarkable  Right-sided MediPort terminating in the SVC  The lungs are hyperinflated  Postoperative changes in the left lung  Scarring in the left midlung  No pneumothorax or pleural effusion  Osseous structures appear within normal limits for patient age  Impression: No acute cardiopulmonary disease  Hyperinflation  Workstation performed: [id number]     CT head without contrast    Result Date: 7/24/2022  Narrative: CT BRAIN - WITHOUT CONTRAST INDICATION:   Syncope, recurrent Brain metastases suspected Lightheadedness, brain metastasis  COMPARISON:  6/15/2022 TECHNIQUE:  CT examination of the brain was performed  In addition to axial images, sagittal and coronal 2D reformatted images were created and submitted for interpretation  Radiation dose length product (DLP) for this visit:  864 mGy-cm   This examination, like all CT scans performed in the [facility], was performed utilizing techniques to minimize radiation dose exposure, including the use of iterative reconstruction and automated exposure control  IMAGE QUALITY:  Diagnostic  FINDINGS: PARENCHYMA:  Left cerebellar dense mass measures 3 9 x 3 6 cm, previously measuring 1 8 x 1 6 cm  There is associated vasogenic edema with mass effect upon the 4th ventricle  Adjacent to the mass is a 5 mm density (2/12)  Left parietal cortical lesion measures 6 mm, previously measuring 9 mm  There is redemonstration of left parietal encephalomalacia   VENTRICLES AND EXTRA-AXIAL SPACES: Normal for the patient's age  VISUALIZED ORBITS AND PARANASAL SINUSES:  There is a retention cyst versus polyp in the right maxillary sinus  There is opacity in the right mastoid air cells  CALVARIUM AND EXTRACRANIAL SOFT TISSUES:  Left parietal craniotomy  Impression: The left cerebellar mass has increased in size, now measuring 3 9 x 3 6 cm  There is associated vasogenic edema with mass effect upon the 4th ventricle  There is a new 5 mm high density nodule in the left cerebellum  Left posterior parietal cortical nodule appears smaller than prior  The study was marked in [facility] for immediate notification  Workstation performed: [id number]     MRI brain w wo contrast    Result Date: 7/28/2022  Narrative: MRI BRAIN WITH AND WITHOUT CONTRAST INDICATION: Postop status post resection of left cerebellar metastases  COMPARISON:  MRI dated 7/25/2022  TECHNIQUE: Sagittal T1, axial T2, axial FLAIR, axial T1, axial [place], axial diffusion  Sagittal, axial T1 postcontrast   Axial bravo postcontrast with coronal reconstructions  IV Contrast:  10 mL of Gadobutrol injection (SINGLE-DOSE)  IMAGE QUALITY:   Diagnostic  FINDINGS: BRAIN PARENCHYMA:  New postoperative changes status post left suboccipital craniotomy for resection of large hemorrhagic left cerebellar metastasis as well as the smaller adjacent metastasis  Small subjacent extra-axial collection  Expected postoperative blood products within the operative cavity  Although evaluation for enhancement is somewhat limited due to presence of T1 hyperintense blood products, there is some mild enhancement seen at the posterior inferior aspect of the operative cavity  Possibly postoperative but follow-up anticipated    There is restricted diffusion at the inferior margin of the resection cavity that may be due to combination of marginal ischemia as well as blood products    Surrounding edema is mildly increased  Mass effect on the fourth ventricle is also mildly increased  Stable postsurgical cavity in the left parietal lobe with hemosiderin  Stable surrounding FLAIR signal  No enhancement to suggest recurrent disease  Stable 5 mm lesion in the left parietal lobe with susceptibility artifact adjacent to the resection cavity  There is is T1 shortening within the lesion on precontrast sequence (best appreciated on sagittal image 10 series 5) without definite enhancement    Stable surrounding FLAIR signal  No new lesion identified  VENTRICLES:  Stable  No hydrocephalus  SELLA AND PITUITARY GLAND:  Normal  ORBITS:  Stable bilateral proptosis  PARANASAL SINUSES:  Right maxillary sinus retention cyst  Partial opacification of the right mastoid air cells  VASCULATURE:  Evaluation of the major intracranial vasculature demonstrates appropriate flow voids  CALVARIUM AND SKULL BASE:  Postoperative changes status post left suboccipital craniotomy  EXTRACRANIAL SOFT TISSUES:  Normal      Impression: New postoperative changes status post resection of left cerebellar metastases with expected postoperative blood products  Small region of marginal postoperative ischemia  Mildly increased edema  Mass effect on the fourth ventricle is also slightly increased    No hydrocephalus  Small amount of enhancement at the operative margin may be postoperative  Recommend follow-up  Stable postsurgical changes in the left parietal lobe without locally recurrent disease  Stable 5 mm left parietal lobe lesion adjacent to resection cavity with T1 shortening and no definite residual enhancement    The study was marked in [employer] for immediate notification  Workstation performed: [id number]     MRI brain w wo contrast    Result Date: 7/25/2022  Narrative: MRI BRAIN WITH AND WITHOUT CONTRAST INDICATION: brain mass  COMPARISON:  CT head without contrast 7/24/2022, 6/15/2022  MRI brain with and without contrast 4/26/2022, 4/8/2022  TECHNIQUE: Sagittal T1, axial T2, axial FLAIR, axial T1, axial [place], axial diffusion   Sagittal, axial T1 postcontrast   Axial bravo postcontrast with coronal reconstructions  IV Contrast:  9 mL of Gadobutrol injection (SINGLE-DOSE)  IMAGE QUALITY:   Diagnostic  FINDINGS: BRAIN PARENCHYMA: Postsurgical changes of left parietal craniotomy for resection of left parietal mass  Unchanged left parietal resection cavity with encephalomalacia, gliosis, and peripheral hemosiderin deposition  No abnormal masslike enhancement in left parietal resection cavity to suggest recurrent disease  A few enhancing hemorrhagic metastatic lesions, for reference: - 0 5 x 0 5 cm hemorrhagic enhancing lesion in left parietal lobe posterior to left parietal resection cavity (12:81), previously 0 8 x 0 7 cm on CT head 6/15/2022 but unchanged since 7/24/2022  - 4 3 x 3 6 cm hemorrhagic enhancing mass in left cerebellum (12:38), previously 1 8 x 1 4 cm  Unchanged mass effect on the 4th ventricle since yesterday's CT head without contrast   Moderate surrounding perilesional vasogenic edema in left cerebellum, posterior cerebellar vermis, and right posterior paramedian cerebellum  - 0 6 x 0 6 cm hemorrhagic enhancing mass posterior to the dominant left cerebellar mass (12:40), previously 1 5 x 1 4 cm cm  No midline shift  No diffusion-weighted signal abnormality to suggest acute infarction  A few small scattered hyperintensities on T2/FLAIR imaging are noted in the periventricular and subcortical white matter demonstrating an appearance that is statistically most likely to represent minimal microangiopathic change  VENTRICLES:  Unchanged mass effect on the 4th ventricle  No obstructive hydrocephalus  No intraventricular hemorrhage  SELLA AND PITUITARY GLAND:  Normal  ORBITS:  Unchanged bilateral globe proptosis  PARANASAL SINUSES:  Moderate size right maxillary mucus retention cyst  VASCULATURE:  Evaluation of the major intracranial vasculature demonstrates appropriate flow voids  CALVARIUM AND SKULL BASE: Left parietal craniotomy  Small bilateral mastoid effusions (right worse than left)  EXTRACRANIAL SOFT TISSUES:  Normal      Impression: Mixed treatment response - 4 3 cm hemorrhagic metastatic lesion in left cerebellum, increased in size since CT head 6/15/2022 but unchanged since 7/24/2022 - this is likely due to interval hemorrhage of known left cerebellar lesion  Moderate surrounding perilesional vasogenic edema in left cerebellum, posterior cerebellar vermis, and right posterior paramedian cerebellum with mild mass effect on the 4th ventricle  - 0 6 cm hemorrhagic metastatic lesion posterior to the dominant left cerebellar mass, decreased in size  - 0 5 cm left parietal lobe hemorrhagic lesion posterior to left parietal resection cavity, slightly decreased in size  - No evidence of recurrent disease in left parietal resection cavity  The study was marked [facility] for immediate notification  Workstation performed: [id number]     CT chest abdomen pelvis w contrast    Result Date: 7/26/2022  Narrative: CT CHEST, ABDOMEN AND PELVIS WITH IV CONTRAST INDICATION:   Brain/CNS neoplasm, staging Non-small cell lung cancer (NSCLC), monitor Non-small cell lung cancer (NSCLC), metastatic, assess treatment response h/o lung cancer, new brain mets  Evaluate for staging  COMPARISON:  CT chest abdomen pelvis 6/15/2022 TECHNIQUE: CT examination of the chest, abdomen and pelvis was performed  Axial, sagittal, and coronal 2D reformatted images were created from the source data and submitted for interpretation  Radiation dose length product (DLP) for this visit:  1208 13 mGy-cm   This examination, like all CT scans performed in the [facility], was performed utilizing techniques to minimize radiation dose exposure, including the use of iterative reconstruction and automated exposure control  IV Contrast:  65 mL of iohexol (OMNIPAQUE) Enteric contrast was not administered  FINDINGS: CHEST LUNGS:  Moderate emphysema    Postsurgical changes from left upper lobectomy  There are multiple new bilateral irregular lung nodules suspicious for metastases, with examples as follows: -Right upper lobe juxtapleural nodule measuring 1 6 x 2 5 cm (series 3 image 48)  -Right upper lobe perifissural nodule measuring 1 4 x 1 8 cm (series 3 image 61)  -Left lower lobe juxtapleural nodule measuring 1 9 x 1 5 cm (series 3 image 93)  -Left lower lobe nodule measuring 1 6 x 1 3 cm (series 3 image 70)  PLEURA:  Unremarkable  HEART/GREAT VESSELS:  Heart is unremarkable for patient's age  No thoracic aortic aneurysm  MEDIASTINUM AND OWEN:  Unremarkable  CHEST WALL AND LOWER NECK:  Right chest wall port with the tip in the SVC  ABDOMEN LIVER/BILIARY TREE:  Hepatic steatosis  Otherwise unremarkable  GALLBLADDER:  No calcified gallstones  No pericholecystic inflammatory change  SPLEEN:  Nonspecific hypodense splenic lesion measuring approximately 1 7 cm, seen on prior studies since 10/10/2017  PANCREAS:  Unremarkable  ADRENAL GLANDS:  Unremarkable  KIDNEYS/URETERS:  Bilateral renal cysts and subcentimeter too small to characterize hypodensities  No hydronephrosis  STOMACH AND BOWEL:  Colonic diverticulosis without findings of acute diverticulitis  APPENDIX:  Normal  ABDOMINOPELVIC CAVITY:  No ascites  No pneumoperitoneum  No lymphadenopathy  VESSELS:  Marked atherosclerotic changes  No abdominal aortic aneurysm  PELVIS REPRODUCTIVE ORGANS:  Unremarkable for patient's age  URINARY BLADDER:  Diffusely increased density fluid within the bladder  Otherwise unremarkable  ABDOMINAL WALL/INGUINAL REGIONS:  Unremarkable  OSSEOUS STRUCTURES:  No acute fracture or destructive osseous lesion  Degenerative changes of the spine  Postsurgical change in left ribs from thoracotomy  Impression: 1  Multiple new bilateral irregular lung nodules suspicious for metastases  2   No findings of metastatic disease in the abdomen or pelvis   The study was marked in [facility] for immediate notification  Workstation performed: [id number]       EKG personally reviewed by Janice Hernandez MD      Counseling / Coordination of Care  Total floor / unit time spent today 30 minutes  Greater than 50% of total time was spent with the patient and / or family counseling and / or coordination of care

## 2022-07-30 NOTE — PROGRESS NOTES
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 [facility])  Assessment & Plan   POD 2 from L retrosigmoid/posterior fossa craniotomy for resection of mass with Dr Amadou Cowart (7/28/22)   Patient presented with lightheadedness, tinnitus and visual complaints   S/p L parietal craniotomy for  Resection of tumor on 4/7/2022 with Dr Louisa Gillette Pathology from prior surgery- neuroendocrine tumor   Patient completed WBRT around 5/31/22   Preliminary path:Metastatic carcinoma   On exam this morning patient had decreased left lateral gaze with double vision and right gaze preference, sent down for stat CT head    Imaging:    MRI brain 7/28/2022:  Postoperative changes status post resection of left cerebellar metastasis with expected postoperative blood products  Small region of marginal perioperative ischemia  Mildly increased edema  Mildly increased 4th ventricular mass effect  No hydrocephalus  Small amount of enhancement at the operative margin  Plan:   · Ongoing frequent neurological checks  · Recommend STAT CT head for decline in GCS >2 points in 1 hour  · MRI brain completed post operatively   · CT CAP completed pre-operatively- Follow up recs from med onc  · Send patient down for STAT CT head this morning given change in exam  · Defer keppra given infratentorial location  · Decadron 4mg Q6 hours with taper entered  · DVT ppx: SCD's and SQH,  Stop if platelets decline   · Hold all AC/AP medication at this time  · Patient currently in a-fib with RVR- continue medication titration for rate control    Patient currently on amiodarone drip  · PT/OT recommending acute rehab  · SBP <160  · Ongoing medical management and pain control per primary team    · Continue to monitor hyponatremia  Na 126this morning  Goal sodium >133, need to watch for SIADH  · Patient currently on salt tabs 2 g t i d  As well as fluid restriction of 2000, nephrology consulted input appreciated  · Platelet >465 post operatively  133 this morning  Transfuse as necessary  · Continue symptom management  · CM following for dispo planning  Neurosurgery will continue to follow, call with any further questions or concerns  Atrial fibrillation with rapid ventricular response (HCC)  Assessment & Plan  · A-fib with RVR on admission   · Previously noted in Outpatient PCP note   · Patient currently on amiodarone drip, PO amiodarone, Cardizem, and metoprolol  · Cardiology following, input appreciated  · Continue rate control at this time  Thrombocytopenia ([address] )  Assessment & Plan  · Continue to monitor at this time  · Peripheral smear and additional labs work completed per oncology recommendations  · Appreciate ongoing oncology evaluation and management  · Plan to maintain platelets >564,118 in the acute post operative setting through the weekend than >75 thereafter   · Transfuse as needed  Subjective/Objective     Chief Complaint: "I am okay"    Subjective:  Patient states he is doing better today  He states his dizziness has improved  He continues to complain of some blurry vision  He states when he tries to look to the left he develops double vision  He also endorses some shortness of breath  He denies any headaches, chest pain, abdominal pain, nausea, vomiting, diarrhea, no problems with bowel, no new weakness or numbness/tingling  Patient has Corea catheter in place  Patient's craniotomy dressing removed without difficulty  Incision well approximated with sutures remains clean, dry, intact no signs of erythema or drainage noted  Left forehead trocar site was draining, Band-Aid was removed this appears clean, dry, intact    Patient noted on exam today to have limited left lateral gaze when tested and developed double vision  He also appears to have a right gaze preference  Patient sent down for stat CT head for further workup and evaluation  Objective:  Patient comfortably lying in bed, NAD  I/O       07/28 0701 07/29 0700 07/29 0701 07/30 0700 07/30 0701 07/31 0700    P  O  120 960 240    I V  (mL/kg) 1771 3 (19 4) 1520 3 (17 3)     Blood 200      IV Piggyback 50 100     Total Intake(mL/kg) 2141 3 (23 5) 2580 3 (29 3) 240 (2 7)    Urine (mL/kg/hr) 3775 (1 7) 2500 (1 2) 211 (1 1)    Total Output 3775 2500 211    Net -1633 8 +80 3 +29           Unmeasured Urine Occurrence  2 x 1 x          Invasive Devices  Report    Central Venous Catheter Line  Duration           Port A Cath 06/17/21 Right Chest 407 days          Peripheral Intravenous Line  Duration           Peripheral IV 07/28/22 Right;Proximal Forearm 2 days    Peripheral IV 07/29/22 Distal;Left;Upper;Ventral (anterior) Arm <1 day          Arterial Line  Duration           Arterial Line 07/28/22 Right Radial 1 day          Drain  Duration           External Urinary Catheter Small <1 day                Physical Exam:  Vitals: Blood pressure 150/88, pulse 76, temperature 98 4 °F (36 9 °C), temperature source Oral, resp  rate 16, height 5' 8" (1 727 m), weight 88 1 kg (194 lb 3 6 oz), SpO2 98 %  ,Body mass index is 29 53 kg/m²  Hemodynamic Monitoring: MAP: Arterial Line MAP (mmHg): 116 mmHg    General appearance: alert, appears stated age, cooperative and no distress  Head: Normocephalic, prior incision healing well  Left craniotomy dressing removed without difficulty, incision well approximated with staples sutures this remains clean, dry, intact no drainage noted  Eyes: EOMI except limited left lateral gaze, PERRL, right gaze preference    Some nystagmus noted  Neck: supple, symmetrical, trachea midline   Lungs: non labored breathing  Heart: regular heart rate  Neurologic:   Mental status: Alert, oriented x3, thought content appropriate, speech is clear, following commands  Cranial nerves: grossly intact (Cranial nerves II-XII) except as noted above  Sensory: normal to light touch in all extremities x4  Motor: moving all extremities without focal weakness, strength 5/5 throughout  Reflexes: 2+ and symmetric, no Alcaraz's or clonus appreciated  Coordination: finger to nose normal bilaterally, no drift bilaterally      Lab Results:  Results from last 7 days   Lab Units 07/30/22  0526 07/29/22  1459 07/29/22  0535 07/28/22  1337 07/28/22  1002 07/28/22  0447 07/27/22  1100 07/25/22  0443 07/24/22  1355   WBC Thousand/uL 7 83 7 80 6 90 6 41  --  6 60   < > 6 21 6 37   HEMOGLOBIN g/dL 12 4 11 7* 11 8* 11 7*  --  12 5   < > 13 9 14 7   I STAT HEMOGLOBIN   --   --   --   --    < >  --   --   --   --    HEMATOCRIT % 34 9* 33 8* 34 3* 33 6*  --  36 7   < > 42 0 42 4   HEMATOCRIT, ISTAT   --   --   --   --    < >  --   --   --   --    PLATELETS Thousands/uL 133* 152 158 204  --  89*   < > 76* 90*   NEUTROS PCT %  --   --   --   --   --  93*  --   --  89*   MONOS PCT %  --   --   --   --   --  3*  --   --  5   MONO PCT %  --   --   --  2*  --   --   --  4  --     < > = values in this interval not displayed       Results from last 7 days   Lab Units 07/30/22  0526 07/30/22  0109 07/29/22  1844 07/29/22  0535 07/28/22  1002 07/27/22  1100 07/25/22  0443 07/24/22  1355   POTASSIUM mmol/L 4 0 4 2 4 2   < >  --    < > 3 9 4 0   CHLORIDE mmol/L 92* 95* 95*   < >  --    < > 99 92*   CO2 mmol/L 28 28 27   < >  --    < > 23 23   CO2, I-STAT mmol/L  --   --   --   --  30  --   --   --    BUN mg/dL 17 18 15   < >  --    < > 20 22   CREATININE mg/dL 0 66 0 57* 0 64   < >  --    < > 0 73 1 06   CALCIUM mg/dL 8 6 8 6 8 9   < >  --    < > 8 7 8 6   ALK PHOS U/L  --   --   --   --   --   --  53 58   ALT U/L  --   --   --   --   --   --  55 66   AST U/L  --   --   --   --   --   --  23 19   GLUCOSE, ISTAT mg/dl  --   --   --   --  138 --   --   --     < > = values in this interval not displayed  Results from last 7 days   Lab Units 07/30/22  0526 07/29/22  1118 07/25/22  0443   MAGNESIUM mg/dL 2 2 2 0 2 4     Results from last 7 days   Lab Units 07/30/22  0526   PHOSPHORUS mg/dL 2 7     Results from last 7 days   Lab Units 07/29/22  0535   INR  0 99   PTT seconds 26     No results found for: TROPONINT  ABG:No results found for: PHART, LAZ6JRF, PO2ART, UOK0FJF, R2OOVMOB, BEART, SOURCE    Imaging Studies: I have personally reviewed pertinent reports  and I have personally reviewed pertinent films in PACS    MRI brain w wo contrast    Result Date: 7/28/2022  Impression: New postoperative changes status post resection of left cerebellar metastases with expected postoperative blood products  Small region of marginal postoperative ischemia  Mildly increased edema  Mass effect on the fourth ventricle is also slightly increased    No hydrocephalus  Small amount of enhancement at the operative margin may be postoperative  Recommend follow-up  Stable postsurgical changes in the left parietal lobe without locally recurrent disease  Stable 5 mm left parietal lobe lesion adjacent to resection cavity with T1 shortening and no definite residual enhancement    The study was marked in EPIC for immediate notification  Workstation performed: [id number]     MRI brain w wo contrast    Result Date: 7/25/2022  Impression: Mixed treatment response - 4 3 cm hemorrhagic metastatic lesion in left cerebellum, increased in size since CT head 6/15/2022 but unchanged since 7/24/2022 - this is likely due to interval hemorrhage of known left cerebellar lesion    Moderate surrounding perilesional vasogenic edema in left cerebellum, posterior cerebellar vermis, and right posterior paramedian cerebellum with mild mass effect on the 4th ventricle  - 0 6 cm hemorrhagic metastatic lesion posterior to the dominant left cerebellar mass, decreased in size  - 0 5 cm left parietal lobe hemorrhagic lesion posterior to left parietal resection cavity, slightly decreased in size  - No evidence of recurrent disease in left parietal resection cavity  The study was marked in [facility] for immediate notification  Workstation performed: [id number]     CT chest abdomen pelvis w contrast    Result Date: 7/26/2022  Impression: 1  Multiple new bilateral irregular lung nodules suspicious for metastases  2   No findings of metastatic disease in the abdomen or pelvis  The study was marked in [facility] for immediate notification  Workstation performed: [id number]       EKG, Pathology, and Other Studies: I have personally reviewed pertinent reports        VTE Pharmacologic Prophylaxis: Heparin    VTE Mechanical Prophylaxis: sequential compression device

## 2022-07-30 NOTE — QUICK NOTE
Reviewed CT head with Dr Shayna Chawla and Dr Matthew Hagan  This demonstrates expected postoperative changes status post left occipital/retromastoid craniotomy with resection of a previously present large hyperdense/hemorrhagic left cerebellar metastasis  There is a small amount of residual hemorrhagic material in the   treatment bed with residual local edema and mass effect on the 4th ventricle  Small amounts of pneumocephalus and adjacent scalp gas and fluid surrounding the operative site noted, also expected postoperative changes  Stable 5 mm hyperdense lesion in the left parietal cortex (series 2, image 28) along the superior margin of prior treatment cavity possibly a hypercellular hyperdense /hemorrhagic cortical metastasis versus small hemorrhage or potentially even cavernous angioma, similar to recent brain MRIs  Continued clinical and imaging surveillance recommended  Some worsening compression of the aqueduct  Plan:  · Continue frequent neuro checks  · Stat CT head with any decline in GCS greater than 2 1 hr  · Continued Keppra 4 mg q 6 hours  · Hold all AP/AC medication at this time  · SBP<160  · Ongoing medical management and pain control per primary team  ? Continue to monitor hyponatremia  Na 126 this morning  Goal sodium >133, need to watch for SIADH  § Patient currently on salt tabs 2 g t i d  As well as fluid restriction of 2000, nephrology consulted input appreciated  ? Platelet >100 post operatively  133 this morning  Transfuse as necessary  ? Continue symptom management    Neurosurgical continue follow closely, call with any further questions or concerns

## 2022-07-30 NOTE — PLAN OF CARE
Problem: PAIN - ADULT  Goal: Verbalizes/displays adequate comfort level or baseline comfort level  Description: Interventions:  - Encourage patient to monitor pain and request assistance  - Assess pain using appropriate pain scale  - Administer analgesics based on type and severity of pain and evaluate response  - Implement non-pharmacological measures as appropriate and evaluate response  - Consider cultural and social influences on pain and pain management  - Notify physician/advanced practitioner if interventions unsuccessful or patient reports new pain  Outcome: Progressing     Problem: INFECTION - ADULT  Goal: Absence or prevention of progression during hospitalization  Description: INTERVENTIONS:  - Assess and monitor for signs and symptoms of infection  - Monitor lab/diagnostic results  - Monitor all insertion sites, i e  indwelling lines, tubes, and drains  - Monitor endotracheal if appropriate and nasal secretions for changes in amount and color  - [place] appropriate cooling/warming therapies per order  - Administer medications as ordered  - Instruct and encourage patient and family to use good hand hygiene technique  - Identify and instruct in appropriate isolation precautions for identified infection/condition  Outcome: Progressing     Problem: Knowledge Deficit  Goal: Patient/family/caregiver demonstrates understanding of disease process, treatment plan, medications, and discharge instructions  Description: Complete learning assessment and assess knowledge base    Interventions:  - Provide teaching at level of understanding  - Provide teaching via preferred learning methods  Outcome: Progressing

## 2022-07-30 NOTE — CONSULTS
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 in the setting of lung neuroendocrine carcinoma with brain metastasis, status post craniotomy, residual local mass effect on 4th ventricle  -workup include serum osmolality 269, urine sodium 126, urine osmolality 711 (noted status post Lasix, mannitol on 7/28/22 could be affecting urine results)  -repeat urine sodium, urine osmolality now  -check serum uric acid in a m   -given CT scan finding of residual mass effect on 4th ventricle, agree with starting hypertonic saline 3% 50 mL/hour  -BMP Q three hourly  -goal sodium correction no more than 8 mEq in 24 hours  -one serum sodium 130 or above, consider discontinue hypertonic saline  -consider Lasix 20 mg IV one dose after repeat urine sample is obtained  -may discontinue salt tablet for now while on hypertonic saline  Eventually restart salt tablet once hypertonic saline is discontinued  -consider discontinue Celexa    Primary lung neuroendocrine carcinoma with brain metastasis  -status post left retrosigmoid/posterior fossa craniotomy for resection of mass  -neurosurgery follow-up    Hypertension  -currently on diltiazem, metoprolol  -goal SBP less than 160 as per neurosurgery  -if BP remains above goal, consider doxazosin    Lisinopril can be considered but would avoid for now if any need for IV contrast arises for imaging purpose  -avoid HCTZ  -avoid significant BP variability    Discussed above plan in detail with ICU team    HISTORY OF PRESENT ILLNESS:  Requesting Physician: Merary Garcia MD  Reason for Consult:  Hyponatremia    Zack Robertson is a 69y o  year old male who was admitted to [facility] after presenting with dizziness  A renal consultation is requested today for assistance in the management of hyponatremia  Patient started to have worsening hyponatremia postoperatively  Noted that patient received IV fluid and also dose of Lasix on 7/28/22  Sodium level most recently 126 dropping  Patient at the time of my encounter oriented x3  Also wife present at bedside who provided part of the history  Patient underwent craniotomy as mentioned above  Currently remains in the ICU  Due to worsening hyponatremia, was started on salt tablet  Currently remains off any IV fluid  Has been having occasional nausea, receiving Zofran  Denies any vomiting at the time of my encounter      PAST MEDICAL HISTORY:  Past Medical History:   Diagnosis Date    [facility] [address] [facility]     Hyperlipidemia     Hypertension     Lung cancer ([facility] [address] )        PAST SURGICAL HISTORY:  Past Surgical History:   Procedure Laterality Date    BACK SURGERY      CRANIOTOMY Left 4/7/2022    Procedure: Image guided left parietal craniotomy for tumor resection;  Surgeon: Maryellen Jean-Baptiste MD;  [facility];  Service: Neurosurgery    CRANIOTOMY Left 7/28/2022    Procedure: left retrosigmoid/posterior fossa craniotomy for resction of mass with image guidence;  Surgeon: Maryellen Jean-Baptiste MD;  [facility];  Service: Neurosurgery    HEMORRHOID SURGERY      IR BIOPSY LUNG  5/6/2021    IR PORT PLACEMENT  6/17/2021    LAMINECTOMY  2018    L4-L5    LUNG SURGERY      left upper lobectomy    [place] BRONCHOSCOPY,DIAGNOSTIC N/A 5/25/2021    Procedure: BRONCHOSCOPY FLEXIBLE;  Surgeon: Jordan Drew MD;  [facility];  Service: Thoracic    [place] MEDIASTINOSCOPY WITH LYMPH NODE BIOPSY/IES N/A 2021    Procedure: MEDIASTINOSCOPY, flexible bronchoscopy;  Surgeon: Remi Romero MD;  [facility];  Service: Thoracic    TONSILLECTOMY         ALLERGIES:  Allergies   Allergen Reactions    Bee Venom     Benazepril Other (See Comments)     Angioedema     Latex Other (See Comments)     Burning of eyes at the dentist from the gloves    Meloxicam GI Intolerance    Penicillin G Rash       SOCIAL HISTORY:  Social History     Substance and Sexual Activity   Alcohol Use Yes    Alcohol/week: 7 0 standard drinks    Types: 7 Cans of beer per week    Comment: 1-2 beers nightly     Social History     Substance and Sexual Activity   Drug Use Not Currently    Types: Marijuana    Comment: seldom     Social History     Tobacco Use   Smoking Status Former Smoker    Packs/day: 1 00    Years: 40 00    Pack years: 40 00    Types: Cigarettes    Start date:     Quit date: 2017    Years since quittin 4   Smokeless Tobacco Never Used   Tobacco Comment    quit 2017       FAMILY HISTORY:  Family History   Problem Relation Age of Onset    Nephrolithiasis Father     Lung cancer Maternal Grandfather        MEDICATIONS:    Current Facility-Administered Medications:     acetaminophen (TYLENOL) tablet 975 mg, 975 mg, Oral, Q8H [address], MJ Gibson-C, 975 mg at 22 0515    allopurinol (ZYLOPRIM) tablet 100 mg, 100 mg, Oral, Daily, MJ Gibson-C, 100 mg at 22 0846    aluminum-magnesium hydroxide-simethicone (MYLANTA) oral suspension 30 mL, 30 mL, Oral, Q6H PRN, MJ Gibson-ELIZABETH    amiodarone tablet 400 mg, 400 mg, Oral, BID With Meals, Haseeb Del Toro MD, 400 mg at 22 0841    ascorbic acid (VITAMIN C) tablet 1,000 mg, 1,000 mg, Oral, Daily, MJ Gibson-C, 1,000 mg at 22 0847    atorvastatin (LIPITOR) tablet 20 mg, 20 mg, Oral, Daily With Dinner, MJ Gibson-C, 20 mg at 22 1633    bisacodyl (DULCOLAX) rectal suppository 10 mg, 10 mg, Rectal, Daily PRN, Mulu Ferrari PA-C    [COMPLETED] dexamethasone (DECADRON) tablet 4 mg, 4 mg, Oral, Q6H BASIA, 4 mg at 07/30/22 1324 **FOLLOWED BY** dexamethasone (DECADRON) tablet 4 mg, 4 mg, Oral, Q8H [address] **FOLLOWED BY** [START ON 8/1/2022] dexamethasone (DECADRON) tablet 2 mg, 2 mg, Oral, Q6H [address] **FOLLOWED BY** [START ON 8/4/2022] dexamethasone (DECADRON) tablet 2 mg, 2 mg, Oral, Q8H [address] **FOLLOWED BY** [START ON 8/5/2022] dexamethasone (DECADRON) tablet 2 mg, 2 mg, Oral, Q12H [address] **FOLLOWED BY** [START ON 8/8/2022] dexamethasone (DECADRON) tablet 2 mg, 2 mg, Oral, Q24H [address], Mulu Ferrari PA-C    diltiazem (CARDIZEM SR) 12 hr capsule 120 mg, 120 mg, Oral, Q12H [address], Mulu Ferrari PA-C, 120 mg at 07/30/22 0847    docusate sodium (COLACE) capsule 100 mg, 100 mg, Oral, BID, Mulu Ferrari PA-C, 100 mg at 07/30/22 0849    fentanyl citrate (PF) (FOR EMS ONLY) 100 mcg/2 mL injection **ADS Override Pull**, , , ,     heparin (porcine) subcutaneous injection 5,000 Units, 5,000 Units, Subcutaneous, Q8H [address], [address] Boyd PA-C, 5,000 Units at 07/30/22 0515    levothyroxine tablet 37 5 mcg, 37 5 mcg, Oral, Early Morning, Mulu Ferrari PA-C, 37 5 mcg at 07/30/22 0515    meclizine (ANTIVERT) tablet 25 mg, 25 mg, Oral, Q8H PRN, Mulu Ferrari PA-C, 25 mg at 07/27/22 0840    methocarbamol (ROBAXIN) tablet 500 mg, 500 mg, Oral, Q6H [address], Mulu Ferrari, PA-C, 500 mg at 07/30/22 1323    metoclopramide (REGLAN) injection 10 mg, 10 mg, Intravenous, Q6H PRN, Mikaela Holt DO, 10 mg at 07/29/22 0815    metoprolol succinate (TOPROL-XL) 24 hr tablet 50 mg, 50 mg, Oral, BID, Enriqueta Moore MD, 50 mg at 07/30/22 0845    ondansetron (ZOFRAN) injection 4 mg, 4 mg, Intravenous, Q6H PRN, MJ Gibson-C, 4 mg at 07/29/22 0511    oxyCODONE (ROXICODONE) IR tablet 2 5 mg, 2 5 mg, Oral, Q4H PRN, Classie Boron Ireifej, PA-C    pantoprazole (PROTONIX) EC tablet 40 mg, 40 mg, Oral, Early Morning, Mulu N Corinealma PA-C, 40 mg at 07/30/22 0516    polyethylene glycol (MIRALAX) packet 17 g, 17 g, Oral, BID, Mulu KEISHA Corinealma, PA-C, 17 g at 07/30/22 6950    scopolamine (TRANSDERM-SCOP) 1 mg/3 days TD 72 hr patch 1 patch, 1 patch, Transdermal, Q72H, Muluyang Ferrari, PA-C, 1 patch at 07/28/22 1505    senna (SENOKOT) tablet 8 6 mg, 1 tablet, Oral, Daily, MJ Gibson-C, 8 6 mg at 07/30/22 0849    sodium chloride (HYPERTONIC) 3 % infusion, 50 mL/hr, Intravenous, Continuous, Serene Mendez MD    sodium chloride tablet 2 g, 2 g, Oral, TID With Meals, Yari Holt DO, 2 g at 07/30/22 1327    REVIEW OF SYSTEMS:  More than 10 review of systems were attempted, overall review of system remains negative other than mentioned above        PHYSICAL EXAM:  Current Weight: Weight - Scale: 88 1 kg (194 lb 3 6 oz)  First Weight: Weight - Scale: 89 1 kg (196 lb 6 9 oz)  Vitals:    07/30/22 1305   BP: 146/81   Pulse: 68   Resp: 19   Temp:    SpO2: 95%       Intake/Output Summary (Last 24 hours) at 7/30/2022 1328  Last data filed at 7/30/2022 1320  Gross per 24 hour   Intake 1462 73 ml   Output 2741 ml   Net -1278 27 ml     Wt Readings from Last 3 Encounters:   07/30/22 88 1 kg (194 lb 3 6 oz)   07/25/22 86 2 kg (190 lb)   06/23/22 90 kg (198 lb 6 4 oz)     Temp Readings from Last 3 Encounters:   07/30/22 98 4 °F (36 9 °C) (Oral)   07/24/22 98 7 °F (37 1 °C) (Oral)   06/23/22 (!) 96 2 °F (35 7 °C)     BP Readings from Last 3 Encounters:   07/30/22 146/81   07/24/22 138/72   06/23/22 130/80     Pulse Readings from Last 3 Encounters:   07/30/22 68   07/24/22 105   06/23/22 80        Physical Examination:  General:  Lying in bed, no acute distress  Eyes:  No conjunctival pallor present  ENT:  External examination of ears and nose unremarkable  Neck:  No obvious lymphadenopathy appreciated  Respiratory:  Bilateral air entry present  CVS:  S1, S2 present  GI:  Soft, nondistended  CNS:  Active alert oriented x3 at the time of my encounter  Extremities:  No significant edema in legs  Psych:  Conscious, oriented  Skin:  No new rash    Invasive Devices:      Lab Results:   Results from last 7 days   Lab Units 07/30/22  0526 07/30/22  0109 07/29/22  1844 07/29/22  1459 07/29/22  1118 07/29/22  0535 07/28/22  1337 07/28/22  1002 07/28/22  0447 07/27/22  1824 07/27/22  1100 07/25/22  0443   WBC Thousand/uL 7 83  --   --  7 80  --  6 90 6 41  --  6 60 7 22 7 60 6 21   HEMOGLOBIN g/dL 12 4  --   --  11 7*  --  11 8* 11 7*  --  12 5 13 3 13 5 13 9   I STAT HEMOGLOBIN g/dl  --   --   --   --   --   --   --  9 5*  --   --   --   --    HEMATOCRIT % 34 9*  --   --  33 8*  --  34 3* 33 6*  --  36 7 39 0 39 5 42 0   HEMATOCRIT, ISTAT %  --   --   --   --   --   --   --  28*  --   --   --   --    PLATELETS Thousands/uL 133*  --   --  152  --  158 204  --  89* 106* 69* 76*   POTASSIUM mmol/L 4 0 4 2 4 2  --  3 7 3 6  --   --  3 7 4 1 4 2 3 9   CHLORIDE mmol/L 92* 95* 95*  --  96 96  --   --  101 96 97 99   CO2 mmol/L 28 28 27  --  26 27  --   --  28 27 26 23   CO2, I-STAT mmol/L  --   --   --   --   --   --   --  30  --   --   --   --    BUN mg/dL 17 18 15  --  21 19  --   --  28* 32* 34* 20   CREATININE mg/dL 0 66 0 57* 0 64  --  0 90 0 61  --   --  0 67 0 99 0 95 0 73   CALCIUM mg/dL 8 6 8 6 8 9  --  8 5 8 6  --   --  7 6* 8 6 9 0 8 7   MAGNESIUM mg/dL 2 2  --   --   --  2 0  --   --   --   --   --   --  2 4   PHOSPHORUS mg/dL 2 7  --   --   --   --   --   --   --   --   --   --   --    GLUCOSE, ISTAT mg/dl  --   --   --   --   --   --   --  138  --   --   --   --        Other Studies:   CT head wo contrast   Final Result by Mackenzie Gonsalez MD (07/30 9962)         1  Expected postoperative changes status post left occipital/retromastoid craniotomy with resection of a previously present large hyperdense/hemorrhagic left cerebellar metastasis    There is a small amount of residual hemorrhagic material in the    treatment bed with residual local edema and mass effect on the 4th ventricle  Small amounts of pneumocephalus and adjacent scalp gas and fluid surrounding the operative site noted, also expected postoperative changes  Continued clinical and imaging    surveillance recommended  2   Stable 5 mm hyperdense lesion in the left parietal cortex (series 2, image 28) along the superior margin of prior treatment cavity possibly a hypercellular hyperdense /hemorrhagic cortical metastasis versus small hemorrhage or potentially even    cavernous angioma, similar to recent brain MRIs  Continued clinical and imaging surveillance recommended  Workstation performed: [id number]         MRI brain w wo contrast   Final Result by Sulma Matta MD (07/28 5521)      New postoperative changes status post resection of left cerebellar metastases with expected postoperative blood products  Small region of marginal postoperative ischemia  Mildly increased edema  Mass effect on the fourth ventricle is also slightly    increased    No hydrocephalus  Small amount of enhancement at the operative margin may be postoperative  Recommend follow-up  Stable postsurgical changes in the left parietal lobe without locally recurrent disease  Stable 5 mm left parietal lobe lesion adjacent to resection cavity with T1 shortening and no definite residual enhancement         The study was marked in EPIC for immediate notification  Workstation performed: [id number]         CT chest abdomen pelvis w contrast   Final Result by Shaniqua Martinez MD (07/26 1394)      1  Multiple new bilateral irregular lung nodules suspicious for metastases  2   No findings of metastatic disease in the abdomen or pelvis  The study was marked in [facility] for immediate notification                       Workstation performed: [id number]         MRI brain w wo contrast   Final Result by Beena Vicente Johnson MD (07/25 1713)      Mixed treatment response   - 4 3 cm hemorrhagic metastatic lesion in left cerebellum, increased in size since CT head 6/15/2022 but unchanged since 7/24/2022 - this is likely due to interval hemorrhage of known left cerebellar lesion  Moderate surrounding perilesional vasogenic    edema in left cerebellum, posterior cerebellar vermis, and right posterior paramedian cerebellum with mild mass effect on the 4th ventricle    - 0 6 cm hemorrhagic metastatic lesion posterior to the dominant left cerebellar mass, decreased in size    - 0 5 cm left parietal lobe hemorrhagic lesion posterior to left parietal resection cavity, slightly decreased in size    - No evidence of recurrent disease in left parietal resection cavity  The study was marked in [facility] for immediate notification  Workstation performed: [id number]             Portions of the record may have been created with voice recognition software  Occasional wrong word or "sound a like" substitutions may have occurred due to the inherent limitations of voice recognition software  Read the chart carefully and recognize, using context, where substitutions have occurred

## 2022-07-30 NOTE — PROCEDURES
Insert PICC line    Date/Time: 7/30/2022 12:17 PM  Performed by: Schuyler Mathias RN  Authorized by: Omar Gifford DO     Patient location:  Bedside  Other Assisting Provider: Yes (comment) [facility])    Consent:     Consent obtained:  Written (Kenia Rosales obtained consent)  Universal protocol:     Procedure explained and questions answered to patient or proxy's satisfaction: yes      Relevant documents present and verified: yes      Test results available and properly labeled: yes      Radiology Images displayed and confirmed  If images not available, report reviewed: yes      Required blood products, implants, devices, and special equipment available: yes      Site/side marked: yes      Immediately prior to procedure, a time out was called: yes      Patient identity confirmed:  Arm band and verbally with patient  Pre-procedure details:     Hand hygiene: Hand hygiene performed prior to insertion      Sterile barrier technique: All elements of maximal sterile technique followed      Skin preparation:  ChloraPrep    Skin preparation agent: Skin preparation agent completely dried prior to procedure    Indications:     PICC line indications: medications requiring central line    Anesthesia (see MAR for exact dosages):      Anesthesia method:  Local infiltration    Local anesthetic:  Lidocaine 1% w/o epi (2ml)  Procedure details:     Location:  [place]    Vessel type: vein      Laterality:  Left    Site selection rationale:  Right arm has old IV infiltrate tat is slightly reddened     Approach: percutaneous technique used      Patient position:  Flat    Procedural supplies:  Triple lumen    Catheter size:  5 Fr    Landmarks identified: yes      Ultrasound guidance: yes      Ultrasound image availability:  Not saved    Sterile ultrasound techniques: Sterile gel and sterile probe covers were used      Successful placement: yes      Vessel of catheter tip end:  Sherlock 3CG confirmed    Total catheter length (cm): 49    Catheter out on skin (cm):  4    Max flow rate:  999ml/hr    Arm circumference:  27  Post-procedure details:     Post-procedure:  Securement device placed and dressing applied    Assessment:  Blood return through all ports    Patient tolerance of procedure:   Tolerated well, no immediate complications

## 2022-07-30 NOTE — ASSESSMENT & PLAN NOTE
 POD 2 from L retrosigmoid/posterior fossa craniotomy for resection of mass with Dr Greyson Marcelo (7/28/22)   Patient presented with lightheadedness, tinnitus and visual complaints   S/p L parietal craniotomy for  Resection of tumor on 4/7/2022 with Dr Uriostegui Brood Pathology from prior surgery- neuroendocrine tumor   Patient completed WBRT around 5/31/22   Preliminary path:Metastatic carcinoma   On exam this morning patient had decreased left lateral gaze with double vision and right gaze preference, sent down for stat CT head    Imaging:    MRI brain 7/28/2022:  Postoperative changes status post resection of left cerebellar metastasis with expected postoperative blood products  Small region of marginal perioperative ischemia  Mildly increased edema  Mildly increased 4th ventricular mass effect  No hydrocephalus  Small amount of enhancement at the operative margin  Plan:   · Ongoing frequent neurological checks  · Recommend STAT CT head for decline in GCS >2 points in 1 hour  · MRI brain completed post operatively   · CT CAP completed pre-operatively- Follow up recs from med onc  · Send patient down for STAT CT head this morning given change in exam  · Defer keppra given infratentorial location  · Decadron 4mg Q6 hours with taper entered  · DVT ppx: SCD's and SQH,  Stop if platelets decline   · Hold all AC/AP medication at this time  · Patient currently in a-fib with RVR- continue medication titration for rate control  Patient currently on amiodarone drip  · PT/OT recommending acute rehab  · SBP <160  · Ongoing medical management and pain control per primary team    · Continue to monitor hyponatremia  Na 126this morning  Goal sodium >133, need to watch for SIADH  · Patient currently on salt tabs 2 g t i d  As well as fluid restriction of 2000, nephrology consulted input appreciated  · Platelet >370 post operatively  133 this morning  Transfuse as necessary     · Continue symptom management  · CM following for dispo planning  Neurosurgery will continue to follow, call with any further questions or concerns

## 2022-07-31 PROBLEM — C7A.1: Status: ACTIVE | Noted: 2022-01-01

## 2022-07-31 PROBLEM — E87.1 HYPONATREMIA: Status: ACTIVE | Noted: 2022-01-01

## 2022-07-31 NOTE — PROGRESS NOTES
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 [facility])  Assessment & Plan   POD 3 from L retrosigmoid/posterior fossa craniotomy for resection of mass with Dr Chas Bautista (7/28/22)   Patient presented with lightheadedness, tinnitus and visual complaints   S/p L parietal craniotomy for  Resection of tumor on 4/7/2022 with Dr Rashida Rachel Pathology from prior surgery- neuroendocrine tumor   Patient completed WBRT around 5/31/22   Preliminary path:Metastatic carcinoma    Imaging:     MRI brain 7/28/2022:  Postoperative changes status post resection of left cerebellar metastasis with expected postoperative blood products  Small region of marginal perioperative ischemia  Mildly increased edema  Mildly increased 4th ventricular mass effect  No hydrocephalus  Small amount of enhancement at the operative margin  CT head wo 7/30/22:Expected postoperative changes status post left occipital/retromastoid craniotomy with resection of a previously present large hyperdense/hemorrhagic left cerebellar metastasis  There is a small amount of residual hemorrhagic material in the treatment bed with residual local edema and mass effect on the 4th ventricle  Small amounts of pneumocephalus and adjacent scalp gas and fluid surrounding the operative site noted, also expected postoperative changes  Stable 5 mm hyperdense lesion in the left parietal cortex (series 2, image 28) along the superior margin of prior treatment cavity possibly a hypercellular hyperdense /hemorrhagic cortical metastasis versus small hemorrhage or potentially even cavernous angioma, similar to recent brain MRIs  Continued clinical and imaging surveillance recommended      Plan:   · Ongoing frequent neurological checks  · Recommend STAT CT head for decline in GCS >2 points in 1 hour  · MRI brain completed post operatively   · CT CAP completed pre-operatively- Follow up recs from med onc  · Defer keppra given infratentorial location  · Decadron 4mg Q6 hours with taper entered  · DVT ppx: SCD's and SQH,  Stop if platelets decline   · Hold all AC/AP medication at this time  · PT/OT recommending acute rehab  · SBP <160  · Can try eye patch for double vision, suggest alternating eyes  · Ongoing medical management and pain control per primary team    · Continue to monitor hyponatremia  Na 132 this morning  Goal sodium >133, need to watch for SIADH  · Patient was given HTS and Lasix p r n  And now has transitioned to salt tabs 2 g t i d  And fluid restriction of 1500  nephrology consulted input appreciated  · Platelet >444 post operatively  114 this morning  Transfuse as necessary  · Continue symptom management  · CM following for dispo planning  Neurosurgery will continue to follow, call with any further questions or concerns  Atrial fibrillation with rapid ventricular response (HCC)  Assessment & Plan  · A-fib with RVR on admission   · Previously noted in Outpatient PCP note   · Patient currently on PO amiodarone, Cardizem, and metoprolol  · Cardiology following, input appreciated  · Continue rate control at this time  Thrombocytopenia ([address] )  Assessment & Plan  · Continue to monitor at this time  · Peripheral smear and additional labs work completed per oncology recommendations  · Appreciate ongoing oncology evaluation and management  · Plan to maintain platelets >150,143 in the acute post operative setting through the weekend than >75 thereafter   · Transfuse as needed  Subjective/Objective     Chief Complaint: "I am okay"    Subjective:  Patient currently complaining of a slight headache  He reports his dizziness is the same  He states his appetite has improved  He also endorses left eye floater as well as continue double vision  He denies any chest pain, shortness of breath, abdominal pain, nausea, vomiting, diarrhea, no problems bowel, no new weakness or numbness/tingling  Patient has Corea catheter in place  Patient continues to have some limited left lateral gaze when tested  Objective:  Patient comfortably lying in bed, NAD  I/O       07/29 0701  07/30 0700 07/30 0701 07/31 0700 07/31 0701 08/01 0700    P  O  960 840 300    I V  (mL/kg) 1520 3 (17 3) 540 8 (6 1)     Blood       IV Piggyback 100      Total Intake(mL/kg) 2580 3 (29 3) 1380 8 (15 7) 300 (3 4)    Urine (mL/kg/hr) 2500 (1 2) 3271 (1 5) 1080 (2 3)    Stool  0     Total Output 2500 3271 1080    Net +80 3 -1890 2 -780           Unmeasured Urine Occurrence 2 x 1 x     Unmeasured Stool Occurrence  2 x           Invasive Devices  Report    Peripherally Inserted Central Catheter Line  Duration           PICC Line 92/53/43 Left Basilic 1 day          Central Venous Catheter Line  Duration           Port A Cath 06/17/21 Right Chest 408 days          Peripheral Intravenous Line  Duration           Peripheral IV 07/29/22 Distal;Left;Upper;Ventral (anterior) Arm 1 day          Drain  Duration           Urethral Catheter 16 Fr  <1 day                Physical Exam:  Vitals: Blood pressure 142/87, pulse 68, temperature 98 3 °F (36 8 °C), temperature source Oral, resp  rate 18, height 5' 8" (1 727 m), weight 88 1 kg (194 lb 3 6 oz), SpO2 98 %  ,Body mass index is 29 53 kg/m²  Hemodynamic Monitoring: MAP: Arterial Line MAP (mmHg): 116 mmHg    General appearance: alert, appears stated age, cooperative and no distress  Head: Normocephalic, previous incision healing well    Left craniotomy incision clean, dry, intact no signs of active drainage  Eyes: EOMI except some limited left lateral gaze, PEERL, conjugate gaze some nystagmus noted  Neck: supple, symmetrical, trachea midline   Lungs: non labored breathing, on 2L NC  Heart: regular heart rate  Neurologic:   Mental status: Alert, oriented x3, thought content appropriate, speech is clear, following commands  Cranial nerves: grossly intact (Cranial nerves II-XII) except as noted above  Sensory: normal to light touch in all extremities x4  Motor: moving all extremities without focal weakness, strength 5/5 throughout  Reflexes: 2+ and symmetric, no Alcaraz's or clonus appreciated  Coordination: finger to nose normal bilaterally, no drift bilaterally      Lab Results:  Results from last 7 days   Lab Units 07/31/22  0607 07/30/22  0526 07/29/22  1459 07/29/22  0535 07/28/22  1337 07/28/22  1002 07/28/22  0447 07/27/22  1100 07/25/22  0443 07/24/22  1355   WBC Thousand/uL 7 53 7 83 7 80   < > 6 41  --  6 60   < > 6 21 6 37   HEMOGLOBIN g/dL 12 1 12 4 11 7*   < > 11 7*  --  12 5   < > 13 9 14 7   I STAT HEMOGLOBIN   --   --   --   --   --    < >  --   --   --   --    HEMATOCRIT % 35 8* 34 9* 33 8*   < > 33 6*  --  36 7   < > 42 0 42 4   HEMATOCRIT, ISTAT   --   --   --   --   --    < >  --   --   --   --    PLATELETS Thousands/uL 114* 133* 152   < > 204  --  89*   < > 76* 90*   NEUTROS PCT %  --   --   --   --   --   --  93*  --   --  89*   MONOS PCT %  --   --   --   --   --   --  3*  --   --  5   MONO PCT %  --   --   --   --  2*  --   --   --  4  --     < > = values in this interval not displayed       Results from last 7 days   Lab Units 07/31/22  0607 07/31/22  0022 07/30/22  2142 07/29/22  0535 07/28/22  1002 07/27/22  1100 07/25/22  0443 07/24/22  1355   POTASSIUM mmol/L 3 5 3 5 3 6   < >  --    < > 3 9 4 0   CHLORIDE mmol/L 93* 94* 97   < >  --    < > 99 92*   CO2 mmol/L 33* 31 30   < >  --    < > 23 23   CO2, I-STAT mmol/L  --   --   --   --  30  --   --   --    BUN mg/dL 19 19 19   < >  --    < > 20 22   CREATININE mg/dL 0 62 0 59* 0 70   < >  --    < > 0 73 1 06   CALCIUM mg/dL 8 4 8 4 8 2*   < >  --    < > 8 7 8 6   ALK PHOS U/L  --   --   --   --   --   --  53 58   ALT U/L  --   --   --   --   --   --  55 66   AST U/L  --   --   --   --   --   --  23 19   GLUCOSE, ISTAT mg/dl  --   --   --   --  138  --   --   --     < > = values in this interval not displayed  Results from last 7 days   Lab Units 07/31/22  0607 07/30/22  0526 07/29/22  1118   MAGNESIUM mg/dL 2 2 2 2 2 0     Results from last 7 days   Lab Units 07/31/22  0607 07/30/22  0526   PHOSPHORUS mg/dL 3 5 2 7     Results from last 7 days   Lab Units 07/29/22  0535   INR  0 99   PTT seconds 26     No results found for: TROPONINT  ABG:No results found for: PHART, CBA6XOG, PO2ART, AQB3NDS, L5YBIUQK, BEART, SOURCE    Imaging Studies: I have personally reviewed pertinent reports  and I have personally reviewed pertinent films in PACS    CT head wo contrast    Result Date: 7/30/2022  Impression: 1  Expected postoperative changes status post left occipital/retromastoid craniotomy with resection of a previously present large hyperdense/hemorrhagic left cerebellar metastasis  There is a small amount of residual hemorrhagic material in the treatment bed with residual local edema and mass effect on the 4th ventricle  Small amounts of pneumocephalus and adjacent scalp gas and fluid surrounding the operative site noted, also expected postoperative changes  Continued clinical and imaging surveillance recommended  2   Stable 5 mm hyperdense lesion in the left parietal cortex (series 2, image 28) along the superior margin of prior treatment cavity possibly a hypercellular hyperdense /hemorrhagic cortical metastasis versus small hemorrhage or potentially even cavernous angioma, similar to recent brain MRIs  Continued clinical and imaging surveillance recommended  Workstation performed: [id number]     MRI brain w wo contrast    Result Date: 7/28/2022  Impression: New postoperative changes status post resection of left cerebellar metastases with expected postoperative blood products   Small region of marginal postoperative ischemia  Mildly increased edema  Mass effect on the fourth ventricle is also slightly increased    No hydrocephalus  Small amount of enhancement at the operative margin may be postoperative  Recommend follow-up  Stable postsurgical changes in the left parietal lobe without locally recurrent disease  Stable 5 mm left parietal lobe lesion adjacent to resection cavity with T1 shortening and no definite residual enhancement    The study was marked in EPIC for immediate notification  Workstation performed: [id number]     MRI brain w wo contrast    Result Date: 7/25/2022  Impression: Mixed treatment response - 4 3 cm hemorrhagic metastatic lesion in left cerebellum, increased in size since CT head 6/15/2022 but unchanged since 7/24/2022 - this is likely due to interval hemorrhage of known left cerebellar lesion  Moderate surrounding perilesional vasogenic edema in left cerebellum, posterior cerebellar vermis, and right posterior paramedian cerebellum with mild mass effect on the 4th ventricle  - 0 6 cm hemorrhagic metastatic lesion posterior to the dominant left cerebellar mass, decreased in size  - 0 5 cm left parietal lobe hemorrhagic lesion posterior to left parietal resection cavity, slightly decreased in size  - No evidence of recurrent disease in left parietal resection cavity  The study was marked in [facility] for immediate notification  Workstation performed: [id number]     CT chest abdomen pelvis w contrast    Result Date: 7/26/2022  Impression: 1  Multiple new bilateral irregular lung nodules suspicious for metastases  2   No findings of metastatic disease in the abdomen or pelvis  The study was marked in [facility] for immediate notification  Workstation performed: [id number]       EKG, Pathology, and Other Studies: I have personally reviewed pertinent reports        VTE Pharmacologic Prophylaxis: Heparin    VTE Mechanical Prophylaxis: sequential compression device

## 2022-07-31 NOTE — ASSESSMENT & PLAN NOTE
Primary lung neuroendocrine carcinoma with brain metastasis  ? Oncology following, appreciate recommendations  ? S/p lung resection November 2021  ?  Chemotherapy, RXT

## 2022-07-31 NOTE — ASSESSMENT & PLAN NOTE
· A-fib with RVR on admission   · Previously noted in Outpatient PCP note   · Patient currently on PO amiodarone, Cardizem, and metoprolol  · Cardiology following, input appreciated  · Continue rate control at this time

## 2022-07-31 NOTE — ASSESSMENT & PLAN NOTE
· Metastatic left lung adenocarcinoma  ? S/p left upper lobe resection, radiation/chemotherapy  ? CT CAP:  Multiple new bilateral irregular lung nodules suspicious for metastases, no metastatic disease in abdomen or pelvis  ? Oncology following, appreciate recommendations  ? Titrate FiO2 for SpO2 > 92%  ?  Encourage pulmonary toilet

## 2022-07-31 NOTE — ASSESSMENT & PLAN NOTE
· Hyponatremia -- Likely SIADH  ? Serum Na 131 on 7/24/22, 134 on 7/28/22, 126 7/30/22  ? Serum osmo 269  ? Urine osmolality 711, urine sodium 126 -- following Lasix  Repeat urine osmolality 534, urine sodium 81  ? Uric acid 7 9  ? Was started on NaCl 3% with rise in serum Na to 133 -- hypertonic NaCl discontinued and sodium tabs started  ? Continue diet with fluid restriction 1500 mL  ? Continue BMP q3h  ? Avoid overcorrection -- no more than 8 mEq in 24 hours  ? Celexa has been discontinued  ? Nephrology following, appreciate recommendations  ? I/Os close monitoring  ?  Monitor urine output and renal indices

## 2022-07-31 NOTE — TREATMENT PLAN
Sodium improved to 133  Discussed with critical care - recommended to stop hypertonic saline  Also to start salt tablet 2 gm bid

## 2022-07-31 NOTE — PROGRESS NOTES
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 [facility])  Active Problems:    Essential hypertension    Mixed hyperlipidemia    History of gout    Thrombocytopenia ([facility] [address] )    Hypothyroidism    Atrial fibrillation with rapid ventricular response ([facility] [address] )      Plan:  Patient with no significant issue overnight  He is postop day three after left craniotomy for resection of tumor  He is in sinus rhythm on telemetry with intermittent PACs  He is off intravenous amiodarone  On oral amiodarone and metoprolol  Receiving hypertonic saline  BMP today with potassium of 3 5 and creatinine of 0 62  Serum sodium 132  Hemoglobin 12 1  Potassium supplemented  Will continue current cardiac regimen  Subjective:   Patient seen and examined  No significant events overnight   negative  Objective:     Vitals: Blood pressure 137/85, pulse 68, temperature 98 1 °F (36 7 °C), temperature source Oral, resp  rate 17, height 5' 8" (1 727 m), weight 88 1 kg (194 lb 3 6 oz), SpO2 99 %  , Body mass index is 29 53 kg/m² ,   Orthostatic Blood Pressures    Flowsheet Row Most Recent Value   Blood Pressure 137/85 filed at 07/31/2022 0700   Patient Position - Orthostatic VS Lying filed at 07/27/2022 1908      ,      Intake/Output Summary (Last 24 hours) at 7/31/2022 3882  Last data filed at 7/31/2022 0600  Gross per 24 hour   Intake 1380 8 ml   Output 3060 ml   Net -1679 2 ml       No significant arrhythmias seen on telemetry review         Physical Exam:    GEN: Carlos Degroot  NECK: supple, no carotid bruits, no JVD or HJR  HEART: normal rate, regular rhythm, normal S1 and S2, no murmurs, clicks, gallops or rubs   LUNGS: clear to auscultation bilaterally; no wheezes, rales, or rhonchi   ABDOMEN: normal bowel sounds, soft, no tenderness, no distention  EXTREMITIES: peripheral pulses normal; no clubbing, cyanosis, or edema  SKIN: warm and well perfused, no suspicious lesions on exposed skin    Labs & Results:    No results displayed because visit has over 200 results  XR chest 2 views    Result Date: 7/25/2022  Narrative: CHEST INDICATION:   AFib RVR, lightheadedness, palpitations  COMPARISON:  01/24/2017  CT scan on 06/15/2022 EXAM PERFORMED/VIEWS:  XR CHEST PA & LATERAL Images: 3 FINDINGS: Cardiomediastinal silhouette appears unremarkable  Right-sided MediPort terminating in the SVC  The lungs are hyperinflated  Postoperative changes in the left lung  Scarring in the left midlung  No pneumothorax or pleural effusion  Osseous structures appear within normal limits for patient age  Impression: No acute cardiopulmonary disease  Hyperinflation  Workstation performed: [id number]     CT head wo contrast    Result Date: 7/30/2022  Narrative: CT BRAIN - WITHOUT CONTRAST INDICATION:   change in exam   History of metastatic neuroendocrine carcinoma with brain metastases  COMPARISON:  7/24/2022; progress note 7/30/2022; 7/28/2022; 7/25/2022 TECHNIQUE:  CT examination of the brain was performed  In addition to axial images, sagittal and coronal 2D reformatted images were created and submitted for interpretation  Radiation dose length product (DLP) for this visit:  902 14 mGy-cm   This examination, like all CT scans performed in the [facility], was performed utilizing techniques to minimize radiation dose exposure, including the use of iterative  reconstruction and automated exposure control  IMAGE QUALITY:  Diagnostic  FINDINGS: PARENCHYMA:  Evolving edema gliosis and small amounts of hemorrhagic material in the left cerebellum subjacent to a new left occipital/retromastoid craniotomy (series 3, image 13) indicative of interval resection of a large previously present hyperdense/hemorrhagic cerebellar metastasis and expected postoperative changes    There is residual local mass effect on the 4th ventricle as well as effacement of the left ambient cistern without overt hydrocephalus  Separately in the left parietal lobe there is redemonstration of a previously present craniotomy  There is a small amount of high density along the extra-axial space posteriorly on series 2 image 32 likely related to dural closure material, stable  Cystic encephalomalacia and gliosis in the treatment bed in the left parietal vertex again noted  Along the posterior margin of this treatment bed/gliotic region, there is a 0 5 cm rounded high density lesion, image 28, series 2 possibly a hyperdense or  hypercellular metastasis versus small hemorrhage or less likely cavernous angioma, similar to previous MR imaging  VENTRICLES AND EXTRA-AXIAL SPACES:  4th ventricle is effaced  No hydrocephalus  Small amount of pneumocephalus extra-axial fluid and gas subjacent left occipital craniotomy  VISUALIZED ORBITS AND PARANASAL SINUSES:  Right maxillary sinus mucus retention cyst  CALVARIUM AND EXTRACRANIAL SOFT TISSUES:  Postoperative changes in the scalp with gas and strandy densities adjacent to the left occipital craniotomy  Impression: 1  Expected postoperative changes status post left occipital/retromastoid craniotomy with resection of a previously present large hyperdense/hemorrhagic left cerebellar metastasis  There is a small amount of residual hemorrhagic material in the treatment bed with residual local edema and mass effect on the 4th ventricle  Small amounts of pneumocephalus and adjacent scalp gas and fluid surrounding the operative site noted, also expected postoperative changes  Continued clinical and imaging surveillance recommended   2   Stable 5 mm hyperdense lesion in the left parietal cortex (series 2, image 28) along the superior margin of prior treatment cavity possibly a hypercellular hyperdense /hemorrhagic cortical metastasis versus small hemorrhage or potentially even cavernous angioma, similar to recent brain MRIs  Continued clinical and imaging surveillance recommended  Workstation performed: [id number]     CT head without contrast    Result Date: 7/24/2022  Narrative: CT BRAIN - WITHOUT CONTRAST INDICATION:   Syncope, recurrent Brain metastases suspected Lightheadedness, brain metastasis  COMPARISON:  6/15/2022 TECHNIQUE:  CT examination of the brain was performed  In addition to axial images, sagittal and coronal 2D reformatted images were created and submitted for interpretation  Radiation dose length product (DLP) for this visit:  864 mGy-cm   This examination, like all CT scans performed in the [facility], was performed utilizing techniques to minimize radiation dose exposure, including the use of iterative reconstruction and automated exposure control  IMAGE QUALITY:  Diagnostic  FINDINGS: PARENCHYMA:  Left cerebellar dense mass measures 3 9 x 3 6 cm, previously measuring 1 8 x 1 6 cm  There is associated vasogenic edema with mass effect upon the 4th ventricle  Adjacent to the mass is a 5 mm density (2/12)  Left parietal cortical lesion measures 6 mm, previously measuring 9 mm  There is redemonstration of left parietal encephalomalacia  VENTRICLES AND EXTRA-AXIAL SPACES:  Normal for the patient's age  VISUALIZED ORBITS AND PARANASAL SINUSES:  There is a retention cyst versus polyp in the right maxillary sinus  There is opacity in the right mastoid air cells  CALVARIUM AND EXTRACRANIAL SOFT TISSUES:  Left parietal craniotomy  Impression: The left cerebellar mass has increased in size, now measuring 3 9 x 3 6 cm  There is associated vasogenic edema with mass effect upon the 4th ventricle  There is a new 5 mm high density nodule in the left cerebellum  Left posterior parietal cortical nodule appears smaller than prior  The study was marked in [facility] for immediate notification   Workstation performed: [id number]     MRI brain w wo contrast    Result Date: 7/28/2022  Narrative: MRI BRAIN WITH AND WITHOUT CONTRAST INDICATION: Postop status post resection of left cerebellar metastases  COMPARISON:  MRI dated 7/25/2022  TECHNIQUE: Sagittal T1, axial T2, axial FLAIR, axial T1, axial [place], axial diffusion  Sagittal, axial T1 postcontrast   Axial bravo postcontrast with coronal reconstructions  IV Contrast:  10 mL of Gadobutrol injection (SINGLE-DOSE)  IMAGE QUALITY:   Diagnostic  FINDINGS: BRAIN PARENCHYMA:  New postoperative changes status post left suboccipital craniotomy for resection of large hemorrhagic left cerebellar metastasis as well as the smaller adjacent metastasis  Small subjacent extra-axial collection  Expected postoperative blood products within the operative cavity  Although evaluation for enhancement is somewhat limited due to presence of T1 hyperintense blood products, there is some mild enhancement seen at the posterior inferior aspect of the operative cavity  Possibly postoperative but follow-up anticipated    There is restricted diffusion at the inferior margin of the resection cavity that may be due to combination of marginal ischemia as well as blood products    Surrounding edema is mildly increased  Mass effect on the fourth ventricle is also mildly increased  Stable postsurgical cavity in the left parietal lobe with hemosiderin  Stable surrounding FLAIR signal  No enhancement to suggest recurrent disease  Stable 5 mm lesion in the left parietal lobe with susceptibility artifact adjacent to the resection cavity  There is is T1 shortening within the lesion on precontrast sequence (best appreciated on sagittal image 10 series 5) without definite enhancement    Stable surrounding FLAIR signal  No new lesion identified  VENTRICLES:  Stable  No hydrocephalus  SELLA AND PITUITARY GLAND:  Normal  ORBITS:  Stable bilateral proptosis  PARANASAL SINUSES:  Right maxillary sinus retention cyst  Partial opacification of the right mastoid air cells  VASCULATURE:  Evaluation of the major intracranial vasculature demonstrates appropriate flow voids  CALVARIUM AND SKULL BASE:  Postoperative changes status post left suboccipital craniotomy  EXTRACRANIAL SOFT TISSUES:  Normal      Impression: New postoperative changes status post resection of left cerebellar metastases with expected postoperative blood products  Small region of marginal postoperative ischemia  Mildly increased edema  Mass effect on the fourth ventricle is also slightly increased    No hydrocephalus  Small amount of enhancement at the operative margin may be postoperative  Recommend follow-up  Stable postsurgical changes in the left parietal lobe without locally recurrent disease  Stable 5 mm left parietal lobe lesion adjacent to resection cavity with T1 shortening and no definite residual enhancement    The study was marked in EPIC for immediate notification  Workstation performed: [id number]     MRI brain w wo contrast    Result Date: 7/25/2022  Narrative: MRI BRAIN WITH AND WITHOUT CONTRAST INDICATION: brain mass  COMPARISON:  CT head without contrast 7/24/2022, 6/15/2022  MRI brain with and without contrast 4/26/2022, 4/8/2022  TECHNIQUE: Sagittal T1, axial T2, axial FLAIR, axial T1, axial [place], axial diffusion  Sagittal, axial T1 postcontrast   Axial bravo postcontrast with coronal reconstructions  IV Contrast:  9 mL of Gadobutrol injection (SINGLE-DOSE)  IMAGE QUALITY:   Diagnostic  FINDINGS: BRAIN PARENCHYMA: Postsurgical changes of left parietal craniotomy for resection of left parietal mass  Unchanged left parietal resection cavity with encephalomalacia, gliosis, and peripheral hemosiderin deposition  No abnormal masslike enhancement in left parietal resection cavity to suggest recurrent disease   A few enhancing hemorrhagic metastatic lesions, for reference: - 0 5 x 0 5 cm hemorrhagic enhancing lesion in left parietal lobe posterior to left parietal resection cavity (12:81), previously 0 8 x 0 7 cm on CT head 6/15/2022 but unchanged since 7/24/2022  - 4 3 x 3 6 cm hemorrhagic enhancing mass in left cerebellum (12:38), previously 1 8 x 1 4 cm  Unchanged mass effect on the 4th ventricle since yesterday's CT head without contrast   Moderate surrounding perilesional vasogenic edema in left cerebellum, posterior cerebellar vermis, and right posterior paramedian cerebellum  - 0 6 x 0 6 cm hemorrhagic enhancing mass posterior to the dominant left cerebellar mass (12:40), previously 1 5 x 1 4 cm cm  No midline shift  No diffusion-weighted signal abnormality to suggest acute infarction  A few small scattered hyperintensities on T2/FLAIR imaging are noted in the periventricular and subcortical white matter demonstrating an appearance that is statistically most likely to represent minimal microangiopathic change  VENTRICLES:  Unchanged mass effect on the 4th ventricle  No obstructive hydrocephalus  No intraventricular hemorrhage  SELLA AND PITUITARY GLAND:  Normal  ORBITS:  Unchanged bilateral globe proptosis  PARANASAL SINUSES:  Moderate size right maxillary mucus retention cyst  VASCULATURE:  Evaluation of the major intracranial vasculature demonstrates appropriate flow voids  CALVARIUM AND SKULL BASE: Left parietal craniotomy  Small bilateral mastoid effusions (right worse than left)  EXTRACRANIAL SOFT TISSUES:  Normal      Impression: Mixed treatment response - 4 3 cm hemorrhagic metastatic lesion in left cerebellum, increased in size since CT head 6/15/2022 but unchanged since 7/24/2022 - this is likely due to interval hemorrhage of known left cerebellar lesion    Moderate surrounding perilesional vasogenic edema in left cerebellum, posterior cerebellar vermis, and right posterior paramedian cerebellum with mild mass effect on the 4th ventricle  - 0 6 cm hemorrhagic metastatic lesion posterior to the dominant left cerebellar mass, decreased in size  - 0 5 cm left parietal lobe hemorrhagic lesion posterior to left parietal resection cavity, slightly decreased in size  - No evidence of recurrent disease in left parietal resection cavity  The study was marked in [facility] for immediate notification  Workstation performed: [id number]     CT chest abdomen pelvis w contrast    Result Date: 7/26/2022  Narrative: CT CHEST, ABDOMEN AND PELVIS WITH IV CONTRAST INDICATION:   Brain/CNS neoplasm, staging Non-small cell lung cancer (NSCLC), monitor Non-small cell lung cancer (NSCLC), metastatic, assess treatment response h/o lung cancer, new brain mets  Evaluate for staging  COMPARISON:  CT chest abdomen pelvis 6/15/2022 TECHNIQUE: CT examination of the chest, abdomen and pelvis was performed  Axial, sagittal, and coronal 2D reformatted images were created from the source data and submitted for interpretation  Radiation dose length product (DLP) for this visit:  1208 13 mGy-cm   This examination, like all CT scans performed in the [facility], was performed utilizing techniques to minimize radiation dose exposure, including the use of iterative reconstruction and automated exposure control  IV Contrast:  65 mL of iohexol (OMNIPAQUE) Enteric contrast was not administered  FINDINGS: CHEST LUNGS:  Moderate emphysema  Postsurgical changes from left upper lobectomy  There are multiple new bilateral irregular lung nodules suspicious for metastases, with examples as follows: -Right upper lobe juxtapleural nodule measuring 1 6 x 2 5 cm (series 3 image 48)  -Right upper lobe perifissural nodule measuring 1 4 x 1 8 cm (series 3 image 61)  -Left lower lobe juxtapleural nodule measuring 1 9 x 1 5 cm (series 3 image 93)  -Left lower lobe nodule measuring 1 6 x 1 3 cm (series 3 image 70)  PLEURA:  Unremarkable  HEART/GREAT VESSELS:  Heart is unremarkable for patient's age  No thoracic aortic aneurysm  MEDIASTINUM AND OWEN:  Unremarkable  CHEST WALL AND LOWER NECK:  Right chest wall port with the tip in the SVC   ABDOMEN LIVER/BILIARY TREE:  Hepatic steatosis  Otherwise unremarkable  GALLBLADDER:  No calcified gallstones  No pericholecystic inflammatory change  SPLEEN:  Nonspecific hypodense splenic lesion measuring approximately 1 7 cm, seen on prior studies since 10/10/2017  PANCREAS:  Unremarkable  ADRENAL GLANDS:  Unremarkable  KIDNEYS/URETERS:  Bilateral renal cysts and subcentimeter too small to characterize hypodensities  No hydronephrosis  STOMACH AND BOWEL:  Colonic diverticulosis without findings of acute diverticulitis  APPENDIX:  Normal  ABDOMINOPELVIC CAVITY:  No ascites  No pneumoperitoneum  No lymphadenopathy  VESSELS:  Marked atherosclerotic changes  No abdominal aortic aneurysm  PELVIS REPRODUCTIVE ORGANS:  Unremarkable for patient's age  URINARY BLADDER:  Diffusely increased density fluid within the bladder  Otherwise unremarkable  ABDOMINAL WALL/INGUINAL REGIONS:  Unremarkable  OSSEOUS STRUCTURES:  No acute fracture or destructive osseous lesion  Degenerative changes of the spine  Postsurgical change in left ribs from thoracotomy  Impression: 1  Multiple new bilateral irregular lung nodules suspicious for metastases  2   No findings of metastatic disease in the abdomen or pelvis  The study was marked in [facility] for immediate notification  Workstation performed: [id number]       EKG personally reviewed by Moises Rossi MD      Counseling / Coordination of Care  Total floor / unit time spent today 30 minutes  Greater than 50% of total time was spent with the patient and / or family counseling and / or coordination of care

## 2022-07-31 NOTE — ASSESSMENT & PLAN NOTE
· Primary lung neuroendocrine carcinoma with brain metastasis; POD#3 left retrosigmoid/posterior fossa craniotomy, resection of left cerebellar mass  ? Continue serial neuro checks  ? Stat CT head for decline in GCS > 2 in 1 hour  ? Holding all AC/AP 2/2 recent neurological procedure  § Per neurosurgery okay for DVT prophylaxis with heparin  ? Continue Decadron 4 mg q 6 hours with taper  ? Maintain goal SBP less than 160  ? Hyponatremia treatment as below  ? PT/OT -- recommending acute rehab  ?  Continue scopolamine, Zofran, Reglan for nausea

## 2022-07-31 NOTE — ASSESSMENT & PLAN NOTE
· Baseline 90s to 100  · CBC daily  · Transfuse if < 100, or active bleeding  · DVT prophylaxis with heparin SC

## 2022-07-31 NOTE — ASSESSMENT & PLAN NOTE
 POD 3 from L retrosigmoid/posterior fossa craniotomy for resection of mass with Dr Ethel Tyler (7/28/22)   Patient presented with lightheadedness, tinnitus and visual complaints   S/p L parietal craniotomy for  Resection of tumor on 4/7/2022 with Dr Jl Sequeira Pathology from prior surgery- neuroendocrine tumor   Patient completed WBRT around 5/31/22   Preliminary path:Metastatic carcinoma    Imaging:     MRI brain 7/28/2022:  Postoperative changes status post resection of left cerebellar metastasis with expected postoperative blood products  Small region of marginal perioperative ischemia  Mildly increased edema  Mildly increased 4th ventricular mass effect  No hydrocephalus  Small amount of enhancement at the operative margin  CT head wo 7/30/22:Expected postoperative changes status post left occipital/retromastoid craniotomy with resection of a previously present large hyperdense/hemorrhagic left cerebellar metastasis  There is a small amount of residual hemorrhagic material in the treatment bed with residual local edema and mass effect on the 4th ventricle  Small amounts of pneumocephalus and adjacent scalp gas and fluid surrounding the operative site noted, also expected postoperative changes  Stable 5 mm hyperdense lesion in the left parietal cortex (series 2, image 28) along the superior margin of prior treatment cavity possibly a hypercellular hyperdense /hemorrhagic cortical metastasis versus small hemorrhage or potentially even cavernous angioma, similar to recent brain MRIs  Continued clinical and imaging surveillance recommended  Plan:   · Ongoing frequent neurological checks  · Recommend STAT CT head for decline in GCS >2 points in 1 hour  · MRI brain completed post operatively   · CT CAP completed pre-operatively- Follow up recs from med onc  · Defer keppra given infratentorial location  · Decadron 4mg Q6 hours with taper entered  · DVT ppx: SCD's and SQH,    Stop if platelets decline   · Hold all AC/AP medication at this time  · PT/OT recommending acute rehab  · SBP <160  · Can try eye patch for double vision, suggest alternating eyes  · Ongoing medical management and pain control per primary team    · Continue to monitor hyponatremia  Na 132 this morning  Goal sodium >133, need to watch for SIADH  · Patient was given HTS and Lasix p r n  And now has transitioned to salt tabs 2 g t i d  And fluid restriction of 1500  nephrology consulted input appreciated  · Platelet >357 post operatively  114 this morning  Transfuse as necessary  · Continue symptom management  · CM following for dispo planning  Neurosurgery will continue to follow, call with any further questions or concerns

## 2022-07-31 NOTE — ASSESSMENT & PLAN NOTE
· Continue to monitor at this time  · Peripheral smear and additional labs work completed per oncology recommendations  · Appreciate ongoing oncology evaluation and management  · Plan to maintain platelets >247,236 in the acute post operative setting through the weekend than >75 thereafter   · Transfuse as needed

## 2022-07-31 NOTE — PROGRESS NOTES
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 in the setting of lung neuroendocrine carcinoma with brain metastasis, status post craniotomy, residual local mass effect on 4th ventricle  -workup include serum osmolality 269, urine sodium 126, urine osmolality 711 (noted status post Lasix, mannitol on 7/28/22 affecting urine results)  -repeat urine sodium 81, urine osmolality 534  -given CT scan finding of residual mass effect on 4th ventricle, patient was started on 3% hypertonic saline yesterday with improvement in serum sodium up to 133 when IV fluid was discontinued    -sodium stable 132 today  -increase salt tablet to 2 g p o  T i d , continue fluid restriction 1 5 L per day  -recommend Lasix 20 mg IV once again today along with potassium supplement  -BMP Q six hourly  -goal sodium correction no more than 8 mEq in 24 hours  -Celexa discontinued     Primary lung neuroendocrine carcinoma with brain metastasis  -status post left retrosigmoid/posterior fossa craniotomy for resection of mass  -neurosurgery follow-up     Hypertension  -blood pressure overall improved and acceptable  -currently on diltiazem, metoprolol  --avoid HCTZ  -avoid significant BP variability     Discussed above plan in detail with ICU team    SUBJECTIVE:  Patient seen and examined at bedside    Denies chest pain, worsening shortness of breath, nausea vomiting    OBJECTIVE:  Current Weight: Weight - Scale: 88 1 kg (194 lb 3 6 oz)  Vitals:    07/31/22 0600   BP: 129/85   Pulse: 68   Resp: 15   Temp:    SpO2: 99%       Intake/Output Summary (Last 24 hours) at 7/31/2022 0658  Last data filed at 7/31/2022 0600  Gross per 24 hour   Intake 1380 8 ml   Output 3271 ml   Net -1890 2 ml     Wt Readings from Last 3 Encounters:   07/30/22 88 1 kg (194 lb 3 6 oz)   07/25/22 86 2 kg (190 lb)   06/23/22 90 kg (198 lb 6 4 oz)     Temp Readings from Last 3 Encounters:   07/31/22 98 1 °F (36 7 °C) (Oral)   07/24/22 98 7 °F (37 1 °C) (Oral)   06/23/22 (!) 96 2 °F (35 7 °C)     BP Readings from Last 3 Encounters:   07/31/22 129/85   07/24/22 138/72   06/23/22 130/80     Pulse Readings from Last 3 Encounters:   07/31/22 68   07/24/22 105   06/23/22 80        Physical Examination:  General:  Lying in bed, no acute distress   Eyes:  Mild conjunctival pallor present  ENT:  External examination of ears and nose unremarkable  Neck:  No obvious lymphadenopathy appreciated  Respiratory:  Bilateral air entry present  CVS:  S1, S2 present  GI:  Soft, nondistended  CNS:  Oriented x3  Skin:  No new rash  Musculoskeletal:  No obvious new gross deformity noted    Medications:    Current Facility-Administered Medications:     acetaminophen (TYLENOL) tablet 975 mg, 975 mg, Oral, Q8H [address], Mulu Ferrari PA-C, 975 mg at 07/31/22 0608    allopurinol (ZYLOPRIM) tablet 100 mg, 100 mg, Oral, Daily, Mulu Ferrari PA-C, 100 mg at 07/30/22 0846    aluminum-magnesium hydroxide-simethicone (MYLANTA) oral suspension 30 mL, 30 mL, Oral, Q6H PRN, Mulu Ferrari PA-C    amiodarone tablet 400 mg, 400 mg, Oral, BID With Meals, Daja Michele MD, 400 mg at 07/30/22 1705    ascorbic acid (VITAMIN C) tablet 1,000 mg, 1,000 mg, Oral, Daily, Mulu Ferrari PA-C, 1,000 mg at 07/30/22 0847    atorvastatin (LIPITOR) tablet 20 mg, 20 mg, Oral, Daily With Dinner, Mulu Ferrari PA-C, 20 mg at 07/30/22 1706    bisacodyl (DULCOLAX) rectal suppository 10 mg, 10 mg, Rectal, Daily PRN, Mulu Ferrari PA-C    [COMPLETED] dexamethasone (DECADRON) tablet 4 mg, 4 mg, Oral, Q6H BASIA, 4 mg at 07/30/22 1324 **FOLLOWED BY** dexamethasone (DECADRON) tablet 4 mg, 4 mg, Oral, Q8H BASIA, 4 mg at 07/31/22 0608 **FOLLOWED BY** [START ON 8/1/2022] dexamethasone (DECADRON) tablet 2 mg, 2 mg, Oral, Q6H [address] **FOLLOWED BY** [START ON 8/4/2022] dexamethasone (DECADRON) tablet 2 mg, 2 mg, Oral, Q8H [address] **FOLLOWED BY** [START ON 8/5/2022] dexamethasone (DECADRON) tablet 2 mg, 2 mg, Oral, Q12H [address] **FOLLOWED BY** [START ON 8/8/2022] dexamethasone (DECADRON) tablet 2 mg, 2 mg, Oral, Q24H [address], Mulu Ferrari PA-C    diltiazem (CARDIZEM SR) 12 hr capsule 120 mg, 120 mg, Oral, Q12H [address], Mulu Ferrari PA-C, 120 mg at 07/30/22 2211    docusate sodium (COLACE) capsule 100 mg, 100 mg, Oral, BID, Mulu Ferrari PA-C, 100 mg at 07/30/22 1706    furosemide (LASIX) injection 20 mg, 20 mg, Intravenous, Once, Bevely Saas, DO    heparin (porcine) subcutaneous injection 5,000 Units, 5,000 Units, Subcutaneous, Q8H [address], Dalia Parks PA-C, 5,000 Units at 07/31/22 0608    levothyroxine tablet 37 5 mcg, 37 5 mcg, Oral, Early Morning, Mulu Ferrari PA-C, 37 5 mcg at 07/31/22 0608    meclizine (ANTIVERT) tablet 25 mg, 25 mg, Oral, Q8H PRN, Mulu Ferrari PA-C, 25 mg at 07/27/22 0840    methocarbamol (ROBAXIN) tablet 500 mg, 500 mg, Oral, Q6H [address], Mulu Ferrari PA-C, 500 mg at 07/31/22 0610    metoclopramide (REGLAN) injection 10 mg, 10 mg, Intravenous, Q6H PRN, Miquel Holt DO, 10 mg at 07/29/22 0815    metoprolol succinate (TOPROL-XL) 24 hr tablet 50 mg, 50 mg, Oral, BID, Mariela Valencia MD, 50 mg at 07/30/22 2215    ondansetron (ZOFRAN) injection 4 mg, 4 mg, Intravenous, Q6H PRN, Mulu Ferrari PA-C, 4 mg at 07/29/22 0511    oxyCODONE (ROXICODONE) IR tablet 2 5 mg, 2 5 mg, Oral, Q4H PRN, Mulu Ferrari PA-C    pantoprazole (PROTONIX) EC tablet 40 mg, 40 mg, Oral, Early Morning, Mulu Ferrari PA-C, 40 mg at 07/31/22 0609    polyethylene glycol (MIRALAX) packet 17 g, 17 g, Oral, BID, Mulu Ferrari PA-C, 17 g at 07/30/22 1706    scopolamine (TRANSDERM-SCOP) 1 mg/3 days TD 72 hr patch 1 patch, 1 patch, Transdermal, Q72H, Mulu Ferrari PA-C, 1 patch at 07/28/22 1505    senna (SENOKOT) tablet 8 6 mg, 1 tablet, Oral, Daily, MJ Gibson-ELIZABETH, 8 6 mg at 07/30/22 2170    sodium chloride tablet 2 g, 2 g, Oral, TID With Meals, Oculus VR, DO    Laboratory Results:  Results from last 7 days   Lab Units 07/31/22  0607 07/31/22  0022 07/30/22  2142 07/30/22  1844 07/30/22  1549 07/30/22  1513 07/30/22  0526 07/29/22  1844 07/29/22  1459 07/29/22  1118 07/29/22  0535 07/28/22  1337 07/28/22  1002 07/28/22  0447 07/27/22  1824 07/27/22  1100 07/25/22  0443   WBC Thousand/uL 7 53  --   --   --   --   --  7 83  --  7 80  --  6 90 6 41  --  6 60 7 22   < > 6 21   HEMOGLOBIN g/dL 12 1  --   --   --   --   --  12 4  --  11 7*  --  11 8* 11 7*  --  12 5 13 3   < > 13 9   I STAT HEMOGLOBIN g/dl  --   --   --   --   --   --   --   --   --   --   --   --  9 5*  --   --   --   --    HEMATOCRIT % 35 8*  --   --   --   --   --  34 9*  --  33 8*  --  34 3* 33 6*  --  36 7 39 0   < > 42 0   HEMATOCRIT, ISTAT %  --   --   --   --   --   --   --   --   --   --   --   --  28*  --   --   --   --    PLATELETS Thousands/uL 114*  --   --   --   --   --  133*  --  152  --  158 204  --  89* 106*   < > 76*   SODIUM mmol/L 132* 131* 133* 128* 126* 129* 126*   < >  --  129* 130*  --   --  134* 129*   < > 132*   POTASSIUM mmol/L 3 5 3 5 3 6 3 6 4 1 4 1 4 0   < >  --  3 7 3 6  --   --  3 7 4 1   < > 3 9   CHLORIDE mmol/L 93* 94* 97 94* 92* 94* 92*   < >  --  96 96  --   --  101 96   < > 99   CO2 mmol/L 33* 31 30 25 27 27 28   < >  --  26 27  --   --  28 27   < > 23   CO2, I-STAT mmol/L  --   --   --   --   --   --   --   --   --   --   --   --  30 --   --   --   --    BUN mg/dL 19 19 19 19 18 20 17   < >  --  21 19  --   --  28* 32*   < > 20   CREATININE mg/dL 0 62 0 59* 0 70 0 95 0 70 0 79 0 66   < >  --  0 90 0 61  --   --  0 67 0 99   < > 0 73   CALCIUM mg/dL 8 4 8 4 8 2* 8 7 8 6 8 4 8 6   < >  --  8 5 8 6  --   --  7 6* 8 6   < > 8 7   MAGNESIUM mg/dL  --   --   --   --   --   --  2 2  --   --  2 0  --   --   --   --   --   --  2 4   PHOSPHORUS mg/dL  --   --   --   --   --   --  2 7  --   --   --   --   --   --   --   --   --   --    GLUCOSE, ISTAT mg/dl  --   --   --   --   --   --   --   --   --   --   --   --  138  --   --   --   --     < > = values in this interval not displayed  CT head wo contrast   Final Result by Aparna Rao MD (07/30 9073)         1  Expected postoperative changes status post left occipital/retromastoid craniotomy with resection of a previously present large hyperdense/hemorrhagic left cerebellar metastasis  There is a small amount of residual hemorrhagic material in the    treatment bed with residual local edema and mass effect on the 4th ventricle  Small amounts of pneumocephalus and adjacent scalp gas and fluid surrounding the operative site noted, also expected postoperative changes  Continued clinical and imaging    surveillance recommended  2   Stable 5 mm hyperdense lesion in the left parietal cortex (series 2, image 28) along the superior margin of prior treatment cavity possibly a hypercellular hyperdense /hemorrhagic cortical metastasis versus small hemorrhage or potentially even    cavernous angioma, similar to recent brain MRIs  Continued clinical and imaging surveillance recommended  Workstation performed: [id number]         MRI brain w wo contrast   Final Result by Talon Cook MD (07/28 1709)      New postoperative changes status post resection of left cerebellar metastases with expected postoperative blood products  Small region of marginal postoperative ischemia  Mildly increased edema  Mass effect on the fourth ventricle is also slightly    increased    No hydrocephalus  Small amount of enhancement at the operative margin may be postoperative  Recommend follow-up  Stable postsurgical changes in the left parietal lobe without locally recurrent disease  Stable 5 mm left parietal lobe lesion adjacent to resection cavity with T1 shortening and no definite residual enhancement         The study was marked in EPIC for immediate notification  Workstation performed: [id number]         CT chest abdomen pelvis w contrast   Final Result by Kimberlee Mauricio MD (07/26 9136)      1  Multiple new bilateral irregular lung nodules suspicious for metastases  2   No findings of metastatic disease in the abdomen or pelvis  The study was marked in [facility] for immediate notification  Workstation performed: [id number]         MRI brain w wo contrast   Final Result by Mayra Clayton MD (07/25 2882)      Mixed treatment response   - 4 3 cm hemorrhagic metastatic lesion in left cerebellum, increased in size since CT head 6/15/2022 but unchanged since 7/24/2022 - this is likely due to interval hemorrhage of known left cerebellar lesion  Moderate surrounding perilesional vasogenic    edema in left cerebellum, posterior cerebellar vermis, and right posterior paramedian cerebellum with mild mass effect on the 4th ventricle    - 0 6 cm hemorrhagic metastatic lesion posterior to the dominant left cerebellar mass, decreased in size    - 0 5 cm left parietal lobe hemorrhagic lesion posterior to left parietal resection cavity, slightly decreased in size    - No evidence of recurrent disease in left parietal resection cavity  The study was marked in [facility] for immediate notification  Workstation performed: [id number]             Portions of the record may have been created with voice recognition software   Occasional wrong word or "sound a like" substitutions may have occurred due to the inherent limitations of voice recognition software  Read the chart carefully and recognize, using context, where substitutions have occurred

## 2022-07-31 NOTE — CONSULTS
Podiatry - Consultation    Patient Information:   Manuel Wyman 69 y o  male MRN: [id number]  Unit/Bed#: ICU 04 Encounter: [id number]  PCP: Malena Marsh DO  Date of Admission:  7/25/2022  Date of Consultation: 07/30/22  Requesting Physician: Marcio Rooney MD      ASSESSMENT:    Manuel Wyman is a 69 y o  male with:    1  Onychomycosis   2  Onycholysis Right 2nd Toe   3  Hx Gout  4  Hx Tobacco use     PLAN:    · Nails debrided to normal length and thickness with large nail nipper, without incident  · Onycholytic nail on right 2nd digit removed, without incident and tolerated well by patient  · Patient stable from podiatric standpoint   · Rest of care per primary team   · Will discuss this plan with my attending and update as needed  Nail Debridement Procedure  - Using large nail nipper, nails 1-5 bilaterally were debrided to normal length and thickness, removing all hypertrophic and elongated tissue  Tolerated well by patient and without complications  Nail Removal: Right 2nd Digit  - Right 2nd digit nail plate loosely attached, removed using large nail nipper without incident  - Underlying nail bed intact, with no open wounds or clinical signs of infection present  Weightbearing status: Weightbearing as tolerated    SUBJECTIVE:    History of Present Illness:    Manuel Wyman is a 69 y o  male who is originally admitted 7/25/2022 due to 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  Patient has a past medical history of hypertension, hyperlipidemia, gout, and hypothyroidism  We are consulted for elongated nails and right second toe nail that is "dystrophic" in appearance  Patient seen at bedside with his wife, who states that he may have bumped his foot against the bed leading to the 2nd toenail becoming partially detached  She also requests that all of his nails be trimmed today, as they are long and getting caught on the bed sheets  Patient tolerated treatment well       Review of Systems:    Constitutional: Negative  HENT: Negative  Eyes: Negative  Respiratory: Negative  Cardiovascular: Negative  Gastrointestinal: Negative  Musculoskeletal: negative   Skin:elongated and thickened nails   Neurological: negative    Psych: Negative       Past Medical and Surgical History:     Past Medical History:   Diagnosis Date    Brain cancer ([address] )     Hyperlipidemia     Hypertension     Lung cancer [facility])        Past Surgical History:   Procedure Laterality Date    BACK SURGERY      CRANIOTOMY Left 4/7/2022    Procedure: Image guided left parietal craniotomy for tumor resection;  Surgeon: Yoandy Chicas MD;  [facility];  Service: Neurosurgery    CRANIOTOMY Left 7/28/2022    Procedure: left retrosigmoid/posterior fossa craniotomy for resction of mass with image guidence;  Surgeon: Yoandy Chicas MD;  [facility];  Service: Neurosurgery    HEMORRHOID SURGERY      IR BIOPSY LUNG  5/6/2021    IR PORT PLACEMENT  6/17/2021    LAMINECTOMY  2018    L4-L5    LUNG SURGERY      left upper lobectomy    GA BRONCHOSCOPY,DIAGNOSTIC N/A 5/25/2021    Procedure: BRONCHOSCOPY FLEXIBLE;  Surgeon: Edmond Bautista MD;  [facility];  Service: Thoracic    GA MEDIASTINOSCOPY WITH LYMPH NODE BIOPSY/IES N/A 5/25/2021    Procedure: MEDIASTINOSCOPY, flexible bronchoscopy;  Surgeon: Edmond Bautista MD;  [facility];  Service: Thoracic    TONSILLECTOMY  1959       Meds/Allergies:    Medications Prior to Admission   Medication    allopurinol (ZYLOPRIM) 100 mg tablet    amLODIPine (NORVASC) 10 mg tablet    Ascorbic Acid (vitamin C) 1000 MG tablet    B Complex Vitamins (B COMPLEX 1 PO)    citalopram (CeleXA) 10 mg tablet    dexamethasone (DECADRON) 4 mg tablet    levothyroxine 75 mcg tablet    meclizine (ANTIVERT) 25 mg tablet    metoprolol succinate (TOPROL-XL) 25 mg 24 hr tablet    pantoprazole (PROTONIX) 40 mg tablet    polyethylene glycol (GLYCOLAX) 17 GM/SCOOP powder    Psyllium (Metamucil) 28 3 % POWD    rosuvastatin (CRESTOR) 10 MG tablet    traMADol (ULTRAM) 50 mg tablet       Allergies   Allergen Reactions    Bee Venom     Benazepril Other (See Comments)     Angioedema     Latex Other (See Comments)     Burning of eyes at the dentist from the gloves    Meloxicam GI Intolerance    Penicillin G Rash       Social History:     Marital Status: /Civil Union    Substance Use History:   Social History     Substance and Sexual Activity   Alcohol Use Yes    Alcohol/week: 7 0 standard drinks    Types: 7 Cans of beer per week    Comment: 1-2 beers nightly     Social History     Tobacco Use   Smoking Status Former Smoker    Packs/day: 1 00    Years: 40 00    Pack years: 40 00    Types: Cigarettes    Start date:     Quit date: 2017    Years since quittin 4   Smokeless Tobacco Never Used   Tobacco Comment    quit 2017     Social History     Substance and Sexual Activity   Drug Use Not Currently    Types: Marijuana    Comment: seldom       Family History:    Family History   Problem Relation Age of Onset    Nephrolithiasis Father     Lung cancer Maternal Grandfather          OBJECTIVE:    Vitals:   Blood Pressure: 122/70 (22 1905)  Pulse: 68 (22 1905)  Temperature: 98 3 °F (36 8 °C) (22 1605)  Temp Source: Oral (22 1605)  Respirations: 17 (22 1905)  Height: 5' 8" (172 7 cm) (22 1545)  Weight - Scale: 88 1 kg (194 lb 3 6 oz) (22 0600)  SpO2: 97 % (22 1905)    Physical Exam:    Lower Extremity:    Vascular:   DP: Right: 1+ Left: 1+  PT: Right: 1+ Left: 1+  CRT < 3 seconds at the digits  +0/4 edema noted at bilateral lower extremities  Pedal hair is absent  Skin temperature is cool bilaterally  Musculoskeletal:  MMT is 4/5 in all muscle compartments bilaterally  ROM within normal limits   No Pain on palpation of bilateral lower extremities     No gross deformities noted      Dermatological:  Nails are elongated, hypertrophic, and discolored (yellow/brown) with subungual debris on debridement  Right 2nd digit nail plate approximately 62% detached   No interspace maceration  No open wounds or lesions    Neurological:  Gross sensation is intact  Light touch is intact  Protective sensation is intact  Additional data:     Lab Results: I have personally reviewed pertinent labs including:    Results from last 7 days   Lab Units 07/30/22  0526 07/29/22  0535 07/28/22  1337 07/28/22  1002 07/28/22  0447   WBC Thousand/uL 7 83   < > 6 41  --  6 60   HEMOGLOBIN g/dL 12 4   < > 11 7*  --  12 5   I STAT HEMOGLOBIN   --   --   --    < >  --    HEMATOCRIT % 34 9*   < > 33 6*  --  36 7   HEMATOCRIT, ISTAT   --   --   --    < >  --    PLATELETS Thousands/uL 133*   < > 204  --  89*   NEUTROS PCT %  --   --   --   --  93*   LYMPHS PCT %  --   --   --   --  3*   LYMPHO PCT %  --   --  1*  --   --    MONOS PCT %  --   --   --   --  3*   MONO PCT %  --   --  2*  --   --    EOS PCT %  --   --  0  --  0    < > = values in this interval not displayed  Results from last 7 days   Lab Units 07/30/22  1844 07/29/22  0535 07/28/22  1002 07/27/22  1100 07/25/22  0443   POTASSIUM mmol/L 3 6   < >  --    < > 3 9   CHLORIDE mmol/L 94*   < >  --    < > 99   CO2 mmol/L 25   < >  --    < > 23   CO2, I-STAT mmol/L  --   --  30  --   --    BUN mg/dL 19   < >  --    < > 20   CREATININE mg/dL 0 95   < >  --    < > 0 73   CALCIUM mg/dL 8 7   < >  --    < > 8 7   ALK PHOS U/L  --   --   --   --  53   ALT U/L  --   --   --   --  55   AST U/L  --   --   --   --  23   GLUCOSE, ISTAT mg/dl  --   --  138  --   --     < > = values in this interval not displayed  Results from last 7 days   Lab Units 07/29/22  0535   INR  0 99       Cultures: I have personally reviewed pertinent cultures including:              Imaging: I have personally reviewed pertinent reports in PACS    EKG, Pathology, and Other Studies: I have personally reviewed pertinent reports  Time Spent for Care: 30 minutes  More than 50% of total time spent on counseling and coordination of care as described above  ** Please Note: Portions of the record may have been created with voice recognition software  Occasional wrong word or "sound a like" substitutions may have occurred due to the inherent limitations of voice recognition software  Read the chart carefully and recognize, using context, where substitutions have occurred   **

## 2022-07-31 NOTE — ASSESSMENT & PLAN NOTE
On p o   Amiodarone, Cardizem, metoprolol for rate control for afib which is also being used for HTN

## 2022-07-31 NOTE — PROGRESS NOTES
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 s/p lung resection November 2021, chemotherapy/radiation, new onset AFib, hyperthyroidism, HTN, gout, HLD  He presented to [place] [address] with lightheadedness  At that time he was fine to be in rapid AFib with RVR, initially requiring Cardizem infusion, he has since been transitioned to p o  Cardizem  CTH this admission showed increase in cerebellar mass (now 3 9 x 3 6 cm, prior 1 8 x 1 6 cm) with edema and mass effect on 4th ventricle and new nodule in left cerebellum  MRI showed 4 3 cm hemorrhagic metastatic lesion in left cerebellum, 0 6 cm hemorrhagic metastatic lesion posterior to the dominant left cerebellar mass, 0 5 cm left parietal lobe hemorrhagic lesion posterior to left parietal resection cavity  Now s/p L retrosigmoid/posterior fossa craniotomy 07/28  · Continues to have hyponatremia  Was started on hypertonic saline yesterday with increase in serum sodium to 133 -- hypertonic has been discontinued  Sodium tabs have been started  · Had decrease in left lateral gaze yesterday, CT head expected postoperative changes  · Pain in left shoulder / trapezius overnight  Added lidoderm patch and IV dilaudid due to NPO     ---------------------------------------------------------------------------------------  SUBJECTIVE  Feeling okay  Denies nausea, CP, shortness of breath, abdominal pain  States had BM overnight  Review of Systems   Constitutional: Negative for chills, fatigue and fever  HENT: Negative for trouble swallowing  Eyes: Negative  Negative for photophobia and visual disturbance     Respiratory: Negative for cough, chest tightness, shortness of breath and wheezing  Cardiovascular: Negative for chest pain, palpitations and leg swelling  Gastrointestinal: Negative for abdominal distention, abdominal pain, diarrhea, nausea and vomiting  Endocrine: Negative  Genitourinary: Negative for difficulty urinating, frequency, hematuria and urgency  Musculoskeletal: Negative for arthralgias and myalgias  Skin: Negative for pallor, rash and wound  Allergic/Immunologic: Negative  Neurological: Negative for dizziness, seizures, weakness, light-headedness and numbness  Hematological: Negative  Psychiatric/Behavioral: Negative for agitation, confusion and hallucinations  The patient is not hyperactive  Review of systems was reviewed and negative unless stated above in HPI/24-hour events   ---------------------------------------------------------------------------------------  Assessment and Plan:  1  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  2  Hyponatremia   3  AFib with RVR  4  Hypothyroidism  5  HTN  6  HLD    Neuro:    Primary lung neuroendocrine carcinoma with brain metastasis; POD#3 left retrosigmoid/posterior fossa craniotomy, resection of left cerebellar mass  o Continue serial neuro checks  o Stat CT head for decline in GCS > 2 in 1 hour  o Holding all AC/AP 2/2 recent neurological procedure  - Per neurosurgery okay for DVT prophylaxis with heparin  o Continue Decadron 4 mg q 6 hours with taper  o Maintain goal SBP less than 160  o Hyponatremia treatment as below  o PT/OT -- recommending acute rehab  o Continue scopolamine, Zofran, Reglan for nausea      CV:    AFib with RVR  o Continue p o   Amiodarone, Cardizem, metoprolol for rate control  o Holding all systemic AC/AP 2/2 recent neurological procedure  o DVT ppx with Heparin SC  o Cardiology consulted, appreciate recommendations   HTN, HLD  o Currently normotensive on above meds for AFib  o Continue statin      Pulm:   Metastatic left lung adenocarcinoma  o S/p left upper lobe resection, radiation/chemotherapy  o CT CAP:  Multiple new bilateral irregular lung nodules suspicious for metastases, no metastatic disease in abdomen or pelvis  o Oncology following, appreciate recommendations  o Titrate FiO2 for SpO2 > 92%  o Encourage pulmonary toilet      GI:    GERD  o Continue on Protonix 40 mg p o       :    Hyponatremia -- Likely SIADH  o Serum Na 131 on 7/24/22, 134 on 7/28/22, 126 7/30/22  o Serum osmo 269  o Urine osmolality 711, urine sodium 126 -- following Lasix    Repeat urine osmolality 534, urine sodium 81  o Uric acid 7 9  o Was started on NaCl 3% with rise in serum Na to 133 -- hypertonic NaCl discontinued and sodium tabs started  o Continue diet with fluid restriction 1500 mL  o Continue BMP q3h  o Avoid overcorrection -- no more than 8 mEq in 24 hours  o Celexa has been discontinued  o Nephrology following, appreciate recommendations  o I/Os close monitoring  o Monitor urine output and renal indices      F/E/N:    Fluids -- holding IVF for now   Monitor and replete electrolytes for goal K > 4, Mag > 2, Phos > 3   Diet: regular diet with fluid restriction 1500 mL      Heme/Onc:    Primary lung neuroendocrine carcinoma with brain metastasis  o Oncology following, appreciate recommendations  o S/p lung resection November 2021  o Chemotherapy, RXT   Thrombocytopenia  o Baseline 90s to 100  o CBC daily  o Transfuse if < 100, or active bleeding  o DVT prophylaxis with heparin SC      Endo:    Hypothyroidism  o Continue home levothyroxine   Blood glucose remained stable consider SSI if needed      ID:    No acute issues  o Continue to monitor off antibiotics  o Monitor WBC and temperature      MSK/Skin:    Gout  o Continue home allopurinol   PT/OT when appropriate   Turning/repositioning/pressure offloading    Patient appropriate for transfer out of the ICU today?: No  Disposition: Continue Critical Care   Code Status: Level 1 - Full Code  ---------------------------------------------------------------------------------------  ICU CORE MEASURES    Prophylaxis   VTE Pharmacologic Prophylaxis: Heparin  VTE Mechanical Prophylaxis: sequential compression device  Stress Ulcer Prophylaxis: Pantoprazole PO    ABCDE Protocol (if indicated)  Plan to perform spontaneous awakening trial today? Not applicable  Plan to perform spontaneous breathing trial today? Not applicable  Obvious barriers to extubation? Not applicable  CAM-ICU: Negative    Invasive Devices Review  Invasive Devices  Report    Peripherally Inserted Central Catheter Line  Duration           PICC Line  Left Basilic <1 day          Central Venous Catheter Line  Duration           Port A Cath 21 Right Chest 408 days          Peripheral Intravenous Line  Duration           Peripheral IV 22 Distal;Left;Upper;Ventral (anterior) Arm 1 day          Drain  Duration           Urethral Catheter 16 Fr  <1 day              Can any invasive devices be discontinued today? No  ---------------------------------------------------------------------------------------  OBJECTIVE    Vitals   Vitals:    22 0005 22 0105 22 0205 22 0305   BP: 132/89 140/74 135/75 131/77   Pulse: 68 66 62 62   Resp: 14 20 14 16   Temp:       TempSrc:       SpO2: 95% 94% 93% 93%   Weight:       Height:         Temp (24hrs), Av 2 °F (36 8 °C), Min:97 3 °F (36 3 °C), Max:98 7 °F (37 1 °C)  Current: Temperature: (!) 97 3 °F (36 3 °C)  HR: 69  BP: 131/77 (106)  RR:  16  SpO2: 95%    Respiratory:  SpO2: SpO2: 94 %, SpO2 Device: O2 Device: None (Room air)      Invasive/non-invasive ventilation settings   Respiratory  Report   Lab Data (Last 4 hours)    None         O2/Vent Data (Last 4 hours)    None                Physical Exam  Vitals and nursing note reviewed  Constitutional:       General: He is not in acute distress  Appearance: He is overweight  He is ill-appearing   He is not toxic-appearing or diaphoretic  HENT:      Head: Normocephalic and atraumatic  Right Ear: External ear normal       Left Ear: External ear normal       Nose: Nose normal  No congestion or rhinorrhea  Mouth/Throat:      Mouth: Mucous membranes are dry  Pharynx: Oropharynx is clear  No oropharyngeal exudate or posterior oropharyngeal erythema  Eyes:      General: No visual field deficit or scleral icterus  Extraocular Movements: Extraocular movements intact  Conjunctiva/sclera: Conjunctivae normal       Pupils: Pupils are equal, round, and reactive to light  Neck:      Vascular: No carotid bruit  Cardiovascular:      Rate and Rhythm: Normal rate and regular rhythm  Pulses:           Radial pulses are 2+ on the right side and 2+ on the left side  Dorsalis pedis pulses are 2+ on the right side and 2+ on the left side  Heart sounds: S1 normal and S2 normal  No murmur heard  No friction rub  No gallop  Pulmonary:      Effort: Pulmonary effort is normal  No accessory muscle usage or respiratory distress  Breath sounds: Normal breath sounds  No decreased breath sounds, wheezing, rhonchi or rales  Abdominal:      General: Abdomen is protuberant  Bowel sounds are normal  There is no distension  Palpations: Abdomen is soft  Tenderness: There is no abdominal tenderness  Musculoskeletal:         General: No swelling or tenderness  Normal range of motion  Cervical back: Normal range of motion and neck supple  Right lower leg: No edema  Left lower leg: No edema  Lymphadenopathy:      Cervical: No cervical adenopathy  Skin:     General: Skin is warm and dry  Capillary Refill: Capillary refill takes less than 2 seconds  Coloration: Skin is not pale  Findings: No bruising, erythema or rash  Neurological:      Mental Status: He is alert and easily aroused  GCS: GCS eye subscore is 4  GCS verbal subscore is 5   GCS motor subscore is 6  Cranial Nerves: Cranial nerves are intact  No cranial nerve deficit, dysarthria or facial asymmetry  Sensory: Sensation is intact  No sensory deficit  Motor: Motor function is intact  No weakness  Coordination: Coordination is intact  Comments: Lateral eye gaze now appears equal   BUE strength 5/5, BLE strength 5/5  No noted visual field deficit noted  Psychiatric:         Attention and Perception: Attention normal          Mood and Affect: Affect is flat  Speech: Speech normal          Behavior: Behavior is cooperative  Thought Content:  Thought content normal          Cognition and Memory: Cognition normal          Judgment: Judgment normal              Laboratory and Diagnostics:  Results from last 7 days   Lab Units 07/30/22  0526 07/29/22  1459 07/29/22  0535 07/28/22  1337 07/28/22  1002 07/28/22  0447 07/27/22  1824 07/27/22  1100 07/25/22  0443 07/24/22  1355   WBC Thousand/uL 7 83 7 80 6 90 6 41  --  6 60 7 22 7 60 6 21 6 37   HEMOGLOBIN g/dL 12 4 11 7* 11 8* 11 7*  --  12 5 13 3 13 5 13 9 14 7   I STAT HEMOGLOBIN g/dl  --   --   --   --  9 5*  --   --   --   --   --    HEMATOCRIT % 34 9* 33 8* 34 3* 33 6*  --  36 7 39 0 39 5 42 0 42 4   HEMATOCRIT, ISTAT %  --   --   --   --  28*  --   --   --   --   --    PLATELETS Thousands/uL 133* 152 158 204  --  89* 106* 69* 76* 90*   NEUTROS PCT %  --   --   --   --   --  93*  --   --   --  89*   BANDS PCT %  --   --   --  1  --   --   --   --  9*  --    MONOS PCT %  --   --   --   --   --  3*  --   --   --  5   MONO PCT %  --   --   --  2*  --   --   --   --  4  --      Results from last 7 days   Lab Units 07/30/22  2142 07/30/22  1844 07/30/22  1549 07/30/22  1513 07/30/22  0526 07/30/22  0109 07/29/22  1844 07/27/22  1100 07/25/22  0443 07/24/22  1355   SODIUM mmol/L 133* 128* 126* 129* 126* 127* 127*   < > 132* 131*   POTASSIUM mmol/L 3 6 3 6 4 1 4 1 4 0 4 2 4 2   < > 3 9 4 0   CHLORIDE mmol/L 97 94* 92* 94* 92* 95* 95*   < > 99 92*   CO2 mmol/L 30 25 27 27 28 28 27   < > 23 23   CO2, I-STAT   --   --   --   --   --   --   --    < >  --   --    ANION GAP mmol/L 6 9 7 8 6 4 5   < > 10 16*   BUN mg/dL 19 19 18 20 17 18 15   < > 20 22   CREATININE mg/dL 0 70 0 95 0 70 0 79 0 66 0 57* 0 64   < > 0 73 1 06   CALCIUM mg/dL 8 2* 8 7 8 6 8 4 8 6 8 6 8 9   < > 8 7 8 6   GLUCOSE RANDOM mg/dL 142* 178* 132 174* 140 131 138   < > 117 183*   ALT U/L  --   --   --   --   --   --   --   --  55 66   AST U/L  --   --   --   --   --   --   --   --  23 19   ALK PHOS U/L  --   --   --   --   --   --   --   --  53 58   ALBUMIN g/dL  --   --   --   --   --   --   --   --  2 5* 2 8*   TOTAL BILIRUBIN mg/dL  --   --   --   --   --   --   --   --  0 68 0 56    < > = values in this interval not displayed  Results from last 7 days   Lab Units 22  0526 22  1118 22  0443   MAGNESIUM mg/dL 2 2 2 0 2 4   PHOSPHORUS mg/dL 2 7  --   --       Results from last 7 days   Lab Units 22  0535   INR  0 99   PTT seconds 26              EKG: Sinus rhythm on telemetry  Imagin/30 CT head:  Stable postoperative changes   I have personally reviewed pertinent reports  and I have personally reviewed pertinent films in PACS    Intake and Output  I/O        07 0700  07 07    P  O  960 840    I V  (mL/kg) 1520 3 (17 3) 540 8 (6 1)    IV Piggyback 100     Total Intake(mL/kg) 2580 3 (29 3) 1380 8 (15 7)    Urine (mL/kg/hr) 2500 (1 2) 2736 (1 3)    Stool  0    Total Output 2500 2736    Net +80 3 -1355 2          Unmeasured Urine Occurrence 2 x 1 x    Unmeasured Stool Occurrence  2 x        UOP: 100 ml/hr     Height and Weights   Height: 5' 8" (172 7 cm)  IBW (Ideal Body Weight): 68 4 kg  Body mass index is 29 53 kg/m²    Weight (last 2 days)     Date/Time Weight    22 0600 88 1 (194 23)    22 0600 91 3 (201 28)            Nutrition       Diet Orders   (From admission, onward) Start     Ordered    07/30/22 2103  Diet Regular; Regular House; Fluid Restriction 1500 ML  Diet effective now        References:    Nutrtion Support Algorithm Enteral vs  Parenteral   Question Answer Comment   Diet Type Regular    Regular Regular House    Other Restriction(s): Fluid Restriction 1500 ML    RD to adjust diet per protocol?  Yes        07/30/22 2102                  Active Medications  Scheduled Meds:  Current Facility-Administered Medications   Medication Dose Route Frequency Provider Last Rate    acetaminophen  975 mg Oral Q8H [facility] Mulu Ferrari PA-C      allopurinol  100 mg Oral Daily Mulu Ferrari PA-C      aluminum-magnesium hydroxide-simethicone  30 mL Oral Q6H PRN Adriana Ferrari PA-C      amiodarone  400 mg Oral BID With Meals Haseeb Del Toro MD      vitamin C  1,000 mg Oral Daily Mulu Ferrari PA-C      atorvastatin  20 mg Oral Daily With [employer], PA-C      bisacodyl  10 mg Rectal Daily PRN Adriana Ferrari PA-C      dexamethasone  4 mg Oral Q8H [facility] Mulu Ferrari PA-C      Followed by   Maricela Harada ON 8/1/2022] dexamethasone  2 mg Oral Q6H [facility] Mulu Ferrari PA-C      Followed by   Maricela Harada ON 8/4/2022] dexamethasone  2 mg Oral Q8H [facility] Mulu Ferrari PA-C      Followed by   Maricela Harada ON 8/5/2022] dexamethasone  2 mg Oral Q12H [facility] Mulu Ferrari PA-C      Followed by   Maricela Harada ON 8/8/2022] dexamethasone  2 mg Oral Q24H [facility] Mulu Ferrari PA-C      diltiazem  120 mg Oral Q12H [facility] Mulu Ferrari PA-C      docusate sodium  100 mg Oral BID Adrianamanjit Ferrari PA-C      heparin (porcine)  5,000 Units Subcutaneous [facility] Marjorie Harris PA-C      levothyroxine  37 5 mcg Oral Early Morning Mulu N Ireifej, PA-C      meclizine  25 mg Oral Q8H PRN Adriana Ferrari PA-C      methocarbamol  500 mg Oral Q6H [facility] Mulu Ferrari PA-C      metoclopramide  10 mg Intravenous Q6H PRN Rolly Holt DO      metoprolol succinate  50 mg Oral BID Haseeb Del Toro MD      ondansetron  4 mg Intravenous Q6H PRN Evalechelo Roman PA-C      oxyCODONE  2 5 mg Oral Q4H PRN Evalene JOSE EDUARDO Roman      pantoprazole  40 mg Oral Early Morning Mulu Ferrari PA-C      polyethylene glycol  17 g Oral BID Mulu Ferrari PA-C      scopolamine  1 patch Transdermal Q72H Mulu Ferrari PA-C      senna  1 tablet Oral Daily Mulu Ferrari PA-C      sodium chloride  1 g Oral TID With Meals CORRINE Alexis       Continuous Infusions:     PRN Meds:   aluminum-magnesium hydroxide-simethicone, 30 mL, Q6H PRN  bisacodyl, 10 mg, Daily PRN  meclizine, 25 mg, Q8H PRN  metoclopramide, 10 mg, Q6H PRN  ondansetron, 4 mg, Q6H PRN  oxyCODONE, 2 5 mg, Q4H PRN        Allergies   Allergies   Allergen Reactions    Bee Venom     Benazepril Other (See Comments)     Angioedema     Latex Other (See Comments)     Burning of eyes at the dentist from the gloves    Meloxicam GI Intolerance    Penicillin G Rash     ---------------------------------------------------------------------------------------  Advance Directive and Living Will:      Power of :    POLST:    ---------------------------------------------------------------------------------------  Care Time Delivered:   Upon my evaluation, this patient had a high probability of imminent or life-threatening deterioration due to hyponatremia, POD#3 s/p retrosigmoid/posterior fossa craniotomy, which required my direct attention, intervention, and personal management  I have personally provided 33 minutes ([phone] to 0330) of critical care time, exclusive of procedures, teaching, family meetings, and any prior time recorded by providers other than myself  CORRINE Clark      Portions of the record may have been created with voice recognition software    Occasional wrong word or "sound a like" substitutions may have occurred due to the inherent limitations of voice recognition software  Read the chart carefully and recognize, using context, where substitutions have occurred

## 2022-07-31 NOTE — PROGRESS NOTES
Patient seen examined independently approximally at 9:20 p m  Lucy Pratt Continues to have some neck pain  Temp:  [98 3 °F (36 8 °C)-98 7 °F (37 1 °C)] 98 3 °F (36 8 °C)  HR:  [62-85] 68  Resp:  [14-24] 17  BP: (122-157)/(67-98) 122/70  Arterial Line BP: ()/() 168/90   He is an awake alert  Hold conversation easily  Pupils are equal and reactive  OD slight 6 nerve palsy  OS full range of motion  No drift  Symmetric strength bilaterally  Prior head CT reviewed  Current sodium 128  Lab Results   Component Value Date/Time    SODIUM 128 (L) 07/30/2022 06:44 PM    CREATININE 0 95 07/30/2022 06:44 PM    WBC 7 83 07/30/2022 05:26 AM    HGB 12 4 07/30/2022 05:26 AM     (L) 07/30/2022 05:26 AM    PTT 26 07/29/2022 05:35 AM    INR 0 99 07/29/2022 05:35 AM     Plan:  Postop day 2 left retrosigmoid craniotomy for resection of tumor  Continue cardiac rate control  Continue sodium goal of normal natremia, on hypertonic na at 48  Okay for DVT prophylaxis, continue to monitor platelets  Transfuse for platelets less than [phone]

## 2022-08-01 NOTE — ASSESSMENT & PLAN NOTE
Likely 2/2 SIADH   Nephrology consulted and following    Na today 132, goal 132-136  Continue salt tabs 2g TID  Continue fluid restriction  Appreciate further recommendations

## 2022-08-01 NOTE — ASSESSMENT & PLAN NOTE
POD 4 from L retrosigmoid/posterior fossa craniotomy for resection of mass with Dr Varinder Forman (7/28/22)  · Preliminary path: metastatic carcinoma   Patient presented with lightheadedness, tinnitus and visual complaints   S/p L parietal craniotomy for resection of tumor on 4/7/2022 with Dr Varinder Forman for neuroendocrine tumor   Patient completed WBRT 5/31/22    Imaging:   · MRI brain 7/28/2022:  Postoperative changes status post resection of left cerebellar metastasis with expected postoperative blood products  Small region of marginal perioperative ischemia  Mildly increased edema  Mildly increased 4th ventricular mass effect  No hydrocephalus  Small amount of enhancement at the operative margin  · CT head wo 7/30/22: Expected postoperative changes status post left occipital/retromastoid craniotomy with resection of a previously present large hyperdense/hemorrhagic left cerebellar metastasis  There is a small amount of residual hemorrhagic material in the treatment bed with residual local edema and mass effect on the 4th ventricle  Small amounts of pneumocephalus and adjacent scalp gas and fluid surrounding the operative site noted, also expected postoperative changes  Stable 5 mm hyperdense lesion in the left parietal cortex along the superior margin of prior treatment cavity possibly a hypercellular hyperdense /hemorrhagic cortical metastasis versus small hemorrhage or potentially even cavernous angioma, similar to recent brain MRIs  Plan:   · Ongoing frequent neurological checks  · Recommend STAT CT head for decline in GCS >2 points in 1 hour  · Post op MRI completed and reviewed  · Defer keppra at this time given infratentorial location  · Decadron taper q48h  · Hold all AC/AP medication at this time    · Keep platelets > 75, transfuse if necessary  · Plt 97 today, continue to monitor   · PT/OT evaluation; recommending acute rehab  · SBP <160  · Can try eye patch for double vision, suggest alternating eyes  · Ongoing medical management and pain control per primary team    · CM following for dispo planning  · DVT ppx: SCDs, SQH  Neurosurgery will sign off at this time and follow from the periphery  Patient will follow up in 2 weeks for incision check and pathology review  Please call with questions or concerns

## 2022-08-01 NOTE — ASSESSMENT & PLAN NOTE
Patient with a history metastatic lung adeno carcinoma status post left upper lobe resection with radiation and chemotherapy  Recent CT chest abdomen pelvis showing multiple new bilateral irregular lung nodules which are suspicious for metastasis, fortunately no metastatic disease noted in the abdomen or pelvis

## 2022-08-01 NOTE — ASSESSMENT & PLAN NOTE
Baseline thrombocytopenia around 100  Currently around 100 today  Continue DVT prophylaxis with heparin

## 2022-08-01 NOTE — PLAN OF CARE
Problem: PHYSICAL THERAPY ADULT  Goal: Performs mobility at highest level of function for planned discharge setting  See evaluation for individualized goals  Description: Treatment/Interventions: Functional transfer training, LE strengthening/ROM, Therapeutic exercise, Endurance training, Cognitive reorientation, Equipment eval/education, Bed mobility, Gait training, OT          See flowsheet documentation for full assessment, interventions and recommendations  Outcome: Progressing  Note: Prognosis: Guarded  Problem List: Decreased strength, Decreased endurance, Impaired balance, Decreased mobility, Decreased cognition, Decreased safety awareness, Obesity  Assessment: Pt cont to demonstrate mod functional mobility deficits w/ mod (A)x2 required for the most part for bed mob and transfers at bedside; pt also c/o persistent dizziness while sitting w/ BP stable; pt was unable to progress to amb at this time; overall, cont to recommend rehab upon D/C when medically cleared; will follow  Barriers to Discharge: Inaccessible home environment     PT Discharge Recommendation: Post acute rehabilitation services    See flowsheet documentation for full assessment

## 2022-08-01 NOTE — PROGRESS NOTES
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 [facility])  Assessment & Plan  POD 4 from L retrosigmoid/posterior fossa craniotomy for resection of mass with Dr Ryan Francis (7/28/22)  · Preliminary path: metastatic carcinoma   Patient presented with lightheadedness, tinnitus and visual complaints   S/p L parietal craniotomy for resection of tumor on 4/7/2022 with Dr Ryan Francis for neuroendocrine tumor   Patient completed WBRT 5/31/22    Imaging:   · MRI brain 7/28/2022:  Postoperative changes status post resection of left cerebellar metastasis with expected postoperative blood products  Small region of marginal perioperative ischemia  Mildly increased edema  Mildly increased 4th ventricular mass effect  No hydrocephalus  Small amount of enhancement at the operative margin  · CT head wo 7/30/22: Expected postoperative changes status post left occipital/retromastoid craniotomy with resection of a previously present large hyperdense/hemorrhagic left cerebellar metastasis  There is a small amount of residual hemorrhagic material in the treatment bed with residual local edema and mass effect on the 4th ventricle  Small amounts of pneumocephalus and adjacent scalp gas and fluid surrounding the operative site noted, also expected postoperative changes  Stable 5 mm hyperdense lesion in the left parietal cortex along the superior margin of prior treatment cavity possibly a hypercellular hyperdense /hemorrhagic cortical metastasis versus small hemorrhage or potentially even cavernous angioma, similar to recent brain MRIs  Plan:   · Ongoing frequent neurological checks  · Recommend STAT CT head for decline in GCS >2 points in 1 hour     · Post op MRI completed and reviewed  · Defer keppra at this time given infratentorial location  · Decadron taper q48h  · Hold all AC/AP medication at this time  · Keep platelets > 75, transfuse if necessary  · Plt 97 today, continue to monitor   · PT/OT evaluation; recommending acute rehab  · SBP <160  · Can try eye patch for double vision, suggest alternating eyes  · Ongoing medical management and pain control per primary team    · CM following for dispo planning  · DVT ppx: SCDs, H  Neurosurgery will sign off at this time and follow from the periphery  Patient will follow up in 2 weeks for incision check and pathology review  Please call with questions or concerns  Hyponatremia  Assessment & Plan  Likely 2/2 SIADH  Nephrology consulted and following    Na today 132, goal 132-136  Continue salt tabs 2g TID  Continue fluid restriction  Appreciate further recommendations    Atrial fibrillation with rapid ventricular response (HCC)  Assessment & Plan  · A-fib with RVR on admission   · Previously noted in Outpatient PCP note   · Patient currently on PO amiodarone, Cardizem, and metoprolol  · Cardiology following, input appreciated  · Continue rate control at this time  Subjective/Objective   Chief Complaint: none    Subjective: Patient with NAEO  He continues to have dizziness and double vision but is alternating his eye patch  He worked with therapy who recommends rehab; he has not been ambulating much  He did have a BM this morning  He is tolerating an oral diet  He denies HA or new neurological symptoms today  Objective: Patient laying in bed in NAD, VSS           Invasive Devices  Report    Peripherally Inserted Central Catheter Line  Duration           PICC Line 03/34/81 Left Basilic 1 day          Central Venous Catheter Line  Duration           Port A Cath 06/17/21 Right Chest 409 days          Peripheral Intravenous Line  Duration           Peripheral IV 07/29/22 Distal;Left;Upper;Ventral (anterior) Arm 2 days                Vitals: Blood pressure 152/92, pulse 71, temperature 98 1 °F (36 7 °C), resp  rate 18, height 5' 8" (1 727 m), weight 88 1 kg (194 lb 3 6 oz), SpO2 97 %  ,Body mass index is 29 53 kg/m²  General appearance: alert, appears stated age, cooperative and no distress  Head: left retromastoid incision CDI, closed with sutures  Eyes: EOMI, PERRL  Bilateral horizontal nystagmus, R>L  Lungs: non labored breathing  Heart: regular heart rate  Neurologic:   Mental status: Alert, oriented x4, thought content appropriate, speech clear and fluent  Cranial nerves: grossly intact (Cranial nerves II-XII)  Sensory: normal to LT  Motor: moving all extremities without focal weakness   Reflexes: 2+ and symmetric  Coordination: Slight dysmetria left side, no drift    Lab Results: I have personally reviewed pertinent results  Results from last 7 days   Lab Units 08/01/22  0511 07/31/22  0607 07/30/22  0526 07/29/22  0535 07/28/22  1337 07/28/22  1002 07/28/22  0447   WBC Thousand/uL 6 73 7 53 7 83   < > 6 41  --  6 60   HEMOGLOBIN g/dL 11 4* 12 1 12 4   < > 11 7*  --  12 5   I STAT HEMOGLOBIN   --   --   --   --   --    < >  --    HEMATOCRIT % 34 9* 35 8* 34 9*   < > 33 6*  --  36 7   HEMATOCRIT, ISTAT   --   --   --   --   --    < >  --    PLATELETS Thousands/uL 97* 114* 133*   < > 204  --  89*   NEUTROS PCT %  --   --   --   --   --   --  93*   MONOS PCT %  --   --   --   --   --   --  3*   MONO PCT % 1*  --   --   --  2*  --   --     < > = values in this interval not displayed       Results from last 7 days   Lab Units 08/01/22  1051 08/01/22  0005 07/31/22  1648 07/29/22  0535 07/28/22  1002   POTASSIUM mmol/L 4 0 4 0 4 0   < >  --    CHLORIDE mmol/L 96 97 95*   < >  --    CO2 mmol/L 32 32 31   < >  --    CO2, I-STAT mmol/L  --   --   --   --  30   BUN mg/dL 30* 28* 25   < >  --    CREATININE mg/dL 0 76 0 76 0 87   < >  --    CALCIUM mg/dL 9 1 9 0 8 7   < >  --    GLUCOSE, ISTAT mg/dl  --   -- --   --  138    < > = values in this interval not displayed  Results from last 7 days   Lab Units 07/31/22  0607 07/30/22  0526 07/29/22  1118   MAGNESIUM mg/dL 2 2 2 2 2 0     Results from last 7 days   Lab Units 07/31/22  0607 07/30/22  0526   PHOSPHORUS mg/dL 3 5 2 7     Results from last 7 days   Lab Units 07/29/22  0535   INR  0 99   PTT seconds 26     No results found for: TROPONINT  ABG:No results found for: PHART, VYO0ESP, PO2ART, AEL7IQO, E4XLQYQM, BEART, SOURCE    Imaging Studies: I have personally reviewed pertinent reports  and I have personally reviewed pertinent films in PACS     XR chest 2 views    Result Date: 7/25/2022  Narrative: CHEST INDICATION:   AFib RVR, lightheadedness, palpitations  COMPARISON:  01/24/2017  CT scan on 06/15/2022 EXAM PERFORMED/VIEWS:  XR CHEST PA & LATERAL Images: 3 FINDINGS: Cardiomediastinal silhouette appears unremarkable  Right-sided MediPort terminating in the SVC  The lungs are hyperinflated  Postoperative changes in the left lung  Scarring in the left midlung  No pneumothorax or pleural effusion  Osseous structures appear within normal limits for patient age  Impression: No acute cardiopulmonary disease  Hyperinflation  Workstation performed: [id number]     CT head wo contrast    Result Date: 7/30/2022  Narrative: CT BRAIN - WITHOUT CONTRAST INDICATION:   change in exam   History of metastatic neuroendocrine carcinoma with brain metastases  COMPARISON:  7/24/2022; progress note 7/30/2022; 7/28/2022; 7/25/2022 TECHNIQUE:  CT examination of the brain was performed  In addition to axial images, sagittal and coronal 2D reformatted images were created and submitted for interpretation  Radiation dose length product (DLP) for this visit:  902 14 mGy-cm     This examination, like all CT scans performed in the [facility], was performed utilizing techniques to minimize radiation dose exposure, including the use of iterative  reconstruction and automated exposure control  IMAGE QUALITY:  Diagnostic  FINDINGS: PARENCHYMA:  Evolving edema gliosis and small amounts of hemorrhagic material in the left cerebellum subjacent to a new left occipital/retromastoid craniotomy (series 3, image 13) indicative of interval resection of a large previously present hyperdense/hemorrhagic cerebellar metastasis and expected postoperative changes  There is residual local mass effect on the 4th ventricle as well as effacement of the left ambient cistern without overt hydrocephalus  Separately in the left parietal lobe there is redemonstration of a previously present craniotomy  There is a small amount of high density along the extra-axial space posteriorly on series 2 image 32 likely related to dural closure material, stable  Cystic encephalomalacia and gliosis in the treatment bed in the left parietal vertex again noted  Along the posterior margin of this treatment bed/gliotic region, there is a 0 5 cm rounded high density lesion, image 28, series 2 possibly a hyperdense or  hypercellular metastasis versus small hemorrhage or less likely cavernous angioma, similar to previous MR imaging  VENTRICLES AND EXTRA-AXIAL SPACES:  4th ventricle is effaced  No hydrocephalus  Small amount of pneumocephalus extra-axial fluid and gas subjacent left occipital craniotomy  VISUALIZED ORBITS AND PARANASAL SINUSES:  Right maxillary sinus mucus retention cyst  CALVARIUM AND EXTRACRANIAL SOFT TISSUES:  Postoperative changes in the scalp with gas and strandy densities adjacent to the left occipital craniotomy  Impression: 1  Expected postoperative changes status post left occipital/retromastoid craniotomy with resection of a previously present large hyperdense/hemorrhagic left cerebellar metastasis  There is a small amount of residual hemorrhagic material in the treatment bed with residual local edema and mass effect on the 4th ventricle    Small amounts of pneumocephalus and adjacent scalp gas and fluid surrounding the operative site noted, also expected postoperative changes  Continued clinical and imaging surveillance recommended  2   Stable 5 mm hyperdense lesion in the left parietal cortex (series 2, image 28) along the superior margin of prior treatment cavity possibly a hypercellular hyperdense /hemorrhagic cortical metastasis versus small hemorrhage or potentially even cavernous angioma, similar to recent brain MRIs  Continued clinical and imaging surveillance recommended  Workstation performed: [id number]     CT head without contrast    Result Date: 7/24/2022  Narrative: CT BRAIN - WITHOUT CONTRAST INDICATION:   Syncope, recurrent Brain metastases suspected Lightheadedness, brain metastasis  COMPARISON:  6/15/2022 TECHNIQUE:  CT examination of the brain was performed  In addition to axial images, sagittal and coronal 2D reformatted images were created and submitted for interpretation  Radiation dose length product (DLP) for this visit:  864 mGy-cm   This examination, like all CT scans performed in the [facility], was performed utilizing techniques to minimize radiation dose exposure, including the use of iterative reconstruction and automated exposure control  IMAGE QUALITY:  Diagnostic  FINDINGS: PARENCHYMA:  Left cerebellar dense mass measures 3 9 x 3 6 cm, previously measuring 1 8 x 1 6 cm  There is associated vasogenic edema with mass effect upon the 4th ventricle  Adjacent to the mass is a 5 mm density (2/12)  Left parietal cortical lesion measures 6 mm, previously measuring 9 mm  There is redemonstration of left parietal encephalomalacia  VENTRICLES AND EXTRA-AXIAL SPACES:  Normal for the patient's age  VISUALIZED ORBITS AND PARANASAL SINUSES:  There is a retention cyst versus polyp in the right maxillary sinus  There is opacity in the right mastoid air cells  CALVARIUM AND EXTRACRANIAL SOFT TISSUES:  Left parietal craniotomy       Impression: The left cerebellar mass has increased in size, now measuring 3 9 x 3 6 cm  There is associated vasogenic edema with mass effect upon the 4th ventricle  There is a new 5 mm high density nodule in the left cerebellum  Left posterior parietal cortical nodule appears smaller than prior  The study was marked in [facility] for immediate notification  Workstation performed: [id number]     MRI brain w wo contrast    Result Date: 7/28/2022  Narrative: MRI BRAIN WITH AND WITHOUT CONTRAST INDICATION: Postop status post resection of left cerebellar metastases  COMPARISON:  MRI dated 7/25/2022  TECHNIQUE: Sagittal T1, axial T2, axial FLAIR, axial T1, axial [place], axial diffusion  Sagittal, axial T1 postcontrast   Axial bravo postcontrast with coronal reconstructions  IV Contrast:  10 mL of Gadobutrol injection (SINGLE-DOSE)  IMAGE QUALITY:   Diagnostic  FINDINGS: BRAIN PARENCHYMA:  New postoperative changes status post left suboccipital craniotomy for resection of large hemorrhagic left cerebellar metastasis as well as the smaller adjacent metastasis  Small subjacent extra-axial collection  Expected postoperative blood products within the operative cavity  Although evaluation for enhancement is somewhat limited due to presence of T1 hyperintense blood products, there is some mild enhancement seen at the posterior inferior aspect of the operative cavity  Possibly postoperative but follow-up anticipated    There is restricted diffusion at the inferior margin of the resection cavity that may be due to combination of marginal ischemia as well as blood products    Surrounding edema is mildly increased  Mass effect on the fourth ventricle is also mildly increased  Stable postsurgical cavity in the left parietal lobe with hemosiderin  Stable surrounding FLAIR signal  No enhancement to suggest recurrent disease  Stable 5 mm lesion in the left parietal lobe with susceptibility artifact adjacent to the resection cavity   There is is T1 shortening within the lesion on precontrast sequence (best appreciated on sagittal image 10 series 5) without definite enhancement    Stable surrounding FLAIR signal  No new lesion identified  VENTRICLES:  Stable  No hydrocephalus  SELLA AND PITUITARY GLAND:  Normal  ORBITS:  Stable bilateral proptosis  PARANASAL SINUSES:  Right maxillary sinus retention cyst  Partial opacification of the right mastoid air cells  VASCULATURE:  Evaluation of the major intracranial vasculature demonstrates appropriate flow voids  CALVARIUM AND SKULL BASE:  Postoperative changes status post left suboccipital craniotomy  EXTRACRANIAL SOFT TISSUES:  Normal      Impression: New postoperative changes status post resection of left cerebellar metastases with expected postoperative blood products  Small region of marginal postoperative ischemia  Mildly increased edema  Mass effect on the fourth ventricle is also slightly increased    No hydrocephalus  Small amount of enhancement at the operative margin may be postoperative  Recommend follow-up  Stable postsurgical changes in the left parietal lobe without locally recurrent disease  Stable 5 mm left parietal lobe lesion adjacent to resection cavity with T1 shortening and no definite residual enhancement    The study was marked in [employer] for immediate notification  Workstation performed: [id number]     MRI brain w wo contrast    Result Date: 7/25/2022  Narrative: MRI BRAIN WITH AND WITHOUT CONTRAST INDICATION: brain mass  COMPARISON:  CT head without contrast 7/24/2022, 6/15/2022  MRI brain with and without contrast 4/26/2022, 4/8/2022  TECHNIQUE: Sagittal T1, axial T2, axial FLAIR, axial T1, axial [place], axial diffusion  Sagittal, axial T1 postcontrast   Axial bravo postcontrast with coronal reconstructions  IV Contrast:  9 mL of Gadobutrol injection (SINGLE-DOSE)  IMAGE QUALITY:   Diagnostic  FINDINGS: BRAIN PARENCHYMA: Postsurgical changes of left parietal craniotomy for resection of left parietal mass  Unchanged left parietal resection cavity with encephalomalacia, gliosis, and peripheral hemosiderin deposition  No abnormal masslike enhancement in left parietal resection cavity to suggest recurrent disease  A few enhancing hemorrhagic metastatic lesions, for reference: - 0 5 x 0 5 cm hemorrhagic enhancing lesion in left parietal lobe posterior to left parietal resection cavity (12:81), previously 0 8 x 0 7 cm on CT head 6/15/2022 but unchanged since 7/24/2022  - 4 3 x 3 6 cm hemorrhagic enhancing mass in left cerebellum (12:38), previously 1 8 x 1 4 cm  Unchanged mass effect on the 4th ventricle since yesterday's CT head without contrast   Moderate surrounding perilesional vasogenic edema in left cerebellum, posterior cerebellar vermis, and right posterior paramedian cerebellum  - 0 6 x 0 6 cm hemorrhagic enhancing mass posterior to the dominant left cerebellar mass (12:40), previously 1 5 x 1 4 cm cm  No midline shift  No diffusion-weighted signal abnormality to suggest acute infarction  A few small scattered hyperintensities on T2/FLAIR imaging are noted in the periventricular and subcortical white matter demonstrating an appearance that is statistically most likely to represent minimal microangiopathic change  VENTRICLES:  Unchanged mass effect on the 4th ventricle  No obstructive hydrocephalus  No intraventricular hemorrhage  SELLA AND PITUITARY GLAND:  Normal  ORBITS:  Unchanged bilateral globe proptosis  PARANASAL SINUSES:  Moderate size right maxillary mucus retention cyst  VASCULATURE:  Evaluation of the major intracranial vasculature demonstrates appropriate flow voids  CALVARIUM AND SKULL BASE: Left parietal craniotomy  Small bilateral mastoid effusions (right worse than left)   EXTRACRANIAL SOFT TISSUES:  Normal      Impression: Mixed treatment response - 4 3 cm hemorrhagic metastatic lesion in left cerebellum, increased in size since CT head 6/15/2022 but unchanged since 7/24/2022 - this is likely due to interval hemorrhage of known left cerebellar lesion  Moderate surrounding perilesional vasogenic edema in left cerebellum, posterior cerebellar vermis, and right posterior paramedian cerebellum with mild mass effect on the 4th ventricle  - 0 6 cm hemorrhagic metastatic lesion posterior to the dominant left cerebellar mass, decreased in size  - 0 5 cm left parietal lobe hemorrhagic lesion posterior to left parietal resection cavity, slightly decreased in size  - No evidence of recurrent disease in left parietal resection cavity  The study was marked in [facility] for immediate notification  Workstation performed: [id number]     CT chest abdomen pelvis w contrast    Result Date: 7/26/2022  Narrative: CT CHEST, ABDOMEN AND PELVIS WITH IV CONTRAST INDICATION:   Brain/CNS neoplasm, staging Non-small cell lung cancer (NSCLC), monitor Non-small cell lung cancer (NSCLC), metastatic, assess treatment response h/o lung cancer, new brain mets  Evaluate for staging  COMPARISON:  CT chest abdomen pelvis 6/15/2022 TECHNIQUE: CT examination of the chest, abdomen and pelvis was performed  Axial, sagittal, and coronal 2D reformatted images were created from the source data and submitted for interpretation  Radiation dose length product (DLP) for this visit:  1208 13 mGy-cm   This examination, like all CT scans performed in the [facility], was performed utilizing techniques to minimize radiation dose exposure, including the use of iterative reconstruction and automated exposure control  IV Contrast:  65 mL of iohexol (OMNIPAQUE) Enteric contrast was not administered  FINDINGS: CHEST LUNGS:  Moderate emphysema  Postsurgical changes from left upper lobectomy  There are multiple new bilateral irregular lung nodules suspicious for metastases, with examples as follows: -Right upper lobe juxtapleural nodule measuring 1 6 x 2 5 cm (series 3 image 48)   -Right upper lobe perifissural nodule measuring 1 4 x 1 8 cm (series 3 image 61)  -Left lower lobe juxtapleural nodule measuring 1 9 x 1 5 cm (series 3 image 93)  -Left lower lobe nodule measuring 1 6 x 1 3 cm (series 3 image 70)  PLEURA:  Unremarkable  HEART/GREAT VESSELS:  Heart is unremarkable for patient's age  No thoracic aortic aneurysm  MEDIASTINUM AND OWEN:  Unremarkable  CHEST WALL AND LOWER NECK:  Right chest wall port with the tip in the SVC  ABDOMEN LIVER/BILIARY TREE:  Hepatic steatosis  Otherwise unremarkable  GALLBLADDER:  No calcified gallstones  No pericholecystic inflammatory change  SPLEEN:  Nonspecific hypodense splenic lesion measuring approximately 1 7 cm, seen on prior studies since 10/10/2017  PANCREAS:  Unremarkable  ADRENAL GLANDS:  Unremarkable  KIDNEYS/URETERS:  Bilateral renal cysts and subcentimeter too small to characterize hypodensities  No hydronephrosis  STOMACH AND BOWEL:  Colonic diverticulosis without findings of acute diverticulitis  APPENDIX:  Normal  ABDOMINOPELVIC CAVITY:  No ascites  No pneumoperitoneum  No lymphadenopathy  VESSELS:  Marked atherosclerotic changes  No abdominal aortic aneurysm  PELVIS REPRODUCTIVE ORGANS:  Unremarkable for patient's age  URINARY BLADDER:  Diffusely increased density fluid within the bladder  Otherwise unremarkable  ABDOMINAL WALL/INGUINAL REGIONS:  Unremarkable  OSSEOUS STRUCTURES:  No acute fracture or destructive osseous lesion  Degenerative changes of the spine  Postsurgical change in left ribs from thoracotomy  Impression: 1  Multiple new bilateral irregular lung nodules suspicious for metastases  2   No findings of metastatic disease in the abdomen or pelvis  The study was marked in [facility] for immediate notification  Workstation performed: [id number]     EKG, Pathology, and Other Studies: I have personally reviewed pertinent reports        VTE Pharmacologic Prophylaxis: Heparin    VTE Mechanical Prophylaxis: sequential compression device

## 2022-08-01 NOTE — PROGRESS NOTES
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)    Assessment/Plan:    1  Atrial fibrillation with RVR   ·  Patient is currently well rate controlled, abnormal rhythm could not be appreciated on physical exam  Telemetry was discontinued prior to moving to new room  Rates currently in the 76s  · Order daily EKGs for further monitoring of rate and rhythm  · Continue current regiment of amiodarone 400 mg BID, cardizem 120 mg BID, metoprolol succinate 50 mg BID  Megan       Stefanie Wilkes was seen at bedside today in the morning and appears more comfortable than in previous visits  He does not seem as somnolent but continues to endorse dizziness and fatigue  He denies CP, SOB, palpitations  A small stain of blood could be seen on the pillow behind his head  Objective     Vitals: Temp (24hrs), Av 2 °F (36 8 °C), Min:98 1 °F (36 7 °C), Max:98 4 °F (36 9 °C)  Current: Temperature: 98 1 °F (36 7 °C)  Patient Vitals for the past 24 hrs:   BP Temp Pulse Resp SpO2   22 1149 152/95 -- 74 -- 95 %   22 0809 152/92 98 1 °F (36 7 °C) 71 18 97 %   22 2120 126/81 98 1 °F (36 7 °C) 73 19 98 %   22 1844 128/80 98 4 °F (36 9 °C) 65 19 97 %   22 1700 136/79 -- 68 16 98 %   22 1600 130/82 -- 68 15 96 %   22 1500 126/82 -- 68 17 98 %   22 1400 129/83 -- 68 21 97 %   22 1300 131/71 -- 68 17 98 %    Body mass index is 29 53 kg/m²    Physical Exam:  Physical Exam  Constitutional:       Appearance: He is obese  Cardiovascular:      Rate and Rhythm: Normal rate and regular rhythm  Pulses: Normal pulses  Heart sounds: Normal heart sounds  Pulmonary:      Effort: Pulmonary effort is normal       Breath sounds: Normal breath sounds  Skin:     General: Skin is warm and dry  Neurological:      Mental Status: He is alert           Invasive Devices  Report    Peripherally Inserted Central Catheter Line  Duration           PICC Line 67/12/42 Left Basilic 2 days          Central Venous Catheter Line  Duration           Port A Cath 06/17/21 Right Chest 409 days          Peripheral Intravenous Line  Duration           Peripheral IV 07/29/22 Distal;Left;Upper;Ventral (anterior) Arm 2 days                    Labs:   Results from last 7 days   Lab Units 08/01/22  0511 07/31/22  0607 07/30/22  0526 07/29/22  1459 07/29/22  0535 07/28/22  1337 07/28/22  1002 07/28/22  0447 07/27/22  1824 07/27/22  1100   WBC Thousand/uL 6 73 7 53 7 83 7 80 6 90 6 41  --  6 60 7 22 7 60   HEMOGLOBIN g/dL 11 4* 12 1 12 4 11 7* 11 8* 11 7*  --  12 5 13 3 13 5   I STAT HEMOGLOBIN g/dl  --   --   --   --   --   --  9 5*  --   --   --    HEMATOCRIT % 34 9* 35 8* 34 9* 33 8* 34 3* 33 6*  --  36 7 39 0 39 5   HEMATOCRIT, ISTAT %  --   --   --   --   --   --  28*  --   --   --    PLATELETS Thousands/uL 97* 114* 133* 152 158 204  --  89* 106* 69*   NEUTROS PCT %  --   --   --   --   --   --   --  93*  --   --    MONOS PCT %  --   --   --   --   --   --   --  3*  --   --    MONO PCT % 1*  --   --   --   --  2*  --   --   --   --       Results from last 7 days   Lab Units 08/01/22  1051 08/01/22  0005 07/31/22  1648 07/31/22  0607 07/31/22  0022 07/30/22  2142 07/30/22  1844 07/30/22  1549 07/30/22  1513 07/30/22  0526 07/30/22  0109 07/29/22  1844 07/29/22  1118 07/29/22  0535 07/28/22  1002 07/28/22  0447 07/27/22  1824 07/27/22  1100   SODIUM mmol/L 132* 132* 131* 132* 131* 133* 128* 126* 129* 126* 127* 127* 129* 130*  --  134* 129* 131*   POTASSIUM mmol/L 4 0 4 0 4 0 3 5 3 5 3 6 3 6 4 1 4 1 4 0 4 2 4 2 3 7 3 6  --  3 7 4 1 4 2   CHLORIDE mmol/L 96 97 95* 93* 94* 97 94* 92* 94* 92* 95* 95* 96 96  --  101 96 97   CO2 mmol/L 32 32 31 33* 31 30 25 27 27 28 28 27 26 27  --  28 27 26   CO2, I-STAT mmol/L  --   --   --   --   --   --   --   --   --   --   --   --   --   --  30  --   --   --    BUN mg/dL 30* 28* 25 19 19 19 19 18 20 17 18 15 21 19  --  28* 32* 34*   CREATININE mg/dL 0 76 0 76 0 87 0 62 0 59* 0 70 0 95 0 70 0 79 0 66 0 57* 0 64 0 90 0 61  --  0 67 0 99 0 95   CALCIUM mg/dL 9 1 9 0 8 7 8 4 8 4 8 2* 8 7 8 6 8 4 8 6 8 6 8 9 8 5 8 6  --  7 6* 8 6 9 0   GLUCOSE, ISTAT mg/dl  --   --   --   --   --   --   --   --   --   --   --   --   --   --  138  --   --   --    MAGNESIUM mg/dL  --   --   --  2 2  --   --   --   --   --  2 2  --   --  2 0  --   --   --   --   --    PHOSPHORUS mg/dL  --   --   --  3 5  --   --   --   --   --  2 7  --   --   --   --   --   --   --   --    INR   --   --   --   --   --   --   --   --   --   --   --   --   --  0 99  --   --   --   --    PTT seconds  --   --   --   --   --   --   --   --   --   --   --   --   --  26  --   --   --   --    EGFR ml/min/1 73sq m 93 93 88 101 103 96 81 96 91 98 104 99 86 101  --  98 77 81     Results from last 7 days   Lab Units 07/29/22  0535   INR  0 99   PTT seconds 26             No results found for: PHART, SOJ8EEF, PO2ART, TWB3IJQ, G4JHNMDJ, BEART, SOURCE  No components found for: HIV1X2  Lab Results   Component Value Date    HEPCAB Non-reactive 10/06/2020     No results found for: SPEP, UPEP   Lab Results   Component Value Date    HGBA1C 5 2 04/03/2022    HGBA1C 5 4 01/25/2022    HGBA1C 5 3 06/23/2021     No results found for: CHOL   Lab Results   Component Value Date    HDL 43 06/20/2022    HDL 70 04/03/2022    HDL 35 (L) 01/25/2022      Lab Results   Component Value Date    LDLCALC 93 06/20/2022    LDLCALC 96 04/03/2022    LDLCALC 97 01/25/2022      Lab Results   Component Value Date    TRIG 140 06/20/2022    TRIG 137 04/03/2022    TRIG 130 01/25/2022     No components found for: PROCAL      Micro:      Urinalysis:  Lab Results   Component Value Date    BDZUR Negative 04/03/2022    COCAINEUR Negative 04/03/2022    OPIATEUR Negative 04/03/2022    PCPUR Negative 04/03/2022    THCUR Positive (A) 04/03/2022      Results from last 7 days   Lab Units 07/29/22  1054   COLOR UA  Light Yellow   CLARITY UA  Clear   SPEC GRAV UA  1 023   PH UA  6 5   LEUKOCYTES UA  Negative   NITRITE UA  Negative   GLUCOSE UA mg/dl Negative   KETONES UA mg/dl Negative   BILIRUBIN UA  Negative   BLOOD UA  Large*      Results from last 7 days   Lab Units 07/29/22  1054   RBC UA /hpf Innumerable*   WBC UA /hpf 1-2   EPITHELIAL CELLS WET PREP /hpf Occasional   BACTERIA UA /hpf None Seen      Intake and Outputs:  I/O       07/30 0701 07/31 0700 07/31 0701 08/01 0700 08/01 0701 08/02 0700    P  O  840 540     I V  (mL/kg) 540 8 (6 1)      IV Piggyback       Total Intake(mL/kg) 1380 8 (15 7) 540 (6 1)     Urine (mL/kg/hr) 3271 (1 5) 1180 (0 6)     Stool 0 0     Total Output 3271 1180     Net -1890 2 -640            Unmeasured Urine Occurrence 1 x 1 x     Unmeasured Stool Occurrence 2 x 2 x         Nutrition:  Diet Regular; Regular House; Fluid Restriction 1500 ML  Radiology Results:   CT head wo contrast   Final Result by Richard Nava MD (07/30 1023)         1  Expected postoperative changes status post left occipital/retromastoid craniotomy with resection of a previously present large hyperdense/hemorrhagic left cerebellar metastasis  There is a small amount of residual hemorrhagic material in the    treatment bed with residual local edema and mass effect on the 4th ventricle  Small amounts of pneumocephalus and adjacent scalp gas and fluid surrounding the operative site noted, also expected postoperative changes    Continued clinical and imaging    surveillance recommended  2   Stable 5 mm hyperdense lesion in the left parietal cortex (series 2, image 28) along the superior margin of prior treatment cavity possibly a hypercellular hyperdense /hemorrhagic cortical metastasis versus small hemorrhage or potentially even    cavernous angioma, similar to recent brain MRIs  Continued clinical and imaging surveillance recommended  Workstation performed: [id number]         MRI brain w wo contrast   Final Result by Parris Gerardo MD (07/28 2003)      New postoperative changes status post resection of left cerebellar metastases with expected postoperative blood products  Small region of marginal postoperative ischemia  Mildly increased edema  Mass effect on the fourth ventricle is also slightly    increased    No hydrocephalus  Small amount of enhancement at the operative margin may be postoperative  Recommend follow-up  Stable postsurgical changes in the left parietal lobe without locally recurrent disease  Stable 5 mm left parietal lobe lesion adjacent to resection cavity with T1 shortening and no definite residual enhancement         The study was marked in EPIC for immediate notification  Workstation performed: [id number]         CT chest abdomen pelvis w contrast   Final Result by Radha Acosta MD (07/26 9481)      1  Multiple new bilateral irregular lung nodules suspicious for metastases  2   No findings of metastatic disease in the abdomen or pelvis  The study was marked in [facility] for immediate notification  Workstation performed: [id number]         MRI brain w wo contrast   Final Result by Soraida Barahona MD (07/25 6512)      Mixed treatment response   - 4 3 cm hemorrhagic metastatic lesion in left cerebellum, increased in size since CT head 6/15/2022 but unchanged since 7/24/2022 - this is likely due to interval hemorrhage of known left cerebellar lesion    Moderate surrounding perilesional vasogenic edema in left cerebellum, posterior cerebellar vermis, and right posterior paramedian cerebellum with mild mass effect on the 4th ventricle    - 0 6 cm hemorrhagic metastatic lesion posterior to the dominant left cerebellar mass, decreased in size    - 0 5 cm left parietal lobe hemorrhagic lesion posterior to left parietal resection cavity, slightly decreased in size    - No evidence of recurrent disease in left parietal resection cavity  The study was marked in [facility] for immediate notification                       Workstation performed: [id number]           Scheduled Medications:  acetaminophen, 975 mg, Q8H [address]  allopurinol, 100 mg, Daily  amiodarone, 400 mg, BID With Meals  vitamin C, 1,000 mg, Daily  atorvastatin, 20 mg, Daily With Dinner  dexamethasone, 4 mg, Q8H [address]   Followed by  dexamethasone, 2 mg, Q6H [address]   Followed by  Quintin Tovar ON 8/4/2022] dexamethasone, 2 mg, Q8H [address]   Followed by  [START ON 8/5/2022] dexamethasone, 2 mg, Q12H [address]   Followed by  [START ON 8/8/2022] dexamethasone, 2 mg, Q24H [address]  diltiazem, 120 mg, Q12H [address]  docusate sodium, 100 mg, BID  heparin (porcine), 5,000 Units, Q8H [address]  levothyroxine, 37 5 mcg, Early Morning  methocarbamol, 500 mg, Q6H [address]  metoprolol succinate, 50 mg, BID  pantoprazole, 40 mg, Early Morning  polyethylene glycol, 17 g, BID  scopolamine, 1 patch, Q72H  senna, 1 tablet, Daily  sodium chloride, 2 g, TID With Meals      PRN MEDS:  aluminum-magnesium hydroxide-simethicone, 30 mL, Q6H PRN  bisacodyl, 10 mg, Daily PRN  meclizine, 25 mg, Q8H PRN  metoclopramide, 10 mg, Q6H PRN  ondansetron, 4 mg, Q6H PRN  oxyCODONE, 2 5 mg, Q4H PRN      Last 24 Hour Meds: :   Medication Administration - last 24 hours from 07/31/2022 1257 to 08/01/2022 1257       Date/Time Order Dose Route Action Action by     08/01/2022 1011 allopurinol (ZYLOPRIM) tablet 100 mg 100 mg Oral Given Lena Wiley RN     07/31/2022 1826 allopurinol (ZYLOPRIM) tablet 100 mg   Oral MAR Unhold Lena Wiley, RN     07/31/2022 1825 allopurinol (ZYLOPRIM) tablet 100 mg   Oral MAR Hold Automatic Transfer Provider     08/01/2022 1011 ascorbic acid (VITAMIN C) tablet 1,000 mg 1,000 mg [facility]     07/31/2022 1826 ascorbic acid (VITAMIN C) tablet 1,000 mg   Oral MAR Unhold Thurmon Dies, RN     07/31/2022 1825 ascorbic acid (VITAMIN C) tablet 1,000 mg   Oral MAR Hold Automatic Transfer Provider     08/01/2022 0500 levothyroxine tablet 37 5 mcg 37 5 mcg [facility]     07/31/2022 1826 levothyroxine tablet 37 5 mcg   Oral MAR Unhold Thurmon Dies, RN     07/31/2022 1825 levothyroxine tablet 37 5 mcg   Oral MAR Hold Automatic Transfer Provider     08/01/2022 0500 pantoprazole (PROTONIX) EC tablet 40 mg 40 mg [facility]     07/31/2022 1826 pantoprazole (PROTONIX) EC tablet 40 mg   Oral MAR Unhold Thurmon Dies, RN     07/31/2022 1825 pantoprazole (PROTONIX) EC tablet 40 mg   Oral MAR Hold Automatic Transfer Provider     07/31/2022 1826 atorvastatin (LIPITOR) tablet 20 mg   Oral MAR Unhold Thurmon [facility], RN     07/31/2022 1825 atorvastatin (LIPITOR) tablet 20 mg   Oral MAR Hold Automatic Transfer Provider     07/31/2022 1712 atorvastatin (LIPITOR) tablet 20 mg 20 mg Oral Given Anthony Nash, RN     07/31/2022 1826 ondansetron (ZOFRAN) injection 4 mg   Intravenous MAR Unhold Thurmon Dies, RN     07/31/2022 1825 ondansetron (ZOFRAN) injection 4 mg   Intravenous MAR Hold Automatic Transfer Provider     07/31/2022 1826 aluminum-magnesium hydroxide-simethicone (MYLANTA) oral suspension 30 mL   Oral MAR Unhold Thurmon [facility], RN     07/31/2022 1825 aluminum-magnesium hydroxide-simethicone (MYLANTA) oral suspension 30 mL   Oral MAR Hold Automatic Transfer Provider     08/01/2022 1014 diltiazem (CARDIZEM SR) 12 hr capsule 120 mg 120 mg Oral Given Thurmon [facility], RN     07/31/2022 2212 diltiazem (CARDIZEM SR) 12 hr capsule 120 mg 120 mg Oral Given Cynthia Walker RN     07/31/2022 7210 diltiazem (CARDIZEM SR) 12 hr capsule 120 mg   Oral MAR Unhold Keagan Barrera RN     07/31/2022 1825 diltiazem (CARDIZEM SR) 12 hr capsule 120 mg   Oral MAR Hold Automatic Transfer Provider     07/31/2022 1826 meclizine (ANTIVERT) tablet 25 mg   Oral MAR Unhold Keagan Barrera RN     07/31/2022 1825 meclizine (ANTIVERT) tablet 25 mg   Oral MAR Hold Automatic Transfer Provider     08/01/2022 1012 polyethylene glycol (MIRALAX) packet 17 g 17 g Oral Refused Keagan Barrera RN     07/31/2022 1826 polyethylene glycol (MIRALAX) packet 17 g   Oral MAR Unhold Keagan Barrera RN     07/31/2022 1825 polyethylene glycol (MIRALAX) packet 17 g   Oral MAR Hold Automatic Transfer Provider     07/31/2022 1711 polyethylene glycol (MIRALAX) packet 17 g 17 g Oral Given Rosa Youssef, JUAN J     08/01/2022 1011 docusate sodium (COLACE) capsule 100 mg 100 mg Oral Given Keagan Barrera RN     07/31/2022 1826 docusate sodium (COLACE) capsule 100 mg   Oral MAR Unhold Keagan Barrera RN     07/31/2022 1825 docusate sodium (COLACE) capsule 100 mg   Oral MAR Hold Automatic Transfer Provider     07/31/2022 1712 docusate sodium (COLACE) capsule 100 mg 100 mg Oral Given Black Hall RN     08/01/2022 1011 senna (SENOKOT) tablet 8 6 mg 8 6 mg Oral Given Keagan Barrera RN     07/31/2022 1826 senna (SENOKOT) tablet 8 6 mg   Oral MAR Unhold Keagan Barrera RN     07/31/2022 1825 senna (SENOKOT) tablet 8 6 mg   Oral MAR Hold Automatic Transfer Provider     07/31/2022 1826 bisacodyl (DULCOLAX) rectal suppository 10 mg   Rectal MAR Jennifer Meza RN     07/31/2022 1825 bisacodyl (DULCOLAX) rectal suppository 10 mg   Rectal MAR Hold Automatic Transfer Provider     08/01/2022 0500 dexamethasone (DECADRON) tablet 4 mg 4 mg Oral Given Campos Schilling, JUAN J     07/31/2022 2212 dexamethasone (DECADRON) tablet 4 mg 4 mg Oral Given Campos Schilling RN     07/31/2022 1826 dexamethasone (DECADRON) tablet 4 mg   Oral TORIN Meza RN     07/31/2022 182 dexamethasone (DECADRON) tablet 4 mg   Oral MAR Hold Automatic Transfer Provider     07/31/2022 1350 dexamethasone (DECADRON) tablet 4 mg 4 mg Oral Given Rosa Mcknight, RN     07/31/2022 1826 dexamethasone (DECADRON) tablet 2 mg   Oral MAR Unhold Chaka Duncanvenus, RN     07/31/2022 1825 dexamethasone (DECADRON) tablet 2 mg   Oral MAR Hold Automatic Transfer Provider     07/31/2022 1826 dexamethasone (DECADRON) tablet 2 mg   Oral MAR Unhold Chaka Valentino, RN     07/31/2022 1825 dexamethasone (DECADRON) tablet 2 mg   Oral MAR Hold Automatic Transfer Provider     07/31/2022 1826 dexamethasone (DECADRON) tablet 2 mg   Oral MAR [facility] Chakatodd Avelar, RN     07/31/2022 1825 dexamethasone (DECADRON) tablet 2 mg   Oral MAR Hold Automatic Transfer Provider     07/31/2022 1826 dexamethasone (DECADRON) tablet 2 mg   Oral MAR [facility] Chakatodd Avelar, RN     07/31/2022 1825 dexamethasone (DECADRON) tablet 2 mg   Oral MAR Hold Automatic Transfer Provider     07/31/2022 1826 scopolamine (TRANSDERM-SCOP) 1 mg/3 days TD 72 hr patch 1 patch   Transdermal MAR [facility] Chaka Valentino, RN     07/31/2022 1825 scopolamine (TRANSDERM-SCOP) 1 mg/3 days TD 72 hr patch 1 patch   Transdermal MAR Hold Automatic Transfer Provider     07/31/2022 1352 scopolamine (TRANSDERM-SCOP) 1 mg/3 days TD 72 hr patch 1 patch 1 patch Transdermal Medication Applied Rosa Youssef, RN     07/31/2022 1351 scopolamine (TRANSDERM-SCOP) 1 mg/3 days TD 72 hr patch 1 patch 1 patch Transdermal Patch Removed Brent Gottron, RN     08/01/2022 0500 acetaminophen (TYLENOL) tablet 975 mg 975 mg Oral Given Flory Solano, RN     07/31/2022 2211 acetaminophen (TYLENOL) tablet 975 mg 975 mg Oral Given Flory Solano, RN     07/31/2022 1826 acetaminophen (TYLENOL) tablet 975 mg   Oral MAR [facility] Chaka Avelar, JUAN J     07/31/2022 1825 acetaminophen (TYLENOL) tablet 975 mg   Oral MAR Hold Automatic Transfer Provider     07/31/2022 1350 acetaminophen (TYLENOL) tablet 975 mg 975 mg Oral Given Brent Gottron, JUAN J     08/01/2022 2162 oxyCODONE (ROXICODONE) IR tablet 2 5 mg 2 5 mg Oral Given Flory Solano, JUAN J     07/31/2022 2212 oxyCODONE (ROXICODONE) IR tablet 2 5 mg 2 5 mg Oral Given Flory Solano, JUAN J     07/31/2022 1826 oxyCODONE (ROXICODONE) IR tablet 2 5 mg   Oral MAR Unhold Chaka Avelar, JUAN J     07/31/2022 1825 oxyCODONE (ROXICODONE) IR tablet 2 5 mg   Oral MAR Hold Automatic Transfer Provider     08/01/2022 0500 methocarbamol (ROBAXIN) tablet 500 mg 500 mg Oral Given Flory Solano, RN     07/31/2022 2349 methocarbamol (ROBAXIN) tablet 500 mg 500 mg Oral Given Flory Solano, JUAN J     07/31/2022 1826 methocarbamol (ROBAXIN) tablet 500 mg   Oral MAR Unhold Chaka Avelar, JUAN J     07/31/2022 1825 methocarbamol (ROBAXIN) tablet 500 mg   Oral MAR Hold Automatic Transfer Provider     07/31/2022 1711 methocarbamol (ROBAXIN) tablet 500 mg 500 mg Oral Given Brent Gottron, JUAN J     07/31/2022 1826 metoclopramide (REGLAN) injection 10 mg   Intravenous MAR Mani Godoy RN     07/31/2022 1825 metoclopramide (REGLAN) injection 10 mg   Intravenous MAR Hold Automatic Transfer Provider     08/01/2022 0500 heparin (porcine) subcutaneous injection 5,000 Units 5,000 Units Subcutaneous Given Flory Solano RN     07/31/2022 2212 heparin (porcine) subcutaneous injection 5,000 Units 5,000 Units Subcutaneous Given Flory Solano RN     07/31/2022 1826 heparin (porcine) subcutaneous injection 5,000 Units   Subcutaneous MAR Unhold Chaka Avelar, JUAN J     07/31/2022 1825 heparin (porcine) subcutaneous injection 5,000 Units   Subcutaneous MAR Hold Automatic Transfer Provider     07/31/2022 1349 heparin (porcine) subcutaneous injection 5,000 Units 5,000 Units Subcutaneous Given Rosa Youssef RN     08/01/2022 1011 metoprolol succinate (TOPROL-XL) 24 hr tablet 50 mg 50 mg Oral Given Chaka Avelar RN     07/31/2022 2020 metoprolol succinate (TOPROL-XL) 24 hr tablet 50 mg   Oral Canceled Entry Flory Solano RN     07/31/2022 2000 metoprolol succinate (TOPROL-XL) 24 hr tablet 50 mg 50 mg Oral Given Princess Reyes, JUAN J     07/31/2022 1826 metoprolol succinate (TOPROL-XL) 24 hr tablet 50 mg   Oral MAR Unhold Zakia Woodson RN     07/31/2022 1825 metoprolol succinate (TOPROL-XL) 24 hr tablet 50 mg   Oral MAR Hold Automatic Transfer Provider     08/01/2022 1040 amiodarone tablet 400 mg 400 mg Oral Given Zakia Woodson, JUAN J     07/31/2022 1826 amiodarone tablet 400 mg   Oral MAR Unhold Zakia Woodson, JUAN J     07/31/2022 1825 amiodarone tablet 400 mg   Oral MAR Hold Automatic Transfer Provider     07/31/2022 1712 amiodarone tablet 400 mg 400 mg Oral Given Dat Reagan, RN     08/01/2022 1040 sodium chloride tablet 2 g 2 g Oral Given Zakia Woodson RN     07/31/2022 1826 sodium chloride tablet 2 g   Oral MAR Unhold Zakia Woodson RN     07/31/2022 1825 sodium chloride tablet 2 g   Oral MAR Hold Automatic Transfer Provider     07/31/2022 1711 sodium chloride tablet 2 g 2 g Oral Given Dat Kirk, JUAN J     07/31/2022 1958 furosemide (LASIX) injection 20 mg 20 mg Intravenous Given Princess Reyes, JUAN J     07/31/2022 1959 sodium chloride tablet 2 g 2 g Oral Given Princess Reyes, RN              [facility]    PLEASE NOTE:  This encounter was completed utilizing the [employer] Direct Speech Voice Recognition Software  Grammatical errors, random word insertions, pronoun errors and incomplete sentences are occasional consequences of the system due to software limitations, ambient noise and hardware issues  These may be missed by proof reading prior to affixing electronic signature  Any questions or concerns about the content, text or information contained within the body of this dictation should be directly addressed to the physician for clarification  Please do not hesitate to call me directly if you have any any questions or concerns

## 2022-08-01 NOTE — PROGRESS NOTES
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; to note is on celexa  - serum osm 269  - urine Na 126  - urine osm 711 - labs drawn after furosemide  - repeat Urine Na - 81   - repeat urine osm - 534  - 7/30 - there was concern for mass effect on 4th ventricle on CT scan and started on 3% saline  3% held approx 11 pm   - 7/31 - Na tabs increased to 2 gm TID and fluid restriction  Given furosemide  Plan:  - check BMP now as last BMP at midnight (improving appropriate at that time)  Goal Na today 132-136  - cont salt tab  - cont fluid restriction  - monitor BP  May need addition of doxazosin or other agent  2) renal function - stable Creat 0 76 mg/dL  3) dizziness - hist of neuroendocrine tumor    - initial CT scan with increase seize of L cerebellar mass with vasogenic veena on 4th ventricle and new 5 mm nodule in L cerebellum  - Neurosurgery on board  - pt went to OR on 7/28 for L occipital/retromastoid craniotomy with resection of cefebellar met  - on decadron per primary team    4) HTN    - on diltiazem and metoprolol  - avoid HCTZ    5) acid/base - bicarb stable    6) a fib with RVR    - AC contraindicated due to met disease to brain and risk of bleed  - metoprolol was increased  - initially required dilt drip    SUBJECTIVE / 24H INTERVAL HISTORY:  Pt with continued dizziness  Otherwise no complaints       OBJECTIVE:  Current Weight: Weight - Scale: 88 1 kg (194 lb 3 6 oz)  Vitals:    07/31/22 1700 07/31/22 1844 07/31/22 2120 08/01/22 0809   BP: 136/79 128/80 126/81 152/92   Pulse: 68 65 73 71   Resp: 16 19 19 18   Temp:  98 4 °F (36 9 °C) 98 1 °F (36 7 °C) 98 1 °F (36 7 °C)   TempSrc:       SpO2: 98% 97% 98% 97%   Weight:       Height:           Intake/Output Summary (Last 24 hours) at 8/1/2022 1025  Last data filed at 7/31/2022 1411  Gross per 24 hour   Intake 240 ml   Output 280 ml   Net -40 ml     General: NAD  Skin: no rash  Eyes: anicteric sclera  ENT: moist mucous membrane  Neck: supple  Chest: CTA b/l, no ronchii, no wheeze, no rubs, no rales  CVS: s1s2, no murmur, no gallop, no rub  Abdomen: soft, nontender, nl sounds  Extremities: no sig edema LE b/l  : no guerra  Neuro: AAOX3  Psych: normal affect    Medications:    Current Facility-Administered Medications:     acetaminophen (TYLENOL) tablet 975 mg, 975 mg, Oral, Q8H [address], Wilber Hackmyer, DO, 975 mg at 08/01/22 0500    allopurinol (ZYLOPRIM) tablet 100 mg, 100 mg, Oral, Daily, Wilber Hackmyer, DO, 100 mg at 08/01/22 1011    aluminum-magnesium hydroxide-simethicone (MYLANTA) oral suspension 30 mL, 30 mL, Oral, Q6H PRN, Deja Lieu, DO    amiodarone tablet 400 mg, 400 mg, Oral, BID With Meals, Deja Lieu, DO, 400 mg at 07/31/22 1712    ascorbic acid (VITAMIN C) tablet 1,000 mg, 1,000 mg, Oral, Daily, Wilber Hackmyer, DO, 1,000 mg at 08/01/22 1011    atorvastatin (LIPITOR) tablet 20 mg, 20 mg, Oral, Daily With Dinner, Deja Lieu, DO, 20 mg at 07/31/22 1712    bisacodyl (DULCOLAX) rectal suppository 10 mg, 10 mg, Rectal, Daily PRN, Deja Wheatley, DO    [COMPLETED] dexamethasone (DECADRON) tablet 4 mg, 4 mg, Oral, Q6H BASIA, 4 mg at 07/30/22 1324 **FOLLOWED BY** dexamethasone (DECADRON) tablet 4 mg, 4 mg, Oral, Q8H BASIA, 4 mg at 08/01/22 0500 **FOLLOWED BY** dexamethasone (DECADRON) tablet 2 mg, 2 mg, Oral, Q6H [address] **FOLLOWED BY** [START ON 8/4/2022] dexamethasone (DECADRON) tablet 2 mg, 2 mg, Oral, Q8H [address] **FOLLOWED BY** [START ON 8/5/2022] dexamethasone (DECADRON) tablet 2 mg, 2 mg, Oral, Q12H [address] **FOLLOWED BY** [START ON 8/8/2022] dexamethasone (DECADRON) tablet 2 mg, 2 mg, Oral, Q24H BASIA, Wilber Hackmyer, DO    diltiazem (CARDIZEM SR) 12 hr capsule 120 mg, 120 mg, Oral, Q12H [address], Wilber Hackmyer, DO, 120 mg at 08/01/22 1014    docusate sodium (COLACE) capsule 100 mg, 100 mg, Oral, BID, Wilber Hackmyer, DO, 100 mg at 08/01/22 1011    heparin (porcine) subcutaneous injection 5,000 Units, 5,000 Units, Subcutaneous, Q8H [address], Wilber Hackmyer, DO, 5,000 Units at 08/01/22 0500    levothyroxine tablet 37 5 mcg, 37 5 mcg, Oral, Early Morning, Wilber Hackmyer, DO, 37 5 mcg at 08/01/22 0500    meclizine (ANTIVERT) tablet 25 mg, 25 mg, Oral, Q8H PRN, Wilber Hackmyer, DO, 25 mg at 07/27/22 0840    methocarbamol (ROBAXIN) tablet 500 mg, 500 mg, Oral, Q6H [address], Wilber Hackmyer, DO, 500 mg at 08/01/22 0500    metoclopramide (REGLAN) injection 10 mg, 10 mg, Intravenous, Q6H PRN, Wilber Hackmyer, DO, 10 mg at 07/29/22 0815    metoprolol succinate (TOPROL-XL) 24 hr tablet 50 mg, 50 mg, Oral, BID, Wilber Hackmyer, DO, 50 mg at 08/01/22 1011    ondansetron (ZOFRAN) injection 4 mg, 4 mg, Intravenous, Q6H PRN, Wilber Hackmyer, DO, 4 mg at 07/29/22 0511    oxyCODONE (ROXICODONE) IR tablet 2 5 mg, 2 5 mg, Oral, Q4H PRN, [facility], , 2 5 mg at 08/01/22 0313    pantoprazole (PROTONIX) EC tablet 40 mg, 40 mg, Oral, Early Morning, Wilber Sanchez DO, 40 mg at 08/01/22 0500    polyethylene glycol (MIRALAX) packet 17 g, 17 g, Oral, BID, Wilebr Sanchez DO, 17 g at 07/31/22 1711    scopolamine (TRANSDERM-SCOP) 1 mg/3 days TD 72 hr patch 1 patch, 1 patch, Transdermal, Q72H, Wilber Sanchez DO, 1 patch at 07/31/22 1352    senna (SENOKOT) tablet 8 6 mg, 1 tablet, Oral, Daily, Wilber Sanchez DO, 8 6 mg at 08/01/22 1011    sodium chloride tablet 2 g, 2 g, Oral, TID With Meals, [facility], , 2 g at 07/31/22 1711    Laboratory Results:  Results from last 7 days   Lab Units 08/01/22  0511 08/01/22  0005 07/31/22  1648 07/31/22  0607 07/31/22  0022 07/30/22  2142 07/30/22  1844 07/30/22  1549 07/30/22  1513 07/30/22  0526 07/29/22  1844 07/29/22  1459 07/29/22  1118 07/29/22  0535 07/28/22  1337 07/28/22  1002 07/28/22  0447   WBC Thousand/uL 6 73  --   --  7 53  --   --   --   --   --  7 83  --  7 80  --  6 90 6 41  --  6 60   HEMOGLOBIN g/dL 11 4*  --   --  12 1  --   --   --   --   --  12 4  --  11 7*  --  11 8* 11 7*  --  12 5   I STAT HEMOGLOBIN g/dl  --   --   --   --   --   --   --   --   --   --   --   --   --   --   --  9 5*  --    HEMATOCRIT % 34 9*  --   --  35 8*  --   --   --   --   --  34 9*  --  33 8*  --  34 3* 33 6*  --  36 7   HEMATOCRIT, ISTAT %  --   --   --   --   --   --   --   --   --   --   --   --   --   --   --  28*  --    PLATELETS Thousands/uL 97*  --   --  114*  --   --   --   --   --  133*  --  152  --  158 204  --  89*   POTASSIUM mmol/L  --  4 0 4 0 3 5 3 5 3 6 3 6 4 1   < > 4 0   < >  --  3 7 3 6  --   --  3 7   CHLORIDE mmol/L  --  97 95* 93* 94* 97 94* 92*   < > 92*   < >  --  96 96  --   --  101   CO2 mmol/L  --  32 31 33* 31 30 25 27   < > 28   < >  --  26 27  --   --  28   CO2, I-STAT mmol/L  --   --   --   --   --   --   --   --   --   --   --   --   --   --   --  30  --    BUN mg/dL  --  28* 25 19 19 19 19 18   < > 17   < >  --  21 19  --   --  28*   CREATININE mg/dL  --  0 76 0 87 0 62 0 59* 0 70 0 95 0 70   < > 0 66   < >  --  0 90 0 61  --   --  0 67   CALCIUM mg/dL  --  9 0 8 7 8 4 8 4 8 2* 8 7 8 6   < > 8 6   < >  --  8 5 8 6  --   --  7 6*   MAGNESIUM mg/dL  --   --   --  2 2  --   --   --   --   --  2 2  --   --  2 0  --   --   --   --    PHOSPHORUS mg/dL  --   --   --  3 5  --   --   --   --   --  2 7  --   --   --   --   --   --   --    GLUCOSE, ISTAT mg/dl  --   --   --   --   --   --   --   --   --   --   --   --   --   --   --  138  --     < > = values in this interval not displayed

## 2022-08-01 NOTE — TELEPHONE ENCOUNTER
08/05/2022-PT Lashawn [facility]    08/04/2022-PT STILL IN HOSPITAL    08/02/2022-PT STILL IN HOSPITAL    08/01/2022-PT STILL IN HOSPITAL  08/10/2022-2 WK POV PATH W/STAN      ----- Message from Ralph Andersen PA-C sent at 8/1/2022 11:34 AM EDT -----  Regarding: POV  Please schedule 2 week path review and incision check with Manuel Hernández  Surgery 7/28

## 2022-08-01 NOTE — PLAN OF CARE
Problem: PAIN - ADULT  Goal: Verbalizes/displays adequate comfort level or baseline comfort level  Description: Interventions:  - Encourage patient to monitor pain and request assistance  - Assess pain using appropriate pain scale  - Administer analgesics based on type and severity of pain and evaluate response  - Implement non-pharmacological measures as appropriate and evaluate response  - Consider cultural and social influences on pain and pain management  - Notify physician/advanced practitioner if interventions unsuccessful or patient reports new pain  Outcome: Progressing     Problem: SAFETY ADULT  Goal: Patient will remain free of falls  Description: INTERVENTIONS:  - Educate patient/family on patient safety including physical limitations  - Instruct patient to call for assistance with activity   - Consult OT/PT to assist with strengthening/mobility   - Keep Call bell within reach  - Keep bed low and locked with side rails adjusted as appropriate  - Keep care items and personal belongings within reach  - Initiate and maintain comfort rounds  - Make Fall Risk Sign visible to staff  - Apply yellow socks and bracelet for high fall risk patients  - Consider moving patient to room near nurses station  Outcome: Progressing  Goal: Maintain or return to baseline ADL function  Description: INTERVENTIONS:  -  Assess patient's ability to carry out ADLs; assess patient's baseline for ADL function and identify physical deficits which impact ability to perform ADLs (bathing, care of mouth/teeth, toileting, grooming, dressing, etc )  - Assess/evaluate cause of self-care deficits   - Assess range of motion  - Assess patient's mobility; develop plan if impaired  - Assess patient's need for assistive devices and provide as appropriate  - Encourage maximum independence but intervene and supervise when necessary  - Involve family in performance of ADLs  - Assess for home care needs following discharge   - Consider OT consult to assist with ADL evaluation and planning for discharge  - Provide patient education as appropriate  Outcome: Progressing  Goal: Maintains/Returns to pre admission functional level  Description: INTERVENTIONS:  - Perform BMAT or MOVE assessment daily    - Set and communicate daily mobility goal to care team and patient/family/caregiver  - Collaborate with rehabilitation services on mobility goals if consulted  - Perform Range of Motion 3times a day  - Reposition patient [id number]    - Dangle patient 3 times a day  - Stand patient3 times a day  - Ambulate patient 3 times a day  - Out of bed to chair 3 times a day   - Out of bed for meals 3 times a day  - Out of bed for toileting  - Record patient progress and toleration of activity level   Outcome: Progressing     Problem: NEUROSENSORY - ADULT  Goal: Achieves stable or improved neurological status  Description: INTERVENTIONS  - Monitor and report changes in neurological status  - Monitor vital signs such as temperature, blood pressure, glucose, and any other labs ordered   - Initiate measures to prevent increased intracranial pressure  - Monitor for seizure activity and implement precautions if appropriate      Outcome: Progressing  Goal: Remains free of injury related to seizures activity  Description: INTERVENTIONS  - Maintain airway, patient safety  and administer oxygen as ordered  - Monitor patient for seizure activity, document and report duration and description of seizure to physician/advanced practitioner  - If seizure occurs,  ensure patient safety during seizure  - Reorient patient post seizure  - Seizure pads on all 4 side rails  - Instruct patient/family to notify RN of any seizure activity including if an aura is experienced  - Instruct patient/family to call for assistance with activity based on nursing assessment  - Administer anti-seizure medications if ordered    Outcome: Progressing  Goal: Achieves maximal functionality and self care  Description: INTERVENTIONS  - Monitor swallowing and airway patency with patient fatigue and changes in neurological status  - Encourage and assist patient to increase activity and self care     - Encourage visually impaired, hearing impaired and aphasic patients to use assistive/communication devices  Outcome: Progressing     Problem: CARDIOVASCULAR - ADULT  Goal: Maintains optimal cardiac output and hemodynamic stability  Description: INTERVENTIONS:  - Monitor I/O, vital signs and rhythm  - Monitor for S/S and trends of decreased cardiac output  - Administer and titrate ordered vasoactive medications to optimize hemodynamic stability  - Assess quality of pulses, skin color and temperature  - Assess for signs of decreased coronary artery perfusion  - Instruct patient to report change in severity of symptoms  Outcome: Progressing  Goal: Absence of cardiac dysrhythmias or at baseline rhythm  Description: INTERVENTIONS:  - Continuous cardiac monitoring, vital signs, obtain 12 lead EKG if ordered  - Administer antiarrhythmic and heart rate control medications as ordered  - Monitor electrolytes and administer replacement therapy as ordered  Outcome: Progressing  Goal: Incision(s), wounds(s) or drain site(s) healing without S/S of infection  Description: INTERVENTIONS  - Assess and document dressing, incision, wound bed, drain sites and surrounding tissue  - Provide patient and family education  - Perform skin care/dressing changes every  Outcome: Progressing     Problem: Potential for Falls  Goal: Patient will remain free of falls  Description: INTERVENTIONS:  - Educate patient/family on patient safety including physical limitations  - Instruct patient to call for assistance with activity   - Consult OT/PT to assist with strengthening/mobility   - Keep Call bell within reach  - Keep bed low and locked with side rails adjusted as appropriate  - Keep care items and personal belongings within reach  - Initiate and maintain comfort rounds  - Make Fall Risk Sign visible to staff  - Apply yellow socks and bracelet for high fall risk patients  - Consider moving patient to room near nurses station  Outcome: Progressing     Problem: Prexisting or High Potential for Compromised Skin Integrity  Goal: Skin integrity is maintained or improved  Description: INTERVENTIONS:  - Identify patients at risk for skin breakdown  - Assess and monitor skin integrity  - Assess and monitor nutrition and hydration status  - Monitor labs   - Assess for incontinence   - Turn and reposition patient  - Assist with mobility/ambulation  - Relieve pressure over bony prominences  - Avoid friction and shearing  - Provide appropriate hygiene as needed including keeping skin clean and dry  - Evaluate need for skin moisturizer/barrier cream  - Collaborate with interdisciplinary team   - Patient/family teaching  - Consider wound care consult   Outcome: Progressing     Problem: MOBILITY - ADULT  Goal: Maintain or return to baseline ADL function  Description: INTERVENTIONS:  -  Assess patient's ability to carry out ADLs; assess patient's baseline for ADL function and identify physical deficits which impact ability to perform ADLs (bathing, care of mouth/teeth, toileting, grooming, dressing, etc )  - Assess/evaluate cause of self-care deficits   - Assess range of motion  - Assess patient's mobility; develop plan if impaired  - Assess patient's need for assistive devices and provide as appropriate  - Encourage maximum independence but intervene and supervise when necessary  - Involve family in performance of ADLs  - Assess for home care needs following discharge   - Consider OT consult to assist with ADL evaluation and planning for discharge  - Provide patient education as appropriate  Outcome: Progressing  Goal: Maintains/Returns to pre admission functional level  Description: INTERVENTIONS:  - Perform BMAT or MOVE assessment daily    - Set and communicate daily mobility goal to care team and patient/family/caregiver  - Collaborate with rehabilitation services on mobility goals if consulted  - Perform Range of Motion 3 times a day  - Reposition patient every 3 hours    - Dangle patient 3 times a day  - Stand patient 3 times a day  - Ambulate patient 3 times a day  - Out of bed to chair 3 times a day   - Out of bed for meals 3 times a day  - Out of bed for toileting  - Record patient progress and toleration of activity level   Outcome: Progressing     Problem: RESPIRATORY - ADULT  Goal: Achieves optimal ventilation and oxygenation  Description: INTERVENTIONS:  - Assess for changes in respiratory status  - Assess for changes in mentation and behavior  - Position to facilitate oxygenation and minimize respiratory effort  - Oxygen administered by appropriate delivery if ordered  - Initiate smoking cessation education as indicated  - Encourage broncho-pulmonary hygiene including cough, deep breathe, Incentive Spirometry  - Assess the need for suctioning and aspirate as needed  - Assess and instruct to report SOB or any respiratory difficulty  - Respiratory Therapy support as indicated  Outcome: Progressing     Problem: GASTROINTESTINAL - ADULT  Goal: Minimal or absence of nausea and/or vomiting  Description: INTERVENTIONS:  - Administer IV fluids if ordered to ensure adequate hydration  - Maintain NPO status until nausea and vomiting are resolved  - Nasogastric tube if ordered  - Administer ordered antiemetic medications as needed  - Provide nonpharmacologic comfort measures as appropriate  - Advance diet as tolerated, if ordered  - Consider nutrition services referral to assist patient with adequate nutrition and appropriate food choices  Outcome: Progressing  Goal: Maintains or returns to baseline bowel function  Description: INTERVENTIONS:  - Assess bowel function  - Encourage oral fluids to ensure adequate hydration  - Administer IV fluids if ordered to ensure adequate hydration  - Administer ordered medications as needed  - Encourage mobilization and activity  - Consider nutritional services referral to assist patient with adequate nutrition and appropriate food choices  Outcome: Progressing  Goal: Maintains adequate nutritional intake  Description: INTERVENTIONS:  - Monitor percentage of each meal consumed  - Identify factors contributing to decreased intake, treat as appropriate  - Assist with meals as needed  - Monitor I&O, weight, and lab values if indicated  - Obtain nutrition services referral as needed  Outcome: Progressing     Problem: GENITOURINARY - ADULT  Goal: Maintains or returns to baseline urinary function  Description: INTERVENTIONS:  - Assess urinary function  - Encourage oral fluids to ensure adequate hydration if ordered  - Administer IV fluids as ordered to ensure adequate hydration  - Administer ordered medications as needed  - Offer frequent toileting  - Follow urinary retention protocol if ordered  Outcome: Progressing     Problem: METABOLIC, FLUID AND ELECTROLYTES - ADULT  Goal: Electrolytes maintained within normal limits  Description: INTERVENTIONS:  - Monitor labs and assess patient for signs and symptoms of electrolyte imbalances  - Administer electrolyte replacement as ordered  - Monitor response to electrolyte replacements, including repeat lab results as appropriate  - Instruct patient on fluid and nutrition as appropriate  Outcome: Progressing  Goal: Fluid balance maintained  Description: INTERVENTIONS:  - Monitor labs   - Monitor I/O and WT  - Instruct patient on fluid and nutrition as appropriate  - Assess for signs & symptoms of volume excess or deficit  Outcome: Progressing     Problem: HEMATOLOGIC - ADULT  Goal: Maintains hematologic stability  Description: INTERVENTIONS  - Assess for signs and symptoms of bleeding or hemorrhage  - Monitor labs  - Administer supportive blood products/factors as ordered and appropriate  Outcome: Progressing Problem: MUSCULOSKELETAL - ADULT  Goal: Maintain or return mobility to safest level of function  Description: INTERVENTIONS:  - Assess patient's ability to carry out ADLs; assess patient's baseline for ADL function and identify physical deficits which impact ability to perform ADLs (bathing, care of mouth/teeth, toileting, grooming, dressing, etc )  - Assess/evaluate cause of self-care deficits   - Assess range of motion  - Assess patient's mobility  - Assess patient's need for assistive devices and provide as appropriate  - Encourage maximum independence but intervene and supervise when necessary  - Involve family in performance of ADLs  - Assess for home care needs following discharge   - Consider OT consult to assist with ADL evaluation and planning for discharge  - Provide patient education as appropriate  Outcome: Progressing     Problem: Nutrition/Hydration-ADULT  Goal: Nutrient/Hydration intake appropriate for improving, restoring or maintaining nutritional needs  Description: Monitor and assess patient's nutrition/hydration status for malnutrition  Collaborate with interdisciplinary team and initiate plan and interventions as ordered  Monitor patient's weight and dietary intake as ordered or per policy  Utilize nutrition screening tool and intervene as necessary  Determine patient's food preferences and provide high-protein, high-caloric foods as appropriate       INTERVENTIONS:  - Monitor oral intake, urinary output, labs, and treatment plans  - Assess nutrition and hydration status and recommend course of action  - Evaluate amount of meals eaten  - Assist patient with eating if necessary   - Allow adequate time for meals  - Recommend/ encourage appropriate diets, oral nutritional supplements, and vitamin/mineral supplements  - Order, calculate, and assess calorie counts as needed  - Recommend, monitor, and adjust tube feedings and TPN/PPN based on assessed needs  - Assess need for intravenous fluids  - Provide specific nutrition/hydration education as appropriate  - Include patient/family/caregiver in decisions related to nutrition  Outcome: Progressing     Problem: INFECTION - ADULT  Goal: Absence or prevention of progression during hospitalization  Description: INTERVENTIONS:  - Assess and monitor for signs and symptoms of infection  - Monitor lab/diagnostic results  - Monitor all insertion sites, i e  indwelling lines, tubes, and drains  - Monitor endotracheal if appropriate and nasal secretions for changes in amount and color  - [place] appropriate cooling/warming therapies per order  - Administer medications as ordered  - Instruct and encourage patient and family to use good hand hygiene technique  - Identify and instruct in appropriate isolation precautions for identified infection/condition  Outcome: Progressing     Problem: DISCHARGE PLANNING  Goal: Discharge to home or other facility with appropriate resources  Description: INTERVENTIONS:  - Identify barriers to discharge w/patient and caregiver  - Arrange for needed discharge resources and transportation as appropriate  - Identify discharge learning needs (meds, wound care, etc )  - Arrange for interpretive services to assist at discharge as needed  - Refer to Case Management Department for coordinating discharge planning if the patient needs post-hospital services based on physician/advanced practitioner order or complex needs related to functional status, cognitive ability, or social support system  Outcome: Progressing     Problem: Knowledge Deficit  Goal: Patient/family/caregiver demonstrates understanding of disease process, treatment plan, medications, and discharge instructions  Description: Complete learning assessment and assess knowledge base    Interventions:  - Provide teaching at level of understanding  - Provide teaching via preferred learning methods  Outcome: Progressing     Problem: COPING  Goal: Pt/Family able to verbalize concerns and demonstrate effective coping strategies  Description: INTERVENTIONS:  - Assist patient/family to identify coping skills, available support systems and cultural and spiritual values  - Provide emotional support, including active listening and acknowledgement of concerns of patient and caregivers  - Reduce environmental stimuli, as able  - Provide patient education  - Assess for spiritual pain/suffering and initiate spiritual care, including notification of Pastoral Care or [facility] based community as needed  - Assess effectiveness of coping strategies  Outcome: Progressing  Goal: Will report anxiety at manageable levels  Description: INTERVENTIONS:  - Administer medication as ordered  - Teach and encourage coping skills  - Provide emotional support  - Assess patient/family for anxiety and ability to cope  Outcome: Progressing

## 2022-08-01 NOTE — PROGRESS NOTES
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)  Active Problems:    Essential hypertension    Mixed hyperlipidemia    Adenocarcinoma, lung, left (HCC)    History of gout    Thrombocytopenia (HCC)    Hypothyroidism    Atrial fibrillation with rapid ventricular response (HCC)    Hyponatremia    High grade neuroendocrine carcinoma of lung ([address] )      Plan:  Patient with no chest pain or significant dyspnea  He had no issues overnight  Blood pressure elevated  Patient typically on amlodipine 10 mg per day  Will start 5 mg dose  He is postop day four after craniotomy  Heart rate well controlled on current regimen  BMP with potassium of 4 0 and creatinine of 0 76  Hemoglobin 11 4  Subjective:   Patient seen and examined  No significant events overnight   negative  Objective:     Vitals: Blood pressure 152/92, pulse 71, temperature 98 1 °F (36 7 °C), resp  rate 18, height 5' 8" (1 727 m), weight 88 1 kg (194 lb 3 6 oz), SpO2 97 %  , Body mass index is 29 53 kg/m² ,   Orthostatic Blood Pressures    Flowsheet Row Most Recent Value   Blood Pressure 152/92 filed at 08/01/2022 0809   Patient Position - Orthostatic VS Lying filed at 07/27/2022 1908      ,      Intake/Output Summary (Last 24 hours) at 8/1/2022 0900  Last data filed at 7/31/2022 1411  Gross per 24 hour   Intake 540 ml   Output 1180 ml   Net -640 ml             Physical Exam:    GEN: Mireya Lee   NECK: supple, no carotid bruits, no JVD or HJR  HEART: normal rate, regular rhythm, normal S1 and S2, no murmurs, clicks, gallops or rubs   LUNGS: clear to auscultation bilaterally; no wheezes, rales, or rhonchi   ABDOMEN: normal bowel sounds, soft, no tenderness, no distention  EXTREMITIES: peripheral pulses normal; no clubbing, cyanosis, or edema  SKIN: warm and well perfused, no suspicious lesions on exposed skin    Labs & Results:    No results displayed because visit has over 200 results  XR chest 2 views    Result Date: 7/25/2022  Narrative: CHEST INDICATION:   AFib RVR, lightheadedness, palpitations  COMPARISON:  01/24/2017  CT scan on 06/15/2022 EXAM PERFORMED/VIEWS:  XR CHEST PA & LATERAL Images: 3 FINDINGS: Cardiomediastinal silhouette appears unremarkable  Right-sided MediPort terminating in the SVC  The lungs are hyperinflated  Postoperative changes in the left lung  Scarring in the left midlung  No pneumothorax or pleural effusion  Osseous structures appear within normal limits for patient age  Impression: No acute cardiopulmonary disease  Hyperinflation  Workstation performed: [id number]     CT head wo contrast    Result Date: 7/30/2022  Narrative: CT BRAIN - WITHOUT CONTRAST INDICATION:   change in exam   History of metastatic neuroendocrine carcinoma with brain metastases  COMPARISON:  7/24/2022; progress note 7/30/2022; 7/28/2022; 7/25/2022 TECHNIQUE:  CT examination of the brain was performed  In addition to axial images, sagittal and coronal 2D reformatted images were created and submitted for interpretation  Radiation dose length product (DLP) for this visit:  902 14 mGy-cm   This examination, like all CT scans performed in the [facility], was performed utilizing techniques to minimize radiation dose exposure, including the use of iterative  reconstruction and automated exposure control  IMAGE QUALITY:  Diagnostic  FINDINGS: PARENCHYMA:  Evolving edema gliosis and small amounts of hemorrhagic material in the left cerebellum subjacent to a new left occipital/retromastoid craniotomy (series 3, image 13) indicative of interval resection of a large previously present hyperdense/hemorrhagic cerebellar metastasis and expected postoperative changes    There is residual local mass effect on the 4th ventricle as well as effacement of the left ambient cistern without overt hydrocephalus  Separately in the left parietal lobe there is redemonstration of a previously present craniotomy  There is a small amount of high density along the extra-axial space posteriorly on series 2 image 32 likely related to dural closure material, stable  Cystic encephalomalacia and gliosis in the treatment bed in the left parietal vertex again noted  Along the posterior margin of this treatment bed/gliotic region, there is a 0 5 cm rounded high density lesion, image 28, series 2 possibly a hyperdense or  hypercellular metastasis versus small hemorrhage or less likely cavernous angioma, similar to previous MR imaging  VENTRICLES AND EXTRA-AXIAL SPACES:  4th ventricle is effaced  No hydrocephalus  Small amount of pneumocephalus extra-axial fluid and gas subjacent left occipital craniotomy  VISUALIZED ORBITS AND PARANASAL SINUSES:  Right maxillary sinus mucus retention cyst  CALVARIUM AND EXTRACRANIAL SOFT TISSUES:  Postoperative changes in the scalp with gas and strandy densities adjacent to the left occipital craniotomy  Impression: 1  Expected postoperative changes status post left occipital/retromastoid craniotomy with resection of a previously present large hyperdense/hemorrhagic left cerebellar metastasis  There is a small amount of residual hemorrhagic material in the treatment bed with residual local edema and mass effect on the 4th ventricle  Small amounts of pneumocephalus and adjacent scalp gas and fluid surrounding the operative site noted, also expected postoperative changes  Continued clinical and imaging surveillance recommended  2   Stable 5 mm hyperdense lesion in the left parietal cortex (series 2, image 28) along the superior margin of prior treatment cavity possibly a hypercellular hyperdense /hemorrhagic cortical metastasis versus small hemorrhage or potentially even cavernous angioma, similar to recent brain MRIs    Continued clinical and imaging surveillance recommended  Workstation performed: [id number]     CT head without contrast    Result Date: 7/24/2022  Narrative: CT BRAIN - WITHOUT CONTRAST INDICATION:   Syncope, recurrent Brain metastases suspected Lightheadedness, brain metastasis  COMPARISON:  6/15/2022 TECHNIQUE:  CT examination of the brain was performed  In addition to axial images, sagittal and coronal 2D reformatted images were created and submitted for interpretation  Radiation dose length product (DLP) for this visit:  864 mGy-cm   This examination, like all CT scans performed in the [facility], was performed utilizing techniques to minimize radiation dose exposure, including the use of iterative reconstruction and automated exposure control  IMAGE QUALITY:  Diagnostic  FINDINGS: PARENCHYMA:  Left cerebellar dense mass measures 3 9 x 3 6 cm, previously measuring 1 8 x 1 6 cm  There is associated vasogenic edema with mass effect upon the 4th ventricle  Adjacent to the mass is a 5 mm density (2/12)  Left parietal cortical lesion measures 6 mm, previously measuring 9 mm  There is redemonstration of left parietal encephalomalacia  VENTRICLES AND EXTRA-AXIAL SPACES:  Normal for the patient's age  VISUALIZED ORBITS AND PARANASAL SINUSES:  There is a retention cyst versus polyp in the right maxillary sinus  There is opacity in the right mastoid air cells  CALVARIUM AND EXTRACRANIAL SOFT TISSUES:  Left parietal craniotomy  Impression: The left cerebellar mass has increased in size, now measuring 3 9 x 3 6 cm  There is associated vasogenic edema with mass effect upon the 4th ventricle  There is a new 5 mm high density nodule in the left cerebellum  Left posterior parietal cortical nodule appears smaller than prior  The study was marked in [facility] [facility] [employer] for immediate notification   Workstation performed: [id number]     MRI brain w wo contrast    Result Date: 7/28/2022  Narrative: MRI BRAIN WITH AND WITHOUT CONTRAST INDICATION: Postop status post resection of left cerebellar metastases  COMPARISON:  MRI dated 7/25/2022  TECHNIQUE: Sagittal T1, axial T2, axial FLAIR, axial T1, axial [place], axial diffusion  Sagittal, axial T1 postcontrast   Axial bravo postcontrast with coronal reconstructions  IV Contrast:  10 mL of Gadobutrol injection (SINGLE-DOSE)  IMAGE QUALITY:   Diagnostic  FINDINGS: BRAIN PARENCHYMA:  New postoperative changes status post left suboccipital craniotomy for resection of large hemorrhagic left cerebellar metastasis as well as the smaller adjacent metastasis  Small subjacent extra-axial collection  Expected postoperative blood products within the operative cavity  Although evaluation for enhancement is somewhat limited due to presence of T1 hyperintense blood products, there is some mild enhancement seen at the posterior inferior aspect of the operative cavity  Possibly postoperative but follow-up anticipated    There is restricted diffusion at the inferior margin of the resection cavity that may be due to combination of marginal ischemia as well as blood products    Surrounding edema is mildly increased  Mass effect on the fourth ventricle is also mildly increased  Stable postsurgical cavity in the left parietal lobe with hemosiderin  Stable surrounding FLAIR signal  No enhancement to suggest recurrent disease  Stable 5 mm lesion in the left parietal lobe with susceptibility artifact adjacent to the resection cavity  There is is T1 shortening within the lesion on precontrast sequence (best appreciated on sagittal image 10 series 5) without definite enhancement    Stable surrounding FLAIR signal  No new lesion identified  VENTRICLES:  Stable  No hydrocephalus  SELLA AND PITUITARY GLAND:  Normal  ORBITS:  Stable bilateral proptosis  PARANASAL SINUSES:  Right maxillary sinus retention cyst  Partial opacification of the right mastoid air cells   VASCULATURE:  Evaluation of the major intracranial vasculature demonstrates appropriate flow voids  CALVARIUM AND SKULL BASE:  Postoperative changes status post left suboccipital craniotomy  EXTRACRANIAL SOFT TISSUES:  Normal      Impression: New postoperative changes status post resection of left cerebellar metastases with expected postoperative blood products  Small region of marginal postoperative ischemia  Mildly increased edema  Mass effect on the fourth ventricle is also slightly increased    No hydrocephalus  Small amount of enhancement at the operative margin may be postoperative  Recommend follow-up  Stable postsurgical changes in the left parietal lobe without locally recurrent disease  Stable 5 mm left parietal lobe lesion adjacent to resection cavity with T1 shortening and no definite residual enhancement    The study was marked in [employer] for immediate notification  Workstation performed: [id number]     MRI brain w wo contrast    Result Date: 7/25/2022  Narrative: MRI BRAIN WITH AND WITHOUT CONTRAST INDICATION: brain mass  COMPARISON:  CT head without contrast 7/24/2022, 6/15/2022  MRI brain with and without contrast 4/26/2022, 4/8/2022  TECHNIQUE: Sagittal T1, axial T2, axial FLAIR, axial T1, axial [place], axial diffusion  Sagittal, axial T1 postcontrast   Axial bravo postcontrast with coronal reconstructions  IV Contrast:  9 mL of Gadobutrol injection (SINGLE-DOSE)  IMAGE QUALITY:   Diagnostic  FINDINGS: BRAIN PARENCHYMA: Postsurgical changes of left parietal craniotomy for resection of left parietal mass  Unchanged left parietal resection cavity with encephalomalacia, gliosis, and peripheral hemosiderin deposition  No abnormal masslike enhancement in left parietal resection cavity to suggest recurrent disease   A few enhancing hemorrhagic metastatic lesions, for reference: - 0 5 x 0 5 cm hemorrhagic enhancing lesion in left parietal lobe posterior to left parietal resection cavity (12:81), previously 0 8 x 0 7 cm on CT head 6/15/2022 but unchanged since 7/24/2022  - 4 3 x 3 6 cm hemorrhagic enhancing mass in left cerebellum (12:38), previously 1 8 x 1 4 cm  Unchanged mass effect on the 4th ventricle since yesterday's CT head without contrast   Moderate surrounding perilesional vasogenic edema in left cerebellum, posterior cerebellar vermis, and right posterior paramedian cerebellum  - 0 6 x 0 6 cm hemorrhagic enhancing mass posterior to the dominant left cerebellar mass (12:40), previously 1 5 x 1 4 cm cm  No midline shift  No diffusion-weighted signal abnormality to suggest acute infarction  A few small scattered hyperintensities on T2/FLAIR imaging are noted in the periventricular and subcortical white matter demonstrating an appearance that is statistically most likely to represent minimal microangiopathic change  VENTRICLES:  Unchanged mass effect on the 4th ventricle  No obstructive hydrocephalus  No intraventricular hemorrhage  SELLA AND PITUITARY GLAND:  Normal  ORBITS:  Unchanged bilateral globe proptosis  PARANASAL SINUSES:  Moderate size right maxillary mucus retention cyst  VASCULATURE:  Evaluation of the major intracranial vasculature demonstrates appropriate flow voids  CALVARIUM AND SKULL BASE: Left parietal craniotomy  Small bilateral mastoid effusions (right worse than left)  EXTRACRANIAL SOFT TISSUES:  Normal      Impression: Mixed treatment response - 4 3 cm hemorrhagic metastatic lesion in left cerebellum, increased in size since CT head 6/15/2022 but unchanged since 7/24/2022 - this is likely due to interval hemorrhage of known left cerebellar lesion    Moderate surrounding perilesional vasogenic edema in left cerebellum, posterior cerebellar vermis, and right posterior paramedian cerebellum with mild mass effect on the 4th ventricle  - 0 6 cm hemorrhagic metastatic lesion posterior to the dominant left cerebellar mass, decreased in size  - 0 5 cm left parietal lobe hemorrhagic lesion posterior to left parietal resection cavity, slightly decreased in size  - No evidence of recurrent disease in left parietal resection cavity  The study was marked in [facility] for immediate notification  Workstation performed: [id number]     CT chest abdomen pelvis w contrast    Result Date: 7/26/2022  Narrative: CT CHEST, ABDOMEN AND PELVIS WITH IV CONTRAST INDICATION:   Brain/CNS neoplasm, staging Non-small cell lung cancer (NSCLC), monitor Non-small cell lung cancer (NSCLC), metastatic, assess treatment response h/o lung cancer, new brain mets  Evaluate for staging  COMPARISON:  CT chest abdomen pelvis 6/15/2022 TECHNIQUE: CT examination of the chest, abdomen and pelvis was performed  Axial, sagittal, and coronal 2D reformatted images were created from the source data and submitted for interpretation  Radiation dose length product (DLP) for this visit:  1208 13 mGy-cm   This examination, like all CT scans performed in the [facility], was performed utilizing techniques to minimize radiation dose exposure, including the use of iterative reconstruction and automated exposure control  IV Contrast:  65 mL of iohexol (OMNIPAQUE) Enteric contrast was not administered  FINDINGS: CHEST LUNGS:  Moderate emphysema  Postsurgical changes from left upper lobectomy  There are multiple new bilateral irregular lung nodules suspicious for metastases, with examples as follows: -Right upper lobe juxtapleural nodule measuring 1 6 x 2 5 cm (series 3 image 48)  -Right upper lobe perifissural nodule measuring 1 4 x 1 8 cm (series 3 image 61)  -Left lower lobe juxtapleural nodule measuring 1 9 x 1 5 cm (series 3 image 93)  -Left lower lobe nodule measuring 1 6 x 1 3 cm (series 3 image 70)  PLEURA:  Unremarkable  HEART/GREAT VESSELS:  Heart is unremarkable for patient's age  No thoracic aortic aneurysm  MEDIASTINUM AND OWEN:  Unremarkable  CHEST WALL AND LOWER NECK:  Right chest wall port with the tip in the SVC  ABDOMEN LIVER/BILIARY TREE:  Hepatic steatosis  Otherwise unremarkable   GALLBLADDER: No calcified gallstones  No pericholecystic inflammatory change  SPLEEN:  Nonspecific hypodense splenic lesion measuring approximately 1 7 cm, seen on prior studies since 10/10/2017  PANCREAS:  Unremarkable  ADRENAL GLANDS:  Unremarkable  KIDNEYS/URETERS:  Bilateral renal cysts and subcentimeter too small to characterize hypodensities  No hydronephrosis  STOMACH AND BOWEL:  Colonic diverticulosis without findings of acute diverticulitis  APPENDIX:  Normal  ABDOMINOPELVIC CAVITY:  No ascites  No pneumoperitoneum  No lymphadenopathy  VESSELS:  Marked atherosclerotic changes  No abdominal aortic aneurysm  PELVIS REPRODUCTIVE ORGANS:  Unremarkable for patient's age  URINARY BLADDER:  Diffusely increased density fluid within the bladder  Otherwise unremarkable  ABDOMINAL WALL/INGUINAL REGIONS:  Unremarkable  OSSEOUS STRUCTURES:  No acute fracture or destructive osseous lesion  Degenerative changes of the spine  Postsurgical change in left ribs from thoracotomy  Impression: 1  Multiple new bilateral irregular lung nodules suspicious for metastases  2   No findings of metastatic disease in the abdomen or pelvis  The study was marked in [facility] for immediate notification  Workstation performed: [id number]       EKG personally reviewed by Janice Hernandez MD      Counseling / Coordination of Care  Total floor / unit time spent today 30 minutes  Greater than 50% of total time was spent with the patient and / or family counseling and / or coordination of care

## 2022-08-01 NOTE — PHYSICAL THERAPY NOTE
PHYSICAL THERAPY NOTE          Patient Name: Bishop Cao Date: 8/1/2022 08/01/22 1155   PT Last Visit   PT Visit Date 08/01/22   Note Type   Note Type Treatment   Pain Assessment   Pain Assessment Tool FLACC   Pain Location/Orientation Location: Head   Pain Onset/Description Onset: Ongoing;Frequency: Intermittent; Descriptor: Aching;Descriptor: Discomfort   Effect of Pain on Daily Activities no increased pain   Patient's Stated Pain Goal No pain   Hospital Pain Intervention(s) Repositioned; Ambulation/increased activity; Emotional support   Pain Rating: FLACC (Rest) - Face 0   Pain Rating: FLACC (Rest) - Legs 0   Pain Rating: FLACC (Rest) - Activity 0   Pain Rating: FLACC (Rest) - Cry 0   Pain Rating: FLACC (Rest) - Consolability 0   Score: FLACC (Rest) 0   Pain Rating: FLACC (Activity) - Face 1   Pain Rating: FLACC (Activity) - Legs 0   Pain Rating: FLACC (Activity) - Activity 0   Pain Rating: FLACC (Activity) - Cry 1   Pain Rating: FLACC (Activity) - Consolability 1   Score: FLACC (Activity) 3   Restrictions/Precautions   Other Precautions Cognitive;Multiple lines;Telemetry; Fall Risk;O2  (bed alarm on at the end of session)   General   Chart Reviewed Yes   Additional Pertinent History cleared for Tx session (spoke to nsg)   Response to Previous Treatment Patient with no complaints from previous session  Family/Caregiver Present Yes   Cognition   Overall Cognitive Status Impaired   Arousal/Participation Responsive; Cooperative   Attention Attends with cues to redirect   Orientation Level Oriented to person;Oriented to place   Memory Decreased recall of recent events;Decreased recall of precautions   Following Commands Follows one step commands with increased time or repetition   Subjective   Subjective Pt is resting in bed; arousable; reports being tired; agreeable to try mobilization;   Bed Mobility   Rolling R 3 Moderate assistance  (to get off the bed pan)   Additional items Assist x 2; Increased time required;LE management   Rolling L 3  Moderate assistance   Additional items Assist x 2; Increased time required;Verbal cues;LE management   Supine to Sit 3  Moderate assistance   Additional items Increased time required;Verbal cues;LE management;Assist x 1   Sit to Supine 3  Moderate assistance   Additional items Assist x 2; Increased time required;Verbal cues;LE management  (repositioned higher in bed w/ (A)x2)   Transfers   Sit to Stand 3  Moderate assistance  (2 trials)   Additional items Assist x 2; Increased time required;Verbal cues   Stand to Sit 3  Moderate assistance  (2 trials)   Additional items Assist x 2; Increased time required;Verbal cues   Ambulation/Elevation   Gait pattern Not appropriate; Not tested  (dizziness and fatigue; BP taken and stable)   Balance   Static Sitting Poor +   Dynamic Sitting Poor   Static Standing Poor   Dynamic Standing Poor -   Activity Tolerance   Activity Tolerance Patient limited by fatigue; Other (Comment)  (transient dizziness)   Nurse Made Aware spoke to JUAN J Schwartz   Exercises   Knee AROM Long Arc Quad Sitting;15 reps;AROM; Bilateral   Assessment   Prognosis Guarded   Problem List Decreased strength;Decreased endurance; Impaired balance;Decreased mobility; Decreased cognition;Decreased safety awareness; Obesity   Assessment Pt cont to demonstrate mod functional mobility deficits w/ mod (A)x2 required for the most part for bed mob and transfers at bedside; pt also c/o persistent dizziness while sitting w/ BP stable; pt was unable to progress to amb at this time; overall, cont to recommend rehab upon D/C when medically cleared; will follow  Barriers to Discharge Inaccessible home environment   Goals   Patient Goals to rest   STG Expiration Date 08/12/22   PT Treatment Day 1   Plan   Treatment/Interventions Functional transfer training;LE strengthening/ROM; Therapeutic exercise;Cognitive reorientation; Endurance training;Bed mobility;Gait training;Spoke to nursing;Family   Progress Slow progress, decreased activity tolerance   PT Frequency 3-5x/wk   Recommendation   PT Discharge Recommendation Post acute rehabilitation services   AM-PAC Basic Mobility Inpatient   Turning in Bed Without Bedrails 2   Lying on Back to Sitting on Edge of Flat Bed 2   Moving Bed to Chair 2   Standing Up From Chair 2   Walk in Room 1   Climb 3-5 Stairs 1   Basic Mobility Inpatient Raw Score 10   Turning Head Towards Sound 3   Follow Simple Instructions 3   Low Function Basic Mobility Raw Score 16   Low Function Basic Mobility Standardized Score 25 72   Highest Level Of Mobility   -[facility] Goal 4: Move to chair/commode   -HL Achieved 5: Stand (1 or more minutes)   Education   Education Provided Mobility training   Patient Reinforcement needed   End of Consult   Patient Position at End of Consult Supine;Bed/Chair alarm activated; All needs within reach     [facility], PT

## 2022-08-01 NOTE — ASSESSMENT & PLAN NOTE
· Primary left lung adenocarcinoma   · Reporting lightheadedness for the past several months, which has increased over the past several days  · CT today showing increase in size of left cerebellar mass with associated vasogenic edema with mass effect upon the 4th ventricle as well as new 5 mm high-density nodule in the left cerebellum  · Patient is AAOx4, neuro exam is nonfocal  · Patient is postop day 4 from left retrosigmoid posterior fossa craniotomy for resection of mass  · Preliminary pathology showing metastatic carcinoma  · Continue Decadron taper  · Platelet goal above 75  · Blood pressure goal below 160

## 2022-08-01 NOTE — PROGRESS NOTES
[facility]  Progress Note - Ti Daisy 1955, 69 y o  male MRN: [id number]  Unit/Bed#: [facility] 924-01 Encounter: [id number]  Primary Care Provider: Luci Huff,    Date and time admitted to hospital: 7/25/2022 12:26 AM    High grade neuroendocrine carcinoma of lung [facility])  Assessment & Plan  Management as above    Hyponatremia  Assessment & Plan  Management per Nephrology, will continue salt tabs, sodium stable at 132    Atrial fibrillation with rapid ventricular response ([address] )  Assessment & Plan  Patient rates currently well controlled on Cardizem and metoprolol,  As well as amiodarone    Hypothyroidism  Assessment & Plan  Continue levothyroxine  Thrombocytopenia (HCC)  Assessment & Plan  Baseline thrombocytopenia around 100  Currently around 100 today  Continue DVT prophylaxis with heparin    History of gout  Assessment & Plan  On allopurinol 100 mg daily  Adenocarcinoma, lung, left [facility])  Assessment & Plan  Patient with a history metastatic lung adeno carcinoma status post left upper lobe resection with radiation and chemotherapy  Recent CT chest abdomen pelvis showing multiple new bilateral irregular lung nodules which are suspicious for metastasis, fortunately no metastatic disease noted in the abdomen or pelvis      Mixed hyperlipidemia  Assessment & Plan  Continue atorvastatin 20 mg daily    Essential hypertension  Assessment & Plan  On metoprolol succinate 50 mg b i d    Continue amiodarone  Continue diltiazem    * 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 [facility])  Assessment & Plan  · Primary left lung adenocarcinoma   · Reporting lightheadedness for the past several months, which has increased over the past several days  · CT today showing increase in size of left cerebellar mass with associated vasogenic edema with mass effect upon the 4th ventricle as well as new 5 mm high-density nodule in the left cerebellum  · Patient is AAOx4, neuro exam is nonfocal  · Patient is postop day 4 from left retrosigmoid posterior fossa craniotomy for resection of mass  · Preliminary pathology showing metastatic carcinoma  · Continue Decadron taper  · Platelet goal above 75  · Blood pressure goal below 160                    VTE Pharmacologic Prophylaxis: VTE Score: 5 High Risk (Score >/= 5) - Pharmacological DVT Prophylaxis Ordered: heparin  Sequential Compression Devices Ordered  Patient Centered Rounds: I performed bedside rounds with nursing staff today  Discussions with Specialists or Other Care Team Provider: n/a    Education and Discussions with Family / Patient: Updated  (wife) at bedside  Time Spent for Care: 30 minutes  More than 50% of total time spent on counseling and coordination of care as described above  Current Length of Stay: 7 day(s)  Current Patient Status: Inpatient   Certification Statement: The patient will continue to require additional inpatient hospital stay due to Monitoring sodium levels,  Discharge Plan: Anticipate discharge in 48 hrs to discharge location to be determined pending rehab evaluations  Code Status: Level 1 - Full Code    Subjective:   Patient reports ongoing dizziness and double vision  Patient denies any headaches chest pain shortness and breath numbness and tingling his extremities  Objective:     Vitals:   Temp (24hrs), Av °F (36 7 °C), Min:97 5 °F (36 4 °C), Max:98 4 °F (36 9 °C)    Temp:  [97 5 °F (36 4 °C)-98 4 °F (36 9 °C)] 97 5 °F (36 4 °C)  HR:  [65-74] 68  Resp:  [16-19] 17  BP: (126-152)/(76-95) 133/76  SpO2:  [95 %-98 %] 95 %  Body mass index is 29 53 kg/m²  Input and Output Summary (last 24 hours):   No intake or output data in the 24 hours ending 22 [id number]    Physical Exam:   Physical Exam  Vitals and nursing note reviewed  Constitutional:       General: He is not in acute distress  Appearance: He is well-developed   He is not ill-appearing, toxic-appearing or diaphoretic  HENT:      Head: Normocephalic and atraumatic  Eyes:      General: No scleral icterus  Conjunctiva/sclera: Conjunctivae normal    Cardiovascular:      Rate and Rhythm: Normal rate and regular rhythm  Heart sounds: No murmur heard  No friction rub  No gallop  Pulmonary:      Effort: Pulmonary effort is normal  No respiratory distress  Breath sounds: Normal breath sounds  No stridor  No wheezing, rhonchi or rales  Chest:      Chest wall: No tenderness  Abdominal:      General: There is no distension  Palpations: Abdomen is soft  There is no mass  Tenderness: There is no abdominal tenderness  There is no guarding or rebound  Hernia: No hernia is present  Musculoskeletal:         General: No swelling, tenderness, deformity or signs of injury  Cervical back: Neck supple  Skin:     General: Skin is warm and dry  Coloration: Skin is not jaundiced or pale  Findings: No bruising  Neurological:      Mental Status: He is alert and oriented to person, place, and time  Additional Data:     Labs:  Results from last 7 days   Lab Units 08/01/22  0511 07/29/22  0535 07/28/22  1337 07/28/22  1002 07/28/22  0447   WBC Thousand/uL 6 73   < > 6 41  --  6 60   HEMOGLOBIN g/dL 11 4*   < > 11 7*  --  12 5   I STAT HEMOGLOBIN   --   --   --    < >  --    HEMATOCRIT % 34 9*   < > 33 6*  --  36 7   HEMATOCRIT, ISTAT   --   --   --    < >  --    PLATELETS Thousands/uL 97*   < > 204  --  89*   BANDS PCT %  --   --  1  --   --    NEUTROS PCT %  --   --   --   --  93*   LYMPHS PCT %  --   --   --   --  3*   LYMPHO PCT % 1*  --  1*   < >  --    MONOS PCT %  --   --   --   --  3*   MONO PCT % 1*  --  2*   < >  --    EOS PCT % 0  --  0   < > 0    < > = values in this interval not displayed       Results from last 7 days   Lab Units 08/01/22  1051   SODIUM mmol/L 132*   POTASSIUM mmol/L 4 0   CHLORIDE mmol/L 96   CO2 mmol/L 32   BUN mg/dL 30*   CREATININE mg/dL 0 76 ANION GAP mmol/L 4   CALCIUM mg/dL 9 1   GLUCOSE RANDOM mg/dL 141*     Results from last 7 days   Lab Units 07/29/22  0535   INR  0 99     Results from last 7 days   Lab Units 07/28/22  1336   POC GLUCOSE mg/dl 166*               Lines/Drains:  Invasive Devices  Report    Peripherally Inserted Central Catheter Line  Duration           PICC Line 31/81/33 Left Basilic 2 days          Central Venous Catheter Line  Duration           Port A Cath 06/17/21 Right Chest 410 days          Peripheral Intravenous Line  Duration           Peripheral IV 07/29/22 Distal;Left;Upper;Ventral (anterior) Arm 3 days                Central Line:  Goal for removal: N/A - Chronic PICC             Imaging: No pertinent imaging reviewed      Recent Cultures (last 7 days):         Last 24 Hours Medication List:   Current Facility-Administered Medications   Medication Dose Route Frequency Provider Last Rate    acetaminophen  975 mg Oral Q8H [address] Wilbermarialuisa Macarioer, DO      allopurinol  100 mg Oral Daily Vuong, DO      aluminum-magnesium hydroxide-simethicone  30 mL Oral Q6H PRN Vuong, DO      amiodarone  400 mg Oral BID With Meals Vuong, DO      vitamin C  1,000 mg Oral Daily Wilber Lagunas, DO      atorvastatin  20 mg Oral Daily With [employer], DO      bisacodyl  10 mg Rectal Daily PRN Vuong, DO      dexamethasone  2 mg Oral Q6H [address] Wilber Sanchez, DO      Followed by   Laverta Rolls ON 8/4/2022] dexamethasone  2 mg Oral Q8H [address] Wilbermarialuisa Tadeomyer, DO      Followed by   Laverta Rolls ON 8/5/2022] dexamethasone  2 mg Oral Q12H [address] Wilbermarialuisa Tadeomyysabel, DO      Followed by   Laverta Rolls ON 8/8/2022] dexamethasone  2 mg Oral Q24H [address] Wilber Sanchez, DO      diltiazem  120 mg Oral Q12H [address] Wilber Sanchez, DO      docusate sodium  100 mg Oral BID Vuong, DO      heparin (porcine)  5,000 Units Subcutaneous Q8H [address] Wilbermarialuisa Lagunas, DO      levothyroxine  37 5 mcg Oral Early Morning Wilber Lagunas DO      meclizine  25 mg Oral Q8H PRN [employer], DO      methocarbamol  500 mg Oral Q6H [facility] Wilber Janneth, DO      metoclopramide  10 mg Intravenous Q6H PRN [employer], DO      metoprolol succinate  50 mg Oral BID [employer], DO      ondansetron  4 mg Intravenous Q6H PRN [employer], DO      oxyCODONE  2 5 mg Oral Q4H PRN [employer], DO      pantoprazole  40 mg Oral Early Morning [facility], [facility]      polyethylene glycol  17 g Oral BID [employer], DO      scopolamine  1 patch Transdermal Q72H [facility], DO      senna  1 tablet Oral Daily [facility], DO      sodium chloride  2 g Oral TID With Meals [employer], DO          Today, Patient Was Seen By: Ce Ballesteros DO    **Please Note: This note may have been constructed using a voice recognition system  **

## 2022-08-01 NOTE — DISCHARGE INSTRUCTIONS
Discharge Instructions  Craniotomy for tumor resection     Activity:  Do not lift, push or pull more than 10 pounds for 2 weeks  Avoid bending, lifting and twisting for 2 weeks  No running  No athletic activities until cleared  No driving for at least 2 weeks or until cleared by Neurosurgery  When able to shower, continue to use clean towel and washcloth for 2 weeks post-op  Do not use a hair dryer, and avoid hair products such as mousse, oils, and gels  Do not brush your hair away from the incision since this will put strain on the suture line  Do not dye or perm hair for 6 weeks or until cleared by physician  Continue to change bed linens and pajamas more frequently  Wear clean clothes daily  May walk as tolerated  Recommend 4 short walks daily  Surgical incision care:  Keep dressings in place for 3 days  After 3 days, incisions may be left open to air, but should remain clean  Keep incisions dry for 3 days  May shower after 3 days using a baby shampoo including head incision  Rinse off shampoo and pat dry  Avoid rubbing the incision but gently massage hair  Do not immerse the incisions in water for 6 weeks  Staples/suture will be removed at your 2 week postoperative visit  Do not apply any creams or ointments to the incision, unless otherwise instructed by [facility]  Contact office if increasing redness, drainage, pain or swelling occurs around the incisions or if you develop a fever greater than 101F  Do not dye/perm hair or use any hair products until cleared by Neurosurgery  Postoperative medication:  [facility] will provide pain medication in the postoperative period  All prescriptions must come from a single practice  Take medications as prescribed  Call office with any questions/concerns  May use over the counter Tylenol  No NSAIDs (ie   Ibuprofen, Aleve, Advil, Naproxen)  Please contact office for questions regarding dosage and modifications  No antiplatelet or anticoagulation medication (ie  Coumadin, Aspirin, Plavix) until cleared by [address], unless otherwise instructed  Please contact [facility] if you have any questions about the effects of any of your medications on blood clotting  Do not operate heavy machinery or vehicles while taking sedating medications  Use a bowel regimen while on opioids as they induce constipation  Ie  Senokot-S, Miralax, Colace, etc  Increase fiber and water intake  Follow-up as scheduled for a 2 week post-operative visit for an incision check and final pathology  ** Please notify the office if incision becomes red, swollen, tender, or has increased drainage, and temp>101  Return to the ER if you experience increased headache, drowsiness, weakness, nausea/vomiting, or seizures  **

## 2022-08-02 NOTE — PROGRESS NOTES
Cardiology Progress note  Unit/Bed#: [facility] 924-01 Encounter: [id number]        Brian Garza 69 y o  male [address] Stay Days: 8    Assessment and Plan      HPI    Brian Elizabeth is a 69year old male PMH neuroendocrine tumor s/p lung and brain resection and known metastatic cerebellar hemorrhagic tumor  Currently being treated with radiation therapy  He presented to [facility] for increasing lightheadedness and shortness of breath, was found to be in Afib  Patient has a history of afib episode following lung resection in   He was transferred to [facility] the week of  for neurosurgery eval regarding a hemorrhagic mass in his cerebellum  Patient is currently post op day 5 following posterior craniotomy and is well rate controlled  Current Problem List   Principal Problem:    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 [facility])  Active Problems:    Essential hypertension    Mixed hyperlipidemia    Adenocarcinoma, lung, left (HCC)    History of gout    Thrombocytopenia (HCC)    Hypothyroidism    Atrial fibrillation with rapid ventricular response (HCC)    Hyponatremia    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)    Assessment/Plan:    1  Atrial fibrillation with RVR   ·  Patient is currently well rate controlled but still maintains irregular rhythm  Telemetry has been discontinued  Patient is post-op day 5 following posterior craniotomy  Will continue to give current regimen for rate control  Subjective     Patient was seen today and denies chest pain, shortness of breath, and palpitations  Reports his dizziness is at the same level as yesterday  He is post-op day 5 following his posterior craniotomy for removal of hemorrhagic cerebellar mass  No significant events overnight           Objective     Vitals: Temp (24hrs), Av 9 °F (36 6 °C), Min:97 5 °F (36 4 °C), Max:98 2 °F (36 8 °C)  Current: Temperature: 98 °F (36 7 °C)  Patient Vitals for the past 24 hrs:   BP Temp Pulse Resp SpO2 Weight   08/02/22 0933 (!) 159/103 -- 70 -- 94 % --   08/02/22 0927 (!) 162/106 -- 77 -- 94 % --   08/02/22 0800 142/84 98 °F (36 7 °C) 63 19 96 % --   08/02/22 0546 -- -- -- -- -- 78 3 kg (172 lb 9 9 oz)   08/01/22 2305 137/84 98 2 °F (36 8 °C) 64 16 96 % --   08/01/22 1549 133/76 97 5 °F (36 4 °C) 68 17 95 % --   08/01/22 1149 152/95 -- 74 -- 95 % --    Body mass index is 26 25 kg/m²  Physical Exam:  Physical Exam  Constitutional:       Appearance: He is normal weight  Cardiovascular:      Rate and Rhythm: Normal rate  Rhythm irregular  Pulses: Normal pulses  Heart sounds: Normal heart sounds  Pulmonary:      Effort: Pulmonary effort is normal       Breath sounds: Normal breath sounds  Skin:     General: Skin is warm and dry  Neurological:      Mental Status: He is alert           Invasive Devices  Report    Peripherally Inserted Central Catheter Line  Duration           PICC Line 16/50/52 Left Basilic 2 days          Central Venous Catheter Line  Duration           Port A Cath 06/17/21 Right Chest 410 days          Peripheral Intravenous Line  Duration           Peripheral IV 07/29/22 Distal;Left;Upper;Ventral (anterior) Arm 3 days                    Labs:   Results from last 7 days   Lab Units 08/02/22  0508 08/01/22  0511 07/31/22  0607 07/30/22  0526 07/29/22  1459 07/29/22  0535 07/28/22  1337 07/28/22  1002 07/28/22  0447 07/27/22  1824 07/27/22  1100   WBC Thousand/uL 7 05 6 73 7 53 7 83 7 80 6 90 6 41  --  6 60 7 22 7 60   HEMOGLOBIN g/dL 11 3* 11 4* 12 1 12 4 11 7* 11 8* 11 7*  --  12 5 13 3 13 5   I STAT HEMOGLOBIN g/dl  --   --   --   --   --   --   --  9 5*  --   --   --    HEMATOCRIT % 33 7* 34 9* 35 8* 34 9* 33 8* 34 3* 33 6*  --  36 7 39 0 39 5   HEMATOCRIT, ISTAT %  --   --   --   --   --   --   --  28*  --   --   --    PLATELETS Thousands/uL 82* 97* 114* 133* 152 158 204  --  89* 106* 69*   NEUTROS PCT %  --   --   --   --   --   --   --   --  93*  --   -- MONOS PCT %  --   --   --   --   --   --   --   --  3*  --   --    MONO PCT %  --  1*  --   --   --   --  2*  --   --   --   --       Results from last 7 days   Lab Units 08/02/22  0507 08/01/22  1051 08/01/22  0005 07/31/22  1648 07/31/22  0607 07/31/22  0022 07/30/22  2142 07/30/22  1844 07/30/22  1549 07/30/22  1513 07/30/22  0526 07/30/22  0109 07/29/22  1844 07/29/22  1118 07/29/22  0535 07/28/22  1002 07/28/22  0447 07/27/22  1824 07/27/22  1100   SODIUM mmol/L 138 132* 132* 131* 132* 131* 133* 128* 126* 129* 126* 127* 127* 129* 130*  --  134* 129* 131*   POTASSIUM mmol/L 3 5 4 0 4 0 4 0 3 5 3 5 3 6 3 6 4 1 4 1 4 0 4 2 4 2 3 7 3 6  --  3 7 4 1 4 2   CHLORIDE mmol/L 103 96 97 95* 93* 94* 97 94* 92* 94* 92* 95* 95* 96 96  --  101 96 97   CO2 mmol/L 31 32 32 31 33* 31 30 25 27 27 28 28 27 26 27  --  28 27 26   CO2, I-STAT mmol/L  --   --   --   --   --   --   --   --   --   --   --   --   --   --   --  30  --   --   --    BUN mg/dL 31* 30* 28* 25 19 19 19 19 18 20 17 18 15 21 19  --  28* 32* 34*   CREATININE mg/dL 0 58* 0 76 0 76 0 87 0 62 0 59* 0 70 0 95 0 70 0 79 0 66 0 57* 0 64 0 90 0 61  --  0 67 0 99 0 95   CALCIUM mg/dL 8 5 9 1 9 0 8 7 8 4 8 4 8 2* 8 7 8 6 8 4 8 6 8 6 8 9 8 5 8 6  --  7 6* 8 6 9 0   GLUCOSE, ISTAT mg/dl  --   --   --   --   --   --   --   --   --   --   --   --   --   --   --  138  --   --   --    MAGNESIUM mg/dL  --   --   --   --  2 2  --   --   --   --   --  2 2  --   --  2 0  --   --   --   --   --    PHOSPHORUS mg/dL  --   --   --   --  3 5  --   --   --   --   --  2 7  --   --   --   --   --   --   --   --    INR   --   --   --   --   --   --   --   --   --   --   --   --   --   --  0 99  --   --   --   --    PTT seconds  --   --   --   --   --   --   --   --   --   --   --   --   --   --  26  --   --   --   --    EGFR ml/min/1 73sq m 104 93 93 88 101 103 96 81 96 91 98 104 99 86 101  --  98 77 81     Results from last 7 days   Lab Units 07/29/22  0535   INR  0 99   PTT seconds 26 No results found for: PHART, WCZ0CQD, PO2ART, QIH1GIF, F7USLZRQ, BEART, SOURCE  No components found for: HIV1X2  Lab Results   Component Value Date    HEPCAB Non-reactive 10/06/2020     No results found for: SPEP, UPEP   Lab Results   Component Value Date    HGBA1C 5 2 04/03/2022    HGBA1C 5 4 01/25/2022    HGBA1C 5 3 06/23/2021     No results found for: CHOL   Lab Results   Component Value Date    HDL 43 06/20/2022    HDL 70 04/03/2022    HDL 35 (L) 01/25/2022      Lab Results   Component Value Date    LDLCALC 93 06/20/2022    LDLCALC 96 04/03/2022    LDLCALC 97 01/25/2022      Lab Results   Component Value Date    TRIG 140 06/20/2022    TRIG 137 04/03/2022    TRIG 130 01/25/2022     No components found for: PROCAL      Micro:      Urinalysis:  Lab Results   Component Value Date    BDZUR Negative 04/03/2022    COCAINEUR Negative 04/03/2022    OPIATEUR Negative 04/03/2022    PCPUR Negative 04/03/2022    THCUR Positive (A) 04/03/2022      Results from last 7 days   Lab Units 07/29/22  1054   COLOR UA  Light Yellow   CLARITY UA  Clear   SPEC GRAV UA  1 023   PH UA  6 5   LEUKOCYTES UA  Negative   NITRITE UA  Negative   GLUCOSE UA mg/dl Negative   KETONES UA mg/dl Negative   BILIRUBIN UA  Negative   BLOOD UA  Large*      Results from last 7 days   Lab Units 07/29/22  1054   RBC UA /hpf Innumerable*   WBC UA /hpf 1-2   EPITHELIAL CELLS WET PREP /hpf Occasional   BACTERIA UA /hpf None Seen      Intake and Outputs:  I/O       07/31 0701 08/01 0700 08/01 0701 08/02 0700 08/02 0701 08/03 0700    P  O  540      I V  (mL/kg)       Total Intake(mL/kg) 540 (6 1)      Urine (mL/kg/hr) 1180 (0 6) 775 (0 4)     Stool 0      Total Output 1180 775     Net -640 -775            Unmeasured Urine Occurrence 1 x      Unmeasured Stool Occurrence 2 x          Nutrition:  Diet Regular; Regular House; Fluid Restriction 1500 ML  Radiology Results:   CT head wo contrast   Final Result by Dalila Edgar MD (07/30 1023) 1   Expected postoperative changes status post left occipital/retromastoid craniotomy with resection of a previously present large hyperdense/hemorrhagic left cerebellar metastasis  There is a small amount of residual hemorrhagic material in the    treatment bed with residual local edema and mass effect on the 4th ventricle  Small amounts of pneumocephalus and adjacent scalp gas and fluid surrounding the operative site noted, also expected postoperative changes  Continued clinical and imaging    surveillance recommended  2   Stable 5 mm hyperdense lesion in the left parietal cortex (series 2, image 28) along the superior margin of prior treatment cavity possibly a hypercellular hyperdense /hemorrhagic cortical metastasis versus small hemorrhage or potentially even    cavernous angioma, similar to recent brain MRIs  Continued clinical and imaging surveillance recommended  Workstation performed: [id number]         MRI brain w wo contrast   Final Result by Ambika Resendez MD (07/28 9523)      New postoperative changes status post resection of left cerebellar metastases with expected postoperative blood products  Small region of marginal postoperative ischemia  Mildly increased edema  Mass effect on the fourth ventricle is also slightly    increased    No hydrocephalus  Small amount of enhancement at the operative margin may be postoperative  Recommend follow-up  Stable postsurgical changes in the left parietal lobe without locally recurrent disease  Stable 5 mm left parietal lobe lesion adjacent to resection cavity with T1 shortening and no definite residual enhancement         The study was marked in EPIC for immediate notification  Workstation performed: [id number]         CT chest abdomen pelvis w contrast   Final Result by Jason Garcia MD (07/26 5777)      1  Multiple new bilateral irregular lung nodules suspicious for metastases        2   No findings of metastatic disease in the abdomen or pelvis  The study was marked in [facility] for immediate notification  Workstation performed: [id number]         MRI brain w wo contrast   Final Result by Jef Goetz MD (07/25 1713)      Mixed treatment response   - 4 3 cm hemorrhagic metastatic lesion in left cerebellum, increased in size since CT head 6/15/2022 but unchanged since 7/24/2022 - this is likely due to interval hemorrhage of known left cerebellar lesion  Moderate surrounding perilesional vasogenic    edema in left cerebellum, posterior cerebellar vermis, and right posterior paramedian cerebellum with mild mass effect on the 4th ventricle    - 0 6 cm hemorrhagic metastatic lesion posterior to the dominant left cerebellar mass, decreased in size    - 0 5 cm left parietal lobe hemorrhagic lesion posterior to left parietal resection cavity, slightly decreased in size    - No evidence of recurrent disease in left parietal resection cavity  The study was marked in [facility] for immediate notification                       Workstation performed: [id number]           Scheduled Medications:  acetaminophen, 975 mg, Q8H [address]  allopurinol, 100 mg, Daily  amiodarone, 400 mg, BID With Meals  vitamin C, 1,000 mg, Daily  atorvastatin, 20 mg, Daily With Dinner  dexamethasone, 2 mg, Q6H [address]   Followed by  Destiny Motley ON 8/4/2022] dexamethasone, 2 mg, Q8H [address]   Followed by  [START ON 8/5/2022] dexamethasone, 2 mg, Q12H [address]   Followed by  [START ON 8/8/2022] dexamethasone, 2 mg, Q24H [address]  diltiazem, 120 mg, Q12H [address]  docusate sodium, 100 mg, BID  heparin (porcine), 5,000 Units, Q8H [address]  levothyroxine, 37 5 mcg, Early Morning  methocarbamol, 500 mg, Q6H BASIA  metoprolol succinate, 50 mg, BID  pantoprazole, 40 mg, Early Morning  polyethylene glycol, 17 g, BID  scopolamine, 1 patch, Q72H  senna, 1 tablet, Daily  sodium chloride, 1 g, TID With Meals      PRN MEDS:  aluminum-magnesium hydroxide-simethicone, 30 mL, Q6H PRN  bisacodyl, 10 mg, Daily PRN  meclizine, 25 mg, Q8H PRN  metoclopramide, 10 mg, Q6H PRN  ondansetron, 4 mg, Q6H PRN  oxyCODONE, 2 5 mg, Q4H PRN      Last 24 Hour Meds: :   Medication Administration - last 24 hours from 08/01/2022 1016 to 08/02/2022 1016       Date/Time Order Dose Route Action Action by     08/02/2022 0952 allopurinol (ZYLOPRIM) tablet 100 mg 100 mg Oral Given Mannie Henry RN     08/02/2022 1203 ascorbic acid (VITAMIN C) tablet 1,000 mg 1,000 mg Oral Given Mannie Henry RN     08/02/2022 7081 levothyroxine tablet 37 5 mcg 37 5 mcg Oral Given Nicki Saenz RN     08/02/2022 0506 pantoprazole (PROTONIX) EC tablet 40 mg 40 mg Oral Given Nicki Saenz RN     08/01/2022 1813 atorvastatin (LIPITOR) tablet 20 mg 20 mg Oral Given Mannie Henry RN     08/01/2022 2306 diltiazem (CARDIZEM SR) 12 hr capsule 120 mg 120 mg Oral Given Nicki Saenz RN     08/02/2022 5647 meclizine (ANTIVERT) tablet 25 mg 25 mg Oral Given Mannie Henry RN     08/02/2022 0953 polyethylene glycol (MIRALAX) packet 17 g 17 g Oral Not Given Mannie Henry RN     08/01/2022 1710 polyethylene glycol (MIRALAX) packet 17 g 17 g Oral Not Given Mannie Henry RN     08/02/2022 6376 docusate sodium (COLACE) capsule 100 mg 100 mg Oral Given Mannie Henry RN     08/01/2022 1709 docusate sodium (COLACE) capsule 100 mg 100 mg Oral Not Given Mannie Henry RN     08/02/2022 6288 senna (SENOKOT) tablet 8 6 mg 8 6 mg Oral Given Mannie Henry RN     08/01/2022 1354 dexamethasone (DECADRON) tablet 4 mg 4 mg Oral Given Mannie Henry RN     08/02/2022 0506 dexamethasone (DECADRON) tablet 2 mg 2 mg Oral Given Nicki Saenz RN     08/01/2022 2306 dexamethasone (DECADRON) tablet 2 mg 2 mg Oral Given Nicki Saenz RN     08/02/2022 0507 acetaminophen (TYLENOL) tablet 975 mg 975 mg Oral Given Nicki Saenz RN     08/01/2022 2106 acetaminophen (TYLENOL) tablet 975 mg   Oral Canceled Entry Nicki Saenz RN     08/01/2022 2053 acetaminophen (TYLENOL) tablet 975 mg 975 mg Oral Given Loma Spurling, RN     08/01/2022 1354 acetaminophen (TYLENOL) tablet 975 mg 975 mg Oral Given Davene Sit, RN     08/02/2022 0236 oxyCODONE (ROXICODONE) IR tablet 2 5 mg 2 5 mg Oral Given Loma Spurling, RN     08/01/2022 2054 oxyCODONE (ROXICODONE) IR tablet 2 5 mg 2 5 mg Oral Given Loma Spurling, RN     08/02/2022 1144 methocarbamol (ROBAXIN) tablet 500 mg 500 mg Oral Given Loma Spurling, RN     08/01/2022 2308 methocarbamol (ROBAXIN) tablet 500 mg 500 mg Oral Given Loma Spurling, RN     08/01/2022 1813 methocarbamol (ROBAXIN) tablet 500 mg 500 mg Oral Given Davene Sit, RN     08/01/2022 1355 methocarbamol (ROBAXIN) tablet 500 mg 500 mg Oral Given Davene Sit, RN     08/02/2022 0507 heparin (porcine) subcutaneous injection 5,000 Units 5,000 Units Subcutaneous Given Loma Spurling, RN     08/01/2022 2106 heparin (porcine) subcutaneous injection 5,000 Units   Subcutaneous Canceled Entry Loma Spurling, RN     08/01/2022 2052 heparin (porcine) subcutaneous injection 5,000 Units 5,000 Units Subcutaneous Given Loma Spurling, RN     08/01/2022 1355 heparin (porcine) subcutaneous injection 5,000 Units 5,000 Units Subcutaneous Given Davene Sit, RN     08/02/2022 3534 metoprolol succinate (TOPROL-XL) 24 hr tablet 50 mg 50 mg Oral Given Davene Sit, RN     08/01/2022 2053 metoprolol succinate (TOPROL-XL) 24 hr tablet 50 mg 50 mg Oral Given Loma Spurling, RN     08/02/2022 9600 amiodarone tablet 400 mg 400 mg Oral Given Davene Sit, RN     08/01/2022 1813 amiodarone tablet 400 mg 400 mg Oral Given Davene Sit, RN     08/01/2022 1040 amiodarone tablet 400 mg 400 mg Oral Given Davene Sit, RN     08/02/2022 1001 sodium chloride tablet 2 g 2 g Oral Not Given Davene Sit, RN     08/01/2022 1813 sodium chloride tablet 2 g 2 g Oral Given Kamila Mills RN     08/01/2022 1354 sodium chloride tablet 2 g 2 g Oral Given Kamila Mills RN     08/01/2022 1040 sodium chloride tablet 2 g 2 g Oral Given Chaka Avelar RN     08/02/2022 9119 potassium chloride (K-DUR,KLOR-CON) CR tablet 20 mEq 20 mEq Oral Given Chaka Avelar RN              [address] [facility]      PLEASE NOTE:  This encounter was completed utilizing the M- [employer]Fluency Direct Speech Voice Recognition Software  Grammatical errors, random word insertions, pronoun errors and incomplete sentences are occasional consequences of the system due to software limitations, ambient noise and hardware issues  These may be missed by proof reading prior to affixing electronic signature  Any questions or concerns about the content, text or information contained within the body of this dictation should be directly addressed to the physician for clarification  Please do not hesitate to call me directly if you have any any questions or concerns

## 2022-08-02 NOTE — ASSESSMENT & PLAN NOTE
· Baseline thrombocytopenia around 100  · Currently at 80 today, continue to monitor  · Continue DVT prophylaxis with heparin

## 2022-08-02 NOTE — PLAN OF CARE
Problem: PAIN - ADULT  Goal: Verbalizes/displays adequate comfort level or baseline comfort level  Description: Interventions:  - Encourage patient to monitor pain and request assistance  - Assess pain using appropriate pain scale  - Administer analgesics based on type and severity of pain and evaluate response  - Implement non-pharmacological measures as appropriate and evaluate response  - Consider cultural and social influences on pain and pain management  - Notify physician/advanced practitioner if interventions unsuccessful or patient reports new pain  Outcome: Progressing       Goal: Maintain or return to baseline ADL function  Description: INTERVENTIONS:  -  Assess patient's ability to carry out ADLs; assess patient's baseline for ADL function and identify physical deficits which impact ability to perform ADLs (bathing, care of mouth/teeth, toileting, grooming, dressing, etc )  - Assess/evaluate cause of self-care deficits   - Assess range of motion  - Assess patient's mobility; develop plan if impaired  - Assess patient's need for assistive devices and provide as appropriate  - Encourage maximum independence but intervene and supervise when necessary  - Involve family in performance of ADLs  - Assess for home care needs following discharge   - Consider OT consult to assist with ADL evaluation and planning for discharge  - Provide patient education as appropriate  Outcome: Progressing       Problem: NEUROSENSORY - ADULT  Goal: Achieves stable or improved neurological status  Description: INTERVENTIONS  - Monitor and report changes in neurological status  - Monitor vital signs such as temperature, blood pressure, glucose, and any other labs ordered   - Initiate measures to prevent increased intracranial pressure  - Monitor for seizure activity and implement precautions if appropriate      Outcome: Progressing  Goal: Remains free of injury related to seizures activity  Description: INTERVENTIONS  - Maintain airway, patient safety  and administer oxygen as ordered  - Monitor patient for seizure activity, document and report duration and description of seizure to physician/advanced practitioner  - If seizure occurs,  ensure patient safety during seizure  - Reorient patient post seizure  - Seizure pads on all 4 side rails  - Instruct patient/family to notify RN of any seizure activity including if an aura is experienced  - Instruct patient/family to call for assistance with activity based on nursing assessment  - Administer anti-seizure medications if ordered    Outcome: Progressing  Goal: Achieves maximal functionality and self care  Description: INTERVENTIONS  - Monitor swallowing and airway patency with patient fatigue and changes in neurological status  - Encourage and assist patient to increase activity and self care     - Encourage visually impaired, hearing impaired and aphasic patients to use assistive/communication devices  Outcome: Progressing     Problem: CARDIOVASCULAR - ADULT  Goal: Maintains optimal cardiac output and hemodynamic stability  Description: INTERVENTIONS:  - Monitor I/O, vital signs and rhythm  - Monitor for S/S and trends of decreased cardiac output  - Administer and titrate ordered vasoactive medications to optimize hemodynamic stability  - Assess quality of pulses, skin color and temperature  - Assess for signs of decreased coronary artery perfusion  - Instruct patient to report change in severity of symptoms  Outcome: Progressing  Goal: Absence of cardiac dysrhythmias or at baseline rhythm  Description: INTERVENTIONS:  - Continuous cardiac monitoring, vital signs, obtain 12 lead EKG if ordered  - Administer antiarrhythmic and heart rate control medications as ordered  - Monitor electrolytes and administer replacement therapy as ordered  Outcome: Progressing        Problem: Potential for Falls  Goal: Patient will remain free of falls  Description: INTERVENTIONS:  - Educate patient/family on patient safety including physical limitations  - Instruct patient to call for assistance with activity   - Consult OT/PT to assist with strengthening/mobility   - Keep Call bell within reach  - Keep bed low and locked with side rails adjusted as appropriate  - Keep care items and personal belongings within reach  - Initiate and maintain comfort rounds  - Make Fall Risk Sign visible to staff    - Apply yellow socks and bracelet for high fall risk patients  - Consider moving patient to room near nurses station  Outcome: Progressing     Problem: Prexisting or High Potential for Compromised Skin Integrity  Goal: Skin integrity is maintained or improved  Description: INTERVENTIONS:  - Identify patients at risk for skin breakdown  - Assess and monitor skin integrity  - Assess and monitor nutrition and hydration status  - Monitor labs   - Assess for incontinence   - Turn and reposition patient  - Assist with mobility/ambulation  - Relieve pressure over bony prominences  - Avoid friction and shearing  - Provide appropriate hygiene as needed including keeping skin clean and dry  - Evaluate need for skin moisturizer/barrier cream  - Collaborate with interdisciplinary team   - Patient/family teaching  - Consider wound care consult   Outcome: Progressing     Problem: MOBILITY - ADULT  Goal: Maintain or return to baseline ADL function  Description: INTERVENTIONS:  -  Assess patient's ability to carry out ADLs; assess patient's baseline for ADL function and identify physical deficits which impact ability to perform ADLs (bathing, care of mouth/teeth, toileting, grooming, dressing, etc )  - Assess/evaluate cause of self-care deficits   - Assess range of motion  - Assess patient's mobility; develop plan if impaired  - Assess patient's need for assistive devices and provide as appropriate  - Encourage maximum independence but intervene and supervise when necessary  - Involve family in performance of ADLs  - Assess for home care needs following discharge   - Consider OT consult to assist with ADL evaluation and planning for discharge  - Provide patient education as appropriate  Outcome: Progressing  Goal: Maintains/Returns to pre admission functional level  Description: INTERVENTIONS:  - Perform BMAT or MOVE assessment daily    - Set and communicate daily mobility goal to care team and patient/family/caregiver  - Collaborate with rehabilitation services on mobility goals if consulted  - Perform Range of Motion3 times a day  - Reposition patient every 3 hours    - Dangle patient 3 times a day  - Stand patient 3 times a day  - Ambulate patient 3 times a day  - Out of bed to chair 3 times a day   - Out of bed for meals 3 times a day  - Out of bed for toileting  - Record patient progress and toleration of activity level   Outcome: Progressing     Problem: RESPIRATORY - ADULT  Goal: Achieves optimal ventilation and oxygenation  Description: INTERVENTIONS:  - Assess for changes in respiratory status  - Assess for changes in mentation and behavior  - Position to facilitate oxygenation and minimize respiratory effort  - Oxygen administered by appropriate delivery if ordered  - Initiate smoking cessation education as indicated  - Encourage broncho-pulmonary hygiene including cough, deep breathe, Incentive Spirometry  - Assess the need for suctioning and aspirate as needed  - Assess and instruct to report SOB or any respiratory difficulty  - Respiratory Therapy support as indicated  Outcome: Progressing     Problem: GASTROINTESTINAL - ADULT  Goal: Minimal or absence of nausea and/or vomiting  Description: INTERVENTIONS:  - Administer IV fluids if ordered to ensure adequate hydration  - Maintain NPO status until nausea and vomiting are resolved  - Nasogastric tube if ordered  - Administer ordered antiemetic medications as needed  - Provide nonpharmacologic comfort measures as appropriate  - Advance diet as tolerated, if ordered  - Consider nutrition services referral to assist patient with adequate nutrition and appropriate food choices  Outcome: Progressing  Goal: Maintains or returns to baseline bowel function  Description: INTERVENTIONS:  - Assess bowel function  - Encourage oral fluids to ensure adequate hydration  - Administer IV fluids if ordered to ensure adequate hydration  - Administer ordered medications as needed  - Encourage mobilization and activity  - Consider nutritional services referral to assist patient with adequate nutrition and appropriate food choices  Outcome: Progressing  Goal: Maintains adequate nutritional intake  Description: INTERVENTIONS:  - Monitor percentage of each meal consumed  - Identify factors contributing to decreased intake, treat as appropriate  - Assist with meals as needed  - Monitor I&O, weight, and lab values if indicated  - Obtain nutrition services referral as needed  Outcome: Progressing     Problem: GENITOURINARY - ADULT  Goal: Maintains or returns to baseline urinary function  Description: INTERVENTIONS:  - Assess urinary function  - Encourage oral fluids to ensure adequate hydration if ordered  - Administer IV fluids as ordered to ensure adequate hydration  - Administer ordered medications as needed  - Offer frequent toileting  - Follow urinary retention protocol if ordered  Outcome: Progressing     Problem: METABOLIC, FLUID AND ELECTROLYTES - ADULT  Goal: Electrolytes maintained within normal limits  Description: INTERVENTIONS:  - Monitor labs and assess patient for signs and symptoms of electrolyte imbalances  - Administer electrolyte replacement as ordered  - Monitor response to electrolyte replacements, including repeat lab results as appropriate  - Instruct patient on fluid and nutrition as appropriate  Outcome: Progressing  Goal: Fluid balance maintained  Description: INTERVENTIONS:  - Monitor labs   - Monitor I/O and WT  - Instruct patient on fluid and nutrition as appropriate  - Assess for signs & symptoms of volume excess or deficit  Outcome: Progressing     Problem: HEMATOLOGIC - ADULT  Goal: Maintains hematologic stability  Description: INTERVENTIONS  - Assess for signs and symptoms of bleeding or hemorrhage  - Monitor labs  - Administer supportive blood products/factors as ordered and appropriate  Outcome: Progressing     Problem: MUSCULOSKELETAL - ADULT  Goal: Maintain or return mobility to safest level of function  Description: INTERVENTIONS:  - Assess patient's ability to carry out ADLs; assess patient's baseline for ADL function and identify physical deficits which impact ability to perform ADLs (bathing, care of mouth/teeth, toileting, grooming, dressing, etc )  - Assess/evaluate cause of self-care deficits   - Assess range of motion  - Assess patient's mobility  - Assess patient's need for assistive devices and provide as appropriate  - Encourage maximum independence but intervene and supervise when necessary  - Involve family in performance of ADLs  - Assess for home care needs following discharge   - Consider OT consult to assist with ADL evaluation and planning for discharge  - Provide patient education as appropriate  Outcome: Progressing     Problem: Nutrition/Hydration-ADULT  Goal: Nutrient/Hydration intake appropriate for improving, restoring or maintaining nutritional needs  Description: Monitor and assess patient's nutrition/hydration status for malnutrition  Collaborate with interdisciplinary team and initiate plan and interventions as ordered  Monitor patient's weight and dietary intake as ordered or per policy  Utilize nutrition screening tool and intervene as necessary  Determine patient's food preferences and provide high-protein, high-caloric foods as appropriate       INTERVENTIONS:  - Monitor oral intake, urinary output, labs, and treatment plans  - Assess nutrition and hydration status and recommend course of action  - Evaluate amount of meals eaten  - Assist patient with eating if necessary   - Allow adequate time for meals  - Recommend/ encourage appropriate diets, oral nutritional supplements, and vitamin/mineral supplements  - Order, calculate, and assess calorie counts as needed  - Recommend, monitor, and adjust tube feedings and TPN/PPN based on assessed needs  - Assess need for intravenous fluids  - Provide specific nutrition/hydration education as appropriate  - Include patient/family/caregiver in decisions related to nutrition  Outcome: Progressing     Problem: INFECTION - ADULT  Goal: Absence or prevention of progression during hospitalization  Description: INTERVENTIONS:  - Assess and monitor for signs and symptoms of infection  - Monitor lab/diagnostic results  - Monitor all insertion sites, i e  indwelling lines, tubes, and drains  - Monitor endotracheal if appropriate and nasal secretions for changes in amount and color  - [place] appropriate cooling/warming therapies per order  - Administer medications as ordered  - Instruct and encourage patient and family to use good hand hygiene technique  - Identify and instruct in appropriate isolation precautions for identified infection/condition  Outcome: Progressing     Problem: DISCHARGE PLANNING  Goal: Discharge to home or other facility with appropriate resources  Description: INTERVENTIONS:  - Identify barriers to discharge w/patient and caregiver  - Arrange for needed discharge resources and transportation as appropriate  - Identify discharge learning needs (meds, wound care, etc )  - Arrange for interpretive services to assist at discharge as needed  - Refer to Case Management Department for coordinating discharge planning if the patient needs post-hospital services based on physician/advanced practitioner order or complex needs related to functional status, cognitive ability, or social support system  Outcome: Progressing     Problem: Knowledge Deficit  Goal: Patient/family/caregiver demonstrates understanding of disease process, treatment plan, medications, and discharge instructions  Description: Complete learning assessment and assess knowledge base    Interventions:  - Provide teaching at level of understanding  - Provide teaching via preferred learning methods  Outcome: Progressing     Problem: COPING  Goal: Pt/Family able to verbalize concerns and demonstrate effective coping strategies  Description: INTERVENTIONS:  - Assist patient/family to identify coping skills, available support systems and cultural and spiritual values  - Provide emotional support, including active listening and acknowledgement of concerns of patient and caregivers  - Reduce environmental stimuli, as able  - Provide patient education  - Assess for spiritual pain/suffering and initiate spiritual care, including notification of Pastoral Care or casey based community as needed  - Assess effectiveness of coping strategies  Outcome: Progressing  Goal: Will report anxiety at manageable levels  Description: INTERVENTIONS:  - Administer medication as ordered  - Teach and encourage coping skills  - Provide emotional support  - Assess patient/family for anxiety and ability to cope  Outcome: Progressing

## 2022-08-02 NOTE — PHYSICAL THERAPY NOTE
PHYSICAL THERAPY TREATMENT  NAME:  Mike Mills  DATE: 08/02/22    AGE:   69 y o   Mrn:   [id number]  ADMIT DX:  Atrial fibrillation with rapid ventricular response ([facility] [address] ) [I48 [id number]]    Past Medical History:   Diagnosis Date    Brain cancer ([facility] [address] )     Hyperlipidemia     Hypertension     Lung cancer [facility])      Past Surgical History:   Procedure Laterality Date    BACK SURGERY      CRANIOTOMY Left 4/7/2022    Procedure: Image guided left parietal craniotomy for tumor resection;  Surgeon: Ugo Galvin MD;  [facility];  Service: Neurosurgery    CRANIOTOMY Left 7/28/2022    Procedure: left retrosigmoid/posterior fossa craniotomy for resction of mass with image guidence;  Surgeon: Ugo Galvin MD;  [facility];  Service: Neurosurgery    HEMORRHOID SURGERY      IR BIOPSY LUNG  5/6/2021    IR PORT PLACEMENT  6/17/2021    LAMINECTOMY  2018    L4-L5    LUNG SURGERY      left upper lobectomy    MI BRONCHOSCOPY,DIAGNOSTIC N/A 5/25/2021    Procedure: BRONCHOSCOPY FLEXIBLE;  Surgeon: Moises Bermudez MD;  [facility];  Service: Thoracic    MI MEDIASTINOSCOPY WITH LYMPH NODE BIOPSY/IES N/A 5/25/2021    Procedure: MEDIASTINOSCOPY, flexible bronchoscopy;  Surgeon: Moises Bermudez MD;  [facility];  Service: Thoracic    TONSILLECTOMY  1959       Length Of Stay: 8     08/02/22 0941   PT Last Visit   PT Visit Date 08/02/22   Note Type   Note Type Treatment   End of Consult   Patient Position at End of Consult Bedside chair; All needs within reach;Bed/Chair alarm activated   Pain Assessment   Pain Assessment Tool 0-10   Pain Score No Pain   Restrictions/Precautions   Weight Bearing Precautions Per Order No   Braces or Orthoses   (none)   Other Precautions Cognitive; Chair Alarm; Bed Alarm;Multiple lines; Fall Risk;Visual impairment  (dipolpia- eye patch utilized)   General   Chart Reviewed Yes   Response to Previous Treatment Patient with no complaints from previous session  Family/Caregiver Present No   Cognition   Overall Cognitive Status Impaired   Arousal/Participation Alert; Cooperative   Attention Attends with cues to redirect   Orientation Level Oriented to person;Oriented to place;Oriented to situation  (generally  to time)   Memory Decreased recall of precautions;Decreased recall of recent events;Decreased short term memory   Following Commands Follows one step commands with increased time or repetition   Comments overall cooperative/ pleasant and makes needs known  Inc time for processing- limited insight into deficits  Subjective   Subjective "I'm tired and weak today" "My wife should be here soon "   Bed Mobility   Rolling L 3  Moderate assistance   Additional items Assist x 1; Increased time required;Verbal cues; Bedrails   Supine to Sit 3  Moderate assistance   Additional items Assist x 1; Increased time required; Bedrails;Verbal cues;LE management   Additional Comments seated ablacne/ dynamic reaching EOB- pt sits w/ UE support for brief periods w/ close S> CGA  fatigues w/ inc time  Transfers   Sit to Stand 3  Moderate assistance   Additional items Assist x 2   Stand to Sit 3  Moderate assistance   Additional items Assist x 2   Sit pivot 3  Moderate assistance   Additional items Assist x 2; Increased time required;Verbal cues  (sit pivot to drop arm chair utilized following 3 standing attempts w/ B/L knee buckling  recommend DEP lateral slide BTB from chair for safety w/ staff  RN aware Reg Quintanilla))   Additional Comments STS x3 trial w/ maxA x1 and use of RW- pt unable to achieve full standing w/ RW w/ hip/ knees in flexed position  < 10 secs standing each prior to B/L knee buckling- requiring maxA x2 for controlled descent to bed     Ambulation/Elevation   Gait pattern   (unable to achieve full standing on multiple attempts )   Balance   Static Sitting Poor +   Dynamic Sitting Poor -   Static Standing Poor -   Ambulatory Zero   Endurance Deficit   Endurance Deficit Yes Endurance Deficit Description gross LE weakness/ fatigue   Activity Tolerance   Activity Tolerance Patient limited by fatigue;Treatment limited secondary to medical complications (Comment)   Medical Staff Made Aware Co- tx was performed w/ OT today  This pt's participation in skilled therapies requires skilled Ax2 2*medical complexity; decreased activity tolerance; pain; limited endurance and for safety  For these reasons co- tx was indicated to ensure patient could safely and optimally participate in therapy services and maximize their rehabilitation during treatment time  PT and OT disciplines addressed separate goals of patient's individualized care plans throughout session  Nurse Made Aware Kenji RN   Exercises   Hip Flexion Supine;AAROM;10 reps   Hip Abduction Supine;10 reps;AAROM;PROM   Hip Adduction Supine;AAROM;PROM;15 reps;Right;Left   Knee AROM Long Arc Quad Sitting;10 reps  (x2)   Ankle Pumps Sitting;15 reps   Balance training  dynamic reachign activities EOB  standing trials w/ RW-   Assessment   Prognosis Fair   Problem List Decreased strength;Decreased range of motion;Decreased endurance; Impaired balance;Decreased mobility; Decreased coordination;Decreased cognition; Impaired judgement;Decreased safety awareness; Impaired vision; Impaired sensation;Obesity; Decreased skin integrity;Pain   Assessment PT tx session w/ foc on functional bed mobility=- supin> sit; seated static and dynamic balance EOB; multiple stnading trials w/ RW and mod> maxA x2 w/ full standing unable to be achieved  AA/ AROM/ therex to improve LE strength and activiti tolerance  Sit pivo xfers to conclude session w/ modA x2 to drop arm chair  eye patch utilized for diplopia  Pt continues to function well below baseline and requires Ax2 for basic mobility and OOB transfers  PT recommending rehab on d/c   Barriers to Discharge Inaccessible home environment   Goals   Patient Goals get better     STG Expiration Date 08/12/22   PT Treatment Day 2   Plan   Treatment/Interventions ADL retraining;Functional transfer training;LE strengthening/ROM; Therapeutic exercise; Endurance training;Cognitive reorientation;Patient/family training;Equipment eval/education; Bed mobility;Gait training;Spoke to nursing;OT;Compensatory technique education   Progress Slow progress, decreased activity tolerance   PT Frequency 3-5x/wk   Recommendation   PT Discharge Recommendation Post acute rehabilitation services   AM-PAC Basic Mobility Inpatient   Turning in Bed Without Bedrails 2   Lying on Back to Sitting on Edge of Flat Bed 2   Moving Bed to Chair 2   Standing Up From Chair 1   Walk in Room 1   Climb 3-5 Stairs 1   Basic Mobility Inpatient Raw Score 9   Turning Head Towards Sound 4   Follow Simple Instructions 3   Low Function Basic Mobility Raw Score 16   Low Function Basic Mobility Standardized Score 25 72   Highest Level Of Mobility   [facility] Goal 3: Sit at edge of bed   Education   Education Provided Mobility training;Home exercise program   Patient Reinforcement needed   End of Consult   Patient Position at End of Consult Bedside chair;Bed/Chair alarm activated; All needs within reach   End of Consult Comments alarm intact     The patient's AM-PAC Basic Mobility Inpatient Short Form Raw Score is 9  A Raw score of less than or equal to 16 suggests the patient may benefit from discharge to post-acute rehabilitation services  Please also refer to the recommendation of the Physical Therapist for safe discharge planning              Harley Lobo, PT

## 2022-08-02 NOTE — PLAN OF CARE
Problem: OCCUPATIONAL THERAPY ADULT  Goal: Performs self-care activities at highest level of function for planned discharge setting  See evaluation for individualized goals  Description: Treatment Interventions: ADL retraining, Visual perceptual retraining, Functional transfer training, UE strengthening/ROM, Endurance training, Cognitive reorientation, Patient/family training, Equipment evaluation/education, Compensatory technique education, Continued evaluation, Energy conservation, Activityengagement          See flowsheet documentation for full assessment, interventions and recommendations  Outcome: Progressing  Note: Limitation: Decreased ADL status, Decreased UE ROM, Decreased UE strength, Decreased Safe judgement during ADL, Decreased cognition, Decreased endurance, Visual deficit, Decreased self-care trans, Decreased high-level ADLs  Prognosis: Fair  Assessment: Pt was seen this date for OT tx session focusing on self care tasks, bed mobility, sit to stand progressions, standing tolerance, tranfers, sitting balance and tolerance, neuro re-ed, safety awareness, compensatory techniques, energy conservation, and overall activity tolerance  Pt presents supine and is agreeable to OT tx session  Pt is on 3L O2 with O2 sats WNL, placed on RA with sats remaining WNL at rest and with activity, completes previously mentioned tasks at documented assist levels please see above in flow sheet  At end of session while pt is resting in chair O2 sats noted to decrease to 90% and was placed back on 3L O2- NSG aware  Pt continues to require overall Ax2 for transfers and max A for LB self care tasks  Pt is overall limited by double vision, decreased balance, cognition, increased fatigue, decreased strength, endurance, and activity tolerance and continues to function below baseline level  Pt resting OOB in chair at end of session with all needs in reach and alarm on   Pt would benefit from continued OT Tx to improve overall functional abilities and increase independence  Will continue to follow with current POC       OT Discharge Recommendation: Post acute rehabilitation services

## 2022-08-02 NOTE — ASSESSMENT & PLAN NOTE
· Primary left lung adenocarcinoma   · Reporting lightheadedness for the past several months, which has increased over the past several days  · CT showing increase in size of left cerebellar mass with associated vasogenic edema with mass effect upon the 4th ventricle as well as new 5 mm high-density nodule in the left cerebellum  · Patient is AAOx4, neuro exam is nonfocal  · Patient is postop day 5 from left retrosigmoid posterior fossa craniotomy for resection of mass  · Preliminary pathology showing metastatic carcinoma  · Continue Decadron taper  · Platelet goal above 75  · Blood pressure goal below 160  · Discharge planning to inpatient rehabilitation, consult with PMR to see if he's a candidate for acute rehabilitation

## 2022-08-02 NOTE — PROGRESS NOTES
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 [facility])  Active Problems:    Essential hypertension    Mixed hyperlipidemia    Adenocarcinoma, lung, left (HCC)    History of gout    Thrombocytopenia (HCC)    Hypothyroidism    Atrial fibrillation with rapid ventricular response (HCC)    Hyponatremia    High grade neuroendocrine carcinoma of lung ([address] )      Plan:  Patient with no chest pain or significant dyspnea  He had no issues overnight  Blood pressure improved on current regimen  He is postop day 5 after craniotomy  Heart rate well controlled on current regimen  BMP with potassium of 3 5 and creatinine of 0 58  Hemoglobin 11 3  Will supplement potassium  Nephrology managing hyponatremia  Subjective:   Patient seen and examined  No significant events overnight   negative  Objective:     Vitals: Blood pressure 142/84, pulse 63, temperature 98 °F (36 7 °C), resp  rate 19, height 5' 8" (1 727 m), weight 78 3 kg (172 lb 9 9 oz), SpO2 96 %  , Body mass index is 26 25 kg/m² ,   Orthostatic Blood Pressures    Flowsheet Row Most Recent Value   Blood Pressure 142/84 filed at 08/02/2022 0800   Patient Position - Orthostatic VS Lying filed at 07/27/2022 1908      ,      Intake/Output Summary (Last 24 hours) at 8/2/2022 2725  Last data filed at 8/1/2022 1801  Gross per 24 hour   Intake --   Output 775 ml   Net -775 ml           Physical Exam:    GEN: Randal Harmon   NECK: supple, no carotid bruits, no JVD or HJR  HEART: normal rate, regular rhythm, normal S1 and S2, no murmurs, clicks, gallops or rubs   LUNGS: clear to auscultation bilaterally; no wheezes, rales, or rhonchi   ABDOMEN: normal bowel sounds, soft, no tenderness, no distention  EXTREMITIES: peripheral pulses normal; no clubbing, cyanosis, or edema  SKIN: warm and well perfused, no suspicious lesions on exposed skin    Labs & Results:    No results displayed because visit has over 200 results  XR chest 2 views    Result Date: 7/25/2022  Narrative: CHEST INDICATION:   AFib RVR, lightheadedness, palpitations  COMPARISON:  01/24/2017  CT scan on 06/15/2022 EXAM PERFORMED/VIEWS:  XR CHEST PA & LATERAL Images: 3 FINDINGS: Cardiomediastinal silhouette appears unremarkable  Right-sided MediPort terminating in the SVC  The lungs are hyperinflated  Postoperative changes in the left lung  Scarring in the left midlung  No pneumothorax or pleural effusion  Osseous structures appear within normal limits for patient age  Impression: No acute cardiopulmonary disease  Hyperinflation  Workstation performed: [id number]     CT head wo contrast    Result Date: 7/30/2022  Narrative: CT BRAIN - WITHOUT CONTRAST INDICATION:   change in exam   History of metastatic neuroendocrine carcinoma with brain metastases  COMPARISON:  7/24/2022; progress note 7/30/2022; 7/28/2022; 7/25/2022 TECHNIQUE:  CT examination of the brain was performed  In addition to axial images, sagittal and coronal 2D reformatted images were created and submitted for interpretation  Radiation dose length product (DLP) for this visit:  902 14 mGy-cm   This examination, like all CT scans performed in the [facility], was performed utilizing techniques to minimize radiation dose exposure, including the use of iterative  reconstruction and automated exposure control  IMAGE QUALITY:  Diagnostic  FINDINGS: PARENCHYMA:  Evolving edema gliosis and small amounts of hemorrhagic material in the left cerebellum subjacent to a new left occipital/retromastoid craniotomy (series 3, image 13) indicative of interval resection of a large previously present hyperdense/hemorrhagic cerebellar metastasis and expected postoperative changes    There is residual local mass effect on the 4th ventricle as well as effacement of the left ambient cistern without overt hydrocephalus  Separately in the left parietal lobe there is redemonstration of a previously present craniotomy  There is a small amount of high density along the extra-axial space posteriorly on series 2 image 32 likely related to dural closure material, stable  Cystic encephalomalacia and gliosis in the treatment bed in the left parietal vertex again noted  Along the posterior margin of this treatment bed/gliotic region, there is a 0 5 cm rounded high density lesion, image 28, series 2 possibly a hyperdense or  hypercellular metastasis versus small hemorrhage or less likely cavernous angioma, similar to previous MR imaging  VENTRICLES AND EXTRA-AXIAL SPACES:  4th ventricle is effaced  No hydrocephalus  Small amount of pneumocephalus extra-axial fluid and gas subjacent left occipital craniotomy  VISUALIZED ORBITS AND PARANASAL SINUSES:  Right maxillary sinus mucus retention cyst  CALVARIUM AND EXTRACRANIAL SOFT TISSUES:  Postoperative changes in the scalp with gas and strandy densities adjacent to the left occipital craniotomy  Impression: 1  Expected postoperative changes status post left occipital/retromastoid craniotomy with resection of a previously present large hyperdense/hemorrhagic left cerebellar metastasis  There is a small amount of residual hemorrhagic material in the treatment bed with residual local edema and mass effect on the 4th ventricle  Small amounts of pneumocephalus and adjacent scalp gas and fluid surrounding the operative site noted, also expected postoperative changes  Continued clinical and imaging surveillance recommended  2   Stable 5 mm hyperdense lesion in the left parietal cortex (series 2, image 28) along the superior margin of prior treatment cavity possibly a hypercellular hyperdense /hemorrhagic cortical metastasis versus small hemorrhage or potentially even cavernous angioma, similar to recent brain MRIs    Continued clinical and imaging surveillance recommended  Workstation performed: [id number]     CT head without contrast    Result Date: 7/24/2022  Narrative: CT BRAIN - WITHOUT CONTRAST INDICATION:   Syncope, recurrent Brain metastases suspected Lightheadedness, brain metastasis  COMPARISON:  6/15/2022 TECHNIQUE:  CT examination of the brain was performed  In addition to axial images, sagittal and coronal 2D reformatted images were created and submitted for interpretation  Radiation dose length product (DLP) for this visit:  864 mGy-cm   This examination, like all CT scans performed in the [facility], was performed utilizing techniques to minimize radiation dose exposure, including the use of iterative reconstruction and automated exposure control  IMAGE QUALITY:  Diagnostic  FINDINGS: PARENCHYMA:  Left cerebellar dense mass measures 3 9 x 3 6 cm, previously measuring 1 8 x 1 6 cm  There is associated vasogenic edema with mass effect upon the 4th ventricle  Adjacent to the mass is a 5 mm density (2/12)  Left parietal cortical lesion measures 6 mm, previously measuring 9 mm  There is redemonstration of left parietal encephalomalacia  VENTRICLES AND EXTRA-AXIAL SPACES:  Normal for the patient's age  VISUALIZED ORBITS AND PARANASAL SINUSES:  There is a retention cyst versus polyp in the right maxillary sinus  There is opacity in the right mastoid air cells  CALVARIUM AND EXTRACRANIAL SOFT TISSUES:  Left parietal craniotomy  Impression: The left cerebellar mass has increased in size, now measuring 3 9 x 3 6 cm  There is associated vasogenic edema with mass effect upon the 4th ventricle  There is a new 5 mm high density nodule in the left cerebellum  Left posterior parietal cortical nodule appears smaller than prior  The study was marked in [facility] for immediate notification   Workstation performed: [id number]     MRI brain w wo contrast    Result Date: 7/28/2022  Narrative: MRI BRAIN WITH AND WITHOUT CONTRAST INDICATION: Postop status post resection of left cerebellar metastases  COMPARISON:  MRI dated 7/25/2022  TECHNIQUE: Sagittal T1, axial T2, axial FLAIR, axial T1, axial [place], axial diffusion  Sagittal, axial T1 postcontrast   Axial bravo postcontrast with coronal reconstructions  IV Contrast:  10 mL of Gadobutrol injection (SINGLE-DOSE)  IMAGE QUALITY:   Diagnostic  FINDINGS: BRAIN PARENCHYMA:  New postoperative changes status post left suboccipital craniotomy for resection of large hemorrhagic left cerebellar metastasis as well as the smaller adjacent metastasis  Small subjacent extra-axial collection  Expected postoperative blood products within the operative cavity  Although evaluation for enhancement is somewhat limited due to presence of T1 hyperintense blood products, there is some mild enhancement seen at the posterior inferior aspect of the operative cavity  Possibly postoperative but follow-up anticipated    There is restricted diffusion at the inferior margin of the resection cavity that may be due to combination of marginal ischemia as well as blood products    Surrounding edema is mildly increased  Mass effect on the fourth ventricle is also mildly increased  Stable postsurgical cavity in the left parietal lobe with hemosiderin  Stable surrounding FLAIR signal  No enhancement to suggest recurrent disease  Stable 5 mm lesion in the left parietal lobe with susceptibility artifact adjacent to the resection cavity  There is is T1 shortening within the lesion on precontrast sequence (best appreciated on sagittal image 10 series 5) without definite enhancement    Stable surrounding FLAIR signal  No new lesion identified  VENTRICLES:  Stable  No hydrocephalus  SELLA AND PITUITARY GLAND:  Normal  ORBITS:  Stable bilateral proptosis  PARANASAL SINUSES:  Right maxillary sinus retention cyst  Partial opacification of the right mastoid air cells   VASCULATURE:  Evaluation of the major intracranial vasculature demonstrates appropriate flow voids  CALVARIUM AND SKULL BASE:  Postoperative changes status post left suboccipital craniotomy  EXTRACRANIAL SOFT TISSUES:  Normal      Impression: New postoperative changes status post resection of left cerebellar metastases with expected postoperative blood products  Small region of marginal postoperative ischemia  Mildly increased edema  Mass effect on the fourth ventricle is also slightly increased    No hydrocephalus  Small amount of enhancement at the operative margin may be postoperative  Recommend follow-up  Stable postsurgical changes in the left parietal lobe without locally recurrent disease  Stable 5 mm left parietal lobe lesion adjacent to resection cavity with T1 shortening and no definite residual enhancement    The study was marked in [employer] for immediate notification  Workstation performed: [id number]     MRI brain w wo contrast    Result Date: 7/25/2022  Narrative: MRI BRAIN WITH AND WITHOUT CONTRAST INDICATION: brain mass  COMPARISON:  CT head without contrast 7/24/2022, 6/15/2022  MRI brain with and without contrast 4/26/2022, 4/8/2022  TECHNIQUE: Sagittal T1, axial T2, axial FLAIR, axial T1, axial [place], axial diffusion  Sagittal, axial T1 postcontrast   Axial bravo postcontrast with coronal reconstructions  IV Contrast:  9 mL of Gadobutrol injection (SINGLE-DOSE)  IMAGE QUALITY:   Diagnostic  FINDINGS: BRAIN PARENCHYMA: Postsurgical changes of left parietal craniotomy for resection of left parietal mass  Unchanged left parietal resection cavity with encephalomalacia, gliosis, and peripheral hemosiderin deposition  No abnormal masslike enhancement in left parietal resection cavity to suggest recurrent disease   A few enhancing hemorrhagic metastatic lesions, for reference: - 0 5 x 0 5 cm hemorrhagic enhancing lesion in left parietal lobe posterior to left parietal resection cavity (12:81), previously 0 8 x 0 7 cm on CT head 6/15/2022 but unchanged since 7/24/2022  - 4 3 x 3 6 cm hemorrhagic enhancing mass in left cerebellum (12:38), previously 1 8 x 1 4 cm  Unchanged mass effect on the 4th ventricle since yesterday's CT head without contrast   Moderate surrounding perilesional vasogenic edema in left cerebellum, posterior cerebellar vermis, and right posterior paramedian cerebellum  - 0 6 x 0 6 cm hemorrhagic enhancing mass posterior to the dominant left cerebellar mass (12:40), previously 1 5 x 1 4 cm cm  No midline shift  No diffusion-weighted signal abnormality to suggest acute infarction  A few small scattered hyperintensities on T2/FLAIR imaging are noted in the periventricular and subcortical white matter demonstrating an appearance that is statistically most likely to represent minimal microangiopathic change  VENTRICLES:  Unchanged mass effect on the 4th ventricle  No obstructive hydrocephalus  No intraventricular hemorrhage  SELLA AND PITUITARY GLAND:  Normal  ORBITS:  Unchanged bilateral globe proptosis  PARANASAL SINUSES:  Moderate size right maxillary mucus retention cyst  VASCULATURE:  Evaluation of the major intracranial vasculature demonstrates appropriate flow voids  CALVARIUM AND SKULL BASE: Left parietal craniotomy  Small bilateral mastoid effusions (right worse than left)  EXTRACRANIAL SOFT TISSUES:  Normal      Impression: Mixed treatment response - 4 3 cm hemorrhagic metastatic lesion in left cerebellum, increased in size since CT head 6/15/2022 but unchanged since 7/24/2022 - this is likely due to interval hemorrhage of known left cerebellar lesion    Moderate surrounding perilesional vasogenic edema in left cerebellum, posterior cerebellar vermis, and right posterior paramedian cerebellum with mild mass effect on the 4th ventricle  - 0 6 cm hemorrhagic metastatic lesion posterior to the dominant left cerebellar mass, decreased in size  - 0 5 cm left parietal lobe hemorrhagic lesion posterior to left parietal resection cavity, slightly decreased in size  - No evidence of recurrent disease in left parietal resection cavity  The study was marked in [facility] for immediate notification  Workstation performed: [id number]     CT chest abdomen pelvis w contrast    Result Date: 7/26/2022  Narrative: CT CHEST, ABDOMEN AND PELVIS WITH IV CONTRAST INDICATION:   Brain/CNS neoplasm, staging Non-small cell lung cancer (NSCLC), monitor Non-small cell lung cancer (NSCLC), metastatic, assess treatment response h/o lung cancer, new brain mets  Evaluate for staging  COMPARISON:  CT chest abdomen pelvis 6/15/2022 TECHNIQUE: CT examination of the chest, abdomen and pelvis was performed  Axial, sagittal, and coronal 2D reformatted images were created from the source data and submitted for interpretation  Radiation dose length product (DLP) for this visit:  1208 13 mGy-cm   This examination, like all CT scans performed in the [facility], was performed utilizing techniques to minimize radiation dose exposure, including the use of iterative reconstruction and automated exposure control  IV Contrast:  65 mL of iohexol (OMNIPAQUE) Enteric contrast was not administered  FINDINGS: CHEST LUNGS:  Moderate emphysema  Postsurgical changes from left upper lobectomy  There are multiple new bilateral irregular lung nodules suspicious for metastases, with examples as follows: -Right upper lobe juxtapleural nodule measuring 1 6 x 2 5 cm (series 3 image 48)  -Right upper lobe perifissural nodule measuring 1 4 x 1 8 cm (series 3 image 61)  -Left lower lobe juxtapleural nodule measuring 1 9 x 1 5 cm (series 3 image 93)  -Left lower lobe nodule measuring 1 6 x 1 3 cm (series 3 image 70)  PLEURA:  Unremarkable  HEART/GREAT VESSELS:  Heart is unremarkable for patient's age  No thoracic aortic aneurysm  MEDIASTINUM AND OWEN:  Unremarkable  CHEST WALL AND LOWER NECK:  Right chest wall port with the tip in the SVC  ABDOMEN LIVER/BILIARY TREE:  Hepatic steatosis  Otherwise unremarkable   GALLBLADDER: No calcified gallstones  No pericholecystic inflammatory change  SPLEEN:  Nonspecific hypodense splenic lesion measuring approximately 1 7 cm, seen on prior studies since 10/10/2017  PANCREAS:  Unremarkable  ADRENAL GLANDS:  Unremarkable  KIDNEYS/URETERS:  Bilateral renal cysts and subcentimeter too small to characterize hypodensities  No hydronephrosis  STOMACH AND BOWEL:  Colonic diverticulosis without findings of acute diverticulitis  APPENDIX:  Normal  ABDOMINOPELVIC CAVITY:  No ascites  No pneumoperitoneum  No lymphadenopathy  VESSELS:  Marked atherosclerotic changes  No abdominal aortic aneurysm  PELVIS REPRODUCTIVE ORGANS:  Unremarkable for patient's age  URINARY BLADDER:  Diffusely increased density fluid within the bladder  Otherwise unremarkable  ABDOMINAL WALL/INGUINAL REGIONS:  Unremarkable  OSSEOUS STRUCTURES:  No acute fracture or destructive osseous lesion  Degenerative changes of the spine  Postsurgical change in left ribs from thoracotomy  Impression: 1  Multiple new bilateral irregular lung nodules suspicious for metastases  2   No findings of metastatic disease in the abdomen or pelvis  The study was marked in [facility] for immediate notification  Workstation performed: [id number]       EKG personally reviewed by Parrish Fung MD      Counseling / Coordination of Care  Total floor / unit time spent today 30 minutes  Greater than 50% of total time was spent with the patient and / or family counseling and / or coordination of care

## 2022-08-02 NOTE — ASSESSMENT & PLAN NOTE
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

## 2022-08-02 NOTE — ASSESSMENT & PLAN NOTE
· Rates currently well controlled on Cardizem, metoprolol, and amiodarone  · No AP/AC due to post op state

## 2022-08-02 NOTE — PROGRESS NOTES
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, left cerebellar mass, chronic AFib hypertension, gout, hyperlipidemia initially presented with dizziness  Nephrology board for hyponatremia     1) Hyponatremia     - initially 131 on 07/24  -sodium increased to 134 on 07/28  - Na decreased to 126 post op 7/90  - etiology - possible in setting of SIADH in setting of malignancy; to note is on celexa  - serum osm 269  - urine Na 126  - urine osm 711 - labs drawn after furosemide  - repeat Urine Na - 81       - repeat urine osm - 534  - 7/30 - there was concern for mass effect on 4th ventricle on CT scan and started on 3% saline  3% held approx 11 pm   - 7/31 - Na tabs increased to 2 gm TID and fluid restriction  Given furosemide  -8/1-sodium level unchanged 132   Continued on salt tablets  -8/2-sodium level 138  Creatinine 0 58, stable  Potassium appropriate at 3 5  Lower salt tablet to 1 g 3 times a day        Plan:  -can check next BMP in a m   -lower salt tablets 1 g 3 times a day  - cont fluid restriction  - monitor BP   May need addition of doxazosin or other agent if blood pressures rise consistently above 814 systolic       2) renal function - stable Creat at baseline     3) dizziness - hist of neuroendocrine tumor     - initial CT scan with increase seize of L cerebellar mass with vasogenic veena on 4th ventricle and new 5 mm nodule in L cerebellum  - Neurosurgery on board  - pt went to OR on 7/28 for L occipital/retromastoid craniotomy with resection of cefebellar met  - on decadron per primary team     4) HTN     - on diltiazem and metoprolol  - avoid HCTZ  -blood pressure is improving     5) acid/base - bicarb stable     6) a fib with RVR     - AC contraindicated due to met disease to brain and risk of bleed  - metoprolol was increased  - initially required dilt drip    SUBJECTIVE / 24H INTERVAL HISTORY:    Patient denies shortness of breath  Continues to have dizziness and weakness  Blood pressure is 307-881 systolic  Urine output 775 cc       OBJECTIVE:  Current Weight: Weight - Scale: 78 3 kg (172 lb 9 9 oz) (RN  aware)  Vitals:    08/01/22 1549 08/01/22 2305 08/02/22 0546 08/02/22 0800   BP: 133/76 137/84  142/84   Pulse: 68 64  63   Resp: 17 16  19   Temp: 97 5 °F (36 4 °C) 98 2 °F (36 8 °C)  98 °F (36 7 °C)   TempSrc:       SpO2: 95% 96%  96%   Weight:   78 3 kg (172 lb 9 9 oz)    Height:           Intake/Output Summary (Last 24 hours) at 8/2/2022 0850  Last data filed at 8/1/2022 1801  Gross per 24 hour   Intake --   Output 775 ml   Net -775 ml     General: NAD  Skin: no rash  Eyes: anicteric sclera  ENT: moist mucous membrane  Neck: supple  Chest: CTA b/l, no ronchii, no wheeze, no rubs, no rales  CVS: s1s2, no murmur, no gallop, no rub  Abdomen: soft, nontender, nl sounds  Extremities: no significant pitting edema LE b/l  : no guerra  Neuro: AAOX3  Psych: normal affect    Medications:    Current Facility-Administered Medications:     acetaminophen (TYLENOL) tablet 975 mg, 975 mg, Oral, Q8H BASIA, Wilber Hackmyer, DO, 975 mg at 08/02/22 0507    allopurinol (ZYLOPRIM) tablet 100 mg, 100 mg, Oral, Daily, Wilber Hackmyer, DO, 100 mg at 08/01/22 1011    aluminum-magnesium hydroxide-simethicone (MYLANTA) oral suspension 30 mL, 30 mL, Oral, Q6H PRN, Jamila Fuchs, DO    amiodarone tablet 400 mg, 400 mg, Oral, BID With Meals, Jamila Fuchs, DO, 400 mg at 08/01/22 1813    ascorbic acid (VITAMIN C) tablet 1,000 mg, 1,000 mg, Oral, Daily, Wilber Sanchez DO, 1,000 mg at 08/01/22 1011    atorvastatin (LIPITOR) tablet 20 mg, 20 mg, Oral, Daily With Dinner, Jamila Kearney DO, 20 mg at 08/01/22 1813    bisacodyl (DULCOLAX) rectal suppository 10 mg, 10 mg, Rectal, Daily PRN, Leon[employer], DO    [COMPLETED] dexamethasone (DECADRON) tablet 4 mg, 4 mg, Oral, Q6H BASIA, 4 mg at 07/30/22 1324 **FOLLOWED BY** [COMPLETED] dexamethasone (DECADRON) tablet 4 mg, 4 mg, Oral, Q8H BASIA, 4 mg at 08/01/22 1354 **FOLLOWED BY** dexamethasone (DECADRON) tablet 2 mg, 2 mg, Oral, Q6H [address], 2 mg at 08/02/22 0506 **FOLLOWED BY** [START ON 8/4/2022] dexamethasone (DECADRON) tablet 2 mg, 2 mg, Oral, Q8H [address] **FOLLOWED BY** [START ON 8/5/2022] dexamethasone (DECADRON) tablet 2 mg, 2 mg, Oral, Q12H [address] **FOLLOWED BY** [START ON 8/8/2022] dexamethasone (DECADRON) tablet 2 mg, 2 mg, Oral, Q24H BASIA, Wilber Hafantamyer, DO    diltiazem (CARDIZEM SR) 12 hr capsule 120 mg, 120 mg, Oral, Q12H [address], Wilber Hackmyer, DO, 120 mg at 08/01/22 2306    docusate sodium (COLACE) capsule 100 mg, 100 mg, Oral, BID, Wilber Hackmyer, DO, 100 mg at 08/01/22 1011    heparin (porcine) subcutaneous injection 5,000 Units, 5,000 Units, Subcutaneous, Q8H [address], Wilber Hackmyer, DO, 5,000 Units at 08/02/22 0507    levothyroxine tablet 37 5 mcg, 37 5 mcg, Oral, Early Morning, Wilber Hackmyer, DO, 37 5 mcg at 08/02/22 0506    meclizine (ANTIVERT) tablet 25 mg, 25 mg, Oral, Q8H PRN, Wilber Hackmyer, DO, 25 mg at 07/27/22 0840    methocarbamol (ROBAXIN) tablet 500 mg, 500 mg, Oral, Q6H [address], Wilber Hackmyer, DO, 500 mg at 08/02/22 0506    metoclopramide (REGLAN) injection 10 mg, 10 mg, Intravenous, Q6H PRN, Wilber Sanchez DO, 10 mg at 07/29/22 0815    metoprolol succinate (TOPROL-XL) 24 hr tablet 50 mg, 50 mg, Oral, BID, Wilber Sanchez DO, 50 mg at 08/01/22 2053    ondansetron (ZOFRAN) injection 4 mg, 4 mg, Intravenous, Q6H PRN, Wilber Sanchez DO, 4 mg at 07/29/22 0511    oxyCODONE (ROXICODONE) IR tablet 2 5 mg, 2 5 mg, Oral, Q4H PRN, Leonardo Kelly DO, 2 5 mg at 08/02/22 0236    pantoprazole (PROTONIX) EC tablet 40 mg, 40 mg, Oral, Early Morning, Wilber Sanchez DO, 40 mg at 08/02/22 0506    polyethylene glycol (MIRALAX) packet 17 g, 17 g, Oral, BID, [facility], 17 g at 07/31/22 1711    scopolamine (TRANSDERM-SCOP) 1 mg/3 days TD 72 hr patch 1 patch, 1 patch, Transdermal, Q72H, Wilber Sanchez , 1 patch at 07/31/22 1352    senna (SENOKOT) tablet 8 6 mg, 1 tablet, Oral, Daily, [facility], 8 6 mg at 08/01/22 1011    sodium chloride tablet 1 g, 1 g, Oral, TID With Meals, Tamanna Marquez MD    Laboratory Results:  Results from last 7 days   Lab Units 08/02/22  0508 08/02/22  0507 08/01/22  1051 08/01/22  0511 08/01/22  0005 07/31/22  1648 07/31/22  0607 07/31/22  0022 07/30/22  2142 07/30/22  1513 07/30/22  0526 07/29/22  1844 07/29/22  1459 07/29/22  1118 07/29/22  0535 07/28/22  1337 07/28/22  1002   WBC Thousand/uL 7 05  --   --  6 73  --   --  7 53  --   --   --  7 83  --  7 80  --  6 90 6 41  --    HEMOGLOBIN g/dL 11 3*  --   --  11 4*  --   --  12 1  --   --   --  12 4  --  11 7*  --  11 8* 11 7*  --    I STAT HEMOGLOBIN g/dl  --   --   --   --   --   --   --   --   --   --   --   --   --   --   --   --  9 5*   HEMATOCRIT % 33 7*  --   --  34 9*  --   --  35 8*  --   --   --  34 9*  --  33 8*  --  34 3* 33 6*  --    HEMATOCRIT, ISTAT %  --   --   --   --   --   --   --   --   --   --   --   --   --   --   --   --  28*   PLATELETS Thousands/uL 82*  --   --  97*  --   --  114*  --   --   --  133*  --  152  --  158 204  --    POTASSIUM mmol/L  --  3 5 4 0  --  4 0 4 0 3 5 3 5 3 6   < > 4 0   < >  --  3 7 3 6  --   --    CHLORIDE mmol/L  --  103 96  --  97 95* 93* 94* 97   < > 92*   < >  --  96 96  --   --    CO2 mmol/L  --  31 32  --  32 31 33* 31 30   < > 28   < >  --  26 27  --   --    CO2, I-STAT mmol/L  --   --   --   --   --   --   --   --   --   --   --   --   --   --   --   --  30   BUN mg/dL  --  31* 30*  --  28* 25 19 19 19   < > 17   < >  --  21 19  --   --    CREATININE mg/dL  --  0 58* 0 76  --  0 76 0 87 0 62 0 59* 0 70   < > 0 66   < >  --  0 90 0 61  --   --    CALCIUM mg/dL  --  8 5 9 1  --  9 0 8 7 8 4 8 4 8 2*   < > 8 6 < >  --  8 5 8 6  --   --    MAGNESIUM mg/dL  --   --   --   --   --   --  2 2  --   --   --  2 2  --   --  2 0  --   --   --    PHOSPHORUS mg/dL  --   --   --   --   --   --  3 5  --   --   --  2 7  --   --   --   --   --   --    GLUCOSE, ISTAT mg/dl  --   --   --   --   --   --   --   --   --   --   --   --   --   --   --   --  138    < > = values in this interval not displayed

## 2022-08-02 NOTE — PLAN OF CARE
Problem: PHYSICAL THERAPY ADULT  Goal: Performs mobility at highest level of function for planned discharge setting  See evaluation for individualized goals  Description: Treatment/Interventions: Functional transfer training, LE strengthening/ROM, Therapeutic exercise, Endurance training, Cognitive reorientation, Equipment eval/education, Bed mobility, Gait training, OT          See flowsheet documentation for full assessment, interventions and recommendations  Note: Prognosis: Fair  Problem List: Decreased strength, Decreased range of motion, Decreased endurance, Impaired balance, Decreased mobility, Decreased coordination, Decreased cognition, Impaired judgement, Decreased safety awareness, Impaired vision, Impaired sensation, Obesity, Decreased skin integrity, Pain  Assessment: PT tx session w/ foc on functional bed mobility=- supin> sit; seated static and dynamic balance EOB; multiple stnading trials w/ RW and mod> maxA x2 w/ full standing unable to be achieved  AA/ AROM/ therex to improve LE strength and activiti tolerance  Sit pivo xfers to conclude session w/ modA x2 to drop arm chair  eye patch utilized for diplopia  Pt continues to function well below baseline and requires Ax2 for basic mobility and OOB transfers  PT recommending rehab on d/c  Barriers to Discharge: Inaccessible home environment     PT Discharge Recommendation: Post acute rehabilitation services    See flowsheet documentation for full assessment

## 2022-08-02 NOTE — PROGRESS NOTES
[address] [facility]  Progress Note - Galvez Preet 1955, 69 y o  male MRN: [id number]  [facility] Encounter: [id number]  Primary Care Provider: Barbara Chavez DO   Date and time admitted to hospital: 7/25/2022 12:26 AM    * 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 [facility])  Assessment & Plan  · Primary left lung adenocarcinoma   · Reporting lightheadedness for the past several months, which has increased over the past several days  · CT showing increase in size of left cerebellar mass with associated vasogenic edema with mass effect upon the 4th ventricle as well as new 5 mm high-density nodule in the left cerebellum  · Patient is AAOx4, neuro exam is nonfocal  · Patient is postop day 5 from left retrosigmoid posterior fossa craniotomy for resection of mass  · Preliminary pathology showing metastatic carcinoma  · Continue Decadron taper  · Platelet goal above 75  · Blood pressure goal below 160  · Discharge planning to inpatient rehabilitation, consult with PMR to see if he's a candidate for acute rehabilitation              Adenocarcinoma, lung, left ([address] )  Assessment & Plan  Patient with a history metastatic lung adeno carcinoma status post left upper lobe resection with radiation and chemotherapy  Recent CT chest abdomen pelvis showing multiple new bilateral irregular lung nodules which are suspicious for metastasis, fortunately no metastatic disease noted in the abdomen or pelvis      High grade neuroendocrine carcinoma of lung ([address] )  Assessment & Plan  Management as above    Hyponatremia  Assessment & Plan  · Management per Nephrology  · 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  · Resolved  · Will continue salt tabs, 1g TID    Atrial fibrillation with rapid ventricular response ([address] )  Assessment & Plan  · Rates currently well controlled on Cardizem, metoprolol, and amiodarone  · No AP/AC due to post op state      Hypothyroidism  Assessment & Plan  Continue levothyroxine  Thrombocytopenia (HCC)  Assessment & Plan  · Baseline thrombocytopenia around 100  · Currently at 80 today, continue to monitor  · Continue DVT prophylaxis with heparin    History of gout  Assessment & Plan  · On allopurinol 100 mg daily  Mixed hyperlipidemia  Assessment & Plan  Continue atorvastatin 20 mg daily    Essential hypertension  Assessment & Plan  · BP is at goal  · Continue metoprolol succinate 50 mg b i d   · Continue diltiazem          VTE Pharmacologic Prophylaxis: VTE Score: 5 Moderate Risk (Score 3-4) - Pharmacological DVT Prophylaxis Ordered: heparin  Patient Centered Rounds: I performed bedside rounds with nursing staff today  Discussions with Specialists or Other Care Team Provider: MARIUM      Education and Discussions with Family / Patient: Updated  (wife) at bedside  Time Spent for Care: 30 minutes  More than 50% of total time spent on counseling and coordination of care as described above  Current Length of Stay: 8 day(s)  Current Patient Status: Inpatient   Certification Statement: The patient will continue to require additional inpatient hospital stay due to monitor platelets, discharge planning  Discharge Plan: Anticipate discharge in 24-48 hrs to rehab facility  Code Status: Level 1 - Full Code    Subjective:   Denies any new complaints  Continues to have diplopia    Objective:     Vitals:   Temp (24hrs), Av 2 °F (36 8 °C), Min:98 °F (36 7 °C), Max:98 5 °F (36 9 °C)    Temp:  [98 °F (36 7 °C)-98 5 °F (36 9 °C)] 98 5 °F (36 9 °C)  HR:  [63-77] 72  Resp:  [16-19] 19  BP: (137-162)/() 158/98  SpO2:  [94 %-96 %] 95 %  Body mass index is 26 25 kg/m²  Input and Output Summary (last 24 hours): Intake/Output Summary (Last 24 hours) at 2022 1619  Last data filed at 2022 1108  Gross per 24 hour   Intake --   Output 950 ml   Net -950 ml       Physical Exam:   Physical Exam  Constitutional:       Appearance: Normal appearance   He is obese  HENT:      Head:      Comments: Cranial staples are clean and dry     Nose: Nose normal       Mouth/Throat:      Mouth: Mucous membranes are moist       Pharynx: Oropharynx is clear  Eyes:      Extraocular Movements: Extraocular movements intact  Cardiovascular:      Rate and Rhythm: Normal rate and regular rhythm  Pulmonary:      Effort: Pulmonary effort is normal    Abdominal:      General: There is no distension  Palpations: Abdomen is soft  Tenderness: There is no abdominal tenderness  Neurological:      Mental Status: He is alert and oriented to person, place, and time  Comments: Nystagmus to the right   Psychiatric:         Mood and Affect: Mood normal          Behavior: Behavior normal           Additional Data:     Labs:  Results from last 7 days   Lab Units 08/02/22  0508 08/01/22  0511 07/29/22  0535 07/28/22  1337 07/28/22  1002 07/28/22  0447   WBC Thousand/uL 7 05 6 73   < > 6 41  --  6 60   HEMOGLOBIN g/dL 11 3* 11 4*   < > 11 7*  --  12 5   I STAT HEMOGLOBIN   --   --   --   --    < >  --    HEMATOCRIT % 33 7* 34 9*   < > 33 6*  --  36 7   HEMATOCRIT, ISTAT   --   --   --   --    < >  --    PLATELETS Thousands/uL 82* 97*   < > 204  --  89*   BANDS PCT %  --   --   --  1  --   --    NEUTROS PCT %  --   --   --   --   --  93*   LYMPHS PCT %  --   --   --   --   --  3*   LYMPHO PCT %  --  1*  --  1*   < >  --    MONOS PCT %  --   --   --   --   --  3*   MONO PCT %  --  1*  --  2*   < >  --    EOS PCT %  --  0  --  0   < > 0    < > = values in this interval not displayed       Results from last 7 days   Lab Units 08/02/22  0507   SODIUM mmol/L 138   POTASSIUM mmol/L 3 5   CHLORIDE mmol/L 103   CO2 mmol/L 31   BUN mg/dL 31*   CREATININE mg/dL 0 58*   ANION GAP mmol/L 4   CALCIUM mg/dL 8 5   GLUCOSE RANDOM mg/dL 136     Results from last 7 days   Lab Units 07/29/22  0535   INR  0 99     Results from last 7 days   Lab Units 07/28/22  1336   POC GLUCOSE mg/dl 166* Lines/Drains:  Invasive Devices  Report    Peripherally Inserted Central Catheter Line  Duration           PICC Line 88/65/93 Left Basilic 3 days          Central Venous Catheter Line  Duration           Port A Cath 06/17/21 Right Chest 411 days          Peripheral Intravenous Line  Duration           Peripheral IV 07/29/22 Distal;Left;Upper;Ventral (anterior) Arm 4 days                Central Line:  Goal for removal: Will discontinue when peripheral access obtained  Imaging: No pertinent imaging reviewed      Recent Cultures (last 7 days):         Last 24 Hours Medication List:   Current Facility-Administered Medications   Medication Dose Route Frequency Provider Last Rate    acetaminophen  975 mg Oral Q8H [facility], DO      allopurinol  100 mg Oral Daily [employer], DO      aluminum-magnesium hydroxide-simethicone  30 mL Oral Q6H PRN [employer], DO      amiodarone  400 mg Oral BID With Meals [employer], DO      amLODIPine  5 mg Oral Daily Cheikh Davis MD      vitamin C  1,000 mg Oral Daily [employer], DO      atorvastatin  20 mg Oral Daily With [employer], DO      bisacodyl  10 mg Rectal Daily PRN [employer], DO      dexamethasone  2 mg Oral Q6H [facility]       Followed by   Joann Davidson ON 8/4/2022] dexamethasone  2 mg Oral Q8H [facility]      Followed by   Joann Davidson ON 8/5/2022] dexamethasone  2 mg Oral Q12H [facility] DO      Followed by   Joann Davidson ON 8/8/2022] dexamethasone  2 mg Oral Q24H [facility], DO      diltiazem  120 mg Oral Q12H [facility], DO      docusate sodium  100 mg Oral BID [employer], DO      heparin (porcine)  5,000 Units Subcutaneous Q8H [facility], DO      levothyroxine  37 5 mcg Oral Early Morning [facility], DO      meclizine  25 mg Oral Q8H PRN [employer], DO      methocarbamol  500 mg Oral Q6H [facility], DO      metoclopramide  10 mg Intravenous Q6H PRN Jarad Punch, DO      metoprolol succinate  50 mg Oral BID Jarad Sofia, DO      ondansetron  4 mg Intravenous Q6H PRN Jarad Punch, DO      oxyCODONE  2 5 mg Oral Q4H PRN Jarad Punch, DO      pantoprazole  40 mg Oral Early Morning Wilber Janneth, DO      polyethylene glycol  17 g Oral BID Jarad Sofia, DO      scopolamine  1 patch Transdermal Q72H Wilber Janneth, DO      senna  1 tablet Oral Daily Wilber Lagunas, DO      sodium chloride  1 g Oral TID With Meals Leonides Navarrete MD          Today, Patient Was Seen By: Leonides Coates MD    **Please Note: This note may have been constructed using a voice recognition system  **

## 2022-08-02 NOTE — OCCUPATIONAL THERAPY NOTE
Occupational Therapy Progress Note     Patient Name: Nena Lozano Date: 8/2/2022  Problem List  Principal Problem:    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 [facility])  Active Problems:    Essential hypertension    Mixed hyperlipidemia    Adenocarcinoma, lung, [address] [address] )    History of gout    Thrombocytopenia (HCC)    Hypothyroidism    Atrial fibrillation with rapid ventricular response (HCC)    Hyponatremia    High grade neuroendocrine carcinoma of lung ([facility] [address] )            08/02/22 0940   OT Last Visit   OT Visit Date 08/02/22   Note Type   Note Type Treatment   Restrictions/Precautions   Weight Bearing Precautions Per Order No   Other Precautions Cognitive; Chair Alarm; Fall Risk;Multiple lines   Pain Assessment   Pain Score No Pain   ADL   Where Assessed Supine, bed   LB Dressing Assistance 2  Maximal Assistance   LB Dressing Deficit Don/doff R sock; Don/doff L sock   LB Dressing Comments able to present BL feet   Toileting Assistance  1  Total Assistance   Toileting Deficit Perineal hygiene   Bed Mobility   Supine to Sit 3  Moderate assistance   Additional items Assist x 1; Increased time required;LE management;Verbal cues   Additional Comments OOB at end of session   Transfers   Sit to Stand 3  Moderate assistance   Additional items Assist x 2; Increased time required;Verbal cues   Stand to Sit 3  Moderate assistance   Additional items Assist x 2; Increased time required;Verbal cues   Stand pivot 2  Maximal assistance   Additional items Assist x 2; Increased time required;Verbal cues   Additional Comments Completes STS at RW x 2 initially able to tolerance approx 10 seconds in static stance, decreases upon trial 2   Ax2 transfer to drop arm chair following   Neuromuscular Education   Trunk Control Completes functional reaching in all quadrants while seated EOB with increased VC to utilize trunk noted to be able to shift out of midline and right self in midline with increased time and VC throughout Cognition   Overall Cognitive Status Impaired   Arousal/Participation Responsive; Cooperative   Attention Attends with cues to redirect   Orientation Level Oriented X4   Memory Decreased recall of recent events;Decreased recall of precautions   Following Commands Follows one step commands with increased time or repetition   Comments Overall pleasant and coopeartive  Cues to rediect and follow commands at times  Increased time required for processing   Vision   Vision Comments Pt with persistent diplopia - improves with eye patch on R eye   Activity Tolerance   Activity Tolerance Patient limited by fatigue  (additionally pt with c/o dizziness and noted to have elevated BP - see flowsheet for BP recordings during this time )   Medical Staff Made Aware NSG aware   Assessment   Assessment Pt was seen this date for OT tx session focusing on self care tasks, bed mobility, sit to stand progressions, standing tolerance, tranfers, sitting balance and tolerance, neuro re-ed, safety awareness, compensatory techniques, energy conservation, and overall activity tolerance  Pt presents supine and is agreeable to OT tx session  Pt is on 3L O2 with O2 sats WNL, placed on RA with sats remaining WNL at rest and with activity, completes previously mentioned tasks at documented assist levels please see above in flow sheet  At end of session while pt is resting in chair O2 sats noted to decrease to 90% and was placed back on 3L O2- NSG aware  Pt continues to require overall Ax2 for transfers and max A for LB self care tasks  Pt is overall limited by double vision, decreased balance, cognition, increased fatigue, decreased strength, endurance, and activity tolerance and continues to function below baseline level  Pt resting OOB in chair at end of session with all needs in reach and alarm on  Pt would benefit from continued OT Tx to improve overall functional abilities and increase independence  Will continue to follow with current POC  Plan   Treatment Interventions ADL retraining;Visual perceptual retraining;Functional transfer training;UE strengthening/ROM; Endurance training;Cognitive reorientation;Patient/family training;Equipment evaluation/education; Compensatory technique education;Continued evaluation; Energy conservation; Activityengagement   Goal Expiration Date 08/12/22   OT Treatment Day 1   OT Frequency 3-5x/wk   Recommendation   OT Discharge Recommendation Post acute rehabilitation services   AM-PAC Daily Activity Inpatient   Lower Body Dressing 2   Bathing 2   Toileting 2   Upper Body Dressing 2   Grooming 3   Eating 3   Daily Activity Raw Score 14   Daily Activity Standardized Score (Calc for Raw Score >=11) 33 39   AM-PAC Applied Cognition Inpatient   Following a Speech/Presentation 1   Understanding Ordinary Conversation 3   Taking Medications 2   Remembering Where Things Are Placed or Put Away 2   Remembering List of 4-5 Errands 2   Taking Care of Complicated Tasks 1   Applied Cognition Raw Score 11   Applied Cognition Standardized Score 27 03

## 2022-08-03 NOTE — PROGRESS NOTES
Cardiology Progress note  Unit/Bed#: [facility] 924-01 Encounter: [id number]        Caity Ritter 69 y o  male [address] Stay Days: 9    Assessment and Plan      HPI: Fallon Salamanca is a 69year old male PMH neuroendocrine lung cancer with brain metastasis s/p lung resection in  and craniotomy 6 days ago, essential HTN, hypothyroidism, Afib with RVR, and mixed HLD  He presented to [facility] for increasing lightheadedness and shortness of breath, was found to be in Afib  Patient has a history of afib episode following lung resection in   He was transferred to [facility] the week of  for neurosurgery eval regarding a hemorrhagic mass in his cerebellum  Patient is currently post op day 6 following posterior craniotomy  Current Problem List   Principal Problem:    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 [facility])  Active Problems:    Essential hypertension    Mixed hyperlipidemia    Adenocarcinoma, lung, left (HCC)    History of gout    Thrombocytopenia (HCC)    Hypothyroidism    Atrial fibrillation with rapid ventricular response (HCC)    Hyponatremia    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)    Assessment/Plan:    1  Atrial fibrillation with RVR   ·  Status: Improved  Patient no longer in Afib, maintaining rate controlled (72bpm) sinus rhythm with premature atrial contractions as seen on EKG 2022  Currently on Amiodarone 400 mg BID, Diltiazem 120 mg Q12H, amlodipine resumed at 5 mg daily, metoprolol succinate 50 mg BID  Subjective     Mr Deni Roque was seen at bedside today, he had no overnight events  He continues to endorse dizziness and fatigue and denies CP, SOB, and palpitations   He has no questions for the cardiology team      Objective     Vitals: Temp (24hrs), Av 3 °F (36 8 °C), Min:97 9 °F (36 6 °C), Max:98 5 °F (36 9 °C)  Current: Temperature: 98 4 °F (36 9 °C)  Patient Vitals for the past 24 hrs:   BP Temp Pulse Resp SpO2 Weight   22 0850 155/93 -- 72 -- 96 % --   08/03/22 0541 -- -- -- -- -- 78 kg (171 lb 15 3 oz)   08/02/22 2257 150/96 98 4 °F (36 9 °C) 71 14 95 % --   08/02/22 2148 155/97 97 9 °F (36 6 °C) 69 16 95 % --   08/02/22 1535 158/98 98 5 °F (36 9 °C) 72 -- 95 % --    Body mass index is 26 15 kg/m²  Physical Exam:  Physical Exam  Constitutional:       Appearance: He is normal weight  Cardiovascular:      Rate and Rhythm: Normal rate and regular rhythm  Pulses: Normal pulses  Heart sounds: Normal heart sounds  Pulmonary:      Effort: Pulmonary effort is normal       Breath sounds: Normal breath sounds  Skin:     General: Skin is warm and dry  Neurological:      Mental Status: He is alert           Invasive Devices  Report    Peripherally Inserted Central Catheter Line  Duration           PICC Line 82/20/34 Left Basilic 3 days          Central Venous Catheter Line  Duration           Port A Cath 06/17/21 Right Chest 411 days                    Labs:   Results from last 7 days   Lab Units 08/03/22  0539 08/02/22  0508 08/01/22  0511 07/31/22  0607 07/30/22  0526 07/29/22  1459 07/29/22  0535 07/28/22  1337 07/28/22  1002 07/28/22  0447 07/28/22  0447 07/27/22  1824 07/27/22  1100   WBC Thousand/uL 7 19 7 05 6 73 7 53 7 83 7 80 6 90 6 41  --   --  6 60 7 22 7 60   HEMOGLOBIN g/dL 11 3* 11 3* 11 4* 12 1 12 4 11 7* 11 8* 11 7*  --   --  12 5 13 3 13 5   I STAT HEMOGLOBIN g/dl  --   --   --   --   --   --   --   --  9 5*  --   --   --   --    HEMATOCRIT % 34 4* 33 7* 34 9* 35 8* 34 9* 33 8* 34 3* 33 6*  --   --  36 7 39 0 39 5   HEMATOCRIT, ISTAT %  --   --   --   --   --   --   --   --  28*  --   --   --   --    PLATELETS Thousands/uL 69* 82* 97* 114* 133* 152 158 204  --   --  89* 106* 69*   NEUTROS PCT % 96*  --   --   --   --   --   --   --   --    < > 93*  --   --    MONOS PCT % 1*  --   --   --   --   --   --   --   --   --  3*  --   --    MONO PCT %  --   --  1*  --   --   --   --  2*  --   --   --   --   --     < > = values in this interval not displayed        Results from last 7 days   Lab Units 08/03/22  0539 08/02/22  0507 08/01/22  1051 08/01/22  0005 07/31/22  1648 07/31/22  0841 07/31/22  0022 07/30/22  2142 07/30/22  1844 07/30/22  1549 07/30/22  1513 07/30/22  0526 07/30/22  0109 07/29/22  1844 07/29/22  1118 07/29/22  0535 07/28/22  1002 07/28/22  0447 07/27/22  1824 07/27/22  1100   SODIUM mmol/L 137 138 132* 132* 131* 132* 131* 133* 128* 126* 129* 126* 127* 127* 129* 130*  --  134* 129* 131*   POTASSIUM mmol/L 4 0 3 5 4 0 4 0 4 0 3 5 3 5 3 6 3 6 4 1 4 1 4 0 4 2 4 2 3 7 3 6  --  3 7 4 1 4 2   CHLORIDE mmol/L 101 103 96 97 95* 93* 94* 97 94* 92* 94* 92* 95* 95* 96 96  --  101 96 97   CO2 mmol/L 31 31 32 32 31 33* 31 30 25 27 27 28 28 27 26 27  --  28 27 26   CO2, I-STAT mmol/L  --   --   --   --   --   --   --   --   --   --   --   --   --   --   --   --  30  --   --   --    BUN mg/dL 28* 31* 30* 28* 25 19 19 19 19 18 20 17 18 15 21 19  --  28* 32* 34*   CREATININE mg/dL 0 66 0 58* 0 76 0 76 0 87 0 62 0 59* 0 70 0 95 0 70 0 79 0 66 0 57* 0 64 0 90 0 61  --  0 67 0 99 0 95   CALCIUM mg/dL 9 0 8 5 9 1 9 0 8 7 8 4 8 4 8 2* 8 7 8 6 8 4 8 6 8 6 8 9 8 5 8 6  --  7 6* 8 6 9 0   GLUCOSE, ISTAT mg/dl  --   --   --   --   --   --   --   --   --   --   --   --   --   --   --   --  138  --   --   --    MAGNESIUM mg/dL  --   --   --   --   --  2 2  --   --   --   --   --  2 2  --   --  2 0  --   --   --   --   --    PHOSPHORUS mg/dL  --   --   --   --   --  3 5  --   --   --   --   --  2 7  --   --   --   --   --   --   --   --    INR   --   --   --   --   --   --   --   --   --   --   --   --   --   --   --  0 99  --   --   --   --    PTT seconds  --   --   --   --   --   --   --   --   --   --   --   --   --   --   --  26  --   --   --   --    EGFR ml/min/1 73sq m 98 104 93 93 88 101 103 96 81 96 91 98 104 99 86 101  --  98 77 81     Results from last 7 days   Lab Units 07/29/22  0535   INR  0 99   PTT seconds 26             No results found for: PHART, ZZE6YIS, PO2ART, AEL9ZFQ, T5KOCMSY, BEART, SOURCE  No components found for: HIV1X2  Lab Results   Component Value Date    HEPCAB Non-reactive 10/06/2020     No results found for: SPEP, UPEP   Lab Results   Component Value Date    HGBA1C 5 2 04/03/2022    HGBA1C 5 4 01/25/2022    HGBA1C 5 3 06/23/2021     No results found for: CHOL   Lab Results   Component Value Date    HDL 43 06/20/2022    HDL 70 04/03/2022    HDL 35 (L) 01/25/2022      Lab Results   Component Value Date    LDLCALC 93 06/20/2022    LDLCALC 96 04/03/2022    LDLCALC 97 01/25/2022      Lab Results   Component Value Date    TRIG 140 06/20/2022    TRIG 137 04/03/2022    TRIG 130 01/25/2022     No components found for: PROCAL      Micro:      Urinalysis:  Lab Results   Component Value Date    BDZUR Negative 04/03/2022    COCAINEUR Negative 04/03/2022    OPIATEUR Negative 04/03/2022    PCPUR Negative 04/03/2022    THCUR Positive (A) 04/03/2022      Results from last 7 days   Lab Units 07/29/22  1054   COLOR UA  Light Yellow   CLARITY UA  Clear   SPEC GRAV UA  1 023   PH UA  6 5   LEUKOCYTES UA  Negative   NITRITE UA  Negative   GLUCOSE UA mg/dl Negative   KETONES UA mg/dl Negative   BILIRUBIN UA  Negative   BLOOD UA  Large*      Results from last 7 days   Lab Units 07/29/22  1054   RBC UA /hpf Innumerable*   WBC UA /hpf 1-2   EPITHELIAL CELLS WET PREP /hpf Occasional   BACTERIA UA /hpf None Seen      Intake and Outputs:  I/O       08/01 0701  08/02 0700 08/02 0701  08/03 0700 08/03 0701  08/04 0700    P  O   120     Total Intake(mL/kg)  120 (1 5)     Urine (mL/kg/hr) 775 (0 4) 650 (0 3) 250 (1 1)    Stool       Total Output 775 650 250    Net -775 -530 -250           Unmeasured Urine Occurrence  1 x         Nutrition:  Diet Regular; Regular House; Fluid Restriction 1500 ML  Radiology Results:   CT head wo contrast   Final Result by Padma Esquivel MD (07/30 1023)         1    Expected postoperative changes status post left occipital/retromastoid craniotomy with resection of a previously present large hyperdense/hemorrhagic left cerebellar metastasis  There is a small amount of residual hemorrhagic material in the    treatment bed with residual local edema and mass effect on the 4th ventricle  Small amounts of pneumocephalus and adjacent scalp gas and fluid surrounding the operative site noted, also expected postoperative changes  Continued clinical and imaging    surveillance recommended  2   Stable 5 mm hyperdense lesion in the left parietal cortex (series 2, image 28) along the superior margin of prior treatment cavity possibly a hypercellular hyperdense /hemorrhagic cortical metastasis versus small hemorrhage or potentially even    cavernous angioma, similar to recent brain MRIs  Continued clinical and imaging surveillance recommended  Workstation performed: [id number]         MRI brain w wo contrast   Final Result by Pam Monroe MD (07/28 7361)      New postoperative changes status post resection of left cerebellar metastases with expected postoperative blood products  Small region of marginal postoperative ischemia  Mildly increased edema  Mass effect on the fourth ventricle is also slightly    increased    No hydrocephalus  Small amount of enhancement at the operative margin may be postoperative  Recommend follow-up  Stable postsurgical changes in the left parietal lobe without locally recurrent disease  Stable 5 mm left parietal lobe lesion adjacent to resection cavity with T1 shortening and no definite residual enhancement         The study was marked in EPIC for immediate notification  Workstation performed: [id number]         CT chest abdomen pelvis w contrast   Final Result by Bruno Carter MD (07/26 6198)      1  Multiple new bilateral irregular lung nodules suspicious for metastases  2   No findings of metastatic disease in the abdomen or pelvis           The study was marked in EPIC for immediate notification  Workstation performed: [id number]         MRI brain w wo contrast   Final Result by Jonatan Turcios MD (07/25 1143)      Mixed treatment response   - 4 3 cm hemorrhagic metastatic lesion in left cerebellum, increased in size since CT head 6/15/2022 but unchanged since 7/24/2022 - this is likely due to interval hemorrhage of known left cerebellar lesion  Moderate surrounding perilesional vasogenic    edema in left cerebellum, posterior cerebellar vermis, and right posterior paramedian cerebellum with mild mass effect on the 4th ventricle    - 0 6 cm hemorrhagic metastatic lesion posterior to the dominant left cerebellar mass, decreased in size    - 0 5 cm left parietal lobe hemorrhagic lesion posterior to left parietal resection cavity, slightly decreased in size    - No evidence of recurrent disease in left parietal resection cavity  The study was marked in [facility] for immediate notification                       Workstation performed: [id number]           Scheduled Medications:  acetaminophen, 975 mg, Q8H [address]  allopurinol, 100 mg, Daily  amiodarone, 400 mg, BID With Meals  amLODIPine, 5 mg, Daily  vitamin C, 1,000 mg, Daily  atorvastatin, 20 mg, Daily With Dinner  dexamethasone, 2 mg, Q6H [address]   Followed by  Jerry Pastor ON 8/4/2022] dexamethasone, 2 mg, Q8H [address]   Followed by  [START ON 8/5/2022] dexamethasone, 2 mg, Q12H [address]   Followed by  [START ON 8/8/2022] dexamethasone, 2 mg, Q24H [address]  diltiazem, 120 mg, Q12H [address]  docusate sodium, 100 mg, BID  heparin (porcine), 5,000 Units, Q8H [address]  levothyroxine, 37 5 mcg, Early Morning  methocarbamol, 500 mg, Q6H BASIA  metoprolol succinate, 50 mg, BID  pantoprazole, 40 mg, Early Morning  polyethylene glycol, 17 g, BID  scopolamine, 1 patch, Q72H  senna, 1 tablet, Daily      PRN MEDS:  aluminum-magnesium hydroxide-simethicone, 30 mL, Q6H PRN  bisacodyl, 10 mg, Daily PRN  meclizine, 25 mg, Q8H PRN  metoclopramide, 10 mg, Q6H PRN  ondansetron, 4 mg, Q6H PRN  oxyCODONE, 2 5 mg, Q4H PRN      Last 24 Hour Meds: :   Medication Administration - last 24 hours from 08/02/2022 0959 to 08/03/2022 3922       Date/Time Order Dose Route Action Action by     08/03/2022 0848 allopurinol (ZYLOPRIM) tablet 100 mg 100 mg Oral Given Ale Matute RN     08/03/2022 0848 ascorbic acid (VITAMIN C) tablet 1,000 mg 1,000 mg Oral Given Ale Matute RN     08/03/2022 0540 levothyroxine tablet 37 5 mcg 37 5 mcg Oral Given Angi Robles RN     08/03/2022 0539 pantoprazole (PROTONIX) EC tablet 40 mg 40 mg Oral Given Angi Robles RN     08/02/2022 1826 atorvastatin (LIPITOR) tablet 20 mg 20 mg Oral Given Mannie Henry RN     08/03/2022 0850 diltiazem (CARDIZEM SR) 12 hr capsule 120 mg 120 mg Oral Given Ale Matute RN     08/02/2022 2152 diltiazem (CARDIZEM SR) 12 hr capsule 120 mg 120 mg Oral Given Angi Robles RN     08/02/2022 1446 diltiazem (CARDIZEM SR) 12 hr capsule 120 mg 120 mg Oral Given Mannie Henry RN     08/03/2022 0848 polyethylene glycol (MIRALAX) packet 17 g 17 g Oral Given Ale Matute RN     08/02/2022 1853 polyethylene glycol (MIRALAX) packet 17 g 17 g Oral Refused Mannie Henry RN     08/03/2022 0848 docusate sodium (COLACE) capsule 100 mg 100 mg Oral Given Ale Matute RN     08/02/2022 1826 docusate sodium (COLACE) capsule 100 mg 100 mg Oral Given Mannie Henry RN     08/03/2022 0848 senna (SENOKOT) tablet 8 6 mg 8 6 mg Oral Given Ale Matute RN     08/03/2022 0539 dexamethasone (DECADRON) tablet 2 mg 2 mg Oral Given Angi Robles RN     08/02/2022 2301 dexamethasone (DECADRON) tablet 2 mg 2 mg Oral Given Angi Robles RN     08/02/2022 1826 dexamethasone (DECADRON) tablet 2 mg 2 mg Oral Given Mannie Henry RN     08/02/2022 1454 dexamethasone (DECADRON) tablet 2 mg 2 mg Oral Given Mannie Henry RN     08/03/2022 1861 acetaminophen (TYLENOL) tablet 975 mg 975 mg Oral Given Carisa Diaz RN     08/02/2022 2152 acetaminophen (TYLENOL) tablet 975 mg 975 mg Oral Given Carisa Diaz RN     08/02/2022 1446 acetaminophen (TYLENOL) tablet 975 mg 975 mg Oral Given Zakia Woodson RN     08/03/2022 0539 methocarbamol (ROBAXIN) tablet 500 mg 500 mg Oral Given Carisa Diaz RN     08/02/2022 2301 methocarbamol (ROBAXIN) tablet 500 mg 500 mg Oral Given Carisa Diaz RN     08/02/2022 1826 methocarbamol (ROBAXIN) tablet 500 mg 500 mg Oral Given Zakia Woodson RN     08/02/2022 1446 methocarbamol (ROBAXIN) tablet 500 mg 500 mg Oral Given Zakia Woodson RN     08/03/2022 0540 heparin (porcine) subcutaneous injection 5,000 Units 5,000 Units Subcutaneous Given Carisa Diaz RN     08/02/2022 2152 heparin (porcine) subcutaneous injection 5,000 Units 5,000 Units Subcutaneous Given Carisa Diaz RN     08/02/2022 1446 heparin (porcine) subcutaneous injection 5,000 Units 5,000 Units Subcutaneous Given Zakia Woodson RN     08/03/2022 0848 metoprolol succinate (TOPROL-XL) 24 hr tablet 50 mg 50 mg Oral Given Lakshmi Umanzor RN     08/02/2022 2152 metoprolol succinate (TOPROL-XL) 24 hr tablet 50 mg 50 mg Oral Given Carisa Diaz RN     08/03/2022 0848 amiodarone tablet 400 mg 400 mg Oral Given Lakshmi Umanzor RN     08/02/2022 1826 amiodarone tablet 400 mg 400 mg Oral Given Zakia Woodson RN     08/02/2022 1001 sodium chloride tablet 2 g 2 g Oral Not Given Zakia Woodson RN     08/03/2022 9814 sodium chloride tablet 1 g 1 g Oral Given Lakshmi Umanzro RN     08/02/2022 1826 sodium chloride tablet 1 g 1 g Oral Given Zakia Woodson RN     08/02/2022 1446 sodium chloride tablet 1 g 1 g Oral Given Zakia Woodson RN     08/03/2022 0848 amLODIPine (NORVASC) tablet 5 mg 5 mg Oral Given Lakshmi Umanzor RN     08/02/2022 1446 amLODIPine (NORVASC) tablet 5 mg 5 mg Oral Given JUAN J Villafuerte, MS4  [address]    PLEASE NOTE:  This encounter was completed utilizing the [employer] Direct Speech Voice Recognition Software  Grammatical errors, random word insertions, pronoun errors and incomplete sentences are occasional consequences of the system due to software limitations, ambient noise and hardware issues  These may be missed by proof reading prior to affixing electronic signature  Any questions or concerns about the content, text or information contained within the body of this dictation should be directly addressed to the physician for clarification  Please do not hesitate to call me directly if you have any any questions or concerns

## 2022-08-03 NOTE — ASSESSMENT & PLAN NOTE
· Patient with a history metastatic lung adeno carcinoma status post left upper lobe resection with radiation and chemotherapy  · Recent CT chest abdomen pelvis showing multiple new bilateral irregular lung nodules which are suspicious for metastasis, fortunately no metastatic disease noted in the abdomen or pelvis    · Outpatient follow up with Dr Jason Oviedo for possible chemo after rehabilitation

## 2022-08-03 NOTE — CONSULTS
PHYSICAL MEDICINE AND REHABILITATION CONSULT NOTE  Ashok Arellano 69 y o  male MRN: [id number]  [facility] Encounter: [id number]    Requested by (Physician/Service): Radha Adhikari MD  Reason for Consultation:  Assessment of rehabilitation needs    Assessment:  Rehabilitation Diagnosis:    Left cerebellar mass s/p resection   Diplopia   Dizziness    Impaired mobility and self care    Recommendations:  Rehabilitation Plan:   Continue PT/OT (SLP) while on acute care   At this time recommend sub-acute inpatient rehabilitation as patient would not tolerate a more aggressive rehabilitation program     Covid-19 Testing: [facility] inpatient rehabilitation units require testing within 48 hours of all potential admissions at this time  *Re-testing is NOT required for patients recovering from COVID-19 infection if isolation has been discontinued per CDC criteria  Medical Co-morbidities Plan:  · 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 s/p resection of the lung and brain with adjunct chemotherapy and radiation   · Atrial fibrillation with RVR currently rate controlled   · Hypothyroidism  · Thrombocytopenia   · Hyperlipidemia   · Hypertension   · DVT ppx: heparin SQ and SCD    Thank you for this consultation  Do not hesitate to contact service with further questions  CORRINE Chinchilla  PM&R    History of Present Illness:  Ashok Arellano is a 69 y o  male with a PMH of neuroendocrine tumor s/p resection of the lung and brain, 11/2021 s/p chemotherapy and radiation, atrial fibrillation, hypothyroidism, hypertension, gout, and HLD who presented to the [address] [place] [address] with dizziness that had been going on for a few days  He was found to be in afib with RVR and started on Lopressor as well as Cardizem  CT of the head showed increased cerebellar mass with edema and mass effect on the 4th ventricle and new nodule in left cerebellum   MRI of the brain showed 4 3 cm hemorrhagic metastatic lesion in left cerebellum, 0 6 cm for adjunct metastatic lesion left cerebellum, 0 5 cm left parietal lobe hemorrhagic lesion  He underwent left retrosigmoid/posterior fossa craniotomy on 7/28/22  He currently on a decadron taper  PM&R are consulted for rehabilitation recommendations  The patient was seen in his room with spouse at bedside  He is reporting double vision, dizziness and lightheadedness that has been ongoing  He does have an eye patch on currently and has been alternating the patch every two hours  He does feel that is helping  He denies any neck pain or pain down his arms  He does have a history of back pain for which he received PT in the past  Spouse reports one fall outside however patient was not using a RW or cane  Review of Systems: 10 point ROS negative except for what is noted in HPI    Function:  Prior level of function and living situation: The patient lives in a two story home with 1 SH[address] and lives with his spouse  He needed some assistance at baseline, he needed supervision in the shower  Current level of function:  Physical Therapy: Moderate assist for bed mobility, moderate assist x 2 for transfers   Occupational Therapy: Maximal LB dressing, total assist for toileting  Speech Therapy:      Physical Exam:  /93   Pulse 72   Temp 98 4 °F (36 9 °C)   Resp 14   Ht 5' 8" (1 727 m)   Wt 78 kg (171 lb 15 3 oz)   SpO2 96%   BMI 26 15 kg/m²        Intake/Output Summary (Last 24 hours) at 8/3/2022 1014  Last data filed at 8/3/2022 0901  Gross per 24 hour   Intake 120 ml   Output 900 ml   Net -780 ml       Body mass index is 26 15 kg/m²        Physical Exam  Constitutional:       Comments: Fatigued appearing    HENT:      Head:      Comments: Left posterior incision with ecchymosis, healed left sided incision      Right Ear: External ear normal       Left Ear: External ear normal       Nose: Nose normal       Mouth/Throat: Pharynx: Oropharynx is clear  Eyes:      Extraocular Movements:      Right eye: Nystagmus present  Left eye: Nystagmus present  Pulmonary:      Effort: Pulmonary effort is normal    Abdominal:      General: There is no distension  Musculoskeletal:         General: Normal range of motion  Skin:     Findings: Bruising present  Neurological:      Mental Status: He is alert  Comments: Diplopia, LUE/LLE ataxia    Psychiatric:         Mood and Affect: Affect is flat  Social History:    Social History     Socioeconomic History    Marital status: /Civil Union     Spouse name: None    Number of children: None    Years of education: None    Highest education level: None   Occupational History    None   Tobacco Use    Smoking status: Former Smoker     Packs/day: 1 00     Years: 40 00     Pack years: 40 00     Types: Cigarettes     Start date:      Quit date: 2017     Years since quittin 4    Smokeless tobacco: Never Used    Tobacco comment: quit 2017   Vaping Use    Vaping Use: Never used   Substance and Sexual Activity    Alcohol use: Yes     Alcohol/week: 7 0 standard drinks     Types: 7 Cans of beer per week     Comment: 1-2 beers nightly    Drug use: Not Currently     Types: Marijuana     Comment: seldom    Sexual activity: None   Other Topics Concern    None   Social History Narrative    None     Social Determinants of Health     Financial Resource Strain: Not on file   Food Insecurity: No Food Insecurity    Worried About Running Out of Food in the Last Year: Never true    Felecia of Food in the Last Year: Never true   Transportation Needs: No Transportation Needs    Lack of Transportation (Medical): No    Lack of Transportation (Non-Medical):  No   Physical Activity: Not on file   Stress: Not on file   Social Connections: Not on file   Intimate Partner Violence: Not on file   Housing Stability: Low Risk     Unable to Pay for Housing in the Last Year: No  Number of Places Lived in the Last Year: 1    Unstable Housing in the Last Year: No        Family History:    Family History   Problem Relation Age of Onset    Nephrolithiasis Father     Lung cancer Maternal Grandfather          Medications:     Current Facility-Administered Medications:     acetaminophen (TYLENOL) tablet 975 mg, 975 mg, Oral, Q8H [facility], Wilber Sanchez DO, 975 mg at 08/03/22 0539    allopurinol (ZYLOPRIM) tablet 100 mg, 100 mg, Oral, Daily, Wilber Sanchez DO, 100 mg at 08/03/22 0848    aluminum-magnesium hydroxide-simethicone (MYLANTA) oral suspension 30 mL, 30 mL, Oral, Q6H PRN, [facility], [employer]    amiodarone tablet 400 mg, 400 mg, Oral, BID With Meals, [facility], DO, 400 mg at 08/03/22 0848    amLODIPine (NORVASC) tablet 5 mg, 5 mg, Oral, Daily, Benita Sprague MD, 5 mg at 08/03/22 0848    ascorbic acid (VITAMIN C) tablet 1,000 mg, 1,000 mg, Oral, Daily, Wilber Sanchez DO, 1,000 mg at 08/03/22 0848    atorvastatin (LIPITOR) tablet 20 mg, 20 mg, Oral, Daily With Dinner, [employer], [employer], 20 mg at 08/02/22 1826    bisacodyl (DULCOLAX) rectal suppository 10 mg, 10 mg, Rectal, Daily PRN, [facility], DO    [COMPLETED] dexamethasone (DECADRON) tablet 4 mg, 4 mg, Oral, Q6H BASIA, 4 mg at 07/30/22 1324 **FOLLOWED BY** [COMPLETED] dexamethasone (DECADRON) tablet 4 mg, 4 mg, Oral, Q8H BASIA, 4 mg at 08/01/22 1354 **FOLLOWED BY** dexamethasone (DECADRON) tablet 2 mg, 2 mg, Oral, Q6H BASIA, 2 mg at 08/03/22 0539 **FOLLOWED BY** [START ON 8/4/2022] dexamethasone (DECADRON) tablet 2 mg, 2 mg, Oral, Q8H [facility] **FOLLOWED BY** [START ON 8/5/2022] dexamethasone (DECADRON) tablet 2 mg, 2 mg, Oral, Q12H [facility] **FOLLOWED BY** [START ON 8/8/2022] dexamethasone (DECADRON) tablet 2 mg, 2 mg, Oral, Q24H [facility]    diltiazem (CARDIZEM SR) 12 hr capsule 120 mg, 120 mg, Oral, Q12H [facility], Wilber Sanchez DO, 120 mg at 08/03/22 0850    docusate sodium (COLACE) capsule 100 mg, 100 mg, Oral, BID, Wilber Hackmyer, DO, 100 mg at 08/03/22 0848    heparin (porcine) subcutaneous injection 5,000 Units, 5,000 Units, Subcutaneous, Q8H [address], Wilber Hackmyer, DO, 5,000 Units at 08/03/22 0540    levothyroxine tablet 37 5 mcg, 37 5 mcg, Oral, Early Morning, Wilber Hackmyer, DO, 37 5 mcg at 08/03/22 0540    meclizine (ANTIVERT) tablet 25 mg, 25 mg, Oral, Q8H PRN, Wilber Hackmyer, DO, 25 mg at 08/02/22 0721    methocarbamol (ROBAXIN) tablet 500 mg, 500 mg, Oral, Q6H [address], Wilber Hackmyer, DO, 500 mg at 08/03/22 0539    metoclopramide (REGLAN) injection 10 mg, 10 mg, Intravenous, Q6H PRN, Wilber Hackmyer, DO, 10 mg at 07/29/22 0815    metoprolol succinate (TOPROL-XL) 24 hr tablet 50 mg, 50 mg, Oral, BID, Wilber Hackmyer, DO, 50 mg at 08/03/22 0848    ondansetron (ZOFRAN) injection 4 mg, 4 mg, Intravenous, Q6H PRN, Wilber Hackmyer, DO, 4 mg at 07/29/22 0511    oxyCODONE (ROXICODONE) IR tablet 2 5 mg, 2 5 mg, Oral, Q4H PRN, Chrystine Clan, DO, 2 5 mg at 08/02/22 0236    pantoprazole (PROTONIX) EC tablet 40 mg, 40 mg, Oral, Early Morning, Wilber Hackmyer, DO, 40 mg at 08/03/22 0539    polyethylene glycol (MIRALAX) packet 17 g, 17 g, Oral, BID, Wilber Hackmyer, DO, 17 g at 08/03/22 0848    scopolamine (TRANSDERM-SCOP) 1 mg/3 days TD 72 hr patch 1 patch, 1 patch, Transdermal, Q72H, Wilber Hafantamyer, DO, 1 patch at 07/31/22 1352    senna (SENOKOT) tablet 8 6 mg, 1 tablet, Oral, Daily, Wilber Sanchez DO, 8 6 mg at 08/03/22 0848    Past Medical History:     Past Medical History:   Diagnosis Date    Brain cancer ([facility] [address] )     Hyperlipidemia     Hypertension     Lung cancer [facility])         Past Surgical History:     Past Surgical History:   Procedure Laterality Date    BACK SURGERY      CRANIOTOMY Left 4/7/2022    Procedure: Image guided left parietal craniotomy for tumor resection;  Surgeon: Olinda Kwan MD;  [facility];  Service: Neurosurgery    CRANIOTOMY Left 7/28/2022    Procedure: left retrosigmoid/posterior fossa craniotomy for resction of mass with image guidence;  Surgeon: aKrolina Thurman MD;  [facility];  Service: Neurosurgery    [address]      IR BIOPSY LUNG  5/6/2021    IR PORT PLACEMENT  6/17/2021    LAMINECTOMY  2018    L4-L5    LUNG SURGERY      left upper lobectomy    IA BRONCHOSCOPY,DIAGNOSTIC N/A 5/25/2021    Procedure: BRONCHOSCOPY FLEXIBLE;  Surgeon: Kleber Schuler MD;  [facility];  Service: Thoracic    IA MEDIASTINOSCOPY WITH LYMPH NODE BIOPSY/IES N/A 5/25/2021    Procedure: MEDIASTINOSCOPY, flexible bronchoscopy;  Surgeon: Kleber Schuler MD;  [facility];  Service: Thoracic    TONSILLECTOMY  1959         Allergies: Allergies   Allergen Reactions    Bee Venom     Benazepril Other (See Comments)     Angioedema     Latex Other (See Comments)     Burning of eyes at the dentist from the gloves    Meloxicam GI Intolerance    Penicillin G Rash           LABORATORY RESULTS:      Lab Results   Component Value Date    HGB 11 3 (L) 08/03/2022    HCT 34 4 (L) 08/03/2022    WBC 7 19 08/03/2022     Lab Results   Component Value Date    BUN 28 (H) 08/03/2022    K 4 0 08/03/2022     08/03/2022    GLUCOSE 138 07/28/2022    CREATININE 0 66 08/03/2022     Lab Results   Component Value Date    PROTIME 13 3 07/29/2022    INR 0 99 07/29/2022        DIAGNOSTIC STUDIES: Reviewed  CT head wo contrast    Result Date: 7/30/2022  Impression: 1  Expected postoperative changes status post left occipital/retromastoid craniotomy with resection of a previously present large hyperdense/hemorrhagic left cerebellar metastasis  There is a small amount of residual hemorrhagic material in the treatment bed with residual local edema and mass effect on the 4th ventricle  Small amounts of pneumocephalus and adjacent scalp gas and fluid surrounding the operative site noted, also expected postoperative changes  Continued clinical and imaging surveillance recommended   2   Stable 5 mm hyperdense lesion in the left parietal cortex (series 2, image 28) along the superior margin of prior treatment cavity possibly a hypercellular hyperdense /hemorrhagic cortical metastasis versus small hemorrhage or potentially even cavernous angioma, similar to recent brain MRIs  Continued clinical and imaging surveillance recommended  Workstation performed: [id number]     MRI brain w wo contrast    Result Date: 7/28/2022  Impression: New postoperative changes status post resection of left cerebellar metastases with expected postoperative blood products  Small region of marginal postoperative ischemia  Mildly increased edema  Mass effect on the fourth ventricle is also slightly increased    No hydrocephalus  Small amount of enhancement at the operative margin may be postoperative  Recommend follow-up  Stable postsurgical changes in the left parietal lobe without locally recurrent disease  Stable 5 mm left parietal lobe lesion adjacent to resection cavity with T1 shortening and no definite residual enhancement    The study was marked in EPIC for immediate notification  Workstation performed: [id number]     MRI brain w wo contrast    Result Date: 7/25/2022  Impression: Mixed treatment response - 4 3 cm hemorrhagic metastatic lesion in left cerebellum, increased in size since CT head 6/15/2022 but unchanged since 7/24/2022 - this is likely due to interval hemorrhage of known left cerebellar lesion  Moderate surrounding perilesional vasogenic edema in left cerebellum, posterior cerebellar vermis, and right posterior paramedian cerebellum with mild mass effect on the 4th ventricle  - 0 6 cm hemorrhagic metastatic lesion posterior to the dominant left cerebellar mass, decreased in size  - 0 5 cm left parietal lobe hemorrhagic lesion posterior to left parietal resection cavity, slightly decreased in size  - No evidence of recurrent disease in left parietal resection cavity   The study was marked in [facility] for immediate notification  Workstation performed: [id number]     CT chest abdomen pelvis w contrast    Result Date: 7/26/2022  Impression: 1  Multiple new bilateral irregular lung nodules suspicious for metastases  2   No findings of metastatic disease in the abdomen or pelvis  The study was marked in [facility] for immediate notification   Workstation performed: [id number]

## 2022-08-03 NOTE — CASE MANAGEMENT
Case Management Discharge Planning Note    Patient name Francois Gasca  [facility] [id number]  : 1955 Date 8/3/2022       Current Admission Date: 2022  Current Admission Diagnosis: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 [facility])   Patient Active Problem List    Diagnosis Date Noted    Hyponatremia 2022    [facility] 20[facility]    Chronic obstructive pulmonary disease, unspecified COPD type [facility] 2022    Dizziness 2022    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 ([facility] ) 2022    Irregular heart rate 2022    Atrial fibrillation with rapid ventricular response ([place] [address] ) 2022    Thrombocytopenia ([place] [address] ) 2022    Goals of care, counseling/discussion 2022    Hypothyroidism 2022    Left parietal hemorrhagic tumor 2022    Platelets decreased ([place] [address] ) 02/15/2022    Psoriasis 02/15/2022    CKD (chronic kidney disease) stage 2, GFR 60-89 ml/min 2022    Labyrinthitis of both ears 2022    Proctocolitis 2021    Pericardial effusion 2021    Constipation 2021    Left non-suppurative otitis media 2021    Bilateral hearing loss due to cerumen impaction 2021    Chronic back pain 2021    Former cigarette smoker 2021    History of gout 2021    Hypertension 2021    Cancer of upper lobe of left lung [facility] 2021    Drug-induced neutropenia ([facility] ) 07/15/2021    Adenocarcinoma, lung, left ([facility] ) 2021    Encounter for central line care 2021    Hyperglycemia 2021    Cigarette nicotine dependence in remission 2021    Bilateral hearing loss 2020    Mixed hyperlipidemia 2019    Renal cyst, right 2018    Lumbar stenosis 2018    Glaucoma 2018    JUNAID (obstructive sleep apnea) 2018    Spinal stenosis of lumbar region with neurogenic claudication 2018  Acute idiopathic gout of left foot 11/06/2017    Depression with anxiety 11/06/2017    Essential hypertension 11/06/2017    Hypertriglyceridemia 11/06/2017    Lumbago with sciatica, left side 11/06/2017    Back problem 06/30/2017      LOS (days): 9  Geometric Mean LOS (GMLOS) (days): 9 90  Days to GMLOS:0 4     OBJECTIVE:  Risk of Unplanned Readmission Score: 34 93         Current admission status: Inpatient   Preferred Pharmacy:   [facility] [address], [address]   [address]   [address] [address]  Phone: [phone] Fax: [phone]    Primary Care Provider: Alea Ramírez DO    Primary Insurance: MEDICARE  Secondary Insurance: AARP    DISCHARGE DETAILS:    Discharge planning discussed with[de-identified] Pt's spouse  Freedom of Choice: Yes  Comments - Freedom of Choice: CM discussed STR with pt's spouse  She is in agreement for a blanket referral to be sent to STR  CM sent referrals as requested via [employer]  CM contacted family/caregiver?: Yes  Were Treatment Team discharge recommendations reviewed with patient/caregiver?: Yes  Did patient/caregiver verbalize understanding of patient care needs?: Yes  Were patient/caregiver advised of the risks associated with not following Treatment Team discharge recommendations?: Yes    Contacts  Patient Contacts: Priscilla Haywodo-wife  Relationship to Patient[de-identified] Family  Contact Method:  In Person  Reason/Outcome: Referral, Discharge [address]         Is the patient interested in [facility] at discharge?: No    DME Referral Provided  Referral made for DME?: No    Other Referral/Resources/Interventions Provided:  Interventions: Short Term Rehab         Treatment Team Recommendation: Short Term Rehab  Discharge Destination Plan[de-identified] Short Term Rehab  Transport at Discharge : [facility] Ambulance

## 2022-08-03 NOTE — ASSESSMENT & PLAN NOTE
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

## 2022-08-03 NOTE — ASSESSMENT & PLAN NOTE
· Baseline thrombocytopenia around 100  · Currently at 69 today, continue to monitor  · Continue DVT prophylaxis with heparin  · Will reconsult with hematology if it continues to decrease tomorrow

## 2022-08-03 NOTE — RESTORATIVE TECHNICIAN NOTE
Restorative Technician Note      Patient Name: Ronal Lawrence     Restorative Tech Visit Date: 8/3/2022  Note Type: Mobility  Patient Position Upon Consult: Supine  Activity Performed: Repositioned; Dangled  Assistive Device: Other (Comment) (Ax2 to sit EOB)  Patient Position at End of Consult: Supine;  All needs within reach    [facility]

## 2022-08-03 NOTE — PROGRESS NOTES
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 [facility])  Assessment & Plan  · Primary left lung adenocarcinoma   · Reporting lightheadedness for the past several months, which has increased over the past several days  · CT showing increase in size of left cerebellar mass with associated vasogenic edema with mass effect upon the 4th ventricle as well as new 5 mm high-density nodule in the left cerebellum  · Patient is AAOx4, neuro exam is nonfocal  · Patient is postop day 5 from left retrosigmoid posterior fossa craniotomy for resection of mass  · Preliminary pathology showing metastatic carcinoma  · Continue Decadron taper  · Platelet goal above 75  · Blood pressure goal below 160  · Discharge planning to inpatient rehabilitation, consult with PMR to see if he's a candidate for acute rehabilitation              Adenocarcinoma, lung, left ([address] )  Assessment & Plan  · Patient with a history metastatic lung adeno carcinoma status post left upper lobe resection with radiation and chemotherapy  · Recent CT chest abdomen pelvis showing multiple new bilateral irregular lung nodules which are suspicious for metastasis, fortunately no metastatic disease noted in the abdomen or pelvis    · Outpatient follow up with Dr Jen Peacock for possible chemo after rehabilitation      High grade neuroendocrine carcinoma of lung [facility])  Assessment & Plan  · Management as above    Hyponatremia  Assessment & Plan  · Management per Nephrology  · 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  · Resolved with salt tabs  · Salt tabs discontinued, will continue to monitor BMP    Atrial fibrillation with rapid ventricular response ([address] )  Assessment & Plan  · Rates currently well controlled on Cardizem, metoprolol, and amiodarone  · No AP/AC due to post op state  Thrombocytopenia (HCC)  Assessment & Plan  · Baseline thrombocytopenia around 100  · Currently at 69 today, continue to monitor  · Continue DVT prophylaxis with heparin  · Will reconsult with hematology if it continues to decrease tomorrow    History of gout  Assessment & Plan  · On allopurinol 100 mg daily  Mixed hyperlipidemia  Assessment & Plan  Continue atorvastatin 20 mg daily    Essential hypertension  Assessment & Plan  · BP is at goal  · Continue metoprolol succinate 50 mg b i d   · Continue diltiazem          VTE Pharmacologic Prophylaxis: VTE Score: 5 Moderate Risk (Score 3-4) - Pharmacological DVT Prophylaxis Ordered: heparin  Patient Centered Rounds: I performed bedside rounds with nursing staff today  Discussions with Specialists or Other Care Team Provider: nurse, MARIUM    Education and Discussions with Family / Patient: Updated  (wife) at bedside  Time Spent for Care: 30 minutes  More than 50% of total time spent on counseling and coordination of care as described above  Current Length of Stay: 9 day(s)  Current Patient Status: Inpatient   Certification Statement: The patient will continue to require additional inpatient hospital stay due to monitor Na and platelet counts  Discharge Plan: Anticipate discharge in 24-48 hrs to rehab facility  Code Status: Level 1 - Full Code    Subjective:   Denies any new complaints  Still has diplopia  Objective:     Vitals:   Temp (24hrs), Av 1 °F (36 7 °C), Min:97 9 °F (36 6 °C), Max:98 4 °F (36 9 °C)    Temp:  [97 9 °F (36 6 °C)-98 4 °F (36 9 °C)] 97 9 °F (36 6 °C)  HR:  [69-72] 72  Resp:  [14-16] 14  BP: (128-155)/(77-97) 128/77  SpO2:  [93 %-96 %] 93 %  Body mass index is 26 15 kg/m²  Input and Output Summary (last 24 hours):      Intake/Output Summary (Last 24 hours) at 8/3/2022 1637  Last data filed at 8/3/2022 0901  Gross per 24 hour   Intake 120 ml Output 725 ml   Net -605 ml       Physical Exam:   Physical Exam  Constitutional:       Appearance: Normal appearance  HENT:      Head:      Comments: Cranial incision is clean and dry, there is some ecchymosis by the incision     Nose: Nose normal       Mouth/Throat:      Mouth: Mucous membranes are moist       Pharynx: Oropharynx is clear  Eyes:      Extraocular Movements: Extraocular movements intact  Cardiovascular:      Rate and Rhythm: Normal rate and regular rhythm  Pulmonary:      Effort: Pulmonary effort is normal       Breath sounds: No wheezing or rales  Abdominal:      Palpations: Abdomen is soft  Skin:     General: Skin is warm  Neurological:      General: No focal deficit present  Mental Status: He is alert and oriented to person, place, and time  Psychiatric:         Mood and Affect: Mood normal          Behavior: Behavior normal           Additional Data:     Labs:  Results from last 7 days   Lab Units 08/03/22  0539 07/29/22  0535 07/28/22  1337   WBC Thousand/uL 7 19   < > 6 41   HEMOGLOBIN g/dL 11 3*   < > 11 7*   HEMATOCRIT % 34 4*   < > 33 6*   PLATELETS Thousands/uL 69*   < > 204   BANDS PCT %  --   --  1   NEUTROS PCT % 96*  --   --    LYMPHS PCT % 2*  --   --    LYMPHO PCT   --    < > 1*   MONOS PCT % 1*  --   --    MONO PCT   --    < > 2*   EOS PCT % 0   < > 0    < > = values in this interval not displayed       Results from last 7 days   Lab Units 08/03/22  0539   SODIUM mmol/L 137   POTASSIUM mmol/L 4 0   CHLORIDE mmol/L 101   CO2 mmol/L 31   BUN mg/dL 28*   CREATININE mg/dL 0 66   ANION GAP mmol/L 5   CALCIUM mg/dL 9 0   GLUCOSE RANDOM mg/dL 150*     Results from last 7 days   Lab Units 07/29/22  0535   INR  0 99     Results from last 7 days   Lab Units 07/28/22  1336   POC GLUCOSE mg/dl 166*               Lines/Drains:  Invasive Devices  Report    Peripherally Inserted Central Catheter Line  Duration           PICC Line 79/84/33 Left Basilic 4 days          Central Venous Catheter Line  Duration           Port A Cath 06/17/21 Right Chest 412 days                Central Line:  Goal for removal: Will discontinue when meds requiring line are completed  Imaging: No pertinent imaging reviewed      Recent Cultures (last 7 days):         Last 24 Hours Medication List:   Current Facility-Administered Medications   Medication Dose Route Frequency Provider Last Rate    acetaminophen  975 mg Oral Q8H [facility], DO      allopurinol  100 mg Oral Daily [employer], DO      aluminum-magnesium hydroxide-simethicone  30 mL Oral Q6H PRN [employer], DO      amiodarone  400 mg Oral BID With Meals [employer], DO      amLODIPine  5 mg Oral Daily Carli Dong MD      vitamin C  1,000 mg Oral Daily [employer], DO      atorvastatin  20 mg Oral Daily With [employer], DO      bisacodyl  10 mg Rectal Daily PRN [employer], DO      dexamethasone  2 mg Oral Q6H [facility], DO      Followed by   Jared Chavarria ON 8/4/2022] dexamethasone  2 mg Oral Q8H [facility], DO      Followed by   Jared Chavarria ON 8/5/2022] dexamethasone  2 mg Oral Q12H [facility]      Followed by   Jared Chavarria ON 8/8/2022] dexamethasone  2 mg Oral Q24H [facility], DO      diltiazem  120 mg Oral Q12H [facility], DO      docusate sodium  100 mg Oral BID [employer], DO      heparin (porcine)  5,000 Units Subcutaneous Q8H [facility], DO      levothyroxine  37 5 mcg Oral Early Morning [facility], DO      meclizine  25 mg Oral Q8H PRN [employer], DO      methocarbamol  500 mg Oral Q6H [facility], DO      metoclopramide  10 mg Intravenous Q6H PRN [employer], DO      metoprolol succinate  50 mg Oral BID [employer], DO      ondansetron  4 mg Intravenous Q6H PRN [employer], DO      oxyCODONE  2 5 mg Oral Q4H PRN [employer], DO      pantoprazole  40 mg Oral Early Morning Wilber Sanchez DO      phenol  1 spray Mouth/Throat Q2H PRN Xiomara Martinez MD      polyethylene glycol  17 g Oral BID [employer], DO      scopolamine  1 patch Transdermal Q72H [employer], DO      senna  1 tablet Oral Daily [employer], DO          Today, Patient Was Seen By: Christina Pinzon MD    **Please Note: This note may have been constructed using a voice recognition system  **

## 2022-08-03 NOTE — RESTORATIVE TECHNICIAN NOTE
Restorative Technician Note      Patient Name: Isidra Cabrera     Restorative Tech Visit Date: 8/3/2022  Note Type: Mobility  Patient Position Upon Consult: Supine  Activity Performed: Repositioned  Assistive Device: Other (Comment) (Ax2 to roll)  Patient Position at End of Consult: Supine;  All needs within reach      Skyler Monroy

## 2022-08-03 NOTE — PROGRESS NOTES
Wife of patient is requesting that someone from the Pastoral Care office be with her when she tells her  that his father  last week  Nursing staff will contact pastoral care when she is ready to tell him     22 1400   Clinical Encounter Type   Visited With Family

## 2022-08-03 NOTE — PROGRESS NOTES
[address] [facility] NOTE   Carlos Degroot 69 y o  male MRN: [id number]  Unit/Bed#: [facility] 924-01 Encounter: [id number]  Reason for Consult: hyponatremia    ASSESSMENT and PLAN:    69 with medical history of primary long neuroendocrine carcinoma with metastatic disease to the brain, left cerebellar mass, chronic AFib hypertension, gout, hyperlipidemia initially presented with dizziness   Nephrology board for hyponatremia     1) Hyponatremia     - initially 131 on 07/24  -sodium increased to 134 on 07/28  - Na decreased to 126 post op 7/90  - etiology - possible in setting of SIADH in setting of malignancy; to note is on celexa  - serum osm 269  - urine Na 126  - urine osm 711 - labs drawn after furosemide  - repeat Urine Na - 81       - repeat urine osm - 534  - 7/30 - there was concern for mass effect on 4th ventricle on CT scan and started on 3% saline  3% held approx 11 pm   - 7/31 - Na tabs increased to 2 gm TID and fluid restriction  Given furosemide    -8/1-sodium level unchanged 132   Continued on salt tablets  -8/2-sodium level 138  Creatinine 0 58, stable  Potassium appropriate at 3 5  Lower salt tablet to 1 g for twice daily  -8/3-sodium level stable 137  Creatinine stable 0 66  Holding salt tablets     Plan:  -can check next BMP in a m   -hold salt tablets completely today  -if sodium level is less than 134 tomorrow, can start salt tablet 1 g twice a day  -cardiology team has added amlodipine to patient's blood pressure regimen  defer blood pressure management to Cardiology and primary team  - cont fluid restriction  -please call the renal team back if further questions or issues arise  Thank you   -reviewed case with primary team attending  Wife is at bedside       2) renal function - stable Creat at baseline     3) dizziness - hist of neuroendocrine tumor     - initial CT scan with increase seize of L cerebellar mass with vasogenic veena on 4th ventricle and new 5 mm nodule in L cerebellum  - Neurosurgery on board  - pt went to OR on 7/28 for L occipital/retromastoid craniotomy with resection of cefebellar met  - on decadron per primary team     4) HTN     - on diltiazem and metoprolol  - avoid HCTZ  -amlodipine added per Cardiology team on 08/03     5) acid/base - bicarb stable     6) a fib with RVR     - AC contraindicated due to met disease to brain and risk of bleed  - metoprolol was increased  - initially required dilt drip    7) wife had questions regarding patient's seeing floaters also  Primary team attending will be seeing the patient to discuss  8) wife had concerns the patient was having difficulty swallowing at times  Primary team attending will be review with the patient  I did not auscultate any upper airway wheeze  9) lung nodule - per Primary team       SUBJECTIVE / 24H INTERVAL HISTORY:    Blood pressure is 622 systolic  Afebrile  Rule urine output 650 cc       OBJECTIVE:  Current Weight: Weight - Scale: 78 kg (171 lb 15 3 oz)  Vitals:    08/02/22 2148 08/02/22 2257 08/03/22 0541 08/03/22 0850   BP: 155/97 150/96  155/93   Pulse: 69 71  72   Resp: 16 14     Temp: 97 9 °F (36 6 °C) 98 4 °F (36 9 °C)     TempSrc:       SpO2: 95% 95%  96%   Weight:   78 kg (171 lb 15 3 oz)    Height:           Intake/Output Summary (Last 24 hours) at 8/3/2022 0468  Last data filed at 8/3/2022 0901  Gross per 24 hour   Intake 120 ml   Output 900 ml   Net -780 ml     General: NAD  Skin: no rash  Eyes: anicteric sclera  ENT: moist mucous membrane, surgical site occipital region intact  Neck: supple  Chest: CTA b/l, no ronchii, no wheeze, no rubs, no rales  CVS: s1s2, no murmur, no gallop, no rub  Abdomen: soft, nontender, nl sounds  Extremities: no edema LE b/l  : no guerra  Neuro: AAOX3  Psych: normal affect    Medications:    Current Facility-Administered Medications:     acetaminophen (TYLENOL) tablet 975 mg, 975 mg, Oral, Q8H [address], Wilber Hackmyer, DO, 975 mg at 08/03/22 0539   allopurinol (ZYLOPRIM) tablet 100 mg, 100 mg, Oral, Daily, Wilber Sanchez DO, 100 mg at 08/03/22 0848    aluminum-magnesium hydroxide-simethicone (MYLANTA) oral suspension 30 mL, 30 mL, Oral, Q6H PRN, Shadia Delgado DO    amiodarone tablet 400 mg, 400 mg, Oral, BID With Meals, Shadia Delgado DO, 400 mg at 08/03/22 0848    amLODIPine (NORVASC) tablet 5 mg, 5 mg, Oral, Daily, Danica Holguin MD, 5 mg at 08/03/22 0848    ascorbic acid (VITAMIN C) tablet 1,000 mg, 1,000 mg, Oral, Daily, Wilber Sanchez DO, 1,000 mg at 08/03/22 0848    atorvastatin (LIPITOR) tablet 20 mg, 20 mg, Oral, Daily With [employer], Shadia Delgado DO, 20 mg at 08/02/22 1826    bisacodyl (DULCOLAX) rectal suppository 10 mg, 10 mg, Rectal, Daily PRN, Shadia Delgado DO    [COMPLETED] dexamethasone (DECADRON) tablet 4 mg, 4 mg, Oral, Q6H [facility]IA, 4 mg at 07/30/22 1324 **FOLLOWED BY** [COMPLETED] dexamethasone (DECADRON) tablet 4 mg, 4 mg, Oral, Q8H [facility], 4 mg at 08/01/22 1354 **FOLLOWED BY** dexamethasone (DECADRON) tablet 2 mg, 2 mg, Oral, Q6H [facility]IA, 2 mg at 08/03/22 0539 **FOLLOWED BY** [START ON 8/4/2022] dexamethasone (DECADRON) tablet 2 mg, 2 mg, Oral, Q8H [address] **FOLLOWED BY** [START ON 8/5/2022] dexamethasone (DECADRON) tablet 2 mg, 2 mg, Oral, Q12H [address] **FOLLOWED BY** [START ON 8/8/2022] dexamethasone (DECADRON) tablet 2 mg, 2 mg, Oral, Q24H [facility]IA, Wilber Sanchez DO    diltiazem (CARDIZEM SR) 12 hr capsule 120 mg, 120 mg, Oral, Q12H [address], Wilber Hackmyer, DO, 120 mg at 08/03/22 0850    docusate sodium (COLACE) capsule 100 mg, 100 mg, Oral, BID, Wilber Hackmyer, DO, 100 mg at 08/03/22 0848    heparin (porcine) subcutaneous injection 5,000 Units, 5,000 Units, Subcutaneous, Q8H [address], Wilber Hackmyer, DO, 5,000 Units at 08/03/22 0540    levothyroxine tablet 37 5 mcg, 37 5 mcg, Oral, Early Morning, Wilber Hackmyer, DO, 37 5 mcg at 08/03/22 0540    meclizine (ANTIVERT) tablet 25 mg, 25 mg, Oral, Q8H PRN, Wilber Hackmyer, DO, 25 mg at 08/02/22 8192   methocarbamol (ROBAXIN) tablet 500 mg, 500 mg, Oral, Q6H [address], Wilber Hackmyer, DO, 500 mg at 08/03/22 0539    metoclopramide (REGLAN) injection 10 mg, 10 mg, Intravenous, Q6H PRN, Wilber Hackmyer, DO, 10 mg at 07/29/22 0815    metoprolol succinate (TOPROL-XL) 24 hr tablet 50 mg, 50 mg, Oral, BID, Wilber Hackmyer, DO, 50 mg at 08/03/22 0848    ondansetron (ZOFRAN) injection 4 mg, 4 mg, Intravenous, Q6H PRN, Wilber Hackmyer, DO, 4 mg at 07/29/22 0511    oxyCODONE (ROXICODONE) IR tablet 2 5 mg, 2 5 mg, Oral, Q4H PRN, Deposit Moose, DO, 2 5 mg at 08/02/22 0236    pantoprazole (PROTONIX) EC tablet 40 mg, 40 mg, Oral, Early Morning, Wilber Hackmyer, DO, 40 mg at 08/03/22 0539    polyethylene glycol (MIRALAX) packet 17 g, 17 g, Oral, BID, Wilber Hackmyer, DO, 17 g at 08/03/22 0848    scopolamine (TRANSDERM-SCOP) 1 mg/3 days TD 72 hr patch 1 patch, 1 patch, Transdermal, Q72H, Wilber Hackmyer, DO, 1 patch at 07/31/22 1352    senna (SENOKOT) tablet 8 6 mg, 1 tablet, Oral, Daily, Wilber Hackmyer, DO, 8 6 mg at 08/03/22 0848    Laboratory Results:  Results from last 7 days   Lab Units 08/03/22  0539 08/02/22  0508 08/02/22  0507 08/01/22  1051 08/01/22  0511 08/01/22  0005 07/31/22  1648 07/31/22  0607 07/31/22  0022 07/30/22  1513 07/30/22  0526 07/29/22  1844 07/29/22  1459 07/29/22  1118 07/29/22  0535 07/28/22  1337 07/28/22  1002   WBC Thousand/uL 7 19 7 05  --   --  6 73  --   --  7 53  --   --  7 83  --  7 80  --  6 90   < >  --    HEMOGLOBIN g/dL 11 3* 11 3*  --   --  11 4*  --   --  12 1  --   --  12 4  --  11 7*  --  11 8*   < >  --    I STAT HEMOGLOBIN g/dl  --   --   --   --   --   --   --   --   --   --   --   --   --   --   --   --  9 5*   HEMATOCRIT % 34 4* 33 7*  --   --  34 9*  --   --  35 8*  --   --  34 9*  --  33 8*  --  34 3*   < >  --    HEMATOCRIT, ISTAT %  --   --   --   --   --   --   --   --   --   --   --   --   --   --   --   --  28*   PLATELETS Thousands/uL 69* 82*  --   --  97*  --   --  114*  --   -- 133*  --  152  --  158   < >  --    POTASSIUM mmol/L 4 0  --  3 5 4 0  --  4 0 4 0 3 5 3 5   < > 4 0   < >  --  3 7 3 6  --   --    CHLORIDE mmol/L 101  --  103 96  --  97 95* 93* 94*   < > 92*   < >  --  96 96  --   --    CO2 mmol/L 31  --  31 32  --  32 31 33* 31   < > 28   < >  --  26 27  --   --    CO2, I-STAT mmol/L  --   --   --   --   --   --   --   --   --   --   --   --   --   --   --   --  30   BUN mg/dL 28*  --  31* 30*  --  28* 25 19 19   < > 17   < >  --  21 19  --   --    CREATININE mg/dL 0 66  --  0 58* 0 76  --  0 76 0 87 0 62 0 59*   < > 0 66   < >  --  0 90 0 61  --   --    CALCIUM mg/dL 9 0  --  8 5 9 1  --  9 0 8 7 8 4 8 4   < > 8 6   < >  --  8 5 8 6  --   --    MAGNESIUM mg/dL  --   --   --   --   --   --   --  2 2  --   --  2 2  --   --  2 0  --   --   --    PHOSPHORUS mg/dL  --   --   --   --   --   --   --  3 5  --   --  2 7  --   --   --   --   --   --    GLUCOSE, ISTAT mg/dl  --   --   --   --   --   --   --   --   --   --   --   --   --   --   --   --  138    < > = values in this interval not displayed

## 2022-08-03 NOTE — PROGRESS NOTES
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 [facility])  Active Problems:    Essential hypertension    Mixed hyperlipidemia    Adenocarcinoma, lung, left (HCC)    History of gout    Thrombocytopenia (HCC)    Hypothyroidism    Atrial fibrillation with rapid ventricular response (HCC)    Hyponatremia    High grade neuroendocrine carcinoma of lung ([address] )      Plan:  Patient with no chest pain or significant dyspnea   He had no issues overnight  Added amlodipine for better blood pressure control   He is postop day 6 after craniotomy  Nephrology managing hyponatremia  BMP today with serum sodium of 137  Potassium is 4 0 with a creatinine of 0 66  Will continue current cardiac management  Subjective:   Patient seen and examined  No significant events overnight   negative  Objective:     Vitals: Blood pressure 150/96, pulse 71, temperature 98 4 °F (36 9 °C), resp  rate 14, height 5' 8" (1 727 m), weight 78 kg (171 lb 15 3 oz), SpO2 95 %  , Body mass index is 26 15 kg/m² ,   Orthostatic Blood Pressures    Flowsheet Row Most Recent Value   Blood Pressure 150/96 filed at 08/02/2022 2257   Patient Position - Orthostatic VS Lying filed at 07/27/2022 1908      ,      Intake/Output Summary (Last 24 hours) at 8/3/2022 5368  Last data filed at 8/3/2022 0331  Gross per 24 hour   Intake 120 ml   Output 650 ml   Net -530 ml             Physical Exam:    GEN: Brian Garza   NECK: supple, no carotid bruits, no JVD or HJR  HEART: normal rate, regular rhythm, normal S1 and S2, no murmurs, clicks, gallops or rubs   LUNGS: clear to auscultation bilaterally; no wheezes, rales, or rhonchi   ABDOMEN: normal bowel sounds, soft, no tenderness, no distention  EXTREMITIES: peripheral pulses normal; no clubbing, cyanosis, or edema  SKIN: warm and well perfused, no suspicious lesions on exposed skin    Labs & Results:    No results displayed because visit has over 200 results  XR chest 2 views    Result Date: 7/25/2022  Narrative: CHEST INDICATION:   AFib RVR, lightheadedness, palpitations  COMPARISON:  01/24/2017  CT scan on 06/15/2022 EXAM PERFORMED/VIEWS:  XR CHEST PA & LATERAL Images: 3 FINDINGS: Cardiomediastinal silhouette appears unremarkable  Right-sided MediPort terminating in the SVC  The lungs are hyperinflated  Postoperative changes in the left lung  Scarring in the left midlung  No pneumothorax or pleural effusion  Osseous structures appear within normal limits for patient age  Impression: No acute cardiopulmonary disease  Hyperinflation  Workstation performed: [id number]     CT head wo contrast    Result Date: 7/30/2022  Narrative: CT BRAIN - WITHOUT CONTRAST INDICATION:   change in exam   History of metastatic neuroendocrine carcinoma with brain metastases  COMPARISON:  7/24/2022; progress note 7/30/2022; 7/28/2022; 7/25/2022 TECHNIQUE:  CT examination of the brain was performed  In addition to axial images, sagittal and coronal 2D reformatted images were created and submitted for interpretation  Radiation dose length product (DLP) for this visit:  902 14 mGy-cm   This examination, like all CT scans performed in the [facility], was performed utilizing techniques to minimize radiation dose exposure, including the use of iterative  reconstruction and automated exposure control  IMAGE QUALITY:  Diagnostic  FINDINGS: PARENCHYMA:  Evolving edema gliosis and small amounts of hemorrhagic material in the left cerebellum subjacent to a new left occipital/retromastoid craniotomy (series 3, image 13) indicative of interval resection of a large previously present hyperdense/hemorrhagic cerebellar metastasis and expected postoperative changes    There is residual local mass effect on the 4th ventricle as well as effacement of the left ambient cistern without overt hydrocephalus  Separately in the left parietal lobe there is redemonstration of a previously present craniotomy  There is a small amount of high density along the extra-axial space posteriorly on series 2 image 32 likely related to dural closure material, stable  Cystic encephalomalacia and gliosis in the treatment bed in the left parietal vertex again noted  Along the posterior margin of this treatment bed/gliotic region, there is a 0 5 cm rounded high density lesion, image 28, series 2 possibly a hyperdense or  hypercellular metastasis versus small hemorrhage or less likely cavernous angioma, similar to previous MR imaging  VENTRICLES AND EXTRA-AXIAL SPACES:  4th ventricle is effaced  No hydrocephalus  Small amount of pneumocephalus extra-axial fluid and gas subjacent left occipital craniotomy  VISUALIZED ORBITS AND PARANASAL SINUSES:  Right maxillary sinus mucus retention cyst  CALVARIUM AND EXTRACRANIAL SOFT TISSUES:  Postoperative changes in the scalp with gas and strandy densities adjacent to the left occipital craniotomy  Impression: 1  Expected postoperative changes status post left occipital/retromastoid craniotomy with resection of a previously present large hyperdense/hemorrhagic left cerebellar metastasis  There is a small amount of residual hemorrhagic material in the treatment bed with residual local edema and mass effect on the 4th ventricle  Small amounts of pneumocephalus and adjacent scalp gas and fluid surrounding the operative site noted, also expected postoperative changes  Continued clinical and imaging surveillance recommended  2   Stable 5 mm hyperdense lesion in the left parietal cortex (series 2, image 28) along the superior margin of prior treatment cavity possibly a hypercellular hyperdense /hemorrhagic cortical metastasis versus small hemorrhage or potentially even cavernous angioma, similar to recent brain MRIs    Continued clinical and imaging surveillance recommended  Workstation performed: [id number]     CT head without contrast    Result Date: 7/24/2022  Narrative: CT BRAIN - WITHOUT CONTRAST INDICATION:   Syncope, recurrent Brain metastases suspected Lightheadedness, brain metastasis  COMPARISON:  6/15/2022 TECHNIQUE:  CT examination of the brain was performed  In addition to axial images, sagittal and coronal 2D reformatted images were created and submitted for interpretation  Radiation dose length product (DLP) for this visit:  864 mGy-cm   This examination, like all CT scans performed in the [facility], was performed utilizing techniques to minimize radiation dose exposure, including the use of iterative reconstruction and automated exposure control  IMAGE QUALITY:  Diagnostic  FINDINGS: PARENCHYMA:  Left cerebellar dense mass measures 3 9 x 3 6 cm, previously measuring 1 8 x 1 6 cm  There is associated vasogenic edema with mass effect upon the 4th ventricle  Adjacent to the mass is a 5 mm density (2/12)  Left parietal cortical lesion measures 6 mm, previously measuring 9 mm  There is redemonstration of left parietal encephalomalacia  VENTRICLES AND EXTRA-AXIAL SPACES:  Normal for the patient's age  VISUALIZED ORBITS AND PARANASAL SINUSES:  There is a retention cyst versus polyp in the right maxillary sinus  There is opacity in the right mastoid air cells  CALVARIUM AND EXTRACRANIAL SOFT TISSUES:  Left parietal craniotomy  Impression: The left cerebellar mass has increased in size, now measuring 3 9 x 3 6 cm  There is associated vasogenic edema with mass effect upon the 4th ventricle  There is a new 5 mm high density nodule in the left cerebellum  Left posterior parietal cortical nodule appears smaller than prior  The study was marked in [facility] for immediate notification   Workstation performed: [id number]     MRI brain w wo contrast    Result Date: 7/28/2022  Narrative: MRI BRAIN WITH AND WITHOUT CONTRAST INDICATION: Postop status post resection of left cerebellar metastases  COMPARISON:  MRI dated 7/25/2022  TECHNIQUE: Sagittal T1, axial T2, axial FLAIR, axial T1, axial Standard, axial diffusion  Sagittal, axial T1 postcontrast   Axial bravo postcontrast with coronal reconstructions  IV Contrast:  10 mL of Gadobutrol injection (SINGLE-DOSE)  IMAGE QUALITY:   Diagnostic  FINDINGS: BRAIN PARENCHYMA:  New postoperative changes status post left suboccipital craniotomy for resection of large hemorrhagic left cerebellar metastasis as well as the smaller adjacent metastasis  Small subjacent extra-axial collection  Expected postoperative blood products within the operative cavity  Although evaluation for enhancement is somewhat limited due to presence of T1 hyperintense blood products, there is some mild enhancement seen at the posterior inferior aspect of the operative cavity  Possibly postoperative but follow-up anticipated    There is restricted diffusion at the inferior margin of the resection cavity that may be due to combination of marginal ischemia as well as blood products    Surrounding edema is mildly increased  Mass effect on the fourth ventricle is also mildly increased  Stable postsurgical cavity in the left parietal lobe with hemosiderin  Stable surrounding FLAIR signal  No enhancement to suggest recurrent disease  Stable 5 mm lesion in the left parietal lobe with susceptibility artifact adjacent to the resection cavity  There is is T1 shortening within the lesion on precontrast sequence (best appreciated on sagittal image 10 series 5) without definite enhancement    Stable surrounding FLAIR signal  No new lesion identified  VENTRICLES:  Stable  No hydrocephalus  SELLA AND PITUITARY GLAND:  Normal  ORBITS:  Stable bilateral proptosis  PARANASAL SINUSES:  Right maxillary sinus retention cyst  Partial opacification of the right mastoid air cells   VASCULATURE:  Evaluation of the major intracranial vasculature demonstrates appropriate flow voids  CALVARIUM AND SKULL BASE:  Postoperative changes status post left suboccipital craniotomy  EXTRACRANIAL SOFT TISSUES:  Normal      Impression: New postoperative changes status post resection of left cerebellar metastases with expected postoperative blood products  Small region of marginal postoperative ischemia  Mildly increased edema  Mass effect on the fourth ventricle is also slightly increased    No hydrocephalus  Small amount of enhancement at the operative margin may be postoperative  Recommend follow-up  Stable postsurgical changes in the left parietal lobe without locally recurrent disease  Stable 5 mm left parietal lobe lesion adjacent to resection cavity with T1 shortening and no definite residual enhancement    The study was marked in [employer] for immediate notification  Workstation performed: [id number]     MRI brain w wo contrast    Result Date: 7/25/2022  Narrative: MRI BRAIN WITH AND WITHOUT CONTRAST INDICATION: brain mass  COMPARISON:  CT head without contrast 7/24/2022, 6/15/2022  MRI brain with and without contrast 4/26/2022, 4/8/2022  TECHNIQUE: Sagittal T1, axial T2, axial FLAIR, axial T1, axial [place], axial diffusion  Sagittal, axial T1 postcontrast   Axial bravo postcontrast with coronal reconstructions  IV Contrast:  9 mL of Gadobutrol injection (SINGLE-DOSE)  IMAGE QUALITY:   Diagnostic  FINDINGS: BRAIN PARENCHYMA: Postsurgical changes of left parietal craniotomy for resection of left parietal mass  Unchanged left parietal resection cavity with encephalomalacia, gliosis, and peripheral hemosiderin deposition  No abnormal masslike enhancement in left parietal resection cavity to suggest recurrent disease   A few enhancing hemorrhagic metastatic lesions, for reference: - 0 5 x 0 5 cm hemorrhagic enhancing lesion in left parietal lobe posterior to left parietal resection cavity (12:81), previously 0 8 x 0 7 cm on CT head 6/15/2022 but unchanged since 7/24/2022  - 4 3 x 3 6 cm hemorrhagic enhancing mass in left cerebellum (12:38), previously 1 8 x 1 4 cm  Unchanged mass effect on the 4th ventricle since yesterday's CT head without contrast   Moderate surrounding perilesional vasogenic edema in left cerebellum, posterior cerebellar vermis, and right posterior paramedian cerebellum  - 0 6 x 0 6 cm hemorrhagic enhancing mass posterior to the dominant left cerebellar mass (12:40), previously 1 5 x 1 4 cm cm  No midline shift  No diffusion-weighted signal abnormality to suggest acute infarction  A few small scattered hyperintensities on T2/FLAIR imaging are noted in the periventricular and subcortical white matter demonstrating an appearance that is statistically most likely to represent minimal microangiopathic change  VENTRICLES:  Unchanged mass effect on the 4th ventricle  No obstructive hydrocephalus  No intraventricular hemorrhage  SELLA AND PITUITARY GLAND:  Normal  ORBITS:  Unchanged bilateral globe proptosis  PARANASAL SINUSES:  Moderate size right maxillary mucus retention cyst  VASCULATURE:  Evaluation of the major intracranial vasculature demonstrates appropriate flow voids  CALVARIUM AND SKULL BASE: Left parietal craniotomy  Small bilateral mastoid effusions (right worse than left)  EXTRACRANIAL SOFT TISSUES:  Normal      Impression: Mixed treatment response - 4 3 cm hemorrhagic metastatic lesion in left cerebellum, increased in size since CT head 6/15/2022 but unchanged since 7/24/2022 - this is likely due to interval hemorrhage of known left cerebellar lesion    Moderate surrounding perilesional vasogenic edema in left cerebellum, posterior cerebellar vermis, and right posterior paramedian cerebellum with mild mass effect on the 4th ventricle  - 0 6 cm hemorrhagic metastatic lesion posterior to the dominant left cerebellar mass, decreased in size  - 0 5 cm left parietal lobe hemorrhagic lesion posterior to left parietal resection cavity, slightly decreased in size  - No evidence of recurrent disease in left parietal resection cavity  The study was marked in [facility] for immediate notification  Workstation performed: [id number]     CT chest abdomen pelvis w contrast    Result Date: 7/26/2022  Narrative: CT CHEST, ABDOMEN AND PELVIS WITH IV CONTRAST INDICATION:   Brain/CNS neoplasm, staging Non-small cell lung cancer (NSCLC), monitor Non-small cell lung cancer (NSCLC), metastatic, assess treatment response h/o lung cancer, new brain mets  Evaluate for staging  COMPARISON:  CT chest abdomen pelvis 6/15/2022 TECHNIQUE: CT examination of the chest, abdomen and pelvis was performed  Axial, sagittal, and coronal 2D reformatted images were created from the source data and submitted for interpretation  Radiation dose length product (DLP) for this visit:  1208 13 mGy-cm   This examination, like all CT scans performed in the [facility], was performed utilizing techniques to minimize radiation dose exposure, including the use of iterative reconstruction and automated exposure control  IV Contrast:  65 mL of iohexol (OMNIPAQUE) Enteric contrast was not administered  FINDINGS: CHEST LUNGS:  Moderate emphysema  Postsurgical changes from left upper lobectomy  There are multiple new bilateral irregular lung nodules suspicious for metastases, with examples as follows: -Right upper lobe juxtapleural nodule measuring 1 6 x 2 5 cm (series 3 image 48)  -Right upper lobe perifissural nodule measuring 1 4 x 1 8 cm (series 3 image 61)  -Left lower lobe juxtapleural nodule measuring 1 9 x 1 5 cm (series 3 image 93)  -Left lower lobe nodule measuring 1 6 x 1 3 cm (series 3 image 70)  PLEURA:  Unremarkable  HEART/GREAT VESSELS:  Heart is unremarkable for patient's age  No thoracic aortic aneurysm  MEDIASTINUM AND OWEN:  Unremarkable  CHEST WALL AND LOWER NECK:  Right chest wall port with the tip in the SVC  ABDOMEN LIVER/BILIARY TREE:  Hepatic steatosis  Otherwise unremarkable   GALLBLADDER:  No calcified gallstones  No pericholecystic inflammatory change  SPLEEN:  Nonspecific hypodense splenic lesion measuring approximately 1 7 cm, seen on prior studies since 10/10/2017  PANCREAS:  Unremarkable  ADRENAL GLANDS:  Unremarkable  KIDNEYS/URETERS:  Bilateral renal cysts and subcentimeter too small to characterize hypodensities  No hydronephrosis  STOMACH AND BOWEL:  Colonic diverticulosis without findings of acute diverticulitis  APPENDIX:  Normal  ABDOMINOPELVIC CAVITY:  No ascites  No pneumoperitoneum  No lymphadenopathy  VESSELS:  Marked atherosclerotic changes  No abdominal aortic aneurysm  PELVIS REPRODUCTIVE ORGANS:  Unremarkable for patient's age  URINARY BLADDER:  Diffusely increased density fluid within the bladder  Otherwise unremarkable  ABDOMINAL WALL/INGUINAL REGIONS:  Unremarkable  OSSEOUS STRUCTURES:  No acute fracture or destructive osseous lesion  Degenerative changes of the spine  Postsurgical change in left ribs from thoracotomy  Impression: 1  Multiple new bilateral irregular lung nodules suspicious for metastases  2   No findings of metastatic disease in the abdomen or pelvis  The study was marked in [facility] for immediate notification  Workstation performed: [id number]       EKG personally reviewed by Dilip Abbasi MD      Counseling / Coordination of Care  Total floor / unit time spent today 30 minutes  Greater than 50% of total time was spent with the patient and / or family counseling and / or coordination of care

## 2022-08-04 NOTE — PHYSICAL THERAPY NOTE
PHYSICAL THERAPY NOTE          Patient Name: Jignesh Contreras Date: 8/4/2022 08/04/22 0933   PT Last Visit   PT Visit Date 08/04/22   Note Type   Note Type Treatment   Pain Assessment   Pain Assessment Tool 0-10   Pain Score 4   Pain Location/Orientation Location: [facility]   Hospital Pain Intervention(s) Repositioned; Ambulation/increased activity; Emotional support   Restrictions/Precautions   Weight Bearing Precautions Per Order No   Other Precautions Cognitive; Chair Alarm; Bed Alarm;Multiple lines; Fall Risk;Pain;Visual impairment  (dipolpia- eye patch utilized)   General   Chart Reviewed Yes   Response to Previous Treatment Patient with no complaints from previous session  Family/Caregiver Present No   Cognition   Overall Cognitive Status Impaired   Arousal/Participation Alert; Cooperative   Attention Attends with cues to redirect   Orientation Level Oriented X4   Memory Decreased recall of precautions;Decreased recall of recent events;Decreased short term memory   Following Commands Follows one step commands with increased time or repetition   Comments pt with flat affect, pleasant and cooperative to participate in therapy session  Bed Mobility   Supine to Sit 2  Maximal assistance   Additional items Assist x 1;HOB elevated; Bedrails; Increased time required;LE management;Verbal cues   Sit to Supine Unable to assess   Transfers   Sit to Stand 2  Maximal assistance   Additional items Assist x 2; Increased time required;Verbal cues   Stand to Sit 2  Maximal assistance   Additional items Assist x 2; Increased time required;Verbal cues   Stand pivot 2  Maximal assistance   Additional items Assist x 2; Increased time required;Verbal cues   Additional Comments 3x STS trials performed with b/l HHA  Able to achieve ~75% stand on each attempt   SPT from EOB to drop arm chair completed   Ambulation/Elevation   Gait pattern Not appropriate  (2* poor standing tolerance/ balance)   Balance   Static Sitting Fair -   Dynamic Sitting Poor   Static Standing Poor -   Dynamic Standing Poor -   Ambulatory Zero   Endurance Deficit   Endurance Deficit Yes   Endurance Deficit Description gross fatigue/weakness   Activity Tolerance   Activity Tolerance Patient tolerated treatment well;Patient limited by fatigue   Medical Staff Made Aware OT; co-treatment performed this date 2* increased medical complexity and multiple co-morbidities   Nurse Made Aware RN cleared pt to participate in therapy session   Exercises   Neuro re-ed sitting EOB ~15 min performing static/ dynamic reaching ADL tasks  Pt flucutuates from requiring close S level to mod Ax1 to correct R lateral and posteior lean  Pt with increased foward head/ trunk posture- VC/ TC required to correct   Assessment   Prognosis Fair   Problem List Decreased strength;Decreased range of motion;Decreased endurance; Impaired balance;Decreased mobility; Decreased coordination;Decreased cognition; Impaired judgement;Decreased safety awareness; Impaired vision; Impaired sensation;Obesity; Decreased skin integrity;Pain   Assessment Pt seen for PT treatment session this date  Therapy session focused on bed mobility, sitting tolerance/ balance, functional transfers and endurance training in order to improve overall mobility and independence  Pt requires assist of 1-2 for all mobility performed this date  Pt making slow progress toward goals 2* decreased activity tolerance  Pt requiring max Ax1 to complete bed mobility tasks, max Ax2 to complete 3x STS transfers/ SPT to drop arm chair  Pt fluctuates from close S level to mod Ax1 with fatigue when sitting EOB performing static/ dynamic tasks  Pt continues to demonstrate flat affect, however was oriented this session  Continues to be limited by fatigue, with increased exhaustion noted at end of session   Pt was left sitting OOB in recliner chair at the end of PT session with all needs in reach  Pt would benefit from continued PT services while in hospital to address remaining limitations  PT to continue to follow pt and recommends rehab upon d/c  The patient's AM-PAC Basic Mobility Inpatient Short Form Raw Score is 8  A Raw score of less than or equal to 16 suggests the patient may benefit from discharge to post-acute rehabilitation services  Please also refer to the recommendation of the Physical Therapist for safe discharge planning  Barriers to Discharge Inaccessible home environment   Goals   Patient Goals to rest   STG Expiration Date 08/12/22   PT Treatment Day 3   Plan   Treatment/Interventions ADL retraining;Functional transfer training;LE strengthening/ROM; Patient/family training;Bed mobility;Spoke to nursing;Spoke to case management;OT   Progress Slow progress, decreased activity tolerance   PT Frequency 3-5x/wk   Recommendation   PT Discharge Recommendation Post acute rehabilitation services   AM-PAC Basic Mobility Inpatient   Turning in Bed Without Bedrails 2   Lying on Back to Sitting on Edge of Flat Bed 2   Moving Bed to Chair 1   Standing Up From Chair 1   Walk in Room 1   Climb 3-5 Stairs 1   Basic Mobility Inpatient Raw Score 8   Highest Level Of Mobility   JH-HLM Goal 3: Sit at edge of bed   JH-HLM Achieved 4: Move to chair/commode   Education   Education Provided Mobility training   Patient Demonstrates acceptance/verbal understanding   End of Consult   Patient Position at End of Consult Bedside chair; All needs within reach;Bed/Chair alarm activated     Gabriella Thorpe, PT, DPT

## 2022-08-04 NOTE — ASSESSMENT & PLAN NOTE
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

## 2022-08-04 NOTE — OCCUPATIONAL THERAPY NOTE
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 [facility])  Active Problems:    Essential hypertension    Mixed hyperlipidemia    Adenocarcinoma, lung, left ([facility] [address] )    History of gout    Thrombocytopenia (HCC)    Atrial fibrillation with rapid ventricular response (HCC)    Hyponatremia    High grade neuroendocrine carcinoma of lung ([facility] [address] )          08/04/22 0932   OT Last Visit   OT Visit Date 08/04/22   Note Type   Note Type Treatment   Restrictions/Precautions   Weight Bearing Precautions Per Order No   Other Precautions Cognitive; Chair Alarm; Bed Alarm;Multiple lines; Fall Risk;Pain;O2  (1LO2, eye patch for visual deficits)   Lifestyle   Autonomy PTA, pt reports requiring assistance with ADLs/IADLs and reports using no DME for functional mobility   Reciprocal Relationships Lives with his wife   Service to Others Retired, reports previously working as    Intrinsic Gratification Enjoys playing guitar - likes blue grass music   Pain Assessment   Pain Assessment Tool 0-10   Pain Score 4   Pain Location/Orientation Location: Head  (posterior)   Hospital Pain Intervention(s) Repositioned; Ambulation/increased activity   ADL   Where Assessed Edge of bed   Grooming Assistance 5  Supervision/Setup   Grooming Deficit Wash/dry face; Supervision/safety; Increased time to complete;Verbal cueing   Grooming Comments increased A for thoroughness   UB Bathing Assistance 4  Minimal Assistance   LB Bathing Assistance 2  Maximal Assistance   LB Bathing Deficit Right lower leg including foot; Left lower leg including foot; Buttocks   UB Dressing Assistance 4  Minimal Assistance   UB Dressing Deficit Thread RUE; Thread LUE;Pull around back   LB Dressing Assistance 2  Maximal Assistance   LB Dressing Deficit Don/doff R sock; Don/doff L sock   Bed Mobility   Supine to Sit 2  Maximal assistance   Additional items Assist x 1;HOB elevated; Bedrails; Increased time required   Sit to Supine Unable to assess   Transfers   Sit to Stand 2  Maximal assistance   Additional items Assist x 2; Increased time required;Verbal cues   Stand to Sit 2  Maximal assistance   Additional items Assist x 2; Increased time required;Verbal cues   Stand pivot 2  Maximal assistance   Additional items Assist x 2; Increased time required;Verbal cues   Additional Comments Pt c/o dizziness/lightheadedness during session  BPs as follows: 140/85 lying, 120/84 seated EOB, 146/88 after seated activity   Cognition   Overall Cognitive Status Impaired   Arousal/Participation Responsive; Cooperative   Attention Attends with cues to redirect   Orientation Level Oriented X4  (oriented to month and year)   Following Commands Follows one step commands with increased time or repetition   Comments Pt lethargic and fatigued during session, requires redirection to task, frequent rest breaks  Pt with flat affect but pleasant and cooperative t/o session   Activity Tolerance   Activity Tolerance Patient limited by fatigue;Patient limited by pain   Medical Staff Made Aware PT Priya Maya, RN   Assessment   Assessment Patient participated in Skilled OT session this date with interventions consisting of ADL re training with the use of correct body mechnaics, Energy Conservation techniques, safety awareness and fall prevention techniques,  therapeutic activities to: increase activity tolerance, increase dynamic sit/ stand balance during functional activity  and increase OOB/ sitting tolerance   Upon arrival patient was found supine in bed  Pt demonstrated the following tasks: MAX A sup to sit, MAX A x 2 STS and SPT  Pt requires S to MOD A to maintain EOB sitting position, presents with R lean, as well as posterior/anterior leans  Pt performs grooming with S, MIN A for UB ADLs and MAX A for LB ADLs   Pt noted to have decreased activity tolerance/endurance, lethargic during session; requires frequent rest breaks  Patient continues to be functioning below baseline level, occupational performance remains limited secondary to factors listed above and increased risk for falls and injury  From OT standpoint, recommendation at time of d/c would be STR  Patient to benefit from continued Occupational Therapy treatment while in the hospital to address deficits as defined above and maximize level of functional independence with ADLs and functional mobility  Pt was left in chair with alarm on after session with all current needs met  The patient's raw score on the AM-PAC Daily Activity inpatient short form is 15, standardized score is 34 69, less than 39 4  Patients at this level are likely to benefit from discharge to post-acute rehabilitation services  Please refer to the recommendation of the Occupational Therapist for safe discharge planning  Plan   Treatment Interventions ADL retraining;Visual perceptual retraining;Functional transfer training;UE strengthening/ROM; Endurance training;Cognitive reorientation;Patient/family training;Equipment evaluation/education; Compensatory technique education;Continued evaluation; Energy conservation; Activityengagement   Goal Expiration Date 08/12/22   OT Treatment Day 2   OT Frequency 3-5x/wk   Recommendation   OT Discharge Recommendation Post acute rehabilitation services   AM-PAC Daily Activity Inpatient   Lower Body Dressing 2   Bathing 2   Toileting 2   Upper Body Dressing 3   Grooming 3   Eating 3   Daily Activity Raw Score 15   Daily Activity Standardized Score (Calc for Raw Score >=11) 34 69   AM-PAC Applied Cognition Inpatient   Following a Speech/Presentation 2   Understanding Ordinary Conversation 3   Taking Medications 3   Remembering Where Things Are Placed or Put Away 2   Remembering List of 4-5 Errands 2   Taking Care of Complicated Tasks 1   Applied Cognition Raw Score 13   Applied Cognition Standardized Score 30 46       Dustin Sidhu MS, OTR/L

## 2022-08-04 NOTE — ASSESSMENT & PLAN NOTE
· Patient with a history metastatic lung adeno carcinoma status post left upper lobe resection with radiation and chemotherapy  · Recent CT chest abdomen pelvis showing multiple new bilateral irregular lung nodules which are suspicious for metastasis, fortunately no metastatic disease noted in the abdomen or pelvis    · Outpatient follow up with Dr Yvette Monet for possible chemo after rehabilitation

## 2022-08-04 NOTE — PLAN OF CARE
Problem: PAIN - ADULT  Goal: Verbalizes/displays adequate comfort level or baseline comfort level  Description: Interventions:  - Encourage patient to monitor pain and request assistance  - Assess pain using appropriate pain scale  - Administer analgesics based on type and severity of pain and evaluate response  - Implement non-pharmacological measures as appropriate and evaluate response  - Consider cultural and social influences on pain and pain management  - Notify physician/advanced practitioner if interventions unsuccessful or patient reports new pain  Outcome: Progressing     Problem: SAFETY ADULT  Goal: Patient will remain free of falls  Description: INTERVENTIONS:  - Educate patient/family on patient safety including physical limitations  - Instruct patient to call for assistance with activity   - Consult OT/PT to assist with strengthening/mobility   - Keep Call bell within reach  - Keep bed low and locked with side rails adjusted as appropriate  - Keep care items and personal belongings within reach  - Initiate and maintain comfort rounds  - Make Fall Risk Sign visible to staff  - Offer Toileting every  Hours, in advance of need  - Initiate/Maintain alarm  - Obtain necessary fall risk management equipment:   - Apply yellow socks and bracelet for high fall risk patients  - Consider moving patient to room near nurses station  Outcome: Progressing  Goal: Maintain or return to baseline ADL function  Description: INTERVENTIONS:  -  Assess patient's ability to carry out ADLs; assess patient's baseline for ADL function and identify physical deficits which impact ability to perform ADLs (bathing, care of mouth/teeth, toileting, grooming, dressing, etc )  - Assess/evaluate cause of self-care deficits   - Assess range of motion  - Assess patient's mobility; develop plan if impaired  - Assess patient's need for assistive devices and provide as appropriate  - Encourage maximum independence but intervene and supervise when necessary  - Involve family in performance of ADLs  - Assess for home care needs following discharge   - Consider OT consult to assist with ADL evaluation and planning for discharge  - Provide patient education as appropriate  Outcome: Progressing  Goal: Maintains/Returns to pre admission functional level  Description: INTERVENTIONS:  - Perform BMAT or MOVE assessment daily    - Set and communicate daily mobility goal to care team and patient/family/caregiver  - Collaborate with rehabilitation services on mobility goals if consulted  - Perform Range of Motion  times a day  - Reposition patient every  hours    - Dangle patient  times a day  - Stand patient  times a day  - Ambulate patient  times a day  - Out of bed to chair  times a day   - Out of bed for meals  times a day  - Out of bed for toileting  - Record patient progress and toleration of activity level   Outcome: Progressing     Problem: NEUROSENSORY - ADULT  Goal: Achieves stable or improved neurological status  Description: INTERVENTIONS  - Monitor and report changes in neurological status  - Monitor vital signs such as temperature, blood pressure, glucose, and any other labs ordered   - Initiate measures to prevent increased intracranial pressure  - Monitor for seizure activity and implement precautions if appropriate      Outcome: Progressing  Goal: Remains free of injury related to seizures activity  Description: INTERVENTIONS  - Maintain airway, patient safety  and administer oxygen as ordered  - Monitor patient for seizure activity, document and report duration and description of seizure to physician/advanced practitioner  - If seizure occurs,  ensure patient safety during seizure  - Reorient patient post seizure  - Seizure pads on all 4 side rails  - Instruct patient/family to notify RN of any seizure activity including if an aura is experienced  - Instruct patient/family to call for assistance with activity based on nursing assessment  - Administer anti-seizure medications if ordered    Outcome: Progressing  Goal: Achieves maximal functionality and self care  Description: INTERVENTIONS  - Monitor swallowing and airway patency with patient fatigue and changes in neurological status  - Encourage and assist patient to increase activity and self care     - Encourage visually impaired, hearing impaired and aphasic patients to use assistive/communication devices  Outcome: Progressing     Problem: CARDIOVASCULAR - ADULT  Goal: Maintains optimal cardiac output and hemodynamic stability  Description: INTERVENTIONS:  - Monitor I/O, vital signs and rhythm  - Monitor for S/S and trends of decreased cardiac output  - Administer and titrate ordered vasoactive medications to optimize hemodynamic stability  - Assess quality of pulses, skin color and temperature  - Assess for signs of decreased coronary artery perfusion  - Instruct patient to report change in severity of symptoms  Outcome: Progressing  Goal: Absence of cardiac dysrhythmias or at baseline rhythm  Description: INTERVENTIONS:  - Continuous cardiac monitoring, vital signs, obtain 12 lead EKG if ordered  - Administer antiarrhythmic and heart rate control medications as ordered  - Monitor electrolytes and administer replacement therapy as ordered  Outcome: Progressing     Problem: SKIN/TISSUE INTEGRITY - ADULT  Goal: Skin Integrity remains intact(Skin Breakdown Prevention)  Description: Assess:  -Perform Garry assessment every   -Clean and moisturize skin every   -Inspect skin when repositioning, toileting, and assisting with ADLS  -Assess under medical devices such as  every   -Assess extremities for adequate circulation and sensation     Bed Management:  -Have minimal linens on bed & keep smooth, unwrinkled  -Change linens as needed when moist or perspiring  -Avoid sitting or lying in one position for more than  hours while in bed  -Keep HOB at degrees     Toileting:  -Offer bedside commode  -Assess for incontinence every   -Use incontinent care products after each incontinent episode such as     Activity:  -Mobilize patient  times a day  -Encourage activity and walks on unit  -Encourage or provide ROM exercises   -Turn and reposition patient every  Hours  -Use appropriate equipment to lift or move patient in bed  -Instruct/ Assist with weight shifting every  when out of bed in chair  -Consider limitation of chair time  hour intervals    Skin Care:  -Avoid use of baby powder, tape, friction and shearing, hot water or constrictive clothing  -Relieve pressure over bony prominences using   -Do not massage red bony areas    Next Steps:  -Teach patient strategies to minimize risks such as    -Consider consults to  interdisciplinary teams such as   Outcome: Progressing  Goal: Incision(s), wounds(s) or drain site(s) healing without S/S of infection  Description: INTERVENTIONS  - Assess and document dressing, incision, wound bed, drain sites and surrounding tissue  - Provide patient and family education  - Perform skin care/dressing changes every   Outcome: Progressing  Goal: Pressure injury heals and does not worsen  Description: Interventions:  - Implement low air loss mattress or specialty surface (Criteria met)  - Apply silicone foam dressing  - Instruct/assist with weight shifting every  minutes when in chair   - Limit chair time to  hour intervals  - Use special pressure reducing interventions such as  when in chair   - Apply fecal or urinary incontinence containment device   - Perform passive or active ROM every   - Turn and reposition patient & offload bony prominences every  hours   - Utilize friction reducing device or surface for transfers   - Consider consults to  interdisciplinary teams such as   - Use incontinent care products after each incontinent episode such as   - Consider nutrition services referral as needed  Outcome: Progressing     Problem: Potential for Falls  Goal: Patient will remain free of falls  Description: INTERVENTIONS:  - Educate patient/family on patient safety including physical limitations  - Instruct patient to call for assistance with activity   - Consult OT/PT to assist with strengthening/mobility   - Keep Call bell within reach  - Keep bed low and locked with side rails adjusted as appropriate  - Keep care items and personal belongings within reach  - Initiate and maintain comfort rounds  - Make Fall Risk Sign visible to staff  - Offer Toileting every  Hours, in advance of need  - Initiate/Maintain alarm  - Obtain necessary fall risk management equipment:   - Apply yellow socks and bracelet for high fall risk patients  - Consider moving patient to room near nurses station  Outcome: Progressing     Problem: Prexisting or High Potential for Compromised Skin Integrity  Goal: Skin integrity is maintained or improved  Description: INTERVENTIONS:  - Identify patients at risk for skin breakdown  - Assess and monitor skin integrity  - Assess and monitor nutrition and hydration status  - Monitor labs   - Assess for incontinence   - Turn and reposition patient  - Assist with mobility/ambulation  - Relieve pressure over bony prominences  - Avoid friction and shearing  - Provide appropriate hygiene as needed including keeping skin clean and dry  - Evaluate need for skin moisturizer/barrier cream  - Collaborate with interdisciplinary team   - Patient/family teaching  - Consider wound care consult   Outcome: Progressing     Problem: MOBILITY - ADULT  Goal: Maintain or return to baseline ADL function  Description: INTERVENTIONS:  -  Assess patient's ability to carry out ADLs; assess patient's baseline for ADL function and identify physical deficits which impact ability to perform ADLs (bathing, care of mouth/teeth, toileting, grooming, dressing, etc )  - Assess/evaluate cause of self-care deficits   - Assess range of motion  - Assess patient's mobility; develop plan if impaired  - Assess patient's need for assistive devices and provide as appropriate  - Encourage maximum independence but intervene and supervise when necessary  - Involve family in performance of ADLs  - Assess for home care needs following discharge   - Consider OT consult to assist with ADL evaluation and planning for discharge  - Provide patient education as appropriate  Outcome: Progressing  Goal: Maintains/Returns to pre admission functional level  Description: INTERVENTIONS:  - Perform BMAT or MOVE assessment daily    - Set and communicate daily mobility goal to care team and patient/family/caregiver  - Collaborate with rehabilitation services on mobility goals if consulted  - Perform Range of Motion  times a day  - Reposition patient every  hours    - Dangle patient  times a day  - Stand patient  times a day  - Ambulate patient  times a day  - Out of bed to chair  times a day   - Out of bed for me times a day  - Out of bed for toileting  - Record patient progress and toleration of activity level   Outcome: Progressing     Problem: RESPIRATORY - ADULT  Goal: Achieves optimal ventilation and oxygenation  Description: INTERVENTIONS:  - Assess for changes in respiratory status  - Assess for changes in mentation and behavior  - Position to facilitate oxygenation and minimize respiratory effort  - Oxygen administered by appropriate delivery if ordered  - Initiate smoking cessation education as indicated  - Encourage broncho-pulmonary hygiene including cough, deep breathe, Incentive Spirometry  - Assess the need for suctioning and aspirate as needed  - Assess and instruct to report SOB or any respiratory difficulty  - Respiratory Therapy support as indicated  Outcome: Progressing     Problem: GASTROINTESTINAL - ADULT  Goal: Minimal or absence of nausea and/or vomiting  Description: INTERVENTIONS:  - Administer IV fluids if ordered to ensure adequate hydration  - Maintain NPO status until nausea and vomiting are resolved  - Nasogastric tube if ordered  - Administer ordered antiemetic medications as needed  - Provide nonpharmacologic comfort measures as appropriate  - Advance diet as tolerated, if ordered  - Consider nutrition services referral to assist patient with adequate nutrition and appropriate food choices  Outcome: Progressing  Goal: Maintains or returns to baseline bowel function  Description: INTERVENTIONS:  - Assess bowel function  - Encourage oral fluids to ensure adequate hydration  - Administer IV fluids if ordered to ensure adequate hydration  - Administer ordered medications as needed  - Encourage mobilization and activity  - Consider nutritional services referral to assist patient with adequate nutrition and appropriate food choices  Outcome: Progressing  Goal: Maintains adequate nutritional intake  Description: INTERVENTIONS:  - Monitor percentage of each meal consumed  - Identify factors contributing to decreased intake, treat as appropriate  - Assist with meals as needed  - Monitor I&O, weight, and lab values if indicated  - Obtain nutrition services referral as needed  Outcome: Progressing     Problem: GENITOURINARY - ADULT  Goal: Maintains or returns to baseline urinary function  Description: INTERVENTIONS:  - Assess urinary function  - Encourage oral fluids to ensure adequate hydration if ordered  - Administer IV fluids as ordered to ensure adequate hydration  - Administer ordered medications as needed  - Offer frequent toileting  - Follow urinary retention protocol if ordered  Outcome: Progressing     Problem: METABOLIC, FLUID AND ELECTROLYTES - ADULT  Goal: Electrolytes maintained within normal limits  Description: INTERVENTIONS:  - Monitor labs and assess patient for signs and symptoms of electrolyte imbalances  - Administer electrolyte replacement as ordered  - Monitor response to electrolyte replacements, including repeat lab results as appropriate  - Instruct patient on fluid and nutrition as appropriate  Outcome: Progressing  Goal: Fluid balance maintained  Description: INTERVENTIONS:  - Monitor labs   - Monitor I/O and WT  - Instruct patient on fluid and nutrition as appropriate  - Assess for signs & symptoms of volume excess or deficit  Outcome: Progressing     Problem: HEMATOLOGIC - ADULT  Goal: Maintains hematologic stability  Description: INTERVENTIONS  - Assess for signs and symptoms of bleeding or hemorrhage  - Monitor labs  - Administer supportive blood products/factors as ordered and appropriate  Outcome: Progressing     Problem: MUSCULOSKELETAL - ADULT  Goal: Maintain or return mobility to safest level of function  Description: INTERVENTIONS:  - Assess patient's ability to carry out ADLs; assess patient's baseline for ADL function and identify physical deficits which impact ability to perform ADLs (bathing, care of mouth/teeth, toileting, grooming, dressing, etc )  - Assess/evaluate cause of self-care deficits   - Assess range of motion  - Assess patient's mobility  - Assess patient's need for assistive devices and provide as appropriate  - Encourage maximum independence but intervene and supervise when necessary  - Involve family in performance of ADLs  - Assess for home care needs following discharge   - Consider OT consult to assist with ADL evaluation and planning for discharge  - Provide patient education as appropriate  Outcome: Progressing     Problem: Nutrition/Hydration-ADULT  Goal: Nutrient/Hydration intake appropriate for improving, restoring or maintaining nutritional needs  Description: Monitor and assess patient's nutrition/hydration status for malnutrition  Collaborate with interdisciplinary team and initiate plan and interventions as ordered  Monitor patient's weight and dietary intake as ordered or per policy  Utilize nutrition screening tool and intervene as necessary  Determine patient's food preferences and provide high-protein, high-caloric foods as appropriate       INTERVENTIONS:  - Monitor oral intake, urinary output, labs, and treatment plans  - Assess nutrition and hydration status and recommend course of action  - Evaluate amount of meals eaten  - Assist patient with eating if necessary   - Allow adequate time for meals  - Recommend/ encourage appropriate diets, oral nutritional supplements, and vitamin/mineral supplements  - Order, calculate, and assess calorie counts as needed  - Recommend, monitor, and adjust tube feedings and TPN/PPN based on assessed needs  - Assess need for intravenous fluids  - Provide specific nutrition/hydration education as appropriate  - Include patient/family/caregiver in decisions related to nutrition  Outcome: Progressing     Problem: INFECTION - ADULT  Goal: Absence or prevention of progression during hospitalization  Description: INTERVENTIONS:  - Assess and monitor for signs and symptoms of infection  - Monitor lab/diagnostic results  - Monitor all insertion sites, i e  indwelling lines, tubes, and drains  - Monitor endotracheal if appropriate and nasal secretions for changes in amount and color  - [place] appropriate cooling/warming therapies per order  - Administer medications as ordered  - Instruct and encourage patient and family to use good hand hygiene technique  - Identify and instruct in appropriate isolation precautions for identified infection/condition  Outcome: Progressing     Problem: DISCHARGE PLANNING  Goal: Discharge to home or other facility with appropriate resources  Description: INTERVENTIONS:  - Identify barriers to discharge w/patient and caregiver  - Arrange for needed discharge resources and transportation as appropriate  - Identify discharge learning needs (meds, wound care, etc )  - Arrange for interpretive services to assist at discharge as needed  - Refer to Case Management Department for coordinating discharge planning if the patient needs post-hospital services based on physician/advanced practitioner order or complex needs related to functional status, cognitive ability, or social support system  Outcome: Progressing     Problem: Knowledge Deficit  Goal: Patient/family/caregiver demonstrates understanding of disease process, treatment plan, medications, and discharge instructions  Description: Complete learning assessment and assess knowledge base    Interventions:  - Provide teaching at level of understanding  - Provide teaching via preferred learning methods  Outcome: Progressing     Problem: COPING  Goal: Pt/Family able to verbalize concerns and demonstrate effective coping strategies  Description: INTERVENTIONS:  - Assist patient/family to identify coping skills, available support systems and cultural and spiritual values  - Provide emotional support, including active listening and acknowledgement of concerns of patient and caregivers  - Reduce environmental stimuli, as able  - Provide patient education  - Assess for spiritual pain/suffering and initiate spiritual care, including notification of Pastoral Care or [facility] based community as needed  - Assess effectiveness of coping strategies  Outcome: Progressing  Goal: Will report anxiety at manageable levels  Description: INTERVENTIONS:  - Administer medication as ordered  - Teach and encourage coping skills  - Provide emotional support  - Assess patient/family for anxiety and ability to cope  Outcome: Progressing

## 2022-08-04 NOTE — PROGRESS NOTES
Pastoral Care Progress Note    2022  Patient: Randal Harmon : 1955  Admission Date & Time: 2022 0026  MRN: [id number] [facility]: [id number]        Per request of Kaitlynn CAUSEY 22,  visited with patient, wife, Terie Neighbor, and patient's brother, Prince Duarte   provided support and emotional space for patient to hear and process news of his father's recent death,  and burial  Patient expressed regret, sadness   facilitated storytelling and life review of father's life and contributions to the family and when asked, led prayer together  Patient tearfully expressed grief and then heightened anxiety  Patient requested "something to calm me down "  reported request to nurse, Carolyn Tomas, for followup  Spiritual Care available if needed or requested                 Chaplaincy Interventions Utilized:   Empowerment: Provided anxiety containment, Provided grief counseling, and Reframed experience of patient/family  Exploration: Explored emotional needs & resources, Explored spiritual needs & resources, Facilitated life review, and Facilitated story telling  Collaboration: Consulted with interdisciplinary team  Relationship Building: Cultivated a relationship of care and support and Listened empathically  Ritual: Provided prayer      Spiritual Coping Strategies Utilized:   Connectedness and Spiritual comfort

## 2022-08-04 NOTE — PLAN OF CARE
Problem: PAIN - ADULT  Goal: Verbalizes/displays adequate comfort level or baseline comfort level  Description: Interventions:  - Encourage patient to monitor pain and request assistance  - Assess pain using appropriate pain scale  - Administer analgesics based on type and severity of pain and evaluate response  - Implement non-pharmacological measures as appropriate and evaluate response  - Consider cultural and social influences on pain and pain management  - Notify physician/advanced practitioner if interventions unsuccessful or patient reports new pain  8/4/2022 1550 by Long Mccarthy RN  Outcome: Progressing  8/4/2022 1549 by Long Mccarthy RN  Outcome: Progressing     Problem: SAFETY ADULT  Goal: Patient will remain free of falls  Description: INTERVENTIONS:  - Educate patient/family on patient safety including physical limitations  - Instruct patient to call for assistance with activity   - Consult OT/PT to assist with strengthening/mobility   - Keep Call bell within reach  - Keep bed low and locked with side rails adjusted as appropriate  - Keep care items and personal belongings within reach  - Initiate and maintain comfort rounds  - Make Fall Risk Sign visible to staff  - Offer Toileting every  Hours, in advance of need  - Initiate/Maintain alarm  - Obtain necessary fall risk management equipment:   - Apply yellow socks and bracelet for high fall risk patients  - Consider moving patient to room near nurses station  8/4/2022 1550 by Long Mccarthy RN  Outcome: Progressing  8/4/2022 1549 by Long Mccarthy RN  Outcome: Progressing  Goal: Maintain or return to baseline ADL function  Description: INTERVENTIONS:  -  Assess patient's ability to carry out ADLs; assess patient's baseline for ADL function and identify physical deficits which impact ability to perform ADLs (bathing, care of mouth/teeth, toileting, grooming, dressing, etc )  - Assess/evaluate cause of self-care deficits   - Assess range of motion  - Assess patient's mobility; develop plan if impaired  - Assess patient's need for assistive devices and provide as appropriate  - Encourage maximum independence but intervene and supervise when necessary  - Involve family in performance of ADLs  - Assess for home care needs following discharge   - Consider OT consult to assist with ADL evaluation and planning for discharge  - Provide patient education as appropriate  8/4/2022 1550 by Mary Wilson RN  Outcome: Progressing  8/4/2022 1549 by Mary Wilson RN  Outcome: Progressing  Goal: Maintains/Returns to pre admission functional level  Description: INTERVENTIONS:  - Perform BMAT or MOVE assessment daily    - Set and communicate daily mobility goal to care team and patient/family/caregiver  - Collaborate with rehabilitation services on mobility goals if consulted  - Perform Range of Motion  times a day  - Reposition patient every  hours    - Dangle patient  times a day  - Stand patient  times a day  - Ambulate patient  times a day  - Out of bed to chair  times a day   - Out of bed for meals  times a day  - Out of bed for toileting  - Record patient progress and toleration of activity level   8/4/2022 1550 by Mary Wilson RN  Outcome: Progressing  8/4/2022 1549 by Mary Wilson RN  Outcome: Progressing     Problem: NEUROSENSORY - ADULT  Goal: Achieves stable or improved neurological status  Description: INTERVENTIONS  - Monitor and report changes in neurological status  - Monitor vital signs such as temperature, blood pressure, glucose, and any other labs ordered   - Initiate measures to prevent increased intracranial pressure  - Monitor for seizure activity and implement precautions if appropriate      8/4/2022 1550 by Mary Wilson RN  Outcome: Progressing  8/4/2022 1549 by Mary Wilson RN  Outcome: Progressing  Goal: Remains free of injury related to seizures activity  Description: INTERVENTIONS  - Maintain airway, patient safety  and administer oxygen as ordered  - Monitor patient for seizure activity, document and report duration and description of seizure to physician/advanced practitioner  - If seizure occurs,  ensure patient safety during seizure  - Reorient patient post seizure  - Seizure pads on all 4 side rails  - Instruct patient/family to notify RN of any seizure activity including if an aura is experienced  - Instruct patient/family to call for assistance with activity based on nursing assessment  - Administer anti-seizure medications if ordered    8/4/2022 1550 by Karine Mckeon RN  Outcome: Progressing  8/4/2022 1549 by Karine Mckeon RN  Outcome: Progressing  Goal: Achieves maximal functionality and self care  Description: INTERVENTIONS  - Monitor swallowing and airway patency with patient fatigue and changes in neurological status  - Encourage and assist patient to increase activity and self care     - Encourage visually impaired, hearing impaired and aphasic patients to use assistive/communication devices  8/4/2022 1550 by Karine Mckeon RN  Outcome: Progressing  8/4/2022 1549 by Karine Mckeon RN  Outcome: Progressing     Problem: CARDIOVASCULAR - ADULT  Goal: Maintains optimal cardiac output and hemodynamic stability  Description: INTERVENTIONS:  - Monitor I/O, vital signs and rhythm  - Monitor for S/S and trends of decreased cardiac output  - Administer and titrate ordered vasoactive medications to optimize hemodynamic stability  - Assess quality of pulses, skin color and temperature  - Assess for signs of decreased coronary artery perfusion  - Instruct patient to report change in severity of symptoms  8/4/2022 1550 by Karine Mckeon RN  Outcome: Progressing  8/4/2022 1549 by Karine Mckeon RN  Outcome: Progressing  Goal: Absence of cardiac dysrhythmias or at baseline rhythm  Description: INTERVENTIONS:  - Continuous cardiac monitoring, vital signs, obtain 12 lead EKG if ordered  - Administer antiarrhythmic and heart rate control medications as ordered  - Monitor electrolytes and administer replacement therapy as ordered  8/4/2022 1550 by Mar Cabrera RN  Outcome: Progressing  8/4/2022 1549 by Mar Cabrera RN  Outcome: Progressing     Problem: SKIN/TISSUE INTEGRITY - ADULT  Goal: Skin Integrity remains intact(Skin Breakdown Prevention)  Description: Assess:  -Perform Garry assessment every   -Clean and moisturize skin every   -Inspect skin when repositioning, toileting, and assisting with ADLS  -Assess under medical devices such as  every   -Assess extremities for adequate circulation and sensation     Bed Management:  -Have minimal linens on bed & keep smooth, unwrinkled  -Change linens as needed when moist or perspiring  -Avoid sitting or lying in one position for more than  hours while in bed  -Keep HOB at degrees     Toileting:  -Offer bedside commode  -Assess for incontinence every   -Use incontinent care products after each incontinent episode such as     Activity:  -Mobilize patient  times a day  -Encourage activity and walks on unit  -Encourage or provide ROM exercises   -Turn and reposition patient every  Hours  -Use appropriate equipment to lift or move patient in bed  -Instruct/ Assist with weight shifting every  when out of bed in chair  -Consider limitation of chair time  hour intervals    Skin Care:  -Avoid use of baby powder, tape, friction and shearing, hot water or constrictive clothing  -Relieve pressure over bony prominences using   -Do not massage red bony areas    Next Steps:  -Teach patient strategies to minimize risks such as    -Consider consults to  interdisciplinary teams such as   8/4/2022 1550 by Mar Cabrera RN  Outcome: Progressing  8/4/2022 1549 by Mar Cabrera RN  Outcome: Progressing  Goal: Incision(s), wounds(s) or drain site(s) healing without S/S of infection  Description: INTERVENTIONS  - Assess and document dressing, incision, wound bed, drain sites and surrounding tissue  - Provide patient and family education  - Perform skin care/dressing changes every   8/4/2022 1550 by Trevin Moralez RN  Outcome: Progressing  8/4/2022 1549 by Trevin Moralez RN  Outcome: Progressing  Goal: Pressure injury heals and does not worsen  Description: Interventions:  - Implement low air loss mattress or specialty surface (Criteria met)  - Apply silicone foam dressing  - Instruct/assist with weight shifting every  minutes when in chair   - Limit chair time to  hour intervals  - Use special pressure reducing interventions such as  when in chair   - Apply fecal or urinary incontinence containment device   - Perform passive or active ROM every   - Turn and reposition patient & offload bony prominences every  hours   - Utilize friction reducing device or surface for transfers   - Consider consults to  interdisciplinary teams such as   - Use incontinent care products after each incontinent episode such as   - Consider nutrition services referral as needed  8/4/2022 1550 by Trevin Moralez RN  Outcome: Progressing  8/4/2022 1549 by Trevin Moralez RN  Outcome: Progressing     Problem: Potential for Falls  Goal: Patient will remain free of falls  Description: INTERVENTIONS:  - Educate patient/family on patient safety including physical limitations  - Instruct patient to call for assistance with activity   - Consult OT/PT to assist with strengthening/mobility   - Keep Call bell within reach  - Keep bed low and locked with side rails adjusted as appropriate  - Keep care items and personal belongings within reach  - Initiate and maintain comfort rounds  - Make Fall Risk Sign visible to staff  - Offer Toileting every  Hours, in advance of need  - Initiate/Maintain alarm  - Obtain necessary fall risk management equipment:   - Apply yellow socks and bracelet for high fall risk patients  - Consider moving patient to room near nurses station  8/4/2022 1550 by Trevin Moralez RN  Outcome: Progressing  8/4/2022 1549 by Trevin Moralez RN  Outcome: Progressing     Problem: Prexisting or High Potential for Compromised Skin Integrity  Goal: Skin integrity is maintained or improved  Description: INTERVENTIONS:  - Identify patients at risk for skin breakdown  - Assess and monitor skin integrity  - Assess and monitor nutrition and hydration status  - Monitor labs   - Assess for incontinence   - Turn and reposition patient  - Assist with mobility/ambulation  - Relieve pressure over bony prominences  - Avoid friction and shearing  - Provide appropriate hygiene as needed including keeping skin clean and dry  - Evaluate need for skin moisturizer/barrier cream  - Collaborate with interdisciplinary team   - Patient/family teaching  - Consider wound care consult   8/4/2022 1550 by Narinder Salgado RN  Outcome: Progressing  8/4/2022 1549 by Narinder Salgado RN  Outcome: Progressing     Problem: MOBILITY - ADULT  Goal: Maintain or return to baseline ADL function  Description: INTERVENTIONS:  -  Assess patient's ability to carry out ADLs; assess patient's baseline for ADL function and identify physical deficits which impact ability to perform ADLs (bathing, care of mouth/teeth, toileting, grooming, dressing, etc )  - Assess/evaluate cause of self-care deficits   - Assess range of motion  - Assess patient's mobility; develop plan if impaired  - Assess patient's need for assistive devices and provide as appropriate  - Encourage maximum independence but intervene and supervise when necessary  - Involve family in performance of ADLs  - Assess for home care needs following discharge   - Consider OT consult to assist with ADL evaluation and planning for discharge  - Provide patient education as appropriate  8/4/2022 1550 by Narinder Salgado RN  Outcome: Progressing  8/4/2022 1549 by Narinder Salgado RN  Outcome: Progressing  Goal: Maintains/Returns to pre admission functional level  Description: INTERVENTIONS:  - Perform BMAT or MOVE assessment daily    - Set and communicate daily mobility goal to care team and patient/family/caregiver  - Collaborate with rehabilitation services on mobility goals if consulted  - Perform Range of Motion  times a day  - Reposition patient every  hours    - Dangle patient  times a day  - Stand patient  times a day  - Ambulate patient  times a day  - Out of bed to chair  times a day   - Out of bed for meal times a day  - Out of bed for toileting  - Record patient progress and toleration of activity level   8/4/2022 1550 by Mary Wilson RN  Outcome: Progressing  8/4/2022 1549 by Mary Wilson RN  Outcome: Progressing     Problem: RESPIRATORY - ADULT  Goal: Achieves optimal ventilation and oxygenation  Description: INTERVENTIONS:  - Assess for changes in respiratory status  - Assess for changes in mentation and behavior  - Position to facilitate oxygenation and minimize respiratory effort  - Oxygen administered by appropriate delivery if ordered  - Initiate smoking cessation education as indicated  - Encourage broncho-pulmonary hygiene including cough, deep breathe, Incentive Spirometry  - Assess the need for suctioning and aspirate as needed  - Assess and instruct to report SOB or any respiratory difficulty  - Respiratory Therapy support as indicated  8/4/2022 1550 by Mary Wilson RN  Outcome: Progressing  8/4/2022 1549 by Mary Wilson RN  Outcome: Progressing     Problem: GASTROINTESTINAL - ADULT  Goal: Minimal or absence of nausea and/or vomiting  Description: INTERVENTIONS:  - Administer IV fluids if ordered to ensure adequate hydration  - Maintain NPO status until nausea and vomiting are resolved  - Nasogastric tube if ordered  - Administer ordered antiemetic medications as needed  - Provide nonpharmacologic comfort measures as appropriate  - Advance diet as tolerated, if ordered  - Consider nutrition services referral to assist patient with adequate nutrition and appropriate food choices  8/4/2022 1550 by Mary Wilson RN  Outcome: Progressing  8/4/2022 1549 by Mary Wilson RN  Outcome: Progressing  Goal: Maintains or returns to baseline bowel function  Description: INTERVENTIONS:  - Assess bowel function  - Encourage oral fluids to ensure adequate hydration  - Administer IV fluids if ordered to ensure adequate hydration  - Administer ordered medications as needed  - Encourage mobilization and activity  - Consider nutritional services referral to assist patient with adequate nutrition and appropriate food choices  8/4/2022 1550 by Blanca Carey RN  Outcome: Progressing  8/4/2022 1549 by Blanca Carey RN  Outcome: Progressing  Goal: Maintains adequate nutritional intake  Description: INTERVENTIONS:  - Monitor percentage of each meal consumed  - Identify factors contributing to decreased intake, treat as appropriate  - Assist with meals as needed  - Monitor I&O, weight, and lab values if indicated  - Obtain nutrition services referral as needed  8/4/2022 1550 by Blanca Carey RN  Outcome: Progressing  8/4/2022 1549 by Blanca Carey RN  Outcome: Progressing     Problem: GENITOURINARY - ADULT  Goal: Maintains or returns to baseline urinary function  Description: INTERVENTIONS:  - Assess urinary function  - Encourage oral fluids to ensure adequate hydration if ordered  - Administer IV fluids as ordered to ensure adequate hydration  - Administer ordered medications as needed  - Offer frequent toileting  - Follow urinary retention protocol if ordered  8/4/2022 1550 by Blanca Carey RN  Outcome: Progressing  8/4/2022 1549 by Blanca Carey RN  Outcome: Progressing     Problem: METABOLIC, FLUID AND ELECTROLYTES - ADULT  Goal: Electrolytes maintained within normal limits  Description: INTERVENTIONS:  - Monitor labs and assess patient for signs and symptoms of electrolyte imbalances  - Administer electrolyte replacement as ordered  - Monitor response to electrolyte replacements, including repeat lab results as appropriate  - Instruct patient on fluid and nutrition as appropriate  8/4/2022 1550 by Susy Chaidez RN  Outcome: Progressing  8/4/2022 1549 by Susy Chaidez RN  Outcome: Progressing  Goal: Fluid balance maintained  Description: INTERVENTIONS:  - Monitor labs   - Monitor I/O and WT  - Instruct patient on fluid and nutrition as appropriate  - Assess for signs & symptoms of volume excess or deficit  8/4/2022 1550 by Susy Chaidez RN  Outcome: Progressing  8/4/2022 1549 by Susy Chaidez RN  Outcome: Progressing     Problem: HEMATOLOGIC - ADULT  Goal: Maintains hematologic stability  Description: INTERVENTIONS  - Assess for signs and symptoms of bleeding or hemorrhage  - Monitor labs  - Administer supportive blood products/factors as ordered and appropriate  8/4/2022 1550 by Susy Chaidez RN  Outcome: Progressing  8/4/2022 1549 by Susy Chaidez RN  Outcome: Progressing     Problem: MUSCULOSKELETAL - ADULT  Goal: Maintain or return mobility to safest level of function  Description: INTERVENTIONS:  - Assess patient's ability to carry out ADLs; assess patient's baseline for ADL function and identify physical deficits which impact ability to perform ADLs (bathing, care of mouth/teeth, toileting, grooming, dressing, etc )  - Assess/evaluate cause of self-care deficits   - Assess range of motion  - Assess patient's mobility  - Assess patient's need for assistive devices and provide as appropriate  - Encourage maximum independence but intervene and supervise when necessary  - Involve family in performance of ADLs  - Assess for home care needs following discharge   - Consider OT consult to assist with ADL evaluation and planning for discharge  - Provide patient education as appropriate  8/4/2022 1550 by Susy Chaidez RN  Outcome: Progressing  8/4/2022 1549 by Susy Chaidez RN  Outcome: Progressing     Problem: Nutrition/Hydration-ADULT  Goal: Nutrient/Hydration intake appropriate for improving, restoring or maintaining nutritional needs  Description: Monitor and assess patient's nutrition/hydration status for malnutrition  Collaborate with interdisciplinary team and initiate plan and interventions as ordered  Monitor patient's weight and dietary intake as ordered or per policy  Utilize nutrition screening tool and intervene as necessary  Determine patient's food preferences and provide high-protein, high-caloric foods as appropriate       INTERVENTIONS:  - Monitor oral intake, urinary output, labs, and treatment plans  - Assess nutrition and hydration status and recommend course of action  - Evaluate amount of meals eaten  - Assist patient with eating if necessary   - Allow adequate time for meals  - Recommend/ encourage appropriate diets, oral nutritional supplements, and vitamin/mineral supplements  - Order, calculate, and assess calorie counts as needed  - Recommend, monitor, and adjust tube feedings and TPN/PPN based on assessed needs  - Assess need for intravenous fluids  - Provide specific nutrition/hydration education as appropriate  - Include patient/family/caregiver in decisions related to nutrition  8/4/2022 1550 by Karine Mckeon RN  Outcome: Progressing  8/4/2022 1549 by Karine Mckeon RN  Outcome: Progressing     Problem: INFECTION - ADULT  Goal: Absence or prevention of progression during hospitalization  Description: INTERVENTIONS:  - Assess and monitor for signs and symptoms of infection  - Monitor lab/diagnostic results  - Monitor all insertion sites, i e  indwelling lines, tubes, and drains  - Monitor endotracheal if appropriate and nasal secretions for changes in amount and color  - [place] appropriate cooling/warming therapies per order  - Administer medications as ordered  - Instruct and encourage patient and family to use good hand hygiene technique  - Identify and instruct in appropriate isolation precautions for identified infection/condition  8/4/2022 1550 by Karine Mckeon RN  Outcome: Progressing  8/4/2022 1549 by Karine Mckeon RN  Outcome: Progressing     Problem: DISCHARGE PLANNING  Goal: Discharge to home or other facility with appropriate resources  Description: INTERVENTIONS:  - Identify barriers to discharge w/patient and caregiver  - Arrange for needed discharge resources and transportation as appropriate  - Identify discharge learning needs (meds, wound care, etc )  - Arrange for interpretive services to assist at discharge as needed  - Refer to Case Management Department for coordinating discharge planning if the patient needs post-hospital services based on physician/advanced practitioner order or complex needs related to functional status, cognitive ability, or social support system  8/4/2022 1550 by Narinder Salgado RN  Outcome: Progressing  8/4/2022 1549 by Narinder Salgado RN  Outcome: Progressing     Problem: Knowledge Deficit  Goal: Patient/family/caregiver demonstrates understanding of disease process, treatment plan, medications, and discharge instructions  Description: Complete learning assessment and assess knowledge base    Interventions:  - Provide teaching at level of understanding  - Provide teaching via preferred learning methods  8/4/2022 1550 by Narinder Salgado RN  Outcome: Progressing  8/4/2022 1549 by Narinder Salgado RN  Outcome: Progressing     Problem: COPING  Goal: Pt/Family able to verbalize concerns and demonstrate effective coping strategies  Description: INTERVENTIONS:  - Assist patient/family to identify coping skills, available support systems and cultural and spiritual values  - Provide emotional support, including active listening and acknowledgement of concerns of patient and caregivers  - Reduce environmental stimuli, as able  - Provide patient education  - Assess for spiritual pain/suffering and initiate spiritual care, including notification of Pastoral Care or [place] based community as needed  - Assess effectiveness of coping strategies  8/4/2022 1550 by Narinder Salgado RN  Outcome: Progressing  8/4/2022 1549 by Narinder Salgado RN  Outcome: Progressing  Goal: Will report anxiety at manageable levels  Description: INTERVENTIONS:  - Administer medication as ordered  - Teach and encourage coping skills  - Provide emotional support  - Assess patient/family for anxiety and ability to cope  8/4/2022 1550 by Nick Ramirez RN  Outcome: Progressing  8/4/2022 1549 by Nick Ramirez, RN  Outcome: Progressing

## 2022-08-04 NOTE — PROGRESS NOTES
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 [facility])  Active Problems:    Essential hypertension    Mixed hyperlipidemia    Adenocarcinoma, lung, left (HCC)    History of gout    Thrombocytopenia (HCC)    Atrial fibrillation with rapid ventricular response (HCC)    Hyponatremia    High grade neuroendocrine carcinoma of lung ([address] )      Plan:  Patient with no chest pain or significant dyspnea   He had no issues overnight   Added amlodipine for better blood pressure control  Blood pressure improved  He is postop day 7 after craniotomy  BMP today with serum sodium of 134  Potassium is 4 5 with a creatinine of 0 58  Will continue current cardiac management  Amiodarone load to end August 13th with initiation of amiodarone 200 mg per day  August 14th  Please call if we can be of further assistance  Will see as an OP  Subjective:   Patient seen and examined  No significant events overnight   negative  Objective:     Vitals: Blood pressure 138/83, pulse 73, temperature 98 2 °F (36 8 °C), resp  rate 18, height 5' 8" (1 727 m), weight 78 kg (171 lb 15 3 oz), SpO2 95 %  , Body mass index is 26 15 kg/m² ,   Orthostatic Blood Pressures    Flowsheet Row Most Recent Value   Blood Pressure 138/83 filed at 08/03/2022 2155   Patient Position - Orthostatic VS Lying filed at 07/27/2022 1908      ,      Intake/Output Summary (Last 24 hours) at 8/4/2022 0855  Last data filed at 8/4/2022 0435  Gross per 24 hour   Intake --   Output 1200 ml   Net -1200 ml         Physical Exam:    GEN: Carlos Degroot   NECK: supple, no carotid bruits, no JVD or HJR  HEART: normal rate, regular rhythm, normal S1 and S2, no murmurs, clicks, gallops or rubs   LUNGS: clear to auscultation bilaterally; no wheezes, rales, or rhonchi   ABDOMEN: normal bowel sounds, soft, no tenderness, no distention  EXTREMITIES: peripheral pulses normal; no clubbing, cyanosis, or edema  SKIN: warm and well perfused, no suspicious lesions on exposed skin    Labs & Results:    No results displayed because visit has over 200 results  XR chest 2 views    Result Date: 7/25/2022  Narrative: CHEST INDICATION:   AFib RVR, lightheadedness, palpitations  COMPARISON:  01/24/2017  CT scan on 06/15/2022 EXAM PERFORMED/VIEWS:  XR CHEST PA & LATERAL Images: 3 FINDINGS: Cardiomediastinal silhouette appears unremarkable  Right-sided MediPort terminating in the SVC  The lungs are hyperinflated  Postoperative changes in the left lung  Scarring in the left midlung  No pneumothorax or pleural effusion  Osseous structures appear within normal limits for patient age  Impression: No acute cardiopulmonary disease  Hyperinflation  Workstation performed: [id number]     CT head wo contrast    Result Date: 7/30/2022  Narrative: CT BRAIN - WITHOUT CONTRAST INDICATION:   change in exam   History of metastatic neuroendocrine carcinoma with brain metastases  COMPARISON:  7/24/2022; progress note 7/30/2022; 7/28/2022; 7/25/2022 TECHNIQUE:  CT examination of the brain was performed  In addition to axial images, sagittal and coronal 2D reformatted images were created and submitted for interpretation  Radiation dose length product (DLP) for this visit:  902 14 mGy-cm   This examination, like all CT scans performed in the [facility], was performed utilizing techniques to minimize radiation dose exposure, including the use of iterative  reconstruction and automated exposure control  IMAGE QUALITY:  Diagnostic   FINDINGS: PARENCHYMA:  Evolving edema gliosis and small amounts of hemorrhagic material in the left cerebellum subjacent to a new left occipital/retromastoid craniotomy (series 3, image 13) indicative of interval resection of a large previously present hyperdense/hemorrhagic cerebellar metastasis and expected postoperative changes  There is residual local mass effect on the 4th ventricle as well as effacement of the left ambient cistern without overt hydrocephalus  Separately in the left parietal lobe there is redemonstration of a previously present craniotomy  There is a small amount of high density along the extra-axial space posteriorly on series 2 image 32 likely related to dural closure material, stable  Cystic encephalomalacia and gliosis in the treatment bed in the left parietal vertex again noted  Along the posterior margin of this treatment bed/gliotic region, there is a 0 5 cm rounded high density lesion, image 28, series 2 possibly a hyperdense or  hypercellular metastasis versus small hemorrhage or less likely cavernous angioma, similar to previous MR imaging  VENTRICLES AND EXTRA-AXIAL SPACES:  4th ventricle is effaced  No hydrocephalus  Small amount of pneumocephalus extra-axial fluid and gas subjacent left occipital craniotomy  VISUALIZED ORBITS AND PARANASAL SINUSES:  Right maxillary sinus mucus retention cyst  CALVARIUM AND EXTRACRANIAL SOFT TISSUES:  Postoperative changes in the scalp with gas and strandy densities adjacent to the left occipital craniotomy  Impression: 1  Expected postoperative changes status post left occipital/retromastoid craniotomy with resection of a previously present large hyperdense/hemorrhagic left cerebellar metastasis  There is a small amount of residual hemorrhagic material in the treatment bed with residual local edema and mass effect on the 4th ventricle  Small amounts of pneumocephalus and adjacent scalp gas and fluid surrounding the operative site noted, also expected postoperative changes  Continued clinical and imaging surveillance recommended   2   Stable 5 mm hyperdense lesion in the left parietal cortex (series 2, image 28) along the superior margin of prior treatment cavity possibly a hypercellular hyperdense /hemorrhagic cortical metastasis versus small hemorrhage or potentially even cavernous angioma, similar to recent brain MRIs  Continued clinical and imaging surveillance recommended  Workstation performed: [id number]     CT head without contrast    Result Date: 7/24/2022  Narrative: CT BRAIN - WITHOUT CONTRAST INDICATION:   Syncope, recurrent Brain metastases suspected Lightheadedness, brain metastasis  COMPARISON:  6/15/2022 TECHNIQUE:  CT examination of the brain was performed  In addition to axial images, sagittal and coronal 2D reformatted images were created and submitted for interpretation  Radiation dose length product (DLP) for this visit:  864 mGy-cm   This examination, like all CT scans performed in the [place], was performed utilizing techniques to minimize radiation dose exposure, including the use of iterative reconstruction and automated exposure control  IMAGE QUALITY:  Diagnostic  FINDINGS: PARENCHYMA:  Left cerebellar dense mass measures 3 9 x 3 6 cm, previously measuring 1 8 x 1 6 cm  There is associated vasogenic edema with mass effect upon the 4th ventricle  Adjacent to the mass is a 5 mm density (2/12)  Left parietal cortical lesion measures 6 mm, previously measuring 9 mm  There is redemonstration of left parietal encephalomalacia  VENTRICLES AND EXTRA-AXIAL SPACES:  Normal for the patient's age  VISUALIZED ORBITS AND PARANASAL SINUSES:  There is a retention cyst versus polyp in the right maxillary sinus  There is opacity in the right mastoid air cells  CALVARIUM AND EXTRACRANIAL SOFT TISSUES:  Left parietal craniotomy  Impression: The left cerebellar mass has increased in size, now measuring 3 9 x 3 6 cm  There is associated vasogenic edema with mass effect upon the 4th ventricle  There is a new 5 mm high density nodule in the left cerebellum  Left posterior parietal cortical nodule appears smaller than prior  The study was marked in [facility] for immediate notification   Workstation performed: [id number]     MRI brain w wo contrast    Result Date: 7/28/2022  Narrative: MRI BRAIN WITH AND WITHOUT CONTRAST INDICATION: Postop status post resection of left cerebellar metastases  COMPARISON:  MRI dated 7/25/2022  TECHNIQUE: Sagittal T1, axial T2, axial FLAIR, axial T1, axial [place], axial diffusion  Sagittal, axial T1 postcontrast   Axial bravo postcontrast with coronal reconstructions  IV Contrast:  10 mL of Gadobutrol injection (SINGLE-DOSE)  IMAGE QUALITY:   Diagnostic  FINDINGS: BRAIN PARENCHYMA:  New postoperative changes status post left suboccipital craniotomy for resection of large hemorrhagic left cerebellar metastasis as well as the smaller adjacent metastasis  Small subjacent extra-axial collection  Expected postoperative blood products within the operative cavity  Although evaluation for enhancement is somewhat limited due to presence of T1 hyperintense blood products, there is some mild enhancement seen at the posterior inferior aspect of the operative cavity  Possibly postoperative but follow-up anticipated    There is restricted diffusion at the inferior margin of the resection cavity that may be due to combination of marginal ischemia as well as blood products    Surrounding edema is mildly increased  Mass effect on the fourth ventricle is also mildly increased  Stable postsurgical cavity in the left parietal lobe with hemosiderin  Stable surrounding FLAIR signal  No enhancement to suggest recurrent disease  Stable 5 mm lesion in the left parietal lobe with susceptibility artifact adjacent to the resection cavity  There is is T1 shortening within the lesion on precontrast sequence (best appreciated on sagittal image 10 series 5) without definite enhancement    Stable surrounding FLAIR signal  No new lesion identified  VENTRICLES:  Stable  No hydrocephalus  SELLA AND PITUITARY GLAND:  Normal  ORBITS:  Stable bilateral proptosis   PARANASAL SINUSES:  Right maxillary sinus retention cyst  Partial opacification of the right mastoid air cells  VASCULATURE:  Evaluation of the major intracranial vasculature demonstrates appropriate flow voids  CALVARIUM AND SKULL BASE:  Postoperative changes status post left suboccipital craniotomy  EXTRACRANIAL SOFT TISSUES:  Normal      Impression: New postoperative changes status post resection of left cerebellar metastases with expected postoperative blood products  Small region of marginal postoperative ischemia  Mildly increased edema  Mass effect on the fourth ventricle is also slightly increased    No hydrocephalus  Small amount of enhancement at the operative margin may be postoperative  Recommend follow-up  Stable postsurgical changes in the left parietal lobe without locally recurrent disease  Stable 5 mm left parietal lobe lesion adjacent to resection cavity with T1 shortening and no definite residual enhancement    The study was marked in [employer] for immediate notification  Workstation performed: [id number]     MRI brain w wo contrast    Result Date: 7/25/2022  Narrative: MRI BRAIN WITH AND WITHOUT CONTRAST INDICATION: brain mass  COMPARISON:  CT head without contrast 7/24/2022, 6/15/2022  MRI brain with and without contrast 4/26/2022, 4/8/2022  TECHNIQUE: Sagittal T1, axial T2, axial FLAIR, axial T1, axial [place], axial diffusion  Sagittal, axial T1 postcontrast   Axial bravo postcontrast with coronal reconstructions  IV Contrast:  9 mL of Gadobutrol injection (SINGLE-DOSE)  IMAGE QUALITY:   Diagnostic  FINDINGS: BRAIN PARENCHYMA: Postsurgical changes of left parietal craniotomy for resection of left parietal mass  Unchanged left parietal resection cavity with encephalomalacia, gliosis, and peripheral hemosiderin deposition  No abnormal masslike enhancement in left parietal resection cavity to suggest recurrent disease   A few enhancing hemorrhagic metastatic lesions, for reference: - 0 5 x 0 5 cm hemorrhagic enhancing lesion in left parietal lobe posterior to left parietal resection cavity (12:81), previously 0 8 x 0 7 cm on CT head 6/15/2022 but unchanged since 7/24/2022  - 4 3 x 3 6 cm hemorrhagic enhancing mass in left cerebellum (12:38), previously 1 8 x 1 4 cm  Unchanged mass effect on the 4th ventricle since yesterday's CT head without contrast   Moderate surrounding perilesional vasogenic edema in left cerebellum, posterior cerebellar vermis, and right posterior paramedian cerebellum  - 0 6 x 0 6 cm hemorrhagic enhancing mass posterior to the dominant left cerebellar mass (12:40), previously 1 5 x 1 4 cm cm  No midline shift  No diffusion-weighted signal abnormality to suggest acute infarction  A few small scattered hyperintensities on T2/FLAIR imaging are noted in the periventricular and subcortical white matter demonstrating an appearance that is statistically most likely to represent minimal microangiopathic change  VENTRICLES:  Unchanged mass effect on the 4th ventricle  No obstructive hydrocephalus  No intraventricular hemorrhage  SELLA AND PITUITARY GLAND:  Normal  ORBITS:  Unchanged bilateral globe proptosis  PARANASAL SINUSES:  Moderate size right maxillary mucus retention cyst  VASCULATURE:  Evaluation of the major intracranial vasculature demonstrates appropriate flow voids  CALVARIUM AND SKULL BASE: Left parietal craniotomy  Small bilateral mastoid effusions (right worse than left)  EXTRACRANIAL SOFT TISSUES:  Normal      Impression: Mixed treatment response - 4 3 cm hemorrhagic metastatic lesion in left cerebellum, increased in size since CT head 6/15/2022 but unchanged since 7/24/2022 - this is likely due to interval hemorrhage of known left cerebellar lesion  Moderate surrounding perilesional vasogenic edema in left cerebellum, posterior cerebellar vermis, and right posterior paramedian cerebellum with mild mass effect on the 4th ventricle  - 0 6 cm hemorrhagic metastatic lesion posterior to the dominant left cerebellar mass, decreased in size  - 0 5 cm left parietal lobe hemorrhagic lesion posterior to left parietal resection cavity, slightly decreased in size  - No evidence of recurrent disease in left parietal resection cavity  The study was marked in [facility] for immediate notification  Workstation performed: [id number]     CT chest abdomen pelvis w contrast    Result Date: 7/26/2022  Narrative: CT CHEST, ABDOMEN AND PELVIS WITH IV CONTRAST INDICATION:   Brain/CNS neoplasm, staging Non-small cell lung cancer (NSCLC), monitor Non-small cell lung cancer (NSCLC), metastatic, assess treatment response h/o lung cancer, new brain mets  Evaluate for staging  COMPARISON:  CT chest abdomen pelvis 6/15/2022 TECHNIQUE: CT examination of the chest, abdomen and pelvis was performed  Axial, sagittal, and coronal 2D reformatted images were created from the source data and submitted for interpretation  Radiation dose length product (DLP) for this visit:  1208 13 mGy-cm   This examination, like all CT scans performed in the [facility], was performed utilizing techniques to minimize radiation dose exposure, including the use of iterative reconstruction and automated exposure control  IV Contrast:  65 mL of iohexol (OMNIPAQUE) Enteric contrast was not administered  FINDINGS: CHEST LUNGS:  Moderate emphysema  Postsurgical changes from left upper lobectomy  There are multiple new bilateral irregular lung nodules suspicious for metastases, with examples as follows: -Right upper lobe juxtapleural nodule measuring 1 6 x 2 5 cm (series 3 image 48)  -Right upper lobe perifissural nodule measuring 1 4 x 1 8 cm (series 3 image 61)  -Left lower lobe juxtapleural nodule measuring 1 9 x 1 5 cm (series 3 image 93)  -Left lower lobe nodule measuring 1 6 x 1 3 cm (series 3 image 70)  PLEURA:  Unremarkable  HEART/GREAT VESSELS:  Heart is unremarkable for patient's age  No thoracic aortic aneurysm  MEDIASTINUM AND OWEN:  Unremarkable   CHEST WALL AND LOWER NECK:  Right chest wall port with the tip in the SVC  ABDOMEN LIVER/BILIARY TREE:  Hepatic steatosis  Otherwise unremarkable  GALLBLADDER:  No calcified gallstones  No pericholecystic inflammatory change  SPLEEN:  Nonspecific hypodense splenic lesion measuring approximately 1 7 cm, seen on prior studies since 10/10/2017  PANCREAS:  Unremarkable  ADRENAL GLANDS:  Unremarkable  KIDNEYS/URETERS:  Bilateral renal cysts and subcentimeter too small to characterize hypodensities  No hydronephrosis  STOMACH AND BOWEL:  Colonic diverticulosis without findings of acute diverticulitis  APPENDIX:  Normal  ABDOMINOPELVIC CAVITY:  No ascites  No pneumoperitoneum  No lymphadenopathy  VESSELS:  Marked atherosclerotic changes  No abdominal aortic aneurysm  PELVIS REPRODUCTIVE ORGANS:  Unremarkable for patient's age  URINARY BLADDER:  Diffusely increased density fluid within the bladder  Otherwise unremarkable  ABDOMINAL WALL/INGUINAL REGIONS:  Unremarkable  OSSEOUS STRUCTURES:  No acute fracture or destructive osseous lesion  Degenerative changes of the spine  Postsurgical change in left ribs from thoracotomy  Impression: 1  Multiple new bilateral irregular lung nodules suspicious for metastases  2   No findings of metastatic disease in the abdomen or pelvis  The study was marked in [facility] for immediate notification  Workstation performed: [id number]       EKG personally reviewed by Tesha Mills MD      Counseling / Coordination of Care  Total floor / unit time spent today 30 minutes  Greater than 50% of total time was spent with the patient and / or family counseling and / or coordination of care

## 2022-08-04 NOTE — PLAN OF CARE
Problem: PHYSICAL THERAPY ADULT  Goal: Performs mobility at highest level of function for planned discharge setting  See evaluation for individualized goals  Description: Treatment/Interventions: Functional transfer training, LE strengthening/ROM, Therapeutic exercise, Endurance training, Cognitive reorientation, Equipment eval/education, Bed mobility, Gait training, OT          See flowsheet documentation for full assessment, interventions and recommendations  Outcome: Progressing  Note: Prognosis: Fair  Problem List: Decreased strength, Decreased range of motion, Decreased endurance, Impaired balance, Decreased mobility, Decreased coordination, Decreased cognition, Impaired judgement, Decreased safety awareness, Impaired vision, Impaired sensation, Obesity, Decreased skin integrity, Pain  Assessment: Pt seen for PT treatment session this date  Therapy session focused on bed mobility, sitting tolerance/ balance, functional transfers and endurance training in order to improve overall mobility and independence  Pt requires assist of 1-2 for all mobility performed this date  Pt making slow progress toward goals 2* decreased activity tolerance  Pt requiring max Ax1 to complete bed mobility tasks, max Ax2 to complete 3x STS transfers/ SPT to drop arm chair  Pt fluctuates from close S level to mod Ax1 with fatigue when sitting EOB performing static/ dynamic tasks  Pt continues to demonstrate flat affect, however was oriented this session  Continues to be limited by fatigue, with increased exhaustion noted at end of session  Pt was left sitting OOB in recliner chair at the end of PT session with all needs in reach  Pt would benefit from continued PT services while in hospital to address remaining limitations  PT to continue to follow pt and recommends rehab upon d/c  The patient's AM-PAC Basic Mobility Inpatient Short Form Raw Score is 8   A Raw score of less than or equal to 16 suggests the patient may benefit from discharge to post-acute rehabilitation services  Please also refer to the recommendation of the Physical Therapist for safe discharge planning  Barriers to Discharge: Inaccessible home environment     PT Discharge Recommendation: Post acute rehabilitation services    See flowsheet documentation for full assessment

## 2022-08-04 NOTE — ASSESSMENT & PLAN NOTE
· Primary left lung adenocarcinoma   · Reporting lightheadedness for the past several months, which has increased over the past several days  · CT showing increase in size of left cerebellar mass with associated vasogenic edema with mass effect upon the 4th ventricle as well as new 5 mm high-density nodule in the left cerebellum  · Patient is AAOx4, neuro exam is nonfocal  · Patient is postop day 5 from left retrosigmoid posterior fossa craniotomy for resection of mass  · Preliminary pathology showing metastatic carcinoma  · Continue Decadron taper  · Platelet goal above 75  · Blood pressure goal below 160  · Discharge planning to subacute inpatient rehabilitation

## 2022-08-04 NOTE — SPEECH THERAPY NOTE
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 [facility])  Active Problems:    Essential hypertension    Mixed hyperlipidemia    Adenocarcinoma, lung, left ([facility] [address] )    History of gout    Thrombocytopenia (HCC)    Atrial fibrillation with rapid ventricular response (HCC)    Hyponatremia    High grade neuroendocrine carcinoma of lung [facility])      Past Medical History  Past Medical History:   Diagnosis Date    [facility] cancer ([facility] )     Hyperlipidemia     Hypertension     Lung cancer [facility])        Past Surgical History  Past Surgical History:   Procedure Laterality Date    BACK SURGERY      CRANIOTOMY Left 4/7/2022    Procedure: Image guided left parietal craniotomy for tumor resection;  Surgeon: Sarkis Armstrong MD;  [facility];  Service: Neurosurgery    CRANIOTOMY Left 7/28/2022    Procedure: left retrosigmoid/posterior fossa craniotomy for resction of mass with image guidence;  Surgeon: Sarkis Armstrong MD;  [facility];  Service: Neurosurgery    [address] [place] [address] IR BIOPSY LUNG  5/6/2021    IR PORT PLACEMENT  6/17/2021    LAMINECTOMY  2018    L4-L5    LUNG SURGERY      left upper lobectomy    SD BRONCHOSCOPY,DIAGNOSTIC N/A 5/25/2021    Procedure: BRONCHOSCOPY FLEXIBLE;  Surgeon: Doug Townsend MD;  [facility];  Service: Thoracic    SD MEDIASTINOSCOPY WITH LYMPH NODE BIOPSY/IES N/A 5/25/2021    Procedure: MEDIASTINOSCOPY, flexible bronchoscopy;  Surgeon: Doug Townsend MD;  [facility];  Service: [address] [place] [address]       Summary   Pt presented with functional appearing oral and pharyngeal stage swallowing skills with materials administered today  The pt has some mild c/o material sticking in his throat but is able to alternate bites/sips    Educated on diet levels & the pt & wife agree to remain on the regular w/ thin  He prefers being set up, material cut up then will self feed  Risk/s for Aspiration: low    Recommended Diet: regular diet and thin liquids   Recommended Form of Meds: whole with puree   Aspiration precautions and swallowing strategies: only feed when fully alert, slow rate of feeding, small bites/sips and alternating bites and sips  Other Recommendations: Continue frequent oral care, no f/u at this time re consult if needed        Current Medical Status  Pt is a 69 y o  male who presented to [facility] with lightheadedness, tinnitus and visual complaints  S/p L parietal craniotomy for  Resection of tumor on 4/7/2022 with Dr Edwin Cleaning  Pt with L cerebellar mass that increased in size measuring 3 9 x 3 6cm  associated edema and mass effect upon the 4th ventricle  New 5mm hyperdensity in the left cerebellum  L posterior parietal cortical nodule appears smaller   Pt underwent L retrosigmoid/post fossa crani for resection of the mass on 7/28/22  He was extubated following surgery & moved to the med surg floor  Current Precautions:  Fall  Aspiration      Allergies:  No known food allergies    Past medical history:  Please see H&P for details  Has had chemo, L upper lobectomy, previous crani  Special Studies:      Social/Education/Vocational Hx:  Pt lives with family    Swallow Information   Current Risks for Dysphagia & Aspiration: recent surgery and recent intubation  Current Symptoms/Concerns: food sticking  poor appetite  Current Diet: regular diet and thin liquids   Baseline Diet: regular diet and thin liquids      Baseline Assessment   Behavior/Cognition: alert and lethargic  Speech/Language Status: able to participate in basic conversation  Patient Positioning: upright in bed  Pain Status/Interventions/Response to Interventions:   No report of or nonverbal indications of pain         Swallow Mechanism Exam  Facial: symmetrical  Labial: WFL  Lingual: WFL  Velum: unable to visualize  Mandible: adequate ROM  Dentition: adequate  Vocal quality:clear/adequate and weak   Volitional Cough: strong/productive   Respiratory Status: on NC      Consistencies Assessed and Performance   Consistencies Administered: thin liquids, puree and hard solids    Oral Stage: WFL and minimal  Mastication was adequate with the materials administered today-mildly prolonged by appropriate  Bolus formation and transfer were functional with no significant oral residue noted  No overt s/s reduced oral control  Pharyngeal Stage: WFL  Swallow Mechanics:  Swallowing initiation appeared prompt  Laryngeal rise was palpated and judged to be within functional limits  No coughing, throat clearing, change in vocal quality or respiratory status noted today   Mult swallows at times    Esophageal Concerns: globus sensation and belching-new since surgery    Strategies and Efficacy: alternate bites/sips   Summary and Recommendations (see above)    Results Reviewed with: patient, RN and family     Treatment Recommended: no

## 2022-08-04 NOTE — PROGRESS NOTES
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 [facility])  Assessment & Plan  · Primary left lung adenocarcinoma   · Reporting lightheadedness for the past several months, which has increased over the past several days  · CT showing increase in size of left cerebellar mass with associated vasogenic edema with mass effect upon the 4th ventricle as well as new 5 mm high-density nodule in the left cerebellum  · Patient is AAOx4, neuro exam is nonfocal  · Patient is postop day 5 from left retrosigmoid posterior fossa craniotomy for resection of mass  · Preliminary pathology showing metastatic carcinoma  · Continue Decadron taper  · Platelet goal above 75  · Blood pressure goal below 160  · Discharge planning to subacute inpatient rehabilitation              Adenocarcinoma, lung, left ([facility] [address] )  Assessment & Plan  · Patient with a history metastatic lung adeno carcinoma status post left upper lobe resection with radiation and chemotherapy  · Recent CT chest abdomen pelvis showing multiple new bilateral irregular lung nodules which are suspicious for metastasis, fortunately no metastatic disease noted in the abdomen or pelvis    · Outpatient follow up with Dr Kareem Hoover for possible chemo after rehabilitation      High grade neuroendocrine carcinoma of lung ([facility] [address] )  Assessment & Plan  · Management as above    Hyponatremia  Assessment & Plan  · Management per Nephrology  · 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  · Resolved with salt tabs  · Salt tabs discontinued, will continue to monitor BMP, if Na decreased further will restart salt tabs tomorrow    Atrial fibrillation with rapid ventricular response (HCC)  Assessment & Plan  · Rates currently well controlled on Cardizem, metoprolol, and amiodarone load  Change to 200mg daily amiodarone on   · No AP/AC due to post op state  Thrombocytopenia (HCC)  Assessment & Plan  · Baseline thrombocytopenia around 100  · Stabilized now  · Continue DVT prophylaxis with heparin    History of gout  Assessment & Plan  · On allopurinol 100 mg daily  Mixed hyperlipidemia  Assessment & Plan  Continue atorvastatin 20 mg daily    Essential hypertension  Assessment & Plan  · BP is at goal  · Continue metoprolol succinate 50 mg b i d   · Continue diltiazem          VTE Pharmacologic Prophylaxis: VTE Score: 5 Moderate Risk (Score 3-4) - Pharmacological DVT Prophylaxis Ordered: heparin  Patient Centered Rounds: I performed bedside rounds with nursing staff today  Discussions with Specialists or Other Care Team Provider: nurse, CM    Education and Discussions with Family / Patient: Updated  (wife) at bedside  Time Spent for Care: 20 minutes  More than 50% of total time spent on counseling and coordination of care as described above  Current Length of Stay: 10 day(s)  Current Patient Status: Inpatient   Certification Statement: The patient will continue to require additional inpatient hospital stay due to discharge planning  Discharge Plan: Anticipate discharge tomorrow to rehab facility  Code Status: Level 1 - Full Code    Subjective:       Objective:     Vitals:   Temp (24hrs), Av 5 °F (36 9 °C), Min:98 2 °F (36 8 °C), Max:98 7 °F (37 1 °C)    Temp:  [98 2 °F (36 8 °C)-98 7 °F (37 1 °C)] 98 7 °F (37 1 °C)  HR:  [72-88] 78  Resp:  [16-18] 16  BP: (120-146)/(79-89) 145/89  SpO2:  [93 %-95 %] 93 %  Body mass index is 26 15 kg/m²  Input and Output Summary (last 24 hours):      Intake/Output Summary (Last 24 hours) at 2022 1742  Last data filed at 2022 1701  Gross per 24 hour   Intake --   Output 1050 ml   Net -1050 ml       Physical Exam:   Physical Exam  Constitutional:       Appearance: Normal appearance  HENT:      Head:      Comments: Incision staples are clean and dry     Nose: Nose normal       Mouth/Throat:      Mouth: Mucous membranes are moist       Pharynx: Oropharynx is clear  Eyes:      Extraocular Movements: Extraocular movements intact  Cardiovascular:      Rate and Rhythm: Normal rate and regular rhythm  Pulmonary:      Effort: Pulmonary effort is normal       Breath sounds: No wheezing or rales  Neurological:      Mental Status: He is alert and oriented to person, place, and time  Mental status is at baseline  Psychiatric:         Mood and Affect: Mood normal          Behavior: Behavior normal         Additional Data:     Labs:  Results from last 7 days   Lab Units 08/04/22  0605 08/03/22  0539   WBC Thousand/uL 6 35 7 19   HEMOGLOBIN g/dL 10 8* 11 3*   HEMATOCRIT % 33 2* 34 4*   PLATELETS Thousands/uL 77* 69*   NEUTROS PCT %  --  96*   LYMPHS PCT %  --  2*   MONOS PCT %  --  1*   EOS PCT %  --  0     Results from last 7 days   Lab Units 08/04/22  0605   SODIUM mmol/L 134*   POTASSIUM mmol/L 4 5   CHLORIDE mmol/L 102   CO2 mmol/L 23   BUN mg/dL 27*   CREATININE mg/dL 0 58*   ANION GAP mmol/L 9   CALCIUM mg/dL 9 1   GLUCOSE RANDOM mg/dL 148*     Results from last 7 days   Lab Units 07/29/22  0535   INR  0 99                   Lines/Drains:  Invasive Devices  Report    Peripherally Inserted Central Catheter Line  Duration           PICC Line 94/22/99 Left Basilic 5 days          Central Venous Catheter Line  Duration           Port A Cath 06/17/21 Right Chest 413 days                Central Line:  Goal for removal: Will discontinue when meds requiring line are completed  Imaging: No pertinent imaging reviewed      Recent Cultures (last 7 days):         Last 24 Hours Medication List:   Current Facility-Administered Medications   Medication Dose Route Frequency Provider Last Rate    acetaminophen  975 mg Oral Q8H [facility] Wilber Sanchez DO      allopurinol  100 mg Oral Daily [employer], DO      aluminum-magnesium hydroxide-simethicone  30 mL Oral Q6H PRN [employer], DO      [START ON 8/14/2022] amiodarone  200 mg Oral Daily With Breakfast Natalya Pickett MD      amiodarone  400 mg Oral BID With Meals Natalya Pickett MD      amLODIPine  5 mg Oral Daily Natayla Pickett MD      vitamin C  1,000 mg Oral Daily [employer], DO      atorvastatin  20 mg Oral Daily With [employer], DO      bisacodyl  10 mg Rectal Daily PRN [employer], DO      dexamethasone  2 mg Oral Q8H [facility], DO      Followed by   Liam Becerril ON 8/5/2022] dexamethasone  2 mg Oral Q12H [facility],       Followed by   Liam Becerril ON 8/8/2022] dexamethasone  2 mg Oral Q24H [facility], DO      diltiazem  120 mg Oral Q12H [facility], DO      docusate sodium  100 mg Oral BID [employer], DO      heparin (porcine)  5,000 Units Subcutaneous Q8H [facility], DO      levothyroxine  37 5 mcg Oral Early Morning [facility], DO      LORazepam  0 5 mg Oral BID PRN Alex Meeks MD      meclizine  25 mg Oral Q8H PRN [employer], DO      methocarbamol  500 mg Oral Q6H [facility], DO      metoclopramide  10 mg Intravenous Q6H PRN [employer], DO      metoprolol succinate  50 mg Oral BID [employer], DO      ondansetron  4 mg Intravenous Q6H PRN [employer], DO      oxyCODONE  2 5 mg Oral Q4H PRN [employer], DO      pantoprazole  40 mg Oral Early Morning [facility], DO      phenol  1 spray Mouth/Throat 4x Daily (AC & HS) Alex Meeks MD      polyethylene glycol  17 g Oral BID [employer], DO      scopolamine  1 patch Transdermal Q72H [employer], DO      senna  1 tablet Oral Daily [employer], DO          Today, Patient Was Seen By: Leonides Coates MD    **Please Note: This note may have been constructed using a voice recognition system  **

## 2022-08-04 NOTE — PROGRESS NOTES
Cardiology Progress note  Unit/Bed#: [facility] 924-01 Encounter: [id number]        Neetu Bullard 69 y o  male [address] Stay Days: 10    Assessment and Plan      HPI: Geneva Markham is a 69year old male PMH neuroendocrine lung cancer with brain metastasis s/p lung resection in  and craniotomy 6 days ago, essential HTN, hypothyroidism, Afib with RVR, and mixed HLD  He presented to [facility] for increasing lightheadedness and shortness of breath, was found to be in Afib  Patient has a history of afib episode following lung resection in   He was transferred to [facility] the week of  for neurosurgery eval regarding a hemorrhagic mass in his cerebellum  Patient is currently post op day 6 following posterior craniotomy          Current Problem List   Principal Problem:    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 Status: Improved  Patient no longer in Afib, maintaining rate controlled (72bpm) sinus rhythm with premature atrial contractions as seen on EKG 2022  Currently on Amiodarone 400 mg BID, Diltiazem 120 mg Q12H, amlodipine resumed at 5 mg daily, metoprolol succinate 50 mg BID  · Continue cardiac medication regimen  Subjective     Mr Jobe Heimlich was seen at bedside today and experienced no overnight events  He currently denies CP, SOB, and palpitations  He endorses dizziness which he says has been the case for the past week   He has no questions for the cardiology team      Objective     Vitals: Temp (24hrs), Av 1 °F (36 7 °C), Min:97 9 °F (36 6 °C), Max:98 2 °F (36 8 °C)  Current: Temperature: 98 2 °F (36 8 °C)  Patient Vitals for the past 24 hrs:   BP Temp Pulse Resp SpO2   08/04/22 0920 146/88 -- 88 -- 95 %   08/04/22 0917 120/84 -- 79 -- 94 %   08/04/22 0911 140/85 -- 72 -- 95 %   08/03/22 2155 138/83 98 2 °F (36 8 °C) 73 18 95 %   08/03/22 2025 131/79 -- 79 -- 95 %   08/03/22 1452 128/77 97 9 °F (36 6 °C) 72 -- 93 %    Body mass index is 26 15 kg/m²  Physical Exam:  Physical Exam  Constitutional:       Appearance: He is normal weight  Cardiovascular:      Rate and Rhythm: Normal rate and regular rhythm  Pulses: Normal pulses  Heart sounds: Normal heart sounds  Pulmonary:      Effort: Pulmonary effort is normal       Breath sounds: Normal breath sounds  Abdominal:      General: Abdomen is flat  Palpations: Abdomen is soft  Skin:     General: Skin is warm and dry  Neurological:      Mental Status: He is alert           Invasive Devices  Report    Peripherally Inserted Central Catheter Line  Duration           PICC Line 84/35/24 Left Basilic 5 days          Central Venous Catheter Line  Duration           Port A Cath 06/17/21 Right Chest 412 days                    Labs:   Results from last 7 days   Lab Units 08/04/22  0605 08/03/22  0539 08/02/22  0508 08/01/22  0511 07/31/22  0607 07/30/22  0526 07/29/22  1459 07/29/22  0535 07/28/22  1337   WBC Thousand/uL 6 35 7 19 7 05 6 73 7 53 7 83 7 80 6 90 6 41   HEMOGLOBIN g/dL 10 8* 11 3* 11 3* 11 4* 12 1 12 4 11 7* 11 8* 11 7*   HEMATOCRIT % 33 2* 34 4* 33 7* 34 9* 35 8* 34 9* 33 8* 34 3* 33 6*   PLATELETS Thousands/uL 77* 69* 82* 97* 114* 133* 152 158 204   NEUTROS PCT %  --  96*  --   --   --   --   --   --   --    MONOS PCT %  --  1*  --   --   --   --   --   --   --    MONO PCT %  --   --   --  1*  --   --   --   --  2*      Results from last 7 days   Lab Units 08/04/22  0605 08/03/22  0539 08/02/22  0507 08/01/22  1051 08/01/22  0005 07/31/22  1648 07/31/22  0416 07/31/22  0022 07/30/22  2142 07/30/22  1844 07/30/22  1549 07/30/22  1513 07/30/22  0526 07/30/22  0109 07/29/22  1844 07/29/22  1118 07/29/22  0535   SODIUM mmol/L 134* 137 138 132* 132* 131* 132* 131* 133* 128* 126* 129* 126* 127* 127* 129* 130*   POTASSIUM mmol/L 4 5 4 0 3 5 4 0 4 0 4 0 3 5 3 5 3 6 3 6 4 1 4 1 4 0 4 2 4 2 3 7 3 6   CHLORIDE mmol/L 102 101 103 96 97 95* 93* 94* 97 94* 92* 94* 92* 95* 95* 96 96   CO2 mmol/L 23 31 31 32 32 31 33* 31 30 25 27 27 28 28 27 26 27   BUN mg/dL 27* 28* 31* 30* 28* 25 19 19 19 19 18 20 17 18 15 21 19   CREATININE mg/dL 0 58* 0 66 0 58* 0 76 0 76 0 87 0 62 0 59* 0 70 0 95 0 70 0 79 0 66 0 57* 0 64 0 90 0 61   CALCIUM mg/dL 9 1 9 0 8 5 9 1 9 0 8 7 8 4 8 4 8 2* 8 7 8 6 8 4 8 6 8 6 8 9 8 5 8 6   MAGNESIUM mg/dL  --   --   --   --   --   --  2 2  --   --   --   --   --  2 2  --   --  2 0  --    PHOSPHORUS mg/dL  --   --   --   --   --   --  3 5  --   --   --   --   --  2 7  --   --   --   --    INR   --   --   --   --   --   --   --   --   --   --   --   --   --   --   --   --  0 99   PTT seconds  --   --   --   --   --   --   --   --   --   --   --   --   --   --   --   --  26   EGFR ml/min/1 73sq m 104 98 104 93 93 88 101 103 96 81 96 91 98 104 99 86 101     Results from last 7 days   Lab Units 07/29/22  0535   INR  0 99   PTT seconds 26             No results found for: PHART, VZC0DER, PO2ART, XOC9NLU, Z3FPFIVD, BEART, SOURCE  No components found for: HIV1X2  Lab Results   Component Value Date    HEPCAB Non-reactive 10/06/2020     No results found for: SPEP, UPEP   Lab Results   Component Value Date    HGBA1C 5 2 04/03/2022    HGBA1C 5 4 01/25/2022    HGBA1C 5 3 06/23/2021     No results found for: CHOL   Lab Results   Component Value Date    HDL 43 06/20/2022    HDL 70 04/03/2022    HDL 35 (L) 01/25/2022      Lab Results   Component Value Date    LDLCALC 93 06/20/2022    LDLCALC 96 04/03/2022    LDLCALC 97 01/25/2022      Lab Results   Component Value Date    TRIG 140 06/20/2022    TRIG 137 04/03/2022    TRIG 130 01/25/2022     No components found for: PROCAL Micro:      Urinalysis:  Lab Results   Component Value Date    BDZUR Negative 04/03/2022    COCAINEUR Negative 04/03/2022    OPIATEUR Negative 04/03/2022    PCPUR Negative 04/03/2022    THCUR Positive (A) 04/03/2022      Results from last 7 days   Lab Units 07/29/22  1054   COLOR UA  Light Yellow   CLARITY UA  Clear   SPEC GRAV UA  1 023   PH UA  6 5   LEUKOCYTES UA  Negative   NITRITE UA  Negative   GLUCOSE UA mg/dl Negative   KETONES UA mg/dl Negative   BILIRUBIN UA  Negative   BLOOD UA  Large*      Results from last 7 days   Lab Units 07/29/22  1054   RBC UA /hpf Innumerable*   WBC UA /hpf 1-2   EPITHELIAL CELLS WET PREP /hpf Occasional   BACTERIA UA /hpf None Seen      Intake and Outputs:  I/O       08/02 0701 08/03 0700 08/03 0701 08/04 0700 08/04 0701 08/05 0700    P  O  120      Total Intake(mL/kg) 120 (1 5)      Urine (mL/kg/hr) 650 (0 3) 1200 (0 6)     Stool  0     Total Output 650 1200     Net -530 -1200            Unmeasured Urine Occurrence 1 x      Unmeasured Stool Occurrence  1 x         Nutrition:  Diet Regular; Regular House; Fluid Restriction 1500 ML  Radiology Results:   CT head wo contrast   Final Result by Reno Luna MD (07/30 1023)         1  Expected postoperative changes status post left occipital/retromastoid craniotomy with resection of a previously present large hyperdense/hemorrhagic left cerebellar metastasis  There is a small amount of residual hemorrhagic material in the    treatment bed with residual local edema and mass effect on the 4th ventricle  Small amounts of pneumocephalus and adjacent scalp gas and fluid surrounding the operative site noted, also expected postoperative changes  Continued clinical and imaging    surveillance recommended     2   Stable 5 mm hyperdense lesion in the left parietal cortex (series 2, image 28) along the superior margin of prior treatment cavity possibly a hypercellular hyperdense /hemorrhagic cortical metastasis versus small hemorrhage or potentially even    cavernous angioma, similar to recent brain MRIs  Continued clinical and imaging surveillance recommended  Workstation performed: [id number]         MRI brain w wo contrast   Final Result by Jolanta Healy MD (07/28 1535)      New postoperative changes status post resection of left cerebellar metastases with expected postoperative blood products  Small region of marginal postoperative ischemia  Mildly increased edema  Mass effect on the fourth ventricle is also slightly    increased    No hydrocephalus  Small amount of enhancement at the operative margin may be postoperative  Recommend follow-up  Stable postsurgical changes in the left parietal lobe without locally recurrent disease  Stable 5 mm left parietal lobe lesion adjacent to resection cavity with T1 shortening and no definite residual enhancement         The study was marked in EPIC for immediate notification  Workstation performed: [id number]         CT chest abdomen pelvis w contrast   Final Result by Iris Back MD (07/26 5607)      1  Multiple new bilateral irregular lung nodules suspicious for metastases  2   No findings of metastatic disease in the abdomen or pelvis  The study was marked in [facility] for immediate notification  Workstation performed: [id number]         MRI brain w wo contrast   Final Result by Lynn Granados MD (07/25 0302)      Mixed treatment response   - 4 3 cm hemorrhagic metastatic lesion in left cerebellum, increased in size since CT head 6/15/2022 but unchanged since 7/24/2022 - this is likely due to interval hemorrhage of known left cerebellar lesion    Moderate surrounding perilesional vasogenic    edema in left cerebellum, posterior cerebellar vermis, and right posterior paramedian cerebellum with mild mass effect on the 4th ventricle    - 0 6 cm hemorrhagic metastatic lesion posterior to the dominant left cerebellar mass, decreased in size    - 0 5 cm left parietal lobe hemorrhagic lesion posterior to left parietal resection cavity, slightly decreased in size    - No evidence of recurrent disease in left parietal resection cavity  The study was marked in [facility] for immediate notification                       Workstation performed: [id number]           Scheduled Medications:  acetaminophen, 975 mg, Q8H [address]  allopurinol, 100 mg, Daily  [START ON 8/14/2022] amiodarone, 200 mg, Daily With Breakfast  amiodarone, 400 mg, BID With Meals  amLODIPine, 5 mg, Daily  vitamin C, 1,000 mg, Daily  atorvastatin, 20 mg, Daily With Dinner  dexamethasone, 2 mg, Q8H [address]   Followed by  Loanne Hemp ON 8/5/2022] dexamethasone, 2 mg, Q12H [address]   Followed by  Loanne Hemp ON 8/8/2022] dexamethasone, 2 mg, Q24H [address]  diltiazem, 120 mg, Q12H [address]  docusate sodium, 100 mg, BID  heparin (porcine), 5,000 Units, Q8H [address]  levothyroxine, 37 5 mcg, Early Morning  methocarbamol, 500 mg, Q6H [address]  metoprolol succinate, 50 mg, BID  pantoprazole, 40 mg, Early Morning  polyethylene glycol, 17 g, BID  scopolamine, 1 patch, Q72H  senna, 1 tablet, Daily      PRN MEDS:  aluminum-magnesium hydroxide-simethicone, 30 mL, Q6H PRN  bisacodyl, 10 mg, Daily PRN  LORazepam, 0 5 mg, BID PRN  meclizine, 25 mg, Q8H PRN  metoclopramide, 10 mg, Q6H PRN  ondansetron, 4 mg, Q6H PRN  oxyCODONE, 2 5 mg, Q4H PRN  phenol, 1 spray, Q2H PRN      Last 24 Hour Meds: :   Medication Administration - last 24 hours from 08/03/2022 1221 to 08/04/2022 1221       Date/Time Order Dose Route Action Action by     08/04/2022 0859 allopurinol (ZYLOPRIM) tablet 100 mg 100 mg Oral Given Andrew Esaprza RN     08/04/2022 8166 ascorbic acid (VITAMIN C) tablet 1,000 mg 1,000 mg Oral Given Andrew Esparza RN     08/04/2022 0606 levothyroxine tablet 37 5 mcg 37 5 mcg Oral Given Angi Robles RN     08/04/2022 0606 pantoprazole (PROTONIX) EC tablet 40 mg 40 mg Oral Given Angi Robles RN     08/03/2022 1810 atorvastatin (LIPITOR) tablet 20 mg 20 mg Oral Given Jordyn Holguin RN     08/04/2022 0859 diltiazem (CARDIZEM SR) 12 hr capsule 120 mg 120 mg Oral Given Xenia Herrera RN     08/03/2022 2027 diltiazem (CARDIZEM SR) 12 hr capsule 120 mg 120 mg Oral Given Nery Contreras RN     08/04/2022 0606 meclizine (ANTIVERT) tablet 25 mg 25 mg Oral Given Nery Contreras RN     08/03/2022 1810 meclizine (ANTIVERT) tablet 25 mg 25 mg Oral Given Jordyn Holguin RN     08/04/2022 0858 polyethylene glycol (MIRALAX) packet 17 g 17 g Oral Given Xenia Herrera RN     08/03/2022 1810 polyethylene glycol (MIRALAX) packet 17 g 17 g Oral Given Jordyn Holguin RN     08/04/2022 0858 docusate sodium (COLACE) capsule 100 mg 100 mg Oral Given Xenia Herrera RN     08/03/2022 1810 docusate sodium (COLACE) capsule 100 mg 100 mg Oral Given Jordyn Holguin RN     08/04/2022 0858 senna (SENOKOT) tablet 8 6 mg 8 6 mg Oral Given Xenia Herrera RN     08/03/2022 1810 dexamethasone (DECADRON) tablet 2 mg 2 mg Oral Given Jordyn Holguin RN     08/03/2022 1225 dexamethasone (DECADRON) tablet 2 mg 2 mg Oral Given Jordyn Holguin RN     08/04/2022 4799 dexamethasone (DECADRON) tablet 2 mg 2 mg Oral Given Nery Contreras RN     08/03/2022 2304 dexamethasone (DECADRON) tablet 2 mg 2 mg Oral Given Nery Contreras RN     08/03/2022 1440 scopolamine (TRANSDERM-SCOP) 1 mg/3 days TD 72 hr patch 1 patch 1 patch Transdermal Medication Applied Jordyn Holguin RN     08/03/2022 1439 scopolamine (TRANSDERM-SCOP) 1 mg/3 days TD 72 hr patch 1 patch 1 patch Transdermal Patch Removed Jordyn Holguin RN     08/04/2022 0606 acetaminophen (TYLENOL) tablet 975 mg 975 mg Oral Given Nery Contreras RN     08/03/2022 2304 acetaminophen (TYLENOL) tablet 975 mg 975 mg Oral Given Nery Contreras RN     08/03/2022 1451 acetaminophen (TYLENOL) tablet 975 mg 975 mg Oral Given Jordyn Holguin RN     08/04/2022 0107 oxyCODONE (ROXICODONE) IR tablet 2 5 mg 2 5 mg Oral Given Phyllis mEanuel, RN     08/03/2022 2026 oxyCODONE (ROXICODONE) IR tablet 2 5 mg 2 5 mg Oral Given Phyllis Emanuel, RN     08/04/2022 1213 methocarbamol (ROBAXIN) tablet 500 mg 500 mg Oral Given Sherlyn Lynn, RN     08/04/2022 0606 methocarbamol (ROBAXIN) tablet 500 mg 500 mg Oral Given Phyllis Emanuel, RN     08/03/2022 2304 methocarbamol (ROBAXIN) tablet 500 mg 500 mg Oral Given Phyllis Emanuel, RN     08/03/2022 1810 methocarbamol (ROBAXIN) tablet 500 mg 500 mg Oral Given Sherlyn Lynn, RN     08/03/2022 1226 methocarbamol (ROBAXIN) tablet 500 mg 500 mg Oral Given Sherlyn Bailey, RN     08/04/2022 8483 heparin (porcine) subcutaneous injection 5,000 Units 5,000 Units Subcutaneous Given Phyllis Emanuel, RN     08/03/2022 2304 heparin (porcine) subcutaneous injection 5,000 Units 5,000 Units Subcutaneous Given Phyllis Emanuel, RN     08/03/2022 1451 heparin (porcine) subcutaneous injection 5,000 Units 5,000 Units Subcutaneous Given Sherlyn Bailey, RN     08/04/2022 0858 metoprolol succinate (TOPROL-XL) 24 hr tablet 50 mg 50 mg Oral Given Nick Ramirez, JUAN J     08/03/2022 2026 metoprolol succinate (TOPROL-XL) 24 hr tablet 50 mg 50 mg Oral Given Phyllis Emanuel, RN     08/04/2022 6509 amiodarone tablet 400 mg 400 mg Oral Given Nick Ramirez, JUAN J     08/03/2022 1810 amiodarone tablet 400 mg 400 mg Oral Given Sherlyn Bailey, RN     08/04/2022 0900 amLODIPine (NORVASC) tablet 5 mg 5 mg Oral Given Nick Ramirez, JUAN J     08/03/2022 1440 phenol (CHLORASEPTIC) 1 4 % mucosal liquid 1 spray 1 spray Mouth/Throat Given Sherlyn Bailey, RN     08/04/2022 1213 LORazepam (ATIVAN) tablet 0 5 mg 0 5 mg Oral Given Sherlyn Bailey, RN              [address]    PLEASE NOTE:  This encounter was completed utilizing the M- Modal/Fluency Direct Speech Voice Recognition Software   Grammatical errors, random word insertions, pronoun errors and incomplete sentences are occasional consequences of the system due to software limitations, ambient noise and hardware issues  These may be missed by proof reading prior to affixing electronic signature  Any questions or concerns about the content, text or information contained within the body of this dictation should be directly addressed to the physician for clarification  Please do not hesitate to call me directly if you have any any questions or concerns

## 2022-08-04 NOTE — ASSESSMENT & PLAN NOTE
· Rates currently well controlled on Cardizem, metoprolol, and amiodarone load  Change to 200mg daily amiodarone on 8/13  · No AP/AC due to post op state

## 2022-08-04 NOTE — PLAN OF CARE
Problem: OCCUPATIONAL THERAPY ADULT  Goal: Performs self-care activities at highest level of function for planned discharge setting  See evaluation for individualized goals  Description: Treatment Interventions: ADL retraining, Visual perceptual retraining, Functional transfer training, UE strengthening/ROM, Endurance training, Cognitive reorientation, Patient/family training, Equipment evaluation/education, Compensatory technique education, Continued evaluation, Energy conservation, Activityengagement          See flowsheet documentation for full assessment, interventions and recommendations  Outcome: Progressing  Note: Limitation: Decreased ADL status, Decreased UE ROM, Decreased UE strength, Decreased Safe judgement during ADL, Decreased cognition, Decreased endurance, Visual deficit, Decreased self-care trans, Decreased high-level ADLs  Prognosis: Fair  Assessment: Patient participated in Skilled OT session this date with interventions consisting of ADL re training with the use of correct body mechnaics, Energy Conservation techniques, safety awareness and fall prevention techniques,  therapeutic activities to: increase activity tolerance, increase dynamic sit/ stand balance during functional activity  and increase OOB/ sitting tolerance   Upon arrival patient was found supine in bed  Pt demonstrated the following tasks: MAX A sup to sit, MAX A x 2 STS and SPT  Pt requires S to MOD A to maintain EOB sitting position, presents with R lean, as well as posterior/anterior leans  Pt performs grooming with S, MIN A for UB ADLs and MAX A for LB ADLs  Pt noted to have decreased activity tolerance/endurance, lethargic during session; requires frequent rest breaks  Patient continues to be functioning below baseline level, occupational performance remains limited secondary to factors listed above and increased risk for falls and injury  From OT standpoint, recommendation at time of d/c would be STR    Patient to benefit from continued Occupational Therapy treatment while in the hospital to address deficits as defined above and maximize level of functional independence with ADLs and functional mobility  Pt was left in chair with alarm on after session with all current needs met  The patient's raw score on the AM-PAC Daily Activity inpatient short form is 15, standardized score is 34 69, less than 39 4  Patients at this level are likely to benefit from discharge to post-acute rehabilitation services  Please refer to the recommendation of the Occupational Therapist for safe discharge planning       OT Discharge Recommendation: Post acute rehabilitation services

## 2022-08-05 NOTE — RESTORATIVE TECHNICIAN NOTE
Restorative Technician Note      Patient Name: Dami [facility] Visit Date: 8/5/2022  Note Type: Mobility  Patient Position Upon Consult: Supine  Activity Performed: Range of motion  Range of Motion: Active; All extremities  Patient Position at End of Consult: Supine;  All needs within [facility]

## 2022-08-05 NOTE — RESTORATIVE TECHNICIAN NOTE
Restorative Technician Note      Patient Name: Carlos Jenkins     [facility] Tech Visit Date: 8/5/2022  Note Type: Mobility  Patient Position Upon Consult: Supine  Activity Performed: Range of motion  Range of Motion: All extremities; Other (Comment) (PROM performed due to fatigue)  Patient Position at End of Consult: Supine;  All needs within [facility]

## 2022-08-05 NOTE — CASE MANAGEMENT
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 ([place] [address] ) 2022    Irregular heart rate 2022    Atrial fibrillation with rapid ventricular response ([place] [address] ) 2022    Thrombocytopenia ([place] [address] ) 2022    Goals of care, counseling/discussion 2022    Hypothyroidism 2022    Left parietal hemorrhagic tumor 2022    Platelets decreased ([place] [address] ) 02/15/2022    Psoriasis 02/15/2022    CKD (chronic kidney disease) stage 2, GFR 60-89 ml/min 2022    Labyrinthitis of both ears 2022    Proctocolitis 2021    Pericardial effusion 2021    Constipation 2021    Left non-suppurative otitis media 2021    Bilateral hearing loss due to cerumen impaction 2021    Chronic back pain 2021    Former cigarette smoker 2021    History of gout 2021    Hypertension 2021    Cancer of upper lobe of left lung [facility] ) 2021    Drug-induced neutropenia [facility]  ) 07/15/2021    Adenocarcinoma, lung, left ([facility] ) 2021    Encounter for central line care 2021    Hyperglycemia 2021    Cigarette nicotine dependence in remission 2021    Bilateral hearing loss 2020    Mixed hyperlipidemia 2019    Renal cyst, right 2018    Lumbar stenosis 2018    Glaucoma 2018    JUNAID (obstructive sleep apnea) 2018    Spinal stenosis of lumbar region with neurogenic claudication 2018  Acute idiopathic gout of left foot 11/06/2017    Depression with anxiety 11/06/2017    Essential hypertension 11/06/2017    Hypertriglyceridemia 11/06/2017    Lumbago with sciatica, left side 11/06/2017    Back problem 06/30/2017      LOS (days): 11  Geometric Mean LOS (GMLOS) (days): 9 90  Days to GMLOS:-1 8     OBJECTIVE:  Risk of Unplanned Readmission Score: 32 92         Current admission status: Inpatient   Preferred Pharmacy:   [facility] [address], [address] [place] [address]   [facility]   [address]   [address] [address]  Phone: [phone] Fax: [phone]    Primary Care Provider: Ada Boles DO    Primary Insurance: MEDICARE  Secondary Insurance: AARP    DISCHARGE DETAILS:       Additional Comments: CM was made aware by spouse that she is requesting that pt go to [facility] at d/c  CM called and confirmed that they are able to offer pt a bed on Mon  CM reserved bed via [facility]  CM notified pt's provider and requested pt get the booster

## 2022-08-05 NOTE — PROGRESS NOTES
Patient's wife stated she had to cancel his appointment for routine port flushes  Port flushed  Adequate blood return noted  Port then de accessed and gauze placed over top  Tolerated well

## 2022-08-05 NOTE — CASE MANAGEMENT
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 [facility])   Patient Active Problem List    Diagnosis Date Noted    Hyponatremia 2022    High grade neuroendocrine carcinoma of lung ([facility] ) 2022    Chronic obstructive pulmonary disease, unspecified COPD type ([place] [address] ) 2022    Dizziness 2022    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 ([place] [address] ) 2022    Irregular heart rate 2022    Atrial fibrillation with rapid ventricular response ([place] [address] ) 2022    Thrombocytopenia ([place] [address] ) 2022    Goals of care, counseling/discussion 2022    Hypothyroidism 2022    Left parietal hemorrhagic tumor 2022    Platelets decreased ([place] [address] ) 02/15/2022    Psoriasis 02/15/2022    CKD (chronic kidney disease) stage 2, GFR 60-89 ml/min 2022    Labyrinthitis of both ears 2022    Proctocolitis 2021    Pericardial effusion 2021    Constipation 2021    Left non-suppurative otitis media 2021    Bilateral hearing loss due to cerumen impaction 2021    Chronic back pain 2021    Former cigarette smoker 2021    History of gout 2021    Hypertension 2021    Cancer of upper lobe of left lung ([facility] ) 2021    Drug-induced neutropenia ([place] [address] ) 07/15/2021    Adenocarcinoma, lung, left ([place] [address] ) 2021    Encounter for central line care 2021    Hyperglycemia 2021    Cigarette nicotine dependence in remission 2021    Bilateral hearing loss 2020    Mixed hyperlipidemia 2019    Renal cyst, right 2018    Lumbar stenosis 2018    Glaucoma 2018    JUNAID (obstructive sleep apnea) 2018    Spinal stenosis of lumbar region with neurogenic claudication 2018  Acute idiopathic gout of left foot 11/06/2017    Depression with anxiety 11/06/2017    Essential hypertension 11/06/2017    Hypertriglyceridemia 11/06/2017    Lumbago with sciatica, left side 11/06/2017    Back problem 06/30/2017      LOS (days): 11  Geometric Mean LOS (GMLOS) (days): 9 90  Days to GMLOS:-1 6     OBJECTIVE:  Risk of Unplanned Readmission Score: 32 79         Current admission status: Inpatient   Preferred Pharmacy:   [facility] [address], [address] [place]  [address]   [place]  [address]   [address] [address]  Phone: [phone] Fax: [phone]    Primary Care Provider: Sherri Castillo DO    Primary Insurance: MEDICARE  Secondary Insurance: [facility]    DISCHARGE DETAILS:    Additional Comments: CM met with pt and spouse and provided her with an update as to the status of the referrals that were sent

## 2022-08-05 NOTE — ASSESSMENT & PLAN NOTE
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

## 2022-08-05 NOTE — ASSESSMENT & PLAN NOTE
· Patient with a history metastatic lung adeno carcinoma status post left upper lobe resection with radiation and chemotherapy  · Recent CT chest abdomen pelvis showing multiple new bilateral irregular lung nodules which are suspicious for metastasis, fortunately no metastatic disease noted in the abdomen or pelvis    · Outpatient follow up with Dr Jorja Hamman for possible chemo after rehabilitation

## 2022-08-05 NOTE — PROGRESS NOTES
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 [facility])  Assessment & Plan  · Primary left lung adenocarcinoma   · Reporting lightheadedness for the past several months, which has increased over the past several days  · CT showing increase in size of left cerebellar mass with associated vasogenic edema with mass effect upon the 4th ventricle as well as new 5 mm high-density nodule in the left cerebellum  · Patient is AAOx4, neuro exam is nonfocal  · Patient is postop day 5 from left retrosigmoid posterior fossa craniotomy for resection of mass  · Preliminary pathology showing metastatic carcinoma  · Continue Decadron taper  · Platelet goal above 75  · Blood pressure goal below 160  · Discharge planning to subacute inpatient rehabilitation              Adenocarcinoma, lung, left ([facility] [address] )  Assessment & Plan  · Patient with a history metastatic lung adeno carcinoma status post left upper lobe resection with radiation and chemotherapy  · Recent CT chest abdomen pelvis showing multiple new bilateral irregular lung nodules which are suspicious for metastasis, fortunately no metastatic disease noted in the abdomen or pelvis  · Outpatient follow up with Dr Gopal Valencia for possible chemo after rehabilitation      High grade neuroendocrine carcinoma of lung ([facility] [address] )  Assessment & Plan  · Management as above    Hyponatremia  Assessment & Plan  · Management per Nephrology  · 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  · Resolved with salt tabs  · Salt tabs discontinued, Na is stable    Atrial fibrillation with rapid ventricular response (HCC)  Assessment & Plan  · Rates currently well controlled on Cardizem, metoprolol, and amiodarone load   Change to 200mg daily amiodarone on   · No AP/AC due to post op state  Thrombocytopenia (HCC)  Assessment & Plan  · Baseline thrombocytopenia around 100  · Stabilized now  · Continue DVT prophylaxis with heparin    History of gout  Assessment & Plan  · On allopurinol 100 mg daily  Mixed hyperlipidemia  Assessment & Plan  Continue atorvastatin 20 mg daily    Essential hypertension  Assessment & Plan  · BP is at goal  · Continue metoprolol succinate 50 mg b i d   · Continue diltiazem          VTE Pharmacologic Prophylaxis: VTE Score: 5 Moderate Risk (Score 3-4) - Pharmacological DVT Prophylaxis Ordered: heparin  Patient Centered Rounds: I performed bedside rounds with nursing staff today  Discussions with Specialists or Other Care Team Provider: nurse, CM    Education and Discussions with Family / Patient: Updated  (wife) at bedside  Time Spent for Care: 20 minutes  More than 50% of total time spent on counseling and coordination of care as described above  Current Length of Stay: 11 day(s)  Current Patient Status: Inpatient   Certification Statement: The patient will continue to require additional inpatient hospital stay due to discharge planning  Discharge Plan: Anticipate discharge in >72 hrs to rehab facility  Code Status: Level 1 - Full Code    Subjective:   Denies any new complaints  Still with a sore throat but this is mild and he is tolerating his diet  Objective:     Vitals:   Temp (24hrs), Av °F (36 7 °C), Min:97 4 °F (36 3 °C), Max:98 4 °F (36 9 °C)    Temp:  [97 4 °F (36 3 °C)-98 4 °F (36 9 °C)] 98 2 °F (36 8 °C)  HR:  [68-70] 68  Resp:  [18] 18  BP: (125-153)/(73-89) 147/88  SpO2:  [93 %] 93 %  Body mass index is 24 14 kg/m²  Input and Output Summary (last 24 hours):      Intake/Output Summary (Last 24 hours) at 2022 1713  Last data filed at 2022 1311  Gross per 24 hour   Intake 320 ml   Output 300 ml   Net 20 ml       Physical Exam:   Physical Exam  Constitutional:       Appearance: Normal appearance  He is obese  HENT:      Head:      Comments: Staples and clean and dry     Nose: Nose normal       Mouth/Throat:      Mouth: Mucous membranes are moist       Pharynx: Oropharynx is clear  Posterior oropharyngeal erythema present  No oropharyngeal exudate  Eyes:      Extraocular Movements: Extraocular movements intact  Cardiovascular:      Rate and Rhythm: Normal rate and regular rhythm  Pulmonary:      Effort: Pulmonary effort is normal       Breath sounds: No wheezing or rales  Abdominal:      Palpations: Abdomen is soft  Musculoskeletal:      Right lower leg: No edema  Left lower leg: No edema  Skin:     General: Skin is warm and dry  Neurological:      General: No focal deficit present  Mental Status: He is alert and oriented to person, place, and time  Psychiatric:         Mood and Affect: Mood normal          Behavior: Behavior normal           Additional Data:     Labs:  Results from last 7 days   Lab Units 08/05/22  0542 08/04/22  0605 08/03/22  0539   WBC Thousand/uL 6 91   < > 7 19   HEMOGLOBIN g/dL 10 8*   < > 11 3*   HEMATOCRIT % 32 4*   < > 34 4*   PLATELETS Thousands/uL 79*   < > 69*   NEUTROS PCT %  --   --  96*   LYMPHS PCT %  --   --  2*   MONOS PCT %  --   --  1*   EOS PCT %  --   --  0    < > = values in this interval not displayed  Results from last 7 days   Lab Units 08/05/22  0542   SODIUM mmol/L 137   POTASSIUM mmol/L 4 0   CHLORIDE mmol/L 99   CO2 mmol/L 31   BUN mg/dL 23   CREATININE mg/dL 0 67   ANION GAP mmol/L 7   CALCIUM mg/dL 9 6   GLUCOSE RANDOM mg/dL 141*                       Lines/Drains:  Invasive Devices  Report    Peripherally Inserted Central Catheter Line  Duration           PICC Line 30/09/13 Left Basilic 6 days          Central Venous Catheter Line  Duration           Port A Cath 06/17/21 Right Chest 414 days                Central Line:  Goal for removal: Will discontinue when meds requiring line are completed  Imaging: No pertinent imaging reviewed      Recent Cultures (last 7 days):         Last 24 Hours Medication List:   Current Facility-Administered Medications   Medication Dose Route Frequency Provider Last Rate    acetaminophen  975 mg Oral Q8H [address] Wilbermarialuisa Sanchez, DO      allopurinol  100 mg Oral Daily [employer], DO      aluminum-magnesium hydroxide-simethicone  30 mL Oral Q6H PRN [place], DO      [START ON 8/14/2022] amiodarone  200 mg Oral Daily With Breakfast Dilip Abbasi MD      amiodarone  400 mg Oral BID With Meals Dilip Abbasi MD      amLODIPine  5 mg Oral Daily Dilip Abbasi MD      vitamin C  1,000 mg Oral Daily [employer], DO      atorvastatin  20 mg Oral Daily With [employer], DO      bisacodyl  10 mg Rectal Daily PRN [employer], DO      COVID-19 Moderna vaccine booster  0 25 mL Intramuscular Once Cachorro Rader MD      dexamethasone  2 mg Oral Q12H [address] Wilber Sanchez DO      Followed by   Lala Heath ON 8/8/2022] dexamethasone  2 mg Oral Q24H [address] Wilber Sanchez, DO      diltiazem  120 mg Oral Q12H [address] Wilber Lagunas, DO      docusate sodium  100 mg Oral BID [employer], DO      heparin (porcine)  5,000 Units Subcutaneous Q8H [address] Wilbermarialuisa Lagunas, DO      levothyroxine  37 5 mcg Oral Early Morning Wilber Lagunas, DO      LORazepam  0 5 mg Oral BID PRN Cachorro Rader MD      meclizine  25 mg Oral Q8H PRN [employer], DO      methocarbamol  500 mg Oral Q6H [address] Wilber Lagunas, DO      metoclopramide  10 mg Intravenous Q6H PRN [employer], DO      metoprolol succinate  50 mg Oral BID [employer], DO      ondansetron  4 mg Intravenous Q6H PRN [employer], DO      oxyCODONE  2 5 mg Oral Q4H PRN [employer], DO      pantoprazole  40 mg Oral Early Morning Wilber Sanchez, DO      phenol  1 spray Mouth/Throat 4x Daily (AC & HS) Cachorro Rader MD      polyethylene glycol  17 g Oral BID [employer], DO      scopolamine  1 patch Transdermal Q72H Melodie Chino DO      senna  1 tablet Oral Daily Leafy Lulú, DO          Today, Patient Was Seen By: Sheela Rodrigez MD    **Please Note: This note may have been constructed using a voice recognition system  **

## 2022-08-05 NOTE — PLAN OF CARE
Problem: PAIN - ADULT  Goal: Verbalizes/displays adequate comfort level or baseline comfort level  Description: Interventions:  - Encourage patient to monitor pain and request assistance  - Assess pain using appropriate pain scale  - Administer analgesics based on type and severity of pain and evaluate response  - Implement non-pharmacological measures as appropriate and evaluate response  - Consider cultural and social influences on pain and pain management  - Notify physician/advanced practitioner if interventions unsuccessful or patient reports new pain  Outcome: Progressing     Problem: SAFETY ADULT  Goal: Patient will remain free of falls  Description: INTERVENTIONS:  - Educate patient/family on patient safety including physical limitations  - Instruct patient to call for assistance with activity   - Consult OT/PT to assist with strengthening/mobility   - Keep Call bell within reach  - Keep bed low and locked with side rails adjusted as appropriate  - Keep care items and personal belongings within reach  - Initiate and maintain comfort rounds  - Make Fall Risk Sign visible to staff  - Apply yellow socks and bracelet for high fall risk patients  - Consider moving patient to room near nurses station  Outcome: Progressing  Goal: Maintain or return to baseline ADL function  Description: INTERVENTIONS:  -  Assess patient's ability to carry out ADLs; assess patient's baseline for ADL function and identify physical deficits which impact ability to perform ADLs (bathing, care of mouth/teeth, toileting, grooming, dressing, etc )  - Assess/evaluate cause of self-care deficits   - Assess range of motion  - Assess patient's mobility; develop plan if impaired  - Assess patient's need for assistive devices and provide as appropriate  - Encourage maximum independence but intervene and supervise when necessary  - Involve family in performance of ADLs  - Assess for home care needs following discharge   - Consider OT consult to assist with ADL evaluation and planning for discharge  - Provide patient education as appropriate  Outcome: Progressing  Goal: Maintains/Returns to pre admission functional level  Description: INTERVENTIONS:  - Perform BMAT or MOVE assessment daily    - Set and communicate daily mobility goal to care team and patient/family/caregiver  - Collaborate with rehabilitation services on mobility goals if consulted  - Perform Range of Motion   - Reposition patient every 2 hours    - Dangle patient   - Stand patient   - Ambulate patient  - Out of bed to chair   - Out of bed for toileting  - Record patient progress and toleration of activity level   Outcome: Progressing     Problem: NEUROSENSORY - ADULT  Goal: Achieves stable or improved neurological status  Description: INTERVENTIONS  - Monitor and report changes in neurological status  - Monitor vital signs such as temperature, blood pressure, glucose, and any other labs ordered   - Initiate measures to prevent increased intracranial pressure  - Monitor for seizure activity and implement precautions if appropriate      Outcome: Progressing  Goal: Remains free of injury related to seizures activity  Description: INTERVENTIONS  - Maintain airway, patient safety  and administer oxygen as ordered  - Monitor patient for seizure activity, document and report duration and description of seizure to physician/advanced practitioner  - If seizure occurs,  ensure patient safety during seizure  - Reorient patient post seizure  - Seizure pads on all 4 side rails  - Instruct patient/family to notify RN of any seizure activity including if an aura is experienced  - Instruct patient/family to call for assistance with activity based on nursing assessment  - Administer anti-seizure medications if ordered    Outcome: Progressing  Goal: Achieves maximal functionality and self care  Description: INTERVENTIONS  - Monitor swallowing and airway patency with patient fatigue and changes in neurological status  - Encourage and assist patient to increase activity and self care     - Encourage visually impaired, hearing impaired and aphasic patients to use assistive/communication devices  Outcome: Progressing    Goal: Incision(s), wounds(s) or drain site(s) healing without S/S of infection  Description: INTERVENTIONS  - Assess and document dressing, incision, wound bed, drain sites and surrounding tissue  - Provide patient and family education  - Perform skin care/dressing changes   Outcome: Progressing  Goal: Pressure injury heals and does not worsen  Description: Interventions:  - Implement low air loss mattress or specialty surface (Criteria met)  - Apply silicone foam dressing  - Consider nutrition services referral as needed  Outcome: Progressing     Problem: Potential for Falls  Goal: Patient will remain free of falls  Description: INTERVENTIONS:  - Educate patient/family on patient safety including physical limitations  - Instruct patient to call for assistance with activity   - Consult OT/PT to assist with strengthening/mobility   - Keep Call bell within reach  - Keep bed low and locked with side rails adjusted as appropriate  - Keep care items and personal belongings within reach  - Initiate and maintain comfort rounds  - Make Fall Risk Sign visible to staff  - Apply yellow socks and bracelet for high fall risk patients  - Consider moving patient to room near nurses station  Outcome: Progressing     Problem: Prexisting or High Potential for Compromised Skin Integrity  Goal: Skin integrity is maintained or improved  Description: INTERVENTIONS:  - Identify patients at risk for skin breakdown  - Assess and monitor skin integrity  - Assess and monitor nutrition and hydration status  - Monitor labs   - Assess for incontinence   - Turn and reposition patient  - Assist with mobility/ambulation  - Relieve pressure over bony prominences  - Avoid friction and shearing  - Provide appropriate hygiene as needed including keeping skin clean and dry  - Evaluate need for skin moisturizer/barrier cream  - Collaborate with interdisciplinary team   - Patient/family teaching  - Consider wound care consult   Outcome: Progressing     Problem: MOBILITY - ADULT  Goal: Maintain or return to baseline ADL function  Description: INTERVENTIONS:  -  Assess patient's ability to carry out ADLs; assess patient's baseline for ADL function and identify physical deficits which impact ability to perform ADLs (bathing, care of mouth/teeth, toileting, grooming, dressing, etc )  - Assess/evaluate cause of self-care deficits   - Assess range of motion  - Assess patient's mobility; develop plan if impaired  - Assess patient's need for assistive devices and provide as appropriate  - Encourage maximum independence but intervene and supervise when necessary  - Involve family in performance of ADLs  - Assess for home care needs following discharge   - Consider OT consult to assist with ADL evaluation and planning for discharge  - Provide patient education as appropriate  Outcome: Progressing  Goal: Maintains/Returns to pre admission functional level  Description: INTERVENTIONS:  - Perform BMAT or MOVE assessment daily    - Set and communicate daily mobility goal to care team and patient/family/caregiver  - Collaborate with rehabilitation services on mobility goals if consulted  - Perform Range of Motion   - Reposition patient every 2 hours    - Dangle patient   - Stand patient   - Ambulate patient  - Out of bed to chair   - Out of bed for toileting  - Record patient progress and toleration of activity level   Outcome: Progressing     Problem: RESPIRATORY - ADULT  Goal: Achieves optimal ventilation and oxygenation  Description: INTERVENTIONS:  - Assess for changes in respiratory status  - Assess for changes in mentation and behavior  - Position to facilitate oxygenation and minimize respiratory effort  - Oxygen administered by appropriate delivery if ordered  - Initiate smoking cessation education as indicated  - Encourage broncho-pulmonary hygiene including cough, deep breathe, Incentive Spirometry  - Assess the need for suctioning and aspirate as needed  - Assess and instruct to report SOB or any respiratory difficulty  - Respiratory Therapy support as indicated  Outcome: Progressing     Problem: GASTROINTESTINAL - ADULT  Goal: Minimal or absence of nausea and/or vomiting  Description: INTERVENTIONS:  - Administer IV fluids if ordered to ensure adequate hydration  - Maintain NPO status until nausea and vomiting are resolved  - Nasogastric tube if ordered  - Administer ordered antiemetic medications as needed  - Provide nonpharmacologic comfort measures as appropriate  - Advance diet as tolerated, if ordered  - Consider nutrition services referral to assist patient with adequate nutrition and appropriate food choices  Outcome: Progressing  Goal: Maintains or returns to baseline bowel function  Description: INTERVENTIONS:  - Assess bowel function  - Encourage oral fluids to ensure adequate hydration  - Administer IV fluids if ordered to ensure adequate hydration  - Administer ordered medications as needed  - Encourage mobilization and activity  - Consider nutritional services referral to assist patient with adequate nutrition and appropriate food choices  Outcome: Progressing  Goal: Maintains adequate nutritional intake  Description: INTERVENTIONS:  - Monitor percentage of each meal consumed  - Identify factors contributing to decreased intake, treat as appropriate  - Assist with meals as needed  - Monitor I&O, weight, and lab values if indicated  - Obtain nutrition services referral as needed  Outcome: Progressing     Problem: GENITOURINARY - ADULT  Goal: Maintains or returns to baseline urinary function  Description: INTERVENTIONS:  - Assess urinary function  - Encourage oral fluids to ensure adequate hydration if ordered  - Administer IV fluids as ordered to ensure adequate hydration  - Administer ordered medications as needed  - Offer frequent toileting  - Follow urinary retention protocol if ordered  Outcome: Progressing     Problem: METABOLIC, FLUID AND ELECTROLYTES - ADULT  Goal: Electrolytes maintained within normal limits  Description: INTERVENTIONS:  - Monitor labs and assess patient for signs and symptoms of electrolyte imbalances  - Administer electrolyte replacement as ordered  - Monitor response to electrolyte replacements, including repeat lab results as appropriate  - Instruct patient on fluid and nutrition as appropriate  Outcome: Progressing  Goal: Fluid balance maintained  Description: INTERVENTIONS:  - Monitor labs   - Monitor I/O and WT  - Instruct patient on fluid and nutrition as appropriate  - Assess for signs & symptoms of volume excess or deficit  Outcome: Progressing     Problem: HEMATOLOGIC - ADULT  Goal: Maintains hematologic stability  Description: INTERVENTIONS  - Assess for signs and symptoms of bleeding or hemorrhage  - Monitor labs  - Administer supportive blood products/factors as ordered and appropriate  Outcome: Progressing     Problem: MUSCULOSKELETAL - ADULT  Goal: Maintain or return mobility to safest level of function  Description: INTERVENTIONS:  - Assess patient's ability to carry out ADLs; assess patient's baseline for ADL function and identify physical deficits which impact ability to perform ADLs (bathing, care of mouth/teeth, toileting, grooming, dressing, etc )  - Assess/evaluate cause of self-care deficits   - Assess range of motion  - Assess patient's mobility  - Assess patient's need for assistive devices and provide as appropriate  - Encourage maximum independence but intervene and supervise when necessary  - Involve family in performance of ADLs  - Assess for home care needs following discharge   - Consider OT consult to assist with ADL evaluation and planning for discharge  - Provide patient education as appropriate  Outcome: Progressing     Problem: Nutrition/Hydration-ADULT  Goal: Nutrient/Hydration intake appropriate for improving, restoring or maintaining nutritional needs  Description: Monitor and assess patient's nutrition/hydration status for malnutrition  Collaborate with interdisciplinary team and initiate plan and interventions as ordered  Monitor patient's weight and dietary intake as ordered or per policy  Utilize nutrition screening tool and intervene as necessary  Determine patient's food preferences and provide high-protein, high-caloric foods as appropriate       INTERVENTIONS:  - Monitor oral intake, urinary output, labs, and treatment plans  - Assess nutrition and hydration status and recommend course of action  - Evaluate amount of meals eaten  - Assist patient with eating if necessary   - Allow adequate time for meals  - Recommend/ encourage appropriate diets, oral nutritional supplements, and vitamin/mineral supplements  - Order, calculate, and assess calorie counts as needed  - Recommend, monitor, and adjust tube feedings and TPN/PPN based on assessed needs  - Assess need for intravenous fluids  - Provide specific nutrition/hydration education as appropriate  - Include patient/family/caregiver in decisions related to nutrition  Outcome: Progressing     Problem: INFECTION - ADULT  Goal: Absence or prevention of progression during hospitalization  Description: INTERVENTIONS:  - Assess and monitor for signs and symptoms of infection  - Monitor lab/diagnostic results  - Monitor all insertion sites, i e  indwelling lines, tubes, and drains  - Monitor endotracheal if appropriate and nasal secretions for changes in amount and color  - Davis appropriate cooling/warming therapies per order  - Administer medications as ordered  - Instruct and encourage patient and family to use good hand hygiene technique  - Identify and instruct in appropriate isolation precautions for identified infection/condition  Outcome: Progressing     Problem: DISCHARGE PLANNING  Goal: Discharge to home or other facility with appropriate resources  Description: INTERVENTIONS:  - Identify barriers to discharge w/patient and caregiver  - Arrange for needed discharge resources and transportation as appropriate  - Identify discharge learning needs (meds, wound care, etc )  - Arrange for interpretive services to assist at discharge as needed  - Refer to Case Management Department for coordinating discharge planning if the patient needs post-hospital services based on physician/advanced practitioner order or complex needs related to functional status, cognitive ability, or social support system  Outcome: Progressing     Problem: Knowledge Deficit  Goal: Patient/family/caregiver demonstrates understanding of disease process, treatment plan, medications, and discharge instructions  Description: Complete learning assessment and assess knowledge base    Interventions:  - Provide teaching at level of understanding  - Provide teaching via preferred learning methods  Outcome: Progressing     Problem: COPING  Goal: Pt/Family able to verbalize concerns and demonstrate effective coping strategies  Description: INTERVENTIONS:  - Assist patient/family to identify coping skills, available support systems and cultural and spiritual values  - Provide emotional support, including active listening and acknowledgement of concerns of patient and caregivers  - Reduce environmental stimuli, as able  - Provide patient education  - Assess for spiritual pain/suffering and initiate spiritual care, including notification of Pastoral Care or casey based community as needed  - Assess effectiveness of coping strategies  Outcome: Progressing  Goal: Will report anxiety at manageable levels  Description: INTERVENTIONS:  - Administer medication as ordered  - Teach and encourage coping skills  - Provide emotional support  - Assess patient/family for anxiety and ability to cope  Outcome: Progressing

## 2022-08-06 NOTE — CASE MANAGEMENT
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 ([place] [address] ) 2022    Irregular heart rate 2022    Atrial fibrillation with rapid ventricular response ([place] [address] ) 2022    Thrombocytopenia ([place] [address] ) 2022    Goals of care, counseling/discussion 2022    Hypothyroidism 2022    Left parietal hemorrhagic tumor 2022    Platelets decreased ([place] [address] ) 02/15/2022    Psoriasis 02/15/2022    CKD (chronic kidney disease) stage 2, GFR 60-89 ml/min 2022    Labyrinthitis of both ears 2022    Proctocolitis 2021    Pericardial effusion 2021    Constipation 2021    Left non-suppurative otitis media 2021    Bilateral hearing loss due to cerumen impaction 2021    Chronic back pain 2021    Former cigarette smoker 2021    History of gout 2021    Hypertension 2021    Cancer of upper lobe of left lung ([facility] ) 2021    Drug-induced neutropenia ([place] [address] ) 07/15/2021    Adenocarcinoma, lung, left ([facility] ) 2021    Encounter for central line care 2021    Hyperglycemia 2021    Cigarette nicotine dependence in remission 2021    Bilateral hearing loss 2020    Mixed hyperlipidemia 2019    Renal cyst, right 2018    Lumbar stenosis 2018    Glaucoma 2018    JUNAID (obstructive sleep apnea) 2018    Spinal stenosis of lumbar region with neurogenic claudication 2018  Acute idiopathic gout of left foot 11/06/2017    Depression with anxiety 11/06/2017    Essential hypertension 11/06/2017    Hypertriglyceridemia 11/06/2017    Lumbago with sciatica, left side 11/06/2017    Back problem 06/30/2017      LOS (days): 12  Geometric Mean LOS (GMLOS) (days): 9 90  Days to GMLOS:-2 7     OBJECTIVE:  Risk of Unplanned Readmission Score: 32 96         Current admission status: Inpatient   Preferred Pharmacy:   [facility]  [address] [place] [address] [address] [facility]   [facility]   [address] [address]  Phone: [phone] Fax: [phone]    Primary Care Provider: Ingrid Morrison DO    Primary Insurance: MEDICARE  Secondary Insurance: [facility]    DISCHARGE DETAILS:            Other Referral/Resources/Interventions Provided:  Referral Comments: [facility] is able to accept the patient on Monday  [facility] did reach out today to inquire if the patient was medically stable for discharge  Currently awaiting response from [facility] at this time

## 2022-08-06 NOTE — PLAN OF CARE
Problem: PAIN - ADULT  Goal: Verbalizes/displays adequate comfort level or baseline comfort level  Description: Interventions:  - Encourage patient to monitor pain and request assistance  - Assess pain using appropriate pain scale  - Administer analgesics based on type and severity of pain and evaluate response  - Implement non-pharmacological measures as appropriate and evaluate response  - Consider cultural and social influences on pain and pain management  - Notify physician/advanced practitioner if interventions unsuccessful or patient reports new pain  Outcome: Progressing     Problem: SAFETY ADULT  Goal: Patient will remain free of falls  Description: INTERVENTIONS:  - Educate patient/family on patient safety including physical limitations  - Instruct patient to call for assistance with activity   - Consult OT/PT to assist with strengthening/mobility   - Keep Call bell within reach  - Keep bed low and locked with side rails adjusted as appropriate  - Keep care items and personal belongings within reach  - Initiate and maintain comfort rounds  - Make Fall Risk Sign visible to staff  - Apply yellow socks and bracelet for high fall risk patients  - Consider moving patient to room near nurses station  Outcome: Progressing  Goal: Maintain or return to baseline ADL function  Description: INTERVENTIONS:  -  Assess patient's ability to carry out ADLs; assess patient's baseline for ADL function and identify physical deficits which impact ability to perform ADLs (bathing, care of mouth/teeth, toileting, grooming, dressing, etc )  - Assess/evaluate cause of self-care deficits   - Assess range of motion  - Assess patient's mobility; develop plan if impaired  - Assess patient's need for assistive devices and provide as appropriate  - Encourage maximum independence but intervene and supervise when necessary  - Involve family in performance of ADLs  - Assess for home care needs following discharge   - Consider OT consult to assist with ADL evaluation and planning for discharge  - Provide patient education as appropriate  Outcome: Progressing  Goal: Maintains/Returns to pre admission functional level  Description: INTERVENTIONS:  - Perform BMAT or MOVE assessment daily    - Set and communicate daily mobility goal to care team and patient/family/caregiver  - Collaborate with rehabilitation services on mobility goals if consulted  - Perform Range of Motion   - Reposition patient every 2 hours    - Dangle patient   - Stand patient   - Ambulate patient  - Out of bed to chair   - Out of bed for toileting  - Record patient progress and toleration of activity level   Outcome: Progressing     Problem: NEUROSENSORY - ADULT  Goal: Achieves stable or improved neurological status  Description: INTERVENTIONS  - Monitor and report changes in neurological status  - Monitor vital signs such as temperature, blood pressure, glucose, and any other labs ordered   - Initiate measures to prevent increased intracranial pressure  - Monitor for seizure activity and implement precautions if appropriate      Outcome: Progressing  Goal: Remains free of injury related to seizures activity  Description: INTERVENTIONS  - Maintain airway, patient safety  and administer oxygen as ordered  - Monitor patient for seizure activity, document and report duration and description of seizure to physician/advanced practitioner  - If seizure occurs,  ensure patient safety during seizure  - Reorient patient post seizure  - Seizure pads on all 4 side rails  - Instruct patient/family to notify RN of any seizure activity including if an aura is experienced  - Instruct patient/family to call for assistance with activity based on nursing assessment  - Administer anti-seizure medications if ordered    Outcome: Progressing  Goal: Achieves maximal functionality and self care  Description: INTERVENTIONS  - Monitor swallowing and airway patency with patient fatigue and changes in neurological status  - Encourage and assist patient to increase activity and self care     - Encourage visually impaired, hearing impaired and aphasic patients to use assistive/communication devices  Outcome: Progressing     Problem: CARDIOVASCULAR - ADULT  Goal: Maintains optimal cardiac output and hemodynamic stability  Description: INTERVENTIONS:  - Monitor I/O, vital signs and rhythm  - Monitor for S/S and trends of decreased cardiac output  - Administer and titrate ordered vasoactive medications to optimize hemodynamic stability  - Assess quality of pulses, skin color and temperature  - Assess for signs of decreased coronary artery perfusion  - Instruct patient to report change in severity of symptoms  Outcome: Progressing    Problem: Potential for Falls  Goal: Patient will remain free of falls  Description: INTERVENTIONS:  - Educate patient/family on patient safety including physical limitations  - Instruct patient to call for assistance with activity   - Consult OT/PT to assist with strengthening/mobility   - Keep Call bell within reach  - Keep bed low and locked with side rails adjusted as appropriate  - Keep care items and personal belongings within reach  - Initiate and maintain comfort rounds  - Make Fall Risk Sign visible to staff  - Apply yellow socks and bracelet for high fall risk patients  - Consider moving patient to room near nurses station  Outcome: Progressing     Problem: Prexisting or High Potential for Compromised Skin Integrity  Goal: Skin integrity is maintained or improved  Description: INTERVENTIONS:  - Identify patients at risk for skin breakdown  - Assess and monitor skin integrity  - Assess and monitor nutrition and hydration status  - Monitor labs   - Assess for incontinence   - Turn and reposition patient  - Assist with mobility/ambulation  - Relieve pressure over bony prominences  - Avoid friction and shearing  - Provide appropriate hygiene as needed including keeping skin clean and dry  - Evaluate need for skin moisturizer/barrier cream  - Collaborate with interdisciplinary team   - Patient/family teaching  - Consider wound care consult   Outcome: Progressing     Problem: MOBILITY - ADULT  Goal: Maintain or return to baseline ADL function  Description: INTERVENTIONS:  -  Assess patient's ability to carry out ADLs; assess patient's baseline for ADL function and identify physical deficits which impact ability to perform ADLs (bathing, care of mouth/teeth, toileting, grooming, dressing, etc )  - Assess/evaluate cause of self-care deficits   - Assess range of motion  - Assess patient's mobility; develop plan if impaired  - Assess patient's need for assistive devices and provide as appropriate  - Encourage maximum independence but intervene and supervise when necessary  - Involve family in performance of ADLs  - Assess for home care needs following discharge   - Consider OT consult to assist with ADL evaluation and planning for discharge  - Provide patient education as appropriate  Outcome: Progressing  Goal: Maintains/Returns to pre admission functional level  Description: INTERVENTIONS:  - Perform BMAT or MOVE assessment daily    - Set and communicate daily mobility goal to care team and patient/family/caregiver  - Collaborate with rehabilitation services on mobility goals if consulted  - Perform Range of Motion   - Reposition patient every 2 hours    - Dangle patient   - Stand patient   - Ambulate patient  - Out of bed to chair   - Out of bed for toileting  - Record patient progress and toleration of activity level   Outcome: Progressing     Problem: RESPIRATORY - ADULT  Goal: Achieves optimal ventilation and oxygenation  Description: INTERVENTIONS:  - Assess for changes in respiratory status  - Assess for changes in mentation and behavior  - Position to facilitate oxygenation and minimize respiratory effort  - Oxygen administered by appropriate delivery if ordered  - Initiate smoking cessation education as indicated  - Encourage broncho-pulmonary hygiene including cough, deep breathe, Incentive Spirometry  - Assess the need for suctioning and aspirate as needed  - Assess and instruct to report SOB or any respiratory difficulty  - Respiratory Therapy support as indicated  Outcome: Progressing     Problem: GASTROINTESTINAL - ADULT  Goal: Minimal or absence of nausea and/or vomiting  Description: INTERVENTIONS:  - Administer IV fluids if ordered to ensure adequate hydration  - Maintain NPO status until nausea and vomiting are resolved  - Nasogastric tube if ordered  - Administer ordered antiemetic medications as needed  - Provide nonpharmacologic comfort measures as appropriate  - Advance diet as tolerated, if ordered  - Consider nutrition services referral to assist patient with adequate nutrition and appropriate food choices  Outcome: Progressing  Goal: Maintains or returns to baseline bowel function  Description: INTERVENTIONS:  - Assess bowel function  - Encourage oral fluids to ensure adequate hydration  - Administer IV fluids if ordered to ensure adequate hydration  - Administer ordered medications as needed  - Encourage mobilization and activity  - Consider nutritional services referral to assist patient with adequate nutrition and appropriate food choices  Outcome: Progressing  Goal: Maintains adequate nutritional intake  Description: INTERVENTIONS:  - Monitor percentage of each meal consumed  - Identify factors contributing to decreased intake, treat as appropriate  - Assist with meals as needed  - Monitor I&O, weight, and lab values if indicated  - Obtain nutrition services referral as needed  Outcome: Progressing     Problem: GENITOURINARY - ADULT  Goal: Maintains or returns to baseline urinary function  Description: INTERVENTIONS:  - Assess urinary function  - Encourage oral fluids to ensure adequate hydration if ordered  - Administer IV fluids as ordered to ensure adequate hydration  - Administer ordered medications as needed  - Offer frequent toileting  - Follow urinary retention protocol if ordered  Outcome: Progressing     Problem: METABOLIC, FLUID AND ELECTROLYTES - ADULT  Goal: Electrolytes maintained within normal limits  Description: INTERVENTIONS:  - Monitor labs and assess patient for signs and symptoms of electrolyte imbalances  - Administer electrolyte replacement as ordered  - Monitor response to electrolyte replacements, including repeat lab results as appropriate  - Instruct patient on fluid and nutrition as appropriate  Outcome: Progressing  Goal: Fluid balance maintained  Description: INTERVENTIONS:  - Monitor labs   - Monitor I/O and WT  - Instruct patient on fluid and nutrition as appropriate  - Assess for signs & symptoms of volume excess or deficit  Outcome: Progressing     Problem: HEMATOLOGIC - ADULT  Goal: Maintains hematologic stability  Description: INTERVENTIONS  - Assess for signs and symptoms of bleeding or hemorrhage  - Monitor labs  - Administer supportive blood products/factors as ordered and appropriate  Outcome: Progressing     Problem: MUSCULOSKELETAL - ADULT  Goal: Maintain or return mobility to safest level of function  Description: INTERVENTIONS:  - Assess patient's ability to carry out ADLs; assess patient's baseline for ADL function and identify physical deficits which impact ability to perform ADLs (bathing, care of mouth/teeth, toileting, grooming, dressing, etc )  - Assess/evaluate cause of self-care deficits   - Assess range of motion  - Assess patient's mobility  - Assess patient's need for assistive devices and provide as appropriate  - Encourage maximum independence but intervene and supervise when necessary  - Involve family in performance of ADLs  - Assess for home care needs following discharge   - Consider OT consult to assist with ADL evaluation and planning for discharge  - Provide patient education as appropriate  Outcome: Progressing Problem: Nutrition/Hydration-ADULT  Goal: Nutrient/Hydration intake appropriate for improving, restoring or maintaining nutritional needs  Description: Monitor and assess patient's nutrition/hydration status for malnutrition  Collaborate with interdisciplinary team and initiate plan and interventions as ordered  Monitor patient's weight and dietary intake as ordered or per policy  Utilize nutrition screening tool and intervene as necessary  Determine patient's food preferences and provide high-protein, high-caloric foods as appropriate       INTERVENTIONS:  - Monitor oral intake, urinary output, labs, and treatment plans  - Assess nutrition and hydration status and recommend course of action  - Evaluate amount of meals eaten  - Assist patient with eating if necessary   - Allow adequate time for meals  - Recommend/ encourage appropriate diets, oral nutritional supplements, and vitamin/mineral supplements  - Order, calculate, and assess calorie counts as needed  - Recommend, monitor, and adjust tube feedings and TPN/PPN based on assessed needs  - Assess need for intravenous fluids  - Provide specific nutrition/hydration education as appropriate  - Include patient/family/caregiver in decisions related to nutrition  Outcome: Progressing     Problem: INFECTION - ADULT  Goal: Absence or prevention of progression during hospitalization  Description: INTERVENTIONS:  - Assess and monitor for signs and symptoms of infection  - Monitor lab/diagnostic results  - Monitor all insertion sites, i e  indwelling lines, tubes, and drains  - Monitor endotracheal if appropriate and nasal secretions for changes in amount and color  - [place] appropriate cooling/warming therapies per order  - Administer medications as ordered  - Instruct and encourage patient and family to use good hand hygiene technique  - Identify and instruct in appropriate isolation precautions for identified infection/condition  Outcome: Progressing     Problem: DISCHARGE PLANNING  Goal: Discharge to home or other facility with appropriate resources  Description: INTERVENTIONS:  - Identify barriers to discharge w/patient and caregiver  - Arrange for needed discharge resources and transportation as appropriate  - Identify discharge learning needs (meds, wound care, etc )  - Arrange for interpretive services to assist at discharge as needed  - Refer to Case Management Department for coordinating discharge planning if the patient needs post-hospital services based on physician/advanced practitioner order or complex needs related to functional status, cognitive ability, or social support system  Outcome: Progressing     Problem: Knowledge Deficit  Goal: Patient/family/caregiver demonstrates understanding of disease process, treatment plan, medications, and discharge instructions  Description: Complete learning assessment and assess knowledge base    Interventions:  - Provide teaching at level of understanding  - Provide teaching via preferred learning methods  Outcome: Progressing     Problem: COPING  Goal: Pt/Family able to verbalize concerns and demonstrate effective coping strategies  Description: INTERVENTIONS:  - Assist patient/family to identify coping skills, available support systems and cultural and spiritual values  - Provide emotional support, including active listening and acknowledgement of concerns of patient and caregivers  - Reduce environmental stimuli, as able  - Provide patient education  - Assess for spiritual pain/suffering and initiate spiritual care, including notification of Pastoral Care or [place] based community as needed  - Assess effectiveness of coping strategies  Outcome: Progressing  Goal: Will report anxiety at manageable levels  Description: INTERVENTIONS:  - Administer medication as ordered  - Teach and encourage coping skills  - Provide emotional support  - Assess patient/family for anxiety and ability to cope  Outcome: Progressing

## 2022-08-06 NOTE — ASSESSMENT & PLAN NOTE
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

## 2022-08-06 NOTE — PLAN OF CARE
Problem: PAIN - ADULT  Goal: Verbalizes/displays adequate comfort level or baseline comfort level  Description: Interventions:  - Encourage patient to monitor pain and request assistance  - Assess pain using appropriate pain scale  - Administer analgesics based on type and severity of pain and evaluate response  - Implement non-pharmacological measures as appropriate and evaluate response  - Consider cultural and social influences on pain and pain management  - Notify physician/advanced practitioner if interventions unsuccessful or patient reports new pain  Outcome: Progressing     Problem: SAFETY ADULT  Goal: Patient will remain free of falls  Description: INTERVENTIONS:  - Educate patient/family on patient safety including physical limitations  - Instruct patient to call for assistance with activity   - Consult OT/PT to assist with strengthening/mobility   - Keep Call bell within reach  - Keep bed low and locked with side rails adjusted as appropriate  - Keep care items and personal belongings within reach  - Initiate and maintain comfort rounds  - Make Fall Risk Sign visible to staff  - Apply yellow socks and bracelet for high fall risk patients  - Consider moving patient to room near nurses station  Outcome: Progressing     Problem: Prexisting or High Potential for Compromised Skin Integrity  Goal: Skin integrity is maintained or improved  Description: INTERVENTIONS:  - Identify patients at risk for skin breakdown  - Assess and monitor skin integrity  - Assess and monitor nutrition and hydration status  - Monitor labs   - Assess for incontinence   - Turn and reposition patient  - Assist with mobility/ambulation  - Relieve pressure over bony prominences  - Avoid friction and shearing  - Provide appropriate hygiene as needed including keeping skin clean and dry  - Evaluate need for skin moisturizer/barrier cream  - Collaborate with interdisciplinary team   - Patient/family teaching  - Consider wound care consult Outcome: Progressing     Problem: MOBILITY - ADULT  Goal: Maintain or return to baseline ADL function  Description: INTERVENTIONS:  -  Assess patient's ability to carry out ADLs; assess patient's baseline for ADL function and identify physical deficits which impact ability to perform ADLs (bathing, care of mouth/teeth, toileting, grooming, dressing, etc )  - Assess/evaluate cause of self-care deficits   - Assess range of motion  - Assess patient's mobility; develop plan if impaired  - Assess patient's need for assistive devices and provide as appropriate  - Encourage maximum independence but intervene and supervise when necessary  - Involve family in performance of ADLs  - Assess for home care needs following discharge   - Consider OT consult to assist with ADL evaluation and planning for discharge  - Provide patient education as appropriate  Outcome: Progressing     Problem: Nutrition/Hydration-ADULT  Goal: Nutrient/Hydration intake appropriate for improving, restoring or maintaining nutritional needs  Description: Monitor and assess patient's nutrition/hydration status for malnutrition  Collaborate with interdisciplinary team and initiate plan and interventions as ordered  Monitor patient's weight and dietary intake as ordered or per policy  Utilize nutrition screening tool and intervene as necessary  Determine patient's food preferences and provide high-protein, high-caloric foods as appropriate       INTERVENTIONS:  - Monitor oral intake, urinary output, labs, and treatment plans  - Assess nutrition and hydration status and recommend course of action  - Evaluate amount of meals eaten  - Assist patient with eating if necessary   - Allow adequate time for meals  - Recommend/ encourage appropriate diets, oral nutritional supplements, and vitamin/mineral supplements  - Order, calculate, and assess calorie counts as needed  - Recommend, monitor, and adjust tube feedings and TPN/PPN based on assessed needs  - Assess need for intravenous fluids  - Provide specific nutrition/hydration education as appropriate  - Include patient/family/caregiver in decisions related to nutrition  Outcome: Progressing

## 2022-08-06 NOTE — ASSESSMENT & PLAN NOTE
· Baseline thrombocytopenia around 100  · Low but relatively stable  · Continue DVT prophylaxis with heparin

## 2022-08-06 NOTE — PROGRESS NOTES
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 [facility])  Assessment & Plan  · Primary left lung adenocarcinoma   · Reporting lightheadedness for the past several months, which has increased over the past several days  · CT showing increase in size of left cerebellar mass with associated vasogenic edema with mass effect upon the 4th ventricle as well as new 5 mm high-density nodule in the left cerebellum  · Patient is AAOx4, neuro exam is nonfocal  · Patient is postop day 5 from left retrosigmoid posterior fossa craniotomy for resection of mass  · Preliminary pathology showing metastatic carcinoma  · Continue Decadron taper  · Platelet goal above 75  · Blood pressure goal below 160  · Discharge planning to subacute inpatient rehabilitation              Adenocarcinoma, lung, left ([facility] [address] )  Assessment & Plan  · Patient with a history metastatic lung adeno carcinoma status post left upper lobe resection with radiation and chemotherapy  · Recent CT chest abdomen pelvis showing multiple new bilateral irregular lung nodules which are suspicious for metastasis, fortunately no metastatic disease noted in the abdomen or pelvis  · Outpatient follow up with Dr Malena Moreira for possible chemo after rehabilitation      High grade neuroendocrine carcinoma of lung ([facility] [address] )  Assessment & Plan  · Management as above    Hyponatremia  Assessment & Plan  · Management per Nephrology  · 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  · Resolved with salt tabs  · Salt tabs discontinued, Na is stable    Atrial fibrillation with rapid ventricular response (HCC)  Assessment & Plan  · Rates currently well controlled on Cardizem, metoprolol, and amiodarone load   Change to 200mg daily amiodarone on   · No AP/AC due to post op state  Thrombocytopenia (HCC)  Assessment & Plan  · Baseline thrombocytopenia around 100  · Low but relatively stable  · Continue DVT prophylaxis with heparin    History of gout  Assessment & Plan  · On allopurinol 100 mg daily  Mixed hyperlipidemia  Assessment & Plan  Continue atorvastatin 20 mg daily    Essential hypertension  Assessment & Plan  · BP is at goal  · Continue metoprolol succinate 50 mg b i d   · Continue diltiazem          VTE Pharmacologic Prophylaxis: VTE Score: 5 Moderate Risk (Score 3-4) - Pharmacological DVT Prophylaxis Ordered: heparin  Patient Centered Rounds: I performed bedside rounds with nursing staff today  Discussions with Specialists or Other Care Team Provider: nurse, CM    Education and Discussions with Family / Patient: Updated  (wife) at bedside  Time Spent for Care: 20 minutes  More than 50% of total time spent on counseling and coordination of care as described above  Current Length of Stay: 12 day(s)  Current Patient Status: Inpatient   Certification Statement: The patient will continue to require additional inpatient hospital stay due to discharge planning  Discharge Plan: Anticipate discharge in 48 hrs to rehab facility  Code Status: Level 1 - Full Code    Subjective:   Denies any new complaints  Objective:     Vitals:   Temp (24hrs), Av 7 °F (36 5 °C), Min:97 1 °F (36 2 °C), Max:98 2 °F (36 8 °C)    Temp:  [97 1 °F (36 2 °C)-98 2 °F (36 8 °C)] 97 8 °F (36 6 °C)  HR:  [66-77] 66  Resp:  [16] 16  BP: (142-148)/(84-88) 148/87  SpO2:  [91 %-94 %] 94 %  Body mass index is 24 14 kg/m²  Input and Output Summary (last 24 hours): Intake/Output Summary (Last 24 hours) at 2022 1128  Last data filed at 2022 1001  Gross per 24 hour   Intake 200 ml   Output 350 ml   Net -150 ml       Physical Exam:   Physical Exam  Constitutional:       Appearance: Normal appearance     HENT:      Head: Normocephalic and atraumatic  Nose: Nose normal       Mouth/Throat:      Mouth: Mucous membranes are moist       Pharynx: Oropharynx is clear  Eyes:      Extraocular Movements: Extraocular movements intact  Cardiovascular:      Rate and Rhythm: Normal rate and regular rhythm  Pulmonary:      Effort: Pulmonary effort is normal       Breath sounds: No wheezing or rales  Skin:     General: Skin is warm and dry  Neurological:      Mental Status: He is alert  Mental status is at baseline  Psychiatric:         Mood and Affect: Mood normal          Behavior: Behavior normal           Additional Data:     Labs:  Results from last 7 days   Lab Units 08/06/22  0641 08/04/22  0605 08/03/22  0539   WBC Thousand/uL 6 06   < > 7 19   HEMOGLOBIN g/dL 10 3*   < > 11 3*   HEMATOCRIT % 31 1*   < > 34 4*   PLATELETS Thousands/uL 67*   < > 69*   NEUTROS PCT %  --   --  96*   LYMPHS PCT %  --   --  2*   MONOS PCT %  --   --  1*   EOS PCT %  --   --  0    < > = values in this interval not displayed  Results from last 7 days   Lab Units 08/05/22  0542   SODIUM mmol/L 137   POTASSIUM mmol/L 4 0   CHLORIDE mmol/L 99   CO2 mmol/L 31   BUN mg/dL 23   CREATININE mg/dL 0 67   ANION GAP mmol/L 7   CALCIUM mg/dL 9 6   GLUCOSE RANDOM mg/dL 141*                       Lines/Drains:  Invasive Devices  Report    Peripherally Inserted Central Catheter Line  Duration           PICC Line 38/03/31 Left Basilic 6 days          Central Venous Catheter Line  Duration           Port A Cath 06/17/21 Right Chest 414 days                Central Line:  Goal for removal: Will discontinue when peripheral access obtained  Imaging: No pertinent imaging reviewed      Recent Cultures (last 7 days):         Last 24 Hours Medication List:   Current Facility-Administered Medications   Medication Dose Route Frequency Provider Last Rate    acetaminophen  975 mg Oral Q8H [address] Wilber Lagunas DO      allopurinol  100 mg Oral Daily DO Kirt Garcia aluminum-magnesium hydroxide-simethicone  30 mL Oral Q6H PRN Tiffanie Lopez, DO      [START ON 8/14/2022] amiodarone  200 mg Oral Daily With Breakfast Nola Mas MD      amiodarone  400 mg Oral BID With Meals Nola Mas MD      amLODIPine  5 mg Oral Daily Nola Mas MD      vitamin C  1,000 mg Oral Daily Tiffanie Lopez, DO      atorvastatin  20 mg Oral Daily With [employer], DO      bisacodyl  10 mg Rectal Daily PRN Tiffanie Lopez, DO      dexamethasone  2 mg Oral Q12H [facility],       Followed by   Andria Lopez ON 8/8/2022] dexamethasone  2 mg Oral Q24H [facility], DO      diltiazem  120 mg Oral Q12H [facility], DO      docusate sodium  100 mg Oral BID Tiffanie Lopez, DO      heparin (porcine)  5,000 Units Subcutaneous Q8H [facility], DO      levothyroxine  37 5 mcg Oral Early Morning [facility], DO      LORazepam  0 5 mg Oral BID PRN Jennie Higgins MD      meclizine  25 mg Oral Q8H PRN Tiffanie Lopez, DO      methocarbamol  500 mg Oral Q6H [facility], DO      metoclopramide  10 mg Intravenous Q6H PRN Tiffanie Lopez, DO      metoprolol succinate  50 mg Oral BID Tiffanie Lopez, DO      ondansetron  4 mg Intravenous Q6H PRN Tiffanie Lopez, DO      oxyCODONE  2 5 mg Oral Q4H PRN Tiffanie Lopez, DO      pantoprazole  40 mg Oral Early Morning [facility], DO      phenol  1 spray Mouth/Throat 4x Daily (AC & HS) Jennie Higgins MD      polyethylene glycol  17 g Oral BID Tiffanie Lopez, DO      scopolamine  1 patch Transdermal Q72H Tiffanie Lopez, DO      senna  1 tablet Oral Daily Tiffanie Lopez,           Today, Patient Was Seen By: Donal Zamora MD    **Please Note: This note may have been constructed using a voice recognition system  **

## 2022-08-07 NOTE — DISCHARGE SUMMARY
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 [facility])  Assessment & Plan  · Primary left lung adenocarcinoma   · Reporting lightheadedness for the past several months, which has increased over the past several days  · CT showing increase in size of left cerebellar mass with associated vasogenic edema with mass effect upon the 4th ventricle as well as new 5 mm high-density nodule in the left cerebellum  · Patient is AAOx4, neuro exam is nonfocal  · Patient is postop day 5 from left retrosigmoid posterior fossa craniotomy for resection of mass  · Preliminary pathology showing metastatic carcinoma  · Continue Decadron taper  · Platelet goal above 75  · Blood pressure goal below 160  · Discharge planning to subacute inpatient rehabilitation today              Adenocarcinoma, lung, left ([address] )  Assessment & Plan  · Patient with a history metastatic lung adeno carcinoma status post left upper lobe resection with radiation and chemotherapy  · Recent CT chest abdomen pelvis showing multiple new bilateral irregular lung nodules which are suspicious for metastasis, fortunately no metastatic disease noted in the abdomen or pelvis  · Outpatient follow up with Dr Liam Gordillo for possible chemo after rehabilitation      High grade neuroendocrine carcinoma of lung [facility])  Assessment & Plan  · Management as above    Hyponatremia  Assessment & Plan  · Management per Nephrology  · 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  · Serum Na is down to 130 today, restart NaCl tabs and recheck next week in rehab    Atrial fibrillation with rapid ventricular response (HCC)  Assessment & Plan  · Rates currently well controlled on Cardizem, metoprolol, and amiodarone load   Change to 200mg daily amiodarone on 8/13  · No AP/AC due to post op state  Thrombocytopenia (HCC)  Assessment & Plan  · Baseline thrombocytopenia around 100  · Low but relatively stable  · Continue DVT prophylaxis with heparin    History of gout  Assessment & Plan  · On allopurinol 100 mg daily  Mixed hyperlipidemia  Assessment & Plan  Continue atorvastatin 20 mg daily    Essential hypertension  Assessment & Plan  · BP is at goal  · Continue metoprolol succinate 50 mg b i d   · Continue diltiazem  · norvasc restarted        Medical Problems             Resolved Problems  Date Reviewed: 8/1/2022   None               Discharging Physician / Practitioner: Donal Zamora MD  PCP: Sherri Castillo DO  Admission Date:   Admission Orders (From admission, onward)     Ordered        07/25/22 0025  Inpatient Admission  Once                      Discharge Date: 08/07/22    Disposition:    Caroline Issa  Lavon Texted [facility] Physician  Reason for Admission: dizziness  Discharge Diagnoses:   Please see assessment and plan section above for further details regarding discharge diagnoses  Consultations During Hospital Stay:  · Podiatry  · PMR  · Medical oncology  · Nephrology  · Cardiology  · Neurosurgery      Procedures Performed:   · Arterial line  · Picc line  · left retrosigmoid/posterior fossa craniotomy for resction of mass with image guidence (Left)    Significant Findings / Test Results:   MRI brain: Mixed treatment response  - 4 3 cm hemorrhagic metastatic lesion in left cerebellum, increased in size since CT head 6/15/2022 but unchanged since 7/24/2022 - this is likely due to interval hemorrhage of known left cerebellar lesion    Moderate surrounding perilesional vasogenic   edema in left cerebellum, posterior cerebellar vermis, and right posterior paramedian cerebellum with mild mass effect on the 4th ventricle   - 0 6 cm hemorrhagic metastatic lesion posterior to the dominant left cerebellar mass, decreased in size   - 0 5 cm left parietal lobe hemorrhagic lesion posterior to left parietal resection cavity, slightly decreased in size  · - No evidence of recurrent disease in left parietal resection cavity  CT chest abdomen and pelvis: CHEST     LUNGS:  Moderate emphysema  Postsurgical changes from left upper lobectomy      There are multiple new bilateral irregular lung nodules suspicious for metastases, with examples as follows:  -Right upper lobe juxtapleural nodule measuring 1 6 x 2 5 cm (series 3 image 48)  -Right upper lobe perifissural nodule measuring 1 4 x 1 8 cm (series 3 image 61)  -Left lower lobe juxtapleural nodule measuring 1 9 x 1 5 cm (series 3 image 93)  -Left lower lobe nodule measuring 1 6 x 1 3 cm (series 3 image 70)     PLEURA:  Unremarkable      HEART/GREAT VESSELS:  Heart is unremarkable for patient's age  No thoracic aortic aneurysm      MEDIASTINUM AND OWEN:  Unremarkable      CHEST WALL AND LOWER NECK:  Right chest wall port with the tip in the SVC      ABDOMEN     LIVER/BILIARY TREE:  Hepatic steatosis  Otherwise unremarkable      GALLBLADDER:  No calcified gallstones  No pericholecystic inflammatory change      SPLEEN:  Nonspecific hypodense splenic lesion measuring approximately 1 7 cm, seen on prior studies since 10/10/2017      PANCREAS:  Unremarkable      ADRENAL GLANDS:  Unremarkable      KIDNEYS/URETERS:  Bilateral renal cysts and subcentimeter too small to characterize hypodensities  No hydronephrosis  STOMACH AND BOWEL:  Colonic diverticulosis without findings of acute diverticulitis      APPENDIX:  Normal      ABDOMINOPELVIC CAVITY:  No ascites  No pneumoperitoneum  No lymphadenopathy      VESSELS:  Marked atherosclerotic changes  No abdominal aortic aneurysm        PELVIS     REPRODUCTIVE ORGANS:  Unremarkable for patient's age      URINARY BLADDER:  Diffusely increased density fluid within the bladder    Otherwise unremarkable      ABDOMINAL WALL/INGUINAL REGIONS:  Unremarkable      OSSEOUS STRUCTURES:  No acute fracture or destructive osseous lesion  Degenerative changes of the spine  Postsurgical change in left ribs from thoracotomy      IMPRESSION:     1   Multiple new bilateral irregular lung nodules suspicious for metastases      2  No findings of metastatic disease in the abdomen or pelvis  Incidental Findings:   · none    Test Results Pending at Discharge (will require follow up): · None     Outpatient Tests Requested:  · BMP and CBC next week  Complications:  AF with RVR, SIADH    Hospital Course:      Sonya Velez is a 69 y o  male patient who originally presented to the hospital on 7/25/2022 due to dizziness  He presented to the ED and was found to have AFIB with RVR and was managed with cardizem and metoprolol  Neurosurgery was consulted and performed the resection as mentioned above  When stabilized he was discharged to inpatient rehabilitation  See the problem list above for details on his hospital course and additional diagnoses  He should have BMP and CBC next week  He should be patching an eye alternating every 2 hours while awake for diplopia  He has two more days of dexamethasone taper  He will complete his amiodarone loading dose on 8/13 and start daily amiodarone on the 14th  He is to follow up with oncology after rehabilitation to discuss further treatment options  He is to follow up with neurosurgery in 1 week for an incision check      Condition at Discharge: stable    Discharge Day Visit / Exam:   Subjective:  Reports dizziness which improved with wearing his eyepatch  Vitals: Blood Pressure: 143/89 (08/07/22 0804)  Pulse: 78 (08/07/22 0804)  Temperature: 97 5 °F (36 4 °C) (08/07/22 0804)  Temp Source: Oral (07/31/22 0900)  Respirations: 20 (08/06/22 2108)  Height: 5' 8" (172 7 cm) (07/28/22 1545)  Weight - Scale: 72 kg (158 lb 11 7 oz) (08/05/22 0600)  SpO2: 94 % (08/07/22 0804)  Exam: Physical Exam  Constitutional:       Appearance: He is obese  HENT:      Head:      Comments: Staples intact, becki incision ecchymosis without drainage     Nose: Nose normal       Mouth/Throat:      Mouth: Mucous membranes are moist       Pharynx: Oropharynx is clear  Cardiovascular:      Rate and Rhythm: Normal rate and regular rhythm  Pulmonary:      Effort: Pulmonary effort is normal       Breath sounds: No wheezing or rales  Abdominal:      General: There is no distension  Palpations: Abdomen is soft  Tenderness: There is no abdominal tenderness  Musculoskeletal:      Right lower leg: No edema  Left lower leg: No edema  Skin:     General: Skin is warm and dry  Neurological:      Mental Status: He is alert  Comments: Nystagmus on EOM  Generalized weakness   Psychiatric:         Mood and Affect: Mood normal          Behavior: Behavior normal           Discussion with Family: discussed plan of discharge with his wife on 8/6    Medication Adjustments and Discharge Medications:  · Discharge Medication List: See after visit summary for reconciled discharge medications  · Medication Dosing Tapers - Please refer to Discharge Medication List for details on any medication dosing tapers (if applicable to patient)  · Summary of Medication Adjustments made as a result of this hospitalization: see list  · Medications being temporarily held (include recommended restart time): None    Wound Care Recommendations:  When applicable, please see wound care section of After Visit Summary      Instructions for any Catheters / Lines Present at Discharge (including removal date, if applicable): None    Diet Recommendations at Discharge:  Diet -        Diet Orders   (From admission, onward)             Start     Ordered    08/04/22 1814  Dietary nutrition supplements  Once        Question Answer Comment   Select Supplement: Magic Cup [employer]    Frequency Dinner        08/04/22 1813    08/04/22 1813 Dietary nutrition supplements  Once        Question Answer Comment   Select Supplement: Magic Cup Vanilla    Frequency Breakfast        08/04/22 1813    08/04/22 1813  Dietary nutrition supplements  Once        Question Answer Comment   Select Supplement: Magic Cup Chocolate    Frequency Lunch        08/04/22 1813 07/30/22 2103  Diet Regular; Regular House; Fluid Restriction 1500 ML  Diet effective now        References:    Nutrtion Support Algorithm Enteral vs  Parenteral   Question Answer Comment   Diet Type Regular    Regular Regular House    Other Restriction(s): Fluid Restriction 1500 ML    RD to adjust diet per protocol? Yes        07/30/22 2102                Goals of Care Discussions:  · Code Status at Discharge: Level 1 - Full Code  · Goals of care were discussed during this admission  Summary of discussion: full code  Discharge instructions/Information to patient and family:   See after visit summary section titled Discharge Instructions for information provided to patient and family  Planned Readmission: No      Discharge Statement:  I spent 40 minutes discharging the patient  This time was spent on the day of discharge  I had direct contact with the patient on the day of discharge  Greater than 50% of the total time was spent examining patient, answering all patient questions, arranging and discussing plan of care with patient as well as directly providing post-discharge instructions  Additional time then spent on discharge activities  **Please Note: This note may have been constructed using a voice recognition system  **

## 2022-08-07 NOTE — ASSESSMENT & PLAN NOTE
· Primary left lung adenocarcinoma   · Reporting lightheadedness for the past several months, which has increased over the past several days  · CT showing increase in size of left cerebellar mass with associated vasogenic edema with mass effect upon the 4th ventricle as well as new 5 mm high-density nodule in the left cerebellum  · Patient is AAOx4, neuro exam is nonfocal  · Patient is postop day 5 from left retrosigmoid posterior fossa craniotomy for resection of mass  · Preliminary pathology showing metastatic carcinoma  · Continue Decadron taper  · Platelet goal above 75  · Blood pressure goal below 160  · Discharge planning to subacute inpatient rehabilitation today

## 2022-08-07 NOTE — CASE MANAGEMENT
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 ([place] [address] ) 2022    Irregular heart rate 2022    Atrial fibrillation with rapid ventricular response ([place] [address] ) 2022    Thrombocytopenia ([place] [address] ) 2022    Goals of care, counseling/discussion 2022    Hypothyroidism 2022    Left parietal hemorrhagic tumor 2022    Platelets decreased ([place] [address] ) 02/15/2022    Psoriasis 02/15/2022    CKD (chronic kidney disease) stage 2, GFR 60-89 ml/min 2022    Labyrinthitis of both ears 2022    Proctocolitis 2021    Pericardial effusion 2021    Constipation 2021    Left non-suppurative otitis media 2021    Bilateral hearing loss due to cerumen impaction 2021    Chronic back pain 2021    Former cigarette smoker 2021    History of gout 2021    Hypertension 2021    Cancer of upper lobe of left lung ([facility] ) 2021    Drug-induced neutropenia ([facility]  ) 07/15/2021    Adenocarcinoma, lung, left ([facility] ) 2021    Encounter for central line care 2021    Hyperglycemia 2021    Cigarette nicotine dependence in remission 2021    Bilateral hearing loss 2020    Mixed hyperlipidemia 2019    Renal cyst, right 2018    Lumbar stenosis 2018    Glaucoma 2018    JUNAID (obstructive sleep apnea) 2018    Spinal stenosis of lumbar region with neurogenic claudication 2018  Acute idiopathic gout of left foot 11/06/2017    Depression with anxiety 11/06/2017    Essential hypertension 11/06/2017    Hypertriglyceridemia 11/06/2017    Lumbago with sciatica, left side 11/06/2017    Back problem 06/30/2017      LOS (days): 13  Geometric Mean LOS (GMLOS) (days): 9 90  Days to GMLOS:-3 5     OBJECTIVE:  Risk of Unplanned Readmission Score: 33 29      BPCI Notification Given?: Yes  Current admission status: Inpatient   Preferred Pharmacy:   [facility] [address], [address]   [address]   [place] [address]  Phone: [phone] Fax: [phone]    Primary Care Provider: Fabien Lara DO    Primary Insurance: MEDICARE  Secondary Insurance: AARP    DISCHARGE DETAILS:      Other Referral/Resources/Interventions Provided:  Referral Comments: Patient is currently written for d/c and is able to admit [facility] today  Transport is scheduled for 1130 today  Wife plans on being at the hospital by 10am so that she can follow him over to the facility  Nursing aware  COVID test resulted normal          Treatment Team Recommendation: Short Term Rehab  Discharge Destination Plan[de-identified] Short Term Rehab  Transport at Discharge : [facility]  Dispatcher Contacted: Yes  Number/Name of Dispatcher: SLETS  Transported by [employer] and Unit #):  S[facility]TS  ETA of Transport (Date): 08/07/22  ETA of Transport (Time): 1130              IMM Given (Date):: 08/06/22  IMM Given to[de-identified] Family  Family notified[de-identified] [address] Name, [address] : JAZIEL CUMMINGS [facility]  Receiving Facility/Agency Phone Number: [phone]  Facility/Agency Fax Number: [phone]

## 2022-08-07 NOTE — ASSESSMENT & PLAN NOTE
· Management per Nephrology  · 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  · Serum Na is down to 130 today, restart NaCl tabs and recheck next week in rehab

## 2022-08-07 NOTE — ASSESSMENT & PLAN NOTE
· Patient with a history metastatic lung adeno carcinoma status post left upper lobe resection with radiation and chemotherapy  · Recent CT chest abdomen pelvis showing multiple new bilateral irregular lung nodules which are suspicious for metastasis, fortunately no metastatic disease noted in the abdomen or pelvis    · Outpatient follow up with Dr Kareem Hoover for possible chemo after rehabilitation

## 2022-08-07 NOTE — ASSESSMENT & PLAN NOTE
· BP is at goal  · Continue metoprolol succinate 50 mg b i d   · Continue diltiazem  · norvasc restarted

## 2022-08-08 PROBLEM — G92.8 TOXIC METABOLIC ENCEPHALOPATHY: Status: ACTIVE | Noted: 2022-08-08

## 2022-08-08 PROBLEM — J96.01 ACUTE RESPIRATORY FAILURE WITH HYPOXIA (HCC): Status: ACTIVE | Noted: 2022-08-08

## 2022-08-08 PROBLEM — J18.9 MULTIFOCAL PNEUMONIA: Status: ACTIVE | Noted: 2022-01-01

## 2022-08-08 NOTE — PROGRESS NOTES
Vancomycin Assessment    Agnieszka Bray is a 69 y o  male who is currently ordered vancomycin 1000 mg once for possible pneumonia (fever, O2 requirement, consolidation - vs mets - on CT, elevated procalcitonin) in setting of new brain hemorrhages  Relevant clinical data and objective history reviewed:  Creatinine   Date Value Ref Range Status   08/08/2022 1 50 (H) 0 60 - 1 30 mg/dL Final     Comment:     Standardized to IDMS reference method   08/07/2022 0 64 0 60 - 1 30 mg/dL Final     Comment:     Standardized to IDMS reference method   08/05/2022 0 67 0 60 - 1 30 mg/dL Final     Comment:     Standardized to IDMS reference method     /93   Pulse 87   Temp 100 2 °F (37 9 °C) (Probe)   Resp 18   SpO2 95%   No intake/output data recorded  Lab Results   Component Value Date/Time    BUN 28 (H) 08/08/2022 12:46 PM    WBC 4 47 08/08/2022 12:46 PM    HGB 9 6 (L) 08/08/2022 12:46 PM    HCT 28 5 (L) 08/08/2022 12:46 PM     (H) 08/08/2022 12:46 PM    PLT 76 (L) 08/08/2022 12:46 PM     Temp Readings from Last 3 Encounters:   08/08/22 100 2 °F (37 9 °C) (Probe)   08/07/22 97 5 °F (36 4 °C)   07/24/22 98 7 °F (37 1 °C) (Oral)     Vancomycin Days of Therapy: 1    Assessment/Plan  The patient is currently ordered vancomycin based on actual body weight  Baseline risks associated with therapy include: pre-existing renal impairment, advanced age, and dehydration  The patient received 1000 mg once this afternoon  Will order 750 mg STAT (catch up load, total of 1750 mg), followed by pulse- dosing (Scr 1 5 ~3x baseline of 0 5)  Pharmacy will also follow closely for s/sx of nephrotoxicity, infusion reactions, and appropriateness of therapy  BMP and CBC will be ordered per protocol  Plan for random level tomorrow 8/9 with AM labs  Patient should be redosed when level < 20 mcg/mL (correlates with trough of 15-20 mcg/mL)      Pharmacy will continue to follow the patients culture results and clinical progress daily      Moshe Lopez, PharmD, [facility]  Infectious Diseases Clinical Pharmacy Specialist  [phone]

## 2022-08-08 NOTE — H&P
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, most recent of which was recently performed on 7/28/22 by Dr Varinder Forman  Additional history of A-fib, hypothyroidism, SIADH, HTN, gout, and hyperlipidemia  He presents today for worsening mental status  Patient's wife went to visit him today at rehab and found him yelling out in pain, "babbeling," more lethargic, and more confused compared to the evening prior  This is very different than how he was acting during his admission  EMS was called and patient was brought to the ED where he was found to have new multifocal pneumonia versus worsening mets versus septic emboli, severe sepsis, hyponatremia, fever at 101 3F, and right thigh pain  He has required supplemental O2 at approximately 6 L/min and maintaining sats in the low 90's%  Labs remarkable for lactic acid of 7 7, normal WBC, procalcitonin of 3 34  He was evaluated in the ED by the ICU team and will be admitted to the critical care service for additional evaluation and treatment  To review recent admission, patient presented to [address] [place] [address] on 7/28 due to dizziness and was found to have new-onset A-fib which was medically treated  He was found to have increasing size of known cerebellar hemorrhagic mass with edema and mass effect compromising the 4th ventricle  He was transferred to [facility] and underwent left retrosigmoid posterior fossa craniectomy and mass resection on 7/28/22   During his stay he was also treated for hyponatremia which is believed to be due to SIADH 2/2 malignancy which was treated with fluid restriction and salt liberalization, Afib with RVR, and thrombocytopenia  Additionally complained of diplopia and was given eye patch  During stay he was started on loading dose of amiodarone which is to be completed on 8/13 and he can then start daily dosing on 8/14         History obtained from spouse   -------------------------------------------------------------------------------------------------------------  Assessment and Plan:    Neuro:    Diagnosis: Metastatic disease to brain s/p recent craniectomy and resection of left cerebellar mass on 7/28/22 by Dr Berenice Abad, POD # 6  o S/p DAVID resection, chemo, and whole brain radiation  o Neuro checks  o Decadron 2 mg daily  o Neurosurgery aware  o Stat CTH for any acute changes  o Repeat CTH in the AM   Diagnosis: Acute toxic metabolic encephalopathy, multifactorial including cancer with brain mets, severe sepsis, lactic acidosis, pneumonia  o UA is unremarkable  o Monitor lactic acid closely (6 pm, 10 pm, then reassess)  o Fluid resuscitation then continue with IV NS gtt   o Antibiotics: Cefepime and Vancomycin  o Recheck procalcitonin in AM  o Supportive care      CV:    Diagnosis: Paroxysmal A-fib diagnosed during last admission, currently in sinus rhythm  o Holding Amiodarone and Cardizem for now  o Schedule metoprolol 2 5 q6h while NPO  o Continue to monitor on telemetry  o No Anticoagulation due to hemorrhage   Diagnosis: Hypertension  o Antihypertensives currently on hold due to sepsis   Diagnosis: hyperlipidemia  o Substitute home Crestor 10 with pravastatin 80      Pulm:   Diagnosis: Acute respiratory failure 2/2 pneumonia and lung cancer  o Currently on supplemental O2 at 6 L/min  o Antibiotics: Cefepime day # 1, Vancomycin day #1   Will deescalate antibiotics as cultures become available   Respiratory protocol and airway clearance   Diagnosis: Neuroendocrine carcinoma of lung  o Follows at [facility]  o S/p DAVID resection in 4/2021  o Evidence of worsening lesions on CT chest today      GI:    Diagnosis: failed bedside swallow  o NPO for now, meds changed to IV as applicable   Diagnosis: GERD  o Plan: Continue Protonix  o Bowel management as appropriate      :    Corea catheter  o Voiding trial tomorrow  o UA is non-infected      F/E/N:    Fluids: NS @ 100 cc/hr   Electrolytes: Replete as needed to maintain K > 4, Mag > 2, Phos > 3   Nutrition: NPO for now, will reassess in AM      Heme/Onc:    Diagnosis: Neuroendocrine carcinoma of the lung with mets to brain, worsening lung lesions  Follows at [facility]  S/p DAVID resection 4/2021, chemo, whole brain radiation  o Hold off on consulting oncology until sepsis/pneumoia improves   Diagnosis: Thrombocytopenia, platelets low but stable  o Continue to trend      Endo:    Diagnosis: Hypothyroidism  o Continue home synthroid 37 5 mcg daily      ID:    Diagnosis: Severe sepsis 2/2 pneumonia, initial lactic acid 7 7 and procalcitonin 3 34  WBC wnl   o Sepsis pathway  o Aggressive hydration followed by maintenance fluids  o Antibiotics: cefepime day #1, Vancomycin day # 1  o Recheck lactic acid at 6 pm and 10 pm, then reassess  o Recheck Procalcitonin in AM  o Monitor fever curve and WBC  o ATC tylenol for fever control      MSK/Skin:    Diagnosis: Gout  o Continue home allopurinol   Turning/repositioning   PT/OT when appropriate      Disposition: Admit to Critical Care   Code Status: Level 1 - Full Code  --------------------------------------------------------------------------------------------------------------  Review of Systems   Unable to perform ROS: Mental status change       Review of systems was unable to be performed secondary to AMS    Physical Exam  Vitals and nursing note reviewed  Constitutional:       Appearance: He is ill-appearing and toxic-appearing  Interventions: Face mask in place     HENT:      Head: Comments: S/p recent left posterior craniectomy  Incision is C/D/I     Right Ear: External ear normal       Left Ear: External ear normal       Nose: Nose normal       Mouth/Throat:      Mouth: Mucous membranes are dry  Pharynx: Oropharynx is clear  Eyes:      General: No scleral icterus  Extraocular Movements: Extraocular movements intact  Conjunctiva/sclera: Conjunctivae normal       Pupils: Pupils are equal, round, and reactive to light  Cardiovascular:      Rate and Rhythm: Normal rate and regular rhythm  Pulses: Normal pulses  Heart sounds: Normal heart sounds  Pulmonary:      Effort: Tachypnea present  Breath sounds: Decreased air movement present  Decreased breath sounds and rhonchi present  Abdominal:      General: Abdomen is protuberant  Bowel sounds are normal       Palpations: Abdomen is soft  Tenderness: There is no abdominal tenderness  Musculoskeletal:      Cervical back: Neck supple  No rigidity  Right lower leg: No edema  Left lower leg: No edema  Skin:     General: Skin is warm and dry  Neurological:      Mental Status: He is lethargic  Comments: Lethargic  Will open eyes and answer questions with one-word answers or yes/no  Quickly falls back to sleep  No facial droop  EOM intact  PERRL  Smile is symmetric     Motor: Can hold all four extremities antigravity  Sensation: intact to light touch throughout  Reflexes: symmetric and intact, toes equivocal   Psychiatric:         Mood and Affect: Mood normal          Behavior: Behavior normal        --------------------------------------------------------------------------------------------------------------  Vitals:   Vitals:    08/08/22 1600 08/08/22 1644 08/08/22 1720 08/08/22 1800   BP: 137/72 128/93 120/65    BP Location:       Pulse: 96 87 92 76   Resp: 20 18 22 (!) 28   Temp:   100 4 °F (38 °C) 100 2 °F (37 9 °C)   TempSrc:   Bladder Probe   SpO2: 95% 95% 91% 93%     Temp  Min: 98 °F (36 7 °C)  Max: 101 3 °F (38 5 °C)        There is no height or weight on file to calculate BMI    SvO2:       Laboratory and Diagnostics:  Results from last 7 days   Lab Units 08/08/22  1246 08/07/22  0509 08/06/22  0641 08/05/22  0542 08/04/22  0605 08/03/22  0539 08/02/22  0508   WBC Thousand/uL 4 47 5 99 6 06 6 91 6 35 7 19 7 05   HEMOGLOBIN g/dL 9 6* 10 2* 10 3* 10 8* 10 8* 11 3* 11 3*   HEMATOCRIT % 28 5* 30 4* 31 1* 32 4* 33 2* 34 4* 33 7*   PLATELETS Thousands/uL 76* 69* 67* 79* 77* 69* 82*   NEUTROS PCT %  --   --   --   --   --  96*  --    BANDS PCT % 38*  --   --   --   --   --   --    MONOS PCT %  --   --   --   --   --  1*  --    MONO PCT % 0*  --   --   --   --   --   --      Results from last 7 days   Lab Units 08/08/22  1246 08/07/22  0509 08/05/22  0542 08/04/22  0605 08/03/22  0539 08/02/22  0507   SODIUM mmol/L 131* 130* 137 134* 137 138   POTASSIUM mmol/L 4 4 4 0 4 0 4 5 4 0 3 5   CHLORIDE mmol/L 96 95* 99 102 101 103   CO2 mmol/L 22 28 31 23 31 31   ANION GAP mmol/L 13 7 7 9 5 4   BUN mg/dL 28* 20 23 27* 28* 31*   CREATININE mg/dL 1 50* 0 64 0 67 0 58* 0 66 0 58*   CALCIUM mg/dL 9 8 9 3 9 6 9 1 9 0 8 5   GLUCOSE RANDOM mg/dL 161* 137 141* 148* 150* 136   ALT U/L 64  --   --   --   --   --    AST U/L 35  --   --   --   --   --    ALK PHOS U/L 76  --   --   --   --   --    ALBUMIN g/dL 2 0*  --   --   --   --   --    TOTAL BILIRUBIN mg/dL 0 58  --   --   --   --   --           Results from last 7 days   Lab Units 08/08/22  1316   INR  1 13   PTT seconds 34          Results from last 7 days   Lab Units 08/08/22  1749 08/08/22  1508 08/08/22  1316   LACTIC ACID mmol/L 2 9* 7 2* 7 7*     ABG:    VBG:  Results from last 7 days   Lab Units 08/08/22  1748   PH SHANNA  7 378   PCO2 SHANNA mm Hg 41 1*   PO2 SHANNA mm Hg 25 0*   HCO3 SHANNA mmol/L 23 7*   BASE EXC SHANNA mmol/L -1 4     Results from last 7 days   Lab Units 08/08/22  1316   PROCALCITONIN ng/ml 3 34*       Micro:  Results from last 7 days   Lab Units 08/08/22  1316   BLOOD CULTURE  Received in Microbiology Lab  Culture in Progress  Received in Microbiology Lab  Culture in Progress  EKG: NSR at 90  Imaging: I have personally reviewed pertinent reports  and I have personally reviewed pertinent films in PACS     XR hip/pelv 2-3 vws right  Result Date: 8/8/2022  Impression: Mild degenerative changes both hips No acute osseous abnormality  Workstation performed: [id number]     CT head without contrast  Result Date: 8/8/2022  Impression: There are 2 new hemorrhage is identified in the left cerebellar hemisphere series 2 image 11 in the postsurgical territory  Slight increase in vasogenic edema noted resulting in minimal hydrocephalus when compared to the prior study  Slight prominence to the temporal horns lateral ventricles noted which is new since the prior study  Urgent neurosurgical consultation is recommended  Remainder of the brain is stable  The study was marked in [facility] for immediate notification  Workstation performed: [id number]     CTA ED chest PE study  Result Date: 8/8/2022  Impression: Increased multifocal cavitary nodules and consolidations likely representing a combination of worsening metastases and multifocal pneumonia  Septic emboli could cause a similar appearance  No pulmonary emboli   Workstation performed: [id number]       Historical Information   Past Medical History:   Diagnosis Date    Brain cancer ([facility] [address] )     Hyperlipidemia     Hypertension     Lung cancer [facility])      Past Surgical History:   Procedure Laterality Date    BACK SURGERY      CRANIOTOMY Left 4/7/2022    Procedure: Image guided left parietal craniotomy for tumor resection;  Surgeon: Sebastian Alonzo MD;  [facility];  Service: Neurosurgery    CRANIOTOMY Left 7/28/2022    Procedure: left retrosigmoid/posterior fossa craniotomy for resction of mass with image guidence;  Surgeon: Sebastian Alonzo MD;  [facility];  Service: Neurosurgery    HEMORRHOID SURGERY      IR BIOPSY LUNG  2021    IR PORT PLACEMENT  2021    LAMINECTOMY  2018    L4-L5    LUNG SURGERY      left upper lobectomy    HI BRONCHOSCOPY,DIAGNOSTIC N/A 2021    Procedure: BRONCHOSCOPY FLEXIBLE;  Surgeon: Sofia Seip, MD;  [facility];  Service: Thoracic    HI MEDIASTINOSCOPY WITH LYMPH NODE BIOPSY/IES N/A 2021    Procedure: MEDIASTINOSCOPY, flexible bronchoscopy;  Surgeon: Sofia Seip, MD;  [facility];  Service: Thoracic    TONSILLECTOMY       Social History   Social History     Substance and Sexual Activity   Alcohol Use Yes    Alcohol/week: 7 0 standard drinks    Types: 7 Cans of beer per week    Comment: 1-2 beers nightly     Social History     Substance and Sexual Activity   Drug Use Not Currently    Types: Marijuana    Comment: seldom     Social History     Tobacco Use   Smoking Status Former Smoker    Packs/day: 1 00    Years: 40 00    Pack years: 40 00    Types: Cigarettes    Start date:     Quit date: 2017    Years since quittin 4   Smokeless Tobacco Never Used   Tobacco Comment    quit 2017     Family History:   Family History   Problem Relation Age of Onset    Nephrolithiasis Father     Lung cancer Maternal Grandfather      I have reviewed this patient's family history and commented on sigificant items within the HPI      Medications:  Current Facility-Administered Medications   Medication Dose Route Frequency    acetaminophen (TYLENOL) tablet 975 mg  975 mg Oral Q8H [facility]    [START ON 2022] allopurinol (ZYLOPRIM) tablet 100 mg  100 mg Oral Daily    [START ON 2022] cefepime (MAXIPIME) 2,000 mg in dextrose 5 % 50 mL IVPB  2,000 mg Intravenous Q12H    chlorhexidine (PERIDEX) 0 12 % oral rinse 15 mL  15 mL Mouth/Throat Q12H [facility]    [START ON 2022] dexamethasone (DECADRON) tablet 2 mg  2 mg Oral Q24H [facility]    docusate sodium (COLACE) capsule 100 mg  100 mg Oral BID    [START ON 8/9/2022] levothyroxine tablet 37 5 mcg  37 5 mcg Oral Early Morning    lidocaine (LIDODERM) 5 % patch 1 patch  1 patch Topical Once    LORazepam (ATIVAN) tablet 0 5 mg  0 5 mg Oral BID PRN    metoprolol (LOPRESSOR) injection 2 5 mg  2 5 mg Intravenous Q6H    oxyCODONE (ROXICODONE) IR tablet 2 5 mg  2 5 mg Oral Q4H PRN    [START ON 8/9/2022] pantoprazole (PROTONIX) EC tablet 40 mg  40 mg Oral Daily    polyethylene glycol (MIRALAX) packet 17 g  17 g Oral BID    pravastatin (PRAVACHOL) tablet 80 mg  80 mg Oral Daily With Dinner    [START ON 8/9/2022] scopolamine (TRANSDERM-SCOP) 1 mg/3 days TD 72 hr patch 1 patch  1 patch Transdermal Q72H    [START ON 8/9/2022] senna (SENOKOT) tablet 8 6 mg  8 6 mg Oral Daily    sodium chloride (PF) 0 9 % injection 3 mL  3 mL Intravenous Q1H PRN    sodium chloride 0 9 % infusion  100 mL/hr Intravenous Continuous    sodium chloride tablet 1 g  1 g Oral TID With Meals    [START ON 8/9/2022] vancomycin (VANCOCIN) IVPB (premix in dextrose) 1,000 mg 200 mL  15 mg/kg Intravenous Daily PRN    vancomycin (VANCOCIN) IVPB (premix in dextrose) 750 mg 150 mL  750 mg Intravenous Once     Home medications:  Prior to Admission Medications   Prescriptions Last Dose Informant Patient Reported? Taking?    Ascorbic Acid (vitamin C) 1000 MG tablet  Self Yes No   Sig: Take 1,000 mg by mouth daily   B Complex Vitamins (B COMPLEX 1 PO)  Self Yes No   Sig: Take 1 tablet by mouth daily   LORazepam (ATIVAN) 0 5 mg tablet   No No   Sig: Take 1 tablet (0 5 mg total) by mouth 2 (two) times a day as needed for anxiety   acetaminophen (TYLENOL) 325 mg tablet   No No   Sig: Take 3 tablets (975 mg total) by mouth every 8 (eight) hours   allopurinol (ZYLOPRIM) 100 mg tablet  Self No No   Sig: Take 1 tablet (100 mg total) by mouth daily   amLODIPine (NORVASC) 10 mg tablet  Self No No   Sig: TAKE ONE TABLET BY MOUTH EVERY DAY   amiodarone (PACERONE) 400 MG tablet   No No   Sig: Take 1 tablet (400 mg total) by mouth 2 (two) times a day with meals for 13 doses   amiodarone 200 mg tablet   No No   Sig: Take 1 tablet (200 mg total) by mouth daily with breakfast   dexamethasone (DECADRON) 2 mg tablet   No No   Sig: Take 1 tablet (2 mg total) by mouth every 24 hours for 2 doses   diltiazem (CARDIZEM SR) 120 mg 12 hr capsule   No No   Sig: Take 1 capsule (120 mg total) by mouth every 12 (twelve) hours   docusate sodium (COLACE) 100 mg capsule   No No   Sig: Take 1 capsule (100 mg total) by mouth 2 (two) times a day   levothyroxine 75 mcg tablet  Self No No   Sig: Take 0 5 tablets (37 5 mcg total) by mouth daily in the early morning   meclizine (ANTIVERT) 25 mg tablet  Self No No   Sig: Take 1 tablet (25 mg total) by mouth every 8 (eight) hours as needed for dizziness   Patient not taking: Reported on 7/24/2022   methocarbamol (ROBAXIN) 500 mg tablet   No No   Sig: Take 1 tablet (500 mg total) by mouth 3 (three) times a day   metoprolol succinate (TOPROL-XL) 50 mg 24 hr tablet   No No   Sig: Take 1 tablet (50 mg total) by mouth 2 (two) times a day   oxyCODONE (ROXICODONE) 5 immediate release tablet   No No   Sig: Take 0 5 tablets (2 5 mg total) by mouth every 4 (four) hours as needed (pain) Max Daily Amount: 15 mg   pantoprazole (PROTONIX) 40 mg tablet  Self No No   Sig: Take 1 tablet (40 mg total) by mouth daily   phenol (CHLORASEPTIC) 1 4 % mucosal liquid   No No   Sig: Apply 1 spray to the mouth or throat 4 (four) times a day (before meals and at bedtime)   polyethylene glycol (GLYCOLAX) 17 GM/SCOOP powder  Self No No   Sig: Take 17 g by mouth 2 (two) times a day   rosuvastatin (CRESTOR) 10 MG tablet   No No   Sig: TAKE ONE TABLET BY MOUTH EVERY DAY   scopolamine (TRANSDERM-SCOP) 1 mg/3 days TD 72 hr patch   No No   Sig: Place 1 patch on the skin every third day   senna (SENOKOT) 8 6 mg   No No   Sig: Take 1 tablet (8 6 mg total) by mouth daily   sodium chloride 1 g tablet   No No   Sig: Take 1 tablet (1 g total) by mouth 3 (three) times a day with meals      Facility-Administered Medications: None     Allergies: Allergies   Allergen Reactions    Bee Venom     Benazepril Other (See Comments)     Angioedema     Latex Other (See Comments)     Burning of eyes at the dentist from the gloves    Meloxicam GI Intolerance    Penicillin G Rash     ------------------------------------------------------------------------------------------------------------  Advance Directive and Living Will:      Power of :    POLST:    ------------------------------------------------------------------------------------------------------------  Anticipated Length of Stay is > 2 midnights    Care Time Delivered:   Please refer to attending's attestation      Harpreet Aburto, DO        Portions of the record may have been created with voice recognition software  Occasional wrong word or "sound a like" substitutions may have occurred due to the inherent limitations of voice recognition software    Read the chart carefully and recognize, using context, where substitutions have occurred

## 2022-08-08 NOTE — PLAN OF CARE
Problem: Potential for Falls  Goal: Patient will remain free of falls  Description: INTERVENTIONS:  - Educate patient/family on patient safety including physical limitations  - Instruct patient to call for assistance with activity   - Consult OT/PT to assist with strengthening/mobility   - Keep Call bell within reach  - Keep bed low and locked with side rails adjusted as appropriate  - Keep care items and personal belongings within reach  - Initiate and maintain comfort rounds  - Make Fall Risk Sign visible to staff  - Offer Toileting every 2 Hours, in advance of need  - Initiate/Maintain bed alarm  - Obtain necessary fall risk management equipment:   - Apply yellow socks and bracelet for high fall risk patients  - Consider moving patient to room near nurses station  Outcome: Progressing     Problem: MOBILITY - ADULT  Goal: Maintain or return to baseline ADL function  Description: INTERVENTIONS:  -  Assess patient's ability to carry out ADLs; assess patient's baseline for ADL function and identify physical deficits which impact ability to perform ADLs (bathing, care of mouth/teeth, toileting, grooming, dressing, etc )  - Assess/evaluate cause of self-care deficits   - Assess range of motion  - Assess patient's mobility; develop plan if impaired  - Assess patient's need for assistive devices and provide as appropriate  - Encourage maximum independence but intervene and supervise when necessary  - Involve family in performance of ADLs  - Assess for home care needs following discharge   - Consider OT consult to assist with ADL evaluation and planning for discharge  - Provide patient education as appropriate  Outcome: Progressing  Goal: Maintains/Returns to pre admission functional level  Description: INTERVENTIONS:  - Perform BMAT or MOVE assessment daily    - Set and communicate daily mobility goal to care team and patient/family/caregiver     - Collaborate with rehabilitation services on mobility goals if consulted  - Perform Range of Motion 4 times a day  - Reposition patient every 2 hours  - Out of bed to chair 2 times a day   - Out of bed for meals 2 times a day  - Out of bed for toileting  - Record patient progress and toleration of activity level   Outcome: Progressing     Problem: Nutrition/Hydration-ADULT  Goal: Nutrient/Hydration intake appropriate for improving, restoring or maintaining nutritional needs  Description: Monitor and assess patient's nutrition/hydration status for malnutrition  Collaborate with interdisciplinary team and initiate plan and interventions as ordered  Monitor patient's weight and dietary intake as ordered or per policy  Utilize nutrition screening tool and intervene as necessary  Determine patient's food preferences and provide high-protein, high-caloric foods as appropriate  INTERVENTIONS:  - Monitor oral intake, urinary output, labs, and treatment plans  - Assess nutrition and hydration status and recommend course of action  - Evaluate amount of meals eaten  - Assist patient with eating if necessary   - Allow adequate time for meals  - Recommend/ encourage appropriate diets, oral nutritional supplements, and vitamin/mineral supplements  - Order, calculate, and assess calorie counts as needed  - Recommend, monitor, and adjust tube feedings and TPN/PPN based on assessed needs  - Assess need for intravenous fluids  - Provide specific nutrition/hydration education as appropriate  - Include patient/family/caregiver in decisions related to nutrition  Outcome: Progressing     Problem: Neurological Deficit  Goal: Neurological status is stable or improving  Description: Interventions:  - Monitor and assess patient's level of consciousness, motor function, sensory function, and level of assistance needed for ADLs  - Monitor and report changes from baseline  Collaborate with interdisciplinary team to initiate plan and implement interventions as ordered     - Provide and maintain a safe environment  - Consider seizure precautions  - Consider fall precautions  - Consider aspiration precautions  - Consider bleeding precautions  Outcome: Progressing     Problem: Activity Intolerance/Impaired Mobility  Goal: Mobility/activity is maintained at optimum level for patient  Description: Interventions:  - Assess and monitor patient  barriers to mobility and need for assistive/adaptive devices  - Assess patient's emotional response to limitations  - Collaborate with interdisciplinary team and initiate plans and interventions as ordered  - Encourage independent activity per ability   - Maintain proper body alignment  - Perform active/passive rom as tolerated/ordered  - Plan activities to conserve energy   - Turn patient as appropriate  Outcome: Progressing     Problem: Communication Impairment  Goal: Ability to express needs and understand communication  Description: Assess patient's communication skills and ability to understand information  Patient will demonstrate use of effective communication techniques, alternative methods of communication and understanding even if not able to speak  - Encourage communication and provide alternate methods of communication as needed  - Collaborate with case management/ for discharge needs  - Include patient/family/caregiver in decisions related to communication  Outcome: Progressing     Problem: Potential for Aspiration  Goal: Non-ventilated patient's risk of aspiration is minimized  Description: Assess and monitor vital signs, respiratory status, and labs (WBC)  Monitor for signs of aspiration (tachypnea, cough, rales, wheezing, cyanosis, fever)  - Assess and monitor patient's ability to swallow  - Place patient up in chair to eat if possible  - HOB up at 90 degrees to eat if unable to get patient up into chair   - Supervise patient during oral intake  - Instruct patient/ family to take small bites    - Instruct patient/ family to take small single sips when taking liquids  - Follow patient-specific strategies generated by speech pathologist   Outcome: Progressing     Problem: Nutrition  Goal: Nutrition/Hydration status is improving  Description: Monitor and assess patient's nutrition/hydration status for malnutrition (ex- brittle hair, bruises, dry skin, pale skin and conjunctiva, muscle wasting, smooth red tongue, and disorientation)  Collaborate with interdisciplinary team and initiate plan and interventions as ordered  Monitor patient's weight and dietary intake as ordered or per policy  Utilize nutrition screening tool and intervene per policy  Determine patient's food preferences and provide high-protein, high-caloric foods as appropriate  - Assist patient with eating   - Allow adequate time for meals   - Encourage patient to take dietary supplement as ordered  - Collaborate with clinical nutritionist   - Include patient/family/caregiver in decisions related to nutrition    Outcome: Progressing

## 2022-08-08 NOTE — SEPSIS NOTE
Sepsis Note   Bridget Mcmanus 69 y o  male MRN: [id number]  Unit/Bed#: ICU 02 Encounter: [id number]       qSOFA     Row Name 08/09/22 0600 08/09/22 0500 08/09/22 0400 08/09/22 0300 08/09/22 0200    Altered mental status GCS < 15 0 0 0 0 0    Respiratory Rate > / =22 0 1 1 1 0    Systolic BP < / =572 1 0 0 0 0    Q Sofa Score 1 1 1 1 0    Row Name 08/09/22 0100 08/09/22 0000 08/08/22 2300 08/08/22 2200 08/08/22 2101    Altered mental status GCS < 15 0 0 0 0 --    Respiratory Rate > / =22 1 1 0 1 --    Systolic BP < / =972 0 0 0 -- 0    Q Sofa Score 1 1 0 1 1    Row Name 08/08/22 2100 08/08/22 2000 08/08/22 1900 08/08/22 1840 08/08/22 1800    Altered mental status GCS < 15 0 0 0 -- 0    Respiratory Rate > / =22 1 1 1 1 1    Systolic BP < / =463 -- 0 -- 0 --    Q Sofa Score 1 1 1 1 1    Row Name 08/08/22 1720 08/08/22 1644 08/08/22 1600 08/08/22 1545 08/08/22 1530    Altered mental status GCS < 15 -- 1 -- -- --    Respiratory Rate > / =22 1 0 0 0 0    Systolic BP < / =239 0 0 0 0 0    Q Sofa Score 2 1 1 1 1    Row Name 08/08/22 1515 08/08/22 1500 08/08/22 1455 08/08/22 1445 08/08/22 1315    Altered mental status GCS < 15 -- -- 1 -- --    Respiratory Rate > / =22 0 0 -- 0 0    Systolic BP < / =853 0 0 -- 0 0    Q Sofa Score 0 0 1 0 0    Row Name 08/08/22 1230 08/08/22 1228 08/08/22 1219          Altered mental status GCS < 15 1 -- --      Respiratory Rate > / =22 -- 0 0      Systolic BP < / =371 -- 0 1      Q Sofa Score 1 0 1                 Initial Sepsis Screening     Row Name 08/08/22 3852                Is the patient's history suggestive of a new or worsening infection? --        Suspected source of infection suspect infection, source unknown  -GJ        Are two or more of the following signs & symptoms of infection both present and new to the patient? Yes (Proceed)  -GJ        Indicate SIRS criteria Hyperthemia > 38 3C (100 9F); Tachycardia > 90 bpm  -GJ        If the answer is yes to both questions, suspicion of sepsis is present --        If severe sepsis is present AND tissue hypoperfusion perists in the hour after fluid resuscitation or lactate > 4, the patient meets criteria for SEPTIC SHOCK --        Are any of the following organ dysfunction criteria present within 6 hours of suspected infection and SIRS criteria that are NOT considered to be chronic conditions? --        Organ dysfunction Lactate >/equal 4 0 mmol/L (MEETS CRITERIA FOR SEPTIC SHOCK)  -GJ        Date of presentation of severe sepsis 08/08/22  -GJ        Time of presentation of severe sepsis 1411  -GJ        Tissue hypoperfusion persists in the hour after crystalloid fluid administration, evidenced, by either: --        Was hypotension present within one hour of the conclusion of crystalloid fluid administration? --        Date of presentation of septic shock --        Time of presentation of septic shock --              User Key  (r) = Recorded By, (t) = Taken By, (c) = Cosigned By    [address] Name Provider Type    [id number] [facility] Resident                  Default Flowsheet Data (last 720 hours)     Sepsis Reassess     Row Name 08/08/22 1529                   Repeat Volume Status and Tissue Perfusion Assessment Performed    Repeat Volume Status and Tissue Perfusion Assessment Performed Yes  -GJ                  Volume Status and Tissue Perfusion Post Fluid Resuscitation * Must Document All *    Vital Signs Reviewed (HR, RR, BP, T) Yes  -GJ        Shock Index Reviewed Yes  -GJ        Arterial Oxygen Saturation Reviewed (POx, SaO2 or SpO2) Yes (comment %)  -GJ        Cardio Normal S1/S2; Regular rate and rhythm; No murmor; No rub or gallop  -GJ        Pulmonary Normal effort;Rhonchi  -GJ        Capillary Refill Brisk  -GJ        Peripheral Pulses Radial;Dorsalis Pedis  -GJ        Peripheral Pulse +2  -GJ        Dorsalis Pedis +2  -GJ        Skin Warm  -GJ        Urine output assessed --                  *OR*   Intensive Monitoring- Must Document One of the Following Four *:    Vital Signs Reviewed Yes  -GJ        * Central Venous Pressure (CVP or RAP) --        * Central Venous Oxygen (SVO2, ScvO2 or Oxygen saturation via central catheter) --        * Bedside Cardiovascular US in IVC diameter and % collapse --        * Passive Leg Raise OR Crystalloid Challenge --              User Key  (r) = Recorded By, (t) = Taken By, (c) = Cosigned By    Initials Name Provider Type    [id number] [facility] Resident

## 2022-08-08 NOTE — ED ATTENDING ATTESTATION
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 [facility])      ED Course as of 08/08/22 1655   Mon Aug 08, 2022   1321 Temperature(!): 101 3 °F (38 5 °C)  Discuss with Neurosurgery b/c of concern for meninigtis vs brain abscess  Specifically if IV contrast desired for [facility]   And if they're okay with an LP     1356 hs TnI 0hr(!): 83   1528 Bands Relative(!): 38       I, Alex Mackay MD, saw and evaluated the patient  All available labs and X-rays were ordered by me or the resident and have been reviewed by myself  I discussed the patient with the resident / non-physician and agree with the resident's / non-physician practitioner's findings and plan as documented in the resident's / non-physician practicitioner's note, except where noted  At this point, I agree with the current assessment done in the ED  I was present during key portions of all procedures performed unless otherwise stated  Chief Complaint   Patient presents with    Altered Mental Status     Pt comes from [facility]; PT has hx of lung ca with mets to brain w/recent craniotomy in end of July; pt more confused and weaker than usual; pt reports pain at site of stitches     This is a 69 y o  male presenting for evaluation of AMS  Lung mets to the brain  Recently here for AMS / dizziness ? had resection ? DC-ed yesterday to [facility] ? wife noted today he is more altered to previous  He was very sleepy in hospital and would talk ? intermittent periods of "babbling" which is why she wanted him to come in for evaluation  No f/ch/n/v   +RIGHT thigh/leg pain  Wife believes it is swollen  +cough (baseline)  3-4L NC oxygen 92% ? not normally on oxygen ? was on oxygen post-extubation though and continued on it       PMH:   has a past medical history of Brain cancer ([facility] [address] ), Hyperlipidemia, Hypertension, and Lung cancer ([facility] [address] )  PSH:   has a past surgical history that includes Back surgery; Hemorrhoid surgery; Tonsillectomy (1959); Laminectomy (); IR biopsy lung (2021); pr mediastinoscopy with lymph node biopsy/ies (N/A, 2021); pr bronchoscopy,diagnostic (N/A, 2021); IR port placement (2021); Lung surgery; Craniotomy (Left, 2022); and Craniotomy (Left, 2022)  Social:  Social History     Substance and Sexual Activity   Alcohol Use Yes    Alcohol/week: 7 0 standard drinks    Types: 7 Cans of beer per week    Comment: 1-2 beers nightly     Social History     Tobacco Use   Smoking Status Former Smoker    Packs/day: 1 00    Years: 40 00    Pack years: 40 00    Types: Cigarettes    Start date:     Quit date: 2017    Years since quittin 4   Smokeless Tobacco Never Used   Tobacco Comment    quit 2017     Social History     Substance and Sexual Activity   Drug Use Not Currently    Types: Marijuana    Comment: seldom     PE:  Vitals:    22 1515 22 1530 22 1545 22 1600   BP: 106/76 126/71 120/78 137/72   BP Location:       Pulse: 96 94 100 96   Resp: 20 20 20 20   Temp:       TempSrc:       SpO2: 94% 93% 93% 95%   General: VS reviewed  awake, alert  Appears stated age  Head: Normocephalic, atraumatic, nontender  Incision on LEFT posteiror occiput  Ecchymosis surrounding the incision  [place] present  Non-tender  Eyes: PERRL, EOM-I  No diplopia  No hyphema  No subconjunctival hemorrhages  Symmetrical lids  ENTAtraumatic external nose and ears  Mildly dry MM  No stridor  Phonation is odd, babbling at times  Mumbling at times   No drooling  Base of mouth is soft  No mastoid tenderness  Neck: Symmetric, trachea midline  No JVD  CV: Mild tachycardia  Peripheral pulses +2 throughout  No chest wall tenderness  Lungs:   Unlabored   No retractions  No crepitus     No tachypnea  No paradoxical motion  Abd: +BS, soft, NT/ND    MSK:   FROM   Back:   No CVAT  Skin: Dry, intact  Neuro: awake  Alert  Speech as above  GCS 15, CN II-XII grossly intact  Motor grossly intact  Psychiatric/Behavioral: Appropriate mood and affect   Exam: deferred  A:  - Tachycardia  - 92% while on 3-4L, not normally on oxygen  - AMS? P:  - CTH for bleed/changes  - labs  - EKG for strain  - recent brain surgery ? brain abscess? Meninigitis? Check rectal temperature   - dispo    - 13 point ROS was performed and all are normal unless stated in the history above  - Nursing note reviewed  Vitals reviewed  - Orders placed by myself and/or advanced practitioner / resident     - Previous chart was reviewed  - No language barrier    - History obtained from wife patient  - There are no limitations to the history obtained  - Critical care time: Not applicable for this patient  Code Status: Prior  Advance Directive and Living Will:      Power of :    POLST:      Medications   sodium chloride (PF) 0 9 % injection 3 mL (has no administration in time range)   lidocaine (LIDODERM) 5 % patch 1 patch (1 patch Topical Medication Applied 8/8/22 1247)   vancomycin (VANCOCIN) IVPB (premix in dextrose) 1,000 mg 200 mL (1,000 mg Intravenous New Bag 8/8/22 1600)   sodium chloride 0 9 % bolus 1,000 mL (0 mL Intravenous Stopped 8/8/22 1605)   iohexol (OMNIPAQUE) 350 MG/ML injection (MULTI-DOSE) 100 mL (77 mL Intravenous Given 8/8/22 1325)   sodium chloride 0 9 % bolus 1,000 mL (1,000 mL Intravenous New Bag 8/8/22 1455)   sodium chloride 0 9 % bolus 500 mL (0 mL Intravenous Stopped 8/8/22 1653)   cefepime (MAXIPIME) 2 g/50 mL dextrose IVPB (0 mg Intravenous Stopped 8/8/22 1605)     XR hip/pelv 2-3 vws right   Final Result   Mild degenerative changes both hips      No acute osseous abnormality              Workstation performed: [id number]         CT head without contrast   Final Result      There are 2 new hemorrhage is identified in the left cerebellar hemisphere series 2 image 11 in the postsurgical territory  Slight increase in vasogenic edema noted resulting in minimal hydrocephalus when compared to the prior study  Slight prominence    to the temporal horns lateral ventricles noted which is new since the prior study  Urgent neurosurgical consultation is recommended  Remainder of the brain is stable  The study was marked in [facility] for immediate notification  Workstation performed: [id number]         CTA ED chest PE study   Final Result      Increased multifocal cavitary nodules and consolidations likely representing a combination of worsening metastases and multifocal pneumonia  Septic emboli could cause a similar appearance  No pulmonary emboli              Workstation performed: [id number]         CT lower extremity wo contrast right    (Results Pending)     Orders Placed This Encounter   Procedures    FLU/RSV/COVID - if FLU/RSV clinically relevant    Blood culture #1    Blood culture #2    MRSA culture    Urine culture    CT head without contrast    CTA ED chest PE study    XR hip/pelv 2-3 vws right    CT lower extremity wo contrast right    CBC and differential    HS Troponin 0hr (reflex protocol)    Comprehensive metabolic panel    Lactic acid    Procalcitonin    Protime-INR    APTT    UA w Reflex to Microscopic w Reflex to Culture    HS Troponin I 2hr    HS Troponin I 4hr    Lactic acid 2 Hours    Urine Microscopic    Continuous cardiac monitoring    Continuous pulse oximetry    Insert peripheral IV    Insert urinary catheter    ECG 12 lead    INPATIENT ADMISSION     Labs Reviewed   CBC AND DIFFERENTIAL - Abnormal       Result Value Ref Range Status    WBC 4 47  4 31 - 10 16 Thousand/uL Final    RBC 2 83 (*) 3 88 - 5 62 Million/uL Final    Hemoglobin 9 6 (*) 12 0 - 17 0 g/dL Final    Hematocrit 28 5 (*) 36 5 - 49 3 % Final     (*) 82 - 98 fL Final    MCH 33 9  26 8 - 34 3 pg Final    MCHC 33 7  31 4 - 37 4 g/dL Final    RDW 15 4 (*) 11 6 - 15 1 % Final    MPV 10 6  8 9 - 12 7 fL Final    Platelets 76 (*) 810 - 390 Thousands/uL Final    nRBC 8  /100 WBCs Final   HS TROPONIN I 0HR - Abnormal    hs TnI 0hr 83 (*) "Refer to ACS Flowchart"- see link ng/L Final    Comment:                                              Initial (time 0) result  If >=50 ng/L, Myocardial injury suggested ;  Type of myocardial injury and treatment strategy  to be determined  If 5-49 ng/L, a delta result at 2 hours and or 4 hours will be needed to further evaluate  If <4 ng/L, and chest pain has been >3 hours since onset, patient may qualify for discharge based on the HEART score in the ED  If <5 ng/L and <3hours since onset of chest pain, a delta result at 2 hours will be needed to further evaluate  HS Troponin 99th Percentile URL of a Health Population=12 ng/L with a 95% Confidence Interval of 8-18 ng/L  Second Troponin (time 2 hours)  If calculated delta >= 20 ng/L,  Myocardial injury suggested ; Type of myocardial injury and treatment strategy to be determined  If 5-49 ng/L and the calculated delta is 5-19 ng/L, consult medical service for evaluation  Continue evaluation for ischemia on ecg and other possible etiology and repeat hs troponin at 4 hours  If delta is <5 ng/L at 2 hours, consider discharge based on risk stratification via the HEART score (if in ED), or DK risk score in IP/Observation  HS Troponin 99th Percentile URL of a Health Population=12 ng/L with a 95% Confidence Interval of 8-18 ng/L     COMPREHENSIVE METABOLIC PANEL - Abnormal    Sodium 131 (*) 135 - 147 mmol/L Final    Potassium 4 4  3 5 - 5 3 mmol/L Final    Chloride 96  96 - 108 mmol/L Final    CO2 22  21 - 32 mmol/L Final    ANION GAP 13  4 - 13 mmol/L Final    BUN 28 (*) 5 - 25 mg/dL Final    Creatinine 1 50 (*) 0 60 - 1 30 mg/dL Final    Comment: Standardized to IDMS reference method Glucose 161 (*) 65 - 140 mg/dL Final    Comment: If the patient is fasting, the ADA then defines impaired fasting glucose as > 100 mg/dL and diabetes as > or equal to 123 mg/dL  Specimen collection should occur prior to Sulfasalazine administration due to the potential for falsely depressed results  Specimen collection should occur prior to Sulfapyridine administration due to the potential for falsely elevated results  Calcium 9 8  8 3 - 10 1 mg/dL Final    Corrected Calcium 11 4 (*) 8 3 - 10 1 mg/dL Final    AST 35  5 - 45 U/L Final    Comment: Specimen collection should occur prior to Sulfasalazine administration due to the potential for falsely depressed results  ALT 64  12 - 78 U/L Final    Comment: Specimen collection should occur prior to Sulfasalazine and/or Sulfapyridine administration due to the potential for falsely depressed results  Alkaline Phosphatase 76  46 - 116 U/L Final    Total Protein 6 7  6 4 - 8 4 g/dL Final    Albumin 2 0 (*) 3 5 - 5 0 g/dL Final    Total Bilirubin 0 58  0 20 - 1 00 mg/dL Final    Comment: Use of this assay is not recommended for patients undergoing treatment with eltrombopag due to the potential for falsely elevated results      eGFR 46  ml/min/1 73sq m Final    Narrative:     [place] guidelines for Chronic Kidney Disease (CKD):     Stage 1 with normal or high GFR (GFR > 90 mL/min/1 73 square meters)    Stage 2 Mild CKD (GFR = 60-89 mL/min/1 73 square meters)    Stage 3A Moderate CKD (GFR = 45-59 mL/min/1 73 square meters)    Stage 3B Moderate CKD (GFR = 30-44 mL/min/1 73 square meters)    Stage 4 Severe CKD (GFR = 15-29 mL/min/1 73 square meters)    Stage 5 End Stage CKD (GFR <15 mL/min/1 73 square meters)  Note: GFR calculation is accurate only with a steady state creatinine   LACTIC ACID, PLASMA - Abnormal    LACTIC ACID 7 7 (*) 0 5 - 2 0 mmol/L Final    Narrative:     Result may be elevated if tourniquet was used during collection  PROCALCITONIN TEST - Abnormal    Procalcitonin 3 34 (*) <=0 25 ng/ml Final    Comment: Suspected Lower Respiratory Tract Infection (LRTI):  - LESS than or EQUAL to 0 25 ng/mL:   low likelihood for bacterial LRTI; antibiotics DISCOURAGED   - GREATER than 0 25 ng/mL:   increased likelihood for bacterial LRTI; antibiotics ENCOURAGED  Suspected Sepsis:  - Strongly consider initiating antibiotics in ALL UNSTABLE patients  - LESS than or EQUAL to 0 5 ng/mL:   low likelihood for bacterial sepsis; antibiotics DISCOURAGED   - GREATER than 0 5 ng/mL:   increased likelihood for bacterial sepsis; antibiotics ENCOURAGED   - GREATER than 2 ng/mL:   high risk for severe sepsis / septic shock; antibiotics strongly ENCOURAGED  Decisions on antibiotic use should not be based solely on Procalcitonin (PCT) levels  If PCT is low but uncertainty exists with stopping antibiotics, repeat PCT in 6-24 hours to confirm the low level  If antibiotics are administered (regardless if initial PCT was high or low), repeat PCT every 1-2 days to consider early antibiotic cessation (when GREATER than 80% decrease from the peak OR when PCT drops below designated cutoffs, whichever comes first), so long as the infection is NOT one that typically requires prolonged treatment durations (e g , bone/joint infections, endocarditis, Staph  aureus bacteremia)      Situations of FALSE-POSITIVE Procalcitonin values:  1) Newborns < 67 hours old  2) Massive stress from severe trauma / burns, major surgery, acute pancreatitis, cardiogenic / hemorrhagic shock, sickle cell crisis, or other organ perfusion abnormalities  3) Malaria and some Candidal infections  4) Treatment with agents that stimulate cytokines (e g , OKT3, anti-lymphocyte globulins, alemtuzumab, IL-2, granulocyte transfusion [NOT GCSFs])  5) Chronic renal disease causes elevated baseline levels (consider GREATER than 0 75 ng/mL as an abnormal cut-off); initiating HD/CRRT may cause transient decreases  6) Paraneoplastic syndromes from medullary thyroid or SCLC, some forms of vasculitis, and acute [id number] disease    Situations of FALSE-NEGATIVE Procalcitonin values:  1) Too early in clinical course for PCT to have reached its peak (may repeat in 6-24 hours to confirm low level)  2) Localized infection WITHOUT systemic (SIRS / sepsis) response (e g , an abscess, osteomyelitis, cystitis)  3) Mycobacteria (e g , Tuberculosis, MAC)  4) Cystic fibrosis exacerbations     PROTIME-INR - Abnormal    Protime 14 7 (*) 11 6 - 14 5 seconds Final    INR 1 13  0 84 - 1 19 Final   UA W REFLEX TO MICROSCOPIC WITH REFLEX TO CULTURE - Abnormal    Color, UA Yellow   Final    Clarity, UA Turbid   Final    Specific [place], UA 1 035 (*) 1 003 - 1 030 Final    pH, UA 6 0  4 5, 5 0, 5 5, 6 0, 6 5, 7 0, 7 5, 8 0 Final    Leukocytes, UA Negative  Negative Final    Nitrite, UA Negative  Negative Final    Protein, UA 70 (1+) (*) Negative mg/dl Final    Glucose, UA Negative  Negative mg/dl Final    Ketones, UA Negative  Negative mg/dl Final    Urobilinogen, UA 2 0 (*) <2 0 mg/dl mg/dl Final    Bilirubin, UA Negative  Negative Final    Occult Blood, UA Negative  Negative Final   LACTIC ACID 2 HOUR - Abnormal    LACTIC ACID 7 2 (*) 0 5 - 2 0 mmol/L Final    Narrative:     Result may be elevated if tourniquet was used during collection  HS TROPONIN I 4HR - Abnormal    hs TnI 4hr 70 (*) "Refer to ACS Flowchart"- see link ng/L Final    Comment:                                              Initial (time 0) result  If >=50 ng/L, Myocardial injury suggested ;  Type of myocardial injury and treatment strategy  to be determined  If 5-49 ng/L, a delta result at 2 hours and or 4 hours will be needed to further evaluate  If <4 ng/L, and chest pain has been >3 hours since onset, patient may qualify for discharge based on the HEART score in the ED    If <5 ng/L and <3hours since onset of chest pain, a delta result at 2 hours will be needed to further evaluate  HS Troponin 99th Percentile URL of a Health Population=12 ng/L with a 95% Confidence Interval of 8-18 ng/L  Second Troponin (time 2 hours)  If calculated delta >= 20 ng/L,  Myocardial injury suggested ; Type of myocardial injury and treatment strategy to be determined  If 5-49 ng/L and the calculated delta is 5-19 ng/L, consult medical service for evaluation  Continue evaluation for ischemia on ecg and other possible etiology and repeat hs troponin at 4 hours  If delta is <5 ng/L at 2 hours, consider discharge based on risk stratification via the HEART score (if in ED), or DK risk score in IP/Observation  HS Troponin 99th Percentile URL of a Health Population=12 ng/L with a 95% Confidence Interval of 8-18 ng/L      Delta 4hr hsTnI -13  <20 ng/L Final   URINE MICROSCOPIC - Abnormal    RBC, UA 4-10 (*) None Seen, 1-2 /hpf Final    WBC, UA 10-20 (*) None Seen, 1-2 /hpf Final    Epithelial Cells Occasional  None Seen, Occasional /hpf Final    Bacteria, UA None Seen  None Seen, Occasional /hpf Final    MUCUS THREADS Moderate (*) None Seen Final    Amorphous Crystals, UA Occasional   Final    Transitional Epithelial Cells Present   Final   MANUAL DIFFERENTIAL(PHLEBS DO NOT ORDER) - Abnormal    Segmented % 59  43 - 75 % Final    Bands % 38 (*) 0 - 8 % Final    Lymphocytes % 1 (*) 14 - 44 % Final    Monocytes % 0 (*) 4 - 12 % Final    Eosinophils, % 0  0 - 6 % Final    Basophils % 0  0 - 1 % Final    Metamyelocytes% 1  0 - 1 % Final    Myelocytes % 1  0 - 1 % Final    Absolute Neutrophils 4 34  1 85 - 7 62 Thousand/uL Final    Lymphocytes Absolute 0 04 (*) 0 60 - 4 47 Thousand/uL Final    Monocytes Absolute 0 00  0 00 - 1 22 Thousand/uL Final    Eosinophils Absolute 0 00  0 00 - 0 40 Thousand/uL Final    Basophils Absolute 0 00  0 00 - 0 10 Thousand/uL Final    Total Counted     Final    nRBC 18 (*) 0 - 2 /100 WBC Final    Dohle Bodies Present   Final    Anisocytosis Present Final    Polychromasia Present   Final    Platelet Estimate Decreased (*) Adequate Final   COVID19, INFLUENZA A/B, RSV PCR, [facility] - Normal    SARS-CoV-2 Negative  Negative Final    Comment:      INFLUENZA A PCR Negative  Negative Final    Comment:      INFLUENZA B PCR Negative  Negative Final    Comment:      RSV PCR Negative  Negative Final    Comment:      Narrative:     FOR PEDIATRIC PATIENTS - copy/paste COVID Guidelines URL to browser: [email or web address]    SARS-CoV-2 assay is a Nucleic Acid Amplification assay intended for the  qualitative detection of nucleic acid from SARS-CoV-2 in nasopharyngeal  swabs  Results are for the presumptive identification of SARS-CoV-2 RNA  Positive results are indicative of infection with SARS-CoV-2, the virus  causing COVID-19, but do not rule out bacterial infection or co-infection  with other viruses  Laboratories within the [place] and its  territories are required to report all positive results to the appropriate  public health authorities  Negative results do not preclude SARS-CoV-2  infection and should not be used as the sole basis for treatment or other  patient management decisions  Negative results must be combined with  clinical observations, patient history, and epidemiological information  This test has not been FDA cleared or approved  This test has been authorized by FDA under an Emergency Use Authorization  (EUA)  This test is only authorized for the duration of time the  declaration that circumstances exist justifying the authorization of the  emergency use of an in vitro diagnostic tests for detection of SARS-CoV-2  virus and/or diagnosis of COVID-19 infection under section 564(b)(1) of  the Act, 21 U  S C  563TME-8(G)(9), unless the authorization is terminated  or revoked sooner  The test has been validated but independent review by FDA  and CLIA is pending      Test performed using [employer] GeneXpert: This RT-PCR assay targets N2,  a region unique to SARS-CoV-2  A conserved region in the E-gene was chosen  for pan-Sarbecovirus detection which includes SARS-CoV-2  APTT - Normal    PTT 34  23 - 37 seconds Final    Comment: Therapeutic Heparin Range =  60-90 seconds   BLOOD CULTURE   BLOOD CULTURE   MRSA CULTURE   URINE CULTURE   HS TROPONIN I 2HR     Time reflects when diagnosis was documented in both MDM as applicable and the Disposition within this note     Time User Action Codes Description Comment    8/8/2022  2:34 PM Cathren Earnest Add [G93 40] Acute encephalopathy     8/8/2022  2:34 PM Cathren Earnest Add [A41 9,  R65 20] Severe sepsis ([facility] [address] )     8/8/2022  2:34 PM Cathren Earnest Add [J96 01] Acute respiratory failure with hypoxia ([facility] [address] )     8/8/2022  2:34 PM Cathren Earnest Add [N17 9] TREVA (acute kidney injury) ([facility] [address] )     8/8/2022  3:18 PM Cathren Earnest Add [I61 4] Cerebellar hemorrhage ([facility] [address] )     8/8/2022  4:22 PM Arianne Axon Add [C7A 8,  C7B 8] 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 Taking?    Ascorbic Acid (vitamin C) 1000 MG tablet  Self Yes No   Sig: Take 1,000 mg by mouth daily   B Complex Vitamins (B COMPLEX 1 PO)  Self Yes No   Sig: Take 1 tablet by mouth daily   LORazepam (ATIVAN) 0 5 mg tablet   No No   Sig: Take 1 tablet (0 5 mg total) by mouth 2 (two) times a day as needed for anxiety   acetaminophen (TYLENOL) 325 mg tablet   No No   Sig: Take 3 tablets (975 mg total) by mouth every 8 (eight) hours   allopurinol (ZYLOPRIM) 100 mg tablet  Self No No   Sig: Take 1 tablet (100 mg total) by mouth daily   amLODIPine (NORVASC) 10 mg tablet  Self No No   Sig: TAKE ONE TABLET BY MOUTH EVERY DAY   amiodarone (PACERONE) 400 MG tablet   No No   Sig: Take 1 tablet (400 mg total) by mouth 2 (two) times a day with meals for 13 doses   amiodarone 200 mg tablet   No No   Sig: Take 1 tablet (200 mg total) by mouth daily with breakfast   dexamethasone (DECADRON) 2 mg tablet   No No   Sig: Take 1 tablet (2 mg total) by mouth every 24 hours for 2 doses   diltiazem (CARDIZEM SR) 120 mg 12 hr capsule   No No   Sig: Take 1 capsule (120 mg total) by mouth every 12 (twelve) hours   docusate sodium (COLACE) 100 mg capsule   No No   Sig: Take 1 capsule (100 mg total) by mouth 2 (two) times a day   levothyroxine 75 mcg tablet  Self No No   Sig: Take 0 5 tablets (37 5 mcg total) by mouth daily in the early morning   meclizine (ANTIVERT) 25 mg tablet  Self No No   Sig: Take 1 tablet (25 mg total) by mouth every 8 (eight) hours as needed for dizziness   Patient not taking: Reported on 7/24/2022   methocarbamol (ROBAXIN) 500 mg tablet   No No   Sig: Take 1 tablet (500 mg total) by mouth 3 (three) times a day   metoprolol succinate (TOPROL-XL) 50 mg 24 hr tablet   No No   Sig: Take 1 tablet (50 mg total) by mouth 2 (two) times a day   oxyCODONE (ROXICODONE) 5 immediate release tablet   No No   Sig: Take 0 5 tablets (2 5 mg total) by mouth every 4 (four) hours as needed (pain) Max Daily Amount: 15 mg   pantoprazole (PROTONIX) 40 mg tablet  Self No No   Sig: Take 1 tablet (40 mg total) by mouth daily   phenol (CHLORASEPTIC) 1 4 % mucosal liquid   No No   Sig: Apply 1 spray to the mouth or throat 4 (four) times a day (before meals and at bedtime)   polyethylene glycol (GLYCOLAX) 17 GM/SCOOP powder  Self No No   Sig: Take 17 g by mouth 2 (two) times a day   rosuvastatin (CRESTOR) 10 MG tablet   No No Sig: TAKE ONE TABLET BY MOUTH EVERY DAY   scopolamine (TRANSDERM-SCOP) 1 mg/3 days TD 72 hr patch   No No   Sig: Place 1 patch on the skin every third day   senna (SENOKOT) 8 6 mg   No No   Sig: Take 1 tablet (8 6 mg total) by mouth daily   sodium chloride 1 g tablet   No No   Sig: Take 1 tablet (1 g total) by mouth 3 (three) times a day with meals      Facility-Administered Medications: None       Portions of the record may have been created with voice recognition software  Occasional wrong word or "sound a like" substitutions may have occurred due to the inherent limitations of voice recognition software  Read the chart carefully and recognize, using context, where substitutions have occurred      Electronically signed by:  Bishop Newby

## 2022-08-08 NOTE — ED NOTES
Pt SpO2 between 84-88% on 6L NC  Per Dr Mcclendon, pt placed on simple mask at 6L        Damita Claude, RN  08/08/22 [id number]

## 2022-08-08 NOTE — ED PROVIDER NOTES
History  Chief Complaint   Patient presents with    Altered Mental Status     Pt comes from [facility]; PT has hx of lung ca with mets to brain w/recent craniotomy in end of July; pt more confused and weaker than usual; pt reports pain at site of stitches     Patient is a 68-year-old male with a significant past medical history of lung cancer with metastasis to his brain, a recent hospitalization with altered mental status found to brain metastasis, status post resection this past month presenting with altered mental status  As per his wife, he was just discharged from the hospital yesterday to an assisted living facility, and since his discharge she has been acting altered  She describes this as occasionally babbling words  She states that during his hospitalization was mostly making sense, and sleeping, however since his discharge seems to be acting different  She states that he is also complaining of some right-sided hip pain  She denies any falls or head trauma since leaving  She also states that he has been coughing since he was extubated during his hospitalization  He has been on supplemental oxygen since his admission, which he has no previous history of  The wife says that he has not been having fevers, chills  He is not having any chest pain or difficulty breathing  He denies any abdominal pain, nausea, vomiting  He has been having normal bowel movements and has not been having any urinary symptoms  No other complaints at this time  Prior to Admission Medications   Prescriptions Last Dose Informant Patient Reported? Taking?    Ascorbic Acid (vitamin C) 1000 MG tablet  Self Yes No   Sig: Take 1,000 mg by mouth daily   B Complex Vitamins (B COMPLEX 1 PO)  Self Yes No   Sig: Take 1 tablet by mouth daily   LORazepam (ATIVAN) 0 5 mg tablet   No No   Sig: Take 1 tablet (0 5 mg total) by mouth 2 (two) times a day as needed for anxiety   acetaminophen (TYLENOL) 325 mg tablet   No No   Sig: Take 3 tablets (975 mg total) by mouth every 8 (eight) hours   allopurinol (ZYLOPRIM) 100 mg tablet  Self No No   Sig: Take 1 tablet (100 mg total) by mouth daily   amLODIPine (NORVASC) 10 mg tablet  Self No No   Sig: TAKE ONE TABLET BY MOUTH EVERY DAY   amiodarone (PACERONE) 400 MG tablet   No No   Sig: Take 1 tablet (400 mg total) by mouth 2 (two) times a day with meals for 13 doses   amiodarone 200 mg tablet   No No   Sig: Take 1 tablet (200 mg total) by mouth daily with breakfast   dexamethasone (DECADRON) 2 mg tablet   No No   Sig: Take 1 tablet (2 mg total) by mouth every 24 hours for 2 doses   diltiazem (CARDIZEM SR) 120 mg 12 hr capsule   No No   Sig: Take 1 capsule (120 mg total) by mouth every 12 (twelve) hours   docusate sodium (COLACE) 100 mg capsule   No No   Sig: Take 1 capsule (100 mg total) by mouth 2 (two) times a day   levothyroxine 75 mcg tablet  Self No No   Sig: Take 0 5 tablets (37 5 mcg total) by mouth daily in the early morning   meclizine (ANTIVERT) 25 mg tablet  Self No No   Sig: Take 1 tablet (25 mg total) by mouth every 8 (eight) hours as needed for dizziness   Patient not taking: Reported on 7/24/2022   methocarbamol (ROBAXIN) 500 mg tablet   No No   Sig: Take 1 tablet (500 mg total) by mouth 3 (three) times a day   metoprolol succinate (TOPROL-XL) 50 mg 24 hr tablet   No No   Sig: Take 1 tablet (50 mg total) by mouth 2 (two) times a day   oxyCODONE (ROXICODONE) 5 immediate release tablet   No No   Sig: Take 0 5 tablets (2 5 mg total) by mouth every 4 (four) hours as needed (pain) Max Daily Amount: 15 mg   pantoprazole (PROTONIX) 40 mg tablet  Self No No   Sig: Take 1 tablet (40 mg total) by mouth daily   phenol (CHLORASEPTIC) 1 4 % mucosal liquid   No No   Sig: Apply 1 spray to the mouth or throat 4 (four) times a day (before meals and at bedtime)   polyethylene glycol (GLYCOLAX) 17 GM/SCOOP powder  Self No No   Sig: Take 17 g by mouth 2 (two) times a day   rosuvastatin (CRESTOR) 10 MG tablet   No No   Sig: TAKE ONE TABLET BY MOUTH EVERY DAY   scopolamine (TRANSDERM-SCOP) 1 mg/3 days TD 72 hr patch   No No   Sig: Place 1 patch on the skin every third day   senna (SENOKOT) 8 6 mg   No No   Sig: Take 1 tablet (8 6 mg total) by mouth daily   sodium chloride 1 g tablet   No No   Sig: Take 1 tablet (1 g total) by mouth 3 (three) times a day with meals      Facility-Administered Medications: None       Past Medical History:   Diagnosis Date    Brain cancer ([facility] [address] )     Hyperlipidemia     Hypertension     Lung cancer [facility])        Past Surgical History:   Procedure Laterality Date    BACK SURGERY      CRANIOTOMY Left 4/7/2022    Procedure: Image guided left parietal craniotomy for tumor resection;  Surgeon: Kelly Campbell MD;  [facility];  Service: Neurosurgery    CRANIOTOMY Left 7/28/2022    Procedure: left retrosigmoid/posterior fossa craniotomy for resction of mass with image guidence;  Surgeon: Kelly Campbell MD;  [facility];  Service: Neurosurgery      [address] Stein[address]      IR BIOPSY LUNG  5/6/2021    IR PORT PLACEMENT  6/17/2021    LAMINECTOMY  2018    L4-L5    LUNG SURGERY      left upper lobectomy    KS BRONCHOSCOPY,DIAGNOSTIC N/A 5/25/2021    Procedure: BRONCHOSCOPY FLEXIBLE;  Surgeon: Arnie Arthur MD;  [facility];  Service: Thoracic    KS MEDIASTINOSCOPY WITH LYMPH NODE BIOPSY/IES N/A 5/25/2021    Procedure: MEDIASTINOSCOPY, flexible bronchoscopy;  Surgeon: Arnie Arthur MD;  [facility];  Service: Thoracic    TONSILLECTOMY  1959       Family History   Problem Relation Age of Onset    Nephrolithiasis Father     Lung cancer Maternal Grandfather      I have reviewed and agree with the history as documented      E-Cigarette/Vaping    E-Cigarette Use Never User      E-Cigarette/Vaping Substances    Nicotine No     THC No     CBD No     Flavoring No     Other No     Unknown No      Social History     Tobacco Use    Smoking status: Former Smoker     Packs/day: 1 00     Years: 40 00     Pack years: 40 00     Types: Cigarettes     Start date:      Quit date: 2017     Years since quittin 4    Smokeless tobacco: Never Used    Tobacco comment: quit 2017   Vaping Use    Vaping Use: Never used   Substance Use Topics    Alcohol use: Yes     Alcohol/week: 7 0 standard drinks     Types: 7 Cans of beer per week     Comment: 1-2 beers nightly    Drug use: Not Currently     Types: Marijuana     Comment: seldom        Review of Systems   Unable to perform ROS: Mental status change       Physical Exam  ED Triage Vitals   Temperature Pulse Respirations Blood Pressure SpO2   22 1219 22 1219 22 1219 22 1219 22 1219   98 °F (36 7 °C) (!) 115 20 93/75 94 %      Temp Source Heart Rate Source Patient Position - Orthostatic VS BP Location FiO2 (%)   22 1219 22 1219 22 1219 22 1219 22 2208   Oral Monitor Lying Left arm 50      Pain Score       22 1800       No Pain             Orthostatic Vital Signs  Vitals:    22 0300 22 0400 22 0500 22 0600   BP: 112/66 101/73 109/65 94/59   Pulse: 92 98 94 88   Patient Position - Orthostatic VS:  Lying         Physical Exam  Vitals and nursing note reviewed  Constitutional:       Appearance: He is ill-appearing  HENT:      Head: Normocephalic  No raccoon eyes, Garvin's sign, right periorbital erythema or left periorbital erythema  Comments: Nasal cannula in place  There is staples and a surgical scar the the posterior left scalp  Surgical site clean, dry, intact  Right Ear: Tympanic membrane, ear canal and external ear normal  No hemotympanum  Left Ear: Tympanic membrane, ear canal and external ear normal  No hemotympanum  Nose: Nose normal       Mouth/Throat:      Mouth: Mucous membranes are dry  Eyes:      General: No scleral icterus  Right eye: No discharge           Left eye: No discharge  Extraocular Movements: Extraocular movements intact  Right eye: Normal extraocular motion and no nystagmus  Left eye: Normal extraocular motion and no nystagmus  Conjunctiva/sclera: Conjunctivae normal       Pupils: Pupils are equal, round, and reactive to light  Right eye: Pupil is round and reactive  Left eye: Pupil is round and reactive  Cardiovascular:      Rate and Rhythm: Tachycardia present  Pulses: Normal pulses  Heart sounds: Normal heart sounds  No murmur heard  No friction rub  No gallop  Pulmonary:      Effort: Tachypnea present  Breath sounds: Examination of the right-upper field reveals rhonchi  Examination of the left-upper field reveals rhonchi  Examination of the right-middle field reveals rhonchi  Examination of the left-middle field reveals rhonchi  Examination of the right-lower field reveals rhonchi  Examination of the left-lower field reveals rhonchi  Rhonchi present  Abdominal:      General: Abdomen is flat  Palpations: Abdomen is soft  Tenderness: There is no abdominal tenderness  Genitourinary:     Comments: Deferred  Musculoskeletal:         General: Normal range of motion  Cervical back: Normal range of motion  Right lower leg: No edema  Left lower leg: No edema  Comments: There is tenderness and soft tissue swelling over the right hip/thigh  No overlying skin changes  No obvious bony deformities  Unable to examine further given patient mental status  Skin:     General: Skin is warm and dry  Comments: Diffuse bruising   Neurological:      Mental Status: He is alert  He is confused  GCS: GCS eye subscore is 4  GCS verbal subscore is 4  GCS motor subscore is 6  Cranial Nerves: No facial asymmetry  Sensory: Sensation is intact  No sensory deficit  Motor: No weakness or pronator drift  Coordination: Finger-Nose-Finger Test normal       Comments:  On initial exam patient with GCS 14, confused and occasionally saying incomprehensible words  Initially following commands but intermittently agitated            ED Medications  Medications   sodium chloride (PF) 0 9 % injection 3 mL (has no administration in time range)   allopurinol (ZYLOPRIM) tablet 100 mg (has no administration in time range)   dexamethasone (DECADRON) tablet 2 mg (has no administration in time range)   docusate sodium (COLACE) capsule 100 mg (100 mg Oral Not Given 8/8/22 1736)   levothyroxine tablet 37 5 mcg (37 5 mcg Oral Not Given 8/9/22 0612)   LORazepam (ATIVAN) tablet 0 5 mg (has no administration in time range)   oxyCODONE (ROXICODONE) IR tablet 2 5 mg (has no administration in time range)   polyethylene glycol (MIRALAX) packet 17 g (17 g Oral Not Given 8/8/22 1736)   pravastatin (PRAVACHOL) tablet 80 mg (80 mg Oral Not Given 8/8/22 1736)   scopolamine (TRANSDERM-SCOP) 1 mg/3 days TD 72 hr patch 1 patch (1 patch Transdermal Medication Applied 8/9/22 0007)   senna (SENOKOT) tablet 8 6 mg (has no administration in time range)   sodium chloride tablet 1 g (1 g Oral Not Given 8/8/22 1737)   chlorhexidine (PERIDEX) 0 12 % oral rinse 15 mL (15 mL Mouth/Throat Given 8/8/22 2109)   cefepime (MAXIPIME) 2,000 mg in dextrose 5 % 50 mL IVPB (0 mg Intravenous Stopped 8/9/22 0400)   sodium chloride 0 9 % infusion (100 mL/hr Intravenous New Bag 8/9/22 0337)   metoprolol (LOPRESSOR) injection 2 5 mg (2 5 mg Intravenous Given 8/9/22 0619)   vancomycin (VANCOCIN) IVPB (premix in dextrose) 1,000 mg 200 mL (has no administration in time range)   pantoprazole (PROTONIX) injection 40 mg (40 mg Intravenous Given 8/9/22 0636)   acetaminophen (TYLENOL) rectal suppository 650 mg (has no administration in time range)   lidocaine (LIDODERM) 5 % patch 1 patch (1 patch Topical Patch Removed 8/9/22 0016)   sodium chloride 0 9 % bolus 1,000 mL (0 mL Intravenous Stopped 8/8/22 1605)   iohexol (OMNIPAQUE) 350 MG/ML injection (MULTI-DOSE) 100 mL (77 mL Intravenous Given 8/8/22 1325)   sodium chloride 0 9 % bolus 1,000 mL (1,000 mL Intravenous New Bag 8/8/22 1455)   sodium chloride 0 9 % bolus 500 mL (0 mL Intravenous Stopped 8/8/22 1653)   cefepime (MAXIPIME) 2 g/50 mL dextrose IVPB (0 mg Intravenous Stopped 8/8/22 1605)   vancomycin (VANCOCIN) IVPB (premix in dextrose) 1,000 mg 200 mL (0 mg Intravenous Stopped 8/8/22 1725)   vancomycin (VANCOCIN) IVPB (premix in dextrose) 750 mg 150 mL (0 mg Intravenous Stopped 8/8/22 2000)   OLANZapine (ZyPREXA) IM injection 2 5 mg (2 5 mg Intramuscular Given 8/9/22 0032)   sterile water injection **ADS Override Pull** (  Override Pull 8/9/22 0032)   OLANZapine (ZyPREXA) IM injection 2 5 mg (2 5 mg Intramuscular Given 8/9/22 0042)       Diagnostic Studies  Results Reviewed     Procedure Component Value Units Date/Time    Blood culture #1 [[id number]] Collected: 08/08/22 1316    Lab Status: Preliminary result Specimen: Blood from Arm, Right Updated: 08/08/22 1704     Blood Culture Received in Microbiology Lab  Culture in Progress  Blood culture #2 [[id number]] Collected: 08/08/22 1316    Lab Status: Preliminary result Specimen: Blood from Arm, Left Updated: 08/08/22 1704     Blood Culture Received in Microbiology Lab  Culture in Progress  Urine Microscopic [[id number]]  (Abnormal) Collected: 08/08/22 1511    Lab Status: Final result Specimen: Urine, Indwelling Corea Catheter Updated: 08/08/22 1622     RBC, UA 4-10 /hpf      WBC, UA 10-20 /hpf      Epithelial Cells Occasional /hpf      Bacteria, UA None Seen /hpf      MUCUS THREADS Moderate     Amorphous Crystals, UA Occasional     Transitional Epithelial Cells Present    Urine culture [[id number]] Collected: 08/08/22 1511    Lab Status: In process Specimen: Urine, Indwelling Corea Catheter Updated: 08/08/22 1622    MRSA culture [[id number]] Collected: 08/08/22 1611    Lab Status:  In process Specimen: Nares from Nose Updated: 08/08/22 1615    Lactic acid 2 Hours [[id number]]  (Abnormal) Collected: 08/08/22 1508    Lab Status: Final result Specimen: Blood from Arm, Left Updated: 08/08/22 1553     LACTIC ACID 7 2 mmol/L     Narrative:      Result may be elevated if tourniquet was used during collection      HS Troponin I 4hr [[id number]]  (Abnormal) Collected: 08/08/22 1508    Lab Status: Final result Specimen: Blood from Arm, Left Updated: 08/08/22 1549     hs TnI 4hr 70 ng/L      Delta 4hr hsTnI -13 ng/L     UA w Reflex to Microscopic w Reflex to Culture [[id number]]  (Abnormal) Collected: 08/08/22 1511    Lab Status: Final result Specimen: Urine, Indwelling Corea Catheter Updated: 08/08/22 1544     Color, UA Yellow     Clarity, UA Turbid     Specific Gravity, UA 1 035     pH, UA 6 0     Leukocytes, UA Negative     Nitrite, UA Negative     Protein, UA 70 (1+) mg/dl      Glucose, UA Negative mg/dl      Ketones, UA Negative mg/dl      Urobilinogen, UA 2 0 mg/dl      Bilirubin, UA Negative     Occult Blood, UA Negative    Manual Differential(PHLEBS Do Not Order) [[id number]]  (Abnormal) Collected: 08/08/22 1246    Lab Status: Final result Specimen: Blood from Arm, Right Updated: 08/08/22 1509     Segmented % 59 %      Bands % 38 %      Lymphocytes % 1 %      Monocytes % 0 %      Eosinophils, % 0 %      Basophils % 0 %      Metamyelocytes% 1 %      Myelocytes % 1 %      Absolute Neutrophils 4 34 Thousand/uL      Lymphocytes Absolute 0 04 Thousand/uL      Monocytes Absolute 0 00 Thousand/uL      Eosinophils Absolute 0 00 Thousand/uL      Basophils Absolute 0 00 Thousand/uL      Total Counted --     nRBC 18 /100 WBC      Dohle Bodies Present     Anisocytosis Present     Polychromasia Present     Platelet Estimate Decreased    CBC and differential [[id number]]  (Abnormal) Collected: 08/08/22 1246    Lab Status: Final result Specimen: Blood from Arm, Right Updated: 08/08/22 1508     WBC 4 47 Thousand/uL      RBC 2 83 Million/uL      Hemoglobin 9 6 g/dL      Hematocrit 28 5 %       fL      MCH 33 9 pg      MCHC 33 7 g/dL      RDW 15 4 %      MPV 10 6 fL      Platelets 76 Thousands/uL      nRBC 8 /100 WBCs     FLU/RSV/COVID - if FLU/RSV clinically relevant [[id number]]  (Normal) Collected: 08/08/22 1316    Lab Status: Final result Specimen: Nares from Nose Updated: 08/08/22 1428     SARS-CoV-2 Negative     INFLUENZA A PCR Negative     INFLUENZA B PCR Negative     RSV PCR Negative    Narrative:      FOR PEDIATRIC PATIENTS - copy/paste COVID Guidelines URL to browser: [email or web address]  [employer]    SARS-CoV-2 assay is a Nucleic Acid Amplification assay intended for the  qualitative detection of nucleic acid from SARS-CoV-2 in nasopharyngeal  swabs  Results are for the presumptive identification of SARS-CoV-2 RNA  Positive results are indicative of infection with SARS-CoV-2, the virus  causing COVID-19, but do not rule out bacterial infection or co-infection  with other viruses  Laboratories within the [place] and its  territories are required to report all positive results to the appropriate  public health authorities  Negative results do not preclude SARS-CoV-2  infection and should not be used as the sole basis for treatment or other  patient management decisions  Negative results must be combined with  clinical observations, patient history, and epidemiological information  This test has not been FDA cleared or approved  This test has been authorized by FDA under an Emergency Use Authorization  (EUA)  This test is only authorized for the duration of time the  declaration that circumstances exist justifying the authorization of the  emergency use of an in vitro diagnostic tests for detection of SARS-CoV-2  virus and/or diagnosis of COVID-19 infection under section 564(b)(1) of  the Act, 21 U  S C  892ROU-3(H)(4), unless the authorization is terminated  or revoked sooner   The test has been validated but independent review by FDA  and CLIA is pending  Test performed using [employer] Gene[employer]pert: This RT-PCR assay targets N2,  a region unique to SARS-CoV-2  A conserved region in the E-gene was chosen  for pan-Sarbecovirus detection which includes SARS-CoV-2  Procalcitonin [[id number]]  (Abnormal) Collected: 08/08/22 1316    Lab Status: Final result Specimen: Blood from Arm, Right Updated: 08/08/22 1410     Procalcitonin 3 34 ng/ml     Lactic acid [[id number]]  (Abnormal) Collected: 08/08/22 1316    Lab Status: Final result Specimen: Blood from Arm, Left Updated: 08/08/22 1410     LACTIC ACID 7 7 mmol/L     Narrative:      Result may be elevated if tourniquet was used during collection      Protime-INR [[id number]]  (Abnormal) Collected: 08/08/22 1316    Lab Status: Final result Specimen: Blood from Arm, Right Updated: 08/08/22 1357     Protime 14 7 seconds      INR 1 13    APTT [[id number]]  (Normal) Collected: 08/08/22 1316    Lab Status: Final result Specimen: Blood from Arm, Right Updated: 08/08/22 1357     PTT 34 seconds     Comprehensive metabolic panel [[id number]]  (Abnormal) Collected: 08/08/22 1246    Lab Status: Final result Specimen: Blood from Arm, Right Updated: 08/08/22 1335     Sodium 131 mmol/L      Potassium 4 4 mmol/L      Chloride 96 mmol/L      CO2 22 mmol/L      ANION GAP 13 mmol/L      BUN 28 mg/dL      Creatinine 1 50 mg/dL      Glucose 161 mg/dL      Calcium 9 8 mg/dL      Corrected Calcium 11 4 mg/dL      AST 35 U/L      ALT 64 U/L      Alkaline Phosphatase 76 U/L      Total Protein 6 7 g/dL      Albumin 2 0 g/dL      Total Bilirubin 0 58 mg/dL      eGFR 46 ml/min/1 73sq m     Narrative:      [place] guidelines for Chronic Kidney Disease (CKD):     Stage 1 with normal or high GFR (GFR > 90 mL/min/1 73 square meters)    Stage 2 Mild CKD (GFR = 60-89 mL/min/1 73 square meters)    Stage 3A Moderate CKD (GFR = 45-59 mL/min/1 73 square meters)    Stage 3B Moderate CKD (GFR = 30-44 mL/min/1 73 square meters)    Stage 4 Severe CKD (GFR = 15-29 mL/min/1 73 square meters)    Stage 5 End Stage CKD (GFR <15 mL/min/1 73 square meters)  Note: GFR calculation is accurate only with a steady state creatinine    HS Troponin 0hr (reflex protocol) [[id number]]  (Abnormal) Collected: 08/08/22 1246    Lab Status: Final result Specimen: Blood from Arm, Right Updated: 08/08/22 1335     hs TnI 0hr 83 ng/L     HS Troponin I 2hr [[id number]]     Lab Status: No result Specimen: Blood                  CT head wo contrast   Final Result by Gregory Rooney MD (08/09 0555)      1  No significant change in left cerebellar parenchymal hematoma and vasogenic edema with persistent mass effect on posterior brain stem and 4th ventricle  Grossly stable ventricular size without hydrocephalus  2   Stable small left parietal cortical hemorrhage as well as subcortical gliosis and encephalomalacia  Stable small left parietal extra-axial collection subjacent to craniotomy  Workstation performed: [id number]         CT lower extremity wo contrast right   Final Result by Mendez Vega DO (08/08 0504)      Complex fluid collection within the right iliacus, suspicious for abscess  The study was marked in EPIC for significant notification  Workstation performed: [id number]         XR hip/pelv 2-3 vws right   Final Result by Katherine Clemons MD (08/08 1512)   Mild degenerative changes both hips      No acute osseous abnormality  Workstation performed: [id number]         CT head without contrast   Final Result by Leo Soares DO (08/08 1447)      There are 2 new hemorrhage is identified in the left cerebellar hemisphere series 2 image 11 in the postsurgical territory  Slight increase in vasogenic edema noted resulting in minimal hydrocephalus when compared to the prior study  Slight prominence    to the temporal horns lateral ventricles noted which is new since the prior study    Urgent neurosurgical consultation is recommended  Remainder of the brain is stable  The study was marked in [facility] for immediate notification  Workstation performed: [id number]         CTA ED chest PE study   Final Result by Ellie Gilliland MD (08/08 1456)      Increased multifocal cavitary nodules and consolidations likely representing a combination of worsening metastases and multifocal pneumonia  Septic emboli could cause a similar appearance  No pulmonary emboli  Workstation performed: [id number]               Procedures  ECG 12 Lead Documentation Only    Date/Time: 8/8/2022 12:20 PM  Performed by: Peyman Foreman DO  Authorized by: Peyman Foreman DO     Indications / Diagnosis:  AMS  ECG reviewed by me, the ED Provider: yes    Patient location:  ED  Previous ECG:     Previous ECG:  Compared to current    Similarity:  Changes noted  Interpretation:     Interpretation: abnormal    Rate:     ECG rate:  111    ECG rate assessment: tachycardic    Rhythm:     Rhythm: sinus tachycardia    Ectopy:     Ectopy: none    QRS:     QRS axis:  Normal  Conduction:     Conduction: normal    ST segments:     ST segments:  Normal  T waves:     T waves: non-specific            ED Course  ED Course as of 08/09/22 0658   Mon Aug 08, 2022   1304 Reached out to neurosurgery on call Dr Josie Sexton for recs  Will order CT wo contrast, will hold on LP for now until CT results  1411 LACTIC ACID(!!): 7 7   1418 Procalcitonin(!): 3 34   1452 Rads Impression: There are 2 new hemorrhage is identified in the left cerebellar hemisphere series 2 image 11 in the postsurgical territory  Slight increase in vasogenic edema noted resulting in minimal hydrocephalus when compared to the prior study  Slight prominence   to the temporal horns lateral ventricles noted which is new since the prior study      1458 Increased multifocal cavitary nodules and consolidations likely representing a combination of worsening metastases and multifocal pneumonia  Septic emboli could cause a similar appearance       1525 Discussed with neuro ICU Dr Sanjiv Saunders Name 08/08/22 1320                Is the patient's history suggestive of a new or worsening infection? --        Suspected source of infection suspect infection, source unknown  -GJ        Are two or more of the following signs & symptoms of infection both present and new to the patient? Yes (Proceed)  -GJ        Indicate SIRS criteria Hyperthemia > 38 3C (100 9F); Tachycardia > 90 bpm  -GJ        If the answer is yes to both questions, suspicion of sepsis is present --        If severe sepsis is present AND tissue hypoperfusion perists in the hour after fluid resuscitation or lactate > 4, the patient meets criteria for SEPTIC SHOCK --        Are any of the following organ dysfunction criteria present within 6 hours of suspected infection and SIRS criteria that are NOT considered to be chronic conditions?  --        Organ dysfunction Lactate >/equal 4 0 mmol/L (MEETS CRITERIA FOR SEPTIC SHOCK)  -GJ        Date of presentation of severe sepsis 08/08/22  -GJ        Time of presentation of severe sepsis 1411  -GJ        Tissue hypoperfusion persists in the hour after crystalloid fluid administration, evidenced, by either: --        Was hypotension present within one hour of the conclusion of crystalloid fluid administration? --        Date of presentation of septic shock --        Time of presentation of septic shock --              User Key  (r) = Recorded By, (t) = Taken By, (c) = Cosigned By    [address] Name Provider Type    44[address] [facility], DO Resident              Default Flowsheet Data (last 720 hours)     Sepsis Reassess     [address] Name 08/08/22 1529                   Repeat Volume Status and Tissue Perfusion Assessment Performed    Repeat Volume Status and Tissue Perfusion Assessment Performed Yes  -12[facility] Volume Status and Tissue Perfusion Post Fluid Resuscitation * Must Document All *    Vital Signs Reviewed (HR, RR, BP, T) Yes  -GJ        Shock Index Reviewed Yes  -GJ        Arterial Oxygen Saturation Reviewed (POx, SaO2 or SpO2) Yes (comment %)  -GJ        Cardio Normal S1/S2; Regular rate and rhythm; No murmor; No rub or gallop  -GJ        Pulmonary Normal effort;Rhonchi  -GJ        Capillary Refill Brisk  -GJ        Peripheral Pulses Radial;Dorsalis Pedis  -GJ        Peripheral Pulse +2  -GJ        Dorsalis Pedis +2  -GJ        Skin Warm  -GJ        Urine output assessed --                  *OR*   Intensive Monitoring- Must Document One of the Following Four *:    Vital Signs Reviewed Yes  -GJ        * Central Venous Pressure (CVP or RAP) --        * Central Venous Oxygen (SVO2, ScvO2 or Oxygen saturation via central catheter) --        * Bedside Cardiovascular US in IVC diameter and % collapse --        * Passive Leg Raise OR Crystalloid Challenge --              User Key  (r) = Recorded By, (t) = Taken By, (c) = Cosigned By    Initials Name Provider Type    [address] [place] [address] Resident                        [facility]  Number of Diagnoses or Management Options  Acute encephalopathy: new and requires workup  Acute respiratory failure with hypoxia [facility]): new and requires workup  TREVA (acute kidney injury) [facility]): new and requires workup  Cerebellar hemorrhage [facility]): new and requires workup  Severe sepsis [facility]): new and requires workup  Diagnosis management comments: Patient is a 69year old male presenting with altered mental status, concerning for acute intracranial abnormality given recent brain surgery  The differential also includes toxic metabolic etiologies such as electrolyte disturbances (Na/Ca), hypoglycemia, and uremia; acidosis states, infection, hypoxemia or hypercarbia   Given patient temperature as well as tachycardia, meets SIRS criteria, unclear if this is autonomic dysregulation versus sepsis  Patient also with persistent hypoxia and tachycardia given recent hospitalization/immobilization concern for PE  Given this wide differential diagnosis, will send basic labs including electrolytes to evaluate for metabolic causes, ECG, CTA for PE, CT head    CT head notable for cerebellar bleed  Labs notable for lactic of 7 7 placing the patient in severe sepsis  Giving sepsis fluids and antibiotics  Unclear source at this time, but differential includes meningitis versus pulmonary  Could also be dysautonomia given patient new bleed and lactic acidosis secondary to critical illness  CTA for PE demonstrates multifocal pneumonia versus worsening mets versus septic emboli  Labs also notable for TREVA  On reassessment, patient exam unchanged, remains agitated, GCS 14 with confusion  Tachycardia improving with fluids  Given cerebellar bleed and severe sepsis, patient most appropriate for neuro ICU care  Discussed with PACS and Dr Isis Ferreira and will admit to neuro ICU under critical care team  Discussed this with wife who seems to understand and is agreeable         Amount and/or Complexity of Data Reviewed  Clinical lab tests: ordered and reviewed  Tests in the radiology section of CPT®: ordered and reviewed  Obtain history from someone other than the patient: yes  Review and summarize past medical records: yes  Discuss the patient with other providers: yes  Independent visualization of images, tracings, or specimens: yes    Patient Progress  Patient progress: stable      Disposition  Final diagnoses:   Acute encephalopathy   Severe sepsis ([address] )   Acute respiratory failure with hypoxia ([address] )   TREVA (acute kidney injury) ([facility] [address] )   Cerebellar hemorrhage ([facility] [address] )     Time reflects when diagnosis was documented in both MDM as applicable and the Disposition within this note     Time User Action Codes Description Comment    8/8/2022  2:34 PM Liliya Gracia Add [O64 10] Acute encephalopathy     8/8/2022  2:34 PM Bret Yang Cronin [A41 9,  R65 20] Severe sepsis ([place] [address] )     8/8/2022  2:34 PM Paul December Add [J96 01] Acute respiratory failure with hypoxia ([place] [address] )     8/8/2022  2:34 PM Paul December Add [N17 9] TREVA (acute kidney injury) ([place] [address] )     8/8/2022  3:18 PM Paul December Add [I61 4] Cerebellar hemorrhage ([place] [address] )     8/8/2022  4:22 PM Vanessa Peres Add [C7A 8,  C7B 8] 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 ([place] [address] )     8/9/2022 12:34 AM Sorin Lerma Add [L02 91] Abscess       ED Disposition     ED Disposition   Admit    Condition   Stable    Date/Time   Mon Aug 8, 2022  3:19 PM    Comment   Case was discussed with critical care and the patient's admission status was agreed to be Admission Status: inpatient status to the service of Dr July Wilson   Follow-up Information    None         Current Discharge Medication List      CONTINUE these medications which have NOT CHANGED    Details   acetaminophen (TYLENOL) 325 mg tablet Take 3 tablets (975 mg total) by mouth every 8 (eight) hours  Refills: 0    Associated Diagnoses: 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 amiodarone (PACERONE) 400 MG tablet Take 1 tablet (400 mg total) by mouth 2 (two) times a day with meals for 13 doses  Qty: 13 tablet, Refills: 0    Associated Diagnoses: Persistent atrial fibrillation ([place] [address] )      ! ! amiodarone 200 mg tablet Take 1 tablet (200 mg total) by mouth daily with breakfast  Refills: 0    Associated Diagnoses: Persistent atrial fibrillation (HCC)      amLODIPine (NORVASC) 10 mg tablet TAKE ONE TABLET BY MOUTH EVERY DAY  Qty: 90 tablet, Refills: 3    Associated Diagnoses: Essential hypertension      Ascorbic Acid (vitamin C) 1000 MG tablet Take 1,000 mg by mouth daily      B Complex Vitamins (B COMPLEX 1 PO) Take 1 tablet by mouth daily      dexamethasone (DECADRON) 2 mg tablet Take 1 tablet (2 mg total) by mouth every 24 hours for 2 doses  Qty: 2 tablet, Refills: 0    Associated Diagnoses: 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)      diltiazem (CARDIZEM SR) 120 mg 12 hr capsule Take 1 capsule (120 mg total) by mouth every 12 (twelve) hours  Refills: 0    Associated Diagnoses: Persistent atrial fibrillation (HCC)      docusate sodium (COLACE) 100 mg capsule Take 1 capsule (100 mg total) by mouth 2 (two) times a day  Refills: 0    Associated Diagnoses: Constipation, unspecified constipation type      levothyroxine 75 mcg tablet Take 0 5 tablets (37 5 mcg total) by mouth daily in the early morning  Qty: 15 tablet, Refills: 2    Associated Diagnoses: Hypothyroidism      LORazepam (ATIVAN) 0 5 mg tablet Take 1 tablet (0 5 mg total) by mouth 2 (two) times a day as needed for anxiety  Qty: 10 tablet, Refills: 0    Associated Diagnoses: 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)      meclizine (ANTIVERT) 25 mg tablet Take 1 tablet (25 mg total) by mouth every 8 (eight) hours as needed for dizziness  Qty: 30 tablet, Refills: 0    Associated Diagnoses: Dizziness      methocarbamol (ROBAXIN) 500 mg tablet Take 1 tablet (500 mg total) by mouth 3 (three) times a day  Refills: 0    Associated Diagnoses: 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)      metoprolol succinate (TOPROL-XL) 50 mg 24 hr tablet Take 1 tablet (50 mg total) by mouth 2 (two) times a day    Associated Diagnoses: Irregular heart rate      oxyCODONE (ROXICODONE) 5 immediate release tablet Take 0 5 tablets (2 5 mg total) by mouth every 4 (four) hours as needed (pain) Max Daily Amount: 15 mg  Qty: 20 tablet, Refills: 0    Associated Diagnoses: 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)      pantoprazole (PROTONIX) 40 mg tablet Take 1 tablet (40 mg total) by mouth daily  Qty: 30 tablet, Refills: 11    Associated Diagnoses: Adenocarcinoma, lung, left ([facility] [address] ); Encounter for chemotherapy management      phenol (CHLORASEPTIC) 1 4 % mucosal liquid Apply 1 spray to the mouth or throat 4 (four) times a day (before meals and at bedtime)  Refills: 0    Associated Diagnoses: 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)      polyethylene glycol (GLYCOLAX) 17 GM/SCOOP powder Take 17 g by mouth 2 (two) times a day  Qty: 850 g, Refills: 0    Associated Diagnoses: Constipation, unspecified constipation type      rosuvastatin (CRESTOR) 10 MG tablet TAKE ONE TABLET BY MOUTH EVERY DAY  Qty: 30 tablet, Refills: 5    Associated Diagnoses: Mixed hyperlipidemia      scopolamine (TRANSDERM-SCOP) 1 mg/3 days TD 72 hr patch Place 1 patch on the skin every third day  Refills: 0    Associated Diagnoses: 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 - Potential duplicate medications found  Please discuss with provider  No discharge procedures on file  PDMP Review       Value Time User    PDMP Reviewed  Yes 7/24/2022  9:33 PM Rene Barnes PA-C           ED Provider  Attending physically available and evaluated Ronal Lawrence  I managed the patient along with the ED Attending      Electronically Signed by         Sb Moreira DO  08/09/22 9590

## 2022-08-08 NOTE — PROGRESS NOTES
08/08/22 1700   Clinical Encounter Type   Visited With Family   Routine Visit Follow-up   Referral From    Referral To

## 2022-08-08 NOTE — PROGRESS NOTES
Spiritual Care Progress Note    2022  Patient: Lenny Oviedo : 1955  Admission Date & Time: 2022 1204  MRN: [id number] [facility]: [id number]       visited patient "Jim Bartholomew" and pt's wife "Angie Lindquist" per RN referral  Elizabeth Garcia provided emotional support, active listening, and facilitated exploration of spiritual needs for pt's wife as patient was unable to communicate  Patient showed some signs of discomfort/distress by trembling limbs and adjusting blanket frequently  Pt's wife expressed gratitude and requested prayer and follow-up visits   provided prayer and affirmed that spiritual care will be available and will attempt follow-up upon pt's hospitalization               Chaplaincy Interventions Utilized:   Empowerment: Normalized experience of patient/family and Provided anxiety containment    Exploration: Explored emotional needs & resources and Explored spiritual needs & resources    Collaboration: Consulted with interdisciplinary team    Relationship Building: Cultivated a relationship of care and support and Listened empathically    Ritual: Provided prayer      Chaplaincy Outcomes Achieved:  Catharsis, Expressed gratitude, and Verbally processed emotions      Spiritual Coping Strategies Utilized:   Spiritual practices     22 1400   Clinical Encounter Type   Visited With Patient and family together   Routine Visit Introduction   Crisis Visit ED   Referral From Nurse   Orthodox Encounters   Orthodox Needs Prayer   Family Spiritual Encounters   Family Participation in Care 5

## 2022-08-08 NOTE — TELEPHONE ENCOUNTER
1st attempt-- reached out to patient to check in to see how he is doing postop and provide postop instructions  Left VM requesting a call back at his convenience, provided office number

## 2022-08-08 NOTE — PROGRESS NOTES
Pastoral Care Progress Note    2022  Patient: Alea Wolff : 1955  Admission Date & Time: 2022 1204  MRN: [id number] [facility]: [id number]                       Exploration: Explored emotional needs & resources      Relationship Building: Cultivated a relationship of care and support and Listened empathically    Chaplaincy Outcomes Achieved:  Expressed gratitude        Know patient and wife who was here before in the ICU, provided emotional support for wife, listening support and Spiritual Care presence

## 2022-08-09 PROBLEM — G93.40 ACUTE ENCEPHALOPATHY: Status: ACTIVE | Noted: 2022-08-08

## 2022-08-09 NOTE — RESPIRATORY THERAPY NOTE
Resp care   08/09/22 0720   Respiratory Assessment   Assessment Type Assess only   General Appearance Sleeping   Respiratory Pattern Normal   Chest Assessment Chest expansion symmetrical   Resp Comments patient is resting on HFNC at this time and appears comfortable and in no distress, pt remains on 50lpm/50% fio2 and satting 99%, will wean Fio2 to 40% and flow to 45lpm, Rn aware, will continue to mohnitor   O2 Device HFNC   Non-Invasive Information   O2 Interface Device HFNC prongs   Non-Invasive Ventilation Mode HFNC (High flow)   SpO2 99 %   $ Pulse Oximetry Spot Check Charge Completed   Non-Invasive Settings   FiO2 (%) 50   Flow (lpm) 50   Temperature (Set) 33   Non-Invasive Readings   Skin Intervention Skin intact   Heater Temperature (Obs) 35

## 2022-08-09 NOTE — ASSESSMENT & PLAN NOTE
· S/p left retrosigmoid posterior fossa craniotomy for resection of mass with Dr Zulema Acharya on 07/28/2022   · Presented with encephalopathy-question toxic metabolic with infectious etiology    Imaging:   · CT head 08/09/2022: Grossly stable ill-defined hemorrhage in degenerative edema within the left cerebellar operative bed  Effacement of 4th ventricle is again noted with stable mild prominence of the temporal horns  Small amount of cortical based hemorrhage in the left parietal lobe sub adjacent to the parietal craniotomy site  Plan:   · Continue to monitor neurological exam   · vEEG to ensure no seizure- given questionable seizure activity this morning  · Loaded with keppra- managed on 1000mg BID   · Decadron 2mg QD   · Ongoing medical management   · MRI brain ordered per medical team  · Continue abx treatment for pneumonia   · Rocephin 1g QD  · Flagyl 500mg Q8  · Vancomycin 17 5 mg/kg Q12   · Palliative care consultation   · No further neurosurgical intervention indicated at this time  Do not anticipate further operative interventions  · Will continue to follow from the periphery while patient remains inpatient  Call with questions or concerns

## 2022-08-09 NOTE — CONSULTS
Consultation - Palliative and Supportive Care   Susan Martinez 69 y o  male [id number]    Patient Active Problem List   Diagnosis    Renal cyst, right    Acute idiopathic gout of left foot    Back problem    Depression with anxiety    Essential hypertension    Glaucoma    Hypertriglyceridemia    Lumbago with sciatica, left side    Lumbar stenosis    JUNAID (obstructive sleep apnea)    Spinal stenosis of lumbar region with neurogenic claudication    Mixed hyperlipidemia    Bilateral hearing loss    Hyperglycemia    Cigarette nicotine dependence in remission    Adenocarcinoma, lung, left ([address] )    Encounter for central line care    Drug-induced neutropenia (HCC)    Left non-suppurative otitis media    Bilateral hearing loss due to cerumen impaction    Cancer of upper lobe of left lung (HCC)    Chronic back pain    Former cigarette smoker    History of gout    Hypertension    Proctocolitis    Pericardial effusion    Constipation    Labyrinthitis of both ears    CKD (chronic kidney disease) stage 2, GFR 60-89 ml/min    Platelets decreased (HCC)    Psoriasis    Left parietal hemorrhagic tumor    Thrombocytopenia (HCC)    Goals of care, counseling/discussion    Hypothyroidism    Irregular heart rate    Atrial fibrillation with rapid ventricular response (HCC)    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)    Dizziness    Chronic obstructive pulmonary disease, unspecified COPD type (HCC)    Hyponatremia    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)    Multifocal pneumonia    Acute respiratory failure with hypoxia (HCC)    Acute encephalopathy     Active issues specifically addressed today include:     Metastatic lung Ca  Sepsis 2/2 PNA and iliacus abscess  Worsening ICH  Palliative patient  GOC discussion  Grieving spouse    Plan:  1  Symptom management -    - Will leave symptom mgmt to ICU team for now      2  Goals -    -Discussion with wife regarding pt's living will reveals that he had documented that he wishes to be DNI but not DNR  It was explained that ACLS protocol requires intubation and wife will think about it tonight and we will reconvene tomorrow  Until then he will remain full code  Code Status: full - Level 1   Decisional apparatus:  Patient is not competent on my exam today  If competence is lost, patient's substitute decision maker would default to his wife, Chetan Morales, by [place]  (wife is also his POA)     Advance Directive / Living Will / POLST:  none     I have reviewed the patient's controlled substance dispensing history in the Prescription Drug Monitoring Program in compliance with the [facility] regulations before prescribing any controlled substances  We appreciate the invitation to be involved in this patient's care  We will continue to follow  Please do not hesitate to reach our on call provider through our clinic answering service at  should you have acute symptom control concerns  Diana Rowland MD  Palliative and Supportive Care  Clinic/Answering Service: [phone]  You can find me on [employer]! IDENTIFICATION:  Inpatient consult to Palliative Care  Consult performed by: Zara Jean-Baptiste MD  Consult ordered by: Marlin Henning DO        Physician Requesting Consult: Natali Patel MD  Reason for Consult / Principal Problem: mets to brain, worsening lung lesions  Hx and PE limited by: Pt disorientation    HISTORY OF PRESENT ILLNESS:       Carlos Degroot is a 69 y o  male with PMH of lung Ca s/p resection with brain mets s/p total brain RTwho presents with AMS and was admitted for sepsis 2/2 PNA and worsening ICH      Review of Systems   Unable to perform ROS: mental status change       Past Medical History:   Diagnosis Date    [facility] [address] [facility]     Hyperlipidemia     Hypertension     Lung cancer [address] [address] )      Past Surgical History:   Procedure Laterality Date    BACK SURGERY      CRANIOTOMY Left 2022    Procedure: Image guided left parietal craniotomy for tumor resection;  Surgeon: Sarkis Armstrong MD;  [facility];  Service: Neurosurgery    CRANIOTOMY Left 2022    Procedure: left retrosigmoid/posterior fossa craniotomy for resction of mass with image guidence;  Surgeon: Sarkis Armstrong MD;  [facility];  Service: Neurosurgery    [address] IR BIOPSY LUNG  2021    IR PORT PLACEMENT  2021    LAMINECTOMY  2018    L4-L5    LUNG SURGERY      left upper lobectomy    [place] BRONCHOSCOPY,DIAGNOSTIC N/A 2021    Procedure: BRONCHOSCOPY FLEXIBLE;  Surgeon: Doug Townsend MD;  [facility];  Service: Thoracic    [place] MEDIASTINOSCOPY WITH LYMPH NODE BIOPSY/IES N/A 2021    Procedure: MEDIASTINOSCOPY, flexible bronchoscopy;  Surgeon: Doug Townsend MD;  [facility];  Service: Thoracic    TONSILLECTOMY       Social History     Socioeconomic History    Marital status: /Civil Union     Spouse name: Not on file    Number of children: Not on file    Years of education: Not on file    Highest education level: Not on file   Occupational History    Not on file   Tobacco Use    Smoking status: Former Smoker     Packs/day: 1 00     Years: 40 00     Pack years: 40 00     Types: Cigarettes     Start date:      Quit date: 2017     Years since quittin 4    Smokeless tobacco: Never Used    Tobacco comment: quit 2017   Vaping Use    Vaping Use: Never used   Substance and Sexual Activity    Alcohol use:  Yes     Alcohol/week: 7 0 standard drinks     Types: 7 Cans of beer per week     Comment: 1-2 beers nightly    Drug use: Not Currently     Types: Marijuana     Comment: seldom    Sexual activity: Not on file   Other Topics Concern    Not on file   Social History Narrative    Not on file     Social Determinants of Health     Financial Resource Strain: Not on file   Food Insecurity: No Food Insecurity  Worried About Running Out of Food in the Last Year: Never true    Felecia of Food in the Last Year: Never true   Transportation Needs: No Transportation Needs    Lack of Transportation (Medical): No    Lack of Transportation (Non-Medical): No   Physical Activity: Not on file   Stress: Not on file   Social Connections: Not on file   Intimate Partner Violence: Not on file   Housing Stability: Low Risk     Unable to Pay for Housing in the Last Year: No    Number of Places Lived in the Last Year: 1    Unstable Housing in the Last Year: No     Family History   Problem Relation Age of Onset    Nephrolithiasis Father     Lung cancer Maternal Grandfather        MEDICATIONS / ALLERGIES:    Allergies   Allergen Reactions    Bee Venom     Benazepril Other (See Comments)     Angioedema     Latex Other (See Comments)     Burning of eyes at the dentist from the gloves    Meloxicam GI Intolerance    Penicillin G Rash       OBJECTIVE:    Physical Exam  Physical Exam  Constitutional:       General: He is not in acute distress  Appearance: He is obese  He is ill-appearing  He is not diaphoretic  Eyes:      Extraocular Movements: Extraocular movements intact  Cardiovascular:      Rate and Rhythm: Normal rate  Pulmonary:      Effort: Pulmonary effort is normal    Abdominal:      Tenderness: There is no abdominal tenderness  There is no guarding  Genitourinary:     Comments: guerra  Musculoskeletal:      Cervical back: No rigidity  Skin:     General: Skin is warm and dry  Coloration: Skin is pale  Neurological:      Mental Status: He is disoriented  Comments: AOx1 5   Psychiatric:      Comments: Poor historian         Lab Results:   I have personally reviewed pertinent labs  , CBC:   Lab Results   Component Value Date    WBC 3 14 (L) 08/09/2022    HGB 8 1 (L) 08/09/2022    HCT 24 7 (L) 08/09/2022     (H) 08/09/2022    PLT 54 (L) 08/09/2022    MCH 34 3 08/09/2022    MCHC 32 9 08/09/2022 RDW 15 9 (H) 08/09/2022    MPV 11 0 08/09/2022    NRBC 4 (H) 08/09/2022   , CMP:   Lab Results   Component Value Date    SODIUM 137 08/09/2022    K 3 4 (L) 08/09/2022     08/09/2022    CO2 24 08/09/2022    BUN 17 08/09/2022    CREATININE 0 77 08/09/2022    CALCIUM 8 7 08/09/2022    EGFR 92 08/09/2022   , BMP:  Lab Results   Component Value Date    SODIUM 137 08/09/2022    K 3 4 (L) 08/09/2022     08/09/2022    CO2 24 08/09/2022    BUN 17 08/09/2022    CREATININE 0 77 08/09/2022    GLUC 87 08/09/2022    CALCIUM 8 7 08/09/2022    AGAP 7 08/09/2022    EGFR 92 08/09/2022     Imaging Studies: pertinent imaging reviewd  EKG, Pathology, and Other Studies: pertinent studies reviewed    Counseling / Coordination of Care    Total floor / unit time spent today 60 minutes  Greater than 50% of total time was spent with the patient and / or family counseling and / or coordination of care  A description of the counseling / coordination of care: ongoing [address] discussion, emotional support

## 2022-08-09 NOTE — PHYSICAL THERAPY NOTE
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 ([facility] [address] ) [C7A 8, C7B 8]  Severe sepsis ([facility] [address] ) [A41 9, R65 20]    Problem List  Patient Active Problem List   Diagnosis    Renal cyst, right    Acute idiopathic gout of left foot    Back problem    Depression with anxiety    Essential hypertension    Glaucoma    Hypertriglyceridemia    Lumbago with sciatica, left side    Lumbar stenosis    JUNAID (obstructive sleep apnea)    Spinal stenosis of lumbar region with neurogenic claudication    Mixed hyperlipidemia    Bilateral hearing loss    Hyperglycemia    Cigarette nicotine dependence in remission    Adenocarcinoma, lung, left ([facility] [address] )    Encounter for central line care    Drug-induced neutropenia (HCC)    Left non-suppurative otitis media    Bilateral hearing loss due to cerumen impaction    Cancer of upper lobe of left lung (HCC)    Chronic back pain    Former cigarette smoker    History of gout    Hypertension    Proctocolitis    Pericardial effusion    Constipation    Labyrinthitis of both ears    CKD (chronic kidney disease) stage 2, GFR 60-89 ml/min    Platelets decreased (HCC)    Psoriasis    Left parietal hemorrhagic tumor    Thrombocytopenia (HCC)    Goals of care, counseling/discussion    Hypothyroidism    Irregular heart rate    Atrial fibrillation with rapid ventricular response (HCC)    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)    Dizziness    Chronic obstructive pulmonary disease, unspecified COPD type ([facility] [address] )    Hyponatremia    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)    Multifocal pneumonia    Acute respiratory failure with hypoxia (HCC)    Toxic metabolic encephalopathy       Past Medical History  Past Medical History: Diagnosis Date    Brain cancer ([facility] [address] )     Hyperlipidemia     Hypertension     Lung cancer [facility])        Past Surgical History  Past Surgical History:   Procedure Laterality Date    BACK SURGERY      CRANIOTOMY Left 4/7/2022    Procedure: Image guided left parietal craniotomy for tumor resection;  Surgeon: Anisha Barajas MD;  [facility];  Service: Neurosurgery    CRANIOTOMY Left 7/28/2022    Procedure: left retrosigmoid/posterior fossa craniotomy for resction of mass with image guidence;  Surgeon: Anisha Barajas MD;  [facility];  Service: Neurosurgery    HEMORRHOID SURGERY      IR BIOPSY LUNG  5/6/2021    IR PORT PLACEMENT  6/17/2021    LAMINECTOMY  2018    L4-L5    LUNG SURGERY      left upper lobectomy    [place] BRONCHOSCOPY,DIAGNOSTIC N/A 5/25/2021    Procedure: BRONCHOSCOPY FLEXIBLE;  Surgeon: Lyudmila Mena MD;  [facility];  Service: Thoracic    [place] MEDIASTINOSCOPY WITH LYMPH NODE BIOPSY/IES N/A 5/25/2021    Procedure: MEDIASTINOSCOPY, flexible bronchoscopy;  Surgeon: Lyudmila Mena MD;  [facility];  Service: Thoracic    TONSILLECTOMY  1959 08/09/22 0941   PT Last Visit   PT Visit Date 08/09/22   Note Type   Note type Evaluation   Pain Assessment   Pain Assessment Tool 0-10   Pain Score 8   Pain Location/Orientation Orientation: Bilateral;Location: Leg   Hospital Pain Intervention(s) Repositioned; Ambulation/increased activity; Elevated   Restrictions/Precautions   Weight Bearing Precautions Per Order No   Other Precautions Cognitive; Chair Alarm; Bed Alarm; Fall Risk;O2;Pain;Multiple lines;Telemetry  (7L O2)   Home Living   Type of [address] [place] [address] Multi-level;Stairs to enter with rails  (2-4 SHANTA)   Prior Function   Level of [place] Independent with ADLs and functional mobility   Lives With Spouse   Falls in the last 6 months 0   Vocational Retired   Comments Pt reports no AD PTA, spouse assists with ADL's as needed   General   Family/Caregiver Present No   Cognition   Overall Cognitive Status Impaired   Arousal/Participation Alert   Orientation Level Oriented X4  (grossly)   Following Commands Follows one step commands with increased time or repetition   Comments Pt with improved alertness upon sitting, requires frequent cues to attend to task, drowsy  Pt requires increased time for processing   RLE Assessment   RLE Assessment X   Strength RLE   RLE Overall Strength 2/5   R Ankle Dorsiflexion 1/5   R Ankle Plantar Flexion 1/5   LLE Assessment   LLE Assessment X   Strength LLE   LLE Overall Strength 2/5   L Ankle Dorsiflexion 1/5   L Ankle Plantar Flexion 1/5   Bed Mobility   Supine to Sit 2  Maximal assistance   Additional items Assist x 2; Increased time required;Verbal cues;HOB elevated; Bedrails   Additional Comments VC's for sequencing of bed mobility, L lateral lean in sitting, cues for forward eye gaze with limited improvement   Transfers   Sit to Stand 2  Maximal assistance   Additional items Assist x 2; Increased time required;Verbal cues   Stand to Sit 2  Maximal assistance   Additional items Assist x 2; Increased time required;Verbal cues   Stand pivot 2  Maximal assistance   Additional items Assist x 2; Increased time required;Verbal cues   Additional Comments Performed x3 STS trials, initial trial 25% clearance, second trial 75%   Ambulation/Elevation   Gait pattern Not appropriate   Balance   Static Sitting Poor   Dynamic Sitting Poor -   Static Standing Poor -   Dynamic Standing Poor -   Ambulatory Zero   Activity Tolerance   Activity Tolerance Patient limited by fatigue   Medical Staff Made Aware Co-evaluation with OT given medical complexity and limited activity tolerance   Nurse Made Aware RN updated  Chair alarm engaged at end of session   Assessment   Prognosis Fair   Problem List Decreased strength;Decreased endurance; Impaired balance;Decreased mobility; Decreased cognition;Decreased safety awareness;Pain   Assessment Pt is a 69 y o  male seen for PT evaluation s/p admit to [facility] on 8/8/2022  Pt was admitted with a primary dx of: Acute respiratory failure  PT now consulted for assessment of mobility and d/c needs  Pt with Up with assistance orders  Pts current comorbidities and personal factors effecting treatment include: Afib, HTN, Gout, SIADH, 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 (most recent 7/28/22)  Pts current clinical presentation is Unstable/Unpredictable (high complexity) due to Ongoing medical management for primary dx, Decreased activity tolerance compared to baseline, Fall risk, Increased assistance needed from caregiver at current time, Ongoing telemetry monitoring, Cog status, Trending lab values, Continuous pulse oximetry monitoring   Prior to admission, pt was independent without AD  Upon evaluation, pt currently is requiring MaxA x2 for bed mobility; MaxA x2 for transfers  Pt presents at PT eval functioning below baseline and currently w/ overall mobility deficits 2* to: BLE weakness, impaired balance, decreased endurance, pain, decreased activity tolerance compared to baseline, decreased functional mobility tolerance compared to baseline, decreased safety awareness, fall risk, SOB upon exertion, decreased cognition  Pt currently at a fall risk 2* to impairments listed above  Pt will continue to benefit from skilled acute PT interventions to address stated impairments; to maximize functional mobility; for ongoing pt/ family training; and DME needs  At conclusion of PT session pt returned back in chair and chair alarm engaged with phone and call bell within reach  Pt denies any further questions at this time  Recommend IP rehab upon hospital D/C  Barriers to Discharge Inaccessible home environment   Goals   Patient Goals to rest   STG Expiration Date 08/23/22   Short Term Goal #1 In 14 days pt will be able to: 1   Demonstrate ability to perform all aspects of bed mobility with Mulu to improve functional safety  2  Perform functional transfers with Mulu to facilitate safe return to previous living environment  3   Pt will be able to tolerate sitting EOB > 15 minutes with close supervision in preparation for transfers  4  Improve LE strength grades by 1 to increase ease of functional mobility with transfers and gait  5  Pt will demonstrate improved balance by one grade in order to decrease risk of falls  6  PT to see for gait/transfer goals as appropriate  PT Treatment Day 0   Plan   Treatment/Interventions Functional transfer training;LE strengthening/ROM; Therapeutic exercise; Endurance training;Cognitive reorientation;Patient/family training;Equipment eval/education; Bed mobility   PT Frequency 3-5x/wk   Recommendation   PT Discharge Recommendation Post acute rehabilitation services   AM-PAC Basic Mobility Inpatient   Turning in Bed Without Bedrails 1   Lying on Back to Sitting on Edge of Flat Bed 1   Moving Bed to Chair 1   Standing Up From Chair 1   Walk in Room 1   Climb 3-5 Stairs 1   Basic Mobility Inpatient Raw Score 6   Turning Head Towards Sound 3   Follow Simple Instructions 3   Low Function Basic Mobility Raw Score 12   Low Function Basic Mobility Standardized Score 18 33   Highest Level Of Mobility   JH-HLM Goal 2: Bed activities/Dependent transfer   JH-HLM Achieved 4: Move to chair/commode       Sheryl Hughes, PT, DPT, GCS

## 2022-08-09 NOTE — SPEECH THERAPY NOTE
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 ([facility] )    Chronic obstructive pulmonary disease, unspecified COPD type ([facility] )    Hyponatremia    Multifocal pneumonia    Toxic metabolic encephalopathy    Past Medical History  Past Medical History:   Diagnosis Date    Brain cancer ([facility] )     Hyperlipidemia     Hypertension     Lung cancer ([facility] )      Past Surgical History  Past Surgical History:   Procedure Laterality Date    BACK SURGERY      CRANIOTOMY Left 4/7/2022    Procedure: Image guided left parietal craniotomy for tumor resection;  Surgeon: Yoandy Chicas MD;  [facility];  Service: Neurosurgery    CRANIOTOMY Left 7/28/2022    Procedure: left retrosigmoid/posterior fossa craniotomy for resction of mass with image guidence;  Surgeon: Yoandy Chicas MD;  [facility];  Service: Neurosurgery    HEMORRHOID SURGERY      IR BIOPSY LUNG  5/6/2021    IR PORT PLACEMENT  6/17/2021    LAMINECTOMY  2018    L4-L5    LUNG SURGERY      left upper lobectomy    NH BRONCHOSCOPY,DIAGNOSTIC N/A 5/25/2021    Procedure: BRONCHOSCOPY FLEXIBLE;  Surgeon: Edmond Bautista MD;  [facility];  Service: Thoracic    NH MEDIASTINOSCOPY WITH LYMPH NODE BIOPSY/IES N/A 5/25/2021    Procedure: MEDIASTINOSCOPY, flexible bronchoscopy;  Surgeon: Edmond Bautista MD;  [facility];  Service: Thoracic    TONSILLECTOMY  1961        Bedside Swallow Evaluation:    Summary:  Pt presented w/ moderate oral dysphagia and functional pharyngeal swallow appropriate to begin diet of dysphagia level 3 and thin  Oral difficulties kathy by effortful, prolonged mastication, delayed A to P transfer, and moderate gross oral residue w/ solids  Pt lethargic and tires easily w/ mastication of soft/ hard solids  Swallow initiation appeared prompt and laryngeal rise weak upon palpation  Pt education provided on strategies ie small bites/sips and liquid wash after solids  Pt agreeable to diet modifications at this time  ST f/u as able to assess pt tolerance of new diet and monitor pt speech  Recommendations:  Diet: Dysphagia Level 3  Liquid: Thin   Meds: As desired   Supervision: Intermittent  Positioning:Upright  Strategies: Small bites/ sips, liquid wash after solids, encouragement,  Pt to take PO/Meds only when fully alert and upright  Oral care: Frequently   Aspiration precautions  Therapy Prognosis: Fair   Prognosis considerations: pt nicolle status, pt motivation   Frequency:  ST f/u as able to assess pt tolerance of new diet     Consider consult w/:  Rehab  Pulmonary  Nutrition    Goal(s):  Pt will tolerate least restrictive diet w/out s/s aspiration or oral/pharyngeal difficulties  H&P/Admit info/ pertinent provider notes: (PMH noted above)  Per admission note 8/8 22:   Susan Martinez is a 69 y o  male with a complicated past medical history including 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 s/p two resections, most recent of which was recently performed on 7/28/22 by Dr Berenice Abad  Additional history of A-fib, hypothyroidism, SIADH, HTN, gout, and hyperlipidemia  He presents today for worsening mental status      Patient's wife went to visit him today at rehab and found him yelling out in pain, "babbeling," more lethargic, and more confused compared to the evening prior  This is very different than how he was acting during his admission   EMS was called and patient was brought to the ED where he was found to have new multifocal pneumonia versus worsening mets versus septic emboli, severe sepsis, hyponatremia, fever at 101 3F, and right thigh pain  He has required supplemental O2 at approximately 6 L/min and maintaining sats in the low 90's%  Labs remarkable for lactic acid of 7 7, normal WBC, procalcitonin of 3 34  He was evaluated in the ED by the ICU team and will be admitted to the critical care service for additional evaluation and treatment      To review recent admission, patient presented to [address] [place] [address] on 7/28 due to dizziness and was found to have new-onset A-fib which was medically treated  He was found to have increasing size of known cerebellar hemorrhagic mass with edema and mass effect compromising the 4th ventricle  He was transferred to [facility] and underwent left retrosigmoid posterior fossa craniectomy and mass resection on 7/28/22  During his stay he was also treated for hyponatremia which is believed to be due to SIADH 2/2 malignancy which was treated with fluid restriction and salt liberalization, Afib with RVR, and thrombocytopenia  Additionally complained of diplopia and was given eye patch  During stay he was started on loading dose of amiodarone which is to be completed on 8/13 and he can then start daily dosing on 8/14  Special Studies:  CT Head 8/9/22:   1  No significant change in left cerebellar parenchymal hematoma and vasogenic edema with persistent mass effect on posterior brain stem and 4th ventricle  Grossly stable ventricular size without hydrocephalus  2   Stable small left parietal cortical hemorrhage as well as subcortical gliosis and encephalomalacia  Stable small left parietal extra-axial collection subjacent to craniotomy  CTA Chest 8/8/22:   Increased multifocal cavitary nodules and consolidations likely representing a combination of worsening metastases and multifocal pneumonia  Septic emboli could cause a similar appearance  No pulmonary emboli      Previous VBS:  --    Patient's goal: None stated Did the pt report pain? No   If yes, was nursing notified/was it addressed? --    Reason for consult:  R/o aspiration  Determine safest and least restrictive diet  current pna    Precautions:  Aspiration  Fall    Food allergies:  N/A     Current diet:  NPO     Premorbid diet[de-identified]  Regular w/ thin     O2 requirement:  HFNC     Voice/Speech:  Clear, intelligible, low volume, minimal output     Social:  Lives at home w/ wife      Follows commands:                        Yes     Cognitive Status:  Alert, awake and lethargic     Oral mech exam:  Dentition: Natural teeth   Labial strength and ROM: retraction WFL,weak protrusion  Lingual strength and ROM: WFL, weak   Mandibular strength and ROM: WNL   Velum:WNL  Secretion management: WNL     Items administered:  Puree, soft solid, hard solid,nectar thick liquid, thin liquids  Liquids were taken by straw/tsp  Oral stage:  Lip closure:  WNL   Mastication:Prolonged, labored   Bolus formation: Reduced   Bolus control: Reduced   Transfer: delayed   Oral residue: moderate gross oral  Pocketing:None noted     Pharyngeal stage:  Swallow promptness: prompt   Laryngeal rise: reduced   Wet voice: none noted   Throat clear:none noted   Cough:none noted   Secondary swallows: none noted   Audible swallows: none noted   No overt s/s aspiration    Esophageal stage:  No s/s reported    Aspiration precautions posted    Results d/w:  Pt, nursing, physician

## 2022-08-09 NOTE — CONSULTS
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 [facility])  Assessment & Plan  · S/p left retrosigmoid posterior fossa craniotomy for resection of mass with Dr Berenice Abad on 07/28/2022   · Presented with encephalopathy-question toxic metabolic with infectious etiology    Imaging:   · CT head 08/09/2022: Grossly stable ill-defined hemorrhage in degenerative edema within the left cerebellar operative bed  Effacement of 4th ventricle is again noted with stable mild prominence of the temporal horns  Small amount of cortical based hemorrhage in the left parietal lobe sub adjacent to the parietal craniotomy site  Plan:   · Continue to monitor neurological exam   · vEEG to ensure no seizure- given questionable seizure activity this morning  · Loaded with keppra- managed on 1000mg BID   · Decadron 2mg QD   · Ongoing medical management   · MRI brain ordered per medical team  · Continue abx treatment for pneumonia   · Rocephin 1g QD  · Flagyl 500mg Q8  · Vancomycin 17 5 mg/kg Q12   · Palliative care consultation   · No further neurosurgical intervention indicated at this time  Do not anticipate further operative interventions  · Will continue to follow from the periphery while patient remains inpatient  Call with questions or concerns  Multifocal pneumonia  Assessment & Plan  · Ongoing medical management  · Supplemental O2   · ABX regimen per [facility]      History of Present Illness   HPI: Susan Martinez is a 69 y o  male with history of Prior L parietal craniotomy for resection of mass on 4/7/2022   Patient diagnosed with neuroendocrine carcinoma  Underwent adjuvant therapies without improvement in disease  Had progression of posterior fossa metastasis and underwent a left retrosigmoid/posterior fossa craniotomy for resection of mass on 07/28/2022  Unfortunately patient returns to the ICU at this time with toxic metabolic encephalopathy with concern for multilobular pneumonia  He is currently on abx therapy for infectious etiology  Patient was noted to have some tremoring and concerned to have had a seizure this morning  He is awaiting vEEG placement  He was loaded with keppra  He continues to undergo further medical work up at this time  He currently is in a lethargic state  He grumbles incomprehensible words and moves all extremities to pain  Ct imaging on admission did demonstrate a small amount of hemorrhage in the surgical bed in the L hemicerebellum  Other past medical history includes hyperlipidemia, hypertension, lung adenocarcinoma      Review of Systems   Unable to perform ROS: Patient nonverbal       Historical Information   Past Medical History:   Diagnosis Date    [facility] )     Hyperlipidemia     Hypertension     Lung cancer [facility])      Past Surgical History:   Procedure Laterality Date    BACK SURGERY      CRANIOTOMY Left 4/7/2022    Procedure: Image guided left parietal craniotomy for tumor resection;  Surgeon: Christine Azul MD;  [facility];  Service: Neurosurgery    CRANIOTOMY Left 7/28/2022    Procedure: left retrosigmoid/posterior fossa craniotomy for resction of mass with image guidence;  Surgeon: Christine Azul MD;  [facility];  Service: Neurosurgery    HEMORRHOID SURGERY      IR BIOPSY LUNG  5/6/2021    IR PORT PLACEMENT  6/17/2021    LAMINECTOMY  2018    L4-L5    LUNG SURGERY      left upper lobectomy    ID BRONCHOSCOPY,DIAGNOSTIC N/A 5/25/2021    Procedure: BRONCHOSCOPY FLEXIBLE;  Surgeon: Soha Wynn MD;  [facility];  Service: Thoracic    ID MEDIASTINOSCOPY WITH LYMPH NODE BIOPSY/IES N/A 2021    Procedure: MEDIASTINOSCOPY, flexible bronchoscopy;  Surgeon: Jordan Drew MD;  [facility];  Service: Thoracic    TONSILLECTOMY       Social History     Substance and Sexual Activity   Alcohol Use Yes    Alcohol/week: 7 0 standard drinks    Types: 7 Cans of beer per week    Comment: 1-2 beers nightly     Social History     Substance and Sexual Activity   Drug Use Not Currently    Types: Marijuana    Comment: seldom     Social History     Tobacco Use   Smoking Status Former Smoker    Packs/day: 1 00    Years: 40 00    Pack years: 40 00    Types: Cigarettes    Start date:     Quit date: 2017    Years since quittin 4   Smokeless Tobacco Never Used   Tobacco Comment    quit 2017     Family History   Problem Relation Age of Onset    Nephrolithiasis Father     Lung cancer Maternal Grandfather        Meds/Allergies   all current active meds have been reviewed, current meds:   Current Facility-Administered Medications   Medication Dose Route Frequency    acetaminophen (TYLENOL) tablet 650 mg  650 mg Oral Q6H [address]    allopurinol (ZYLOPRIM) tablet 100 mg  100 mg Oral Daily    cefTRIAXone (ROCEPHIN) 1,000 mg in dextrose 5 % 50 mL IVPB  1,000 mg Intravenous Q24H    chlorhexidine (PERIDEX) 0 12 % oral rinse 15 mL  15 mL Mouth/Throat Q12H [address]    dexamethasone (DECADRON) tablet 2 mg  2 mg Oral Q24H [address]    docusate sodium (COLACE) capsule 100 mg  100 mg Oral BID    levETIRAcetam (KEPPRA) 1,000 mg in sodium chloride 0 9 % 100 mL IVPB  1,000 mg Intravenous Q12H [address]    levothyroxine tablet 37 5 mcg  37 5 mcg Oral Early Morning    LORazepam (ATIVAN) tablet 0 5 mg  0 5 mg Oral BID PRN    metoprolol (LOPRESSOR) injection 2 5 mg  2 5 mg Intravenous Q6H    metroNIDAZOLE (FLAGYL) IVPB (premix) 500 mg 100 mL  500 mg Intravenous Q8H    oxyCODONE (ROXICODONE) IR tablet 2 5 mg  2 5 mg Oral Q4H PRN    pantoprazole (PROTONIX) injection 40 mg  40 mg Intravenous Daily    polyethylene glycol (MIRALAX) packet 17 g  17 g Oral BID    pravastatin (PRAVACHOL) tablet 80 mg  80 mg Oral Daily With Dinner    scopolamine (TRANSDERM-SCOP) 1 mg/3 days TD 72 hr patch 1 patch  1 patch Transdermal Q72H    senna (SENOKOT) tablet 8 6 mg  8 6 mg Oral Daily    sodium chloride (PF) 0 9 % injection 3 mL  3 mL Intravenous Q1H PRN    sodium chloride 0 9 % infusion  100 mL/hr Intravenous Continuous    sodium chloride tablet 1 g  1 g Oral TID With Meals    vancomycin (VANCOCIN) 1500 mg in sodium chloride 0 9% 250 mL IVPB  17 5 mg/kg Intravenous Q12H    and PTA meds:   Prior to Admission Medications   Prescriptions Last Dose Informant Patient Reported? Taking?    Ascorbic Acid (vitamin C) 1000 MG tablet  Self Yes No   Sig: Take 1,000 mg by mouth daily   B Complex Vitamins (B COMPLEX 1 PO)  Self Yes No   Sig: Take 1 tablet by mouth daily   LORazepam (ATIVAN) 0 5 mg tablet   No No   Sig: Take 1 tablet (0 5 mg total) by mouth 2 (two) times a day as needed for anxiety   acetaminophen (TYLENOL) 325 mg tablet   No No   Sig: Take 3 tablets (975 mg total) by mouth every 8 (eight) hours   allopurinol (ZYLOPRIM) 100 mg tablet  Self No No   Sig: Take 1 tablet (100 mg total) by mouth daily   amLODIPine (NORVASC) 10 mg tablet  Self No No   Sig: TAKE ONE TABLET BY MOUTH EVERY DAY   amiodarone (PACERONE) 400 MG tablet   No No   Sig: Take 1 tablet (400 mg total) by mouth 2 (two) times a day with meals for 13 doses   amiodarone 200 mg tablet   No No   Sig: Take 1 tablet (200 mg total) by mouth daily with breakfast   dexamethasone (DECADRON) 2 mg tablet   No No   Sig: Take 1 tablet (2 mg total) by mouth every 24 hours for 2 doses   diltiazem (CARDIZEM SR) 120 mg 12 hr capsule   No No   Sig: Take 1 capsule (120 mg total) by mouth every 12 (twelve) hours   docusate sodium (COLACE) 100 mg capsule   No No   Sig: Take 1 capsule (100 mg total) by mouth 2 (two) times a day   levothyroxine 75 mcg tablet Self No No   Sig: Take 0 5 tablets (37 5 mcg total) by mouth daily in the early morning   meclizine (ANTIVERT) 25 mg tablet  Self No No   Sig: Take 1 tablet (25 mg total) by mouth every 8 (eight) hours as needed for dizziness   Patient not taking: Reported on 7/24/2022   methocarbamol (ROBAXIN) 500 mg tablet   No No   Sig: Take 1 tablet (500 mg total) by mouth 3 (three) times a day   metoprolol succinate (TOPROL-XL) 50 mg 24 hr tablet   No No   Sig: Take 1 tablet (50 mg total) by mouth 2 (two) times a day   oxyCODONE (ROXICODONE) 5 immediate release tablet   No No   Sig: Take 0 5 tablets (2 5 mg total) by mouth every 4 (four) hours as needed (pain) Max Daily Amount: 15 mg   pantoprazole (PROTONIX) 40 mg tablet  Self No No   Sig: Take 1 tablet (40 mg total) by mouth daily   phenol (CHLORASEPTIC) 1 4 % mucosal liquid   No No   Sig: Apply 1 spray to the mouth or throat 4 (four) times a day (before meals and at bedtime)   polyethylene glycol (GLYCOLAX) 17 GM/SCOOP powder  Self No No   Sig: Take 17 g by mouth 2 (two) times a day   rosuvastatin (CRESTOR) 10 MG tablet   No No   Sig: TAKE ONE TABLET BY MOUTH EVERY DAY   scopolamine (TRANSDERM-SCOP) 1 mg/3 days TD 72 hr patch   No No   Sig: Place 1 patch on the skin every third day   senna (SENOKOT) 8 6 mg   No No   Sig: Take 1 tablet (8 6 mg total) by mouth daily   sodium chloride 1 g tablet   No No   Sig: Take 1 tablet (1 g total) by mouth 3 (three) times a day with meals      Facility-Administered Medications: None     Allergies   Allergen Reactions    Bee Venom     Benazepril Other (See Comments)     Angioedema     Latex Other (See Comments)     Burning of eyes at the dentist from the gloves    Meloxicam GI Intolerance    Penicillin G Rash       Objective   I/O       08/07 0701 08/08 0700 08/08 0701 08/09 0700 08/09 0701  08/10 0700    I V  (mL/kg)  1121 7 (13 6) 400 (4 9)    IV Piggyback  2000 650    Total Intake(mL/kg)  3121 7 (37 9) 1050 (12 8)    Urine (mL/kg/hr)  1505 700 (1)    Total Output  1505 700    Net  +1616 7 +350                 Physical Exam  Constitutional:       Appearance: He is ill-appearing  HENT:      Head: Atraumatic  Comments: posterior incision intact  Sutures in place  Some perincisional ecchymosis  Mouth/Throat:      Mouth: Mucous membranes are dry  Eyes:      Pupils: Pupils are equal, round, and reactive to light  Cardiovascular:      Rate and Rhythm: Normal rate  Pulses: Normal pulses  Pulmonary:      Comments: High flow NC in place   Neurological:      Comments: Patient with incomprehensible sounds to pain  FC when asked to lower arms and wiggle toes  Withdraw to pain otherwise  Eyes do not open to pain  Blink response intact  No clonus       Vitals:Blood pressure 110/60, pulse 96, temperature (!) 101 12 °F (38 4 °C), resp  rate (!) 25, height 5' 8" (1 727 m), weight 82 1 kg (181 lb), SpO2 99 %  ,Body mass index is 27 52 kg/m²  Lab Results:   Results from last 7 days   Lab Units 08/09/22  1304 08/09/22  0734 08/09/22  0620 08/08/22  1246 08/04/22  0605 08/03/22  0539   WBC Thousand/uL  --  3 14* 3 17* 4 47   < > 7 19   HEMOGLOBIN g/dL 8 1* 7 3* 5 9* 9 6*   < > 11 3*   HEMATOCRIT % 24 7* 22 2* 18 3* 28 5*   < > 34 4*   PLATELETS Thousands/uL  --  54* 59* 76*   < > 69*   NEUTROS PCT %  --   --   --   --   --  96*   MONOS PCT %  --   --   --   --   --  1*   MONO PCT %  --   --  0* 0*  --   --     < > = values in this interval not displayed       Results from last 7 days   Lab Units 08/09/22  0620 08/08/22  1246 08/07/22  0509   POTASSIUM mmol/L 3 4* 4 4 4 0   CHLORIDE mmol/L 106 96 95*   CO2 mmol/L 24 22 28   BUN mg/dL 17 28* 20   CREATININE mg/dL 0 77 1 50* 0 64   CALCIUM mg/dL 8 7 9 8 9 3   ALK PHOS U/L  --  76  --    ALT U/L  --  64  --    AST U/L  --  35  --      Results from last 7 days   Lab Units 08/09/22  0620   MAGNESIUM mg/dL 1 7     Results from last 7 days   Lab Units 08/09/22  0620   PHOSPHORUS mg/dL 3 4 Results from last 7 days   Lab Units 08/09/22  1304 08/08/22  1316   INR  1 28* 1 13   PTT seconds 35 34     No results found for: TROPONINT  ABG:No results found for: PHART, WIP6LRG, PO2ART, IGX6UHQ, E3VMEIJL, BEART, SOURCE    Imaging Studies: I have personally reviewed pertinent reports  and I have personally reviewed pertinent films in PACS    XR hip/pelv 2-3 vws right    Result Date: 8/8/2022  Impression: Mild degenerative changes both hips No acute osseous abnormality  Workstation performed: [id number]     CT head wo contrast    Result Date: 8/9/2022  Impression: Grossly stable ill-defined hemorrhage and degenerative edema within the left cerebellar operative bed  Effacement of the 4th ventricle is again noted with stable mild prominence of the temporal horns again raises suspicion for mild associated hydrocephalus  Stable small amount of cortically-based hemorrhage in the left parietal lobe subjacent to the parietal craniotomy site  Workstation performed: [id number]     CT head wo contrast    Result Date: 8/9/2022  Impression: 1  No significant change in left cerebellar parenchymal hematoma and vasogenic edema with persistent mass effect on posterior brain stem and 4th ventricle  Grossly stable ventricular size without hydrocephalus  2   Stable small left parietal cortical hemorrhage as well as subcortical gliosis and encephalomalacia  Stable small left parietal extra-axial collection subjacent to craniotomy  Workstation performed: [id number]     CT head without contrast    Result Date: 8/8/2022  Impression: There are 2 new hemorrhage is identified in the left cerebellar hemisphere series 2 image 11 in the postsurgical territory  Slight increase in vasogenic edema noted resulting in minimal hydrocephalus when compared to the prior study  Slight prominence to the temporal horns lateral ventricles noted which is new since the prior study  Urgent neurosurgical consultation is recommended  Remainder of the brain is stable  The study was marked in [facility] for immediate notification  Workstation performed: [id number]     CTA ED chest PE study    Result Date: 8/8/2022  Impression: Increased multifocal cavitary nodules and consolidations likely representing a combination of worsening metastases and multifocal pneumonia  Septic emboli could cause a similar appearance  No pulmonary emboli  Workstation performed: [id number]     CT lower extremity wo contrast right    Result Date: 8/8/2022   Impression: Complex fluid collection within the right iliacus, suspicious for abscess  The study was marked in EPIC for significant notification  Workstation performed: [id number]       EKG, Pathology, and Other Studies: I have personally reviewed pertinent reports  VTE Prophylaxis: Sequential compression device Priscilla Paula)     Code Status: Level 1 - Full Code  Advance Directive and Living Will:      Power of :    POLST:      Counseling / Coordination of Care  I spent 30 minutes with the patient

## 2022-08-09 NOTE — NUTRITION
08/09/22 1640   Biochemical Data,Medical Tests, and Procedures   Biochemical Data/Medical Tests/Procedures Lab values reviewed; Meds reviewed   Recommendations/Interventions   Summary NPO after midnight tonight for CT drain placement  EN ordered- not infusing at this time   Interventions/Recommendations Adjust EN/ PN;Tube feeding recs provided   Recommendations to Provider 1  Adjust EN regimen to Vital AF 1 2 at 60mL/hr  Start at 15 mL and advance by 10 mL q 4 hrs to goal  Provides: 1440 mL TV, 1728 kcal, 108g protein, 1168 mL free water   Defer FWF regimen to provider   Appreciate RD protocol and consult

## 2022-08-09 NOTE — RESPIRATORY THERAPY NOTE
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)    Chronic obstructive pulmonary disease, unspecified COPD type ([place] [address] )    Hyponatremia    Multifocal pneumonia    Toxic metabolic encephalopathy      Home Pulmonary Medications:  na       Past Medical History:   Diagnosis Date    Brain cancer ([place] [address] )     Hyperlipidemia     Hypertension     Lung cancer ([place] [address] )      Social History     Socioeconomic History    Marital status: /Civil Union     Spouse name: None    Number of children: None    Years of education: None    Highest education level: None   Occupational History    None   Tobacco Use    Smoking status: Former Smoker     Packs/day: 1 00     Years: 40 00     Pack years: 40 00     Types: Cigarettes     Start date:      Quit date: 2017     Years since quittin 4    Smokeless tobacco: Never Used    Tobacco comment: quit 2017   Vaping Use    Vaping Use: Never used   Substance and Sexual Activity    Alcohol use:  Yes     Alcohol/week: 7 0 standard drinks     Types: 7 Cans of beer per week     Comment: 1-2 beers nightly    Drug use: Not Currently     Types: Marijuana     Comment: seldom    Sexual activity: None   Other Topics Concern    None   Social History Narrative    None     Social Determinants of Health     Financial Resource Strain: Not on file   Food Insecurity: No Food Insecurity    Worried About Running Out of Food in the Last Year: Never true    Ran Out of Food in the Last Year: Never true   Transportation Needs: No Transportation Needs    Lack of Transportation (Medical): No    Lack of Transportation (Non-Medical): No   Physical Activity: Not on file   Stress: Not on file   Social Connections: Not on file   Intimate Partner Violence: Not on file   Housing Stability: Low Risk     Unable to Pay for Housing in the Last Year: No    Number of Places Lived in the Last Year: 1    Unstable Housing in the Last Year: No       Subjective         Objective    Physical Exam:   Assessment Type: Assess only  General Appearance: Lethargic  Respiratory Pattern: Dyspnea with exertion  Chest Assessment: Chest expansion symmetrical  Bilateral Breath Sounds: Diminished, Coarse (sl  coarse)  Cough: None  O2 Device: HFNC    Vitals:  Blood pressure 117/65, pulse 82, temperature 99 32 °F (37 4 °C), resp  rate (!) 28, height 5' 8" (1 727 m), weight 82 3 kg (181 lb 7 oz), SpO2 97 %  Imaging and other studies: I have personally reviewed pertinent reports  and I have personally reviewed pertinent films in PACS    O2 Device: HFNC     Plan    Respiratory Plan: Vent/NIV/HFNC        Resp Comments: (P) Pt  evaluated at bedside per Respiratory protocol  Pt  admitted with Acute Encephalopathy/sepsis at this time  Pt  placed on HFNC at this time due to increased hypoxia at this time  Pt  has Hx  of Lung CA with likel;y mets to the brain  Pt's breath sounds are diminished and sl  coarse bilaterally  Pt  takes no bronchodilators at home and they are not indicated at this time  Will continue to assess and monitor pt per Respiratory protocol

## 2022-08-09 NOTE — CASE MANAGEMENT
Case Management Assessment & Discharge Planning Note    Patient name Luba Lomax ICU 02ICU 02 MRN [id number]  : 1955 Date 2022       Current Admission Date: 2022  Current Admission Diagnosis:Acute respiratory failure with hypoxia [facility])   Patient Active Problem List    Diagnosis Date Noted    Multifocal pneumonia 2022    Acute respiratory failure with hypoxia ([facility] ) 2022    Toxic metabolic encephalopathy     Hyponatremia 2022    High grade [facility]endocrine carcinoma [facility] ) 2022    Chronic obstructive pulmonary disease, unspecified COPD type ([facility] ) 2022    Dizziness 2022    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 ([facility] ) 2022    Irregular heart rate 2022    Atrial fibrillation with rapid ventricular response ([facility] ) 2022    Thrombocytopenia ([facility] ) 2022    Goals of care, counseling/discussion 2022    Hypothyroidism 2022    Left parietal hemorrhagic tumor 2022    Platelets decreased ([facility] ) 02/15/2022    Psoriasis 02/15/2022    CKD (chronic kidney disease) stage 2, GFR 60-89 ml/min 2022    Labyrinthitis of both ears 2022    Proctocolitis 2021    Pericardial effusion 2021    Constipation 2021    Left non-suppurative otitis media 2021    Bilateral hearing loss due to cerumen impaction 2021    Chronic back pain 2021    Former cigarette smoker 2021    History of gout 2021    Hypertension 2021    Cancer of upper lobe of left lung ([facility] ) 2021    Drug-induced neutropenia ([facility] ) 07/15/2021    Adenocarcinoma, lung, left ([facility] ) 2021    Encounter for central line care 2021    Hyperglycemia 2021    Cigarette nicotine dependence in remission 2021    Bilateral hearing loss 2020    Mixed hyperlipidemia 2019    Renal cyst, right 2018    Lumbar stenosis 2018    Glaucoma 03/16/2018    JUNAID (obstructive sleep apnea) 03/16/2018    Spinal stenosis of lumbar region with neurogenic claudication 01/09/2018    Acute idiopathic gout of left foot 11/06/2017    Depression with anxiety 11/06/2017    Essential hypertension 11/06/2017    Hypertriglyceridemia 11/06/2017    Lumbago with sciatica, left side 11/06/2017    Back problem 06/30/2017      LOS (days): 1  Geometric Mean LOS (GMLOS) (days):   Days to GMLOS:     OBJECTIVE:  PATIENT READMITTED TO HOSPITAL  Risk of Unplanned Readmission Score: 36 66         Current admission status: Inpatient       Preferred Pharmacy:   [facility] [address], [address] [facility] [address]   [address]   [address] [address]  Phone: [phone] Fax: [phone]    Primary Care Provider: Maria Fernanda Colby DO    Primary Insurance: MEDICARE  Secondary Insurance: AARP    ASSESSMENT:  Gillian Ott Proxies    There are no active Health Care Proxies on file         Advance Directives  Does patient have a Health Care POA?: Yes  Does patient have Advance Directives?: Yes  Advance Directives: Living will, Power of  for health care  Primary Contact: Nica Galindo (Spouse) [phone] (H) [phone]         Readmission Root Cause  30 Day Readmission: Yes  Who directed you to return to the hospital?: Family  Did you understand whom to contact if you had questions or problems?: Yes  Did you get your prescriptions before you left the hospital?: Yes  Were you able to get your prescriptions filled when you left the hospital?: Yes  Did you take your medications as prescribed?: Yes  Were you able to get to your follow-up appointments?: No  Reason[de-identified] Readmitted prior to appointment  During previous admission, was a post-acute recommendation made?: Yes  What post-acute resources were offered?: STR  Patient was readmitted due to: AMS, acute respiratory failure  Action Plan: therapy recs, CCM plan    Patient Information  Admitted from[de-identified] Facility ([facility] for rehab)  Mental Status: Confused  During Assessment patient was accompanied by: Spouse  Assessment information provided by[de-identified] Patient, Spouse  Primary Caregiver: Self  Support Systems: Self, Spouse/significant other, Family members  [place] of Residence: [place] [address] do you live in?: [place]  Home entry access options  Select all that apply : Stairs  Number of steps to enter home  : 1  Do the steps have railings?: No  Type of Current Residence: 2 story home  Upon entering residence, is there a bedroom on the main floor (no further steps)?: No  A bedroom is located on the following floor levels of residence (select all that apply):: 3rd Floor  Upon entering residence, is there a bathroom on the main floor (no further steps)?: Yes  Number of steps to 3rd floor from main floor: Two Flights  In the last 12 months, was there a time when you were not able to pay the mortgage or rent on time?: No  In the last 12 months, how many places have you lived?: 1  In the last 12 months, was there a time when you did not have a steady place to sleep or slept in a shelter (including now)?: No  Homeless/housing insecurity resource given?: N/A  Living Arrangements: Lives w/ Spouse/significant other  Is patient a ?: No    Activities of Daily Living Prior to Admission  Functional Status: Independent  Completes ADLs independently?: Yes  Ambulates independently?: Yes  Does patient use assisted devices?: No  Does patient currently own DME?: No  Does patient have a history of Outpatient Therapy (PT/OT)?: Yes  Does patient have a history of HHC?: No  Does patient currently have [place] [address]?: No         Patient Information Continued  Income Source: Employed  Does patient have prescription coverage?: Yes  Within the past 12 months, you worried that your food would run out before you got the money to buy more : Never true  Within the past 12 months, the food you bought just didn't last and you didn't have money to get more : Never true  Food insecurity resource given?: N/A  Does patient receive dialysis treatments?: No  Does patient have a history of substance abuse?: No  Does patient have a history of Mental Health Diagnosis?: No    Means of Transportation  Means of Transport to Appts[de-identified] Family transport  In the past 12 months, has lack of transportation kept you from medical appointments or from getting medications?: No  In the past 12 months, has lack of transportation kept you from meetings, work, or from getting things needed for daily living?: No  Was application for public transport provided?: N/A    DISCHARGE DETAILS:    Discharge planning discussed with[de-identified] pt's wife  Freedom of Choice: Yes  Were Treatment Team discharge recommendations reviewed with patient/caregiver?: Yes  Did patient/caregiver verbalize understanding of patient care needs?: Yes  Were patient/caregiver advised of the risks associated with not following Treatment Team discharge recommendations?: Yes    Contacts  Patient Contacts: Ye Haywood-wife  Relationship to Patient[de-identified] Family  Contact Method: Phone  Phone Number: [phone] (H) [phone] (C)  Reason/Outcome: Continuity of Care, Emergency Contact, Discharge [address]         Is the patient interested in [facility] at discharge?: No    DME Referral Provided  Referral made for DME?: No      CM spoke to pt's wife to discuss the role of CM  Pt is a 30 Day Readmission  Pt was recently on P9 and discharged to [facility] two days ago (pt was a Max assist of 1-2 from an OT/PT standpoint in house)  Prior to both hospitalizations; pt was living at home with his wife (retired) in a 2 story home which has 1STE into the mud room (pt has a 1/2 bath standard toilet in mud room)  There is 12 steps to the "first floor" (kitchen and living space) and another 12 steps to the "second floor" which has the bedroom and bathroom (tub/shower with grab bars and standard toilet)   Pt recently stopped driving  Pt is a retired   Pt reports being fully independent for all ADL/IADLs prior to both admissions except for needing assistance with showering  Pt's had 1-2 falls in the last 6 months  Pt ambulates independently without a device and owns no DME  Pt enjoys playing guitar, singing, and 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  Pt has no hx of mental health or substance abuse  Pt uses [facility] in [facility]  CM will appreciate therapy recommendations when appropriate  Pt's wife doesn't want a referral placed with [facility] at this time and would prefer to wait on this decision  Pt has three [address] vaccinations:    Dose 1  03/18/2021 (COVID-19 MODERNA VACC 0 5 ML IM)      Dose 2  04/16/2021 (COVID-19 MODERNA VACC 0 5 ML IM)      Dose 3  08/05/2022 (COVID-19 MODERNA VACC 0 25 ML IM        CM reviewed d/c planning process including the following: identifying help at home, patient preference for d/c planning needs, Discharge [facility]g, [facility], availability of treatment team to discuss questions or concerns patient and/or family may have regarding understanding medications and recognizing signs and symptoms once discharged  CM also encouraged patient to follow up with all recommended appointments after discharge  Patient advised of importance for patient and family to participate in managing patients medical well being

## 2022-08-09 NOTE — PLAN OF CARE
Problem: Potential for Falls  Goal: Patient will remain free of falls  Description: INTERVENTIONS:  - Educate patient/family on patient safety including physical limitations  - Instruct patient to call for assistance with activity   - Consult OT/PT to assist with strengthening/mobility   - Keep Call bell within reach  - Keep bed low and locked with side rails adjusted as appropriate  - Keep care items and personal belongings within reach  - Initiate and maintain comfort rounds  - Make Fall Risk Sign visible to staff  - Offer Toileting every 2 Hours, in advance of need  - Initiate/Maintain bed alarm  - Apply yellow socks and bracelet for high fall risk patients  - Consider moving patient to room near nurses station  Outcome: Progressing     Problem: MOBILITY - ADULT  Goal: Maintain or return to baseline ADL function  Description: INTERVENTIONS:  -  Assess patient's ability to carry out ADLs; assess patient's baseline for ADL function and identify physical deficits which impact ability to perform ADLs (bathing, care of mouth/teeth, toileting, grooming, dressing, etc )  - Assess/evaluate cause of self-care deficits   - Assess range of motion  - Assess patient's mobility; develop plan if impaired  - Assess patient's need for assistive devices and provide as appropriate  - Encourage maximum independence but intervene and supervise when necessary  - Involve family in performance of ADLs  - Assess for home care needs following discharge   - Consider OT consult to assist with ADL evaluation and planning for discharge  - Provide patient education as appropriate  Outcome: Progressing  Goal: Maintains/Returns to pre admission functional level  Description: INTERVENTIONS:  - Perform BMAT or MOVE assessment daily    - Set and communicate daily mobility goal to care team and patient/family/caregiver  - Collaborate with rehabilitation services on mobility goals if consulted  - Perform Range of Motion 3 times a day    - Reposition patient every 2 hours  - Dangle patient 3 times a day  - Stand patient 3 times a day  - Ambulate patient 3 times a day  - Out of bed to chair 3 times a day   - Out of bed for meals 3 times a day  - Out of bed for toileting  - Record patient progress and toleration of activity level   Outcome: Progressing     Problem: Nutrition/Hydration-ADULT  Goal: Nutrient/Hydration intake appropriate for improving, restoring or maintaining nutritional needs  Description: Monitor and assess patient's nutrition/hydration status for malnutrition  Collaborate with interdisciplinary team and initiate plan and interventions as ordered  Monitor patient's weight and dietary intake as ordered or per policy  Utilize nutrition screening tool and intervene as necessary  Determine patient's food preferences and provide high-protein, high-caloric foods as appropriate  INTERVENTIONS:  - Monitor oral intake, urinary output, labs, and treatment plans  - Assess nutrition and hydration status and recommend course of action  - Evaluate amount of meals eaten  - Assist patient with eating if necessary   - Allow adequate time for meals  - Recommend/ encourage appropriate diets, oral nutritional supplements, and vitamin/mineral supplements  - Order, calculate, and assess calorie counts as needed  - Recommend, monitor, and adjust tube feedings and TPN/PPN based on assessed needs  - Assess need for intravenous fluids  - Provide specific nutrition/hydration education as appropriate  - Include patient/family/caregiver in decisions related to nutrition  Outcome: Progressing     Problem: Neurological Deficit  Goal: Neurological status is stable or improving  Description: Interventions:  - Monitor and assess patient's level of consciousness, motor function, sensory function, and level of assistance needed for ADLs  - Monitor and report changes from baseline   Collaborate with interdisciplinary team to initiate plan and implement interventions as ordered  - Provide and maintain a safe environment  - Consider seizure precautions  - Consider fall precautions  - Consider aspiration precautions  - Consider bleeding precautions  Outcome: Progressing     Problem: Activity Intolerance/Impaired Mobility  Goal: Mobility/activity is maintained at optimum level for patient  Description: Interventions:  - Assess and monitor patient  barriers to mobility and need for assistive/adaptive devices  - Assess patient's emotional response to limitations  - Collaborate with interdisciplinary team and initiate plans and interventions as ordered  - Encourage independent activity per ability   - Maintain proper body alignment  - Perform active/passive rom as tolerated/ordered  - Plan activities to conserve energy   - Turn patient as appropriate  Outcome: Progressing     Problem: Communication Impairment  Goal: Ability to express needs and understand communication  Description: Assess patient's communication skills and ability to understand information  Patient will demonstrate use of effective communication techniques, alternative methods of communication and understanding even if not able to speak  - Encourage communication and provide alternate methods of communication as needed  - Collaborate with case management/ for discharge needs  - Include patient/family/caregiver in decisions related to communication  Outcome: Progressing     Problem: Potential for Aspiration  Goal: Non-ventilated patient's risk of aspiration is minimized  Description: Assess and monitor vital signs, respiratory status, and labs (WBC)  Monitor for signs of aspiration (tachypnea, cough, rales, wheezing, cyanosis, fever)  - Assess and monitor patient's ability to swallow  - Place patient up in chair to eat if possible  - HOB up at 90 degrees to eat if unable to get patient up into chair   - Supervise patient during oral intake     - Instruct patient/ family to take small bites  - Instruct patient/ family to take small single sips when taking liquids  - Follow patient-specific strategies generated by speech pathologist   Outcome: Progressing     Problem: Nutrition  Goal: Nutrition/Hydration status is improving  Description: Monitor and assess patient's nutrition/hydration status for malnutrition (ex- brittle hair, bruises, dry skin, pale skin and conjunctiva, muscle wasting, smooth red tongue, and disorientation)  Collaborate with interdisciplinary team and initiate plan and interventions as ordered  Monitor patient's weight and dietary intake as ordered or per policy  Utilize nutrition screening tool and intervene per policy  Determine patient's food preferences and provide high-protein, high-caloric foods as appropriate  - Assist patient with eating   - Allow adequate time for meals   - Encourage patient to take dietary supplement as ordered  - Collaborate with clinical nutritionist   - Include patient/family/caregiver in decisions related to nutrition    Outcome: Progressing     Problem: Prexisting or High Potential for Compromised Skin Integrity  Goal: Skin integrity is maintained or improved  Description: INTERVENTIONS:  - Identify patients at risk for skin breakdown  - Assess and monitor skin integrity  - Assess and monitor nutrition and hydration status  - Monitor labs   - Assess for incontinence   - Turn and reposition patient  - Assist with mobility/ambulation  - Relieve pressure over bony prominences  - Avoid friction and shearing  - Provide appropriate hygiene as needed including keeping skin clean and dry  - Evaluate need for skin moisturizer/barrier cream  - Collaborate with interdisciplinary team   - Patient/family teaching  - Consider wound care consult   Outcome: Progressing

## 2022-08-09 NOTE — PROGRESS NOTES
I have personally seen and examined patient and reviewed all data with resident  Agree with note, assessment and plan  Critical care time 47min  Please refer to attending comments below  Critical care time does not include procedures, family meeting or teaching  Acute worsening intracranial hemorrhage  Acute encephalopathy-multifactorial CNS event of as well as sepsis  Severe sepsis secondary to pneumonia  Neuroendocrine carcinoma of the lung with Mets to the brain status post resection x2 as well as whole-brain irradiation completed 05/31/2022  Anemia  Thrombocytopenia-normal platelet count in June of 2022 since then has decline  Atrial fibrillation  Hypothyroid-continue levothyroxine  SIADH with hyponatremia  Gout  Hyperlipidemia continue statin  Hypomagnesemia-replete and recheck  Hypokalemia-replete and recheck  Protein calorie malnutrition     Exam:  Incision c/d/i  Moist cough unable to expectorate  KNOX, unable to lift b/l le off bed, can elevate heels and bend at knees  Grasp 5/5 bilateral hands, can lift forearms off bed, with prompting can elevate upper arms off bed, face symmetric, normal speech, names objects    Goal SBP  110-150  MAP>65    IO +1 6L  UO 1 5L  Normal saline 100 mL/hour    Cerebral edema  Decadron 2 mg Q 24 hours    SIADH/hyponatremia  Salt tab 1 g p o  T i d      Infectious workup  08/08/2022-MRSA culture-pending  08/08/2022 urine culture-pending  08/08/2022 RSV/COVID/influenza-negative  08/08/2022 blood culture x2-pending    Antibiotic regimen:  Cefepime 2 g IV q 12 hour  Vancomycin pharmacy adjusted    Fever control:  Tylenol 650 mg q 6 hours around the clock    Atrial fibrillation rate control:  Metoprolol 2 5 mg IV q 6 hours around the clock    Devices:  06/17/2021 right chest Port-A-Cath  08/08/2020 to urethral catheter    08/09/2022 CT head-no significant change in the left cerebellar parenchymal hematoma in vasogenic edema with persistent mass effect on the posterior brainstem and 4th ventricle  Stable left parietal cortical hemorrhage as well as subcortical gliosis and encephalomalacia  Stable small left parietal extra-axial collection adjacent craniotomy  08/09/2022 CT scan right lower extremity  Complex fluid collection with a right iliacus suspicious for abscess    Patient presented to the emergency department on 08/08/2022 altered mental status  Discharged from hospital Sunday to rehab/ nursing facility  He was diagnosed with pneumonia as well as worsening intracranial hemorrhage as surgical resection area  Overnight patient's lactic acidosis improved  CT scan of the right lower extremity was performed which shows a complex fluid collection the right iliacus concerning for abscess  Patient was agitated overnight received Haldol 5 mg with good response  Hemoglobin this morning has declined to 5 9  No acute signs of blood loss  Patient is persistently febrile with T-max 101 4 this morning  Continue with empiric antibiotics for infectious management  Patient has pneumonia as well as has an abscess in the iliacus muscle  IR consultation for drainage  Cultures pending  Attempt to maintain fever control  Patient presently is on Tylenol around the clock  Monitor endpoints resuscitation  Continue to monitor patient's neurologic exam   CT scan this morning reveals no significant change in left cerebellar parenchymal hematoma  There is still significant amount of mass effect  Continue with Decadron  If patient start have decline in neurologic exam or decrease in GCS by greater than 2 points plan to reimage emergently and consider initiation of osmolar therapy  There is a collection reported extra-axial adjacent to the craniotomy site  Will discuss with Neurosurgery opinion regarding infection verses postoperative changes  Patient has a history of thrombocytopenia  His platelet count is significantly lower than it is at his baseline    Baseline platelet count appears to be in high 70s to low 80s  Present platelet count is 54  Patient likely will require RBCs as well as platelet transfusion with intracranial hemorrhage with a goal of maintaining hemoglobin greater than 7 0 and platelet count greater than 50,000  If bleed appears to be eating larger on imaging follow-up studies goal platelet count will be increased to 75,000  Update:    Patient placed on HFNC overnight, now on midflow  Change neurologic checks to q 2 hrs    Discontinue guerra    Reassess need for IVF    Recheck Hb and coags and type and screen    Patient had clinical seizure event  Keppra initiated  vEEG ordered  Cefepime changed to ceftriaxone  Recheck CT head    Consult NS    Consult palliative    IR for abscess drainage tm  NPO after midnight    Update:    Patient having febrile events  She chest x-ray reviewed patient has multiple opacifications concerning for multifocal pneumonia versus worsening metastatic disease versus embolic events  Check echocardiogram to exclude possibility of septic embolic event  Repeat hemoglobin improved without transfusion  Patient required escalation of oxygen support to high-flow nasal cannula 40 L/60%  Plan to check ABG  Oxygen increased secondary to decreased PaO2 and saturation on ABG  Saturation 86%  Palliative care met with family today  Patient remains full code  Plan for Interventional Radiology tomorrow to drain abscess in right psoas

## 2022-08-09 NOTE — TELEMEDICINE
e-Consult (IPC)  - Interventional Radiology  Dean Robison 69 y o  male MRN: [id number]  Unit/Bed#: ICU 02 Encounter: [id number]          Interventional Radiology has been consulted to evaluate Dean Robison    We were consulted by Critical Care concerning this patient with right iliacus abscess  IP Consult to IR  Consult performed by: [facility], CRNP  Consult ordered by: Gen Perez PA-C        08/09/22    Assessment/Recommendation:   Jeanie Flatter, 63E male with a pmh of Neuroendocrine Carcinoma with mets to brain, Left lung Adenocarcinoma, CKD, A-fib, ARF, Encephalopathy, HTN, Neutropenia, renal cyst and Intracranial hemorrhage  Patient is s/p resection x 2 and whole brain irradiation 5/31/22  Patient febrile - 101 5, Lactic acid was 2 9 now 1 8 Procalcitonin 3 31 WBC 3 14    CT RLE - Complex fluid collection within the right iliacus muscle measuring 5 1 x 3 1 x 4 1 cm  Fluid collection is amendable for drainage  Plan -   CT drain placement tomorrow - timing to be determined by team availability  NPO after midnight  Wife to sign consent for patient    31 + minutes, >50% of the total time devoted to medical consultative verbal/EMR discussion between providers  Written report will be generated in the EMR  Thank you for allowing Interventional Radiology to participate in the care of Dean Robison  Please don't hesitate to call or TigerText us with any questions       [facility]

## 2022-08-09 NOTE — PLAN OF CARE
Problem: PHYSICAL THERAPY ADULT  Goal: Performs mobility at highest level of function for planned discharge setting  See evaluation for individualized goals  Description: Treatment/Interventions: Functional transfer training, LE strengthening/ROM, Therapeutic exercise, Endurance training, Cognitive reorientation, Patient/family training, Equipment eval/education, Bed mobility          See flowsheet documentation for full assessment, interventions and recommendations  Note: Prognosis: Fair  Problem List: Decreased strength, Decreased endurance, Impaired balance, Decreased mobility, Decreased cognition, Decreased safety awareness, Pain  Assessment: Pt is a 69 y o  male seen for PT evaluation s/p admit to [facility] on 8/8/2022  Pt was admitted with a primary dx of: Acute respiratory failure  PT now consulted for assessment of mobility and d/c needs  Pt with Up with assistance orders  Pts current comorbidities and personal factors effecting treatment include: Afib, HTN, Gout, SIADH, 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 (most recent 7/28/22)  Pts current clinical presentation is Unstable/Unpredictable (high complexity) due to Ongoing medical management for primary dx, Decreased activity tolerance compared to baseline, Fall risk, Increased assistance needed from caregiver at current time, Ongoing telemetry monitoring, Cog status, Trending lab values, Continuous pulse oximetry monitoring   Prior to admission, pt was independent without AD  Upon evaluation, pt currently is requiring MaxA x2 for bed mobility; MaxA x2 for transfers   Pt presents at PT eval functioning below baseline and currently w/ overall mobility deficits 2* to: BLE weakness, impaired balance, decreased endurance, pain, decreased activity tolerance compared to baseline, decreased functional mobility tolerance compared to baseline, decreased safety awareness, fall risk, SOB upon exertion, decreased cognition  Pt currently at a fall risk 2* to impairments listed above  Pt will continue to benefit from skilled acute PT interventions to address stated impairments; to maximize functional mobility; for ongoing pt/ family training; and DME needs  At conclusion of PT session pt returned back in chair and chair alarm engaged with phone and call bell within reach  Pt denies any further questions at this time  Recommend IP rehab upon hospital D/C  Barriers to Discharge: Inaccessible home environment     PT Discharge Recommendation: Post acute rehabilitation services    See flowsheet documentation for full assessment

## 2022-08-09 NOTE — PLAN OF CARE
Problem: OCCUPATIONAL THERAPY ADULT  Goal: Performs self-care activities at highest level of function for planned discharge setting  See evaluation for individualized goals  Description: Treatment Interventions: ADL retraining, Functional transfer training, UE strengthening/ROM, Visual perceptual retraining, Endurance training, Cognitive reorientation, Patient/family training, Equipment evaluation/education, Compensatory technique education, Fine motor coordination activities, Energy conservation, Activityengagement, Continued evaluation          See flowsheet documentation for full assessment, interventions and recommendations  Note: Limitation: Decreased ADL status, Decreased UE strength, Decreased UE ROM, Decreased Safe judgement during ADL, Decreased endurance, Decreased cognition, Decreased fine motor control, Visual deficit, Decreased self-care trans, Decreased high-level ADLs  Prognosis: Fair  Assessment: Pt is a 70 y/o male seen for OT eval s/p adm to [facility] w/ worsening mental status  Of note, pt had L retrosigmoid posterior fossa craniectomy and mass resections on 7/28  Pt was D/C to [facility] 2 days prior to admission  Pt dx'd w/ acute respiratory failure w/ hypoxia, pneumonia and altered mental status  Pt  has a past medical history of Brain cancer ([facility] [address] ), Hyperlipidemia, Hypertension, and Lung cancer ([facility] [address] )  Pt with active OT orders and up with assistance  orders  Pt lives with his spouse in 3 SH, 2 SHANTA, FFOS up to kitchen, additional FFOS up to bed/bath, has 1/2 bath on mudroom level  Pt was I w/  ADLS and IADLS (prior to rehab and last hospital stay; however reports now having assist w/ both), drove (but hasn't driven since last hospital admission), & required no use of DME PTA  Pt is currently demonstrating the following occupational deficits: Mod A UB ADLS, Max A LB ADLS, Max A x2 bed mobility, Mod A EOB sitting, Max A x2 STS transfers and stand pivot transfer w/ B/L HHA   These deficits that are impacting pt's baseline areas of occupation are a result of the following impairments: pain, endurance, activity tolerance, functional mobility, forward functional reach, balance, trunk control, functional standing tolerance, unsupportive home environment, decreased I w/ ADLS/IADLS, strength, ROM, visual deficits, cognitive impairments, decreased safety awareness, decreased insight into deficits, impaired fine motor skills and coordination deficits  The following Occupational Performance Areas to address include: eating, grooming, bathing/shower, toilet hygiene, dressing, medication management, socialization, health maintenance, functional mobility, community mobility, clothing management and household maintenance  Recommend inpatient rehab  upon D/C   Pt to continue to benefit from acute immediate OT services to address the following goals 3-5x/week to  w/in 10-14 days:     OT Discharge Recommendation: Post acute rehabilitation services        Jorge Armstrong MS, OTR/L

## 2022-08-09 NOTE — OCCUPATIONAL THERAPY NOTE
Occupational Therapy Evaluation     Patient Name: Bishop Cao Date: 8/9/2022  Problem List  Principal Problem:    Acute respiratory failure with hypoxia [facility])  Active Problems:    Cigarette nicotine dependence in remission    Adenocarcinoma, lung, left (HCC)    CKD (chronic kidney disease) stage 2, GFR 60-89 ml/min    Thrombocytopenia (HCC)    Atrial fibrillation with rapid ventricular response (HCC)    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 ([facility] )    Chronic obstructive pulmonary disease, unspecified COPD type ([facility] )    Hyponatremia    Multifocal pneumonia    Toxic metabolic encephalopathy    Past Medical History  Past Medical History:   Diagnosis Date    Brain cancer ([facility] )     Hyperlipidemia     Hypertension     Lung cancer [facility])      Past Surgical History  Past Surgical History:   Procedure Laterality Date    BACK SURGERY      CRANIOTOMY Left 4/7/2022    Procedure: Image guided left parietal craniotomy for tumor resection;  Surgeon: James Santos MD;  [facility]: [facility];  Service: Neurosurgery    CRANIOTOMY Left 7/28/2022    Procedure: left retrosigmoid/posterior fossa craniotomy for resction of mass with image guidence;  Surgeon: James Santos MD;  [facility];  Service: Neurosurgery    HEMORRHOID SURGERY      IR BIOPSY LUNG  5/6/2021    IR PORT PLACEMENT  6/17/2021    LAMINECTOMY  2018    L4-L5    LUNG SURGERY      left upper lobectomy    SD BRONCHOSCOPY,DIAGNOSTIC N/A 5/25/2021    Procedure: BRONCHOSCOPY FLEXIBLE;  Surgeon: Remi Romero MD;  Location[facility];  Service: Thoracic    SD MEDIASTINOSCOPY WITH LYMPH NODE BIOPSY/IES N/A 5/25/2021    Procedure: MEDIASTINOSCOPY, flexible bronchoscopy;  Surgeon: Remi Romero MD;  [facility];  Service: Thoracic    TONSILLECTOMY  1959 08/09/22 0940   OT Last Visit   OT Visit Date 08/09/22   Note Type   Note type Evaluation   Restrictions/Precautions   Weight Bearing Precautions Per Order No   Other Precautions Cognitive; Bed Alarm; Chair Alarm;Multiple lines;Telemetry; Fall Risk;O2;Pain;Visual impairment  (7L O2)   Pain Assessment   Pain Assessment Tool 0-10   Pain Score 8   Pain Location/Orientation Orientation: Bilateral;Location: Leg   Pain Onset/Description Onset: Ongoing; Descriptor: Aching;Descriptor: Discomfort   Patient's Stated Pain Goal No pain   Hospital Pain Intervention(s) Repositioned; Ambulation/increased activity; Emotional support   Home Living   Type of [address] Multi-level;Bed/bath upstairs;1/2 bath on main level  (2-4 SHANTA)   Bathroom Shower/Tub Tub/shower unit   Bathroom Toilet Standard   Bathroom Equipment Other (Comment)  (denies any)   Home Equipment Other (Comment)  (denies any)   Additional Comments Prior to initial hospitaliziation (before brain mass resection 7/28); pt was residing in 3 , 2-4 SHANTA, FFOS up to bed/bath   Prior Function   Level of [place] Independent with ADLs and functional mobility   Lives With Spouse   Receives Help From Family   ADL Assistance Needs assistance   IADLs Needs assistance   Falls in the last 6 months 0   Vocational Retired   Comments (+)  (hasn't driven since initial hospitilization)   [address] (spouse helps w/ LB ADLS), assist IADLS, I w/ transfers and functional mobility PTA   Reciprocal Relationships Pt lives w/ spouse   Service to Others Retired    Intrinsic Gratification Likes to play [employer]   Psychosocial   Psychosocial (WDL) [address]  4  [facility] [facility] 4  [address] 3  Moderate Assistance   LB [address] 2  Maximal [facility] 3  Moderate Assistance    [facility] 2  Maximal [address]  2  Maximal [address] 2  Maximal Assistance   Functional Deficit Steadying;Verbal cueing;Supervision/safety; Increased time to complete  (Ax2) Bed Mobility   Supine to Sit 2  Maximal assistance   Additional items Assist x 2;HOB elevated; Increased time required;Verbal cues;LE management   Sit to Supine Unable to assess   Additional Comments Pt sat EOB w/ Mod A x1 for sitting balance/trunk control   Transfers   Sit to Stand 2  Maximal assistance   Additional items Assist x 2; Increased time required;Verbal cues   Stand to Sit 2  Maximal assistance   Additional items Assist x 2; Increased time required;Verbal cues   Stand pivot 2  Maximal assistance   Additional items Assist x 2; Increased time required;Verbal cues   Additional Comments B/L HHA used into standing; x3 attempts for STS transfers  Performed stand pivot transfer from EOB to drop arm recliner w/ Max Ax2   Functional Mobility   Additional Comments Not appropriate @ this time   Balance   Static Sitting Poor   Dynamic Sitting Poor -   Static Standing Poor -   Dynamic Standing Poor -   Ambulatory Zero   Activity Tolerance   Activity Tolerance Patient limited by fatigue;Patient limited by pain   Medical Staff Made Aware Tanja PAK   Nurse Made Aware yes, Blanche   RUE Assessment   RUE Assessment X  (grossly 3-/5)   LUE Assessment   LUE Assessment X  (grossly 3-/5)   Hand Function   Gross Motor Coordination Impaired   Fine Motor Coordination Impaired   Sensation   Light Touch No apparent deficits   Vision-Basic Assessment   Current Vision Wears glasses all the time   Patient Visual Report Blurring of print when reading;Past pointing/reaching; Unable to keep objects in focus   Vision - Complex Assessment   Ocular Range of Motion WFL   Head Position WDL   Cognition   Overall Cognitive Status Impaired   Arousal/Participation Responsive; Cooperative;Lethargic   Attention Attends with cues to redirect   Orientation Level Oriented X4   Memory Decreased recall of precautions   Following Commands Follows one step commands without difficulty   Comments Pt is pleasant and cooperative; overall lethargic and slow to respond; needs cues for safety during functional tasks   Assessment   Limitation Decreased ADL status; Decreased UE strength;Decreased UE ROM; Decreased Safe judgement during ADL;Decreased endurance;Decreased cognition;Decreased fine motor control;Visual deficit; Decreased self-care trans;Decreased high-level ADLs   Prognosis Fair   Assessment Pt is a 70 y/o male seen for OT eval s/p adm to [facility] w/ worsening mental status  Of note, pt had L retrosigmoid posterior fossa craniectomy and mass resections on 7/28  Pt was D/C to [facility] 2 days prior to admission  Pt dx'd w/ acute respiratory failure w/ hypoxia, pneumonia and altered mental status  Pt  has a past medical history of Brain cancer ([facility] [address] ), Hyperlipidemia, Hypertension, and Lung cancer ([facility] [address] )  Pt with active OT orders and up with assistance  orders  Pt lives with his spouse in 3 SH, 2 SHANTA, FFOS up to kitchen, additional FFOS up to bed/bath, has 1/2 bath on mudroom level  Pt was I w/  ADLS and IADLS (prior to rehab and last hospital stay; however reports now having assist w/ both), drove (but hasn't driven since last hospital admission), & required no use of DME PTA  Pt is currently demonstrating the following occupational deficits: Mod A UB ADLS, Max A LB ADLS, Max A x2 bed mobility, Mod A EOB sitting, Max A x2 STS transfers and stand pivot transfer w/ B/L HHA  These deficits that are impacting pt's baseline areas of occupation are a result of the following impairments: pain, endurance, activity tolerance, functional mobility, forward functional reach, balance, trunk control, functional standing tolerance, unsupportive home environment, decreased I w/ ADLS/IADLS, strength, ROM, visual deficits, cognitive impairments, decreased safety awareness, decreased insight into deficits, impaired fine motor skills and coordination deficits  The following Occupational Performance Areas to address include: eating, grooming, bathing/shower, toilet hygiene, dressing, medication management, socialization, health maintenance, functional mobility, community mobility, clothing management and household maintenance  Recommend inpatient rehab  upon D/C  Pt to continue to benefit from acute immediate OT services to address the following goals 3-5x/week to  w/in 10-14 days:   Goals   Patient Goals to be more independent   LTG Time Frame 10-14   Long Term Goal #1 see below listed goals   Plan   Treatment Interventions ADL retraining;Functional transfer training;UE strengthening/ROM; Visual perceptual retraining; Endurance training;Cognitive reorientation;Patient/family training;Equipment evaluation/education; Compensatory technique education; Fine motor coordination activities; Energy conservation; Activityengagement;Continued evaluation   Goal Expiration Date 22   OT Frequency 3-5x/wk   Recommendation   OT Discharge Recommendation Post acute rehabilitation services   Additional Comments  The patient's raw score on the AM-PAC Daily Activity inpatient short form is 14, standardized score is 33 39, less than 39 4  Patients at this level are likely to benefit from discharge to post-acute rehabilitation services  Please refer to the recommendation of the Occupational Therapist for safe discharge planning  Additional Comments 2 Pt seen as a co-evaluation due to the patient's co-morbidities, clinically unstable presentation, and present impairments which are a regression from the patient's baseline     AM-PAC Daily Activity Inpatient   Lower Body Dressing 2   Bathing 2   Toileting 2   Upper Body Dressing 2   Grooming 3   Eating 3   Daily Activity Raw Score 14   Daily Activity Standardized Score (Calc for Raw Score >=11) 33 39   AM-PAC Applied Cognition Inpatient   Following a Speech/Presentation 3   Understanding Ordinary Conversation 4   Taking Medications 3   Remembering Where Things Are Placed or Put Away 3   Remembering List of 4-5 Errands 3   Taking Care of Complicated Tasks 2 Applied Cognition Raw Score 18   Applied Cognition Standardized Score 38 07       GOALS    1) Pt will complete rolling left/right in bed with Mod assist, as prerequisite for further engagement in ADLS   2) Pt will complete supine to sit transfer with Mod A using B/L UEs to initiate bed mobility   3) Pt will tolerate sitting at EOB 20 minutes with S assist and stable vital signs, as prerequisite for further engagement in ADLS   4) Pt will complete grooming task with Min assist and increased time to increase independence in functional tasks  5) Pt will increase B/L UE ROM 1/2 MMT to increase functional UB use during ADLS   6) Pt will complete UB ADLS with Min A and use of AD/DME as needed to increase independence in functional tasks  7) Pt will complete LB ADLS with Mod A and use of AD/DME as needed to increase independence in functional tasks  8) Pt will complete sit to stand transfer / sit pivot transfer / stand pivot transfer with Mod assist on/off all ADL surfaces   9) Pt will follow 100% simple one step verbal commands and be A/Ox4 consistently t/o use of external environmental cues to increase awareness for functional tasks  10) Pt will demonstrate 100% carryover of E C  techniques t/o fx'l I/ADL/ leisure tasks w/o cues s/p skilled education  11) Pt will engage in ongoing  assessments, screens, and activities t/o fx'l I/ADL/leisure tasks w/ G participation to A w/ adaptation and accomodations or rule out visual perceptual impairments    Dino Alicea MS, OTR/L

## 2022-08-09 NOTE — PROGRESS NOTES
Vancomycin IV Pharmacy-to-Dose Consultation    Bridget Mcmanus is a 69 y o  male who is currently receiving vancomycin IV with management by the Pharmacy Consult service  Relevant clinical data and objective / subjective history reviewed  Vancomycin Assessment:  Indication: Pneumonia  Status: critically ill  Micro:   8/8 COVID/FLU/RSV: negative  8/8 blood, urine, and MRSA cx: in process  Procalcitonin: 3 34 (8/8) > 3 31 (8/9)  Renal Function: improved, serum creatinine down to 0 77 from 1 5, CrCl ~ 76 mL/min  Days of Therapy: 2   Current Dose: 1000 mg (15 mg/kg) IV daily PRN when random vancomycin level is less than 20  Goal Trough: 15-20 (appropriate for most indications)   Goal AUC(24h): 400-600  Last Level: 12 (random level)      Vancomycin Plan:  New Dosing: change to 1500 mg (17 5 mg/kg) IV q12h  Next Level: trough level 8/10 at 0800  Renal Function Monitoring: daily BMP and UOP assessment      Pharmacy will continue to follow closely for s/sx of nephrotoxicity, infusion reactions and appropriateness of therapy  BMP and CBC will be ordered per protocol  We will continue to follow the patient's culture results and clinical progress daily         Thank you,  Gustavo Santana, PharmD, [facility], [facility] 6 Pharmacist  [phone]

## 2022-08-09 NOTE — PROGRESS NOTES
While repositioning pt in chair-pt noted to have left upward gaze and no longer responding to questions, with questionable left upper extremity tremors  Event lasted approx 10-15 seconds  CCM (dr Valerie Burgos) aware and at bedside-pt returned to baseline prior to CCM's arrival bedside  Vitals stable as charted

## 2022-08-09 NOTE — QUICK NOTE
Notified by nurse that patient may have had a seizure  Eyes were deviated up and to the left and he was not responding  He had some tremor in his left arm but he has had this on and off with his fever  Episode lasted < 30 seconds  By the time I entered the room he was slowly starting to respond and gaze was back to midline  Discussed case with Dr Burkett Pouch  Will start patient on vEEG monitoring and updated Dr Chucky Groves  Give bolus dose of Keppra now and follow with 1 g bid  Wife was updated at bedside

## 2022-08-09 NOTE — PROGRESS NOTES
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, most recent of which was recently performed on 7/28/22 by Dr Poole Life  Additional history of A-fib, hypothyroidism, SIADH, HTN, gout, and hyperlipidemia  He represented to the ED on 8/8/22 due to altered mental status  Per wife, he was yelling out in pain, "babbling," and confused  He was found to have new multifocal pneumonia, severe sepsis, hyponatremia, and worsening intracranial hemorrhage at surgical site and was therefore admitted to the ICU for further evaluation and management    · Lactic acidosis cleared overnight with hydration  · CT of the RLE revealed complex fluid collection in the right iliacus which is concerning for abscess  IR has been consulted  · Was mildly agitated and yelling for help overnight but could not identify what he needed help with  He was given 5 mg of Haldol with good response  · Hgb this morning was 5 9 - suspect dilutional and sending stat repeat    ---------------------------------------------------------------------------------------  SUBJECTIVE  Patient is lethargic and is only answering yes/no questions  Denies pain or trouble breathing   Otherwise unable to contribute to history    Review of Systems   Unable to perform ROS: Mental status change      Review of systems was unable to be performed secondary to mental status changes  ---------------------------------------------------------------------------------------  Assessment and Plan:    Neuro:   · Diagnosis: Metastatic disease to brain s/p recent craniectomy and resection of left cerebellar mass on 7/28/22 by Dr Zulema Acharya, POD # 12  ? S/p DAVID resection, chemo, and whole brain radiation  ? Repeat CTH morning of 8/9 is stable  ? Neuro checks  ? Decadron 2 mg daily  ? Neurosurgery aware  ? Stat CTH for any acute changes  · Diagnosis: Acute toxic metabolic encephalopathy, multifactorial including cancer with brain mets, severe sepsis, lactic acidosis, pneumonia  Lactic acid has cleared and vitals improved  ? UA is unremarkable  ? Fluid resuscitation then continue with IV NS gtt   ? Antibiotics: Cefepime and Vancomycin  ? Repeat Procalcitonin is pending for this morning - will follow up on results  ? Supportive care        CV:   · Diagnosis: Paroxysmal A-fib diagnosed during last admission, currently in sinus rhythm  ? Holding Amiodarone and Cardizem for now  ? Schedule metoprolol 2 5 q6h while NPO  ? Continue to monitor on telemetry  ? No Anticoagulation due to hemorrhage  · Diagnosis: Hypertension  ? Antihypertensives currently on hold due to sepsis  · Diagnosis: hyperlipidemia  ? Substitute home Crestor 10 with pravastatin 80      Pulm:  · Diagnosis: Acute respiratory failure 2/2 pneumonia and lung cancer  ? Currently on HFNC at 40 L/min and 40% FiO2 and maintaining SpO2 > 95%  Wean as tolerated  ? Antibiotics: Cefepime day # 2, Vancomycin day #2  § Will deescalate antibiotics as cultures become available  § Respiratory protocol and airway clearance  · Diagnosis: Neuroendocrine carcinoma of lung  ? Follows at [facility]  ? S/p DAVID resection in 4/2021  ? Evidence of worsening lesions on CT chest      GI:   · Diagnosis: failed bedside swallow  ? NPO for now, meds changed to IV as applicable  ? Will have speech reassess this morning  · Diagnosis: GERD  ? Plan: Continue Protonix (changed to IV as he is NPO)  ? Bowel management as appropriate      :   · Corea catheter  ?  Voiding trial  ? UA is non-infected      F/E/N:   · Fluids: NS @ 100 cc/hr  · Electrolytes: Replete as needed to maintain K > 4, Mag > 2, Phos > 3  · Nutrition: NPO for now, will reassess in AM      Heme/Onc:   · Diagnosis: Acute on chronic anemia  ? Initial CBC on 8/9 with Hgb of 5 9  Rechecked with a stat CBC and is 7 3  ? Will continue to trend  Repeat this afternoon  ? Preemptively type and cross due to his rare blood antibodies  · Diagnosis: Neuroendocrine carcinoma of the lung with mets to brain, worsening lung lesions  Follows at [facility]  S/p DAVID resection 4/2021, chemo, whole brain radiation  ? Hold off on consulting oncology until sepsis/pneumoia improves  · Diagnosis: Thrombocytopenia, platelets low but stable  ? Continue to trend      Endo:     · Diagnosis: Hypothyroidism  ? Continue home synthroid 37 5 mcg daily  o Missed dose this morning as he is currently NPO  Resume once he is able to safely take PO medications      ID:   · Diagnosis: Severe sepsis 2/2 pneumonia, initial lactic acid 7 7 and procalcitonin 3 34  WBC wnl  Lactic acidosis has cleared and vitals are stable  ? Sepsis pathway  · Diagnosis: Severe sepsis 2/2 pneumonia, initial lactic acid 7 7 and procalcitonin 3 34  WBC wnl  Lactic acidosis has cleared and vitals are stable  ? Sepsis pathway  ? Maintenance fluids  ? Antibiotics: cefepime day #2, Vancomycin day # 2  ? Will deescalate as culture results are available  ? Recheck Procalcitonin pending - will follow up on results  ? Monitor fever curve and WBC  ? ATC tylenol for fever control        MSK/Skin:    Diagnosis: Right thigh pain, found to have likely abscess in right iliacus muscle  o Continue antibiotics as outlined above  o IR consulted for possible drainage of abscess  · Diagnosis: Gout  ? Continue home allopurinol  · Turning/repositioning  · PT/OT when appropriate    Patient appropriate for transfer out of the ICU today?: Patient does not meet criteria for referral to the ICU Follow-Up Clinic; referral has not been made     Disposition: Transfer to Stepdown Level 2  Code Status: Level 1 - Full Code  ---------------------------------------------------------------------------------------  ICU CORE MEASURES    Prophylaxis   VTE Pharmacologic Prophylaxis: Pharmacologic VTE Prophylaxis contraindicated due to [address]  VTE Mechanical Prophylaxis: sequential compression device  Stress Ulcer Prophylaxis: Pantoprazole IV     ABCDE Protocol (if indicated)  Plan to perform spontaneous awakening trial today? Not applicable  Plan to perform spontaneous breathing trial today? Not applicable  Obvious barriers to extubation? Not applicable  CAM-ICU: Negative    Invasive Devices Review  Invasive Devices  Report    Central Venous Catheter Line  Duration           Port A Cath 21 Right Chest 417 days          Peripheral Intravenous Line  Duration           Peripheral IV 22 Left Forearm <1 day    Peripheral IV 22 Right Antecubital <1 day          Drain  Duration           Urethral Catheter Temperature probe 16 Fr  <1 day              Can any invasive devices be discontinued today?  No  ---------------------------------------------------------------------------------------  OBJECTIVE    Vitals   Vitals:    22 0600 22 0700 22 0720 22 0727   BP: 94/59 110/60     BP Location:       Pulse: 88 86 91    Resp: 21 21     Temp: (!) 101 48 °F (38 6 °C) (!) 101 12 °F (38 4 °C)     TempSrc:       SpO2: 97% 98% 99% 99%   Weight:       Height:         Temp (24hrs), Av 5 °F (38 1 °C), Min:98 °F (36 7 °C), Max:101 84 °F (38 8 °C)  Current: Temperature: (!) 101 12 °F (38 4 °C)  HR: 88  BP: 94/59  RR: 21  SpO2: 97%    Respiratory:  SpO2: SpO2: 99 %, SpO2 Activity: SpO2 Activity: At Rest, SpO2 Device: O2 Device: High flow nasal cannula  Nasal Cannula O2 Flow Rate (L/min): 6 L/min    Invasive/non-invasive ventilation settings   Respiratory  Report   Lab Data (Last 4 hours)    None         O2/Vent Data (Last 4 hours)              Non-Invasive Ventilation Mode HFNC (High flow) HFNC (High flow)                  Physical Exam  Vitals and nursing note reviewed  Constitutional:       Appearance: He is ill-appearing and toxic-appearing  Interventions: Nasal cannula in place  HENT:      Head:      Comments: Left posterior scalp surgical site is C/D/I  Sutures are still in place     Right Ear: External ear normal       Left Ear: External ear normal       Nose: Nose normal  No congestion  Mouth/Throat:      Mouth: Mucous membranes are moist       Pharynx: Oropharynx is clear  Eyes:      General: No scleral icterus  Extraocular Movements: Extraocular movements intact  Conjunctiva/sclera: Conjunctivae normal       Pupils: Pupils are equal, round, and reactive to light  Cardiovascular:      Rate and Rhythm: Normal rate and regular rhythm  Pulses: Normal pulses  Heart sounds: Normal heart sounds  No murmur heard  Pulmonary:      Breath sounds: Decreased air movement present  Decreased breath sounds and rhonchi present  Abdominal:      General: Abdomen is protuberant  Bowel sounds are normal  There is no distension  Palpations: Abdomen is soft  Tenderness: There is no abdominal tenderness  Musculoskeletal:      Cervical back: Neck supple  No rigidity  Skin:     General: Skin is warm and dry  Coloration: Skin is pale  Neurological:      Mental Status: He is lethargic  Comments: Patient awakens to voice, will nod head or answer yes/no  He is oriented to self and place only  Follows simple commands such as squeeze hands  CN: PERRL, EOM intact, blink to threat intact  Face is relatively symmetric  Facial sensation intact  Hard of hearing  Tongue protrusion midline  Motor: able to squeeze both hands and move toes  Unable to fully cooperate for more detailed strength exam to direct confrontation   Withdraws in all four extremities  Sensation: Grimaces to painful stimuli in all four extremities  Coordination: Unable to participate  DTR: 1+ and symmetric, toes downgoing         Laboratory and Diagnostics:  Results from last 7 days   Lab Units 08/09/22  0734 08/09/22  0620 08/08/22  1246 08/07/22  0509 08/06/22  0641 08/05/22  0542 08/04/22  0605 08/03/22  0539   WBC Thousand/uL 3 14* 3 17* 4 47 5 99 6 06 6 91 6 35 7 19   HEMOGLOBIN g/dL 7 3* 5 9* 9 6* 10 2* 10 3* 10 8* 10 8* 11 3*   HEMATOCRIT % 22 2* 18 3* 28 5* 30 4* 31 1* 32 4* 33 2* 34 4*   PLATELETS Thousands/uL 54* 59* 76* 69* 67* 79* 77* 69*   NEUTROS PCT %  --   --   --   --   --   --   --  96*   BANDS PCT %  --  17* 38*  --   --   --   --   --    MONOS PCT %  --   --   --   --   --   --   --  1*   MONO PCT %  --  0* 0*  --   --   --   --   --      Results from last 7 days   Lab Units 08/09/22  0620 08/08/22  1246 08/07/22  0509 08/05/22  0542 08/04/22  0605 08/03/22  0539   SODIUM mmol/L 137 131* 130* 137 134* 137   POTASSIUM mmol/L 3 4* 4 4 4 0 4 0 4 5 4 0   CHLORIDE mmol/L 106 96 95* 99 102 101   CO2 mmol/L 24 22 28 31 23 31   ANION GAP mmol/L 7 13 7 7 9 5   BUN mg/dL 17 28* 20 23 27* 28*   CREATININE mg/dL 0 77 1 50* 0 64 0 67 0 58* 0 66   CALCIUM mg/dL 8 7 9 8 9 3 9 6 9 1 9 0   GLUCOSE RANDOM mg/dL 87 161* 137 141* 148* 150*   ALT U/L  --  64  --   --   --   --    AST U/L  --  35  --   --   --   --    ALK PHOS U/L  --  76  --   --   --   --    ALBUMIN g/dL  --  2 0*  --   --   --   --    TOTAL BILIRUBIN mg/dL  --  0 58  --   --   --   --      Results from last 7 days   Lab Units 08/09/22  0620   MAGNESIUM mg/dL 1 7   PHOSPHORUS mg/dL 3 4      Results from last 7 days   Lab Units 08/08/22  1316   INR  1 13   PTT seconds 34          Results from last 7 days   Lab Units 08/09/22  0013 08/08/22  2105 08/08/22  1749 08/08/22  1508 08/08/22  1316   LACTIC ACID mmol/L 1 8 2 2* 2 9* 7 2* 7 7*     ABG:    VBG:  Results from last 7 days   Lab Units 08/08/22  1748   PH SHANNA  7 378   PCO2 SHANNA mm Hg 41 1*   PO2 SHANNA mm Hg 25 0*   HCO3 SHANNA mmol/L 23 7*   BASE EXC SHANNA mmol/L -1 4     Results from last 7 days   Lab Units 08/09/22  0620 08/08/22  1316   PROCALCITONIN ng/ml 3 31* 3 34*       Micro  Results from last 7 days   Lab Units 08/08/22  1316   BLOOD CULTURE  Received in Microbiology Lab  Culture in Progress  Received in Microbiology Lab  Culture in Progress  EKG: NSR at 84  Imaging: I have personally reviewed pertinent reports  and I have personally reviewed pertinent films in PACS    XR hip/pelv 2-3 vws right  Result Date: 8/8/2022  Impression: Mild degenerative changes both hips No acute osseous abnormality  Workstation performed: [id number]     CT head wo contrast  Result Date: 8/9/2022  Impression: 1  No significant change in left cerebellar parenchymal hematoma and vasogenic edema with persistent mass effect on posterior brain stem and 4th ventricle  Grossly stable ventricular size without hydrocephalus  2   Stable small left parietal cortical hemorrhage as well as subcortical gliosis and encephalomalacia  Stable small left parietal extra-axial collection subjacent to craniotomy  Workstation performed: [id number]     CT head without contrast  Result Date: 8/8/2022  Impression: There are 2 new hemorrhage is identified in the left cerebellar hemisphere series 2 image 11 in the postsurgical territory  Slight increase in vasogenic edema noted resulting in minimal hydrocephalus when compared to the prior study  Slight prominence to the temporal horns lateral ventricles noted which is new since the prior study  Urgent neurosurgical consultation is recommended  Remainder of the brain is stable  The study was marked in [facility] for immediate notification  Workstation performed: [id number]     CTA ED chest PE study  Result Date: 8/8/2022  Impression: Increased multifocal cavitary nodules and consolidations likely representing a combination of worsening metastases and multifocal pneumonia  Septic emboli could cause a similar appearance  No pulmonary emboli   Workstation performed: [id number]     CT lower extremity wo contrast right  Result Date: 8/8/2022  Impression: Complex fluid collection within the right iliacus, suspicious for abscess  The study was marked in EPIC for significant notification  Workstation performed: [id number]         Intake and Output    I/O       08/07 0701 08/08 0700 08/08 0701 08/09 0700 08/09 0701  08/10 0700    I V  (mL/kg)  1121 7 (13 6)     IV Piggyback  2000     Total Intake(mL/kg)  3121 7 (37 9)     Urine (mL/kg/hr)  1505     Total Output  1505     Net  +1616 7                  Height and Weights   Height: 5' 8" (172 7 cm)  IBW (Ideal Body Weight): 68 4 kg  Body mass index is 27 59 kg/m²  Weight (last 2 days)     Date/Time Weight    08/08/22 2000 82 3 (181 44)            Nutrition       Diet Orders   (From admission, onward)             Start     Ordered    08/08/22 2320  Diet NPO  Diet effective now        References:    Nutrtion Support Algorithm Enteral vs  Parenteral   Question Answer Comment   Diet Type NPO    RD to adjust diet per protocol?  Yes        08/08/22 2320                  Active Medications  Scheduled Meds:  Current Facility-Administered Medications   Medication Dose Route Frequency Provider Last Rate    acetaminophen  650 mg Rectal Q6H Alejandra Almanzai, DO      allopurinol  100 mg Oral Daily Alejandra Almanzai, DO      cefepime  2,000 mg Intravenous Q12H Alejandra Almanzai, DO Stopped (08/09/22 0400)    chlorhexidine  15 mL Mouth/Throat Q12H [address] Alejandra Moceri, DO      dexamethasone  2 mg Oral Q24H [address] Alejandra Mochiquitai, DO      docusate sodium  100 mg Oral BID Alejandra Moceri, DO      levothyroxine  37 5 mcg Oral Early Morning Alejandra Almanzai, DO      LORazepam  0 5 mg Oral BID PRN Alejandra Almanzai, DO      metoprolol  2 5 mg Intravenous Q6H Mary Grace Corral, DO      oxyCODONE  2 5 mg Oral Q4H PRN Alejandra Mochiquitai, DO      pantoprazole  40 mg Intravenous Daily Alejandra Mochiquitai, DO      polyethylene glycol  17 g Oral BID Alejandra Mochiquitai, DO      pravastatin  80 mg Oral Daily With [employer], DO      scopolamine  1 patch Transdermal Q72H Alejandra Moceri, DO      senna  8 6 mg Oral Daily Alejandra Moceri, DO      sodium chloride (PF)  3 mL Intravenous Q1H PRN Alejandra Moceri, DO      sodium chloride  100 mL/hr Intravenous Continuous Mary Grace Danae Derricotte,  mL/hr (08/09/22 1887)    sodium chloride  1 g Oral TID With Meals Alejandra Moceri, DO      vancomycin  15 mg/kg Intravenous Daily PRN Mary Grace Danae Derricotte, DO       Continuous Infusions:  sodium chloride, 100 mL/hr, Last Rate: 100 mL/hr (08/09/22 0406)      PRN Meds:   LORazepam, 0 5 mg, BID PRN  oxyCODONE, 2 5 mg, Q4H PRN  sodium chloride (PF), 3 mL, Q1H PRN  vancomycin, 15 mg/kg, Daily PRN        Allergies   Allergies   Allergen Reactions    Bee Venom     Benazepril Other (See Comments)     Angioedema     Latex Other (See Comments)     Burning of eyes at the dentist from the gloves    Meloxicam GI Intolerance    Penicillin G Rash     ---------------------------------------------------------------------------------------  Advance Directive and Living Will:      Power of :    POLST:    ---------------------------------------------------------------------------------------  Care Time Delivered:   Please refer to attending's attestation    Neo Natasha, DO      Portions of the record may have been created with voice recognition software  Occasional wrong word or "sound a like" substitutions may have occurred due to the inherent limitations of voice recognition software    Read the chart carefully and recognize, using context, where substitutions have occurred

## 2022-08-10 NOTE — PROGRESS NOTES
Progress note - Palliative and Supportive Care   Mike Mills 69 y o  male [id number]    Patient Active Problem List   Diagnosis    Renal cyst, right    Acute idiopathic gout of left foot    Back problem    Depression with anxiety    Essential hypertension    Glaucoma    Hypertriglyceridemia    Lumbago with sciatica, left side    Lumbar stenosis    JUNAID (obstructive sleep apnea)    Spinal stenosis of lumbar region with neurogenic claudication    Mixed hyperlipidemia    Bilateral hearing loss    Hyperglycemia    Cigarette nicotine dependence in remission    Adenocarcinoma, lung, left ([address] )    Encounter for central line care    Drug-induced neutropenia (HCC)    Left non-suppurative otitis media    Bilateral hearing loss due to cerumen impaction    Cancer of upper lobe of left lung (HCC)    Chronic back pain    Former cigarette smoker    History of gout    Hypertension    Proctocolitis    Pericardial effusion    Constipation    Labyrinthitis of both ears    CKD (chronic kidney disease) stage 2, GFR 60-89 ml/min    Platelets decreased (HCC)    Psoriasis    Left parietal hemorrhagic tumor    Thrombocytopenia (HCC)    Goals of care, counseling/discussion    Hypothyroidism    Irregular heart rate    Atrial fibrillation with rapid ventricular response (HCC)    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)    Dizziness    Chronic obstructive pulmonary disease, unspecified COPD type (HCC)    Hyponatremia    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)    Multifocal pneumonia    Acute respiratory failure with hypoxia (HCC)    Acute encephalopathy     Active issues specifically addressed today include:     Metastatic Lung Ca  Sepsis 2/2 PNA and iliacus abscess  Worsening ICH  Palliative Patient  Unclear GOC in living will  Grieving spouse with poor support system      Plan:  1  Symptom management -     Will defer to ICU at this time    2   Goals - Pt has living will at home which states DNI but not DNR, working with wife to determine what he would want since that is not an option  Plan to meet with ICU team and wife [place] tomorrow  Wife is overwhelmed and flustered  She is encouraged to seek support for tomorrow's meeting  [facility] will also try to attend  Code Status: full - Level 1   Decisional apparatus:  Patient is not competent on my exam today  If competence is lost, patient's substitute decision maker would default to wife by [place]  Advance Directive / Living Will / POLST:  none    Interval history:      Pt seen and examined at bedside  Slightly more alert than yesterday  Answers simple questions with 1-2 words  Denies discomfort on my encounter, poor historian    MEDICATIONS / ALLERGIES:    Allergies   Allergen Reactions    Bee Venom     Benazepril Other (See Comments)     Angioedema     Latex Other (See Comments)     Burning of eyes at the dentist from the gloves    Meloxicam GI Intolerance    Penicillin G Rash       OBJECTIVE:    Physical Exam  Physical Exam  Constitutional:       General: He is not in acute distress  Appearance: He is obese  He is ill-appearing  He is not diaphoretic  Eyes:      Extraocular Movements: Extraocular movements intact  Cardiovascular:      Rate and Rhythm: Normal rate  Pulmonary:      Effort: Pulmonary effort is normal    Abdominal:      Tenderness: There is no abdominal tenderness  There is no guarding  Genitourinary:     Comments: guerra  Musculoskeletal:      Cervical back: No rigidity  Skin:     General: Skin is warm and dry  Coloration: Skin is pale  Neurological:      Mental Status: He is disoriented  Comments: AOx1 5   Psychiatric:      Comments: Poor historian         Lab Results:   I have personally reviewed pertinent labs  , CBC:   Lab Results   Component Value Date    WBC 2 88 (L) 08/10/2022    HGB 6 6 (LL) 08/10/2022    HCT 20 2 (L) 08/10/2022     (H) 08/10/2022    PLT 47 (LL) 08/10/2022    MCH 34 6 (H) 08/10/2022    MCHC 32 7 08/10/2022    RDW 16 5 (H) 08/10/2022    MPV 11 9 08/10/2022    NRBC 8 (H) 08/10/2022   , CMP:   Lab Results   Component Value Date    SODIUM 140 08/10/2022    K 3 7 08/10/2022     (H) 08/10/2022    CO2 24 08/10/2022    BUN 12 08/10/2022    CREATININE 0 58 (L) 08/10/2022    CALCIUM 8 5 08/10/2022    EGFR 104 08/10/2022     Imaging Studies: pertinent imaging reviewed  EKG, Pathology, and Other Studies: pertinent studies reviewed    Counseling / Coordination of Care    Total floor / unit time spent today 40 minutes  Greater than 50% of total time was spent with the patient and / or family counseling and / or coordination of care   A description of the counseling / coordination of care: ongoing [address], emotional support provided

## 2022-08-10 NOTE — CONSULTS
Vancomycin IV Pharmacy-to-Dose Consultation    Braydon Russo is a 69 y o  male who was receiving vancomycin IV with management by the Pharmacy Consult service  The patient's vancomycin therapy has been discontinued  Thank you for allowing us to take part in this patient's care  Pharmacy will sign-off at this point; please call if there are any questions        Richi Astorga, PharmD, [facility] Pharmacist   [phone]

## 2022-08-10 NOTE — SPEECH THERAPY NOTE
Speech Language/Pathology Hold Note  Pt NPO for IR  NG placed yesterday as pt was poorly alert as the day progressed  Will follow as able when able to take po

## 2022-08-10 NOTE — OCCUPATIONAL THERAPY NOTE
OT CANCEL NOTE    Pt chart reviewed  Pt is pending IR; requested to hold OT services at this time  Will continue to follow pt on caseload and see pt when medically stable and as clinically appropriate         08/10/22 1000   OT Last Visit   OT Visit Date 08/10/22   Note Type   Note Type Cancelled Session   Cancel Reasons Patient off floor/test       Puja Salcedo MS, OTR/L

## 2022-08-10 NOTE — PLAN OF CARE
Problem: Potential for Falls  Goal: Patient will remain free of falls  Description: INTERVENTIONS:  - Educate patient/family on patient safety including physical limitations  - Instruct patient to call for assistance with activity   - Consult OT/PT to assist with strengthening/mobility   - Keep Call bell within reach  - Keep bed low and locked with side rails adjusted as appropriate  - Keep care items and personal belongings within reach  - Initiate and maintain comfort rounds  - Make Fall Risk Sign visible to staff  - Offer Toileting every 2 Hours, in advance of need  - Initiate/Maintain bed alarm  - Apply yellow socks and bracelet for high fall risk patients  - Consider moving patient to room near nurses station  8/9/2022 2041 by Melissa Murry RN  Outcome: Progressing  8/9/2022 2040 by Melissa Murry RN  Outcome: Progressing     Problem: Nutrition/Hydration-ADULT  Goal: Nutrient/Hydration intake appropriate for improving, restoring or maintaining nutritional needs  Description: Monitor and assess patient's nutrition/hydration status for malnutrition  Collaborate with interdisciplinary team and initiate plan and interventions as ordered  Monitor patient's weight and dietary intake as ordered or per policy  Utilize nutrition screening tool and intervene as necessary  Determine patient's food preferences and provide high-protein, high-caloric foods as appropriate       INTERVENTIONS:  - Monitor oral intake, urinary output, labs, and treatment plans  - Assess nutrition and hydration status and recommend course of action  - Evaluate amount of meals eaten  - Assist patient with eating if necessary   - Allow adequate time for meals  - Recommend/ encourage appropriate diets, oral nutritional supplements, and vitamin/mineral supplements  - Order, calculate, and assess calorie counts as needed  - Recommend, monitor, and adjust tube feedings and TPN/PPN based on assessed needs  - Assess need for intravenous fluids  - Provide specific nutrition/hydration education as appropriate  - Include patient/family/caregiver in decisions related to nutrition  8/9/2022 2041 by Constanza Vann RN  Outcome: Progressing  8/9/2022 2040 by Constanza Vann RN  Outcome: Progressing     Problem: Communication Impairment  Goal: Ability to express needs and understand communication  Description: Assess patient's communication skills and ability to understand information  Patient will demonstrate use of effective communication techniques, alternative methods of communication and understanding even if not able to speak  - Encourage communication and provide alternate methods of communication as needed  - Collaborate with case management/ for discharge needs  - Include patient/family/caregiver in decisions related to communication  8/9/2022 2041 by Constanza Vann RN  Outcome: Progressing  8/9/2022 2040 by Constanza Vann RN  Outcome: Progressing     Problem: Potential for Aspiration  Goal: Non-ventilated patient's risk of aspiration is minimized  Description: Assess and monitor vital signs, respiratory status, and labs (WBC)  Monitor for signs of aspiration (tachypnea, cough, rales, wheezing, cyanosis, fever)  - Assess and monitor patient's ability to swallow  - Place patient up in chair to eat if possible  - HOB up at 90 degrees to eat if unable to get patient up into chair   - Supervise patient during oral intake  - Instruct patient/ family to take small bites  - Instruct patient/ family to take small single sips when taking liquids    - Follow patient-specific strategies generated by speech pathologist   8/9/2022 2041 by Constanza Vann RN  Outcome: Progressing  8/9/2022 2040 by Constanza Vann RN  Outcome: Progressing     Problem: Nutrition  Goal: Nutrition/Hydration status is improving  Description: Monitor and assess patient's nutrition/hydration status for malnutrition (ex- brittle hair, bruises, dry skin, pale skin and conjunctiva, muscle wasting, smooth red tongue, and disorientation)  Collaborate with interdisciplinary team and initiate plan and interventions as ordered  Monitor patient's weight and dietary intake as ordered or per policy  Utilize nutrition screening tool and intervene per policy  Determine patient's food preferences and provide high-protein, high-caloric foods as appropriate  - Assist patient with eating   - Allow adequate time for meals   - Encourage patient to take dietary supplement as ordered  - Collaborate with clinical nutritionist   - Include patient/family/caregiver in decisions related to nutrition    8/9/2022 2041 by Ash Rosa RN  Outcome: Progressing  8/9/2022 2040 by Ash Rosa RN  Outcome: Progressing     Problem: Prexisting or High Potential for Compromised Skin Integrity  Goal: Skin integrity is maintained or improved  Description: INTERVENTIONS:  - Identify patients at risk for skin breakdown  - Assess and monitor skin integrity  - Assess and monitor nutrition and hydration status  - Monitor labs   - Assess for incontinence   - Turn and reposition patient  - Assist with mobility/ambulation  - Relieve pressure over bony prominences  - Avoid friction and shearing  - Provide appropriate hygiene as needed including keeping skin clean and dry  - Evaluate need for skin moisturizer/barrier cream  - Collaborate with interdisciplinary team   - Patient/family teaching  - Consider wound care consult   8/9/2022 2041 by Ash Rosa RN  Outcome: Progressing  8/9/2022 2040 by Ash Rosa RN  Outcome: Progressing     Problem: PAIN - ADULT  Goal: Verbalizes/displays adequate comfort level or baseline comfort level  Description: Interventions:  - Encourage patient to monitor pain and request assistance  - Assess pain using appropriate pain scale  - Administer analgesics based on type and severity of pain and evaluate response  - Implement non-pharmacological measures as appropriate and evaluate response  - Consider cultural and social influences on pain and pain management  - Notify physician/advanced practitioner if interventions unsuccessful or patient reports new pain  Outcome: Progressing     Problem: SAFETY ADULT  Goal: Patient will remain free of falls  Description: INTERVENTIONS:  - Educate patient/family on patient safety including physical limitations  - Instruct patient to call for assistance with activity   - Consult OT/PT to assist with strengthening/mobility   - Keep Call bell within reach  - Keep bed low and locked with side rails adjusted as appropriate  - Keep care items and personal belongings within reach  - Initiate and maintain comfort rounds  - Make Fall Risk Sign visible to staff  - Offer Toileting every 2 Hours, in advance of need  - Initiate/Maintain bed alarm  - Apply yellow socks and bracelet for high fall risk patients  - Consider moving patient to room near nurses station  8/9/2022 2041 by Ted Carmona RN  Outcome: Progressing  8/9/2022 2040 by Ted Carmona RN  Outcome: Progressing

## 2022-08-10 NOTE — PROGRESS NOTES
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, most recent of which was recently performed on 7/28/22 by Dr Chas Bautista  Additional history of A-fib, hypothyroidism, SIADH, HTN, gout, and hyperlipidemia  He represented to the ED on 8/8/22 due to altered mental status  Per wife, he was yelling out in pain, "babbling," and confused  He was found to have new multifocal pneumonia, severe sepsis, hyponatremia, and worsening intracranial hemorrhage at surgical site and was therefore admitted to the ICU for further evaluation and management    · Had seizure like activity yesterday morning, started on vEEG, Keppra, Cefepime changed to Ceftriaxone  · Increased work of breathing/hypoxic --> Initial ABG with hypoxemia  Increased HFNC to 80% FiO2 at 50 L/min  Repeat ABG improved  · Spouse met with Palliative - continue full code for now  · Episode of A-fib with RVR   Given PRN metoprolol and HR improved from 130's to 80's    ---------------------------------------------------------------------------------------  SUBJECTIVE  Patient remains confused and is unable to contribute to history    Review of Systems   Unable to perform ROS: Mental status change     Review of systems was unable to be performed secondary to confusion  ---------------------------------------------------------------------------------------  Assessment and Plan:    Neuro:    Diagnosis: Acute worsening of ICH 2/2 recent tumor resection  o Updated CTH revealed stable hemorrhage and edema in the surgical site and stable adjacent left parietal lobe hemorrhage  o Avoid AC/AP  o Goal platelets > 50   Diagnosis: Neuroendocrine cancer with mets to brain, s/p two resections most recent of which was on 7/28 POD #13  Also s/p chemo and whole brain radiation  o Updated MRI ordered  Patient has MRI-compatible EEG leads  o Decadron 2 mg daily  o Neurosurgery following   Diagnosis: Acute toxic metabolic encephalopathy, multifactorial including brain mets, severe sepsis, hypoxemia, pneumonia, fevers, and reduced baseline cognitive reserve  o Continue supportive care  o Stat CTH for any acute changes  o Q2H neuro checks  o Frequent reorientation  o Promote restful sleep   Diagnosis: Clinical seizure-like activity  o Monitoring on vEEG  o Given bolus dose of Keppra 2 g on 8/9  o Continue Keppra 1 g bid  o Cefepime changed to Ceftriaxone as cefepime can lower seizure threshold  o Seizure precautions   Diagnosis: Developing mild hydrocephalus due to effacement of the 4th ventricle  o Continue to monitor clinically  o Consider EVD      CV:    Diagnosis: Paroxysmal A-fib diagnosed during last admission  o Overnight had episode of A-fib with RVR  Given additional 2 5 mg of metoprolol and HR improved from 130's to 80's  o Holding amiodarone and Cardizem - consider restarting if patient continues to have episodes of A-fib  o Continue to monitor on telemetry  o No AC/AP due to [address]   Diagnosis: Intermittent hypotension  o Continue on IV fluid hydration, bolus as needed  o Continue to trend   Diagnosis: History of HTN  o Antihypertensives currently on hold due to hypotension   Diagnosis: Hyperlipidemia  o Home Crestor substituted with Pravastatin 80 mg daily   Diagnosis: Evaluation for endocarditis given concern for septic emboli  o Transthoracic echo was technically limited and was inadequate for visualizing valves  Will need EMILY   LVEF is 60%, concentric remodeling of LV, unable to assess diastolic function of LV   Sub-optimal visualization of RV, LA, RA, aortic valve, tricuspid valve, pulmonic valve      Pulm:   Diagnosis: Acute hypoxic respiratory failure 2/2 pneumonia and neuroendocrine cancer, increasing tumor burden, possible septic emboli  o ABG afternoon of 8/9 with hypoxemia  Repeat in the evening was improved  o HFNC at 50 L/min and 80% FiO2  o Antibiotics day # 3 overall  Currently on Ceftriaxone day #2 and Vancomycin day # 3   MRSA swab negative  Will d/c vancomycin   Follow micro results and deescalate as able   Respiratory protocol and airway clearance   Encourage deep breathing   Incentive spirometry   Diagnosis: Neuroendocrine carcinoma of lung  o [facility] at [facility]  o S/p DAVID resection in 4/2021  o Last chemo in January and radiation in May 2022  o Evidence of worsening lesions on CT chest      GI:    Diagnosis: GERD  o Continue home Protonix   Diet: Dysphagia 3, thin liquids  o Bowel regimen ordered, no BM since admission      :    No acute issues   Condom catheter   UA non-infected  Urine culture negative      F/E/N:    Fluids: as needed  · Electrolytes: Replete as needed to maintain K > 4, Mag > 2, Phos > 3  · Nutrition: NPO for now, will reassess in AM      Heme/Onc:    Diagnosis: Acute on chronic anemia  o Hgb 6 6 this morning, repeat CBC pending  o Type and cross sent as patient has rare blood antibodies  Last transfusion was at the end of July  o Plan to transfuse 1 unit blood and 1 unit platelets   Diagnosis: Thrombocytopenia  o Platelets were normal in 6/2022  o Platelets 47 this morning  As he is going to IR today, will transfuse 1 unit platelets  o Goal platelets > 50 for now, if signs of bleeding then goal would increase to > 75   Diagnosis: coagulopathy  o PT and INR are both high  o No AC/AP  o Repeat this morning   Diagnosis: Neuroendocrine carcinoma of the lung with mets to brain, worsening lung lesions  o As outlined above, follows [facility]  hold off on consulting Oncology for now  o Palliative is following  Endo:   · Diagnosis: Hypothyroidism  ?  Continue home synthroid 37 5 mcg daily      ID:   · Diagnosis: Severe sepsis 2/2 pneumonia and iliacus muscle abscess, initial lactic acid 7 7 and procalcitonin 3 34  WBC wnl  Lactic acidosis has cleared and vitals are stable  ? Sepsis pathway  ? Maintenance fluids  ? Antibiotics day # 3  Cefepime discontinued yesterday  Ceftriaxone day # 2, Vancomycin day # 3  § MRSA negative  Will d/c vancomycin  § Will deescalate as culture results are available  ? Monitor fever curve and WBC  ? ATC tylenol for fever control   Diagnosis: Abscess in iliacus muscle  o Scheduled for IR drainage today for source control      MSK/Skin:    Diagnosis: Right thigh pain and swelling  o Much improved  Continue to monitor   Diagnosis: Right iliacus muscle abscess  o Draining today  o Diagnosis: Gout   Continue home allopurinol  · Turning/repositioning  · PT/OT when appropriate    Patient appropriate for transfer out of the ICU today?: No  Disposition: Continue Critical Care   Code Status: Level 1 - Full Code  ---------------------------------------------------------------------------------------  ICU CORE MEASURES    Prophylaxis   VTE Pharmacologic Prophylaxis: Pharmacologic VTE Prophylaxis contraindicated due to [address]  VTE Mechanical Prophylaxis: sequential compression device  Stress Ulcer Prophylaxis: Pantoprazole IV     ABCDE Protocol (if indicated)  Plan to perform spontaneous awakening trial today? Not applicable  Plan to perform spontaneous breathing trial today? Not applicable  Obvious barriers to extubation? Not applicable  CAM-ICU: Negative    Invasive Devices Review  Invasive Devices  Report    Central Venous Catheter Line  Duration           Port A Cath 06/17/21 Right Chest 418 days          Peripheral Intravenous Line  Duration           Peripheral IV 08/08/22 Right Antecubital 1 day    Peripheral IV 08/10/22 Dorsal (posterior); Left Forearm <1 day          Drain  Duration           NG/OG/Enteral Tube Nasogastric Right nare <1 day Can any invasive devices be discontinued today? No  ---------------------------------------------------------------------------------------  OBJECTIVE    Vitals   Vitals:    08/10/22 0500 08/10/22 0600 08/10/22 0733 08/10/22 0734   BP: 131/68 131/81     BP Location:       Pulse: 92 92     Resp: (!) 45 (!) 29     Temp:  (!) 97 16 °F (36 2 °C)     TempSrc:       SpO2: 96% 98% 100% 99%   Weight:       Height:         Temp (24hrs), Av 2 °F (37 3 °C), Min:97 16 °F (36 2 °C), Max:101 48 °F (38 6 °C)  Current: Temperature: (!) 97 16 °F (36 2 °C)  HR: 85  BP: 105/61  RR: 29  SpO2: 99%    Respiratory:  SpO2: SpO2: 99 %, SpO2 Activity: SpO2 Activity: At Rest, SpO2 Device: O2 Device: High flow nasal cannula  Nasal Cannula O2 Flow Rate (L/min): 40 L/min    Invasive/non-invasive ventilation settings   Respiratory  Report   Lab Data (Last 4 hours)    None         O2/Vent Data (Last 4 hours)      08/10 0733 08/10 0734        Non-Invasive Ventilation Mode HFNC (High flow) HFNC (High flow)                  Physical Exam  Vitals and nursing note reviewed  Constitutional:       Appearance: He is ill-appearing and toxic-appearing  Interventions: Nasal cannula in place  HENT:      Head:      Comments: Left posterior scalp surgical site is C/D/I  Sutures are still in place     Right Ear: External ear normal       Left Ear: External ear normal       Nose: Nose normal  No congestion  Mouth/Throat:      Mouth: Mucous membranes are moist       Pharynx: Oropharynx is clear  Eyes:      General: No scleral icterus  Extraocular Movements: Extraocular movements intact  Conjunctiva/sclera: Conjunctivae normal       Pupils: Pupils are equal, round, and reactive to light  Cardiovascular:      Rate and Rhythm: Normal rate and regular rhythm  Pulses: Normal pulses  Heart sounds: Normal heart sounds  No murmur heard  Pulmonary:      Breath sounds: Decreased air movement present   Decreased breath sounds and rhonchi present  Abdominal:      General: Abdomen is protuberant  Bowel sounds are normal  There is no distension  Palpations: Abdomen is soft  Tenderness: There is no abdominal tenderness  Musculoskeletal:      Cervical back: Neck supple  No rigidity  Right lower leg: No edema  Left lower leg: No edema  Skin:     General: Skin is warm and dry  Coloration: Skin is pale  Neurological:      Mental Status: He is lethargic  Comments: Mental status: Awake, alert, oriented to person and place  For time, oriented to year only  Can follow simple commands but not multistep commands    CN: intact  Motor: global weakness but at least 4/5 throughout with no focality  Reflexes: 1+ and symmetric  Sensory: intact to light touch, toes downgoing   Psychiatric:      Comments: Flat affect             Laboratory and Diagnostics:  Results from last 7 days   Lab Units 08/10/22  0546 08/09/22  1304 08/09/22  0734 08/09/22  0620 08/08/22  1246 08/07/22  0509 08/06/22  0641 08/05/22  0542   WBC Thousand/uL 2 88*  --  3 14* 3 17* 4 47 5 99 6 06 6 91   HEMOGLOBIN g/dL 6 6* 8 1* 7 3* 5 9* 9 6* 10 2* 10 3* 10 8*   HEMATOCRIT % 20 2* 24 7* 22 2* 18 3* 28 5* 30 4* 31 1* 32 4*   PLATELETS Thousands/uL 47*  --  54* 59* 76* 69* 67* 79*   BANDS PCT %  --   --   --  17* 38*  --   --   --    MONO PCT %  --   --   --  0* 0*  --   --   --      Results from last 7 days   Lab Units 08/09/22  0620 08/08/22  1246 08/07/22  0509 08/05/22  0542 08/04/22  0605   SODIUM mmol/L 137 131* 130* 137 134*   POTASSIUM mmol/L 3 4* 4 4 4 0 4 0 4 5   CHLORIDE mmol/L 106 96 95* 99 102   CO2 mmol/L 24 22 28 31 23   ANION GAP mmol/L 7 13 7 7 9   BUN mg/dL 17 28* 20 23 27*   CREATININE mg/dL 0 77 1 50* 0 64 0 67 0 58*   CALCIUM mg/dL 8 7 9 8 9 3 9 6 9 1   GLUCOSE RANDOM mg/dL 87 161* 137 141* 148*   ALT U/L  --  64  --   --   --    AST U/L  --  35  --   --   --    ALK PHOS U/L  --  76  --   --   --    ALBUMIN g/dL  --  2 0*  --   --   -- TOTAL BILIRUBIN mg/dL  --  0 58  --   --   --      Results from last 7 days   Lab Units 08/09/22  0620   MAGNESIUM mg/dL 1 7   PHOSPHORUS mg/dL 3 4      Results from last 7 days   Lab Units 08/09/22  1304 08/08/22  1316   INR  1 28* 1 13   PTT seconds 35 34          Results from last 7 days   Lab Units 08/09/22  0013 08/08/22  2105 08/08/22  1749 08/08/22  1508 08/08/22  1316   LACTIC ACID mmol/L 1 8 2 2* 2 9* 7 2* 7 7*     ABG:  Results from last 7 days   Lab Units 08/09/22  2018   [place] [address] ART  7 342*   PCO2 ART mm Hg 47 6*   PO2 ART mm Hg 101 2   HCO3 ART mmol/L 25 2   BASE EXC ART mmol/L -0 5   ABG SOURCE  Radial, Right     VBG:  Results from last 7 days   Lab Units 08/09/22  2018 08/09/22  1812 08/08/22  1748   PH SHANNA   --   --  7 378   PCO2 SHANNA mm Hg  --   --  41 1*   PO2 SHANNA mm Hg  --   --  25 0*   HCO3 SHANNA mmol/L  --   --  23 7*   BASE EXC SHANNA mmol/L  --   --  -1 4   ABG SOURCE  Radial, Right   < >  --     < > = values in this interval not displayed  Results from last 7 days   Lab Units 08/09/22  0620 08/08/22  1316   PROCALCITONIN ng/ml 3 31* 3 34*       Micro  Results from last 7 days   Lab Units 08/08/22  1611 08/08/22  1511 08/08/22  1316   BLOOD CULTURE   --   --  No Growth at 24 hrs  No Growth at 24 hrs  URINE CULTURE   --  No Growth <1000 cfu/mL  --    MRSA CULTURE ONLY  No Methicillin Resistant Staphlyococcus aureus (MRSA) isolated  --   --        EKG: NSR at 87 on tele    Imaging:  I have personally reviewed pertinent reports  and I have personally reviewed pertinent films in PACS     XR hip/pelv 2-3 vws right  Result Date: 8/8/2022  Impression: Mild degenerative changes both hips No acute osseous abnormality  Workstation performed: [id number]      CT head wo contrast  Result Date: 8/9/2022  Impression: 1  No significant change in left cerebellar parenchymal hematoma and vasogenic edema with persistent mass effect on posterior brain stem and 4th ventricle    Grossly stable ventricular size without hydrocephalus  2   Stable small left parietal cortical hemorrhage as well as subcortical gliosis and encephalomalacia  Stable small left parietal extra-axial collection subjacent to craniotomy  Workstation performed: [id number]      CT head without contrast  Result Date: 8/8/2022  Impression: There are 2 new hemorrhage is identified in the left cerebellar hemisphere series 2 image 11 in the postsurgical territory  Slight increase in vasogenic edema noted resulting in minimal hydrocephalus when compared to the prior study  Slight prominence to the temporal horns lateral ventricles noted which is new since the prior study  Urgent neurosurgical consultation is recommended  Remainder of the brain is stable  The study was marked in [facility] for immediate notification  Workstation performed: [id number]      CTA ED chest PE study  Result Date: 8/8/2022  Impression: Increased multifocal cavitary nodules and consolidations likely representing a combination of worsening metastases and multifocal pneumonia  Septic emboli could cause a similar appearance  No pulmonary emboli  Workstation performed: [id number]      CT lower extremity wo contrast right  Result Date: 8/8/2022  Impression: Complex fluid collection within the right iliacus, suspicious for abscess  The study was marked in EPIC for significant notification  Workstation performed: [id number]     Intake and Output  I/O       08/08 0701  08/09 0700 08/09 0701  08/10 0700 08/10 0701  08/11 0700    I V  (mL/kg) 1121 7 (13 6) 1878 3 (22 9)     NG/GT  150     IV Piggyback 2000 1000     Feedings  190     Total Intake(mL/kg) 3121 7 (37 9) 3218 3 (39 2)     Urine (mL/kg/hr) 1505 2150 (1 1)     Stool  0     Total Output 1505 2150     Net +1616 7 +1068 3            Unmeasured Stool Occurrence  1 x           Height and Weights   Height: 5' 8" (172 7 cm)  IBW (Ideal Body Weight): 68 4 kg  Body mass index is 27 52 kg/m²    Weight (last 2 days)     Date/Time Weight 08/09/22 1400 82 1 (181)    08/08/22 2000 82 3 (181 44)            Nutrition       Diet Orders   (From admission, onward)             Start     Ordered    08/10/22 0001  Diet Enteral/Parenteral; Tube Feeding No Oral Diet; Vital AF 1 2; Continuous; 60  Diet effective midnight        Comments: Begin at 20cc/hr and increase 10cc/hr if tolerating   References:    Nutrtion Support Algorithm Enteral vs  Parenteral   Question Answer Comment   Diet Type Enteral/Parenteral    Enteral/Parenteral Tube Feeding No Oral Diet    Tube Feeding Formula: Vital AF 1 2    Bolus/Cyclic/Continuous Continuous    Tube Feeding Goal Rate (mL/hr): 60    RD to adjust diet per protocol?  Yes        08/09/22 1650                Active Medications  Scheduled Meds:  Current Facility-Administered Medications   Medication Dose Route Frequency Provider Last Rate    acetaminophen  650 mg Oral Q6H [facility]      allopurinol  100 mg Oral Daily Alejandra Goldstein, DO      cefTRIAXone  1,000 mg Intravenous Q24H Alejandra Goldstein DO Stopped (08/09/22 1530)    chlorhexidine  15 mL Mouth/Throat Q12H [facility] Alejandra Goldstein, DO      dexamethasone  2 mg Oral Q24H [facility] Alejandra Goldstein, DO      docusate sodium  100 mg Oral BID Alejandra Goldstein, DO      levETIRAcetam  1,000 mg Intravenous Q12H [facility] Alejandra Goldstein, DO Stopped (08/09/22 2200)    levothyroxine  37 5 mcg Oral Early Morning Alejandra Goldstein, DO      LORazepam  0 5 mg Oral BID PRN Alejandra Goldstein, DO      metoprolol  2 5 mg Intravenous Q6H Mary Grace Danaebill Corral, DO      metroNIDAZOLE  500 mg Intravenous Q8H [facility] Stopped (08/10/22 0400)    oxyCODONE  2 5 mg Oral Q4H PRN Alejandra Goldstein, DO      pantoprazole  40 mg Intravenous Daily Alejandra Goldstein, DO      polyethylene glycol  17 g Oral BID Alejandra Goldstein, DO      pravastatin  80 mg Oral Daily With [employer], DO      scopolamine  1 patch Transdermal Q72H Alejandra Goldstein, DO      senna 8 6 mg Oral Daily Alejandra Goldstein DO      sodium chloride (PF)  3 mL Intravenous Q1H PRN Alejandra Goldstein DO      sodium chloride  1 g Oral TID With Meals Alejandra Goldstein DO       Continuous Infusions:     PRN Meds:   LORazepam, 0 5 mg, BID PRN  oxyCODONE, 2 5 mg, Q4H PRN  sodium chloride (PF), 3 mL, Q1H PRN        Allergies   Allergies   Allergen Reactions    Bee Venom     Benazepril Other (See Comments)     Angioedema     Latex Other (See Comments)     Burning of eyes at the dentist from the gloves    Meloxicam GI Intolerance    Penicillin G Rash     ---------------------------------------------------------------------------------------  Advance Directive and Living Will:      Power of :    POLST:    ---------------------------------------------------------------------------------------  Care Time Delivered:   Please refer to attending's attestation    Mason Callow, DO      Portions of the record may have been created with voice recognition software  Occasional wrong word or "sound a like" substitutions may have occurred due to the inherent limitations of voice recognition software    Read the chart carefully and recognize, using context, where substitutions have occurred

## 2022-08-10 NOTE — PROGRESS NOTES
I have personally seen and examined patient and reviewed all data with resident  Agree with note, assessment and plan  Critical care time 45 minutes  Please refer to attending comments below  Critical care time does not include procedures, family meeting or teaching  Acute worsening intracranial hemorrhage  Acute encephalopathy-multifactorial CNS event of as well as sepsis  Severe sepsis secondary to pneumonia  Neuroendocrine carcinoma of the lung with Mets to the brain status post resection x2 as well as whole-brain irradiation completed 05/31/2022  Anemia  Thrombocytopenia-normal platelet count in June of 2022 since then has decline  Atrial fibrillation  Hypothyroid-continue levothyroxine  SIADH with hyponatremia  Gout  Hyperlipidemia continue statin  Hypomagnesemia-replete and recheck  Hypokalemia-replete and recheck  Protein calorie malnutrition      Exam:  Patient appears to be sleeping with his eyes closed but opens his eyes to verbal request, pupils are equal and reactive, face appears to be symmetric, patient will move all extremities to verbal request, he does answer questions with 1-2 words    Goal SBP  110-150  MAP>65     IO +1  06L  UO 2 1L  Normal saline 100 mL/hour     Respiratory support  45L/60%    Cerebral edema  Decadron 2 mg Q 24 hours     SIADH/hyponatremia  Salt tab 1 g p o  T i d      Infectious workup  08/08/2022-MRSA culture-negative  08/08/2022 urine culture negative  08/08/2022 RSV/COVID/influenza-negative  08/08/2022 blood culture x2-no growth to date     Antibiotic regimen: day #3  Cefepime 2 g IV q 12 hour-change to ceftriaxone 1 g IV Q 24 hours 08/09/2022  Vancomycin pharmacy adjusted- d/c 8/10/22 MRSA negative  Flagyl 500mg IV q 8     Fever control:  Tylenol 650 mg q 6 hours around the clock     Atrial fibrillation rate control:  Metoprolol 2 5 mg IV q 6 hours around the clock     Devices:  06/17/2021 right chest Port-A-Cath       08/09/2022 CT head-no significant change in the left cerebellar parenchymal hematoma in vasogenic edema with persistent mass effect on the posterior brainstem and 4th ventricle  Stable left parietal cortical hemorrhage as well as subcortical gliosis and encephalomalacia  Stable small left parietal extra-axial collection adjacent craniotomy      08/09/2022 CT scan right lower extremity  Complex fluid collection with a right iliacus suspicious for abscess    08/09/2022-CT head grossly stable hemorrhage with degenerative edema in the left cerebellar operative bed  Effacement of 4th ventricle noted with prominence of the temporal horn raises suspicion for mild hydrocephalus  Small stable cortically based hemorrhage left parietal adjacent to parietal craniotomy site  Patient noted to have hypoxemia last night for which his high-flow nasal cannula was increased from 60% to 80%  ABG improved  Palliative Care met with family yesterday  Patient remains full code  Patient was administered metoprolol secondary to atrial fibrillation with RVR  Heart rate was 130s  This morning patient returned with thrombocytopenia and anemia on his labs again  Plan is for Interventional Radiology today  Patient has several events fever yesterday  Overnight patient remained afebrile  Antibiotics were adjusted from cefepime to ceftriaxone secondary to seizure event  In reviewing patient's vital signs he appears to have tachypnea with respiratory rate ranging from 20-45  He is presently on high-flow nasal cannula and is a full code  If patient continues to have tachypnea and appears to have increased work of breathing on clinical exam there is concern he may need additional support/invasive ventilation  Plan to discuss with patient's wife, palliative care and patient today in more detail regarding goals of care considering diagnosis of neuroendocrine cancer with metastatic disease  Hemoglobin 6 6 morning platelet count 47   Patient is scheduled to go to Interventional Radiology  Plan to transfuse 1 unit RBCs and 1 unit platelets  Patient continues to have abnormal hemoglobin and platelet count which may be secondary to his neuroendocrine carcinoma in history of chemotherapy versus infection/sepsis versus marrow infiltration of cancer process  Plan to check anemia labs as well as reticulocyte count  Update:    Echo limited study, no obvious vegetations    NO AC/AP    Goal platelets > 50    MRI pending     Restart amiodarone 400 mg BID, metoprolol to q 12  Restart TF after IR    CXR    Check reticulocyte count, peripheral smear, iron studies, HIT    Repeat labs 8pm    Plan for family meeting tomorrow to discuss goals of care

## 2022-08-10 NOTE — PHYSICAL THERAPY NOTE
PT orders received  Chart reviewed  Pt pending IR will hold   Will continue to follow  Chad Anderson, PT, DPT     08/10/22 1239   PT Last Visit   PT Visit Date 08/10/22   Note Type   Note Type Cancelled Session   Cancel Reasons Medical status

## 2022-08-10 NOTE — PROGRESS NOTES
Pt's heart rate increased to 130-140 bpm previously 80-90  12 lead EKG was performed and found patient to be in rapid Afib with RVR  AP was notified and one time dose of 2 5mg of metoprolol was ordered and given  Pt's rhythm broke and returned to normal sinus rhythm

## 2022-08-11 NOTE — ACP (ADVANCE CARE PLANNING)
Record of Family Meeting - Palliative and Supportive Care   Dennise Lal 69 y o  male [id number]      Recommendations and Plan:  Recommend giving family a day or two to process and then reconvening for a strategy session on how best to transition to comfort care if that is what the family decides  A family meeting was held for Cortney Joey Noble  This meeting was necessary for determine the appropriate course of treatment  Time of Meetin:15 - 4:45  Meeting Location: ICU conference room  Participants:   Dr Caty Leyva and ICU team  Nica Mateo (wife)  Lisandra Moore (brother)  Georgie Rome MD     Patient Participation: pt declined to participate    Patient Support System: wife and brother  Pt also has mother and other siblings, not present today    Advanced Directive of POLST available: pt has living will, but that living will had said DNI but not DNR thus must be renegotiated as they did not understand    Topics of Discussion:    Progression of lung masses in very short time (matter of a few weeks) - cancer is very aggressive    Overall pt is too weak to consider disease-directed therapies and unlikely to regain his functional status in time to have a shot at treating the cancer (would be minimum 2-3 mos to regain strength and he does not have that time)    Infections place him at risk for not surviving even this hospitalization    Discussion of what he might want to do, family agrees he would like to be at home - there are concerns about whether Mary Murillo would be able to care for him if he requires nursing care for bathing and toileting    Decided to leave further discussion until family has some time to process as they are both overwhelmed and tearful    In the meantime, Wendi and Radha Purcell both agree that should something happen, pt will be DNR/DNI level 3    Time Involved in Meetin min, beginning at approximately  4:15 and ending at approximately 4:30       Georgie Rome MD Palliative and Supportive Care  Clinic/Answering Service: [phone]  You can find me on [employer]!

## 2022-08-11 NOTE — SPEECH THERAPY NOTE
Speech Language/Pathology    Speech/Language Pathology Progress Note    Patient Name: Edyta De La Paz Date: 8/11/2022     Problem List  Principal Problem:    Acute respiratory failure with hypoxia (HCC)  Active Problems:    Cigarette nicotine dependence in remission    Adenocarcinoma, lung, left (HCC)    CKD (chronic kidney disease) stage 2, GFR 60-89 ml/min    Thrombocytopenia (HCC)    Atrial fibrillation with rapid ventricular response (HCC)    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 ([facility] )    Chronic obstructive pulmonary disease, unspecified COPD type ([facility] )    Hyponatremia    Multifocal pneumonia    Acute encephalopathy       Past Medical History  Past Medical History:   Diagnosis Date    Brain cancer ([facility] )     Hyperlipidemia     Hypertension     Lung cancer ([facility] )         Past Surgical History  Past Surgical History:   Procedure Laterality Date    BACK SURGERY      CRANIOTOMY Left 4/7/2022    Procedure: Image guided left parietal craniotomy for tumor resection;  Surgeon: Cristin Whitaker MD;  [facility];  Service: Neurosurgery    CRANIOTOMY Left 7/28/2022    Procedure: left retrosigmoid/posterior fossa craniotomy for resction of mass with image guidence;  Surgeon: Cristin Whitaker MD;  [facility];  Service: Neurosurgery    HEMORRHOID SURGERY      IR BIOPSY LUNG  5/6/2021    IR PORT PLACEMENT  6/17/2021    LAMINECTOMY  2018    L4-L5    LUNG SURGERY      left upper lobectomy    TX BRONCHOSCOPY,DIAGNOSTIC N/A 5/25/2021    Procedure: BRONCHOSCOPY FLEXIBLE;  Surgeon: Vita Avila MD;  [facility];  Service: Thoracic    TX MEDIASTINOSCOPY WITH LYMPH NODE BIOPSY/IES N/A 5/25/2021    Procedure: MEDIASTINOSCOPY, flexible bronchoscopy;  Surgeon: Vita Avila MD;  [facility];  Service: Thoracic    TONSILLECTOMY  1961         Subjective:  Pt alert, awake and sitting up in bed   Pt asleep upon arrival but quickly awoke w/ mild lethargy and cooperative for tx  Nrsing reports pt stated he was starving  Current diet: NPO     Objective:  Pt seen for f/u dysphagia tx  Pt speech minimal, but still intelligible  Pt accepted 4 oz of puree, toast and thin by straw w/ no s/s concerning of aspiration  Oral containment, manipulation, and A to P transfer WFL  Mastication of the hard solid prolonged, pt tiring w/mastication  Min lingual residue w/ hard solid cleared w/ liquid wash  Swallow initiation fairly prompt and laryngeal rise weak but WFL  Assessment:  Pt remains mildly lethargic, speech output minimal today but remains intelligible  Appropriate to resume dysphagia level 3 w/ thin when cleared by physician       Plan/Recommendations:  Resume diet of dysphagia level 3 w/ thin when cleared by physician   ST f/u as able to assess pt tolerance of fiet

## 2022-08-11 NOTE — PROGRESS NOTES
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, most recent of which was recently performed on 7/28/22 by Dr Adelia Morfin  Additional history of A-fib, hypothyroidism, SIADH, HTN, gout, and hyperlipidemia  He represented to the ED on 8/8/22 due to altered mental status  Per wife, he was yelling out in pain, "babbling," and confused  He was found to have new multifocal pneumonia, severe sepsis, hyponatremia, and worsening intracranial hemorrhage at surgical site and was therefore admitted to the ICU for further evaluation and management  He was noted to have clinical seizure-like activity on the morning of 8/9 and was therefore placed on vEEG monitoring and started on Keppra  · Was more awake overnight and complained of new headache  Repeat CTH stable  · Supplemental O2 has been weaned to 6 L/min per NC    ---------------------------------------------------------------------------------------  SUBJECTIVE  Patient remains confused and is unable to provide history today  He denies pain and shortness of breath  Review of Systems   Unable to perform ROS: Mental status change        Review of systems was unable to be performed secondary to altered mental status  ---------------------------------------------------------------------------------------  Assessment and Plan:    Neuro:   · Diagnosis: Acute worsening of ICH 2/2 recent tumor resection  ? Updated CTH revealed stable hemorrhage and edema in the surgical site and stable adjacent left parietal lobe hemorrhage  ? Repeat CTH 8/10 is stable  ? Avoid AC/AP  ?  Goal platelets > 50  · Diagnosis: Neuroendocrine cancer with mets to brain, s/p two resections most recent of which was on 7/28 POD #14  Also s/p chemo and whole brain radiation  ? Updated MRI ordered  Patient has MRI-compatible EEG leads  ? Decadron 2 mg daily  ? Will discuss suture removal with Neurosurgery team  · Diagnosis: Acute toxic metabolic encephalopathy, multifactorial including brain mets, severe sepsis, hypoxemia, pneumonia, fevers, and reduced baseline cognitive reserve  ? Continue supportive care and treat underlying causes  ? Stat CTH for any acute changes  ? Q2H neuro checks  ? Frequent reorientation  ? Promote restful sleep  · Diagnosis: Clinical seizure-like activity  ? Monitoring on vEEG  ? Given bolus dose of Keppra 2 g on 8/9  ? Continue Keppra 1 g bid  ? Seizure precautions  ? If vEEG remains without seizures or epileptiform discharges, then would recommend discontinuing today  · Diagnosis: Developing mild hydrocephalus due to effacement of the 4th ventricle  ? Continue to monitor clinically  ? Monitor for signs of increasing ICP        CV:   · Diagnosis: Paroxysmal A-fib diagnosed during last admission  ? Metoprolol was changed to metoprolol tartrate 25 mg bid  ? Holding Cardizem for now  ? Resumed Amiodarone at 400 mg bid through the end of day 8/13 then start 200 mg daily on 8/14  ? Continue to monitor on telemetry  ? No AC/AP due to [address]  · Diagnosis: Intermittent hypotension  ? Currently on NS at 100 cc/hr as he is NPO for procedure today  · Diagnosis: History of HTN  ? Antihypertensives currently on hold due to hypotension  · Diagnosis: Hyperlipidemia  ? Home Crestor substituted with Pravastatin 80 mg daily  · Diagnosis: Evaluation for endocarditis given concern for septic emboli  ? Transthoracic echo was technically limited and was inadequate for visualizing valves  Consider EMILY  § LVEF is 60%, concentric remodeling of LV, unable to assess diastolic function of LV   Sub-optimal visualization of RV, LA, RA, aortic valve, tricuspid valve, pulmonic valve      Pulm:  · Diagnosis: Acute hypoxic respiratory failure 2/2 pneumonia and neuroendocrine cancer, increasing tumor burden, possible septic emboli  ? ABG afternoon of 8/9 with hypoxemia  Repeat in the evening was improved  ? Currently on 6 L/min per NC  ? Antibiotics day # 4 overall  Currently on Ceftriaxone day # 3  § MRSA swab negative and therefore vancomycin was discontinued  § Follow micro results and deescalate as able  § Respiratory protocol and airway clearance  § Encourage deep breathing  § Incentive spirometry  · Diagnosis: Neuroendocrine carcinoma of lung  ? Follows at [facility]  ? S/p DAVID resection in 4/2021  ? Last chemo in January and radiation in May 2022  ? Evidence of worsening lesions on CT chest  ? Plan for family meeting with Palliative care team this afternoon      GI:   · Diagnosis: GERD  ? Continue home Protonix  · Diet: Dysphagia 3, thin liquids versus TF depending on his alertness  Currently has NG  ? Bowel regimen ordered  Last BM evening of 8/10      :   · No acute issues  · Condom catheter  · UA non-infected  Urine culture negative      F/E/N:   · Fluids: NS at 100 cc/hr while NPO  · Electrolytes: Replete as needed to maintain K > 4, Mag > 2, Phos > 3  · Diagnosis: Hypophosphatemia and Hypomagnesemia   · Nutrition: NPO for now, TF vs Dysphagia 3/thin liquids  · Diagnosis: Protein-calorie malnutrition  · Supplemental nutrition once diet allows      Heme/Onc:   · Diagnosis: Acute on chronic anemia, goal hgb > 8  ? Hgb 6 6 on morning of 8/10  Received 1 u pRBCs  ? Continue to monitor closely  AM labs are not resulted yet  ? Anemia workup:  ? Retics 1 88%, 48,100 absolute  ? Iron saturation 66%, TIBC 93, Iron 61, Ferritin 3637  ? Peripheral smear is not resulted  · Diagnosis: Thrombocytopenia, goal plt > 50  ? Platelets were normal in 6/2022  ? On 8/10 platelets were 47, transfused 1 unit of platelets  ? Thrombocytopenia workup:  ?  Has been on heparin in prior admissions  ? HIT labs pending  · Diagnosis: coagulopathy  ? PT and INR are both high on admission  ? Follow up labs on 8/10 were in normal range  ? No AC/AP  · Diagnosis: Neuroendocrine carcinoma of the lung with mets to brain, worsening lung lesions  ? As outlined above, follows at [facility]  ? Will hold off on consulting Oncology for now  ? Palliative is following      Endo:   · Diagnosis: Hypothyroidism  ? Continue home synthroid 37 5 mcg daily      ID:   · Diagnosis: Severe sepsis 2/2 pneumonia and iliacus muscle abscess, initial lactic acid 7 7 and procalcitonin 3 34  Significantly improved overall  Vitals stable  ? Sepsis pathway  ? Maintenance fluids  ? Antibiotics day # 4 overall  Ceftriaxone day # 3  § Vancomycin was discontinued once MRSA negative  § Will deescalate as culture results are available  ? Monitor fever curve and WBC  ? ATC tylenol for fever control  · Diagnosis: Abscess in iliacus muscle  ? Was bumped from IR schedule on 8/10 due to emergent cases  Rescheduled for today (8/11) at noon      MSK/Skin:   · Diagnosis: Right thigh pain and swelling  ? Much improved  Continue to monitor  · Diagnosis: Right iliacus muscle abscess  ? Draining today  ? Diagnosis: Gout  § Continue home allopurinol  · Turning/repositioning  · PT/OT when appropriate    Patient appropriate for transfer out of the ICU today?: No  Disposition: Continue Critical Care   Code Status: Level 1 - Full Code  ---------------------------------------------------------------------------------------  ICU CORE MEASURES    Prophylaxis   VTE Pharmacologic Prophylaxis: Pharmacologic VTE Prophylaxis contraindicated due to [address]  VTE Mechanical Prophylaxis: sequential compression device  Stress Ulcer Prophylaxis: Omeprazole PO    ABCDE Protocol (if indicated)  Plan to perform spontaneous awakening trial today? Not applicable  Plan to perform spontaneous breathing trial today? Not applicable  Obvious barriers to extubation?  Not applicable  CAM-ICU: Negative    Invasive Devices Review  Invasive Devices  Report    Central Venous Catheter Line  Duration           Port A Cath 21 Right Chest 419 days          Peripheral Intravenous Line  Duration           Peripheral IV 22 Right Antecubital 2 days    Peripheral IV 08/10/22 Dorsal (posterior); Left Forearm 1 day          Drain  Duration           NG/OG/Enteral Tube Nasogastric Right nare 1 day    External Urinary Catheter  <1 day              Can any invasive devices be discontinued today? No  ---------------------------------------------------------------------------------------  OBJECTIVE    Vitals   Vitals:    22 0400 22 0500 22 0543 22 0600   BP: 110/63 115/68  114/73   Pulse: 78 80  80   Resp: 14 15  15   Temp: 98 2 °F (36 8 °C)      TempSrc: Oral      SpO2: 96% 96%  97%   Weight:   83 9 kg (184 lb 15 5 oz)    Height:         Temp (24hrs), Av 3 °F (36 8 °C), Min:97 52 °F (36 4 °C), Max:98 8 °F (37 1 °C)  Current: Temperature: 98 2 °F (36 8 °C)  HR: 80  BP: 115/68  RR: 15  SpO2: 96%    Respiratory:  SpO2: SpO2: 97 %, SpO2 Activity: SpO2 Activity: At Rest, SpO2 Device: O2 Device: Nasal cannula  Nasal Cannula O2 Flow Rate (L/min): 6 L/min    Invasive/non-invasive ventilation settings   Respiratory  Report   Lab Data (Last 4 hours)    None         O2/Vent Data (Last 4 hours)    None                Physical Exam  Vitals and nursing note reviewed  Constitutional:       General: He is not in acute distress  Appearance: He is ill-appearing and toxic-appearing  HENT:      Head:      Comments: S/p craniectomy  Significant ecchymosis surrounding incision  C/D/I  Sutures are still in place     Right Ear: External ear normal       Left Ear: External ear normal       Nose: Nose normal       Comments: NG present     Mouth/Throat:      Mouth: Mucous membranes are moist       Pharynx: Oropharynx is clear  Eyes:      General: No scleral icterus  Extraocular Movements: Extraocular movements intact  Pupils: Pupils are equal, round, and reactive to light  Comments: Conjunctival pallor   Cardiovascular:      Rate and Rhythm: Normal rate and regular rhythm  Pulses: Normal pulses  Heart sounds: Normal heart sounds  Pulmonary:      Effort: Pulmonary effort is normal  No respiratory distress  Breath sounds: Decreased air movement present  No rhonchi  Abdominal:      General: Abdomen is protuberant  Bowel sounds are normal  There is no distension  Palpations: Abdomen is soft  Genitourinary:     Comments: Condom catheter  Musculoskeletal:      Cervical back: Neck supple  No rigidity  Right lower leg: No edema  Left lower leg: No edema  Skin:     General: Skin is warm and dry  Coloration: Skin is pale  Findings: Ecchymosis present  Neurological:      Mental Status: He is lethargic  Comments: Continues to be very lethargic and quickly falls back to sleep  Oriented to self and place  Disoriented to time  Able to follow very simple commands only (I e , squeeze hand, move toes)  CN: PERRL, EOM intact  corneal reflex intact  Face symmetric  Cough reflex intact  Hearing intact  Equal shoulder shrug  Motor: 5/5  strength bilaterally  Able to move toes but cannot lift legs off of bed     Coordination: ataxia on FNF bilaterally  Sensation: intact to light touch  DTRs: 1+ and symmetric throughout, toes downgoing             Laboratory and Diagnostics:  Results from last 7 days   Lab Units 08/11/22  0542 08/10/22  2051 08/10/22  0826 08/10/22  0546 08/09/22  1304 08/09/22  0734 08/09/22  0620 08/08/22  1246 08/07/22  0509   WBC Thousand/uL 2 10* 2 57*  --  2 88*  --  3 14* 3 17* 4 47 5 99   HEMOGLOBIN g/dL 7 7* 8 6* 6 6* 6 6* 8 1* 7 3* 5 9* 9 6* 10 2*   HEMATOCRIT % 23 5* 25 6* 20 2* 20 2* 24 7* 22 2* 18 3* 28 5* 30 4*   PLATELETS Thousands/uL 88* 95*  --  47*  --  54* 59* 76* 69*   BANDS PCT %  --   --   -- 11*  --   --  17* 38*  --    MONO PCT %  --   --   --   --   --   --  0* 0*  --      Results from last 7 days   Lab Units 08/11/22  0542 08/10/22  0826 08/09/22  0620 08/08/22  1246 08/07/22  0509 08/05/22  0542   SODIUM mmol/L 142 140 137 131* 130* 137   POTASSIUM mmol/L 3 4* 3 7 3 4* 4 4 4 0 4 0   CHLORIDE mmol/L 113* 112* 106 96 95* 99   CO2 mmol/L 24 24 24 22 28 31   ANION GAP mmol/L 5 4 7 13 7 7   BUN mg/dL 12 12 17 28* 20 23   CREATININE mg/dL 0 60 0 58* 0 77 1 50* 0 64 0 67   CALCIUM mg/dL 8 1* 8 5 8 7 9 8 9 3 9 6   GLUCOSE RANDOM mg/dL 110 111 87 161* 137 141*   ALT U/L  --   --   --  64  --   --    AST U/L  --   --   --  35  --   --    ALK PHOS U/L  --   --   --  76  --   --    ALBUMIN g/dL  --   --   --  2 0*  --   --    TOTAL BILIRUBIN mg/dL  --   --   --  0 58  --   --      Results from last 7 days   Lab Units 08/09/22  0620   MAGNESIUM mg/dL 1 7   PHOSPHORUS mg/dL 3 4      Results from last 7 days   Lab Units 08/10/22  0826 08/09/22  1304 08/08/22  1316   INR  0 98 1 28* 1 13   PTT seconds 37 35 34          Results from last 7 days   Lab Units 08/09/22  0013 08/08/22  2105 08/08/22  1749 08/08/22  1508 08/08/22  1316   LACTIC ACID mmol/L 1 8 2 2* 2 9* 7 2* 7 7*     ABG:  Results from last 7 days   Lab Units 08/09/22  2018   PH ART  7 342*   PCO2 ART mm Hg 47 6*   PO2 ART mm Hg 101 2   HCO3 ART mmol/L 25 2   BASE EXC ART mmol/L -0 5   ABG SOURCE  Radial, Right     VBG:  Results from last 7 days   Lab Units 08/09/22  2018 08/09/22  1812 08/08/22  1748   PH SHANNA   --   --  7 378   PCO2 SHANNA mm Hg  --   --  41 1*   PO2 SHANNA mm Hg  --   --  25 0*   HCO3 SHANNA mmol/L  --   --  23 7*   BASE EXC SHANNA mmol/L  --   --  -1 4   ABG SOURCE  Radial, Right   < >  --     < > = values in this interval not displayed       Results from last 7 days   Lab Units 08/09/22  0620 08/08/22  1316   PROCALCITONIN ng/ml 3 31* 3 34*       Micro  Results from last 7 days   Lab Units 08/08/22  1611 08/08/22  1511 08/08/22  1316   BLOOD CULTURE   --   --  No Growth at 48 hrs  No Growth at 48 hrs  URINE CULTURE   --  No Growth <1000 cfu/mL  --    MRSA CULTURE ONLY  No Methicillin Resistant Staphlyococcus aureus (MRSA) isolated  --   --        EKG: NSR at 81 on tele    Imaging:  I have personally reviewed pertinent reports  and I have personally reviewed pertinent films in PACS     CT Head wo contrast  Date: 8/10/22  IMPRESSION: Essentially unchanged appearance of hemorrhage and edema in the left cerebellar surgical bed, with mild mass effect on the 4th ventricle  No significant change from recent CT  Intake and Output  I/O       08/09 0701  08/10 0700 08/10 0701 08/11 0700 08/11 0701 08/12 0700    P  O   200     I V  (mL/kg) 1878 3 (22 9) 1556 7 (18 6)     Blood  580     NG/ 180     IV Piggyback 1000 350     Feedings 190 60     Total Intake(mL/kg) 3218 3 (39 2) 2926 7 (34 9)     Urine (mL/kg/hr) 2150 (1 1) 1920 (1)     Stool 0 0     Total Output 2150 1920     Net +1068 3 +1006 7            Unmeasured Urine Occurrence  1 x     Unmeasured Stool Occurrence 1 x 2 x             Height and Weights   Height: 5' 8" (172 7 cm)  IBW (Ideal Body Weight): 68 4 kg  Body mass index is 28 12 kg/m²  Weight (last 2 days)     Date/Time Weight    08/11/22 0543 83 9 (184 97)    08/09/22 1400 82 1 (181)            Nutrition       Diet Orders   (From admission, onward)             Start     Ordered    08/11/22 0500  Diet NPO  Diet effective 0500        References:    Nutrtion Support Algorithm Enteral vs  Parenteral   Question Answer Comment   Diet Type NPO    RD to adjust diet per protocol?  Yes        08/10/22 2008                Active Medications  Scheduled Meds:  Current Facility-Administered Medications   Medication Dose Route Frequency Provider Last Rate    acetaminophen  650 mg Oral Q6H [address] Mary Grace Corral DO      allopurinol  100 mg Oral Daily Alejandra Goldstein DO      [START ON 8/14/2022] amiodarone  200 mg Oral Daily With Breakfast Mary Gracemilana Alexanderricotte, DO      amiodarone  400 mg Oral BID With Meals American Juan Derricotte, DO      cefTRIAXone  1,000 mg Intravenous Q24H Alejandra Goldstein, DO Stopped (08/10/22 1535)    chlorhexidine  15 mL Mouth/Throat Q12H [address] Alejandra Goldstein, DO      docusate sodium  100 mg Oral BID Alejandra Goldstein, DO      HYDROmorphone  0 2 mg Intravenous Q3H PRN Jason Borjas PA-C      levETIRAcetam  1,000 mg Oral Q12H [address] Alejandra Goldstein, DO      levothyroxine  37 5 mcg Oral Early Morning Alejandra Goldstein, DO      LORazepam  0 5 mg Oral BID PRN Alejandra Goldstein, DO      metoprolol tartrate  25 mg Oral Q12H [address] Mary Gracemilana Alexanderricotte, DO      metroNIDAZOLE  500 mg Intravenous Q8H Mary Gracesamia Corral,  mg (08/11/22 0137)    omeprazole (PRILOSEC) suspension 2 mg/mL  20 mg Oral Daily Mary Grace Danae Derricotte, DO      oxyCODONE  2 5 mg Oral Q4H PRN Alejandra Goldstein, DO      polyethylene glycol  17 g Oral BID Alejandra Goldstein, DO      pravastatin  80 mg Oral Daily With [employer], DO      scopolamine  1 patch Transdermal Q72H Alejandra Goldstein, DO      senna  8 6 mg Oral Daily Alejandra Goldstein, DO      sodium chloride (PF)  3 mL Intravenous Q1H PRN Alejandra Goldstein, DO      sodium chloride  100 mL/hr Intravenous Continuous Jason Borjas PA-C 100 mL/hr (08/10/22 2011)    sodium chloride  1 g Oral TID With Meals Alejandra Goldstein DO       Continuous Infusions:  sodium chloride, 100 mL/hr, Last Rate: 100 mL/hr (08/10/22 2011)      PRN Meds:   HYDROmorphone, 0 2 mg, Q3H PRN  LORazepam, 0 5 mg, BID PRN  oxyCODONE, 2 5 mg, Q4H PRN  sodium chloride (PF), 3 mL, Q1H PRN        Allergies   Allergies   Allergen Reactions    Bee Venom     Benazepril Other (See Comments)     Angioedema     Latex Other (See Comments)     Burning of eyes at the dentist from the gloves    Meloxicam GI Intolerance    Penicillin G Rash ---------------------------------------------------------------------------------------  Advance Directive and Living Will:      Power of :    POLST:    ---------------------------------------------------------------------------------------  Care Time Delivered:   Please refer to attending's attestation    Faiza Mabry,       Portions of the record may have been created with voice recognition software  Occasional wrong word or "sound a like" substitutions may have occurred due to the inherent limitations of voice recognition software    Read the chart carefully and recognize, using context, where substitutions have occurred

## 2022-08-11 NOTE — BRIEF OP NOTE (RAD/CATH)
INTERVENTIONAL RADIOLOGY PROCEDURE NOTE    Date: 8/11/2022    Procedure: Right iliacus fluid collection drainage     Preoperative diagnosis:   1  Acute encephalopathy    2  Severe sepsis ([facility] [facility] )    3  Acute respiratory failure with hypoxia (HCC)    4  TREVA (acute kidney injury) ([facility] [facility] )    5  Cerebellar hemorrhage ([facility] [facility] )    6  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 ([facility] [facility] )    7  Abscess         Postoperative diagnosis: Same  Surgeon: Ferd Aschoff, MD     Assistant: None  No qualified resident was available  Blood loss: Minimal    Specimens: 10 cc bloody fluid     Findings: 10 F drain placed into right iliacus muscle  10 cc lf bloody fluid was aspirated and sent to lab  This likely represents a hematoma  Complications: None immediate      Anesthesia: conscious sedation

## 2022-08-11 NOTE — OCCUPATIONAL THERAPY NOTE
OT CANCEL NOTE    Pt chart reviewed  Pt is currently off the floor in IR  Will continue to follow pt on caseload and see pt when medically stable and as clinically appropriate         08/11/22 1315   OT Last Visit   OT Visit Date 08/11/22   Note Type   Note type Cancelled Session   Cancel Reasons Patient off floor/test       Radha Kothari MS, OTR/L

## 2022-08-12 NOTE — CASE MANAGEMENT
Case Management Discharge Planning Note    Patient name Tamar Lopez  Location ICU 02/ICU 02 MRN [id number]  : 1955 Date 2022       Current Admission Date: 2022  Current Admission Diagnosis:Acute respiratory failure with hypoxia [facility])   Patient Active Problem List    Diagnosis Date Noted    Multifocal pneumonia 2022    Acute respiratory failure with hypoxia ([address] ) 2022    Acute encephalopathy 2022    Hyponatremia 2022    High grade neuroendocrine carcinoma of lung ([facility] ) 2022    Chronic obstructive pulmonary disease, unspecified COPD type ([facility] [facility] ) 2022    Dizziness 2022    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 ([facility] ) 2022    Irregular heart rate 2022    Atrial fibrillation with rapid ventricular response ([facility] ) 2022    Thrombocytopenia ([facility] ) 2022    Goals of care, counseling/discussion 2022    Hypothyroidism 2022    Left parietal hemorrhagic tumor 2022    Platelets decreased ([facility] [facility] ) 02/15/2022    Psoriasis 02/15/2022    CKD (chronic kidney disease) stage 2, GFR 60-89 ml/min 2022    Labyrinthitis of both ears 2022    Proctocolitis 2021    Pericardial effusion 2021    Constipation 2021    Left non-suppurative otitis media 2021    Bilateral hearing loss due to cerumen impaction 2021    Chronic back pain 2021    Former cigarette smoker 2021    History of gout 2021    Hypertension 2021    Cancer of upper lobe of left lung ([facility] ) 2021    Drug-induced neutropenia ([facility] ) 07/15/2021    Adenocarcinoma, lung, left ([facility] ) 2021    Encounter for central line care 2021    Hyperglycemia 2021    Cigarette nicotine dependence in remission 2021    Bilateral hearing loss 2020    Mixed hyperlipidemia 2019    Renal cyst, right 2018    Lumbar stenosis 2018    Glaucoma 2018    JUNAID (obstructive sleep apnea) 03/16/2018    Spinal stenosis of lumbar region with neurogenic claudication 01/09/2018    Acute idiopathic gout of left foot 11/06/2017    Depression with anxiety 11/06/2017    Essential hypertension 11/06/2017    Hypertriglyceridemia 11/06/2017    Lumbago with sciatica, left side 11/06/2017    Back problem 06/30/2017      LOS (days): 4  Geometric Mean LOS (GMLOS) (days): 4 80  Days to GMLOS:0 8     OBJECTIVE:  Risk of Unplanned Readmission Score: 36 02         Current admission status: Inpatient   Preferred Pharmacy:   [facility] [address], [address]   [address]   [address] [place] [address]  Phone: [phone] Fax: [address] [place] [address] [address]  Phone: [phone] Fax: [phone]    Primary Care Provider: Adama Mckeon DO    Primary Insurance: MEDICARE  Secondary Insurance: [facility]    DISCHARGE DETAILS:                    Additional Comments: Met with patient's wife at bedside  She indicated she would like a referral to [address] [place] [address], a facility that is close to patient's home  Upon further review of the chart, it appears meeting with palliative was held yesterday and will be reconvened on Saturday to discuss hospice options  CM will continue to follow

## 2022-08-12 NOTE — PLAN OF CARE
Problem: Potential for Falls  Goal: Patient will remain free of falls  Description: INTERVENTIONS:  - Educate patient/family on patient safety including physical limitations  - Instruct patient to call for assistance with activity   - Consult OT/PT to assist with strengthening/mobility   - Keep Call bell within reach  - Keep bed low and locked with side rails adjusted as appropriate  - Keep care items and personal belongings within reach  - Initiate and maintain comfort rounds  - Make Fall Risk Sign visible to staff  - Offer Toileting every 2 Hours, in advance of need  - Initiate/Maintain bed alarm  - Apply yellow socks and bracelet for high fall risk patients  - Consider moving patient to room near nurses station  Outcome: Progressing     Problem: MOBILITY - ADULT  Goal: Maintain or return to baseline ADL function  Description: INTERVENTIONS:  -  Assess patient's ability to carry out ADLs; assess patient's baseline for ADL function and identify physical deficits which impact ability to perform ADLs (bathing, care of mouth/teeth, toileting, grooming, dressing, etc )  - Assess/evaluate cause of self-care deficits   - Assess range of motion  - Assess patient's mobility; develop plan if impaired  - Assess patient's need for assistive devices and provide as appropriate  - Encourage maximum independence but intervene and supervise when necessary  - Involve family in performance of ADLs  - Assess for home care needs following discharge   - Consider OT consult to assist with ADL evaluation and planning for discharge  - Provide patient education as appropriate  Outcome: Progressing  Goal: Maintains/Returns to pre admission functional level  Description: INTERVENTIONS:  - Perform BMAT or MOVE assessment daily    - Set and communicate daily mobility goal to care team and patient/family/caregiver  - Collaborate with rehabilitation services on mobility goals if consulted  - Perform Range of Motion 3 times a day    - Reposition patient every 2 hours  - Record patient progress and toleration of activity level   Outcome: Progressing     Problem: Nutrition/Hydration-ADULT  Goal: Nutrient/Hydration intake appropriate for improving, restoring or maintaining nutritional needs  Description: Monitor and assess patient's nutrition/hydration status for malnutrition  Collaborate with interdisciplinary team and initiate plan and interventions as ordered  Monitor patient's weight and dietary intake as ordered or per policy  Utilize nutrition screening tool and intervene as necessary  Determine patient's food preferences and provide high-protein, high-caloric foods as appropriate  INTERVENTIONS:  - Monitor oral intake, urinary output, labs, and treatment plans  - Assess nutrition and hydration status and recommend course of action  - Evaluate amount of meals eaten  - Assist patient with eating if necessary   - Allow adequate time for meals  - Recommend/ encourage appropriate diets, oral nutritional supplements, and vitamin/mineral supplements  - Order, calculate, and assess calorie counts as needed  - Recommend, monitor, and adjust tube feedings and TPN/PPN based on assessed needs  - Assess need for intravenous fluids  - Provide specific nutrition/hydration education as appropriate  - Include patient/family/caregiver in decisions related to nutrition  Outcome: Progressing     Problem: Neurological Deficit  Goal: Neurological status is stable or improving  Description: Interventions:  - Monitor and assess patient's level of consciousness, motor function, sensory function, and level of assistance needed for ADLs  - Monitor and report changes from baseline  Collaborate with interdisciplinary team to initiate plan and implement interventions as ordered  - Provide and maintain a safe environment  - Consider seizure precautions  - Consider fall precautions  - Consider aspiration precautions  - Consider bleeding precautions    Outcome: Progressing     Problem: Activity Intolerance/Impaired Mobility  Goal: Mobility/activity is maintained at optimum level for patient  Description: Interventions:  - Assess and monitor patient  barriers to mobility and need for assistive/adaptive devices  - Assess patient's emotional response to limitations  - Collaborate with interdisciplinary team and initiate plans and interventions as ordered  - Encourage independent activity per ability   - Maintain proper body alignment  - Perform active/passive rom as tolerated/ordered  - Plan activities to conserve energy   - Turn patient as appropriate  Outcome: Progressing     Problem: Communication Impairment  Goal: Ability to express needs and understand communication  Description: Assess patient's communication skills and ability to understand information  Patient will demonstrate use of effective communication techniques, alternative methods of communication and understanding even if not able to speak  - Encourage communication and provide alternate methods of communication as needed  - Collaborate with case management/ for discharge needs  - Include patient/family/caregiver in decisions related to communication  Outcome: Progressing     Problem: Potential for Aspiration  Goal: Non-ventilated patient's risk of aspiration is minimized  Description: Assess and monitor vital signs, respiratory status, and labs (WBC)  Monitor for signs of aspiration (tachypnea, cough, rales, wheezing, cyanosis, fever)  - Assess and monitor patient's ability to swallow  - Place patient up in chair to eat if possible  - HOB up at 90 degrees to eat if unable to get patient up into chair   - Supervise patient during oral intake  - Instruct patient/ family to take small bites  - Instruct patient/ family to take small single sips when taking liquids    - Follow patient-specific strategies generated by speech pathologist   Outcome: Progressing     Problem: Nutrition  Goal: Nutrition/Hydration status is improving  Description: Monitor and assess patient's nutrition/hydration status for malnutrition (ex- brittle hair, bruises, dry skin, pale skin and conjunctiva, muscle wasting, smooth red tongue, and disorientation)  Collaborate with interdisciplinary team and initiate plan and interventions as ordered  Monitor patient's weight and dietary intake as ordered or per policy  Utilize nutrition screening tool and intervene per policy  Determine patient's food preferences and provide high-protein, high-caloric foods as appropriate  - Assist patient with eating   - Allow adequate time for meals   - Encourage patient to take dietary supplement as ordered  - Collaborate with clinical nutritionist   - Include patient/family/caregiver in decisions related to nutrition    Outcome: Progressing     Problem: Prexisting or High Potential for Compromised Skin Integrity  Goal: Skin integrity is maintained or improved  Description: INTERVENTIONS:  - Identify patients at risk for skin breakdown  - Assess and monitor skin integrity  - Assess and monitor nutrition and hydration status  - Monitor labs   - Assess for incontinence   - Turn and reposition patient  - Assist with mobility/ambulation  - Relieve pressure over bony prominences  - Avoid friction and shearing  - Provide appropriate hygiene as needed including keeping skin clean and dry  - Evaluate need for skin moisturizer/barrier cream  - Collaborate with interdisciplinary team   - Patient/family teaching  - Consider wound care consult   Outcome: Progressing     Problem: PAIN - ADULT  Goal: Verbalizes/displays adequate comfort level or baseline comfort level  Description: Interventions:  - Encourage patient to monitor pain and request assistance  - Assess pain using appropriate pain scale  - Administer analgesics based on type and severity of pain and evaluate response  - Implement non-pharmacological measures as appropriate and evaluate response  - Consider cultural and social influences on pain and pain management  - Notify physician/advanced practitioner if interventions unsuccessful or patient reports new pain  Outcome: Progressing     Problem: INFECTION - ADULT  Goal: Absence or prevention of progression during hospitalization  Description: INTERVENTIONS:  - Assess and monitor for signs and symptoms of infection  - Monitor lab/diagnostic results  - Monitor all insertion sites, i e  indwelling lines, tubes, and drains  - Monitor endotracheal if appropriate and nasal secretions for changes in amount and color  - Trevor appropriate cooling/warming therapies per order  - Administer medications as ordered  - Instruct and encourage patient and family to use good hand hygiene technique  - Identify and instruct in appropriate isolation precautions for identified infection/condition  Outcome: Progressing     Problem: SAFETY ADULT  Goal: Patient will remain free of falls  Description: INTERVENTIONS:  - Educate patient/family on patient safety including physical limitations  - Instruct patient to call for assistance with activity   - Consult OT/PT to assist with strengthening/mobility   - Keep Call bell within reach  - Keep bed low and locked with side rails adjusted as appropriate  - Keep care items and personal belongings within reach  - Initiate and maintain comfort rounds  - Make Fall Risk Sign visible to staff  - Initiate/Maintain bed alarm  - Apply yellow socks and bracelet for high fall risk patients  - Consider moving patient to room near nurses station  Outcome: Progressing  Goal: Maintain or return to baseline ADL function  Description: INTERVENTIONS:  -  Assess patient's ability to carry out ADLs; assess patient's baseline for ADL function and identify physical deficits which impact ability to perform ADLs (bathing, care of mouth/teeth, toileting, grooming, dressing, etc )  - Assess/evaluate cause of self-care deficits   - Assess range of motion  - Assess patient's mobility; develop plan if impaired  - Assess patient's need for assistive devices and provide as appropriate  - Encourage maximum independence but intervene and supervise when necessary  - Involve family in performance of ADLs  - Assess for home care needs following discharge   - Consider OT consult to assist with ADL evaluation and planning for discharge  - Provide patient education as appropriate  Outcome: Progressing  Goal: Maintains/Returns to pre admission functional level  Description: INTERVENTIONS:  - Perform BMAT or MOVE assessment daily    - Set and communicate daily mobility goal to care team and patient/family/caregiver  - Collaborate with rehabilitation services on mobility goals if consulted  - Perform Range of Motion 3 times a day  - Reposition patient every 2 hours  Outcome: Progressing     Problem: DISCHARGE PLANNING  Goal: Discharge to home or other facility with appropriate resources  Description: INTERVENTIONS:  - Identify barriers to discharge w/patient and caregiver  - Arrange for needed discharge resources and transportation as appropriate  - Identify discharge learning needs (meds, wound care, etc )  - Arrange for interpretive services to assist at discharge as needed  - Refer to Case Management Department for coordinating discharge planning if the patient needs post-hospital services based on physician/advanced practitioner order or complex needs related to functional status, cognitive ability, or social support system  Outcome: Progressing     Problem: Knowledge Deficit  Goal: Patient/family/caregiver demonstrates understanding of disease process, treatment plan, medications, and discharge instructions  Description: Complete learning assessment and assess knowledge base    Interventions:  - Provide teaching at level of understanding  - Provide teaching via preferred learning methods  Outcome: Progressing     Problem: NEUROSENSORY - ADULT  Goal: Achieves stable or improved neurological status  Description: INTERVENTIONS  - Monitor and report changes in neurological status  - Monitor vital signs such as temperature, blood pressure, glucose, and any other labs ordered   - Initiate measures to prevent increased intracranial pressure  - Monitor for seizure activity and implement precautions if appropriate      Outcome: Progressing  Goal: Remains free of injury related to seizures activity  Description: INTERVENTIONS  - Maintain airway, patient safety  and administer oxygen as ordered  - Monitor patient for seizure activity, document and report duration and description of seizure to physician/advanced practitioner  - If seizure occurs,  ensure patient safety during seizure  - Reorient patient post seizure  - Seizure pads on all 4 side rails  - Instruct patient/family to notify RN of any seizure activity including if an aura is experienced  - Instruct patient/family to call for assistance with activity based on nursing assessment  - Administer anti-seizure medications if ordered    Outcome: Progressing  Goal: Achieves maximal functionality and self care  Description: INTERVENTIONS  - Monitor swallowing and airway patency with patient fatigue and changes in neurological status  - Encourage and assist patient to increase activity and self care     - Encourage visually impaired, hearing impaired and aphasic patients to use assistive/communication devices  Outcome: Progressing

## 2022-08-12 NOTE — PROGRESS NOTES
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, most recent of which was recently performed on 7/28/22 by Dr Molly Gutiérrez  Additional history of A-fib, hypothyroidism, SIADH, HTN, gout, and hyperlipidemia  He represented to the ED on 8/8/22 due to altered mental status  Per wife, he was yelling out in pain, "babbling," and confused  He was found to have new multifocal pneumonia, severe sepsis, hyponatremia, and worsening intracranial hemorrhage at surgical site and was therefore admitted to the ICU for further evaluation and management  He was noted to have clinical seizure-like activity on the morning of 8/9 and was therefore placed on vEEG monitoring and started on Keppra  · Family meeting held yesterday with wife Gracie Siegel) and brother William Sender)  Code status changed to level 3  Plan on meeting again on Saturday (8/12) for further discussions  · Increased O2 needs, now on [address] at 10 L/min    ---------------------------------------------------------------------------------------  SUBJECTIVE  Patient remains confused  He denies any pain or difficulty breathing  Otherwise unable to contribute to history today  Review of Systems   Unable to perform ROS: Mental status change        Review of systems was unable to be performed secondary to confusion  ---------------------------------------------------------------------------------------  Assessment and Plan:    Neuro:   · Diagnosis: Acute worsening of ICH 2/2 recent tumor resection  ? Updated CTH revealed stable hemorrhage and edema in the surgical site and stable adjacent left parietal lobe hemorrhage  ?  Repeat CTH 8/10 is stable  ? Avoid AC/AP  DVT prophylaxis okay  ? Goal platelets > 50  · Diagnosis: Neuroendocrine cancer with mets to brain, s/p two resections most recent of which was on 7/28 POD #14  Also s/p chemo and whole brain radiation  ? Updated MRI ordered  Patient has MRI-compatible EEG leads  ? Decadron 2 mg daily  ? Neurosurgery team will check incision and remove sutures today  · Diagnosis: Acute toxic metabolic encephalopathy, multifactorial including brain mets, severe sepsis, hypoxemia, pneumonia, fevers, and reduced baseline cognitive reserve  ? Continue supportive care and treat underlying causes  ? Stat CTH for any acute changes  ? Q2H neuro checks  ? Frequent reorientation  ? Promote restful sleep  · Diagnosis: Clinical seizure-like activity  Monitored on vEEG with no evidence of seizures or epileptiform discharges  ? EEG monitoring discontinued on 8/11  ? Continue Keppra 1 g bid  ? Seizure precautions  · Diagnosis: Developing mild hydrocephalus due to effacement of the 4th ventricle  ? Monitor for signs of increasing ICP      CV:   · Diagnosis: Paroxysmal A-fib diagnosed during last admission  ? Metoprolol was changed to metoprolol tartrate 25 mg bid  ? Holding Cardizem for now  ? Resumed Amiodarone at 400 mg bid through the end of day 8/13 then start 200 mg daily on 8/14  ? Continue to monitor on telemetry  ? No AC/AP due to [address]  DVT prophylaxis has been started  · Diagnosis: Intermittent hypotension  ? Currently on NS at 100 cc/hr  ? Will give albumin this morning for intravascular volume  · Diagnosis: History of HTN  ? Antihypertensives currently on hold due to hypotension  · Diagnosis: Hyperlipidemia  ? Home Crestor substituted with Pravastatin 80 mg daily  · Diagnosis: Evaluation for endocarditis given concern for septic emboli  ? Transthoracic echo was technically limited and was inadequate for visualizing valves   Consider EMILY  § LVEF is 60%, concentric remodeling of LV, unable to assess diastolic function of LV  Sub-optimal visualization of RV, LA, RA, aortic valve, tricuspid valve, pulmonic valve        Pulm:  · Diagnosis: Acute hypoxic respiratory failure 2/2 pneumonia and neuroendocrine cancer, increasing tumor burden, possible septic emboli  ? ABG afternoon of 8/9 with hypoxemia  Repeat in the evening was improved  ? Currently on 10 L/min [address]  ? Antibiotics day # 5 overall  Currently on Ceftriaxone day # 4  § MRSA swab negative and therefore vancomycin was discontinued  § Follow micro results and deescalate as able  § Respiratory protocol and airway clearance  § Encourage deep breathing  § Incentive spirometry  · Diagnosis: Neuroendocrine carcinoma of lung  ? Follows at [facility]  ? S/p DAVID resection in 4/2021  ? Last chemo in January and radiation in May 2022  ? Evidence of worsening lesions on CT chest  ? Family meeting held on 8/11  Plan on next meeting on 8/13  GI:   · Diagnosis: GERD  ? Continue home Protonix  · Diet: Dysphagia 3, thin liquids  ? Bowel regimen ordered  Last BM on 8/11      :   · No acute issues  · Condom catheter  · UA non-infected  Urine culture negative      F/E/N:   · Fluids: NS at 100 cc/hr  · Electrolytes: Replete as needed to maintain K > 4, Mag > 2, Phos > 3  § Diagnosis: Hypophosphatemia and Hypomagnesemia   · Nutrition: Dysphagia 3/thin liquids  § Diagnosis: Protein-calorie malnutrition  § Supplemental nutrition once diet allows      Heme/Onc:   · Diagnosis: Acute on chronic anemia, goal hgb > 8  ? Hgb 6 6 on morning of 8/10  Received 1 u pRBCs  ? Continue to monitor closely  AM labs are not resulted yet  ? Anemia workup:  § Retics 1 88%, 48,100 absolute  § Iron saturation 66%, TIBC 93, Iron 61, Ferritin 3637  § Peripheral smear: 79% neutrophils, decreased lymphocytes, anisocytosis  · Diagnosis: Thrombocytopenia, goal plt > 50  ? Platelets were normal in 6/2022  ? On 8/10 platelets were 47, transfused 1 unit of platelets  ?  Thrombocytopenia workup:  § Has been on heparin in prior admissions  § HIT labs pending  · Diagnosis: coagulopathy  ? PT and INR are both high on admission  ? Follow up labs on 8/10 were in normal range  · Diagnosis: Neuroendocrine carcinoma of the lung with mets to brain, worsening lung lesions  ? As outlined above, follows at [facility]  ? Will hold off on consulting Oncology for now  ? Palliative is following  ? Next family meeting 8/13      Endo:   · Diagnosis: Hypothyroidism  ? Continue home synthroid 37 5 mcg daily      ID:   · Diagnosis: Severe sepsis 2/2 pneumonia, initial lactic acid 7 7 and procalcitonin 3 34  Significantly improved overall  Vitals stable  ? Sepsis pathway  ? Maintenance fluids  ? Antibiotics day # 5 overall  Ceftriaxone day # 4  § Vancomycin was discontinued once MRSA negative  § Will deescalate as culture results are available  ? Monitor fever curve and WBC  ? ATC tylenol for fever control  · Diagnosis: Suspected abscess in iliacus muscle was actually hematoma      MSK/Skin:   · Diagnosis: Right thigh pain and swelling  ? Much improved  Continue to monitor  · Diagnosis: Right iliacus muscle hematoma  ? S/p IR drainage on 8/11  ? Follow up on cultures  ? Diagnosis: Gout  § Continue home allopurinol  · Turning/repositioning  · PT/OT when appropriate      Patient appropriate for transfer out of the ICU today?: No  Disposition: Continue Critical Care   Code Status: Level 3 - DNAR and DNI  ---------------------------------------------------------------------------------------  ICU CORE MEASURES    Prophylaxis   VTE Pharmacologic Prophylaxis: Heparin  VTE Mechanical Prophylaxis: sequential compression device  Stress Ulcer Prophylaxis: Omeprazole PO    ABCDE Protocol (if indicated)  Plan to perform spontaneous awakening trial today? Not applicable  Plan to perform spontaneous breathing trial today? Not applicable  Obvious barriers to extubation?  Not applicable  CAM-ICU: Negative    Invasive Devices Review  Invasive Devices  Report    Central Venous Catheter Line  Duration           Port A Cath 21 Right Chest 420 days          Peripheral Intravenous Line  Duration           Peripheral IV 22 Right Antecubital 3 days    Peripheral IV 08/10/22 Dorsal (posterior); Left Forearm 2 days    Peripheral IV 22 Dorsal (posterior); Right Forearm <1 day          Drain  Duration           NG/OG/Enteral Tube Nasogastric Right nare 2 days    External Urinary Catheter  1 day    Abscess Drain RLQ <1 day              Can any invasive devices be discontinued today? No  ---------------------------------------------------------------------------------------  OBJECTIVE    Vitals   Vitals:    22 0430 22 0530 22 0600 22 0804   BP: 105/69 111/70     Pulse: 92 98 100    Resp: 20 15 17    Temp:       TempSrc:       SpO2: 94% 94% 98% 94%   Weight:       Height:         Temp (24hrs), Av 2 °F (37 3 °C), Min:98 4 °F (36 9 °C), Max:100 4 °F (38 °C)  Current: Temperature: 100 4 °F (38 °C)  HR: 90  BP: 101/58  RR: 13  SpO2: 94%    Respiratory:  SpO2: SpO2: 94 %, SpO2 Activity: SpO2 Activity: At Rest, SpO2 Device: O2 Device: Mid flow nasal cannula  Nasal Cannula O2 Flow Rate (L/min): 10 L/min    Invasive/non-invasive ventilation settings   Respiratory  Report   Lab Data (Last 4 hours)    None         O2/Vent Data (Last 4 hours)    None                Physical Exam  Vitals and nursing note reviewed  Constitutional:       General: He is not in acute distress  Appearance: He is cachectic  He is ill-appearing and toxic-appearing  HENT:      Head:      Comments: S/p craniectomy  Significant ecchymosis surrounding incision  C/D/I  Sutures are still in place     Right Ear: External ear normal       Left Ear: External ear normal       Nose: Nose normal       Comments: NG present     Mouth/Throat:      Mouth: Mucous membranes are moist       Pharynx: Oropharynx is clear  Eyes:      General: No scleral icterus  Extraocular Movements: Extraocular movements intact  Pupils: Pupils are equal, round, and reactive to light  Comments: Conjunctival pallor   Cardiovascular:      Rate and Rhythm: Normal rate and regular rhythm  Pulses: Normal pulses  Heart sounds: Normal heart sounds  Pulmonary:      Effort: Pulmonary effort is normal  No respiratory distress  Breath sounds: Decreased air movement present  No wheezing or rhonchi  Abdominal:      General: Abdomen is protuberant  Bowel sounds are normal  There is no distension  Palpations: Abdomen is soft  Genitourinary:     Comments: Condom catheter  Musculoskeletal:      Cervical back: Neck supple  No rigidity  Right lower leg: No edema  Left lower leg: No edema  Skin:     General: Skin is warm and dry  Coloration: Skin is pale  Findings: Ecchymosis present  Neurological:      Mental Status: He is lethargic  Comments: More alert today but still remains drowsy/lethargic  Oriented to self and hospital  Not oriented to time  Able to follow one-step commands  CN are intact  Motor: global generalized weakness at 4/5 and symmetric  Mild ataxia  Reflexes are 1+ throughout and diminished   Psychiatric:         Mood and Affect: Mood is not anxious  Behavior: Behavior is not agitated             Laboratory and Diagnostics:  Results from last 7 days   Lab Units 08/12/22  0533 08/11/22  0542 08/10/22  2051 08/10/22  4324 08/10/22  0546 08/09/22  1304 08/09/22  0734 08/09/22  0620 08/08/22  1246   WBC Thousand/uL 2 69* 2 10* 2 57*  --  2 88*  --  3 14* 3 17* 4 47   HEMOGLOBIN g/dL 8 9* 7 7* 8 6* 6 6* 6 6* 8 1* 7 3* 5 9* 9 6*   HEMATOCRIT % 27 3* 23 5* 25 6* 20 2* 20 2* 24 7* 22 2* 18 3* 28 5*   PLATELETS Thousands/uL 92* 88* 95*  --  47*  --  54* 59* 76*   BANDS PCT %  --   --   --   --  11*  --   --  17* 38*   MONO PCT %  --   --   --   --   --   --   --  0* 0*     Results from last 7 days   Lab Units 08/12/22  0533 08/11/22  0542 08/10/22  0826 08/09/22  0620 08/08/22  1246 08/07/22  0509   SODIUM mmol/L 139 142 140 137 131* 130*   POTASSIUM mmol/L 3 7 3 4* 3 7 3 4* 4 4 4 0   CHLORIDE mmol/L 109* 113* 112* 106 96 95*   CO2 mmol/L 24 24 24 24 22 28   ANION GAP mmol/L 6 5 4 7 13 7   BUN mg/dL 13 12 12 17 28* 20   CREATININE mg/dL 0 65 0 60 0 58* 0 77 1 50* 0 64   CALCIUM mg/dL 8 8 8 1* 8 5 8 7 9 8 9 3   GLUCOSE RANDOM mg/dL 90 110 111 87 161* 137   ALT U/L  --   --   --   --  64  --    AST U/L  --   --   --   --  35  --    ALK PHOS U/L  --   --   --   --  76  --    ALBUMIN g/dL  --   --   --   --  2 0*  --    TOTAL BILIRUBIN mg/dL  --   --   --   --  0 58  --      Results from last 7 days   Lab Units 08/09/22  0620   MAGNESIUM mg/dL 1 7   PHOSPHORUS mg/dL 3 4      Results from last 7 days   Lab Units 08/10/22  0826 08/09/22  1304 08/08/22  1316   INR  0 98 1 28* 1 13   PTT seconds 37 35 34          Results from last 7 days   Lab Units 08/09/22  0013 08/08/22  2105 08/08/22  1749 08/08/22  1508 08/08/22  1316   LACTIC ACID mmol/L 1 8 2 2* 2 9* 7 2* 7 7*     ABG:  Results from last 7 days   Lab Units 08/09/22 2018   PH ART  7 342*   PCO2 ART mm Hg 47 6*   PO2 ART mm Hg 101 2   HCO3 ART mmol/L 25 2   BASE EXC ART mmol/L -0 5   ABG SOURCE  Radial, Right     VBG:  Results from last 7 days   Lab Units 08/09/22 2018 08/09/22  1812 08/08/22  1748   PH SHANNA   --   --  7 378   PCO2 SHANNA mm Hg  --   --  41 1*   PO2 SHANNA mm Hg  --   --  25 0*   HCO3 SHANNA mmol/L  --   --  23 7*   BASE EXC SHANNA mmol/L  --   --  -1 4   ABG SOURCE  Radial, Right   < >  --     < > = values in this interval not displayed  Results from last 7 days   Lab Units 08/09/22  0620 08/08/22  1316   PROCALCITONIN ng/ml 3 31* 3 34*       Micro  Results from last 7 days   Lab Units 08/08/22  1611 08/08/22  1511 08/08/22  1316   BLOOD CULTURE   --   --  No Growth at 72 hrs  No Growth at 72 hrs     URINE CULTURE   --  No Growth <1000 cfu/mL  --    MRSA CULTURE ONLY  No Methicillin Resistant Staphlyococcus aureus (MRSA) isolated  --   --        EKG: NSR at 97 on tele  Imaging: I have personally reviewed pertinent reports  and I have personally reviewed pertinent films in PACS    CT head wo contrast  Result Date: 8/10/2022  Impression: Essentially unchanged appearance of hemorrhage and edema in the left cerebellar surgical bed, with mild mass effect on the 4th ventricle  No significant change from recent CT  Workstation performed: [id number]         Intake and Output  I/O       08/10 0701  08/11 0700 08/11 0701 08/12 0700 08/12 0701 08/13 0700    P  O  200 200     I V  (mL/kg) 1556 7 (18 6) 1140 (13 6)     Blood 580      NG/ 380     IV Piggyback 350 50     Feedings 60      Total Intake(mL/kg) 2926 7 (34 9) 1770 (21 1)     Urine (mL/kg/hr) 1920 (1) 2400 (1 2)     Drains  10     Stool 0 0     Total Output 1920 2410     Net +1006 7 -640            Unmeasured Urine Occurrence 1 x 1 x     Unmeasured Stool Occurrence 2 x 1 x               Height and Weights   Height: 5' 8" (172 7 cm)  IBW (Ideal Body Weight): 68 4 kg  Body mass index is 28 12 kg/m²  Weight (last 2 days)     Date/Time Weight    08/11/22 0543 83 9 (184 97)            Nutrition       Diet Orders   (From admission, onward)             Start     Ordered    08/11/22 1526  Diet Dysphagia/Modified Consistency; Dysphagia 3-Dental Soft; Thin Liquid  Diet effective now        References:    Nutrtion Support Algorithm Enteral vs  Parenteral   Question Answer Comment   Diet Type Dysphagia/Modified Consistency    Dysphagia/Modified Consistency Dysphagia 3-Dental Soft    Liquid Modifier Thin Liquid    RD to adjust diet per protocol?  Yes        08/11/22 1525                  Active Medications  Scheduled Meds:  Current Facility-Administered Medications   Medication Dose Route Frequency Provider Last Rate    acetaminophen  650 mg Oral Q6H [address] Mary Grace Corral,       allopurinol  100 mg Oral Daily Ashwin Steven, DO      [START ON 8/14/2022] amiodarone  200 mg Oral Daily With Breakfast Mary Grace Alexanderricotte, DO      amiodarone  400 mg Oral BID With Meals Figueroa Nagelottbrittney, DO      cefTRIAXone  1,000 mg Intravenous Q24H Alejandra Goldstein, DO Stopped (08/11/22 1600)    chlorhexidine  15 mL Mouth/Throat Q12H [address] Alejandra Almanzai, DO      docusate sodium  100 mg Oral BID Alejandra Goldstein, DO      heparin (porcine)  5,000 Units Subcutaneous Q8H [address] Mary Gracemilana Alexanderricotte, DO      HYDROmorphone  0 2 mg Intravenous Q3H PRN Keily Cantrell PA-C      levETIRAcetam  1,000 mg Oral Q12H [address] Alejandra Goldstein, DO      levothyroxine  37 5 mcg Oral Early Morning Alejandra Goldstein, DO      LORazepam  0 5 mg Oral BID PRN Alejandra Goldstein, DO      metoprolol tartrate  25 mg Oral Q12H [address] Mary Gracemilana Alexanderricotte, DO      metroNIDAZOLE  500 mg Intravenous Q8H Mary Gracesamia Alexanderricotte,  mg (08/12/22 0121)    omeprazole (PRILOSEC) suspension 2 mg/mL  20 mg Oral Daily Mary Gracejulianna Alexanderricotte, DO      oxyCODONE  2 5 mg Oral Q4H PRN Alejandra Goldstein, DO      polyethylene glycol  17 g Oral BID Alejandra Goldstein, DO      potassium chloride  40 mEq Oral Once [employer], DO      pravastatin  80 mg Oral Daily With [facility] [employer], DO      senna  8 6 mg Oral Daily Alejandra Goldstein, DO      sodium chloride (PF)  3 mL Intravenous Q1H PRN Alejandra Goldstein, DO      sodium chloride  100 mL/hr Intravenous Continuous Keily Cantrell PA-C Stopped (08/11/22 1230)    sodium chloride  1 g Oral TID With Meals Alejandra Goldstein DO       Continuous Infusions:  sodium chloride, 100 mL/hr, Last Rate: Stopped (08/11/22 1230)      PRN Meds:   HYDROmorphone, 0 2 mg, Q3H PRN  LORazepam, 0 5 mg, BID PRN  oxyCODONE, 2 5 mg, Q4H PRN  sodium chloride (PF), 3 mL, Q1H PRN        Allergies   Allergies   Allergen Reactions    Bee Venom     Benazepril Other (See Comments)     Angioedema     Latex Other (See Comments) Burning of eyes at the dentist from the gloves    Meloxicam GI Intolerance    Penicillin G Rash     ---------------------------------------------------------------------------------------  Advance Directive and Living Will:      Power of :    POLST:    ---------------------------------------------------------------------------------------  Care Time Delivered:   Please refer to attending's attestation    Faiza Mabry,       Portions of the record may have been created with voice recognition software  Occasional wrong word or "sound a like" substitutions may have occurred due to the inherent limitations of voice recognition software    Read the chart carefully and recognize, using context, where substitutions have occurred

## 2022-08-12 NOTE — OCCUPATIONAL THERAPY NOTE
OT CANCEL NOTE    Pt chart reviewed  Pt is pending potential D/C to home w/ home hospice; will continue to follow until further decisions made but will hold OT tx today  Will continue to follow pt on caseload and see pt when medically stable and as clinically appropriate         08/12/22 2217   OT Last Visit   OT Visit Date 08/12/22   Note Type   Note Type Cancelled Session   Cancel Reasons Medical status       Margie Ward MS, OTR/L

## 2022-08-12 NOTE — PLAN OF CARE
Problem: Nutrition/Hydration-ADULT  Goal: Nutrient/Hydration intake appropriate for improving, restoring or maintaining nutritional needs  Description: Monitor and assess patient's nutrition/hydration status for malnutrition  Collaborate with interdisciplinary team and initiate plan and interventions as ordered  Monitor patient's weight and dietary intake as ordered or per policy  Utilize nutrition screening tool and intervene as necessary  Determine patient's food preferences and provide high-protein, high-caloric foods as appropriate  INTERVENTIONS:  - Monitor oral intake, urinary output, labs, and treatment plans  - Assess nutrition and hydration status and recommend course of action  - Evaluate amount of meals eaten  - Assist patient with eating if necessary   - Allow adequate time for meals  - Recommend/ encourage appropriate diets, oral nutritional supplements, and vitamin/mineral supplements  - Order, calculate, and assess calorie counts as needed  - Recommend, monitor, and adjust tube feedings and TPN/PPN based on assessed needs  - Assess need for intravenous fluids  - Provide specific nutrition/hydration education as appropriate  - Include patient/family/caregiver in decisions related to nutrition  Outcome: Progressing     Problem: Neurological Deficit  Goal: Neurological status is stable or improving  Description: Interventions:  - Monitor and assess patient's level of consciousness, motor function, sensory function, and level of assistance needed for ADLs  - Monitor and report changes from baseline  Collaborate with interdisciplinary team to initiate plan and implement interventions as ordered  - Provide and maintain a safe environment  - Consider seizure precautions  - Consider fall precautions  - Consider aspiration precautions  - Consider bleeding precautions    Outcome: Progressing     Problem: PAIN - ADULT  Goal: Verbalizes/displays adequate comfort level or baseline comfort level  Description: Interventions:  - Encourage patient to monitor pain and request assistance  - Assess pain using appropriate pain scale  - Administer analgesics based on type and severity of pain and evaluate response  - Implement non-pharmacological measures as appropriate and evaluate response  - Consider cultural and social influences on pain and pain management  - Notify physician/advanced practitioner if interventions unsuccessful or patient reports new pain  Outcome: Progressing

## 2022-08-12 NOTE — PROGRESS NOTES
Meeting with patients wife Camilo Altamirano, brother Yannick Oakes along with Daksha Cao and Neurology resident Dr Alisa Segura and Dr Denis Liu from palliative  Patient was asked if he would like to participate in conversation, he declined and requested we speak to his family  Discussed current clinical status with family explaining progression of cancer and images reviewed  Family understood that patient is not a candidate for chemotherapy at this time  They were also informed with his deconditioning and acute critical illness and co morbidities the likelihood of him regaining strength to tolerate chemotherapy is unlikely  Family inquired regarding next steps  The conversation was directed to code status  Family in agreement with [facility]  We agreed to meet again to further discuss patients wishes and to discuss with him if he wishes to be involved in conversations  Patient wife did ask for explanation regarding what comfort care/ hospice would look like  Plan to continue medical management and transition to DNR 3 with plan for additional meetings this weekend pending patient and wife availability       Cc time 36min    DOS 8/10/22 3:30pm

## 2022-08-13 NOTE — PROGRESS NOTES
Patient continues to have increased work of breathing and hypotension  Met with patients wife and brother with palliative care to discuss clinical decline and goals of care  Patient wife understands and does not want to "see him suffer"  Plan is to transition to comfort measures  Palliative will attempt to coordinate hospice house and will order comfort medications

## 2022-08-13 NOTE — PROGRESS NOTES
Progress Note - Palliative & Supportive Care  Mike Mills  69 y o   male  ICU 02/ICU 02   MRN: [id number]  Encounter: [id number]     ASSESSMENT:    Patient Active Problem List   Diagnosis    Renal cyst, right    Acute idiopathic gout of left foot    Back problem    Depression with anxiety    Essential hypertension    Glaucoma    Hypertriglyceridemia    Lumbago with sciatica, left side    Lumbar stenosis    JUNAID (obstructive sleep apnea)    Spinal stenosis of lumbar region with neurogenic claudication    Mixed hyperlipidemia    Bilateral hearing loss    Hyperglycemia    Cigarette nicotine dependence in remission    Adenocarcinoma, lung[facility] left ([facility] )    Encounter for central line care    Drug-induced neutropenia (HCC)    Left non-suppurative otitis media    Bilateral hearing loss due to cerumen impaction    Cancer of upper lobe of left lung (HCC)    Chronic back pain    Former cigarette smoker    History of gout    Hypertension    Proctocolitis    Pericardial effusion    Constipation    Labyrinthitis of both ears    CKD (chronic kidney disease) stage 2, GFR 60-89 ml/min    Platelets decreased (HCC)    Psoriasis    Left parietal hemorrhagic tumor    Thrombocytopenia (HCC)    Goals of care, counseling/discussion    Hypothyroidism    Irregular heart rate    Atrial fibrillation with rapid ventricular response (HCC)    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    Dizziness    Chronic obstructive pulmonary disease, unspecified COPD type (HCC)    Hyponatremia    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)    Multifocal pneumonia    Acute respiratory failure with hypoxia (HCC)    Acute encephalopathy       Active problems addressed:  Metastatic lung cancer  Brain mets s/p resection x2  Seizures  Sepsis 2/2 PNA and iliacus abscess  Palliative patient    Consult is for goals of care discussion      PLAN:    PALLIATIVE AND SUPPORTIVE CARE FAMILY CONFERENCE:    Time of Meeting: 10:15am    Participants:  Family: wife Lakisha Bull, brother Cayden Nava  ICU team: Dr Fuad Almodovar    Patient Participation: no    Meeting Location: ICU conference room    A family meeting was held for goals of care given rapid decompensation and clinical decline  This meeting was necessary for determine the appropriate course of treatment  Topics of Discussion:  Medical perspective from ICU Dr Fuad Almodovar, patient w/ worsening hypotension and increased work of breathing  Goals of care discussed with all, wife and brother want to focus on patient's comfort and limit suffering  Given progressive nature of symptoms and clinical deterioration, would like to transition patient to comfort care  Family agreeable to [facility] hosp[facility] [place]  PLAN:  #Comfort care   -Family agreeable to comfort care measures and hospice per above meeting  -IV dilaudid 0 2 mg q1hr PRN for pain or dyspnea   -IV lorazepam 1 mg q1hr PRN for dyspnea or anxiety   -IV haldol 0 5 mg q1hr PRN for nausea, vomiting, agitation, restleness, hiccups, hallucinations, agitation  -dulcolax rectal suppository 10 mg, rectal daily PRN if no bowel movement past 48 hours  -continue oral solution Keppra 1000 mg daily (NGT in place)  -Hospice house able to manage up to 10L oxygen NRB - continue to wean oxygen requirements in ICU prior to transport to hospice house      #Transfer to [address] referral via case management placed    Time Involved in Meetin minutes, beginning at approximately 21  and ending at approximately 11  Code status: Level 4 - Comfort Care       We appreciate the opportunity to participate in this patient's care  We will continue to follow  Please do not hesitate to contact our on-call provider through our clinic answering service at [phone] should you have acute symptom control concerns  INTERVAL HISTORY:  Family meeting held today; please see above "family conference" for detail      Review of Systems Unable to perform ROS: Mental status change       MEDICATIONS / ALLERGIES:  all current active meds have been reviewed    Allergies   Allergen Reactions    Bee Venom     Benazepril Other (See Comments)     Angioedema     Latex Other (See Comments)     Burning of eyes at the dentist from the gloves    Meloxicam GI Intolerance    Penicillin G Rash       OBJECTIVE:  BP (!) 81/61   Pulse (!) 134   Temp 100 2 °F (37 9 °C) (Rectal)   Resp (!) 28   Ht 5' 8" (1 727 m)   Wt 83 9 kg (184 lb 15 5 oz)   SpO2 97%   BMI 28 12 kg/m²   Physical Exam:  Limited 2/2 mental status   Physical Exam  Vitals and nursing note reviewed  Constitutional:       General: He is not in acute distress  Appearance: Normal appearance  He is ill-appearing  HENT:      Head: Normocephalic and atraumatic  Cardiovascular:      Rate and Rhythm: Tachycardia present  Rhythm irregular  Pulmonary:      Effort: Tachypnea present  Abdominal:      General: There is no distension  Musculoskeletal:      Cervical back: Neck supple  Right lower leg: No edema  Left lower leg: No edema  Skin:     General: Skin is warm and dry  Coloration: Skin is pale  Neurological:      Comments: Eyes closed, not responding to voice   Psychiatric:         Thought Content: Thought content normal          Lab Results:   I have personally reviewed pertinent labs  Imaging Studies: I have personally reviewed pertinent reports  Counseling / Coordination of Care  Total floor / unit time spent today 45 minutes  Greater than 50% of total time was spent with the patient and / or family counseling and / or coordination of care  A description of the counseling / coordination of care: provided medical updates, discussed palliative care, determined competency, determined goals of care, determined POA, determined social/family support, discussed plans of care, discussed symptom management, provided psychosocial support      Conner Cedeno, DO  [facility]'s Palliative and Supportive Care

## 2022-08-13 NOTE — PROGRESS NOTES
Neurosurgery progress note    Overnight events  Respiratory decline with hypotension and Afib overnight  Neurologic exam unchanged      Vitals:    08/13/22 0300 08/13/22 0400 08/13/22 0500 08/13/22 0600   BP: (!) 83/54 95/57 94/60 (!) 87/58   Pulse: (!) 112 (!) 108 104 102   Resp: (!) 25 (!) 27 (!) 29 (!) 27   Temp:  98 9 °F (37 2 °C)     TempSrc:  Oral     SpO2: (!) 89% 95% 95% 97%   Weight:       Height:           Lab Results   Component Value Date/Time    SODIUM 136 08/13/2022 05:20 AM    K 3 7 08/13/2022 05:20 AM    WBC 2 69 (L) 08/12/2022 05:33 AM    HGB 8 9 (L) 08/12/2022 05:33 AM    HCT 27 3 (L) 08/12/2022 05:33 AM    PLT 92 (L) 08/12/2022 05:33 AM    PTT 37 08/10/2022 08:26 AM    INR 0 98 08/10/2022 08:26 AM         Intake/Output Summary (Last 24 hours) at 8/13/2022 0737  Last data filed at 8/13/2022 0600  Gross per 24 hour   Intake 952 34 ml   Output 1210 ml   Net -257 66 ml         Current Facility-Administered Medications:     acetaminophen (TYLENOL) tablet 650 mg, 650 mg, Oral, Q6H PRN, Mary Grace Corral DO    allopurinol (ZYLOPRIM) tablet 100 mg, 100 mg, Oral, Daily, Alejandra Goldstein DO, 100 mg at 08/12/22 1029    [START ON 8/14/2022] amiodarone tablet 200 mg, 200 mg, Oral, Daily With Breakfast, Mary Grace Corral DO    amiodarone tablet 400 mg, 400 mg, Oral, BID With Meals, Figueroa Corral DO, 400 mg at 08/12/22 1638    cefTRIAXone (ROCEPHIN) 1,000 mg in dextrose 5 % 50 mL IVPB, 1,000 mg, Intravenous, Q24H, Alejandra Goldstein DO, Last Rate: 100 mL/hr at 08/12/22 1508, 1,000 mg at 08/12/22 1508    chlorhexidine (PERIDEX) 0 12 % oral rinse 15 mL, 15 mL, Mouth/Throat, Q12H [address], Alejandra Goldstein, DO, 15 mL at 08/12/22 2020    dexmedeTOMIDine (Precedex) 400 mcg in sodium chloride 0 9% 100 mL, 0 1-0 7 mcg/kg/hr, Intravenous, Titrated, CORRINE Evans, Last Rate: 4 2 mL/hr at 08/13/22 0234, 0 2 mcg/kg/hr at 08/13/22 0234    heparin (porcine) subcutaneous injection 5,000 Units, 5,000 Units, Subcutaneous, Q8H [address], Mary Gracejulianna Fink Derricotte, DO, 5,000 Units at 08/13/22 0519    HYDROmorphone HCl (DILAUDID) injection 0 2 mg, 0 2 mg, Intravenous, Q3H PRN, Christina Common Derricotte, DO    levETIRAcetam (KEPPRA) oral solution 1,000 mg, 1,000 mg, Oral, Q12H [address], Alejandra Moceri, DO, 1,000 mg at 08/12/22 2021    levothyroxine tablet 37 5 mcg, 37 5 mcg, Oral, Early Morning, Alejandra Moceri, DO, 37 5 mcg at 08/13/22 0520    LORazepam (ATIVAN) tablet 0 5 mg, 0 5 mg, Oral, BID PRN, Alejandra Moceri, DO, 0 5 mg at 08/13/22 0040    melatonin tablet 3 mg, 3 mg, Per NG Tube, , Tanja Santos, CRNP, 3 mg at 08/12/22 1923    metoprolol tartrate (LOPRESSOR) tablet 25 mg, 25 mg, Oral, Q12H [address], Mary Gracejulianna Fink Derricotte, DO, 25 mg at 08/12/22 2020    metroNIDAZOLE (FLAGYL) tablet 500 mg, 500 mg, Oral, Q8H [address], Alejandra Moceri, DO, 500 mg at 08/13/22 0519    omeprazole (PRILOSEC) suspension 2 mg/mL, 20 mg, Oral, Daily, Mary Grace Fink Derricotte, DO, 20 mg at 08/12/22 1049    oxyCODONE (ROXICODONE) immediate release tablet 10 mg, 10 mg, Oral, Q4H PRN, Mary Gracejulianna Fink Derricotte, DO, 10 mg at 08/12/22 1912    oxyCODONE (ROXICODONE) IR tablet 5 mg, 5 mg, Oral, Q4H PRN, Christina Common Derricotte, DO, 5 mg at 08/13/22 0219    polyethylene glycol (MIRALAX) packet 17 g, 17 g, Oral, BID PRN, Mary Grace Corral DO    pravastatin (PRAVACHOL) tablet 80 mg, 80 mg, Oral, Daily With Dinner, Alejandra Goldstein DO, 80 mg at 08/12/22 [facility]    senna-docusate sodium (SENOKOT S) 8 6-50 mg per tablet 1 tablet, 1 tablet, Per NG Tube, BID, Mary Grace Corral DO    Insert peripheral IV, , , Once **AND** sodium chloride (PF) 0 9 % injection 3 mL, 3 mL, Intravenous, Q1H PRN, Alejandra Goldstein DO    sodium chloride tablet 1 g, 1 g, Oral, TID With Meals, Alejandra Goldstein DO, 1 g at 08/12/22 1640     Neurologic exam  Drowsiness unchanged, mumbles responses Oriented to self and hospital, not time/year  Follows one-step, simple commands  Generalized weakness 4/5 symmetric  Incision CDI     Assessment  Patient has h/o A-fib, hypothyroidism, SIADH, HTN, gout, and neuroendocrine carcinoma of the lung with metastasis to the brain s/p two resections, most recently on 7/28/22 by Dr Kathryn Lara who p/w AMS on 8/8/22 with seizure activity on EEG, on Keppra  IR drain in right iliacus fluid collection suspicious for abscess  Plan  -Neurologic exam unchanged with healing wound  -Most recent [facility] on 8/10 showed stable left cerebellar hemorrhage  -Continue GOC discussions with family per [facility], comfort care measures per wife  -Gearldine Ahumada M D    Neurosurgeon

## 2022-08-13 NOTE — CASE MANAGEMENT
Case Management Discharge Planning Note    Patient name Braydon Russo  Location ICU 02/ICU 02 MRN [id number]  : 1955 Date 2022       Current Admission Date: 2022  Current Admission Diagnosis:Acute respiratory failure with hypoxia [facility])   Patient Active Problem List    Diagnosis Date Noted    Multifocal pneumonia 2022    Acute respiratory failure with hypoxia ([facility] ) 2022    Acute encephalopathy 2022    Hyponatremia 2022    [facility] 20[facility]    Chronic obstructive pulmonary disease, unspecified COPD type ([facility] ) 2022    Dizziness 2022    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 ([facility] 2022    Irregular heart rate 2022    Atrial fibrillation with rapid ventricular response ([facility] 2022    Thrombocytopenia ([facility] ) 2022    Goals of care, counseling/discussion 2022    Hypothyroidism 2022    Left parietal hemorrhagic tumor 2022    Platelets decreased ([facility] ) 02/15/2022    Psoriasis 02/15/2022    CKD (chronic kidney disease) stage 2, GFR 60-89 ml/min 2022    Labyrinthitis of both ears 2022    Proctocolitis 2021    Pericardial effusion 2021    Constipation 2021    Left non-suppurative otitis media 2021    Bilateral hearing loss due to cerumen impaction 2021    Chronic back pain 2021    Former cigarette smoker 2021    History of gout 2021    Hypertension 2021    Cancer of upper lobe of left lung ([facility] ) 2021    Drug-induced neutropenia ([facility] ) 07/15/2021    Adenocarcinoma, lung, left ([facility] ) 2021    Encounter for central line care 2021    Hyperglycemia 2021    Cigarette nicotine dependence in remission 2021    Bilateral hearing loss 2020    Mixed hyperlipidemia 2019    Renal cyst, right 2018    Lumbar stenosis 2018    Glaucoma 2018    JUNAID (obstructive sleep apnea) 03/16/2018    Spinal stenosis of lumbar region with neurogenic claudication 01/09/2018    Acute idiopathic gout of left foot 11/06/2017    Depression with anxiety 11/06/2017    Essential hypertension 11/06/2017    Hypertriglyceridemia 11/06/2017    Lumbago with sciatica, left side 11/06/2017    Back problem 06/30/2017      LOS (days): 5  Geometric Mean LOS (GMLOS) (days): 4 80  Days to GMLOS:0     OBJECTIVE:  Risk of Unplanned Readmission Score: 40 88         Current admission status: Inpatient   [facility] Pharmacy:   [facility]  [address] [place] [address] [address] [facility]   [facility] [address]   [address] [place] [address]  Phone: [phone] Fax: [address], [place] [address] [facility] [address] [place] [address] [address] [facility]  Phone: [phone] Fax: [phone]    Primary Care Provider: Robinson Brantley DO    Primary Insurance: MEDICARE  Secondary Insurance: [facility]    DISCHARGE DETAILS:    Family meeting was done, family agreeable to hospice referral  They are interested in inpatient unit   CM placed hospice referral

## 2022-08-13 NOTE — PROGRESS NOTES
Patient with decline in oxygen saturation overnight requiring escalation to HFNC  This morning hypotension and afib rvr  Patient clinically declining  Donna Ward, pt wife, contacted  She is en route to hospital  She will contact pt brother  Donna Ward was informed of clinical decline and the need for further discussion regarding goals of care  Pt DNR3  Interventions to sustain MAP >65 and control heart rate      CC time 32 min

## 2022-08-13 NOTE — PLAN OF CARE
Problem: Potential for Falls  Goal: Patient will remain free of falls  Description: INTERVENTIONS:  - Educate patient/family on patient safety including physical limitations  - Instruct patient to call for assistance with activity   - Consult OT/PT to assist with strengthening/mobility   - Keep Call bell within reach  - Keep bed low and locked with side rails adjusted as appropriate  - Keep care items and personal belongings within reach  - Initiate and maintain comfort rounds  - Make Fall Risk Sign visible to staff  - Initiate/Maintain bed alarm  - Obtain necessary fall risk management equipment:   - Apply yellow socks and bracelet for high fall risk patients  - Consider moving patient to room near nurses station  8/13/2022 0755 by [facility]  Outcome: Progressing  8/13/2022 0754 by [facility]  Outcome: Not Progressing     Problem: SAFETY ADULT  Goal: Patient will remain free of falls  Description: INTERVENTIONS:  - Educate patient/family on patient safety including physical limitations  - Instruct patient to call for assistance with activity   - Consult OT/PT to assist with strengthening/mobility   - Keep Call bell within reach  - Keep bed low and locked with side rails adjusted as appropriate  - Keep care items and personal belongings within reach  - Initiate and maintain comfort rounds  - Make Fall Risk Sign visible to staff  - Initiate/Maintain bed alarm  - Apply yellow socks and bracelet for high fall risk patients  - Consider moving patient to room near nurses station    8/13/2022 0754 by [facility]  Outcome: Not Progressing     Problem: DISCHARGE PLANNING  Goal: Discharge to home or other facility with appropriate resources  Description: INTERVENTIONS:  - Identify barriers to discharge w/patient and caregiver  - Arrange for needed discharge resources and transportation as appropriate  - Identify discharge learning needs (meds, wound care, etc )  - Arrange for interpretive services to assist at discharge as needed  - Refer to Case Management Department for coordinating discharge planning if the patient needs post-hospital services based on physician/advanced practitioner order or complex needs related to functional status, cognitive ability, or social support system  8/13/2022 0755 by Holly Baker  Outcome: Progressing  8/13/2022 0754 by Holly Baker  Outcome: Not Progressing     Problem: NEUROSENSORY - ADULT  Goal: Remains free of injury related to seizures activity  Description: INTERVENTIONS  - Maintain airway, patient safety  and administer oxygen as ordered  - Monitor patient for seizure activity, document and report duration and description of seizure to physician/advanced practitioner  - If seizure occurs,  ensure patient safety during seizure  - Reorient patient post seizure  - Seizure pads on all 4 side rails  - Instruct patient/family to notify RN of any seizure activity including if an aura is experienced  - Instruct patient/family to call for assistance with activity based on nursing assessment  - Administer anti-seizure medications if ordered    8/13/2022 0755 by Holly Baker  Outcome: Progressing  8/13/2022 [address] by Holly Baker  Outcome: Not Progressing

## 2022-08-13 NOTE — PROGRESS NOTES
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, most recent of which was recently performed on 7/28/22 by Dr Poole Life  Additional history of A-fib, hypothyroidism, SIADH, HTN, gout, and hyperlipidemia  He represented to the ED on 8/8/22 due to altered mental status  Per wife, he was yelling out in pain, "babbling," and confused  He was found to have new multifocal pneumonia, severe sepsis, hyponatremia, and worsening intracranial hemorrhage at surgical site and was therefore admitted to the ICU for further evaluation and management  He was noted to have clinical seizure-like activity on the morning of 8/9 and was therefore placed on vEEG monitoring and started on Keppra  Video EEG monitoring was without seizures or epileptiform discharges and thus it was discontinued  Family meeting was held on 8/11 and code status changed to DNR due to the progression of his disease    · Acute decompensation overnight  Had several events of hypoxia with desaturations as low as in the 60's%  Additionally had episodes of tachycardia  ABG consistent with hypoxemia with pO2 of 32 7  Supplemental O2 was escalated and he is now on max HFNC + NRB  Also received additional metoprolol and Lasix  · Given ativan and melatonin for anxiety  Subsequently started on Precedex  · Updated CXR obtained overnight - pulmonary vascular congestion and evidence of metastatic disease  · At 06:35 had an episode of A-flutter in the 150's that lasted about 2-3 minutes before he converted back to sinus tachycardia in the low 100's  · Had another deterioration around 07:00  BP dropped to the 80's/50's   Given additional fluids and started on pressors  Wife, Marielos Barber, was called and updated with his deterioration  Wendi and patient's brother, Stephanie Cuevas, had family meeting with Dr Romeo Castillo and Palliative care team and patient transitioned to comfort care with plans for [facility]     ---------------------------------------------------------------------------------------  SUBJECTIVE  Patient remains confused and is unable to contribute to history today    Review of Systems   Unable to perform ROS: Mental status change     Review of systems was unable to be performed secondary to confusion  ---------------------------------------------------------------------------------------  Assessment and Plan:    Neuro:   · Diagnosis: Acute toxic metabolic encephalopathy, multifactorial including brain mets, hypoxia/hypoxemia, reduced baseline cognitive reserve, prolonged hospitalization  ? Continue supportive care  ? Q2H neuro checks  · Diagnosis: Acute worsening of cerebellar ICH 2/2 recent tumor resection  ? Updated CTH revealed stable hemorrhage and edema in the surgical site and stable adjacent left parietal lobe hemorrhage  ? Repeat CTH 8/10 is stable  ? Avoid AC/AP  DVT prophylaxis okay  ? Goal platelets > 50  · Diagnosis: Neuroendocrine cancer with mets to brain, s/p two resections most recent of which was on 7/28 POD #16  Also s/p chemo and whole brain radiation  ? Decadron 2 mg daily  · Diagnosis: Clinical seizure-like activity  Monitored on vEEG with no evidence of seizures or epileptiform discharges  ? EEG monitoring discontinued on 8/11  ? Continue Keppra 1 g bid  · Diagnosis: mild hydrocephalus due to effacement of the 4th ventricle      CV:   · Diagnosis: Paroxysmal A-fib diagnosed during last admission  ? Required additional PRN dose of metoprolol overnight  ? Metoprolol tartrate 25 mg bid  ? Holding Cardizem for now   ?  Resumed Amiodarone at 400 mg bid through the end of day 8/13 then start 200 mg daily on 8/14  ? Continue to monitor on telemetry  ? No AC/AP due to [address]  DVT prophylaxis has been started  · Diagnosis: Intermittent hypotension with history of HTN  ? Continue to monitor  ? MAP > 65  ? Home antihypertensives currently on hold due to hypotension  · Diagnosis: Hyperlipidemia  ? Home Crestor substituted with Pravastatin 80 mg daily        Pulm:   Diagnosis: Acute hypoxic respiratory failure 2/2 pneumonia and tumor burden  o Acute decompensation overnight  o ABG 8/13 at 0137: 7 47/30 8/32 7/21 7  o Currently on HFNC at 55 L/min and 100% FiO2  o Updated CXR - diffuse airspace disease representing combination of pneumonia and metastasis  o Given Lasix for diuresis without much change  o antibiotics day # 6, Ceftriaxone day # 5, Flagyl day # 5  o Respiratory protocol and airway clearance protocol   Diagnosis: Neuroendocrine carcinoma of the lung with metastases  ? Follows at [facility]  ? S/p DAVID resection in 4/2021  ? Last chemo in January and radiation in May 2022  ? Evidence of worsening lesions on CT chest  ? Family meeting scheduled for today   Diagnosis: Secretions  o Continue frequent suctioning  o Can consider adding glycopyrrolate      GI:    Diagnosis: GERD  o Plan: Continue home Protonix   Diet: dysphagia 3, thin liquids   Wife to bring milkshakes as this is something that he enjoys   Bowel regimen ordered  Last BM on 8/11      :    No acute issues   Condom catheter    F/E/N:    Fluids: n/a   Electrolytes: Replete as needed to maintain K > 4, Mag > 2, Phos > 3   Nutrition: Dysphagia level 3/thin liquids, supplements with meals   Diagnosis: Protein-calorie malnutrition      Heme/Onc:   · Diagnosis: Acute on chronic anemia, goal hgb > 8  ? Hgb 6 6 on morning of 8/10  Received 1 u pRBCs  ?  Anemia workup:  § Retics 1 88%, 48,100 absolute  § Iron saturation 66%, TIBC 93, Iron 61, Ferritin 3637  § Peripheral smear: 79% neutrophils, decreased lymphocytes, anisocytosis  · Diagnosis: Thrombocytopenia, goal plt > 50  ? Platelets were normal in 6/2022  ? On 8/10 platelets were 47, transfused 1 unit of platelets  ? Thrombocytopenia workup:  § Has been on heparin in recebnt prior admissions  § HIT labs pending  · Diagnosis: coagulopathy  ? PT and INR are both high on admission  ? Follow up labs on 8/10 were in normal range  · Diagnosis: Neuroendocrine carcinoma of the lung with mets to brain, worsening lung lesions  ? As outlined above, follows at [facility]:   · Diagnosis: Hypothyroidism  ? Continue home synthroid 37 5 mcg daily      ID:   · Diagnosis: Severe sepsis 2/2 pneumonia and R iliacus abscess, initial lactic acid 7 7 and procalcitonin 3 34  Significantly improved overall  Vitals stable  ? Sepsis pathway  ? Maintenance fluids  ? Antibiotics day # 5 overall  Ceftriaxone day # 4  § Vancomycin was discontinued once MRSA negative  § Will deescalate as culture results are available  ? Monitor fever curve and WBC  ? ATC tylenol for fever control  · Diagnosis: Suspected abscess in iliacus  · Prelim micro: no poly's or bacteria      MSK/Skin:   · Diagnosis: Right thigh pain and swelling  ? Much improved  Continue to monitor  · Diagnosis: Right iliacus muscle hematoma  ? S/p IR drainage on 8/11  ? Follow up on cultures  ? Diagnosis: Gout  § Continue home allopurinol  · Turning/repositioning  · PT/OT when appropriate    Patient appropriate for transfer out of the ICU today?: No  Disposition: Comfort Care  Code Status: Level 4 - Comfort Care  ---------------------------------------------------------------------------------------  ICU CORE MEASURES    Prophylaxis   VTE Pharmacologic Prophylaxis: Heparin  VTE Mechanical Prophylaxis: sequential compression device  Stress Ulcer Prophylaxis: Omeprazole PO    ABCDE Protocol (if indicated)  Plan to perform spontaneous awakening trial today? Not applicable  Plan to perform spontaneous breathing trial today?  Not applicable  Obvious barriers to extubation? Not applicable  CAM-ICU: Negative    Invasive Devices Review  Invasive Devices  Report    Central Venous Catheter Line  Duration           Port A Cath 21 Right Chest 421 days          Peripheral Intravenous Line  Duration           Peripheral IV 08/10/22 Dorsal (posterior); Left Forearm 3 days    Peripheral IV 22 Dorsal (posterior); Right Forearm 1 day          Drain  Duration           NG/OG/Enteral Tube Nasogastric Right nare 3 days    External Urinary Catheter  2 days    Abscess Drain RLQ 1 day              Can any invasive devices be discontinued today? No  ---------------------------------------------------------------------------------------  OBJECTIVE    Vitals   Vitals:    22 0800 22 0802 22 0900 22 1000   BP: (!) 84/70  (!) 74/60 (!) 81/61   BP Location: Left arm      Pulse: (!) 134  (!) 150 (!) 134   Resp: (!) 37  (!) 30 (!) 28   Temp: 100 2 °F (37 9 °C)      TempSrc: Rectal      SpO2: 93% 96% (!) 87% 97%   Weight:       Height:         Temp (24hrs), Av 5 °F (36 9 °C), Min:98 2 °F (36 8 °C), Max:98 9 °F (37 2 °C)  Current: Temperature: 98 9 °F (37 2 °C)  HR: 104  BP: 94/60  RR: 29  SpO2: 95%    Respiratory:  SpO2: SpO2: 95 %, SpO2 Activity: SpO2 Activity: At Rest, SpO2 Device: O2 Device: High flow nasal cannula  Nasal Cannula O2 Flow Rate (L/min): 10 L/min    Invasive/non-invasive ventilation settings   Respiratory  Report   Lab Data (Last 4 hours)    None         O2/Vent Data (Last 4 hours)       0500          Non-Invasive Ventilation Mode HFNC (High flow)                   Physical Exam  Vitals and nursing note reviewed  Constitutional:       General: He is in acute distress  Appearance: He is cachectic  He is ill-appearing  Interventions: Nasal cannula and face mask in place     HENT:      Head:      Comments: Left posterior crani incision C/D/I with ecchymosis     Right Ear: External ear normal       Left Ear: External ear normal       Nose: Nose normal       Mouth/Throat:      Mouth: Mucous membranes are moist       Pharynx: Oropharynx is clear  Eyes:      General: No scleral icterus  Extraocular Movements: Extraocular movements intact  Conjunctiva/sclera: Conjunctivae normal       Pupils: Pupils are equal, round, and reactive to light  Cardiovascular:      Rate and Rhythm: Tachycardia present  Rhythm irregular  Pulses: Normal pulses  Pulmonary:      Effort: Tachypnea and respiratory distress present  Breath sounds: Decreased air movement present  Decreased breath sounds, rhonchi and rales present  Abdominal:      General: There is no distension  Palpations: Abdomen is soft  Tenderness: There is no abdominal tenderness  Comments: Ecchymosis   Musculoskeletal:      Cervical back: Neck supple  Right lower leg: No edema  Left lower leg: No edema  Skin:     General: Skin is warm and dry  Neurological:      Comments: Stuporous  Will open eyes and say yes/no     Psychiatric:      Comments: Appears anxious and uncomfortable              Laboratory and Diagnostics:  Results from last 7 days   Lab Units 08/12/22  0533 08/11/22  0542 08/10/22  2051 08/10/22  0826 08/10/22  0546 08/09/22  1304 08/09/22  0734 08/09/22  0620 08/08/22  1246   WBC Thousand/uL 2 69* 2 10* 2 57*  --  2 88*  --  3 14* 3 17* 4 47   HEMOGLOBIN g/dL 8 9* 7 7* 8 6* 6 6* 6 6* 8 1* 7 3* 5 9* 9 6*   HEMATOCRIT % 27 3* 23 5* 25 6* 20 2* 20 2* 24 7* 22 2* 18 3* 28 5*   PLATELETS Thousands/uL 92* 88* 95*  --  47*  --  54* 59* 76*   BANDS PCT %  --   --   --   --  11*  --   --  17* 38*   MONO PCT %  --   --   --   --   --   --   --  0* 0*     Results from last 7 days   Lab Units 08/12/22  0533 08/11/22  0542 08/10/22  0826 08/09/22  0620 08/08/22  1246 08/07/22  0509   SODIUM mmol/L 139 142 140 137 131* 130*   POTASSIUM mmol/L 3 7 3 4* 3 7 3 4* 4 4 4 0   CHLORIDE mmol/L 109* 113* 112* 106 96 95*   CO2 mmol/L 24 24 24 24 22 28   ANION GAP mmol/L 6 5 4 7 13 7   BUN mg/dL 13 12 12 17 28* 20   CREATININE mg/dL 0 65 0 60 0 58* 0 77 1 50* 0 64   CALCIUM mg/dL 8 8 8 1* 8 5 8 7 9 8 9 3   GLUCOSE RANDOM mg/dL 90 110 111 87 161* 137   ALT U/L  --   --   --   --  64  --    AST U/L  --   --   --   --  35  --    ALK PHOS U/L  --   --   --   --  76  --    ALBUMIN g/dL  --   --   --   --  2 0*  --    TOTAL BILIRUBIN mg/dL  --   --   --   --  0 58  --      Results from last 7 days   Lab Units 08/09/22  0620   MAGNESIUM mg/dL 1 7   PHOSPHORUS mg/dL 3 4      Results from last 7 days   Lab Units 08/10/22  0826 08/09/22  1304 08/08/22  1316   INR  0 98 1 28* 1 13   PTT seconds 37 35 34          Results from last 7 days   Lab Units 08/09/22  0013 08/08/22  2105 08/08/22  1749 08/08/22  1508 08/08/22  1316   LACTIC ACID mmol/L 1 8 2 2* 2 9* 7 2* 7 7*     ABG:  Results from last 7 days   Lab Units 08/13/22  0137   PH ART  7 466*   PCO2 ART mm Hg 30 8*   PO2 ART mm Hg 32 7*   HCO3 ART mmol/L 21 7*   BASE EXC ART mmol/L -1 5   ABG SOURCE  Radial, Right     VBG:  Results from last 7 days   Lab Units 08/13/22  0137 08/09/22  1812 08/08/22  1748   PH SHANNA   --   --  7 378   PCO2 SHANNA mm Hg  --   --  41 1*   PO2 SHANNA mm Hg  --   --  25 0*   HCO3 SHANNA mmol/L  --   --  23 7*   BASE EXC SHANNA mmol/L  --   --  -1 4   ABG SOURCE  Radial, Right   < >  --     < > = values in this interval not displayed  Results from last 7 days   Lab Units 08/09/22  0620 08/08/22  1316   PROCALCITONIN ng/ml 3 31* 3 34*       Micro  Results from last 7 days   Lab Units 08/11/22  1345 08/08/22  1611 08/08/22  1511 08/08/22  1316   BLOOD CULTURE   --   --   --  No Growth After 4 Days  No Growth After 4 Days     GRAM STAIN RESULT  No Polys or Bacteria seen  --   --   --    URINE CULTURE   --   --  No Growth <1000 cfu/mL  --    BODY FLUID CULTURE, STERILE  No growth  --   --   --    MRSA CULTURE ONLY   --  No Methicillin Resistant Staphlyococcus aureus (MRSA) isolated --   --        EK22 23:40 -- sinus tachycardia, ST/T wave abnormality  22 06:35 -- sinus tach at 151 with arrhythmia  Imaging: I have personally reviewed pertinent reports  and I have personally reviewed pertinent films in PACS      Intake and Output  I/O        07 0700  07 0700    P  O  200     I V  (mL/kg) 1140 (13 6) 26 (0 3)    NG/ 780    IV Piggyback 50 137 9    Total Intake(mL/kg) 1770 (21 1) 943 9 (11 3)    Urine (mL/kg/hr) 2400 (1 2) 1205 (0 6)    Emesis/NG output  0    Drains 10 5    Stool 0     Total Output 2410 1210    Net -640 -266 1          Unmeasured Urine Occurrence 1 x 1 x    Unmeasured Stool Occurrence 1 x           Height and Weights   Height: 5' 8" (172 7 cm)  IBW (Ideal Body Weight): 68 4 kg  Body mass index is 28 12 kg/m²  Weight (last 2 days)     Date/Time Weight    22 0543 83 9 (184 97)            Nutrition       Diet Orders   (From admission, onward)             Start     Ordered    22 1151  Dietary nutrition supplements  Once        Question Answer Comment   Select Supplement: Magic Cup Chocolate    Frequency Lunch, Dinner        22 1151    22 1526  Diet Dysphagia/Modified Consistency; Dysphagia 3-Dental Soft; Thin Liquid  Diet effective now        References:    Nutrtion Support Algorithm Enteral vs  Parenteral   Question Answer Comment   Diet Type Dysphagia/Modified Consistency    Dysphagia/Modified Consistency Dysphagia 3-Dental Soft    Liquid Modifier Thin Liquid    RD to adjust diet per protocol?  Yes        22 1525                Active Medications  Scheduled Meds:  Current Facility-Administered Medications   Medication Dose Route Frequency Provider Last Rate    acetaminophen  650 mg Oral Q6H PRN Mary Grace Corral DO      allopurinol  100 mg Oral Daily Alejandra Goldstein DO      [START ON 2022] amiodarone  200 mg Oral Daily With Breakfast Mary Grace Corral DO      amiodarone 400 mg Oral BID With Meals [employer] Derricotte, DO      cefTRIAXone  1,000 mg Intravenous Q24H Alejandra Mochiquitai, DO 1,000 mg (08/12/22 1508)    chlorhexidine  15 mL Mouth/Throat Q12H [address] Alejandra Moceri, DO      dexmedetomidine  0 1-0 7 mcg/kg/hr Intravenous Titrated KATERIN EvansNP 0 2 mcg/kg/hr (08/13/22 0234)    heparin (porcine)  5,000 Units Subcutaneous Q8H [address] Mary Grace Danae Derricotte, DO      HYDROmorphone  0 2 mg Intravenous Q3H PRN Mary Grace Danae Derricotte, DO      levETIRAcetam  1,000 mg Oral Q12H [address] Alejandra Mochiquitai, DO      levothyroxine  37 5 mcg Oral Early Morning Alejandra Goldstein, DO      LORazepam  0 5 mg Oral BID PRN Alejandra Goldstein, DO      melatonin  3 mg Per NG Tube HS CORRINE Jones      metoprolol tartrate  25 mg Oral Q12H [address] Mary Grace Danae Derricotte, DO      metroNIDAZOLE  500 mg Oral Q8H [address] Alejandra Mochiquitai, DO      omeprazole (PRILOSEC) suspension 2 mg/mL  20 mg Oral Daily Mary Grace Danae Derricotte, DO      oxyCODONE  10 mg Oral Q4H PRN Mary Grace Danae Derricotte, DO      oxyCODONE  5 mg Oral Q4H PRN Mary Grace Danae Derricotte, DO      polyethylene glycol  17 g Oral BID PRN Mary Grace Danae Derricotte, DO      pravastatin  80 mg Oral Daily With [facility] [employer], DO      senna-docusate sodium  1 tablet Per NG Tube BID Mary Grace Danae Derricotte, DO      sodium chloride (PF)  3 mL Intravenous Q1H PRN Alejandra Almanzai, DO      sodium chloride  1 g Oral TID With Meals Alejandra Goldstein, DO       Continuous Infusions:  dexmedetomidine, 0 1-0 7 mcg/kg/hr, Last Rate: 0 2 mcg/kg/hr (08/13/22 0234)      PRN Meds:   acetaminophen, 650 mg, Q6H PRN  HYDROmorphone, 0 2 mg, Q3H PRN  LORazepam, 0 5 mg, BID PRN  oxyCODONE, 10 mg, Q4H PRN  oxyCODONE, 5 mg, Q4H PRN  polyethylene glycol, 17 g, BID PRN  sodium chloride (PF), 3 mL, Q1H PRN        Allergies   Allergies   Allergen Reactions    Bee Venom     Benazepril Other (See Comments)     Angioedema     Latex Other (See Comments)     Burning of eyes at the dentist from the gloves    Meloxicam GI Intolerance    Penicillin G Rash     ---------------------------------------------------------------------------------------  Advance Directive and Living Will:      Power of :    POLST:    ---------------------------------------------------------------------------------------  Care Time Delivered:   Please refer to attending's note    Tobin Lopez,       Portions of the record may have been created with voice recognition software  Occasional wrong word or "sound a like" substitutions may have occurred due to the inherent limitations of voice recognition software    Read the chart carefully and recognize, using context, where substitutions have occurred

## 2022-08-13 NOTE — PROGRESS NOTES
Critical Care Services- Interval Progress Note   Isidra Cabrera 69 y o  male MRN: [id number]  Unit/Bed#: ICU 02 Encounter: [id number]  Assessment and Plan  Interval Events:  2030 pt sats 90%, placed on HFNC  2330 acute desaturation to 70%- HFNC maxed and NRB placed  Given lasix  CXR completed  Saturations increased to 96%  Tachy 140s- lopressor given  0130 sats 68%- ABG completed  0230 precedex gtt started with significant improvement in hypoxia and tachycardia        Diagnosis: hypoxia   o Plan: CXR  o ABG   o NRB, HFNC  o Lasix   o precedex for anxiety      Diagnosis: a fib   o Plan: lopressor   o EKG   o monitor on tele       Upon my evaluation, this patient had a high probability of imminent or life-threatening deterioration due to hypoxia, tachycardia , which required my direct attention, intervention, and personal management  I have personally provided 45 minutes (0000 to 0200) on 08/13/2022 of critical care time, exclusive of procedures, teaching, family meetings, and any prior time recorded by providers other than myself  Time includes review of laboratory data, review of imaging/radiology results, monitoring for potential decompensation    Interventions were performed as documented above    -------------------------------------------------------------------------------------------------------------------------------------  Hemodynamic Monitoring:  Vital Signs:   HR: 145  BP: 104/57  MAP: 68  RR: 18  SpO2: 82% on 100% oxygen    Laboratory   Results from last 7 days   Lab Units 08/12/22  0533 08/11/22  0542 08/10/22  2051 08/10/22  0826 08/10/22  0546 08/09/22  1304 08/09/22  0734 08/09/22  0620 08/08/22  1246   WBC Thousand/uL 2 69* 2 10* 2 57*  --  2 88*  --  3 14* 3 17* 4 47   HEMOGLOBIN g/dL 8 9* 7 7* 8 6* 6 6* 6 6* 8 1* 7 3* 5 9* 9 6*   HEMATOCRIT % 27 3* 23 5* 25 6* 20 2* 20 2* 24 7* 22 2* 18 3* 28 5*   PLATELETS Thousands/uL 92* 88* 95*  --  47*  --  54* 59* 76*   BANDS PCT %  --   --   -- --  11*  --   --  17* 38*   MONO PCT %  --   --   --   --   --   --   --  0* 0*     Results from last 7 days   Lab Units 08/12/22  0533 08/11/22  0542 08/10/22  0826 08/09/22  0620 08/08/22  1246 08/07/22  0509   SODIUM mmol/L 139 142 140 137 131* 130*   POTASSIUM mmol/L 3 7 3 4* 3 7 3 4* 4 4 4 0   CHLORIDE mmol/L 109* 113* 112* 106 96 95*   CO2 mmol/L 24 24 24 24 22 28   ANION GAP mmol/L 6 5 4 7 13 7   BUN mg/dL 13 12 12 17 28* 20   CREATININE mg/dL 0 65 0 60 0 58* 0 77 1 50* 0 64   CALCIUM mg/dL 8 8 8 1* 8 5 8 7 9 8 9 3   GLUCOSE RANDOM mg/dL 90 110 111 87 161* 137   ALT U/L  --   --   --   --  64  --    AST U/L  --   --   --   --  35  --    ALK PHOS U/L  --   --   --   --  76  --    ALBUMIN g/dL  --   --   --   --  2 0*  --    TOTAL BILIRUBIN mg/dL  --   --   --   --  0 58  --      Results from last 7 days   Lab Units 08/09/22  0620   MAGNESIUM mg/dL 1 7   PHOSPHORUS mg/dL 3 4      Results from last 7 days   Lab Units 08/10/22  0826 08/09/22  1304 08/08/22  1316   INR  0 98 1 28* 1 13   PTT seconds 37 35 34          Results from last 7 days   Lab Units 08/09/22  0013 08/08/22  2105 08/08/22  1749 08/08/22  1508 08/08/22  1316   LACTIC ACID mmol/L 1 8 2 2* 2 9* 7 2* 7 7*     ABG:  Results from last 7 days   Lab Units 08/13/22  0137   PH ART  7 466*   PCO2 ART mm Hg 30 8*   PO2 ART mm Hg 32 7*   HCO3 ART mmol/L 21 7*   BASE EXC ART mmol/L -1 5   ABG SOURCE  Radial, Right     VBG:  Results from last 7 days   Lab Units 08/13/22  0137 08/09/22  1812 08/08/22  1748   PH SHANNA   --   --  7 378   PCO2 SHANNA mm Hg  --   --  41 1*   PO2 SHANNA mm Hg  --   --  25 0*   HCO3 SHANNA mmol/L  --   --  23 7*   BASE EXC SHANNA mmol/L  --   --  -1 4   ABG SOURCE  Radial, Right   < >  --     < > = values in this interval not displayed       Results from last 7 days   Lab Units 08/09/22  0620 08/08/22  1316   PROCALCITONIN ng/ml 3 31* 3 34*       Micro  Results from last 7 days   Lab Units 08/11/22  1345 08/08/22  1611 08/08/22  1511 08/08/22  1316   BLOOD CULTURE   --   --   --  No Growth After 4 Days  No Growth After 4 Days  GRAM STAIN RESULT  No Polys or Bacteria seen  --   --   --    URINE CULTURE   --   --  No Growth <1000 cfu/mL  --    BODY FLUID CULTURE, STERILE  No growth  --   --   --    MRSA CULTURE ONLY   --  No Methicillin Resistant Staphlyococcus aureus (MRSA) isolated  --   --        Diagnostic Imaging / Data: I have personally reviewed pertinent reports        Medications:  Current Facility-Administered Medications   Medication Dose Route Frequency    acetaminophen (TYLENOL) tablet 650 mg  650 mg Oral Q6H PRN    allopurinol (ZYLOPRIM) tablet 100 mg  100 mg Oral Daily    [START ON 8/14/2022] amiodarone tablet 200 mg  200 mg Oral Daily With Breakfast    amiodarone tablet 400 mg  400 mg Oral BID With Meals    cefTRIAXone (ROCEPHIN) 1,000 mg in dextrose 5 % 50 mL IVPB  1,000 mg Intravenous Q24H    chlorhexidine (PERIDEX) 0 12 % oral rinse 15 mL  15 mL Mouth/Throat Q12H [address]    heparin (porcine) subcutaneous injection 5,000 Units  5,000 Units Subcutaneous Q8H [address]    HYDROmorphone HCl (DILAUDID) injection 0 2 mg  0 2 mg Intravenous Q3H PRN    levETIRAcetam (KEPPRA) oral solution 1,000 mg  1,000 mg Oral Q12H [address]    levothyroxine tablet 37 5 mcg  37 5 mcg Oral Early Morning    LORazepam (ATIVAN) tablet 0 5 mg  0 5 mg Oral BID PRN    melatonin tablet 3 mg  3 mg Per NG Tube HS    metoprolol tartrate (LOPRESSOR) tablet 25 mg  25 mg Oral Q12H BASIA    metroNIDAZOLE (FLAGYL) tablet 500 mg  500 mg Oral Q8H [address]    omeprazole (PRILOSEC) suspension 2 mg/mL  20 mg Oral Daily    oxyCODONE (ROXICODONE) immediate release tablet 10 mg  10 mg Oral Q4H PRN    oxyCODONE (ROXICODONE) IR tablet 5 mg  5 mg Oral Q4H PRN    polyethylene glycol (MIRALAX) packet 17 g  17 g Oral BID PRN    pravastatin (PRAVACHOL) tablet 80 mg  80 mg Oral Daily With Dinner    senna-docusate sodium (SENOKOT S) 8 6-50 mg per tablet 1 tablet  1 tablet Per NG Tube BID    sodium chloride (PF) 0 9 % injection 3 mL  3 mL Intravenous Q1H PRN    sodium chloride tablet 1 g  1 g Oral TID With Meals       SIGNATURE: CORRINE Hubbard    Portions of the record may have been created with voice recognition software  Occasional wrong word or "sound a like" substitutions may have occurred due to the inherent limitations of voice recognition software    Read the chart carefully and recognize, using context, where substitutions have occurred

## 2022-08-13 NOTE — PLAN OF CARE
Problem: MOBILITY - ADULT  Goal: Maintain or return to baseline ADL function  Description: INTERVENTIONS:  -  Assess patient's ability to carry out ADLs; assess patient's baseline for ADL function and identify physical deficits which impact ability to perform ADLs (bathing, care of mouth/teeth, toileting, grooming, dressing, etc )  - Assess/evaluate cause of self-care deficits   - Assess range of motion  - Assess patient's mobility; develop plan if impaired  - Assess patient's need for assistive devices and provide as appropriate  - Encourage maximum independence but intervene and supervise when necessary  - Involve family in performance of ADLs  - Assess for home care needs following discharge   - Consider OT consult to assist with ADL evaluation and planning for discharge  - Provide patient education as appropriate  8/13/2022 0755 by Karen Funes  Outcome: Not Progressing  8/13/2022 0754 by Karen Funes  Outcome: Not Progressing  Goal: Maintains/Returns to pre admission functional level  Description: INTERVENTIONS:  - Perform BMAT or MOVE assessment daily    - Set and communicate daily mobility goal to care team and patient/family/caregiver  - Collaborate with rehabilitation services on mobility goals if consulted  - Perform Range of Motion 4 times a day  - Reposition patient every 2 hours  - Record patient progress and toleration of activity level   8/13/2022 0755 by Karen Funes  Outcome: Not Progressing       Problem: Nutrition/Hydration-ADULT  Goal: Nutrient/Hydration intake appropriate for improving, restoring or maintaining nutritional needs  Description: Monitor and assess patient's nutrition/hydration status for malnutrition  Collaborate with interdisciplinary team and initiate plan and interventions as ordered  Monitor patient's weight and dietary intake as ordered or per policy  Utilize nutrition screening tool and intervene as necessary   Determine patient's food preferences and provide high-protein, high-caloric foods as appropriate  INTERVENTIONS:  - Monitor oral intake, urinary output, labs, and treatment plans  - Assess nutrition and hydration status and recommend course of action  - Evaluate amount of meals eaten  - Assist patient with eating if necessary   - Allow adequate time for meals  - Recommend/ encourage appropriate diets, oral nutritional supplements, and vitamin/mineral supplements  - Order, calculate, and assess calorie counts as needed  - Recommend, monitor, and adjust tube feedings and TPN/PPN based on assessed needs  - Assess need for intravenous fluids  - Provide specific nutrition/hydration education as appropriate  - Include patient/family/caregiver in decisions related to nutrition  8/13/2022 0755 by Karen Funes  Outcome: Not Progressing       Problem: Neurological Deficit  Goal: Neurological status is stable or improving  Description: Interventions:  - Monitor and assess patient's level of consciousness, motor function, sensory function, and level of assistance needed for ADLs  - Monitor and report changes from baseline  Collaborate with interdisciplinary team to initiate plan and implement interventions as ordered  - Provide and maintain a safe environment  - Consider seizure precautions  - Consider fall precautions  - Consider aspiration precautions  - Consider bleeding precautions  8/13/2022 0755 by Karen Funes  Outcome: Not Progressing  8/13/2022 0754 by Karen Funes  Outcome: Not Progressing     Problem: Activity Intolerance/Impaired Mobility  Goal: Mobility/activity is maintained at optimum level for patient  Description: Interventions:  - Assess and monitor patient  barriers to mobility and need for assistive/adaptive devices  - Assess patient's emotional response to limitations  - Collaborate with interdisciplinary team and initiate plans and interventions as ordered    - Encourage independent activity per ability   - Maintain proper body alignment  - Perform active/passive rom as tolerated/ordered  - Plan activities to conserve energy   - Turn patient as appropriate  8/13/2022 0755 by Andres Palmer  Outcome: Not Progressing  8/13/2022 0754 by Andres Palmer  Outcome: Not Progressing     Problem: Communication Impairment  Goal: Ability to express needs and understand communication  Description: Assess patient's communication skills and ability to understand information  Patient will demonstrate use of effective communication techniques, alternative methods of communication and understanding even if not able to speak  - Encourage communication and provide alternate methods of communication as needed  - Collaborate with case management/ for discharge needs  - Include patient/family/caregiver in decisions related to communication  8/13/2022 0755 by Andres Palmer  Outcome: Not Progressing  8/13/2022 0754 by Andres Palmer  Outcome: Not Progressing     Problem: Potential for Aspiration  Goal: Non-ventilated patient's risk of aspiration is minimized  Description: Assess and monitor vital signs, respiratory status, and labs (WBC)  Monitor for signs of aspiration (tachypnea, cough, rales, wheezing, cyanosis, fever)  - Assess and monitor patient's ability to swallow  - Place patient up in chair to eat if possible  - HOB up at 90 degrees to eat if unable to get patient up into chair   - Supervise patient during oral intake  - Instruct patient/ family to take small bites  - Instruct patient/ family to take small single sips when taking liquids    - Follow patient-specific strategies generated by speech pathologist   8/13/2022 0755 by Andres Palmer  Outcome: Not Progressing  8/13/2022 0754 by Andres Palmer  Outcome: Not Progressing     Problem: Nutrition  Goal: Nutrition/Hydration status is improving  Description: Monitor and assess patient's nutrition/hydration status for malnutrition (ex- brittle hair, bruises, dry skin, pale skin and conjunctiva, muscle wasting, smooth red tongue, and disorientation)  Collaborate with interdisciplinary team and initiate plan and interventions as ordered  Monitor patient's weight and dietary intake as ordered or per policy  Utilize nutrition screening tool and intervene per policy  Determine patient's food preferences and provide high-protein, high-caloric foods as appropriate  - Assist patient with eating   - Allow adequate time for meals   - Encourage patient to take dietary supplement as ordered  - Collaborate with clinical nutritionist   - Include patient/family/caregiver in decisions related to nutrition    8/13/2022 0755 by Karen Funes  Outcome: Not Progressing  8/13/2022 0754 by Karen Funes  Outcome: Not Progressing     Problem: Prexisting or High Potential for Compromised Skin Integrity  Goal: Skin integrity is maintained or improved  Description: INTERVENTIONS:  - Identify patients at risk for skin breakdown  - Assess and monitor skin integrity  - Assess and monitor nutrition and hydration status  - Monitor labs   - Assess for incontinence   - Turn and reposition patient  - Assist with mobility/ambulation  - Relieve pressure over bony prominences  - Avoid friction and shearing  - Provide appropriate hygiene as needed including keeping skin clean and dry  - Evaluate need for skin moisturizer/barrier cream  - Collaborate with interdisciplinary team   - Patient/family teaching  - Consider wound care consult   8/13/2022 0755 by Karen Funes  Outcome: Not Progressing  8/13/2022 0754 by Karen Funes  Outcome: Not Progressing     Problem: PAIN - ADULT  Goal: Verbalizes/displays adequate comfort level or baseline comfort level  Description: Interventions:  - Encourage patient to monitor pain and request assistance  - Assess pain using appropriate pain scale  - Administer analgesics based on type and severity of pain and evaluate response  - Implement non-pharmacological measures as appropriate and evaluate response  - Consider cultural and social influences on pain and pain management  - Notify physician/advanced practitioner if interventions unsuccessful or patient reports new pain  8/13/2022 0755 by Kirti Colon  Outcome: Not Progressing  8/13/2022 0754 by Kirti Colon  Outcome: Not Progressing     Problem: INFECTION - ADULT  Goal: Absence or prevention of progression during hospitalization  Description: INTERVENTIONS:  - Assess and monitor for signs and symptoms of infection  - Monitor lab/diagnostic results  - Monitor all insertion sites, i e  indwelling lines, tubes, and drains  - Monitor endotracheal if appropriate and nasal secretions for changes in amount and color  - [place] appropriate cooling/warming therapies per order  - Administer medications as ordered  - Instruct and encourage patient and family to use good hand hygiene technique  - Identify and instruct in appropriate isolation precautions for identified infection/condition  8/13/2022 0755 by Kirti Colon  Outcome: Not Progressing  8/13/2022 0754 by Kirti Colon  Outcome: Not Progressing     Problem: SAFETY ADULT  Goal: Maintain or return to baseline ADL function  Description: INTERVENTIONS:  -  Assess patient's ability to carry out ADLs; assess patient's baseline for ADL function and identify physical deficits which impact ability to perform ADLs (bathing, care of mouth/teeth, toileting, grooming, dressing, etc )  - Assess/evaluate cause of self-care deficits   - Assess range of motion  - Assess patient's mobility; develop plan if impaired  - Assess patient's need for assistive devices and provide as appropriate  - Encourage maximum independence but intervene and supervise when necessary  - Involve family in performance of ADLs  - Assess for home care needs following discharge   - Consider OT consult to assist with ADL evaluation and planning for discharge  - Provide patient education as appropriate  8/13/2022 0755 by [employer] [employer] Michael  Outcome: Not Progressing  8/13/2022 0754 by Alfredo Donis  Outcome: Not Progressing  Goal: Maintains/Returns to pre admission functional level  Description: INTERVENTIONS:  - Perform BMAT or MOVE assessment daily    - Set and communicate daily mobility goal to care team and patient/family/caregiver  - Collaborate with rehabilitation services on mobility goals if consulted  - Perform Range of Motion 4 times a day  - Reposition patient every 2 hours  - Record patient progress and toleration of activity level   8/13/2022 0755 by Alfredo Donis  Outcome: Not Progressing       Problem: Knowledge Deficit  Goal: Patient/family/caregiver demonstrates understanding of disease process, treatment plan, medications, and discharge instructions  Description: Complete learning assessment and assess knowledge base  Interventions:  - Provide teaching at level of understanding  - Provide teaching via preferred learning methods  8/13/2022 0755 by Alfredo Donis  Outcome: Not Progressing    Problem: NEUROSENSORY - ADULT  Goal: Achieves stable or improved neurological status  Description: INTERVENTIONS  - Monitor and report changes in neurological status  - Monitor vital signs such as temperature, blood pressure, glucose, and any other labs ordered   - Initiate measures to prevent increased intracranial pressure  - Monitor for seizure activity and implement precautions if appropriate      8/13/2022 0755 by Alfredo Donis  Outcome: Not Progressing    Goal: Achieves maximal functionality and self care  Description: INTERVENTIONS  - Monitor swallowing and airway patency with patient fatigue and changes in neurological status  - Encourage and assist patient to increase activity and self care     - Encourage visually impaired, hearing impaired and aphasic patients to use assistive/communication devices  8/13/2022 0755 by Alfredo Donis  Outcome: Not Progressing

## 2022-08-14 NOTE — CASE MANAGEMENT
Case Management Progress Note    Patient name Dami Frias  Location ICU 02/ICU 02 MRN [id number]  : 1955 Date 2022       LOS (days): 6  Geometric Mean LOS (GMLOS) (days): 4 80  Days to GMLOS:-1 1        OBJECTIVE:        Current admission status: Inpatient  Preferred Pharmacy:   [facility] [id number], [address]   [address]   [address] [address]  Phone: [phone] Fax: [employer], [address] [address] [facility] [address]  Phone: [phone] Fax: [phone]    Primary Care Provider: Robinson Brantley DO    Primary Insurance: MEDICARE  Secondary Insurance: AARP    PROGRESS NOTE:    Cm told my hospice liaison that Fentanyl GTT is no longer needed   CM re-submitted transport for BLS and not an ALS truck

## 2022-08-14 NOTE — CASE MANAGEMENT
Case Management Progress Note    Patient name Tiburcio Ormond  Location ICU 02/ICU 02 MRN [id number]  : 1955 Date 2022       LOS (days): 6  Geometric Mean LOS (GMLOS) (days): 4 80  Days to GMLOS:-1 1        OBJECTIVE:        Current admission status: Inpatient  Preferred Pharmacy:   [facility] [id number], [address]   [address]   [facility] [address]  Phone: [phone] Fax: [facility] [address] [place] [address] [facility]  Phone: [phone] Fax: [phone]    Primary Care Provider: Dave Mayfield DO    Primary Insurance: MEDICARE  Secondary Insurance: AARP    PROGRESS NOTE:    Pt changed pt's transport to ALS truck as Hospice liaison reports wanting to have the pt on a Fentanyl gtt for transport   CM made the necessary changes in Roundtrip request

## 2022-08-14 NOTE — PLAN OF CARE
Problem: Potential for Falls  Goal: Patient will remain free of falls  Description: INTERVENTIONS:  - Educate patient/family on patient safety including physical limitations  - Instruct patient to call for assistance with activity   - Consult OT/PT to assist with strengthening/mobility   - Keep Call bell within reach  - Keep bed low and locked with side rails adjusted as appropriate  - Keep care items and personal belongings within reach  - Initiate and maintain comfort rounds  - Make Fall Risk Sign visible to staff  - Initiate/Maintain bed alarm  - Apply yellow socks and bracelet for high fall risk patients  - Consider moving patient to room near nurses station  Outcome: Progressing     Problem: SAFETY ADULT  Goal: Patient will remain free of falls  Description: INTERVENTIONS:  - Educate patient/family on patient safety including physical limitations  - Instruct patient to call for assistance with activity   - Consult OT/PT to assist with strengthening/mobility   - Keep Call bell within reach  - Keep bed low and locked with side rails adjusted as appropriate  - Keep care items and personal belongings within reach  - Initiate and maintain comfort rounds  - Make Fall Risk Sign visible to staff  - Apply yellow socks and bracelet for high fall risk patients  - Consider moving patient to room near nurses station  Outcome: Progressing     Problem: MOBILITY - ADULT  Goal: Maintain or return to baseline ADL function  Description: INTERVENTIONS:  -  Assess patient's ability to carry out ADLs; assess patient's baseline for ADL function and identify physical deficits which impact ability to perform ADLs (bathing, care of mouth/teeth, toileting, grooming, dressing, etc )  - Assess/evaluate cause of self-care deficits   - Assess range of motion  - Assess patient's mobility; develop plan if impaired  - Assess patient's need for assistive devices and provide as appropriate  - Encourage maximum independence but intervene and supervise when necessary  - Involve family in performance of ADLs  - Assess for home care needs following discharge   - Consider OT consult to assist with ADL evaluation and planning for discharge  - Provide patient education as appropriate  Outcome: Not Progressing  Goal: Maintains/Returns to pre admission functional level  Description: INTERVENTIONS:  - Perform BMAT or MOVE assessment daily    - Set and communicate daily mobility goal to care team and patient/family/caregiver  - Collaborate with rehabilitation services on mobility goals if consulted  - Reposition patient every 2 hours  - Record patient progress and toleration of activity level   Outcome: Not Progressing     Problem: Nutrition/Hydration-ADULT  Goal: Nutrient/Hydration intake appropriate for improving, restoring or maintaining nutritional needs  Description: Monitor and assess patient's nutrition/hydration status for malnutrition  Collaborate with interdisciplinary team and initiate plan and interventions as ordered  Monitor patient's weight and dietary intake as ordered or per policy  Utilize nutrition screening tool and intervene as necessary  Determine patient's food preferences and provide high-protein, high-caloric foods as appropriate       INTERVENTIONS:  - Monitor oral intake, urinary output, labs, and treatment plans  - Assess nutrition and hydration status and recommend course of action  - Evaluate amount of meals eaten  - Assist patient with eating if necessary   - Allow adequate time for meals  - Recommend/ encourage appropriate diets, oral nutritional supplements, and vitamin/mineral supplements  - Order, calculate, and assess calorie counts as needed  - Recommend, monitor, and adjust tube feedings and TPN/PPN based on assessed needs  - Assess need for intravenous fluids  - Provide specific nutrition/hydration education as appropriate  - Include patient/family/caregiver in decisions related to nutrition  Outcome: Not Progressing Problem: Neurological Deficit  Goal: Neurological status is stable or improving  Description: Interventions:  - Monitor and assess patient's level of consciousness, motor function, sensory function, and level of assistance needed for ADLs  - Monitor and report changes from baseline  Collaborate with interdisciplinary team to initiate plan and implement interventions as ordered  - Provide and maintain a safe environment  - Consider seizure precautions  - Consider fall precautions  - Consider aspiration precautions  - Consider bleeding precautions  Outcome: Not Progressing     Problem: Activity Intolerance/Impaired Mobility  Goal: Mobility/activity is maintained at optimum level for patient  Description: Interventions:  - Assess and monitor patient  barriers to mobility and need for assistive/adaptive devices  - Assess patient's emotional response to limitations  - Collaborate with interdisciplinary team and initiate plans and interventions as ordered  - Encourage independent activity per ability   - Maintain proper body alignment  - Perform active/passive rom as tolerated/ordered  - Plan activities to conserve energy   - Turn patient as appropriate  Outcome: Not Progressing     Problem: Communication Impairment  Goal: Ability to express needs and understand communication  Description: Assess patient's communication skills and ability to understand information  Patient will demonstrate use of effective communication techniques, alternative methods of communication and understanding even if not able to speak  - Encourage communication and provide alternate methods of communication as needed  - Collaborate with case management/ for discharge needs  - Include patient/family/caregiver in decisions related to communication    Outcome: Not Progressing     Problem: Potential for Aspiration  Goal: Non-ventilated patient's risk of aspiration is minimized  Description: Assess and monitor vital signs, respiratory status, and labs (WBC)  Monitor for signs of aspiration (tachypnea, cough, rales, wheezing, cyanosis, fever)  - Assess and monitor patient's ability to swallow  - Place patient up in chair to eat if possible  - HOB up at 90 degrees to eat if unable to get patient up into chair   - Supervise patient during oral intake  - Instruct patient/ family to take small bites  - Instruct patient/ family to take small single sips when taking liquids  - Follow patient-specific strategies generated by speech pathologist   Outcome: Not Progressing     Problem: Nutrition  Goal: Nutrition/Hydration status is improving  Description: Monitor and assess patient's nutrition/hydration status for malnutrition (ex- brittle hair, bruises, dry skin, pale skin and conjunctiva, muscle wasting, smooth red tongue, and disorientation)  Collaborate with interdisciplinary team and initiate plan and interventions as ordered  Monitor patient's weight and dietary intake as ordered or per policy  Utilize nutrition screening tool and intervene per policy  Determine patient's food preferences and provide high-protein, high-caloric foods as appropriate  - Assist patient with eating   - Allow adequate time for meals   - Encourage patient to take dietary supplement as ordered  - Collaborate with clinical nutritionist   - Include patient/family/caregiver in decisions related to nutrition    Outcome: Not Progressing     Problem: Prexisting or High Potential for Compromised Skin Integrity  Goal: Skin integrity is maintained or improved  Description: INTERVENTIONS:  - Identify patients at risk for skin breakdown  - Assess and monitor skin integrity  - Assess and monitor nutrition and hydration status  - Monitor labs   - Assess for incontinence   - Turn and reposition patient  - Assist with mobility/ambulation  - Relieve pressure over bony prominences  - Avoid friction and shearing  - Provide appropriate hygiene as needed including keeping skin clean and dry  - Evaluate need for skin moisturizer/barrier cream  - Collaborate with interdisciplinary team   - Patient/family teaching  - Consider wound care consult   Outcome: Not Progressing     Problem: PAIN - ADULT  Goal: Verbalizes/displays adequate comfort level or baseline comfort level  Description: Interventions:  - Encourage patient to monitor pain and request assistance  - Assess pain using appropriate pain scale  - Administer analgesics based on type and severity of pain and evaluate response  - Implement non-pharmacological measures as appropriate and evaluate response  - Consider cultural and social influences on pain and pain management  - Notify physician/advanced practitioner if interventions unsuccessful or patient reports new pain  Outcome: Not Progressing     Problem: INFECTION - ADULT  Goal: Absence or prevention of progression during hospitalization  Description: INTERVENTIONS:  - Assess and monitor for signs and symptoms of infection  - Monitor lab/diagnostic results  - Monitor all insertion sites, i e  indwelling lines, tubes, and drains  - Monitor endotracheal if appropriate and nasal secretions for changes in amount and color  - [place] appropriate cooling/warming therapies per order  - Administer medications as ordered  - Instruct and encourage patient and family to use good hand hygiene technique  - Identify and instruct in appropriate isolation precautions for identified infection/condition  Outcome: Not Progressing     Problem: SAFETY ADULT  Goal: Maintain or return to baseline ADL function  Description: INTERVENTIONS:  -  Assess patient's ability to carry out ADLs; assess patient's baseline for ADL function and identify physical deficits which impact ability to perform ADLs (bathing, care of mouth/teeth, toileting, grooming, dressing, etc )  - Assess/evaluate cause of self-care deficits   - Assess range of motion  - Assess patient's mobility; develop plan if impaired  - Assess patient's need for assistive devices and provide as appropriate  - Encourage maximum independence but intervene and supervise when necessary  - Involve family in performance of ADLs  - Assess for home care needs following discharge   - Consider OT consult to assist with ADL evaluation and planning for discharge  - Provide patient education as appropriate  Outcome: Not Progressing  Goal: Maintains/Returns to pre admission functional level  Description: INTERVENTIONS:  - Perform BMAT or MOVE assessment daily    - Set and communicate daily mobility goal to care team and patient/family/caregiver  - Collaborate with rehabilitation services on mobility goals if consulted  - Reposition patient every 2 hours  - Record patient progress and toleration of activity level   Outcome: Not Progressing     Problem: DISCHARGE PLANNING  Goal: Discharge to home or other facility with appropriate resources  Description: INTERVENTIONS:  - Identify barriers to discharge w/patient and caregiver  - Arrange for needed discharge resources and transportation as appropriate  - Identify discharge learning needs (meds, wound care, etc )  - Arrange for interpretive services to assist at discharge as needed  - Refer to Case Management Department for coordinating discharge planning if the patient needs post-hospital services based on physician/advanced practitioner order or complex needs related to functional status, cognitive ability, or social support system  Outcome: Not Progressing     Problem: Knowledge Deficit  Goal: Patient/family/caregiver demonstrates understanding of disease process, treatment plan, medications, and discharge instructions  Description: Complete learning assessment and assess knowledge base    Interventions:  - Provide teaching at level of understanding  - Provide teaching via preferred learning methods  Outcome: Not Progressing     Problem: NEUROSENSORY - ADULT  Goal: Achieves stable or improved neurological status  Description: INTERVENTIONS  - Monitor and report changes in neurological status  - Monitor vital signs such as temperature, blood pressure, glucose, and any other labs ordered   - Initiate measures to prevent increased intracranial pressure  - Monitor for seizure activity and implement precautions if appropriate      Outcome: Not Progressing  Goal: Remains free of injury related to seizures activity  Description: INTERVENTIONS  - Maintain airway, patient safety  and administer oxygen as ordered  - Monitor patient for seizure activity, document and report duration and description of seizure to physician/advanced practitioner  - If seizure occurs,  ensure patient safety during seizure  - Reorient patient post seizure  - Seizure pads on all 4 side rails  - Instruct patient/family to notify RN of any seizure activity including if an aura is experienced  - Instruct patient/family to call for assistance with activity based on nursing assessment  - Administer anti-seizure medications if ordered    Outcome: Not Progressing  Goal: Achieves maximal functionality and self care  Description: INTERVENTIONS  - Monitor swallowing and airway patency with patient fatigue and changes in neurological status  - Encourage and assist patient to increase activity and self care     - Encourage visually impaired, hearing impaired and aphasic patients to use assistive/communication devices  Outcome: Not Progressing

## 2022-08-14 NOTE — PROGRESS NOTES
[facility]  Progress Note - Agnieszka Dillardr 1955, 69 y o  male MRN: [id number]  Unit/Bed#: ICU 02 Encounter: [id number]  Primary Care Provider: Hamzah Samuels DO   Date and time admitted to hospital: 2022 12:04 PM    Adenocarcinoma, lung, left ([address] )  Assessment & Plan  · Comfort measures only       ----------------------------------------------------------------------------------------  HPI/24hr events:  Transitioned to comfort care yesterday     Patient appropriate for transfer out of the ICU today?: Patient does not meet criteria for referral to the ICU Follow-Up Clinic; referral has not been made  Disposition: Comfort Care  Code Status: Level 4 - Comfort Care  ---------------------------------------------------------------------------------------  SUBJECTIVE      Review of Systems  Review of systems was unable to be performed secondary to AMS   ---------------------------------------------------------------------------------------  OBJECTIVE    Vitals   Vitals:    22 0900 22 1000 22 1517 22   BP: (!) 74/60 (!) 81/61     BP Location:       Pulse: (!) 150 (!) 134  (!) 114   Resp: (!) 30 (!) 28  (!) 35   Temp:       TempSrc:       SpO2: (!) 87% 97% 97% 93%   Weight:       Height:         Temp (24hrs), Av 2 °F (37 9 °C), Min:100 2 °F (37 9 °C), Max:100 2 °F (37 9 °C)  Current: Temperature: 100 2 °F (37 9 °C)          Respiratory:  SpO2: SpO2: 93 %  Nasal Cannula O2 Flow Rate (L/min): 15 L/min    Invasive/non-invasive ventilation settings   Respiratory  Report   Lab Data (Last 4 hours)    None         O2/Vent Data (Last 4 hours)    None                Physical Exam  Vitals and nursing note reviewed  Constitutional:       Appearance: He is ill-appearing     HENT:      Head:      Comments: Incision CDI     Right Ear: External ear normal       Left Ear: External ear normal       Nose: Nose normal       Mouth/Throat:      Mouth: Mucous membranes are dry  Cardiovascular:      Rate and Rhythm: Tachycardia present  Pulmonary:      Breath sounds: Rhonchi present  Abdominal:      Palpations: Abdomen is soft  Musculoskeletal:      Cervical back: Neck supple  Skin:     General: Skin is dry  Capillary Refill: Capillary refill takes less than 2 seconds  Findings: Bruising present     Neurological:      Comments: Lethargic   Incomprehensible speech    Psychiatric:      Comments: Appears anxious at times              Laboratory and Diagnostics:  Results from last 7 days   Lab Units 08/13/22  0520 08/12/22  0533 08/11/22  0542 08/10/22  2051 08/10/22  0826 08/10/22  0546 08/09/22  1304 08/09/22  0734 08/09/22  0620 08/08/22  1246   WBC Thousand/uL 3 35* 2 69* 2 10* 2 57*  --  2 88*  --  3 14* 3 17* 4 47   HEMOGLOBIN g/dL 8 0* 8 9* 7 7* 8 6* 6 6* 6 6* 8 1* 7 3* 5 9* 9 6*   HEMATOCRIT % 24 8* 27 3* 23 5* 25 6* 20 2* 20 2* 24 7* 22 2* 18 3* 28 5*   PLATELETS Thousands/uL 73* 92* 88* 95*  --  47*  --  54* 59* 76*   BANDS PCT %  --   --   --   --   --  11*  --   --  17* 38*   MONO PCT %  --   --   --   --   --   --   --   --  0* 0*     Results from last 7 days   Lab Units 08/13/22  0520 08/12/22  0533 08/11/22  0542 08/10/22  0826 08/09/22  0620 08/08/22  1246 08/07/22  0509   SODIUM mmol/L 136 139 142 140 137 131* 130*   POTASSIUM mmol/L 3 7 3 7 3 4* 3 7 3 4* 4 4 4 0   CHLORIDE mmol/L 105 109* 113* 112* 106 96 95*   CO2 mmol/L 24 24 24 24 24 22 28   ANION GAP mmol/L 7 6 5 4 7 13 7   BUN mg/dL 15 13 12 12 17 28* 20   CREATININE mg/dL 0 75 0 65 0 60 0 58* 0 77 1 50* 0 64   CALCIUM mg/dL 8 2* 8 8 8 1* 8 5 8 7 9 8 9 3   GLUCOSE RANDOM mg/dL 124 90 110 111 87 161* 137   ALT U/L  --   --   --   --   --  64  --    AST U/L  --   --   --   --   --  35  --    ALK PHOS U/L  --   --   --   --   --  76  --    ALBUMIN g/dL  --   --   --   --   --  2 0*  --    TOTAL BILIRUBIN mg/dL  --   --   --   --   --  0 58  --      Results from last 7 days   Lab Units 08/13/22  0520 08/09/22  0620   MAGNESIUM mg/dL 1 9 1 7   PHOSPHORUS mg/dL  --  3 4      Results from last 7 days   Lab Units 08/10/22  0826 08/09/22  1304 08/08/22  1316   INR  0 98 1 28* 1 13   PTT seconds 37 35 34          Results from last 7 days   Lab Units 08/09/22  0013 08/08/22  2105 08/08/22  1749 08/08/22  1508 08/08/22  1316   LACTIC ACID mmol/L 1 8 2 2* 2 9* 7 2* 7 7*     ABG:  Results from last 7 days   Lab Units 08/13/22  0137   PH ART  7 466*   PCO2 ART mm Hg 30 8*   PO2 ART mm Hg 32 7*   HCO3 ART mmol/L 21 7*   BASE EXC ART mmol/L -1 5   ABG SOURCE  Radial, Right     VBG:  Results from last 7 days   Lab Units 08/13/22  0137 08/09/22  1812 08/08/22  1748   PH SHANNA   --   --  7 378   PCO2 SHANNA mm Hg  --   --  41 1*   PO2 SHANNA mm Hg  --   --  25 0*   HCO3 SHANNA mmol/L  --   --  23 7*   BASE EXC SHANNA mmol/L  --   --  -1 4   ABG SOURCE  Radial, Right   < >  --     < > = values in this interval not displayed  Results from last 7 days   Lab Units 08/09/22  0620 08/08/22  1316   PROCALCITONIN ng/ml 3 31* 3 34*       Micro  Results from last 7 days   Lab Units 08/11/22  1345 08/08/22  1611 08/08/22  1511 08/08/22  1316   BLOOD CULTURE   --   --   --  No Growth After 5 Days  No Growth After 5 Days  GRAM STAIN RESULT  No Polys or Bacteria seen  --   --   --    URINE CULTURE   --   --  No Growth <1000 cfu/mL  --    BODY FLUID CULTURE, STERILE  No growth  --   --   --    MRSA CULTURE ONLY   --  No Methicillin Resistant Staphlyococcus aureus (MRSA) isolated  --   --        EKG:   Imaging: I have personally reviewed pertinent reports        Intake and Output  I/O       08/12 0701 08/13 0700 08/13 0701 08/14 0700    I V  (mL/kg) 34 4 (0 4) 8 4 (0 1)    NG/ 60    IV Piggyback 137 9     Total Intake(mL/kg) 952 3 (11 4) 68 4 (0 8)    Urine (mL/kg/hr) 1205 (0 6)     Emesis/NG output 0     Drains 5     Total Output 1210     Net -257 7 +68 4          Unmeasured Urine Occurrence 1 x           Height and Weights Height: 5' 8" (172 7 cm)  IBW (Ideal Body Weight): 68 4 kg  Body mass index is 28 12 kg/m²  Weight (last 2 days)     None            Nutrition       Diet Orders   (From admission, onward)             Start     Ordered    08/13/22 1102  Diet Regular; Pleasure Feed  Diet effective now        References:    Nutrtion Support Algorithm Enteral vs  Parenteral   Question Answer Comment   Diet Type Regular    Regular Pleasure Feed    RD to adjust diet per protocol? No        08/13/22 1108    08/12/22 1151  Dietary nutrition supplements  Once        Question Answer Comment   Select Supplement: Magic Cup Chocolate    Frequency Lunch, Dinner        08/12/22 1151                  Active Medications  Scheduled Meds:  Current Facility-Administered Medications   Medication Dose Route Frequency Provider Last Rate    bisacodyl  10 mg Rectal Daily PRN Lacy Reese DO      fentaNYL  50 mcg/hr Intravenous Continuous CORRINE Jones 50 mcg/hr (08/14/22 0255)    glycopyrrolate  0 2 mg Intravenous Q1H PRN CORRINE Jones      haloperidol lactate  2 mg Intravenous Q3H PRN CORRINE Jones      HYDROmorphone  0 5 mg Intravenous Q1H PRN CORRINE Jones      levETIRAcetam  1,000 mg Intravenous Q12H [facility] Alejandra Goldstein DO 1,000 mg (08/13/22 2147)    LORazepam  1 mg Intravenous Q3H PRN CORRINE Evans       Continuous Infusions:  fentaNYL, 50 mcg/hr, Last Rate: 50 mcg/hr (08/14/22 0255)      PRN Meds:   bisacodyl, 10 mg, Daily PRN  glycopyrrolate, 0 2 mg, Q1H PRN  haloperidol lactate, 2 mg, Q3H PRN  HYDROmorphone, 0 5 mg, Q1H PRN  LORazepam, 1 mg, Q3H PRN        Invasive Devices Review  Invasive Devices  Report    Central Venous Catheter Line  Duration           Port A Cath 06/17/21 Right Chest 422 days          Peripheral Intravenous Line  Duration           Peripheral IV 08/10/22 Dorsal (posterior); Left Forearm 4 days    Peripheral IV 08/12/22 Dorsal (posterior); Right Forearm 2 days          Drain  Duration           NG/OG/Enteral Tube Nasogastric Right nare 4 days    External Urinary Catheter  3 days    Abscess Drain RLQ 2 days                Rationale for remaining devices: comfort   ---------------------------------------------------------------------------------------  Advance Directive and Living Will:      Power of :    POLST:    ---------------------------------------------------------------------------------------  Care Time Delivered:   No Critical Care time spent       CORRINE Burnette      Portions of the record may have been created with voice recognition software  Occasional wrong word or "sound a like" substitutions may have occurred due to the inherent limitations of voice recognition software    Read the chart carefully and recognize, using context, where substitutions have occurred

## 2022-08-14 NOTE — DISCHARGE SUMMARY
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 ([address] ) [C7A 8, C7B 8]  Severe sepsis ([address] ) [A41 9, R65 20]      Procedures Performed:   Orders Placed This Encounter   Procedures    ED ECG Documentation Only       Hospital Course:    Per admitting physician, "Randal Harmon is a 69 y o  male with a complicated past medical history including 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 s/p two resections, most recent of which was recently performed on 7/28/22 by Dr Georgia Weathers  Additional history of A-fib, hypothyroidism, SIADH, HTN, gout, and hyperlipidemia  He presents today for worsening mental status      Patient's wife went to visit him today at rehab and found him yelling out in pain, "babbeling," more lethargic, and more confused compared to the evening prior  This is very different than how he was acting during his admission  EMS was called and patient was brought to the ED where he was found to have new multifocal pneumonia versus worsening mets versus septic emboli, severe sepsis, hyponatremia, fever at 101 3F, and right thigh pain  He has required supplemental O2 at approximately 6 L/min and maintaining sats in the low 90's%  Labs remarkable for lactic acid of 7 7, normal WBC, procalcitonin of 3 34  He was evaluated in the ED by the ICU team and will be admitted to the critical care service for additional evaluation and treatment      To review recent admission, patient presented to [address] [place] [address] on 7/28 due to dizziness and was found to have new-onset A-fib which was medically treated  He was found to have increasing size of known cerebellar hemorrhagic mass with edema and mass effect compromising the 4th ventricle  [facility] was [facility] and underwent left retrosigmoid posterior fossa craniectomy and mass resection on 7/28/22  During his stay he was also treated for hyponatremia which is believed to be due to SIADH 2/2 malignancy which was treated with fluid restriction and salt liberalization, Afib with RVR, and thrombocytopenia  Additionally complained of diplopia and was given eye patch  During stay he was started on loading dose of amiodarone which is to be completed on 8/13 and he can then start daily dosing on 8/14 "     Pt was admitted to neuro ICU  He was started on antibiotics for pneumonia and suspected thigh abscess  His respiratory status began to deteriorate, requiring HFNC  Pt continued to require HFNC, unable to be weaned  Family decided to change level of care and pt was made level 4 comfort care  Pt deemed clinically stable  Will be discharged to hospice house  Exam on Day of Discharge:   Physical Exam  Constitutional:       Appearance: He is ill-appearing  Cardiovascular:      Rate and Rhythm: Normal rate and regular rhythm  Heart sounds: Normal heart sounds  Pulmonary:      Breath sounds: Wheezing present  No rales (expiratory tanya on R)  Comments: coarse  Chest:      Chest wall: No tenderness  Abdominal:      Palpations: Abdomen is soft  Musculoskeletal:      Right lower leg: No edema  Left lower leg: No edema  Skin:     General: Skin is warm and dry  Neurological:      Comments: Wakes to command, minimally verbal  Follows commands         Condition at Discharge: fair     Discharge instructions/Information to patient and family:   See after visit summary for information provided to patient and family  Provisions for Follow-Up Care:  See after visit summary for information related to follow-up care and any pertinent home health orders        Disposition: Hospice house    Planned Readmission: No    Discharge Statement   I spent 30 minutes discharging the patient  This time was spent on the day of discharge  I had direct contact with the patient on the day of discharge  Additional documentation is required if more than 30 minutes were spent on discharge  Discharge Medications:  See after visit summary for reconciled discharge medications provided to patient and family    Medication changes made with this admission:  · Please see AVS summary    Martha Corral DO  [facility] Medicine PGY2  8/14/2022  3:16 PM

## 2022-08-14 NOTE — CASE MANAGEMENT
Case Management Progress Note    Patient name Zaid French  Location ICU 02/ICU 02 MRN [id number]  : 1955 Date 2022       LOS (days): 6  Geometric Mean LOS (GMLOS) (days): 4 80  Days to GMLOS:-1        OBJECTIVE:        Current admission status: Inpatient  Preferred Pharmacy:   [id number] [address], [address]   [facility] [address]  Phone: [phone] Fax: [address], [address] [facility] [address] [facility]  Phone: [phone] Fax: [phone]    Primary Care Provider: Linda Medrano DO    Primary Insurance: MEDICARE  Secondary Insurance: AARP    PROGRESS NOTE:      Message sent to [facility] team to inquire about their recommendations for the pt

## 2022-08-15 ENCOUNTER — TRANSITIONAL CARE MANAGEMENT (OUTPATIENT)
Dept: FAMILY MEDICINE CLINIC | Facility: CLINIC | Age: 67
End: 2022-08-15

## 2022-08-17 LAB
SRA .2 IU/ML UFH SER-ACNC: <1 % (ref 0–20)
SRA 100IU/ML UFH SER-ACNC: <1 % (ref 0–20)
SRA UFH SER-IMP: NORMAL

## 2022-08-19 ENCOUNTER — HOME CARE VISIT (OUTPATIENT)
Dept: HOME HOSPICE | Facility: HOSPICE | Age: 67
End: 2022-08-19
Payer: MEDICARE

## 2022-08-19 NOTE — HOSPICE
Tana Mckinley, Bereavement Final [facility] 22 (1MR) Due: 22  : 1955  SOC: 22  DOD: 22, <24hrs  Diagnosis: Lung Cancer  Primary: Wife - Ericka Zhou was a 69year old man who passed shortly after arriving at the [facility]  MSW met spouse Paresh Osborn and brother Maria Del Carmen Rosen  Informed them of Adalid's passing  Paresh Osborn immediately became tearful  She said she planned to follow the ambulance but was told to allow time for him to settle in, so she stopped to get something to eat  She said she had promised Cristobal Jerry that she would be there  RN assured them he  peacefully  Paresh Osborn sat at bedside crying  Maria Del Carmen Silas held back tears and reported their father  three weeks ago  Maria Del Carmen Silas reported his mother Jay Garcia is weak, needs help, and that he and his sister are going to need to start taking turns with her  Cristobal Jerry and Paresh Osborn were together 40 years  Bereavement support was mentioned  Maria Del Carmen Rosen declined follow up for himself but wanted to share information with his mother  Paresh Osborn was the only person to provide contact information and may be passing it along  It was shared at [facility] that Cristobal Jerry was to come to the [facility] on Saturday but he was not stable enough for transport   the hospital called again requesting transfer to the [facility] and [facility] staff told he was stable enough for transport  Cristobal Jerry had been on 10L of high flow oxygen and was just transferred to the [facility] where he passed soon after arrival     Call Assignments:  Hazel Pittman to reassess wife Diana Torres at MR   (Due: 22)

## 2024-02-27 NOTE — ASSESSMENT & PLAN NOTE
Patient presented with new onset apraxias and aphasia  Found to have a left parietal brain mass with vasogenic edema, 2 additional left cerebellar masses    Patient has a known history of small-cell lung cancer (dx 4/2021) status post chemotherapy/surgery/radiation/immunotherapy at [facility]  · CT chest abdomen pelvis without any evidence of metastatic disease  · Neurology consulted--no current recommendations  · Neurosurgery following, input appreciated  · Image continue Decadron 4 mg q 6 hours, no need for keppra at this point  · SBP <160  · Patient was started on salt tablets in ICU for unclear reason, sodium has been normal, will likely discontinue  · Holding DVT prophylaxis at this time Statement Selected

## 2024-04-02 ENCOUNTER — DOCUMENTATION (OUTPATIENT)
Dept: HOME HEALTH SERVICES | Facility: HOME HEALTHCARE | Age: 69
End: 2024-04-02

## 2024-04-02 NOTE — PROGRESS NOTES
Adalid Haywood, Bereavement Final [facility] 22 (1MR) Due: 22 SENT TO [facility]   : 1955   SOC: 22   DOD: 22, <24hrs   Diagnosis: Lung Cancer   Primary: Wife - Wendi Cordoba was a 67 year old man who passed shortly after arriving at the IPU. MSW met spouse Wendi and brother Marquise. Informed them of Adalid’s passing. Wendi immediately became tearful. She said she planned to follow the ambulance but was told to allow time for him to settle in, so she stopped to get something to eat. She said she had promised Adalid that she would be there. RN assured them he  peacefully. Wendi sat at bedside crying. Marquise held back tears and reported their father  three weeks ago. Marquise reported his mother Steff is weak, needs help, and that he and his sister are going to need to start taking turns with her. Adalid and Wendi were together 40 years. Bereavement support was mentioned. Maruqise declined follow up for himself but wanted to share information with his mother. Wendi was the only person to provide contact information and may be passing it along. It was shared at [facility] that Adalid was to come to the IPU on Saturday but he was not stable enough for transport.  the hospital called again requesting transfer to the IPU and IPU staff told he was stable enough for transport. Adalid had been on 10L of high flow oxygen and was just transferred to the IPU where he passed soon after arrival.      Call Assignments:   ELIA Up to reassess wife Wendi Haywood at MR. (Due: 22)      Team: GINO Mackay.      Bereaved:   Wendi Haywood (Wife) - Fredy, phone, MR     LATE ENTRY ADD TO CHART   Note added to chart by Michelle Shea

## 2024-05-06 NOTE — PROGRESS NOTES
Transfer Note - ICU/Stepdown Transfer to [facility]/MS tele   Iver Mems 69 y o  male MRN: [id number]  [address]   Unit/Bed#: ICU 04 Encounter: [id number]    High grade neuroendocrine carcinoma of lung [facility])  Assessment & Plan  Primary lung neuroendocrine carcinoma with brain metastasis  ? Oncology following, appreciate recommendations  ? S/p lung resection November 2021  ? Chemotherapy, RXT        Hyponatremia  Assessment & Plan  · Hyponatremia -- Likely SIADH  ? Serum Na 131 on 7/24/22, 134 on 7/28/22, 126 7/30/22  ? Serum osmo 269  ? Urine osmolality 711, urine sodium 126 -- following Lasix  Repeat urine osmolality 534, urine sodium 81  ? Uric acid 7 9  ? Was started on NaCl 3% with rise in serum Na to 133 -- hypertonic NaCl discontinued and sodium tabs started  ? Continue diet with fluid restriction 1500 mL  ? Continue BMP q3h  ? Avoid overcorrection -- no more than 8 mEq in 24 hours  ? Celexa has been discontinued  ? Nephrology following, appreciate recommendations  ? I/Os close monitoring  ? Monitor urine output and renal indices      Atrial fibrillation with rapid ventricular response (HCC)  Assessment & Plan  On p o  Amiodarone, Cardizem, metoprolol for rate control for afib    Hypothyroidism  Assessment & Plan  Continue home levothyroxine      Thrombocytopenia (HCC)  Assessment & Plan  · Baseline 90s to 100  · CBC daily  · Transfuse if < 100, or active bleeding  · DVT prophylaxis with heparin SC    Adenocarcinoma, lung, left (HCC)  Assessment & Plan  · Metastatic left lung adenocarcinoma  ? S/p left upper lobe resection, radiation/chemotherapy  ? CT CAP:  Multiple new bilateral irregular lung nodules suspicious for metastases, no metastatic disease in abdomen or pelvis  ? Oncology following, appreciate recommendations  ? Titrate FiO2 for SpO2 > 92%  ?  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 Continue serial neuro checks  ? Stat CT head for decline in GCS > 2 in 1 hour  ? Holding all AC/AP 2/2 recent neurological procedure  § Per neurosurgery okay for DVT prophylaxis with heparin  ? Continue Decadron 4 mg q 6 hours with taper  ? Maintain goal SBP less than 160  ? Hyponatremia treatment as below  ? PT/OT -- recommending acute rehab  ? 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 [facility])  Active Problems:    Essential hypertension    Mixed hyperlipidemia    Adenocarcinoma, lung, left (HCC)    History of gout    Thrombocytopenia (HCC)    Hypothyroidism    Atrial fibrillation with rapid ventricular response (HCC)    Hyponatremia    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)  Resolved Problems:    * No resolved hospital problems  *      Consultants:   Neurosurgery  Nephrology  Cardiology  Podiatry    History of Present Illness:   Jamie Dominguez is a 69 y o  male with history of 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 s/p lung resection in November 2021, chemotherapy, and radiation, new onset AFib, hypothyroidism, hypertension, gout, hyperlipidemia who presented to the [facility] with lightheadedness  Found to be in rapid AFib with RVR  Required Cardizem drip, transitioned to PO cardizem    CTH this admission showed increased cerebellar mass (3 9 cm, prior 1 8 cm) with edema and mass effect on the 4th ventricle and new nodule in left cerebellum  MRI brain showed 4 3 cm hemorrhagic metastatic lesion in left cerebellum , 0 6 cm for adjunct metastatic lesion left cerebellum, 0 5 cm left parietal lobe hemorrhagic lesion  Summary of clinical course:   Pt admitted to the ICU following left retrosigmoid/posterior fossa craniotomy on 7/28  The pt was noted to be hyponatremic despite fluid restriction and salt tabs and therefore was a concern for SIADH, and therefore was started on hypertonic saline which improved his sodium level  The patient was subsequently transition to salt tabs only and this stabilized his sodium level  Cardiology was seeing the patient and adjusted medications due to his atrial fibrillation  The patient was on a Decadron taper due to his surgery  Podiatry was consulted for an ingrown toenail  The patient stabilized and was ready for downgrade and was transitioned to [facility] for further management and care  Recent or scheduled procedures:   Left retrosigmoid/posterior fossa craniotomy on 7/28       Outstanding/pending diagnostics:   None    Cultures:   None       Mobilization Plan:   Up out of bed as tolerated    Nutrition Plan:   Regular diet, fluid restriction 1500 mL    Invasive Devices Review  Invasive Devices  Report    Peripherally Inserted Central Catheter Line  Duration           PICC Line 03/03/31 Left Basilic 1 day          Central Venous Catheter Line  Duration           Port A Cath 06/17/21 Right Chest 409 days          Peripheral Intravenous Line  Duration           Peripheral IV 07/29/22 Distal;Left;Upper;Ventral (anterior) Arm 2 days                Rationale for remaining devices:  Required for continued medical care    VTE Pharmacologic Prophylaxis: Heparin  VTE Mechanical Prophylaxis: sequential compression device    Discharge Plan:   Patient should be ready for discharge to rehab after further stabilization    Initial Physical Therapy Recommendations:  Post acute rehab services  Initial Occupational Therapy Recommendations:  Post acute rehab services  Initial /Plan:  Post acute rehab services    Home medications that are not reordered and reason why:   See initial H and P from [facility] [place] with Dr Herbie Coombs regarding transfer  Please contact critical care via [employer] with any questions or concerns  Portions of the record may have been created with voice recognition software  Occasional wrong word or "sound a like" substitutions may have occurred due to the inherent limitations of voice recognition software  Read the chart carefully and recognize, using context, where substitutions have occurred  yes

## (undated) DEVICE — PACK CRANIOTOMY PBDS RF

## (undated) DEVICE — ROSEBUD DISSECTORS: Brand: DEROYAL

## (undated) DEVICE — INTENDED FOR TISSUE SEPARATION, AND OTHER PROCEDURES THAT REQUIRE A SHARP SURGICAL BLADE TO PUNCTURE OR CUT.: Brand: BARD-PARKER SAFETY BLADES SIZE 10, STERILE

## (undated) DEVICE — LIGHT HANDLE COVER SLEEVE DISP BLUE STELLAR

## (undated) DEVICE — ADHESIVE SKIN HIGH VISCOSITY EXOFIN 1ML

## (undated) DEVICE — ANTIBACTERIAL VIOLET BRAIDED (POLYGLACTIN 910), SYNTHETIC ABSORBABLE SUTURE: Brand: COATED VICRYL

## (undated) DEVICE — 3M™ IOBAN™ 2 ANTIMICROBIAL INCISE DRAPE 6640EZ: Brand: IOBAN™ 2

## (undated) DEVICE — DURASEAL 5ML

## (undated) DEVICE — CHLORAPREP HI-LITE 26ML ORANGE

## (undated) DEVICE — SUT SILK 2-0 SH CR/8 18 IN C012D

## (undated) DEVICE — TELFA NON-ADHERENT ABSORBENT DRESSING: Brand: TELFA

## (undated) DEVICE — PLASTIC ADHESIVE BANDAGE: Brand: CURITY

## (undated) DEVICE — TOOL 9MH30 LEGEND 9CM 3MM MH: Brand: MIDAS REX

## (undated) DEVICE — 3000CC GUARDIAN II: Brand: GUARDIAN

## (undated) DEVICE — DRAPE INTESTINAL ISOLATION BAG

## (undated) DEVICE — PETRI DISH STERILE

## (undated) DEVICE — SPONGE SCRUB 4 PCT CHLORHEXIDINE

## (undated) DEVICE — TRAY FOLEY 16FR URIMETER SILICONE SURESTEP

## (undated) DEVICE — ELECTRODE BLADE MOD E-Z CLEAN 2.5IN 6.4CM -0012M

## (undated) DEVICE — INTENDED FOR TISSUE SEPARATION, AND OTHER PROCEDURES THAT REQUIRE A SHARP SURGICAL BLADE TO PUNCTURE OR CUT.: Brand: BARD-PARKER ® CARBON RIB-BACK BLADES

## (undated) DEVICE — DRAIN JACKSON PRATT 10FR 1/8END: Brand: CARDINAL HEALTH

## (undated) DEVICE — JACKSON-PRATT 100CC BULB RESERVOIR: Brand: CARDINAL HEALTH

## (undated) DEVICE — MEDI-VAC YANKAUER SUCTION HANDLE W/STRAIGHT TIP & CONTROL VENT: Brand: CARDINAL HEALTH

## (undated) DEVICE — DRAPE SHEET THREE QUARTER

## (undated) DEVICE — 3M™ IOBAN™ 2 ANTIMICROBIAL INCISE DRAPE 6650EZ: Brand: IOBAN™ 2

## (undated) DEVICE — MARKER REFLECTIVE RADIOPAQUE SPHERE

## (undated) DEVICE — TUBING SUCTION 5MM X 12 FT

## (undated) DEVICE — COTTON TIP APPLICTOR 2 PK

## (undated) DEVICE — GLOVE INDICATOR PI UNDERGLOVE SZ 8 BLUE

## (undated) DEVICE — INTENDED FOR TISSUE SEPARATION, AND OTHER PROCEDURES THAT REQUIRE A SHARP SURGICAL BLADE TO PUNCTURE OR CUT.: Brand: BARD-PARKER SAFETY BLADES SIZE 15, STERILE

## (undated) DEVICE — HEMOSTATIC MATRIX SURGIFLO 8ML W/THROMBIN

## (undated) DEVICE — BETADINE OINTMENT FOIL PACK

## (undated) DEVICE — SUT MONOCRYL 4-0 PS-2 18 IN Y496G

## (undated) DEVICE — ANTI-FOG SOLUTION WITH FOAM PAD: Brand: DEVON

## (undated) DEVICE — PROXIMATE SKIN STAPLERS (35 WIDE) CONTAINS 35 STAINLESS STEEL STAPLES (FIXED HEAD): Brand: PROXIMATE

## (undated) DEVICE — 40601 PROLONGED POSITIONING SYSTEM: Brand: 40601 PROLONGED POSITIONING SYSTEM

## (undated) DEVICE — DISPOSABLE SLIM BIPOLAR FORCEPS, NON-STICK,: Brand: SPETZLER-MALIS

## (undated) DEVICE — DISPOSABLE EQUIPMENT COVER: Brand: SMALL TOWEL DRAPE

## (undated) DEVICE — GLOVE SRG BIOGEL ECLIPSE 6

## (undated) DEVICE — PACKING VAGINAL 2 IN

## (undated) DEVICE — SURGICEL 4 X 8

## (undated) DEVICE — SYRINGE 10ML SLIP TIP LF

## (undated) DEVICE — OCCLUSIVE GAUZE STRIP,3% BISMUTH TRIBROMOPHENATE IN PETROLATUM BLEND: Brand: XEROFORM

## (undated) DEVICE — UTILITY MARKER,BLACK WITH LABELS: Brand: DEVON

## (undated) DEVICE — SUT PROLENE 2-0 CT-2 30 IN 8411H

## (undated) DEVICE — GLOVE INDICATOR PI UNDERGLOVE SZ 6.5 BLUE

## (undated) DEVICE — 3M™ STERI-STRIP™ REINFORCED ADHESIVE SKIN CLOSURES, R1547, 1/2 IN X 4 IN (12 MM X 100 MM), 6 STRIPS/ENVELOPE: Brand: 3M™ STERI-STRIP™

## (undated) DEVICE — SUT NUROLON 4-0 TF CR/8 18 IN C584D

## (undated) DEVICE — DRAPE MICROSCOPE ARMATEC 54 X 150IN F/LEICA

## (undated) DEVICE — PENCIL ELECTROSURG E-Z CLEAN -0035H

## (undated) DEVICE — SUT VICRYL 3-0 SH 27 IN J416H

## (undated) DEVICE — MAYFIELD® DISPOSABLE ADULT SKULL PIN (PLASTIC BASE): Brand: MAYFIELD®

## (undated) DEVICE — ADAPTOR TRACH SWIVEL

## (undated) DEVICE — PAD GROUNDING ADULT

## (undated) DEVICE — Device: Brand: IQ SYSTEM

## (undated) DEVICE — BETHLEHEM UNIVERSAL MINOR GEN: Brand: CARDINAL HEALTH

## (undated) DEVICE — FIRST STEP BEDSIDE KIT - STAND-UP POUCH, ENDOSCOPIC CLEANING PAD - 1 POUCH: Brand: FIRST STEP BEDSIDE KIT - STAND-UP POUCH, ENDOSCOPIC CLEANING PAD

## (undated) DEVICE — TOOL F2/8TA23 LEGEND 8CM 2.3MM TAPER: Brand: MIDAS REX™

## (undated) DEVICE — SOFT SILICONE HYDROCELLULAR SACRUM DRESSING WITH LOCK AWAY LAYER: Brand: ALLEVYN LIFE SACRUM (LARGE) PACK OF 10

## (undated) DEVICE — SINGLE USE BIOPSY VALVE MAJ-210: Brand: SINGLE USE BIOPSY VALVE (STERILE)

## (undated) DEVICE — 2000CC GUARDIAN II: Brand: GUARDIAN

## (undated) DEVICE — SINGLE USE SUCTION VALVE MAJ-209: Brand: SINGLE USE SUCTION VALVE (STERILE)